# Patient Record
Sex: FEMALE | Race: WHITE | NOT HISPANIC OR LATINO | Employment: OTHER | URBAN - NONMETROPOLITAN AREA
[De-identification: names, ages, dates, MRNs, and addresses within clinical notes are randomized per-mention and may not be internally consistent; named-entity substitution may affect disease eponyms.]

---

## 2017-01-09 RX ORDER — OMEPRAZOLE 40 MG/1
40 CAPSULE, DELAYED RELEASE ORAL EVERY MORNING
Qty: 30 CAPSULE | Refills: 3 | Status: SHIPPED | OUTPATIENT
Start: 2017-01-09 | End: 2017-03-23

## 2017-01-10 ENCOUNTER — TRANSITIONAL CARE MANAGEMENT TELEPHONE ENCOUNTER (OUTPATIENT)
Dept: INTERNAL MEDICINE | Facility: CLINIC | Age: 77
End: 2017-01-10

## 2017-01-10 ENCOUNTER — OFFICE VISIT (OUTPATIENT)
Dept: INTERNAL MEDICINE | Facility: CLINIC | Age: 77
End: 2017-01-10

## 2017-01-10 VITALS
SYSTOLIC BLOOD PRESSURE: 110 MMHG | TEMPERATURE: 98.2 F | RESPIRATION RATE: 14 BRPM | BODY MASS INDEX: 22.26 KG/M2 | HEART RATE: 78 BPM | OXYGEN SATURATION: 96 % | HEIGHT: 62 IN | DIASTOLIC BLOOD PRESSURE: 58 MMHG | WEIGHT: 121 LBS

## 2017-01-10 DIAGNOSIS — J18.9 PNEUMONIA DUE TO INFECTIOUS ORGANISM, UNSPECIFIED LATERALITY, UNSPECIFIED PART OF LUNG: Primary | ICD-10-CM

## 2017-01-10 DIAGNOSIS — R51.9 HEADACHE, UNSPECIFIED HEADACHE TYPE: ICD-10-CM

## 2017-01-10 RX ORDER — MECLIZINE HCL 12.5 MG/1
TABLET ORAL
Refills: 0 | COMMUNITY
Start: 2017-01-07 | End: 2017-01-12 | Stop reason: SDUPTHER

## 2017-01-10 NOTE — MR AVS SNAPSHOT
Sadie Tijerina   1/10/2017 4:15 PM   Office Visit    Provider:  Kristian Wolff MD   Department:  [facility] PRIMARY CARE   Dept Phone:  [phone]                Your Full Care Plan              Today's Medication Changes          These changes are accurate as of: 1/10/17  5:06 PM.  If you have any questions, ask your nurse or doctor.               Medication(s)that have changed:     meclizine 12.5 MG tablet   Commonly known as:  ANTIVERT   otc   What changed:  Another medication with the same name was removed. Continue taking this medication, and follow the directions you see here.   Changed by:  Kristian Wolff MD         Stop taking medication(s)listed here:     buPROPion 100 MG tablet   Commonly known as:  WELLBUTRIN   Stopped by:  Kristian Wolff MD           fluconazole 150 MG tablet   Commonly known as:  DIFLUCAN   Stopped by:  Kristian Wolff MD           metroNIDAZOLE 500 MG tablet   Commonly known as:  FLAGYL   Stopped by:  Kristian Wolff MD           ondansetron ODT 4 MG disintegrating tablet   Commonly known as:  ZOFRAN-ODT   Stopped by:  Kristian Wolff MD                      Your Updated Medication List          This list is accurate as of: 1/10/17  5:06 PM.  Always use your most recent med list.                * albuterol (2.5 MG/3ML) 0.083% nebulizer solution   Commonly known as:  PROVENTIL   USE 1 AMPULE IN NEBULIZER FOUR TIMES A DAY AS NEEDED       * albuterol 108 (90 BASE) MCG/ACT inhaler   Commonly known as:  PROVENTIL HFA;VENTOLIN HFA   Inhale 1 puff Every 4 (Four) Hours As Needed for shortness of air.       amLODIPine 5 MG tablet   Commonly known as:  NORVASC   Take 1/2  tablet by mouth in morning and 1/2 at night if BP over 150       clonazePAM 0.5 MG tablet   Commonly known as:  KlonoPIN   Take 1 tablet by mouth Every Night.       estrogens (conjugated) 0.625 MG tablet   Commonly known as:  PREMARIN   Take daily for 21 days then do not take for 7 days.        Fluticasone Propionate (Inhal) 250 MCG/BLIST aerosol powder    Commonly known as:  FLOVENT DISKUS   Take 1 puff by mouth 2 (Two) Times a Day.       losartan 50 MG tablet   Commonly known as:  COZAAR   Take 1 tablet by mouth Daily.       meclizine 12.5 MG tablet   Commonly known as:  ANTIVERT       MUCINEX PO       omeprazole 40 MG capsule   Commonly known as:  priLOSEC   Take 1 capsule by mouth Every Morning.       pravastatin 20 MG tablet   Commonly known as:  PRAVACHOL   Take 1 tablet by mouth Every Night.       propranolol 60 MG tablet   Commonly known as:  INDERAL   TAKE 1 TABLET BY MOUTH ONE TIME A DAY       sertraline 50 MG tablet   Commonly known as:  ZOLOFT       SPIRIVA HANDIHALER 18 MCG per inhalation capsule   Generic drug:  tiotropium   INHALE CONTENTS 1 CAPSULE BY MOUTH ONE TIME A DAY       traMADol 50 MG tablet   Commonly known as:  ULTRAM       * Notice:  This list has 2 medication(s) that are the same as other medications prescribed for you. Read the directions carefully, and ask your doctor or other care provider to review them with you.            We Performed the Following     Ambulatory Referral to Neurology       You Were Diagnosed With        Codes Comments    Pneumonia due to infectious organism, unspecified laterality, unspecified part of lung    -  Primary ICD-10-CM: J18.9  ICD-9-CM: 136.9, 484.8     Headache, unspecified headache type     ICD-10-CM: R51  ICD-9-CM: 784.0       Instructions     None    Patient Instructions History      [facility] [facility] Hospital[facility]Done[facility] Signup     [facility] [employer] allows you to send messages to your doctor, view your test results, renew your prescriptions, schedule appointments, and more. To sign up, go to [employer] and click on the Sign Up Now link in the New User? box. Enter your [employer] Activation Code exactly as it appears below along with the last four digits of your Social Security Number and your Date of Birth () to complete the sign-up process. If  "you do not sign up before the expiration date, you must request a new code.    [employer] Activation Code: [email or web address]  Expires: 1/24/2017  5:06 PM    If you have questions, you can email [email or web address] or call [phone] to talk to our [employer] staff. Remember, [employer] is NOT to be used for urgent needs. For medical emergencies, dial 911.               Other Info from Your Visit           Allergies     Morphine And Related      Prednisone        Reason for Visit     Transitional Care Management Here for hospital follow up      Vital Signs     Blood Pressure Pulse Temperature Respirations Height Weight    110/58 78 98.2 °F (36.8 °C) 14 62\" (157.5 cm) 121 lb (54.9 kg)    Oxygen Saturation Body Mass Index Smoking Status             96% 22.13 kg/m2 Current Every Day Smoker         Problems and Diagnoses Noted     Pneumonia due to infectious organism, unspecified laterality, unspecified part of lung    -  Primary    Headache          "

## 2017-01-10 NOTE — PROGRESS NOTES
Transitional Care Follow Up Visit  Subjective     Sadie Jo Tijerina is a 76 y.o. female who presents for a transitional care management visit.    Within 48 business hours after discharge our office contacted her via telephone to coordinate her care and needs.      I reviewed and discussed the details of that call along with the discharge summary, hospital problems, inpatient lab results, inpatient diagnostic studies, and consultation reports with Sadie.        Date of TCM Phone Call 1/10/2017   [facility]   Date of Admission 12/26/2016   Date of Discharge 1/7/2017   Discharge Disposition Home or Self Care       History of Present Illness   Course During Hospital Stay:  Right upper lobe and middle lobe community-acquired pneumonia. COPD with acute exacerbation,    dysphagia and diarrhea resolved. Persistent headache with CT head done seeing Neurology.     No cough now no F/c , no chest pain no soa, Generalized weakness. Also headache, off and on, CT head showed microvascular diease. Also  C/o jaw pain bilateral. HTN stable without med. , COPD stable on medicine, Left-sided mastoid effusion. And dizzy ?due to this. Last CXR normal       The following portions of the patient's history were reviewed and updated as appropriate: allergies, current medications, past family history, past medical history, past social history, past surgical history and problem list.    Review of Systems   Constitutional: Negative.    Respiratory: Negative.    Cardiovascular: Negative.    Gastrointestinal: Negative.    Musculoskeletal: Negative.    Skin: Negative.    Neurological: Negative.    Psychiatric/Behavioral: Negative.        Objective   Physical Exam   Constitutional: She is oriented to person, place, and time. She appears well-nourished.   Neck: Neck supple.   Cardiovascular: Normal rate, regular rhythm and normal heart sounds.    Pulmonary/Chest: Effort normal and breath sounds normal.   Abdominal: Bowel  sounds are normal.   Neurological: She is alert and oriented to person, place, and time.   Skin: Skin is warm.   Psychiatric: She has a normal mood and affect.       Assessment/Plan   Sadie was seen today for transitional care management.    Diagnoses and all orders for this visit:    Pneumonia due to infectious organism, unspecified laterality, unspecified part of lung, last CXR NSD, continue present Mx    Headache, and dizzy follow up with neuro    CT head micro ischemic changes start ASA 81mg daily    TMJ, aleve or tylenol for now    HTN low now, meds on hold    Weakness, refuse PT    Left mastoid effusion dizzy, refuse to see ENT     3 weeks

## 2017-01-12 RX ORDER — MECLIZINE HCL 12.5 MG/1
12.5 TABLET ORAL 3 TIMES DAILY PRN
Qty: 90 TABLET | Refills: 0 | Status: SHIPPED | OUTPATIENT
Start: 2017-01-12 | End: 2017-02-28 | Stop reason: SDUPTHER

## 2017-01-15 ENCOUNTER — RESULTS ENCOUNTER (OUTPATIENT)
Dept: INTERNAL MEDICINE | Facility: CLINIC | Age: 77
End: 2017-01-15

## 2017-01-15 DIAGNOSIS — J18.9 PNEUMONIA DUE TO INFECTIOUS ORGANISM, UNSPECIFIED LATERALITY, UNSPECIFIED PART OF LUNG: ICD-10-CM

## 2017-01-18 ENCOUNTER — LAB (OUTPATIENT)
Dept: INTERNAL MEDICINE | Facility: CLINIC | Age: 77
End: 2017-01-18

## 2017-01-18 DIAGNOSIS — J18.9 PNEUMONIA DUE TO INFECTIOUS ORGANISM, UNSPECIFIED LATERALITY, UNSPECIFIED PART OF LUNG: Primary | ICD-10-CM

## 2017-01-18 PROCEDURE — 36415 COLL VENOUS BLD VENIPUNCTURE: CPT | Performed by: INTERNAL MEDICINE

## 2017-01-19 LAB
ALBUMIN SERPL-MCNC: 3.7 G/DL (ref 3.2–4.8)
ALBUMIN/GLOB SERPL: 1.4 G/DL (ref 1.5–2.5)
ALP SERPL-CCNC: 96 U/L (ref 25–100)
ALT SERPL-CCNC: 27 U/L (ref 7–40)
AST SERPL-CCNC: 26 U/L (ref 0–33)
BASOPHILS # BLD AUTO: 0.03 10*3/MM3 (ref 0–0.2)
BASOPHILS NFR BLD AUTO: 0.3 % (ref 0–1)
BILIRUB SERPL-MCNC: 0.2 MG/DL (ref 0.3–1.2)
BUN SERPL-MCNC: 14 MG/DL (ref 9–23)
BUN/CREAT SERPL: 15.6 (ref 7–25)
CALCIUM SERPL-MCNC: 9.4 MG/DL (ref 8.7–10.4)
CHLORIDE SERPL-SCNC: 103 MMOL/L (ref 99–109)
CO2 SERPL-SCNC: 24 MMOL/L (ref 20–31)
CREAT SERPL-MCNC: 0.9 MG/DL (ref 0.6–1.3)
EOSINOPHIL # BLD AUTO: 0.18 10*3/MM3 (ref 0.1–0.3)
EOSINOPHIL NFR BLD AUTO: 1.9 % (ref 0–3)
ERYTHROCYTE [DISTWIDTH] IN BLOOD BY AUTOMATED COUNT: 12.8 % (ref 11.3–14.5)
GLOBULIN SER CALC-MCNC: 2.7 GM/DL
GLUCOSE SERPL-MCNC: 89 MG/DL (ref 70–100)
HCT VFR BLD AUTO: 33.4 % (ref 34.5–44)
HGB BLD-MCNC: 10.6 G/DL (ref 11.5–15.5)
IMM GRANULOCYTES # BLD: 0.03 10*3/MM3 (ref 0–0.03)
IMM GRANULOCYTES NFR BLD: 0.3 % (ref 0–0.6)
LYMPHOCYTES # BLD AUTO: 1.97 10*3/MM3 (ref 0.6–4.8)
LYMPHOCYTES NFR BLD AUTO: 20.4 % (ref 24–44)
MCH RBC QN AUTO: 28.6 PG (ref 27–31)
MCHC RBC AUTO-ENTMCNC: 31.7 G/DL (ref 32–36)
MCV RBC AUTO: 90.3 FL (ref 80–99)
MONOCYTES # BLD AUTO: 0.68 10*3/MM3 (ref 0–1)
MONOCYTES NFR BLD AUTO: 7 % (ref 0–12)
NEUTROPHILS # BLD AUTO: 6.79 10*3/MM3 (ref 1.5–8.3)
NEUTROPHILS NFR BLD AUTO: 70.1 % (ref 41–71)
PLATELET # BLD AUTO: 336 10*3/MM3 (ref 150–450)
POTASSIUM SERPL-SCNC: 5.1 MMOL/L (ref 3.5–5.5)
PROT SERPL-MCNC: 6.4 G/DL (ref 5.7–8.2)
RBC # BLD AUTO: 3.7 10*6/MM3 (ref 3.89–5.14)
SODIUM SERPL-SCNC: 144 MMOL/L (ref 132–146)
WBC # BLD AUTO: 9.68 10*3/MM3 (ref 3.5–10.8)

## 2017-01-24 ENCOUNTER — OFFICE VISIT (OUTPATIENT)
Dept: NEUROLOGY | Facility: CLINIC | Age: 77
End: 2017-01-24

## 2017-01-24 VITALS
WEIGHT: 121 LBS | SYSTOLIC BLOOD PRESSURE: 120 MMHG | HEIGHT: 62 IN | HEART RATE: 74 BPM | BODY MASS INDEX: 22.26 KG/M2 | OXYGEN SATURATION: 96 % | DIASTOLIC BLOOD PRESSURE: 62 MMHG

## 2017-01-24 DIAGNOSIS — R42 DIZZINESS: Primary | ICD-10-CM

## 2017-01-24 DIAGNOSIS — G44.209 TENSION HEADACHE: ICD-10-CM

## 2017-01-24 PROCEDURE — 99214 OFFICE O/P EST MOD 30 MIN: CPT | Performed by: NURSE PRACTITIONER

## 2017-01-24 RX ORDER — AMITRIPTYLINE HYDROCHLORIDE 10 MG/1
10 TABLET, FILM COATED ORAL NIGHTLY
Qty: 30 TABLET | Refills: 3 | Status: SHIPPED | OUTPATIENT
Start: 2017-01-24 | End: 2017-03-23

## 2017-01-24 RX ORDER — ASPIRIN 81 MG/1
81 TABLET ORAL DAILY
COMMUNITY
End: 2018-07-25

## 2017-01-24 NOTE — MR AVS SNAPSHOT
[facility] NEUROLOGY  [phone]                    Sadie Tijerina   1/24/2017 3:00 PM   Office Visit    Dept Phone:  [phone]   Encounter #:  [id number]    Provider:  RUBY Paiz   Department:  [facility] NEUROLOGY                Your Full Care Plan              Today's Medication Changes          These changes are accurate as of: 1/24/17  3:23 PM.  If you have any questions, ask your nurse or doctor.               New Medication(s)Ordered:     amitriptyline 10 MG tablet   Commonly known as:  ELAVIL   Take 1 tablet by mouth Every Night.            Where to Get Your Medications      These medications were sent to [id number] - [place], [place] - [address] - [phone]  - [phone] FX  [address] [place] [address]     Phone:  [phone]     amitriptyline 10 MG tablet                  Your Updated Medication List          This list is accurate as of: 1/24/17  3:23 PM.  Always use your most recent med list.                * albuterol (2.5 MG/3ML) 0.083% nebulizer solution   Commonly known as:  PROVENTIL   USE 1 AMPULE IN NEBULIZER FOUR TIMES A DAY AS NEEDED       * albuterol 108 (90 BASE) MCG/ACT inhaler   Commonly known as:  PROVENTIL HFA;VENTOLIN HFA   Inhale 1 puff Every 4 (Four) Hours As Needed for shortness of air.       amitriptyline 10 MG tablet   Commonly known as:  ELAVIL   Take 1 tablet by mouth Every Night.       amLODIPine 5 MG tablet   Commonly known as:  NORVASC   Take 1/2  tablet by mouth in morning and 1/2 at night if BP over 150       aspirin 81 MG EC tablet       clonazePAM 0.5 MG tablet   Commonly known as:  KlonoPIN   Take 1 tablet by mouth Every Night.       estrogens (conjugated) 0.625 MG tablet   Commonly known as:  PREMARIN   Take daily for 21 days then do not take for 7 days.       Fluticasone Propionate (Inhal) 250 MCG/BLIST aerosol powder    Commonly known as:  FLOVENT DISKUS   Take 1 puff by  mouth 2 (Two) Times a Day.       losartan 50 MG tablet   Commonly known as:  COZAAR   Take 1 tablet by mouth Daily.       meclizine 12.5 MG tablet   Commonly known as:  ANTIVERT   Take 1 tablet by mouth 3 (Three) Times a Day As Needed for dizziness.       MUCINEX PO       omeprazole 40 MG capsule   Commonly known as:  priLOSEC   Take 1 capsule by mouth Every Morning.       pravastatin 20 MG tablet   Commonly known as:  PRAVACHOL   Take 1 tablet by mouth Every Night.       propranolol 60 MG tablet   Commonly known as:  INDERAL   TAKE 1 TABLET BY MOUTH ONE TIME A DAY       sertraline 50 MG tablet   Commonly known as:  ZOLOFT       SPIRIVA HANDIHALER 18 MCG per inhalation capsule   Generic drug:  tiotropium   INHALE CONTENTS 1 CAPSULE BY MOUTH ONE TIME A DAY       traMADol 50 MG tablet   Commonly known as:  ULTRAM       * Notice:  This list has 2 medication(s) that are the same as other medications prescribed for you. Read the directions carefully, and ask your doctor or other care provider to review them with you.            Instructions     None    Patient Instructions History      Upcoming Appointments     Visit Type Date Time Department    NEW PATIENT 2017  3:00 PM [facility] [place]    FOLLOW UP 2017  3:30 PM [facility]  RACHEL HALE      [employer] Signup     [facility] [employer] allows you to send messages to your doctor, view your test results, renew your prescriptions, schedule appointments, and more. To sign up, go to [employer] and click on the Sign Up Now link in the New User? box. Enter your [employer] Activation Code exactly as it appears below along with the last four digits of your Social Security Number and your Date of Birth () to complete the sign-up process. If you do not sign up before the expiration date, you must request a new code.    [employer] Activation Code: [email or web address]  Expires: 2/3/2017  5:35 AM    If you have questions, you can email  [email or web address] or call [phone] to talk to our MyChart staff. Remember, [employer] is NOT to be used for urgent needs. For medical emergencies, dial 911.               Other Info from Your Visit           Your Appointments     Jan 26, 2017  3:30 PM EST   Follow Up with Kristian Wolff MD   [facility] PRIMARY CARE (--)    [address] [place] [id number] [phone]           Arrive 15 minutes prior to appointment.              Allergies     Morphine And Related      Prednisone        Reason for Visit     Headache     Imaging Only here to discuss mri that was done while she was inpt in january. states that there is some arty hardening that they want to discuss.      Vital Signs     Blood Pressure Pulse Height Weight Oxygen Saturation Body Mass Index    120/62 74 62\" (157.5 cm) 121 lb (54.9 kg) 96% 22.13 kg/m2    Smoking Status                   Current Every Day Smoker             "

## 2017-01-24 NOTE — PROGRESS NOTES
Subjective   Sadie Tijerina is a 76 y.o. female     Chief Complaint   Patient presents with   • Headache   • Imaging Only     here to discuss mri that was done while she was inpt in january. states that there is some arty hardening that they want to discuss.       History of Present Illness     Pt is 75 yo female in clinic today to follow up Hosp d/c.  Patient was seen by Dr. Cosby while in the hospital and found to have tension headaches.  She also had an MRI which was reviewed with patient and daughters showing a mastoid effusion as well as microvascular disease.  Patient was started on a baby aspirin daily prior to leaving the hospital.  He does state that she still smokes 4-5 cigarettes a day we have discussed quitting smoking and patient states that this is a big improvement from what she is doing that she is working on smoke cessation.  Patient did ENT visit when Dr. sánchez last saw her.  After discussion with the patient as she understands now what the mastoid effusion was.  Patient is willing to go back to ENT and she sees Dr. Wolff on Thursday daughter states that they will make sure the Dr. Wolff  is aware that she is willing to go to ENT.  In the meantime patient had been started on Elavil by Dr. Cosby in the hospital in hopes of helping control her headaches as well as dry up some of the mastoid effusion.  Patient states she never received the prescription and daughter confirms that we will write that in clinic today.    Patient states that her headache is 2-3 out of 10 basis since discharge but she has bits and pieces of the headache every day.  She denies any nausea vomiting photophobia or phonophobia.  Does have complaints of dizziness states that it onset 2001 that it's been intermittent since then.  Patient denies ever passing out or having a fall associated with her dizziness.  That she did have her crystals aligned in approximately 2004 2005 which helped temporarily.  Again patient is encouraged to speak  "with her primary care about getting into see ENT.    The following portions of the patient's history were reviewed and updated as appropriate: allergies, current medications, past family history, past medical history, past social history, past surgical history and problem list.    Review of Systems   Constitutional: Positive for activity change. Negative for chills and fever.   HENT: Positive for ear pain. Negative for congestion, hearing loss, rhinorrhea and sore throat.    Eyes: Negative for pain, discharge and redness.   Respiratory: Negative for cough, shortness of breath, wheezing and stridor.    Cardiovascular: Negative for chest pain, palpitations and leg swelling.   Gastrointestinal: Negative for abdominal pain, constipation, nausea and vomiting.   Endocrine: Negative for cold intolerance, heat intolerance and polyphagia.   Genitourinary: Negative for dysuria, flank pain, frequency and urgency.   Musculoskeletal: Positive for arthralgias, neck pain and neck stiffness. Negative for joint swelling and myalgias.   Skin: Negative for pallor, rash and wound.   Allergic/Immunologic: Negative for environmental allergies.   Neurological: Positive for dizziness and headaches. Negative for tremors, seizures, syncope, facial asymmetry, speech difficulty, weakness, light-headedness and numbness.   Hematological: Negative for adenopathy.   Psychiatric/Behavioral: Negative for confusion and hallucinations. The patient is nervous/anxious.        Objective         Vitals:    01/24/17 1443   BP: 120/62   Pulse: 74   SpO2: 96%   Weight: 121 lb (54.9 kg)   Height: 62\" (157.5 cm)     GENERAL: Patient is pleasant, cooperative, appears to be stated age.  Body habitus is endomorphic.  SKIN AND EXTREMITIES:  No skin rashes or lesions are noted.  No cyanosis, clubbing or edema of the extremities.    HEAD:  Head is normocephalic and atraumatic.    NECK: Neck are non-tender without thyromegaly or adenopathy.  Carotic upstrokes are " 1+/4.  No cranial or cervical bruits.  The neck is supple with a full range of motion.   CARDIOVASCULAR:  Regular rate and rhythm with normal S1 and S2 without rub or gallop.  RESPIRATORY:  Clear to auscultation without wheezes or crackle   ABDOMEN:  Soft and non-tender, positive bowel sound without hepatosplenomegaly  BACK:  Back is straight without midline defect.    PSYCH:  Higher cortical function/mental status:  The patient is alert.  She is oriented x3 to time, place and person.  Recent and the remote memory appear normal.  The patient has a good fund of knowledge.  There is no visual or auditory hallucination or suicidal or homicidal ideation.  SPEECH:There is no gross evidence of aphasia, dysarthria or agnosia.      CRANIAL NERVES:    Visual fields are intact to confrontation testing.   No papilledema, hemorrhages or exudates.  Extraocular movements are full and smooth with normal pursuits and saccades.  No nystagmus noted.  The face is symmetric.  MOTOR: Strength is 5/5 throughout with normal tone and bulk with the following exceptions.  No involuntary movements noted.    MUSCULOSKELETAL: Range of motion normal, no clubbing, cyanosis, or edema.  No joint swelling.     Results for orders placed or performed in visit on 01/18/17   Comprehensive Metabolic Panel   Result Value Ref Range    Glucose 89 70 - 100 mg/dL    BUN 14 9 - 23 mg/dL    Creatinine 0.90 0.60 - 1.30 mg/dL    eGFR Non African Am 61 >60 mL/min/1.73    eGFR African Am 73 >60 mL/min/1.73    BUN/Creatinine Ratio 15.6 7.0 - 25.0    Sodium 144 132 - 146 mmol/L    Potassium 5.1 3.5 - 5.5 mmol/L    Chloride 103 99 - 109 mmol/L    Total CO2 24.0 20.0 - 31.0 mmol/L    Calcium 9.4 8.7 - 10.4 mg/dL    Total Protein 6.4 5.7 - 8.2 g/dL    Albumin 3.70 3.20 - 4.80 g/dL    Globulin 2.7 gm/dL    A/G Ratio 1.4 (L) 1.5 - 2.5 g/dL    Total Bilirubin 0.2 (L) 0.3 - 1.2 mg/dL    Alkaline Phosphatase 96 25 - 100 U/L    AST (SGOT) 26 0 - 33 U/L    ALT (SGPT) 27 7 - 40  U/L   CBC & Differential   Result Value Ref Range    WBC 9.68 3.50 - 10.80 10*3/mm3    RBC 3.70 (L) 3.89 - 5.14 10*6/mm3    Hemoglobin 10.6 (L) 11.5 - 15.5 g/dL    Hematocrit 33.4 (L) 34.5 - 44.0 %    MCV 90.3 80.0 - 99.0 fL    MCH 28.6 27.0 - 31.0 pg    MCHC 31.7 (L) 32.0 - 36.0 g/dL    RDW 12.8 11.3 - 14.5 %    Platelets 336 150 - 450 10*3/mm3    Neutrophil Rel % 70.1 41.0 - 71.0 %    Lymphocyte Rel % 20.4 (L) 24.0 - 44.0 %    Monocyte Rel % 7.0 0.0 - 12.0 %    Eosinophil Rel % 1.9 0.0 - 3.0 %    Basophil Rel % 0.3 0.0 - 1.0 %    Neutrophils Absolute 6.79 1.50 - 8.30 10*3/mm3    Lymphocytes Absolute 1.97 0.60 - 4.80 10*3/mm3    Monocytes Absolute 0.68 0.00 - 1.00 10*3/mm3    Eosinophils Absolute 0.18 0.10 - 0.30 10*3/mm3    Basophils Absolute 0.03 0.00 - 0.20 10*3/mm3    Immature Granulocyte Rel % 0.3 0.0 - 0.6 %    Immature Grans Absolute 0.03 0.00 - 0.03 10*3/mm3       Ct Chest With Contrast    Result Date: 2016  Narrative:  [place] [id number] [phone]     CT Report    [id number]     Signed    PATIENT NAME:  SUSAN HOWELL MRN:  [id number]  :  1940 ACCT#:  [id number]  ATTENDING:   DATE OF EXAM:  16  PRIMARY CARE:  YESI RASHEED MD LOCATION:  ED    ORDERING PHYSICIAN:  JENNIFER SEGAL  PROCEDURE(s):  CT CHEST WITH CONTRAST  ORDER NUMBER(s):  [id number]    CC:   JANE SEGAL; YESI RASHEED MD           FINAL REPORT      TECHNIQUE:   Thin section axial images through the chest were obtained during the arterial phase of IV contrast administration.      CLINICAL HISTORY:   SOA, ABDOMINAL PAIN      FINDINGS:   There are patchy ankur-bronchovascular opacities in areas of right middle and upper lobes, likely due to bronchitis and/or bronchopneumonia. There is evidence of calcified granulomatous disease. The lungs are otherwise clear. The pulmonary arteries show no filling defect. There is no pleural disease, adenopathy or osseous  abnormality.     IMPRESSION-  Lung opacities in the right upper and middle lobes, consistent with bronchitis/bronchopneumonia.      Authenticated by Klever Og M.D. on 2016 08:00:12 PM     DICTATED BY:      [facility].RADIOLOG[facility]  DICTATED DATE/TIME:      16  TRANSCRIBED DATE/TIME:      16    CC: YESI RASHEED MD; JENNIFER SEGAL PA-C      Mri Brain Without Contrast    Result Date: 1/3/2017  Narrative:  [address] [address]   [phone]     MRI    [id number]     Signed    PATIENT NAME:  SUSAN HOWELL MRN:  [id number]  :  1940 ACCT#:  [id number]  ATTENDING:  MARICRUZ ELI MD DATE OF EXAM:  17  PRIMARY CARE:  YESI RASHEED MD LOCATION:  [facility]    ORDERING PHYSICIAN:  EZRA LR  PROCEDURE(s):  MR BRAIN W/O CONTRAST  ORDER NUMBER(s):  [id number]    CC:   RUBY LR; YESI RASHEED MD         PROCEDURE: MR BRAIN W/O CONTRAST-       HISTORY: headaches, dizziness       PROCEDURE: Multiplanar multisequence imaging of the brain was performed   without the use of intravenous contrast.       FINDINGS: There are FLAIR hyperintensities in the periventricular and   subcortical white matter consistent with chronic small vessel ischemic   change. There is no mass, mass effect or midline shift. There is no   hydrocephalus. There are no areas of restricted diffusion.           The midbrain, krista, cerebellum and craniocervical junction are   unremarkable. The sella and pituitary gland are within normal limits.   The major intracranial vasculature demonstrates the expected flow   related signal. There is a left mastoid effusion. The remainder the   paranasal sinuses are clear.      IMPRESSION-  1. Findings consistent with chronic small vessel ischemic change with no   acute intracranial abnormality.   2. Left mastoid effusion.                 This report was finalized on 1/3/2017 2:44 PM by Sylvain Dent M.D..          DICTATED BY:      FARRAH ACOSTA MD  DICTATED DATE/TIME:      17  TRANSCRIBED DATE/TIME:      17    CC: RUBY SIERRA; YESI RASHEED MD      Ct Abdomen Pelvis With Contrast    Result Date: 2016  Narrative:  [address] [address]   [phone]     CT Report    [id number]     Signed    PATIENT NAME:  SUSAN HOWELL MRN:  [id number]  :  1940 ACCT#:  [id number]  ATTENDING:   DATE OF EXAM:  16  PRIMARY CARE:  YESI RASHEED MD LOCATION:  ED    ORDERING PHYSICIAN:  JENNIFER SEGAL  PROCEDURE(s):  CT ABD/PELVIS WITH CONTRAST  ORDER NUMBER(s):  [id number]    CC:   JANE SEGAL; YESI RASHEED MD           FINAL REPORT      TECHNIQUE:   Routine axial images through the abdomen and pelvis were obtained following IV and oral contrast administration.      CLINICAL HISTORY:   SOA, ABDOMINAL PAIN      FINDINGS:   Abdomen: The gallbladder has been removed. There is an exophytic left renal cyst. The solid abdominal organs and ureters are otherwise normal. Other than mild left sided diverticulosis the GI tract is normal, with no evidence of appendicitis.      Pelvis: The uterus and ovaries are absent. The urinary bladder is normal. There is no pelvic or abdominal ascites, adenopathy or significant osseous abnormality.     IMPRESSION-  No acute abnormality of the abdomen or pelvis.      Authenticated by Klever Og M.D. on 2016 07:58:26 PM     [facility]ATED BY:      [facility]  DICTATED DATE/TIME:      16  TRANSCRIBED DATE/TIME:      16    CC: YESI RASHEED MD; JENNIFER SEGAL PA-C      Xr Chest 1 View    Result Date: 2017  Narrative:  [address] [address]   [phone]     Diagnostic Imaging Report    [id number]     Signed    PATIENT NAME:  SUSAN HOWELL MRN:  [id number]  :  1940 ACCT#:  [id number]  ATTENDING:  MARICRUZ  SREEDHAR THOMPSON DATE OF EXAM:  17  PRIMARY CARE:  YESI RASHEED MD LOCATION:  [facility]    ORDERING PHYSICIAN:  JIMMY PATINO MD  PROCEDURE(s):  CHEST PORTABLE  ORDER NUMBER(s):  [id number]    CC:  YESI RASHEED MD; SHERI PATINO         FINAL REPORT      CLINICAL HISTORY:   F/U PNA      COMPARISON:   2013      FINDINGS:   The heart size is normal. The mediastinum is normal. There is no focal infiltrate or edema. There are no pleural effusions. There is no pneumothorax. There is no osseous abnormality.     IMPRESSION-  No acute cardiopulmonary process      Authenticated by OK Ramirez MD on 2017 09:02:46 AM     DICTATED BY:      [facility].RADIOLOGY  DICTATED DATE/TIME:      17  TRANSCRIBED DATE/TIME:      17    CC: YESI RASHEED MD; SHERI PATINO      Xr Chest 1 View    Result Date: 2016  Narrative:  [place] [id number]   [phone]     Diagnostic Imaging Report    [id number]     Signed    PATIENT NAME:  SUSAN HOWELL MRN:  [id number]  :  1940 ACCT#:  [id number]  ATTENDING:  MARICRUZ ELI MD DATE OF EXAM:  16  PRIMARY CARE:  YESI RASHEED MD LOCATION:  [facility]    ORDERING PHYSICIAN:  EZRA LR  PROCEDURE(s):  CHEST PORTABLE  ORDER NUMBER(s):  [id number]    CC:   RUBY LR; YESI RASHEED MD         PROCEDURE: CHEST PORTABLE-       HISTORY: dyspnea       COMPARISON: 2016.       FINDINGS: The heart is normal in size. The mediastinum is unremarkable.   There are worsening bilateral pleural effusions and bibasilar opacities.   There is no pneumothorax.  There are no acute osseous abnormalities.          IMPRESSION- Worsening bilateral pleural effusions and bibasilar airspace  disease.       Continued followup is recommended.       This report was finalized on 2016 3:44 PM by Sylvain Acosta M.D..         DICTATED BY:      SYLVAIN ACOSTA MD  DICTATED  DATE/TIME:      16 1544  TRANSCRIBED DATE/TIME:      16 1544    CC: RUBY SIERRA; YESI RASHEED MD      Xr Chest 1 View    Result Date: 2016  Narrative:  [place] [address] [place] [id number]   [phone]     Diagnostic Imaging Report    [id number]     Signed    PATIENT NAME:  SUSAN HOWELL MRN:  [id number]  :  1940 ACCT#:  [id number]  ATTENDING:  MARICRUZ ELI MD DATE OF EXAM:  16  PRIMARY CARE:  YESI RASHEED MD LOCATION:  [facility]    ORDERING PHYSICIAN:  EZRA LR  PROCEDURE(s):  CHEST PORTABLE  ORDER NUMBER(s):  [id number]    CC:   RUBY LR; YESI RASHEED MD         PROCEDURE: CHEST PORTABLE-       HISTORY: Right lobe pneumonia       COMPARISON: 2016.       FINDINGS: The heart is normal in size. The mediastinum is unremarkable.   The patient's known right upper and right middle lobe airspace disease   is not well appreciated on this exam. There are chronic hazy opacities   in the lung bases likely areas of scarring. There is no pneumothorax.    There are no acute osseous abnormalities.          IMPRESSION- The patient's known right upper and right middle lobe  airspace disease is not well seen on this exam.       Continued followup is recommended.       This report was finalized on 2016 2:36 PM by Sylvain Acosta M.D..         DICTATED BY:      SYLVAIN ACOSTA MD  DICTATED DATE/TIME:      16 1435  TRANSCRIBED DATE/TIME:      16 1436    CC: RUBY SIERRA; YESI RASHEED MD        I have personally reviewed the above labs and imaging studies.    Assessment/Plan    1.  Tension headache: Patient and are willing to go and pick amitriptyline low dose and started today.  Did review with patient that he can dry her out and watch for her urine output.  Also aware that it may cause mild confusion if that happens again we need to have her return to clinic.    2.  Right  ear pain/mastoid effusion on MRI: Patient will be seeing her primary care this week and is willing to have a consult placed for ENT.  Patient also is aware that the amitriptyline may assist with drying out the mastoid effusion.    3.  Patient discussed in clinic in detail today her brother-in-law who was patients with dementia and his vehicle was found in a pond with brother-in-law in the vehicle.  Apparently her family member had been missing for over a month.  Patient gets teary-eyed in clinic today and states that her brother-in-law will attempt to the nursing home and see her has been at least once a week.  Daughter of helping patient wanting to make sure she is not becoming too depressed.

## 2017-01-26 ENCOUNTER — OFFICE VISIT (OUTPATIENT)
Dept: INTERNAL MEDICINE | Facility: CLINIC | Age: 77
End: 2017-01-26

## 2017-01-26 VITALS
DIASTOLIC BLOOD PRESSURE: 62 MMHG | TEMPERATURE: 97.5 F | SYSTOLIC BLOOD PRESSURE: 130 MMHG | HEIGHT: 62 IN | HEART RATE: 68 BPM | WEIGHT: 123 LBS | BODY MASS INDEX: 22.63 KG/M2 | OXYGEN SATURATION: 97 % | RESPIRATION RATE: 14 BRPM

## 2017-01-26 DIAGNOSIS — F41.8 MIXED ANXIETY DEPRESSIVE DISORDER: ICD-10-CM

## 2017-01-26 DIAGNOSIS — G25.0 ESSENTIAL TREMOR: ICD-10-CM

## 2017-01-26 DIAGNOSIS — H81.10 BENIGN PAROXYSMAL POSITIONAL VERTIGO, UNSPECIFIED LATERALITY: ICD-10-CM

## 2017-01-26 DIAGNOSIS — I10 ESSENTIAL HYPERTENSION: ICD-10-CM

## 2017-01-26 DIAGNOSIS — G25.81 RESTLESS LEGS SYNDROME: ICD-10-CM

## 2017-01-26 DIAGNOSIS — J43.9 PULMONARY EMPHYSEMA, UNSPECIFIED EMPHYSEMA TYPE (HCC): ICD-10-CM

## 2017-01-26 DIAGNOSIS — K21.9 GASTROESOPHAGEAL REFLUX DISEASE WITHOUT ESOPHAGITIS: ICD-10-CM

## 2017-01-26 DIAGNOSIS — E78.5 HYPERLIPIDEMIA, UNSPECIFIED HYPERLIPIDEMIA TYPE: Primary | ICD-10-CM

## 2017-01-26 PROCEDURE — 99213 OFFICE O/P EST LOW 20 MIN: CPT | Performed by: INTERNAL MEDICINE

## 2017-01-26 RX ORDER — RANITIDINE 150 MG/1
150 TABLET ORAL DAILY
COMMUNITY
End: 2017-03-23

## 2017-01-26 NOTE — PROGRESS NOTES
Subjective   Sadie Tijerina is a 76 y.o. female.     Chief Complaint   Patient presents with   • Hypertension     here for follow up - discuss meds        History of Present Illness   Patient here for follow-up.  Pneumonia resolved on chest x-ray.  Coughing is also improved.  Denies any fever chills no chest pain no short of breath.  Patient also has low blood now.  White blood cell normalized.  Anxiety stable medication.  Hypertension without medication stable now.  Headache MRI of the head showed ischemic changes.  Continues to have some dizziness maxillary effusion or MRI patient is a pending to see ENT doctor.    Current Outpatient Prescriptions:   •  albuterol (PROVENTIL HFA;VENTOLIN HFA) 108 (90 BASE) MCG/ACT inhaler, Inhale 1 puff Every 4 (Four) Hours As Needed for shortness of air., Disp: 18 g, Rfl: 3  •  albuterol (PROVENTIL) (2.5 MG/3ML) 0.083% nebulizer solution, USE 1 AMPULE IN NEBULIZER FOUR TIMES A DAY AS NEEDED, Disp: 150 mL, Rfl: 4  •  amitriptyline (ELAVIL) 10 MG tablet, Take 1 tablet by mouth Every Night., Disp: 30 tablet, Rfl: 3  •  amLODIPine (NORVASC) 5 MG tablet, Take 1/2  tablet by mouth in morning and 1/2 at night if BP over 150, Disp: 30 tablet, Rfl: 5  •  aspirin 81 MG EC tablet, Take 81 mg by mouth Daily., Disp: , Rfl:   •  clonazePAM (KlonoPIN) 0.5 MG tablet, Take 1 tablet by mouth Every Night., Disp: 30 tablet, Rfl: 2  •  estrogens, conjugated, (PREMARIN) 0.625 MG tablet, Take daily for 21 days then do not take for 7 days., Disp: 90 tablet, Rfl: 2  •  Fluticasone Propionate, Inhal, (FLOVENT DISKUS) 250 MCG/BLIST aerosol powder , Take 1 puff by mouth 2 (Two) Times a Day., Disp: 1 each, Rfl: 5  •  GuaiFENesin (MUCINEX PO), Take  by mouth., Disp: , Rfl:   •  losartan (COZAAR) 50 MG tablet, Take 1 tablet by mouth Daily., Disp: 45 tablet, Rfl: 3  •  meclizine (ANTIVERT) 12.5 MG tablet, Take 1 tablet by mouth 3 (Three) Times a Day As Needed for dizziness., Disp: 90 tablet, Rfl: 0  •   pravastatin (PRAVACHOL) 20 MG tablet, Take 1 tablet by mouth Every Night., Disp: 90 tablet, Rfl: 3  •  propranolol (INDERAL) 60 MG tablet, TAKE 1 TABLET BY MOUTH ONE TIME A DAY , Disp: 30 tablet, Rfl: 4  •  raNITIdine (ZANTAC) 150 MG tablet, Take 150 mg by mouth Daily., Disp: , Rfl:   •  sertraline (ZOLOFT) 50 MG tablet, Take 1.5 tablets by mouth daily., Disp: , Rfl:   •  SPIRIVA HANDIHALER 18 MCG per inhalation capsule, INHALE CONTENTS 1 CAPSULE BY MOUTH ONE TIME A DAY , Disp: 30 capsule, Rfl: 8  •  traMADol (ULTRAM) 50 MG tablet, Take 1 tablet by mouth daily as needed., Disp: , Rfl:   •  omeprazole (priLOSEC) 40 MG capsule, Take 1 capsule by mouth Every Morning., Disp: 30 capsule, Rfl: 3    The following portions of the patient's history were reviewed and updated as appropriate: allergies, current medications, past family history, past medical history, past social history, past surgical history and problem list.    Review of Systems   Constitutional: Negative.    Respiratory: Negative.    Cardiovascular: Negative.    Gastrointestinal: Negative.    Musculoskeletal: Negative.    Skin: Negative.    Neurological: Negative.    Psychiatric/Behavioral: Negative.        Objective   Physical Exam   Constitutional: She is oriented to person, place, and time. She appears well-developed and well-nourished.   Neck: Neck supple.   Cardiovascular: Normal rate, regular rhythm and normal heart sounds.    Pulmonary/Chest: Effort normal and breath sounds normal.   Abdominal: Soft. Bowel sounds are normal.   Neurological: She is alert and oriented to person, place, and time.   Psychiatric: She has a normal mood and affect. Her behavior is normal.       All tests have been reviewed.    Assessment/Plan   There are no diagnoses linked to this encounter.          Pneumonia  CXR NSD, resolving after abx     Headache, and dizzy seen by neuro,Left mastoid effusion, pending to see ENT     MRI  head micro ischemic changes continue ASA 81mg  daily     TMJ, aleve or tylenol for now     HTN continue diet only resume losartan, but lower to 25mg daily    Anemia we will repeat next time CBC              8 weeks

## 2017-01-26 NOTE — MR AVS SNAPSHOT
Sadie Tijerina   1/26/2017 3:30 PM   Office Visit    Provider:  Kristian Wolff MD   Department:  [facility] PRIMARY CARE   Dept Phone:  [phone]                Your Full Care Plan              Today's Medication Changes          These changes are accurate as of: 1/26/17  4:09 PM.  If you have any questions, ask your nurse or doctor.               Stop taking medication(s)listed here:     amLODIPine 5 MG tablet   Commonly known as:  NORVASC   Stopped by:  Kristian Wolff MD                      Your Updated Medication List          This list is accurate as of: 1/26/17  4:09 PM.  Always use your most recent med list.                * albuterol (2.5 MG/3ML) 0.083% nebulizer solution   Commonly known as:  PROVENTIL   USE 1 AMPULE IN NEBULIZER FOUR TIMES A DAY AS NEEDED       * albuterol 108 (90 BASE) MCG/ACT inhaler   Commonly known as:  PROVENTIL HFA;VENTOLIN HFA   Inhale 1 puff Every 4 (Four) Hours As Needed for shortness of air.       amitriptyline 10 MG tablet   Commonly known as:  ELAVIL   Take 1 tablet by mouth Every Night.       aspirin 81 MG EC tablet       clonazePAM 0.5 MG tablet   Commonly known as:  KlonoPIN   Take 1 tablet by mouth Every Night.       estrogens (conjugated) 0.625 MG tablet   Commonly known as:  PREMARIN   Take daily for 21 days then do not take for 7 days.       Fluticasone Propionate (Inhal) 250 MCG/BLIST aerosol powder    Commonly known as:  FLOVENT DISKUS   Take 1 puff by mouth 2 (Two) Times a Day.       losartan 50 MG tablet   Commonly known as:  COZAAR   Take 1 tablet by mouth Daily.       meclizine 12.5 MG tablet   Commonly known as:  ANTIVERT   Take 1 tablet by mouth 3 (Three) Times a Day As Needed for dizziness.       MUCINEX PO       omeprazole 40 MG capsule   Commonly known as:  priLOSEC   Take 1 capsule by mouth Every Morning.       pravastatin 20 MG tablet   Commonly known as:  PRAVACHOL   Take 1 tablet by mouth Every Night.       propranolol 60 MG tablet   Commonly known as:  INDERAL   TAKE 1 TABLET BY MOUTH ONE TIME A DAY       raNITIdine 150 MG tablet   Commonly known as:  ZANTAC       sertraline 50 MG tablet   Commonly known as:  ZOLOFT       SPIRIVA HANDIHALER 18 MCG per inhalation capsule   Generic drug:  tiotropium   INHALE CONTENTS 1 CAPSULE BY MOUTH ONE TIME A DAY       traMADol 50 MG tablet   Commonly known as:  ULTRAM       * Notice:  This list has 2 medication(s) that are the same as other medications prescribed for you. Read the directions carefully, and ask your doctor or other care provider to review them with you.            You Were Diagnosed With        Codes Comments    Hyperlipidemia, unspecified hyperlipidemia type    -  Primary ICD-10-CM: E78.5  ICD-9-CM: 272.4     Essential hypertension     ICD-10-CM: I10  ICD-9-CM: 401.9     Gastroesophageal reflux disease without esophagitis     ICD-10-CM: K21.9  ICD-9-CM: 530.81     Pulmonary emphysema, unspecified emphysema type     ICD-10-CM: J43.9  ICD-9-CM: 492.8     Benign paroxysmal positional vertigo, unspecified laterality     ICD-10-CM: H81.10  ICD-9-CM: 386.11     Essential tremor     ICD-10-CM: G25.0  ICD-9-CM: 333.1     Mixed anxiety depressive disorder     ICD-10-CM: F41.8  ICD-9-CM: 300.4     Restless legs syndrome     ICD-10-CM: G25.81  ICD-9-CM: 333.94       Instructions     None    Patient Instructions History      [employer]inel[employer] Signup     [facility] Spinel[employer] allows you to send messages to your doctor, view your test results, renew your prescriptions, schedule appointments, and more. To sign up, go to [employer] and click on the Sign Up Now link in the New User? box. Enter your [employer] Activation Code exactly as it appears below along with the last four digits of your Social Security Number and your Date of Birth () to complete the sign-up process. If you do not sign up before the expiration date, you must request a new code.    [employer]inel[employer] Activation  "Code: [email or web address]  Expires: 2/3/2017  5:35 AM    If you have questions, you can email [email or web address] or call [phone] to talk to our MyChart staff. Remember, [employer] is NOT to be used for urgent needs. For medical emergencies, dial 911.               Other Info from Your Visit           Your Appointments     Feb 09, 2017  2:00 PM EST   Follow Up with RUBY Paiz   [facility] NEUROLOGY (--)    [address] [facility] [address], [address] [address] [phone]           Arrive 15 minutes prior to appointment.              Allergies     Morphine And Related      Prednisone        Reason for Visit     Hypertension here for follow up - discuss meds       Vital Signs     Blood Pressure Pulse Temperature Respirations Height Weight    130/62 68 97.5 °F (36.4 °C) 14 62\" (157.5 cm) 123 lb (55.8 kg)    Oxygen Saturation Body Mass Index Smoking Status             97% 22.5 kg/m2 Current Every Day Smoker         Problems and Diagnoses Noted     Benign positional vertigo    Essential tremor    Acid reflux disease    High cholesterol or triglycerides    High blood pressure    Mixed anxiety depressive disorder    Emphysema    Restless legs syndrome      "

## 2017-02-06 PROCEDURE — 99496 TRANSJ CARE MGMT HIGH F2F 7D: CPT | Performed by: INTERNAL MEDICINE

## 2017-02-08 RX ORDER — LEVOFLOXACIN 500 MG/1
500 TABLET, FILM COATED ORAL DAILY
Qty: 7 TABLET | Refills: 0 | Status: SHIPPED | OUTPATIENT
Start: 2017-02-08 | End: 2017-03-23

## 2017-02-21 RX ORDER — CLONAZEPAM 0.5 MG/1
TABLET ORAL
Qty: 30 TABLET | Refills: 1 | Status: SHIPPED | OUTPATIENT
Start: 2017-02-21 | End: 2017-04-11 | Stop reason: SDUPTHER

## 2017-02-28 RX ORDER — MECLIZINE HCL 12.5 MG/1
TABLET ORAL
Qty: 90 TABLET | Refills: 0 | Status: SHIPPED | OUTPATIENT
Start: 2017-02-28 | End: 2017-04-11 | Stop reason: SDUPTHER

## 2017-03-13 ENCOUNTER — LAB (OUTPATIENT)
Dept: INTERNAL MEDICINE | Facility: CLINIC | Age: 77
End: 2017-03-13

## 2017-03-13 ENCOUNTER — ANTICOAGULATION VISIT (OUTPATIENT)
Dept: INTERNAL MEDICINE | Facility: CLINIC | Age: 77
End: 2017-03-13

## 2017-03-13 DIAGNOSIS — R26.89 BALANCE PROBLEM: Primary | ICD-10-CM

## 2017-03-13 DIAGNOSIS — D64.9 ANEMIA, UNSPECIFIED TYPE: ICD-10-CM

## 2017-03-13 DIAGNOSIS — D64.9 ANEMIA, UNSPECIFIED TYPE: Primary | ICD-10-CM

## 2017-03-13 PROCEDURE — 36415 COLL VENOUS BLD VENIPUNCTURE: CPT | Performed by: INTERNAL MEDICINE

## 2017-03-14 LAB
BASOPHILS # BLD AUTO: 0.05 10*3/MM3 (ref 0–0.2)
BASOPHILS NFR BLD AUTO: 0.5 % (ref 0–1)
EOSINOPHIL # BLD AUTO: 0.24 10*3/MM3 (ref 0.1–0.3)
EOSINOPHIL NFR BLD AUTO: 2.6 % (ref 0–3)
ERYTHROCYTE [DISTWIDTH] IN BLOOD BY AUTOMATED COUNT: 13.8 % (ref 11.3–14.5)
HCT VFR BLD AUTO: 41.6 % (ref 34.5–44)
HGB BLD-MCNC: 13.2 G/DL (ref 11.5–15.5)
IMM GRANULOCYTES # BLD: 0.02 10*3/MM3 (ref 0–0.03)
IMM GRANULOCYTES NFR BLD: 0.2 % (ref 0–0.6)
LYMPHOCYTES # BLD AUTO: 3.01 10*3/MM3 (ref 0.6–4.8)
LYMPHOCYTES NFR BLD AUTO: 33.1 % (ref 24–44)
MCH RBC QN AUTO: 29.1 PG (ref 27–31)
MCHC RBC AUTO-ENTMCNC: 31.7 G/DL (ref 32–36)
MCV RBC AUTO: 91.8 FL (ref 80–99)
MONOCYTES # BLD AUTO: 0.96 10*3/MM3 (ref 0–1)
MONOCYTES NFR BLD AUTO: 10.5 % (ref 0–12)
NEUTROPHILS # BLD AUTO: 4.82 10*3/MM3 (ref 1.5–8.3)
NEUTROPHILS NFR BLD AUTO: 53.1 % (ref 41–71)
PLATELET # BLD AUTO: 276 10*3/MM3 (ref 150–450)
RBC # BLD AUTO: 4.53 10*6/MM3 (ref 3.89–5.14)
WBC # BLD AUTO: 9.1 10*3/MM3 (ref 3.5–10.8)

## 2017-03-23 ENCOUNTER — OFFICE VISIT (OUTPATIENT)
Dept: INTERNAL MEDICINE | Facility: CLINIC | Age: 77
End: 2017-03-23

## 2017-03-23 VITALS
OXYGEN SATURATION: 94 % | SYSTOLIC BLOOD PRESSURE: 136 MMHG | BODY MASS INDEX: 22.08 KG/M2 | RESPIRATION RATE: 14 BRPM | DIASTOLIC BLOOD PRESSURE: 66 MMHG | WEIGHT: 120 LBS | HEIGHT: 62 IN | HEART RATE: 86 BPM | TEMPERATURE: 97.3 F

## 2017-03-23 DIAGNOSIS — H81.10 BENIGN PAROXYSMAL POSITIONAL VERTIGO, UNSPECIFIED LATERALITY: ICD-10-CM

## 2017-03-23 DIAGNOSIS — G25.0 ESSENTIAL TREMOR: ICD-10-CM

## 2017-03-23 DIAGNOSIS — I10 ESSENTIAL HYPERTENSION: ICD-10-CM

## 2017-03-23 DIAGNOSIS — J43.9 PULMONARY EMPHYSEMA, UNSPECIFIED EMPHYSEMA TYPE (HCC): ICD-10-CM

## 2017-03-23 DIAGNOSIS — K21.9 GASTROESOPHAGEAL REFLUX DISEASE WITHOUT ESOPHAGITIS: ICD-10-CM

## 2017-03-23 DIAGNOSIS — E78.5 HYPERLIPIDEMIA, UNSPECIFIED HYPERLIPIDEMIA TYPE: Primary | ICD-10-CM

## 2017-03-23 DIAGNOSIS — F41.8 MIXED ANXIETY DEPRESSIVE DISORDER: ICD-10-CM

## 2017-03-23 DIAGNOSIS — E55.9 VITAMIN D DEFICIENCY: ICD-10-CM

## 2017-03-23 DIAGNOSIS — G25.81 RESTLESS LEGS SYNDROME: ICD-10-CM

## 2017-03-23 PROCEDURE — 99214 OFFICE O/P EST MOD 30 MIN: CPT | Performed by: INTERNAL MEDICINE

## 2017-03-23 RX ORDER — PROPRANOLOL HYDROCHLORIDE 60 MG/1
60 TABLET ORAL DAILY
Qty: 90 TABLET | Refills: 3 | Status: SHIPPED | OUTPATIENT
Start: 2017-03-23 | End: 2018-07-27 | Stop reason: SDUPTHER

## 2017-03-23 RX ORDER — BENZONATATE 200 MG/1
200 CAPSULE ORAL 3 TIMES DAILY PRN
Qty: 45 CAPSULE | Refills: 1 | Status: SHIPPED | OUTPATIENT
Start: 2017-03-23 | End: 2017-08-07

## 2017-03-23 RX ORDER — TRAMADOL HYDROCHLORIDE 50 MG/1
50 TABLET ORAL DAILY PRN
Qty: 30 TABLET | Refills: 2 | Status: SHIPPED | OUTPATIENT
Start: 2017-03-23 | End: 2018-02-06 | Stop reason: HOSPADM

## 2017-03-23 NOTE — PROGRESS NOTES
Subjective   Sadie Tijerina is a 76 y.o. female.     Chief Complaint   Patient presents with   • Hypertension     Here for follow up on labs   • Med Refill     discuss tessalon pearls - refill on tramadol        History of Present Illness   Patient here for follow-up.  COPD stable medication.  Depression anxiety continues to have somesuicidal thoughts.  Patient is taking sertraline 75 mg daily.  Dizziness meclizine as needed helps.  Back pain still comes and goes need a refill tramadol.  Hyperlipidemia stable on medication.  Restless leg syndrome stable medication.  Tremor stable medication.  Knee joint pain still.  Needs a refill tramadol.    Current Outpatient Prescriptions:   •  albuterol (PROVENTIL HFA;VENTOLIN HFA) 108 (90 BASE) MCG/ACT inhaler, Inhale 1 puff Every 4 (Four) Hours As Needed for shortness of air., Disp: 18 g, Rfl: 3  •  albuterol (PROVENTIL) (2.5 MG/3ML) 0.083% nebulizer solution, USE 1 AMPULE IN NEBULIZER FOUR TIMES A DAY AS NEEDED, Disp: 150 mL, Rfl: 4  •  aspirin 81 MG EC tablet, Take 81 mg by mouth Daily., Disp: , Rfl:   •  clonazePAM (KlonoPIN) 0.5 MG tablet, TAKE 1 TABLET BY MOUTH AT BEDTIME , Disp: 30 tablet, Rfl: 1  •  estrogens, conjugated, (PREMARIN) 0.625 MG tablet, Take daily for 21 days then do not take for 7 days., Disp: 90 tablet, Rfl: 2  •  Fluticasone Propionate, Inhal, (FLOVENT DISKUS) 250 MCG/BLIST aerosol powder , Take 1 puff by mouth 2 (Two) Times a Day., Disp: 1 each, Rfl: 5  •  losartan (COZAAR) 50 MG tablet, Take 1 tablet by mouth Daily., Disp: 45 tablet, Rfl: 3  •  meclizine (ANTIVERT) 12.5 MG tablet, TAKE 1 TABLET BY MOUTH THREE TIMES A DAY AS NEEDED for dizziness, Disp: 90 tablet, Rfl: 0  •  pravastatin (PRAVACHOL) 20 MG tablet, Take 1 tablet by mouth Every Night., Disp: 90 tablet, Rfl: 3  •  propranolol (INDERAL) 60 MG tablet, Take 1 tablet by mouth Daily., Disp: 90 tablet, Rfl: 3  •  sertraline (ZOLOFT) 50 MG tablet, TAKE 1 AND 1/2 TABLETS BY MOUTH ONE TIME A DAY  , Disp: 90 tablet, Rfl: 4  •  SPIRIVA HANDIHALER 18 MCG per inhalation capsule, INHALE CONTENTS 1 CAPSULE BY MOUTH ONE TIME A DAY , Disp: 30 capsule, Rfl: 8  •  traMADol (ULTRAM) 50 MG tablet, Take 1 tablet by mouth daily as needed., Disp: , Rfl:     The following portions of the patient's history were reviewed and updated as appropriate: allergies, current medications, past family history, past medical history, past social history, past surgical history and problem list.    Review of Systems   Constitutional: Negative.    Respiratory: Negative.    Cardiovascular: Negative.    Gastrointestinal: Negative.    Musculoskeletal: Negative.    Skin: Negative.    Neurological: Negative.    Psychiatric/Behavioral: Negative.        Objective   Physical Exam   Constitutional: She is oriented to person, place, and time. She appears well-nourished.   Neck: Neck supple.   Cardiovascular: Normal rate, regular rhythm and normal heart sounds.    Pulmonary/Chest: Effort normal and breath sounds normal.   Abdominal: Bowel sounds are normal.   Musculoskeletal: She exhibits tenderness.   Neurological: She is alert and oriented to person, place, and time.   Skin: Skin is warm.   Psychiatric: She has a normal mood and affect.       All tests have been reviewed.    Assessment/Plan   There are no diagnoses linked to this encounter.          Pneumonia CXR NSD, resolving after abx, still some cough refill tessalon--      Headache, and dizzy seen by neuro,Left mastoid effusion, seen ENT, no further tx from ENT      MRI  head micro ischemic changes continue ASA 81mg daily      TMJ, aleve or tylenol for now      HTN continue  Losartan  25mg daily, due to dizzy with 50mg     Anemia we will repeat next time CBC    allergy, d/c medicine  COPD CXR emphysema continue medications,  Depression and anxiety encouraged patient continue sertraline 75 daily increase to 50 bid, refill clonazepam,  Fatigue ?due to depression  tobacco use cut  down  dizzy,improved after medicine.continue, carotid, echo, holter unremarkble  Vitamin D deficiency continue supplement.   Low back pain improved after Flexeril and Tylenol,refill tramadol, xray:mild DJD  ?hematuria on dip , micro normal  HRT patient still wants it.wean very slow,   fatigue, improved   hyperlipidemia, continue pravastatin   RLS, refill clonazepam   tremor continue propranololp helps per patient. continue   edema trace Watch  Knee OA xr OA, refused cortisone shot, or glucosamine, refill tramadol  Refuse disrobing for PE  vacUTD   Follow-up 2 mo PE                  8 weeks

## 2017-04-11 RX ORDER — MECLIZINE HCL 12.5 MG/1
TABLET ORAL
Qty: 90 TABLET | Refills: 0 | Status: SHIPPED | OUTPATIENT
Start: 2017-04-11 | End: 2017-07-05

## 2017-04-11 RX ORDER — CLONAZEPAM 0.5 MG/1
TABLET ORAL
Qty: 30 TABLET | Refills: 0 | Status: SHIPPED | OUTPATIENT
Start: 2017-04-11 | End: 2017-05-07 | Stop reason: SDUPTHER

## 2017-05-08 RX ORDER — PROPRANOLOL HYDROCHLORIDE 60 MG/1
TABLET ORAL
Qty: 30 TABLET | Refills: 3 | Status: SHIPPED | OUTPATIENT
Start: 2017-05-08 | End: 2017-06-02

## 2017-05-08 RX ORDER — MECLIZINE HCL 12.5 MG/1
TABLET ORAL
Qty: 90 TABLET | Refills: 0 | Status: SHIPPED | OUTPATIENT
Start: 2017-05-08 | End: 2017-07-05

## 2017-05-08 RX ORDER — CLONAZEPAM 0.5 MG/1
TABLET ORAL
Qty: 30 TABLET | Refills: 0 | Status: SHIPPED | OUTPATIENT
Start: 2017-05-08 | End: 2017-10-12 | Stop reason: SDUPTHER

## 2017-05-08 RX ORDER — CONJUGATED ESTROGENS 0.62 MG/1
TABLET, FILM COATED ORAL
Qty: 90 TABLET | Refills: 1 | Status: SHIPPED | OUTPATIENT
Start: 2017-05-08 | End: 2018-10-14 | Stop reason: SDUPTHER

## 2017-05-11 RX ORDER — MECLIZINE HCL 12.5 MG/1
TABLET ORAL
Qty: 90 TABLET | Refills: 0 | Status: SHIPPED | OUTPATIENT
Start: 2017-05-11 | End: 2017-06-02

## 2017-06-02 ENCOUNTER — OFFICE VISIT (OUTPATIENT)
Dept: INTERNAL MEDICINE | Facility: CLINIC | Age: 77
End: 2017-06-02

## 2017-06-02 VITALS
OXYGEN SATURATION: 95 % | BODY MASS INDEX: 22.63 KG/M2 | WEIGHT: 123 LBS | HEIGHT: 62 IN | HEART RATE: 68 BPM | DIASTOLIC BLOOD PRESSURE: 66 MMHG | RESPIRATION RATE: 14 BRPM | SYSTOLIC BLOOD PRESSURE: 106 MMHG | TEMPERATURE: 97.1 F

## 2017-06-02 DIAGNOSIS — I10 ESSENTIAL HYPERTENSION: ICD-10-CM

## 2017-06-02 DIAGNOSIS — K21.9 GASTROESOPHAGEAL REFLUX DISEASE WITHOUT ESOPHAGITIS: ICD-10-CM

## 2017-06-02 DIAGNOSIS — J43.9 PULMONARY EMPHYSEMA, UNSPECIFIED EMPHYSEMA TYPE (HCC): ICD-10-CM

## 2017-06-02 DIAGNOSIS — H81.10 BENIGN PAROXYSMAL POSITIONAL VERTIGO, UNSPECIFIED LATERALITY: ICD-10-CM

## 2017-06-02 DIAGNOSIS — E78.5 HYPERLIPIDEMIA, UNSPECIFIED HYPERLIPIDEMIA TYPE: Primary | ICD-10-CM

## 2017-06-02 DIAGNOSIS — E55.9 VITAMIN D DEFICIENCY: ICD-10-CM

## 2017-06-02 DIAGNOSIS — G25.0 ESSENTIAL TREMOR: ICD-10-CM

## 2017-06-02 DIAGNOSIS — R25.2 CALF CRAMP: ICD-10-CM

## 2017-06-02 DIAGNOSIS — F41.8 MIXED ANXIETY DEPRESSIVE DISORDER: ICD-10-CM

## 2017-06-02 DIAGNOSIS — R31.9 HEMATURIA: ICD-10-CM

## 2017-06-02 DIAGNOSIS — R53.83 OTHER FATIGUE: ICD-10-CM

## 2017-06-02 PROCEDURE — 99397 PER PM REEVAL EST PAT 65+ YR: CPT | Performed by: INTERNAL MEDICINE

## 2017-06-09 ENCOUNTER — RESULTS ENCOUNTER (OUTPATIENT)
Dept: INTERNAL MEDICINE | Facility: CLINIC | Age: 77
End: 2017-06-09

## 2017-06-09 DIAGNOSIS — R53.83 OTHER FATIGUE: ICD-10-CM

## 2017-06-09 DIAGNOSIS — I10 ESSENTIAL HYPERTENSION: ICD-10-CM

## 2017-06-09 DIAGNOSIS — E55.9 VITAMIN D DEFICIENCY: ICD-10-CM

## 2017-06-09 DIAGNOSIS — E78.5 HYPERLIPIDEMIA, UNSPECIFIED HYPERLIPIDEMIA TYPE: ICD-10-CM

## 2017-06-09 DIAGNOSIS — K21.9 GASTROESOPHAGEAL REFLUX DISEASE WITHOUT ESOPHAGITIS: ICD-10-CM

## 2017-06-12 RX ORDER — MECLIZINE HCL 12.5 MG/1
TABLET ORAL
Qty: 90 TABLET | Refills: 0 | Status: SHIPPED | OUTPATIENT
Start: 2017-06-12 | End: 2017-09-27 | Stop reason: SDUPTHER

## 2017-06-12 RX ORDER — LOSARTAN POTASSIUM 50 MG/1
TABLET ORAL
Qty: 30 TABLET | Refills: 2 | Status: SHIPPED | OUTPATIENT
Start: 2017-06-12 | End: 2017-09-19 | Stop reason: SDUPTHER

## 2017-06-26 LAB
25(OH)D3+25(OH)D2 SERPL-MCNC: 21.4 NG/ML
ALBUMIN SERPL-MCNC: 3.9 G/DL (ref 3.5–5)
ALBUMIN/GLOB SERPL: 1.3 G/DL (ref 1–2)
ALP SERPL-CCNC: 73 U/L (ref 38–126)
ALT SERPL-CCNC: 26 U/L (ref 13–69)
APPEARANCE UR: CLEAR
AST SERPL-CCNC: 21 U/L (ref 15–46)
BACTERIA #/AREA URNS HPF: ABNORMAL /HPF
BILIRUB SERPL-MCNC: 0.5 MG/DL (ref 0.2–1.3)
BILIRUB UR QL STRIP: NEGATIVE
BUN SERPL-MCNC: 20 MG/DL (ref 7–20)
BUN/CREAT SERPL: 20 (ref 7.1–23.5)
CALCIUM SERPL-MCNC: 9.8 MG/DL (ref 8.4–10.2)
CHLORIDE SERPL-SCNC: 99 MMOL/L (ref 98–107)
CHOLEST SERPL-MCNC: 166 MG/DL (ref 0–199)
CK SERPL-CCNC: 54 U/L (ref 30–170)
CO2 SERPL-SCNC: 32 MMOL/L (ref 26–30)
COLOR UR: YELLOW
CREAT SERPL-MCNC: 1 MG/DL (ref 0.6–1.3)
EPI CELLS #/AREA URNS HPF: ABNORMAL /HPF
FOLATE SERPL-MCNC: 7.11 NG/ML
GLOBULIN SER CALC-MCNC: 2.9 GM/DL
GLUCOSE SERPL-MCNC: 93 MG/DL (ref 74–98)
GLUCOSE UR QL: NEGATIVE
HDLC SERPL-MCNC: 63 MG/DL (ref 40–60)
HGB UR QL STRIP: ABNORMAL
KETONES UR QL STRIP: NEGATIVE
LDLC SERPL CALC-MCNC: 73 MG/DL (ref 0–99)
LEUKOCYTE ESTERASE UR QL STRIP: NEGATIVE
NITRITE UR QL STRIP: NEGATIVE
PH UR STRIP: 6 [PH] (ref 5–8)
POTASSIUM SERPL-SCNC: 4.9 MMOL/L (ref 3.5–5.1)
PROT SERPL-MCNC: 6.8 G/DL (ref 6.3–8.2)
PROT UR QL STRIP: NEGATIVE
RBC #/AREA URNS HPF: ABNORMAL /HPF
SODIUM SERPL-SCNC: 140 MMOL/L (ref 137–145)
SP GR UR: 1.02 (ref 1–1.03)
TRIGL SERPL-MCNC: 151 MG/DL
TSH SERPL DL<=0.005 MIU/L-ACNC: 2.53 MIU/ML (ref 0.47–4.68)
UROBILINOGEN UR STRIP-MCNC: ABNORMAL MG/DL
VIT B12 SERPL-MCNC: 366 PG/ML (ref 239–931)
VLDLC SERPL CALC-MCNC: 30.2 MG/DL
WBC #/AREA URNS HPF: ABNORMAL /HPF

## 2017-07-05 ENCOUNTER — OFFICE VISIT (OUTPATIENT)
Dept: INTERNAL MEDICINE | Facility: CLINIC | Age: 77
End: 2017-07-05

## 2017-07-05 VITALS
RESPIRATION RATE: 14 BRPM | DIASTOLIC BLOOD PRESSURE: 64 MMHG | OXYGEN SATURATION: 94 % | SYSTOLIC BLOOD PRESSURE: 130 MMHG | BODY MASS INDEX: 22.63 KG/M2 | WEIGHT: 123 LBS | HEART RATE: 68 BPM | HEIGHT: 62 IN | TEMPERATURE: 98.1 F

## 2017-07-05 DIAGNOSIS — E55.9 VITAMIN D DEFICIENCY: ICD-10-CM

## 2017-07-05 DIAGNOSIS — G25.0 ESSENTIAL TREMOR: ICD-10-CM

## 2017-07-05 DIAGNOSIS — I10 ESSENTIAL HYPERTENSION: ICD-10-CM

## 2017-07-05 DIAGNOSIS — G25.81 RESTLESS LEGS SYNDROME: ICD-10-CM

## 2017-07-05 DIAGNOSIS — J43.9 PULMONARY EMPHYSEMA, UNSPECIFIED EMPHYSEMA TYPE (HCC): ICD-10-CM

## 2017-07-05 DIAGNOSIS — E78.5 HYPERLIPIDEMIA, UNSPECIFIED HYPERLIPIDEMIA TYPE: Primary | ICD-10-CM

## 2017-07-05 DIAGNOSIS — R53.83 OTHER FATIGUE: ICD-10-CM

## 2017-07-05 DIAGNOSIS — G44.209 TENSION HEADACHE: ICD-10-CM

## 2017-07-05 DIAGNOSIS — F41.8 MIXED ANXIETY DEPRESSIVE DISORDER: ICD-10-CM

## 2017-07-05 DIAGNOSIS — K21.9 GASTROESOPHAGEAL REFLUX DISEASE WITHOUT ESOPHAGITIS: ICD-10-CM

## 2017-07-05 PROCEDURE — 99214 OFFICE O/P EST MOD 30 MIN: CPT | Performed by: INTERNAL MEDICINE

## 2017-07-05 NOTE — PROGRESS NOTES
Subjective   Sadie Tijerina is a 77 y.o. female.     Chief Complaint   Patient presents with   • Hypertension     Here for follow up        History of Present Illness   Patient here for follow-up.  COPD stable on medication.  The depression anxiety stable and now on medication.  Hypertension stable medication.  Anemia resolved.  Hyperlipidemia stable medication.  The restless leg syndrome stable medication.  Vitamin D still decreased patient self discontinued the vitamin D supplement.    Current Outpatient Prescriptions:   •  albuterol (PROVENTIL HFA;VENTOLIN HFA) 108 (90 BASE) MCG/ACT inhaler, Inhale 1 puff Every 4 (Four) Hours As Needed for shortness of air., Disp: 18 g, Rfl: 3  •  albuterol (PROVENTIL) (2.5 MG/3ML) 0.083% nebulizer solution, USE 1 AMPULE IN NEBULIZER FOUR TIMES A DAY AS NEEDED, Disp: 150 mL, Rfl: 4  •  aspirin 81 MG EC tablet, Take 81 mg by mouth Daily., Disp: , Rfl:   •  benzonatate (TESSALON) 200 MG capsule, Take 1 capsule by mouth 3 (Three) Times a Day As Needed for Cough., Disp: 45 capsule, Rfl: 1  •  clonazePAM (KlonoPIN) 0.5 MG tablet, TAKE 1 TABLET BY MOUTH AT BEDTIME , Disp: 30 tablet, Rfl: 0  •  estrogens, conjugated, (PREMARIN) 0.625 MG tablet, Take daily for 21 days then do not take for 7 days., Disp: 90 tablet, Rfl: 2  •  FLOVENT DISKUS 250 MCG/BLIST aerosol powder , INHALE 1 PUFF BY MOUTH TWO TIMES A DAY , Disp: 60 each, Rfl: 4  •  losartan (COZAAR) 50 MG tablet, TAKE 1 TABLET BY MOUTH ONE TIME A DAY , Disp: 30 tablet, Rfl: 2  •  meclizine (ANTIVERT) 12.5 MG tablet, TAKE 1 TABLET BY MOUTH THREE TIMES A DAY AS NEEDED for dizziness , Disp: 90 tablet, Rfl: 0  •  pravastatin (PRAVACHOL) 20 MG tablet, Take 1 tablet by mouth Every Night., Disp: 90 tablet, Rfl: 3  •  PREMARIN 0.625 MG tablet, TAKE 1 TABLET BY MOUTH ONE TIME A DAY , Disp: 90 tablet, Rfl: 1  •  propranolol (INDERAL) 60 MG tablet, Take 1 tablet by mouth Daily., Disp: 90 tablet, Rfl: 3  •  sertraline (ZOLOFT) 50 MG tablet,  Take 1 tablet by mouth 2 (Two) Times a Day., Disp: 180 tablet, Rfl: 1  •  SPIRIVA HANDIHALER 18 MCG per inhalation capsule, INHALE CONTENTS 1 CAPSULE BY MOUTH ONE TIME A DAY , Disp: 30 capsule, Rfl: 8  •  traMADol (ULTRAM) 50 MG tablet, Take 1 tablet by mouth Daily As Needed (pain)., Disp: 30 tablet, Rfl: 2    The following portions of the patient's history were reviewed and updated as appropriate: allergies, current medications, past family history, past medical history, past social history, past surgical history and problem list.    Review of Systems   Constitutional: Negative.    Respiratory: Negative.    Cardiovascular: Negative.    Gastrointestinal: Negative.    Skin: Negative.    Psychiatric/Behavioral: Negative.        Objective   Physical Exam   Constitutional: She is oriented to person, place, and time. She appears well-developed and well-nourished.   Neck: Neck supple.   Cardiovascular: Normal rate, regular rhythm and normal heart sounds.    Pulmonary/Chest: Effort normal and breath sounds normal.   Abdominal: Soft. Bowel sounds are normal.   Neurological: She is alert and oriented to person, place, and time.   Psychiatric: She has a normal mood and affect. Her behavior is normal.       All tests have been reviewed.    Assessment/Plan   There are no diagnoses linked to this encounter.          Headache, and dizzy seen by neuro,Left mastoid effusion, seen ENT, no further tx from ENT    MRI head micro ischemic changes continue ASA 81mg daily    TMJ, resolved after aleve or tylenol for now    HTN continue Losartan 25mg daily, due to dizzy with 50mg    Anemia resolved   allergy, d/c medicine  COPD CXR emphysema continue medications,  Depression and anxiety continue sertraline 50 bid,clonazepam,  Fatigue still some  tobacco use cut down  dizzy,improved after medicine.continue, carotid, echo, holter unremarkble  Vitamin D deficiency start Vit D3 1000iu  Low back pain improved after Flexeril and Tylenol,refill  tramadol, xray:mild DJD  HRT patient still wants it.wean very slow,   fatigue, improved   hyperlipidemia, continue pravastatin   RLS, continue clonazepam   tremor continue propranolol helps per patient. continue   edema trace Watch  Knee OA xr OA, refused cortisone shot, or glucosamine, tramadol  Refused disrobing for PE  vacUTD   Follow-up 4 mo

## 2017-07-18 ENCOUNTER — APPOINTMENT (OUTPATIENT)
Dept: GENERAL RADIOLOGY | Facility: HOSPITAL | Age: 77
End: 2017-07-18

## 2017-07-18 ENCOUNTER — HOSPITAL ENCOUNTER (EMERGENCY)
Facility: HOSPITAL | Age: 77
Discharge: HOME | End: 2017-07-18
Attending: EMERGENCY MEDICINE | Admitting: EMERGENCY MEDICINE

## 2017-07-18 VITALS
BODY MASS INDEX: 22.45 KG/M2 | SYSTOLIC BLOOD PRESSURE: 135 MMHG | WEIGHT: 122 LBS | HEIGHT: 62 IN | RESPIRATION RATE: 18 BRPM | OXYGEN SATURATION: 92 % | TEMPERATURE: 97.4 F | HEART RATE: 70 BPM | DIASTOLIC BLOOD PRESSURE: 69 MMHG

## 2017-07-18 DIAGNOSIS — J44.1 COPD EXACERBATION (HCC): Primary | ICD-10-CM

## 2017-07-18 LAB
ALBUMIN SERPL-MCNC: 3.8 G/DL (ref 3.5–5)
ALBUMIN/GLOB SERPL: 1.2 G/DL (ref 1–2)
ALP SERPL-CCNC: 96 U/L (ref 38–126)
ALT SERPL W P-5'-P-CCNC: 25 U/L (ref 13–69)
ANION GAP SERPL CALCULATED.3IONS-SCNC: 14.4 MMOL/L
AST SERPL-CCNC: 15 U/L (ref 15–46)
BASOPHILS # BLD AUTO: 0.07 10*3/MM3 (ref 0–0.2)
BASOPHILS NFR BLD AUTO: 0.4 % (ref 0–2.5)
BILIRUB SERPL-MCNC: 0.7 MG/DL (ref 0.2–1.3)
BUN BLD-MCNC: 13 MG/DL (ref 7–20)
BUN/CREAT SERPL: 13 (ref 7.1–23.5)
CALCIUM SPEC-SCNC: 9.3 MG/DL (ref 8.4–10.2)
CHLORIDE SERPL-SCNC: 100 MMOL/L (ref 98–107)
CO2 SERPL-SCNC: 30 MMOL/L (ref 26–30)
CREAT BLD-MCNC: 1 MG/DL (ref 0.6–1.3)
D-LACTATE SERPL-SCNC: 0.7 MMOL/L (ref 0.5–2)
DEPRECATED RDW RBC AUTO: 41.2 FL (ref 37–54)
EOSINOPHIL # BLD AUTO: 0.18 10*3/MM3 (ref 0–0.7)
EOSINOPHIL NFR BLD AUTO: 1.2 % (ref 0–7)
ERYTHROCYTE [DISTWIDTH] IN BLOOD BY AUTOMATED COUNT: 12.7 % (ref 11.5–14.5)
GFR SERPL CREATININE-BSD FRML MDRD: 53 ML/MIN/1.73
GLOBULIN UR ELPH-MCNC: 3.3 GM/DL
GLUCOSE BLD-MCNC: 118 MG/DL (ref 74–98)
HCT VFR BLD AUTO: 41.5 % (ref 37–47)
HGB BLD-MCNC: 13.7 G/DL (ref 12–16)
HOLD SPECIMEN: NORMAL
HOLD SPECIMEN: NORMAL
IMM GRANULOCYTES # BLD: 0.07 10*3/MM3 (ref 0–0.06)
IMM GRANULOCYTES NFR BLD: 0.4 % (ref 0–0.6)
LYMPHOCYTES # BLD AUTO: 2.09 10*3/MM3 (ref 0.6–3.4)
LYMPHOCYTES NFR BLD AUTO: 13.4 % (ref 10–50)
MCH RBC QN AUTO: 29.5 PG (ref 27–31)
MCHC RBC AUTO-ENTMCNC: 33 G/DL (ref 30–37)
MCV RBC AUTO: 89.4 FL (ref 81–99)
MONOCYTES # BLD AUTO: 0.98 10*3/MM3 (ref 0–0.9)
MONOCYTES NFR BLD AUTO: 6.3 % (ref 0–12)
NEUTROPHILS # BLD AUTO: 12.2 10*3/MM3 (ref 2–6.9)
NEUTROPHILS NFR BLD AUTO: 78.3 % (ref 37–80)
NRBC BLD MANUAL-RTO: 0 /100 WBC (ref 0–0)
NT-PROBNP SERPL-MCNC: 343 PG/ML (ref 0–450)
PLATELET # BLD AUTO: 260 10*3/MM3 (ref 130–400)
PMV BLD AUTO: 9.4 FL (ref 6–12)
POTASSIUM BLD-SCNC: 4.4 MMOL/L (ref 3.5–5.1)
PROT SERPL-MCNC: 7.1 G/DL (ref 6.3–8.2)
RBC # BLD AUTO: 4.64 10*6/MM3 (ref 4.2–5.4)
SODIUM BLD-SCNC: 140 MMOL/L (ref 137–145)
TROPONIN I SERPL-MCNC: <0.012 NG/ML (ref 0–0.03)
WBC NRBC COR # BLD: 15.59 10*3/MM3 (ref 4.8–10.8)
WHOLE BLOOD HOLD SPECIMEN: NORMAL
WHOLE BLOOD HOLD SPECIMEN: NORMAL

## 2017-07-18 PROCEDURE — 85025 COMPLETE CBC W/AUTO DIFF WBC: CPT | Performed by: EMERGENCY MEDICINE

## 2017-07-18 PROCEDURE — 83880 ASSAY OF NATRIURETIC PEPTIDE: CPT | Performed by: EMERGENCY MEDICINE

## 2017-07-18 PROCEDURE — 84484 ASSAY OF TROPONIN QUANT: CPT | Performed by: EMERGENCY MEDICINE

## 2017-07-18 PROCEDURE — 96375 TX/PRO/DX INJ NEW DRUG ADDON: CPT

## 2017-07-18 PROCEDURE — 83605 ASSAY OF LACTIC ACID: CPT | Performed by: EMERGENCY MEDICINE

## 2017-07-18 PROCEDURE — 99284 EMERGENCY DEPT VISIT MOD MDM: CPT

## 2017-07-18 PROCEDURE — 94799 UNLISTED PULMONARY SVC/PX: CPT

## 2017-07-18 PROCEDURE — 96361 HYDRATE IV INFUSION ADD-ON: CPT

## 2017-07-18 PROCEDURE — 94640 AIRWAY INHALATION TREATMENT: CPT

## 2017-07-18 PROCEDURE — 93005 ELECTROCARDIOGRAM TRACING: CPT | Performed by: EMERGENCY MEDICINE

## 2017-07-18 PROCEDURE — 96365 THER/PROPH/DIAG IV INF INIT: CPT

## 2017-07-18 PROCEDURE — 80053 COMPREHEN METABOLIC PANEL: CPT | Performed by: EMERGENCY MEDICINE

## 2017-07-18 PROCEDURE — 71020 HC CHEST PA AND LATERAL: CPT

## 2017-07-18 PROCEDURE — 25010000002 METHYLPREDNISOLONE PER 125 MG: Performed by: EMERGENCY MEDICINE

## 2017-07-18 PROCEDURE — 25010000002 MAGNESIUM SULFATE 2 GM/50ML SOLUTION: Performed by: EMERGENCY MEDICINE

## 2017-07-18 RX ORDER — IPRATROPIUM BROMIDE AND ALBUTEROL SULFATE 2.5; .5 MG/3ML; MG/3ML
3 SOLUTION RESPIRATORY (INHALATION) ONCE
Status: COMPLETED | OUTPATIENT
Start: 2017-07-18 | End: 2017-07-18

## 2017-07-18 RX ORDER — SODIUM CHLORIDE 0.9 % (FLUSH) 0.9 %
10 SYRINGE (ML) INJECTION AS NEEDED
Status: DISCONTINUED | OUTPATIENT
Start: 2017-07-18 | End: 2017-07-18 | Stop reason: HOSPADM

## 2017-07-18 RX ORDER — DOXYCYCLINE 100 MG/1
100 CAPSULE ORAL 2 TIMES DAILY
Qty: 20 CAPSULE | Refills: 0 | Status: SHIPPED | OUTPATIENT
Start: 2017-07-18 | End: 2017-08-07

## 2017-07-18 RX ORDER — MAGNESIUM SULFATE HEPTAHYDRATE 40 MG/ML
2 INJECTION, SOLUTION INTRAVENOUS ONCE
Status: COMPLETED | OUTPATIENT
Start: 2017-07-18 | End: 2017-07-18

## 2017-07-18 RX ORDER — METHYLPREDNISOLONE 4 MG/1
TABLET ORAL
Qty: 21 TABLET | Refills: 0 | Status: SHIPPED | OUTPATIENT
Start: 2017-07-18 | End: 2017-08-07

## 2017-07-18 RX ORDER — DOXYCYCLINE 100 MG/1
100 CAPSULE ORAL ONCE
Status: COMPLETED | OUTPATIENT
Start: 2017-07-18 | End: 2017-07-18

## 2017-07-18 RX ORDER — METHYLPREDNISOLONE SODIUM SUCCINATE 125 MG/2ML
125 INJECTION, POWDER, LYOPHILIZED, FOR SOLUTION INTRAMUSCULAR; INTRAVENOUS ONCE
Status: COMPLETED | OUTPATIENT
Start: 2017-07-18 | End: 2017-07-18

## 2017-07-18 RX ADMIN — IPRATROPIUM BROMIDE AND ALBUTEROL SULFATE 3 ML: .5; 3 SOLUTION RESPIRATORY (INHALATION) at 13:03

## 2017-07-18 RX ADMIN — SODIUM CHLORIDE 500 ML: 9 INJECTION, SOLUTION INTRAVENOUS at 10:46

## 2017-07-18 RX ADMIN — METHYLPREDNISOLONE SODIUM SUCCINATE 125 MG: 125 INJECTION, POWDER, FOR SOLUTION INTRAMUSCULAR; INTRAVENOUS at 10:47

## 2017-07-18 RX ADMIN — IPRATROPIUM BROMIDE AND ALBUTEROL SULFATE 3 ML: .5; 3 SOLUTION RESPIRATORY (INHALATION) at 10:39

## 2017-07-18 RX ADMIN — DOXYCYCLINE 100 MG: 100 CAPSULE ORAL at 13:00

## 2017-07-18 RX ADMIN — SODIUM CHLORIDE 500 ML: 9 INJECTION, SOLUTION INTRAVENOUS at 13:00

## 2017-07-18 RX ADMIN — MAGNESIUM SULFATE HEPTAHYDRATE 2 G: 40 INJECTION, SOLUTION INTRAVENOUS at 10:50

## 2017-07-18 NOTE — ED NOTES
"Pt's family came to me saying \"she is out of her head\", pt playing with blanket on arrival into room, pt A&O x4, pt says that she will wake up sometimes and play with her blanket in the middle of the night, she said that she dreamt that her daughter's dog , pt looked sleepy and I told her that she could take a nap while the fluids were infusing, MD notified and he said as long as she was alert and oriented he was good with it.     Maliha Hanks RN  [phone]    "

## 2017-07-18 NOTE — ED PROVIDER NOTES
"TRIAGE CHIEF COMPLAINT:     Nursing and triage notes reviewed    Chief Complaint   Patient presents with   • Shortness of Breath      HPI: Sadie Tijerina is a 77 y.o. female who presents to the emergency department complaining of Shortness of breath.  Patient states that for the past 3 weeks she has had shortness of breath, cough, and congestion that is progressively been worsening.  She states over the past few days has been coughing up some \"nasty\" colored sputum.  Has not had any fevers that she is aware of.  She denies having any chest pain.  She denies abdominal pain but states she occasionally will become sick to her stomach.  She states that she had some basic labs drawn from her primary care physician.  Does have a history of COPD and apparently has been told she should wear oxygen but she does not.     REVIEW OF SYSTEMS: Otherwise negative unless stated above     PAST MEDICAL HISTORY:   Past Medical History:   Diagnosis Date   • Arthritis    • Depression    • History of emphysema    • History of esophageal reflux    • History of recurrent UTI (urinary tract infection)    • History of renal calculi    • History of uterine cancer    • Hyperlipidemia         FAMILY HISTORY:   Family History   Problem Relation Age of Onset   • Cancer Mother    • Heart attack Father    • Arthritis Father    • Diabetes Daughter    • Hyperlipidemia Daughter    • Migraines Daughter         SOCIAL HISTORY:   Social History     Social History   • Marital status:      Spouse name: N/A   • Number of children: N/A   • Years of education: N/A     Occupational History   • Not on file.     Social History Main Topics   • Smoking status: Current Every Day Smoker   • Smokeless tobacco: Not on file   • Alcohol use No   • Drug use: No   • Sexual activity: Not on file      Comment:      Other Topics Concern   • Not on file     Social History Narrative        SURGICAL HISTORY:   Past Surgical History:   Procedure Laterality " Date   • FOOT SURGERY     • HAND SURGERY     • HYSTERECTOMY          CURRENT MEDICATIONS:      Medication List      ASK your doctor about these medications          * albuterol (2.5 MG/3ML) 0.083% nebulizer solution   Commonly known as:  PROVENTIL   USE 1 AMPULE IN NEBULIZER FOUR TIMES A DAY AS NEEDED       * albuterol 108 (90 BASE) MCG/ACT inhaler   Commonly known as:  PROVENTIL HFA;VENTOLIN HFA   Inhale 1 puff Every 4 (Four) Hours As Needed for shortness of air.       aspirin 81 MG EC tablet       benzonatate 200 MG capsule   Commonly known as:  TESSALON PERLES   Take 1 capsule by mouth 3 (Three) Times a Day As Needed for Cough.       clonazePAM 0.5 MG tablet   Commonly known as:  KlonoPIN   TAKE 1 TABLET BY MOUTH AT BEDTIME       * estrogens (conjugated) 0.625 MG tablet   Commonly known as:  PREMARIN   Take daily for 21 days then do not take for 7 days.       * PREMARIN 0.625 MG tablet   Generic drug:  estrogens (conjugated)   TAKE 1 TABLET BY MOUTH ONE TIME A DAY       FLOVENT DISKUS 250 MCG/BLIST aerosol powder    Generic drug:  Fluticasone Propionate (Inhal)   INHALE 1 PUFF BY MOUTH TWO TIMES A DAY       losartan 50 MG tablet   Commonly known as:  COZAAR   TAKE 1 TABLET BY MOUTH ONE TIME A DAY       meclizine 12.5 MG tablet   Commonly known as:  ANTIVERT   TAKE 1 TABLET BY MOUTH THREE TIMES A DAY AS NEEDED for dizziness       pravastatin 20 MG tablet   Commonly known as:  PRAVACHOL   Take 1 tablet by mouth Every Night.       propranolol 60 MG tablet   Commonly known as:  INDERAL   Take 1 tablet by mouth Daily.       sertraline 50 MG tablet   Commonly known as:  ZOLOFT   Take 1 tablet by mouth 2 (Two) Times a Day.       SPIRIVA HANDIHALER 18 MCG per inhalation capsule   Generic drug:  tiotropium   INHALE CONTENTS 1 CAPSULE BY MOUTH ONE TIME A DAY       traMADol 50 MG tablet   Commonly known as:  ULTRAM   Take 1 tablet by mouth Daily As Needed (pain).       * Notice:  This list has 4 medication(s) that are the  same as other   medications prescribed for you. Read the directions carefully, and ask   your doctor or other care provider to review them with you.         ALLERGIES: Morphine and related and Prednisone     PHYSICAL EXAM:   VITAL SIGNS:   Vitals:    07/18/17 1020   BP: 127/69   Pulse:    Resp:    Temp:    SpO2:       CONSTITUTIONAL: Awake, oriented, appears non-toxic   HENT: Atraumatic, normocephalic, oral mucosa pink and moist, airway patent.   EYES: Conjunctiva clear  NECK: Trachea midline   CARDIOVASCULAR: Normal heart rate, Normal rhythm, No murmurs, rubs, gallops   PULMONARY/CHEST: Decreased air movement with diffuse wheezes in all lung fields.  No chest wall tenderness.  No retractions are noted.  ABDOMINAL: Non-distended, soft, non-tender - no rebound or guarding.  NEUROLOGIC: Non-focal, moving all four extremities, no gross sensory or motor deficits.   EXTREMITIES: No clubbing, cyanosis, or edema   SKIN: Warm, Dry, No erythema, No rash     ED COURSE / MEDICAL DECISION MAKING:   Sadie Tijerina is a 77 y.o. female who presents to the emergency department for evaluation of shortness of breath, cough, and sputum production.  Patient does have a history of COPD.  Patient has an oxygen saturation in the low 90s, occasionally dipping into the very high 80s on arrival on room air.  There is no increased work of breathing but exam does reveal diffuse wheezes in all lung fields with decreased air movement.  Exam is otherwise largely unremarkable.  EKG was obtained on arrival which revealed sinus rhythm with rate of 75 bpm.  Acute nonspecific's findings but no acute ST segment or T-wave changes that would suggest ischemia at this time.  We will obtain a chest x-ray and laboratory tests for further evaluation.  Chest x-ray did not reveal any acute abnormalities.  Patient's labs including a lactic acid and cardiac enzymes were within normal ranges.  Patient significantly improved with breathing treatments,  magnesium, and steroids.  Repeat exam revealed significantly decreased wheezing.  I think patient can safely be treated as an outpatient.  Will give her return precautions and have her follow with her primary care physician.    DECISION TO DISCHARGE/ADMIT: see ED care timeline     FINAL IMPRESSION:   1 -- COPD exacerbation   2 --   3 --     Electronically signed by: Marilin Amos MD, 7/18/2017 10:29 AM       Marilin Amos MD  07/18/17 5725

## 2017-07-19 ENCOUNTER — TELEPHONE (OUTPATIENT)
Dept: INTERNAL MEDICINE | Facility: CLINIC | Age: 77
End: 2017-07-19

## 2017-08-07 ENCOUNTER — OFFICE VISIT (OUTPATIENT)
Dept: INTERNAL MEDICINE | Facility: CLINIC | Age: 77
End: 2017-08-07

## 2017-08-07 VITALS
OXYGEN SATURATION: 95 % | TEMPERATURE: 97.8 F | BODY MASS INDEX: 21.75 KG/M2 | HEIGHT: 62 IN | RESPIRATION RATE: 18 BRPM | SYSTOLIC BLOOD PRESSURE: 120 MMHG | WEIGHT: 118.19 LBS | DIASTOLIC BLOOD PRESSURE: 62 MMHG | HEART RATE: 76 BPM

## 2017-08-07 DIAGNOSIS — R05.9 COUGH: Primary | ICD-10-CM

## 2017-08-07 PROCEDURE — 99213 OFFICE O/P EST LOW 20 MIN: CPT | Performed by: NURSE PRACTITIONER

## 2017-08-07 RX ORDER — BUDESONIDE AND FORMOTEROL FUMARATE DIHYDRATE 160; 4.5 UG/1; UG/1
2 AEROSOL RESPIRATORY (INHALATION)
Qty: 6 G | Refills: 0 | COMMUNITY
Start: 2017-08-07 | End: 2017-08-18 | Stop reason: SDUPTHER

## 2017-08-07 RX ORDER — MELATONIN
1000 DAILY
COMMUNITY
End: 2018-07-25

## 2017-08-07 NOTE — PROGRESS NOTES
Chief Complaint / Reason:      Chief Complaint   Patient presents with   • Cough     f/u- ER, bronchitis, just completed A Medrol pack and doxycycyline.        Subjective     HPI  Patient presents today for cough.  7/18/17seen in ER for COPD exacerbation.   she was shortness of breath, having productive cough, and congestion that progressively worsened.  In the ER she was given doxycycline and a Medrol Dosepak and which she states she has improved but she still has a cough.  Denies chest pain, fever, or heart palpitations.  Was instructed to wear oxygen but states she does not.  No coughing up blood or night sweats.  Vital signs stable.  History taken from: patient    PMH/FH/Social History were reviewed and updated appropriately in the electronic medical record.     Review of Systems:   Review of Systems   Constitutional: Negative.  Negative for fatigue and fever.   HENT: Positive for congestion.    Respiratory: Positive for cough. Negative for chest tightness, shortness of breath and wheezing.    Cardiovascular: Negative.  Negative for chest pain.   Gastrointestinal: Negative.    Skin: Negative.    Neurological: Negative.  Negative for weakness.   Psychiatric/Behavioral: Negative.      All other systems were reviewed and are negative.  Exceptions are noted in the subjective or above.      Objective     Vital Signs  Vitals:    08/07/17 1437   BP: 120/62   Pulse: 76   Resp: 18   Temp: 97.8 °F (36.6 °C)   SpO2: 95%       Body mass index is 21.62 kg/(m^2).    Physical Exam   Constitutional: She is oriented to person, place, and time. She appears well-developed and well-nourished.   HENT:   Head: Normocephalic and atraumatic.   Right Ear: Tympanic membrane and external ear normal. Tympanic membrane is not bulging.   Left Ear: Tympanic membrane and external ear normal. Tympanic membrane is not bulging.   Nose: Nose normal.   Mouth/Throat: Mucous membranes are dry. Posterior oropharyngeal erythema present.   Eyes:  Conjunctivae and EOM are normal. Pupils are equal, round, and reactive to light.   Neck: Normal range of motion. Neck supple.   Cardiovascular: Normal rate, regular rhythm, normal heart sounds and intact distal pulses.    Pulmonary/Chest: Effort normal and breath sounds normal. She exhibits tenderness.   Abdominal: Soft. Bowel sounds are normal. There is no tenderness.   Lymphadenopathy:     She has no cervical adenopathy.   Neurological: She is alert and oriented to person, place, and time. She has normal reflexes.   Skin: Skin is warm and dry.   Psychiatric: She has a normal mood and affect. Her behavior is normal. Judgment and thought content normal.   Nursing note and vitals reviewed.       Results Review:    I reviewed the patient's clinical results From ER.     Medication Review:     Current Outpatient Prescriptions:   •  albuterol (PROVENTIL HFA;VENTOLIN HFA) 108 (90 BASE) MCG/ACT inhaler, Inhale 1 puff Every 4 (Four) Hours As Needed for shortness of air., Disp: 18 g, Rfl: 3  •  albuterol (PROVENTIL) (2.5 MG/3ML) 0.083% nebulizer solution, USE 1 AMPULE IN NEBULIZER FOUR TIMES A DAY AS NEEDED, Disp: 150 mL, Rfl: 4  •  aspirin 81 MG EC tablet, Take 81 mg by mouth Daily., Disp: , Rfl:   •  cholecalciferol (VITAMIN D3) 1000 UNITS tablet, Take 1,000 Units by mouth Daily., Disp: , Rfl:   •  clonazePAM (KlonoPIN) 0.5 MG tablet, TAKE 1 TABLET BY MOUTH AT BEDTIME , Disp: 30 tablet, Rfl: 0  •  estrogens, conjugated, (PREMARIN) 0.625 MG tablet, Take daily for 21 days then do not take for 7 days., Disp: 90 tablet, Rfl: 2  •  losartan (COZAAR) 50 MG tablet, TAKE 1 TABLET BY MOUTH ONE TIME A DAY , Disp: 30 tablet, Rfl: 2  •  meclizine (ANTIVERT) 12.5 MG tablet, TAKE 1 TABLET BY MOUTH THREE TIMES A DAY AS NEEDED for dizziness , Disp: 90 tablet, Rfl: 0  •  pravastatin (PRAVACHOL) 20 MG tablet, Take 1 tablet by mouth Every Night., Disp: 90 tablet, Rfl: 3  •  PREMARIN 0.625 MG tablet, TAKE 1 TABLET BY MOUTH ONE TIME A DAY ,  Disp: 90 tablet, Rfl: 1  •  propranolol (INDERAL) 60 MG tablet, Take 1 tablet by mouth Daily., Disp: 90 tablet, Rfl: 3  •  sertraline (ZOLOFT) 50 MG tablet, Take 1 tablet by mouth 2 (Two) Times a Day., Disp: 180 tablet, Rfl: 1  •  SPIRIVA HANDIHALER 18 MCG per inhalation capsule, INHALE CONTENTS 1 CAPSULE BY MOUTH ONE TIME A DAY , Disp: 30 capsule, Rfl: 8  •  traMADol (ULTRAM) 50 MG tablet, Take 1 tablet by mouth Daily As Needed (pain)., Disp: 30 tablet, Rfl: 2  •  budesonide-formoterol (SYMBICORT) 160-4.5 MCG/ACT inhaler, Inhale 2 puffs 2 (Two) Times a Day., Disp: 6 g, Rfl: 0  •  meclizine (ANTIVERT) 12.5 MG tablet, TAKE 1 TABLET BY MOUTH THREE TIMES A DAY AS NEEDED for dizziness , Disp: 90 tablet, Rfl: 0    Assessment/Plan   Sadie was seen today for cough.    Diagnoses and all orders for this visit:    Cough  -     budesonide-formoterol (SYMBICORT) 160-4.5 MCG/ACT inhaler; Inhale 2 puffs 2 (Two) Times a Day.  Recommend treating cough symptomatically.  Discussed ways to prevent COPD exacerbation.  Increase fluid intake and stay active to loosen secretions.  Discussed worsening signs and symptoms.  Recommend patient follow up in 4 weeks and as needed.  Jasmin Nayak, APRN  08/07/2017

## 2017-08-07 NOTE — PATIENT INSTRUCTIONS
Chronic Obstructive Pulmonary Disease Exacerbation  Chronic obstructive pulmonary disease (COPD) is a common lung condition in which airflow from the lungs is limited. COPD is a general term that can be used to describe many different lung problems that limit airflow, including chronic bronchitis and emphysema. COPD exacerbations are episodes when breathing symptoms become much worse and require extra treatment. Without treatment, COPD exacerbations can be life threatening, and frequent COPD exacerbations can cause further damage to your lungs.  CAUSES  · Respiratory infections.  · Exposure to smoke.  · Exposure to air pollution, chemical fumes, or dust.  Sometimes there is no apparent cause or trigger.  RISK FACTORS  · Smoking cigarettes.  · Older age.  · Frequent prior COPD exacerbations.  SIGNS AND SYMPTOMS  · Increased coughing.  · Increased thick spit (sputum) production.  · Increased wheezing.  · Increased shortness of breath.  · Rapid breathing.  · Chest tightness.  DIAGNOSIS  Your medical history, a physical exam, and tests will help your health care provider make a diagnosis. Tests may include:  · A chest X-ray.  · Basic lab tests.  · Sputum testing.  · An arterial blood gas test.  TREATMENT  Depending on the severity of your COPD exacerbation, you may need to be admitted to a hospital for treatment. Some of the treatments commonly used to treat COPD exacerbations are:   · Antibiotic medicines.  · Bronchodilators. These are drugs that expand the air passages. They may be given with an inhaler or nebulizer. Spacer devices may be needed to help improve drug delivery.  · Corticosteroid medicines.  · Supplemental oxygen therapy.  · Airway clearing techniques, such as noninvasive ventilation (NIV) and positive expiratory pressure (PEP). These provide respiratory support through a mask or other noninvasive device.  HOME CARE INSTRUCTIONS  · Do not smoke. Quitting smoking is very important to prevent COPD from  getting worse and exacerbations from happening as often.  · Avoid exposure to all substances that irritate the airway, especially to tobacco smoke.  · If you were prescribed an antibiotic medicine, finish it all even if you start to feel better.  · Take all medicines as directed by your health care provider. It is important to use correct technique with inhaled medicines.  · Drink enough fluids to keep your urine clear or pale yellow (unless you have a medical condition that requires fluid restriction).  · Use a cool mist vaporizer. This makes it easier to clear your chest when you cough.  · If you have a home nebulizer and oxygen, continue to use them as directed.  · Maintain all necessary vaccinations to prevent infections.  · Exercise regularly.  · Eat a healthy diet.  · Keep all follow-up appointments as directed by your health care provider.  SEEK IMMEDIATE MEDICAL CARE IF:  · You have worsening shortness of breath.  · You have trouble talking.  · You have severe chest pain.  · You have blood in your sputum.  · You have a fever.  · You have weakness, vomit repeatedly, or faint.  · You feel confused.  · You continue to get worse.  MAKE SURE YOU:  · Understand these instructions.  · Will watch your condition.  · Will get help right away if you are not doing well or get worse.     This information is not intended to replace advice given to you by your health care provider. Make sure you discuss any questions you have with your health care provider.     Document Released: 10/14/2008 Document Revised: 01/08/2016 Document Reviewed: 08/22/2014  [email or web address] Interactive Patient Education ©2017 [email or web address]

## 2017-08-09 RX ORDER — MECLIZINE HCL 12.5 MG/1
TABLET ORAL
Qty: 90 TABLET | Refills: 0 | Status: SHIPPED | OUTPATIENT
Start: 2017-08-09 | End: 2017-09-27 | Stop reason: SDUPTHER

## 2017-08-18 DIAGNOSIS — R05.9 COUGH: ICD-10-CM

## 2017-08-18 RX ORDER — BUDESONIDE AND FORMOTEROL FUMARATE DIHYDRATE 160; 4.5 UG/1; UG/1
2 AEROSOL RESPIRATORY (INHALATION)
Qty: 1 INHALER | Refills: 2 | COMMUNITY
Start: 2017-08-18 | End: 2017-08-21 | Stop reason: SDUPTHER

## 2017-08-21 DIAGNOSIS — R05.9 COUGH: ICD-10-CM

## 2017-08-21 RX ORDER — BUDESONIDE AND FORMOTEROL FUMARATE DIHYDRATE 160; 4.5 UG/1; UG/1
2 AEROSOL RESPIRATORY (INHALATION)
Qty: 1 INHALER | Refills: 2 | COMMUNITY
Start: 2017-08-21 | End: 2017-08-23 | Stop reason: SDUPTHER

## 2017-08-23 DIAGNOSIS — R05.9 COUGH: ICD-10-CM

## 2017-08-23 RX ORDER — BUDESONIDE AND FORMOTEROL FUMARATE DIHYDRATE 160; 4.5 UG/1; UG/1
AEROSOL RESPIRATORY (INHALATION)
Qty: 10.2 G | Refills: 2 | Status: SHIPPED | OUTPATIENT
Start: 2017-08-23 | End: 2017-12-18 | Stop reason: SDUPTHER

## 2017-08-28 ENCOUNTER — OFFICE VISIT (OUTPATIENT)
Dept: INTERNAL MEDICINE | Facility: CLINIC | Age: 77
End: 2017-08-28

## 2017-08-28 VITALS
HEART RATE: 69 BPM | TEMPERATURE: 97.9 F | DIASTOLIC BLOOD PRESSURE: 58 MMHG | SYSTOLIC BLOOD PRESSURE: 120 MMHG | WEIGHT: 119 LBS | HEIGHT: 62 IN | BODY MASS INDEX: 21.9 KG/M2 | OXYGEN SATURATION: 94 %

## 2017-08-28 DIAGNOSIS — H65.92 OTITIS MEDIA WITH EFFUSION, LEFT: ICD-10-CM

## 2017-08-28 DIAGNOSIS — B37.0 ORAL THRUSH: Primary | ICD-10-CM

## 2017-08-28 DIAGNOSIS — I10 ESSENTIAL HYPERTENSION: ICD-10-CM

## 2017-08-28 PROCEDURE — 99214 OFFICE O/P EST MOD 30 MIN: CPT | Performed by: NURSE PRACTITIONER

## 2017-08-28 RX ORDER — FLUCONAZOLE 150 MG/1
150 TABLET ORAL ONCE
Qty: 1 TABLET | Refills: 0 | Status: SHIPPED | OUTPATIENT
Start: 2017-08-28 | End: 2017-08-28

## 2017-08-28 RX ORDER — AMOXICILLIN AND CLAVULANATE POTASSIUM 875; 125 MG/1; MG/1
1 TABLET, FILM COATED ORAL 2 TIMES DAILY
Qty: 20 TABLET | Refills: 0 | Status: SHIPPED | OUTPATIENT
Start: 2017-08-28 | End: 2017-09-07

## 2017-08-28 NOTE — PROGRESS NOTES
Chief Complaint / Reason:      Chief Complaint   Patient presents with   • Follow-up     follow up visit for cough and htn       Subjective     HPI  Presents today for follow up regarding cough and HTN. HTN is stable on medications and patient states that she has been taking it as prescribed. She still has a cough that is very annoying and sometimes keeps her up at night. She denies any fever, does report shortness of breath and dizziness. No chest pain or heart palpitations. States that she has tried using inhaler with no relief. Reports that she does wash her mouth out with each use.   History taken from: patient    PMH/FH/Social History were reviewed and updated appropriately in the electronic medical record.     Review of Systems:   Review of Systems   Constitutional: Positive for fatigue. Negative for fever.   HENT: Positive for sore throat and trouble swallowing.    Respiratory: Positive for cough. Negative for chest tightness, shortness of breath and wheezing.    Cardiovascular: Negative.  Negative for chest pain.   Gastrointestinal: Negative.    Skin: Negative.    Neurological: Negative.  Negative for weakness.   Psychiatric/Behavioral: Negative.      All other systems were reviewed and are negative.  Exceptions are noted in the subjective or above.      Objective     Vital Signs  Vitals:    08/28/17 1257   BP: 120/58   Pulse: 69   Temp: 97.9 °F (36.6 °C)   SpO2: 94%       Body mass index is 21.77 kg/(m^2).    Physical Exam   Constitutional: She is oriented to person, place, and time. She appears well-developed and well-nourished.   HENT:   Head: Normocephalic and atraumatic.   Right Ear: External ear normal. Tympanic membrane is erythematous. Tympanic membrane is not bulging.   Left Ear: External ear normal. There is tenderness. Tympanic membrane is erythematous and bulging. A middle ear effusion is present.   Nose: Nose normal.   Mouth/Throat: Mucous membranes are dry. Posterior oropharyngeal edema and  posterior oropharyngeal erythema present.       Eyes: Conjunctivae and EOM are normal. Pupils are equal, round, and reactive to light.   Neck: Normal range of motion. Neck supple.   Cardiovascular: Normal rate, regular rhythm, normal heart sounds and intact distal pulses.    Pulmonary/Chest: Effort normal and breath sounds normal. She exhibits tenderness.   Abdominal: Soft. Bowel sounds are normal. There is no tenderness.   Lymphadenopathy:     She has no cervical adenopathy.   Neurological: She is alert and oriented to person, place, and time. She has normal reflexes.   Skin: Skin is warm and dry.   Psychiatric: She has a normal mood and affect. Her behavior is normal. Judgment and thought content normal.   Nursing note and vitals reviewed.       Results Review:    I reviewed the patient's new clinical results.     Medication Review:     Current Outpatient Prescriptions:   •  albuterol (PROVENTIL HFA;VENTOLIN HFA) 108 (90 BASE) MCG/ACT inhaler, Inhale 1 puff Every 4 (Four) Hours As Needed for shortness of air., Disp: 18 g, Rfl: 3  •  albuterol (PROVENTIL) (2.5 MG/3ML) 0.083% nebulizer solution, USE 1 AMPULE IN NEBULIZER FOUR TIMES A DAY AS NEEDED, Disp: 150 mL, Rfl: 4  •  aspirin 81 MG EC tablet, Take 81 mg by mouth Daily., Disp: , Rfl:   •  cholecalciferol (VITAMIN D3) 1000 UNITS tablet, Take 1,000 Units by mouth Daily., Disp: , Rfl:   •  clonazePAM (KlonoPIN) 0.5 MG tablet, TAKE 1 TABLET BY MOUTH AT BEDTIME , Disp: 30 tablet, Rfl: 0  •  estrogens, conjugated, (PREMARIN) 0.625 MG tablet, Take daily for 21 days then do not take for 7 days., Disp: 90 tablet, Rfl: 2  •  losartan (COZAAR) 50 MG tablet, TAKE 1 TABLET BY MOUTH ONE TIME A DAY , Disp: 30 tablet, Rfl: 2  •  meclizine (ANTIVERT) 12.5 MG tablet, TAKE 1 TABLET BY MOUTH THREE TIMES A DAY AS NEEDED for dizziness , Disp: 90 tablet, Rfl: 0  •  meclizine (ANTIVERT) 12.5 MG tablet, TAKE 1 TABLET BY MOUTH THREE TIMES A DAY AS NEEDED for dizziness , Disp: 90 tablet,  Rfl: 0  •  pravastatin (PRAVACHOL) 20 MG tablet, Take 1 tablet by mouth Every Night., Disp: 90 tablet, Rfl: 3  •  PREMARIN 0.625 MG tablet, TAKE 1 TABLET BY MOUTH ONE TIME A DAY , Disp: 90 tablet, Rfl: 1  •  propranolol (INDERAL) 60 MG tablet, Take 1 tablet by mouth Daily., Disp: 90 tablet, Rfl: 3  •  sertraline (ZOLOFT) 50 MG tablet, Take 1 tablet by mouth 2 (Two) Times a Day., Disp: 180 tablet, Rfl: 1  •  SPIRIVA HANDIHALER 18 MCG per inhalation capsule, INHALE CONTENTS 1 CAPSULE BY MOUTH ONE TIME A DAY , Disp: 30 capsule, Rfl: 8  •  SYMBICORT 160-4.5 MCG/ACT inhaler, INHALE 2 PUFFS BY MOUTH TWO TIMES A DAY AND RINSE MOUTH AFTER EACH USE, Disp: 10.2 g, Rfl: 2  •  traMADol (ULTRAM) 50 MG tablet, Take 1 tablet by mouth Daily As Needed (pain)., Disp: 30 tablet, Rfl: 2  •  amoxicillin-clavulanate (AUGMENTIN) 875-125 MG per tablet, Take 1 tablet by mouth 2 (Two) Times a Day for 10 days., Disp: 20 tablet, Rfl: 0  •  nystatin (MYCOSTATIN) 390977 UNIT/ML suspension, Swish and swallow 5 mL 4 (Four) Times a Day., Disp: 60 mL, Rfl: 0    Assessment/Plan   Sadie was seen today for follow-up.    Diagnoses and all orders for this visit:    Oral thrush  -     fluconazole (DIFLUCAN) 150 MG tablet; Take 1 tablet by mouth 1 (One) Time for 1 dose.  -     nystatin (MYCOSTATIN) [id number] UNIT/ML suspension; Swish and swallow 5 mL 4 (Four) Times a Day.    Otitis media with effusion, left  -     amoxicillin-clavulanate (AUGMENTIN) 875-125 MG per tablet; Take 1 tablet by mouth 2 (Two) Times a Day for 10 days.    Essential hypertension  stable on medication  Discussed compliance with medication regimen and discussed ways to prevent of long-term complications from high blood pressure.    F/u 4 weeks and prn  Jasmin Nayak, RUBY  08/28/2017

## 2017-09-19 RX ORDER — MECLIZINE HCL 12.5 MG/1
TABLET ORAL
Qty: 90 TABLET | Refills: 0 | Status: SHIPPED | OUTPATIENT
Start: 2017-09-19 | End: 2019-04-10 | Stop reason: SDUPTHER

## 2017-09-19 RX ORDER — LOSARTAN POTASSIUM 50 MG/1
TABLET ORAL
Qty: 30 TABLET | Refills: 1 | Status: ON HOLD | OUTPATIENT
Start: 2017-09-19 | End: 2017-10-30

## 2017-09-19 RX ORDER — TIOTROPIUM BROMIDE 18 UG/1
CAPSULE ORAL; RESPIRATORY (INHALATION)
Qty: 30 CAPSULE | Refills: 7 | Status: SHIPPED | OUTPATIENT
Start: 2017-09-19 | End: 2017-10-12 | Stop reason: SDUPTHER

## 2017-09-19 RX ORDER — CONJUGATED ESTROGENS 0.62 MG/1
TABLET, FILM COATED ORAL
Qty: 90 TABLET | Refills: 1 | Status: SHIPPED | OUTPATIENT
Start: 2017-09-19 | End: 2017-10-12

## 2017-09-27 ENCOUNTER — OFFICE VISIT (OUTPATIENT)
Dept: INTERNAL MEDICINE | Facility: CLINIC | Age: 77
End: 2017-09-27

## 2017-09-27 VITALS
TEMPERATURE: 97.9 F | RESPIRATION RATE: 12 BRPM | HEIGHT: 62 IN | BODY MASS INDEX: 22.26 KG/M2 | SYSTOLIC BLOOD PRESSURE: 122 MMHG | HEART RATE: 73 BPM | OXYGEN SATURATION: 95 % | WEIGHT: 121 LBS | DIASTOLIC BLOOD PRESSURE: 70 MMHG

## 2017-09-27 DIAGNOSIS — L03.90 CELLULITIS, UNSPECIFIED CELLULITIS SITE: ICD-10-CM
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
## 2017-09-27 DIAGNOSIS — I10 ESSENTIAL HYPERTENSION: ICD-10-CM

## 2017-09-27 DIAGNOSIS — Z09 FOLLOW UP: Primary | ICD-10-CM

## 2017-09-27 DIAGNOSIS — B37.0 ORAL THRUSH: ICD-10-CM

## 2017-09-27 PROCEDURE — 99214 OFFICE O/P EST MOD 30 MIN: CPT | Performed by: NURSE PRACTITIONER

## 2017-09-27 RX ORDER — CEPHALEXIN 500 MG/1
500 CAPSULE ORAL 2 TIMES DAILY
Qty: 14 CAPSULE | Refills: 0 | Status: SHIPPED | OUTPATIENT
Start: 2017-09-27 | End: 2018-01-15 | Stop reason: HOSPADM

## 2017-09-27 RX ORDER — FLUCONAZOLE 150 MG/1
150 TABLET ORAL ONCE
Qty: 1 TABLET | Refills: 0 | Status: SHIPPED | OUTPATIENT
Start: 2017-09-27 | End: 2017-09-27

## 2017-09-27 NOTE — PROGRESS NOTES
Chief Complaint / Reason:      Chief Complaint   Patient presents with   • Hypertension       Subjective     HPI  Patient presents today for follow-up regarding hypertension.  She has provided a log of her blood pressure for me to review.  Reports blood pressure seems to be controlled and is only taking 25 mg of Cozaar but is elevated at home.  She has some abrasions on extremities and head, which she indicates that she fell in some gravel.  The fall will be almost a week ago.  She states that she has tried putting peroxide on her wounds that they don't seem to be healing well except for the one that is on her head.  Denies any neurological changes or headache.  Denies any fever, chest pain shortness of breath or heart palpitations.     History taken from: patient    PMH/FH/Social History were reviewed and updated appropriately in the electronic medical record.     Review of Systems:   Review of Systems   Constitutional: Negative.    Respiratory: Negative.    Cardiovascular: Negative.    Gastrointestinal: Negative.    Skin: Positive for color change and wound.     All other systems were reviewed and are negative.  Exceptions are noted in the subjective or above.      Objective     Vital Signs  Vitals:    09/27/17 1255   BP: 122/70   Pulse: 73   Resp: 12   Temp: 97.9 °F (36.6 °C)   SpO2: 95%       Body mass index is 22.13 kg/(m^2).    Physical Exam   Constitutional: She is oriented to person, place, and time. She appears well-developed and well-nourished. No distress.   Cardiovascular: Normal rate, regular rhythm, normal heart sounds and intact distal pulses.    Pulmonary/Chest: Effort normal and breath sounds normal. She has no wheezes. She exhibits no tenderness.   Neurological: She is alert and oriented to person, place, and time.   Skin: Skin is warm. Abrasion, bruising and laceration noted. No rash noted. There is erythema. No pallor.        Psychiatric: She has a normal mood and affect. Her behavior is  normal. Judgment and thought content normal.   Nursing note and vitals reviewed.       Results Review:    I reviewed the patient's BP results she brought in from home.       Medication Review:     Current Outpatient Prescriptions:   •  albuterol (PROVENTIL HFA;VENTOLIN HFA) 108 (90 BASE) MCG/ACT inhaler, Inhale 1 puff Every 4 (Four) Hours As Needed for shortness of air., Disp: 18 g, Rfl: 3  •  albuterol (PROVENTIL) (2.5 MG/3ML) 0.083% nebulizer solution, USE 1 AMPULE IN NEBULIZER FOUR TIMES A DAY AS NEEDED, Disp: 150 mL, Rfl: 4  •  aspirin 81 MG EC tablet, Take 81 mg by mouth Daily., Disp: , Rfl:   •  cholecalciferol (VITAMIN D3) 1000 UNITS tablet, Take 1,000 Units by mouth Daily., Disp: , Rfl:   •  clonazePAM (KlonoPIN) 0.5 MG tablet, TAKE 1 TABLET BY MOUTH AT BEDTIME , Disp: 30 tablet, Rfl: 0  •  estrogens, conjugated, (PREMARIN) 0.625 MG tablet, Take daily for 21 days then do not take for 7 days., Disp: 90 tablet, Rfl: 2  •  losartan (COZAAR) 50 MG tablet, TAKE 1 TABLET BY MOUTH ONE TIME A DAY , Disp: 30 tablet, Rfl: 1  •  meclizine (ANTIVERT) 12.5 MG tablet, TAKE 1 TABLET BY MOUTH THREE TIMES A DAY AS NEEDED for dizziness , Disp: 90 tablet, Rfl: 0  •  pravastatin (PRAVACHOL) 20 MG tablet, Take 1 tablet by mouth Every Night., Disp: 90 tablet, Rfl: 3  •  PREMARIN 0.625 MG tablet, TAKE 1 TABLET BY MOUTH ONE TIME A DAY , Disp: 90 tablet, Rfl: 1  •  PREMARIN 0.625 MG tablet, TAKE 1 TABLET BY MOUTH ONE TIME A DAY , Disp: 90 tablet, Rfl: 1  •  propranolol (INDERAL) 60 MG tablet, Take 1 tablet by mouth Daily., Disp: 90 tablet, Rfl: 3  •  sertraline (ZOLOFT) 50 MG tablet, TAKE 1 TABLET BY MOUTH TWO TIMES A DAY , Disp: 60 tablet, Rfl: 0  •  SPIRIVA HANDIHALER 18 MCG per inhalation capsule, inhale contents of 1 capsule by mouth one time per day, Disp: 30 capsule, Rfl: 7  •  SYMBICORT 160-4.5 MCG/ACT inhaler, INHALE 2 PUFFS BY MOUTH TWO TIMES A DAY AND RINSE MOUTH AFTER EACH USE, Disp: 10.2 g, Rfl: 2  •  traMADol (ULTRAM)  50 MG tablet, Take 1 tablet by mouth Daily As Needed (pain)., Disp: 30 tablet, Rfl: 2  •  cephalexin (KEFLEX) 500 MG capsule, Take 1 capsule by mouth 2 (Two) Times a Day for 7 days., Disp: 14 capsule, Rfl: 0  •  nystatin (MYCOSTATIN) [id number] UNIT/ML suspension, Swish and swallow 5 mL 4 (Four) Times a Day., Disp: 473 mL, Rfl: 0    Assessment/Plan   Sadie was seen today for hypertension.    Diagnoses and all orders for this visit:    Follow up    Cellulitis, unspecified cellulitis site  -     cephalexin (KEFLEX) 500 MG capsule; Take 1 capsule by mouth 2 (Two) Times a Day for 7 days.  Recommend patient keep area clean and dry.  Advised patient to clean with antibacterial soap.  Essential hypertension  Continue taking Cozaar as she has been doing  Recommend patient closely monitor blood pressure and contact office if remains elevated and discussed dietary modifications.  Fall, initial encounter  Discussed fall prevention and interventions.  Oral thrush  -     nystatin (MYCOSTATIN) [id number] UNIT/ML suspension; Swish and swallow 5 mL 4 (Four) Times a Day.  -     fluconazole (DIFLUCAN) 150 MG tablet; Take 1 tablet by mouth 1 (One) Time for 1 dose.  Recommend patient rinse mouth thoroughly after breathing treatments.    Return in about 2 weeks (around 10/11/2017).    Jasmin Nayak, APRN  09/27/2017

## 2017-09-27 NOTE — PATIENT INSTRUCTIONS
Cellulitis, Adult  Cellulitis is a skin infection. The infected area is usually red and tender. This condition occurs most often in the arms and lower legs. The infection can travel to the muscles, blood, and underlying tissue and become serious. It is very important to get treated for this condition.  CAUSES  Cellulitis is caused by bacteria. The bacteria enter through a break in the skin, such as a cut, burn, insect bite, open sore, or crack.  RISK FACTORS  This condition is more likely to occur in people who:  · Have a weak defense system (immune system).  · Have open wounds on the skin such as cuts, burns, bites, and scrapes. Bacteria can enter the body through these open wounds.  · Are older.  · Have diabetes.  · Have a type of long-lasting (chronic) liver disease (cirrhosis) or kidney disease.  · Use IV drugs.  SYMPTOMS  Symptoms of this condition include:  · Redness, streaking, or spotting on the skin.  · Swollen area of the skin.  · Tenderness or pain when an area of the skin is touched.  · Warm skin.  · Fever.  · Chills.  · Blisters.  DIAGNOSIS  This condition is diagnosed based on a medical history and physical exam. You may also have tests, including:  · Blood tests.  · Lab tests.  · Imaging tests.  TREATMENT  Treatment for this condition may include:   · Medicines, such as antibiotic medicines or antihistamines.  · Supportive care, such as rest and application of cold or warm cloths (cold or warm compresses) to the skin.  · Hospital care, if the condition is severe.  The infection usually gets better within 1-2 days of treatment.  HOME CARE INSTRUCTIONS  · Take over-the-counter and prescription medicines only as told by your health care provider.  · If you were prescribed an antibiotic medicine, take it as told by your health care provider. Do not stop taking the antibiotic even if you start to feel better.  · Drink enough fluid to keep your urine clear or pale yellow.  · Do not touch or rub the infected  area.  · Raise (elevate) the infected area above the level of your heart while you are sitting or lying down.  · Apply warm or cold compresses to the affected area as told by your health care provider.  · Keep all follow-up visits as told by your health care provider. This is important. These visits let your health care provider make sure a more serious infection is not developing.  SEEK MEDICAL CARE IF:  · You have a fever.  · Your symptoms do not improve within 1-2 days of starting treatment.  · Your bone or joint underneath the infected area becomes painful after the skin has healed.  · Your infection returns in the same area or another area.  · You notice a swollen bump in the infected area.  · You develop new symptoms.  · You have a general ill feeling (malaise) with muscle aches and pains.  SEEK IMMEDIATE MEDICAL CARE IF:  · Your symptoms get worse.  · You feel very sleepy.  · You develop vomiting or diarrhea that persists.  · You notice red streaks coming from the infected area.  · Your red area gets larger or turns dark in color.     This information is not intended to replace advice given to you by your health care provider. Make sure you discuss any questions you have with your health care provider.     Document Released: 09/27/2006 Document Revised: 04/10/2017 Document Reviewed: 10/26/2016  [employer] Interactive Patient Education ©2017 [employer]

## 2017-10-12 ENCOUNTER — OFFICE VISIT (OUTPATIENT)
Dept: INTERNAL MEDICINE | Facility: CLINIC | Age: 77
End: 2017-10-12

## 2017-10-12 VITALS
WEIGHT: 120.19 LBS | HEIGHT: 62 IN | DIASTOLIC BLOOD PRESSURE: 70 MMHG | SYSTOLIC BLOOD PRESSURE: 118 MMHG | HEART RATE: 68 BPM | RESPIRATION RATE: 16 BRPM | OXYGEN SATURATION: 95 % | TEMPERATURE: 98.1 F | BODY MASS INDEX: 22.12 KG/M2

## 2017-10-12 DIAGNOSIS — I10 ESSENTIAL HYPERTENSION: ICD-10-CM

## 2017-10-12 DIAGNOSIS — Z09 FOLLOW UP: Primary | ICD-10-CM

## 2017-10-12 DIAGNOSIS — L03.90 CELLULITIS, UNSPECIFIED CELLULITIS SITE: ICD-10-CM

## 2017-10-12 PROCEDURE — 99213 OFFICE O/P EST LOW 20 MIN: CPT | Performed by: NURSE PRACTITIONER

## 2017-10-12 RX ORDER — CLONAZEPAM 0.5 MG/1
0.5 TABLET ORAL NIGHTLY PRN
Qty: 30 TABLET | Refills: 3 | Status: SHIPPED | OUTPATIENT
Start: 2017-10-12 | End: 2018-02-06 | Stop reason: HOSPADM

## 2017-10-12 NOTE — PROGRESS NOTES
Chief Complaint / Reason:      Chief Complaint   Patient presents with   • Cellulitis     f/u- 2 week   • Hypertension       Subjective     HPI  Patient presents today for 2 week follow up regarding cellulitis and hypertension, She states some of the places have healed from her previous fall in the gravel while others have not. Blood pressure is stable. Denies fever, chest pain, shortness of breath or heart palpitations.   History taken from: patient    PMH/FH/Social History were reviewed and updated appropriately in the electronic medical record.     Review of Systems:   Review of Systems   Constitutional: Negative.  Negative for fever.   Respiratory: Negative.    Cardiovascular: Negative.    Gastrointestinal: Negative.    Skin: Positive for color change and wound.     All other systems were reviewed and are negative.  Exceptions are noted in the subjective or above.      Objective     Vital Signs  Vitals:    10/12/17 1301   BP: 118/70   Pulse: 68   Resp: 16   Temp: 98.1 °F (36.7 °C)   SpO2: 95%       Body mass index is 21.98 kg/(m^2).    Physical Exam   Constitutional: She is oriented to person, place, and time. She appears well-developed and well-nourished. No distress.   Cardiovascular: Normal rate, regular rhythm, normal heart sounds and intact distal pulses.    Pulmonary/Chest: Effort normal and breath sounds normal. She has no wheezes. She exhibits no tenderness.   Neurological: She is alert and oriented to person, place, and time.   Skin: Skin is warm and dry. Rash noted. No erythema. No pallor.   Wounds healing and non healing noted. See photos   Psychiatric: She has a normal mood and affect. Her behavior is normal. Judgment and thought content normal.   Nursing note and vitals reviewed.      Medication Review:     Current Outpatient Prescriptions:   •  albuterol (PROVENTIL HFA;VENTOLIN HFA) 108 (90 BASE) MCG/ACT inhaler, Inhale 1 puff Every 4 (Four) Hours As Needed for shortness of air., Disp: 18 g, Rfl:  3  •  albuterol (PROVENTIL) (2.5 MG/3ML) 0.083% nebulizer solution, USE 1 AMPULE IN NEBULIZER FOUR TIMES A DAY AS NEEDED, Disp: 150 mL, Rfl: 4  •  aspirin 81 MG EC tablet, Take 81 mg by mouth Daily., Disp: , Rfl:   •  cholecalciferol (VITAMIN D3) 1000 UNITS tablet, Take 1,000 Units by mouth Daily., Disp: , Rfl:   •  losartan (COZAAR) 50 MG tablet, TAKE 1 TABLET BY MOUTH ONE TIME A DAY  (Patient taking differently: TAKE 1 TABLET BY MOUTH ONE TIME A DAY   taking 1/2 tab. bid), Disp: 30 tablet, Rfl: 1  •  meclizine (ANTIVERT) 12.5 MG tablet, TAKE 1 TABLET BY MOUTH THREE TIMES A DAY AS NEEDED for dizziness , Disp: 90 tablet, Rfl: 0  •  nystatin (MYCOSTATIN) 588247 UNIT/ML suspension, Swish and swallow 5 mL 4 (Four) Times a Day., Disp: 473 mL, Rfl: 0  •  pravastatin (PRAVACHOL) 20 MG tablet, Take 1 tablet by mouth Every Night., Disp: 90 tablet, Rfl: 3  •  PREMARIN 0.625 MG tablet, TAKE 1 TABLET BY MOUTH ONE TIME A DAY , Disp: 90 tablet, Rfl: 1  •  propranolol (INDERAL) 60 MG tablet, Take 1 tablet by mouth Daily., Disp: 90 tablet, Rfl: 3  •  sertraline (ZOLOFT) 50 MG tablet, TAKE 1 TABLET BY MOUTH TWO TIMES A DAY , Disp: 60 tablet, Rfl: 0  •  SYMBICORT 160-4.5 MCG/ACT inhaler, INHALE 2 PUFFS BY MOUTH TWO TIMES A DAY AND RINSE MOUTH AFTER EACH USE, Disp: 10.2 g, Rfl: 2  •  tiotropium (SPIRIVA HANDIHALER) 18 MCG per inhalation capsule, Place 1 capsule into inhaler and inhale Daily., Disp: 30 capsule, Rfl: 5  •  traMADol (ULTRAM) 50 MG tablet, Take 1 tablet by mouth Daily As Needed (pain)., Disp: 30 tablet, Rfl: 2  •  clonazePAM (KlonoPIN) 0.5 MG tablet, Take 1 tablet by mouth At Night As Needed for Seizures., Disp: 30 tablet, Rfl: 3    Assessment/Plan   Sadie was seen today for cellulitis and hypertension.    Diagnoses and all orders for this visit:    Follow up    Cellulitis, unspecified cellulitis site  Keep area clean and dry and keep open to air.   Essential hypertension  Stable   Other orders  -     tiotropium (SPIRIVA  HANDIHALER) 18 MCG per inhalation capsule; Place 1 capsule into inhaler and inhale Daily.      Return in about 4 weeks (around 11/9/2017), or if symptoms worsen or fail to improve.    Jasmin Nayak, APRN  10/12/2017

## 2017-10-24 RX ORDER — PRAVASTATIN SODIUM 20 MG
TABLET ORAL
Qty: 30 TABLET | Refills: 5 | Status: SHIPPED | OUTPATIENT
Start: 2017-10-24 | End: 2018-11-16 | Stop reason: SDUPTHER

## 2017-10-24 RX ORDER — ALBUTEROL SULFATE 2.5 MG/3ML
SOLUTION RESPIRATORY (INHALATION)
Qty: 75 ML | Refills: 3 | Status: SHIPPED | OUTPATIENT
Start: 2017-10-24 | End: 2018-02-06 | Stop reason: HOSPADM

## 2017-10-24 RX ORDER — MECLIZINE HCL 12.5 MG/1
TABLET ORAL
Qty: 90 TABLET | Refills: 5 | Status: ON HOLD | OUTPATIENT
Start: 2017-10-24 | End: 2017-10-30

## 2017-10-27 ENCOUNTER — APPOINTMENT (OUTPATIENT)
Dept: GENERAL RADIOLOGY | Facility: HOSPITAL | Age: 77
End: 2017-10-27

## 2017-10-27 ENCOUNTER — HOSPITAL ENCOUNTER (INPATIENT)
Facility: HOSPITAL | Age: 77
LOS: 15 days | Discharge: HOME | End: 2017-11-12
Attending: EMERGENCY MEDICINE | Admitting: HOSPITALIST

## 2017-10-27 DIAGNOSIS — J44.1 COPD WITH EXACERBATION (HCC): Primary | ICD-10-CM

## 2017-10-27 DIAGNOSIS — R09.02 HYPOXIA: ICD-10-CM

## 2017-10-27 DIAGNOSIS — Z74.09 IMPAIRED FUNCTIONAL MOBILITY, BALANCE, GAIT, AND ENDURANCE: ICD-10-CM

## 2017-10-27 DIAGNOSIS — R06.2 WHEEZING: ICD-10-CM

## 2017-10-27 DIAGNOSIS — D72.829 LEUKOCYTOSIS, UNSPECIFIED TYPE: ICD-10-CM

## 2017-10-27 DIAGNOSIS — Z74.09 IMPAIRED MOBILITY AND ADLS: ICD-10-CM

## 2017-10-27 DIAGNOSIS — Z78.9 IMPAIRED MOBILITY AND ADLS: ICD-10-CM

## 2017-10-27 LAB
ALBUMIN SERPL-MCNC: 4.2 G/DL (ref 3.2–4.8)
ALBUMIN/GLOB SERPL: 1.2 G/DL (ref 1.5–2.5)
ALP SERPL-CCNC: 104 U/L (ref 25–100)
ALT SERPL W P-5'-P-CCNC: 17 U/L (ref 7–40)
ANION GAP SERPL CALCULATED.3IONS-SCNC: 4 MMOL/L (ref 3–11)
AST SERPL-CCNC: 24 U/L (ref 0–33)
BASOPHILS # BLD AUTO: 0.03 10*3/MM3 (ref 0–0.2)
BASOPHILS NFR BLD AUTO: 0.2 % (ref 0–1)
BILIRUB SERPL-MCNC: 0.3 MG/DL (ref 0.3–1.2)
BNP SERPL-MCNC: 153 PG/ML (ref 0–100)
BUN BLD-MCNC: 13 MG/DL (ref 9–23)
BUN/CREAT SERPL: 14.4 (ref 7–25)
CALCIUM SPEC-SCNC: 9.5 MG/DL (ref 8.7–10.4)
CHLORIDE SERPL-SCNC: 101 MMOL/L (ref 99–109)
CO2 SERPL-SCNC: 32 MMOL/L (ref 20–31)
CREAT BLD-MCNC: 0.9 MG/DL (ref 0.6–1.3)
DEPRECATED RDW RBC AUTO: 43.1 FL (ref 37–54)
EOSINOPHIL # BLD AUTO: 0.24 10*3/MM3 (ref 0–0.3)
EOSINOPHIL NFR BLD AUTO: 1.5 % (ref 0–3)
ERYTHROCYTE [DISTWIDTH] IN BLOOD BY AUTOMATED COUNT: 13 % (ref 11.3–14.5)
GFR SERPL CREATININE-BSD FRML MDRD: 60 ML/MIN/1.73
GLOBULIN UR ELPH-MCNC: 3.4 GM/DL
GLUCOSE BLD-MCNC: 132 MG/DL (ref 70–100)
HCT VFR BLD AUTO: 41.5 % (ref 34.5–44)
HGB BLD-MCNC: 13.6 G/DL (ref 11.5–15.5)
IMM GRANULOCYTES # BLD: 0.03 10*3/MM3 (ref 0–0.03)
IMM GRANULOCYTES NFR BLD: 0.2 % (ref 0–0.6)
LYMPHOCYTES # BLD AUTO: 1.37 10*3/MM3 (ref 0.6–4.8)
LYMPHOCYTES NFR BLD AUTO: 8.4 % (ref 24–44)
MCH RBC QN AUTO: 29.9 PG (ref 27–31)
MCHC RBC AUTO-ENTMCNC: 32.8 G/DL (ref 32–36)
MCV RBC AUTO: 91.2 FL (ref 80–99)
MONOCYTES # BLD AUTO: 1 10*3/MM3 (ref 0–1)
MONOCYTES NFR BLD AUTO: 6.2 % (ref 0–12)
NEUTROPHILS # BLD AUTO: 13.56 10*3/MM3 (ref 1.5–8.3)
NEUTROPHILS NFR BLD AUTO: 83.5 % (ref 41–71)
PLATELET # BLD AUTO: 269 10*3/MM3 (ref 150–450)
PMV BLD AUTO: 9.9 FL (ref 6–12)
POTASSIUM BLD-SCNC: 4.1 MMOL/L (ref 3.5–5.5)
PROT SERPL-MCNC: 7.6 G/DL (ref 5.7–8.2)
RBC # BLD AUTO: 4.55 10*6/MM3 (ref 3.89–5.14)
SODIUM BLD-SCNC: 137 MMOL/L (ref 132–146)
TROPONIN I SERPL-MCNC: 0 NG/ML (ref 0–0.07)
WBC NRBC COR # BLD: 16.23 10*3/MM3 (ref 3.5–10.8)

## 2017-10-27 PROCEDURE — 80053 COMPREHEN METABOLIC PANEL: CPT | Performed by: EMERGENCY MEDICINE

## 2017-10-27 PROCEDURE — 71010 HC CHEST PA OR AP: CPT

## 2017-10-27 PROCEDURE — 85025 COMPLETE CBC W/AUTO DIFF WBC: CPT | Performed by: EMERGENCY MEDICINE

## 2017-10-27 PROCEDURE — 99284 EMERGENCY DEPT VISIT MOD MDM: CPT

## 2017-10-27 PROCEDURE — 93005 ELECTROCARDIOGRAM TRACING: CPT | Performed by: EMERGENCY MEDICINE

## 2017-10-27 PROCEDURE — 83880 ASSAY OF NATRIURETIC PEPTIDE: CPT | Performed by: EMERGENCY MEDICINE

## 2017-10-27 PROCEDURE — 84484 ASSAY OF TROPONIN QUANT: CPT

## 2017-10-27 RX ORDER — SODIUM CHLORIDE 0.9 % (FLUSH) 0.9 %
10 SYRINGE (ML) INJECTION AS NEEDED
Status: DISCONTINUED | OUTPATIENT
Start: 2017-10-27 | End: 2017-11-12 | Stop reason: HOSPADM

## 2017-10-27 RX ORDER — METHYLPREDNISOLONE SODIUM SUCCINATE 40 MG/ML
80 INJECTION, POWDER, LYOPHILIZED, FOR SOLUTION INTRAMUSCULAR; INTRAVENOUS ONCE
Status: COMPLETED | OUTPATIENT
Start: 2017-10-27 | End: 2017-10-28

## 2017-10-27 RX ORDER — IPRATROPIUM BROMIDE AND ALBUTEROL SULFATE 2.5; .5 MG/3ML; MG/3ML
3 SOLUTION RESPIRATORY (INHALATION) ONCE
Status: COMPLETED | OUTPATIENT
Start: 2017-10-27 | End: 2017-10-28

## 2017-10-28 ENCOUNTER — APPOINTMENT (OUTPATIENT)
Dept: CARDIOLOGY | Facility: HOSPITAL | Age: 77
End: 2017-10-28
Attending: INTERNAL MEDICINE

## 2017-10-28 PROBLEM — D72.829 LEUKOCYTOSIS: Chronic | Status: ACTIVE | Noted: 2017-10-28

## 2017-10-28 PROBLEM — J44.1 COPD WITH EXACERBATION (HCC): Status: ACTIVE | Noted: 2017-10-28

## 2017-10-28 PROBLEM — J44.1 COPD EXACERBATION (HCC): Status: ACTIVE | Noted: 2017-10-28

## 2017-10-28 PROBLEM — Z72.0 TOBACCO ABUSE DISORDER: Chronic | Status: ACTIVE | Noted: 2017-10-28

## 2017-10-28 LAB
BH CV ECHO MEAS - AO MAX PG (FULL): 2.5 MMHG
BH CV ECHO MEAS - AO MAX PG: 6.9 MMHG
BH CV ECHO MEAS - AO ROOT AREA (BSA CORRECTED): 1.2
BH CV ECHO MEAS - AO ROOT AREA: 2.8 CM^2
BH CV ECHO MEAS - AO ROOT DIAM: 1.9 CM
BH CV ECHO MEAS - AO V2 MAX: 131 CM/SEC
BH CV ECHO MEAS - BSA(HAYCOCK): 1.6 M^2
BH CV ECHO MEAS - BSA: 1.6 M^2
BH CV ECHO MEAS - BZI_BMI: 23 KILOGRAMS/M^2
BH CV ECHO MEAS - BZI_METRIC_HEIGHT: 157.5 CM
BH CV ECHO MEAS - BZI_METRIC_WEIGHT: 57.2 KG
BH CV ECHO MEAS - CONTRAST EF (2CH): 62.1 ML/M^2
BH CV ECHO MEAS - CONTRAST EF 4CH: 63.6 ML/M^2
BH CV ECHO MEAS - EDV(CUBED): 32.8 ML
BH CV ECHO MEAS - EDV(MOD-SP2): 29 ML
BH CV ECHO MEAS - EDV(MOD-SP4): 33 ML
BH CV ECHO MEAS - EDV(TEICH): 41 ML
BH CV ECHO MEAS - EF(CUBED): 79.4 %
BH CV ECHO MEAS - EF(MOD-SP2): 62.1 %
BH CV ECHO MEAS - EF(MOD-SP4): 63.6 %
BH CV ECHO MEAS - EF(TEICH): 73.1 %
BH CV ECHO MEAS - ESV(CUBED): 6.8 ML
BH CV ECHO MEAS - ESV(MOD-SP2): 11 ML
BH CV ECHO MEAS - ESV(MOD-SP4): 12 ML
BH CV ECHO MEAS - ESV(TEICH): 11 ML
BH CV ECHO MEAS - FS: 40.9 %
BH CV ECHO MEAS - IVS/LVPW: 0.85
BH CV ECHO MEAS - IVSD: 0.99 CM
BH CV ECHO MEAS - LAT PEAK E' VEL: 6.1 CM/SEC
BH CV ECHO MEAS - LV DIASTOLIC VOL/BSA (35-75): 21 ML/M^2
BH CV ECHO MEAS - LV MASS(C)D: 101.6 GRAMS
BH CV ECHO MEAS - LV MASS(C)DI: 64.7 GRAMS/M^2
BH CV ECHO MEAS - LV MAX PG: 4.4 MMHG
BH CV ECHO MEAS - LV MEAN PG: 2 MMHG
BH CV ECHO MEAS - LV SYSTOLIC VOL/BSA (12-30): 7.6 ML/M^2
BH CV ECHO MEAS - LV V1 MAX: 105 CM/SEC
BH CV ECHO MEAS - LV V1 MEAN: 66.7 CM/SEC
BH CV ECHO MEAS - LV V1 VTI: 21.5 CM
BH CV ECHO MEAS - LVIDD: 3.2 CM
BH CV ECHO MEAS - LVIDS: 1.9 CM
BH CV ECHO MEAS - LVLD AP2: 5.5 CM
BH CV ECHO MEAS - LVLD AP4: 6.1 CM
BH CV ECHO MEAS - LVLS AP2: 5.3 CM
BH CV ECHO MEAS - LVLS AP4: 5.1 CM
BH CV ECHO MEAS - LVPWD: 1.2 CM
BH CV ECHO MEAS - MV A MAX VEL: 116 CM/SEC
BH CV ECHO MEAS - MV DEC TIME: 0.35 SEC
BH CV ECHO MEAS - MV E MAX VEL: 80.1 CM/SEC
BH CV ECHO MEAS - MV E/A: 0.69
BH CV ECHO MEAS - RAP SYSTOLE: 3 MMHG
BH CV ECHO MEAS - RVDD: 2.6 CM
BH CV ECHO MEAS - RVSP: 27 MMHG
BH CV ECHO MEAS - SI(CUBED): 16.6 ML/M^2
BH CV ECHO MEAS - SI(MOD-SP2): 11.5 ML/M^2
BH CV ECHO MEAS - SI(MOD-SP4): 13.4 ML/M^2
BH CV ECHO MEAS - SI(TEICH): 19.1 ML/M^2
BH CV ECHO MEAS - SV(CUBED): 26 ML
BH CV ECHO MEAS - SV(MOD-SP2): 18 ML
BH CV ECHO MEAS - SV(MOD-SP4): 21 ML
BH CV ECHO MEAS - SV(TEICH): 29.9 ML
BH CV ECHO MEAS - TR MAX V: 24 MMHG
BH CV ECHO MEAS - TR MAX VEL: 244 CM/SEC
BH CV VAS BP LEFT ARM: NORMAL MMHG
HOLD SPECIMEN: NORMAL
HOLD SPECIMEN: NORMAL
LEFT ATRIUM VOLUME INDEX: 15 ML/M2
LEFT ATRIUM VOLUME: 24 CM3
WHOLE BLOOD HOLD SPECIMEN: NORMAL
WHOLE BLOOD HOLD SPECIMEN: NORMAL

## 2017-10-28 PROCEDURE — 99223 1ST HOSP IP/OBS HIGH 75: CPT | Performed by: INTERNAL MEDICINE

## 2017-10-28 PROCEDURE — 25010000002 METHYLPREDNISOLONE PER 40 MG: Performed by: PHYSICIAN ASSISTANT

## 2017-10-28 PROCEDURE — 93306 TTE W/DOPPLER COMPLETE: CPT

## 2017-10-28 PROCEDURE — 94799 UNLISTED PULMONARY SVC/PX: CPT

## 2017-10-28 PROCEDURE — 87205 SMEAR GRAM STAIN: CPT | Performed by: PHYSICIAN ASSISTANT

## 2017-10-28 PROCEDURE — 25010000002 METHYLPREDNISOLONE PER 40 MG: Performed by: NURSE PRACTITIONER

## 2017-10-28 PROCEDURE — 25010000002 METHYLPREDNISOLONE PER 40 MG: Performed by: INTERNAL MEDICINE

## 2017-10-28 PROCEDURE — 94640 AIRWAY INHALATION TREATMENT: CPT

## 2017-10-28 PROCEDURE — 87184 SC STD DISK METHOD PER PLATE: CPT | Performed by: PHYSICIAN ASSISTANT

## 2017-10-28 PROCEDURE — 87077 CULTURE AEROBIC IDENTIFY: CPT | Performed by: PHYSICIAN ASSISTANT

## 2017-10-28 PROCEDURE — 87186 SC STD MICRODIL/AGAR DIL: CPT | Performed by: PHYSICIAN ASSISTANT

## 2017-10-28 PROCEDURE — 94760 N-INVAS EAR/PLS OXIMETRY 1: CPT

## 2017-10-28 PROCEDURE — 25010000002 ENOXAPARIN PER 10 MG

## 2017-10-28 PROCEDURE — 87185 SC STD ENZYME DETCJ PER NZM: CPT | Performed by: PHYSICIAN ASSISTANT

## 2017-10-28 PROCEDURE — 87070 CULTURE OTHR SPECIMN AEROBIC: CPT | Performed by: PHYSICIAN ASSISTANT

## 2017-10-28 PROCEDURE — 93306 TTE W/DOPPLER COMPLETE: CPT | Performed by: INTERNAL MEDICINE

## 2017-10-28 RX ORDER — DOXYCYCLINE HYCLATE 100 MG/1
100 CAPSULE ORAL EVERY 12 HOURS
Status: DISCONTINUED | OUTPATIENT
Start: 2017-10-28 | End: 2017-11-04

## 2017-10-28 RX ORDER — IPRATROPIUM BROMIDE AND ALBUTEROL SULFATE 2.5; .5 MG/3ML; MG/3ML
3 SOLUTION RESPIRATORY (INHALATION) ONCE
Status: COMPLETED | OUTPATIENT
Start: 2017-10-28 | End: 2017-10-28

## 2017-10-28 RX ORDER — METHYLPREDNISOLONE SODIUM SUCCINATE 40 MG/ML
40 INJECTION, POWDER, LYOPHILIZED, FOR SOLUTION INTRAMUSCULAR; INTRAVENOUS EVERY 12 HOURS
Status: COMPLETED | OUTPATIENT
Start: 2017-10-28 | End: 2017-10-30

## 2017-10-28 RX ORDER — IPRATROPIUM BROMIDE AND ALBUTEROL SULFATE 2.5; .5 MG/3ML; MG/3ML
3 SOLUTION RESPIRATORY (INHALATION)
Status: DISCONTINUED | OUTPATIENT
Start: 2017-10-28 | End: 2017-10-29

## 2017-10-28 RX ORDER — SODIUM CHLORIDE 0.9 % (FLUSH) 0.9 %
1-10 SYRINGE (ML) INJECTION AS NEEDED
Status: DISCONTINUED | OUTPATIENT
Start: 2017-10-28 | End: 2017-11-12 | Stop reason: HOSPADM

## 2017-10-28 RX ORDER — CLONAZEPAM 0.5 MG/1
0.5 TABLET ORAL NIGHTLY PRN
Status: DISCONTINUED | OUTPATIENT
Start: 2017-10-28 | End: 2017-10-29

## 2017-10-28 RX ORDER — ASPIRIN 81 MG/1
81 TABLET ORAL DAILY
Status: DISCONTINUED | OUTPATIENT
Start: 2017-10-28 | End: 2017-11-12 | Stop reason: HOSPADM

## 2017-10-28 RX ORDER — FAMOTIDINE 20 MG/1
20 TABLET, FILM COATED ORAL 2 TIMES DAILY PRN
Status: DISCONTINUED | OUTPATIENT
Start: 2017-10-28 | End: 2017-11-12 | Stop reason: HOSPADM

## 2017-10-28 RX ORDER — IPRATROPIUM BROMIDE AND ALBUTEROL SULFATE 2.5; .5 MG/3ML; MG/3ML
3 SOLUTION RESPIRATORY (INHALATION) EVERY 4 HOURS PRN
Status: DISCONTINUED | OUTPATIENT
Start: 2017-10-28 | End: 2017-10-29

## 2017-10-28 RX ORDER — ONDANSETRON 2 MG/ML
4 INJECTION INTRAMUSCULAR; INTRAVENOUS EVERY 6 HOURS PRN
Status: DISCONTINUED | OUTPATIENT
Start: 2017-10-28 | End: 2017-11-12 | Stop reason: HOSPADM

## 2017-10-28 RX ORDER — BUDESONIDE AND FORMOTEROL FUMARATE DIHYDRATE 160; 4.5 UG/1; UG/1
2 AEROSOL RESPIRATORY (INHALATION)
Status: DISCONTINUED | OUTPATIENT
Start: 2017-10-28 | End: 2017-11-12 | Stop reason: HOSPADM

## 2017-10-28 RX ORDER — NICOTINE 21 MG/24HR
1 PATCH, TRANSDERMAL 24 HOURS TRANSDERMAL EVERY 24 HOURS
Status: DISCONTINUED | OUTPATIENT
Start: 2017-10-28 | End: 2017-11-12 | Stop reason: HOSPADM

## 2017-10-28 RX ORDER — DOXYCYCLINE HYCLATE 100 MG
100 TABLET ORAL ONCE
Status: COMPLETED | OUTPATIENT
Start: 2017-10-28 | End: 2017-10-28

## 2017-10-28 RX ORDER — ONDANSETRON 4 MG/1
4 TABLET, FILM COATED ORAL EVERY 6 HOURS PRN
Status: DISCONTINUED | OUTPATIENT
Start: 2017-10-28 | End: 2017-11-12 | Stop reason: HOSPADM

## 2017-10-28 RX ORDER — NICOTINE 21 MG/24HR
1 PATCH, TRANSDERMAL 24 HOURS TRANSDERMAL EVERY 24 HOURS
Status: DISCONTINUED | OUTPATIENT
Start: 2017-10-28 | End: 2017-10-28

## 2017-10-28 RX ORDER — TRAMADOL HYDROCHLORIDE 50 MG/1
50 TABLET ORAL DAILY PRN
Status: DISCONTINUED | OUTPATIENT
Start: 2017-10-28 | End: 2017-10-31

## 2017-10-28 RX ORDER — METHYLPREDNISOLONE SODIUM SUCCINATE 40 MG/ML
40 INJECTION, POWDER, LYOPHILIZED, FOR SOLUTION INTRAMUSCULAR; INTRAVENOUS DAILY
Status: DISCONTINUED | OUTPATIENT
Start: 2017-10-28 | End: 2017-10-28

## 2017-10-28 RX ORDER — DEXTROMETHORPHAN POLISTIREX 30 MG/5ML
30 SUSPENSION ORAL 2 TIMES DAILY
Status: DISCONTINUED | OUTPATIENT
Start: 2017-10-28 | End: 2017-11-07

## 2017-10-28 RX ORDER — ATORVASTATIN CALCIUM 10 MG/1
10 TABLET, FILM COATED ORAL NIGHTLY
Status: DISCONTINUED | OUTPATIENT
Start: 2017-10-28 | End: 2017-11-12 | Stop reason: HOSPADM

## 2017-10-28 RX ORDER — MELATONIN
1000 DAILY
Status: DISCONTINUED | OUTPATIENT
Start: 2017-10-28 | End: 2017-11-12 | Stop reason: HOSPADM

## 2017-10-28 RX ADMIN — METHYLPREDNISOLONE SODIUM SUCCINATE 80 MG: 40 INJECTION, POWDER, FOR SOLUTION INTRAMUSCULAR; INTRAVENOUS at 00:38

## 2017-10-28 RX ADMIN — DOXYCYCLINE HYCLATE 100 MG: 100 TABLET, COATED ORAL at 00:36

## 2017-10-28 RX ADMIN — DOXYCYCLINE HYCLATE 100 MG: 100 CAPSULE ORAL at 08:58

## 2017-10-28 RX ADMIN — SERTRALINE HYDROCHLORIDE 50 MG: 50 TABLET ORAL at 08:58

## 2017-10-28 RX ADMIN — IPRATROPIUM BROMIDE AND ALBUTEROL SULFATE 3 ML: .5; 3 SOLUTION RESPIRATORY (INHALATION) at 07:49

## 2017-10-28 RX ADMIN — DOXYCYCLINE HYCLATE 100 MG: 100 CAPSULE ORAL at 20:27

## 2017-10-28 RX ADMIN — IPRATROPIUM BROMIDE AND ALBUTEROL SULFATE 3 ML: .5; 3 SOLUTION RESPIRATORY (INHALATION) at 16:57

## 2017-10-28 RX ADMIN — IPRATROPIUM BROMIDE AND ALBUTEROL SULFATE 3 ML: .5; 3 SOLUTION RESPIRATORY (INHALATION) at 12:00

## 2017-10-28 RX ADMIN — CLONAZEPAM 0.5 MG: 0.5 TABLET ORAL at 20:27

## 2017-10-28 RX ADMIN — IPRATROPIUM BROMIDE AND ALBUTEROL SULFATE 3 ML: .5; 3 SOLUTION RESPIRATORY (INHALATION) at 20:42

## 2017-10-28 RX ADMIN — ATORVASTATIN CALCIUM 10 MG: 10 TABLET, FILM COATED ORAL at 20:27

## 2017-10-28 RX ADMIN — VITAMIN D, TAB 1000IU (100/BT) 1000 UNITS: 25 TAB at 08:58

## 2017-10-28 RX ADMIN — ENOXAPARIN SODIUM 40 MG: 40 INJECTION SUBCUTANEOUS at 08:59

## 2017-10-28 RX ADMIN — BUDESONIDE AND FORMOTEROL FUMARATE DIHYDRATE 2 PUFF: 160; 4.5 AEROSOL RESPIRATORY (INHALATION) at 20:43

## 2017-10-28 RX ADMIN — DEXTROMETHORPHAN 30 MG: 30 SUSPENSION, EXTENDED RELEASE ORAL at 12:21

## 2017-10-28 RX ADMIN — BUDESONIDE AND FORMOTEROL FUMARATE DIHYDRATE 2 PUFF: 160; 4.5 AEROSOL RESPIRATORY (INHALATION) at 07:48

## 2017-10-28 RX ADMIN — METHYLPREDNISOLONE SODIUM SUCCINATE 40 MG: 40 INJECTION, POWDER, FOR SOLUTION INTRAMUSCULAR; INTRAVENOUS at 20:27

## 2017-10-28 RX ADMIN — DEXTROMETHORPHAN 30 MG: 30 SUSPENSION, EXTENDED RELEASE ORAL at 17:55

## 2017-10-28 RX ADMIN — IPRATROPIUM BROMIDE AND ALBUTEROL SULFATE 3 ML: .5; 3 SOLUTION RESPIRATORY (INHALATION) at 00:22

## 2017-10-28 RX ADMIN — ASPIRIN 81 MG: 81 TABLET, COATED ORAL at 08:58

## 2017-10-28 RX ADMIN — METHYLPREDNISOLONE SODIUM SUCCINATE 40 MG: 40 INJECTION, POWDER, FOR SOLUTION INTRAMUSCULAR; INTRAVENOUS at 08:59

## 2017-10-28 RX ADMIN — IPRATROPIUM BROMIDE AND ALBUTEROL SULFATE 3 ML: .5; 3 SOLUTION RESPIRATORY (INHALATION) at 02:24

## 2017-10-29 ENCOUNTER — APPOINTMENT (OUTPATIENT)
Dept: GENERAL RADIOLOGY | Facility: HOSPITAL | Age: 77
End: 2017-10-29

## 2017-10-29 PROCEDURE — 94760 N-INVAS EAR/PLS OXIMETRY 1: CPT

## 2017-10-29 PROCEDURE — 94799 UNLISTED PULMONARY SVC/PX: CPT

## 2017-10-29 PROCEDURE — 25010000002 METHYLPREDNISOLONE PER 40 MG: Performed by: INTERNAL MEDICINE

## 2017-10-29 PROCEDURE — 94640 AIRWAY INHALATION TREATMENT: CPT

## 2017-10-29 PROCEDURE — 99233 SBSQ HOSP IP/OBS HIGH 50: CPT | Performed by: INTERNAL MEDICINE

## 2017-10-29 PROCEDURE — 73502 X-RAY EXAM HIP UNI 2-3 VIEWS: CPT

## 2017-10-29 PROCEDURE — 25010000002 ENOXAPARIN PER 10 MG

## 2017-10-29 RX ORDER — CLONAZEPAM 0.5 MG/1
0.5 TABLET ORAL DAILY PRN
Status: DISCONTINUED | OUTPATIENT
Start: 2017-10-29 | End: 2017-11-12 | Stop reason: HOSPADM

## 2017-10-29 RX ORDER — IPRATROPIUM BROMIDE AND ALBUTEROL SULFATE 2.5; .5 MG/3ML; MG/3ML
3 SOLUTION RESPIRATORY (INHALATION)
Status: DISCONTINUED | OUTPATIENT
Start: 2017-10-29 | End: 2017-11-11

## 2017-10-29 RX ADMIN — DEXTROMETHORPHAN 30 MG: 30 SUSPENSION, EXTENDED RELEASE ORAL at 08:41

## 2017-10-29 RX ADMIN — IPRATROPIUM BROMIDE AND ALBUTEROL SULFATE 3 ML: .5; 3 SOLUTION RESPIRATORY (INHALATION) at 00:29

## 2017-10-29 RX ADMIN — BUDESONIDE AND FORMOTEROL FUMARATE DIHYDRATE 2 PUFF: 160; 4.5 AEROSOL RESPIRATORY (INHALATION) at 19:45

## 2017-10-29 RX ADMIN — VITAMIN D, TAB 1000IU (100/BT) 1000 UNITS: 25 TAB at 08:42

## 2017-10-29 RX ADMIN — TRAMADOL HYDROCHLORIDE 50 MG: 50 TABLET, COATED ORAL at 08:56

## 2017-10-29 RX ADMIN — IPRATROPIUM BROMIDE AND ALBUTEROL SULFATE 3 ML: .5; 3 SOLUTION RESPIRATORY (INHALATION) at 19:45

## 2017-10-29 RX ADMIN — ASPIRIN 81 MG: 81 TABLET, COATED ORAL at 08:42

## 2017-10-29 RX ADMIN — DEXTROMETHORPHAN 30 MG: 30 SUSPENSION, EXTENDED RELEASE ORAL at 17:43

## 2017-10-29 RX ADMIN — METHYLPREDNISOLONE SODIUM SUCCINATE 40 MG: 40 INJECTION, POWDER, FOR SOLUTION INTRAMUSCULAR; INTRAVENOUS at 21:53

## 2017-10-29 RX ADMIN — IPRATROPIUM BROMIDE AND ALBUTEROL SULFATE 3 ML: .5; 3 SOLUTION RESPIRATORY (INHALATION) at 07:00

## 2017-10-29 RX ADMIN — ATORVASTATIN CALCIUM 10 MG: 10 TABLET, FILM COATED ORAL at 21:53

## 2017-10-29 RX ADMIN — METHYLPREDNISOLONE SODIUM SUCCINATE 40 MG: 40 INJECTION, POWDER, FOR SOLUTION INTRAMUSCULAR; INTRAVENOUS at 08:42

## 2017-10-29 RX ADMIN — IPRATROPIUM BROMIDE AND ALBUTEROL SULFATE 3 ML: .5; 3 SOLUTION RESPIRATORY (INHALATION) at 12:35

## 2017-10-29 RX ADMIN — DOXYCYCLINE HYCLATE 100 MG: 100 CAPSULE ORAL at 21:53

## 2017-10-29 RX ADMIN — BUDESONIDE AND FORMOTEROL FUMARATE DIHYDRATE 2 PUFF: 160; 4.5 AEROSOL RESPIRATORY (INHALATION) at 07:00

## 2017-10-29 RX ADMIN — SERTRALINE HYDROCHLORIDE 50 MG: 50 TABLET ORAL at 08:42

## 2017-10-29 RX ADMIN — IPRATROPIUM BROMIDE AND ALBUTEROL SULFATE 3 ML: .5; 3 SOLUTION RESPIRATORY (INHALATION) at 16:16

## 2017-10-29 RX ADMIN — ENOXAPARIN SODIUM 40 MG: 40 INJECTION SUBCUTANEOUS at 08:42

## 2017-10-29 RX ADMIN — DOXYCYCLINE HYCLATE 100 MG: 100 CAPSULE ORAL at 08:42

## 2017-10-30 LAB
ANION GAP SERPL CALCULATED.3IONS-SCNC: 4 MMOL/L (ref 3–11)
BASOPHILS # BLD AUTO: 0.01 10*3/MM3 (ref 0–0.2)
BASOPHILS NFR BLD AUTO: 0.1 % (ref 0–1)
BUN BLD-MCNC: 26 MG/DL (ref 9–23)
BUN/CREAT SERPL: 32.5 (ref 7–25)
CALCIUM SPEC-SCNC: 8.9 MG/DL (ref 8.7–10.4)
CHLORIDE SERPL-SCNC: 98 MMOL/L (ref 99–109)
CO2 SERPL-SCNC: 34 MMOL/L (ref 20–31)
CREAT BLD-MCNC: 0.8 MG/DL (ref 0.6–1.3)
DEPRECATED RDW RBC AUTO: 43.3 FL (ref 37–54)
EOSINOPHIL # BLD AUTO: 0 10*3/MM3 (ref 0–0.3)
EOSINOPHIL NFR BLD AUTO: 0 % (ref 0–3)
ERYTHROCYTE [DISTWIDTH] IN BLOOD BY AUTOMATED COUNT: 12.6 % (ref 11.3–14.5)
GFR SERPL CREATININE-BSD FRML MDRD: 69 ML/MIN/1.73
GLUCOSE BLD-MCNC: 152 MG/DL (ref 70–100)
HCT VFR BLD AUTO: 37.7 % (ref 34.5–44)
HGB BLD-MCNC: 11.6 G/DL (ref 11.5–15.5)
IMM GRANULOCYTES # BLD: 0.06 10*3/MM3 (ref 0–0.03)
IMM GRANULOCYTES NFR BLD: 0.4 % (ref 0–0.6)
LYMPHOCYTES # BLD AUTO: 0.78 10*3/MM3 (ref 0.6–4.8)
LYMPHOCYTES NFR BLD AUTO: 4.9 % (ref 24–44)
MCH RBC QN AUTO: 28.8 PG (ref 27–31)
MCHC RBC AUTO-ENTMCNC: 30.8 G/DL (ref 32–36)
MCV RBC AUTO: 93.5 FL (ref 80–99)
MONOCYTES # BLD AUTO: 0.5 10*3/MM3 (ref 0–1)
MONOCYTES NFR BLD AUTO: 3.1 % (ref 0–12)
NEUTROPHILS # BLD AUTO: 14.65 10*3/MM3 (ref 1.5–8.3)
NEUTROPHILS NFR BLD AUTO: 91.5 % (ref 41–71)
PLATELET # BLD AUTO: 267 10*3/MM3 (ref 150–450)
PMV BLD AUTO: 10 FL (ref 6–12)
POTASSIUM BLD-SCNC: 4.2 MMOL/L (ref 3.5–5.5)
RBC # BLD AUTO: 4.03 10*6/MM3 (ref 3.89–5.14)
SODIUM BLD-SCNC: 136 MMOL/L (ref 132–146)
WBC NRBC COR # BLD: 16 10*3/MM3 (ref 3.5–10.8)

## 2017-10-30 PROCEDURE — 94799 UNLISTED PULMONARY SVC/PX: CPT

## 2017-10-30 PROCEDURE — 85025 COMPLETE CBC W/AUTO DIFF WBC: CPT | Performed by: INTERNAL MEDICINE

## 2017-10-30 PROCEDURE — 94640 AIRWAY INHALATION TREATMENT: CPT

## 2017-10-30 PROCEDURE — 25010000002 METHYLPREDNISOLONE PER 40 MG: Performed by: INTERNAL MEDICINE

## 2017-10-30 PROCEDURE — 25010000002 ENOXAPARIN PER 10 MG

## 2017-10-30 PROCEDURE — 99233 SBSQ HOSP IP/OBS HIGH 50: CPT | Performed by: INTERNAL MEDICINE

## 2017-10-30 PROCEDURE — 80048 BASIC METABOLIC PNL TOTAL CA: CPT | Performed by: INTERNAL MEDICINE

## 2017-10-30 PROCEDURE — 94760 N-INVAS EAR/PLS OXIMETRY 1: CPT

## 2017-10-30 RX ORDER — PROPRANOLOL HYDROCHLORIDE 20 MG/1
60 TABLET ORAL DAILY
Status: DISCONTINUED | OUTPATIENT
Start: 2017-10-30 | End: 2017-11-12 | Stop reason: HOSPADM

## 2017-10-30 RX ORDER — LOSARTAN POTASSIUM 50 MG/1
25 TABLET ORAL 2 TIMES DAILY
Status: ON HOLD | COMMUNITY
End: 2017-11-12

## 2017-10-30 RX ORDER — ACETAMINOPHEN 325 MG/1
650 TABLET ORAL EVERY 6 HOURS PRN
Status: DISCONTINUED | OUTPATIENT
Start: 2017-10-30 | End: 2017-11-12 | Stop reason: HOSPADM

## 2017-10-30 RX ADMIN — DEXTROMETHORPHAN 30 MG: 30 SUSPENSION, EXTENDED RELEASE ORAL at 08:45

## 2017-10-30 RX ADMIN — IPRATROPIUM BROMIDE AND ALBUTEROL SULFATE 3 ML: .5; 3 SOLUTION RESPIRATORY (INHALATION) at 23:19

## 2017-10-30 RX ADMIN — ACETAMINOPHEN 650 MG: 325 TABLET ORAL at 10:14

## 2017-10-30 RX ADMIN — ENOXAPARIN SODIUM 40 MG: 40 INJECTION SUBCUTANEOUS at 08:46

## 2017-10-30 RX ADMIN — SERTRALINE HYDROCHLORIDE 50 MG: 50 TABLET ORAL at 08:44

## 2017-10-30 RX ADMIN — ASPIRIN 81 MG: 81 TABLET, COATED ORAL at 08:44

## 2017-10-30 RX ADMIN — DOXYCYCLINE HYCLATE 100 MG: 100 CAPSULE ORAL at 20:23

## 2017-10-30 RX ADMIN — IPRATROPIUM BROMIDE AND ALBUTEROL SULFATE 3 ML: .5; 3 SOLUTION RESPIRATORY (INHALATION) at 12:54

## 2017-10-30 RX ADMIN — ACETAMINOPHEN 650 MG: 325 TABLET ORAL at 04:02

## 2017-10-30 RX ADMIN — IPRATROPIUM BROMIDE AND ALBUTEROL SULFATE 3 ML: .5; 3 SOLUTION RESPIRATORY (INHALATION) at 00:31

## 2017-10-30 RX ADMIN — METHYLPREDNISOLONE SODIUM SUCCINATE 40 MG: 40 INJECTION, POWDER, FOR SOLUTION INTRAMUSCULAR; INTRAVENOUS at 08:43

## 2017-10-30 RX ADMIN — IPRATROPIUM BROMIDE AND ALBUTEROL SULFATE 3 ML: .5; 3 SOLUTION RESPIRATORY (INHALATION) at 07:50

## 2017-10-30 RX ADMIN — DEXTROMETHORPHAN 30 MG: 30 SUSPENSION, EXTENDED RELEASE ORAL at 17:02

## 2017-10-30 RX ADMIN — ATORVASTATIN CALCIUM 10 MG: 10 TABLET, FILM COATED ORAL at 20:23

## 2017-10-30 RX ADMIN — VITAMIN D, TAB 1000IU (100/BT) 1000 UNITS: 25 TAB at 08:45

## 2017-10-30 RX ADMIN — BUDESONIDE AND FORMOTEROL FUMARATE DIHYDRATE 2 PUFF: 160; 4.5 AEROSOL RESPIRATORY (INHALATION) at 18:57

## 2017-10-30 RX ADMIN — METHYLPREDNISOLONE SODIUM SUCCINATE 40 MG: 40 INJECTION, POWDER, FOR SOLUTION INTRAMUSCULAR; INTRAVENOUS at 20:24

## 2017-10-30 RX ADMIN — BUDESONIDE AND FORMOTEROL FUMARATE DIHYDRATE 2 PUFF: 160; 4.5 AEROSOL RESPIRATORY (INHALATION) at 07:50

## 2017-10-30 RX ADMIN — PROPRANOLOL HYDROCHLORIDE 60 MG: 20 TABLET ORAL at 10:14

## 2017-10-30 RX ADMIN — IPRATROPIUM BROMIDE AND ALBUTEROL SULFATE 3 ML: .5; 3 SOLUTION RESPIRATORY (INHALATION) at 15:39

## 2017-10-30 RX ADMIN — IPRATROPIUM BROMIDE AND ALBUTEROL SULFATE 3 ML: .5; 3 SOLUTION RESPIRATORY (INHALATION) at 04:03

## 2017-10-30 RX ADMIN — DOXYCYCLINE HYCLATE 100 MG: 100 CAPSULE ORAL at 08:44

## 2017-10-30 RX ADMIN — IPRATROPIUM BROMIDE AND ALBUTEROL SULFATE 3 ML: .5; 3 SOLUTION RESPIRATORY (INHALATION) at 18:57

## 2017-10-31 PROBLEM — J96.01 ACUTE RESPIRATORY FAILURE WITH HYPOXEMIA (HCC): Status: ACTIVE | Noted: 2017-10-31

## 2017-10-31 LAB
ANION GAP SERPL CALCULATED.3IONS-SCNC: 1 MMOL/L (ref 3–11)
BUN BLD-MCNC: 30 MG/DL (ref 9–23)
BUN/CREAT SERPL: 37.5 (ref 7–25)
CALCIUM SPEC-SCNC: 8.7 MG/DL (ref 8.7–10.4)
CHLORIDE SERPL-SCNC: 100 MMOL/L (ref 99–109)
CO2 SERPL-SCNC: 34 MMOL/L (ref 20–31)
CREAT BLD-MCNC: 0.8 MG/DL (ref 0.6–1.3)
DEPRECATED RDW RBC AUTO: 43.2 FL (ref 37–54)
ERYTHROCYTE [DISTWIDTH] IN BLOOD BY AUTOMATED COUNT: 12.6 % (ref 11.3–14.5)
GFR SERPL CREATININE-BSD FRML MDRD: 69 ML/MIN/1.73
GLUCOSE BLD-MCNC: 118 MG/DL (ref 70–100)
HCT VFR BLD AUTO: 37.6 % (ref 34.5–44)
HGB BLD-MCNC: 11.7 G/DL (ref 11.5–15.5)
MCH RBC QN AUTO: 29 PG (ref 27–31)
MCHC RBC AUTO-ENTMCNC: 31.1 G/DL (ref 32–36)
MCV RBC AUTO: 93.3 FL (ref 80–99)
PLATELET # BLD AUTO: 265 10*3/MM3 (ref 150–450)
PMV BLD AUTO: 10.1 FL (ref 6–12)
POTASSIUM BLD-SCNC: 4.5 MMOL/L (ref 3.5–5.5)
RBC # BLD AUTO: 4.03 10*6/MM3 (ref 3.89–5.14)
SODIUM BLD-SCNC: 135 MMOL/L (ref 132–146)
WBC NRBC COR # BLD: 14.64 10*3/MM3 (ref 3.5–10.8)

## 2017-10-31 PROCEDURE — 94799 UNLISTED PULMONARY SVC/PX: CPT

## 2017-10-31 PROCEDURE — 99232 SBSQ HOSP IP/OBS MODERATE 35: CPT | Performed by: INTERNAL MEDICINE

## 2017-10-31 PROCEDURE — 94760 N-INVAS EAR/PLS OXIMETRY 1: CPT

## 2017-10-31 PROCEDURE — 25010000002 ENOXAPARIN PER 10 MG

## 2017-10-31 PROCEDURE — 63710000001 METHYLPREDNISOLONE PER 4 MG: Performed by: INTERNAL MEDICINE

## 2017-10-31 PROCEDURE — 94640 AIRWAY INHALATION TREATMENT: CPT

## 2017-10-31 PROCEDURE — 80048 BASIC METABOLIC PNL TOTAL CA: CPT | Performed by: INTERNAL MEDICINE

## 2017-10-31 PROCEDURE — 85027 COMPLETE CBC AUTOMATED: CPT | Performed by: INTERNAL MEDICINE

## 2017-10-31 RX ORDER — ACETYLCYSTEINE 200 MG/ML
5 SOLUTION ORAL; RESPIRATORY (INHALATION)
Status: DISPENSED | OUTPATIENT
Start: 2017-10-31 | End: 2017-11-01

## 2017-10-31 RX ORDER — TRAMADOL HYDROCHLORIDE 50 MG/1
50 TABLET ORAL EVERY 8 HOURS PRN
Status: DISCONTINUED | OUTPATIENT
Start: 2017-10-31 | End: 2017-11-12 | Stop reason: HOSPADM

## 2017-10-31 RX ORDER — GUAIFENESIN 600 MG/1
600 TABLET, EXTENDED RELEASE ORAL EVERY 12 HOURS SCHEDULED
Status: DISCONTINUED | OUTPATIENT
Start: 2017-10-31 | End: 2017-11-12 | Stop reason: HOSPADM

## 2017-10-31 RX ORDER — FLUTICASONE PROPIONATE 50 MCG
2 SPRAY, SUSPENSION (ML) NASAL DAILY
Status: DISCONTINUED | OUTPATIENT
Start: 2017-10-31 | End: 2017-11-12 | Stop reason: HOSPADM

## 2017-10-31 RX ADMIN — ACETAMINOPHEN 325 MG: 325 TABLET ORAL at 00:09

## 2017-10-31 RX ADMIN — TRAMADOL HYDROCHLORIDE 50 MG: 50 TABLET, COATED ORAL at 23:47

## 2017-10-31 RX ADMIN — METHYLPREDNISOLONE 40 MG: 16 TABLET ORAL at 09:12

## 2017-10-31 RX ADMIN — IPRATROPIUM BROMIDE AND ALBUTEROL SULFATE 3 ML: .5; 3 SOLUTION RESPIRATORY (INHALATION) at 12:11

## 2017-10-31 RX ADMIN — ASPIRIN 81 MG: 81 TABLET, COATED ORAL at 09:07

## 2017-10-31 RX ADMIN — BUDESONIDE AND FORMOTEROL FUMARATE DIHYDRATE 2 PUFF: 160; 4.5 AEROSOL RESPIRATORY (INHALATION) at 06:57

## 2017-10-31 RX ADMIN — ACETAMINOPHEN 650 MG: 325 TABLET ORAL at 06:35

## 2017-10-31 RX ADMIN — VITAMIN D, TAB 1000IU (100/BT) 1000 UNITS: 25 TAB at 09:07

## 2017-10-31 RX ADMIN — BUDESONIDE AND FORMOTEROL FUMARATE DIHYDRATE 2 PUFF: 160; 4.5 AEROSOL RESPIRATORY (INHALATION) at 19:54

## 2017-10-31 RX ADMIN — GUAIFENESIN 600 MG: 600 TABLET, EXTENDED RELEASE ORAL at 11:51

## 2017-10-31 RX ADMIN — ATORVASTATIN CALCIUM 10 MG: 10 TABLET, FILM COATED ORAL at 20:03

## 2017-10-31 RX ADMIN — PROPRANOLOL HYDROCHLORIDE 60 MG: 20 TABLET ORAL at 09:06

## 2017-10-31 RX ADMIN — FLUTICASONE PROPIONATE 2 SPRAY: 50 SPRAY, METERED NASAL at 18:09

## 2017-10-31 RX ADMIN — GUAIFENESIN 600 MG: 600 TABLET, EXTENDED RELEASE ORAL at 20:02

## 2017-10-31 RX ADMIN — IPRATROPIUM BROMIDE AND ALBUTEROL SULFATE 3 ML: .5; 3 SOLUTION RESPIRATORY (INHALATION) at 06:57

## 2017-10-31 RX ADMIN — IPRATROPIUM BROMIDE AND ALBUTEROL SULFATE 3 ML: .5; 3 SOLUTION RESPIRATORY (INHALATION) at 19:54

## 2017-10-31 RX ADMIN — IPRATROPIUM BROMIDE AND ALBUTEROL SULFATE 3 ML: .5; 3 SOLUTION RESPIRATORY (INHALATION) at 03:31

## 2017-10-31 RX ADMIN — ENOXAPARIN SODIUM 40 MG: 40 INJECTION SUBCUTANEOUS at 09:07

## 2017-10-31 RX ADMIN — DOXYCYCLINE HYCLATE 100 MG: 100 CAPSULE ORAL at 09:07

## 2017-10-31 RX ADMIN — IPRATROPIUM BROMIDE AND ALBUTEROL SULFATE 3 ML: .5; 3 SOLUTION RESPIRATORY (INHALATION) at 15:30

## 2017-10-31 RX ADMIN — DOXYCYCLINE HYCLATE 100 MG: 100 CAPSULE ORAL at 20:02

## 2017-10-31 RX ADMIN — SERTRALINE HYDROCHLORIDE 50 MG: 50 TABLET ORAL at 09:07

## 2017-10-31 RX ADMIN — DEXTROMETHORPHAN 30 MG: 30 SUSPENSION, EXTENDED RELEASE ORAL at 09:06

## 2017-10-31 RX ADMIN — DEXTROMETHORPHAN 30 MG: 30 SUSPENSION, EXTENDED RELEASE ORAL at 18:09

## 2017-11-01 ENCOUNTER — APPOINTMENT (OUTPATIENT)
Dept: GENERAL RADIOLOGY | Facility: HOSPITAL | Age: 77
End: 2017-11-01

## 2017-11-01 PROCEDURE — 97116 GAIT TRAINING THERAPY: CPT

## 2017-11-01 PROCEDURE — 25010000002 CEFTRIAXONE PER 250 MG: Performed by: INTERNAL MEDICINE

## 2017-11-01 PROCEDURE — 94799 UNLISTED PULMONARY SVC/PX: CPT

## 2017-11-01 PROCEDURE — 94640 AIRWAY INHALATION TREATMENT: CPT

## 2017-11-01 PROCEDURE — 94760 N-INVAS EAR/PLS OXIMETRY 1: CPT

## 2017-11-01 PROCEDURE — 25010000002 ENOXAPARIN PER 10 MG

## 2017-11-01 PROCEDURE — 63710000001 METHYLPREDNISOLONE PER 4 MG: Performed by: INTERNAL MEDICINE

## 2017-11-01 PROCEDURE — 97165 OT EVAL LOW COMPLEX 30 MIN: CPT

## 2017-11-01 PROCEDURE — 97110 THERAPEUTIC EXERCISES: CPT

## 2017-11-01 PROCEDURE — 97162 PT EVAL MOD COMPLEX 30 MIN: CPT

## 2017-11-01 PROCEDURE — 99232 SBSQ HOSP IP/OBS MODERATE 35: CPT | Performed by: INTERNAL MEDICINE

## 2017-11-01 PROCEDURE — 71020 HC CHEST PA AND LATERAL: CPT

## 2017-11-01 RX ORDER — CEFTRIAXONE SODIUM 1 G/50ML
1 INJECTION, SOLUTION INTRAVENOUS EVERY 24 HOURS
Status: DISCONTINUED | OUTPATIENT
Start: 2017-11-01 | End: 2017-11-01

## 2017-11-01 RX ORDER — CEFTRIAXONE SODIUM 1 G/50ML
1 INJECTION, SOLUTION INTRAVENOUS DAILY
Status: DISCONTINUED | OUTPATIENT
Start: 2017-11-01 | End: 2017-11-03 | Stop reason: CLARIF

## 2017-11-01 RX ADMIN — SERTRALINE HYDROCHLORIDE 50 MG: 50 TABLET ORAL at 09:46

## 2017-11-01 RX ADMIN — DOXYCYCLINE HYCLATE 100 MG: 100 CAPSULE ORAL at 20:11

## 2017-11-01 RX ADMIN — CEFTRIAXONE SODIUM 1 G: 1 INJECTION, SOLUTION INTRAVENOUS at 21:54

## 2017-11-01 RX ADMIN — ACETYLCYSTEINE 5 ML: 200 SOLUTION ORAL; RESPIRATORY (INHALATION) at 00:24

## 2017-11-01 RX ADMIN — IPRATROPIUM BROMIDE AND ALBUTEROL SULFATE 3 ML: .5; 3 SOLUTION RESPIRATORY (INHALATION) at 15:50

## 2017-11-01 RX ADMIN — METHYLPREDNISOLONE 40 MG: 16 TABLET ORAL at 09:48

## 2017-11-01 RX ADMIN — GUAIFENESIN 600 MG: 600 TABLET, EXTENDED RELEASE ORAL at 09:46

## 2017-11-01 RX ADMIN — BUDESONIDE AND FORMOTEROL FUMARATE DIHYDRATE 2 PUFF: 160; 4.5 AEROSOL RESPIRATORY (INHALATION) at 18:49

## 2017-11-01 RX ADMIN — ENOXAPARIN SODIUM 40 MG: 40 INJECTION SUBCUTANEOUS at 09:45

## 2017-11-01 RX ADMIN — DOXYCYCLINE HYCLATE 100 MG: 100 CAPSULE ORAL at 09:46

## 2017-11-01 RX ADMIN — IPRATROPIUM BROMIDE AND ALBUTEROL SULFATE 3 ML: .5; 3 SOLUTION RESPIRATORY (INHALATION) at 12:06

## 2017-11-01 RX ADMIN — IPRATROPIUM BROMIDE AND ALBUTEROL SULFATE 3 ML: .5; 3 SOLUTION RESPIRATORY (INHALATION) at 23:46

## 2017-11-01 RX ADMIN — IPRATROPIUM BROMIDE AND ALBUTEROL SULFATE 3 ML: .5; 3 SOLUTION RESPIRATORY (INHALATION) at 00:24

## 2017-11-01 RX ADMIN — TRAMADOL HYDROCHLORIDE 50 MG: 50 TABLET, COATED ORAL at 23:28

## 2017-11-01 RX ADMIN — BUDESONIDE AND FORMOTEROL FUMARATE DIHYDRATE 2 PUFF: 160; 4.5 AEROSOL RESPIRATORY (INHALATION) at 07:35

## 2017-11-01 RX ADMIN — IPRATROPIUM BROMIDE AND ALBUTEROL SULFATE 3 ML: .5; 3 SOLUTION RESPIRATORY (INHALATION) at 18:49

## 2017-11-01 RX ADMIN — FLUTICASONE PROPIONATE 2 SPRAY: 50 SPRAY, METERED NASAL at 09:46

## 2017-11-01 RX ADMIN — PROPRANOLOL HYDROCHLORIDE 60 MG: 20 TABLET ORAL at 09:45

## 2017-11-01 RX ADMIN — DEXTROMETHORPHAN 30 MG: 30 SUSPENSION, EXTENDED RELEASE ORAL at 09:48

## 2017-11-01 RX ADMIN — VITAMIN D, TAB 1000IU (100/BT) 1000 UNITS: 25 TAB at 09:46

## 2017-11-01 RX ADMIN — ASPIRIN 81 MG: 81 TABLET, COATED ORAL at 09:46

## 2017-11-01 RX ADMIN — DEXTROMETHORPHAN 30 MG: 30 SUSPENSION, EXTENDED RELEASE ORAL at 17:57

## 2017-11-01 RX ADMIN — IPRATROPIUM BROMIDE AND ALBUTEROL SULFATE 3 ML: .5; 3 SOLUTION RESPIRATORY (INHALATION) at 07:35

## 2017-11-01 RX ADMIN — ATORVASTATIN CALCIUM 10 MG: 10 TABLET, FILM COATED ORAL at 20:11

## 2017-11-01 RX ADMIN — GUAIFENESIN 600 MG: 600 TABLET, EXTENDED RELEASE ORAL at 20:11

## 2017-11-01 NOTE — SIGNIFICANT NOTE
RN reports very thick secretions. Mucinex just added today. Questions about mucomyst use. Will do 2 doses and have rounding MD re-eval in AM. Please f/u.

## 2017-11-01 NOTE — PLAN OF CARE
Problem: Patient Care Overview (Adult)  Goal: Plan of Care Review  Outcome: Ongoing (interventions implemented as appropriate)    11/01/17 1119   Coping/Psychosocial Response Interventions   Plan Of Care Reviewed With patient   Patient Care Overview   Progress progress towards functional goals is fair   Outcome Evaluation   Outcome Summary/Follow up Plan Presents w/ evolving sympt., incl. elev BP, SIGNIF SOB/fatigue w/ gt x 50 ft utilizing MIN.HHA of 2 (noted onset dizziness & L knee pain 10/10, req. roll back to room in chair), wheezing/non-prod. cough,desat. to 91% & weakness/knee instabil. w/ all mobil; recommend HHPT at d/c to meet all goals, although pt notified CM she plans home w/ no f/u needs          Problem: Inpatient Physical Therapy  Goal: Bed Mobility Goal LTG- PT  Outcome: Ongoing (interventions implemented as appropriate)    11/01/17 1119   Bed Mobility PT LTG   Bed Mobility PT LTG, Date Established 11/01/17   Bed Mobility PT LTG, Time to Achieve 1 wk   Bed Mobility PT LTG, Activity Type all bed mobility   Bed Mobility PT LTG, [place] Level independent       Goal: Transfer Training Goal 1 LTG- PT  Outcome: Ongoing (interventions implemented as appropriate)    11/01/17 1119   Transfer Training PT LTG   Transfer Training PT LTG, Date Established 11/01/17   Transfer Training PT LTG, Time to Achieve 1 wk   Transfer Training PT LTG, Activity Type bed to chair /chair to bed;sit to stand/stand to sit   Transfer Training PT LTG, [place] Level supervision required   Transfer Training PT LTG, Assist Device walker, rolling       Goal: Gait Training Goal LTG- PT  Outcome: Ongoing (interventions implemented as appropriate)    11/01/17 1119   Gait Training PT LTG   Gait Training Goal PT LTG, Date Established 11/01/17   Gait Training Goal PT LTG, Time to Achieve 1 wk   Gait Training Goal PT LTG, [place] Level contact guard assist  (stable VS,WBAT LLE; NO LOB)   Gait Training Goal PT LTG, Assist Device  walker, rolling   Gait Training Goal PT LTG, Distance to Achieve 150       Goal: Patient Education Goal LTG- PT  Outcome: Ongoing (interventions implemented as appropriate)    11/01/17 1119   Patient Education PT LTG   Patient Education PT LTG, Date Established 11/01/17   Patient Education PT LTG, Time to Achieve 1 wk   Patient Education PT LTG, Education Type written program;HEP;posture/body mechanics;gait;transfers;bed mobility;benefits of activity   Patient Education PT LTG, Education Understanding demonstrate adequately;verbalize understanding

## 2017-11-01 NOTE — THERAPY EVALUATION
"Acute Care - Occupational Therapy Initial Evaluation  [facility]     Patient Name: Sadie Tijerina  : 1940  MRN: [id number]  Today's Date: 2017  Onset of Illness/Injury or Date of Surgery Date: 10/27/17  Date of Referral to OT: 10/31/17  Referring Physician: VIRGIE Craft MD    Admit Date: 10/27/2017       ICD-10-CM ICD-9-CM   1. COPD with exacerbation J44.1 491.21   2. Hypoxia R09.02 799.02   3. Wheezing R06.2 786.07   4. Leukocytosis, unspecified type D72.829 288.60   5. Impaired functional mobility, balance, gait, and endurance Z74.09 V49.89   6. Impaired mobility and ADLs Z74.09 799.89     Patient Active Problem List   Diagnosis   • Benign paroxysmal positional vertigo   • Calf cramp   • Mixed anxiety depressive disorder   • Dizziness   • Fatigue   • Gastroesophageal reflux disease without esophagitis   • Hematuria   • Hyperlipidemia   • Hypertension   • Low back pain   • Orthostatic hypotension   • Pulmonary emphysema   • Restless legs syndrome   • Pre-syncope   • Essential tremor   • Vitamin D deficiency   • Balance problem   • Tension headache   • COPD exacerbation   • Leukocytosis   • COPD with exacerbation   • Tobacco abuse disorder   • Acute respiratory failure with hypoxemia     Past Medical History:   Diagnosis Date   • Arthritis    • Depression    • History of emphysema    • History of esophageal reflux    • History of recurrent UTI (urinary tract infection)    • History of renal calculi    • History of uterine cancer    • Hyperlipidemia      Past Surgical History:   Procedure Laterality Date   • FOOT SURGERY     • HAND SURGERY     • HYSTERECTOMY            OT ASSESSMENT FLOWSHEET (last 72 hours)      OT Evaluation       17 0954 17 0953 10/30/17 1517          Rehab Evaluation    Document Type evaluation  -DM evaluation  -       Subjective Information agree to therapy;complains of;weakness;pain;dyspnea   wobbly w/gt.toBRonRA perNsg;\"no sleep\";init.refPT,thenAgreed  -DM agree " to therapy;complains of;fatigue  -       Patient Effort, Rehab Treatment fair  -DM good  -MC       Symptoms Noted During/After Treatment fatigue;increased pain;shortness of breath  -DM dizziness;fatigue  -       Symptoms Noted Comment  VSS throughout, pt with hx of vertigo  -       General Information    Patient Profile Review yes  -DM yes  -       Onset of Illness/Injury or Date of Surgery Date 10/27/17  -DM 10/27/17  -       Referring Physician VIRGIE Craft MD  - MD Venancio  -       General Observations Sup.in bed;tele;o2;iv;bro.at BS;nsg gave shot in abdom (blood thinner);exit alarm disarmed by nsg. d/t slight stature(kept triggering it w/any mvmt)  - Pt received in supine, 02 on3L  -       Pertinent History Of Current Problem chr. bronchitis & cont. to smoke daily; on steroids & ABX since 8/'17;incr. cough past week; to ER W/ wheezing, hypoxia, o2 sat 89%, HTN, chills; MD detected ? murmur; dx: COPD exac, resp.fail./hypox w/ wheezing, leukocytosis; echo '16: EF 65%, Mod. TR; on Requip for RLS   -DM Pt is 77 YOF who presented to ED with c/o SOA and productive cough x 1 week. Pt with recent fall at home  -       Precautions/Limitations fall precautions;oxygen therapy device and L/min;other (see comments)   H/O Vertigo,ess. tremor,RLS,A/D  -DM fall precautions;oxygen therapy device and L/min  -       Prior Level of Function independent:;all household mobility;community mobility;gait;transfer;bed mobility;ADL's;home management;cooking;cleaning;driving;shopping   INDEP gt w/o A.D.;Hired ydwk;smokes 5-6 cig./day  - independent:;all household mobility;community mobility;gait;transfer;bed mobility;ADL's;home management;driving  -       Equipment Currently Used at Home nebulizer  - nebulizer  - nebulizer  -[facility]      Plans/Goals Discussed With patient;agreed upon  - patient;agreed upon  -       Risks Reviewed patient:;LOB;dizziness;increased discomfort;change in vital signs;lines disloged  -  patient:;LOB;nausea/vomiting;dizziness;increased discomfort;change in vital signs;lines disloged  -       Benefits Reviewed patient:;improve function;increase independence;increase strength;increase balance;increase knowledge;decrease pain  - patient:;improve function;increase independence;increase strength;increase balance;increase knowledge  -       Barriers to Rehab ineffective coping  - none identified  -       Living Environment    Lives With alone  - alone  - alone  -[facility]      Living Arrangements house  - house  - house  -[facility]      Home Accessibility stairs within home;tub/shower is not walk in;bed and bath on same level   doesn't use upstairs  - stairs within home;tub/shower is not walk in  - stairs within home  -[facility]      Number of Stairs Within Home --   1 flight  - 12  -MC --   1 flight  -[facility]      Type of Financial/Environmental Concern none  -DM  none  -[facility]      Transportation Available car;family or friend will provide  -  car;family or friend will provide  -[facility]      Living Environment Comment 2 dtrs avail. PRN  -DM All needs met on first floor of home. Pt reports she has a large family who is available to assist 24/7  -       Clinical Impression    Date of Referral to OT  10/31/17  [facility]       OT Diagnosis  Impaired ADLs and functional mobility  -       Patient/Family Goals Statement  to return home  -       Criteria for Skilled Therapeutic Interventions Met  yes  -       Rehab Potential  good, to achieve stated therapy goals  -       Therapy Frequency  daily  -       Anticipated Equipment Needs At Discharge  --   TBD  -       Anticipated Discharge Disposition  home with assist  -       Functional Level Prior    Ambulation   0-->independent  -[facility]      Transferring   0-->independent  -[facility]      Toileting   0-->independent  -[facility]      Bathing   0-->independent  -[facility]      Dressing   0-->independent  -[facility]      Eating   0-->independent  -[facility]      Communication    0-->understands/communicates without difficulty  -[facility]      Swallowing   0-->swallows foods/liquids without difficulty  -[facility]      Vital Signs    Pre Systolic BP Rehab 160  -  -MC       Pre Treatment Diastolic BP 70  -DM 70  -MC       Post Systolic BP Rehab 144  -  -MC       Post Treatment Diastolic BP 66  -DM 66  -MC       Pretreatment Heart Rate (beats/min) 92   IRREG.  -DM 77  -MC       Intratreatment Heart Rate (beats/min) 80  -DM        Posttreatment Heart Rate (beats/min) 76  -DM 80  -MC       Pre SpO2 (%) 92  -DM 92  -MC       O2 Delivery Pre Treatment supplemental O2   3 L  -DM supplemental O2   3L  -MC       Post SpO2 (%) 91  -DM 92  -MC       O2 Delivery Post Treatment supplemental O2  -DM supplemental O2  -MC       Pre Patient Position Supine  -DM Supine  -MC       Intra Patient Position Standing  -DM Sitting  -MC       Post Patient Position Sitting  -DM Sitting  -MC       Pain Assessment    Pain Assessment 0-10  -DM 0-10  -MC       Pain Score 2  -DM 1  -MC       Post Pain Score 3  -DM 1  -MC       Pain Type Acute pain   s/p shot  -DM Acute pain  -MC       Pain Location Abdomen   also L knee pain 10/10 w/ WB during gt  -DM Abdomen   pt recently received shot in abdomen   -       Pain Descriptors Aching  -DM        Pain Frequency Constant/continuous  -DM        Patient's Stated Pain Goal No pain  -DM        Pain Intervention(s) Repositioned;Rest  -DM        Response to Interventions tolerated  -DM        Vision Assessment/Intervention    Visual Impairment WFL  -DM WFL  -MC       Cognitive Assessment/Intervention    Current Cognitive/Communication Assessment functional  -DM functional  -       Orientation Status oriented x 4  -DM oriented x 4  -MC       Follows Commands/Answers Questions 100% of the time;able to follow single-step instructions;needs cueing;needs increased time;needs repetition  -% of the time  -       Personal Safety mild impairment;decreased awareness, need for  assist;decreased awareness, need for safety;decreased insight to deficits  -DM mild impairment  -       Personal Safety Interventions fall prevention program maintained;gait belt;nonskid shoes/slippers when out of bed  - fall prevention program maintained;gait belt;muscle strengthening facilitated;nonskid shoes/slippers when out of bed;supervised activity  -       Short/Long Term Memory  intact short term memory;intact long term memory  -       ROM (Range of Motion)    General ROM  upper extremity range of motion deficits identified  -       General ROM Detail  Bilateral shoulders 0-75% AROM, bilateral elbows/forearms/wrists/digits WFL  -       MMT (Manual Muscle Testing)    General MMT Assessment  upper extremity strength deficits identified  -       General MMT Assessment Detail  Bilateral shoulders 2+/5, bilateral elbows/forearms/wrists/digits 4/5  -       Bed Mobility, Assessment/Treatment    Bed Mobility, Assistive Device  bed rails;head of bed elevated  -       Bed Mobility, Roll Left, Dawn  supervision required  -       Bed Mobility, Scoot/Bridge, Dawn  minimum assist (75% patient effort)  -       Bed Mob, Supine to Sit, Dawn  supervision required  -       Bed Mob, Sit to Supine, Dawn  not tested  -       Bed Mobility, Impairments  ROM decreased;strength decreased  -       Transfer Assessment/Treatment    Transfers, Bed-Chair Dawn  contact guard assist;2 person assist required  -       Transfers, Sit-Stand Dawn  contact guard assist;2 person assist required  -       Transfers, Stand-Sit Dawn  contact guard assist;2 person assist required  -       Transfer, Safety Issues  balance decreased during turns  -       Transfer, Impairments  strength decreased;impaired balance  -       Transfer, Comment  CUes for hand placement, safe transfer technique   -       Functional Mobility    Functional Mobility- Ind. Level   contact guard assist;2 person assist required  -       Functional Mobility-Distance (Feet)  --   in hallway  -       Functional Mobility- Comment  pt reported becoming dizzy during mobility.  Brought chair up behind her, BP taken and stable. Follow with chair for safety in the future  -       Upper Body Dressing Assessment/Training    UB Dressing Assess/Train, Clothing Type  donning:;hospital gown  -       UB Dressing Assess/Train, Position  edge of bed;sitting  -       UB Dressing Assess/Train, [place]  moderate assist (50% patient effort)  -       UB Dressing Assess/Train, Impairments  ROM decreased;strength decreased  -       Motor Skills/Interventions    Additional Documentation  Balance Skills Training (Group);Fine Motor Coordination Training (Group);Gross Motor Coordination Training (Group)  -       Balance Skills Training    Sitting-Level of Assistance  Close supervision  -       Sitting-Balance Support  Feet supported  -       Sitting-Balance Activities  Trunk control activities  -       Sitting # of Minutes  7  -       Standing-Level of Assistance  Contact guard  -       Static Standing Balance Support  Right upper extremity supported  -       Gait Balance-Level of Assistance  Contact guard;x2  -       Gait Balance Support  Right upper extremity supported;Left upper extremity supported  -       Fine Motor Coordination Training    Opposition  Right:;Left:;intact  -       Sensory Assessment/Intervention    Sensory Impairment  numbness;tingling  -       Light Touch  LUE;RUE;LLE;RLE  -       LUE Light Touch  WNL  -       RUE Light Touch  WNL  -       LLE Light Touch  mild impairment   chronic  -       RLE Light Touch  mild impairment   chronic  -       Positioning and Restraints    Pre-Treatment Position  in bed  -       Post Treatment Position  chair  -       In Chair  notified nsg;reclined;call light within reach;encouraged to call for assist;exit alarm  on;with PT;legs elevated  -         User Key  (r) = Recorded By, (t) = Taken By, (c) = Cosigned By    Initials Name Effective Dates    DM Nicole Fraire, PT 06/19/15 -     [facility] Indy Baird, RN 06/21/17 -      April Galvez, OT 03/14/16 -            Occupational Therapy Education     Title: PT OT SLP Therapies (Active)     Topic: Occupational Therapy (Active)     Point: ADL training (Done)    Description: Instruct learner(s) on proper safety adaptation and remediation techniques during self care or transfers.   Instruct in proper use of assistive devices.    Learning Progress Summary    Learner Readiness Method Response Comment Documented by Status   Patient Acceptance KIMBERLEY TRINIDAD,NR   11/01/17 1132 Done               Point: Body mechanics (Done)    Description: Instruct learner(s) on proper positioning and spine alignment during self-care, functional mobility activities and/or exercises.    Learning Progress Summary    Learner Readiness Method Response Comment Documented by Status   Patient Acceptance KIMBERLEY TRINIDAD,NR   11/01/17 1132 Done                      User Key     Initials Effective Dates Name Provider Type Discipline     03/14/16 -  April Galvez, OT Occupational Therapist OT                  OT Recommendation and Plan  Anticipated Equipment Needs At Discharge:  (TBD)  Anticipated Discharge Disposition: home with assist  Therapy Frequency: daily  Plan of Care Review  Plan Of Care Reviewed With: patient  Outcome Summary/Follow up Plan: OT initial eval completed. Pt demonstrates an increased need for assist 2/2 weakness and fatigue.  Recommendp to receive skilled OT to address ADLs, functional mobility, safety and education to return to pLOF.  Recommend pt to go home with assist at time of DC.          OT Goals       11/01/17 1133          Bed Mobility OT LTG    Bed Mobility OT LTG, Date Established 11/01/17  -      Bed Mobility OT LTG, Time to Achieve 2 wks  -      Bed Mobility OT LTG, Activity Type all  bed mobility  -      Bed Mobility OT LTG, [place] Level conditional independence  -      Bed Mobility OT LTG, Outcome goal ongoing  -      Transfer Training OT LTG    Transfer Training OT LTG, Date Established 11/01/17  -      Transfer Training OT LTG, Time to Achieve 2 wks  -      Transfer Training OT LTG, Activity Type sit to stand/stand to sit;toilet  -      Transfer Training OT LTG, [place] Level conditional independence  -      Transfer Training OT LTG, Assist Device walker, rolling;commode, bedside  -      Transfer Training OT LTG, Outcome goal ongoing  -      Patient Education OT LTG    Patient Education OT LTG, Date Established 11/01/17  -      Patient Education OT LTG, Time to Achieve 2 wks  -      Patient Education OT LTG, Education Type HEP;work simplification;energy conservation;adaptive breathing  -      Patient Education OT LTG, Education Understanding verbalizes understanding;demonstrates adequately  -      Patient Education OT LTG Outcome goal ongoing  -      Activity Tolerance OT LTG    Activity Tolerance Goal OT LTG, Date Established 11/01/17  -      Activity Tolerance Goal OT LTG, Time to Achieve 2 wks  -      Activity Tolerance Goal OT LTG, Activity Level 10 min activity  -      Activity Tolerance Goal OT LTG, Additional Goal Pt will complete 10 minutes of ADLs/functional task with one sitting rest break to improve occupational endurance.   -      Activity Tolerance Goal OT LTG, Outcome goal ongoing  -        User Key  (r) = Recorded By, (t) = Taken By, (c) = Cosigned By    Initials Name Provider Type    ALLISON Galvez, OT Occupational Therapist                Outcome Measures       11/01/17 3413          How much help from another is currently needed...    Putting on and taking off regular lower body clothing? 3  -MC      Bathing (including washing, rinsing, and drying) 3  -MC      Toileting (which includes using toilet bed pan or urinal) 3  -MC       Putting on and taking off regular upper body clothing 3  -MC      Taking care of personal grooming (such as brushing teeth) 3  -MC      Eating meals 3  -      Score 18  -      Functional Assessment    Outcome Measure Options AM-PAC 6 Clicks Daily Activity (OT)  -MC        User Key  (r) = Recorded By, (t) = Taken By, (c) = Cosigned By    Initials Name Provider Type    ALLISON Galvez OT Occupational Therapist          Time Calculation:   OT Start Time: 0953    Therapy Charges for Today     Code Description Service Date Service Provider Modifiers Qty    [id number]  OT EVAL LOW COMPLEXITY 4 11/1/2017 April Galvez OT GO 1               April Galvez OT  11/1/2017

## 2017-11-01 NOTE — PLAN OF CARE
Problem: Respiratory Insufficiency (Adult)  Goal: Identify Related Risk Factors and Signs and Symptoms  Outcome: Outcome(s) achieved Date Met:  11/01/17  Goal: Acid/Base Balance  Outcome: Ongoing (interventions implemented as appropriate)  Goal: Effective Ventilation  Outcome: Ongoing (interventions implemented as appropriate)    Problem: Fall Risk (Adult)  Goal: Identify Related Risk Factors and Signs and Symptoms  Outcome: Outcome(s) achieved Date Met:  11/01/17  Goal: Absence of Falls  Outcome: Ongoing (interventions implemented as appropriate)

## 2017-11-01 NOTE — PLAN OF CARE
Problem: Patient Care Overview (Adult)  Goal: Plan of Care Review  Outcome: Ongoing (interventions implemented as appropriate)    11/01/17 1133   Coping/Psychosocial Response Interventions   Plan Of Care Reviewed With patient   Outcome Evaluation   Outcome Summary/Follow up Plan OT initial eval completed. Pt demonstrates an increased need for assist 2/2 weakness and fatigue. Recommendp to receive skilled OT to address ADLs, functional mobility, safety and education to return to pLOF. Recommend pt to go home with assist at time of DC.         Problem: Inpatient Occupational Therapy  Goal: Bed Mobility Goal LTG- OT  Outcome: Ongoing (interventions implemented as appropriate)    11/01/17 1133   Bed Mobility OT LTG   Bed Mobility OT LTG, Date Established 11/01/17   Bed Mobility OT LTG, Time to Achieve 2 wks   Bed Mobility OT LTG, Activity Type all bed mobility   Bed Mobility OT LTG, [place]ake Level conditional independence   Bed Mobility OT LTG, Outcome goal ongoing       Goal: Transfer Training Goal 1 LTG- OT  Outcome: Ongoing (interventions implemented as appropriate)    11/01/17 1133   Transfer Training OT LTG   Transfer Training OT LTG, Date Established 11/01/17   Transfer Training OT LTG, Time to Achieve 2 wks   Transfer Training OT LTG, Activity Type sit to stand/stand to sit;toilet   Transfer Training OT LTG, Leake Level conditional independence   Transfer Training OT LTG, Assist Device walker, rolling;commode, bedside   Transfer Training OT LTG, Outcome goal ongoing       Goal: Patient Education Goal LTG- OT  Outcome: Ongoing (interventions implemented as appropriate)    11/01/17 1133   Patient Education OT LTG   Patient Education OT LTG, Date Established 11/01/17   Patient Education OT LTG, Time to Achieve 2 wks   Patient Education OT LTG, Education Type HEP;work simplification;energy conservation;adaptive breathing   Patient Education OT LTG, Education Understanding verbalizes  understanding;demonstrates adequately   Patient Education OT LTG Outcome goal ongoing       Goal: Activity Tolerance Goal LTG- OT  Outcome: Ongoing (interventions implemented as appropriate)    11/01/17 1133   Activity Tolerance OT LTG   Activity Tolerance Goal OT LTG, Date Established 11/01/17   Activity Tolerance Goal OT LTG, Time to Achieve 2 wks   Activity Tolerance Goal OT LTG, Activity Level 10 min activity   Activity Tolerance Goal OT LTG, Additional Goal Pt will complete 10 minutes of ADLs/functional task with one sitting rest break to improve occupational endurance.    Activity Tolerance Goal OT LTG, Outcome goal ongoing

## 2017-11-01 NOTE — PROGRESS NOTES
Continued Stay Note  [facility]     Patient Name: Sadie Tijerina  MRN: [id number]  Today's Date: 11/1/2017    Admit Date: 10/27/2017          Discharge Plan       11/01/17 1529    Case Management/Social Work Plan    Plan home    Patient/Family In Agreement With Plan yes    Additional Comments Spoke with patient at bedside regarding discharge plan.  Patient unsure when she may be discharged home.  Verbalizes no discharge needs at this time.  Patient plan to discharge home via car with family to transport when medically ready.              Discharge Codes     None        Expected Discharge Date and Time     Expected Discharge Date Expected Discharge Time    Nov 3, 2017             Indy Baird RN

## 2017-11-01 NOTE — PLAN OF CARE
Problem: Patient Care Overview (Adult)  Goal: Plan of Care Review  Outcome: Ongoing (interventions implemented as appropriate)    Problem: Respiratory Insufficiency (Adult)  Goal: Identify Related Risk Factors and Signs and Symptoms  Outcome: Ongoing (interventions implemented as appropriate)    Problem: Fall Risk (Adult)  Goal: Identify Related Risk Factors and Signs and Symptoms  Outcome: Ongoing (interventions implemented as appropriate)

## 2017-11-01 NOTE — PROGRESS NOTES
[facility] Medicine Services  PROGRESS NOTE    Patient Name: Sadie Tijernia  : 1940  MRN: [id number]    Date of Admission: 10/27/2017  Length of Stay: 3  Primary Care Physician: Kristian Wolff MD    Subjective   Subjective     CC:  F/U dyspnea    HPI:  She is not short of breath at rest on 1L O2 but does get short of air with moving around.  She has a tickly cough that keeps her up at night.    Review of Systems  Gen- No fevers, chills  CV- No palpitations  Resp- Persistent wheezing, SOA improved some  GI- No N/V/D, abd pain      Otherwise ROS is negative except as mentioned in the HPI.    Objective   Objective     Vital Signs:   Temp:  [97.6 °F (36.4 °C)-98.1 °F (36.7 °C)] 97.9 °F (36.6 °C)  Heart Rate:  [65-88] 69  Resp:  [14-22] 14  BP: (131-144)/(56-66) 131/56        Physical Exam:  Constitutional: No acute distress, awake, alert, sitting up in bed reading newspaper  HENT: NCAT, mucous membranes moist  Respiratory: Diffuse expiratory wheezing slightly improved from yesterday, voice is hoarse, occasional coughing, respiratory effort normal, O2 sats drop with talking when on room air   Cardiovascular: RRR, no murmurs, rubs, or gallops  Gastrointestinal: Positive bowel sounds, soft, nontender, nondistended  Musculoskeletal: No bilateral ankle edema  Psychiatric: Appropriate affect, cooperative  Neurologic: Oriented x 3, Cranial Nerves grossly intact to confrontation, speech clear  Skin: No rashes    Results Reviewed:  I have personally reviewed current lab, radiology, and data and agree.      Results from last 7 days  Lab Units 10/31/17  0539 10/30/17  0620 10/27/17  2323   WBC 10*3/mm3 14.64* 16.00* 16.23*   HEMOGLOBIN g/dL 11.7 11.6 13.6   HEMATOCRIT % 37.6 37.7 41.5   PLATELETS 10*3/mm3 265 267 269       Results from last 7 days  Lab Units 10/31/17  0539 10/30/17  0620 10/27/17  2323   SODIUM mmol/L 135 136 137   POTASSIUM mmol/L 4.5 4.2 4.1   CHLORIDE mmol/L 100 98* 101    CO2 mmol/L 34.0* 34.0* 32.0*   BUN mg/dL 30* 26* 13   CREATININE mg/dL 0.80 0.80 0.90   GLUCOSE mg/dL 118* 152* 132*   CALCIUM mg/dL 8.7 8.9 9.5   ALT (SGPT) U/L  --   --  17   AST (SGOT) U/L  --   --  24     No results found for: BNP  No results found for: PHART       Microbiology Results Abnormal     Procedure Component Value - Date/Time    Respiratory Culture - Sputum, Cough [[id number]]  (Abnormal)  (Susceptibility) Collected:  10/28/17 0038    Lab Status:  Preliminary result Specimen:  Sputum from Cough Updated:  10/31/17 1146     Respiratory Culture --      Light growth (2+) Enterobacter cloacae (A)      Light growth (2+) Klebsiella pneumoniae (A)      Light growth (2+) Normal Respiratory Ashlee     Gram Stain Result Moderate (3+) WBCs per low power field      Many (4+) Gram negative bacilli, tiny      Occasional Gram positive cocci in clusters    Susceptibility      Enterobacter cloacae     TEA (Preliminary)     Ampicillin >16 ug/ml Resistant     Ampicillin + Sulbactam 16/8 ug/ml Intermediate     Aztreonam <=8 ug/ml Susceptible     Cefepime <=8 ug/ml Susceptible     Cefotaxime <=2 ug/ml Susceptible     Ceftriaxone <=8 ug/ml Susceptible     Cefuroxime sodium 16 ug/ml Intermediate     Ertapenem <=1 ug/ml Susceptible     Gentamicin <=4 ug/ml Susceptible     Levofloxacin <=2 ug/ml Susceptible     Meropenem <=1 ug/ml Susceptible     Piperacillin + Tazobactam <=16 ug/ml Susceptible     Tetracycline <=4 ug/ml Susceptible     Tobramycin <=4 ug/ml Susceptible     Trimethoprim + Sulfamethoxazole <=2/38 ug/ml Susceptible                Susceptibility      Klebsiella pneumoniae     TEA (Preliminary)     Ampicillin >16 ug/ml Resistant     Ampicillin + Sulbactam <=8/4 ug/ml Susceptible     Aztreonam <=8 ug/ml Susceptible     Cefepime <=8 ug/ml Susceptible     Cefotaxime <=2 ug/ml Susceptible     Ceftriaxone <=8 ug/ml Susceptible     Cefuroxime sodium <=4 ug/ml Susceptible     Ertapenem <=1 ug/ml Susceptible     Gentamicin  <=4 ug/ml Susceptible     Levofloxacin <=2 ug/ml Susceptible     Meropenem <=1 ug/ml Susceptible     Piperacillin + Tazobactam <=16 ug/ml Susceptible     Tetracycline <=4 ug/ml Susceptible     Tobramycin <=4 ug/ml Susceptible     Trimethoprim + Sulfamethoxazole <=2/38 ug/ml Susceptible                          Imaging Results (last 24 hours)     ** No results found for the last 24 hours. **        Results for orders placed during the hospital encounter of 10/27/17   Adult Transthoracic Echo Complete W/ Cont if Necessary Per Protocol    Narrative · Left ventricular systolic function is normal.  · Estimated EF appears to be in the range of 61 - 65%.  · Left ventricular diastolic dysfunction (grade I a) consistent with   impaired relaxation.  · Calculated right ventricular systolic pressure from tricuspid   regurgitation is 27 mmHg.          I have reviewed the medications.    Assessment/Plan   Assessment / Plan     Hospital Problem List     * (Principal)COPD exacerbation    Gastroesophageal reflux disease without esophagitis    Hyperlipidemia    Leukocytosis (Chronic)    COPD with exacerbation    Tobacco abuse disorder (Chronic)             Brief Hospital Course to date:  Sadie Tijerina is a 77 y.o. female with COPD and continued tobacco use presenting with SOA x 1 week and diffuse wheezing.    Assessment & Plan:    COPD with exacerbation   Acute hypoxic respiratory failure  -Slowly improving  -Continue PO steroids, scheduled duonebs, PRN albuterol nebs, flutter valve  -PO doxycycline day 4/7  -Sputum culture pending with enterobacter and klebsiella both sensitive to tetracycline  -Smoking cessation encouraged  -She will likely need a steroid taper at discharge  -She does not have a pulmonologist and will need a referral to pulmonary clinic at discharge for PFTs and further management of her lung disease    Hip pain s/p fall prior to admission  -X ray ordered and revealed no acute fracture  -PT/OT  consulted    GERD   -Continue pepcid, tums PRN    HLD  -Continue atorvastatin    Leukocytosis  -Recheck with am labs    Tobacco abuse disorder  -Counseled on cessation; she says she will think about it  -Continue nicotine patch       DVT Prophylaxis:  Lovenox    CODE STATUS: Conditional Code    Disposition: I expect the patient to be discharged home in 2-3 days.    Vy Craft MD  10/31/17  8:04 PM

## 2017-11-01 NOTE — THERAPY EVALUATION
"Acute Care - Physical Therapy Initial Evaluation  [facility]     Patient Name: Sadie Tijerina  : 1940  MRN: [id number]  Today's Date: 2017   Onset of Illness/Injury or Date of Surgery Date: 10/27/17  Date of Referral to PT: 10/31/17  Referring Physician: VIRGIE Craft MD      Admit Date: 10/27/2017     Visit Dx:    ICD-10-CM ICD-9-CM   1. COPD with exacerbation J44.1 491.21   2. Hypoxia R09.02 799.02   3. Wheezing R06.2 786.07   4. Leukocytosis, unspecified type D72.829 288.60   5. Impaired functional mobility, balance, gait, and endurance Z74.09 V49.89   6. Impaired mobility and ADLs Z74.09 799.89     Patient Active Problem List   Diagnosis   • Benign paroxysmal positional vertigo   • Calf cramp   • Mixed anxiety depressive disorder   • Dizziness   • Fatigue   • Gastroesophageal reflux disease without esophagitis   • Hematuria   • Hyperlipidemia   • Hypertension   • Low back pain   • Orthostatic hypotension   • Pulmonary emphysema   • Restless legs syndrome   • Pre-syncope   • Essential tremor   • Vitamin D deficiency   • Balance problem   • Tension headache   • COPD exacerbation   • Leukocytosis   • COPD with exacerbation   • Tobacco abuse disorder   • Acute respiratory failure with hypoxemia     Past Medical History:   Diagnosis Date   • Arthritis    • Depression    • History of emphysema    • History of esophageal reflux    • History of recurrent UTI (urinary tract infection)    • History of renal calculi    • History of uterine cancer    • Hyperlipidemia      Past Surgical History:   Procedure Laterality Date   • FOOT SURGERY     • HAND SURGERY     • HYSTERECTOMY            PT ASSESSMENT (last 72 hours)      PT Evaluation       17 0954 17 0953    Rehab Evaluation    Document Type evaluation  -DM evaluation  -    Subjective Information agree to therapy;complains of;weakness;pain;dyspnea   wobbly w/gt.toBRonRA perNsg;\"no sleep\";init.refPT,thenAgreed  -DM agree to therapy;complains " of;fatigue  -    Patient Effort, Rehab Treatment fair  -DM good  -MC    Symptoms Noted During/After Treatment fatigue;increased pain;shortness of breath  -DM dizziness;fatigue  -    Symptoms Noted Comment  VSS throughout, pt with hx of vertigo  -    General Information    Patient Profile Review yes  -DM yes  -MC    Onset of Illness/Injury or Date of Surgery Date 10/27/17  -DM 10/27/17  -    Referring Physician VIRGIE Craft MD  -DM MD Venancio  -    General Observations Sup.in bed;tele;o2;iv;bro.at BS;nsg gave shot in abdom (blood thinner);exit alarm disarmed by nsg. d/t slight stature(kept triggering it w/any mvmt)  - Pt received in supine, 02 on3L  -    Pertinent History Of Current Problem chr. bronchitis & cont. to smoke daily; on steroids & ABX since 8/'17;incr. cough past week; to ER W/ wheezing, hypoxia, o2 sat 89%, HTN, chills; MD detected ? murmur; dx: COPD exac, resp.fail./hypox w/ wheezing, leukocytosis; echo '16: EF 65%, Mod. TR; on Requip for RLS   -DM Pt is 77 YOF who presented to ED with c/o SOA and productive cough x 1 week. Pt with recent fall at home  -    Precautions/Limitations fall precautions;oxygen therapy device and L/min;other (see comments)   H/O Vertigo,ess. tremor,RLS,A/D  -DM fall precautions;oxygen therapy device and L/min  -    Prior Level of Function independent:;all household mobility;community mobility;gait;transfer;bed mobility;ADL's;home management;cooking;cleaning;driving;shopping   [facility] gt w/o A.D.;Hired ydwk;smokes 5-6 cig./day  - independent:;all household mobility;community mobility;gait;transfer;bed mobility;ADL's;home management;driving  -    Equipment Currently Used at Home nebulizer  -DM nebulizer  -    Plans/Goals Discussed With patient;agreed upon  -DM patient;agreed upon  -    Risks Reviewed patient:;LOB;dizziness;increased discomfort;change in vital signs;lines disloged  -DM patient:;LOB;nausea/vomiting;dizziness;increased discomfort;change in  vital signs;lines disloged  -    Benefits Reviewed patient:;improve function;increase independence;increase strength;increase balance;increase knowledge;decrease pain  -DM patient:;improve function;increase independence;increase strength;increase balance;increase knowledge  -    Barriers to Rehab ineffective coping  -DM none identified  -MC    Living Environment    Lives With alone  -DM alone  -MC    Living Arrangements house  -DM house  -MC    Home Accessibility stairs within home;tub/shower is not walk in;bed and bath on same level   doesn't use upstairs  -DM stairs within home;tub/shower is not walk in  -    Number of Stairs Within Home --   1 flight  -DM 12  -MC    Type of Financial/Environmental Concern none  -DM     Transportation Available car;family or friend will provide  -DM     Living Environment Comment 2 dtrs avail. PRN  -DM All needs met on first floor of home. Pt reports she has a large family who is available to assist 24/7  -    Clinical Impression    Date of Referral to PT 10/31/17  -     PT Diagnosis IMPAIRED funct mobil  -DM     Criteria for Skilled Therapeutic Interventions Met yes;treatment indicated  -DM     Pathology/Pathophysiology Noted (Describe Specifically for Each System) pulmonary  -DM     Impairments Found (describe specific impairments) gait, locomotion, and balance  -DM     Rehab Potential good, to achieve stated therapy goals  -DM     Vital Signs    Pre Systolic BP Rehab 160  -  -MC    Pre Treatment Diastolic BP 70  -DM 70  -MC    Post Systolic BP Rehab 144  -  -MC    Post Treatment Diastolic BP 66  -DM 66  -MC    Pretreatment Heart Rate (beats/min) 92   IRREG.  -DM 77  -MC    Intratreatment Heart Rate (beats/min) 80  -DM     Posttreatment Heart Rate (beats/min) 76  -DM 80  -MC    Pre SpO2 (%) 92  -DM 92  -MC    O2 Delivery Pre Treatment supplemental O2   3 L  -DM supplemental O2   3L  -MC    Post SpO2 (%) 91  -DM 92  -MC    O2 Delivery Post Treatment  supplemental O2  -DM supplemental O2  -MC    Pre Patient Position Supine  -DM Supine  -MC    Intra Patient Position Standing  -DM Sitting  -MC    Post Patient Position Sitting  -DM Sitting  -    Pain Assessment    Pain Assessment 0-10  -DM 0-10  -MC    Pain Score 2  -DM 1  -MC    Post Pain Score 3  -DM 1  -MC    Pain Type Acute pain   s/p shot  -DM Acute pain  -    Pain Location Abdomen   also L knee pain 10/10 w/ WB during gt  -DM Abdomen   pt recently received shot in abdomen   -    Pain Descriptors Aching  -DM     Pain Frequency Constant/continuous  -DM     Patient's Stated Pain Goal No pain  -DM     Pain Intervention(s) Repositioned;Rest  -DM     Response to Interventions tolerated  -DM     Vision Assessment/Intervention    Visual Impairment WFL  -DM WFL  -    Cognitive Assessment/Intervention    Current Cognitive/Communication Assessment functional  -DM functional  -    Orientation Status oriented x 4  -DM oriented x 4  -    Follows Commands/Answers Questions 100% of the time;able to follow single-step instructions;needs cueing;needs increased time;needs repetition  - 100% of the time  -    Personal Safety mild impairment;decreased awareness, need for assist;decreased awareness, need for safety;decreased insight to deficits  - mild impairment  -    Personal Safety Interventions fall prevention program maintained;gait belt;nonskid shoes/slippers when out of bed  - fall prevention program maintained;gait belt;muscle strengthening facilitated;nonskid shoes/slippers when out of bed;supervised activity  -    Short/Long Term Memory  intact short term memory;intact long term memory  -    ROM (Range of Motion)    General ROM lower extremity range of motion deficits identified   Lknee & Bhip flex hall. 25 % d/t pain w/mvmt  -DM upper extremity range of motion deficits identified  -    General ROM Detail  Bilateral shoulders 0-75% AROM, bilateral elbows/forearms/wrists/digits WFL  -    MMT  (Manual Muscle Testing)    General MMT Assessment lower extremity strength deficits identified   L quad & B hip flex 3- to 3+/5  -DM upper extremity strength deficits identified  -    General MMT Assessment Detail  Bilateral shoulders 2+/5, bilateral elbows/forearms/wrists/digits 4/5  -    Bed Mobility, Assessment/Treatment    Bed Mobility, Assistive Device bed rails;head of bed elevated  -DM bed rails;head of bed elevated  -MC    Bed Mobility, Roll Left, [place] supervision required  -DM supervision required  -MC    Bed Mobility, Roll Right, [place] supervision required  -DM     Bed Mobility, Scoot/Bridge, [place] minimum assist (75% patient effort);verbal cues required  -DM minimum assist (75% patient effort)  -    Bed Mob, Supine to Sit, [place]  supervision required  -MC    Bed Mob, Sit to Supine, [place]  not tested  -    Bed Mob, Sidelying to Sit, [place] supervision required;verbal cues required  -DM     Bed Mobility, Safety Issues decreased use of arms for pushing/pulling;decreased use of legs for bridging/pushing  -DM     Bed Mobility, Impairments ROM decreased;strength decreased;pain  -DM ROM decreased;strength decreased  -    Bed Mobility, Comment Cues for HP  -DM     Transfer Assessment/Treatment    Transfers, Bed-Chair [place]  contact guard assist;2 person assist required  -MC    Transfers, Sit-Stand [place] contact guard assist;2 person assist required;verbal cues required  -DM contact guard assist;2 person assist required  -MC    Transfers, Stand-Sit [place] contact guard assist;2 person assist required;verbal cues required  -DM contact guard assist;2 person assist required  -MC    Transfers, Sit-Stand-Sit, Assist Device --   GT BELT; UE SUPP.  -DM     Transfer, Safety Issues weight-shifting ability decreased;loses balance backward  -DM balance decreased during turns  -MC    Transfer, Impairments strength decreased;impaired balance;pain   L knee  pain w/WB  -DM strength decreased;impaired balance  -    Transfer, Comment CUES for HP  -DM CUes for hand placement, safe transfer technique   -    Gait Assessment/Treatment    Gait, [place] Level minimum assist (75% patient effort);2 person assist required;verbal cues required  -     Gait, Assistive Device --   GT belt, UE supp. (decl. R wx)  -DM     Gait, Distance (Feet) 50   onset dizziness;brought jade chair;rolled toRoom;sidest.toCh  -DM     Gait, Gait Pattern Analysis swing-to gait  -     Gait, Gait Deviations antalgic;left:;alia decreased;decreased heel strike;forward flexed posture;narrow base;step length decreased;weight-shifting ability decreased   c/o L knee pain 10/10 upon ret. to room(h/o pain w/WB)  -DM     Gait, Safety Issues step length decreased;weight-shifting ability decreased;supplemental O2   MOD SOB/fat./dizzy  -DM     Gait, Impairments strength decreased;impaired balance;pain  -     Gait, Comment cues for incr step length,trunk ext, PLB  -     Motor Skills/Interventions    Additional Documentation Balance Skills Training (Group)  - Balance Skills Training (Group);Fine Motor Coordination Training (Group);Gross Motor Coordination Training (Group)  -    Balance Skills Training    Sitting-Level of Assistance Close supervision  - Close supervision  -    Sitting-Balance Support Feet supported  - Feet supported  -    Sitting-Balance Activities Trunk control activities   scooting; LE exer  - Trunk control activities  -    Sitting # of Minutes 7  - 7  -    Standing-Level of Assistance Contact guard  - Contact guard  -    Static Standing Balance Support Right upper extremity supported;Left upper extremity supported  - Right upper extremity supported  -    Standing-Balance Activities Weight Shift A-P;Weight Shift R-L   trunk ext; PLB  -     Gait Balance-Level of Assistance  Contact guard;x2  -    Gait Balance Support  Right upper extremity  supported;Left upper extremity supported  -    Therapy Exercises    Bilateral Lower Extremities AROM:;10 reps;5 reps;sitting;ankle pumps/circles;glut sets;hip abduction/adduction;hip ER;hip flexion;hip IR;LAQ;quad sets   only  5 reps marches & L LAQ  -     Fine Motor Coordination Training    Opposition  Right:;Left:;intact  -    Sensory Assessment/Intervention    Sensory Impairment  numbness;tingling  -    Light Touch  LUE;RUE;LLE;RLE  -    LUE Light Touch  WNL  -    RUE Light Touch  WNL  -    LLE Light Touch  mild impairment   chronic  -    RLE Light Touch  mild impairment   chronic  -    Positioning and Restraints    Pre-Treatment Position in bed  - in bed  -    Post Treatment Position chair  - chair  -    In Chair notified nsg;reclined;call light within reach;encouraged to call for assist;exit alarm on;legs elevated  - notified nsg;reclined;call light within reach;encouraged to call for assist;exit alarm on;with PT;legs elevated  -      10/30/17 1517       General Information    Equipment Currently Used at Home nebulizer  -[facility]     Living Environment    Lives With alone  -[facility]     Living Arrangements house  -[facility]     Home Accessibility stairs within home  -[facility]     Number of Stairs Within Home --   1 flight  -[facility]     Type of Financial/Environmental Concern none  -[facility]     Transportation Available car;family or friend will provide  -[facility]       User Key  (r) = Recorded By, (t) = Taken By, (c) = Cosigned By    Initials Name Provider Type     Nicole Fraire, PT Physical Therapist    [facility] Indy Baird, RN Case Manager     April Galvez, OT Occupational Therapist          Physical Therapy Education     Title: PT OT SLP Therapies (Active)     Topic: Physical Therapy (Active)     Point: Mobility training (Active)    Learning Progress Summary    Learner Readiness Method Response Comment Documented by Status   Patient KIMBERLEY Meehan NR   11/01/17 1119 Active               Point: Home exercise  program (Active)    Learning Progress Summary    Learner Readiness Method Response Comment Documented by Status   Patient KIMBERLEY Meehan NR  DM 11/01/17 1119 Active               Point: Body mechanics (Active)    Learning Progress Summary    Learner Readiness Method Response Comment Documented by Status   Patient KIMBERLEY Meehan NR  DM 11/01/17 1119 Active               Point: Precautions (Active)    Learning Progress Summary    Learner Readiness Method Response Comment Documented by Status   Patient KIMBERLEY Meehan NR  DM 11/01/17 1119 Active                      User Key     Initials Effective Dates Name Provider Type Discipline     06/19/15 -  Nicole Fraire, PT Physical Therapist PT                PT Recommendation and Plan  Anticipated Discharge Disposition: home with home health (pt told CM she plans to return home(dtr. to helpPRN))  PT Frequency: daily  Plan of Care Review  Plan Of Care Reviewed With: patient  Progress: progress towards functional goals is fair  Outcome Summary/Follow up Plan: Presents w/ evolving sympt., incl. elev BP, SIGNIF SOB/fatigue w/ gt x 50 ft utilizing MIN.HHA of 2 (noted onset dizziness & L knee pain 10/10, req. roll back to room in chair), wheezing/non-prod. cough,desat. to 91% & weakness/knee instabil. w/ all mobil; recommend HHPT at d/c to meet all goals, although pt notified CM she plans home w/ no f/u needs             IP PT Goals       11/01/17 1119          Bed Mobility PT LTG    Bed Mobility PT LTG, Date Established 11/01/17  -DM      Bed Mobility PT LTG, Time to Achieve 1 wk  -DM      Bed Mobility PT LTG, Activity Type all bed mobility  -DM      Bed Mobility PT LTG, [place] Level independent  -DM      Transfer Training PT LTG    Transfer Training PT LTG, Date Established 11/01/17  -DM      Transfer Training PT LTG, Time to Achieve 1 wk  -DM      Transfer Training PT LTG, Activity Type bed to chair /chair to bed;sit to stand/stand to sit  -DM      Transfer Training PT LTG,  [place] Level supervision required  -DM      Transfer Training PT LTG, Assist Device walker, rolling  -DM      Gait Training PT LTG    Gait Training Goal PT LTG, Date Established 11/01/17  -DM      Gait Training Goal PT LTG, Time to Achieve 1 wk  -DM      Gait Training Goal PT LTG, [place] Level contact guard assist   stable VS,WBAT LLE; NO LOB  -DM      Gait Training Goal PT LTG, Assist Device walker, rolling  -DM      Gait Training Goal PT LTG, Distance to Achieve 150  -DM      Patient Education PT LTG    Patient Education PT LTG, Date Established 11/01/17  -DM      Patient Education PT LTG, Time to Achieve 1 wk  -DM      Patient Education PT LTG, Education Type written program;HEP;posture/body mechanics;gait;transfers;bed mobility;benefits of activity  -DM      Patient Education PT LTG, Education Understanding demonstrate adequately;verbalize understanding  -DM        User Key  (r) = Recorded By, (t) = Taken By, (c) = Cosigned By    Initials Name Provider Type    DM Nicole Fraire, PT Physical Therapist                Outcome Measures       11/01/17 0954 11/01/17 0953       How much help from another person do you currently need...    Turning from your back to your side while in flat bed without using bedrails? 3  -DM      Moving from lying on back to sitting on the side of a flat bed without bedrails? 3  -DM      Moving to and from a bed to a chair (including a wheelchair)? 3  -DM      Standing up from a chair using your arms (e.g., wheelchair, bedside chair)? 3  -DM      Climbing 3-5 steps with a railing? 1  -DM      To walk in hospital room? 3  -DM      AM-PAC 6 Clicks Score 16  -DM      How much help from another is currently needed...    Putting on and taking off regular lower body clothing?  3  -MC     Bathing (including washing, rinsing, and drying)  3  -MC     Toileting (which includes using toilet bed pan or urinal)  3  -MC     Putting on and taking off regular upper body clothing  3  -MC      Taking care of personal grooming (such as brushing teeth)  3  -MC     Eating meals  3  -MC     Score  18  -     Functional Assessment    Outcome Measure Options AM-PAC 6 Clicks Basic Mobility (PT)  -DM AM-PAC 6 Clicks Daily Activity (OT)  -       User Key  (r) = Recorded By, (t) = Taken By, (c) = Cosigned By    Initials Name Provider Type    DM Nicole Fraire, PT Physical Therapist    MC April Galvez, OT Occupational Therapist           Time Calculation:         PT Charges       11/01/17 1119          Time Calculation    Start Time 0954  -DM      PT Received On 11/01/17  -DM      PT Goal Re-Cert Due Date 11/11/17  -DM      Time Calculation- PT    Total Timed Code Minutes- PT 25 minute(s)  -DM        User Key  (r) = Recorded By, (t) = Taken By, (c) = Cosigned By    Initials Name Provider Type    GILSON Farire, PT Physical Therapist          Therapy Charges for Today     Code Description Service Date Service Provider Modifiers Qty    79744045751 HC PT EVAL MOD COMPLEXITY 4 11/1/2017 Nicole Fraire, PT GP 1    93052456545 HC PT THER PROC EA 15 MIN 11/1/2017 Nicole Fraire, PT GP 1    40073229769 HC GAIT TRAINING EA 15 MIN 11/1/2017 Nicole Fraire, PT GP 1          PT G-Codes  Outcome Measure Options: AM-PAC 6 Clicks Basic Mobility (PT)      Nicole Fraire, PT  11/1/2017

## 2017-11-01 NOTE — PROGRESS NOTES
[facility] Medicine Services  PROGRESS NOTE    Patient Name: Sadie Tijerina  : 1940  MRN: [id number]    Date of Admission: 10/27/2017  Length of Stay: 4  Primary Care Physician: Kristian Wolff MD    Subjective   Subjective     CC:  F/U dyspnea    HPI:  Still significantly short of breath with moving around.  She feels very tired due to coughing.  No better after mucomyst nebs given overnight.    Review of Systems  Gen- No fevers, chills  CV- No palpitations  Resp- Persistent wheezing, productive cough, SOA on exertion  GI- No N/V/D, abd pain      Otherwise ROS is negative except as mentioned in the HPI.    Objective   Objective     Vital Signs:   Temp:  [96.4 °F (35.8 °C)-97.6 °F (36.4 °C)] 97.6 °F (36.4 °C)  Heart Rate:  [67-90] 70  Resp:  [14-22] 20  BP: (141-160)/(58-92) 148/74        Physical Exam:  Constitutional: No acute distress, awake, alert, sleeping but arousable  HENT: NCAT, mucous membranes moist  Respiratory: Diffuse expiratory wheezing slightly improved from yesterday, she does have some faint bibasilar crackles which are new, voice is hoarse, occasional coughing, respiratory effort normal, O2 sats drop with talking when on room air   Cardiovascular: RRR, no murmurs, rubs, or gallops  Gastrointestinal: Positive bowel sounds, soft, nontender, nondistended  Musculoskeletal: No bilateral ankle edema  Psychiatric: Appropriate affect, cooperative  Neurologic: Oriented x 3, Cranial Nerves grossly intact to confrontation, speech clear  Skin: No rashes    Results Reviewed:  I have personally reviewed current lab, radiology, and data and agree.      Results from last 7 days  Lab Units 10/31/17  0539 10/30/17  0620 10/27/17  2323   WBC 10*3/mm3 14.64* 16.00* 16.23*   HEMOGLOBIN g/dL 11.7 11.6 13.6   HEMATOCRIT % 37.6 37.7 41.5   PLATELETS 10*3/mm3 265 267 269       Results from last 7 days  Lab Units 10/31/17  0539 10/30/17  0620 10/27/17  2323   SODIUM mmol/L 135 136 137    POTASSIUM mmol/L 4.5 4.2 4.1   CHLORIDE mmol/L 100 98* 101   CO2 mmol/L 34.0* 34.0* 32.0*   BUN mg/dL 30* 26* 13   CREATININE mg/dL 0.80 0.80 0.90   GLUCOSE mg/dL 118* 152* 132*   CALCIUM mg/dL 8.7 8.9 9.5   ALT (SGPT) U/L  --   --  17   AST (SGOT) U/L  --   --  24     No results found for: BNP  No results found for: PHART       Microbiology Results Abnormal     Procedure Component Value - Date/Time    Respiratory Culture - Sputum, Cough [[id number]]  (Abnormal)  (Susceptibility) Collected:  10/28/17 0038    Lab Status:  Preliminary result Specimen:  Sputum from Cough Updated:  11/01/17 1452     Respiratory Culture --      Moderate growth (3+) Haemophilus influenzae Biotype III (A)        Beta Lactamase Positive            Light growth (2+) Enterobacter cloacae (A)      Light growth (2+) Klebsiella pneumoniae (A)      Light growth (2+) Normal Respiratory Ashlee     Gram Stain Result Moderate (3+) WBCs per low power field      Many (4+) Gram negative bacilli, tiny      Occasional Gram positive cocci in clusters    Susceptibility      Enterobacter cloacae     TEA (Preliminary)     Ampicillin >16 ug/ml Resistant     Ampicillin + Sulbactam 16/8 ug/ml Intermediate     Aztreonam <=8 ug/ml Susceptible     Cefepime <=8 ug/ml Susceptible     Cefotaxime <=2 ug/ml Susceptible     Ceftriaxone <=8 ug/ml Susceptible     Cefuroxime sodium 16 ug/ml Intermediate     Ertapenem <=1 ug/ml Susceptible     Gentamicin <=4 ug/ml Susceptible     Levofloxacin <=2 ug/ml Susceptible     Meropenem <=1 ug/ml Susceptible     Piperacillin + Tazobactam <=16 ug/ml Susceptible     Tetracycline <=4 ug/ml Susceptible     Tobramycin <=4 ug/ml Susceptible     Trimethoprim + Sulfamethoxazole <=2/38 ug/ml Susceptible                Susceptibility      Klebsiella pneumoniae     TEA (Preliminary)     Ampicillin >16 ug/ml Resistant     Ampicillin + Sulbactam <=8/4 ug/ml Susceptible     Aztreonam <=8 ug/ml Susceptible     Cefepime <=8 ug/ml Susceptible      Cefotaxime <=2 ug/ml Susceptible     Ceftriaxone <=8 ug/ml Susceptible     Cefuroxime sodium <=4 ug/ml Susceptible     Ertapenem <=1 ug/ml Susceptible     Gentamicin <=4 ug/ml Susceptible     Levofloxacin <=2 ug/ml Susceptible     Meropenem <=1 ug/ml Susceptible     Piperacillin + Tazobactam <=16 ug/ml Susceptible     Tetracycline <=4 ug/ml Susceptible     Tobramycin <=4 ug/ml Susceptible     Trimethoprim + Sulfamethoxazole <=2/38 ug/ml Susceptible                          Imaging Results (last 24 hours)     Procedure Component Value Units Date/Time    XR Chest PA & Lateral [[id number]] Updated:  11/01/17 1459        Results for orders placed during the hospital encounter of 10/27/17   Adult Transthoracic Echo Complete W/ Cont if Necessary Per Protocol    Narrative · Left ventricular systolic function is normal.  · Estimated EF appears to be in the range of 61 - 65%.  · Left ventricular diastolic dysfunction (grade I a) consistent with   impaired relaxation.  · Calculated right ventricular systolic pressure from tricuspid   regurgitation is 27 mmHg.          I have reviewed the medications.    Assessment/Plan   Assessment / Plan     Hospital Problem List     * (Principal)COPD exacerbation    Gastroesophageal reflux disease without esophagitis    Hyperlipidemia    Leukocytosis (Chronic)    COPD with exacerbation    Tobacco abuse disorder (Chronic)    Acute respiratory failure with hypoxemia             Brief Hospital Course to date:  Sadie Tijerina is a 77 y.o. female with COPD and continued tobacco use presenting with SOA x 1 week and diffuse wheezing.    Assessment & Plan:    COPD with exacerbation   Acute hypoxic respiratory failure  -Wheezing improved some today but she has new bibasilar faint crackles  -Continue PO steroids, scheduled duonebs, PRN albuterol nebs, flutter valve, incentive spirometry  -PO doxycycline day 5/7, will add ceftriaxone today as 3rd generation cephalosporin is an option for  beta-lactamase positive H. Influenzae pending susceptibilities but tetracycline may also cover this  -Sputum culture pending with enterobacter and klebsiella both sensitive to tetracycline, now also with beta-lactamase positive H. Influenzae  -Will get repeat CXR PA/lateral today given new lung exam findings  -Smoking cessation encouraged  -She will likely need a steroid taper at discharge  -She does not have a pulmonologist and will need a referral to pulmonary clinic at discharge for PFTs and further management of her lung disease    Hip pain s/p fall prior to admission  -X ray ordered and revealed no acute fracture  -PT/OT consulted    GERD   -Continue pepcid, tums PRN    HLD  -Continue atorvastatin    Leukocytosis  -Recheck with am labs    Tobacco abuse disorder  -Counseled on cessation; she says she will think about it  -Continue nicotine patch       DVT Prophylaxis:  Lovenox    CODE STATUS: Conditional Code    Disposition: I expect the patient to be discharged home in 2-3 days.  She has been extremely slow to improve.    Vy Craft MD  11/01/17  7:25 PM

## 2017-11-02 LAB
ANION GAP SERPL CALCULATED.3IONS-SCNC: 10 MMOL/L (ref 3–11)
BACTERIA SPEC RESP CULT: ABNORMAL
BUN BLD-MCNC: 27 MG/DL (ref 9–23)
BUN/CREAT SERPL: 33.8 (ref 7–25)
CALCIUM SPEC-SCNC: 8.6 MG/DL (ref 8.7–10.4)
CHLORIDE SERPL-SCNC: 96 MMOL/L (ref 99–109)
CO2 SERPL-SCNC: 29 MMOL/L (ref 20–31)
CREAT BLD-MCNC: 0.8 MG/DL (ref 0.6–1.3)
DEPRECATED RDW RBC AUTO: 43 FL (ref 37–54)
ERYTHROCYTE [DISTWIDTH] IN BLOOD BY AUTOMATED COUNT: 12.7 % (ref 11.3–14.5)
GFR SERPL CREATININE-BSD FRML MDRD: 69 ML/MIN/1.73
GLUCOSE BLD-MCNC: 86 MG/DL (ref 70–100)
GRAM STN SPEC: ABNORMAL
HCT VFR BLD AUTO: 40.1 % (ref 34.5–44)
HGB BLD-MCNC: 12.8 G/DL (ref 11.5–15.5)
MCH RBC QN AUTO: 29.2 PG (ref 27–31)
MCHC RBC AUTO-ENTMCNC: 31.9 G/DL (ref 32–36)
MCV RBC AUTO: 91.6 FL (ref 80–99)
PLATELET # BLD AUTO: 266 10*3/MM3 (ref 150–450)
PMV BLD AUTO: 10 FL (ref 6–12)
POTASSIUM BLD-SCNC: 4 MMOL/L (ref 3.5–5.5)
RBC # BLD AUTO: 4.38 10*6/MM3 (ref 3.89–5.14)
SODIUM BLD-SCNC: 135 MMOL/L (ref 132–146)
WBC NRBC COR # BLD: 15.59 10*3/MM3 (ref 3.5–10.8)

## 2017-11-02 PROCEDURE — 80048 BASIC METABOLIC PNL TOTAL CA: CPT | Performed by: INTERNAL MEDICINE

## 2017-11-02 PROCEDURE — 94799 UNLISTED PULMONARY SVC/PX: CPT

## 2017-11-02 PROCEDURE — 25010000002 CEFTRIAXONE PER 250 MG: Performed by: INTERNAL MEDICINE

## 2017-11-02 PROCEDURE — 94640 AIRWAY INHALATION TREATMENT: CPT

## 2017-11-02 PROCEDURE — 63710000001 METHYLPREDNISOLONE PER 4 MG: Performed by: INTERNAL MEDICINE

## 2017-11-02 PROCEDURE — 94667 MNPJ CHEST WALL 1ST: CPT

## 2017-11-02 PROCEDURE — 99233 SBSQ HOSP IP/OBS HIGH 50: CPT | Performed by: INTERNAL MEDICINE

## 2017-11-02 PROCEDURE — 25010000002 ENOXAPARIN PER 10 MG

## 2017-11-02 PROCEDURE — 94760 N-INVAS EAR/PLS OXIMETRY 1: CPT

## 2017-11-02 PROCEDURE — 85027 COMPLETE CBC AUTOMATED: CPT | Performed by: INTERNAL MEDICINE

## 2017-11-02 RX ADMIN — GUAIFENESIN 600 MG: 600 TABLET, EXTENDED RELEASE ORAL at 20:05

## 2017-11-02 RX ADMIN — ASPIRIN 81 MG: 81 TABLET, COATED ORAL at 09:34

## 2017-11-02 RX ADMIN — IPRATROPIUM BROMIDE AND ALBUTEROL SULFATE 3 ML: .5; 3 SOLUTION RESPIRATORY (INHALATION) at 16:05

## 2017-11-02 RX ADMIN — DEXTROMETHORPHAN 30 MG: 30 SUSPENSION, EXTENDED RELEASE ORAL at 09:34

## 2017-11-02 RX ADMIN — ATORVASTATIN CALCIUM 10 MG: 10 TABLET, FILM COATED ORAL at 20:05

## 2017-11-02 RX ADMIN — DOXYCYCLINE HYCLATE 100 MG: 100 CAPSULE ORAL at 20:05

## 2017-11-02 RX ADMIN — IPRATROPIUM BROMIDE AND ALBUTEROL SULFATE 3 ML: .5; 3 SOLUTION RESPIRATORY (INHALATION) at 13:19

## 2017-11-02 RX ADMIN — GUAIFENESIN 600 MG: 600 TABLET, EXTENDED RELEASE ORAL at 09:34

## 2017-11-02 RX ADMIN — FLUTICASONE PROPIONATE 2 SPRAY: 50 SPRAY, METERED NASAL at 09:35

## 2017-11-02 RX ADMIN — ENOXAPARIN SODIUM 40 MG: 40 INJECTION SUBCUTANEOUS at 09:34

## 2017-11-02 RX ADMIN — ACETAMINOPHEN 650 MG: 325 TABLET ORAL at 15:01

## 2017-11-02 RX ADMIN — SERTRALINE HYDROCHLORIDE 50 MG: 50 TABLET ORAL at 09:34

## 2017-11-02 RX ADMIN — PROPRANOLOL HYDROCHLORIDE 60 MG: 20 TABLET ORAL at 09:34

## 2017-11-02 RX ADMIN — VITAMIN D, TAB 1000IU (100/BT) 1000 UNITS: 25 TAB at 09:34

## 2017-11-02 RX ADMIN — BUDESONIDE AND FORMOTEROL FUMARATE DIHYDRATE 2 PUFF: 160; 4.5 AEROSOL RESPIRATORY (INHALATION) at 19:47

## 2017-11-02 RX ADMIN — IPRATROPIUM BROMIDE AND ALBUTEROL SULFATE 3 ML: .5; 3 SOLUTION RESPIRATORY (INHALATION) at 04:04

## 2017-11-02 RX ADMIN — DOXYCYCLINE HYCLATE 100 MG: 100 CAPSULE ORAL at 09:34

## 2017-11-02 RX ADMIN — IPRATROPIUM BROMIDE AND ALBUTEROL SULFATE 3 ML: .5; 3 SOLUTION RESPIRATORY (INHALATION) at 19:47

## 2017-11-02 RX ADMIN — DEXTROMETHORPHAN 30 MG: 30 SUSPENSION, EXTENDED RELEASE ORAL at 17:04

## 2017-11-02 RX ADMIN — CEFTRIAXONE SODIUM 1 G: 1 INJECTION, SOLUTION INTRAVENOUS at 09:38

## 2017-11-02 RX ADMIN — METHYLPREDNISOLONE 40 MG: 16 TABLET ORAL at 09:34

## 2017-11-02 RX ADMIN — IPRATROPIUM BROMIDE AND ALBUTEROL SULFATE 3 ML: .5; 3 SOLUTION RESPIRATORY (INHALATION) at 08:25

## 2017-11-02 RX ADMIN — BUDESONIDE AND FORMOTEROL FUMARATE DIHYDRATE 2 PUFF: 160; 4.5 AEROSOL RESPIRATORY (INHALATION) at 08:41

## 2017-11-02 NOTE — PLAN OF CARE
Problem: Respiratory Insufficiency (Adult)  Goal: Acid/Base Balance  Outcome: Ongoing (interventions implemented as appropriate)  Goal: Effective Ventilation  Outcome: Ongoing (interventions implemented as appropriate)    Problem: Fall Risk (Adult)  Goal: Absence of Falls  Outcome: Ongoing (interventions implemented as appropriate)

## 2017-11-02 NOTE — PROGRESS NOTES
[facility] Medicine Services  PROGRESS NOTE    Patient Name: Sadie Tijerina  : 1940  MRN: [id number]    Date of Admission: 10/27/2017  Length of Stay: 5  Primary Care Physician: Kristian Wolff MD    Subjective   Subjective     CC:  F/U dyspnea    HPI:  Pt states that she still feels poorly. Does not feel any better than on admission.  Slept better last pm.     Review of Systems  Gen- No fevers, chills  CV- No palpitations  Resp- Persistent wheezing, productive cough, SOA on exertion  GI- No N/V/D, abd pain      Otherwise ROS is negative except as mentioned in the HPI.    Objective   Objective     Vital Signs:   Temp:  [96.9 °F (36.1 °C)-98.5 °F (36.9 °C)] 98.3 °F (36.8 °C)  Heart Rate:  [67-82] 78  Resp:  [16-20] 18  BP: (118-166)/(67-80) 118/70        Physical Exam:  Constitutional: No acute distress, awake, alert, sleeping but arousable  HENT: NCAT, mucous membranes moist  Respiratory: Diffuse expiratory wheezing, poor air movement bilaterally  Cardiovascular: RRR, no murmurs, rubs, or gallops  Gastrointestinal: Positive bowel sounds, soft, nontender, nondistended  Musculoskeletal: No bilateral ankle edema  Psychiatric: Appropriate affect, cooperative  Neurologic: Oriented x 3, Cranial Nerves grossly intact to confrontation, speech clear  Skin: No rashes    Results Reviewed:  I have personally reviewed current lab, radiology, and data and agree.      Results from last 7 days  Lab Units 11/02/17  0615 10/31/17  0539 10/30/17  0620   WBC 10*3/mm3 15.59* 14.64* 16.00*   HEMOGLOBIN g/dL 12.8 11.7 11.6   HEMATOCRIT % 40.1 37.6 37.7   PLATELETS 10*3/mm3 266 265 267       Results from last 7 days  Lab Units 11/02/17  0615 10/31/17  0539 10/30/17  0620 10/27/17  2323   SODIUM mmol/L 135 135 136 137   POTASSIUM mmol/L 4.0 4.5 4.2 4.1   CHLORIDE mmol/L 96* 100 98* 101   CO2 mmol/L 29.0 34.0* 34.0* 32.0*   BUN mg/dL 27* 30* 26* 13   CREATININE mg/dL 0.80 0.80 0.80 0.90   GLUCOSE mg/dL  86 118* 152* 132*   CALCIUM mg/dL 8.6* 8.7 8.9 9.5   ALT (SGPT) U/L  --   --   --  17   AST (SGOT) U/L  --   --   --  24     No results found for: BNP  No results found for: PHART       Microbiology Results Abnormal     Procedure Component Value - Date/Time    Respiratory Culture - Sputum, Cough [[id number]]  (Abnormal)  (Susceptibility) Collected:  10/28/17 0038    Lab Status:  Preliminary result Specimen:  Sputum from Cough Updated:  11/01/17 1452     Respiratory Culture --      Moderate growth (3+) Haemophilus influenzae Biotype III (A)        Beta Lactamase Positive            Light growth (2+) Enterobacter cloacae (A)      Light growth (2+) Klebsiella pneumoniae (A)      Light growth (2+) Normal Respiratory Ashlee     Gram Stain Result Moderate (3+) WBCs per low power field      Many (4+) Gram negative bacilli, tiny      Occasional Gram positive cocci in clusters    Susceptibility      Enterobacter cloacae     TEA (Preliminary)     Ampicillin >16 ug/ml Resistant     Ampicillin + Sulbactam 16/8 ug/ml Intermediate     Aztreonam <=8 ug/ml Susceptible     Cefepime <=8 ug/ml Susceptible     Cefotaxime <=2 ug/ml Susceptible     Ceftriaxone <=8 ug/ml Susceptible     Cefuroxime sodium 16 ug/ml Intermediate     Ertapenem <=1 ug/ml Susceptible     Gentamicin <=4 ug/ml Susceptible     Levofloxacin <=2 ug/ml Susceptible     Meropenem <=1 ug/ml Susceptible     Piperacillin + Tazobactam <=16 ug/ml Susceptible     Tetracycline <=4 ug/ml Susceptible     Tobramycin <=4 ug/ml Susceptible     Trimethoprim + Sulfamethoxazole <=2/38 ug/ml Susceptible                Susceptibility      Klebsiella pneumoniae     TEA (Preliminary)     Ampicillin >16 ug/ml Resistant     Ampicillin + Sulbactam <=8/4 ug/ml Susceptible     Aztreonam <=8 ug/ml Susceptible     Cefepime <=8 ug/ml Susceptible     Cefotaxime <=2 ug/ml Susceptible     Ceftriaxone <=8 ug/ml Susceptible     Cefuroxime sodium <=4 ug/ml Susceptible     Ertapenem <=1 ug/ml Susceptible      Gentamicin <=4 ug/ml Susceptible     Levofloxacin <=2 ug/ml Susceptible     Meropenem <=1 ug/ml Susceptible     Piperacillin + Tazobactam <=16 ug/ml Susceptible     Tetracycline <=4 ug/ml Susceptible     Tobramycin <=4 ug/ml Susceptible     Trimethoprim + Sulfamethoxazole <=2/38 ug/ml Susceptible                          Imaging Results (last 24 hours)     Procedure Component Value Units Date/Time    XR Chest PA & Lateral [[id number]] Updated:  11/01/17 1459        Results for orders placed during the hospital encounter of 10/27/17   Adult Transthoracic Echo Complete W/ Cont if Necessary Per Protocol    Narrative · Left ventricular systolic function is normal.  · Estimated EF appears to be in the range of 61 - 65%.  · Left ventricular diastolic dysfunction (grade I a) consistent with   impaired relaxation.  · Calculated right ventricular systolic pressure from tricuspid   regurgitation is 27 mmHg.          I have reviewed the medications.    Assessment/Plan   Assessment / Plan     Hospital Problem List     * (Principal)COPD exacerbation    Gastroesophageal reflux disease without esophagitis    Hyperlipidemia    Leukocytosis (Chronic)    COPD with exacerbation    Tobacco abuse disorder (Chronic)    Acute respiratory failure with hypoxemia             Brief Hospital Course to date:  Sadie Tijerina is a 77 y.o. female with COPD and continued tobacco use presenting with SOA x 1 week and diffuse wheezing.    Assessment & Plan:    COPD with exacerbation   Acute hypoxic respiratory failure  -Wheezing improved some today but she has new bibasilar faint crackles  -Continue PO steroids, scheduled duonebs, PRN albuterol nebs, flutter valve, incentive spirometry  -PO doxycycline day 6/7, added ceftriaxone today as 3rd generation cephalosporin is an option for beta-lactamase positive H. Influenzae pending susceptibilities but tetracycline may also cover this  -Sputum culture pending with enterobacter and klebsiella both  sensitive to tetracycline, now also with beta-lactamase positive H. Influenzae  -Repeat CXR done yesterday but I am unable to pull up images and has not yet been read by Radiology  -Smoking cessation encouraged  -She will likely need a steroid taper at discharge  -She does not have a pulmonologist and will need a referral to pulmonary clinic at discharge for PFTs and further management of her lung disease    Hip pain s/p fall prior to admission  -X ray ordered and revealed no acute fracture  -PT/OT consulted    GERD   -Continue pepcid, tums PRN    HLD  -Continue atorvastatin    Leukocytosis  -Recheck with am labs    Tobacco abuse disorder  -Counseled on cessation; she says she will think about it  -Continue nicotine patch       DVT Prophylaxis:  Lovenox    CODE STATUS: Conditional Code    Disposition: I expect the patient to be discharged home in 2-3 days.  She has been extremely slow to improve.    Cori Chaidez MD  11/02/17  1:07 PM

## 2017-11-03 LAB
ANION GAP SERPL CALCULATED.3IONS-SCNC: 4 MMOL/L (ref 3–11)
BUN BLD-MCNC: 30 MG/DL (ref 9–23)
BUN/CREAT SERPL: 37.5 (ref 7–25)
CALCIUM SPEC-SCNC: 9.4 MG/DL (ref 8.7–10.4)
CHLORIDE SERPL-SCNC: 97 MMOL/L (ref 99–109)
CO2 SERPL-SCNC: 33 MMOL/L (ref 20–31)
CREAT BLD-MCNC: 0.8 MG/DL (ref 0.6–1.3)
DEPRECATED RDW RBC AUTO: 41.7 FL (ref 37–54)
ERYTHROCYTE [DISTWIDTH] IN BLOOD BY AUTOMATED COUNT: 12.5 % (ref 11.3–14.5)
GFR SERPL CREATININE-BSD FRML MDRD: 69 ML/MIN/1.73
GLUCOSE BLD-MCNC: 100 MG/DL (ref 70–100)
HCT VFR BLD AUTO: 42.1 % (ref 34.5–44)
HGB BLD-MCNC: 13.7 G/DL (ref 11.5–15.5)
MCH RBC QN AUTO: 29.5 PG (ref 27–31)
MCHC RBC AUTO-ENTMCNC: 32.5 G/DL (ref 32–36)
MCV RBC AUTO: 90.5 FL (ref 80–99)
PLATELET # BLD AUTO: 279 10*3/MM3 (ref 150–450)
PMV BLD AUTO: 10 FL (ref 6–12)
POTASSIUM BLD-SCNC: 5 MMOL/L (ref 3.5–5.5)
RBC # BLD AUTO: 4.65 10*6/MM3 (ref 3.89–5.14)
SODIUM BLD-SCNC: 134 MMOL/L (ref 132–146)
WBC NRBC COR # BLD: 18.75 10*3/MM3 (ref 3.5–10.8)

## 2017-11-03 PROCEDURE — 25010000002 ENOXAPARIN PER 10 MG

## 2017-11-03 PROCEDURE — 85027 COMPLETE CBC AUTOMATED: CPT | Performed by: INTERNAL MEDICINE

## 2017-11-03 PROCEDURE — 99232 SBSQ HOSP IP/OBS MODERATE 35: CPT | Performed by: PHYSICIAN ASSISTANT

## 2017-11-03 PROCEDURE — 25010000002 CEFTRIAXONE PER 250 MG: Performed by: INTERNAL MEDICINE

## 2017-11-03 PROCEDURE — 63710000001 METHYLPREDNISOLONE PER 4 MG: Performed by: INTERNAL MEDICINE

## 2017-11-03 PROCEDURE — 97116 GAIT TRAINING THERAPY: CPT

## 2017-11-03 PROCEDURE — 94640 AIRWAY INHALATION TREATMENT: CPT

## 2017-11-03 PROCEDURE — 94668 MNPJ CHEST WALL SBSQ: CPT

## 2017-11-03 PROCEDURE — 97530 THERAPEUTIC ACTIVITIES: CPT

## 2017-11-03 PROCEDURE — 80048 BASIC METABOLIC PNL TOTAL CA: CPT | Performed by: INTERNAL MEDICINE

## 2017-11-03 PROCEDURE — 94799 UNLISTED PULMONARY SVC/PX: CPT

## 2017-11-03 PROCEDURE — 94760 N-INVAS EAR/PLS OXIMETRY 1: CPT

## 2017-11-03 RX ORDER — LOSARTAN POTASSIUM 25 MG/1
25 TABLET ORAL
Status: DISCONTINUED | OUTPATIENT
Start: 2017-11-03 | End: 2017-11-12 | Stop reason: HOSPADM

## 2017-11-03 RX ORDER — MECLIZINE HCL 12.5 MG/1
12.5 TABLET ORAL 3 TIMES DAILY PRN
Status: DISCONTINUED | OUTPATIENT
Start: 2017-11-03 | End: 2017-11-12 | Stop reason: HOSPADM

## 2017-11-03 RX ADMIN — CEFTRIAXONE SODIUM 1 G: 1 INJECTION, SOLUTION INTRAVENOUS at 08:40

## 2017-11-03 RX ADMIN — NYSTATIN 500000 UNITS: 100000 SUSPENSION ORAL at 20:48

## 2017-11-03 RX ADMIN — LOSARTAN POTASSIUM 25 MG: 25 TABLET, FILM COATED ORAL at 08:40

## 2017-11-03 RX ADMIN — VITAMIN D, TAB 1000IU (100/BT) 1000 UNITS: 25 TAB at 08:35

## 2017-11-03 RX ADMIN — ATORVASTATIN CALCIUM 10 MG: 10 TABLET, FILM COATED ORAL at 20:48

## 2017-11-03 RX ADMIN — SERTRALINE HYDROCHLORIDE 50 MG: 50 TABLET ORAL at 08:35

## 2017-11-03 RX ADMIN — IPRATROPIUM BROMIDE AND ALBUTEROL SULFATE 3 ML: .5; 3 SOLUTION RESPIRATORY (INHALATION) at 03:15

## 2017-11-03 RX ADMIN — BUDESONIDE AND FORMOTEROL FUMARATE DIHYDRATE 2 PUFF: 160; 4.5 AEROSOL RESPIRATORY (INHALATION) at 19:06

## 2017-11-03 RX ADMIN — ENOXAPARIN SODIUM 40 MG: 40 INJECTION SUBCUTANEOUS at 08:34

## 2017-11-03 RX ADMIN — DOXYCYCLINE HYCLATE 100 MG: 100 CAPSULE ORAL at 20:48

## 2017-11-03 RX ADMIN — DEXTROMETHORPHAN 30 MG: 30 SUSPENSION, EXTENDED RELEASE ORAL at 08:35

## 2017-11-03 RX ADMIN — FLUTICASONE PROPIONATE 2 SPRAY: 50 SPRAY, METERED NASAL at 08:34

## 2017-11-03 RX ADMIN — ASPIRIN 81 MG: 81 TABLET, COATED ORAL at 08:35

## 2017-11-03 RX ADMIN — GUAIFENESIN 600 MG: 600 TABLET, EXTENDED RELEASE ORAL at 20:48

## 2017-11-03 RX ADMIN — NYSTATIN 500000 UNITS: 100000 SUSPENSION ORAL at 12:14

## 2017-11-03 RX ADMIN — CLONAZEPAM 0.5 MG: 0.5 TABLET ORAL at 12:20

## 2017-11-03 RX ADMIN — PROPRANOLOL HYDROCHLORIDE 60 MG: 20 TABLET ORAL at 08:35

## 2017-11-03 RX ADMIN — IPRATROPIUM BROMIDE AND ALBUTEROL SULFATE 3 ML: .5; 3 SOLUTION RESPIRATORY (INHALATION) at 16:05

## 2017-11-03 RX ADMIN — METHYLPREDNISOLONE 40 MG: 16 TABLET ORAL at 08:35

## 2017-11-03 RX ADMIN — TRAMADOL HYDROCHLORIDE 50 MG: 50 TABLET, COATED ORAL at 05:07

## 2017-11-03 RX ADMIN — MECLIZINE HCL 12.5 MG: 12.5 TABLET ORAL at 17:17

## 2017-11-03 RX ADMIN — NYSTATIN 500000 UNITS: 100000 SUSPENSION ORAL at 17:29

## 2017-11-03 RX ADMIN — DOXYCYCLINE HYCLATE 100 MG: 100 CAPSULE ORAL at 08:35

## 2017-11-03 RX ADMIN — DEXTROMETHORPHAN 30 MG: 30 SUSPENSION, EXTENDED RELEASE ORAL at 17:18

## 2017-11-03 RX ADMIN — BUDESONIDE AND FORMOTEROL FUMARATE DIHYDRATE 2 PUFF: 160; 4.5 AEROSOL RESPIRATORY (INHALATION) at 07:39

## 2017-11-03 RX ADMIN — IPRATROPIUM BROMIDE AND ALBUTEROL SULFATE 3 ML: .5; 3 SOLUTION RESPIRATORY (INHALATION) at 07:39

## 2017-11-03 RX ADMIN — IPRATROPIUM BROMIDE AND ALBUTEROL SULFATE 3 ML: .5; 3 SOLUTION RESPIRATORY (INHALATION) at 19:06

## 2017-11-03 NOTE — PROGRESS NOTES
[facility] Medicine Services  PROGRESS NOTE    Patient Name: Sadie Tijerina  : 1940  MRN: [id number]    Date of Admission: 10/27/2017  Length of Stay: 6  Primary Care Physician: Kristian Wolff MD    Subjective   Subjective     CC:  F/U dyspnea    HPI:  Pt states still feels poorly, still SOA/but stable, not worse. Making better efforts with PT, wants to go home at [place].     Review of Systems  Gen- No fevers, chills  CV- No palpitations  Resp- Persistent wheezing, productive cough, SOA on exertion  GI- No N/V/D, abd pain      Otherwise ROS is negative except as mentioned in the HPI.    Objective   Objective     Vital Signs:   Temp:  [97.8 °F (36.6 °C)-98.6 °F (37 °C)] 98.2 °F (36.8 °C)  Heart Rate:  [75-82] 75  Resp:  [16-18] 16  BP: (118-160)/(62-94) 160/94        Physical Exam:  Constitutional: resting in bed, appears comfortable, in NAD, awake, alert  HENT: NCAT, mucous membranes moist  Respiratory: Diffuse expiratory wheezing, poor air movement bilaterally, rhonchi cleared with cough  Cardiovascular: RRR, no murmurs, rubs, or gallops  Gastrointestinal: Positive bowel sounds, soft, nontender, nondistended  Musculoskeletal: No bilateral ankle edema  Psychiatric: Appropriate affect, cooperative  Neurologic: Oriented x 3, Cranial Nerves grossly intact to confrontation, speech clear  Skin: warm, dry. No rashes    Results Reviewed:  I have personally reviewed current lab, radiology, and data and agree.      Results from last 7 days  Lab Units 11/03/17  0454 11/02/17  0615 10/31/17  0539   WBC 10*3/mm3 18.75* 15.59* 14.64*   HEMOGLOBIN g/dL 13.7 12.8 11.7   HEMATOCRIT % 42.1 40.1 37.6   PLATELETS 10*3/mm3 279 266 265       Results from last 7 days  Lab Units 11/03/17  0454 11/02/17  0615 10/31/17  0539  10/27/17  2323   SODIUM mmol/L 134 135 135  < > 137   POTASSIUM mmol/L 5.0 4.0 4.5  < > 4.1   CHLORIDE mmol/L 97* 96* 100  < > 101   CO2 mmol/L 33.0* 29.0 34.0*  < > 32.0*   BUN mg/dL  30* 27* 30*  < > 13   CREATININE mg/dL 0.80 0.80 0.80  < > 0.90   GLUCOSE mg/dL 100 86 118*  < > 132*   CALCIUM mg/dL 9.4 8.6* 8.7  < > 9.5   ALT (SGPT) U/L  --   --   --   --  17   AST (SGOT) U/L  --   --   --   --  24   < > = values in this interval not displayed.  No results found for: BNP  No results found for: PHART       Microbiology Results Abnormal     Procedure Component Value - Date/Time    Respiratory Culture - Sputum, Cough [[id number]]  (Abnormal)  (Susceptibility) Collected:  10/28/17 0038    Lab Status:  Final result Specimen:  Sputum from Cough Updated:  11/02/17 1416     Respiratory Culture --      Moderate growth (3+) Haemophilus influenzae Biotype III (A)        Beta Lactamase Positive            Light growth (2+) Enterobacter cloacae (A)      Light growth (2+) Klebsiella pneumoniae (A)      Light growth (2+) Normal Respiratory Ashlee     Gram Stain Result Moderate (3+) WBCs per low power field      Many (4+) Gram negative bacilli, tiny      Occasional Gram positive cocci in clusters    Susceptibility      Haemophilus influenzae Biotype III     DISK DIFFUSION     Ampicillin Resistant     Ampicillin + Sulbactam Susceptible     Ceftriaxone Susceptible     Chloramphenicol Susceptible     Meropenem Susceptible     Rifampin Susceptible     Trimethoprim + Sulfamethoxazole Susceptible                Susceptibility      Enterobacter cloacae     TEA     Ampicillin >16 ug/ml Resistant     Ampicillin + Sulbactam 16/8 ug/ml Intermediate     Aztreonam <=8 ug/ml Susceptible     Cefepime <=8 ug/ml Susceptible     Cefotaxime <=2 ug/ml Susceptible     Ceftriaxone <=8 ug/ml Susceptible     Cefuroxime sodium 16 ug/ml Intermediate     Ertapenem <=1 ug/ml Susceptible     Gentamicin <=4 ug/ml Susceptible     Levofloxacin <=2 ug/ml Susceptible     Meropenem <=1 ug/ml Susceptible     Piperacillin + Tazobactam <=16 ug/ml Susceptible     Tetracycline <=4 ug/ml Susceptible     Tobramycin <=4 ug/ml Susceptible     Trimethoprim  + Sulfamethoxazole <=2/38 ug/ml Susceptible                Susceptibility      Klebsiella pneumoniae     TEA     Ampicillin >16 ug/ml Resistant     Ampicillin + Sulbactam <=8/4 ug/ml Susceptible     Aztreonam <=8 ug/ml Susceptible     Cefepime <=8 ug/ml Susceptible     Cefotaxime <=2 ug/ml Susceptible     Ceftriaxone <=8 ug/ml Susceptible     Cefuroxime sodium <=4 ug/ml Susceptible     Ertapenem <=1 ug/ml Susceptible     Gentamicin <=4 ug/ml Susceptible     Levofloxacin <=2 ug/ml Susceptible     Meropenem <=1 ug/ml Susceptible     Piperacillin + Tazobactam <=16 ug/ml Susceptible     Tetracycline <=4 ug/ml Susceptible     Tobramycin <=4 ug/ml Susceptible     Trimethoprim + Sulfamethoxazole <=2/38 ug/ml Susceptible                          Imaging Results (last 24 hours)     Procedure Component Value Units Date/Time    XR Chest PA & Lateral [[id number]] Collected:  11/03/17 0849     Updated:  11/03/17 0852    Narrative:          EXAMINATION: XR CHEST PA AND LATERAL-      INDICATION: Cough, dyspnea; J44.1-Chronic obstructive pulmonary disease  with (acute) exacerbation; R09.02-Hypoxemia; R06.2-Wheezing;  D72.829-Elevated white blood cell count, unspecified; Z74.09-Other  reduced mobility; Z74.09-Other reduced mobility      COMPARISON: 10/27/2017     FINDINGS: Heart mediastinum and bony vasculature appear within normal  limits. Lungs appear well-expanded and clear except for granulomatous  calcifications, present on the previous exam.           Impression:       Stable negative chest exam.     This report was finalized on 11/3/2017 8:49 AM by DR. Paulino Manley MD.           Results for orders placed during the hospital encounter of 10/27/17   Adult Transthoracic Echo Complete W/ Cont if Necessary Per Protocol    Narrative · Left ventricular systolic function is normal.  · Estimated EF appears to be in the range of 61 - 65%.  · Left ventricular diastolic dysfunction (grade I a) consistent with   impaired relaxation.  ·  Calculated right ventricular systolic pressure from tricuspid   regurgitation is 27 mmHg.          I have reviewed the medications.    Assessment/Plan   Assessment / Plan     Hospital Problem List     * (Principal)COPD exacerbation    Gastroesophageal reflux disease without esophagitis    Hyperlipidemia    CAP (community acquired pneumonia)    Leukocytosis (Chronic)    Tobacco abuse disorder (Chronic)    Acute respiratory failure with hypoxemia             Brief Hospital Course to date:  Sadie Tijerina is a 77 y.o. female with COPD and continued tobacco use presenting with SOA x 1 week and diffuse wheezing.    Assessment & Plan:    COPD with exacerbation   Acute hypoxic respiratory failure  -Wheezing, bibasilar faint crackles  -Continue PO steroids, scheduled duonebs, PRN albuterol nebs, flutter valve, incentive spirometry  -PO doxycycline day 7/7, started ceftriaxone (3rd generation cephalosporin) 11/3 d/t susceptibilities for beta-lactamase positive H. Influenzae.   -Sputum culture pending with enterobacter and klebsiella both sensitive to tetracycline,  beta-lactamase positive H. Influenzae- all 3 microbes sensitive to rocephin. Near dc could consider omnicef.   -Repeat CXR done shows no infiltrates   -Smoking cessation encouraged  -She will likely need a steroid taper at discharge  -She does not have a pulmonologist and will need a referral to pulmonary clinic at discharge for PFTs and further management of her lung disease  -getting chest percussion with RT/encouraged frequent use of flutter valve    Hip pain s/p fall prior to admission  -X ray ordered and revealed no acute fracture  -PT/OT consulted    GERD   -Continue pepcid, tums PRN    HLD  -Continue atorvastatin    Leukocytosis  -however still on steroids, afebrile    Tobacco abuse disorder  -Counseled on cessation; she says she will think about it  -Continue nicotine patch     Thrush  -start nystatin oral    HTN  -resume home ARB but at low dose for  now, watch K+    DVT Prophylaxis:  Lovenox    CODE STATUS: Conditional Code    Disposition: I expect the patient to be discharged home in 2-3 days.  She has been extremely slow to improve. Refusing rehab. Will need HH.    Casie M Mayne, PA-C  11/03/17  11:44 AM

## 2017-11-03 NOTE — THERAPY TREATMENT NOTE
Acute Care - Occupational Therapy Treatment Note  [facility]     Patient Name: Sadie Tijerina  : 1940  MRN: [id number]  Today's Date: 11/3/2017  Onset of Illness/Injury or Date of Surgery Date: 10/27/17  Date of Referral to OT: 10/31/17  Referring Physician: VIRGIE Craft MD      Admit Date: 10/27/2017    Visit Dx:     ICD-10-CM ICD-9-CM   1. COPD with exacerbation J44.1 491.21   2. Hypoxia R09.02 799.02   3. Wheezing R06.2 786.07   4. Leukocytosis, unspecified type D72.829 288.60   5. Impaired functional mobility, balance, gait, and endurance Z74.09 V49.89   6. Impaired mobility and ADLs Z74.09 799.89     Patient Active Problem List   Diagnosis   • Benign paroxysmal positional vertigo   • Calf cramp   • Mixed anxiety depressive disorder   • Dizziness   • Fatigue   • Gastroesophageal reflux disease without esophagitis   • Hematuria   • Hyperlipidemia   • Hypertension   • Low back pain   • Orthostatic hypotension   • CAP (community acquired pneumonia)   • Pulmonary emphysema   • Restless legs syndrome   • Pre-syncope   • Essential tremor   • Vitamin D deficiency   • Balance problem   • Tension headache   • COPD exacerbation   • Leukocytosis   • COPD with exacerbation   • Tobacco abuse disorder   • Acute respiratory failure with hypoxemia             Adult Rehabilitation Note       17 1052 17 1049       Rehab Assessment/Intervention    Discipline occupational therapist  -CL physical therapist  -EH     Document Type therapy note (daily note)  -CL therapy note (daily note)  -EH     Subjective Information agree to therapy;complains of;weakness;fatigue  -CL      Patient Effort, Rehab Treatment good  -CL      Symptoms Noted During/After Treatment fatigue  -CL fatigue;increased pain;shortness of breath  -EH     Precautions/Limitations fall precautions;oxygen therapy device and L/min  -CL fall precautions;oxygen therapy device and L/min  -EH     Recorded by [CL] Ashley Cosby OT [EH] Jasmin Swartz,  PT     Vital Signs    Pre SpO2 (%) 93  -CL      O2 Delivery Pre Treatment supplemental O2  -CL      Intra SpO2 (%) 89  -CL      O2 Delivery Intra Treatment supplemental O2  -CL      Post SpO2 (%) 91  -CL      O2 Delivery Post Treatment supplemental O2  -CL      Pre Patient Position Supine  -CL      Intra Patient Position Standing  -CL      Post Patient Position Supine  -CL      Recorded by [CL] Ashley Cosby OT      Pain Assessment    Pain Assessment Gomez-Hudson FACES  -CL Gomez-Baker FACES  -EH     Gomez-Baker FACES Pain Rating 2  -CL      Pain Score 2  -CL 2  -EH     Post Pain Score 2  -CL 3  -EH     Pain Type Acute pain  -CL Acute pain  -EH     Pain Location Abdomen  -CL Abdomen  -EH     Pain Descriptors  Aching   after coughing  -EH     Pain Intervention(s) Repositioned;Ambulation/increased activity  -CL Repositioned;Ambulation/increased activity  -EH     Response to Interventions Tolerated.   -CL tolerated  -EH     Recorded by [CL] Ashley Cosby OT [EH] Jasmin Swartz, PT     Cognitive Assessment/Intervention    Current Cognitive/Communication Assessment functional  -CL functional  -EH     Orientation Status oriented x 4  -CL oriented x 4  -EH     Follows Commands/Answers Questions 100% of the time;needs cueing;needs repetition  -% of the time;able to follow single-step instructions  -EH     Personal Safety mild impairment;decreased awareness, need for assist;decreased awareness, need for safety  -CL mild impairment  -EH     Personal Safety Interventions fall prevention program maintained;gait belt;nonskid shoes/slippers when out of bed  -CL fall prevention program maintained;gait belt;nonskid shoes/slippers when out of bed  -EH     Recorded by [CL] Ashley Cosby OT [EH] Jasmin Swartz, PT     Bed Mobility, Assessment/Treatment    Bed Mobility, Assistive Device bed rails;head of bed elevated  -CL      Bed Mob, Supine to Sit, [place] supervision required  -CL supervision required  -EH     Bed Mob,  Sit to Supine, [place] supervision required  -CL supervision required  -EH     Recorded by [CL] Ashley Cosby OT [EH] Jasmin Swartz PT     Transfer Assessment/Treatment    Transfers, Sit-Stand [place] minimum assist (75% patient effort);verbal cues required  -CL minimum assist (75% patient effort)  -EH     Transfers, Stand-Sit [place] minimum assist (75% patient effort);verbal cues required  -CL minimum assist (75% patient effort)  -EH     Transfers, Sit-Stand-Sit, Assist Device rolling walker  -CL rolling walker  -EH     Transfer, Comment VCs for HP/sequencing.   -CL Upon sit> stand 1st attempt pt had slight LOB. during stand>sit t/f pt required cueing for continued use of walker, positioning and HP for safety.  -EH     Recorded by [CL] Ashley Cosby OT [EH] Jasmni Swartz, PT     Gait Assessment/Treatment    Gait, [place] Level  minimum assist (75% patient effort)  -     Gait, Assistive Device  rolling walker  -     Gait, Distance (Feet)  56  -     Gait, Gait Pattern Analysis  swing-to gait  -     Gait, Gait Deviations  antalgic;forward flexed posture;alia decreased;decreased heel strike;step length decreased  -     Gait, Safety Issues  balance decreased during turns;step length decreased;supplemental O2  -     Gait, Impairments  strength decreased;impaired balance;pain  -     Gait, Comment  cueing for PLB, avoiding veering to L in hallway.  -EH     Recorded by  [EH] Jasmin Swartz, PT     Functional Mobility    Functional Mobility- Ind. Level minimum assist (75% patient effort);1 person + 1 person to manage equipment;verbal cues required  -CL      Functional Mobility- Device rolling walker  -CL      Functional Mobility-Distance (Feet) 56  -CL      Functional Mobility- Comment Pt w/ frequent episodes of slight LOB w/ Min A to recover, followed closely w/ chair. Pt required 1 seated rest break for ~3 mins.   -CL      Recorded by [CL] Ashley Cosby OT      Motor  Skills/Interventions    Additional Documentation  Balance Skills Training (Group)  -     Recorded by  [EH] Jasmin Swartz PT     Balance Skills Training    Sitting-Level of Assistance Close supervision  -CL Close supervision  -     Sitting-Balance Support Right upper extremity supported;Left upper extremity supported;Feet supported  -CL      Sitting-Balance Activities Forward lean;Reaching for objects;Trunk control activities  -CL      Standing-Level of Assistance Contact guard  -CL Contact guard  -EH     Static Standing Balance Support assistive device  -CL      Standing-Balance Activities Weight Shift A-P;Weight Shift R-L  -CL      Gait Balance-Level of Assistance Minimum assistance  -CL      Gait Balance Support assistive device  -CL      Gait Balance Activities side-stepping;scanning environment R/L  -CL      Recorded by [CL] Ashley Cosby OT [EH] Jasmin Swartz PT     Therapy Exercises    Bilateral Lower Extremities  AROM:;10 reps;ankle pumps/circles;quad sets  -     Bilateral Upper Extremity --   Pt encouraged to perform HEP, decline d/t fatigue  -CL      Recorded by [CL] Ashley Cosby OT [EH] Jasmin Swartz PT     Positioning and Restraints    Pre-Treatment Position in bed  -CL in bed  -EH     Post Treatment Position bed  -CL bed  -EH     In Bed notified nsg;fowlers;call light within reach;encouraged to call for assist;exit alarm on  -CL notified nsg;fowlers;call light within reach;encouraged to call for assist;exit alarm on  -EH     Recorded by [CL] Ashley Cosby OT [EH] Jasmin Swartz PT       User Key  (r) = Recorded By, (t) = Taken By, (c) = Cosigned By    Initials Name Effective Dates     Jasmin Swartz, PT 06/19/15 -     CL Ashley Cosby OT 06/08/16 -                 OT Goals       11/03/17 1407 11/01/17 1133       Bed Mobility OT LTG    Bed Mobility OT LTG, Date Established  11/01/17  -     Bed Mobility OT LTG, Time to Achieve  2 wks  -     Bed Mobility OT LTG, Activity  Type  all bed mobility  -     Bed Mobility OT LTG, Butts Level  conditional independence  -MC     Bed Mobility OT LTG, Outcome goal ongoing  -CL goal ongoing  -     Transfer Training OT LTG    Transfer Training OT LTG, Date Established  11/01/17  -     Transfer Training OT LTG, Time to Achieve  2 wks  -MC     Transfer Training OT LTG, Activity Type  sit to stand/stand to sit;toilet  -     Transfer Training OT LTG, Butts Level  conditional independence  -MC     Transfer Training OT LTG, Assist Device  walker, rolling;commode, bedside  -     Transfer Training OT LTG, Outcome goal ongoing  -CL goal ongoing  -MC     Patient Education OT LTG    Patient Education OT LTG, Date Established  11/01/17  -     Patient Education OT LTG, Time to Achieve  2 wks  -     Patient Education OT LTG, Education Type  HEP;work simplification;energy conservation;adaptive breathing  -     Patient Education OT LTG, Education Understanding  verbalizes understanding;demonstrates adequately  -     Patient Education OT LTG Outcome goal ongoing  -CL goal ongoing  -     Activity Tolerance OT LTG    Activity Tolerance Goal OT LTG, Date Established  11/01/17  -     Activity Tolerance Goal OT LTG, Time to Achieve  2 wks  -MC     Activity Tolerance Goal OT LTG, Activity Level  10 min activity  -     Activity Tolerance Goal OT LTG, Additional Goal  Pt will complete 10 minutes of ADLs/functional task with one sitting rest break to improve occupational endurance.   -     Activity Tolerance Goal OT LTG, Outcome goal ongoing  -CL goal ongoing  -MC       User Key  (r) = Recorded By, (t) = Taken By, (c) = Cosigned By    Initials Name Provider Type    ALLISON Galvez, OT Occupational Therapist    KRISTY Cosby, OT Occupational Therapist          Occupational Therapy Education     Title: PT OT SLP Therapies (Active)     Topic: Occupational Therapy (Active)     Point: ADL training (Active)    Description: Instruct  learner(s) on proper safety adaptation and remediation techniques during self care or transfers.   Instruct in proper use of assistive devices.    Learning Progress Summary    Learner Readiness Method Response Comment Documented by Status   Patient Acceptance E,D NR Pt educated on appropriate safety precautions, t/f techniques, HEP, and benefits of therapy.  11/03/17 1407 Active    Acceptance ED ARIANNANR   11/01/17 1132 Done               Point: Home exercise program (Active)    Description: Instruct learner(s) on appropriate technique for monitoring, assisting and/or progressing therapeutic exercises/activities.    Learning Progress Summary    Learner Readiness Method Response Comment Documented by Status   Patient Acceptance E,D NR Pt educated on appropriate safety precautions, t/f techniques, HEP, and benefits of therapy.  11/03/17 1407 Active               Point: Precautions (Active)    Description: Instruct learner(s) on prescribed precautions during self-care and functional transfers.    Learning Progress Summary    Learner Readiness Method Response Comment Documented by Status   Patient Acceptance E,D NR Pt educated on appropriate safety precautions, t/f techniques, HEP, and benefits of therapy.  11/03/17 1407 Active               Point: Body mechanics (Active)    Description: Instruct learner(s) on proper positioning and spine alignment during self-care, functional mobility activities and/or exercises.    Learning Progress Summary    Learner Readiness Method Response Comment Documented by Status   Patient Acceptance E,D NR Pt educated on appropriate safety precautions, t/f techniques, HEP, and benefits of therapy.  11/03/17 1407 Active    Acceptance EKIMBERLEY NR   11/01/17 1132 Done                      User Key     Initials Effective Dates Name Provider Type Discipline     03/14/16 -  April Galvez, OT Occupational Therapist OT     06/08/16 -  Ashley Cosby, OT Occupational Therapist OT                   OT Recommendation and Plan  Anticipated Equipment Needs At Discharge:  (TBD)  Anticipated Discharge Disposition: home with assist  Therapy Frequency: daily  Plan of Care Review  Plan Of Care Reviewed With: patient  Progress: progress towards functional goals is fair  Outcome Summary/Follow up Plan: Pt demo good effort, ambulated 56 ft w/ Min Ax1 and 1 seated rest break. Pt w/ frequent episodes of minor LOB. Pt conts to quickly fatigue, encouraged to perform HEP outside of tx session. Recommend cont skilled IPOT POC.         Outcome Measures       11/03/17 1052 11/03/17 1049 11/01/17 0954    How much help from another person do you currently need...    Turning from your back to your side while in flat bed without using bedrails?  3  -EH 3  -DM    Moving from lying on back to sitting on the side of a flat bed without bedrails?  3  -EH 3  -DM    Moving to and from a bed to a chair (including a wheelchair)?  3  -EH 3  -DM    Standing up from a chair using your arms (e.g., wheelchair, bedside chair)?  3  -EH 3  -DM    Climbing 3-5 steps with a railing?  1  -EH 1  -DM    To walk in hospital room?  3  -EH 3  -DM    AM-PAC 6 Clicks Score  16  -EH 16  -DM    How much help from another is currently needed...    Putting on and taking off regular lower body clothing? 2  -CL      Bathing (including washing, rinsing, and drying) 2  -CL      Toileting (which includes using toilet bed pan or urinal) 3  -CL      Putting on and taking off regular upper body clothing 3  -CL      Taking care of personal grooming (such as brushing teeth) 3  -CL      Eating meals 4  -CL      Score 17  -CL      Functional Assessment    Outcome Measure Options AM-PAC 6 Clicks Daily Activity (OT)  -CL AM-PAC 6 Clicks Basic Mobility (PT)  -EH AM-PAC 6 Clicks Basic Mobility (PT)  -DM      11/01/17 0953          How much help from another is currently needed...    Putting on and taking off regular lower body clothing? 3  -MC      Bathing (including  washing, rinsing, and drying) 3  -MC      Toileting (which includes using toilet bed pan or urinal) 3  -MC      Putting on and taking off regular upper body clothing 3  -MC      Taking care of personal grooming (such as brushing teeth) 3  -MC      Eating meals 3  -MC      Score 18  -MC      Functional Assessment    Outcome Measure Options AM-PAC 6 Clicks Daily Activity (OT)  -        User Key  (r) = Recorded By, (t) = Taken By, (c) = Cosigned By    Initials Name Provider Type     Jasmin Swartz, PT Physical Therapist    GILSON Fraire, PT Physical Therapist    MC April Galvez, OT Occupational Therapist    CL Ashley Cosby OT Occupational Therapist           Time Calculation:         Time Calculation- OT       11/03/17 1409          Time Calculation- OT    OT Start Time 1052  -CL      Total Timed Code Minutes- OT 11 minute(s)  -CL      OT Received On 11/03/17  -CL      OT Goal Re-Cert Due Date 11/11/17  -CL        User Key  (r) = Recorded By, (t) = Taken By, (c) = Cosigned By    Initials Name Provider Type    CL Ashley Cosby OT Occupational Therapist           Therapy Charges for Today     Code Description Service Date Service Provider Modifiers Qty    [id number]  OT THERAPEUTIC ACT EA 15 MIN 11/3/2017 Ashley Cosby OT GO 1               Ashley Cosby OT  11/3/2017

## 2017-11-03 NOTE — PLAN OF CARE
Problem: Respiratory Insufficiency (Adult)  Goal: Acid/Base Balance  Outcome: Ongoing (interventions implemented as appropriate)    Problem: Fall Risk (Adult)  Goal: Absence of Falls  Outcome: Ongoing (interventions implemented as appropriate)

## 2017-11-03 NOTE — PROGRESS NOTES
Continued Stay Note   [facility]     Patient Name: Sadie Tijerina  MRN: [id number]  Today's Date: 11/3/2017    Admit Date: 10/27/2017          Discharge Plan       11/03/17 1309    Case Management/Social Work Plan    Plan update    Patient/Family In Agreement With Plan yes    Additional Comments Spoke with patient at bedside regarding discharge plan.   Patient denies discharge needs at this time.  Patient plan to discharge home via car with family to transport when medically ready.              Discharge Codes     None        Expected Discharge Date and Time     Expected Discharge Date Expected Discharge Time    Nov 3, 2017             Indy Baird RN

## 2017-11-03 NOTE — PROGRESS NOTES
"Adult Nutrition  Assessment/PES    Patient Name:  Sadie Tijerina  YOB: 1940  MRN: [id number]  Admit Date:  10/27/2017    Assessment Date:  11/3/2017    Comments:            Reason for Assessment       11/03/17 1618    Reason for Assessment    Reason For Assessment/Visit length of stay    Time Spent (min) 20    Diagnosis --   complete list per MD notes this adm    Gastrointestinal GERD/Reflux    Pulmonary/Critical Care COPD    Renal ARF    Substance Use Tobacco                Anthropometrics       11/03/17 1619    Anthropometrics    Height 157.5 cm (62\")    Weight 57.2 kg (126 lb)    Ideal Body Weight (IBW)    Ideal Body Weight (IBW), Female 50.83    % Ideal Body Weight 112.67    Body Mass Index (BMI)    BMI (kg/m2) 23.09                  Nutrition Prescription Ordered       11/03/17 1619    Nutrition Prescription PO    Current PO Diet Regular            Evaluation of Received Nutrient/Fluid Intake       11/03/17 1619    PO Evaluation    Number of Meals 5    % PO Intake 35            Problem/Interventions:        Problem 1       11/03/17 1620    Nutrition Diagnoses Problem 1    Problem 1 Inadequate Intake/Infusion    Etiology (related to) MNT for Treatment/Condition    Signs/Symptoms (evidenced by) PO Intake    Percent (%) intake recorded 35 %    Over number of meals 5                    Intervention Goal       11/03/17 1620    Intervention Goal    PO Increase intake            Nutrition Intervention       11/03/17 1620    Nutrition Intervention    RD/Tech Action Follow Tx progress;Supplement provided   attempted to get pt food prefs and discuss po intake x2, but pt unavailable.            Nutrition Prescription       11/03/17 1621    Nutrition Prescription PO    PO Prescription Begin/change supplement    Supplement Boost Glucose Control    Supplement Frequency 2 times a day    New PO Prescription Ordered? Yes            Education/Evaluation       11/03/17 1620    Monitor/Evaluation    Monitor Per " protocol        Electronically signed by:  Flavia Rich MS,RD,LD  11/03/17 4:21 PM

## 2017-11-03 NOTE — THERAPY TREATMENT NOTE
Acute Care - Physical Therapy Treatment Note  [facility]     Patient Name: Sadie Tijerina  : 1940  MRN: [id number]  Today's Date: 11/3/2017  Onset of Illness/Injury or Date of Surgery Date: 10/27/17  Date of Referral to PT: 10/31/17  Referring Physician: VIRGIE Craft MD    Admit Date: 10/27/2017    Visit Dx:    ICD-10-CM ICD-9-CM   1. COPD with exacerbation J44.1 491.21   2. Hypoxia R09.02 799.02   3. Wheezing R06.2 786.07   4. Leukocytosis, unspecified type D72.829 288.60   5. Impaired functional mobility, balance, gait, and endurance Z74.09 V49.89   6. Impaired mobility and ADLs Z74.09 799.89     Patient Active Problem List   Diagnosis   • Benign paroxysmal positional vertigo   • Calf cramp   • Mixed anxiety depressive disorder   • Dizziness   • Fatigue   • Gastroesophageal reflux disease without esophagitis   • Hematuria   • Hyperlipidemia   • Hypertension   • Low back pain   • Orthostatic hypotension   • CAP (community acquired pneumonia)   • Pulmonary emphysema   • Restless legs syndrome   • Pre-syncope   • Essential tremor   • Vitamin D deficiency   • Balance problem   • Tension headache   • COPD exacerbation   • Leukocytosis   • COPD with exacerbation   • Tobacco abuse disorder   • Acute respiratory failure with hypoxemia               Adult Rehabilitation Note       17 1049          Rehab Assessment/Intervention    Discipline physical therapist  -      Document Type therapy note (daily note)  -      Symptoms Noted During/After Treatment fatigue;increased pain;shortness of breath  -      Precautions/Limitations fall precautions;oxygen therapy device and L/min  -EH      Recorded by [] Jasmin Swartz, PT      Pain Assessment    Pain Assessment [place] FACES  -      Pain Score 2  -EH      Post Pain Score 3  -      Pain Type Acute pain  -      Pain Location Abdomen  -      Pain Descriptors Aching   after coughing  -      Pain Intervention(s)  Repositioned;Ambulation/increased activity  -      Response to Interventions tolerated  -      Recorded by [] Jasmin Swartz, PT      Cognitive Assessment/Intervention    Current Cognitive/Communication Assessment functional  -      Orientation Status oriented x 4  -      Follows Commands/Answers Questions 100% of the time;able to follow single-step instructions  -      Personal Safety mild impairment  -      Personal Safety Interventions fall prevention program maintained;gait belt;nonskid shoes/slippers when out of bed  -EH      Recorded by [] Jasmin Swartz, PT      Bed Mobility, Assessment/Treatment    Bed Mob, Supine to Sit, [place] supervision required  -      Bed Mob, Sit to Supine, [place] supervision required  -EH      Recorded by [] Jasmin Swartz, PT      Transfer Assessment/Treatment    Transfers, Sit-Stand [place] minimum assist (75% patient effort)  -      Transfers, Stand-Sit [place] minimum assist (75% patient effort)  -      Transfers, Sit-Stand-Sit, Assist Device rolling walker  -      Transfer, Comment Upon sit> stand 1st attempt pt had slight LOB. during stand>sit t/f pt required cueing for continued use of walker, positioning and HP for safety.  -      Recorded by [] Jasmin Swartz, PT      Gait Assessment/Treatment    Gait, [place] Level minimum assist (75% patient effort)  -      Gait, Assistive Device rolling walker  -      Gait, Distance (Feet) 56  -      Gait, Gait Pattern Analysis swing-to gait  -      Gait, Gait Deviations antalgic;forward flexed posture;alia decreased;decreased heel strike;step length decreased  -      Gait, Safety Issues balance decreased during turns;step length decreased;supplemental O2  -      Gait, Impairments strength decreased;impaired balance;pain  -      Gait, Comment cueing for PLB, avoiding veering to L in hallway.  -      Recorded by [] Jasmin Swartz, PT       Motor Skills/Interventions    Additional Documentation Balance Skills Training (Group)  -      Recorded by [] Jasmin Swartz, PT      Balance Skills Training    Sitting-Level of Assistance Close supervision  -      Standing-Level of Assistance Contact guard  -EH      Recorded by [] Jasmin Swartz, PT      Therapy Exercises    Bilateral Lower Extremities AROM:;10 reps;ankle pumps/circles;quad sets  -EH      Recorded by [] Jasmin Swartz, PT      Positioning and Restraints    Pre-Treatment Position in bed  -      Post Treatment Position bed  -EH      In Bed notified nsg;fowlers;call light within reach;encouraged to call for assist;exit alarm on  -EH      Recorded by [] Jasmin Swartz, PT        User Key  (r) = Recorded By, (t) = Taken By, (c) = Cosigned By    Initials Name Effective Dates     Jasmin Swartz, PT 06/19/15 -                 IP PT Goals       11/03/17 1121 11/01/17 1119       Bed Mobility PT LTG    Bed Mobility PT LTG, Date Established  11/01/17  -DM     Bed Mobility PT LTG, Time to Achieve  1 wk  -DM     Bed Mobility PT LTG, Activity Type  all bed mobility  -DM     Bed Mobility PT LTG, [place] Level  independent  -DM     Bed Mobility PT LTG, Outcome goal ongoing  -      Transfer Training PT LTG    Transfer Training PT LTG, Date Established  11/01/17  -DM     Transfer Training PT LTG, Time to Achieve  1 wk  -DM     Transfer Training PT LTG, Activity Type  bed to chair /chair to bed;sit to stand/stand to sit  -DM     Transfer Training PT LTG, [place] Level  supervision required  -DM     Transfer Training PT LTG, Assist Device  walker, rolling  -DM     Transfer Training PT LTG, Outcome goal ongoing  -      Gait Training PT LTG    Gait Training Goal PT LTG, Date Established  11/01/17  -DM     Gait Training Goal PT LTG, Time to Achieve  1 wk  -DM     Gait Training Goal PT LTG, [place] Level  contact guard assist   stable VS,WBAT LLE; NO LOB  -DM     Gait  Training Goal PT LTG, Assist Device  walker, rolling  -DM     Gait Training Goal PT LTG, Distance to Achieve  150  -DM     Gait Training Goal PT LTG, Outcome goal ongoing  -      Patient Education PT LTG    Patient Education PT LTG, Date Established  11/01/17  -DM     Patient Education PT LTG, Time to Achieve  1 wk  -DM     Patient Education PT LTG, Education Type  written program;HEP;posture/body mechanics;gait;transfers;bed mobility;benefits of activity  -DM     Patient Education PT LTG, Education Understanding  demonstrate adequately;verbalize understanding  -DM     Patient Education PT LTG Outcome goal ongoing  -        User Key  (r) = Recorded By, (t) = Taken By, (c) = Cosigned By    Initials Name Provider Type     Jasmin Swartz, PT Physical Therapist    GILSON Fraire, PT Physical Therapist          Physical Therapy Education     Title: PT OT SLP Therapies (Active)     Topic: Physical Therapy (Done)     Point: Mobility training (Done)    Learning Progress Summary    Learner Readiness Method Response Comment Documented by Status   Patient Acceptance E VU,[facility] 11/03/17 1120 Done    KIMBERLEY Meehan NR   11/01/17 1119 Active               Point: Home exercise program (Done)    Learning Progress Summary    Learner Readiness Method Response Comment Documented by Status   Patient Acceptance E VU,[facility] 11/03/17 1120 Done    KIMBERLEY Meehan NR   11/01/17 1119 Active               Point: Body mechanics (Done)    Learning Progress Summary    Learner Readiness Method Response Comment Documented by Status   Patient Acceptance E ,[facility] 11/03/17 1120 Done    KIMBERLEY Meehan NR   11/01/17 1119 Active               Point: Precautions (Done)    Learning Progress Summary    Learner Readiness Method Response Comment Documented by Status   Patient Acceptance E VU,[facility] 11/03/17 1120 Done    KIMBERLEY Meehan NR   11/01/17 1119 Active                      User Key     Initials Effective Dates Name Provider Type [facility]  06/19/15 -  Jasmin Swartz, PT Physical Therapist PT    DM 06/19/15 -  Nicole Fraire, PT Physical Therapist PT                    PT Recommendation and Plan  Anticipated Discharge Disposition: home with home health (pt told CM she plans to return home(dtr. to helpPRN))  PT Frequency: daily  Plan of Care Review  Plan Of Care Reviewed With: patient  Progress: progress towards functional goals is fair  Outcome Summary/Follow up Plan: Pt ambulates 56 ft in jade with 1 sitting rest break. Pt required cueing for PLB and cueing for  safety during t/f and ambulation.           Outcome Measures       11/03/17 1049 11/01/17 0954 11/01/17 0953    How much help from another person do you currently need...    Turning from your back to your side while in flat bed without using bedrails? 3  -EH 3  -DM     Moving from lying on back to sitting on the side of a flat bed without bedrails? 3  -EH 3  -DM     Moving to and from a bed to a chair (including a wheelchair)? 3  -EH 3  -DM     Standing up from a chair using your arms (e.g., wheelchair, bedside chair)? 3  -EH 3  -DM     Climbing 3-5 steps with a railing? 1  -EH 1  -DM     To walk in hospital room? 3  -EH 3  -DM     AM-PAC 6 Clicks Score 16  -EH 16  -DM     How much help from another is currently needed...    Putting on and taking off regular lower body clothing?   3  -MC    Bathing (including washing, rinsing, and drying)   3  -MC    Toileting (which includes using toilet bed pan or urinal)   3  -MC    Putting on and taking off regular upper body clothing   3  -MC    Taking care of personal grooming (such as brushing teeth)   3  -MC    Eating meals   3  -MC    Score   18  -MC    Functional Assessment    Outcome Measure Options AM-PAC 6 Clicks Basic Mobility (PT)  -EH AM-PAC 6 Clicks Basic Mobility (PT)  -DM AM-PAC 6 Clicks Daily Activity (OT)  -MC      User Key  (r) = Recorded By, (t) = Taken By, (c) = Cosigned By    Initials Name Provider Type     Jasmin Swartz,  PT Physical Therapist    GILSON Fraire, PT Physical Therapist    ALLISON Galvez, OT Occupational Therapist           Time Calculation:         PT Charges       11/03/17 1123          Time Calculation    Start Time 1049  -      PT Received On 11/03/17  -      PT Goal Re-Cert Due Date 11/11/17  -      Time Calculation- PT    Total Timed Code Minutes- PT 15 minute(s)  -        User Key  (r) = Recorded By, (t) = Taken By, (c) = Cosigned By    Initials Name Provider Type     Jasmin Swartz, PT Physical Therapist          Therapy Charges for Today     Code Description Service Date Service Provider Modifiers Qty    [id number] HC GAIT TRAINING EA 15 MIN 11/3/2017 Jasmin Swartz, PT GP 1          PT G-Codes  Outcome Measure Options: AM-PAC 6 Clicks Basic Mobility (PT)    Jasmin Swartz, PT  11/3/2017

## 2017-11-03 NOTE — PLAN OF CARE
Problem: Patient Care Overview (Adult)  Goal: Plan of Care Review  Outcome: Ongoing (interventions implemented as appropriate)    11/03/17 1407   Coping/Psychosocial Response Interventions   Plan Of Care Reviewed With patient   Patient Care Overview   Progress progress towards functional goals is fair   Outcome Evaluation   Outcome Summary/Follow up Plan Pt demo good effort, ambulated 56 ft w/ Min Ax1 and 1 seated rest break. Pt w/ frequent episodes of minor LOB. Pt conts to quickly fatigue, encouraged to perform HEP outside of tx session. Recommend cont skilled IPOT POC.          Problem: Inpatient Occupational Therapy  Goal: Bed Mobility Goal LTG- OT  Outcome: Ongoing (interventions implemented as appropriate)    11/01/17 1133 11/03/17 1407   Bed Mobility OT LTG   Bed Mobility OT LTG, Date Established 11/01/17 --    Bed Mobility OT LTG, Time to Achieve 2 wks --    Bed Mobility OT LTG, Activity Type all bed mobility --    Bed Mobility OT LTG, [place] Level conditional independence --    Bed Mobility OT LTG, Outcome --  goal ongoing       Goal: Transfer Training Goal 1 LTG- OT  Outcome: Ongoing (interventions implemented as appropriate)    11/01/17 1133 11/03/17 1407   Transfer Training OT LTG   Transfer Training OT LTG, Date Established 11/01/17 --    Transfer Training OT LTG, Time to Achieve 2 wks --    Transfer Training OT LTG, Activity Type sit to stand/stand to sit;toilet --    Transfer Training OT LTG, [place] Level conditional independence --    Transfer Training OT LTG, Assist Device walker, rolling;commode, bedside --    Transfer Training OT LTG, Outcome --  goal ongoing       Goal: Patient Education Goal LTG- OT  Outcome: Ongoing (interventions implemented as appropriate)    11/01/17 1133 11/03/17 1407   Patient Education OT LTG   Patient Education OT LTG, Date Established 11/01/17 --    Patient Education OT LTG, Time to Achieve 2 wks --    Patient Education OT LTG, Education Type HEP;work  simplification;energy conservation;adaptive breathing --    Patient Education OT LTG, Education Understanding verbalizes understanding;demonstrates adequately --    Patient Education OT LTG Outcome --  goal ongoing       Goal: Activity Tolerance Goal LTG- OT  Outcome: Ongoing (interventions implemented as appropriate)    11/01/17 1133 11/03/17 1407   Activity Tolerance OT LTG   Activity Tolerance Goal OT LTG, Date Established 11/01/17 --    Activity Tolerance Goal OT LTG, Time to Achieve 2 wks --    Activity Tolerance Goal OT LTG, Activity Level 10 min activity --    Activity Tolerance Goal OT LTG, Additional Goal Pt will complete 10 minutes of ADLs/functional task with one sitting rest break to improve occupational endurance.  --    Activity Tolerance Goal OT LTG, Outcome --  goal ongoing

## 2017-11-03 NOTE — PLAN OF CARE
Problem: Patient Care Overview (Adult)  Goal: Plan of Care Review  Outcome: Ongoing (interventions implemented as appropriate)    11/03/17 1121   Coping/Psychosocial Response Interventions   Plan Of Care Reviewed With patient   Patient Care Overview   Progress progress towards functional goals is fair   Outcome Evaluation   Outcome Summary/Follow up Plan Pt ambulates 56 ft in jade with 1 sitting rest break. Pt required cueing for PLB and cueing for safety during t/f and ambulation.          Problem: Inpatient Physical Therapy  Goal: Bed Mobility Goal LTG- PT  Outcome: Ongoing (interventions implemented as appropriate)    11/01/17 1119 11/03/17 1121   Bed Mobility PT LTG   Bed Mobility PT LTG, Date Established 11/01/17 --    Bed Mobility PT LTG, Time to Achieve 1 wk --    Bed Mobility PT LTG, Activity Type all bed mobility --    Bed Mobility PT LTG, [place] Level independent --    Bed Mobility PT LTG, Outcome --  goal ongoing       Goal: Transfer Training Goal 1 LTG- PT  Outcome: Ongoing (interventions implemented as appropriate)    11/01/17 1119 11/03/17 1121   Transfer Training PT LTG   Transfer Training PT LTG, Date Established 11/01/17 --    Transfer Training PT LTG, Time to Achieve 1 wk --    Transfer Training PT LTG, Activity Type bed to chair /chair to bed;sit to stand/stand to sit --    Transfer Training PT LTG, [place] Level supervision required --    Transfer Training PT LTG, Assist Device walker, rolling --    Transfer Training PT LTG, Outcome --  goal ongoing       Goal: Gait Training Goal LTG- PT  Outcome: Ongoing (interventions implemented as appropriate)    11/01/17 1119 11/03/17 1121   Gait Training PT LTG   Gait Training Goal PT LTG, Date Established 11/01/17 --    Gait Training Goal PT LTG, Time to Achieve 1 wk --    Gait Training Goal PT LTG, [place] Level contact guard assist  (stable VS,WBAT LLE; NO LOB) --    Gait Training Goal PT LTG, Assist Device walker, rolling --    Gait  Training Goal PT LTG, Distance to Achieve 150 --    Gait Training Goal PT LTG, Outcome --  goal ongoing       Goal: Patient Education Goal LTG- PT  Outcome: Ongoing (interventions implemented as appropriate)    11/01/17 1119 11/03/17 1121   Patient Education PT LTG   Patient Education PT LTG, Date Established 11/01/17 --    Patient Education PT LTG, Time to Achieve 1 wk --    Patient Education PT LTG, Education Type written program;HEP;posture/body mechanics;gait;transfers;bed mobility;benefits of activity --    Patient Education PT LTG, Education Understanding demonstrate adequately;verbalize understanding --    Patient Education PT LTG Outcome --  goal ongoing

## 2017-11-04 LAB
ANION GAP SERPL CALCULATED.3IONS-SCNC: 8 MMOL/L (ref 3–11)
BUN BLD-MCNC: 41 MG/DL (ref 9–23)
BUN/CREAT SERPL: 51.3 (ref 7–25)
CALCIUM SPEC-SCNC: 9.3 MG/DL (ref 8.7–10.4)
CHLORIDE SERPL-SCNC: 98 MMOL/L (ref 99–109)
CO2 SERPL-SCNC: 30 MMOL/L (ref 20–31)
CREAT BLD-MCNC: 0.8 MG/DL (ref 0.6–1.3)
DEPRECATED RDW RBC AUTO: 42.3 FL (ref 37–54)
ERYTHROCYTE [DISTWIDTH] IN BLOOD BY AUTOMATED COUNT: 12.6 % (ref 11.3–14.5)
GFR SERPL CREATININE-BSD FRML MDRD: 69 ML/MIN/1.73
GLUCOSE BLD-MCNC: 96 MG/DL (ref 70–100)
HCT VFR BLD AUTO: 40.4 % (ref 34.5–44)
HGB BLD-MCNC: 12.8 G/DL (ref 11.5–15.5)
MCH RBC QN AUTO: 28.8 PG (ref 27–31)
MCHC RBC AUTO-ENTMCNC: 31.7 G/DL (ref 32–36)
MCV RBC AUTO: 91 FL (ref 80–99)
PLATELET # BLD AUTO: 273 10*3/MM3 (ref 150–450)
PMV BLD AUTO: 10.5 FL (ref 6–12)
POTASSIUM BLD-SCNC: 4.6 MMOL/L (ref 3.5–5.5)
RBC # BLD AUTO: 4.44 10*6/MM3 (ref 3.89–5.14)
SODIUM BLD-SCNC: 136 MMOL/L (ref 132–146)
WBC NRBC COR # BLD: 19.36 10*3/MM3 (ref 3.5–10.8)

## 2017-11-04 PROCEDURE — 25010000002 ONDANSETRON PER 1 MG: Performed by: NURSE PRACTITIONER

## 2017-11-04 PROCEDURE — 25010000002 ENOXAPARIN PER 10 MG

## 2017-11-04 PROCEDURE — 85027 COMPLETE CBC AUTOMATED: CPT | Performed by: PHYSICIAN ASSISTANT

## 2017-11-04 PROCEDURE — 94799 UNLISTED PULMONARY SVC/PX: CPT

## 2017-11-04 PROCEDURE — 80048 BASIC METABOLIC PNL TOTAL CA: CPT | Performed by: PHYSICIAN ASSISTANT

## 2017-11-04 PROCEDURE — 25010000002 CEFTRIAXONE PER 250 MG

## 2017-11-04 PROCEDURE — 99232 SBSQ HOSP IP/OBS MODERATE 35: CPT | Performed by: NURSE PRACTITIONER

## 2017-11-04 PROCEDURE — 94640 AIRWAY INHALATION TREATMENT: CPT

## 2017-11-04 PROCEDURE — 63710000001 METHYLPREDNISOLONE PER 4 MG: Performed by: INTERNAL MEDICINE

## 2017-11-04 PROCEDURE — 94668 MNPJ CHEST WALL SBSQ: CPT

## 2017-11-04 RX ORDER — METHYLPREDNISOLONE 16 MG/1
32 TABLET ORAL
Status: DISCONTINUED | OUTPATIENT
Start: 2017-11-05 | End: 2017-11-08

## 2017-11-04 RX ORDER — CODEINE PHOSPHATE AND GUAIFENESIN 10; 100 MG/5ML; MG/5ML
5 SOLUTION ORAL NIGHTLY PRN
Status: DISCONTINUED | OUTPATIENT
Start: 2017-11-04 | End: 2017-11-05

## 2017-11-04 RX ADMIN — NYSTATIN 500000 UNITS: 100000 SUSPENSION ORAL at 18:56

## 2017-11-04 RX ADMIN — ASPIRIN 81 MG: 81 TABLET, COATED ORAL at 09:33

## 2017-11-04 RX ADMIN — NYSTATIN 500000 UNITS: 100000 SUSPENSION ORAL at 09:32

## 2017-11-04 RX ADMIN — IPRATROPIUM BROMIDE AND ALBUTEROL SULFATE 3 ML: .5; 3 SOLUTION RESPIRATORY (INHALATION) at 00:07

## 2017-11-04 RX ADMIN — BUDESONIDE AND FORMOTEROL FUMARATE DIHYDRATE 2 PUFF: 160; 4.5 AEROSOL RESPIRATORY (INHALATION) at 07:33

## 2017-11-04 RX ADMIN — DEXTROMETHORPHAN 30 MG: 30 SUSPENSION, EXTENDED RELEASE ORAL at 09:38

## 2017-11-04 RX ADMIN — GUAIFENESIN 600 MG: 600 TABLET, EXTENDED RELEASE ORAL at 21:10

## 2017-11-04 RX ADMIN — ATORVASTATIN CALCIUM 10 MG: 10 TABLET, FILM COATED ORAL at 21:10

## 2017-11-04 RX ADMIN — FAMOTIDINE 20 MG: 20 TABLET, FILM COATED ORAL at 15:51

## 2017-11-04 RX ADMIN — IPRATROPIUM BROMIDE AND ALBUTEROL SULFATE 3 ML: .5; 3 SOLUTION RESPIRATORY (INHALATION) at 15:22

## 2017-11-04 RX ADMIN — SERTRALINE HYDROCHLORIDE 50 MG: 50 TABLET ORAL at 09:33

## 2017-11-04 RX ADMIN — IPRATROPIUM BROMIDE AND ALBUTEROL SULFATE 3 ML: .5; 3 SOLUTION RESPIRATORY (INHALATION) at 19:16

## 2017-11-04 RX ADMIN — NYSTATIN 500000 UNITS: 100000 SUSPENSION ORAL at 13:11

## 2017-11-04 RX ADMIN — DEXTROMETHORPHAN 30 MG: 30 SUSPENSION, EXTENDED RELEASE ORAL at 18:56

## 2017-11-04 RX ADMIN — TRAMADOL HYDROCHLORIDE 50 MG: 50 TABLET, COATED ORAL at 04:24

## 2017-11-04 RX ADMIN — ENOXAPARIN SODIUM 40 MG: 40 INJECTION SUBCUTANEOUS at 09:32

## 2017-11-04 RX ADMIN — BUDESONIDE AND FORMOTEROL FUMARATE DIHYDRATE 2 PUFF: 160; 4.5 AEROSOL RESPIRATORY (INHALATION) at 19:16

## 2017-11-04 RX ADMIN — DOXYCYCLINE HYCLATE 100 MG: 100 CAPSULE ORAL at 09:33

## 2017-11-04 RX ADMIN — VITAMIN D, TAB 1000IU (100/BT) 1000 UNITS: 25 TAB at 09:33

## 2017-11-04 RX ADMIN — IPRATROPIUM BROMIDE AND ALBUTEROL SULFATE 3 ML: .5; 3 SOLUTION RESPIRATORY (INHALATION) at 11:07

## 2017-11-04 RX ADMIN — ONDANSETRON 4 MG: 2 INJECTION INTRAMUSCULAR; INTRAVENOUS at 10:39

## 2017-11-04 RX ADMIN — GUAIFENESIN AND CODEINE PHOSPHATE 5 ML: 100; 10 SOLUTION ORAL at 21:10

## 2017-11-04 RX ADMIN — PROPRANOLOL HYDROCHLORIDE 60 MG: 20 TABLET ORAL at 09:36

## 2017-11-04 RX ADMIN — IPRATROPIUM BROMIDE AND ALBUTEROL SULFATE 3 ML: .5; 3 SOLUTION RESPIRATORY (INHALATION) at 03:29

## 2017-11-04 RX ADMIN — FLUTICASONE PROPIONATE 2 SPRAY: 50 SPRAY, METERED NASAL at 09:37

## 2017-11-04 RX ADMIN — LOSARTAN POTASSIUM 25 MG: 25 TABLET, FILM COATED ORAL at 09:33

## 2017-11-04 RX ADMIN — WATER 1 G: 1 INJECTION INTRAMUSCULAR; INTRAVENOUS; SUBCUTANEOUS at 10:29

## 2017-11-04 RX ADMIN — METHYLPREDNISOLONE 40 MG: 16 TABLET ORAL at 09:33

## 2017-11-04 RX ADMIN — NYSTATIN 500000 UNITS: 100000 SUSPENSION ORAL at 21:10

## 2017-11-04 RX ADMIN — IPRATROPIUM BROMIDE AND ALBUTEROL SULFATE 3 ML: .5; 3 SOLUTION RESPIRATORY (INHALATION) at 07:33

## 2017-11-04 NOTE — PLAN OF CARE
Problem: Respiratory Insufficiency (Adult)  Goal: Effective Ventilation  Outcome: Ongoing (interventions implemented as appropriate)    Problem: Fall Risk (Adult)  Goal: Absence of Falls  Outcome: Ongoing (interventions implemented as appropriate)

## 2017-11-04 NOTE — PLAN OF CARE
"Problem: Patient Care Overview (Adult)  Goal: Plan of Care Review  Outcome: Ongoing (interventions implemented as appropriate)  Loose productive wet cough, RA and o2 at 1l, SOB \"same\" today    11/03/17 1407 11/04/17 1956   Coping/Psychosocial Response Interventions   Plan Of Care Reviewed With --  patient;family;daughter   Patient Care Overview   Progress progress towards functional goals is fair --        Goal: Adult Individualization and Mutuality  Outcome: Ongoing (interventions implemented as appropriate)  Goal: Discharge Needs Assessment  Outcome: Ongoing (interventions implemented as appropriate)    Problem: Respiratory Insufficiency (Adult)  Goal: Acid/Base Balance  Outcome: Ongoing (interventions implemented as appropriate)  Goal: Effective Ventilation  Outcome: Ongoing (interventions implemented as appropriate)    Problem: Fall Risk (Adult)  Goal: Absence of Falls  Outcome: Ongoing (interventions implemented as appropriate)      "

## 2017-11-04 NOTE — PROGRESS NOTES
[facility] Medicine Services  PROGRESS NOTE    Patient Name: Sadie Tijerina  : 1940  MRN: [id number]    Date of Admission: 10/27/2017  Length of Stay: 7  Primary Care Physician: Kristian Wolff MD    Subjective   Subjective     CC:  F/U dyspnea    HPI:  Patient still feels about the same, however did better with PT ambulating yesterday. States cough keeping her up and she might feel better if she could rest at night.     Review of Systems  Gen- No fevers, chills  CV- No palpitations  Resp- Persistent wheezing, productive cough, SOA on exertion  GI- No N/V/D, abd pain      Otherwise ROS is negative except as mentioned in the HPI.    Objective   Objective     Vital Signs:   Temp:  [97.6 °F (36.4 °C)-98 °F (36.7 °C)] 97.7 °F (36.5 °C)  Heart Rate:  [70-99] 85  Resp:  [16-24] 24  BP: (118-165)/(62-80) 165/80        Physical Exam:  Constitutional: resting in bed, appears comfortable, in NAD, awake, alert  HENT: NCAT, mucous membranes moist  Respiratory: Diffuse expiratory wheezing, better air movement bilaterally, few rhonchi in bases  Cardiovascular: RRR, no murmurs, rubs, or gallops  Gastrointestinal: Positive bowel sounds, soft, nontender, nondistended  Musculoskeletal: No bilateral ankle edema  Psychiatric: Appropriate affect, cooperative  Neurologic: Oriented x 3, Cranial Nerves grossly intact to confrontation, speech clear  Skin: warm, dry. No rashes    Results Reviewed:  I have personally reviewed current lab, radiology, and data and agree.      Results from last 7 days  Lab Units 1715   WBC 10*3/mm3 19.36* 18.75* 15.59*   HEMOGLOBIN g/dL 12.8 13.7 12.8   HEMATOCRIT % 40.4 42.1 40.1   PLATELETS 10*3/mm3 273 279 266       Results from last 7 days  Lab Units 1715   SODIUM mmol/L 136 134 135   POTASSIUM mmol/L 4.6 5.0 4.0   CHLORIDE mmol/L 98* 97* 96*   CO2 mmol/L 30.0 33.0* 29.0   BUN mg/dL 41* 30*  27*   CREATININE mg/dL 0.80 0.80 0.80   GLUCOSE mg/dL 96 100 86   CALCIUM mg/dL 9.3 9.4 8.6*     No results found for: BNP  No results found for: PHART       Microbiology Results Abnormal     Procedure Component Value - Date/Time    Respiratory Culture - Sputum, Cough [[id number]]  (Abnormal)  (Susceptibility) Collected:  10/28/17 0038    Lab Status:  Final result Specimen:  Sputum from Cough Updated:  11/02/17 1418     Respiratory Culture --      Moderate growth (3+) Haemophilus influenzae Biotype III (A)        Beta Lactamase Positive            Light growth (2+) Enterobacter cloacae (A)      Light growth (2+) Klebsiella pneumoniae (A)      Light growth (2+) Normal Respiratory Ashlee     Gram Stain Result Moderate (3+) WBCs per low power field      Many (4+) Gram negative bacilli, tiny      Occasional Gram positive cocci in clusters    Susceptibility      Haemophilus influenzae Biotype III     DISK DIFFUSION     Ampicillin Resistant     Ampicillin + Sulbactam Susceptible     Ceftriaxone Susceptible     Chloramphenicol Susceptible     Meropenem Susceptible     Rifampin Susceptible     Trimethoprim + Sulfamethoxazole Susceptible                Susceptibility      Enterobacter cloacae     TEA     Ampicillin >16 ug/ml Resistant     Ampicillin + Sulbactam 16/8 ug/ml Intermediate     Aztreonam <=8 ug/ml Susceptible     Cefepime <=8 ug/ml Susceptible     Cefotaxime <=2 ug/ml Susceptible     Ceftriaxone <=8 ug/ml Susceptible     Cefuroxime sodium 16 ug/ml Intermediate     Ertapenem <=1 ug/ml Susceptible     Gentamicin <=4 ug/ml Susceptible     Levofloxacin <=2 ug/ml Susceptible     Meropenem <=1 ug/ml Susceptible     Piperacillin + Tazobactam <=16 ug/ml Susceptible     Tetracycline <=4 ug/ml Susceptible     Tobramycin <=4 ug/ml Susceptible     Trimethoprim + Sulfamethoxazole <=2/38 ug/ml Susceptible                Susceptibility      Klebsiella pneumoniae     TEA     Ampicillin >16 ug/ml Resistant     Ampicillin + Sulbactam  <=8/4 ug/ml Susceptible     Aztreonam <=8 ug/ml Susceptible     Cefepime <=8 ug/ml Susceptible     Cefotaxime <=2 ug/ml Susceptible     Ceftriaxone <=8 ug/ml Susceptible     Cefuroxime sodium <=4 ug/ml Susceptible     Ertapenem <=1 ug/ml Susceptible     Gentamicin <=4 ug/ml Susceptible     Levofloxacin <=2 ug/ml Susceptible     Meropenem <=1 ug/ml Susceptible     Piperacillin + Tazobactam <=16 ug/ml Susceptible     Tetracycline <=4 ug/ml Susceptible     Tobramycin <=4 ug/ml Susceptible     Trimethoprim + Sulfamethoxazole <=2/38 ug/ml Susceptible                          Imaging Results (last 24 hours)     ** No results found for the last 24 hours. **        Results for orders placed during the hospital encounter of 10/27/17   Adult Transthoracic Echo Complete W/ Cont if Necessary Per Protocol    Narrative · Left ventricular systolic function is normal.  · Estimated EF appears to be in the range of 61 - 65%.  · Left ventricular diastolic dysfunction (grade I a) consistent with   impaired relaxation.  · Calculated right ventricular systolic pressure from tricuspid   regurgitation is 27 mmHg.          I have reviewed the medications.    Assessment/Plan   Assessment / Plan     Hospital Problem List     * (Principal)COPD exacerbation    Gastroesophageal reflux disease without esophagitis    Hyperlipidemia    CAP (community acquired pneumonia)    Leukocytosis (Chronic)    Tobacco abuse disorder (Chronic)    Acute respiratory failure with hypoxemia             Brief Hospital Course to date:  Sadie Tijerina is a 77 y.o. female with COPD and continued tobacco use presenting with SOA x 1 week and diffuse wheezing and slow progress    Assessment & Plan:    COPD with exacerbation   Acute hypoxic respiratory failure  - slow progress, continues to desaturate with activity and off O2  -Continue PO steroids, scheduled duonebs, PRN albuterol nebs, flutter valve, incentive spirometry  - status post PO doxycycline 7 days, started  ceftriaxone (3rd generation cephalosporin) 11/3 d/t susceptibilities for beta-lactamase positive H. Influenzae day 4/7  - Near dc could consider omnicef.   -Repeat CXR done shows no infiltrates   -Smoking cessation encouraged  -She will likely need a steroid taper at discharge  -She does not have a pulmonologist and will need a referral to pulmonary clinic at discharge for PFTs and further management of her lung disease  -getting chest percussion with RT/encouraged frequent use of flutter valve  - decrease medrol slightly  - add prn cough syrup with codeine     Hip pain s/p fall prior to admission  -X ray ordered and revealed no acute fracture  -PT/OT consulted    GERD   -Continue pepcid, tums PRN    HLD  -Continue atorvastatin    Leukocytosis  -however still on steroids, afebrile    Tobacco abuse disorder  -Counseled on cessation; she says she will think about it  -Continue nicotine patch     Thrush  -start nystatin oral    HTN  -resume home ARB but at low dose for now, watch K+    Weakness/ bilateral foot pain  -- walked with PT yesterday, states feet gave out prior to breathing. Refusing rehab currently but will try home PT at discharge.    DVT Prophylaxis:  Lovenox    CODE STATUS: Conditional Code    Disposition: I expect the patient to be discharged home in 2-3 days.  She has been extremely slow to improve. Refusing rehab. Will need HH.    Lorri Swartz, APRN  11/04/17  11:43 AM

## 2017-11-05 ENCOUNTER — APPOINTMENT (OUTPATIENT)
Dept: GENERAL RADIOLOGY | Facility: HOSPITAL | Age: 77
End: 2017-11-05

## 2017-11-05 PROCEDURE — 94799 UNLISTED PULMONARY SVC/PX: CPT

## 2017-11-05 PROCEDURE — 71010 HC CHEST PA OR AP: CPT

## 2017-11-05 PROCEDURE — 63710000001 METHYLPREDNISOLONE PER 4 MG: Performed by: NURSE PRACTITIONER

## 2017-11-05 PROCEDURE — 25010000002 CEFTRIAXONE PER 250 MG

## 2017-11-05 PROCEDURE — 94640 AIRWAY INHALATION TREATMENT: CPT

## 2017-11-05 PROCEDURE — 25010000002 ENOXAPARIN PER 10 MG

## 2017-11-05 PROCEDURE — 99232 SBSQ HOSP IP/OBS MODERATE 35: CPT | Performed by: NURSE PRACTITIONER

## 2017-11-05 PROCEDURE — 94760 N-INVAS EAR/PLS OXIMETRY 1: CPT

## 2017-11-05 RX ORDER — CODEINE PHOSPHATE AND GUAIFENESIN 10; 100 MG/5ML; MG/5ML
10 SOLUTION ORAL NIGHTLY PRN
Status: DISCONTINUED | OUTPATIENT
Start: 2017-11-05 | End: 2017-11-05

## 2017-11-05 RX ORDER — BENZONATATE 100 MG/1
100 CAPSULE ORAL 3 TIMES DAILY PRN
Status: DISCONTINUED | OUTPATIENT
Start: 2017-11-05 | End: 2017-11-12 | Stop reason: HOSPADM

## 2017-11-05 RX ADMIN — BUDESONIDE AND FORMOTEROL FUMARATE DIHYDRATE 2 PUFF: 160; 4.5 AEROSOL RESPIRATORY (INHALATION) at 20:17

## 2017-11-05 RX ADMIN — ASPIRIN 81 MG: 81 TABLET, COATED ORAL at 08:59

## 2017-11-05 RX ADMIN — LOSARTAN POTASSIUM 25 MG: 25 TABLET, FILM COATED ORAL at 08:58

## 2017-11-05 RX ADMIN — IPRATROPIUM BROMIDE AND ALBUTEROL SULFATE 3 ML: .5; 3 SOLUTION RESPIRATORY (INHALATION) at 00:30

## 2017-11-05 RX ADMIN — GUAIFENESIN 600 MG: 600 TABLET, EXTENDED RELEASE ORAL at 08:57

## 2017-11-05 RX ADMIN — PROPRANOLOL HYDROCHLORIDE 60 MG: 20 TABLET ORAL at 08:56

## 2017-11-05 RX ADMIN — DEXTROMETHORPHAN 30 MG: 30 SUSPENSION, EXTENDED RELEASE ORAL at 17:03

## 2017-11-05 RX ADMIN — GUAIFENESIN 600 MG: 600 TABLET, EXTENDED RELEASE ORAL at 21:20

## 2017-11-05 RX ADMIN — BENZONATATE 100 MG: 100 CAPSULE ORAL at 21:20

## 2017-11-05 RX ADMIN — NYSTATIN 500000 UNITS: 100000 SUSPENSION ORAL at 08:57

## 2017-11-05 RX ADMIN — DEXTROMETHORPHAN 30 MG: 30 SUSPENSION, EXTENDED RELEASE ORAL at 08:57

## 2017-11-05 RX ADMIN — METHYLPREDNISOLONE 32 MG: 16 TABLET ORAL at 08:58

## 2017-11-05 RX ADMIN — IPRATROPIUM BROMIDE AND ALBUTEROL SULFATE 3 ML: .5; 3 SOLUTION RESPIRATORY (INHALATION) at 04:07

## 2017-11-05 RX ADMIN — METHYLPREDNISOLONE 32 MG: 16 TABLET ORAL at 09:46

## 2017-11-05 RX ADMIN — VITAMIN D, TAB 1000IU (100/BT) 1000 UNITS: 25 TAB at 08:59

## 2017-11-05 RX ADMIN — IPRATROPIUM BROMIDE AND ALBUTEROL SULFATE 3 ML: .5; 3 SOLUTION RESPIRATORY (INHALATION) at 08:00

## 2017-11-05 RX ADMIN — IPRATROPIUM BROMIDE AND ALBUTEROL SULFATE 3 ML: .5; 3 SOLUTION RESPIRATORY (INHALATION) at 15:45

## 2017-11-05 RX ADMIN — IPRATROPIUM BROMIDE AND ALBUTEROL SULFATE 3 ML: .5; 3 SOLUTION RESPIRATORY (INHALATION) at 23:19

## 2017-11-05 RX ADMIN — ENOXAPARIN SODIUM 40 MG: 40 INJECTION SUBCUTANEOUS at 08:58

## 2017-11-05 RX ADMIN — NYSTATIN 500000 UNITS: 100000 SUSPENSION ORAL at 12:01

## 2017-11-05 RX ADMIN — BUDESONIDE AND FORMOTEROL FUMARATE DIHYDRATE 2 PUFF: 160; 4.5 AEROSOL RESPIRATORY (INHALATION) at 07:59

## 2017-11-05 RX ADMIN — SERTRALINE HYDROCHLORIDE 50 MG: 50 TABLET ORAL at 08:59

## 2017-11-05 RX ADMIN — ATORVASTATIN CALCIUM 10 MG: 10 TABLET, FILM COATED ORAL at 21:20

## 2017-11-05 RX ADMIN — NYSTATIN 500000 UNITS: 100000 SUSPENSION ORAL at 17:03

## 2017-11-05 RX ADMIN — FLUTICASONE PROPIONATE 2 SPRAY: 50 SPRAY, METERED NASAL at 09:00

## 2017-11-05 RX ADMIN — IPRATROPIUM BROMIDE AND ALBUTEROL SULFATE 3 ML: .5; 3 SOLUTION RESPIRATORY (INHALATION) at 13:04

## 2017-11-05 RX ADMIN — NYSTATIN 500000 UNITS: 100000 SUSPENSION ORAL at 21:20

## 2017-11-05 RX ADMIN — ACETAMINOPHEN 650 MG: 325 TABLET ORAL at 08:59

## 2017-11-05 RX ADMIN — WATER 1 G: 1 INJECTION INTRAMUSCULAR; INTRAVENOUS; SUBCUTANEOUS at 08:56

## 2017-11-05 RX ADMIN — IPRATROPIUM BROMIDE AND ALBUTEROL SULFATE 3 ML: .5; 3 SOLUTION RESPIRATORY (INHALATION) at 20:17

## 2017-11-05 NOTE — PLAN OF CARE
Problem: Patient Care Overview (Adult)  Goal: Plan of Care Review  Outcome: Ongoing (interventions implemented as appropriate)  Goal: Adult Individualization and Mutuality  Outcome: Ongoing (interventions implemented as appropriate)  Goal: Discharge Needs Assessment  Outcome: Ongoing (interventions implemented as appropriate)    Problem: Respiratory Insufficiency (Adult)  Goal: Acid/Base Balance  Outcome: Ongoing (interventions implemented as appropriate)  Goal: Effective Ventilation  Outcome: Ongoing (interventions implemented as appropriate)  Patient with frequent non-productive cough.  Oxygen removed and patient desaturated to 89% and Oxygen reapplied at one liter per minute.      11/05/17 1447   Respiratory Insufficiency (Adult)   Effective Ventilation making progress toward outcome         Problem: Fall Risk (Adult)  Goal: Absence of Falls  Outcome: Ongoing (interventions implemented as appropriate)  Bed and chair alarm in place and activated.    11/05/17 1447   Fall Risk (Adult)   Absence of Falls making progress toward outcome

## 2017-11-05 NOTE — PROGRESS NOTES
[facility] Medicine Services  PROGRESS NOTE    Patient Name: Sadie Tijerina  : 1940  MRN: [id number]    Date of Admission: 10/27/2017  Length of Stay: 8  Primary Care Physician: Kristian Wolff MD    Subjective   Subjective     CC:  F/U dyspnea    HPI:  Patient still feels about the same, however requiring less oxygen. States she's never felt this bad and taken so long to improve. Still very winded with activity. Extreme fatigue and cough no better. Sides are sore from cough.    Review of Systems  Gen- No fevers, chills  CV- No palpitations  Resp- Persistent wheezing, productive cough, SOA on exertion  GI- No N/V/D, abd pain      Otherwise ROS is negative except as mentioned in the HPI.    Objective   Objective     Vital Signs:   Temp:  [97.6 °F (36.4 °C)-98.5 °F (36.9 °C)] 97.6 °F (36.4 °C)  Heart Rate:  [64-89] 89  Resp:  [18-20] 18  BP: (109-148)/(54-89) 146/63        Physical Exam:  Constitutional: resting in bed, appears comfortable, in NAD, awake, alert  HENT: NCAT, mucous membranes moist  Respiratory: slight bilat expiratory wheezing, few rhonchi in bases- increase in rhonchi to right base  Cardiovascular: RRR, no murmurs, rubs, or gallops  Gastrointestinal: Positive bowel sounds, soft, nontender, nondistended  Musculoskeletal: No bilateral ankle edema  Psychiatric: Appropriate affect, cooperative  Neurologic: Oriented x 3, Cranial Nerves grossly intact to confrontation, speech clear  Skin: warm, dry. No rashes    Results Reviewed:  I have personally reviewed current lab, radiology, and data and agree.      Results from last 7 days  Lab Units 17  0517  0615   WBC 10*3/mm3 19.36* 18.75* 15.59*   HEMOGLOBIN g/dL 12.8 13.7 12.8   HEMATOCRIT % 40.4 42.1 40.1   PLATELETS 10*3/mm3 273 279 266       Results from last 7 days  Lab Units 17  0517  0615   SODIUM mmol/L 136 134 135   POTASSIUM mmol/L 4.6 5.0 4.0   CHLORIDE  mmol/L 98* 97* 96*   CO2 mmol/L 30.0 33.0* 29.0   BUN mg/dL 41* 30* 27*   CREATININE mg/dL 0.80 0.80 0.80   GLUCOSE mg/dL 96 100 86   CALCIUM mg/dL 9.3 9.4 8.6*     No results found for: BNP  No results found for: PHART       Microbiology Results Abnormal     Procedure Component Value - Date/Time    Respiratory Culture - Sputum, Cough [[id number]]  (Abnormal)  (Susceptibility) Collected:  10/28/17 0038    Lab Status:  Final result Specimen:  Sputum from Cough Updated:  11/02/17 1414     Respiratory Culture --      Moderate growth (3+) Haemophilus influenzae Biotype III (A)        Beta Lactamase Positive            Light growth (2+) Enterobacter cloacae (A)      Light growth (2+) Klebsiella pneumoniae (A)      Light growth (2+) Normal Respiratory Ashlee     Gram Stain Result Moderate (3+) WBCs per low power field      Many (4+) Gram negative bacilli, tiny      Occasional Gram positive cocci in clusters    Susceptibility      Haemophilus influenzae Biotype III     DISK DIFFUSION     Ampicillin Resistant     Ampicillin + Sulbactam Susceptible     Ceftriaxone Susceptible     Chloramphenicol Susceptible     Meropenem Susceptible     Rifampin Susceptible     Trimethoprim + Sulfamethoxazole Susceptible                Susceptibility      Enterobacter cloacae     TEA     Ampicillin >16 ug/ml Resistant     Ampicillin + Sulbactam 16/8 ug/ml Intermediate     Aztreonam <=8 ug/ml Susceptible     Cefepime <=8 ug/ml Susceptible     Cefotaxime <=2 ug/ml Susceptible     Ceftriaxone <=8 ug/ml Susceptible     Cefuroxime sodium 16 ug/ml Intermediate     Ertapenem <=1 ug/ml Susceptible     Gentamicin <=4 ug/ml Susceptible     Levofloxacin <=2 ug/ml Susceptible     Meropenem <=1 ug/ml Susceptible     Piperacillin + Tazobactam <=16 ug/ml Susceptible     Tetracycline <=4 ug/ml Susceptible     Tobramycin <=4 ug/ml Susceptible     Trimethoprim + Sulfamethoxazole <=2/38 ug/ml Susceptible                Susceptibility      Klebsiella pneumoniae      TEA     Ampicillin >16 ug/ml Resistant     Ampicillin + Sulbactam <=8/4 ug/ml Susceptible     Aztreonam <=8 ug/ml Susceptible     Cefepime <=8 ug/ml Susceptible     Cefotaxime <=2 ug/ml Susceptible     Ceftriaxone <=8 ug/ml Susceptible     Cefuroxime sodium <=4 ug/ml Susceptible     Ertapenem <=1 ug/ml Susceptible     Gentamicin <=4 ug/ml Susceptible     Levofloxacin <=2 ug/ml Susceptible     Meropenem <=1 ug/ml Susceptible     Piperacillin + Tazobactam <=16 ug/ml Susceptible     Tetracycline <=4 ug/ml Susceptible     Tobramycin <=4 ug/ml Susceptible     Trimethoprim + Sulfamethoxazole <=2/38 ug/ml Susceptible                          Imaging Results (last 24 hours)     ** No results found for the last 24 hours. **        Results for orders placed during the hospital encounter of 10/27/17   Adult Transthoracic Echo Complete W/ Cont if Necessary Per Protocol    Narrative · Left ventricular systolic function is normal.  · Estimated EF appears to be in the range of 61 - 65%.  · Left ventricular diastolic dysfunction (grade I a) consistent with   impaired relaxation.  · Calculated right ventricular systolic pressure from tricuspid   regurgitation is 27 mmHg.          I have reviewed the medications.    Assessment/Plan   Assessment / Plan     Hospital Problem List     * (Principal)COPD exacerbation    Gastroesophageal reflux disease without esophagitis    Hyperlipidemia    CAP (community acquired pneumonia)    Leukocytosis (Chronic)    Tobacco abuse disorder (Chronic)    Acute respiratory failure with hypoxemia             Brief Hospital Course to date:  Sadie Tijerina is a 77 y.o. female with COPD and continued tobacco use presenting with SOA x 1 week and diffuse wheezing and slow progress    Assessment & Plan:    COPD with exacerbation   Acute hypoxic respiratory failure  - slow progress, continues to desaturate with activity and off O2, weaned O2 to 1L at rest  -Continue PO steroids- decreased to 32mg daily,  scheduled duonebs, PRN albuterol nebs, flutter valve, incentive spirometry  - status post PO doxycycline 7 days, started ceftriaxone (3rd generation cephalosporin) 11/3 d/t susceptibilities for beta-lactamase positive H. Influenzae day 5/7  -Repeat CXR as patient clinically no better- only chronic changes seen  -Smoking cessation encouraged  -She will likely need a long steroid taper  -She does not have a pulmonologist and will need a referral to pulmonary clinic at discharge for PFTs and further management of her lung disease-   -getting chest percussion with RT/encouraged frequent use of flutter valve  - add prn cough syrup with codeine - increase at night    Hip pain s/p fall prior to admission  -X ray ordered and revealed no acute fracture  -PT/OT consulted    GERD   -Continue pepcid, tums PRN    HLD  -Continue atorvastatin    Leukocytosis  -however still on steroids, afebrile    Tobacco abuse disorder  -Counseled on cessation; she says she will think about it  -Continue nicotine patch     Thrush  -start nystatin oral    HTN  -resume home ARB but at low dose for now, watch K+    Weakness/ bilateral foot pain  -- walked with PT yesterday, states feet gave out prior to breathing. Refusing rehab currently but will try home PT at discharge.    DVT Prophylaxis:  Lovenox    CODE STATUS: Conditional Code    Disposition: I expect the patient to be discharged home in 2-3 days.  She has been extremely slow to improve. Refusing rehab still. Will need HH.    Lorri Swartz, APRN  11/05/17  12:40 PM

## 2017-11-06 PROCEDURE — 94799 UNLISTED PULMONARY SVC/PX: CPT

## 2017-11-06 PROCEDURE — 63710000001 METHYLPREDNISOLONE PER 4 MG: Performed by: NURSE PRACTITIONER

## 2017-11-06 PROCEDURE — 94640 AIRWAY INHALATION TREATMENT: CPT

## 2017-11-06 PROCEDURE — 94760 N-INVAS EAR/PLS OXIMETRY 1: CPT

## 2017-11-06 PROCEDURE — 25010000002 TOBRAMYCIN PER 80 MG

## 2017-11-06 PROCEDURE — 25010000002 ENOXAPARIN PER 10 MG

## 2017-11-06 PROCEDURE — 99232 SBSQ HOSP IP/OBS MODERATE 35: CPT | Performed by: HOSPITALIST

## 2017-11-06 PROCEDURE — 97530 THERAPEUTIC ACTIVITIES: CPT

## 2017-11-06 PROCEDURE — 25010000002 CEFTRIAXONE PER 250 MG

## 2017-11-06 RX ORDER — LIDOCAINE 50 MG/G
1 PATCH TOPICAL
Status: DISCONTINUED | OUTPATIENT
Start: 2017-11-06 | End: 2017-11-12 | Stop reason: HOSPADM

## 2017-11-06 RX ADMIN — LOSARTAN POTASSIUM 25 MG: 25 TABLET, FILM COATED ORAL at 09:58

## 2017-11-06 RX ADMIN — PROPRANOLOL HYDROCHLORIDE 60 MG: 20 TABLET ORAL at 10:05

## 2017-11-06 RX ADMIN — IPRATROPIUM BROMIDE AND ALBUTEROL SULFATE 3 ML: .5; 3 SOLUTION RESPIRATORY (INHALATION) at 16:04

## 2017-11-06 RX ADMIN — IPRATROPIUM BROMIDE AND ALBUTEROL SULFATE 3 ML: .5; 3 SOLUTION RESPIRATORY (INHALATION) at 04:19

## 2017-11-06 RX ADMIN — BUDESONIDE AND FORMOTEROL FUMARATE DIHYDRATE 2 PUFF: 160; 4.5 AEROSOL RESPIRATORY (INHALATION) at 20:12

## 2017-11-06 RX ADMIN — GUAIFENESIN 600 MG: 600 TABLET, EXTENDED RELEASE ORAL at 09:57

## 2017-11-06 RX ADMIN — FLUTICASONE PROPIONATE 2 SPRAY: 50 SPRAY, METERED NASAL at 09:59

## 2017-11-06 RX ADMIN — DEXTROMETHORPHAN 30 MG: 30 SUSPENSION, EXTENDED RELEASE ORAL at 09:58

## 2017-11-06 RX ADMIN — DEXTROMETHORPHAN 30 MG: 30 SUSPENSION, EXTENDED RELEASE ORAL at 18:49

## 2017-11-06 RX ADMIN — TOBRAMYCIN SULFATE 300 MG: 40 INJECTION, SOLUTION INTRAMUSCULAR; INTRAVENOUS at 18:37

## 2017-11-06 RX ADMIN — NYSTATIN 500000 UNITS: 100000 SUSPENSION ORAL at 21:13

## 2017-11-06 RX ADMIN — IPRATROPIUM BROMIDE AND ALBUTEROL SULFATE 3 ML: .5; 3 SOLUTION RESPIRATORY (INHALATION) at 13:10

## 2017-11-06 RX ADMIN — LIDOCAINE 1 PATCH: 50 PATCH CUTANEOUS at 18:38

## 2017-11-06 RX ADMIN — NYSTATIN 500000 UNITS: 100000 SUSPENSION ORAL at 18:37

## 2017-11-06 RX ADMIN — IPRATROPIUM BROMIDE AND ALBUTEROL SULFATE 3 ML: .5; 3 SOLUTION RESPIRATORY (INHALATION) at 07:30

## 2017-11-06 RX ADMIN — ATORVASTATIN CALCIUM 10 MG: 10 TABLET, FILM COATED ORAL at 21:13

## 2017-11-06 RX ADMIN — FAMOTIDINE 20 MG: 20 TABLET, FILM COATED ORAL at 21:13

## 2017-11-06 RX ADMIN — WATER 1 G: 1 INJECTION INTRAMUSCULAR; INTRAVENOUS; SUBCUTANEOUS at 09:23

## 2017-11-06 RX ADMIN — TRAMADOL HYDROCHLORIDE 50 MG: 50 TABLET, COATED ORAL at 09:23

## 2017-11-06 RX ADMIN — IPRATROPIUM BROMIDE AND ALBUTEROL SULFATE 3 ML: .5; 3 SOLUTION RESPIRATORY (INHALATION) at 20:12

## 2017-11-06 RX ADMIN — SERTRALINE HYDROCHLORIDE 50 MG: 50 TABLET ORAL at 09:58

## 2017-11-06 RX ADMIN — ENOXAPARIN SODIUM 40 MG: 40 INJECTION SUBCUTANEOUS at 09:59

## 2017-11-06 RX ADMIN — NYSTATIN 500000 UNITS: 100000 SUSPENSION ORAL at 13:14

## 2017-11-06 RX ADMIN — ASPIRIN 81 MG: 81 TABLET, COATED ORAL at 09:58

## 2017-11-06 RX ADMIN — VITAMIN D, TAB 1000IU (100/BT) 1000 UNITS: 25 TAB at 09:58

## 2017-11-06 RX ADMIN — BUDESONIDE AND FORMOTEROL FUMARATE DIHYDRATE 2 PUFF: 160; 4.5 AEROSOL RESPIRATORY (INHALATION) at 07:30

## 2017-11-06 RX ADMIN — METHYLPREDNISOLONE 32 MG: 16 TABLET ORAL at 09:57

## 2017-11-06 RX ADMIN — GUAIFENESIN 600 MG: 600 TABLET, EXTENDED RELEASE ORAL at 21:13

## 2017-11-06 RX ADMIN — NYSTATIN 500000 UNITS: 100000 SUSPENSION ORAL at 09:58

## 2017-11-06 NOTE — PROGRESS NOTES
Continued Stay Note   [facility]     Patient Name: Sadie Tijerina  MRN: [id number]  Today's Date: 11/6/2017    Admit Date: 10/27/2017          Discharge Plan       11/06/17 1443    Case Management/Social Work Plan    Plan update    Patient/Family In Agreement With Plan yes    Additional Comments Spoke with patient at bedside regarding discharge plan.  Patient verbalizes no new discharge needs at this time.  Patient plans to discharge home via car with family to transport when medically ready.               Discharge Codes     None        Expected Discharge Date and Time     Expected Discharge Date Expected Discharge Time    Nov 8, 2017             Indy Baird RN

## 2017-11-06 NOTE — PLAN OF CARE
Problem: Patient Care Overview (Adult)  Goal: Plan of Care Review  Outcome: Ongoing (interventions implemented as appropriate)    11/06/17 1006   Coping/Psychosocial Response Interventions   Plan Of Care Reviewed With patient   Patient Care Overview   Progress progress toward functional goals as expected   Outcome Evaluation   Outcome Summary/Follow up Plan Pt conts to be CGA for toilet t/f and this date required Min Ax1+1 to ambulate 70 ft w/ RW, frequent episodes of minor LOB. Pt conts to be limited d/t c/o dyspnea/fatigue. Recommend cont skilled IPOT POC.          Problem: Inpatient Occupational Therapy  Goal: Bed Mobility Goal LTG- OT  Outcome: Ongoing (interventions implemented as appropriate)    11/01/17 1133 11/06/17 1006   Bed Mobility OT LTG   Bed Mobility OT LTG, Date Established 11/01/17 --    Bed Mobility OT LTG, Time to Achieve 2 wks --    Bed Mobility OT LTG, Activity Type all bed mobility --    Bed Mobility OT LTG, [place] Level conditional independence --    Bed Mobility OT LTG, Outcome --  goal ongoing       Goal: Transfer Training Goal 1 LTG- OT  Outcome: Ongoing (interventions implemented as appropriate)    11/01/17 1133 11/06/17 1006   Transfer Training OT LTG   Transfer Training OT LTG, Date Established 11/01/17 --    Transfer Training OT LTG, Time to Achieve 2 wks --    Transfer Training OT LTG, Activity Type sit to stand/stand to sit;toilet --    Transfer Training OT LTG, [place] Level conditional independence --    Transfer Training OT LTG, Assist Device walker, rolling;commode, bedside --    Transfer Training OT LTG, Outcome --  goal ongoing       Goal: Patient Education Goal LTG- OT  Outcome: Ongoing (interventions implemented as appropriate)    11/01/17 1133 11/06/17 1006   Patient Education OT LTG   Patient Education OT LTG, Date Established 11/01/17 --    Patient Education OT LTG, Time to Achieve 2 wks --    Patient Education OT LTG, Education Type HEP;work  simplification;energy conservation;adaptive breathing --    Patient Education OT LTG, Education Understanding verbalizes understanding;demonstrates adequately --    Patient Education OT LTG Outcome --  goal ongoing       Goal: Activity Tolerance Goal LTG- OT  Outcome: Ongoing (interventions implemented as appropriate)    11/01/17 1133 11/06/17 1006   Activity Tolerance OT LTG   Activity Tolerance Goal OT LTG, Date Established 11/01/17 --    Activity Tolerance Goal OT LTG, Time to Achieve 2 wks --    Activity Tolerance Goal OT LTG, Activity Level 10 min activity --    Activity Tolerance Goal OT LTG, Additional Goal Pt will complete 10 minutes of ADLs/functional task with one sitting rest break to improve occupational endurance.  --    Activity Tolerance Goal OT LTG, Outcome --  goal ongoing

## 2017-11-06 NOTE — PROGRESS NOTES
[facility] Medicine Services  PROGRESS NOTE    Patient Name: Sadie Tijerina  : 1940  MRN: [id number]    Date of Admission: 10/27/2017  Length of Stay: 9  Primary Care Physician: Kristian Wolff MD    Subjective   Subjective     CC:  SOB    HPI:  NAEO. Pt reports persistent coughing that make the center of her chest hurt when she coughs. On RA at home. Current smoker.     Review of Systems   Gen- No fevers, chills  CV- No chest pain, palpitations  Resp- +persistent productive cough, dyspnea on 1L NC  GI- No N/V/D, abd pain    Otherwise ROS is negative except as mentioned in the HPI.    Objective   Objective     Vital Signs:   Temp:  [97.3 °F (36.3 °C)-98.1 °F (36.7 °C)] 98.1 °F (36.7 °C)  Heart Rate:  [58-85] 77  Resp:  [16-20] 16  BP: (115-150)/(56-91) 132/61        Physical Exam:  Constitutional: No acute distress, awake, alert  Eyes: PERRLA, sclerae anicteric, no conjunctival injection  HENT: NCAT, mucous membranes moist  Neck: Supple, no thyromegaly, no lymphadenopathy, trachea midline  Respiratory: Diffuse expiratory wheezing, nonlabored respirations   Cardiovascular: RRR, no murmurs, rubs, or gallops, palpable pedal pulses bilaterally  Gastrointestinal: Positive bowel sounds, soft, nontender, nondistended  Musculoskeletal: No bilateral ankle edema, no clubbing or cyanosis to extremities  Psychiatric: Appropriate affect, cooperative  Neurologic: Oriented x 3, strength symmetric in all extremities, Cranial Nerves grossly intact to confrontation, speech clear  Skin: No rashes    Results Reviewed:  I have personally reviewed current lab, radiology, and data and agree.      Results from last 7 days  Lab Units 1715   WBC 10*3/mm3 19.36* 18.75* 15.59*   HEMOGLOBIN g/dL 12.8 13.7 12.8   HEMATOCRIT % 40.4 42.1 40.1   PLATELETS 10*3/mm3 273 279 266       Results from last 7 days  Lab Units 17  0615   SODIUM  mmol/L 136 134 135   POTASSIUM mmol/L 4.6 5.0 4.0   CHLORIDE mmol/L 98* 97* 96*   CO2 mmol/L 30.0 33.0* 29.0   BUN mg/dL 41* 30* 27*   CREATININE mg/dL 0.80 0.80 0.80   GLUCOSE mg/dL 96 100 86   CALCIUM mg/dL 9.3 9.4 8.6*     No results found for: BNP  No results found for: PHART    Microbiology Results Abnormal     Procedure Component Value - Date/Time    Respiratory Culture - Sputum, Cough [[id number]]  (Abnormal)  (Susceptibility) Collected:  10/28/17 0038    Lab Status:  Final result Specimen:  Sputum from Cough Updated:  11/02/17 1416     Respiratory Culture --      Moderate growth (3+) Haemophilus influenzae Biotype III (A)        Beta Lactamase Positive            Light growth (2+) Enterobacter cloacae (A)      Light growth (2+) Klebsiella pneumoniae (A)      Light growth (2+) Normal Respiratory Ashlee     Gram Stain Result Moderate (3+) WBCs per low power field      Many (4+) Gram negative bacilli, tiny      Occasional Gram positive cocci in clusters    Susceptibility      Haemophilus influenzae Biotype III     DISK DIFFUSION     Ampicillin Resistant     Ampicillin + Sulbactam Susceptible     Ceftriaxone Susceptible     Chloramphenicol Susceptible     Meropenem Susceptible     Rifampin Susceptible     Trimethoprim + Sulfamethoxazole Susceptible                Susceptibility      Enterobacter cloacae     TEA     Ampicillin >16 ug/ml Resistant     Ampicillin + Sulbactam 16/8 ug/ml Intermediate     Aztreonam <=8 ug/ml Susceptible     Cefepime <=8 ug/ml Susceptible     Cefotaxime <=2 ug/ml Susceptible     Ceftriaxone <=8 ug/ml Susceptible     Cefuroxime sodium 16 ug/ml Intermediate     Ertapenem <=1 ug/ml Susceptible     Gentamicin <=4 ug/ml Susceptible     Levofloxacin <=2 ug/ml Susceptible     Meropenem <=1 ug/ml Susceptible     Piperacillin + Tazobactam <=16 ug/ml Susceptible     Tetracycline <=4 ug/ml Susceptible     Tobramycin <=4 ug/ml Susceptible     Trimethoprim + Sulfamethoxazole <=2/38 ug/ml Susceptible                 Susceptibility      Klebsiella pneumoniae     TEA     Ampicillin >16 ug/ml Resistant     Ampicillin + Sulbactam <=8/4 ug/ml Susceptible     Aztreonam <=8 ug/ml Susceptible     Cefepime <=8 ug/ml Susceptible     Cefotaxime <=2 ug/ml Susceptible     Ceftriaxone <=8 ug/ml Susceptible     Cefuroxime sodium <=4 ug/ml Susceptible     Ertapenem <=1 ug/ml Susceptible     Gentamicin <=4 ug/ml Susceptible     Levofloxacin <=2 ug/ml Susceptible     Meropenem <=1 ug/ml Susceptible     Piperacillin + Tazobactam <=16 ug/ml Susceptible     Tetracycline <=4 ug/ml Susceptible     Tobramycin <=4 ug/ml Susceptible     Trimethoprim + Sulfamethoxazole <=2/38 ug/ml Susceptible                          Imaging Results (last 24 hours)     Procedure Component Value Units Date/Time    XR Chest 1 View [[id number]] Collected:  11/05/17 1324     Updated:  11/05/17 1456    Narrative:          EXAMINATION: XR CHEST, SINGLE VIEW - 11/05/2017     INDICATION:  J44.1-Chronic obstructive pulmonary disease with (acute)  exacerbation; R09.02-Hypoxemia; R06.2-Wheezing; D72.829-Elevated white  blood cell count, unspecified; Z74.09-Other reduced mobility.      COMPARISON: Chest x-ray 11/01/2017.     FINDINGS: Chronic lung changes with hyperinflated appearance and  flattening of the diaphragms concerning for underlying emphysema. No  focal airspace opacity or consolidation. Pulmonary vascularity within  normal limits. Cardiomegaly signal contour within normal limits. No  pneumothorax or pleural effusion.           Impression:       Chronic lung changes without acute cardiopulmonary process.     DICTATED:     11/05/2017  EDITED:         11/05/2017                    Results for orders placed during the hospital encounter of 10/27/17   Adult Transthoracic Echo Complete W/ Cont if Necessary Per Protocol    Narrative · Left ventricular systolic function is normal.  · Estimated EF appears to be in the range of 61 - 65%.  · Left ventricular  diastolic dysfunction (grade I a) consistent with   impaired relaxation.  · Calculated right ventricular systolic pressure from tricuspid   regurgitation is 27 mmHg.          I have reviewed the medications.    Assessment/Plan   Assessment / Plan     Hospital Problem List     * (Principal)COPD exacerbation    Gastroesophageal reflux disease without esophagitis    Hyperlipidemia    CAP (community acquired pneumonia)    Leukocytosis (Chronic)    Tobacco abuse disorder (Chronic)    Acute respiratory failure with hypoxemia        Brief Hospital Course to date:  Sadie Tijerina is a 77 y.o. female with COPD and continued tobacco use presenting with SOA x 1 week and diffuse wheezing.    Assessment & Plan:    - Acute hypoxic respiratory failure: RA at home. Currently on 1L NC  - PNA: Resp Cx +H.Flu, Enterobacter, Klebsiella. D6 Ceftriaxone to which all 3 bacteria are susceptible to (completed 7days Doxy). Will give 1x dose tobra and reassess to eval for improvement. Optimized on nebs, pulm toilet  - COPD with exacerbation: Symbicort, Duonebs, Methylpred (allergic to prednisone)  - HTN: Losartan, propranolol  - Chest wall pain: Lidocaine patch  - Chronic compensated dCHF: ECHO 10/2017: EF normal, Grade I diastolic dysfunction  - Mood: Zoloft  - Hip pain s/p fall prior to admission: No acute fracture  - GERD  - Leukocytosis: On steroids  - Tobacco abuse disorder       DVT Prophylaxis: pLOV    CODE STATUS: Conditional Code    Disposition: I expect the patient to be discharged in 2-3 days    Nereida Mixon MD  11/06/17  1:32 PM

## 2017-11-06 NOTE — PROGRESS NOTES
"Pharmacy Consult-Vancomycin Dosing    Sadie Tijerina is a  76 yo woman admitted 10/28/17 for COPD exacerbation vs. PNA.  See microbiology below.  Today is day 6 of ceftriaxone therapy but clinical improvement is less than desired despite sensitivities of all isolated microbes.  Pharmacy consulted for a one-time dose of tobramycin.  Continuation of aminoglycoside therapy will be re-evaluated by the provider tomorrow.    Indication: CAP  Consulting Provider: Dr. Mixon    Current Antimicrobial Therapy  IV Anti-Infectives       Ordered     Dose/Rate Route Frequency Start Stop      11/06/17 1412  tobramycin (NEBCIN) 300 mg in sodium chloride 0.9 % 100 mL IVPB     Ordering Provider:  Sarina Johnson RPH    300 mg  100 mL/hr over 60 Minutes Intravenous Once 11/06/17 1600      11/06/17 1345  Pharmacy to Dose tobramycin (NEBCIN)     Ordering Provider:  Nereida Mixon MD     Does not apply Continuous PRN 11/06/17 1338      11/03/17 1326  cefTRIAXone (ROCEPHIN) in SWFI 1 gram/10ml IV PUSH syringe     Ordering Provider:  Marin Moran RPH    1 g  over 5 Minutes Intravenous Daily 11/04/17 0900      10/28/17 0029  doxycycline (VIBRAMYICN) tablet 100 mg     Ordering Provider:  Jenifer Daley PA-C    100 mg Oral Once 10/28/17 0031 10/28/17 0036          Allergies  Allergies as of 10/27/2017 - Tony as Reviewed 10/27/2017   Allergen Reaction Noted   • Influenza vaccines  10/12/2017   • Morphine and related  04/04/2016   • Prednisone  04/04/2016   Labs      Results from last 7 days     Lab Units 11/04/17  0513 11/03/17  0454 11/02/17  0615   BUN mg/dL 41* 30* 27*   CREATININE mg/dL 0.80 0.80 0.80     Results from last 7 days     Lab Units 11/04/17  0513 11/03/17  0454 11/02/17  0615   WBC 10*3/mm3 19.36* 18.75* 15.59*   Evaluation of Dosing     Ht - 62\" (157.5 cm)  Wt - 126 lb (57.2 kg)    Estimated Creatinine Clearance: 51.5 mL/min (by C-G formula based on Cr of 0.8).    Intake & Output (last 3 days)         11/03 0701 - " 11/04 0700 11/04 0701 - 11/05 0700 11/05 0701 - 11/06 0700 11/06 0701 - 11/07 0700      P.O. 360 960 720 480    IV Piggyback        Total Intake(mL/kg) 360 (6.3) 960 (16.8) 720 (12.6) 480 (8.4)    Urine (mL/kg/hr) 800 (0.6) 2375 (1.7) 1050 (0.8)     Total Output 800 2375 1050      Net -440 -1415 -330 +480            Unmeasured Urine Occurrence  1 x      Unmeasured Stool Occurrence 1 x               Microbiology and Radiology  Microbiology Results (last 10 days)       Procedure Component Value - Date/Time      Respiratory Culture - Sputum, Cough [[id number]]  (Abnormal)  (Susceptibility) Collected:  10/28/17 0038    Lab Status:  Final result Specimen:  Sputum from Cough Updated:  11/02/17 1416     Respiratory Culture --      Moderate growth (3+) Haemophilus influenzae Biotype III (A)        Beta Lactamase Positive            Light growth (2+) Enterobacter cloacae (A)      Light growth (2+) Klebsiella pneumoniae (A)      Light growth (2+) Normal Respiratory Ahslee     Gram Stain Result Moderate (3+) WBCs per low power field      Many (4+) Gram negative bacilli, tiny      Occasional Gram positive cocci in clusters    Susceptibility        Haemophilus influenzae Biotype III     DISK DIFFUSION     Ampicillin Resistant     Ampicillin + Sulbactam Susceptible     Ceftriaxone Susceptible     Chloramphenicol Susceptible     Meropenem Susceptible     Rifampin Susceptible     Trimethoprim + Sulfamethoxazole Susceptible                  Susceptibility        Enterobacter cloacae     TEA     Ampicillin >16 ug/ml Resistant     Ampicillin + Sulbactam 16/8 ug/ml Intermediate     Aztreonam <=8 ug/ml Susceptible     Cefepime <=8 ug/ml Susceptible     Cefotaxime <=2 ug/ml Susceptible     Ceftriaxone <=8 ug/ml Susceptible     Cefuroxime sodium 16 ug/ml Intermediate     Ertapenem <=1 ug/ml Susceptible     Gentamicin <=4 ug/ml Susceptible     Levofloxacin <=2 ug/ml Susceptible     Meropenem <=1 ug/ml Susceptible     Piperacillin +  Tazobactam <=16 ug/ml Susceptible     Tetracycline <=4 ug/ml Susceptible     Tobramycin <=4 ug/ml Susceptible     Trimethoprim + Sulfamethoxazole <=2/38 ug/ml Susceptible                  Susceptibility        Klebsiella pneumoniae     TEA     Ampicillin >16 ug/ml Resistant     Ampicillin + Sulbactam <=8/4 ug/ml Susceptible     Aztreonam <=8 ug/ml Susceptible     Cefepime <=8 ug/ml Susceptible     Cefotaxime <=2 ug/ml Susceptible     Ceftriaxone <=8 ug/ml Susceptible     Cefuroxime sodium <=4 ug/ml Susceptible     Ertapenem <=1 ug/ml Susceptible     Gentamicin <=4 ug/ml Susceptible     Levofloxacin <=2 ug/ml Susceptible     Meropenem <=1 ug/ml Susceptible     Piperacillin + Tazobactam <=16 ug/ml Susceptible     Tetracycline <=4 ug/ml Susceptible     Tobramycin <=4 ug/ml Susceptible     Trimethoprim + Sulfamethoxazole <=2/38 ug/ml Susceptible                            Assessment/Plan:    Will load the patient with 6 mg/kg (300 mg total) based on IBW.  Will reassess for ongoing dosing if provider chooses to continue the antibiotic.      Sarina Johnson, PharmD, BCPS

## 2017-11-06 NOTE — PROGRESS NOTES
Adult Nutrition  Assessment/PES    Patient Name:  Sadie Tijerina  YOB: 1940  MRN: [id number]  Admit Date:  10/27/2017    Assessment Date:  11/6/2017    Comments:            Reason for Assessment       11/06/17 1333    Reason for Assessment    Reason For Assessment/Visit follow up protocol    Time Spent (min) 15                        Nutrition Prescription Ordered       11/06/17 1334    Nutrition Prescription PO    Supplement Boost Glucose Control    Supplement Frequency 2 times a day      11/06/17 1333    Nutrition Prescription PO    Current PO Diet Regular            Evaluation of Received Nutrient/Fluid Intake       11/06/17 1333    PO Evaluation    Number of Meals 5    % PO Intake 70            Problem/Interventions:        Problem 1       11/06/17 1333    Nutrition Diagnoses Problem 1    Problem 1 Nutrition Appropriate for Condition at this Time    Etiology (related to) MNT for Treatment/Condition    Signs/Symptoms (evidenced by) PO Intake    Percent (%) intake recorded 70 %    Over number of meals 5                    Intervention Goal       11/06/17 1333    Intervention Goal    PO Maintain intake            Nutrition Intervention       11/06/17 1333    Nutrition Intervention    RD/Tech Action Follow Tx progress              Education/Evaluation       11/06/17 1333    Monitor/Evaluation    Monitor Per protocol        Electronically signed by:  Flavia Rich, MS,RD,LD  11/06/17 1:34 PM

## 2017-11-06 NOTE — PLAN OF CARE
Problem: Patient Care Overview (Adult)  Goal: Plan of Care Review  Outcome: Ongoing (interventions implemented as appropriate)    11/06/17 0541   Coping/Psychosocial Response Interventions   Plan Of Care Reviewed With patient   Patient Care Overview   Progress progress toward functional goals as expected         Problem: Respiratory Insufficiency (Adult)  Goal: Acid/Base Balance  Outcome: Ongoing (interventions implemented as appropriate)    11/06/17 0541   Respiratory Insufficiency (Adult)   Acid/Base Balance making progress toward outcome       Goal: Effective Ventilation  Outcome: Ongoing (interventions implemented as appropriate)    11/06/17 0541   Respiratory Insufficiency (Adult)   Effective Ventilation making progress toward outcome         Problem: Fall Risk (Adult)  Goal: Absence of Falls  Outcome: Ongoing (interventions implemented as appropriate)    11/06/17 0541   Fall Risk (Adult)   Absence of Falls making progress toward outcome

## 2017-11-06 NOTE — THERAPY TREATMENT NOTE
Acute Care - Occupational Therapy Treatment Note  [facility]     Patient Name: Sadie Tijerina  : 1940  MRN: [id number]  Today's Date: 2017  Onset of Illness/Injury or Date of Surgery Date: 10/27/17  Date of Referral to OT: 10/31/17  Referring Physician: VIRGIE Craft MD      Admit Date: 10/27/2017    Visit Dx:     ICD-10-CM ICD-9-CM   1. COPD with exacerbation J44.1 491.21   2. Hypoxia R09.02 799.02   3. Wheezing R06.2 786.07   4. Leukocytosis, unspecified type D72.829 288.60   5. Impaired functional mobility, balance, gait, and endurance Z74.09 V49.89   6. Impaired mobility and ADLs Z74.09 799.89     Patient Active Problem List   Diagnosis   • Benign paroxysmal positional vertigo   • Calf cramp   • Mixed anxiety depressive disorder   • Dizziness   • Fatigue   • Gastroesophageal reflux disease without esophagitis   • Hematuria   • Hyperlipidemia   • Hypertension   • Low back pain   • Orthostatic hypotension   • CAP (community acquired pneumonia)   • Pulmonary emphysema   • Restless legs syndrome   • Pre-syncope   • Essential tremor   • Vitamin D deficiency   • Balance problem   • Tension headache   • COPD exacerbation   • Leukocytosis   • COPD with exacerbation   • Tobacco abuse disorder   • Acute respiratory failure with hypoxemia             Adult Rehabilitation Note       17 0903 17 1052 17 1049    Rehab Assessment/Intervention    Discipline occupational therapist  -CL occupational therapist  -CL physical therapist  -EH    Document Type therapy note (daily note)  -CL therapy note (daily note)  -CL therapy note (daily note)  -EH    Subjective Information agree to therapy;complains of;weakness;fatigue  -CL agree to therapy;complains of;weakness;fatigue  -CL     Patient Effort, Rehab Treatment good  -CL good  -CL     Symptoms Noted During/After Treatment shortness of breath  -CL fatigue  -CL fatigue;increased pain;shortness of breath  -EH    Symptoms Noted Comment VSS.   -CL       Precautions/Limitations fall precautions;oxygen therapy device and L/min  -CL fall precautions;oxygen therapy device and L/min  -CL fall precautions;oxygen therapy device and L/min  -EH    Recorded by [CL] Ashley Cosby OT [CL] Ashley Cosby OT [EH] Jasmin Swartz, PT    Vital Signs    Pre Systolic BP Rehab 138  -CL      Pre Treatment Diastolic BP 62  -CL      Pretreatment Heart Rate (beats/min) 80  -CL      Posttreatment Heart Rate (beats/min) 76  -CL      Pre SpO2 (%) 96  -CL 93  -CL     O2 Delivery Pre Treatment supplemental O2  -CL supplemental O2  -CL     Intra SpO2 (%)  89  -CL     O2 Delivery Intra Treatment  supplemental O2  -CL     Post SpO2 (%) 96  -CL 91  -CL     O2 Delivery Post Treatment supplemental O2  -CL supplemental O2  -CL     Pre Patient Position Supine  -CL Supine  -CL     Intra Patient Position Standing  -CL Standing  -CL     Post Patient Position Sitting  -CL Supine  -CL     Recorded by [CL] Ashley Cosby OT [CL] Ashley Cosby OT     Pain Assessment    Pain Assessment Gomez-Hudson FACES  -CL Gomez-Baker FACES  -CL Gomez-Baker FACES  -EH    Gmoez-Baker FACES Pain Rating 2  -CL 2  -CL     Pain Score 6  -CL 2  -CL 2  -EH    Post Pain Score 6  -CL 2  -CL 3  -EH    Pain Type Chronic pain  -CL Acute pain  -CL Acute pain  -EH    Pain Location Generalized  -CL Abdomen  -CL Abdomen  -EH    Pain Descriptors   Aching   after coughing  -EH    Pain Intervention(s) Repositioned;Ambulation/increased activity  -CL Repositioned;Ambulation/increased activity  -CL Repositioned;Ambulation/increased activity  -EH    Response to Interventions Tolerated.   -CL Tolerated.   -CL tolerated  -EH    Recorded by [CL] Ashley Cosby OT [CL] Ashley Cosby OT [EH] Jasmin Swartz, PT    Cognitive Assessment/Intervention    Current Cognitive/Communication Assessment functional  -CL functional  -CL functional  -EH    Orientation Status oriented x 4  -CL oriented x 4  -CL oriented x 4  -EH    Follows Commands/Answers Questions 100%  of the time;needs cueing;needs repetition  -% of the time;needs cueing;needs repetition  -% of the time;able to follow single-step instructions  -EH    Personal Safety mild impairment;decreased awareness, need for safety;decreased awareness, need for assist  -CL mild impairment;decreased awareness, need for assist;decreased awareness, need for safety  -CL mild impairment  -EH    Personal Safety Interventions fall prevention program maintained;gait belt;nonskid shoes/slippers when out of bed  -CL fall prevention program maintained;gait belt;nonskid shoes/slippers when out of bed  -CL fall prevention program maintained;gait belt;nonskid shoes/slippers when out of bed  -EH    Recorded by [CL] Ashley Cosby OT [CL] Ashley Cosby OT [EH] Jasmin Swartz, PT    Bed Mobility, Assessment/Treatment    Bed Mobility, Assistive Device bed rails;head of bed elevated  -CL bed rails;head of bed elevated  -CL     Bed Mob, Supine to Sit, [place] contact guard assist;verbal cues required  -CL supervision required  -CL supervision required  -    Bed Mob, Sit to Supine, [place]  supervision required  -CL supervision required  -    Bed Mobility, Comment VCs for HP/sequencing.   -CL      Recorded by [CL] Ashley Cosby OT [CL] Ashley Cosby OT [EH] Jasmin Swartz, PT    Transfer Assessment/Treatment    Transfers, Sit-Stand [place] stand by assist;verbal cues required  -CL minimum assist (75% patient effort);verbal cues required  -CL minimum assist (75% patient effort)  -    Transfers, Stand-Sit [place] stand by assist;verbal cues required  -CL minimum assist (75% patient effort);verbal cues required  -CL minimum assist (75% patient effort)  -    Transfers, Sit-Stand-Sit, Assist Device rolling walker  -CL rolling walker  -CL rolling walker  -EH    Toilet Transfer, [place] contact guard assist;verbal cues required  -CL      Toilet Transfer, Assistive Device rolling walker   grab bars  -CL       Transfer, Comment VCs for HP/sequencing.   -CL VCs for HP/sequencing.   -CL Upon sit> stand 1st attempt pt had slight LOB. during stand>sit t/f pt required cueing for continued use of walker, positioning and HP for safety.  -EH    Recorded by [CL] Ashley Cosby OT [CL] Ashley Cosby OT [EH] Jasmin Swartz, PT    Gait Assessment/Treatment    Gait, [place] Level   minimum assist (75% patient effort)  -    Gait, Assistive Device   rolling walker  -    Gait, Distance (Feet)   56  -    Gait, Gait Pattern Analysis   swing-to gait  -    Gait, Gait Deviations   antalgic;forward flexed posture;alia decreased;decreased heel strike;step length decreased  -    Gait, Safety Issues   balance decreased during turns;step length decreased;supplemental O2  -    Gait, Impairments   strength decreased;impaired balance;pain  -EH    Gait, Comment   cueing for PLB, avoiding veering to L in hallway.  -EH    Recorded by   [EH] Jasmin Swartz, PT    Functional Mobility    Functional Mobility- Ind. Level minimum assist (75% patient effort);1 person + 1 person to manage equipment;verbal cues required  -CL minimum assist (75% patient effort);1 person + 1 person to manage equipment;verbal cues required  -CL     Functional Mobility- Device rolling walker  -CL rolling walker  -CL     Functional Mobility-Distance (Feet) 70  -CL 56  -CL     Functional Mobility- Comment Pt w/ 2 episodes of minor LOB w/ c/o numbness in B feet. Pt c/o dyspnea, however O2 sats stable. Followed w/ chair.   -CL Pt w/ frequent episodes of slight LOB w/ Min A to recover, followed closely w/ chair. Pt required 1 seated rest break for ~3 mins.   -CL     Recorded by [CL] Ashley Cosby OT [CL] Ashley Cosby OT     Toileting Assessment/Training    Toileting Assess/Train, Position sitting;standing  -CL      Toileting Assess/Train, Indepen Level contact guard assist;verbal cues required  -CL      Toileting Assess/Train, Comment Clothing management and  post-toilet hygiene.   -CL      Recorded by [CL] Ashley Cosby OT      Motor Skills/Interventions    Additional Documentation   Balance Skills Training (Group)  -EH    Recorded by   [EH] Jasmin Swartz, PT    Balance Skills Training    Sitting-Level of Assistance Close supervision  -CL Close supervision  -CL Close supervision  -EH    Sitting-Balance Support Right upper extremity supported;Left upper extremity supported;Feet supported  -CL Right upper extremity supported;Left upper extremity supported;Feet supported  -CL     Sitting-Balance Activities Forward lean;Reaching for objects;Trunk control activities  -CL Forward lean;Reaching for objects;Trunk control activities  -CL     Standing-Level of Assistance Contact guard  -CL Contact guard  -CL Contact guard  -EH    Static Standing Balance Support assistive device  -CL assistive device  -CL     Standing-Balance Activities Weight Shift A-P;Weight Shift R-L;Reaching for objects  -CL Weight Shift A-P;Weight Shift R-L  -CL     Gait Balance-Level of Assistance Minimum assistance  -CL Minimum assistance  -CL     Gait Balance Support assistive device  -CL assistive device  -CL     Gait Balance Activities scanning environment R/L;side-stepping  -CL side-stepping;scanning environment R/L  -CL     Recorded by [CL] Ashley Cosby OT [CL] Ashley Cosby OT [EH] Jasmin Swartz, PT    Therapy Exercises    Bilateral Lower Extremities   AROM:;10 reps;ankle pumps/circles;quad sets  -EH    Bilateral Upper Extremity 5 reps;AROM:;elbow flexion/extension;shoulder abduction/adduction;shoulder horizontal abd/add;shoulder protraction/retraction   w/ PLB  -CL --   Pt encouraged to perform HEP, decline d/t fatigue  -CL     Recorded by [CL] Ashley Cosby OT [CL] Ashley Cosyb OT [EH] Jasmin Swartz, PT    Positioning and Restraints    Pre-Treatment Position in bed  -CL in bed  -CL in bed  -EH    Post Treatment Position chair  -CL bed  -CL bed  -EH    In Bed  notified curtis;eddie;call  light within reach;encouraged to call for assist;exit alarm on  -CL notified nsg;fowlers;call light within reach;encouraged to call for assist;exit alarm on  -EH    In Chair notified nsg;reclined;call light within reach;encouraged to call for assist;exit alarm on;waffle cushion;legs elevated;heels elevated;with nsg  -CL      Recorded by [CL] Ashley Cosby, OT [CL] Ashley Cosby, OT [EH] Jasmin Swartz, PT      User Key  (r) = Recorded By, (t) = Taken By, (c) = Cosigned By    Initials Name Effective Dates    EH Jasmin Swartz, PT 06/19/15 -     CL Ashley Cosby OT 06/08/16 -                 OT Goals       11/06/17 1006 11/03/17 1407 11/01/17 1133    Bed Mobility OT LTG    Bed Mobility OT LTG, Date Established   11/01/17  -    Bed Mobility OT LTG, Time to Achieve   2 wks  -MC    Bed Mobility OT LTG, Activity Type   all bed mobility  -    Bed Mobility OT LTG, [place] Level   conditional independence  -    Bed Mobility OT LTG, Outcome goal ongoing  -CL goal ongoing  -CL goal ongoing  -    Transfer Training OT LTG    Transfer Training OT LTG, Date Established   11/01/17  -    Transfer Training OT LTG, Time to Achieve   2 wks  -MC    Transfer Training OT LTG, Activity Type   sit to stand/stand to sit;toilet  -    Transfer Training OT LTG, [place] Level   conditional independence  -    Transfer Training OT LTG, Assist Device   walker, rolling;commode, bedside  -    Transfer Training OT LTG, Outcome goal ongoing  -CL goal ongoing  -CL goal ongoing  -MC    Patient Education OT LTG    Patient Education OT LTG, Date Established   11/01/17  -    Patient Education OT LTG, Time to Achieve   2 wks  -MC    Patient Education OT LTG, Education Type   HEP;work simplification;energy conservation;adaptive breathing  -    Patient Education OT LTG, Education Understanding   verbalizes understanding;demonstrates adequately  -    Patient Education OT LTG Outcome goal ongoing  -CL goal ongoing  -CL goal  ongoing  -    Activity Tolerance OT LTG    Activity Tolerance Goal OT LTG, Date Established   11/01/17  -    Activity Tolerance Goal OT LTG, Time to Achieve   2 wks  -    Activity Tolerance Goal OT LTG, Activity Level   10 min activity  -    Activity Tolerance Goal OT LTG, Additional Goal   Pt will complete 10 minutes of ADLs/functional task with one sitting rest break to improve occupational endurance.   -MC    Activity Tolerance Goal OT LTG, Outcome goal ongoing  -CL goal ongoing  -CL goal ongoing  -MC      User Key  (r) = Recorded By, (t) = Taken By, (c) = Cosigned By    Initials Name Provider Type     April Galvez, OT Occupational Therapist    KRISTY Cosby, OT Occupational Therapist          Occupational Therapy Education     Title: PT OT SLP Therapies (Active)     Topic: Occupational Therapy (Active)     Point: ADL training (Active)    Description: Instruct learner(s) on proper safety adaptation and remediation techniques during self care or transfers.   Instruct in proper use of assistive devices.    Learning Progress Summary    Learner Readiness Method Response Comment Documented by Status   Patient Acceptance E,D NR Pt educated on appropriate safety precautions, t/f techniques, BUE HEP w/ PLB, and benefits of therapy.  11/06/17 1005 Active    Acceptance E,D NR Pt educated on appropriate safety precautions, t/f techniques, HEP, and benefits of therapy.  11/03/17 1407 Active    Acceptance E,D VU,NR   11/01/17 1132 Done               Point: Home exercise program (Active)    Description: Instruct learner(s) on appropriate technique for monitoring, assisting and/or progressing therapeutic exercises/activities.    Learning Progress Summary    Learner Readiness Method Response Comment Documented by Status   Patient Acceptance E,D NR Pt educated on appropriate safety precautions, t/f techniques, BUE HEP w/ PLB, and benefits of therapy.  11/06/17 1005 Active    Acceptance E,D NR Pt educated on  appropriate safety precautions, t/f techniques, HEP, and benefits of therapy.  11/03/17 1407 Active               Point: Precautions (Active)    Description: Instruct learner(s) on prescribed precautions during self-care and functional transfers.    Learning Progress Summary    Learner Readiness Method Response Comment Documented by Status   Patient Acceptance E,D NR Pt educated on appropriate safety precautions, t/f techniques, BUE HEP w/ PLB, and benefits of therapy.  11/06/17 1005 Active    Acceptance E,D NR Pt educated on appropriate safety precautions, t/f techniques, HEP, and benefits of therapy.  11/03/17 1407 Active               Point: Body mechanics (Active)    Description: Instruct learner(s) on proper positioning and spine alignment during self-care, functional mobility activities and/or exercises.    Learning Progress Summary    Learner Readiness Method Response Comment Documented by Status   Patient Acceptance E,D NR Pt educated on appropriate safety precautions, t/f techniques, BUE HEP w/ PLB, and benefits of therapy.  11/06/17 1005 Active    Acceptance E,D NR Pt educated on appropriate safety precautions, t/f techniques, HEP, and benefits of therapy.  11/03/17 1407 Active    Acceptance E,D VU,NR   11/01/17 1132 Done                      User Key     Initials Effective Dates Name Provider Type Discipline     03/14/16 -  April Galvez, OT Occupational Therapist OT     06/08/16 -  Ashley Cosby, OT Occupational Therapist OT                  OT Recommendation and Plan  Anticipated Equipment Needs At Discharge:  (TBD)  Anticipated Discharge Disposition: home with assist  Therapy Frequency: daily  Plan of Care Review  Plan Of Care Reviewed With: patient  Progress: progress toward functional goals as expected  Outcome Summary/Follow up Plan: Pt conts to be CGA for toilet t/f and this date required Min Ax1+1 to ambulate 70 ft w/ RW, frequent episodes of minor LOB. Pt conts to be limited d/t c/o  dyspnea/fatigue. Recommend cont skilled IPOT POC.         Outcome Measures       11/06/17 0903 11/03/17 1052 11/03/17 1049    How much help from another person do you currently need...    Turning from your back to your side while in flat bed without using bedrails?   3  -EH    Moving from lying on back to sitting on the side of a flat bed without bedrails?   3  -EH    Moving to and from a bed to a chair (including a wheelchair)?   3  -EH    Standing up from a chair using your arms (e.g., wheelchair, bedside chair)?   3  -EH    Climbing 3-5 steps with a railing?   1  -EH    To walk in hospital room?   3  -EH    AM-PAC 6 Clicks Score   16  -EH    How much help from another is currently needed...    Putting on and taking off regular lower body clothing? 2  -CL 2  -CL     Bathing (including washing, rinsing, and drying) 2  -CL 2  -CL     Toileting (which includes using toilet bed pan or urinal) 3  -CL 3  -CL     Putting on and taking off regular upper body clothing 3  -CL 3  -CL     Taking care of personal grooming (such as brushing teeth) 3  -CL 3  -CL     Eating meals 4  -CL 4  -CL     Score 17  -CL 17  -CL     Functional Assessment    Outcome Measure Options AM-PAC 6 Clicks Daily Activity (OT)  -CL AM-PAC 6 Clicks Daily Activity (OT)  -CL AM-PAC 6 Clicks Basic Mobility (PT)  -      User Key  (r) = Recorded By, (t) = Taken By, (c) = Cosigned By    Initials Name Provider Type     Jasmin Swartz, PT Physical Therapist    CL Ashley Cosby OT Occupational Therapist           Time Calculation:         Time Calculation- OT       11/06/17 1007          Time Calculation- OT    OT Start Time 0903  -CL      Total Timed Code Minutes- OT 24 minute(s)  -CL      OT Received On 11/06/17  -CL      OT Goal Re-Cert Due Date 11/11/17  -CL        User Key  (r) = Recorded By, (t) = Taken By, (c) = Cosigned By    Initials Name Provider Type    KRISTY Cosby OT Occupational Therapist           Therapy Charges for Today     Code  Description Service Date Service Provider Modifiers Qty    [id number]  OT THERAPEUTIC ACT EA 15 MIN 11/6/2017 Ashley Cosby OT GO 2    [id number]  OT THER SUPP EA 15 MIN 11/6/2017 Ashley Cosby OT GO 2               Ashley Cosby OT  11/6/2017

## 2017-11-07 LAB
ALBUMIN SERPL-MCNC: 3.3 G/DL (ref 3.2–4.8)
ANION GAP SERPL CALCULATED.3IONS-SCNC: -2 MMOL/L (ref 3–11)
ARTERIAL PATENCY WRIST A: POSITIVE
ATMOSPHERIC PRESS: ABNORMAL MMHG
BASE EXCESS BLDA CALC-SCNC: 5.9 MMOL/L (ref 0–2)
BDY SITE: ABNORMAL
BUN BLD-MCNC: 37 MG/DL (ref 9–23)
BUN/CREAT SERPL: 41.1 (ref 7–25)
CALCIUM SPEC-SCNC: 8.8 MG/DL (ref 8.7–10.4)
CHLORIDE SERPL-SCNC: 100 MMOL/L (ref 99–109)
CO2 BLDA-SCNC: 30.2 MMOL/L (ref 22–33)
CO2 SERPL-SCNC: 36 MMOL/L (ref 20–31)
COHGB MFR BLD: 1.1 % (ref 0–2)
CREAT BLD-MCNC: 0.9 MG/DL (ref 0.6–1.3)
DEPRECATED RDW RBC AUTO: 43.6 FL (ref 37–54)
ERYTHROCYTE [DISTWIDTH] IN BLOOD BY AUTOMATED COUNT: 12.9 % (ref 11.3–14.5)
GFR SERPL CREATININE-BSD FRML MDRD: 60 ML/MIN/1.73
GLUCOSE BLD-MCNC: 90 MG/DL (ref 70–100)
HCO3 BLDA-SCNC: 29.1 MMOL/L (ref 20–26)
HCT VFR BLD AUTO: 40.1 % (ref 34.5–44)
HCT VFR BLD CALC: 43.2 %
HGB BLD-MCNC: 12.8 G/DL (ref 11.5–15.5)
HGB BLDA-MCNC: 14.1 G/DL (ref 14–18)
HOROWITZ INDEX BLD+IHG-RTO: 27 %
MCH RBC QN AUTO: 29.3 PG (ref 27–31)
MCHC RBC AUTO-ENTMCNC: 31.9 G/DL (ref 32–36)
MCV RBC AUTO: 91.8 FL (ref 80–99)
METHGB BLD QL: 0.6 % (ref 0–1.5)
MODALITY: ABNORMAL
OXYHGB MFR BLDV: 98.4 % (ref 94–99)
PCO2 BLDA: 36.5 MM HG (ref 35–45)
PH BLDA: 7.51 PH UNITS (ref 7.35–7.45)
PHOSPHATE SERPL-MCNC: 3.4 MG/DL (ref 2.4–5.1)
PLATELET # BLD AUTO: 281 10*3/MM3 (ref 150–450)
PMV BLD AUTO: 10.4 FL (ref 6–12)
PO2 BLDA: 151 MM HG (ref 83–108)
POTASSIUM BLD-SCNC: 4.4 MMOL/L (ref 3.5–5.5)
RBC # BLD AUTO: 4.37 10*6/MM3 (ref 3.89–5.14)
SODIUM BLD-SCNC: 134 MMOL/L (ref 132–146)
WBC NRBC COR # BLD: 21.52 10*3/MM3 (ref 3.5–10.8)

## 2017-11-07 PROCEDURE — 36600 WITHDRAWAL OF ARTERIAL BLOOD: CPT | Performed by: HOSPITALIST

## 2017-11-07 PROCEDURE — 97110 THERAPEUTIC EXERCISES: CPT

## 2017-11-07 PROCEDURE — 85027 COMPLETE CBC AUTOMATED: CPT | Performed by: HOSPITALIST

## 2017-11-07 PROCEDURE — 25010000002 ENOXAPARIN PER 10 MG

## 2017-11-07 PROCEDURE — 80069 RENAL FUNCTION PANEL: CPT | Performed by: HOSPITALIST

## 2017-11-07 PROCEDURE — 94799 UNLISTED PULMONARY SVC/PX: CPT

## 2017-11-07 PROCEDURE — 99232 SBSQ HOSP IP/OBS MODERATE 35: CPT | Performed by: HOSPITALIST

## 2017-11-07 PROCEDURE — 82805 BLOOD GASES W/O2 SATURATION: CPT | Performed by: HOSPITALIST

## 2017-11-07 PROCEDURE — 94760 N-INVAS EAR/PLS OXIMETRY 1: CPT

## 2017-11-07 PROCEDURE — 25010000002 CEFTRIAXONE PER 250 MG

## 2017-11-07 PROCEDURE — 63710000001 METHYLPREDNISOLONE PER 4 MG: Performed by: NURSE PRACTITIONER

## 2017-11-07 PROCEDURE — 97116 GAIT TRAINING THERAPY: CPT

## 2017-11-07 RX ORDER — DEXTROMETHORPHAN POLISTIREX 30 MG/5ML
30 SUSPENSION ORAL EVERY 12 HOURS SCHEDULED
Status: DISCONTINUED | OUTPATIENT
Start: 2017-11-07 | End: 2017-11-12 | Stop reason: HOSPADM

## 2017-11-07 RX ADMIN — NYSTATIN 500000 UNITS: 100000 SUSPENSION ORAL at 12:38

## 2017-11-07 RX ADMIN — NYSTATIN 500000 UNITS: 100000 SUSPENSION ORAL at 17:22

## 2017-11-07 RX ADMIN — LIDOCAINE 1 PATCH: 50 PATCH CUTANEOUS at 09:03

## 2017-11-07 RX ADMIN — ENOXAPARIN SODIUM 40 MG: 40 INJECTION SUBCUTANEOUS at 09:01

## 2017-11-07 RX ADMIN — ATORVASTATIN CALCIUM 10 MG: 10 TABLET, FILM COATED ORAL at 20:21

## 2017-11-07 RX ADMIN — BUDESONIDE AND FORMOTEROL FUMARATE DIHYDRATE 2 PUFF: 160; 4.5 AEROSOL RESPIRATORY (INHALATION) at 08:09

## 2017-11-07 RX ADMIN — DEXTROMETHORPHAN 30 MG: 30 SUSPENSION, EXTENDED RELEASE ORAL at 09:02

## 2017-11-07 RX ADMIN — IPRATROPIUM BROMIDE AND ALBUTEROL SULFATE 3 ML: .5; 3 SOLUTION RESPIRATORY (INHALATION) at 12:43

## 2017-11-07 RX ADMIN — IPRATROPIUM BROMIDE AND ALBUTEROL SULFATE 3 ML: .5; 3 SOLUTION RESPIRATORY (INHALATION) at 08:09

## 2017-11-07 RX ADMIN — IPRATROPIUM BROMIDE AND ALBUTEROL SULFATE 3 ML: .5; 3 SOLUTION RESPIRATORY (INHALATION) at 19:41

## 2017-11-07 RX ADMIN — SERTRALINE HYDROCHLORIDE 50 MG: 50 TABLET ORAL at 09:03

## 2017-11-07 RX ADMIN — DEXTROMETHORPHAN 30 MG: 30 SUSPENSION, EXTENDED RELEASE ORAL at 20:22

## 2017-11-07 RX ADMIN — METHYLPREDNISOLONE 32 MG: 16 TABLET ORAL at 09:02

## 2017-11-07 RX ADMIN — ASPIRIN 81 MG: 81 TABLET, COATED ORAL at 09:02

## 2017-11-07 RX ADMIN — FLUTICASONE PROPIONATE 2 SPRAY: 50 SPRAY, METERED NASAL at 09:03

## 2017-11-07 RX ADMIN — IPRATROPIUM BROMIDE AND ALBUTEROL SULFATE 3 ML: .5; 3 SOLUTION RESPIRATORY (INHALATION) at 00:23

## 2017-11-07 RX ADMIN — VITAMIN D, TAB 1000IU (100/BT) 1000 UNITS: 25 TAB at 09:02

## 2017-11-07 RX ADMIN — IPRATROPIUM BROMIDE AND ALBUTEROL SULFATE 3 ML: .5; 3 SOLUTION RESPIRATORY (INHALATION) at 03:50

## 2017-11-07 RX ADMIN — PROPRANOLOL HYDROCHLORIDE 60 MG: 20 TABLET ORAL at 09:02

## 2017-11-07 RX ADMIN — NYSTATIN 500000 UNITS: 100000 SUSPENSION ORAL at 20:22

## 2017-11-07 RX ADMIN — BUDESONIDE AND FORMOTEROL FUMARATE DIHYDRATE 2 PUFF: 160; 4.5 AEROSOL RESPIRATORY (INHALATION) at 19:42

## 2017-11-07 RX ADMIN — IPRATROPIUM BROMIDE AND ALBUTEROL SULFATE 3 ML: .5; 3 SOLUTION RESPIRATORY (INHALATION) at 16:29

## 2017-11-07 RX ADMIN — WATER 1 G: 1 INJECTION INTRAMUSCULAR; INTRAVENOUS; SUBCUTANEOUS at 09:02

## 2017-11-07 RX ADMIN — NYSTATIN 500000 UNITS: 100000 SUSPENSION ORAL at 09:02

## 2017-11-07 RX ADMIN — LOSARTAN POTASSIUM 25 MG: 25 TABLET, FILM COATED ORAL at 09:02

## 2017-11-07 NOTE — PLAN OF CARE
Problem: Patient Care Overview (Adult)  Goal: Plan of Care Review  Outcome: Ongoing (interventions implemented as appropriate)    11/07/17 1428   Patient Care Overview   Progress progress toward functional goals is gradual   Outcome Evaluation   Outcome Summary/Follow up Plan Pt c/o dizziness with forward ambulation and she needs consistent MIN A 2/2 scissoring steps. No improvement with standing rest break and PLB. Pt assisted back to chair, VSS. NSG presented to room to assess pt. DTR reports pt confused.         Problem: Inpatient Physical Therapy  Goal: Bed Mobility Goal LTG- PT  Outcome: Ongoing (interventions implemented as appropriate)  Goal: Transfer Training Goal 1 LTG- PT  Outcome: Ongoing (interventions implemented as appropriate)    11/01/17 1119 11/07/17 1428   Transfer Training PT LTG   Transfer Training PT LTG, Date Established 11/01/17 --    Transfer Training PT LTG, Time to Achieve 1 wk --    Transfer Training PT LTG, Activity Type bed to chair /chair to bed;sit to stand/stand to sit --    Transfer Training PT LTG, [place] Level supervision required --    Transfer Training PT LTG, Assist Device walker, rolling --    Transfer Training PT LTG, Outcome --  goal ongoing       Goal: Gait Training Goal LTG- PT  Outcome: Ongoing (interventions implemented as appropriate)    11/01/17 1119 11/07/17 1428   Gait Training PT LTG   Gait Training Goal PT LTG, Date Established 11/01/17 --    Gait Training Goal PT LTG, Time to Achieve 1 wk --    Gait Training Goal PT LTG, [place] Level contact guard assist  (stable VS,WBAT LLE; NO LOB) --    Gait Training Goal PT LTG, Assist Device walker, rolling --    Gait Training Goal PT LTG, Distance to Achieve 150 --    Gait Training Goal PT LTG, Outcome --  goal ongoing       Goal: Patient Education Goal LTG- PT  Outcome: Ongoing (interventions implemented as appropriate)    11/01/17 1119 11/07/17 1428   Patient Education PT LTG   Patient Education PT LTG, Date  Established 11/01/17 --    Patient Education PT LTG, Time to Achieve 1 wk --    Patient Education PT LTG, Education Type written program;HEP;posture/body mechanics;gait;transfers;bed mobility;benefits of activity --    Patient Education PT LTG, Education Understanding demonstrate adequately;verbalize understanding --    Patient Education PT LTG Outcome --  goal ongoing

## 2017-11-07 NOTE — PLAN OF CARE
Problem: Patient Care Overview (Adult)  Goal: Plan of Care Review  Outcome: Ongoing (interventions implemented as appropriate)  Cont with wet cough, getting OOB without calling for help first at times, o2 at 1-2l nc    11/06/17 1006   Coping/Psychosocial Response Interventions   Plan Of Care Reviewed With patient   Patient Care Overview   Progress progress toward functional goals as expected       Goal: Adult Individualization and Mutuality  Outcome: Ongoing (interventions implemented as appropriate)  Goal: Discharge Needs Assessment  Outcome: Ongoing (interventions implemented as appropriate)    Problem: Respiratory Insufficiency (Adult)  Goal: Acid/Base Balance  Outcome: Ongoing (interventions implemented as appropriate)  Goal: Effective Ventilation  Outcome: Ongoing (interventions implemented as appropriate)    Problem: Fall Risk (Adult)  Goal: Absence of Falls  Outcome: Ongoing (interventions implemented as appropriate)

## 2017-11-07 NOTE — PROGRESS NOTES
[facility] Medicine Services  PROGRESS NOTE    Patient Name: Sadie Tijerina  : 1940  MRN: [id number]    Date of Admission: 10/27/2017  Length of Stay: 10  Primary Care Physician: Kristian Wolff MD    Subjective   Subjective     CC:  SOB    HPI:  Still coughing with increased wheezing and chest pain when coughing. Still very weak. Hasn't been out of bed.    Review of Systems   Gen- No fevers, chills  CV- No chest pain, palpitations  Resp- +persistent productive cough, dyspnea on 1L NC  GI- No N/V/D, abd pain    Otherwise ROS is negative except as mentioned in the HPI.    Objective   Objective     Vital Signs:   Temp:  [97.1 °F (36.2 °C)-98.3 °F (36.8 °C)] 97.9 °F (36.6 °C)  Heart Rate:  [] 80  Resp:  [16-20] 16  BP: (105-146)/(57-67) 146/67        Physical Exam:  NAD, alert and oriented, ill appearing  OP clear, dry MM  Neck supple  Diffuse wheezing, doesn't move a lot of air  RRR  +BS, ND, NT  RODRIGUEZ  No edema  Normal affect  No rashes  Alert and oriented    Results Reviewed:  I have personally reviewed current lab, radiology, and data and agree.      Results from last 7 days  Lab Units 17   WBC 10*3/mm3 21.52* 19.36* 18.75*   HEMOGLOBIN g/dL 12.8 12.8 13.7   HEMATOCRIT % 40.1 40.4 42.1   PLATELETS 10*3/mm3 281 273 279       Results from last 7 days  Lab Units 1745 17  0454   SODIUM mmol/L 134 136 134   POTASSIUM mmol/L 4.4 4.6 5.0   CHLORIDE mmol/L 100 98* 97*   CO2 mmol/L 36.0* 30.0 33.0*   BUN mg/dL 37* 41* 30*   CREATININE mg/dL 0.90 0.80 0.80   GLUCOSE mg/dL 90 96 100   CALCIUM mg/dL 8.8 9.3 9.4     No results found for: BNP  No results found for: PHART    Microbiology Results Abnormal     Procedure Component Value - Date/Time    Respiratory Culture - Sputum, Cough [[id number]]  (Abnormal)  (Susceptibility) Collected:  10/28/17 0038    Lab Status:  Final result Specimen:  Sputum from Cough Updated:   11/02/17 1416     Respiratory Culture --      Moderate growth (3+) Haemophilus influenzae Biotype III (A)        Beta Lactamase Positive            Light growth (2+) Enterobacter cloacae (A)      Light growth (2+) Klebsiella pneumoniae (A)      Light growth (2+) Normal Respiratory Ashlee     Gram Stain Result Moderate (3+) WBCs per low power field      Many (4+) Gram negative bacilli, tiny      Occasional Gram positive cocci in clusters    Susceptibility      Haemophilus influenzae Biotype III     DISK DIFFUSION     Ampicillin Resistant     Ampicillin + Sulbactam Susceptible     Ceftriaxone Susceptible     Chloramphenicol Susceptible     Meropenem Susceptible     Rifampin Susceptible     Trimethoprim + Sulfamethoxazole Susceptible                Susceptibility      Enterobacter cloacae     TEA     Ampicillin >16 ug/ml Resistant     Ampicillin + Sulbactam 16/8 ug/ml Intermediate     Aztreonam <=8 ug/ml Susceptible     Cefepime <=8 ug/ml Susceptible     Cefotaxime <=2 ug/ml Susceptible     Ceftriaxone <=8 ug/ml Susceptible     Cefuroxime sodium 16 ug/ml Intermediate     Ertapenem <=1 ug/ml Susceptible     Gentamicin <=4 ug/ml Susceptible     Levofloxacin <=2 ug/ml Susceptible     Meropenem <=1 ug/ml Susceptible     Piperacillin + Tazobactam <=16 ug/ml Susceptible     Tetracycline <=4 ug/ml Susceptible     Tobramycin <=4 ug/ml Susceptible     Trimethoprim + Sulfamethoxazole <=2/38 ug/ml Susceptible                Susceptibility      Klebsiella pneumoniae     TEA     Ampicillin >16 ug/ml Resistant     Ampicillin + Sulbactam <=8/4 ug/ml Susceptible     Aztreonam <=8 ug/ml Susceptible     Cefepime <=8 ug/ml Susceptible     Cefotaxime <=2 ug/ml Susceptible     Ceftriaxone <=8 ug/ml Susceptible     Cefuroxime sodium <=4 ug/ml Susceptible     Ertapenem <=1 ug/ml Susceptible     Gentamicin <=4 ug/ml Susceptible     Levofloxacin <=2 ug/ml Susceptible     Meropenem <=1 ug/ml Susceptible     Piperacillin + Tazobactam <=16 ug/ml  Susceptible     Tetracycline <=4 ug/ml Susceptible     Tobramycin <=4 ug/ml Susceptible     Trimethoprim + Sulfamethoxazole <=2/38 ug/ml Susceptible                          Imaging Results (last 24 hours)     Procedure Component Value Units Date/Time    XR Chest 1 View [[id number]] Collected:  11/05/17 1324     Updated:  11/07/17 0920    Narrative:          EXAMINATION: XR CHEST, SINGLE VIEW - 11/05/2017     INDICATION:  J44.1-Chronic obstructive pulmonary disease with (acute)  exacerbation; R09.02-Hypoxemia; R06.2-Wheezing; D72.829-Elevated white  blood cell count, unspecified; Z74.09-Other reduced mobility.      COMPARISON: Chest x-ray 11/01/2017.     FINDINGS: Chronic lung changes with hyperinflated appearance and  flattening of the diaphragms concerning for underlying emphysema. No  focal airspace opacity or consolidation. Pulmonary vascularity within  normal limits. Cardiomegaly signal contour within normal limits. No  pneumothorax or pleural effusion.           Impression:       Chronic lung changes without acute cardiopulmonary process.     DICTATED:     11/05/2017  EDITED:         11/05/2017        This report was finalized on 11/7/2017 9:18 AM by Dr. Wellington Noe.           Results for orders placed during the hospital encounter of 10/27/17   Adult Transthoracic Echo Complete W/ Cont if Necessary Per Protocol    Narrative · Left ventricular systolic function is normal.  · Estimated EF appears to be in the range of 61 - 65%.  · Left ventricular diastolic dysfunction (grade I a) consistent with   impaired relaxation.  · Calculated right ventricular systolic pressure from tricuspid   regurgitation is 27 mmHg.          I have reviewed the medications.    Assessment/Plan   Assessment / Plan     Hospital Problem List     * (Principal)COPD exacerbation    Gastroesophageal reflux disease without esophagitis    Hyperlipidemia    CAP (community acquired pneumonia)    Leukocytosis (Chronic)    Tobacco abuse disorder  (Chronic)    Acute respiratory failure with hypoxemia        Brief Hospital Course to date:  Sadie Tijerina is a 77 y.o. female with COPD and continued tobacco use presenting with SOA x 1 week and diffuse wheezing.    Assessment & Plan:    - Acute hypoxic respiratory failure: RA at home. Currently on 1L NC  - PNA: Resp Cx +H.Flu, Enterobacter, Klebsiella. D7 Ceftriaxone to which all 3 bacteria are susceptible to (completed 7days Doxy). S/P tobramycin per notes. Optimized on nebs, pulm toilet. Needs to mobilize!  - COPD with exacerbation: Symbicort, Duonebs, Methylpred (allergic to prednisone)  - Leukocytosis, likely secondary to steroids  - HTN: Losartan, propranolol  - Chest wall pain: Lidocaine patch  - Chronic compensated dCHF: ECHO 10/2017: EF normal, Grade I diastolic dysfunction  - Mood: Zoloft  - Hip pain s/p fall prior to admission: No acute fracture  - GERD  - Leukocytosis: On steroids  - Tobacco abuse disorder       DVT Prophylaxis: pLOV    CODE STATUS: Conditional Code    Disposition: I expect the patient to be discharged in 2-3 days    Paulino Watts MD  11/07/17  10:52 AM

## 2017-11-07 NOTE — THERAPY TREATMENT NOTE
Acute Care - Physical Therapy Treatment Note  [facility]     Patient Name: Sadie Tijerina  : 1940  MRN: [id number]  Today's Date: 2017  Onset of Illness/Injury or Date of Surgery Date: 10/27/17  Date of Referral to PT: 10/31/17  Referring Physician: VIRGIE Craft MD    Admit Date: 10/27/2017    Visit Dx:    ICD-10-CM ICD-9-CM   1. COPD with exacerbation J44.1 491.21   2. Hypoxia R09.02 799.02   3. Wheezing R06.2 786.07   4. Leukocytosis, unspecified type D72.829 288.60   5. Impaired functional mobility, balance, gait, and endurance Z74.09 V49.89   6. Impaired mobility and ADLs Z74.09 799.89     Patient Active Problem List   Diagnosis   • Benign paroxysmal positional vertigo   • Calf cramp   • Mixed anxiety depressive disorder   • Dizziness   • Fatigue   • Gastroesophageal reflux disease without esophagitis   • Hematuria   • Hyperlipidemia   • Hypertension   • Low back pain   • Orthostatic hypotension   • CAP (community acquired pneumonia)   • Pulmonary emphysema   • Restless legs syndrome   • Pre-syncope   • Essential tremor   • Vitamin D deficiency   • Balance problem   • Tension headache   • COPD exacerbation   • Leukocytosis   • COPD with exacerbation   • Tobacco abuse disorder   • Acute respiratory failure with hypoxemia               Adult Rehabilitation Note       17 1350 17 0903       Rehab Assessment/Intervention    Discipline physical therapist  -EH occupational therapist  -CL     Document Type therapy note (daily note)  -EH therapy note (daily note)  -CL     Subjective Information agree to therapy;complains of;fatigue  -EH agree to therapy;complains of;weakness;fatigue  -CL     Patient Effort, Rehab Treatment good  -EH good  -CL     Symptoms Noted During/After Treatment dizziness  -EH shortness of breath  -CL     Symptoms Noted Comment VSS  -EH VSS.   -CL     Precautions/Limitations cardiac precautions  -EH fall precautions;oxygen therapy device and L/min  -CL     Specific  Treatment Considerations scissoring gait  -EH      Recorded by [EH] Jasmin Swartz, PT [CL] Ashley Cosby OT     Vital Signs    Pre Systolic BP Rehab  138  -CL     Pre Treatment Diastolic BP  62  -CL     Pretreatment Heart Rate (beats/min)  80  -CL     Posttreatment Heart Rate (beats/min)  76  -CL     Pre SpO2 (%)  96  -CL     O2 Delivery Pre Treatment  supplemental O2  -CL     Post SpO2 (%)  96  -CL     O2 Delivery Post Treatment  supplemental O2  -CL     Pre Patient Position  Supine  -CL     Intra Patient Position  Standing  -CL     Post Patient Position  Sitting  -CL     Recorded by  [CL] Ashley Cosby OT     Pain Assessment    Pain Assessment Gomez-Hudson FACES  -EH Gomez-Baker FACES  -CL     Gomez-Hudson FACES Pain Rating 2  -EH 2  -CL     Pain Score 6  -EH 6  -CL     Post Pain Score 6  -EH 6  -CL     Pain Type Chronic pain  -EH Chronic pain  -CL     Pain Location Generalized  -EH Generalized  -CL     Pain Descriptors Aching  -EH      Pain Frequency Constant/continuous  -EH      Patient's Stated Pain Goal No pain  -EH      Pain Intervention(s) Repositioned;Ambulation/increased activity  -EH Repositioned;Ambulation/increased activity  -CL     Response to Interventions tolerated  -EH Tolerated.   -CL     Recorded by [EH] Jasmin Swartz, PT [CL] Ashley Cosby OT     Vision Assessment/Intervention    Visual Impairment WFL  -EH      Recorded by [EH] Jasmin Swartz, PT      Cognitive Assessment/Intervention    Current Cognitive/Communication Assessment impaired  -EH functional  -CL     Orientation Status oriented x 4  -EH oriented x 4  -CL     Follows Commands/Answers Questions 100% of the time;able to follow single-step instructions   dtr reports pt confused call in A.M.; pt reports working ...  - 100% of the time;needs cueing;needs repetition  -CL     Personal Safety mild impairment   with therapy prior this morning; no therapy this A.M.  -EH mild impairment;decreased awareness, need for safety;decreased  awareness, need for assist  -CL     Personal Safety Interventions fall prevention program maintained;gait belt;nonskid shoes/slippers when out of bed  -EH fall prevention program maintained;gait belt;nonskid shoes/slippers when out of bed  -CL     Recorded by [EH] Jasmin Swartz, PT [CL] Ashley Cosby OT     Bed Mobility, Assessment/Treatment    Bed Mobility, Assistive Device  bed rails;head of bed elevated  -CL     Bed Mob, Supine to Sit, [place]  contact guard assist;verbal cues required  -CL     Bed Mobility, Comment pt UI  - VCs for HP/sequencing.   -CL     Recorded by [EH] Jasmin Swartz, PT [CL] Ashley Cosby OT     Transfer Assessment/Treatment    Transfers, Sit-Stand [place] contact guard assist;verbal cues required  - stand by assist;verbal cues required  -CL     Transfers, Stand-Sit [place] contact guard assist;verbal cues required  - stand by assist;verbal cues required  -CL     Transfers, Sit-Stand-Sit, Assist Device rolling walker  -EH rolling walker  -CL     Toilet Transfer, [place]  contact guard assist;verbal cues required  -CL     Toilet Transfer, Assistive Device  rolling walker   grab bars  -CL     Transfer, Comment VC for HP; pt does not push up or reach back to chair.  - VCs for HP/sequencing.   -CL     Recorded by [EH] Jasmin Swartz, PT [CL] Ashley Cosby OT     Gait Assessment/Treatment    Gait, [place] Level minimum assist (75% patient effort)  -      Gait, Assistive Device rolling walker  -      Gait, Distance (Feet) 20  -      Gait, Gait Pattern Analysis swing-through gait  -      Gait, Gait Deviations narrow base;ataxia  -      Gait, Safety Issues step length decreased;weight-shifting ability decreased;supplemental O2   pt reports dizziness.  -      Gait, Impairments strength decreased;impaired balance;pain  -      Gait, Comment Pt reports dizziness after ~10 ft of forward ambualtion. Pt cued to stop, stand tall and focus on PLB. Pt  dizziness does not improve, and pt demonstrates multiple scissoring steps with need for assist to maintain balance.  -EH      Recorded by [EH] Jasmin Swartz, PT      Functional Mobility    Functional Mobility- Ind. Level  minimum assist (75% patient effort);1 person + 1 person to manage equipment;verbal cues required  -CL     Functional Mobility- Device  rolling walker  -CL     Functional Mobility-Distance (Feet)  70  -CL     Functional Mobility- Comment  Pt w/ 2 episodes of minor LOB w/ c/o numbness in B feet. Pt c/o dyspnea, however O2 sats stable. Followed w/ chair.   -CL     Recorded by  [CL] Ashley Cosby OT     Toileting Assessment/Training    Toileting Assess/Train, Position  sitting;standing  -CL     Toileting Assess/Train, Indepen Level  contact guard assist;verbal cues required  -CL     Toileting Assess/Train, Comment  Clothing management and post-toilet hygiene.   -CL     Recorded by  [CL] Ashley Cosby OT     Balance Skills Training    Sitting-Level of Assistance  Close supervision  -CL     Sitting-Balance Support  Right upper extremity supported;Left upper extremity supported;Feet supported  -CL     Sitting-Balance Activities  Forward lean;Reaching for objects;Trunk control activities  -CL     Standing-Level of Assistance  Contact guard  -CL     Static Standing Balance Support  assistive device  -CL     Standing-Balance Activities  Weight Shift A-P;Weight Shift R-L;Reaching for objects  -CL     Gait Balance-Level of Assistance  Minimum assistance  -CL     Gait Balance Support  assistive device  -CL     Gait Balance Activities  scanning environment R/L;side-stepping  -CL     Recorded by  [CL] Ashley Cosby OT     Therapy Exercises    Bilateral Lower Extremities AROM:;10 reps;ankle pumps/circles;knee flexion;LAQ  -EH      Bilateral Upper Extremity  5 reps;AROM:;elbow flexion/extension;shoulder abduction/adduction;shoulder horizontal abd/add;shoulder protraction/retraction   w/ PLB  -CL     Recorded by  [EH] Jasmin Swartz, PT [CL] Ashley Cosby, OT     Positioning and Restraints    Pre-Treatment Position in bed  -EH in bed  -CL     Post Treatment Position chair  -EH chair  -CL     In Chair notified nsg;reclined;call light within reach;exit alarm on;encouraged to call for assist;with family/caregiver  - notified nsg;reclined;call light within reach;encouraged to call for assist;exit alarm on;waffle cushion;legs elevated;heels elevated;with nsg  -CL     Recorded by [EH] Jasmin Swartz, PT [CL] Ashley Cosby, OT       User Key  (r) = Recorded By, (t) = Taken By, (c) = Cosigned By    Initials Name Effective Dates     Jasmin Swartz, PT 06/19/15 -     CL Ashley Cosby, OT 06/08/16 -                 IP PT Goals       11/07/17 1428 11/03/17 1121 11/01/17 1119    Bed Mobility PT LTG    Bed Mobility PT LTG, Date Established   11/01/17  -DM    Bed Mobility PT LTG, Time to Achieve   1 wk  -DM    Bed Mobility PT LTG, Activity Type   all bed mobility  -DM    Bed Mobility PT LTG, [place] Level   independent  -DM    Bed Mobility PT LTG, Outcome goal [facility] goal ongoing  -     Transfer Training PT LTG    Transfer Training PT LTG, Date Established   11/01/17  -DM    Transfer Training PT LTG, Time to Achieve   1 wk  -DM    Transfer Training PT LTG, Activity Type   bed to chair /chair to bed;sit to stand/stand to sit  -DM    Transfer Training PT LTG, [place] Level   supervision required  -DM    Transfer Training PT LTG, Assist Device   walker, rolling  -DM    Transfer Training PT LTG, Outcome goal ongoing  [facility] goal ongoing  -     Gait Training PT LTG    Gait Training Goal PT LTG, Date Established   11/01/17  -DM    Gait Training Goal PT LTG, Time to Achieve   1 wk  -DM    Gait Training Goal PT LTG, [place] Level   contact guard assist   stable VS,WBAT LLE; NO LOB  -DM    Gait Training Goal PT LTG, Assist Device   walker, rolling  -DM    Gait Training Goal PT LTG, Distance to Achieve   150  -DM     Gait Training Goal PT LTG, Outcome goal ongoing  - goal ongoing  -     Patient Education PT LTG    Patient Education PT LTG, Date Established   11/01/17  -DM    Patient Education PT LTG, Time to Achieve   1 wk  -DM    Patient Education PT LTG, Education Type   written program;HEP;posture/body mechanics;gait;transfers;bed mobility;benefits of activity  -DM    Patient Education PT LTG, Education Understanding   demonstrate adequately;verbalize understanding  -DM    Patient Education PT LTG Outcome goal ongoing  - goal ongoing  -EH       User Key  (r) = Recorded By, (t) = Taken By, (c) = Cosigned By    Initials Name Provider Type     Jasmin Swartz, PT Physical Therapist    DM Nicole Fraire, PT Physical Therapist          Physical Therapy Education     Title: PT OT SLP Therapies (Active)     Topic: Physical Therapy (Done)     Point: Mobility training (Done)    Learning Progress Summary    Learner Readiness Method Response Comment Documented by Status   Patient Acceptance E ,[facility] 11/07/17 1428 Done    Acceptance E ,[facility] 11/03/17 1120 Done    Jered ED NR   11/01/17 1119 Active               Point: Home exercise program (Done)    Learning Progress Summary    Learner Readiness Method Response Comment Documented by Status   Patient Acceptance E VU,[facility] 11/07/17 1428 Done    Acceptance E VU,[facility] 11/03/17 1120 Done    Jered ED NR   11/01/17 1119 Active               Point: Body mechanics (Done)    Learning Progress Summary    Learner Readiness Method Response Comment Documented by Status   Patient Acceptance E VU,[facility] 11/07/17 1428 Done    Acceptance E VU,[facility] 11/03/17 1120 Done    Jered ED NR   11/01/17 1119 Active               Point: Precautions (Done)    Learning Progress Summary    Learner Readiness Method Response Comment Documented by Status   Patient Acceptance E VU,[facility] 11/07/17 1428 Done    Acceptance E VU,[facility] 11/03/17 1120 Done    Jered TRINIDADD NR   11/01/17 1119 Active                       User Key     Initials Effective Dates Name Provider Type Discipline     06/19/15 -  Jasmin Swartz, PT Physical Therapist PT    DM 06/19/15 -  Nicole Fraire, PT Physical Therapist PT                    PT Recommendation and Plan  Anticipated Discharge Disposition: home with home health (pt told CM she plans to return home(dtr. to helpPRN))  PT Frequency: daily  Plan of Care Review  Plan Of Care Reviewed With: patient  Progress: progress toward functional goals is gradual  Outcome Summary/Follow up Plan: Pt c/o dizziness with forward ambulation and she needs consistent MIN A 2/2 scissoring steps. No improvement with standing rest break and PLB. Pt assisted back to chair, VSS. NSG presented to room to assess pt. DTR reports pt confused.          Outcome Measures       11/07/17 1350 11/06/17 0903       How much help from another person do you currently need...    Turning from your back to your side while in flat bed without using bedrails? 3  -EH      Moving from lying on back to sitting on the side of a flat bed without bedrails? 3  -EH      Moving to and from a bed to a chair (including a wheelchair)? 3  -EH      Standing up from a chair using your arms (e.g., wheelchair, bedside chair)? 3  -EH      Climbing 3-5 steps with a railing? 1  -EH      To walk in hospital room? 3  -EH      AM-PAC 6 Clicks Score 16  -EH      How much help from another is currently needed...    Putting on and taking off regular lower body clothing?  2  -CL     Bathing (including washing, rinsing, and drying)  2  -CL     Toileting (which includes using toilet bed pan or urinal)  3  -CL     Putting on and taking off regular upper body clothing  3  -CL     Taking care of personal grooming (such as brushing teeth)  3  -CL     Eating meals  4  -CL     Score  17  -CL     Functional Assessment    Outcome Measure Options AM-PAC 6 Clicks Basic Mobility (PT)  -EH AM-PAC 6 Clicks Daily Activity (OT)  -CL       User Key  (r) =  Recorded By, (t) = Taken By, (c) = Cosigned By    Initials Name Provider Type     Jasmin Swartz PT Physical Therapist    KRISTY Cosby, OT Occupational Therapist           Time Calculation:         PT Charges       11/07/17 1433          Time Calculation    Start Time 1350  -      PT Received On 11/07/17  -      PT Goal Re-Cert Due Date 11/11/17  -      Time Calculation- PT    Total Timed Code Minutes- PT 27 minute(s)  -        User Key  (r) = Recorded By, (t) = Taken By, (c) = Cosigned By    Initials Name Provider Type     Jasmin Swartz PT Physical Therapist          Therapy Charges for Today     Code Description Service Date Service Provider Modifiers Qty    13072334249 HC GAIT TRAINING EA 15 MIN 11/7/2017 Jasmin Swartz, PT GP 1    24624699766 HC PT THER PROC EA 15 MIN 11/7/2017 Jasmin Swartz, PT GP 1          PT G-Codes  Outcome Measure Options: AM-PAC 6 Clicks Basic Mobility (PT)    Jasmin Swartz PT  11/7/2017

## 2017-11-08 ENCOUNTER — APPOINTMENT (OUTPATIENT)
Dept: GENERAL RADIOLOGY | Facility: HOSPITAL | Age: 77
End: 2017-11-08

## 2017-11-08 LAB
BILIRUB UR QL STRIP: NEGATIVE
CLARITY UR: CLEAR
COLOR UR: YELLOW
GLUCOSE UR STRIP-MCNC: NEGATIVE MG/DL
HGB UR QL STRIP.AUTO: NEGATIVE
KETONES UR QL STRIP: NEGATIVE
LEUKOCYTE ESTERASE UR QL STRIP.AUTO: NEGATIVE
NITRITE UR QL STRIP: NEGATIVE
PH UR STRIP.AUTO: 7 [PH] (ref 5–8)
PROT UR QL STRIP: NEGATIVE
SP GR UR STRIP: 1.02 (ref 1–1.03)
UROBILINOGEN UR QL STRIP: NORMAL

## 2017-11-08 PROCEDURE — 71010 HC CHEST PA OR AP: CPT

## 2017-11-08 PROCEDURE — 94640 AIRWAY INHALATION TREATMENT: CPT

## 2017-11-08 PROCEDURE — 97530 THERAPEUTIC ACTIVITIES: CPT

## 2017-11-08 PROCEDURE — 94799 UNLISTED PULMONARY SVC/PX: CPT

## 2017-11-08 PROCEDURE — 81003 URINALYSIS AUTO W/O SCOPE: CPT | Performed by: HOSPITALIST

## 2017-11-08 PROCEDURE — 25010000002 CEFTRIAXONE PER 250 MG

## 2017-11-08 PROCEDURE — 25010000002 ENOXAPARIN PER 10 MG

## 2017-11-08 PROCEDURE — 63710000001 METHYLPREDNISOLONE PER 4 MG: Performed by: NURSE PRACTITIONER

## 2017-11-08 PROCEDURE — 94760 N-INVAS EAR/PLS OXIMETRY 1: CPT

## 2017-11-08 PROCEDURE — 99232 SBSQ HOSP IP/OBS MODERATE 35: CPT | Performed by: HOSPITALIST

## 2017-11-08 RX ORDER — SODIUM CHLORIDE 9 MG/ML
50 INJECTION, SOLUTION INTRAVENOUS CONTINUOUS
Status: DISCONTINUED | OUTPATIENT
Start: 2017-11-08 | End: 2017-11-11

## 2017-11-08 RX ORDER — QUETIAPINE FUMARATE 25 MG/1
50 TABLET, FILM COATED ORAL NIGHTLY
Status: DISCONTINUED | OUTPATIENT
Start: 2017-11-08 | End: 2017-11-12

## 2017-11-08 RX ORDER — METHYLPREDNISOLONE 4 MG/1
16 TABLET ORAL
Status: DISCONTINUED | OUTPATIENT
Start: 2017-11-09 | End: 2017-11-11

## 2017-11-08 RX ORDER — QUETIAPINE FUMARATE 25 MG/1
12.5 TABLET, FILM COATED ORAL EVERY 8 HOURS PRN
Status: DISCONTINUED | OUTPATIENT
Start: 2017-11-08 | End: 2017-11-12 | Stop reason: HOSPADM

## 2017-11-08 RX ADMIN — BUDESONIDE AND FORMOTEROL FUMARATE DIHYDRATE 2 PUFF: 160; 4.5 AEROSOL RESPIRATORY (INHALATION) at 19:54

## 2017-11-08 RX ADMIN — VITAMIN D, TAB 1000IU (100/BT) 1000 UNITS: 25 TAB at 09:32

## 2017-11-08 RX ADMIN — IPRATROPIUM BROMIDE AND ALBUTEROL SULFATE 3 ML: .5; 3 SOLUTION RESPIRATORY (INHALATION) at 23:49

## 2017-11-08 RX ADMIN — CLONAZEPAM 0.5 MG: 0.5 TABLET ORAL at 05:52

## 2017-11-08 RX ADMIN — NYSTATIN 500000 UNITS: 100000 SUSPENSION ORAL at 11:34

## 2017-11-08 RX ADMIN — IPRATROPIUM BROMIDE AND ALBUTEROL SULFATE 3 ML: .5; 3 SOLUTION RESPIRATORY (INHALATION) at 13:30

## 2017-11-08 RX ADMIN — WATER 1 G: 1 INJECTION INTRAMUSCULAR; INTRAVENOUS; SUBCUTANEOUS at 09:41

## 2017-11-08 RX ADMIN — QUETIAPINE FUMARATE 12.5 MG: 25 TABLET ORAL at 12:17

## 2017-11-08 RX ADMIN — IPRATROPIUM BROMIDE AND ALBUTEROL SULFATE 3 ML: .5; 3 SOLUTION RESPIRATORY (INHALATION) at 03:30

## 2017-11-08 RX ADMIN — METHYLPREDNISOLONE 32 MG: 16 TABLET ORAL at 09:31

## 2017-11-08 RX ADMIN — IPRATROPIUM BROMIDE AND ALBUTEROL SULFATE 3 ML: .5; 3 SOLUTION RESPIRATORY (INHALATION) at 09:16

## 2017-11-08 RX ADMIN — ENOXAPARIN SODIUM 40 MG: 40 INJECTION SUBCUTANEOUS at 09:32

## 2017-11-08 RX ADMIN — TRAMADOL HYDROCHLORIDE 50 MG: 50 TABLET, COATED ORAL at 11:39

## 2017-11-08 RX ADMIN — NYSTATIN 500000 UNITS: 100000 SUSPENSION ORAL at 09:33

## 2017-11-08 RX ADMIN — IPRATROPIUM BROMIDE AND ALBUTEROL SULFATE 3 ML: .5; 3 SOLUTION RESPIRATORY (INHALATION) at 00:27

## 2017-11-08 RX ADMIN — SODIUM CHLORIDE 50 ML/HR: 9 INJECTION, SOLUTION INTRAVENOUS at 17:17

## 2017-11-08 RX ADMIN — SERTRALINE HYDROCHLORIDE 50 MG: 50 TABLET ORAL at 09:32

## 2017-11-08 RX ADMIN — DEXTROMETHORPHAN 30 MG: 30 SUSPENSION, EXTENDED RELEASE ORAL at 09:32

## 2017-11-08 RX ADMIN — ASPIRIN 81 MG: 81 TABLET, COATED ORAL at 09:32

## 2017-11-08 RX ADMIN — PROPRANOLOL HYDROCHLORIDE 60 MG: 20 TABLET ORAL at 09:31

## 2017-11-08 RX ADMIN — IPRATROPIUM BROMIDE AND ALBUTEROL SULFATE 3 ML: .5; 3 SOLUTION RESPIRATORY (INHALATION) at 16:50

## 2017-11-08 RX ADMIN — BUDESONIDE AND FORMOTEROL FUMARATE DIHYDRATE 2 PUFF: 160; 4.5 AEROSOL RESPIRATORY (INHALATION) at 09:18

## 2017-11-08 RX ADMIN — LOSARTAN POTASSIUM 25 MG: 25 TABLET, FILM COATED ORAL at 09:33

## 2017-11-08 RX ADMIN — LIDOCAINE 1 PATCH: 50 PATCH CUTANEOUS at 09:33

## 2017-11-08 RX ADMIN — IPRATROPIUM BROMIDE AND ALBUTEROL SULFATE 3 ML: .5; 3 SOLUTION RESPIRATORY (INHALATION) at 19:54

## 2017-11-08 NOTE — PLAN OF CARE
Problem: Patient Care Overview (Adult)  Goal: Plan of Care Review  Outcome: Ongoing (interventions implemented as appropriate)    11/08/17 0443   Coping/Psychosocial Response Interventions   Plan Of Care Reviewed With patient   Patient Care Overview   Progress no change   Outcome Evaluation   Outcome Summary/Follow up Plan Patient very unsteady when ambulating to bathroom with 2 assist. Patient has been confused and constantly trying to get out of bed setting the alarm off.          Problem: Respiratory Insufficiency (Adult)  Goal: Acid/Base Balance    11/08/17 0443   Respiratory Insufficiency (Adult)   Acid/Base Balance making progress toward outcome       Goal: Effective Ventilation  Outcome: Ongoing (interventions implemented as appropriate)    11/08/17 0443   Respiratory Insufficiency (Adult)   Effective Ventilation making progress toward outcome         Problem: Fall Risk (Adult)  Goal: Absence of Falls  Outcome: Ongoing (interventions implemented as appropriate)    11/08/17 0443   Fall Risk (Adult)   Absence of Falls making progress toward outcome

## 2017-11-08 NOTE — PROGRESS NOTES
Continued Stay Note   [facility]     Patient Name: Sadie Tijerina  MRN: [id number]  Today's Date: 11/8/2017    Admit Date: 10/27/2017          Discharge Plan       11/08/17 1438    Case Management/Social Work Plan    Plan update    Patient/Family In Agreement With Plan yes    Additional Comments Spoke with patient family at bedside regarding discharge plan/needs.  Family voiced concerns about behavioral changes.  Sitter at bedside.  CM following.  Patient discharge disposition TBD.                Discharge Codes     None        Expected Discharge Date and Time     Expected Discharge Date Expected Discharge Time    Nov 10, 2017             Indy Baird RN

## 2017-11-08 NOTE — PROGRESS NOTES
[facility] Medicine Services  PROGRESS NOTE    Patient Name: Sadie Tijerina  : 1940  MRN: [id number]    Date of Admission: 10/27/2017  Length of Stay: 11  Primary Care Physician: Kristian Wolff MD    Subjective   Subjective     CC:  SOB    HPI:  Increased agitation with poor sleep.  Less short of breath. No wheezing today. Some confusion today as well.    Review of Systems   Gen- No fevers, chills  CV- No chest pain, palpitations  Resp- +persistent productive cough, dyspnea on 1L NC  GI- No N/V/D, abd pain    Otherwise ROS is negative except as mentioned in the HPI.    Objective   Objective     Vital Signs:   Temp:  [97.7 °F (36.5 °C)-98.1 °F (36.7 °C)] 97.7 °F (36.5 °C)  Heart Rate:  [78-97] 89  Resp:  [16-28] 22  BP: (118-158)/(56-73) 118/65        Physical Exam:  NAD, alert only/seems confused  OP clear, dry MM  Neck supple  Less wheezing today, moving more air  RRR  +BS, ND, NT  RODRIGUEZ  No edema  Normal affect  No rashes  Alert and oriented    Results Reviewed:  I have personally reviewed current lab, radiology, and data and agree.      Results from last 7 days  Lab Units 174   WBC 10*3/mm3 21.52* 19.36* 18.75*   HEMOGLOBIN g/dL 12.8 12.8 13.7   HEMATOCRIT % 40.1 40.4 42.1   PLATELETS 10*3/mm3 281 273 279       Results from last 7 days  Lab Units 1745 17  0454   SODIUM mmol/L 134 136 134   POTASSIUM mmol/L 4.4 4.6 5.0   CHLORIDE mmol/L 100 98* 97*   CO2 mmol/L 36.0* 30.0 33.0*   BUN mg/dL 37* 41* 30*   CREATININE mg/dL 0.90 0.80 0.80   GLUCOSE mg/dL 90 96 100   CALCIUM mg/dL 8.8 9.3 9.4     No results found for: BNP  pH, Arterial   Date Value Ref Range Status   2017 7.509 (H) 7.350 - 7.450 pH units Final       Microbiology Results Abnormal     Procedure Component Value - Date/Time    Respiratory Culture - Sputum, Cough [[id number]]  (Abnormal)  (Susceptibility) Collected:  10/28/17 0038    Lab Status:   Final result Specimen:  Sputum from Cough Updated:  11/02/17 1416     Respiratory Culture --      Moderate growth (3+) Haemophilus influenzae Biotype III (A)        Beta Lactamase Positive            Light growth (2+) Enterobacter cloacae (A)      Light growth (2+) Klebsiella pneumoniae (A)      Light growth (2+) Normal Respiratory Ashlee     Gram Stain Result Moderate (3+) WBCs per low power field      Many (4+) Gram negative bacilli, tiny      Occasional Gram positive cocci in clusters    Susceptibility      Haemophilus influenzae Biotype III     DISK DIFFUSION     Ampicillin Resistant     Ampicillin + Sulbactam Susceptible     Ceftriaxone Susceptible     Chloramphenicol Susceptible     Meropenem Susceptible     Rifampin Susceptible     Trimethoprim + Sulfamethoxazole Susceptible                Susceptibility      Enterobacter cloacae     TEA     Ampicillin >16 ug/ml Resistant     Ampicillin + Sulbactam 16/8 ug/ml Intermediate     Aztreonam <=8 ug/ml Susceptible     Cefepime <=8 ug/ml Susceptible     Cefotaxime <=2 ug/ml Susceptible     Ceftriaxone <=8 ug/ml Susceptible     Cefuroxime sodium 16 ug/ml Intermediate     Ertapenem <=1 ug/ml Susceptible     Gentamicin <=4 ug/ml Susceptible     Levofloxacin <=2 ug/ml Susceptible     Meropenem <=1 ug/ml Susceptible     Piperacillin + Tazobactam <=16 ug/ml Susceptible     Tetracycline <=4 ug/ml Susceptible     Tobramycin <=4 ug/ml Susceptible     Trimethoprim + Sulfamethoxazole <=2/38 ug/ml Susceptible                Susceptibility      Klebsiella pneumoniae     TEA     Ampicillin >16 ug/ml Resistant     Ampicillin + Sulbactam <=8/4 ug/ml Susceptible     Aztreonam <=8 ug/ml Susceptible     Cefepime <=8 ug/ml Susceptible     Cefotaxime <=2 ug/ml Susceptible     Ceftriaxone <=8 ug/ml Susceptible     Cefuroxime sodium <=4 ug/ml Susceptible     Ertapenem <=1 ug/ml Susceptible     Gentamicin <=4 ug/ml Susceptible     Levofloxacin <=2 ug/ml Susceptible     Meropenem <=1 ug/ml  Susceptible     Piperacillin + Tazobactam <=16 ug/ml Susceptible     Tetracycline <=4 ug/ml Susceptible     Tobramycin <=4 ug/ml Susceptible     Trimethoprim + Sulfamethoxazole <=2/38 ug/ml Susceptible                          Imaging Results (last 24 hours)     Procedure Component Value Units Date/Time    XR Chest 1 View [[id number]] Updated:  11/08/17 0558        Results for orders placed during the hospital encounter of 10/27/17   Adult Transthoracic Echo Complete W/ Cont if Necessary Per Protocol    Narrative · Left ventricular systolic function is normal.  · Estimated EF appears to be in the range of 61 - 65%.  · Left ventricular diastolic dysfunction (grade I a) consistent with   impaired relaxation.  · Calculated right ventricular systolic pressure from tricuspid   regurgitation is 27 mmHg.          I have reviewed the medications.    Assessment/Plan   Assessment / Plan     Hospital Problem List     * (Principal)COPD exacerbation    Gastroesophageal reflux disease without esophagitis    Hyperlipidemia    CAP (community acquired pneumonia)    Leukocytosis (Chronic)    Tobacco abuse disorder (Chronic)    Acute respiratory failure with hypoxemia        Brief Hospital Course to date:  Sadie Tijerina is a 77 y.o. female with COPD and continued tobacco use presenting with SOA x 1 week and diffuse wheezing.    Assessment & Plan:    - Acute hypoxic respiratory failure: RA at home. Currently on 1L NC  - PNA: Resp Cx +H.Flu, Enterobacter, Klebsiella. S/P Ceftriaxone. Needs to mobilize!  - COPD with exacerbation: Symbicort, Duonebs, Methylpred (allergic to prednisone), taper steroids today  - Leukocytosis, likely secondary to steroids  - HTN: Losartan, propranolol  - Chest wall pain: Lidocaine patch  - Chronic compensated dCHF: ECHO 10/2017: EF normal, Grade I diastolic dysfunction  - Mood: Zoloft  - Hip pain s/p fall prior to admission: No acute fracture  - GERD  - Leukocytosis: On steroids  - Tobacco abuse  disorder  - Agitation/confusion, likely secondary to a combination of steroids/sleep deprivation, decrease steroids, and add seroquel for now       DVT Prophylaxis: pLOV    CODE STATUS: Conditional Code    Disposition: I expect the patient to be discharged in 2-3 days    Paulino Watts MD  11/08/17  12:17 PM

## 2017-11-08 NOTE — THERAPY TREATMENT NOTE
Acute Care - Occupational Therapy Treatment Note  [facility]     Patient Name: Sadie Tijerina  : 1940  MRN: [id number]  Today's Date: 2017  Onset of Illness/Injury or Date of Surgery Date: 10/27/17  Date of Referral to OT: 10/31/17  Referring Physician: VIRGIE Craft MD      Admit Date: 10/27/2017    Visit Dx:     ICD-10-CM ICD-9-CM   1. COPD with exacerbation J44.1 491.21   2. Hypoxia R09.02 799.02   3. Wheezing R06.2 786.07   4. Leukocytosis, unspecified type D72.829 288.60   5. Impaired functional mobility, balance, gait, and endurance Z74.09 V49.89   6. Impaired mobility and ADLs Z74.09 799.89     Patient Active Problem List   Diagnosis   • Benign paroxysmal positional vertigo   • Calf cramp   • Mixed anxiety depressive disorder   • Dizziness   • Fatigue   • Gastroesophageal reflux disease without esophagitis   • Hematuria   • Hyperlipidemia   • Hypertension   • Low back pain   • Orthostatic hypotension   • CAP (community acquired pneumonia)   • Pulmonary emphysema   • Restless legs syndrome   • Pre-syncope   • Essential tremor   • Vitamin D deficiency   • Balance problem   • Tension headache   • COPD exacerbation   • Leukocytosis   • COPD with exacerbation   • Tobacco abuse disorder   • Acute respiratory failure with hypoxemia             Adult Rehabilitation Note       17 0840 17 1350 [id number]    Rehab Assessment/Intervention    Discipline occupational therapist  -CL physical therapist  -EH occupational therapist  -CL    Document Type therapy note (daily note)  -CL therapy note (daily note)  -EH therapy note (daily note)  -CL    Subjective Information agree to therapy;no complaints  -CL agree to therapy;complains of;fatigue  -EH agree to therapy;complains of;weakness;fatigue  -CL    Patient Effort, Rehab Treatment good  -CL good  -EH good  -CL    Symptoms Noted During/After Treatment  dizziness  -EH shortness of breath  -CL    Symptoms Noted Comment  VSS  -EH VSS.   -CL     Precautions/Limitations fall precautions;oxygen therapy device and L/min  -CL cardiac precautions  -EH fall precautions;oxygen therapy device and L/min  -CL    Specific Treatment Considerations Upon arrival, pt sitting EOB w/ nursing and attempting to ambulated/t/f w/out assist, not following commands for safety.   -CL scissoring gait  -EH     Recorded by [CL] Ashley Cosby OT [EH] Jasmin Swartz, PT [CL] Ashley Cosby OT    Vital Signs    Pre Systolic BP Rehab --   EUGENIA, RN cleared for tx.   -CL  138  -CL    Pre Treatment Diastolic BP   62  -CL    Pretreatment Heart Rate (beats/min)   80  -CL    Posttreatment Heart Rate (beats/min)   76  -CL    Pre SpO2 (%)   96  -CL    O2 Delivery Pre Treatment   supplemental O2  -CL    Post SpO2 (%)   96  -CL    O2 Delivery Post Treatment   supplemental O2  -CL    Pre Patient Position   Supine  -CL    Intra Patient Position   Standing  -CL    Post Patient Position   Sitting  -CL    Recorded by [CL] Ashley Cosby OT  [CL] Ashley Cosby OT    Pain Assessment    Pain Assessment Gomez-Hudson FACES  -CL Gomez-Baker FACES  -EH Gomez-Baker FACES  -CL    Gomez-Hudson FACES Pain Rating 2  -CL 2  -EH 2  -CL    Pain Score 2  -CL 6  -EH 6  -CL    Post Pain Score 2  -CL 6  -EH 6  -CL    Pain Type Chronic pain  -CL Chronic pain  -EH Chronic pain  -CL    Pain Location Generalized  -CL Generalized  -EH Generalized  -CL    Pain Descriptors  Aching  -EH     Pain Frequency  Constant/continuous  -EH     Patient's Stated Pain Goal  No pain  -EH     Pain Intervention(s) Repositioned;Ambulation/increased activity  -CL Repositioned;Ambulation/increased activity  -EH Repositioned;Ambulation/increased activity  -CL    Response to Interventions Tolerated.   -CL tolerated  -EH Tolerated.   -CL    Recorded by [CL] Ashley Cosby OT [EH] Jasmin Swartz, PT [CL] Ashley Cobsy OT    Vision Assessment/Intervention    Visual Impairment  WFL  -EH     Recorded by  [EH] Jasmin Swartz, PT     Cognitive  Assessment/Intervention    Current Cognitive/Communication Assessment impaired  -CL impaired  -EH functional  -CL    Orientation Status oriented to;person;disoriented to;place;time;situation  -CL oriented x 4  -EH oriented x 4  -CL    Follows Commands/Answers Questions 50% of the time;75% of the time;needs cueing;needs increased time;needs repetition  -% of the time;able to follow single-step instructions   dtr reports pt confused call in A.M.; pt reports working ...  -% of the time;needs cueing;needs repetition  -CL    Personal Safety severe impairment;decreased awareness, need for assist;decreased awareness, need for safety;at risk behaviors demonstrated;impulsive;decreased insight to deficits  -CL mild impairment   with therapy prior this morning; no therapy this A.M.  -EH mild impairment;decreased awareness, need for safety;decreased awareness, need for assist  -CL    Personal Safety Interventions fall prevention program maintained;gait belt;nonskid shoes/slippers when out of bed  -CL fall prevention program maintained;gait belt;nonskid shoes/slippers when out of bed  -EH fall prevention program maintained;gait belt;nonskid shoes/slippers when out of bed  -CL    Recorded by [CL] Ashley Cosby OT [EH] Jasmin Swartz, PT [CL] Ashley Cosby OT    Bed Mobility, Assessment/Treatment    Bed Mobility, Assistive Device   bed rails;head of bed elevated  -CL    Bed Mob, Supine to Sit, [place]   contact guard assist;verbal cues required  -CL    Bed Mobility, Comment Pt received sitting EOB w/ RN.   -CL pt [facility]  - VCs for HP/sequencing.   -CL    Recorded by [CL] Ashley Cosby OT [EH] Jasmin Swarzt, PT [CL] Ashley Cosby OT    Transfer Assessment/Treatment    Transfers, Sit-Stand [place] contact guard assist;verbal cues required  -CL contact guard assist;verbal cues required  - stand by assist;verbal cues required  -CL    Transfers, Stand-Sit [place] contact guard assist;verbal cues  required  -CL contact guard assist;verbal cues required  -EH stand by assist;verbal cues required  -CL    Transfers, Sit-Stand-Sit, Assist Device rolling walker  -CL rolling walker  -EH rolling walker  -CL    Toilet Transfer, [place]   contact guard assist;verbal cues required  -CL    Toilet Transfer, Assistive Device   rolling walker   grab bars  -CL    Transfer, Comment Pt performed ~4 reps d/t standing impulsively despite MAX VCs for safety.   -CL VC for HP; pt does not push up or reach back to chair.  -EH VCs for HP/sequencing.   -CL    Recorded by [CL] Ashley Cosby, OT [EH] Jasmin Swartz, PT [CL] Ashley Cosby OT    Gait Assessment/Treatment    Gait, [place] Level  minimum assist (75% patient effort)  -     Gait, Assistive Device  rolling walker  -EH     Gait, Distance (Feet)  20  -     Gait, Gait Pattern Analysis  swing-through gait  -     Gait, Gait Deviations  narrow base;ataxia  -EH     Gait, Safety Issues  step length decreased;weight-shifting ability decreased;supplemental O2   pt reports dizziness.  -     Gait, Impairments  strength decreased;impaired balance;pain  -EH     Gait, Comment  Pt reports dizziness after ~10 ft of forward ambualtion. Pt cued to stop, stand tall and focus on PLB. Pt dizziness does not improve, and pt demonstrates multiple scissoring steps with need for assist to maintain balance.  -EH     Recorded by  [EH] Jasmin Swartz, PT     Functional Mobility    Functional Mobility- Ind. Level minimum assist (75% patient effort);1 person + 1 person to manage equipment;verbal cues required  -CL  minimum assist (75% patient effort);1 person + 1 person to manage equipment;verbal cues required  -CL    Functional Mobility- Device rolling walker  -CL  rolling walker  -CL    Functional Mobility-Distance (Feet) 150  -CL  70  -CL    Functional Mobility- Comment Pt w/ several minor episodes of LOB w/ Min A to recover d/t small GWYN and scissoring gait, followed closely w/  chair.   -CL  Pt w/ 2 episodes of minor LOB w/ c/o numbness in B feet. Pt c/o dyspnea, however O2 sats stable. Followed w/ chair.   -CL    Recorded by [CL] Ashley Cosby OT  [CL] Ashley Cosby OT    Toileting Assessment/Training    Toileting Assess/Train, Position   sitting;standing  -CL    Toileting Assess/Train, Indepen Level   contact guard assist;verbal cues required  -CL    Toileting Assess/Train, Comment   Clothing management and post-toilet hygiene.   -CL    Recorded by   [CL] Ashley Cosby OT    Balance Skills Training    Sitting-Level of Assistance Contact guard   d/t impulsivity  -CL  Close supervision  -CL    Sitting-Balance Support Right upper extremity supported;Left upper extremity supported;Feet supported  -CL  Right upper extremity supported;Left upper extremity supported;Feet supported  -CL    Sitting-Balance Activities Forward lean;Reaching for objects;Trunk control activities  -CL  Forward lean;Reaching for objects;Trunk control activities  -CL    Standing-Level of Assistance Contact guard  -CL  Contact guard  -CL    Static Standing Balance Support assistive device  -CL  assistive device  -CL    Standing-Balance Activities Weight Shift R-L;Weight Shift A-P;Reaching for objects;Reaching across midline;Forward lean;Lateral lean  -CL  Weight Shift A-P;Weight Shift R-L;Reaching for objects  -CL    Gait Balance-Level of Assistance Minimum assistance  -CL  Minimum assistance  -CL    Gait Balance Support assistive device  -CL  assistive device  -CL    Gait Balance Activities scanning environment R/L;side-stepping  -CL  scanning environment R/L;side-stepping  -CL    Recorded by [CL] Ashley Cosby OT  [CL] Ashley Cosby OT    Therapy Exercises    Bilateral Lower Extremities AROM:;10 reps;ankle pumps/circles  -CL AROM:;10 reps;ankle pumps/circles;knee flexion;LAQ  -EH     Bilateral Upper Extremity   5 reps;AROM:;elbow flexion/extension;shoulder abduction/adduction;shoulder horizontal abd/add;shoulder  protraction/retraction   w/ PLB  -CL    Recorded by [CL] Ashley Cosby OT [EH] Jasmin Swartz, PT [CL] Ashley Cosby OT    Positioning and Restraints    Pre-Treatment Position in bed  -CL in bed  -EH in bed  -CL    Post Treatment Position chair  -CL chair  -EH chair  -CL    In Chair notified nsg;reclined;call light within reach;encouraged to call for assist;exit alarm on;waffle cushion;legs elevated;heels elevated  -CL notified nsg;reclined;call light within reach;exit alarm on;encouraged to call for assist;with family/caregiver  -EH notified nsg;reclined;call light within reach;encouraged to call for assist;exit alarm on;waffle cushion;legs elevated;heels elevated;with nsg  -CL    Recorded by [CL] Ashley Cosby, OT [EH] Jasmin Swartz, PT [CL] Ashley Cosby OT      User Key  (r) = Recorded By, (t) = Taken By, (c) = Cosigned By    Initials Name Effective Dates     Jasmin Swartz, PT 06/19/15 -     CL Ashley Cosby OT 06/08/16 -                 OT Goals       11/08/17 0943 11/06/17 1006 11/03/17 1407    Bed Mobility OT LTG    Bed Mobility OT LTG, Outcome goal ongoing  -CL goal ongoing  -CL goal ongoing  -CL    Transfer Training OT LTG    Transfer Training OT LTG, Outcome goal ongoing  -CL goal ongoing  -CL goal ongoing  -CL    Patient Education OT LTG    Patient Education OT LTG Outcome goal ongoing  -CL goal ongoing  -CL goal ongoing  -CL    Activity Tolerance OT LTG    Activity Tolerance Goal OT LTG, Outcome goal ongoing  -CL goal ongoing  -CL goal ongoing  -CL      11/01/17 1133          Bed Mobility OT LTG    Bed Mobility OT LTG, Date Established 11/01/17  -      Bed Mobility OT LTG, Time to Achieve 2 wks  -MC      Bed Mobility OT LTG, Activity Type all bed mobility  -MC      Bed Mobility OT LTG, [place] Level conditional independence  -      Bed Mobility OT LTG, Outcome goal ongoing  -MC      Transfer Training OT LTG    Transfer Training OT LTG, Date Established 11/01/17  -      Transfer  Training OT LTG, Time to Achieve 2 wks  -      Transfer Training OT LTG, Activity Type sit to stand/stand to sit;toilet  -      Transfer Training OT LTG, [place] Level conditional independence  -      Transfer Training OT LTG, Assist Device walker, rolling;commode, bedside  -      Transfer Training OT LTG, Outcome goal ongoing  -      Patient Education OT LTG    Patient Education OT LTG, Date Established 11/01/17  -      Patient Education OT LTG, Time to Achieve 2 wks  -      Patient Education OT LTG, Education Type HEP;work simplification;energy conservation;adaptive breathing  -      Patient Education OT LTG, Education Understanding verbalizes understanding;demonstrates adequately  -      Patient Education OT LTG Outcome goal ongoing  -      Activity Tolerance OT LTG    Activity Tolerance Goal OT LTG, Date Established 11/01/17  -      Activity Tolerance Goal OT LTG, Time to Achieve 2 wks  -      Activity Tolerance Goal OT LTG, Activity Level 10 min activity  -      Activity Tolerance Goal OT LTG, Additional Goal Pt will complete 10 minutes of ADLs/functional task with one sitting rest break to improve occupational endurance.   -      Activity Tolerance Goal OT LTG, Outcome goal ongoing  -        User Key  (r) = Recorded By, (t) = Taken By, (c) = Cosigned By    Initials Name Provider Type     April Galvez, OT Occupational Therapist    KRISTY Cosby, OT Occupational Therapist          Occupational Therapy Education     Title: PT OT SLP Therapies (Active)     Topic: Occupational Therapy (Active)     Point: ADL training (Active)    Description: Instruct learner(s) on proper safety adaptation and remediation techniques during self care or transfers.   Instruct in proper use of assistive devices.    Learning Progress Summary    Learner Readiness Method Response Comment Documented by Status   Patient Acceptance E,D NR Pt educated on appropriate safety precautions, t/f techniques, and  role of therapy.  11/08/17 0942 Active    Acceptance E,D NR Pt educated on appropriate safety precautions, t/f techniques, BUE HEP w/ PLB, and benefits of therapy.  11/06/17 1005 Active    Acceptance E,D NR Pt educated on appropriate safety precautions, t/f techniques, HEP, and benefits of therapy.  11/03/17 1407 Active    Acceptance E,D VU,NR   11/01/17 1132 Done               Point: Home exercise program (Active)    Description: Instruct learner(s) on appropriate technique for monitoring, assisting and/or progressing therapeutic exercises/activities.    Learning Progress Summary    Learner Readiness Method Response Comment Documented by Status   Patient Acceptance E,D NR Pt educated on appropriate safety precautions, t/f techniques, BUE HEP w/ PLB, and benefits of therapy.  11/06/17 1005 Active    Acceptance E,D NR Pt educated on appropriate safety precautions, t/f techniques, HEP, and benefits of therapy.  11/03/17 1407 Active               Point: Precautions (Active)    Description: Instruct learner(s) on prescribed precautions during self-care and functional transfers.    Learning Progress Summary    Learner Readiness Method Response Comment Documented by Status   Patient Acceptance E,D NR Pt educated on appropriate safety precautions, t/f techniques, and role of therapy.  11/08/17 0942 Active    Acceptance E,D NR Pt educated on appropriate safety precautions, t/f techniques, BUE HEP w/ PLB, and benefits of therapy.  11/06/17 1005 Active    Acceptance E,D NR Pt educated on appropriate safety precautions, t/f techniques, HEP, and benefits of therapy.  11/03/17 1407 Active               Point: Body mechanics (Active)    Description: Instruct learner(s) on proper positioning and spine alignment during self-care, functional mobility activities and/or exercises.    Learning Progress Summary    Learner Readiness Method Response Comment Documented by Status   Patient Acceptance E,D NR Pt educated on  appropriate safety precautions, t/f techniques, and role of therapy.  11/08/17 0942 Active    Acceptance E,D NR Pt educated on appropriate safety precautions, t/f techniques, BUE HEP w/ PLB, and benefits of therapy.  11/06/17 1005 Active    Acceptance E,D NR Pt educated on appropriate safety precautions, t/f techniques, HEP, and benefits of therapy.  11/03/17 1407 Active    Acceptance E,D VU,NR   11/01/17 1132 Done                      User Key     Initials Effective Dates Name Provider Type Discipline     03/14/16 -  April Galvez, OT Occupational Therapist OT     06/08/16 -  Ashley Cosby, OT Occupational Therapist OT                  OT Recommendation and Plan  Anticipated Equipment Needs At Discharge:  (TBD)  Anticipated Discharge Disposition: home with assist  Therapy Frequency: daily  Plan of Care Review  Plan Of Care Reviewed With: patient  Progress: progress towards functional goals is fair  Outcome Summary/Follow up Plan: Pt demo improved activity tolerance by ambulating 150 ft w/ Min Ax1+1 w/ RW, however demo several episodes of LOB. Pt w/ significantly increased confusion this date w/ poor safety awareness, pt oriented only to person. Recommend cont skilled IPOT POC.         Outcome Measures       11/08/17 0840 11/07/17 1350 11/06/17 0903    How much help from another person do you currently need...    Turning from your back to your side while in flat bed without using bedrails?  3  -EH     Moving from lying on back to sitting on the side of a flat bed without bedrails?  3  -EH     Moving to and from a bed to a chair (including a wheelchair)?  3  -EH     Standing up from a chair using your arms (e.g., wheelchair, bedside chair)?  3  -EH     Climbing 3-5 steps with a railing?  1  -EH     To walk in hospital room?  3  -EH     AM-PAC 6 Clicks Score  16  -EH     How much help from another is currently needed...    Putting on and taking off regular lower body clothing? 2  -CL  2  -CL    Bathing  (including washing, rinsing, and drying) 2  -CL  2  -CL    Toileting (which includes using toilet bed pan or urinal) 3  -CL  3  -CL    Putting on and taking off regular upper body clothing 3  -CL  3  -CL    Taking care of personal grooming (such as brushing teeth) 3  -CL  3  -CL    Eating meals 4  -CL  4  -CL    Score 17  -CL  17  -CL    Functional Assessment    Outcome Measure Options AM-PAC 6 Clicks Daily Activity (OT)  -CL AM-PAC 6 Clicks Basic Mobility (PT)  - AM-PAC 6 Clicks Daily Activity (OT)  -CL      User Key  (r) = Recorded By, (t) = Taken By, (c) = Cosigned By    Initials Name Provider Type     Jasmin Swartz, PT Physical Therapist    CL Ashley Cosby OT Occupational Therapist           Time Calculation:         Time Calculation- OT       11/08/17 0946          Time Calculation- OT    OT Start Time 0840  -CL      Total Timed Code Minutes- OT 24 minute(s)  -CL      OT Received On 11/08/17  -CL      OT Goal Re-Cert Due Date 11/11/17  -CL        User Key  (r) = Recorded By, (t) = Taken By, (c) = Cosigned By    Initials Name Provider Type    CL Ashley Cosby OT Occupational Therapist           Therapy Charges for Today     Code Description Service Date Service Provider Modifiers Qty    [id number]  OT THERAPEUTIC ACT EA 15 MIN 11/8/2017 Ashley Cosby OT GO 2    [id number]  OT THER SUPP EA 15 MIN 11/8/2017 Ashley Cosby OT GO 1               Ashley Cosby OT  11/8/2017

## 2017-11-08 NOTE — PLAN OF CARE
Problem: Patient Care Overview (Adult)  Goal: Plan of Care Review  Outcome: Ongoing (interventions implemented as appropriate)    11/08/17 0943   Coping/Psychosocial Response Interventions   Plan Of Care Reviewed With patient   Patient Care Overview   Progress progress towards functional goals is fair   Outcome Evaluation   Outcome Summary/Follow up Plan Pt demo improved activity tolerance by ambulating 150 ft w/ Min Ax1+1 w/ RW, however demo several episodes of LOB. Pt w/ significantly increased confusion this date w/ poor safety awareness, pt oriented only to person. Recommend cont skilled IPOT POC.          Problem: Inpatient Occupational Therapy  Goal: Bed Mobility Goal LTG- OT  Outcome: Ongoing (interventions implemented as appropriate)    11/01/17 1133 11/08/17 0943   Bed Mobility OT LTG   Bed Mobility OT LTG, Date Established 11/01/17 --    Bed Mobility OT LTG, Time to Achieve 2 wks --    Bed Mobility OT LTG, Activity Type all bed mobility --    Bed Mobility OT LTG, [place] Level conditional independence --    Bed Mobility OT LTG, Outcome --  goal ongoing       Goal: Transfer Training Goal 1 LTG- OT  Outcome: Ongoing (interventions implemented as appropriate)    11/01/17 1133 11/08/17 0943   Transfer Training OT LTG   Transfer Training OT LTG, Date Established 11/01/17 --    Transfer Training OT LTG, Time to Achieve 2 wks --    Transfer Training OT LTG, Activity Type sit to stand/stand to sit;toilet --    Transfer Training OT LTG, [place] Level conditional independence --    Transfer Training OT LTG, Assist Device walker, rolling;commode, bedside --    Transfer Training OT LTG, Outcome --  goal ongoing       Goal: Patient Education Goal LTG- OT  Outcome: Ongoing (interventions implemented as appropriate)    11/01/17 1133 11/08/17 0943   Patient Education OT LTG   Patient Education OT LTG, Date Established 11/01/17 --    Patient Education OT LTG, Time to Achieve 2 wks --    Patient Education OT LTG,  Education Type HEP;work simplification;energy conservation;adaptive breathing --    Patient Education OT LTG, Education Understanding verbalizes understanding;demonstrates adequately --    Patient Education OT LTG Outcome --  goal ongoing       Goal: Activity Tolerance Goal LTG- OT  Outcome: Ongoing (interventions implemented as appropriate)    11/01/17 1133 11/08/17 0943   Activity Tolerance OT LTG   Activity Tolerance Goal OT LTG, Date Established 11/01/17 --    Activity Tolerance Goal OT LTG, Time to Achieve 2 wks --    Activity Tolerance Goal OT LTG, Activity Level 10 min activity --    Activity Tolerance Goal OT LTG, Additional Goal Pt will complete 10 minutes of ADLs/functional task with one sitting rest break to improve occupational endurance.  --    Activity Tolerance Goal OT LTG, Outcome --  goal ongoing

## 2017-11-09 PROCEDURE — 94640 AIRWAY INHALATION TREATMENT: CPT

## 2017-11-09 PROCEDURE — 97110 THERAPEUTIC EXERCISES: CPT

## 2017-11-09 PROCEDURE — 63710000001 METHYLPREDNISOLONE PER 4 MG: Performed by: HOSPITALIST

## 2017-11-09 PROCEDURE — 94760 N-INVAS EAR/PLS OXIMETRY 1: CPT

## 2017-11-09 PROCEDURE — 25010000002 ENOXAPARIN PER 10 MG

## 2017-11-09 PROCEDURE — 94799 UNLISTED PULMONARY SVC/PX: CPT

## 2017-11-09 PROCEDURE — 97116 GAIT TRAINING THERAPY: CPT

## 2017-11-09 PROCEDURE — 99233 SBSQ HOSP IP/OBS HIGH 50: CPT | Performed by: HOSPITALIST

## 2017-11-09 RX ADMIN — IPRATROPIUM BROMIDE AND ALBUTEROL SULFATE 3 ML: .5; 3 SOLUTION RESPIRATORY (INHALATION) at 23:14

## 2017-11-09 RX ADMIN — ATORVASTATIN CALCIUM 10 MG: 10 TABLET, FILM COATED ORAL at 21:06

## 2017-11-09 RX ADMIN — BUDESONIDE AND FORMOTEROL FUMARATE DIHYDRATE 2 PUFF: 160; 4.5 AEROSOL RESPIRATORY (INHALATION) at 07:15

## 2017-11-09 RX ADMIN — DEXTROMETHORPHAN 30 MG: 30 SUSPENSION, EXTENDED RELEASE ORAL at 10:07

## 2017-11-09 RX ADMIN — DEXTROMETHORPHAN 30 MG: 30 SUSPENSION, EXTENDED RELEASE ORAL at 21:06

## 2017-11-09 RX ADMIN — IPRATROPIUM BROMIDE AND ALBUTEROL SULFATE 3 ML: .5; 3 SOLUTION RESPIRATORY (INHALATION) at 12:16

## 2017-11-09 RX ADMIN — VITAMIN D, TAB 1000IU (100/BT) 1000 UNITS: 25 TAB at 08:35

## 2017-11-09 RX ADMIN — IPRATROPIUM BROMIDE AND ALBUTEROL SULFATE 3 ML: .5; 3 SOLUTION RESPIRATORY (INHALATION) at 03:51

## 2017-11-09 RX ADMIN — IPRATROPIUM BROMIDE AND ALBUTEROL SULFATE 3 ML: .5; 3 SOLUTION RESPIRATORY (INHALATION) at 07:15

## 2017-11-09 RX ADMIN — IPRATROPIUM BROMIDE AND ALBUTEROL SULFATE 3 ML: .5; 3 SOLUTION RESPIRATORY (INHALATION) at 20:43

## 2017-11-09 RX ADMIN — SERTRALINE HYDROCHLORIDE 50 MG: 50 TABLET ORAL at 08:35

## 2017-11-09 RX ADMIN — NYSTATIN 500000 UNITS: 100000 SUSPENSION ORAL at 17:49

## 2017-11-09 RX ADMIN — METHYLPREDNISOLONE 16 MG: 4 TABLET ORAL at 08:35

## 2017-11-09 RX ADMIN — ENOXAPARIN SODIUM 40 MG: 40 INJECTION SUBCUTANEOUS at 08:35

## 2017-11-09 RX ADMIN — FLUTICASONE PROPIONATE 2 SPRAY: 50 SPRAY, METERED NASAL at 08:35

## 2017-11-09 RX ADMIN — QUETIAPINE FUMARATE 12.5 MG: 25 TABLET ORAL at 21:07

## 2017-11-09 RX ADMIN — LOSARTAN POTASSIUM 25 MG: 25 TABLET, FILM COATED ORAL at 08:35

## 2017-11-09 RX ADMIN — NYSTATIN 500000 UNITS: 100000 SUSPENSION ORAL at 13:21

## 2017-11-09 RX ADMIN — CLONAZEPAM 0.5 MG: 0.5 TABLET ORAL at 10:12

## 2017-11-09 RX ADMIN — ASPIRIN 81 MG: 81 TABLET, COATED ORAL at 08:35

## 2017-11-09 RX ADMIN — PROPRANOLOL HYDROCHLORIDE 60 MG: 20 TABLET ORAL at 10:08

## 2017-11-09 RX ADMIN — NYSTATIN 500000 UNITS: 100000 SUSPENSION ORAL at 08:35

## 2017-11-09 RX ADMIN — NYSTATIN 500000 UNITS: 100000 SUSPENSION ORAL at 21:05

## 2017-11-09 RX ADMIN — SODIUM CHLORIDE 50 ML/HR: 9 INJECTION, SOLUTION INTRAVENOUS at 10:12

## 2017-11-09 RX ADMIN — BUDESONIDE AND FORMOTEROL FUMARATE DIHYDRATE 2 PUFF: 160; 4.5 AEROSOL RESPIRATORY (INHALATION) at 20:43

## 2017-11-09 NOTE — PLAN OF CARE
Problem: Patient Care Overview (Adult)  Goal: Plan of Care Review  Outcome: Ongoing (interventions implemented as appropriate)    11/09/17 1511   Coping/Psychosocial Response Interventions   Plan Of Care Reviewed With patient;family   Patient Care Overview   Progress improving   Outcome Evaluation   Outcome Summary/Follow up Plan Pt. demo improved safety awareness and ind. w/ mobility. Pt. met gait goal this date by ambulating 250 ft w/ RW and CGA; however, patient required cues for safety and adaptive breathing. VSS throughout. Recommend cont PT per POC.         Problem: Inpatient Physical Therapy  Goal: Bed Mobility Goal LTG- PT  Outcome: Ongoing (interventions implemented as appropriate)    11/01/17 1119 11/09/17 1511   Bed Mobility PT LTG   Bed Mobility PT LTG, Date Established 11/01/17 --    Bed Mobility PT LTG, Time to Achieve 1 wk --    Bed Mobility PT LTG, Activity Type all bed mobility --    Bed Mobility PT LTG, [place] Level independent --    Bed Mobility PT LTG, Outcome --  goal ongoing       Goal: Transfer Training Goal 1 LTG- PT  Outcome: Ongoing (interventions implemented as appropriate)    11/01/17 1119 11/09/17 1511   Transfer Training PT LTG   Transfer Training PT LTG, Date Established 11/01/17 --    Transfer Training PT LTG, Time to Achieve 1 wk --    Transfer Training PT LTG, Activity Type bed to chair /chair to bed;sit to stand/stand to sit --    Transfer Training PT LTG, [place] Level supervision required --    Transfer Training PT LTG, Assist Device walker, rolling --    Transfer Training PT LTG, Outcome --  goal ongoing       Goal: Gait Training Goal LTG- PT  Outcome: Outcome(s) achieved Date Met:  11/09/17 11/01/17 1119 11/09/17 1511   Gait Training PT LTG   Gait Training Goal PT LTG, Date Established 11/01/17 --    Gait Training Goal PT LTG, Time to Achieve 1 wk --    Gait Training Goal PT LTG, [place] Level contact guard assist  (stable VS,WBAT LLE; NO LOB) --    Gait  Training Goal PT LTG, Assist Device walker, rolling --    Gait Training Goal PT LTG, Distance to Achieve 150 --    Gait Training Goal PT LTG, Outcome --  goal met       Goal: Patient Education Goal LTG- PT  Outcome: Ongoing (interventions implemented as appropriate)    11/01/17 1119 11/09/17 1511   Patient Education PT LTG   Patient Education PT LTG, Date Established 11/01/17 --    Patient Education PT LTG, Time to Achieve 1 wk --    Patient Education PT LTG, Education Type written program;HEP;posture/body mechanics;gait;transfers;bed mobility;benefits of activity --    Patient Education PT LTG, Education Understanding demonstrate adequately;verbalize understanding --    Patient Education PT LTG Outcome --  goal ongoing

## 2017-11-09 NOTE — THERAPY TREATMENT NOTE
Acute Care - Physical Therapy Treatment Note  [facility]     Patient Name: Sadie Tijerina  : 1940  MRN: [id number]  Today's Date: 2017  Onset of Illness/Injury or Date of Surgery Date: 10/27/17  Date of Referral to PT: 10/31/17  Referring Physician: VIRGIE Craft MD    Admit Date: 10/27/2017    Visit Dx:    ICD-10-CM ICD-9-CM   1. COPD with exacerbation J44.1 491.21   2. Hypoxia R09.02 799.02   3. Wheezing R06.2 786.07   4. Leukocytosis, unspecified type D72.829 288.60   5. Impaired functional mobility, balance, gait, and endurance Z74.09 V49.89   6. Impaired mobility and ADLs Z74.09 799.89     Patient Active Problem List   Diagnosis   • Benign paroxysmal positional vertigo   • Calf cramp   • Mixed anxiety depressive disorder   • Dizziness   • Fatigue   • Gastroesophageal reflux disease without esophagitis   • Hematuria   • Hyperlipidemia   • Hypertension   • Low back pain   • Orthostatic hypotension   • CAP (community acquired pneumonia)   • Pulmonary emphysema   • Restless legs syndrome   • Pre-syncope   • Essential tremor   • Vitamin D deficiency   • Balance problem   • Tension headache   • COPD exacerbation   • Leukocytosis   • COPD with exacerbation   • Tobacco abuse disorder   • Acute respiratory failure with hypoxemia               Adult Rehabilitation Note       17 1430 17 0840 17 1350    Rehab Assessment/Intervention    Discipline physical therapist  -MB occupational therapist  -CL physical therapist  -EH    Document Type therapy note (daily note)  -MB therapy note (daily note)  -CL therapy note (daily note)  -EH    Subjective Information no complaints;agree to therapy  -MB agree to therapy;no complaints  -CL agree to therapy;complains of;fatigue  -EH    Patient Effort, Rehab Treatment good  -MB good  -CL good  -EH    Symptoms Noted During/After Treatment   dizziness  -EH    Symptoms Noted Comment   VSS  -EH    Precautions/Limitations fall precautions;oxygen therapy device  and L/min  -MB fall precautions;oxygen therapy device and L/min  -CL cardiac precautions  -EH    Specific Treatment Considerations  Upon arrival, pt sitting EOB w/ nursing and attempting to ambulated/t/f w/out assist, not following commands for safety.   -CL scissoring gait  -EH    Recorded by [MB] Margo Schaefer, PT [CL] Ashley Cosby, OT [EH] Jasmin Swartz, PT    Vital Signs    Pre Systolic BP Rehab --   VSS. Nsg cleared for tx.  -MB --   VSS, RN cleared for tx.   -CL     Recorded by [MB] Margo Schaefer, PT [CL] Ashley Cosby, OT     Pain Assessment    Pain Assessment Gomez-Hudson FACES  -MB Gomez-Hudson FACES  -CL Gomez-Baker FACES  -EH    Gomez-Baker FACES Pain Rating 2  -MB 2  -CL 2  -EH    Pain Score 1  -MB 2  -CL 6  -EH    Post Pain Score  2  -CL 6  -EH    Pain Type Chronic pain;Acute pain  -MB Chronic pain  -CL Chronic pain  -EH    Pain Location Generalized   B heels  -MB Generalized  -CL Generalized  -EH    Pain Orientation Right;Left  -MB      Pain Descriptors   Aching  -EH    Pain Frequency   Constant/continuous  -EH    Patient's Stated Pain Goal   No pain  -EH    Pain Intervention(s) Repositioned;Ambulation/increased activity  -MB Repositioned;Ambulation/increased activity  -CL Repositioned;Ambulation/increased activity  -EH    Response to Interventions Tolerated  -MB Tolerated.   -CL tolerated  -EH    Recorded by [MB] Margo Schaefer, PT [CL] Ashley Cosby, OT [EH] Jasmin Swartz, PT    Vision Assessment/Intervention    Visual Impairment   WFL  -EH    Recorded by   [EH] Jasmin Swartz, PT    Cognitive Assessment/Intervention    Current Cognitive/Communication Assessment functional  -MB impaired  -CL impaired  -EH    Orientation Status oriented x 4  -MB oriented to;person;disoriented to;place;time;situation  -CL oriented x 4  -EH    Follows Commands/Answers Questions 100% of the time;able to follow single-step instructions;needs cueing  -MB 50% of the time;75% of the time;needs cueing;needs  increased time;needs repetition  - 100% of the time;able to follow single-step instructions   dtr reports pt confused call in A.M.; pt reports working ...  -    Personal Safety mild impairment;decreased awareness, need for assist;decreased awareness, need for safety;decreased insight to deficits  -MB severe impairment;decreased awareness, need for assist;decreased awareness, need for safety;at risk behaviors demonstrated;impulsive;decreased insight to deficits  - mild impairment   with therapy prior this morning; no therapy this A.M.  -    Personal Safety Interventions fall prevention program maintained;gait belt;muscle strengthening facilitated;nonskid shoes/slippers when out of bed  -MB fall prevention program maintained;gait belt;nonskid shoes/slippers when out of bed  - fall prevention program maintained;gait belt;nonskid shoes/slippers when out of bed  -    Recorded by [MB] Margo Schaefer, PT [CL] Ashley Cosby, OT [] Jasmin Swartz, PT    Bed Mobility, Assessment/Treatment    Bed Mob, Supine to Sit, Salome supervision required;verbal cues required  -MB      Bed Mob, Sit to Supine, Salome not tested  -MB      Bed Mobility, Safety Issues decreased use of arms for pushing/pulling;decreased use of legs for bridging/pushing  -MB      Bed Mobility, Impairments strength decreased;impaired balance;decreased flexibility  -MB      Bed Mobility, Comment VCs to scoot hips forward to EOB.  -MB Pt received sitting EOB w/ RN.   - pt [facility]  -    Recorded by [MB] Margo Schaefer, PT [CL] Ashley Cosby, OT [] Jasmin Swartz, PT    Transfer Assessment/Treatment    Transfers, Sit-Stand Salome contact guard assist;verbal cues required  -MB contact guard assist;verbal cues required  -CL contact guard assist;verbal cues required  -    Transfers, Stand-Sit Salome contact guard assist;verbal cues required  -MB contact guard assist;verbal cues required  -CL contact guard  assist;verbal cues required  -    Transfers, Sit-Stand-Sit, Assist Device rolling walker  -MB rolling walker  -CL rolling walker  -EH    Transfer, Safety Issues step length decreased;balance decreased during turns  -MB      Transfer, Impairments decreased flexibility;impaired balance;strength decreased  -MB      Transfer, Comment VCs for safe hand placement.  -MB Pt performed ~4 reps d/t standing impulsively despite MAX VCs for safety.   -CL VC for HP; pt does not push up or reach back to chair.  -EH    Recorded by [MB] Margo Schaefer, PT [CL] Ashley Cosby OT [EH] Jasmin Swartz, PT    Gait Assessment/Treatment    Gait, [place] Level contact guard assist  -MB  minimum assist (75% patient effort)  -    Gait, Assistive Device rolling walker  -MB  rolling walker  -EH    Gait, Distance (Feet) 250  -MB  20  -EH    Gait, Gait Pattern Analysis swing-to gait  -MB  swing-through gait  -    Gait, Gait Deviations forward flexed posture;alia decreased;bilateral:;step length decreased;narrow base  -MB  narrow base;ataxia  -    Gait, Safety Issues balance decreased during turns;step length decreased;supplemental O2  -MB  step length decreased;weight-shifting ability decreased;supplemental O2   pt reports dizziness.  -    Gait, Impairments decreased flexibility;strength decreased;impaired balance  -MB  strength decreased;impaired balance;pain  -    Gait, Comment Pt amb w/ slow alia and VCs for upright posture, inc GWYN and B step length, and adaptive breathing.    -MB  Pt reports dizziness after ~10 ft of forward ambualtion. Pt cued to stop, stand tall and focus on PLB. Pt dizziness does not improve, and pt demonstrates multiple scissoring steps with need for assist to maintain balance.  -EH    Recorded by [MB] Margo Schaefer, PT  [EH] Jasmin Swartz, PT    Functional Mobility    Functional Mobility- Ind. Level  minimum assist (75% patient effort);1 person + 1 person to manage equipment;verbal  cues required  -CL     Functional Mobility- Device  rolling walker  -CL     Functional Mobility-Distance (Feet)  150  -CL     Functional Mobility- Comment  Pt w/ several minor episodes of LOB w/ Min A to recover d/t small GWYN and scissoring gait, followed closely w/ chair.   -CL     Recorded by  [CL] Ashley Cosby OT     Balance Skills Training    Sitting-Level of Assistance  Contact guard   d/t impulsivity  -CL     Sitting-Balance Support  Right upper extremity supported;Left upper extremity supported;Feet supported  -CL     Sitting-Balance Activities  Forward lean;Reaching for objects;Trunk control activities  -CL     Standing-Level of Assistance Contact guard  -MB Contact guard  -CL     Static Standing Balance Support assistive device  -MB assistive device  -CL     Standing-Balance Activities Weight Shift R-L;Weight Shift A-P  -MB Weight Shift R-L;Weight Shift A-P;Reaching for objects;Reaching across midline;Forward lean;Lateral lean  -CL     Gait Balance-Level of Assistance Minimum assistance  -MB Minimum assistance  -CL     Gait Balance Support assistive device  -MB assistive device  -CL     Gait Balance Activities scanning environment R/L;side-stepping  -MB scanning environment R/L;side-stepping  -CL     Recorded by [MB] Margo Schaefer, PT [CL] Ashley Cosby OT     Therapy Exercises    Bilateral Lower Extremities AROM:;sitting;ankle pumps/circles;heel raises;hip abduction/adduction  -MB AROM:;10 reps;ankle pumps/circles  -CL AROM:;10 reps;ankle pumps/circles;knee flexion;LAQ  -EH    Recorded by [MB] Margo Schaefer, PT [CL] Ashley Cosby OT [EH] Jasmin Swartz, PT    Positioning and Restraints    Pre-Treatment Position in bed  -MB in bed  -CL in bed  -EH    Post Treatment Position chair  -MB chair  -CL chair  -EH    In Chair notified nsg;reclined;call light within reach;encouraged to call for assist;exit alarm on;with family/caregiver;legs elevated;heels elevated;waffle cushion  -MB notified  nsg;reclined;call light within reach;encouraged to call for assist;exit alarm on;waffle cushion;legs elevated;heels elevated  -CL notified nsg;reclined;call light within reach;exit alarm on;encouraged to call for assist;with family/caregiver  -EH    Recorded by [MB] Margo Schaefer, PT [CL] Ashley Cosby, OT [EH] Jasmin Swartz, PT      User Key  (r) = Recorded By, (t) = Taken By, (c) = Cosigned By    Initials Name Effective Dates     Jasmin Swartz, PT 06/19/15 -     MB Margo Schaefer, PT 03/14/16 -     CL Ashley Cosby, OT 06/08/16 -                 IP PT Goals       11/09/17 1511 11/07/17 1428 11/03/17 1121    Bed Mobility PT LTG    Bed Mobility PT LTG, Outcome goal ongoing  -MB goal ongoing  -EH goal ongoing  -EH    Transfer Training PT LTG    Transfer Training PT LTG, Outcome goal ongoing  -MB goal ongoing  -EH goal ongoing  -EH    Gait Training PT LTG    Gait Training Goal PT LTG, Outcome goal met  -MB goal ongoing  -EH goal ongoing  -EH    Patient Education PT LTG    Patient Education PT LTG Outcome goal ongoing  -MB goal ongoing  -EH goal ongoing  -EH      11/01/17 1119          Bed Mobility PT LTG    Bed Mobility PT LTG, Date Established 11/01/17  -DM      Bed Mobility PT LTG, Time to Achieve 1 wk  -DM      Bed Mobility PT LTG, Activity Type all bed mobility  -DM      Bed Mobility PT LTG, [place] Level independent  -DM      Transfer Training PT LTG    Transfer Training PT LTG, Date Established 11/01/17  -DM      Transfer Training PT LTG, Time to Achieve 1 wk  -DM      Transfer Training PT LTG, Activity Type bed to chair /chair to bed;sit to stand/stand to sit  -DM      Transfer Training PT LTG, [place] Level supervision required  -DM      Transfer Training PT LTG, Assist Device walker, rolling  -DM      Gait Training PT LTG    Gait Training Goal PT LTG, Date Established 11/01/17  -DM      Gait Training Goal PT LTG, Time to Achieve 1 wk  -DM      Gait Training Goal PT LTG, [place]  Level contact guard assist   stable VS,WBAT LLE; NO LOB  -DM      Gait Training Goal PT LTG, Assist Device walker, rolling  -DM      Gait Training Goal PT LTG, Distance to Achieve 150  -DM      Patient Education PT LTG    Patient Education PT LTG, Date Established 11/01/17  -DM      Patient Education PT LTG, Time to Achieve 1 wk  -DM      Patient Education PT LTG, Education Type written program;HEP;posture/body mechanics;gait;transfers;bed mobility;benefits of activity  -DM      Patient Education PT LTG, Education Understanding demonstrate adequately;verbalize understanding  -DM        User Key  (r) = Recorded By, (t) = Taken By, (c) = Cosigned By    Initials Name Provider Type     Jasmin Swartz, PT Physical Therapist    GILSON Fraire, PT Physical Therapist    VIRGINIE Schaefer, PT Physical Therapist          Physical Therapy Education     Title: PT OT SLP Therapies (Active)     Topic: Physical Therapy (Done)     Point: Mobility training (Done)    Learning Progress Summary    Learner Readiness Method Response Comment Documented by Status   Patient Acceptance E,D ARIANNA GARCIA  MB 11/09/17 1510 Done    Acceptance E VU,NR   11/07/17 1428 Done    Acceptance E VU,NR   11/03/17 1120 Done    Eager E,D JOSE   11/01/17 1119 Active   Family Acceptance E,D ARIANNA GARCIA  MB 11/09/17 1510 Done               Point: Home exercise program (Done)    Learning Progress Summary    Learner Readiness Method Response Comment Documented by Status   Patient Acceptance E,D ARIANNA GARCIA  MB 11/09/17 1510 Done    Acceptance E VUNR   11/07/17 1428 Done    Acceptance E VU,NR   11/03/17 1120 Done    Eager E,D NR   11/01/17 1119 Active   Family Acceptance E,D ARIANNA GARCIA  MB 11/09/17 1510 Done               Point: Body mechanics (Done)    Learning Progress Summary    Learner Readiness Method Response Comment Documented by Status   Patient Acceptance E,D ARIANNA GARCIA  MB 11/09/17 1510 Done    Acceptance E VU,NR   11/07/17 1428 Done    Acceptance E VU,NR    11/03/17 1120 Done    Eager E,D NR   11/01/17 1119 Active   Family Acceptance E,D NR,VU  MB 11/09/17 1510 Done               Point: Precautions (Done)    Learning Progress Summary    Learner Readiness Method Response Comment Documented by Status   Patient Acceptance E,D NR,VU  MB 11/09/17 1510 Done    Acceptance E VU,NR   11/07/17 1428 Done    Acceptance E VU,NR   11/03/17 1120 Done    Eager E,D NR   11/01/17 1119 Active   Family Acceptance E,D NR,VU  MB 11/09/17 1510 Done                      User Key     Initials Effective Dates Name Provider Type Discipline     06/19/15 -  Jasmin Swartz, PT Physical Therapist PT     06/19/15 -  Nicole Fraire, PT Physical Therapist PT    MB 03/14/16 -  Margo Schaefer, PT Physical Therapist PT                    PT Recommendation and Plan  Anticipated Discharge Disposition: home with home health (pt told CM she plans to return home(dtr. to helpPRN))  PT Frequency: daily  Plan of Care Review  Plan Of Care Reviewed With: patient, family  Progress: improving  Outcome Summary/Follow up Plan: Pt. demo improved safety awareness and ind. w/ mobility.  Pt. met gait goal this date by ambulating 250 ft w/ RW and CGA; however, patient required cues for safety and adaptive breathing.  VSS throughout.  Recommend cont PT per POC.          Outcome Measures       11/09/17 1430 11/08/17 0840 11/07/17 1350    How much help from another person do you currently need...    Turning from your back to your side while in flat bed without using bedrails? 3  -MB  3  -EH    Moving from lying on back to sitting on the side of a flat bed without bedrails? 3  -MB  3  -EH    Moving to and from a bed to a chair (including a wheelchair)? 3  -MB  3  -EH    Standing up from a chair using your arms (e.g., wheelchair, bedside chair)? 3  -MB  3  -EH    Climbing 3-5 steps with a railing? 2  -MB  1  -EH    To walk in hospital room? 3  -MB  3  -EH    AM-PAC 6 Clicks Score 17  -MB  16  -EH    How  much help from another is currently needed...    Putting on and taking off regular lower body clothing?  2  -CL     Bathing (including washing, rinsing, and drying)  2  -CL     Toileting (which includes using toilet bed pan or urinal)  3  -CL     Putting on and taking off regular upper body clothing  3  -CL     Taking care of personal grooming (such as brushing teeth)  3  -CL     Eating meals  4  -CL     Score  17  -CL     Functional Assessment    Outcome Measure Options AM-PAC 6 Clicks Basic Mobility (PT)  -MB AM-PAC 6 Clicks Daily Activity (OT)  -CL AM-PAC 6 Clicks Basic Mobility (PT)  -      User Key  (r) = Recorded By, (t) = Taken By, (c) = Cosigned By    Initials Name Provider Type     Jasmin Swartz, PT Physical Therapist    VIRGINIE Schaefer, PT Physical Therapist    CL Ashley Cosby, OT Occupational Therapist           Time Calculation:         PT Charges       11/09/17 1513          Time Calculation    Start Time 1430  -MB      PT Received On 11/09/17  -MB      PT Goal Re-Cert Due Date 11/11/17  -MB      Time Calculation- PT    Total Timed Code Minutes- PT 30 minute(s)  -MB        User Key  (r) = Recorded By, (t) = Taken By, (c) = Cosigned By    Initials Name Provider Type    VIRGINIE Schaefer, PT Physical Therapist          Therapy Charges for Today     Code Description Service Date Service Provider Modifiers Qty    92349718634 HC GAIT TRAINING EA 15 MIN 11/9/2017 Margo Schaefer, PT GP 1    30636466971 HC PT THER PROC EA 15 MIN 11/9/2017 Margo Schaefer, PT GP 1          PT G-Codes  Outcome Measure Options: AM-PAC 6 Clicks Basic Mobility (PT)    Margo Schaefer, PT  11/9/2017

## 2017-11-09 NOTE — PROGRESS NOTES
[facility] Medicine Services  PROGRESS NOTE    Patient Name: Sadie Tijerina  : 1940  MRN: [id number]    Date of Admission: 10/27/2017  Length of Stay: 12  Primary Care Physician: Kristian Wolff MD    Subjective   Subjective     CC:  SOB    HPI:  Slept better. Alert and oriented this am. Mild HA. Some increased wheezing. No f/c. No n/v. No diarrhea. Minimal congestion, but cough persists.    Review of Systems   Gen- No fevers, chills  CV- No chest pain, palpitations  Resp- +persistent productive cough, some increased wheezing, dyspnea on 1L NC  GI- No N/V/D, abd pain  Mild HA    Otherwise ROS is negative except as mentioned in the HPI.    Objective   Objective     Vital Signs:   Temp:  [97.1 °F (36.2 °C)-97.9 °F (36.6 °C)] 97.7 °F (36.5 °C)  Heart Rate:  [58-82] 64  Resp:  [16-20] 18  BP: ()/(44-91) 118/52        Physical Exam:  NAD, alert and oriented  OP clear, dry MM  Neck supple  Minimally increased wheezing today  RRR  +BS, ND, NT  RODRIGUEZ  No edema  Normal affect  No rashes      Results Reviewed:  I have personally reviewed current lab, radiology, and data and agree.      Results from last 7 days  Lab Units 17   WBC 10*3/mm3 21.52* 19.36* 18.75*   HEMOGLOBIN g/dL 12.8 12.8 13.7   HEMATOCRIT % 40.1 40.4 42.1   PLATELETS 10*3/mm3 281 273 279       Results from last 7 days  Lab Units 1745 17  0454   SODIUM mmol/L 134 136 134   POTASSIUM mmol/L 4.4 4.6 5.0   CHLORIDE mmol/L 100 98* 97*   CO2 mmol/L 36.0* 30.0 33.0*   BUN mg/dL 37* 41* 30*   CREATININE mg/dL 0.90 0.80 0.80   GLUCOSE mg/dL 90 96 100   CALCIUM mg/dL 8.8 9.3 9.4     No results found for: BNP  pH, Arterial   Date Value Ref Range Status   2017 7.509 (H) 7.350 - 7.450 pH units Final       Microbiology Results Abnormal     Procedure Component Value - Date/Time    Respiratory Culture - Sputum, Cough [[id number]]  (Abnormal)   (Susceptibility) Collected:  10/28/17 0038    Lab Status:  Final result Specimen:  Sputum from Cough Updated:  11/02/17 1416     Respiratory Culture --      Moderate growth (3+) Haemophilus influenzae Biotype III (A)        Beta Lactamase Positive            Light growth (2+) Enterobacter cloacae (A)      Light growth (2+) Klebsiella pneumoniae (A)      Light growth (2+) Normal Respiratory Ashlee     Gram Stain Result Moderate (3+) WBCs per low power field      Many (4+) Gram negative bacilli, tiny      Occasional Gram positive cocci in clusters    Susceptibility      Haemophilus influenzae Biotype III     DISK DIFFUSION     Ampicillin Resistant     Ampicillin + Sulbactam Susceptible     Ceftriaxone Susceptible     Chloramphenicol Susceptible     Meropenem Susceptible     Rifampin Susceptible     Trimethoprim + Sulfamethoxazole Susceptible                Susceptibility      Enterobacter cloacae     TEA     Ampicillin >16 ug/ml Resistant     Ampicillin + Sulbactam 16/8 ug/ml Intermediate     Aztreonam <=8 ug/ml Susceptible     Cefepime <=8 ug/ml Susceptible     Cefotaxime <=2 ug/ml Susceptible     Ceftriaxone <=8 ug/ml Susceptible     Cefuroxime sodium 16 ug/ml Intermediate     Ertapenem <=1 ug/ml Susceptible     Gentamicin <=4 ug/ml Susceptible     Levofloxacin <=2 ug/ml Susceptible     Meropenem <=1 ug/ml Susceptible     Piperacillin + Tazobactam <=16 ug/ml Susceptible     Tetracycline <=4 ug/ml Susceptible     Tobramycin <=4 ug/ml Susceptible     Trimethoprim + Sulfamethoxazole <=2/38 ug/ml Susceptible                Susceptibility      Klebsiella pneumoniae     TEA     Ampicillin >16 ug/ml Resistant     Ampicillin + Sulbactam <=8/4 ug/ml Susceptible     Aztreonam <=8 ug/ml Susceptible     Cefepime <=8 ug/ml Susceptible     Cefotaxime <=2 ug/ml Susceptible     Ceftriaxone <=8 ug/ml Susceptible     Cefuroxime sodium <=4 ug/ml Susceptible     Ertapenem <=1 ug/ml Susceptible     Gentamicin <=4 ug/ml Susceptible      Levofloxacin <=2 ug/ml Susceptible     Meropenem <=1 ug/ml Susceptible     Piperacillin + Tazobactam <=16 ug/ml Susceptible     Tetracycline <=4 ug/ml Susceptible     Tobramycin <=4 ug/ml Susceptible     Trimethoprim + Sulfamethoxazole <=2/38 ug/ml Susceptible                          Imaging Results (last 24 hours)     Procedure Component Value Units Date/Time    XR Chest 1 View [[id number]] Collected:  11/08/17 1248     Updated:  11/08/17 1322    Narrative:       EXAMINATION: XR CHEST 1 VW- 11/08/2017     INDICATION: DYSPNEA, ON EXERTION      COMPARISON: NONE     FINDINGS:   1. Obstructive lung disease is noted. There is linear fibrotic scarring  and stranding in the upper lung zones with apical pleural scarring.  2. Calcified granulomatous scarring is seen in the central chest.  3. Otherwise, active disease, free fluid or edema is not identified.           Impression:       1. Centrilobular emphysema, obstructive lung disease, fibrotic stranding  and hyperexpansion of the chest is noted.  2. Otherwise, no superimposed acute abnormality is identified.     D:  11/08/2017  E:  11/08/2017     This report was finalized on 11/8/2017 1:20 PM by Dr. Aron Garcia MD.           Results for orders placed during the hospital encounter of 10/27/17   Adult Transthoracic Echo Complete W/ Cont if Necessary Per Protocol    Narrative · Left ventricular systolic function is normal.  · Estimated EF appears to be in the range of 61 - 65%.  · Left ventricular diastolic dysfunction (grade I a) consistent with   impaired relaxation.  · Calculated right ventricular systolic pressure from tricuspid   regurgitation is 27 mmHg.          I have reviewed the medications.    Assessment/Plan   Assessment / Plan     Hospital Problem List     * (Principal)COPD exacerbation    Gastroesophageal reflux disease without esophagitis    Hyperlipidemia    CAP (community acquired pneumonia)    Leukocytosis (Chronic)    Tobacco abuse disorder (Chronic)     Acute respiratory failure with hypoxemia        Brief Hospital Course to date:  Sadie Tijerina is a 77 y.o. female with COPD and continued tobacco use presenting with SOA x 1 week and diffuse wheezing.    Assessment & Plan:    - Acute hypoxic respiratory failure: RA at home. Currently on 1L NC  - PNA: Resp Cx +H.Flu, Enterobacter, Klebsiella. S/P Ceftriaxone. Needs to mobilize!  - COPD with exacerbation: Symbicort, Duonebs, Methylpred (allergic to prednisone), tapered steroids, increase prn atrovent today in hopes of not needing to increase steroids to minimize agitation/psychosis  - Leukocytosis, likely secondary to steroids  - HTN: Losartan, propranolol  - Chest wall pain: Lidocaine patch  - Chronic compensated dCHF: ECHO 10/2017: EF normal, Grade I diastolic dysfunction  - Mood: Zoloft  - Hip pain s/p fall prior to admission: No acute fracture  - GERD  - Leukocytosis: On steroids  - Tobacco abuse disorder  - Agitation/confusion, resolved, continue seroquel at night for sleep       DVT Prophylaxis: pLOV    CODE STATUS: Conditional Code    Disposition: I expect the patient to be discharged in 2-3 days    Paulino Watts MD  11/09/17  9:57 AM

## 2017-11-09 NOTE — PLAN OF CARE
Problem: Patient Care Overview (Adult)  Goal: Plan of Care Review  Outcome: Ongoing (interventions implemented as appropriate)    11/09/17 0513   Coping/Psychosocial Response Interventions   Plan Of Care Reviewed With patient;sibling   Patient Care Overview   Progress improving   Outcome Evaluation   Outcome Summary/Follow up Plan patient was extremely drowsy at beginning of shift. Meds held due to inability to wake up and swallow. Patient was more alert as shift went on and eventually walked to the bathroom with 1 assist to void. Patient was A/O from 0300 on and very pleasant. Patient did not attempt to get OOB nor was she combative. Patient states that she does not remember her combative behavior from previous shifts. No PRN seroquel given and nightly dose was also held. If patient continues with current state she will no longer need a sitter. VSS       Goal: Adult Individualization and Mutuality  Outcome: Ongoing (interventions implemented as appropriate)  Goal: Discharge Needs Assessment  Outcome: Ongoing (interventions implemented as appropriate)    Problem: Respiratory Insufficiency (Adult)  Goal: Acid/Base Balance  Outcome: Ongoing (interventions implemented as appropriate)  Goal: Effective Ventilation  Outcome: Ongoing (interventions implemented as appropriate)    Problem: Fall Risk (Adult)  Goal: Absence of Falls  Outcome: Ongoing (interventions implemented as appropriate)

## 2017-11-10 PROCEDURE — 94799 UNLISTED PULMONARY SVC/PX: CPT

## 2017-11-10 PROCEDURE — 94760 N-INVAS EAR/PLS OXIMETRY 1: CPT

## 2017-11-10 PROCEDURE — 99233 SBSQ HOSP IP/OBS HIGH 50: CPT | Performed by: HOSPITALIST

## 2017-11-10 PROCEDURE — 94640 AIRWAY INHALATION TREATMENT: CPT

## 2017-11-10 PROCEDURE — 97110 THERAPEUTIC EXERCISES: CPT

## 2017-11-10 PROCEDURE — 25010000002 ENOXAPARIN PER 10 MG

## 2017-11-10 PROCEDURE — 63710000001 METHYLPREDNISOLONE PER 4 MG: Performed by: HOSPITALIST

## 2017-11-10 PROCEDURE — 97116 GAIT TRAINING THERAPY: CPT

## 2017-11-10 RX ORDER — DOXYCYCLINE HYCLATE 100 MG/1
100 CAPSULE ORAL EVERY 12 HOURS SCHEDULED
Status: DISCONTINUED | OUTPATIENT
Start: 2017-11-10 | End: 2017-11-11

## 2017-11-10 RX ADMIN — DOXYCYCLINE HYCLATE 100 MG: 100 CAPSULE ORAL at 21:39

## 2017-11-10 RX ADMIN — SERTRALINE HYDROCHLORIDE 50 MG: 50 TABLET ORAL at 08:52

## 2017-11-10 RX ADMIN — FLUTICASONE PROPIONATE 2 SPRAY: 50 SPRAY, METERED NASAL at 08:51

## 2017-11-10 RX ADMIN — METHYLPREDNISOLONE 16 MG: 4 TABLET ORAL at 08:52

## 2017-11-10 RX ADMIN — BUDESONIDE AND FORMOTEROL FUMARATE DIHYDRATE 2 PUFF: 160; 4.5 AEROSOL RESPIRATORY (INHALATION) at 08:33

## 2017-11-10 RX ADMIN — DEXTROMETHORPHAN 30 MG: 30 SUSPENSION, EXTENDED RELEASE ORAL at 21:38

## 2017-11-10 RX ADMIN — VITAMIN D, TAB 1000IU (100/BT) 1000 UNITS: 25 TAB at 08:52

## 2017-11-10 RX ADMIN — ENOXAPARIN SODIUM 40 MG: 40 INJECTION SUBCUTANEOUS at 08:51

## 2017-11-10 RX ADMIN — NYSTATIN 500000 UNITS: 100000 SUSPENSION ORAL at 17:40

## 2017-11-10 RX ADMIN — DEXTROMETHORPHAN 30 MG: 30 SUSPENSION, EXTENDED RELEASE ORAL at 08:51

## 2017-11-10 RX ADMIN — IPRATROPIUM BROMIDE AND ALBUTEROL SULFATE 3 ML: .5; 3 SOLUTION RESPIRATORY (INHALATION) at 12:26

## 2017-11-10 RX ADMIN — LOSARTAN POTASSIUM 25 MG: 25 TABLET, FILM COATED ORAL at 08:51

## 2017-11-10 RX ADMIN — IPRATROPIUM BROMIDE AND ALBUTEROL SULFATE 3 ML: .5; 3 SOLUTION RESPIRATORY (INHALATION) at 19:33

## 2017-11-10 RX ADMIN — ASPIRIN 81 MG: 81 TABLET, COATED ORAL at 08:52

## 2017-11-10 RX ADMIN — DOXYCYCLINE HYCLATE 100 MG: 100 CAPSULE ORAL at 10:14

## 2017-11-10 RX ADMIN — IPRATROPIUM BROMIDE AND ALBUTEROL SULFATE 3 ML: .5; 3 SOLUTION RESPIRATORY (INHALATION) at 03:25

## 2017-11-10 RX ADMIN — NYSTATIN 500000 UNITS: 100000 SUSPENSION ORAL at 11:59

## 2017-11-10 RX ADMIN — NYSTATIN 500000 UNITS: 100000 SUSPENSION ORAL at 21:38

## 2017-11-10 RX ADMIN — QUETIAPINE FUMARATE 12.5 MG: 25 TABLET ORAL at 21:39

## 2017-11-10 RX ADMIN — BUDESONIDE AND FORMOTEROL FUMARATE DIHYDRATE 2 PUFF: 160; 4.5 AEROSOL RESPIRATORY (INHALATION) at 19:33

## 2017-11-10 RX ADMIN — ATORVASTATIN CALCIUM 10 MG: 10 TABLET, FILM COATED ORAL at 21:39

## 2017-11-10 RX ADMIN — IPRATROPIUM BROMIDE AND ALBUTEROL SULFATE 3 ML: .5; 3 SOLUTION RESPIRATORY (INHALATION) at 08:28

## 2017-11-10 RX ADMIN — PROPRANOLOL HYDROCHLORIDE 60 MG: 20 TABLET ORAL at 08:51

## 2017-11-10 RX ADMIN — NYSTATIN 500000 UNITS: 100000 SUSPENSION ORAL at 08:51

## 2017-11-10 RX ADMIN — IPRATROPIUM BROMIDE AND ALBUTEROL SULFATE 3 ML: .5; 3 SOLUTION RESPIRATORY (INHALATION) at 15:44

## 2017-11-10 NOTE — THERAPY TREATMENT NOTE
Acute Care - Physical Therapy Treatment Note  [facility]     Patient Name: Sadie Tijerina  : 1940  MRN: [id number]  Today's Date: 11/10/2017  Onset of Illness/Injury or Date of Surgery Date: 10/27/17  Date of Referral to PT: 10/31/17  Referring Physician: VIRGIE Craft MD    Admit Date: 10/27/2017    Visit Dx:    ICD-10-CM ICD-9-CM   1. COPD with exacerbation J44.1 491.21   2. Hypoxia R09.02 799.02   3. Wheezing R06.2 786.07   4. Leukocytosis, unspecified type D72.829 288.60   5. Impaired functional mobility, balance, gait, and endurance Z74.09 V49.89   6. Impaired mobility and ADLs Z74.09 799.89     Patient Active Problem List   Diagnosis   • Benign paroxysmal positional vertigo   • Calf cramp   • Mixed anxiety depressive disorder   • Dizziness   • Fatigue   • Gastroesophageal reflux disease without esophagitis   • Hematuria   • Hyperlipidemia   • Hypertension   • Low back pain   • Orthostatic hypotension   • CAP (community acquired pneumonia)   • Pulmonary emphysema   • Restless legs syndrome   • Pre-syncope   • Essential tremor   • Vitamin D deficiency   • Balance problem   • Tension headache   • COPD exacerbation   • Leukocytosis   • COPD with exacerbation   • Tobacco abuse disorder   • Acute respiratory failure with hypoxemia               Adult Rehabilitation Note       11/10/17 1104 17 1430 17 0840    Rehab Assessment/Intervention    Discipline physical therapist  -SJ physical therapist  -MB occupational therapist  -CL    Document Type  therapy note (daily note)  -MB therapy note (daily note)  -CL    Subjective Information agree to therapy;complains of;fatigue  -SJ no complaints;agree to therapy  -MB agree to therapy;no complaints  -CL    Patient Effort, Rehab Treatment good  -SJ good  -MB good  -CL    Symptoms Noted During/After Treatment dizziness  -SJ      Symptoms Noted Comment VSS  -SJ      Precautions/Limitations fall precautions;oxygen therapy device and L/min  -SJ fall  precautions;oxygen therapy device and L/min  -MB fall precautions;oxygen therapy device and L/min  -CL    Specific Treatment Considerations   Upon arrival, pt sitting EOB w/ nursing and attempting to ambulated/t/f w/out assist, not following commands for safety.   -CL    Recorded by [SJ] Albina Brian, PT [MB] Margo Schaefer, PT [CL] Ashley Cosby OT    Vital Signs    Pre Systolic BP Rehab 136  -SJ --   VSS. Nsg cleared for tx.  -MB --   VSS, RN cleared for tx.   -CL    Pre Treatment Diastolic BP 59  -SJ      Pretreatment Heart Rate (beats/min) 67  -SJ      Pre SpO2 (%) 93  -SJ      O2 Delivery Pre Treatment supplemental O2  -SJ      Recorded by [SJ] Albina Brian, PT [MB] Margo Schaefer, PT [CL] Ashley Cosby OT    Pain Assessment    Pain Assessment No/denies pain  -SJ Gomez-Hudson FACES  -MB Gomez-Hudson FACES  -CL    Gomez-Hudson FACES Pain Rating 0  -SJ 2  -MB 2  -CL    Pain Score 0  -SJ 1  -MB 2  -CL    Post Pain Score 0  -SJ  2  -CL    Pain Type  Chronic pain;Acute pain  -MB Chronic pain  -CL    Pain Location  Generalized   B heels  -MB Generalized  -CL    Pain Orientation  Right;Left  -MB     Pain Intervention(s)  Repositioned;Ambulation/increased activity  -MB Repositioned;Ambulation/increased activity  -CL    Response to Interventions  Tolerated  -MB Tolerated.   -CL    Recorded by [SJ] Albina Brian, PT [MB] Margo Schaefer, PT [CL] Ashley Cosby OT    Cognitive Assessment/Intervention    Current Cognitive/Communication Assessment functional  -SJ functional  -MB impaired  -CL    Orientation Status oriented x 4  -SJ oriented x 4  -MB oriented to;person;disoriented to;place;time;situation  -CL    Follows Commands/Answers Questions 100% of the time;able to follow single-step instructions;needs cueing;needs increased time  -% of the time;able to follow single-step instructions;needs cueing  -MB 50% of the time;75% of the time;needs cueing;needs increased time;needs repetition  -CL    Personal  Safety mild impairment;decreased awareness, need for assist;decreased awareness, need for safety;decreased insight to deficits  -SJ mild impairment;decreased awareness, need for assist;decreased awareness, need for safety;decreased insight to deficits  -MB severe impairment;decreased awareness, need for assist;decreased awareness, need for safety;at risk behaviors demonstrated;impulsive;decreased insight to deficits  -CL    Personal Safety Interventions fall prevention program maintained;gait belt;muscle strengthening facilitated;nonskid shoes/slippers when out of bed  -SJ fall prevention program maintained;gait belt;muscle strengthening facilitated;nonskid shoes/slippers when out of bed  -MB fall prevention program maintained;gait belt;nonskid shoes/slippers when out of bed  -CL    Recorded by [SJ] Albina Brian PT [MB] Margo Schaefer, PT [CL] Ashley Cosby OT    Bed Mobility, Assessment/Treatment    Bed Mob, Supine to Sit, [place] supervision required;verbal cues required  -SJ supervision required;verbal cues required  -MB     Bed Mob, Sit to Supine, [place]  not tested  -MB     Bed Mobility, Safety Issues  decreased use of arms for pushing/pulling;decreased use of legs for bridging/pushing  -MB     Bed Mobility, Impairments  strength decreased;impaired balance;decreased flexibility  -MB     Bed Mobility, Comment setup for lines, cues for scooting to EOB  - VCs to scoot hips forward to EOB.  -MB Pt received sitting EOB w/ RN.   -CL    Recorded by [SJ] Albina Brian, PT [MB] Margo Scahefer, PT [CL] Ashley Cosby OT    Transfer Assessment/Treatment    Transfers, Sit-Stand [place] contact guard assist;verbal cues required  -SJ contact guard assist;verbal cues required  -MB contact guard assist;verbal cues required  -CL    Transfers, Stand-Sit [place] contact guard assist;verbal cues required  -SJ contact guard assist;verbal cues required  -MB contact guard assist;verbal cues required   -CL    Transfers, Sit-Stand-Sit, Assist Device rolling walker  -SJ rolling walker  -MB rolling walker  -CL    Transfer, Safety Issues step length decreased;balance decreased during turns  -SJ step length decreased;balance decreased during turns  -MB     Transfer, Impairments strength decreased;impaired balance;coordination impaired  -SJ decreased flexibility;impaired balance;strength decreased  -MB     Transfer, Comment cues for safety  -SJ VCs for safe hand placement.  -MB Pt performed ~4 reps d/t standing impulsively despite MAX VCs for safety.   -CL    Recorded by [SJ] Albina Brian, PT [MB] Margo Schaefer, PT [CL] Ashley Cosby OT    Gait Assessment/Treatment    Gait, Long Level minimum assist (75% patient effort);1 person + 1 person to manage equipment;verbal cues required  -SJ contact guard assist  -MB     Gait, Assistive Device rolling walker  -SJ rolling walker  -MB     Gait, Distance (Feet) 110  -  -MB     Gait, Gait Pattern Analysis swing-to gait  -SJ swing-to gait  -MB     Gait, Gait Deviations alia decreased;decreased heel strike;double stance time increased;forward flexed posture;narrow base  -SJ forward flexed posture;alia decreased;bilateral:;step length decreased;narrow base  -MB     Gait, Safety Issues balance decreased during turns;weight-shifting ability decreased;loses balance backward;supplemental O2  -SJ balance decreased during turns;step length decreased;supplemental O2  -MB     Gait, Impairments decreased flexibility;strength decreased;impaired balance  -SJ decreased flexibility;strength decreased;impaired balance  -MB     Gait, Comment slow gait speed, multiple LOB requiring Jas, scissoring gait requiring cues to widen GWYN  -SJ Pt amb w/ slow alia and VCs for upright posture, inc GWYN and B step length, and adaptive breathing.    -MB     Recorded by [SJ] Albina Brian, PT [MB] Margo Schaefer, PT     Functional Mobility    Functional Mobility- Ind. Level    minimum assist (75% patient effort);1 person + 1 person to manage equipment;verbal cues required  -CL    Functional Mobility- Device   rolling walker  -CL    Functional Mobility-Distance (Feet)   150  -CL    Functional Mobility- Comment   Pt w/ several minor episodes of LOB w/ Min A to recover d/t small GWYN and scissoring gait, followed closely w/ chair.   -CL    Recorded by   [CL] Ashley Cosby OT    Balance Skills Training    Sitting-Level of Assistance Close supervision  -SJ  Contact guard   d/t impulsivity  -CL    Sitting-Balance Support Right upper extremity supported;Left upper extremity supported;Feet supported  -SJ  Right upper extremity supported;Left upper extremity supported;Feet supported  -CL    Sitting-Balance Activities   Forward lean;Reaching for objects;Trunk control activities  -CL    Standing-Level of Assistance Contact guard  -SJ Contact guard  -MB Contact guard  -CL    Static Standing Balance Support assistive device  -SJ assistive device  -MB assistive device  -CL    Standing-Balance Activities  Weight Shift R-L;Weight Shift A-P  -MB Weight Shift R-L;Weight Shift A-P;Reaching for objects;Reaching across midline;Forward lean;Lateral lean  -CL    Gait Balance-Level of Assistance Minimum assistance  -SJ Minimum assistance  -MB Minimum assistance  -CL    Gait Balance Support assistive device  -SJ assistive device  -MB assistive device  -CL    Gait Balance Activities  scanning environment R/L;side-stepping  -MB scanning environment R/L;side-stepping  -CL    Recorded by [SJ] Albina Brian, PT [MB] Margo Schaefer, PT [CL] Ashley Cosby, OT    Therapy Exercises    Bilateral Lower Extremities AROM:;sitting;ankle pumps/circles;heel raises;hip abduction/adduction  -SJ AROM:;sitting;ankle pumps/circles;heel raises;hip abduction/adduction  -MB AROM:;10 reps;ankle pumps/circles  -CL    Recorded by [SJ] Albina Brian, PT [MB] Margo Schaefer, PT [CL] Ashley Cosby OT    Positioning and Restraints     Pre-Treatment Position in bed  -SJ in bed  -MB in bed  -CL    Post Treatment Position chair  -SJ chair  -MB chair  -CL    In Chair notified nsg;reclined;call light within reach;encouraged to call for assist;exit alarm on;waffle cushion;heels elevated  -SJ notified nsg;reclined;call light within reach;encouraged to call for assist;exit alarm on;with family/caregiver;legs elevated;heels elevated;waffle cushion  -MB notified nsg;reclined;call light within reach;encouraged to call for assist;exit alarm on;waffle cushion;legs elevated;heels elevated  -CL    Recorded by [SJ] Albina Brian, PT [MB] Margo Schaefer PT [CL] Ashley Cosby OT      11/07/17 1350          Rehab Assessment/Intervention    Discipline physical therapist  -      Document Type therapy note (daily note)  -      Subjective Information agree to therapy;complains of;fatigue  -EH      Patient Effort, Rehab Treatment good  -EH      Symptoms Noted During/After Treatment dizziness  -EH      Symptoms Noted Comment VSS  -      Precautions/Limitations cardiac precautions  -      Specific Treatment Considerations scissoring gait  -EH      Recorded by [EH] Jasmin Swartz, PT      Pain Assessment    Pain Assessment Gomez-Hudson FACES  -      Gomez-Baker FACES Pain Rating 2  -EH      Pain Score 6  -EH      Post Pain Score 6  -EH      Pain Type Chronic pain  -EH      Pain Location Generalized  -      Pain Descriptors Aching  -      Pain Frequency Constant/continuous  -      Patient's Stated Pain Goal No pain  -EH      Pain Intervention(s) Repositioned;Ambulation/increased activity  -      Response to Interventions tolerated  -EH      Recorded by [EH] Jasmin Swartz, PT      Vision Assessment/Intervention    Visual Impairment WFL  -EH      Recorded by [EH] Jasmin Swartz, PT      Cognitive Assessment/Intervention    Current Cognitive/Communication Assessment impaired  -      Orientation Status oriented x 4  -EH      Follows  Commands/Answers Questions 100% of the time;able to follow single-step instructions   dtr reports pt confused call in A.M.; pt reports working ...  -      Personal Safety mild impairment   with therapy prior this morning; no therapy this A.M.  -      Personal Safety Interventions fall prevention program maintained;gait belt;nonskid shoes/slippers when out of bed  -      Recorded by [] Jasmin Swartz, PT      Bed Mobility, Assessment/Treatment    Bed Mobility, Comment pt UIC  -      Recorded by [] Jasmin Swartz PT      Transfer Assessment/Treatment    Transfers, Sit-Stand [place] contact guard assist;verbal cues required  -      Transfers, Stand-Sit [place] contact guard assist;verbal cues required  -      Transfers, Sit-Stand-Sit, Assist Device rolling walker  -      Transfer, Comment VC for HP; pt does not push up or reach back to chair.  -      Recorded by [] Jasmin Swartz, PT      Gait Assessment/Treatment    Gait, [place] Level minimum assist (75% patient effort)  -      Gait, Assistive Device rolling walker  -      Gait, Distance (Feet) 20  -      Gait, Gait Pattern Analysis swing-through gait  -      Gait, Gait Deviations narrow base;ataxia  -      Gait, Safety Issues step length decreased;weight-shifting ability decreased;supplemental O2   pt reports dizziness.  -      Gait, Impairments strength decreased;impaired balance;pain  -      Gait, Comment Pt reports dizziness after ~10 ft of forward ambualtion. Pt cued to stop, stand tall and focus on PLB. Pt dizziness does not improve, and pt demonstrates multiple scissoring steps with need for assist to maintain balance.  -      Recorded by [] Jasmin Swartz, PT      Therapy Exercises    Bilateral Lower Extremities AROM:;10 reps;ankle pumps/circles;knee flexion;LAQ  -      Recorded by [] Jasmin Swartz, PT      Positioning and Restraints    Pre-Treatment Position in bed  -       Post Treatment Position chair  -EH      In Chair notified nsg;reclined;call light within reach;exit alarm on;encouraged to call for assist;with family/caregiver  -EH      Recorded by [EH] Jasmin Swartz, PT        User Key  (r) = Recorded By, (t) = Taken By, (c) = Cosigned By    Initials Name Effective Dates    EH Jasmin Swartz, PT 06/19/15 -     SJ Albina Brian, PT 06/19/15 -     MB Margo Schaefer, PT 03/14/16 -     CL Ashley Cosby, OT 06/08/16 -                 IP PT Goals       11/09/17 1511 11/07/17 1428 11/03/17 1121    Bed Mobility PT LTG    Bed Mobility PT LTG, Outcome goal ongoing  -MB goal ongoing  -EH goal ongoing  -EH    Transfer Training PT LTG    Transfer Training PT LTG, Outcome goal ongoing  -MB goal ongoing  - goal ongoing  -EH    Gait Training PT LTG    Gait Training Goal PT LTG, Outcome goal met  -MB goal ongoing  - goal ongoing  -EH    Patient Education PT LTG    Patient Education PT LTG Outcome goal ongoing  -MB goal ongoing  - goal ongoing  -EH      11/01/17 1119          Bed Mobility PT LTG    Bed Mobility PT LTG, Date Established 11/01/17  -DM      Bed Mobility PT LTG, Time to Achieve 1 wk  -DM      Bed Mobility PT LTG, Activity Type all bed mobility  -DM      Bed Mobility PT LTG, [place] Level independent  -DM      Transfer Training PT LTG    Transfer Training PT LTG, Date Established 11/01/17  -DM      Transfer Training PT LTG, Time to Achieve 1 wk  -DM      Transfer Training PT LTG, Activity Type bed to chair /chair to bed;sit to stand/stand to sit  -DM      Transfer Training PT LTG, [place] Level supervision required  -DM      Transfer Training PT LTG, Assist Device walker, rolling  -DM      Gait Training PT LTG    Gait Training Goal PT LTG, Date Established 11/01/17  -DM      Gait Training Goal PT LTG, Time to Achieve 1 wk  -DM      Gait Training Goal PT LTG, [place] Level contact guard assist   stable VS,WBAT LLE; NO LOB  -DM      Gait Training Goal PT  LTG, Assist Device walker, rolling  -DM      Gait Training Goal PT LTG, Distance to Achieve 150  -DM      Patient Education PT LTG    Patient Education PT LTG, Date Established 11/01/17  -DM      Patient Education PT LTG, Time to Achieve 1 wk  -DM      Patient Education PT LTG, Education Type written program;HEP;posture/body mechanics;gait;transfers;bed mobility;benefits of activity  -DM      Patient Education PT LTG, Education Understanding demonstrate adequately;verbalize understanding  -DM        User Key  (r) = Recorded By, (t) = Taken By, (c) = Cosigned By    Initials Name Provider Type     Jasmin Swartz, PT Physical Therapist    DM Nicole Fraire, PT Physical Therapist    MB Margo Schaefer, PT Physical Therapist          Physical Therapy Education     Title: PT OT SLP Therapies (Active)     Topic: Physical Therapy (Active)     Point: Mobility training (Active)    Learning Progress Summary    Learner Readiness Method Response Comment Documented by Status   Patient Acceptance E NR   11/10/17 1152 Active    Acceptance E,D NR,VU  MB 11/09/17 1510 Done    Acceptance E VU,NR   11/07/17 1428 Done    Acceptance E VU,NR   11/03/17 1120 Done    Eager E,D NR   11/01/17 1119 Active   Family Acceptance E,D NR,VU  MB 11/09/17 1510 Done               Point: Home exercise program (Active)    Learning Progress Summary    Learner Readiness Method Response Comment Documented by Status   Patient Acceptance E NR   11/10/17 1152 Active    Acceptance E,D NR,VU  MB 11/09/17 1510 Done    Acceptance E VU,NR   11/07/17 1428 Done    Acceptance E VU,NR   11/03/17 1120 Done    Eager E,D NR   11/01/17 1119 Active   Family Acceptance E,D NR,VU  MB 11/09/17 1510 Done               Point: Body mechanics (Active)    Learning Progress Summary    Learner Readiness Method Response Comment Documented by Status   Patient Acceptance E NR   11/10/17 1152 Active    Acceptance E,D NR,VU  MB 11/09/17 1510 Done    Acceptance  E VU,NR   11/07/17 1428 Done    Acceptance E VU,NR   11/03/17 1120 Done    Eager E,D NR   11/01/17 1119 Active   Family Acceptance E,D NR,VU  MB 11/09/17 1510 Done               Point: Precautions (Active)    Learning Progress Summary    Learner Readiness Method Response Comment Documented by Status   Patient Acceptance E NR   11/10/17 1152 Active    Acceptance E,D NR,VU  MB 11/09/17 1510 Done    Acceptance E VU,NR   11/07/17 1428 Done    Acceptance E VU,NR   11/03/17 1120 Done    Eager E,D NR   11/01/17 1119 Active   Family Acceptance E,D NR,VU  MB 11/09/17 1510 Done                      User Key     Initials Effective Dates Name Provider Type Discipline     06/19/15 -  Jasmin Swartz, PT Physical Therapist PT     06/19/15 -  Albina Brian, PT Physical Therapist PT     06/19/15 -  Nicole Fraire, PT Physical Therapist PT    MB 03/14/16 -  Margo Schaefer, PT Physical Therapist PT                    PT Recommendation and Plan  Anticipated Discharge Disposition: home with home health (pt told CM she plans to return home(dtr. to helpPRN))  PT Frequency: daily  Plan of Care Review  Plan Of Care Reviewed With: patient  Progress: progress toward functional goals as expected  Outcome Summary/Follow up Plan: Pt amb 110ft with RW with Jas, with several LOB requiring assistance. Pt unsteady and not safe to ambulate on her own. Continue POC.          Outcome Measures       11/10/17 1104 11/09/17 1430 11/08/17 0840    How much help from another person do you currently need...    Turning from your back to your side while in flat bed without using bedrails? 4  -SJ 3  -MB     Moving from lying on back to sitting on the side of a flat bed without bedrails? 4  -SJ 3  -MB     Moving to and from a bed to a chair (including a wheelchair)? 3  -SJ 3  -MB     Standing up from a chair using your arms (e.g., wheelchair, bedside chair)? 3  -SJ 3  -MB     Climbing 3-5 steps with a railing? 2  -SJ 2  -MB     To  walk in hospital room? 3  -SJ 3  -MB     AM-PAC 6 Clicks Score 19  -SJ 17  -MB     How much help from another is currently needed...    Putting on and taking off regular lower body clothing?   2  -CL    Bathing (including washing, rinsing, and drying)   2  -CL    Toileting (which includes using toilet bed pan or urinal)   3  -CL    Putting on and taking off regular upper body clothing   3  -CL    Taking care of personal grooming (such as brushing teeth)   3  -CL    Eating meals   4  -CL    Score   17  -CL    Functional Assessment    Outcome Measure Options AM-PAC 6 Clicks Basic Mobility (PT)  -SJ AM-PAC 6 Clicks Basic Mobility (PT)  -MB AM-PAC 6 Clicks Daily Activity (OT)  -CL      11/07/17 1350          How much help from another person do you currently need...    Turning from your back to your side while in flat bed without using bedrails? 3  -EH      Moving from lying on back to sitting on the side of a flat bed without bedrails? 3  -EH      Moving to and from a bed to a chair (including a wheelchair)? 3  -EH      Standing up from a chair using your arms (e.g., wheelchair, bedside chair)? 3  -EH      Climbing 3-5 steps with a railing? 1  -EH      To walk in hospital room? 3  -EH      AM-PAC 6 Clicks Score 16  -EH      Functional Assessment    Outcome Measure Options AM-PAC 6 Clicks Basic Mobility (PT)  -EH        User Key  (r) = Recorded By, (t) = Taken By, (c) = Cosigned By    Initials Name Provider Type    EH Jasmin Swartz, PT Physical Therapist     Albina Brian, PT Physical Therapist    VIRGINIE Schaefer, PT Physical Therapist    CL Ashley Cosby, OT Occupational Therapist           Time Calculation:         PT Charges       11/10/17 1152          Time Calculation    Start Time 1104  -SJ      PT Received On 11/10/17  -      PT Goal Re-Cert Due Date 11/11/17  -SJ      Time Calculation- PT    Total Timed Code Minutes- PT 23 minute(s)  -        User Key  (r) = Recorded By, (t) = Taken By, (c) =  Cosigned By    Initials Name Provider Type    SJ Albina Brian, PT Physical Therapist          Therapy Charges for Today     Code Description Service Date Service Provider Modifiers Qty    43346779145 HC GAIT TRAINING EA 15 MIN 11/10/2017 Albina Brian, PT GP 1    23472212200 HC PT THER PROC EA 15 MIN 11/10/2017 Albina Brian, PT GP 1    95168735772 HC PT THER SUPP EA 15 MIN 11/10/2017 Albina Brian, PT GP 2          PT G-Codes  Outcome Measure Options: AM-PAC 6 Clicks Basic Mobility (PT)    Albina Brian PT  11/10/2017

## 2017-11-10 NOTE — PROGRESS NOTES
[facility] Medicine Services  PROGRESS NOTE    Patient Name: Sadie Tijerina  : 1940  MRN: [id number]    Date of Admission: 10/27/2017  Length of Stay: 13  Primary Care Physician: Kristian Wolff MD    Subjective   Subjective     CC:  SOB    HPI:  Slept better. Alert and oriented this am. No HA. Increased cough/congestion, but no increased shortness of breath or wheezing. No f/c. No n/v. Still very weak but does NOT agree to going to rehab.    Review of Systems   Gen- No fevers, chills  CV- No chest pain, palpitations  Resp- +persistent productive cough, no increased wheezing, but increased congestion  GI- No N/V/D, abd pain  Mild HA resolved  Increased fatigue/weakness    Otherwise ROS is negative except as mentioned in the HPI.    Objective   Objective     Vital Signs:   Temp:  [97.4 °F (36.3 °C)-98.6 °F (37 °C)] 97.4 °F (36.3 °C)  Heart Rate:  [] 75  Resp:  [16-18] 16  BP: ()/(50-92) 136/59        Physical Exam:  NAD, alert and oriented  OP clear, dry MM  Neck supple  Increased upper airway congestion, otherwise clear  RRR  +BS, ND, NT  RODRIGUEZ  No edema  Normal affect  No rashes      Results Reviewed:  I have personally reviewed current lab, radiology, and data and agree.      Results from last 7 days  Lab Units 17  0545 17  0513   WBC 10*3/mm3 21.52* 19.36*   HEMOGLOBIN g/dL 12.8 12.8   HEMATOCRIT % 40.1 40.4   PLATELETS 10*3/mm3 281 273       Results from last 7 days  Lab Units 17  0545 17  0513   SODIUM mmol/L 134 136   POTASSIUM mmol/L 4.4 4.6   CHLORIDE mmol/L 100 98*   CO2 mmol/L 36.0* 30.0   BUN mg/dL 37* 41*   CREATININE mg/dL 0.90 0.80   GLUCOSE mg/dL 90 96   CALCIUM mg/dL 8.8 9.3     No results found for: BNP  pH, Arterial   Date Value Ref Range Status   2017 7.509 (H) 7.350 - 7.450 pH units Final       Microbiology Results Abnormal     Procedure Component Value - Date/Time    Respiratory Culture - Sputum, Cough [[id number]]   (Abnormal)  (Susceptibility) Collected:  10/28/17 0038    Lab Status:  Final result Specimen:  Sputum from Cough Updated:  11/02/17 141     Respiratory Culture --      Moderate growth (3+) Haemophilus influenzae Biotype III (A)        Beta Lactamase Positive            Light growth (2+) Enterobacter cloacae (A)      Light growth (2+) Klebsiella pneumoniae (A)      Light growth (2+) Normal Respiratory Ashlee     Gram Stain Result Moderate (3+) WBCs per low power field      Many (4+) Gram negative bacilli, tiny      Occasional Gram positive cocci in clusters    Susceptibility      Haemophilus influenzae Biotype III     DISK DIFFUSION     Ampicillin Resistant     Ampicillin + Sulbactam Susceptible     Ceftriaxone Susceptible     Chloramphenicol Susceptible     Meropenem Susceptible     Rifampin Susceptible     Trimethoprim + Sulfamethoxazole Susceptible                Susceptibility      Enterobacter cloacae     TEA     Ampicillin >16 ug/ml Resistant     Ampicillin + Sulbactam 16/8 ug/ml Intermediate     Aztreonam <=8 ug/ml Susceptible     Cefepime <=8 ug/ml Susceptible     Cefotaxime <=2 ug/ml Susceptible     Ceftriaxone <=8 ug/ml Susceptible     Cefuroxime sodium 16 ug/ml Intermediate     Ertapenem <=1 ug/ml Susceptible     Gentamicin <=4 ug/ml Susceptible     Levofloxacin <=2 ug/ml Susceptible     Meropenem <=1 ug/ml Susceptible     Piperacillin + Tazobactam <=16 ug/ml Susceptible     Tetracycline <=4 ug/ml Susceptible     Tobramycin <=4 ug/ml Susceptible     Trimethoprim + Sulfamethoxazole <=2/38 ug/ml Susceptible                Susceptibility      Klebsiella pneumoniae     TEA     Ampicillin >16 ug/ml Resistant     Ampicillin + Sulbactam <=8/4 ug/ml Susceptible     Aztreonam <=8 ug/ml Susceptible     Cefepime <=8 ug/ml Susceptible     Cefotaxime <=2 ug/ml Susceptible     Ceftriaxone <=8 ug/ml Susceptible     Cefuroxime sodium <=4 ug/ml Susceptible     Ertapenem <=1 ug/ml Susceptible     Gentamicin <=4 ug/ml  Susceptible     Levofloxacin <=2 ug/ml Susceptible     Meropenem <=1 ug/ml Susceptible     Piperacillin + Tazobactam <=16 ug/ml Susceptible     Tetracycline <=4 ug/ml Susceptible     Tobramycin <=4 ug/ml Susceptible     Trimethoprim + Sulfamethoxazole <=2/38 ug/ml Susceptible                          Imaging Results (last 24 hours)     ** No results found for the last 24 hours. **        Results for orders placed during the hospital encounter of 10/27/17   Adult Transthoracic Echo Complete W/ Cont if Necessary Per Protocol    Narrative · Left ventricular systolic function is normal.  · Estimated EF appears to be in the range of 61 - 65%.  · Left ventricular diastolic dysfunction (grade I a) consistent with   impaired relaxation.  · Calculated right ventricular systolic pressure from tricuspid   regurgitation is 27 mmHg.          I have reviewed the medications.    Assessment/Plan   Assessment / Plan     Hospital Problem List     * (Principal)COPD exacerbation    Gastroesophageal reflux disease without esophagitis    Hyperlipidemia    CAP (community acquired pneumonia)    Leukocytosis (Chronic)    Tobacco abuse disorder (Chronic)    Acute respiratory failure with hypoxemia        Brief Hospital Course to date:  Sadie Tijerina is a 77 y.o. female with COPD and continued tobacco use presenting with SOA x 1 week and diffuse wheezing.    Assessment & Plan:    - Acute hypoxic respiratory failure: RA at home. Currently on 1L NC  - PNA: Resp Cx +H.Flu, Enterobacter, Klebsiella. S/P Ceftriaxone. Completed Ceftriaxone Needs to mobilize!  - COPD with exacerbation: Symbicort, Duonebs, Methylpred (allergic to prednisone), tapered steroids, increase prn atrovent today in hopes of not needing to increase steroids to minimize agitation/psychosis. Increased congestion today, adding Doxycycline, repeat CXR in AM.  - Leukocytosis, likely secondary to steroids  - HTN: Losartan, propranolol  - Chest wall pain: Lidocaine patch  -  Chronic compensated dCHF: ECHO 10/2017: EF normal, Grade I diastolic dysfunction  - Mood: Zoloft  - Hip pain s/p fall prior to admission: No acute fracture  - GERD  - Leukocytosis: On steroids  - Tobacco abuse disorder  - Agitation/confusion, resolved, continue seroquel at night for sleep       Refused inpatient rehab although I explained to her that she would likely benefit. She appears too weak to return home at this point however.    DVT Prophylaxis: pLOV    CODE STATUS: Conditional Code    Disposition: I expect the patient to be discharged in 2-3 days    Paulino Watts MD  11/10/17  10:09 AM

## 2017-11-10 NOTE — PROGRESS NOTES
Continued Stay Note  [facility]     Patient Name: Sadie Tijerina  MRN: [id number]  Today's Date: 11/10/2017    Admit Date: 10/27/2017          Discharge Plan       11/10/17 0943    Case Management/Social Work Plan    Plan update    Patient/Family In Agreement With Plan yes    Additional Comments Spoke with patient at bedside regarding discharge plan.  Patient indicates that she is feeling more like herself and does not remember how she had been.  Patient not ready for discharge at this time.  Patient plan is to discharge home via car with family to transport when medically ready.              Discharge Codes     None        Expected Discharge Date and Time     Expected Discharge Date Expected Discharge Time    Nov 10, 2017             Indy Baird RN

## 2017-11-10 NOTE — PLAN OF CARE
Problem: Patient Care Overview (Adult)  Goal: Plan of Care Review  Outcome: Ongoing (interventions implemented as appropriate)    11/10/17 1150   Coping/Psychosocial Response Interventions   Plan Of Care Reviewed With patient   Patient Care Overview   Progress progress toward functional goals as expected   Outcome Evaluation   Outcome Summary/Follow up Plan Pt amb 110ft with RW with Jas, with several LOB requiring assistance. Pt unsteady and not safe to ambulate on her own. Continue POC.         Problem: Inpatient Physical Therapy  Goal: Bed Mobility Goal LTG- PT  Outcome: Ongoing (interventions implemented as appropriate)    11/01/17 1119 11/09/17 1511   Bed Mobility PT LTG   Bed Mobility PT LTG, Date Established 11/01/17 --    Bed Mobility PT LTG, Time to Achieve 1 wk --    Bed Mobility PT LTG, Activity Type all bed mobility --    Bed Mobility PT LTG, [place] Level independent --    Bed Mobility PT LTG, Outcome --  goal ongoing       Goal: Transfer Training Goal 1 LTG- PT  Outcome: Ongoing (interventions implemented as appropriate)    11/01/17 1119 11/09/17 1511   Transfer Training PT LTG   Transfer Training PT LTG, Date Established 11/01/17 --    Transfer Training PT LTG, Time to Achieve 1 wk --    Transfer Training PT LTG, Activity Type bed to chair /chair to bed;sit to stand/stand to sit --    Transfer Training PT LTG, [place] Level supervision required --    Transfer Training PT LTG, Assist Device walker, rolling --    Transfer Training PT LTG, Outcome --  goal ongoing       Goal: Patient Education Goal LTG- PT  Outcome: Ongoing (interventions implemented as appropriate)    11/01/17 1119 11/09/17 1511   Patient Education PT LTG   Patient Education PT LTG, Date Established 11/01/17 --    Patient Education PT LTG, Time to Achieve 1 wk --    Patient Education PT LTG, Education Type written program;HEP;posture/body mechanics;gait;transfers;bed mobility;benefits of activity --    Patient Education PT  LTG, Education Understanding demonstrate adequately;verbalize understanding --    Patient Education PT LTG Outcome --  goal ongoing

## 2017-11-11 PROCEDURE — 94640 AIRWAY INHALATION TREATMENT: CPT

## 2017-11-11 PROCEDURE — 94799 UNLISTED PULMONARY SVC/PX: CPT

## 2017-11-11 PROCEDURE — 94760 N-INVAS EAR/PLS OXIMETRY 1: CPT

## 2017-11-11 PROCEDURE — 97530 THERAPEUTIC ACTIVITIES: CPT | Performed by: OCCUPATIONAL THERAPIST

## 2017-11-11 PROCEDURE — 63710000001 METHYLPREDNISOLONE PER 4 MG: Performed by: HOSPITALIST

## 2017-11-11 PROCEDURE — 99232 SBSQ HOSP IP/OBS MODERATE 35: CPT | Performed by: NURSE PRACTITIONER

## 2017-11-11 PROCEDURE — 25010000002 ENOXAPARIN PER 10 MG

## 2017-11-11 RX ORDER — METHYLPREDNISOLONE 4 MG/1
8 TABLET ORAL
Status: DISCONTINUED | OUTPATIENT
Start: 2017-11-12 | End: 2017-11-12 | Stop reason: HOSPADM

## 2017-11-11 RX ORDER — IPRATROPIUM BROMIDE AND ALBUTEROL SULFATE 2.5; .5 MG/3ML; MG/3ML
3 SOLUTION RESPIRATORY (INHALATION)
Status: DISCONTINUED | OUTPATIENT
Start: 2017-11-11 | End: 2017-11-12 | Stop reason: HOSPADM

## 2017-11-11 RX ADMIN — ENOXAPARIN SODIUM 40 MG: 40 INJECTION SUBCUTANEOUS at 08:36

## 2017-11-11 RX ADMIN — QUETIAPINE FUMARATE 12.5 MG: 25 TABLET ORAL at 21:03

## 2017-11-11 RX ADMIN — IPRATROPIUM BROMIDE AND ALBUTEROL SULFATE 3 ML: .5; 3 SOLUTION RESPIRATORY (INHALATION) at 03:54

## 2017-11-11 RX ADMIN — SERTRALINE HYDROCHLORIDE 50 MG: 50 TABLET ORAL at 08:36

## 2017-11-11 RX ADMIN — METHYLPREDNISOLONE 16 MG: 4 TABLET ORAL at 08:35

## 2017-11-11 RX ADMIN — DEXTROMETHORPHAN 30 MG: 30 SUSPENSION, EXTENDED RELEASE ORAL at 21:02

## 2017-11-11 RX ADMIN — IPRATROPIUM BROMIDE AND ALBUTEROL SULFATE 3 ML: .5; 3 SOLUTION RESPIRATORY (INHALATION) at 12:38

## 2017-11-11 RX ADMIN — PROPRANOLOL HYDROCHLORIDE 60 MG: 20 TABLET ORAL at 08:35

## 2017-11-11 RX ADMIN — BUDESONIDE AND FORMOTEROL FUMARATE DIHYDRATE 2 PUFF: 160; 4.5 AEROSOL RESPIRATORY (INHALATION) at 19:15

## 2017-11-11 RX ADMIN — ATORVASTATIN CALCIUM 10 MG: 10 TABLET, FILM COATED ORAL at 21:02

## 2017-11-11 RX ADMIN — NYSTATIN 500000 UNITS: 100000 SUSPENSION ORAL at 21:02

## 2017-11-11 RX ADMIN — IPRATROPIUM BROMIDE AND ALBUTEROL SULFATE 3 ML: .5; 3 SOLUTION RESPIRATORY (INHALATION) at 07:39

## 2017-11-11 RX ADMIN — NYSTATIN 500000 UNITS: 100000 SUSPENSION ORAL at 12:49

## 2017-11-11 RX ADMIN — DEXTROMETHORPHAN 30 MG: 30 SUSPENSION, EXTENDED RELEASE ORAL at 08:35

## 2017-11-11 RX ADMIN — ASPIRIN 81 MG: 81 TABLET, COATED ORAL at 08:36

## 2017-11-11 RX ADMIN — NYSTATIN 500000 UNITS: 100000 SUSPENSION ORAL at 16:51

## 2017-11-11 RX ADMIN — IPRATROPIUM BROMIDE AND ALBUTEROL SULFATE 3 ML: .5; 3 SOLUTION RESPIRATORY (INHALATION) at 19:15

## 2017-11-11 RX ADMIN — TRAMADOL HYDROCHLORIDE 50 MG: 50 TABLET, COATED ORAL at 18:23

## 2017-11-11 RX ADMIN — BUDESONIDE AND FORMOTEROL FUMARATE DIHYDRATE 2 PUFF: 160; 4.5 AEROSOL RESPIRATORY (INHALATION) at 07:40

## 2017-11-11 RX ADMIN — ACETAMINOPHEN 650 MG: 325 TABLET ORAL at 16:50

## 2017-11-11 RX ADMIN — IPRATROPIUM BROMIDE AND ALBUTEROL SULFATE 3 ML: .5; 3 SOLUTION RESPIRATORY (INHALATION) at 00:11

## 2017-11-11 RX ADMIN — NYSTATIN 500000 UNITS: 100000 SUSPENSION ORAL at 08:35

## 2017-11-11 RX ADMIN — FLUTICASONE PROPIONATE 2 SPRAY: 50 SPRAY, METERED NASAL at 08:36

## 2017-11-11 RX ADMIN — DOXYCYCLINE HYCLATE 100 MG: 100 CAPSULE ORAL at 08:35

## 2017-11-11 RX ADMIN — LOSARTAN POTASSIUM 25 MG: 25 TABLET, FILM COATED ORAL at 08:36

## 2017-11-11 RX ADMIN — VITAMIN D, TAB 1000IU (100/BT) 1000 UNITS: 25 TAB at 08:35

## 2017-11-11 NOTE — PROGRESS NOTES
[facility] Medicine Services  PROGRESS NOTE    Patient Name: Sadie Tijerina  : 1940  MRN: [id number]    Date of Admission: 10/27/2017  Length of Stay: 14  Primary Care Physician: Kristian Wolff MD    Subjective   Subjective     CC:  SOB    HPI:  Feels weak and worn out. Breathing at baseline. Occasional cough. Does not want rehab. Has children that by her that can help her.    Review of Systems   Gen- No fevers, chills  CV- No chest pain, palpitations  Resp-as above  GI- No N/V/D, abd pain      Otherwise ROS is negative except as mentioned in the HPI.    Objective   Objective     Vital Signs:   Temp:  [97.3 °F (36.3 °C)-98.2 °F (36.8 °C)] 98.2 °F (36.8 °C)  Heart Rate:  [74-89] 83  Resp:  [16-18] 16  BP: (112-142)/(55-62) 112/59        Physical Exam:  Gen-no acute distress, sitting up in chair, looks tired  CV-RRR, S1 S2 normal, no m/r/g  Resp-very faint wheezes heard in right lung, left clear, diminished throughout  Abd-soft, NT, ND, +BS  Ext-no edema  Neuro-A&Ox3, no focal deficits  Psych-appropriate mood        Results Reviewed:  I have personally reviewed current lab, radiology, and data and agree.      Results from last 7 days  Lab Units 17  0545   WBC 10*3/mm3 21.52*   HEMOGLOBIN g/dL 12.8   HEMATOCRIT % 40.1   PLATELETS 10*3/mm3 281       Results from last 7 days  Lab Units 17  0545   SODIUM mmol/L 134   POTASSIUM mmol/L 4.4   CHLORIDE mmol/L 100   CO2 mmol/L 36.0*   BUN mg/dL 37*   CREATININE mg/dL 0.90   GLUCOSE mg/dL 90   CALCIUM mg/dL 8.8     No results found for: BNP  No results found for: PHART    Microbiology Results Abnormal     Procedure Component Value - Date/Time    Respiratory Culture - Sputum, Cough [[id number]]  (Abnormal)  (Susceptibility) Collected:  10/28/17 0038    Lab Status:  Final result Specimen:  Sputum from Cough Updated:  17 1416     Respiratory Culture --      Moderate growth (3+) Haemophilus influenzae Biotype III (A)         Beta Lactamase Positive            Light growth (2+) Enterobacter cloacae (A)      Light growth (2+) Klebsiella pneumoniae (A)      Light growth (2+) Normal Respiratory Ashlee     Gram Stain Result Moderate (3+) WBCs per low power field      Many (4+) Gram negative bacilli, tiny      Occasional Gram positive cocci in clusters    Susceptibility      Haemophilus influenzae Biotype III     DISK DIFFUSION     Ampicillin Resistant     Ampicillin + Sulbactam Susceptible     Ceftriaxone Susceptible     Chloramphenicol Susceptible     Meropenem Susceptible     Rifampin Susceptible     Trimethoprim + Sulfamethoxazole Susceptible                Susceptibility      Enterobacter cloacae     TEA     Ampicillin >16 ug/ml Resistant     Ampicillin + Sulbactam 16/8 ug/ml Intermediate     Aztreonam <=8 ug/ml Susceptible     Cefepime <=8 ug/ml Susceptible     Cefotaxime <=2 ug/ml Susceptible     Ceftriaxone <=8 ug/ml Susceptible     Cefuroxime sodium 16 ug/ml Intermediate     Ertapenem <=1 ug/ml Susceptible     Gentamicin <=4 ug/ml Susceptible     Levofloxacin <=2 ug/ml Susceptible     Meropenem <=1 ug/ml Susceptible     Piperacillin + Tazobactam <=16 ug/ml Susceptible     Tetracycline <=4 ug/ml Susceptible     Tobramycin <=4 ug/ml Susceptible     Trimethoprim + Sulfamethoxazole <=2/38 ug/ml Susceptible                Susceptibility      Klebsiella pneumoniae     TEA     Ampicillin >16 ug/ml Resistant     Ampicillin + Sulbactam <=8/4 ug/ml Susceptible     Aztreonam <=8 ug/ml Susceptible     Cefepime <=8 ug/ml Susceptible     Cefotaxime <=2 ug/ml Susceptible     Ceftriaxone <=8 ug/ml Susceptible     Cefuroxime sodium <=4 ug/ml Susceptible     Ertapenem <=1 ug/ml Susceptible     Gentamicin <=4 ug/ml Susceptible     Levofloxacin <=2 ug/ml Susceptible     Meropenem <=1 ug/ml Susceptible     Piperacillin + Tazobactam <=16 ug/ml Susceptible     Tetracycline <=4 ug/ml Susceptible     Tobramycin <=4 ug/ml Susceptible     Trimethoprim +  Sulfamethoxazole <=2/38 ug/ml Susceptible                          Imaging Results (last 24 hours)     ** No results found for the last 24 hours. **        Results for orders placed during the hospital encounter of 10/27/17   Adult Transthoracic Echo Complete W/ Cont if Necessary Per Protocol    Narrative · Left ventricular systolic function is normal.  · Estimated EF appears to be in the range of 61 - 65%.  · Left ventricular diastolic dysfunction (grade I a) consistent with   impaired relaxation.  · Calculated right ventricular systolic pressure from tricuspid   regurgitation is 27 mmHg.          I have reviewed the medications.    Assessment/Plan   Assessment / Plan     Hospital Problem List     * (Principal)COPD exacerbation    Gastroesophageal reflux disease without esophagitis    Hyperlipidemia    CAP (community acquired pneumonia)    Leukocytosis (Chronic)    Tobacco abuse disorder (Chronic)    Acute respiratory failure with hypoxemia        Brief Hospital Course to date:  Sadie Tijerina is a 77 y.o. female with COPD and continued tobacco use presenting with SOA x 1 week and diffuse wheezing.    Assessment & Plan:    - Acute hypoxic respiratory failure: RA at home. RA while sitting up in chair now.   - PNA: Resp Cx +H.Flu, Enterobacter, Klebsiella. S/P Ceftriaxone. Completed Ceftriaxone Needs to mobilize!  - COPD with exacerbation: Symbicort, Duonebs, Methylpred (allergic to prednisone), continue to wean Methylpred, stop doxy has previously completed 7 day course on 11/4  - Leukocytosis, likely secondary to steroids, recheck in AM  - HTN: Losartan, propranolol  - Chest wall pain: Lidocaine patch  - Chronic compensated dCHF: ECHO 10/2017: EF normal, Grade I diastolic dysfunction  - Mood: Zoloft  - Hip pain s/p fall prior to admission: No acute fracture  - GERD  - Tobacco abuse disorder  - Agitation/confusion, resolved, continue seroquel at night for sleep  -PT is recommending rehab, patient is very weak,  she is very certain about not going to rehab,       DVT Prophylaxis: Lovenox    CODE STATUS: Conditional Code    Disposition: I expect the patient to be discharged likely Monday if everything continues to improve, too weak today    Roxane Hamm, RUBY  11/11/17  12:53 PM

## 2017-11-11 NOTE — PLAN OF CARE
Problem: Inpatient Occupational Therapy  Goal: Bed Mobility Goal LTG- OT  Outcome: Ongoing (interventions implemented as appropriate)    11/01/17 1133 11/08/17 0943 11/11/17 0958   Bed Mobility OT LTG   Bed Mobility OT LTG, Date Established 11/01/17 --  --    Bed Mobility OT LTG, Time to Achieve 2 wks --  --    Bed Mobility OT LTG, Activity Type all bed mobility --  --    Bed Mobility OT LTG, [place] Level --  --  (required CGA today)   Bed Mobility OT LTG, Outcome --  goal ongoing --        Goal: Transfer Training Goal 1 LTG- OT  Outcome: Ongoing (interventions implemented as appropriate)    11/01/17 1133 11/08/17 0943 11/11/17 0958   Transfer Training OT LTG   Transfer Training OT LTG, Date Established 11/01/17 --  --    Transfer Training OT LTG, Time to Achieve 2 wks --  --    Transfer Training OT LTG, Activity Type sit to stand/stand to sit;toilet --  --    Transfer Training OT LTG, [place] Level --  --  (pt. made progress with this goal today)   Transfer Training OT LTG, Assist Device walker, rolling;commode, bedside --  --    Transfer Training OT LTG, Outcome --  goal ongoing --        Goal: Patient Education Goal LTG- OT  Outcome: Ongoing (interventions implemented as appropriate)    11/01/17 1133 11/08/17 0943   Patient Education OT LTG   Patient Education OT LTG, Date Established 11/01/17 --    Patient Education OT LTG, Time to Achieve 2 wks --    Patient Education OT LTG, Education Type HEP;work simplification;energy conservation;adaptive breathing --    Patient Education OT LTG, Education Understanding verbalizes understanding;demonstrates adequately --    Patient Education OT LTG Outcome --  goal ongoing       Goal: Activity Tolerance Goal LTG- OT  Outcome: Ongoing (interventions implemented as appropriate)    11/01/17 1133 11/08/17 0943   Activity Tolerance OT LTG   Activity Tolerance Goal OT LTG, Date Established 11/01/17 --    Activity Tolerance Goal OT LTG, Time to Achieve 2 wks --     Activity Tolerance Goal OT LTG, Activity Level 10 min activity --    Activity Tolerance Goal OT LTG, Additional Goal Pt will complete 10 minutes of ADLs/functional task with one sitting rest break to improve occupational endurance.  --    Activity Tolerance Goal OT LTG, Outcome --  goal ongoing

## 2017-11-11 NOTE — PLAN OF CARE
Problem: Patient Care Overview (Adult)  Goal: Plan of Care Review  Outcome: Ongoing (interventions implemented as appropriate)    11/11/17 1000   Coping/Psychosocial Response Interventions   Plan Of Care Reviewed With patient   Patient Care Overview   Progress improving   Outcome Evaluation   Outcome Summary/Follow up Plan Pt. required CGA for bed mobility this AM, then ambulated to toilet with CGA. Transferred to chair with min A for balance.

## 2017-11-11 NOTE — THERAPY TREATMENT NOTE
Acute Care - Occupational Therapy Treatment Note  [facility]     Patient Name: Sadie Tijerina  : 1940  MRN: [id number]  Today's Date: 2017  Onset of Illness/Injury or Date of Surgery Date: 10/27/17  Date of Referral to OT: 10/31/17  Referring Physician: VIRGIE Craft MD      Admit Date: 10/27/2017    Visit Dx:     ICD-10-CM ICD-9-CM   1. COPD with exacerbation J44.1 491.21   2. Hypoxia R09.02 799.02   3. Wheezing R06.2 786.07   4. Leukocytosis, unspecified type D72.829 288.60   5. Impaired functional mobility, balance, gait, and endurance Z74.09 V49.89   6. Impaired mobility and ADLs Z74.09 799.89     Patient Active Problem List   Diagnosis   • Benign paroxysmal positional vertigo   • Calf cramp   • Mixed anxiety depressive disorder   • Dizziness   • Fatigue   • Gastroesophageal reflux disease without esophagitis   • Hematuria   • Hyperlipidemia   • Hypertension   • Low back pain   • Orthostatic hypotension   • CAP (community acquired pneumonia)   • Pulmonary emphysema   • Restless legs syndrome   • Pre-syncope   • Essential tremor   • Vitamin D deficiency   • Balance problem   • Tension headache   • COPD exacerbation   • Leukocytosis   • COPD with exacerbation   • Tobacco abuse disorder   • Acute respiratory failure with hypoxemia             Adult Rehabilitation Note       17 0928 11/10/17 1104 17 1430    Rehab Assessment/Intervention    Discipline occupational therapist  -SD physical therapist  -SJ physical therapist  -MB    Document Type therapy note (daily note)  -SD  therapy note (daily note)  -MB    Subjective Information agree to therapy;complains of;pain  -SD agree to therapy;complains of;fatigue  -SJ no complaints;agree to therapy  -MB    Patient Effort, Rehab Treatment good  -SD good  -SJ good  -MB    Symptoms Noted During/After Treatment  dizziness  -SJ     Symptoms Noted Comment  VSS  -SJ     Precautions/Limitations  fall precautions;oxygen therapy device and L/min  -SJ fall  precautions;oxygen therapy device and L/min  -MB    Recorded by [SD] Erica Wang, OT [SJ] Albina Brian, PT [MB] Margo Schaefer, PT    Vital Signs    Pre Systolic BP Rehab  136  -SJ --   VSS. Nsg cleared for tx.  -MB    Pre Treatment Diastolic BP  59  -SJ     Pretreatment Heart Rate (beats/min)  67  -SJ     Pre SpO2 (%)  93  -SJ     O2 Delivery Pre Treatment  supplemental O2  -SJ     Pre Patient Position Supine  -SD      Intra Patient Position Standing  -SD      Post Patient Position Sitting  -SD      Recorded by [SD] Erica Wang, OT [SJ] Albina Brian, PT [MB] Margo Schaefer, PT    Pain Assessment    Pain Assessment 0-10  -SD No/denies pain  -SJ Gomez-Hudson FACES  -MB    Gomez-Hudson FACES Pain Rating  0  -SJ 2  -MB    Pain Score 0   but did c/o her throat being sore  -SD 0  -SJ 1  -MB    Post Pain Score 0  -SD 0  -SJ     Pain Type   Chronic pain;Acute pain  -MB    Pain Location   Generalized   B heels  -MB    Pain Orientation   Right;Left  -MB    Pain Intervention(s) --   pt. used her throat spray  -SD  Repositioned;Ambulation/increased activity  -MB    Response to Interventions   Tolerated  -MB    Recorded by [SD] Erica Wang, OT [SJ] Albina Brian, PT [MB] Margo Schaefer, PT    Cognitive Assessment/Intervention    Current Cognitive/Communication Assessment functional  -SD functional  -SJ functional  -MB    Orientation Status oriented x 4  -SD oriented x 4  -SJ oriented x 4  -MB    Follows Commands/Answers Questions 100% of the time;able to follow single-step instructions  -% of the time;able to follow single-step instructions;needs cueing;needs increased time  -% of the time;able to follow single-step instructions;needs cueing  -MB    Personal Safety mild impairment  -SD mild impairment;decreased awareness, need for assist;decreased awareness, need for safety;decreased insight to deficits  -SJ mild impairment;decreased awareness, need for assist;decreased  awareness, need for safety;decreased insight to deficits  -MB    Personal Safety Interventions fall prevention program maintained;gait belt;nonskid shoes/slippers when out of bed  -SD fall prevention program maintained;gait belt;muscle strengthening facilitated;nonskid shoes/slippers when out of bed  -SJ fall prevention program maintained;gait belt;muscle strengthening facilitated;nonskid shoes/slippers when out of bed  -MB    Recorded by [SD] Erica Wang, OT [SJ] Albina Brian, PT [MB] Margo Schaefer, PT    Bed Mobility, Assessment/Treatment    Bed Mobility, Roll Right, [place] contact guard assist  -SD      Bed Mobility, Scoot/Bridge, [place] contact guard assist  -SD      Bed Mob, Supine to Sit, [place] contact guard assist  -SD supervision required;verbal cues required  - supervision required;verbal cues required  -MB    Bed Mob, Sit to Supine, [place]   not tested  -MB    Bed Mobility, Safety Issues   decreased use of arms for pushing/pulling;decreased use of legs for bridging/pushing  -MB    Bed Mobility, Impairments   strength decreased;impaired balance;decreased flexibility  -MB    Bed Mobility, Comment  setup for lines, cues for scooting to EOB  - VCs to scoot hips forward to EOB.  -MB    Recorded by [SD] Erica Wang OT [SJ] Albina Brian, PT [MB] Margo Schaefer, PT    Transfer Assessment/Treatment    Transfers, Sit-Stand [place] contact guard assist  -SD contact guard assist;verbal cues required  -SJ contact guard assist;verbal cues required  -MB    Transfers, Stand-Sit [place]  contact guard assist;verbal cues required  -SJ contact guard assist;verbal cues required  -MB    Transfers, Sit-Stand-Sit, Assist Device  rolling walker  -SJ rolling walker  -MB    Toilet Transfer, [place] minimum assist (75% patient effort);contact guard assist  -SD      Toilet Transfer, Assistive Device elevated toilet seat  -SD      Transfer, Safety Issues   step length decreased;balance decreased during turns  -SJ step length decreased;balance decreased during turns  -MB    Transfer, Impairments  strength decreased;impaired balance;coordination impaired  -SJ decreased flexibility;impaired balance;strength decreased  -MB    Transfer, Comment  cues for safety  -SJ VCs for safe hand placement.  -MB    Recorded by [SD] Erica Wang OT [SJ] Albina Brian, PT [MB] Margo Schaefer, PT    Gait Assessment/Treatment    Gait, [place] Level  minimum assist (75% patient effort);1 person + 1 person to manage equipment;verbal cues required  -SJ contact guard assist  -MB    Gait, Assistive Device  rolling walker  -SJ rolling walker  -MB    Gait, Distance (Feet)  110  -  -MB    Gait, Gait Pattern Analysis  swing-to gait  -SJ swing-to gait  -MB    Gait, Gait Deviations  alia decreased;decreased heel strike;double stance time increased;forward flexed posture;narrow base  -SJ forward flexed posture;alia decreased;bilateral:;step length decreased;narrow base  -MB    Gait, Safety Issues  balance decreased during turns;weight-shifting ability decreased;loses balance backward;supplemental O2  -SJ balance decreased during turns;step length decreased;supplemental O2  -MB    Gait, Impairments  decreased flexibility;strength decreased;impaired balance  -SJ decreased flexibility;strength decreased;impaired balance  -MB    Gait, Comment  slow gait speed, multiple LOB requiring Jas, scissoring gait requiring cues to widen GWYN  -SJ Pt amb w/ slow alia and VCs for upright posture, inc GWYN and B step length, and adaptive breathing.    -MB    Recorded by  [SJ] Albina Brian PT [MB] Margo Schaefer, PT    Functional Mobility    Functional Mobility- Ind. Level minimum assist (75% patient effort)  -SD      Functional Mobility-Distance (Feet) 15  -SD      Functional Mobility- Safety Issues balance decreased during turns;sequencing ability decreased;loses balance  backward  -SD      Recorded by [SD] Erica Wang OT      Toileting Assessment/Training    Toileting Assess/Train, Assistive Device raised toilet seat  -SD      Toileting Assess/Train, Position supported standing  -SD      Toileting Assess/Train, Indepen Level supervision required  -SD      Toileting Assess/Train, Impairments impaired balance;postural control impaired  -SD      Recorded by [SD] Erica Wang OT      Grooming Assessment/Training    Grooming Assess/Train, Position supported standing  -SD      Grooming Assess/Train, Indepen Level contact guard assist  -SD      Grooming Assess/Train, Impairments postural control impaired;strength decreased  -SD      Grooming Assess/Train, Comment pt. washed her hands & face at sink  -SD      Recorded by [SD] Erica Wang OT      Balance Skills Training    Sitting-Level of Assistance  Close supervision  -     Sitting-Balance Support  Right upper extremity supported;Left upper extremity supported;Feet supported  -     Standing-Level of Assistance  Contact guard  -SJ Contact guard  -MB    Static Standing Balance Support  assistive device  - assistive device  -MB    Standing-Balance Activities Weight Shift R-L;Reaching for objects  -SD  Weight Shift R-L;Weight Shift A-P  -MB    Standing Balance # of Minutes 3  -SD      Gait Balance-Level of Assistance Minimum assistance  -SD Minimum assistance  -SJ Minimum assistance  -MB    Gait Balance Support  assistive device  - assistive device  -MB    Gait Balance Activities   scanning environment R/L;side-stepping  -MB    Recorded by [SD] Erica Wang OT [SJ] Albina Brian, PT [MB] Margo Schaefer, PT    Therapy Exercises    Bilateral Lower Extremities  AROM:;sitting;ankle pumps/circles;heel raises;hip abduction/adduction  -SJ AROM:;sitting;ankle pumps/circles;heel raises;hip abduction/adduction  -MB    Recorded by  [SJ] Albina Brian, PT [MB] Margo Schaefer, PT    Positioning  and Restraints    Pre-Treatment Position in bed  -SD in bed  -SJ in bed  -MB    Post Treatment Position chair  -SD chair  -SJ chair  -MB    In Chair sitting;call light within reach;encouraged to call for assist;exit alarm on   b'fast tray set up in front of pt. in chair; feeding self   -SD notified nsg;reclined;call light within reach;encouraged to call for assist;exit alarm on;waffle cushion;heels elevated  -SJ notified nsg;reclined;call light within reach;encouraged to call for assist;exit alarm on;with family/caregiver;legs elevated;heels elevated;waffle cushion  -MB    Recorded by [SD] Erica Wang, OT [SJ] Albina Brian, PT [MB] Margo Schaefer, PT      User Key  (r) = Recorded By, (t) = Taken By, (c) = Cosigned By    Initials Name Effective Dates    SD Erica Wang, OT 06/22/15 -     SJ Albina Brian, PT 06/19/15 -     VIRGINIE Schaefer, PT 03/14/16 -                 OT Goals       11/11/17 0958 11/08/17 0943 11/06/17 1006    Bed Mobility OT LTG    Bed Mobility OT LTG, [place] Level --   required CGA today  -SD      Bed Mobility OT LTG, Outcome  goal ongoing  -CL goal ongoing  -CL    Transfer Training OT LTG    Transfer Training OT LTG, [place] Level --   pt. made progress with this goal today  -SD      Transfer Training OT LTG, Outcome  goal ongoing  -CL goal ongoing  -CL    Patient Education OT LTG    Patient Education OT LTG Outcome  goal ongoing  -CL goal ongoing  -CL    Activity Tolerance OT LTG    Activity Tolerance Goal OT LTG, Outcome  goal ongoing  -CL goal ongoing  -CL      11/03/17 1407 11/01/17 1133       Bed Mobility OT LTG    Bed Mobility OT LTG, Date Established  11/01/17  -     Bed Mobility OT LTG, Time to Achieve  2 wks  -MC     Bed Mobility OT LTG, Activity Type  all bed mobility  -MC     Bed Mobility OT LTG, [place] Level  conditional independence  -MC     Bed Mobility OT LTG, Outcome goal ongoing  -CL goal ongoing  -MC     Transfer Training OT  LTG    Transfer Training OT LTG, Date Established  11/01/17  -     Transfer Training OT LTG, Time to Achieve  2 wks  -MC     Transfer Training OT LTG, Activity Type  sit to stand/stand to sit;toilet  -     Transfer Training OT LTG, [place] Level  conditional independence  -     Transfer Training OT LTG, Assist Device  walker, rolling;commode, bedside  -     Transfer Training OT LTG, Outcome goal ongoing  -CL goal ongoing  -     Patient Education OT LTG    Patient Education OT LTG, Date Established  11/01/17  -     Patient Education OT LTG, Time to Achieve  2 wks  -MC     Patient Education OT LTG, Education Type  HEP;work simplification;energy conservation;adaptive breathing  -     Patient Education OT LTG, Education Understanding  verbalizes understanding;demonstrates adequately  -     Patient Education OT LTG Outcome goal ongoing  -CL goal ongoing  -     Activity Tolerance OT LTG    Activity Tolerance Goal OT LTG, Date Established  11/01/17  -     Activity Tolerance Goal OT LTG, Time to Achieve  2 wks  -MC     Activity Tolerance Goal OT LTG, Activity Level  10 min activity  -     Activity Tolerance Goal OT LTG, Additional Goal  Pt will complete 10 minutes of ADLs/functional task with one sitting rest break to improve occupational endurance.   -     Activity Tolerance Goal OT LTG, Outcome goal ongoing  -CL goal ongoing  -       User Key  (r) = Recorded By, (t) = Taken By, (c) = Cosigned By    Initials Name Provider Type    ALEJANDRA Wang, OT Occupational Therapist    ALLISON Galvez, OT Occupational Therapist    KRISTY Cosby, OT Occupational Therapist          Occupational Therapy Education     Title: PT OT SLP Therapies (Active)     Topic: Occupational Therapy (Active)     Point: ADL training (Active)    Description: Instruct learner(s) on proper safety adaptation and remediation techniques during self care or transfers.   Instruct in proper use of assistive devices.     Learning Progress Summary    Learner Readiness Method Response Comment Documented by Status   Patient Acceptance E,D NR Pt educated on appropriate safety precautions, t/f techniques, and role of therapy.  11/08/17 0942 Active    Acceptance E,D NR Pt educated on appropriate safety precautions, t/f techniques, BUE HEP w/ PLB, and benefits of therapy.  11/06/17 1005 Active    Acceptance E,D NR Pt educated on appropriate safety precautions, t/f techniques, HEP, and benefits of therapy.  11/03/17 1407 Active    Acceptance E,D VU,NR   11/01/17 1132 Done               Point: Home exercise program (Active)    Description: Instruct learner(s) on appropriate technique for monitoring, assisting and/or progressing therapeutic exercises/activities.    Learning Progress Summary    Learner Readiness Method Response Comment Documented by Status   Patient Acceptance E,D NR Pt educated on appropriate safety precautions, t/f techniques, BUE HEP w/ PLB, and benefits of therapy.  11/06/17 1005 Active    Acceptance E,D NR Pt educated on appropriate safety precautions, t/f techniques, HEP, and benefits of therapy.  11/03/17 1407 Active               Point: Precautions (Active)    Description: Instruct learner(s) on prescribed precautions during self-care and functional transfers.    Learning Progress Summary    Learner Readiness Method Response Comment Documented by Status   Patient Acceptance E,D NR Pt educated on appropriate safety precautions, t/f techniques, and role of therapy.  11/08/17 0942 Active    Acceptance E,D NR Pt educated on appropriate safety precautions, t/f techniques, BUE HEP w/ PLB, and benefits of therapy.  11/06/17 1005 Active    Acceptance E,D NR Pt educated on appropriate safety precautions, t/f techniques, HEP, and benefits of therapy.  11/03/17 1407 Active               Point: Body mechanics (Active)    Description: Instruct learner(s) on proper positioning and spine alignment during self-care,  functional mobility activities and/or exercises.    Learning Progress Summary    Learner Readiness Method Response Comment Documented by Status   Patient Acceptance E,D NR Pt educated on appropriate safety precautions, t/f techniques, and role of therapy.  11/08/17 0942 Active    Acceptance E,D NR Pt educated on appropriate safety precautions, t/f techniques, BUE HEP w/ PLB, and benefits of therapy.  11/06/17 1005 Active    Acceptance E,D NR Pt educated on appropriate safety precautions, t/f techniques, HEP, and benefits of therapy.  11/03/17 1407 Active    Acceptance E,D VU,NR   11/01/17 1132 Done                      User Key     Initials Effective Dates Name Provider Type Discipline     03/14/16 -  April Galvez, OT Occupational Therapist OT     06/08/16 -  Ashley Cosby OT Occupational Therapist OT                  OT Recommendation and Plan  Anticipated Equipment Needs At Discharge:  (TBD)  Anticipated Discharge Disposition: home with assist  Therapy Frequency: daily  Plan of Care Review  Plan Of Care Reviewed With: patient  Progress: improving  Outcome Summary/Follow up Plan: Pt. required CGA for bed mobility this AM, then ambulated to toilet with CGA. Transferred to chair with min A for balance.        Outcome Measures       11/11/17 0928 11/10/17 1104 11/09/17 1430    How much help from another person do you currently need...    Turning from your back to your side while in flat bed without using bedrails?  4  -SJ 3  -MB    Moving from lying on back to sitting on the side of a flat bed without bedrails?  4  -SJ 3  -MB    Moving to and from a bed to a chair (including a wheelchair)?  3  -SJ 3  -MB    Standing up from a chair using your arms (e.g., wheelchair, bedside chair)?  3  -SJ 3  -MB    Climbing 3-5 steps with a railing?  2  -SJ 2  -MB    To walk in hospital room?  3  -SJ 3  -MB    AM-PAC 6 Clicks Score  19  -SJ 17  -MB    How much help from another is currently needed...    Putting on and  taking off regular lower body clothing? 2  -SD      Bathing (including washing, rinsing, and drying) 2  -SD      Toileting (which includes using toilet bed pan or urinal) 3  -SD      Putting on and taking off regular upper body clothing 3  -SD      Taking care of personal grooming (such as brushing teeth) 3  -SD      Eating meals 4  -SD      Score 17  -SD      Functional Assessment    Outcome Measure Options AM-PAC 6 Clicks Daily Activity (OT)  -SD AM-PAC 6 Clicks Basic Mobility (PT)  -LAUREN AM-PAC 6 Clicks Basic Mobility (PT)  -MB      User Key  (r) = Recorded By, (t) = Taken By, (c) = Cosigned By    Initials Name Provider Type    ALEJANDRA Wang, OT Occupational Therapist    LAUREN Brian, PT Physical Therapist    VIRGINIE Schaefer, PT Physical Therapist           Time Calculation:         Time Calculation- OT       11/11/17 1001          Time Calculation- OT    OT Start Time 0928  -SD      OT Stop Time 0951  -SD      OT Time Calculation (min) 23 min  -SD      OT Received On 11/11/17  -SD      OT Goal Re-Cert Due Date 11/11/17  -SD        User Key  (r) = Recorded By, (t) = Taken By, (c) = Cosigned By    Initials Name Provider Type    ALEJANDRA Wang OT Occupational Therapist           Therapy Charges for Today     Code Description Service Date Service Provider Modifiers Qty    [id number]  OT THERAPEUTIC ACT EA 15 MIN 11/11/2017 Erica Wang OT GO 2               Erica Wang OT  11/11/2017

## 2017-11-12 VITALS
BODY MASS INDEX: 23.19 KG/M2 | SYSTOLIC BLOOD PRESSURE: 162 MMHG | DIASTOLIC BLOOD PRESSURE: 77 MMHG | TEMPERATURE: 97.9 F | OXYGEN SATURATION: 94 % | WEIGHT: 126 LBS | HEIGHT: 62 IN | RESPIRATION RATE: 20 BRPM | HEART RATE: 76 BPM

## 2017-11-12 LAB
ANION GAP SERPL CALCULATED.3IONS-SCNC: 1 MMOL/L (ref 3–11)
BASOPHILS # BLD AUTO: 0.02 10*3/MM3 (ref 0–0.2)
BASOPHILS NFR BLD AUTO: 0.1 % (ref 0–1)
BUN BLD-MCNC: 19 MG/DL (ref 9–23)
BUN/CREAT SERPL: 23.8 (ref 7–25)
CALCIUM SPEC-SCNC: 8.7 MG/DL (ref 8.7–10.4)
CHLORIDE SERPL-SCNC: 99 MMOL/L (ref 99–109)
CO2 SERPL-SCNC: 34 MMOL/L (ref 20–31)
CREAT BLD-MCNC: 0.8 MG/DL (ref 0.6–1.3)
DEPRECATED RDW RBC AUTO: 42.6 FL (ref 37–54)
EOSINOPHIL # BLD AUTO: 0.09 10*3/MM3 (ref 0–0.3)
EOSINOPHIL NFR BLD AUTO: 0.5 % (ref 0–3)
ERYTHROCYTE [DISTWIDTH] IN BLOOD BY AUTOMATED COUNT: 12.6 % (ref 11.3–14.5)
GFR SERPL CREATININE-BSD FRML MDRD: 69 ML/MIN/1.73
GLUCOSE BLD-MCNC: 95 MG/DL (ref 70–100)
HCT VFR BLD AUTO: 40.2 % (ref 34.5–44)
HGB BLD-MCNC: 12.6 G/DL (ref 11.5–15.5)
IMM GRANULOCYTES # BLD: 0.1 10*3/MM3 (ref 0–0.03)
IMM GRANULOCYTES NFR BLD: 0.6 % (ref 0–0.6)
LYMPHOCYTES # BLD AUTO: 3.09 10*3/MM3 (ref 0.6–4.8)
LYMPHOCYTES NFR BLD AUTO: 17 % (ref 24–44)
MCH RBC QN AUTO: 28.6 PG (ref 27–31)
MCHC RBC AUTO-ENTMCNC: 31.3 G/DL (ref 32–36)
MCV RBC AUTO: 91.4 FL (ref 80–99)
MONOCYTES # BLD AUTO: 1.16 10*3/MM3 (ref 0–1)
MONOCYTES NFR BLD AUTO: 6.4 % (ref 0–12)
NEUTROPHILS # BLD AUTO: 13.69 10*3/MM3 (ref 1.5–8.3)
NEUTROPHILS NFR BLD AUTO: 75.4 % (ref 41–71)
PLATELET # BLD AUTO: 238 10*3/MM3 (ref 150–450)
PMV BLD AUTO: 10.1 FL (ref 6–12)
POTASSIUM BLD-SCNC: 4.1 MMOL/L (ref 3.5–5.5)
RBC # BLD AUTO: 4.4 10*6/MM3 (ref 3.89–5.14)
SODIUM BLD-SCNC: 134 MMOL/L (ref 132–146)
WBC NRBC COR # BLD: 18.15 10*3/MM3 (ref 3.5–10.8)

## 2017-11-12 PROCEDURE — 94760 N-INVAS EAR/PLS OXIMETRY 1: CPT

## 2017-11-12 PROCEDURE — 63710000001 METHYLPREDNISOLONE PER 4 MG: Performed by: NURSE PRACTITIONER

## 2017-11-12 PROCEDURE — 99239 HOSP IP/OBS DSCHRG MGMT >30: CPT | Performed by: HOSPITALIST

## 2017-11-12 PROCEDURE — 94799 UNLISTED PULMONARY SVC/PX: CPT

## 2017-11-12 PROCEDURE — 85025 COMPLETE CBC W/AUTO DIFF WBC: CPT | Performed by: NURSE PRACTITIONER

## 2017-11-12 PROCEDURE — 94640 AIRWAY INHALATION TREATMENT: CPT

## 2017-11-12 PROCEDURE — 80048 BASIC METABOLIC PNL TOTAL CA: CPT | Performed by: NURSE PRACTITIONER

## 2017-11-12 PROCEDURE — 25010000002 ENOXAPARIN PER 10 MG

## 2017-11-12 RX ORDER — QUETIAPINE FUMARATE 25 MG/1
12.5 TABLET, FILM COATED ORAL NIGHTLY
Qty: 15 TABLET | Refills: 2 | Status: SHIPPED | OUTPATIENT
Start: 2017-11-12 | End: 2017-11-14

## 2017-11-12 RX ORDER — QUETIAPINE FUMARATE 25 MG/1
12.5 TABLET, FILM COATED ORAL NIGHTLY
Status: DISCONTINUED | OUTPATIENT
Start: 2017-11-12 | End: 2017-11-12 | Stop reason: HOSPADM

## 2017-11-12 RX ORDER — FLUTICASONE PROPIONATE 50 MCG
2 SPRAY, SUSPENSION (ML) NASAL DAILY
Qty: 1 BOTTLE | Refills: 2 | Status: SHIPPED | OUTPATIENT
Start: 2017-11-13 | End: 2018-01-02

## 2017-11-12 RX ORDER — LIDOCAINE 50 MG/G
1 PATCH TOPICAL
Qty: 12 PATCH | Refills: 0 | Status: SHIPPED | OUTPATIENT
Start: 2017-11-13 | End: 2017-11-14

## 2017-11-12 RX ORDER — BENZONATATE 100 MG/1
100 CAPSULE ORAL 3 TIMES DAILY PRN
Qty: 30 CAPSULE | Refills: 0 | Status: SHIPPED | OUTPATIENT
Start: 2017-11-12 | End: 2017-11-28 | Stop reason: SDUPTHER

## 2017-11-12 RX ORDER — DEXTROMETHORPHAN POLISTIREX 30 MG/5ML
30 SUSPENSION ORAL EVERY 12 HOURS SCHEDULED
Qty: 280 ML | Refills: 2 | Status: SHIPPED | OUTPATIENT
Start: 2017-11-12 | End: 2017-12-13

## 2017-11-12 RX ORDER — LOSARTAN POTASSIUM 25 MG/1
25 TABLET ORAL DAILY
Qty: 30 TABLET | Refills: 2 | Status: SHIPPED | OUTPATIENT
Start: 2017-11-12 | End: 2019-03-19

## 2017-11-12 RX ADMIN — IPRATROPIUM BROMIDE AND ALBUTEROL SULFATE 3 ML: .5; 3 SOLUTION RESPIRATORY (INHALATION) at 13:38

## 2017-11-12 RX ADMIN — METHYLPREDNISOLONE 8 MG: 4 TABLET ORAL at 08:26

## 2017-11-12 RX ADMIN — DEXTROMETHORPHAN 30 MG: 30 SUSPENSION, EXTENDED RELEASE ORAL at 08:27

## 2017-11-12 RX ADMIN — FLUTICASONE PROPIONATE 2 SPRAY: 50 SPRAY, METERED NASAL at 08:27

## 2017-11-12 RX ADMIN — ACETAMINOPHEN 650 MG: 325 TABLET ORAL at 08:25

## 2017-11-12 RX ADMIN — IPRATROPIUM BROMIDE AND ALBUTEROL SULFATE 3 ML: .5; 3 SOLUTION RESPIRATORY (INHALATION) at 09:11

## 2017-11-12 RX ADMIN — NYSTATIN 500000 UNITS: 100000 SUSPENSION ORAL at 08:25

## 2017-11-12 RX ADMIN — SERTRALINE HYDROCHLORIDE 50 MG: 50 TABLET ORAL at 08:25

## 2017-11-12 RX ADMIN — VITAMIN D, TAB 1000IU (100/BT) 1000 UNITS: 25 TAB at 08:25

## 2017-11-12 RX ADMIN — NYSTATIN 500000 UNITS: 100000 SUSPENSION ORAL at 11:47

## 2017-11-12 RX ADMIN — PROPRANOLOL HYDROCHLORIDE 60 MG: 20 TABLET ORAL at 08:26

## 2017-11-12 RX ADMIN — LOSARTAN POTASSIUM 25 MG: 25 TABLET, FILM COATED ORAL at 08:26

## 2017-11-12 RX ADMIN — IPRATROPIUM BROMIDE AND ALBUTEROL SULFATE 3 ML: .5; 3 SOLUTION RESPIRATORY (INHALATION) at 16:51

## 2017-11-12 RX ADMIN — BENZONATATE 100 MG: 100 CAPSULE ORAL at 08:26

## 2017-11-12 RX ADMIN — ASPIRIN 81 MG: 81 TABLET, COATED ORAL at 08:26

## 2017-11-12 RX ADMIN — BUDESONIDE AND FORMOTEROL FUMARATE DIHYDRATE 2 PUFF: 160; 4.5 AEROSOL RESPIRATORY (INHALATION) at 09:23

## 2017-11-12 RX ADMIN — ENOXAPARIN SODIUM 40 MG: 40 INJECTION SUBCUTANEOUS at 08:25

## 2017-11-12 NOTE — PLAN OF CARE
Problem: Patient Care Overview (Adult)  Goal: Adult Individualization and Mutuality  Outcome: Ongoing (interventions implemented as appropriate)  Goal: Discharge Needs Assessment  Outcome: Ongoing (interventions implemented as appropriate)  Patient reports she is going to be discharged home.      11/12/17 1353   Discharge Needs Assessment   Concerns To Be Addressed basic needs concerns   Readmission Within The Last 30 Days no previous admission in last 30 days   Current Discharge Risk chronically ill;dependent with mobility/activities of daily living;lives alone   Current Health   Anticipated Changes Related to Illness inability to care for self   Living Environment   Transportation Available car;family or friend will provide         Problem: Respiratory Insufficiency (Adult)  Goal: Acid/Base Balance  Outcome: Ongoing (interventions implemented as appropriate)  Goal: Effective Ventilation  Outcome: Ongoing (interventions implemented as appropriate)  Patient is on room air.  Pt with non productive cough, PRN medications given with some relief.      11/12/17 1353   Respiratory Insufficiency (Adult)   Effective Ventilation making progress toward outcome         Problem: Fall Risk (Adult)  Goal: Absence of Falls  Outcome: Ongoing (interventions implemented as appropriate)  Bed and chair alarms in place, pt as of 1400 has not attempted to get up without assistance.      11/12/17 1353   Fall Risk (Adult)   Absence of Falls making progress toward outcome

## 2017-11-12 NOTE — NURSING NOTE
Patient ambulated in the hallway without O2 and maintained her SAO2 95% or greater.  Patient unsteady on her feet and would have fallen four times if the patient was not ambulating with two assists and a gait belt.

## 2017-11-12 NOTE — DISCHARGE SUMMARY
[facility] Medicine Services  DISCHARGE SUMMARY    Patient Name: Sadie Tijerina  : 1940  MRN: [id number]    Date of Admission: 10/27/2017  Date of Discharge:  2017  Length of Stay: 15  Primary Care Physician: Kristian Wolff MD    Consults     No orders found from 2017 to 10/28/2017.        Hospital Course     Presenting Problem:   COPD with exacerbation [J44.1]  COPD with exacerbation [J44.1]    Active Hospital Problems (** Indicates Principal Problem)    Diagnosis Date Noted   • **CAP (community acquired pneumonia) [J18.9] 2016   • Acute respiratory failure with hypoxemia [J96.01] 10/31/2017   • COPD exacerbation [J44.1] 10/28/2017   • Leukocytosis [D72.829] 10/28/2017   • Tobacco abuse disorder [Z72.0] 10/28/2017   • Gastroesophageal reflux disease without esophagitis [K21.9] 2016   • Hyperlipidemia [E78.5] 2016      Resolved Hospital Problems    Diagnosis Date Noted Date Resolved   No resolved problems to display.          Hospital Course:  Sadie Tijerina is a 77 y.o. female who presents to the ED with c/o SOA with onset 1 week ago. Pt was admitted and treated for triple-bug PNA and COPD exacerbation. Discharged on methylpred taper for her COPD. Pt was unsteady on her feet and would benefit greatly from acute rehab however, pt was adamant about leaving. Daughter present at bedside, witnessed pt ambulate and wants to take her home. A family member would stay with patient .        Day of Discharge     HPI:   Pt reports feeling well. Having sorethroat from coughing. No dysphagia.     Review of Systems   Gen- No fevers, chills  CV- No chest pain, palpitations  Resp- No cough, dyspnea  GI- No N/V/D, abd pain    Otherwise ROS is negative except as mentioned in the HPI.    Vital Signs:   Temp:  [96.9 °F (36.1 °C)-98 °F (36.7 °C)] 97.9 °F (36.6 °C)  Heart Rate:  [] 73  Resp:  [16-20] 20  BP: (119-174)/(50-79) 162/77     Physical  Exam:  Constitutional: No acute distress, awake, alert on PA  Eyes: PERRLA, sclerae anicteric, no conjunctival injection  HENT: NCAT, mucous membranes moist  Neck: Supple, no thyromegaly, no lymphadenopathy, trachea midline  Respiratory: Clear to auscultation bilaterally, nonlabored respirations   Cardiovascular: RRR, no murmurs, rubs, or gallops, palpable pedal pulses bilaterally  Gastrointestinal: Positive bowel sounds, soft, nontender, nondistended  Musculoskeletal: No bilateral ankle edema, no clubbing or cyanosis to extremities  Psychiatric: Appropriate affect, cooperative  Neurologic: Oriented x 3, strength symmetric in all extremities, Cranial Nerves grossly intact to confrontation, speech clear  Skin: No rashes    Pertinent  and/or Most Recent Results         Results from last 7 days  Lab Units 11/12/17  0543 11/07/17  0545   WBC 10*3/mm3 18.15* 21.52*   HEMOGLOBIN g/dL 12.6 12.8   HEMATOCRIT % 40.2 40.1   PLATELETS 10*3/mm3 238 281   SODIUM mmol/L 134 134   POTASSIUM mmol/L 4.1 4.4   CHLORIDE mmol/L 99 100   CO2 mmol/L 34.0* 36.0*   BUN mg/dL 19 37*   CREATININE mg/dL 0.80 0.90   GLUCOSE mg/dL 95 90   CALCIUM mg/dL 8.7 8.8           Invalid input(s): PROT, LABALBU        Invalid input(s): TG, LDLREALC      Brief Urine Lab Results  (Last result in the past 365 days)      Color   Clarity   Blood   Leuk Est   Nitrite   Protein   CREAT   Urine HCG        11/08/17 1602 Yellow Clear Negative Negative Negative Negative               Microbiology Results Abnormal     Procedure Component Value - Date/Time    Respiratory Culture - Sputum, Cough [[id number]]  (Abnormal)  (Susceptibility) Collected:  10/28/17 0038    Lab Status:  Final result Specimen:  Sputum from Cough Updated:  11/02/17 1416     Respiratory Culture --      Moderate growth (3+) Haemophilus influenzae Biotype III (A)        Beta Lactamase Positive            Light growth (2+) Enterobacter cloacae (A)      Light growth (2+) Klebsiella pneumoniae (A)       Light growth (2+) Normal Respiratory Ashlee     Gram Stain Result Moderate (3+) WBCs per low power field      Many (4+) Gram negative bacilli, tiny      Occasional Gram positive cocci in clusters    Susceptibility      Haemophilus influenzae Biotype III     DISK DIFFUSION     Ampicillin Resistant     Ampicillin + Sulbactam Susceptible     Ceftriaxone Susceptible     Chloramphenicol Susceptible     Meropenem Susceptible     Rifampin Susceptible     Trimethoprim + Sulfamethoxazole Susceptible                Susceptibility      Enterobacter cloacae     TEA     Ampicillin >16 ug/ml Resistant     Ampicillin + Sulbactam 16/8 ug/ml Intermediate     Aztreonam <=8 ug/ml Susceptible     Cefepime <=8 ug/ml Susceptible     Cefotaxime <=2 ug/ml Susceptible     Ceftriaxone <=8 ug/ml Susceptible     Cefuroxime sodium 16 ug/ml Intermediate     Ertapenem <=1 ug/ml Susceptible     Gentamicin <=4 ug/ml Susceptible     Levofloxacin <=2 ug/ml Susceptible     Meropenem <=1 ug/ml Susceptible     Piperacillin + Tazobactam <=16 ug/ml Susceptible     Tetracycline <=4 ug/ml Susceptible     Tobramycin <=4 ug/ml Susceptible     Trimethoprim + Sulfamethoxazole <=2/38 ug/ml Susceptible                Susceptibility      Klebsiella pneumoniae     TEA     Ampicillin >16 ug/ml Resistant     Ampicillin + Sulbactam <=8/4 ug/ml Susceptible     Aztreonam <=8 ug/ml Susceptible     Cefepime <=8 ug/ml Susceptible     Cefotaxime <=2 ug/ml Susceptible     Ceftriaxone <=8 ug/ml Susceptible     Cefuroxime sodium <=4 ug/ml Susceptible     Ertapenem <=1 ug/ml Susceptible     Gentamicin <=4 ug/ml Susceptible     Levofloxacin <=2 ug/ml Susceptible     Meropenem <=1 ug/ml Susceptible     Piperacillin + Tazobactam <=16 ug/ml Susceptible     Tetracycline <=4 ug/ml Susceptible     Tobramycin <=4 ug/ml Susceptible     Trimethoprim + Sulfamethoxazole <=2/38 ug/ml Susceptible                          Imaging Results (all)     Procedure Component Value Units Date/Time     XR Chest 1 View [[id number]] Collected:  10/27/17 2308     Updated:  10/27/17 2348    Narrative:       EXAM:  XR Chest, 1 View    CLINICAL HISTORY:  77 years old, female; Signs and symptoms; Other: SOA; Additional info: SOA   triage protocol    TECHNIQUE:  Frontal view of the chest.    COMPARISON:  CR - RD-CHEST PA AND LATERAL 2016-02-03 14:26    FINDINGS:  Lungs:  Calcified granuloma within the left midlung zone.  Biapical   pleural-parenchymal scarring, with slight upper retraction of the justin,   possibly secondary to prior infections.  Pleural space:  Normal.  No pneumothorax.  Heart:  Normal.  No cardiomegaly.  Mediastinum:  See above.  Bones/joints:  Multilevel thoracic spine degenerative changes.  Soft tissue   anchor projects over the left humeral head, likely secondary to prior rotator   cuff repair.  Lymph nodes:  Calcified left hilar lymph nodes, compatible with prior   granulomatous disease.      Impression:         1. No acute findings.    2. Non-acute findings are described above.    THIS DOCUMENT HAS BEEN ELECTRONICALLY SIGNED BY PHAN CAN MD    XR Hip With or Without Pelvis 2 - 3 View Right [[id number]] Collected:  10/29/17 1825     Updated:  10/30/17 0039    Narrative:       HISTORY: Fall, lower back and left hip pain.     EXAMINATION: AP PELVIS AND LEFT LOWER HIP - 10/29/2017     COMPARISON: None.     FINDINGS: The bony pelvis appears intact. Hip joints and SI joints  appear well-maintained. There appear to be minor degenerative changes of  the left hip, not unusual for age. No fracture or avulsion is seen.       Impression:       No evidence of acute left hip trauma.     DICTATED:     10/29/2017  EDITED:         10/29/2017        This report was finalized on 10/30/2017 12:37 AM by DR. Paulino Manley MD.       XR Chest PA & Lateral [[id number]] Collected:  11/03/17 0849     Updated:  11/03/17 0852    Narrative:          EXAMINATION: XR CHEST PA AND LATERAL-      INDICATION: Cough, dyspnea; J44.1-Chronic  obstructive pulmonary disease  with (acute) exacerbation; R09.02-Hypoxemia; R06.2-Wheezing;  D72.829-Elevated white blood cell count, unspecified; Z74.09-Other  reduced mobility; Z74.09-Other reduced mobility      COMPARISON: 10/27/2017     FINDINGS: Heart mediastinum and bony vasculature appear within normal  limits. Lungs appear well-expanded and clear except for granulomatous  calcifications, present on the previous exam.           Impression:       Stable negative chest exam.     This report was finalized on 11/3/2017 8:49 AM by DR. Paulino Manley MD.       XR Chest 1 View [[id number]] Collected:  11/05/17 1324     Updated:  11/07/17 0920    Narrative:          EXAMINATION: XR CHEST, SINGLE VIEW - 11/05/2017     INDICATION:  J44.1-Chronic obstructive pulmonary disease with (acute)  exacerbation; R09.02-Hypoxemia; R06.2-Wheezing; D72.829-Elevated white  blood cell count, unspecified; Z74.09-Other reduced mobility.      COMPARISON: Chest x-ray 11/01/2017.     FINDINGS: Chronic lung changes with hyperinflated appearance and  flattening of the diaphragms concerning for underlying emphysema. No  focal airspace opacity or consolidation. Pulmonary vascularity within  normal limits. Cardiomegaly signal contour within normal limits. No  pneumothorax or pleural effusion.           Impression:       Chronic lung changes without acute cardiopulmonary process.     DICTATED:     11/05/2017  EDITED:         11/05/2017        This report was finalized on 11/7/2017 9:18 AM by Dr. Wellington Noe.       XR Chest 1 View [[id number]] Collected:  11/08/17 1248     Updated:  11/08/17 1322    Narrative:       EXAMINATION: XR CHEST 1 VW- 11/08/2017     INDICATION: DYSPNEA, ON EXERTION      COMPARISON: NONE     FINDINGS:   1. Obstructive lung disease is noted. There is linear fibrotic scarring  and stranding in the upper lung zones with apical pleural scarring.  2. Calcified granulomatous scarring is seen in the central chest.  3. Otherwise,  active disease, free fluid or edema is not identified.           Impression:       1. Centrilobular emphysema, obstructive lung disease, fibrotic stranding  and hyperexpansion of the chest is noted.  2. Otherwise, no superimposed acute abnormality is identified.     D:  11/08/2017  E:  11/08/2017     This report was finalized on 11/8/2017 1:20 PM by Dr. Aron Garcia MD.             Results for orders placed during the hospital encounter of 10/27/17   Adult Transthoracic Echo Complete W/ Cont if Necessary Per Protocol    Narrative · Left ventricular systolic function is normal.  · Estimated EF appears to be in the range of 61 - 65%.  · Left ventricular diastolic dysfunction (grade I a) consistent with   impaired relaxation.  · Calculated right ventricular systolic pressure from tricuspid   regurgitation is 27 mmHg.            Discharge Details      Sadie Tijerina   Home Medication Instructions ILYA:[id number]    Printed on:11/12/17 5664   Medication Information                      albuterol (PROVENTIL HFA;VENTOLIN HFA) 108 (90 BASE) MCG/ACT inhaler  Inhale 1 puff Every 4 (Four) Hours As Needed for shortness of air.             albuterol (PROVENTIL) (2.5 MG/3ML) 0.083% nebulizer solution  USE 1 AMPULE IN NEBULIZER FOUR TIMES A DAY AS NEEDED             aspirin 81 MG EC tablet  Take 81 mg by mouth Daily.             benzonatate (TESSALON) 100 MG capsule  Take 1 capsule by mouth 3 (Three) Times a Day As Needed for Cough.             cholecalciferol (VITAMIN D3) 1000 UNITS tablet  Take 1,000 Units by mouth Daily.             clonazePAM (KlonoPIN) 0.5 MG tablet  Take 1 tablet by mouth At Night As Needed for Seizures.             dextromethorphan polistirex ER (DELSYM) 30 MG/5ML Suspension Extended Release oral suspension  Take 30 mg by mouth Every 12 (Twelve) Hours.             fluticasone (FLONASE) 50 MCG/ACT nasal spray  2 sprays by Each Nare route Daily.             lidocaine (LIDODERM) 5 %  Place 1 patch on  the skin Daily. Remove & Discard patch within 12 hours or as directed by MD             losartan (COZAAR) 25 MG tablet  Take 1 tablet by mouth Daily.             meclizine (ANTIVERT) 12.5 MG tablet  TAKE 1 TABLET BY MOUTH THREE TIMES A DAY AS NEEDED for dizziness              methylPREDNISolone (MEDROL) 2 MG tablet  Take 1 tablet by mouth Daily With Breakfast.             nystatin (MYCOSTATIN) 056647 UNIT/ML suspension  Swish and swallow 5 mL 4 (Four) Times a Day.             pravastatin (PRAVACHOL) 20 MG tablet  TAKE 1 TABLET BY MOUTH IN THE EVENING              PREMARIN 0.625 MG tablet  TAKE 1 TABLET BY MOUTH ONE TIME A DAY              propranolol (INDERAL) 60 MG tablet  Take 1 tablet by mouth Daily.             QUEtiapine (SEROquel) 25 MG tablet  Take 0.5 tablets by mouth Every Night.             sertraline (ZOLOFT) 50 MG tablet  TAKE 1 TABLET BY MOUTH TWO TIMES A DAY              SYMBICORT 160-4.5 MCG/ACT inhaler  INHALE 2 PUFFS BY MOUTH TWO TIMES A DAY AND RINSE MOUTH AFTER EACH USE             tiotropium (SPIRIVA HANDIHALER) 18 MCG per inhalation capsule  Place 1 capsule into inhaler and inhale Daily.             traMADol (ULTRAM) 50 MG tablet  Take 1 tablet by mouth Daily As Needed (pain).                   Discharge Disposition:  Home or Self Care    Discharge Diet:  Regular    Discharge Activity:  As tolerated    No future appointments.    Additional Instructions for the Follow-ups that You Need to Schedule     Discharge Follow-up with PCP    As directed    Follow Up Details:  1-2 weeks                 Time Spent on Discharge:  >35min    Nereida Mixon MD  11/12/17  3:13 PM

## 2017-11-12 NOTE — PROGRESS NOTES
[facility] Medicine Services  PROGRESS NOTE    Patient Name: Sadie Tijerina  : 1940  MRN: [id number]    Date of Admission: 10/27/2017  Length of Stay: 15  Primary Care Physician: Kristian Wolff MD    Subjective   Subjective     CC:  Dyspnea    HPI:  Reports feeling well. No SOB, cough. No CP. Adamant about going home despite beng told repeatedly that she will not be safe at home.    Review of Systems   Gen- No fevers, chills  CV- No chest pain, palpitations  Resp- No cough, dyspnea  GI- No N/V/D, abd pain    Otherwise ROS is negative except as mentioned in the HPI.    Objective   Objective     Vital Signs:   Temp:  [96.9 °F (36.1 °C)-98 °F (36.7 °C)] 97.9 °F (36.6 °C)  Heart Rate:  [] 73  Resp:  [16-20] 20  BP: (119-174)/(50-79) 162/77        Physical Exam:  Constitutional: No acute distress, awake, alert  Eyes: PERRLA, sclerae anicteric, no conjunctival injection  HENT: NCAT, mucous membranes moist  Neck: Supple, no thyromegaly, no lymphadenopathy, trachea midline  Respiratory: Clear to auscultation bilaterally, nonlabored respirations   Cardiovascular: RRR, no murmurs, rubs, or gallops, palpable pedal pulses bilaterally  Gastrointestinal: Positive bowel sounds, soft, nontender, nondistended  Musculoskeletal: No bilateral ankle edema, no clubbing or cyanosis to extremities  Psychiatric: Appropriate affect, cooperative  Neurologic: Oriented x 3, strength symmetric in all extremities, Cranial Nerves grossly intact to confrontation, speech clear  Skin: No rashes    Results Reviewed:  I have personally reviewed current lab, radiology, and data and agree.      Results from last 7 days  Lab Units 17  0543 17  0545   WBC 10*3/mm3 18.15* 21.52*   HEMOGLOBIN g/dL 12.6 12.8   HEMATOCRIT % 40.2 40.1   PLATELETS 10*3/mm3 238 281       Results from last 7 days  Lab Units 17  0543 17  0545   SODIUM mmol/L 134 134   POTASSIUM mmol/L 4.1 4.4   CHLORIDE mmol/L 99  100   CO2 mmol/L 34.0* 36.0*   BUN mg/dL 19 37*   CREATININE mg/dL 0.80 0.90   GLUCOSE mg/dL 95 90   CALCIUM mg/dL 8.7 8.8     No results found for: BNP  No results found for: PHART    Microbiology Results Abnormal     Procedure Component Value - Date/Time    Respiratory Culture - Sputum, Cough [[id number]]  (Abnormal)  (Susceptibility) Collected:  10/28/17 0038    Lab Status:  Final result Specimen:  Sputum from Cough Updated:  11/02/17 6080     Respiratory Culture --      Moderate growth (3+) Haemophilus influenzae Biotype III (A)        Beta Lactamase Positive            Light growth (2+) Enterobacter cloacae (A)      Light growth (2+) Klebsiella pneumoniae (A)      Light growth (2+) Normal Respiratory Ashlee     Gram Stain Result Moderate (3+) WBCs per low power field      Many (4+) Gram negative bacilli, tiny      Occasional Gram positive cocci in clusters    Susceptibility      Haemophilus influenzae Biotype III     DISK DIFFUSION     Ampicillin Resistant     Ampicillin + Sulbactam Susceptible     Ceftriaxone Susceptible     Chloramphenicol Susceptible     Meropenem Susceptible     Rifampin Susceptible     Trimethoprim + Sulfamethoxazole Susceptible                Susceptibility      Enterobacter cloacae     TEA     Ampicillin >16 ug/ml Resistant     Ampicillin + Sulbactam 16/8 ug/ml Intermediate     Aztreonam <=8 ug/ml Susceptible     Cefepime <=8 ug/ml Susceptible     Cefotaxime <=2 ug/ml Susceptible     Ceftriaxone <=8 ug/ml Susceptible     Cefuroxime sodium 16 ug/ml Intermediate     Ertapenem <=1 ug/ml Susceptible     Gentamicin <=4 ug/ml Susceptible     Levofloxacin <=2 ug/ml Susceptible     Meropenem <=1 ug/ml Susceptible     Piperacillin + Tazobactam <=16 ug/ml Susceptible     Tetracycline <=4 ug/ml Susceptible     Tobramycin <=4 ug/ml Susceptible     Trimethoprim + Sulfamethoxazole <=2/38 ug/ml Susceptible                Susceptibility      Klebsiella pneumoniae     TEA     Ampicillin >16 ug/ml Resistant      Ampicillin + Sulbactam <=8/4 ug/ml Susceptible     Aztreonam <=8 ug/ml Susceptible     Cefepime <=8 ug/ml Susceptible     Cefotaxime <=2 ug/ml Susceptible     Ceftriaxone <=8 ug/ml Susceptible     Cefuroxime sodium <=4 ug/ml Susceptible     Ertapenem <=1 ug/ml Susceptible     Gentamicin <=4 ug/ml Susceptible     Levofloxacin <=2 ug/ml Susceptible     Meropenem <=1 ug/ml Susceptible     Piperacillin + Tazobactam <=16 ug/ml Susceptible     Tetracycline <=4 ug/ml Susceptible     Tobramycin <=4 ug/ml Susceptible     Trimethoprim + Sulfamethoxazole <=2/38 ug/ml Susceptible                          Imaging Results (last 24 hours)     ** No results found for the last 24 hours. **        Results for orders placed during the hospital encounter of 10/27/17   Adult Transthoracic Echo Complete W/ Cont if Necessary Per Protocol    Narrative · Left ventricular systolic function is normal.  · Estimated EF appears to be in the range of 61 - 65%.  · Left ventricular diastolic dysfunction (grade I a) consistent with   impaired relaxation.  · Calculated right ventricular systolic pressure from tricuspid   regurgitation is 27 mmHg.          I have reviewed the medications.    Assessment/Plan   Assessment / Plan     Hospital Problem List     * (Principal)COPD exacerbation    Gastroesophageal reflux disease without esophagitis    Hyperlipidemia    CAP (community acquired pneumonia)    Leukocytosis (Chronic)    Tobacco abuse disorder (Chronic)    Acute respiratory failure with hypoxemia        Brief Hospital Course to date:  Sadie Tijerina is a 77 y.o. female with COPD and continued tobacco use presenting with SOA x 1 week and diffuse wheezing.    Assessment & Plan:  - Acute hypoxic respiratory failure: RA at home. RA currently  - PNA: Resp Cx +H.Flu, Enterobacter, Klebsiella. Completed Ceftriaxone and doxycycline  - COPD with exacerbation: Symbicort, Duonebs, Methylpred (allergic to prednisone), continue to wean Methylpred  -  Leukocytosis, likely secondary to steroids. Doing well clinically  - HTN: Losartan, propranolol  - Chest wall pain: Lidocaine patch  - Chronic compensated dCHF: ECHO 10/2017: EF normal, Grade I diastolic dysfunction  - Mood: Zoloft  - Hip pain s/p fall prior to admission: No acute fracture  - GERD  - Tobacco abuse disorder  - Agitation/confusion: resolved, continue seroquel 12.5mg qHS    DVT Prophylaxis:  pLOV    CODE STATUS: Conditional Code    Disposition: I expect the patient to be discharged when pt agrees to go to rehab. Adamant about going home. Hope is that daughter and son will see her try to ambulate and will talk pt into going to rehab    Nereida Mixon MD  11/12/17  2:49 PM

## 2017-11-13 ENCOUNTER — TRANSITIONAL CARE MANAGEMENT TELEPHONE ENCOUNTER (OUTPATIENT)
Dept: INTERNAL MEDICINE | Facility: CLINIC | Age: 77
End: 2017-11-13

## 2017-11-14 ENCOUNTER — OFFICE VISIT (OUTPATIENT)
Dept: INTERNAL MEDICINE | Facility: CLINIC | Age: 77
End: 2017-11-14

## 2017-11-14 VITALS
HEIGHT: 62 IN | TEMPERATURE: 98.4 F | BODY MASS INDEX: 22.08 KG/M2 | WEIGHT: 120 LBS | RESPIRATION RATE: 14 BRPM | HEART RATE: 78 BPM | SYSTOLIC BLOOD PRESSURE: 110 MMHG | OXYGEN SATURATION: 98 % | DIASTOLIC BLOOD PRESSURE: 58 MMHG

## 2017-11-14 DIAGNOSIS — M54.50 LOW BACK PAIN WITHOUT SCIATICA, UNSPECIFIED BACK PAIN LATERALITY, UNSPECIFIED CHRONICITY: ICD-10-CM

## 2017-11-14 DIAGNOSIS — J18.9 COMMUNITY ACQUIRED PNEUMONIA, UNSPECIFIED LATERALITY: Primary | ICD-10-CM

## 2017-11-14 DIAGNOSIS — Z72.0 TOBACCO ABUSE DISORDER: Chronic | ICD-10-CM

## 2017-11-14 DIAGNOSIS — J44.1 COPD EXACERBATION (HCC): ICD-10-CM

## 2017-11-14 DIAGNOSIS — I10 ESSENTIAL HYPERTENSION: ICD-10-CM

## 2017-11-14 DIAGNOSIS — H81.10 BENIGN PAROXYSMAL POSITIONAL VERTIGO, UNSPECIFIED LATERALITY: ICD-10-CM

## 2017-11-14 DIAGNOSIS — D72.829 LEUKOCYTOSIS, UNSPECIFIED TYPE: Chronic | ICD-10-CM

## 2017-11-14 DIAGNOSIS — J43.9 PULMONARY EMPHYSEMA, UNSPECIFIED EMPHYSEMA TYPE (HCC): ICD-10-CM

## 2017-11-14 PROBLEM — J96.01 ACUTE RESPIRATORY FAILURE WITH HYPOXEMIA (HCC): Status: RESOLVED | Noted: 2017-10-31 | Resolved: 2017-11-14

## 2017-11-14 PROCEDURE — 99496 TRANSJ CARE MGMT HIGH F2F 7D: CPT | Performed by: INTERNAL MEDICINE

## 2017-11-14 RX ORDER — METHYLPREDNISOLONE 4 MG/1
TABLET ORAL
Refills: 0 | COMMUNITY
Start: 2017-11-12 | End: 2017-12-13

## 2017-11-14 NOTE — PROGRESS NOTES
Transitional Care Follow Up Visit  Subjective     Sadie Jo Tijerina is a 77 y.o. female who presents for a transitional care management visit.    Within 48 business hours after discharge our office contacted her via telephone to coordinate her care and needs.      I reviewed and discussed the details of that call along with the discharge summary, hospital problems, inpatient lab results, inpatient diagnostic studies, and consultation reports with Sadie.     Current outpatient and discharge medications have been reconciled for the patient.    Date of TCM Phone Call 1/10/2017   [facility]   Date of Admission 10/28/17   Date of Discharge 11/12/17   Discharge Disposition Home or Self Care     Risk for Readmission (LACE) Score: 12    History of Present Illness   Course During Hospital Stay:  Patient was hospitalized for community acquired pneumonia COPD exacerbation.  Improved and now.  No significant coughing.  Anxiety hallucination in the hospital resolved.  Patient is not taking Seroquel.  Low pain still comes and goes patient is not taking lidocainepatch rather is on tramadol.   WBC high, still on steroid  The following portions of the patient's history were reviewed and updated as appropriate: allergies, current medications, past family history, past medical history, past social history, past surgical history and problem list.    Review of Systems   Constitutional: Negative.    Respiratory: Negative.    Cardiovascular: Negative.    Gastrointestinal: Negative.    Musculoskeletal: Negative.    Skin: Negative.    Neurological: Negative.    Psychiatric/Behavioral: Negative.        Objective   Physical Exam   Constitutional: She is oriented to person, place, and time. She appears well-nourished.   Neck: Neck supple.   Cardiovascular: Normal rate, regular rhythm and normal heart sounds.    Pulmonary/Chest: Effort normal and breath sounds normal.   Abdominal: Bowel sounds are normal.   Neurological:  She is alert and oriented to person, place, and time.   Skin: Skin is warm.   Psychiatric: She has a normal mood and affect.       Assessment/Plan   Sadie was seen today for nausea and transitional care management.    Diagnoses and all orders for this visit:    Community acquired pneumonia, unspecified laterality    COPD exacerbation    Pulmonary emphysema, unspecified emphysema type    Low back pain without sciatica, unspecified back pain laterality, unspecified chronicity    Benign paroxysmal positional vertigo, unspecified laterality    Leukocytosis, unspecified type    Tobacco abuse disorder    Essential hypertension           COPD exacerbation and knee pneumonia improved after steroid and antibiotics.  Repeat CBC with differential.  Complaining dizziness is still lightheadedness also patient is taking meclizine.  We will try to hold blood pressure medicine low salt and 25 mg to see if it helps.  Encourage patient to quit tobacco  Back pain continue tramadol discontinue lidocaine patch.  Anxiety stable and now hallucination normal discontinue Seroquel.  3 week after labs

## 2017-11-15 ENCOUNTER — TELEPHONE (OUTPATIENT)
Dept: INTERNAL MEDICINE | Facility: CLINIC | Age: 77
End: 2017-11-15

## 2017-11-15 RX ORDER — ONDANSETRON 4 MG/1
4 TABLET, FILM COATED ORAL EVERY 8 HOURS PRN
Qty: 30 TABLET | Refills: 5 | Status: SHIPPED | OUTPATIENT
Start: 2017-11-15 | End: 2018-01-02

## 2017-11-15 NOTE — TELEPHONE ENCOUNTER
Ava is calling concerning a prescription for patient's nausea. [facility] She would like a return call and to please leave a message if you get her voice mail.

## 2017-11-28 RX ORDER — BENZONATATE 100 MG/1
100 CAPSULE ORAL 3 TIMES DAILY PRN
Qty: 30 CAPSULE | Refills: 0 | Status: SHIPPED | OUTPATIENT
Start: 2017-11-28 | End: 2017-12-14 | Stop reason: SDUPTHER

## 2017-11-30 ENCOUNTER — TELEPHONE (OUTPATIENT)
Dept: INTERNAL MEDICINE | Facility: CLINIC | Age: 77
End: 2017-11-30

## 2017-11-30 RX ORDER — AZITHROMYCIN 500 MG/1
500 TABLET, FILM COATED ORAL DAILY
Qty: 5 TABLET | Refills: 0 | Status: SHIPPED | OUTPATIENT
Start: 2017-11-30 | End: 2017-12-14

## 2017-11-30 NOTE — TELEPHONE ENCOUNTER
Please call Zithromax 500 mg  once a day for 5 daysend also encourage patient to do blood tests after antibiotics.  See patient if no better

## 2017-11-30 NOTE — TELEPHONE ENCOUNTER
Pt has been hospitalized for pneumonia and came into the office for hospital follow up on 11/7/17.  Still suffering from pneumonia symptoms such as coughing, stuffy, and pain chest when coughing.  Wanted to see if Dr Wolff can call in a different prescription.      Please return call to patient.

## 2017-12-05 LAB
BASOPHILS # BLD AUTO: 0.05 10*3/MM3 (ref 0–0.2)
BASOPHILS NFR BLD AUTO: 0.5 % (ref 0–2.5)
BUN SERPL-MCNC: 18 MG/DL (ref 7–20)
BUN/CREAT SERPL: 16.4 (ref 7.1–23.5)
CALCIUM SERPL-MCNC: 9.5 MG/DL (ref 8.4–10.2)
CHLORIDE SERPL-SCNC: 101 MMOL/L (ref 98–107)
CO2 SERPL-SCNC: 30 MMOL/L (ref 26–30)
CREAT SERPL-MCNC: 1.1 MG/DL (ref 0.6–1.3)
EOSINOPHIL # BLD AUTO: 0.15 10*3/MM3 (ref 0–0.7)
EOSINOPHIL NFR BLD AUTO: 1.4 % (ref 0–7)
ERYTHROCYTE [DISTWIDTH] IN BLOOD BY AUTOMATED COUNT: 13.1 % (ref 11.5–14.5)
GFR SERPLBLD CREATININE-BSD FMLA CKD-EPI: 48 ML/MIN/1.73
GFR SERPLBLD CREATININE-BSD FMLA CKD-EPI: 58 ML/MIN/1.73
GLUCOSE SERPL-MCNC: 97 MG/DL (ref 74–98)
HCT VFR BLD AUTO: 39.2 % (ref 37–47)
HGB BLD-MCNC: 12.2 G/DL (ref 12–16)
IMM GRANULOCYTES # BLD: 0.05 10*3/MM3 (ref 0–0.06)
IMM GRANULOCYTES NFR BLD: 0.5 % (ref 0–0.6)
LYMPHOCYTES # BLD AUTO: 2.91 10*3/MM3 (ref 0.6–3.4)
LYMPHOCYTES NFR BLD AUTO: 27.8 % (ref 10–50)
MCH RBC QN AUTO: 28.6 PG (ref 27–31)
MCHC RBC AUTO-ENTMCNC: 31.1 G/DL (ref 30–37)
MCV RBC AUTO: 92 FL (ref 81–99)
MONOCYTES # BLD AUTO: 0.77 10*3/MM3 (ref 0–0.9)
MONOCYTES NFR BLD AUTO: 7.4 % (ref 0–12)
NEUTROPHILS # BLD AUTO: 6.53 10*3/MM3 (ref 2–6.9)
NEUTROPHILS NFR BLD AUTO: 62.4 % (ref 37–80)
NRBC BLD AUTO-RTO: 0 /100 WBC (ref 0–0)
PLATELET # BLD AUTO: 316 10*3/MM3 (ref 130–400)
POTASSIUM SERPL-SCNC: 4.1 MMOL/L (ref 3.5–5.1)
RBC # BLD AUTO: 4.26 10*6/MM3 (ref 4.2–5.4)
SODIUM SERPL-SCNC: 140 MMOL/L (ref 137–145)
WBC # BLD AUTO: 10.46 10*3/MM3 (ref 4.8–10.8)

## 2017-12-07 RX ORDER — LOSARTAN POTASSIUM 50 MG/1
TABLET ORAL
Qty: 30 TABLET | Refills: 0 | Status: SHIPPED | OUTPATIENT
Start: 2017-12-07 | End: 2017-12-14

## 2017-12-14 ENCOUNTER — OFFICE VISIT (OUTPATIENT)
Dept: INTERNAL MEDICINE | Facility: CLINIC | Age: 77
End: 2017-12-14

## 2017-12-14 VITALS
DIASTOLIC BLOOD PRESSURE: 60 MMHG | HEIGHT: 62 IN | OXYGEN SATURATION: 90 % | BODY MASS INDEX: 22.08 KG/M2 | RESPIRATION RATE: 14 BRPM | SYSTOLIC BLOOD PRESSURE: 110 MMHG | TEMPERATURE: 99.4 F | HEART RATE: 84 BPM | WEIGHT: 120 LBS

## 2017-12-14 DIAGNOSIS — J44.1 COPD EXACERBATION (HCC): Primary | ICD-10-CM

## 2017-12-14 PROCEDURE — 99214 OFFICE O/P EST MOD 30 MIN: CPT | Performed by: INTERNAL MEDICINE

## 2017-12-14 RX ORDER — PREDNISONE 10 MG/1
10 TABLET ORAL DAILY
Qty: 45 TABLET | Refills: 0 | Status: SHIPPED | OUTPATIENT
Start: 2017-12-14 | End: 2017-12-14 | Stop reason: SDUPTHER

## 2017-12-14 RX ORDER — BENZONATATE 200 MG/1
CAPSULE ORAL
Qty: 45 CAPSULE | Refills: 0 | Status: SHIPPED | OUTPATIENT
Start: 2017-12-14 | End: 2018-02-06 | Stop reason: HOSPADM

## 2017-12-14 RX ORDER — PREDNISONE 10 MG/1
10 TABLET ORAL DAILY
Qty: 45 TABLET | Refills: 0 | Status: SHIPPED | OUTPATIENT
Start: 2017-12-14 | End: 2017-12-14

## 2017-12-14 RX ORDER — PREDNISONE 10 MG/1
10 TABLET ORAL DAILY
Qty: 45 TABLET | Refills: 0 | Status: SHIPPED | OUTPATIENT
Start: 2017-12-14 | End: 2018-01-02

## 2017-12-14 RX ORDER — DEXTROMETHORPHAN POLISTIREX 30 MG/5ML
30 SUSPENSION ORAL NIGHTLY PRN
Qty: 148 ML | Refills: 0 | Status: SHIPPED | OUTPATIENT
Start: 2017-12-14 | End: 2018-02-06 | Stop reason: HOSPADM

## 2017-12-14 NOTE — PROGRESS NOTES
Subjective   Sadie Tijerina is a 77 y.o. female.     Chief Complaint   Patient presents with   • Follow-up     from  for pnumonia    • Labs Only     discuss        Cough   This is a new problem. The current episode started 1 to 4 weeks ago. The problem has been gradually worsening. The problem occurs constantly. The cough is productive of purulent sputum. Associated symptoms include chills, ear congestion, a fever, headaches, nasal congestion, postnasal drip, rhinorrhea, shortness of breath and wheezing. Pertinent negatives include no chest pain, ear pain or hemoptysis. The symptoms are aggravated by lying down. She has tried OTC cough suppressant and oral steroids for the symptoms. The treatment provided no relief. Her past medical history is significant for bronchitis and COPD.          Current Outpatient Prescriptions:   •  albuterol (PROVENTIL HFA;VENTOLIN HFA) 108 (90 BASE) MCG/ACT inhaler, Inhale 1 puff Every 4 (Four) Hours As Needed for shortness of air., Disp: 18 g, Rfl: 3  •  albuterol (PROVENTIL) (2.5 MG/3ML) 0.083% nebulizer solution, USE 1 AMPULE IN NEBULIZER FOUR TIMES A DAY AS NEEDED, Disp: 75 mL, Rfl: 3  •  aspirin 81 MG EC tablet, Take 81 mg by mouth Daily., Disp: , Rfl:   •  benzonatate (TESSALON) 200 MG capsule, 1 tid po prn, Disp: 45 capsule, Rfl: 0  •  cholecalciferol (VITAMIN D3) 1000 UNITS tablet, Take 1,000 Units by mouth Daily., Disp: , Rfl:   •  clonazePAM (KlonoPIN) 0.5 MG tablet, Take 1 tablet by mouth At Night As Needed for Seizures., Disp: 30 tablet, Rfl: 3  •  doxycycline (VIBRAMYICN) 100 MG tablet, Take 1 tablet by mouth 2 (Two) Times a Day., Disp: 20 tablet, Rfl: 0  •  fluticasone (FLONASE) 50 MCG/ACT nasal spray, 2 sprays by Each Nare route Daily., Disp: 1 bottle, Rfl: 2  •  methylPREDNISolone (MEDROL) 4 MG tablet, Take 0.5 tablets by mouth Daily., Disp: 5 tablet, Rfl: 0  •  ondansetron (ZOFRAN) 4 MG tablet, Take 1 tablet by mouth Every 8 (Eight) Hours As Needed for Nausea or  Vomiting., Disp: 30 tablet, Rfl: 5  •  pravastatin (PRAVACHOL) 20 MG tablet, TAKE 1 TABLET BY MOUTH IN THE EVENING , Disp: 30 tablet, Rfl: 5  •  PREMARIN 0.625 MG tablet, TAKE 1 TABLET BY MOUTH ONE TIME A DAY , Disp: 90 tablet, Rfl: 1  •  propranolol (INDERAL) 60 MG tablet, Take 1 tablet by mouth Daily., Disp: 90 tablet, Rfl: 3  •  sertraline (ZOLOFT) 50 MG tablet, TAKE 1 TABLET BY MOUTH TWO TIMES A DAY , Disp: 60 tablet, Rfl: 5  •  SYMBICORT 160-4.5 MCG/ACT inhaler, INHALE 2 PUFFS BY MOUTH TWO TIMES A DAY AND RINSE MOUTH AFTER EACH USE, Disp: 10.2 g, Rfl: 2  •  tiotropium (SPIRIVA HANDIHALER) 18 MCG per inhalation capsule, Place 1 capsule into inhaler and inhale Daily., Disp: 30 capsule, Rfl: 5  •  traMADol (ULTRAM) 50 MG tablet, Take 1 tablet by mouth Daily As Needed (pain)., Disp: 30 tablet, Rfl: 2  •  dextromethorphan polistirex ER (DELSYM) 30 MG/5ML Suspension Extended Release oral suspension, Take 30 mg by mouth At Night As Needed (cough)., Disp: 148 mL, Rfl: 0  •  losartan (COZAAR) 25 MG tablet, Take 1 tablet by mouth Daily., Disp: 30 tablet, Rfl: 2  •  meclizine (ANTIVERT) 12.5 MG tablet, TAKE 1 TABLET BY MOUTH THREE TIMES A DAY AS NEEDED for dizziness , Disp: 90 tablet, Rfl: 0  •  predniSONE (DELTASONE) 10 MG tablet, Take 1 tablet by mouth Daily. 5 tab for 3 day 4 tab for 3 day 3 tab  3d 2 tab 3 d 1 tab 3d, Disp: 45 tablet, Rfl: 0  No current facility-administered medications for this visit.     The following portions of the patient's history were reviewed and updated as appropriate: allergies, current medications, past family history, past medical history, past social history, past surgical history and problem list.    Review of Systems   Constitutional: Positive for chills and fever.   HENT: Positive for postnasal drip and rhinorrhea. Negative for ear pain.    Respiratory: Positive for cough, shortness of breath and wheezing. Negative for hemoptysis.    Cardiovascular: Negative.  Negative for chest pain.    Gastrointestinal: Negative.    Musculoskeletal: Negative.    Skin: Negative.    Neurological: Positive for headaches.   Psychiatric/Behavioral: Negative.        Objective   Physical Exam   Constitutional: She is oriented to person, place, and time. She appears well-developed and well-nourished.   HENT:   Head: Normocephalic and atraumatic.   Eyes: Conjunctivae are normal. Pupils are equal, round, and reactive to light.   Neck: Normal range of motion. Neck supple.   Cardiovascular: Normal rate, regular rhythm and normal heart sounds.    Pulmonary/Chest: Effort normal. She has wheezes. She has no rales.   rhonchi   Abdominal: Bowel sounds are normal.   Neurological: She is alert and oriented to person, place, and time.   Skin: Skin is warm.   Psychiatric: She has a normal mood and affect.       All tests have been reviewed.    Assessment/Plan   Diagnoses and all orders for this visit:    COPD exacerbation    Other orders  -     benzonatate (TESSALON) 200 MG capsule; 1 tid po prn  -     dextromethorphan polistirex ER (DELSYM) 30 MG/5ML Suspension Extended Release oral suspension; Take 30 mg by mouth At Night As Needed (cough).  -     predniSONE (DELTASONE) 10 MG tablet; Take 1 tablet by mouth Daily. 5 tab for 3 day  4 tab for 3 day  3 tab  3d  2 tab 3 d  1 tab 3d           continue doxy and neb and tessalon , zyrtec

## 2017-12-15 ENCOUNTER — TELEPHONE (OUTPATIENT)
Dept: INTERNAL MEDICINE | Facility: CLINIC | Age: 77
End: 2017-12-15

## 2017-12-15 NOTE — TELEPHONE ENCOUNTER
Pt states Pharmacy didn't receive order for Prednisone.     Pt requests call back.     [facility]

## 2017-12-18 DIAGNOSIS — R05.9 COUGH: ICD-10-CM

## 2017-12-18 RX ORDER — BUDESONIDE AND FORMOTEROL FUMARATE DIHYDRATE 160; 4.5 UG/1; UG/1
AEROSOL RESPIRATORY (INHALATION)
Qty: 10.2 G | Refills: 1 | Status: SHIPPED | OUTPATIENT
Start: 2017-12-18 | End: 2018-07-25

## 2017-12-24 ENCOUNTER — APPOINTMENT (OUTPATIENT)
Dept: GENERAL RADIOLOGY | Facility: HOSPITAL | Age: 77
End: 2017-12-24

## 2017-12-24 ENCOUNTER — HOSPITAL ENCOUNTER (EMERGENCY)
Facility: HOSPITAL | Age: 77
Discharge: HOME | End: 2017-12-24
Attending: STUDENT IN AN ORGANIZED HEALTH CARE EDUCATION/TRAINING PROGRAM | Admitting: STUDENT IN AN ORGANIZED HEALTH CARE EDUCATION/TRAINING PROGRAM

## 2017-12-24 VITALS
OXYGEN SATURATION: 96 % | WEIGHT: 121 LBS | HEART RATE: 63 BPM | DIASTOLIC BLOOD PRESSURE: 72 MMHG | HEIGHT: 62 IN | SYSTOLIC BLOOD PRESSURE: 148 MMHG | BODY MASS INDEX: 22.26 KG/M2 | TEMPERATURE: 97.9 F | RESPIRATION RATE: 19 BRPM

## 2017-12-24 DIAGNOSIS — S93.401A SPRAIN OF RIGHT ANKLE, UNSPECIFIED LIGAMENT, INITIAL ENCOUNTER: ICD-10-CM

## 2017-12-24 DIAGNOSIS — S91.311A LACERATION OF RIGHT FOOT, INITIAL ENCOUNTER: Primary | ICD-10-CM

## 2017-12-24 DIAGNOSIS — R05.9 COUGH: ICD-10-CM

## 2017-12-24 PROCEDURE — 73630 X-RAY EXAM OF FOOT: CPT

## 2017-12-24 PROCEDURE — 73610 X-RAY EXAM OF ANKLE: CPT

## 2017-12-24 PROCEDURE — 99282 EMERGENCY DEPT VISIT SF MDM: CPT

## 2017-12-24 RX ORDER — LIDOCAINE HYDROCHLORIDE 10 MG/ML
10 INJECTION, SOLUTION INFILTRATION; PERINEURAL ONCE
Status: COMPLETED | OUTPATIENT
Start: 2017-12-24 | End: 2017-12-24

## 2017-12-24 RX ORDER — CEPHALEXIN 500 MG/1
500 CAPSULE ORAL 3 TIMES DAILY
Qty: 21 CAPSULE | Refills: 0 | Status: SHIPPED | OUTPATIENT
Start: 2017-12-24 | End: 2018-01-02

## 2017-12-24 RX ORDER — DEXTROMETHORPHAN HYDROBROMIDE AND PROMETHAZINE HYDROCHLORIDE 15; 6.25 MG/5ML; MG/5ML
2.5 SYRUP ORAL 4 TIMES DAILY PRN
Qty: 118 ML | Refills: 0 | Status: SHIPPED | OUTPATIENT
Start: 2017-12-24 | End: 2018-01-02

## 2017-12-24 RX ADMIN — LIDOCAINE HYDROCHLORIDE 10 ML: 10 INJECTION, SOLUTION INFILTRATION; PERINEURAL at 13:33

## 2017-12-26 ENCOUNTER — HOSPITAL ENCOUNTER (EMERGENCY)
Facility: HOSPITAL | Age: 77
Discharge: HOME | End: 2017-12-26
Attending: EMERGENCY MEDICINE | Admitting: EMERGENCY MEDICINE

## 2017-12-26 VITALS
RESPIRATION RATE: 16 BRPM | BODY MASS INDEX: 22.26 KG/M2 | SYSTOLIC BLOOD PRESSURE: 148 MMHG | OXYGEN SATURATION: 96 % | HEIGHT: 62 IN | TEMPERATURE: 97.4 F | WEIGHT: 121 LBS | HEART RATE: 89 BPM | DIASTOLIC BLOOD PRESSURE: 72 MMHG

## 2017-12-26 DIAGNOSIS — Z51.89 VISIT FOR WOUND CHECK: Primary | ICD-10-CM

## 2017-12-26 PROCEDURE — 99283 EMERGENCY DEPT VISIT LOW MDM: CPT

## 2018-01-02 ENCOUNTER — HOSPITAL ENCOUNTER (OUTPATIENT)
Dept: GENERAL RADIOLOGY | Facility: HOSPITAL | Age: 78
Discharge: HOME | End: 2018-01-02
Attending: INTERNAL MEDICINE | Admitting: INTERNAL MEDICINE

## 2018-01-02 ENCOUNTER — OFFICE VISIT (OUTPATIENT)
Dept: INTERNAL MEDICINE | Facility: CLINIC | Age: 78
End: 2018-01-02

## 2018-01-02 VITALS
DIASTOLIC BLOOD PRESSURE: 68 MMHG | SYSTOLIC BLOOD PRESSURE: 110 MMHG | OXYGEN SATURATION: 94 % | TEMPERATURE: 97.9 F | HEART RATE: 82 BPM | WEIGHT: 124 LBS | RESPIRATION RATE: 14 BRPM | HEIGHT: 62 IN | BODY MASS INDEX: 22.82 KG/M2

## 2018-01-02 DIAGNOSIS — J44.1 COPD EXACERBATION (HCC): ICD-10-CM

## 2018-01-02 DIAGNOSIS — J44.1 COPD EXACERBATION (HCC): Primary | ICD-10-CM

## 2018-01-02 DIAGNOSIS — Z72.0 TOBACCO ABUSE DISORDER: Chronic | ICD-10-CM

## 2018-01-02 DIAGNOSIS — T14.8XXA WOUND INFECTION: ICD-10-CM

## 2018-01-02 DIAGNOSIS — L08.9 WOUND INFECTION: ICD-10-CM

## 2018-01-02 PROCEDURE — 99214 OFFICE O/P EST MOD 30 MIN: CPT | Performed by: INTERNAL MEDICINE

## 2018-01-02 PROCEDURE — 71046 X-RAY EXAM CHEST 2 VIEWS: CPT

## 2018-01-02 RX ORDER — PREDNISONE 20 MG/1
20 TABLET ORAL 3 TIMES DAILY
Qty: 15 TABLET | Refills: 0 | Status: SHIPPED | OUTPATIENT
Start: 2018-01-02 | End: 2018-01-02

## 2018-01-02 RX ORDER — PROMETHAZINE HYDROCHLORIDE AND CODEINE PHOSPHATE 6.25; 1 MG/5ML; MG/5ML
5 SYRUP ORAL EVERY 4 HOURS PRN
COMMUNITY
End: 2018-01-18 | Stop reason: SDUPTHER

## 2018-01-02 RX ORDER — LEVOFLOXACIN 500 MG/1
500 TABLET, FILM COATED ORAL DAILY
Qty: 14 TABLET | Refills: 0 | Status: SHIPPED | OUTPATIENT
Start: 2018-01-02 | End: 2018-01-15 | Stop reason: HOSPADM

## 2018-01-02 RX ORDER — PREDNISONE 10 MG/1
10 TABLET ORAL DAILY
Qty: 45 TABLET | Refills: 0 | Status: SHIPPED | OUTPATIENT
Start: 2018-01-02 | End: 2018-01-15 | Stop reason: HOSPADM

## 2018-01-02 NOTE — PROGRESS NOTES
Subjective   Sadie Tijerina is a 77 y.o. female.     Chief Complaint   Patient presents with   • Wheezing   • Shortness of Breath       Wheezing    This is a new problem. The current episode started in the past 7 days. The problem occurs constantly. The problem has been rapidly worsening. Associated symptoms include chills, coryza, coughing (yellow sputum), diarrhea, ear pain, headaches, rhinorrhea, shortness of breath, a sore throat and sputum production. Pertinent negatives include no chest pain, fever, hemoptysis, neck pain, rash or vomiting. The symptoms are aggravated by lying flat. Treatments tried: inhaler. The treatment provided no relief.   Shortness of Breath   Associated symptoms include coryza, ear pain, headaches, rhinorrhea, a sore throat, sputum production and wheezing. Pertinent negatives include no chest pain, fever, hemoptysis, neck pain, rash or vomiting.    Patient recently had a COPD exacerbation was started on prednisone Dosepak last week patient finished her last by mouth of prednisone cough started to come back gradually getting worse yellow sputum short of breath wheezing.  Patient is still smoking.  Denies any fever chills.  Patient also recently fell when to emergency room and had a stitches on the left right foot.      Current Outpatient Prescriptions:   •  albuterol (PROVENTIL HFA;VENTOLIN HFA) 108 (90 BASE) MCG/ACT inhaler, Inhale 1 puff Every 4 (Four) Hours As Needed for shortness of air., Disp: 18 g, Rfl: 3  •  albuterol (PROVENTIL) (2.5 MG/3ML) 0.083% nebulizer solution, USE 1 AMPULE IN NEBULIZER FOUR TIMES A DAY AS NEEDED, Disp: 75 mL, Rfl: 3  •  aspirin 81 MG EC tablet, Take 81 mg by mouth Daily., Disp: , Rfl:   •  benzonatate (TESSALON) 200 MG capsule, 1 tid po prn, Disp: 45 capsule, Rfl: 0  •  cholecalciferol (VITAMIN D3) 1000 UNITS tablet, Take 1,000 Units by mouth Daily., Disp: , Rfl:   •  clonazePAM (KlonoPIN) 0.5 MG tablet, Take 1 tablet by mouth At Night As Needed for  Seizures., Disp: 30 tablet, Rfl: 3  •  dextromethorphan polistirex ER (DELSYM) 30 MG/5ML Suspension Extended Release oral suspension, Take 30 mg by mouth At Night As Needed (cough)., Disp: 148 mL, Rfl: 0  •  losartan (COZAAR) 25 MG tablet, Take 1 tablet by mouth Daily., Disp: 30 tablet, Rfl: 2  •  meclizine (ANTIVERT) 12.5 MG tablet, TAKE 1 TABLET BY MOUTH THREE TIMES A DAY AS NEEDED for dizziness , Disp: 90 tablet, Rfl: 0  •  pravastatin (PRAVACHOL) 20 MG tablet, TAKE 1 TABLET BY MOUTH IN THE EVENING , Disp: 30 tablet, Rfl: 5  •  PREMARIN 0.625 MG tablet, TAKE 1 TABLET BY MOUTH ONE TIME A DAY , Disp: 90 tablet, Rfl: 1  •  promethazine-codeine (PHENERGAN with CODEINE) 6.25-10 MG/5ML syrup, Take 5 mL by mouth Every 4 (Four) Hours As Needed for Cough., Disp: , Rfl:   •  propranolol (INDERAL) 60 MG tablet, Take 1 tablet by mouth Daily., Disp: 90 tablet, Rfl: 3  •  sertraline (ZOLOFT) 50 MG tablet, TAKE 1 TABLET BY MOUTH TWO TIMES A DAY , Disp: 60 tablet, Rfl: 5  •  SYMBICORT 160-4.5 MCG/ACT inhaler, INHALE 2 PUFFS BY MOUTH TWO TIMES A DAY. Rinse mouth after each use. , Disp: 10.2 g, Rfl: 1  •  tiotropium (SPIRIVA HANDIHALER) 18 MCG per inhalation capsule, Place 1 capsule into inhaler and inhale Daily., Disp: 30 capsule, Rfl: 5  •  traMADol (ULTRAM) 50 MG tablet, Take 1 tablet by mouth Daily As Needed (pain)., Disp: 30 tablet, Rfl: 2  •  cephalexin (KEFLEX) 500 MG capsule, Take 1 capsule by mouth 2 (Two) Times a Day for 7 days., Disp: 14 capsule, Rfl: 0  •  levoFLOXacin (LEVAQUIN) 500 MG tablet, Take 1 tablet by mouth Daily., Disp: 14 tablet, Rfl: 0  •  predniSONE (DELTASONE) 10 MG tablet, Take 1 tablet by mouth Daily. 5 tab for 3 day 4 tab for 3 day 3 tab  3d 2 tab 3 d 1 tab 3d, Disp: 45 tablet, Rfl: 0    The following portions of the patient's history were reviewed and updated as appropriate: allergies, current medications, past family history, past medical history, past social history, past surgical history and  problem list.    Review of Systems   Constitutional: Positive for chills. Negative for fever.   HENT: Positive for ear pain, rhinorrhea and sore throat.    Respiratory: Positive for cough (yellow sputum), sputum production, shortness of breath and wheezing. Negative for hemoptysis.    Cardiovascular: Negative.  Negative for chest pain.   Gastrointestinal: Positive for diarrhea. Negative for vomiting.   Musculoskeletal: Negative.  Negative for neck pain.   Skin: Positive for wound. Negative for rash.   Neurological: Positive for headaches.   Psychiatric/Behavioral: Negative.        Objective   Physical Exam   Constitutional: She is oriented to person, place, and time. She appears well-nourished.   HENT:   Head: Normocephalic and atraumatic.   Eyes: Conjunctivae are normal. Pupils are equal, round, and reactive to light.   Neck: Normal range of motion. Neck supple.   Cardiovascular: Normal rate, regular rhythm and normal heart sounds.    Pulmonary/Chest: Effort normal. She has wheezes.   Abdominal: Bowel sounds are normal.   Neurological: She is alert and oriented to person, place, and time.   Skin: Skin is warm.   Right ankle would dehicense   Psychiatric: She has a normal mood and affect.       All tests have been reviewed.    Assessment/Plan   Diagnoses and all orders for this visit:    COPD exacerbation  -     XR Chest PA & Lateral; Future    Tobacco abuse disorder nicotine patch and counselling    Wound infection right ankle  -     Ambulatory Referral to Podiatry    Other orders  -     promethazine-codeine (PHENERGAN with CODEINE) 6.25-10 MG/5ML syrup; Take 5 mL by mouth Every 4 (Four) Hours As Needed for Cough.  -     levoFLOXacin (LEVAQUIN) 500 MG tablet; Take 1 tablet by mouth Daily.  -     Discontinue: predniSONE (DELTASONE) 20 MG tablet; Take 1 tablet by mouth 3 (Three) Times a Day.  -     predniSONE (DELTASONE) 10 MG tablet; Take 1 tablet by mouth Daily. 5 tab for 3 day  4 tab for 3 day  3 tab  3d  2 tab 3  d  1 tab 3d  Zyrtec and mucinex and cough med and inhaler

## 2018-01-08 RX ORDER — LOSARTAN POTASSIUM 50 MG/1
TABLET ORAL
Qty: 30 TABLET | Refills: 0 | OUTPATIENT
Start: 2018-01-08

## 2018-01-10 ENCOUNTER — APPOINTMENT (OUTPATIENT)
Dept: GENERAL RADIOLOGY | Facility: HOSPITAL | Age: 78
End: 2018-01-10

## 2018-01-10 ENCOUNTER — HOSPITAL ENCOUNTER (INPATIENT)
Facility: HOSPITAL | Age: 78
LOS: 4 days | Discharge: HOME HEALTH | End: 2018-01-15
Attending: EMERGENCY MEDICINE | Admitting: INTERNAL MEDICINE

## 2018-01-10 DIAGNOSIS — L03.119 CELLULITIS OF FOOT: Primary | ICD-10-CM

## 2018-01-10 DIAGNOSIS — J44.1 COPD EXACERBATION (HCC): ICD-10-CM

## 2018-01-10 DIAGNOSIS — Z74.09 IMPAIRED FUNCTIONAL MOBILITY, BALANCE, GAIT, AND ENDURANCE: ICD-10-CM

## 2018-01-10 LAB
ANION GAP SERPL CALCULATED.3IONS-SCNC: 5.9 MMOL/L
BASOPHILS # BLD AUTO: 0.02 10*3/MM3 (ref 0–0.2)
BASOPHILS NFR BLD AUTO: 0.1 % (ref 0–2.5)
BUN BLD-MCNC: 25 MG/DL (ref 7–20)
BUN/CREAT SERPL: 20.8 (ref 7.1–23.5)
CALCIUM SPEC-SCNC: 9.2 MG/DL (ref 8.4–10.2)
CHLORIDE SERPL-SCNC: 97 MMOL/L (ref 98–107)
CO2 SERPL-SCNC: 39 MMOL/L (ref 26–30)
CREAT BLD-MCNC: 1.2 MG/DL (ref 0.6–1.3)
DEPRECATED RDW RBC AUTO: 44 FL (ref 37–54)
EOSINOPHIL # BLD AUTO: 0.23 10*3/MM3 (ref 0–0.7)
EOSINOPHIL NFR BLD AUTO: 1.7 % (ref 0–7)
ERYTHROCYTE [DISTWIDTH] IN BLOOD BY AUTOMATED COUNT: 13.2 % (ref 11.5–14.5)
GFR SERPL CREATININE-BSD FRML MDRD: 43 ML/MIN/1.73
GLUCOSE BLD-MCNC: 95 MG/DL (ref 74–98)
HCT VFR BLD AUTO: 38.5 % (ref 37–47)
HGB BLD-MCNC: 12 G/DL (ref 12–16)
IMM GRANULOCYTES # BLD: 0.07 10*3/MM3 (ref 0–0.06)
IMM GRANULOCYTES NFR BLD: 0.5 % (ref 0–0.6)
LYMPHOCYTES # BLD AUTO: 2.04 10*3/MM3 (ref 0.6–3.4)
LYMPHOCYTES NFR BLD AUTO: 15.1 % (ref 10–50)
MCH RBC QN AUTO: 28.6 PG (ref 27–31)
MCHC RBC AUTO-ENTMCNC: 31.2 G/DL (ref 30–37)
MCV RBC AUTO: 91.9 FL (ref 81–99)
MONOCYTES # BLD AUTO: 0.94 10*3/MM3 (ref 0–0.9)
MONOCYTES NFR BLD AUTO: 7 % (ref 0–12)
NEUTROPHILS # BLD AUTO: 10.18 10*3/MM3 (ref 2–6.9)
NEUTROPHILS NFR BLD AUTO: 75.6 % (ref 37–80)
NRBC BLD MANUAL-RTO: 0 /100 WBC (ref 0–0)
PLATELET # BLD AUTO: 207 10*3/MM3 (ref 130–400)
PMV BLD AUTO: 10.1 FL (ref 6–12)
POTASSIUM BLD-SCNC: 3.9 MMOL/L (ref 3.5–5.1)
RBC # BLD AUTO: 4.19 10*6/MM3 (ref 4.2–5.4)
SODIUM BLD-SCNC: 138 MMOL/L (ref 137–145)
WBC NRBC COR # BLD: 13.48 10*3/MM3 (ref 4.8–10.8)

## 2018-01-10 PROCEDURE — 87077 CULTURE AEROBIC IDENTIFY: CPT | Performed by: PHYSICIAN ASSISTANT

## 2018-01-10 PROCEDURE — 90471 IMMUNIZATION ADMIN: CPT | Performed by: PHYSICIAN ASSISTANT

## 2018-01-10 PROCEDURE — 25010000002 HEPARIN (PORCINE) PER 1000 UNITS: Performed by: INTERNAL MEDICINE

## 2018-01-10 PROCEDURE — 99220 PR INITIAL OBSERVATION CARE/DAY 70 MINUTES: CPT | Performed by: INTERNAL MEDICINE

## 2018-01-10 PROCEDURE — 99284 EMERGENCY DEPT VISIT MOD MDM: CPT

## 2018-01-10 PROCEDURE — G0378 HOSPITAL OBSERVATION PER HR: HCPCS

## 2018-01-10 PROCEDURE — 80048 BASIC METABOLIC PNL TOTAL CA: CPT | Performed by: PHYSICIAN ASSISTANT

## 2018-01-10 PROCEDURE — 25010000003 CEFAZOLIN PER 500 MG: Performed by: PHYSICIAN ASSISTANT

## 2018-01-10 PROCEDURE — 94640 AIRWAY INHALATION TREATMENT: CPT

## 2018-01-10 PROCEDURE — 87040 BLOOD CULTURE FOR BACTERIA: CPT | Performed by: EMERGENCY MEDICINE

## 2018-01-10 PROCEDURE — 25010000002 TDAP 5-2.5-18.5 LF-MCG/0.5 SUSPENSION: Performed by: PHYSICIAN ASSISTANT

## 2018-01-10 PROCEDURE — 25010000002 VANCOMYCIN PER 500 MG: Performed by: PHYSICIAN ASSISTANT

## 2018-01-10 PROCEDURE — 87186 SC STD MICRODIL/AGAR DIL: CPT | Performed by: PHYSICIAN ASSISTANT

## 2018-01-10 PROCEDURE — 73630 X-RAY EXAM OF FOOT: CPT

## 2018-01-10 PROCEDURE — 90715 TDAP VACCINE 7 YRS/> IM: CPT | Performed by: PHYSICIAN ASSISTANT

## 2018-01-10 PROCEDURE — 85025 COMPLETE CBC W/AUTO DIFF WBC: CPT | Performed by: PHYSICIAN ASSISTANT

## 2018-01-10 PROCEDURE — 87070 CULTURE OTHR SPECIMN AEROBIC: CPT | Performed by: PHYSICIAN ASSISTANT

## 2018-01-10 RX ORDER — ACETAMINOPHEN 325 MG/1
650 TABLET ORAL EVERY 6 HOURS PRN
Status: DISCONTINUED | OUTPATIENT
Start: 2018-01-10 | End: 2018-01-15 | Stop reason: HOSPADM

## 2018-01-10 RX ORDER — IPRATROPIUM BROMIDE AND ALBUTEROL SULFATE 2.5; .5 MG/3ML; MG/3ML
3 SOLUTION RESPIRATORY (INHALATION) ONCE
Status: COMPLETED | OUTPATIENT
Start: 2018-01-10 | End: 2018-01-10

## 2018-01-10 RX ORDER — BENZONATATE 100 MG/1
100 CAPSULE ORAL 3 TIMES DAILY PRN
Status: DISCONTINUED | OUTPATIENT
Start: 2018-01-10 | End: 2018-01-15 | Stop reason: HOSPADM

## 2018-01-10 RX ORDER — IPRATROPIUM BROMIDE AND ALBUTEROL SULFATE 2.5; .5 MG/3ML; MG/3ML
3 SOLUTION RESPIRATORY (INHALATION)
Status: DISCONTINUED | OUTPATIENT
Start: 2018-01-10 | End: 2018-01-15 | Stop reason: HOSPADM

## 2018-01-10 RX ORDER — ONDANSETRON 4 MG/1
4 TABLET, FILM COATED ORAL EVERY 6 HOURS PRN
Status: DISCONTINUED | OUTPATIENT
Start: 2018-01-10 | End: 2018-01-15 | Stop reason: HOSPADM

## 2018-01-10 RX ORDER — ATORVASTATIN CALCIUM 10 MG/1
10 TABLET, FILM COATED ORAL DAILY
Status: DISCONTINUED | OUTPATIENT
Start: 2018-01-11 | End: 2018-01-15 | Stop reason: HOSPADM

## 2018-01-10 RX ORDER — CLONAZEPAM 0.5 MG/1
0.5 TABLET ORAL NIGHTLY PRN
Status: DISCONTINUED | OUTPATIENT
Start: 2018-01-10 | End: 2018-01-15 | Stop reason: HOSPADM

## 2018-01-10 RX ORDER — TRAMADOL HYDROCHLORIDE 50 MG/1
50 TABLET ORAL DAILY PRN
Status: DISCONTINUED | OUTPATIENT
Start: 2018-01-10 | End: 2018-01-15 | Stop reason: HOSPADM

## 2018-01-10 RX ORDER — BUDESONIDE AND FORMOTEROL FUMARATE DIHYDRATE 160; 4.5 UG/1; UG/1
2 AEROSOL RESPIRATORY (INHALATION)
Status: DISCONTINUED | OUTPATIENT
Start: 2018-01-10 | End: 2018-01-15 | Stop reason: HOSPADM

## 2018-01-10 RX ORDER — PROMETHAZINE HYDROCHLORIDE AND CODEINE PHOSPHATE 6.25; 1 MG/5ML; MG/5ML
5 SYRUP ORAL 3 TIMES DAILY PRN
Status: DISCONTINUED | OUTPATIENT
Start: 2018-01-10 | End: 2018-01-15 | Stop reason: HOSPADM

## 2018-01-10 RX ORDER — ASPIRIN 81 MG/1
81 TABLET ORAL DAILY
Status: DISCONTINUED | OUTPATIENT
Start: 2018-01-11 | End: 2018-01-15 | Stop reason: HOSPADM

## 2018-01-10 RX ORDER — SODIUM CHLORIDE 0.9 % (FLUSH) 0.9 %
1-10 SYRINGE (ML) INJECTION AS NEEDED
Status: DISCONTINUED | OUTPATIENT
Start: 2018-01-10 | End: 2018-01-15 | Stop reason: HOSPADM

## 2018-01-10 RX ORDER — HEPARIN SODIUM 5000 [USP'U]/ML
5000 INJECTION, SOLUTION INTRAVENOUS; SUBCUTANEOUS EVERY 8 HOURS SCHEDULED
Status: DISCONTINUED | OUTPATIENT
Start: 2018-01-10 | End: 2018-01-15 | Stop reason: HOSPADM

## 2018-01-10 RX ORDER — MECLIZINE HCL 12.5 MG/1
12.5 TABLET ORAL EVERY 8 HOURS PRN
Status: DISCONTINUED | OUTPATIENT
Start: 2018-01-10 | End: 2018-01-15 | Stop reason: HOSPADM

## 2018-01-10 RX ORDER — SODIUM CHLORIDE 9 MG/ML
100 INJECTION, SOLUTION INTRAVENOUS CONTINUOUS
Status: DISCONTINUED | OUTPATIENT
Start: 2018-01-10 | End: 2018-01-11

## 2018-01-10 RX ORDER — LOSARTAN POTASSIUM 25 MG/1
25 TABLET ORAL DAILY
Status: DISCONTINUED | OUTPATIENT
Start: 2018-01-11 | End: 2018-01-15 | Stop reason: HOSPADM

## 2018-01-10 RX ORDER — PROPRANOLOL HYDROCHLORIDE 20 MG/1
60 TABLET ORAL DAILY
Status: DISCONTINUED | OUTPATIENT
Start: 2018-01-11 | End: 2018-01-15 | Stop reason: HOSPADM

## 2018-01-10 RX ADMIN — SODIUM CHLORIDE 100 ML/HR: 9 INJECTION, SOLUTION INTRAVENOUS at 22:41

## 2018-01-10 RX ADMIN — HEPARIN SODIUM 5000 UNITS: 5000 INJECTION, SOLUTION INTRAVENOUS; SUBCUTANEOUS at 22:40

## 2018-01-10 RX ADMIN — TETANUS TOXOID, REDUCED DIPHTHERIA TOXOID AND ACELLULAR PERTUSSIS VACCINE, ADSORBED 0.5 ML: 5; 2.5; 8; 8; 2.5 SUSPENSION INTRAMUSCULAR at 18:28

## 2018-01-10 RX ADMIN — VANCOMYCIN HYDROCHLORIDE 1000 MG: 1 INJECTION, POWDER, LYOPHILIZED, FOR SOLUTION INTRAVENOUS at 20:23

## 2018-01-10 RX ADMIN — SERTRALINE HYDROCHLORIDE 50 MG: 50 TABLET ORAL at 22:40

## 2018-01-10 RX ADMIN — IPRATROPIUM BROMIDE AND ALBUTEROL SULFATE 3 ML: .5; 3 SOLUTION RESPIRATORY (INHALATION) at 20:24

## 2018-01-10 RX ADMIN — CEFAZOLIN SODIUM 1 G: 1 SOLUTION INTRAVENOUS at 18:30

## 2018-01-11 ENCOUNTER — APPOINTMENT (OUTPATIENT)
Dept: GENERAL RADIOLOGY | Facility: HOSPITAL | Age: 78
End: 2018-01-11

## 2018-01-11 ENCOUNTER — APPOINTMENT (OUTPATIENT)
Dept: ULTRASOUND IMAGING | Facility: HOSPITAL | Age: 78
End: 2018-01-11

## 2018-01-11 LAB
ANION GAP SERPL CALCULATED.3IONS-SCNC: 6.5 MMOL/L
BUN BLD-MCNC: 18 MG/DL (ref 7–20)
BUN/CREAT SERPL: 20 (ref 7.1–23.5)
CALCIUM SPEC-SCNC: 8 MG/DL (ref 8.4–10.2)
CHLORIDE SERPL-SCNC: 102 MMOL/L (ref 98–107)
CO2 SERPL-SCNC: 34 MMOL/L (ref 26–30)
CREAT BLD-MCNC: 0.9 MG/DL (ref 0.6–1.3)
DEPRECATED RDW RBC AUTO: 44.7 FL (ref 37–54)
ERYTHROCYTE [DISTWIDTH] IN BLOOD BY AUTOMATED COUNT: 13.2 % (ref 11.5–14.5)
GFR SERPL CREATININE-BSD FRML MDRD: 60 ML/MIN/1.73
GLUCOSE BLD-MCNC: 103 MG/DL (ref 74–98)
GLUCOSE BLDC GLUCOMTR-MCNC: 111 MG/DL (ref 70–130)
GLUCOSE BLDC GLUCOMTR-MCNC: 186 MG/DL (ref 70–130)
HCT VFR BLD AUTO: 33.6 % (ref 37–47)
HGB BLD-MCNC: 10.4 G/DL (ref 12–16)
MCH RBC QN AUTO: 28.6 PG (ref 27–31)
MCHC RBC AUTO-ENTMCNC: 31 G/DL (ref 30–37)
MCV RBC AUTO: 92.3 FL (ref 81–99)
PLATELET # BLD AUTO: 157 10*3/MM3 (ref 130–400)
PMV BLD AUTO: 10.1 FL (ref 6–12)
POTASSIUM BLD-SCNC: 3.5 MMOL/L (ref 3.5–5.1)
RBC # BLD AUTO: 3.64 10*6/MM3 (ref 4.2–5.4)
SODIUM BLD-SCNC: 139 MMOL/L (ref 137–145)
VANCOMYCIN SERPL-MCNC: 9.47 MCG/ML (ref 5–40)
WBC NRBC COR # BLD: 9.99 10*3/MM3 (ref 4.8–10.8)

## 2018-01-11 PROCEDURE — 25010000002 AZITHROMYCIN: Performed by: NURSE PRACTITIONER

## 2018-01-11 PROCEDURE — 85027 COMPLETE CBC AUTOMATED: CPT | Performed by: INTERNAL MEDICINE

## 2018-01-11 PROCEDURE — 82962 GLUCOSE BLOOD TEST: CPT

## 2018-01-11 PROCEDURE — 71045 X-RAY EXAM CHEST 1 VIEW: CPT

## 2018-01-11 PROCEDURE — 25010000002 VANCOMYCIN PER 500 MG: Performed by: INTERNAL MEDICINE

## 2018-01-11 PROCEDURE — 25010000002 METHYLPREDNISOLONE PER 40 MG: Performed by: NURSE PRACTITIONER

## 2018-01-11 PROCEDURE — 94799 UNLISTED PULMONARY SVC/PX: CPT

## 2018-01-11 PROCEDURE — 25010000002 PIPERACILLIN SOD-TAZOBACTAM PER 1 G: Performed by: NURSE PRACTITIONER

## 2018-01-11 PROCEDURE — 99232 SBSQ HOSP IP/OBS MODERATE 35: CPT | Performed by: NURSE PRACTITIONER

## 2018-01-11 PROCEDURE — 25010000002 HEPARIN (PORCINE) PER 1000 UNITS: Performed by: INTERNAL MEDICINE

## 2018-01-11 PROCEDURE — 87075 CULTR BACTERIA EXCEPT BLOOD: CPT | Performed by: NURSE PRACTITIONER

## 2018-01-11 PROCEDURE — 80202 ASSAY OF VANCOMYCIN: CPT | Performed by: INTERNAL MEDICINE

## 2018-01-11 PROCEDURE — 93971 EXTREMITY STUDY: CPT

## 2018-01-11 PROCEDURE — 94640 AIRWAY INHALATION TREATMENT: CPT

## 2018-01-11 PROCEDURE — 80048 BASIC METABOLIC PNL TOTAL CA: CPT | Performed by: INTERNAL MEDICINE

## 2018-01-11 PROCEDURE — 99222 1ST HOSP IP/OBS MODERATE 55: CPT | Performed by: PODIATRIST

## 2018-01-11 RX ORDER — GUAIFENESIN 600 MG/1
600 TABLET, EXTENDED RELEASE ORAL EVERY 12 HOURS SCHEDULED
Status: DISCONTINUED | OUTPATIENT
Start: 2018-01-11 | End: 2018-01-15 | Stop reason: HOSPADM

## 2018-01-11 RX ORDER — METHYLPREDNISOLONE SODIUM SUCCINATE 40 MG/ML
40 INJECTION, POWDER, LYOPHILIZED, FOR SOLUTION INTRAMUSCULAR; INTRAVENOUS EVERY 12 HOURS
Status: DISCONTINUED | OUTPATIENT
Start: 2018-01-11 | End: 2018-01-15 | Stop reason: HOSPADM

## 2018-01-11 RX ADMIN — PROMETHAZINE HYDROCHLORIDE AND CODEINE PHOSPHATE 5 ML: 6.25; 1 SOLUTION ORAL at 00:10

## 2018-01-11 RX ADMIN — TAZOBACTAM SODIUM AND PIPERACILLIN SODIUM 3.38 G: 375; 3 INJECTION, SOLUTION INTRAVENOUS at 22:15

## 2018-01-11 RX ADMIN — IPRATROPIUM BROMIDE AND ALBUTEROL SULFATE 3 ML: .5; 3 SOLUTION RESPIRATORY (INHALATION) at 06:56

## 2018-01-11 RX ADMIN — TAZOBACTAM SODIUM AND PIPERACILLIN SODIUM 3.38 G: 375; 3 INJECTION, SOLUTION INTRAVENOUS at 13:36

## 2018-01-11 RX ADMIN — SERTRALINE HYDROCHLORIDE 50 MG: 50 TABLET ORAL at 20:39

## 2018-01-11 RX ADMIN — ASPIRIN 81 MG: 81 TABLET, COATED ORAL at 10:02

## 2018-01-11 RX ADMIN — IPRATROPIUM BROMIDE AND ALBUTEROL SULFATE 3 ML: .5; 3 SOLUTION RESPIRATORY (INHALATION) at 00:52

## 2018-01-11 RX ADMIN — ATORVASTATIN CALCIUM 10 MG: 10 TABLET, FILM COATED ORAL at 10:03

## 2018-01-11 RX ADMIN — SERTRALINE HYDROCHLORIDE 50 MG: 50 TABLET ORAL at 10:03

## 2018-01-11 RX ADMIN — LOSARTAN POTASSIUM 25 MG: 25 TABLET, FILM COATED ORAL at 10:03

## 2018-01-11 RX ADMIN — ONDANSETRON 4 MG: 4 TABLET, FILM COATED ORAL at 21:00

## 2018-01-11 RX ADMIN — METHYLPREDNISOLONE SODIUM SUCCINATE 40 MG: 40 INJECTION, POWDER, FOR SOLUTION INTRAMUSCULAR; INTRAVENOUS at 18:41

## 2018-01-11 RX ADMIN — GUAIFENESIN 600 MG: 600 TABLET, EXTENDED RELEASE ORAL at 13:03

## 2018-01-11 RX ADMIN — AZITHROMYCIN 500 MG: 500 INJECTION, POWDER, LYOPHILIZED, FOR SOLUTION INTRAVENOUS at 18:41

## 2018-01-11 RX ADMIN — ACETAMINOPHEN 650 MG: 325 TABLET, FILM COATED ORAL at 03:35

## 2018-01-11 RX ADMIN — VANCOMYCIN HYDROCHLORIDE 750 MG: 750 INJECTION, SOLUTION INTRAVENOUS at 20:39

## 2018-01-11 RX ADMIN — BUDESONIDE AND FORMOTEROL FUMARATE DIHYDRATE 2 PUFF: 160; 4.5 AEROSOL RESPIRATORY (INHALATION) at 18:38

## 2018-01-11 RX ADMIN — HEPARIN SODIUM 5000 UNITS: 5000 INJECTION, SOLUTION INTRAVENOUS; SUBCUTANEOUS at 20:39

## 2018-01-11 RX ADMIN — GUAIFENESIN 600 MG: 600 TABLET, EXTENDED RELEASE ORAL at 20:39

## 2018-01-11 RX ADMIN — ACETAMINOPHEN 650 MG: 325 TABLET, FILM COATED ORAL at 13:35

## 2018-01-11 RX ADMIN — IPRATROPIUM BROMIDE AND ALBUTEROL SULFATE 3 ML: .5; 3 SOLUTION RESPIRATORY (INHALATION) at 18:38

## 2018-01-11 RX ADMIN — HEPARIN SODIUM 5000 UNITS: 5000 INJECTION, SOLUTION INTRAVENOUS; SUBCUTANEOUS at 13:35

## 2018-01-11 RX ADMIN — BUDESONIDE AND FORMOTEROL FUMARATE DIHYDRATE 2 PUFF: 160; 4.5 AEROSOL RESPIRATORY (INHALATION) at 06:57

## 2018-01-11 RX ADMIN — PROPRANOLOL HYDROCHLORIDE 60 MG: 20 TABLET ORAL at 10:02

## 2018-01-11 RX ADMIN — PROMETHAZINE HYDROCHLORIDE AND CODEINE PHOSPHATE 5 ML: 6.25; 1 SOLUTION ORAL at 10:22

## 2018-01-11 RX ADMIN — COLLAGENASE SANTYL: 250 OINTMENT TOPICAL at 13:03

## 2018-01-11 RX ADMIN — PROMETHAZINE HYDROCHLORIDE AND CODEINE PHOSPHATE 5 ML: 6.25; 1 SOLUTION ORAL at 22:15

## 2018-01-11 RX ADMIN — HEPARIN SODIUM 5000 UNITS: 5000 INJECTION, SOLUTION INTRAVENOUS; SUBCUTANEOUS at 05:54

## 2018-01-11 RX ADMIN — TAZOBACTAM SODIUM AND PIPERACILLIN SODIUM 3.38 G: 375; 3 INJECTION, SOLUTION INTRAVENOUS at 10:11

## 2018-01-11 NOTE — CONSULTS
Referring Provider: hospitalist  Reason for Consultation: right foot laceration and cellulitis    Patient Care Team:  Kristian Wolff MD as PCP - General  Kristian Wolff MD as PCP - Family Medicine  Bayron Grimaldo MD (Inactive) as Consulting Physician (General Surgery)    Chief complaint right foot    Subjective .     History of present illness:  77-year-old nondiabetic female who tells me that on 12/23/2017 she got her foot caught in a storm door at home and lacerated her foot.  She says she was seen in the emergency department and her foot was sewn back together by staff.  She was sent home where she continued to slowly have deterioration and signs of infection of the area.  She was then seen by her primary care physician and a podiatrist in [place] who she says took cultures of the area.  She says she had been having the area addressed by her daughter with antibiotic cream and had been cleaning it with warm water.  She states she was sent to the hospital for admission yesterday due to drainage and increased redness and swelling and pain.  She denies any constitutional symptoms and only complains of pain.  She tells me that she was worried about blood stream infection.    Review of Systems  All systems were reviewed and negative except for chief complaint.    History  Past Medical History:   Diagnosis Date   • Arthritis    • Depression    • History of emphysema    • History of esophageal reflux    • History of recurrent UTI (urinary tract infection)    • History of renal calculi    • History of uterine cancer    • Hyperlipidemia    , Past Surgical History:   Procedure Laterality Date   • FOOT SURGERY     • HAND SURGERY     • HYSTERECTOMY     , Family History   Problem Relation Age of Onset   • Cancer Mother    • Heart attack Father    • Arthritis Father    • Diabetes Daughter    • Hyperlipidemia Daughter    • Migraines Daughter    , Social History   Substance Use Topics   • Smoking status: Current Every Day  Smoker     Packs/day: 0.25     Types: Cigarettes   • Smokeless tobacco: Never Used   • Alcohol use No   , Prescriptions Prior to Admission   Medication Sig Dispense Refill Last Dose   • albuterol (PROVENTIL HFA;VENTOLIN HFA) 108 (90 BASE) MCG/ACT inhaler Inhale 1 puff Every 4 (Four) Hours As Needed for shortness of air. 18 g 3 1/10/2018 at Unknown time   • albuterol (PROVENTIL) (2.5 MG/3ML) 0.083% nebulizer solution USE 1 AMPULE IN NEBULIZER FOUR TIMES A DAY AS NEEDED 75 mL 3 Past Week at Unknown time   • aspirin 81 MG EC tablet Take 81 mg by mouth Daily.   1/10/2018   • benzonatate (TESSALON) 200 MG capsule 1 tid po prn (Patient not taking: Reported on 1/11/2018) 45 capsule 0 Not Taking at Unknown time   • cephalexin (KEFLEX) 500 MG capsule Take 1 capsule by mouth 2 (Two) Times a Day for 7 days. (Patient not taking: Reported on 1/11/2018) 14 capsule 0 Not Taking at Unknown time   • cholecalciferol (VITAMIN D3) 1000 UNITS tablet Take 1,000 Units by mouth Daily.   1/10/2018   • clonazePAM (KlonoPIN) 0.5 MG tablet Take 1 tablet by mouth At Night As Needed for Seizures. 30 tablet 3 1/10/2018   • dextromethorphan polistirex ER (DELSYM) 30 MG/5ML Suspension Extended Release oral suspension Take 30 mg by mouth At Night As Needed (cough). 148 mL 0 Unknown at Unknown time   • levoFLOXacin (LEVAQUIN) 500 MG tablet Take 1 tablet by mouth Daily. 14 tablet 0 1/10/2018   • losartan (COZAAR) 25 MG tablet Take 1 tablet by mouth Daily. (Patient not taking: Reported on 1/11/2018) 30 tablet 2 Not Taking at Unknown time   • meclizine (ANTIVERT) 12.5 MG tablet TAKE 1 TABLET BY MOUTH THREE TIMES A DAY AS NEEDED for dizziness  90 tablet 0 1/10/2018   • pravastatin (PRAVACHOL) 20 MG tablet TAKE 1 TABLET BY MOUTH IN THE EVENING  30 tablet 5 1/9/2018   • predniSONE (DELTASONE) 10 MG tablet Take 1 tablet by mouth Daily. 5 tab for 3 day  4 tab for 3 day  3 tab  3d  2 tab 3 d  1 tab 3d (Patient not taking: Reported on 1/11/2018) 45 tablet 0  "Not Taking at Unknown time   • PREMARIN 0.625 MG tablet TAKE 1 TABLET BY MOUTH ONE TIME A DAY  90 tablet 1 1/10/2018   • promethazine-codeine (PHENERGAN with CODEINE) 6.25-10 MG/5ML syrup Take 5 mL by mouth Every 4 (Four) Hours As Needed for Cough.   Unknown at Unknown time   • propranolol (INDERAL) 60 MG tablet Take 1 tablet by mouth Daily. 90 tablet 3 1/10/2018   • sertraline (ZOLOFT) 50 MG tablet TAKE 1 TABLET BY MOUTH TWO TIMES A DAY  60 tablet 5 1/10/2018   • SYMBICORT 160-4.5 MCG/ACT inhaler INHALE 2 PUFFS BY MOUTH TWO TIMES A DAY. Rinse mouth after each use.  10.2 g 1 1/10/2018   • tiotropium (SPIRIVA HANDIHALER) 18 MCG per inhalation capsule Place 1 capsule into inhaler and inhale Daily. 30 capsule 5 1/10/2018   • traMADol (ULTRAM) 50 MG tablet Take 1 tablet by mouth Daily As Needed (pain). 30 tablet 2 1/9/2018   , Scheduled Meds:    aspirin 81 mg Oral Daily   atorvastatin 10 mg Oral Daily   budesonide-formoterol 2 puff Inhalation BID - RT   collagenase  Topical Q24H   estrogens (conjugated) 0.625 mg Oral Daily   guaiFENesin 600 mg Oral Q12H   heparin (porcine) 5,000 Units Subcutaneous Q8H   ipratropium-albuterol 3 mL Nebulization Q6H - RT   losartan 25 mg Oral Daily   piperacillin-tazobactam 3.375 g Intravenous Q8H   propranolol 60 mg Oral Daily   sertraline 50 mg Oral Q12H   [START ON 1/12/2018] vancomycin 750 mg Intravenous Q36H   , Continuous Infusions:    Pharmacy to dose vancomycin    Pharmacy Consult    , PRN Meds:  •  acetaminophen  •  benzonatate  •  clonazePAM  •  meclizine  •  ondansetron  •  Pharmacy to dose vancomycin  •  promethazine-codeine  •  Pharmacy Consult  •  sodium chloride  •  traMADol and Allergies:  Influenza vaccines and Morphine and related    Objective     Vital Signs   /54 (BP Location: Left arm, Patient Position: Lying)  Pulse 83  Temp 97.5 °F (36.4 °C) (Oral)   Resp 20  Ht 157.5 cm (62\")  Wt 58.2 kg (128 lb 4.8 oz)  SpO2 100%  BMI 23.47 kg/m2    Physical Exam:  AAO " ×3, NAD, afebrile vital signs stable  Appears to be a well-developed well-nourished female with apparent stated age.  Nasal cannula in place.  PERRLA, cranial nerves II through XII intact  Heart: Regular rate rhythm  Lungs: Positive for wheezing and crackles.     Physical exam of the right foot shows pitting edema to the right midfoot, forefoot, and ankle.  There is tenderness to palpation about the anterior lateral ankle and dorsal lateral foot.  There is edema and swelling and redness present that stays fairly well localized to this area.  There is no ascending cellulitis or streaking or progression up the ankle or leg  The wound is in an oblique fashion at the junction of the ankle and lateral foot.  Measures roughly 6-7 cm in length and at its widest point more medially is roughly 1.5-2 cm wide.  The wound is yellow and brown and fibrous medially and as it tracks laterally turns a little more necrotic and dry.  I do not appreciate any odor or pus.  It seems to be full-thickness down at least into the subcutaneous tissue layer.  I had this point cannot appreciate any visible tendons or fascia.  Results Review: Current blood cultures pending but no growth as of now.  All laboratory data reviewed.  No white count today.  White blood cell count yesterday was 13.48  No left shift today however neutrophils were 10.18 and monocytes were 0.94 yesterday immature granulocytes were elevated as well..I    Imaging Results: Three-view x-rays taken of the right foot are unremarkable.  I reviewed her x-rays from last month as well which are unremarkable for any fracture or bony abnormality.    Assessment/Plan   77-year-old nondiabetic female with COPD, right foot wound/laceration and cellulitis  Active Problems:    Cellulitis of foot  I discussed her wound and condition with her and her family at bedside today.  I recommend continuing IV antibiotics.  We will begin local wound care with Santyl and dry dressings changed daily.   She is free to weight-bear as tolerated with a flat Darco shoe.  I did explain and advise her that the area needs to be kept clean and dry at all times.  I did explain to her that as of now I do not anticipate the need for any surgery but given the location of the wound we have to watch this very closely and ensure that it does not track deeply into any deep tenderness structures or joint.  I will continue to follow.        I discussed the patients findings and my recommendations with patient and family    Tigre Courtney DPM  01/11/18  11:26 AM

## 2018-01-11 NOTE — ED NOTES
Called hospitalist with answer. Called transferred to DOROTHY Ohara @ this time.      Joe Barrett  01/10/18 1937       Joe Barrett  01/10/18 1939

## 2018-01-11 NOTE — PROGRESS NOTES
"Pharmacokinetic Initial Note - Vancomycin    Sadie Tijerina is a 77 y.o. female  157.5 cm (62\") 55.3 kg (122 lb)    Indication for use: cellulitis of foot      Results from last 7 days     Lab Units 01/10/18  1827   WBC 10*3/mm3 13.48*   CREATININE mg/dL 1.20      Estimated Creatinine Clearance: 33.2 mL/min (by C-G formula based on Cr of 1.2).  Temp Readings from Last 1 Encounters:   01/10/18 97.4 °F (36.3 °C) (Oral)       Culture results  Microbiology Results (last 10 days)       ** No results found for the last 240 hours. **            Other Antimicrobials    Cefazolin 1 gm for one dose given in ED  Assessment/Plan  Initiated Vancomycin 1000 mg IVPB once, followed by 750 mg every  36 hours. Random  Vancomycin ordered for am.  Pharmacy will monitor renal function and adjust dose accordingly.    Lilian Sampson RPH  01/11/18 3:34 AM    "

## 2018-01-11 NOTE — H&P
[facility]   HISTORY AND PHYSICAL      Name:  Sadie Tijerina   Age:  77 y.o.  Sex:  female  :  1940  MRN:  [id number]   Visit Number:  [id number]  Admission Date:  1/10/2018  Date Of Service:  01/10/18  Primary Care Physician:  Kristian Wolff MD    Chief Complaint:     Right lower extremity wound    History Of Presenting Illness:    Miss Forrest is a 77-year-old  female with past medical history significant for arthritis, neuropathy, who presents with a right lower extremity wound and surrounding cellulitis.  The initial laceration occurred on  she was walking through her doorway she gas should on the screen door.  She did not notice it until later that evening.  She presented to the ER the following day and received 6 sutures along with by mouth antibiotics.  Despite this he continued to get worse and she followed up with her primary care physician on the second of this month.  It still did not appear to be healing well and as a result he sent her to podiatry.  Today when she was at the podiatrist he looks at her extremity and recommended that she come here for admission and IV antibiotic therapy.    Today her right lower extremity is swollen with 2+ pitting edema.  Clearly asymmetrical in comparison to her left foot.  There is an approximately 4-1/2-5 cm gash on the top of her foot with what appears to be dried purulent material.  There is surrounding erythema.  It is slightly warm to the touch.  Her stitches are in place with the skin growing over them.  She complains of pain in her foot.  She denies any fever or chills.  Her daughter is at bedside and assisted with providing the history.  No other complaints or concerns.  The patient at this time.    In observation otherwise the patient in the ER, she is mildly hypoxemic with O2 sat 88 to 90% on room air.  She has known COPD.  She states that her primary care has recommended chronic O2, but she is  in denial about the situation.  She believes that she is at her baseline right now.    Review Of Systems:     10 point review systems as stated above otherwise reviewed and negative     Past Medical History:    Past Medical History:   Diagnosis Date   • Arthritis    • Depression    • History of emphysema    • History of esophageal reflux    • History of recurrent UTI (urinary tract infection)    • History of renal calculi    • History of uterine cancer    • Hyperlipidemia        Past Surgical history:    Past Surgical History:   Procedure Laterality Date   • FOOT SURGERY     • HAND SURGERY     • HYSTERECTOMY         Social History:    Social History     Social History   • Marital status:      Spouse name: N/A   • Number of children: N/A   • Years of education: N/A     Occupational History   • Not on file.     Social History Main Topics   • Smoking status: Current Every Day Smoker     Packs/day: 0.25     Types: Cigarettes   • Smokeless tobacco: Never Used   • Alcohol use No   • Drug use: No   • Sexual activity: Not on file      Comment:      Other Topics Concern   • Not on file     Social History Narrative       Family History:    Family History   Problem Relation Age of Onset   • Cancer Mother    • Heart attack Father    • Arthritis Father    • Diabetes Daughter    • Hyperlipidemia Daughter    • Migraines Daughter        Allergies:      Influenza vaccines and Morphine and related    Home Medications:    Prior to Admission Medications     Prescriptions Last Dose Informant Patient Reported? Taking?    albuterol (PROVENTIL HFA;VENTOLIN HFA) 108 (90 BASE) MCG/ACT inhaler   No No    Inhale 1 puff Every 4 (Four) Hours As Needed for shortness of air.    albuterol (PROVENTIL) (2.5 MG/3ML) 0.083% nebulizer solution   No No    USE 1 AMPULE IN NEBULIZER FOUR TIMES A DAY AS NEEDED    aspirin 81 MG EC tablet   Yes No    Take 81 mg by mouth Daily.    benzonatate (TESSALON) 200 MG capsule   No No    1 tid po prn     cephalexin (KEFLEX) 500 MG capsule   No No    Take 1 capsule by mouth 2 (Two) Times a Day for 7 days.    cholecalciferol (VITAMIN D3) 1000 UNITS tablet   Yes No    Take 1,000 Units by mouth Daily.    clonazePAM (KlonoPIN) 0.5 MG tablet   No No    Take 1 tablet by mouth At Night As Needed for Seizures.    dextromethorphan polistirex ER (DELSYM) 30 MG/5ML Suspension Extended Release oral suspension   No No    Take 30 mg by mouth At Night As Needed (cough).    levoFLOXacin (LEVAQUIN) 500 MG tablet   No No    Take 1 tablet by mouth Daily.    losartan (COZAAR) 25 MG tablet   No No    Take 1 tablet by mouth Daily.    meclizine (ANTIVERT) 12.5 MG tablet   No No    TAKE 1 TABLET BY MOUTH THREE TIMES A DAY AS NEEDED for dizziness     pravastatin (PRAVACHOL) 20 MG tablet   No No    TAKE 1 TABLET BY MOUTH IN THE EVENING     predniSONE (DELTASONE) 10 MG tablet   No No    Take 1 tablet by mouth Daily. 5 tab for 3 day  4 tab for 3 day  3 tab  3d  2 tab 3 d  1 tab 3d    PREMARIN 0.625 MG tablet   No No    TAKE 1 TABLET BY MOUTH ONE TIME A DAY     promethazine-codeine (PHENERGAN with CODEINE) 6.25-10 MG/5ML syrup   Yes No    Take 5 mL by mouth Every 4 (Four) Hours As Needed for Cough.    propranolol (INDERAL) 60 MG tablet   No No    Take 1 tablet by mouth Daily.    sertraline (ZOLOFT) 50 MG tablet   No No    TAKE 1 TABLET BY MOUTH TWO TIMES A DAY     SYMBICORT 160-4.5 MCG/ACT inhaler   No No    INHALE 2 PUFFS BY MOUTH TWO TIMES A DAY. Rinse mouth after each use.     tiotropium (SPIRIVA HANDIHALER) 18 MCG per inhalation capsule   No No    Place 1 capsule into inhaler and inhale Daily.    traMADol (ULTRAM) 50 MG tablet   No No    Take 1 tablet by mouth Daily As Needed (pain).             ED Medications:    Medications   sodium chloride 0.9 % flush 1-10 mL (not administered)   heparin (porcine) 5000 UNIT/ML injection 5,000 Units (not administered)   sodium chloride 0.9 % infusion (not administered)   acetaminophen (TYLENOL) tablet 650  mg (not administered)   ondansetron (ZOFRAN) tablet 4 mg (not administered)   Pharmacy to dose vancomycin (not administered)   ipratropium-albuterol (DUO-NEB) nebulizer solution 3 mL (not administered)   aspirin EC tablet 81 mg (not administered)   benzonatate (TESSALON) capsule 100 mg (not administered)   clonazePAM (KlonoPIN) tablet 0.5 mg (not administered)   losartan (COZAAR) tablet 25 mg (not administered)   meclizine (ANTIVERT) tablet 12.5 mg (not administered)   atorvastatin (LIPITOR) tablet 10 mg (not administered)   estrogens (conjugated) (PREMARIN) tablet 0.625 mg (not administered)   propranolol (INDERAL) tablet 60 mg (not administered)   sertraline (ZOLOFT) tablet 50 mg (not administered)   budesonide-formoterol (SYMBICORT) 160-4.5 MCG/ACT inhaler 2 puff (not administered)   traMADol (ULTRAM) tablet 50 mg (not administered)   ceFAZolin (ANCEF) IVPB (duplex) 1 g (0 g Intravenous Stopped 1/10/18 1910)   Tdap (BOOSTRIX) injection 0.5 mL (0.5 mL Intramuscular Given 1/10/18 1828)   ipratropium-albuterol (DUO-NEB) nebulizer solution 3 mL (3 mL Nebulization Given 1/10/18 2024)   vancomycin 1000 mg in sodium chloride 0.9% 250 mL IVPB (0 mg Intravenous Stopped 1/10/18 2139)       Vital Signs:    Temp:  [98 °F (36.7 °C)] 98 °F (36.7 °C)  Heart Rate:  [91-95] 94  Resp:  [16-17] 16  BP: (110-125)/(62-80) 110/62    Last 3 weights    01/10/18  1532   Weight: 55.3 kg (122 lb)       Body mass index is 22.31 kg/(m^2).    Physical Exam:    General Appearance:  Alert and cooperative, not in any acute distress.   Head:  Atraumatic and normocephalic, without obvious abnormality.   Eyes:          PERRLA, conjunctivae and sclerae normal, no Icterus. No pallor. Extra-occular movements are within normal limits.   Ears:  Ears appear intact with no abnormalities noted.   Throat: No oral lesions, no thrush, oral mucosa moist.   Neck: Supple, trachea midline, no thyromegaly, no carotid bruit.   Back:   No kyphoscoliosis present. No  tenderness to palpation,   range of motion normal.   Lungs:   Chest shape is normal. Breath sounds heard bilaterally equally.  Mild bilateral wheezing. No Pleural rub or bronchial breathing.   Heart:  Normal S1 and S2, no murmur, no gallop, no rub. No JVD.   Abdomen:   Normal bowel sounds, no masses, no organomegaly. Soft, non-tender, non-distended, no guarding, no rebound tenderness.   Extremities: Moves all extremities well, no cyanosis, no clubbing.Right lower extremity with gas and erythematous stated above.  Sutures in place   Pulses: Pulses palpable and equal bilaterally.   Skin: No bleeding, bruising or rash.   Neurologic: Alert and oriented x 3. Moves all four limbs equally. No tremors. No facial asymetry.     Laboratory data:    I have reviewed the labs done in the emergency room.      Results from last 7 days  Lab Units 01/10/18  1827   SODIUM mmol/L 138   POTASSIUM mmol/L 3.9   CHLORIDE mmol/L 97*   CO2 mmol/L 39.0*   BUN mg/dL 25*   CREATININE mg/dL 1.20   CALCIUM mg/dL 9.2   GLUCOSE mg/dL 95       Results from last 7 days  Lab Units 01/10/18  1827   WBC 10*3/mm3 13.48*   HEMOGLOBIN g/dL 12.0   HEMATOCRIT % 38.5   PLATELETS 10*3/mm3 207                                   Invalid input(s): USDES,  BLOODU, NITRITITE, BACT, EP  Pain Management Panel     There is no flowsheet data to display.              Radiology:    Imaging Results (last 72 hours)     Procedure Component Value Units Date/Time    XR Foot 3+ View Right [[id number]] Updated:  01/10/18 1841          Active Problems:    Cellulitis of foot      Assessment:    Right lower extremity cellulitis  COPD at baseline per the patient  Hypoxemia, patient is aware that her PCP recommends oxygen but has been in denial in the past  Mild elevation in creatinine  Mild leukocytosis  Tobacco dependence    Plan:    We will admit the patient and start IV antibiotics.  Vancomycin day 1.  Blood cultures obtained in the ER and currently pending.  Wound culture  attempted however no new purulent drainage noted.  Sutures removed in the ER.  Podiatry consulted.    Scheduled nebs every 6 hours for her COPD.  Resumed home medications.  Recommended that she has walking oximetry prior to discharge.  Strongly encourage the patient to accept oxygen if needed at discharge.    Discussed tobacco cessation at length.  Greater than 5 minute discussion.  We'll give NicoDerm patch if patient desires.    Subcutaneous heparin for DVT prophylaxis.    Zhanna Gilliland DO  01/10/18  9:57 PM    Dictated utilizing Dragon dictation.

## 2018-01-11 NOTE — PLAN OF CARE
Problem: Patient Care Overview (Adult)  Goal: Plan of Care Review  Outcome: Ongoing (interventions implemented as appropriate)   01/11/18 3312   Coping/Psychosocial Response Interventions   Plan Of Care Reviewed With patient   Patient Care Overview   Progress no change       Problem: Wound, Traumatic, Nonburn (Adult)  Goal: Signs and Symptoms of Listed Potential Problems Will be Absent or Manageable (Wound, Traumatic, Nonburn)  Outcome: Ongoing (interventions implemented as appropriate)      Problem: Infection, Risk/Actual (Adult)  Goal: Identify Related Risk Factors and Signs and Symptoms  Outcome: Ongoing (interventions implemented as appropriate)      Problem: Fall Risk (Adult)  Goal: Absence of Falls  Outcome: Ongoing (interventions implemented as appropriate)

## 2018-01-11 NOTE — PHARMACY RECOMMENDATION
"Pharmacokinetic Follow-up Note - Vancomycin     Sadie Tijerina is a 77 y.o. female  157.5 cm (62\") 58.2 kg (128 lb 4.8 oz)     Indication for use: Cellulitis of foot      Results from last 7 days     Lab Units 01/11/18  0615 01/10/18  1827   WBC 10*3/mm3 9.99 13.48*   CREATININE mg/dL 0.90 1.20      Estimated Creatinine Clearance: 46.6 mL/min (by C-G formula based on Cr of 0.9).  Temp Readings from Last 1 Encounters:   01/11/18 97.5 °F (36.4 °C) (Oral)     Lab Results   Component Value Date    VANCORANDOM 9.47 01/11/2018       Microbiology:  Microbiology Results (last 10 days)       Procedure Component Value - Date/Time      Blood Culture With CLAYTON - Blood, [[id number]]  (Normal) Collected:  01/10/18 2037    Lab Status:  Preliminary result Specimen:  Blood from Hand, Left Updated:  01/11/18 0916     Blood Culture No growth at less than 24 hours    Blood Culture With CLAYTON - Blood, [[id number]]  (Normal) Collected:  01/10/18 2021    Lab Status:  Preliminary result Specimen:  Blood from Arm, Left Updated:  01/11/18 0916     Blood Culture No growth at less than 24 hours            Current Vancomycin Dose:  Vancomycin 750 mg IV q 36 hrs, day 2 of therapy.    Other Antimicrobials: Piperacillin/Tazobactam (Zosyn) 3.375 gm IV q 8 hrs.    Assessment/Plan:  Vancomycin random (1/11 0600 with AM labs) = 9.47 mcg/mL. Renal function has improved to the point of being able to warrant q 24 hrs dosing. Adjusted dose to 750 mg IV q 24 hrs with first dose today (24 hrs after loading dose). Pharmacy will continue to monitor renal function and adjust dose accordingly.    Victor M Jo GUY   01/11/18 2:09 PM  "

## 2018-01-11 NOTE — DISCHARGE INSTR - OTHER ORDERS
If you have any questions about your recovery, please call the [facility] Nurse Call Center at [phone]. A registered nurse is available 24 hours a day 7 days a week to assist you.

## 2018-01-11 NOTE — PROGRESS NOTES
PROGRESS NOTE        Date of Admission: 1/10/2018  Length of Stay: 0  Primary Care Physician: Kristian Wolff MD    Subjective   Chief Complaint:  Right Foot cellulitis  HPI: This is a 77-year-old female who is admitted with a right foot colitis.  According to the patient she caught her foot on the screen door in December and had stitches.  According to the patient over the past 2 days she's had increasing redness and swelling of her right foot as well as drainage around her sutures.  She was seen and evaluated in the emergency room and admitted to the hospitalist service.  She has chronic and IV antibiotics in the form of Zosyn and vancomycin.  Ultrasound venous was done which did not show any evidence of DVT.  Dr. Courtney with podiatry was consulted.           Review Of Systems:   Review of Systems   General ROS: Patient denies any fevers, chills or loss of consciousness.    Psychological ROS: Denies any hallucinations and delusions.  Ophthalmic ROS: Denies any diplopia or transient loss of vision.  ENT ROS: Denies sore throat, nasal congestion or ear pain.   Allergy and Immunology ROS: Denies rash or itching.  Hematological and Lymphatic ROS: Denies neck swelling or easy bleeding.  Endocrine ROS: Denies any recent unintentional weight gain or loss.  Breast ROS: Denies any pain or swelling.  Respiratory ROS: Denies cough or shortness of breath.   Cardiovascular ROS: Denies chest pain or palpitations. No history of exertional chest pain.   Gastrointestinal ROS: Denies nausea and vomiting. Denies any abdominal pain. No diarrhea.  Genito-Urinary ROS: Denies dysuria or hematuria.  Musculoskeletal ROS: Denies back pain. No muscle pain. No calf pain.  Right foot pain   Neurological ROS: Denies any focal weakness. No loss of consciousness. Denies any numbness. Denies neck pain.   Dermatological ROS: Denies any redness or pruritis.    Objective      Temp:  [97.4 °F (36.3 °C)-97.6 °F (36.4 °C)] 97.6 °F (36.4 °C)  Heart Rate:   [75-97] 80  Resp:  [16-30] 19  BP: (104-141)/(47-62) 126/62  Physical Exam    General Appearance:  Alert and cooperative, not in any acute distress.   Head:  Atraumatic and normocephalic, without obvious abnormality.   Eyes:          PERRLA, conjunctivae and sclerae normal, no Icterus. No pallor. Extra-occular movements are within normal limits.   Ears:  Ears appear intact with no abnormalities noted.   Throat: No oral lesions, no thrush, oral mucosa moist.   Neck: Supple, trachea midline, no thyromegaly, no carotid bruit.   Back:   No kyphoscoliosis present. No tenderness to palpation,   range of motion normal.   Lungs:   Chest shape is normal. Breath sounds heard bilaterally equally.  No crackles or wheezing. No Pleural rub or bronchial breathing.   Heart:  Normal S1 and S2, no murmur, no gallop, no rub. No JVD   Abdomen:   Normal bowel sounds, no masses, no organomegaly. Soft     non-tender, non-distended, no guarding, no rebound                tenderness   Extremities: Moves all extremities well, no edema, no cyanosis, no clubbing.  Erythema and redness to dorsal right foot.   Pulses: Pulses palpable and equal bilaterally   Skin: No bleeding, bruising or rash   Lymph nodes: No palpable adenopathy   Neurologic:    Psychiatric/Behavior:     Cranial nerves 2 - 12 grossly intact, sensation intact, Motor power is normal and equal bilaterally.  Mood normal, behavior normal       Results Review:    I have reviewed the labs, radiology results and diagnostic studies.      Results from last 7 days  Lab Units 01/11/18  0615   WBC 10*3/mm3 9.99   HEMOGLOBIN g/dL 10.4*   PLATELETS 10*3/mm3 157       Results from last 7 days  Lab Units 01/11/18  0615   SODIUM mmol/L 139   POTASSIUM mmol/L 3.5   CO2 mmol/L 34.0*   CREATININE mg/dL 0.90   GLUCOSE mg/dL 103*       Culture Data:   Blood Culture   Date Value Ref Range Status   01/10/2018 No growth at less than 24 hours  Preliminary   01/10/2018 No growth at less than 24 hours   Preliminary     Radiology Data:   Cardiology Data:    I have reviewed the medications.    Assessment/Plan     Assessment and Plan:  1.  Right foot cellulitis-  patient is on IV antibiotics in the form of vancomycin and Zosyn.  Dr. Courtney with podiatry has been consulted.    2.  Left lower lobe pneumonia failed outpatient antibiotics.  Patient is on IV antibiotics in the form of Zosyn and azithromycin since she found out patient.  I have added breathing treatments as well as mucolytics.  Patient does have some cough and congestion.  Will continue with breathing treatments, mucolytics.  Chest x-ray was done    3.  Chronic hypoxic respiratory failure-patient was satting 88% when she was admitted.  She does have a history of hypoxia but has declined oxygen.    4.  Acute renal failure-present upon admission likely secondary to dehydration-resolved    5.  Tobacco abuse-counseling        DVT prophylaxis:  Heparin  Discharge Planning:   Ce Herron, RUBY 01/11/18 3:55 PM

## 2018-01-11 NOTE — PLAN OF CARE
Problem: Patient Care Overview (Adult)  Goal: Plan of Care Review  Outcome: Ongoing (interventions implemented as appropriate)   01/11/18 0420   Coping/Psychosocial Response Interventions   Plan Of Care Reviewed With patient   Patient Care Overview   Progress no change       Problem: Wound, Traumatic, Nonburn (Adult)  Goal: Signs and Symptoms of Listed Potential Problems Will be Absent or Manageable (Wound, Traumatic, Nonburn)  Outcome: Ongoing (interventions implemented as appropriate)   01/11/18 0420   Wound, Traumatic, Nonburn   Problems Assessed (Wound) all   Problems Present (Wound) situational response       Problem: Infection, Risk/Actual (Adult)  Goal: Identify Related Risk Factors and Signs and Symptoms   01/11/18 0420   Infection, Risk/Actual   Infection, Risk/Actual: Related Risk Factors skin integrity impairment     Goal: Infection Prevention/Resolution  Outcome: Ongoing (interventions implemented as appropriate)   01/11/18 0420   Infection, Risk/Actual (Adult)   Infection Prevention/Resolution making progress toward outcome       Problem: Fall Risk (Adult)  Goal: Identify Related Risk Factors and Signs and Symptoms  Outcome: Outcome(s) achieved Date Met: 01/11/18 01/11/18 0420   Fall Risk   Fall Risk: Related Risk Factors age-related changes   Fall Risk: Signs and Symptoms presence of risk factors     Goal: Absence of Falls  Outcome: Ongoing (interventions implemented as appropriate)   01/11/18 0420   Fall Risk (Adult)   Absence of Falls making progress toward outcome

## 2018-01-11 NOTE — PROGRESS NOTES
Discharge Planning Assessment   Friend     Patient Name: Sadie Tijerina  MRN: [id number]  Today's Date: 1/11/2018    Admit Date: 1/10/2018          Discharge Needs Assessment       01/11/18 1549    Living Environment    Lives With alone    Living Arrangements house    Home Accessibility no concerns;bed and bath on same level;stairs within home    Number of Stairs Within Home 13    Stair Railings at Home inside, present on right side    Type of Financial/Environmental Concern none    Transportation Available car;family or friend will provide    Living Environment    Provides Primary Care For no one    Able to Return to Prior Living Arrangements yes    Discharge Needs Assessment    Concerns To Be Addressed denies needs/concerns at this time    Readmission Within The Last 30 Days no previous admission in last 30 days    Anticipated Changes Related to Illness none    Equipment Currently Used at Home nebulizer    Equipment Needed After Discharge none            Discharge Plan       01/11/18 0187    Case Management/Social Work Plan    Plan Home    Patient/Family In Agreement With Plan yes    Additional Comments Spoke with pt in room regarding DCP; she is alone.  Pt states she lives alone in a multi-story house, but does not have to use the upstairs any longer.  Her  is currently at [facility] for LTC.  Pt's daughter lives close and checks on her daily.  She has been helping taking care of her current foot wound and states that if she needs to have dressing changes at discharge, her daughter will be able to help.  Pt had [employer] in the past.  She has a bedside, shower chair, and cane at home that belongs to her .   Pt denies the use of any equipment, except her nebulizer.  She states that she is indepdent and plans to return home at discharge.   Address, phone & PCP verified as correct on chart.  CM will continue to follow and assist with discharge as needed.        Discharge Placement     No  information found        Expected Discharge Date and Time     Expected Discharge Date Expected Discharge Time    Oscar 15, 2018               Demographic Summary       01/11/18 1540    Referral Information    Admission Type inpatient    Arrived From home or self-care    Referral Source admission list    Reason For Consult discharge planning    Record Reviewed history and physical;medical record    Primary Care Physician Information    Name Kristian Wolff MD            Functional Status       01/11/18 1541    Functional Status Prior    Ambulation 0-->independent    Transferring 0-->independent    Toileting 0-->independent    Bathing 0-->independent    Dressing 0-->independent    Eating 0-->independent    Communication 0-->understands/communicates without difficulty    Swallowing 0-->swallows foods/liquids without difficulty    IADL    Medications independent    Meal Preparation independent    Housekeeping independent    Laundry independent    Shopping independent    Oral Care independent            Psychosocial     None            Abuse/Neglect     None            Legal     None            Substance Abuse     None            Patient Forms     None          Sara Galvez

## 2018-01-12 LAB — VANCOMYCIN TROUGH SERPL-MCNC: 7.45 MCG/ML (ref 5–15)

## 2018-01-12 PROCEDURE — 94799 UNLISTED PULMONARY SVC/PX: CPT

## 2018-01-12 PROCEDURE — 80202 ASSAY OF VANCOMYCIN: CPT | Performed by: INTERNAL MEDICINE

## 2018-01-12 PROCEDURE — 25010000002 HEPARIN (PORCINE) PER 1000 UNITS: Performed by: INTERNAL MEDICINE

## 2018-01-12 PROCEDURE — 25010000002 PIPERACILLIN SOD-TAZOBACTAM PER 1 G: Performed by: NURSE PRACTITIONER

## 2018-01-12 PROCEDURE — 25010000002 METHYLPREDNISOLONE PER 40 MG: Performed by: NURSE PRACTITIONER

## 2018-01-12 PROCEDURE — 99232 SBSQ HOSP IP/OBS MODERATE 35: CPT | Performed by: NURSE PRACTITIONER

## 2018-01-12 PROCEDURE — 25010000002 AZITHROMYCIN: Performed by: NURSE PRACTITIONER

## 2018-01-12 PROCEDURE — 25010000002 VANCOMYCIN PER 500 MG: Performed by: INTERNAL MEDICINE

## 2018-01-12 PROCEDURE — 99233 SBSQ HOSP IP/OBS HIGH 50: CPT | Performed by: PODIATRIST

## 2018-01-12 RX ORDER — ATORVASTATIN CALCIUM 10 MG/1
10 TABLET, FILM COATED ORAL DAILY
Status: COMPLETED | OUTPATIENT
Start: 2018-01-12 | End: 2018-01-12

## 2018-01-12 RX ADMIN — PROPRANOLOL HYDROCHLORIDE 60 MG: 20 TABLET ORAL at 09:18

## 2018-01-12 RX ADMIN — PROMETHAZINE HYDROCHLORIDE AND CODEINE PHOSPHATE 5 ML: 6.25; 1 SOLUTION ORAL at 10:39

## 2018-01-12 RX ADMIN — ESTROGENS, CONJUGATED 0.62 MG: 0.62 TABLET, FILM COATED ORAL at 10:49

## 2018-01-12 RX ADMIN — IPRATROPIUM BROMIDE AND ALBUTEROL SULFATE 3 ML: .5; 3 SOLUTION RESPIRATORY (INHALATION) at 18:18

## 2018-01-12 RX ADMIN — GUAIFENESIN 600 MG: 600 TABLET, EXTENDED RELEASE ORAL at 22:22

## 2018-01-12 RX ADMIN — VANCOMYCIN HYDROCHLORIDE 750 MG: 750 INJECTION, SOLUTION INTRAVENOUS at 20:00

## 2018-01-12 RX ADMIN — IPRATROPIUM BROMIDE AND ALBUTEROL SULFATE 3 ML: .5; 3 SOLUTION RESPIRATORY (INHALATION) at 00:37

## 2018-01-12 RX ADMIN — HEPARIN SODIUM 5000 UNITS: 5000 INJECTION, SOLUTION INTRAVENOUS; SUBCUTANEOUS at 22:22

## 2018-01-12 RX ADMIN — TAZOBACTAM SODIUM AND PIPERACILLIN SODIUM 3.38 G: 375; 3 INJECTION, SOLUTION INTRAVENOUS at 06:11

## 2018-01-12 RX ADMIN — TAZOBACTAM SODIUM AND PIPERACILLIN SODIUM 3.38 G: 375; 3 INJECTION, SOLUTION INTRAVENOUS at 22:32

## 2018-01-12 RX ADMIN — COLLAGENASE SANTYL: 250 OINTMENT TOPICAL at 09:19

## 2018-01-12 RX ADMIN — IPRATROPIUM BROMIDE AND ALBUTEROL SULFATE 3 ML: .5; 3 SOLUTION RESPIRATORY (INHALATION) at 07:16

## 2018-01-12 RX ADMIN — TAZOBACTAM SODIUM AND PIPERACILLIN SODIUM 3.38 G: 375; 3 INJECTION, SOLUTION INTRAVENOUS at 13:55

## 2018-01-12 RX ADMIN — SERTRALINE HYDROCHLORIDE 50 MG: 50 TABLET ORAL at 09:18

## 2018-01-12 RX ADMIN — AZITHROMYCIN 500 MG: 500 INJECTION, POWDER, LYOPHILIZED, FOR SOLUTION INTRAVENOUS at 17:13

## 2018-01-12 RX ADMIN — SERTRALINE HYDROCHLORIDE 50 MG: 50 TABLET ORAL at 22:22

## 2018-01-12 RX ADMIN — NYSTATIN 500000 UNITS: 100000 SUSPENSION ORAL at 12:37

## 2018-01-12 RX ADMIN — PROMETHAZINE HYDROCHLORIDE AND CODEINE PHOSPHATE 5 ML: 6.25; 1 SOLUTION ORAL at 22:32

## 2018-01-12 RX ADMIN — METHYLPREDNISOLONE SODIUM SUCCINATE 40 MG: 40 INJECTION, POWDER, FOR SOLUTION INTRAMUSCULAR; INTRAVENOUS at 06:11

## 2018-01-12 RX ADMIN — ASPIRIN 81 MG: 81 TABLET, COATED ORAL at 09:18

## 2018-01-12 RX ADMIN — Medication: at 19:40

## 2018-01-12 RX ADMIN — ATORVASTATIN CALCIUM 10 MG: 10 TABLET, FILM COATED ORAL at 22:22

## 2018-01-12 RX ADMIN — NYSTATIN 500000 UNITS: 100000 SUSPENSION ORAL at 22:22

## 2018-01-12 RX ADMIN — BUDESONIDE AND FORMOTEROL FUMARATE DIHYDRATE 2 PUFF: 160; 4.5 AEROSOL RESPIRATORY (INHALATION) at 18:23

## 2018-01-12 RX ADMIN — IPRATROPIUM BROMIDE AND ALBUTEROL SULFATE 3 ML: .5; 3 SOLUTION RESPIRATORY (INHALATION) at 13:33

## 2018-01-12 RX ADMIN — NYSTATIN 500000 UNITS: 100000 SUSPENSION ORAL at 17:14

## 2018-01-12 RX ADMIN — LOSARTAN POTASSIUM 25 MG: 25 TABLET, FILM COATED ORAL at 09:18

## 2018-01-12 RX ADMIN — METHYLPREDNISOLONE SODIUM SUCCINATE 40 MG: 40 INJECTION, POWDER, FOR SOLUTION INTRAMUSCULAR; INTRAVENOUS at 17:13

## 2018-01-12 RX ADMIN — GUAIFENESIN 600 MG: 600 TABLET, EXTENDED RELEASE ORAL at 09:18

## 2018-01-12 RX ADMIN — HEPARIN SODIUM 5000 UNITS: 5000 INJECTION, SOLUTION INTRAVENOUS; SUBCUTANEOUS at 06:11

## 2018-01-12 RX ADMIN — BUDESONIDE AND FORMOTEROL FUMARATE DIHYDRATE 2 PUFF: 160; 4.5 AEROSOL RESPIRATORY (INHALATION) at 07:17

## 2018-01-12 RX ADMIN — HEPARIN SODIUM 5000 UNITS: 5000 INJECTION, SOLUTION INTRAVENOUS; SUBCUTANEOUS at 13:55

## 2018-01-12 NOTE — PROGRESS NOTES
PROGRESS NOTE        Date of Admission: 1/10/2018  Length of Stay: 1  Primary Care Physician: Kristian Wolff MD    Subjective   Chief Complaint:  Right Foot cellulitis  HPI: This is a 77-year-old female who is admitted with a right foot colitis.  According to the patient she caught her foot on the screen door in December and had stitches.  According to the patient over the past 2 days prior to admission she's had increasing redness and swelling of her right foot as well as drainage around her sutures.  Ultrasound venous was done which did not show any evidence of DVT. Patient was noted to have some soft tissue swelling with no evidence of osteomyelitis on x-ray.  Dr. Courtney with podiatry was consulted and he has evaluated the patient.  He recommended continued IV antibiotics.  Cultures are pending.  She is weightbearing as tolerated with a Darco shoe.  He does not anticipate any need for surgical intervention.    Patient was noted to have cough and congestion yesterday for which a chest x-ray was done.  Chest x-ray did show a left basilar opacity consistent with pneumonia.  Azithromycin has been added to her vancomycin and Zosyn.  Once we get the cultures from her foot we can start deescalating her IV antibiotics.  She is still coughing but is not able to get out very much sputum.  She states that she is still having some pain in her right foot.  She denies any chest pain nausea or vomiting.           Review Of Systems:   Review of Systems   General ROS: Patient denies any fevers, chills or loss of consciousness.    Psychological ROS: Denies any hallucinations and delusions.  Ophthalmic ROS: Denies any diplopia or transient loss of vision.  ENT ROS: Denies sore throat, nasal congestion or ear pain.   Allergy and Immunology ROS: Denies rash or itching.  Hematological and Lymphatic ROS: Denies neck swelling or easy bleeding.  Endocrine ROS: Denies any recent unintentional weight gain or loss.  Breast ROS: Denies any  pain or swelling.  Respiratory ROS: Denies cough or shortness of breath.   Cardiovascular ROS: Denies chest pain or palpitations. No history of exertional chest pain.   Gastrointestinal ROS: Denies nausea and vomiting. Denies any abdominal pain. No diarrhea.  Genito-Urinary ROS: Denies dysuria or hematuria.  Musculoskeletal ROS: Denies back pain. No muscle pain. No calf pain.  Right foot pain   Neurological ROS: Denies any focal weakness. No loss of consciousness. Denies any numbness. Denies neck pain.   Dermatological ROS: Denies any redness or pruritis.    Objective      Temp:  [97.3 °F (36.3 °C)-97.9 °F (36.6 °C)] 97.9 °F (36.6 °C)  Heart Rate:  [70-88] 81  Resp:  [16-19] 19  BP: (102-136)/(44-69) 136/69  Physical Exam    General Appearance:  Alert and cooperative, not in any acute distress.   Head:  Atraumatic and normocephalic, without obvious abnormality.   Eyes:          PERRLA, conjunctivae and sclerae normal, no Icterus. No pallor. Extra-occular movements are within normal limits.   Ears:  Ears appear intact with no abnormalities noted.   Throat: No oral lesions, no thrush, oral mucosa moist.   Neck: Supple, trachea midline, no thyromegaly, no carotid bruit.   Back:   No kyphoscoliosis present. No tenderness to palpation,   range of motion normal.   Lungs:   Chest shape is normal. Breath sounds heard bilaterally equally.  No crackles or wheezing. No Pleural rub or bronchial breathing.   Heart:  Normal S1 and S2, no murmur, no gallop, no rub. No JVD   Abdomen:   Normal bowel sounds, no masses, no organomegaly. Soft     non-tender, non-distended, no guarding, no rebound                tenderness   Extremities: Moves all extremities well, no edema, no cyanosis, no clubbing.  Erythema and redness to dorsal right foot.   Pulses: Pulses palpable and equal bilaterally   Skin: No bleeding, bruising or rash   Lymph nodes: No palpable adenopathy   Neurologic:    Psychiatric/Behavior:     Cranial nerves 2 - 12 grossly  intact, sensation intact, Motor power is normal and equal bilaterally.  Mood normal, behavior normal       Results Review:    I have reviewed the labs, radiology results and diagnostic studies.      Results from last 7 days  Lab Units 01/11/18  0615   WBC 10*3/mm3 9.99   HEMOGLOBIN g/dL 10.4*   PLATELETS 10*3/mm3 157       Results from last 7 days  Lab Units 01/11/18  0615   SODIUM mmol/L 139   POTASSIUM mmol/L 3.5   CO2 mmol/L 34.0*   CREATININE mg/dL 0.90   GLUCOSE mg/dL 103*       Culture Data:   Blood Culture   Date Value Ref Range Status   01/10/2018 No growth at less than 24 hours  Preliminary   01/10/2018 No growth at less than 24 hours  Preliminary     Radiology Data:   Cardiology Data:    I have reviewed the medications.    Assessment/Plan     Assessment and Plan:  1.  Right foot cellulitis-  patient is on IV antibiotics in the form of vancomycin and Zosyn.  Dr. Courtney with podiatry has been consulted At this time does not feel that surgical intervention is warranted.  He has recommended continuation of IV antibiotics..    2.  Left lower lobe pneumonia failed outpatient antibiotics.  Patient is on IV antibiotics in the form of Zosyn and azithromycin since she found out patient.  I have added breathing treatments as well as mucolytics.  Patient does have some cough and congestion.  Will continue with breathing treatments, mucolytics.  Sputum culture ordered    3.  Chronic hypoxic respiratory failure-patient was satting 88% when she was admitted.  She does have a history of hypoxia but has declined oxygen.  Will most certainly need a walking oximetry prior to discharge.  4.  Acute renal failure-present upon admission likely secondary to dehydration-resolved    5.  Tobacco abuse-counseling        DVT prophylaxis:  Heparin  Discharge Planning:   Ce Herron, RUBY 01/12/18 10:59 AM

## 2018-01-12 NOTE — PROGRESS NOTES
Patient Care Team:  Kristian Wolff MD as PCP - General  Kristian Wolff MD as PCP - Family Medicine  Bayron Grimaldo MD (Inactive) as Consulting Physician (General Surgery)    Chief complaint right foot wound/cellulitis/pain    Subjective .   77-year-old female seen at bedside today for her right foot wound/cellulitis.  Still complaining of the foot being painful and tender.  Denies constitutional symptoms.  States nursing just changed her dressing.      Review of Systems  All systems were reviewed and negative except for chief complaint.    History  Past Medical History:   Diagnosis Date   • Arthritis    • Depression    • History of emphysema    • History of esophageal reflux    • History of recurrent UTI (urinary tract infection)    • History of renal calculi    • History of uterine cancer    • Hyperlipidemia    , Past Surgical History:   Procedure Laterality Date   • FOOT SURGERY     • HAND SURGERY     • HYSTERECTOMY     , Family History   Problem Relation Age of Onset   • Cancer Mother    • Heart attack Father    • Arthritis Father    • Diabetes Daughter    • Hyperlipidemia Daughter    • Migraines Daughter    , Social History   Substance Use Topics   • Smoking status: Current Every Day Smoker     Packs/day: 0.25     Types: Cigarettes   • Smokeless tobacco: Never Used   • Alcohol use No   , Prescriptions Prior to Admission   Medication Sig Dispense Refill Last Dose   • albuterol (PROVENTIL HFA;VENTOLIN HFA) 108 (90 BASE) MCG/ACT inhaler Inhale 1 puff Every 4 (Four) Hours As Needed for shortness of air. 18 g 3 1/10/2018 at Unknown time   • albuterol (PROVENTIL) (2.5 MG/3ML) 0.083% nebulizer solution USE 1 AMPULE IN NEBULIZER FOUR TIMES A DAY AS NEEDED 75 mL 3 Past Week at Unknown time   • aspirin 81 MG EC tablet Take 81 mg by mouth Daily.   1/10/2018   • benzonatate (TESSALON) 200 MG capsule 1 tid po prn (Patient not taking: Reported on 1/11/2018) 45 capsule 0 Not Taking at Unknown time   • cephalexin (KEFLEX)  500 MG capsule Take 1 capsule by mouth 2 (Two) Times a Day for 7 days. (Patient not taking: Reported on 1/11/2018) 14 capsule 0 Not Taking at Unknown time   • cholecalciferol (VITAMIN D3) 1000 UNITS tablet Take 1,000 Units by mouth Daily.   1/10/2018   • clonazePAM (KlonoPIN) 0.5 MG tablet Take 1 tablet by mouth At Night As Needed for Seizures. 30 tablet 3 1/10/2018   • dextromethorphan polistirex ER (DELSYM) 30 MG/5ML Suspension Extended Release oral suspension Take 30 mg by mouth At Night As Needed (cough). 148 mL 0 Unknown at Unknown time   • levoFLOXacin (LEVAQUIN) 500 MG tablet Take 1 tablet by mouth Daily. 14 tablet 0 1/10/2018   • losartan (COZAAR) 25 MG tablet Take 1 tablet by mouth Daily. (Patient not taking: Reported on 1/11/2018) 30 tablet 2 Not Taking at Unknown time   • meclizine (ANTIVERT) 12.5 MG tablet TAKE 1 TABLET BY MOUTH THREE TIMES A DAY AS NEEDED for dizziness  90 tablet 0 1/10/2018   • pravastatin (PRAVACHOL) 20 MG tablet TAKE 1 TABLET BY MOUTH IN THE EVENING  30 tablet 5 1/9/2018   • predniSONE (DELTASONE) 10 MG tablet Take 1 tablet by mouth Daily. 5 tab for 3 day  4 tab for 3 day  3 tab  3d  2 tab 3 d  1 tab 3d (Patient not taking: Reported on 1/11/2018) 45 tablet 0 Not Taking at Unknown time   • PREMARIN 0.625 MG tablet TAKE 1 TABLET BY MOUTH ONE TIME A DAY  90 tablet 1 1/10/2018   • promethazine-codeine (PHENERGAN with CODEINE) 6.25-10 MG/5ML syrup Take 5 mL by mouth Every 4 (Four) Hours As Needed for Cough.   Unknown at Unknown time   • propranolol (INDERAL) 60 MG tablet Take 1 tablet by mouth Daily. 90 tablet 3 1/10/2018   • sertraline (ZOLOFT) 50 MG tablet TAKE 1 TABLET BY MOUTH TWO TIMES A DAY  60 tablet 5 1/10/2018   • SYMBICORT 160-4.5 MCG/ACT inhaler INHALE 2 PUFFS BY MOUTH TWO TIMES A DAY. Rinse mouth after each use.  10.2 g 1 1/10/2018   • tiotropium (SPIRIVA HANDIHALER) 18 MCG per inhalation capsule Place 1 capsule into inhaler and inhale Daily. 30 capsule 5 1/10/2018   •  "traMADol (ULTRAM) 50 MG tablet Take 1 tablet by mouth Daily As Needed (pain). 30 tablet 2 1/9/2018   , Scheduled Meds:    aspirin 81 mg Oral Daily   atorvastatin 10 mg Oral Daily   atorvastatin 10 mg Oral Daily   azithromycin 500 mg Intravenous Q24H   budesonide-formoterol 2 puff Inhalation BID - RT   collagenase  Topical Q24H   estrogens (conjugated) 0.625 mg Oral Daily   guaiFENesin 600 mg Oral Q12H   heparin (porcine) 5,000 Units Subcutaneous Q8H   ipratropium-albuterol 3 mL Nebulization Q6H - RT   losartan 25 mg Oral Daily   methylPREDNISolone sodium succinate 40 mg Intravenous Q12H   nystatin 5 mL Oral 4x Daily   piperacillin-tazobactam 3.375 g Intravenous Q8H   propranolol 60 mg Oral Daily   sertraline 50 mg Oral Q12H   vancomycin 750 mg Intravenous Q24H   Pharmacy Consult  Does not apply Once   , Continuous Infusions:    Pharmacy to dose vancomycin    Pharmacy Consult    , PRN Meds:  •  acetaminophen  •  benzonatate  •  clonazePAM  •  meclizine  •  ondansetron  •  Pharmacy to dose vancomycin  •  promethazine-codeine  •  Pharmacy Consult  •  sodium chloride  •  traMADol and Allergies:  Influenza vaccines and Morphine and related    Objective     Vital Signs   /82 (BP Location: Left arm, Patient Position: Lying)  Pulse 81  Temp 97.9 °F (36.6 °C) (Oral)   Resp 18  Ht 157.5 cm (62\")  Wt 59.9 kg (132 lb 2 oz)  SpO2 99%  BMI 24.17 kg/m2    Physical Exam:AAO ×3, NAD, afebrile vital signs stable  Physical exam of the right foot shows that the edema and erythema have decreased fairly significantly.  There still some slight erythema that blanches but it's much less warm and tense than yesterday.  The entire dorsal and lateral foot is still very tender to palpation.  The obliquely oriented wound is still somewhat yellow and fibrous but seems to be healthy in appearance.  It is slightly decreased in size due to the decrease in edema.  She still grossly neurovascularly intact to the right lower extremity.  " Pulses are palpable.  She still has ecchymosis of the lateral 4 digits.       Results Review: Laboratory data reviewed.  There are no new laboratory values from today available      Assessment/Plan   77-year-old nondiabetic female with right foot wound and cellulitis  Active Problems:    Cellulitis of foot  I redressed her right foot.  Explained to her that she can weight-bear as tolerated.  Continue Santyl dressing changes daily to the right foot.  Antibiotics per the primary.  From my standpoint I think she will be okay for discharge when felt safe by the primary.  I think realistically another 1-2 days of IV antibiotics followed by discharge on oral antibiotics should hopefully be sufficient.  I will continue to follow.  Call me with any questions.        Tigre Courtney DPM  01/12/18  3:22 PM

## 2018-01-12 NOTE — PLAN OF CARE
Problem: Patient Care Overview (Adult)  Goal: Plan of Care Review  Outcome: Ongoing (interventions implemented as appropriate)   01/12/18 7884   Coping/Psychosocial Response Interventions   Plan Of Care Reviewed With patient   Patient Care Overview   Progress improving   Outcome Evaluation   Outcome Summary/Follow up Plan ANTIBIOTIC THERAPY MAINTAINED. WOUND CARE PROVIDED. MD STATES IMPROVEMENT. PLANNING DISCHARGE ON MONDAY. TREATMENT FOR PNEUMONIA INITIATED.

## 2018-01-12 NOTE — PLAN OF CARE
Problem: Patient Care Overview (Adult)  Goal: Plan of Care Review  Outcome: Ongoing (interventions implemented as appropriate)   01/12/18 0432   Coping/Psychosocial Response Interventions   Plan Of Care Reviewed With patient   Patient Care Overview   Progress no change       Problem: Wound, Traumatic, Nonburn (Adult)  Goal: Signs and Symptoms of Listed Potential Problems Will be Absent or Manageable (Wound, Traumatic, Nonburn)  Outcome: Ongoing (interventions implemented as appropriate)   01/12/18 0432   Wound, Traumatic, Nonburn   Problems Assessed (Wound) all   Problems Present (Wound) wound complications;infection       Problem: Infection, Risk/Actual (Adult)  Goal: Identify Related Risk Factors and Signs and Symptoms  Outcome: Outcome(s) achieved Date Met: 01/12/18 01/12/18 0432   Infection, Risk/Actual   Infection, Risk/Actual: Related Risk Factors age extremes;chronic illness/condition;skin integrity impairment   Signs and Symptoms (Infection, Risk/Actual) edema     Goal: Infection Prevention/Resolution  Outcome: Ongoing (interventions implemented as appropriate)   01/12/18 0432   Infection, Risk/Actual (Adult)   Infection Prevention/Resolution making progress toward outcome       Problem: Fall Risk (Adult)  Goal: Absence of Falls  Outcome: Ongoing (interventions implemented as appropriate)   01/12/18 0432   Fall Risk (Adult)   Absence of Falls making progress toward outcome

## 2018-01-12 NOTE — PAYOR COMM NOTE
"TO:HUMANA   FROM:MANINDER AGUIRRE,RN PHONE [phone]  INPT CLINICALS     Sadie Tijerina (77 y.o. Female)     Date of Birth Social Security Number Address Home Phone MRN    1940  [id number] [id number] [phone] [id number]    [employer]ian Marital Status          Latter day        Admission Date Admission Type Admitting Provider Attending Provider Department, Room/Bed    [facility] CadeZhanna Chicas DO [facility] MED SURG  4, 410/1    Discharge Date Discharge Disposition Discharge Destination                      Attending Provider: Zhanna Gilliland DO     Allergies:  Influenza Vaccines, Morphine And Related    Isolation:  None   Infection:  None   Code Status:  FULL    Ht:  157.5 cm (62\")   Wt:  59.9 kg (132 lb 2 oz)    Admission Cmt:  None   Principal Problem:  None                Active Insurance as of 1/10/2018     Primary Coverage     Payor Plan Insurance Group Employer/Plan Group    HUMANA HUMANA [id number]     Payor Plan Address Payor Plan Phone Number Effective From Effective To    [address] [address] [phone] [address]     [place] [address]       Subscriber Name Subscriber Birth Date Member ID       SADIE TIJERINA 1940 [id number]                 Emergency Contacts      (Rel.) Home Phone Work Phone Mobile Phone    KenilworthAva baca (Daughter) [phone] -- --    Neris Giles (Daughter) -- -- [phone]               History & Physical       DO Talat at 1/10/2018  9:57 PM              [facility] HOSPITALIST   HISTORY AND PHYSICAL      Name:  Sadie Tijerina   Age:  77 y.o.  Sex:  female  :  1940  MRN:  [id number]   Visit Number:  [id number]  Admission Date:  1/10/2018  Date Of Service:  01/10/18  Primary Care Physician:  Kristian Wolff MD    Chief Complaint:     Right lower extremity wound    History Of Presenting Illness:  "   Miss Forrest is a 77-year-old  female with past medical history significant for arthritis, neuropathy, who presents with a right lower extremity wound and surrounding cellulitis.  The initial laceration occurred on December 23 she was walking through her doorway she gas should on the screen door.  She did not notice it until later that evening.  She presented to the ER the following day and received 6 sutures along with by mouth antibiotics.  Despite this he continued to get worse and she followed up with her primary care physician on the second of this month.  It still did not appear to be healing well and as a result he sent her to podiatry.  Today when she was at the podiatrist he looks at her extremity and recommended that she come here for admission and IV antibiotic therapy.    Today her right lower extremity is swollen with 2+ pitting edema.  Clearly asymmetrical in comparison to her left foot.  There is an approximately 4-1/2-5 cm gash on the top of her foot with what appears to be dried purulent material.  There is surrounding erythema.  It is slightly warm to the touch.  Her stitches are in place with the skin growing over them.  She complains of pain in her foot.  She denies any fever or chills.  Her daughter is at bedside and assisted with providing the history.  No other complaints or concerns.  The patient at this time.    In observation otherwise the patient in the ER, she is mildly hypoxemic with O2 sat 88 to 90% on room air.  She has known COPD.  She states that her primary care has recommended chronic O2, but she is in denial about the situation.  She believes that she is at her baseline right now.    Review Of Systems:     10 point review systems as stated above otherwise reviewed and negative     Past Medical History:    Past Medical History:   Diagnosis Date   • Arthritis    • Depression    • History of emphysema    • History of esophageal reflux    • History of recurrent UTI  (urinary tract infection)    • History of renal calculi    • History of uterine cancer    • Hyperlipidemia        Past Surgical history:    Past Surgical History:   Procedure Laterality Date   • FOOT SURGERY     • HAND SURGERY     • HYSTERECTOMY         Social History:    Social History     Social History   • Marital status:      Spouse name: N/A   • Number of children: N/A   • Years of education: N/A     Occupational History   • Not on file.     Social History Main Topics   • Smoking status: Current Every Day Smoker     Packs/day: 0.25     Types: Cigarettes   • Smokeless tobacco: Never Used   • Alcohol use No   • Drug use: No   • Sexual activity: Not on file      Comment:      Other Topics Concern   • Not on file     Social History Narrative       Family History:    Family History   Problem Relation Age of Onset   • Cancer Mother    • Heart attack Father    • Arthritis Father    • Diabetes Daughter    • Hyperlipidemia Daughter    • Migraines Daughter        Allergies:      Influenza vaccines and Morphine and related    Home Medications:    Prior to Admission Medications     Prescriptions Last Dose Informant Patient Reported? Taking?    albuterol (PROVENTIL HFA;VENTOLIN HFA) 108 (90 BASE) MCG/ACT inhaler   No No    Inhale 1 puff Every 4 (Four) Hours As Needed for shortness of air.    albuterol (PROVENTIL) (2.5 MG/3ML) 0.083% nebulizer solution   No No    USE 1 AMPULE IN NEBULIZER FOUR TIMES A DAY AS NEEDED    aspirin 81 MG EC tablet   Yes No    Take 81 mg by mouth Daily.    benzonatate (TESSALON) 200 MG capsule   No No    1 tid po prn    cephalexin (KEFLEX) 500 MG capsule   No No    Take 1 capsule by mouth 2 (Two) Times a Day for 7 days.    cholecalciferol (VITAMIN D3) 1000 UNITS tablet   Yes No    Take 1,000 Units by mouth Daily.    clonazePAM (KlonoPIN) 0.5 MG tablet   No No    Take 1 tablet by mouth At Night As Needed for Seizures.    dextromethorphan polistirex ER (DELSYM) 30 MG/5ML Suspension  Extended Release oral suspension   No No    Take 30 mg by mouth At Night As Needed (cough).    levoFLOXacin (LEVAQUIN) 500 MG tablet   No No    Take 1 tablet by mouth Daily.    losartan (COZAAR) 25 MG tablet   No No    Take 1 tablet by mouth Daily.    meclizine (ANTIVERT) 12.5 MG tablet   No No    TAKE 1 TABLET BY MOUTH THREE TIMES A DAY AS NEEDED for dizziness     pravastatin (PRAVACHOL) 20 MG tablet   No No    TAKE 1 TABLET BY MOUTH IN THE EVENING     predniSONE (DELTASONE) 10 MG tablet   No No    Take 1 tablet by mouth Daily. 5 tab for 3 day  4 tab for 3 day  3 tab  3d  2 tab 3 d  1 tab 3d    PREMARIN 0.625 MG tablet   No No    TAKE 1 TABLET BY MOUTH ONE TIME A DAY     promethazine-codeine (PHENERGAN with CODEINE) 6.25-10 MG/5ML syrup   Yes No    Take 5 mL by mouth Every 4 (Four) Hours As Needed for Cough.    propranolol (INDERAL) 60 MG tablet   No No    Take 1 tablet by mouth Daily.    sertraline (ZOLOFT) 50 MG tablet   No No    TAKE 1 TABLET BY MOUTH TWO TIMES A DAY     SYMBICORT 160-4.5 MCG/ACT inhaler   No No    INHALE 2 PUFFS BY MOUTH TWO TIMES A DAY. Rinse mouth after each use.     tiotropium (SPIRIVA HANDIHALER) 18 MCG per inhalation capsule   No No    Place 1 capsule into inhaler and inhale Daily.    traMADol (ULTRAM) 50 MG tablet   No No    Take 1 tablet by mouth Daily As Needed (pain).             ED Medications:    Medications   sodium chloride 0.9 % flush 1-10 mL (not administered)   heparin (porcine) 5000 UNIT/ML injection 5,000 Units (not administered)   sodium chloride 0.9 % infusion (not administered)   acetaminophen (TYLENOL) tablet 650 mg (not administered)   ondansetron (ZOFRAN) tablet 4 mg (not administered)   Pharmacy to dose vancomycin (not administered)   ipratropium-albuterol (DUO-NEB) nebulizer solution 3 mL (not administered)   aspirin EC tablet 81 mg (not administered)   benzonatate (TESSALON) capsule 100 mg (not administered)   clonazePAM (KlonoPIN) tablet 0.5 mg (not administered)    losartan (COZAAR) tablet 25 mg (not administered)   meclizine (ANTIVERT) tablet 12.5 mg (not administered)   atorvastatin (LIPITOR) tablet 10 mg (not administered)   estrogens (conjugated) (PREMARIN) tablet 0.625 mg (not administered)   propranolol (INDERAL) tablet 60 mg (not administered)   sertraline (ZOLOFT) tablet 50 mg (not administered)   budesonide-formoterol (SYMBICORT) 160-4.5 MCG/ACT inhaler 2 puff (not administered)   traMADol (ULTRAM) tablet 50 mg (not administered)   ceFAZolin (ANCEF) IVPB (duplex) 1 g (0 g Intravenous Stopped 1/10/18 1910)   Tdap (BOOSTRIX) injection 0.5 mL (0.5 mL Intramuscular Given 1/10/18 1828)   ipratropium-albuterol (DUO-NEB) nebulizer solution 3 mL (3 mL Nebulization Given 1/10/18 2024)   vancomycin 1000 mg in sodium chloride 0.9% 250 mL IVPB (0 mg Intravenous Stopped 1/10/18 2139)       Vital Signs:    Temp:  [98 °F (36.7 °C)] 98 °F (36.7 °C)  Heart Rate:  [91-95] 94  Resp:  [16-17] 16  BP: (110-125)/(62-80) 110/62    Last 3 weights    01/10/18  1532   Weight: 55.3 kg (122 lb)       Body mass index is 22.31 kg/(m^2).    Physical Exam:    General Appearance:  Alert and cooperative, not in any acute distress.   Head:  Atraumatic and normocephalic, without obvious abnormality.   Eyes:          PERRLA, conjunctivae and sclerae normal, no Icterus. No pallor. Extra-occular movements are within normal limits.   Ears:  Ears appear intact with no abnormalities noted.   Throat: No oral lesions, no thrush, oral mucosa moist.   Neck: Supple, trachea midline, no thyromegaly, no carotid bruit.   Back:   No kyphoscoliosis present. No tenderness to palpation,   range of motion normal.   Lungs:   Chest shape is normal. Breath sounds heard bilaterally equally.  Mild bilateral wheezing. No Pleural rub or bronchial breathing.   Heart:  Normal S1 and S2, no murmur, no gallop, no rub. No JVD.   Abdomen:   Normal bowel sounds, no masses, no organomegaly. Soft, non-tender, non-distended, no  guarding, no rebound tenderness.   Extremities: Moves all extremities well, no cyanosis, no clubbing.Right lower extremity with gas and erythematous stated above.  Sutures in place   Pulses: Pulses palpable and equal bilaterally.   Skin: No bleeding, bruising or rash.   Neurologic: Alert and oriented x 3. Moves all four limbs equally. No tremors. No facial asymetry.     Laboratory data:    I have reviewed the labs done in the emergency room.      Results from last 7 days  Lab Units 01/10/18  1827   SODIUM mmol/L 138   POTASSIUM mmol/L 3.9   CHLORIDE mmol/L 97*   CO2 mmol/L 39.0*   BUN mg/dL 25*   CREATININE mg/dL 1.20   CALCIUM mg/dL 9.2   GLUCOSE mg/dL 95       Results from last 7 days  Lab Units 01/10/18  1827   WBC 10*3/mm3 13.48*   HEMOGLOBIN g/dL 12.0   HEMATOCRIT % 38.5   PLATELETS 10*3/mm3 207                                   Invalid input(s): USDES,  BLOODU, NITRITITE, BACT, EP  Pain Management Panel     There is no flowsheet data to display.              Radiology:    Imaging Results (last 72 hours)     Procedure Component Value Units Date/Time    XR Foot 3+ View Right [[id number]] Updated:  01/10/18 1841          Active Problems:    Cellulitis of foot      Assessment:    Right lower extremity cellulitis  COPD at baseline per the patient  Hypoxemia, patient is aware that her PCP recommends oxygen but has been in denial in the past  Mild elevation in creatinine  Mild leukocytosis  Tobacco dependence    Plan:    We will admit the patient and start IV antibiotics.  Vancomycin day 1.  Blood cultures obtained in the ER and currently pending.  Wound culture attempted however no new purulent drainage noted.  Sutures removed in the ER.  Podiatry consulted.    Scheduled nebs every 6 hours for her COPD.  Resumed home medications.  Recommended that she has walking oximetry prior to discharge.  Strongly encourage the patient to accept oxygen if needed at discharge.    Discussed tobacco cessation at length.  Greater  than 5 minute discussion.  We'll give NicoDerm patch if patient desires.    Subcutaneous heparin for DVT prophylaxis.    Zhanna Gilliland DO  01/10/18  9:57 PM    Dictated utilizing Dragon dictation.     Electronically signed by Zhanna Gilliland DO at 1/10/2018 10:15 PM           Emergency Department Notes      Mayda Holcomb PA-C at 1/10/2018  6:05 PM     Attestation signed by Montana Nolan MD at 1/11/2018  7:04 AM              For this patient encounter, I reviewed the NP or PA documentation, treatment plan, and medical decision making. Montana Nolan MD 1/11/2018 7:04 AM                               Subjective   HPI Comments: 77-year-old female advised to come to the ER by her foot doctor today, the patient has an infected right foot wound, her foot doctor has advised admission for IV antibiotics.  She was seen in our ER on December 24 after cutting her foot on the edge of a storm door.  She was sutured and sent home on antibiotics.  The patient and her daughter both report foot became red within one day and has been red and swollen with increased pain ever since.  She was seen by her family Dr. Wolff, last week  he referred her onto a podiatrist.  He gave her a prescription of Levaquin, she has taken 11 days of this.  The patient denies any fever but states she is in so much pain she has decreased sleep.  He was seen by Dr. MEEK today in [place].  She states her only past medical history is COPD for which she uses nebulizer and inhaler only.      History provided by:  Patient and relative  History limited by: no limits.   used: No        Review of Systems   Constitutional: Negative for activity change, appetite change, fatigue and fever.   HENT: Negative for congestion, ear pain, rhinorrhea, sneezing and sore throat.    Eyes: Negative for pain, discharge and redness.   Respiratory: Negative for cough, shortness of breath and wheezing.    Cardiovascular:  Negative.    Gastrointestinal: Negative for abdominal pain, constipation, diarrhea, nausea and vomiting.   Endocrine: Negative.    Genitourinary: Negative for difficulty urinating, dysuria and frequency.   Musculoskeletal: Positive for joint swelling. Negative for back pain and neck pain.   Skin: Positive for wound. Negative for color change, pallor and rash.   Allergic/Immunologic: Negative.    Neurological: Negative.    Hematological: Negative.    Psychiatric/Behavioral: Negative.    All other systems reviewed and are negative.      Past Medical History:   Diagnosis Date   • Arthritis    • Depression    • History of emphysema    • History of esophageal reflux    • History of recurrent UTI (urinary tract infection)    • History of renal calculi    • History of uterine cancer    • Hyperlipidemia        Allergies   Allergen Reactions   • Influenza Vaccines    • Morphine And Related        Past Surgical History:   Procedure Laterality Date   • FOOT SURGERY     • HAND SURGERY     • HYSTERECTOMY         Family History   Problem Relation Age of Onset   • Cancer Mother    • Heart attack Father    • Arthritis Father    • Diabetes Daughter    • Hyperlipidemia Daughter    • Migraines Daughter        Social History     Social History   • Marital status:      Spouse name: N/A   • Number of children: N/A   • Years of education: N/A     Social History Main Topics   • Smoking status: Current Every Day Smoker     Packs/day: 0.25     Types: Cigarettes   • Smokeless tobacco: Never Used   • Alcohol use No   • Drug use: No   • Sexual activity: Not Asked      Comment:      Other Topics Concern   • None     Social History Narrative           Objective   Physical Exam   Constitutional: She is oriented to person, place, and time. She appears well-developed and well-nourished. No distress.   HENT:   Head: Normocephalic and atraumatic.   Right Ear: External ear normal.   Left Ear: External ear normal.   Nose: Nose normal.    Mouth/Throat: Oropharynx is clear and moist. No oropharyngeal exudate.   Eyes: Conjunctivae and EOM are normal. Pupils are equal, round, and reactive to light. Right eye exhibits no discharge. Left eye exhibits no discharge. No scleral icterus.   Neck: Normal range of motion. Neck supple. No JVD present. No tracheal deviation present.   Cardiovascular: Normal rate, regular rhythm and normal heart sounds.    Pulmonary/Chest: Effort normal and breath sounds normal. No stridor. No respiratory distress. She has no wheezes. She has no rales.   Abdominal: Soft. Bowel sounds are normal. She exhibits no distension and no mass. There is no tenderness. There is no rebound and no guarding. No hernia.   Musculoskeletal: Normal range of motion. She exhibits edema and tenderness. She exhibits no deformity.   Right foot with laceration across lateral/ dorsal surface.  There is wound dehiscence to the proximal aspect.  The entire foot is erythematous, warm, swollen.  Capillary refill is less than 3 seconds.  There are palpable pedal pulses.  Sensation is intact.   Neurological: She is alert and oriented to person, place, and time. No cranial nerve deficit. Coordination normal.   Skin: Skin is warm and dry. No rash noted. She is not diaphoretic. No erythema.   Psychiatric: She has a normal mood and affect. Her behavior is normal. Judgment and thought content normal.   Nursing note and vitals reviewed.      Procedures        ED Course  ED Course   Comment By Time   Discussed with hospitalist who accepted pt as obs/admit. Mayda Holcomb PA-C 01/10 1948   CBC, white count 13.8, hemoglobin 12, hematocrit 38.5, pulse 207.  Basic metabolic panel glucose 95, BUN 25, creatinine 1.2, sodium 138, potassium 3.9, chloride 97, CO2 39.  Two-view x-ray of the right foot as interpreted by me, no fracture, no dislocation, no evidence of ostemylitis Mayad Holcomb PA-C 01/10 1952                  MDM    Final diagnoses:   Cellulitis of foot            Mayda  JANE Holcomb  01/11/18 0124       Electronically signed by Montana Nolan MD at 1/11/2018  7:04 AM      Joe S Arnold at 1/10/2018  7:36 PM          Called hospitalist with answer. Called transferred to DOROTHY Ohara @ this time.      Joe FARRELL Arnold  01/10/18 1937       Joe Barrett  01/10/18 1939       Electronically signed by Joe FARRELL Arnold at 1/10/2018  7:39 PM        Vital Signs (last 24 hours)       01/11 0700  -  01/12 0659 01/12 0700  -  01/12 0825   Most Recent    Temp (°F) 97.3 -  97.9      97.9     97.9 (36.6)    Heart Rate 70 -  88    81 -  83     81    Resp 16 -  20    18 -  19     19    /58 -  130/54      136/69     136/69    SpO2 (%) 90 -  100    97 -  98     97          Hospital Medications (active)       Dose Frequency Start End    acetaminophen (TYLENOL) tablet 650 mg 650 mg Every 6 Hours PRN 1/10/2018     Sig - Route: Take 2 tablets by mouth Every 6 (Six) Hours As Needed for Mild Pain . - Oral    aspirin EC tablet 81 mg 81 mg Daily 1/11/2018     Sig - Route: Take 1 tablet by mouth Daily. - Oral    atorvastatin (LIPITOR) tablet 10 mg 10 mg Daily 1/11/2018     Sig - Route: Take 1 tablet by mouth Daily. - Oral    AZITHROMYCIN 500 MG/250 ML 0.9% NS IVPB (vial-mate) 500 mg Every 24 Hours 1/11/2018 1/16/2018    Sig - Route: Infuse 500 mg into a venous catheter Daily. - Intravenous    benzonatate (TESSALON) capsule 100 mg 100 mg 3 Times Daily PRN 1/10/2018     Sig - Route: Take 1 capsule by mouth 3 (Three) Times a Day As Needed for Cough. - Oral    budesonide-formoterol (SYMBICORT) 160-4.5 MCG/ACT inhaler 2 puff 2 puff 2 Times Daily - RT 1/10/2018     Sig - Route: Inhale 2 puffs 2 (Two) Times a Day. - Inhalation    clonazePAM (KlonoPIN) tablet 0.5 mg 0.5 mg Nightly PRN 1/10/2018     Sig - Route: Take 1 tablet by mouth At Night As Needed for Seizures. - Oral    collagenase ointment  Every 24 Hours Scheduled 1/11/2018     Sig - Route: Apply  topically Daily. - Topical    estrogens (conjugated)  (PREMARIN) tablet 0.625 mg 0.625 mg Daily 1/11/2018     Sig - Route: Take 1 tablet by mouth Daily. - Oral    Notes to Pharmacy: Home med--- risks discussed with patient who continues to smoke    guaiFENesin (MUCINEX) 12 hr tablet 600 mg 600 mg Every 12 Hours Scheduled 1/11/2018     Sig - Route: Take 1 tablet by mouth Every 12 (Twelve) Hours. - Oral    heparin (porcine) 5000 UNIT/ML injection 5,000 Units 5,000 Units Every 8 Hours Scheduled 1/10/2018     Sig - Route: Inject 1 mL under the skin Every 8 (Eight) Hours. - Subcutaneous    ipratropium-albuterol (DUO-NEB) nebulizer solution 3 mL 3 mL Every 6 Hours - RT 1/10/2018     Sig - Route: Take 3 mL by nebulization Every 6 (Six) Hours. - Nebulization    losartan (COZAAR) tablet 25 mg 25 mg Daily 1/11/2018     Sig - Route: Take 1 tablet by mouth Daily. - Oral    meclizine (ANTIVERT) tablet 12.5 mg 12.5 mg Every 8 Hours PRN 1/10/2018     Sig - Route: Take 1 tablet by mouth Every 8 (Eight) Hours As Needed for dizziness. - Oral    methylPREDNISolone sodium succinate (SOLU-Medrol) injection 40 mg 40 mg Every 12 Hours 1/11/2018     Sig - Route: Infuse 1 mL into a venous catheter Every 12 (Twelve) Hours. - Intravenous    ondansetron (ZOFRAN) tablet 4 mg 4 mg Every 6 Hours PRN 1/10/2018     Sig - Route: Take 1 tablet by mouth Every 6 (Six) Hours As Needed for Nausea or Vomiting. - Oral    Pharmacy to dose vancomycin  Continuous PRN 1/10/2018 1/24/2018    Sig - Route: Continuous As Needed for Consult. - Does not apply    piperacillin-tazobactam (ZOSYN) 3.375 g in iso-osmotic dextrose 50 ml (premix) 3.375 g Once 1/11/2018 1/11/2018    Sig - Route: Infuse 50 mL into a venous catheter 1 (One) Time. - Intravenous    piperacillin-tazobactam (ZOSYN) 3.375 g in iso-osmotic dextrose 50 ml (premix) 3.375 g Every 8 Hours 1/11/2018 1/16/2018    Sig - Route: Infuse 50 mL into a venous catheter Every 8 (Eight) Hours. - Intravenous    promethazine-codeine (PHENERGAN with CODEINE) 6.25-10  "MG/5ML syrup 5 mL 5 mL 3 Times Daily PRN 1/10/2018     Sig - Route: Take 5 mL by mouth 3 (Three) Times a Day As Needed for Cough. - Oral    propranolol (INDERAL) tablet 60 mg 60 mg Daily 1/11/2018     Sig - Route: Take 3 tablets by mouth Daily. - Oral    Random Vancomycin level 1/11/18 with am labs  Continuous PRN 1/11/2018     Sig - Route: Continuous As Needed for Consult. - Does not apply    sertraline (ZOLOFT) tablet 50 mg 50 mg Every 12 Hours 1/10/2018     Sig - Route: Take 1 tablet by mouth Every 12 (Twelve) Hours. - Oral    Notes to Pharmacy: Please verify home dose    sodium chloride 0.9 % flush 1-10 mL 1-10 mL As Needed 1/10/2018     Sig - Route: Infuse 1-10 mL into a venous catheter As Needed for Line Care. - Intravenous    traMADol (ULTRAM) tablet 50 mg 50 mg Daily PRN 1/10/2018     Sig - Route: Take 1 tablet by mouth Daily As Needed (pain). - Oral    vancomycin in dextrose 5% 150 mL (VANCOCIN) IVPB 750 mg 750 mg Every 24 Hours 1/11/2018 1/21/2018    Sig - Route: Infuse 150 mL into a venous catheter Daily. - Intravenous    Vancomycin level scheduled for 1/12 at 1930. Hold next dose if level > 20 mcg/mL.  Once 1/12/2018     Sig - Route: 1 (One) Time. - Does not apply    Linked Group 1:  \"And\" Linked Group Details        sodium chloride 0.9 % infusion (Discontinued) 100 mL/hr Continuous 1/10/2018 1/11/2018    Sig - Route: Infuse 100 mL/hr into a venous catheter Continuous. - Intravenous    Notes to Pharmacy: Stop after 1 liter    vancomycin in dextrose 5% 150 mL (VANCOCIN) IVPB 750 mg (Discontinued) 750 mg Every 36 Hours 1/12/2018 1/11/2018    Sig - Route: Infuse 150 mL into a venous catheter Every 36 (Thirty-Six) Hours. - Intravenous            Lab Results (last 24 hours)     Procedure Component Value Units Date/Time    Anaerobic Culture - Swab, Foot [[id number]] Collected:  01/11/18 1605    Specimen:  Swab from Foot Updated:  01/11/18 1633    POC Glucose Once [[id number]]  (Abnormal) Collected:  01/11/18 " 1727    Specimen:  Blood Updated:  01/11/18 1740     Glucose 186 (H) mg/dL       Serial Number: [id number]:  [id number]       POC Glucose Once [[id number]]  (Normal) Collected:  01/11/18 2100    Specimen:  Blood Updated:  01/11/18 2106     Glucose 111 mg/dL       Serial Number: [id number]:  [id number]       Blood Culture With CLAYTON - Blood, [[id number]]  (Normal) Collected:  01/10/18 2021    Specimen:  Blood from Arm, Left Updated:  01/11/18 2116     Blood Culture No growth at 24 hours    Blood Culture With CLAYTON - Blood, [[id number]]  (Normal) Collected:  01/10/18 2037    Specimen:  Blood from Hand, Left Updated:  01/11/18 2116     Blood Culture No growth at 24 hours        Imaging Results (last 24 hours)     Procedure Component Value Units Date/Time    US Venous Doppler Lower Extremity Right (duplex) [[id number]] Collected:  01/11/18 0858     Updated:  01/11/18 0900    Narrative:       PROCEDURE: US VENOUS DOPPLER LOWER EXTREMITY RIGHT (DUPLEX)-     HISTORY: edema; L03.119-Cellulitis of unspecified part of limb     PROCEDURE: Multiple transverse and longitudinal scans were performed of  the femoropopliteal deep venous system, with augmentation and  compression maneuvers.     FINDINGS: Normal phasic flow was noted in the visualized deep venous  system. No intraluminal increased echogenicity is noted to suggest  thrombus. There is normal compression and augmentation of the venous  structures.  No abnormal venous collaterals are seen.       Impression:       No evidence of deep venous thrombosis.     This report was finalized on 1/11/2018 8:58 AM by Sylvain Dent M.D..    XR Chest 1 View [[id number]] Collected:  01/11/18 1138     Updated:  01/11/18 1156    Narrative:       PROCEDURE: XR CHEST 1 VW-     HISTORY: cough, hypoxia, dyspnea; L03.119-Cellulitis of unspecified part  of limb     COMPARISON: December 13, 2017.     FINDINGS: The heart is normal in size. The mediastinum is unremarkable.  Left basilar  opacity is consistent with pneumonia. The right lung is  clear. There is no pneumothorax.  There are no acute osseous  abnormalities.           Impression:       Left basilar opacity is consistent with pneumonia. Follow-up  to radiographic resolution is recommended.           Images were reviewed, interpreted, and dictated by Dr. Dent.  Transcribed by Marilee Hernandez PA-C.     This report was finalized on 1/11/2018 11:54 AM by Sylvain Dent M.D..           Physician Progress Notes (last 24 hours) (Notes from 1/11/2018  8:26 AM through 1/12/2018  8:26 AM)      RUBY Gtz at 1/11/2018  3:55 PM  Version 1 of 1           PROGRESS NOTE        Date of Admission: 1/10/2018  Length of Stay: 0  Primary Care Physician: Kristian Wolff MD    Subjective   Chief Complaint:  Right Foot cellulitis  HPI: This is a 77-year-old female who is admitted with a right foot colitis.  According to the patient she caught her foot on the screen door in December and had stitches.  According to the patient over the past 2 days she's had increasing redness and swelling of her right foot as well as drainage around her sutures.  She was seen and evaluated in the emergency room and admitted to the hospitalist service.  She has chronic and IV antibiotics in the form of Zosyn and vancomycin.  Ultrasound venous was done which did not show any evidence of DVT.  Dr. Courtney with podiatry was consulted.           Review Of Systems:   Review of Systems   General ROS: Patient denies any fevers, chills or loss of consciousness.    Psychological ROS: Denies any hallucinations and delusions.  Ophthalmic ROS: Denies any diplopia or transient loss of vision.  ENT ROS: Denies sore throat, nasal congestion or ear pain.   Allergy and Immunology ROS: Denies rash or itching.  Hematological and Lymphatic ROS: Denies neck swelling or easy bleeding.  Endocrine ROS: Denies any recent unintentional weight gain or loss.  Breast ROS: Denies any pain  or swelling.  Respiratory ROS: Denies cough or shortness of breath.   Cardiovascular ROS: Denies chest pain or palpitations. No history of exertional chest pain.   Gastrointestinal ROS: Denies nausea and vomiting. Denies any abdominal pain. No diarrhea.  Genito-Urinary ROS: Denies dysuria or hematuria.  Musculoskeletal ROS: Denies back pain. No muscle pain. No calf pain.  Right foot pain   Neurological ROS: Denies any focal weakness. No loss of consciousness. Denies any numbness. Denies neck pain.   Dermatological ROS: Denies any redness or pruritis.    Objective      Temp:  [97.4 °F (36.3 °C)-97.6 °F (36.4 °C)] 97.6 °F (36.4 °C)  Heart Rate:  [75-97] 80  Resp:  [16-30] 19  BP: (104-141)/(47-62) 126/62  Physical Exam    General Appearance:  Alert and cooperative, not in any acute distress.   Head:  Atraumatic and normocephalic, without obvious abnormality.   Eyes:          PERRLA, conjunctivae and sclerae normal, no Icterus. No pallor. Extra-occular movements are within normal limits.   Ears:  Ears appear intact with no abnormalities noted.   Throat: No oral lesions, no thrush, oral mucosa moist.   Neck: Supple, trachea midline, no thyromegaly, no carotid bruit.   Back:   No kyphoscoliosis present. No tenderness to palpation,   range of motion normal.   Lungs:   Chest shape is normal. Breath sounds heard bilaterally equally.  No crackles or wheezing. No Pleural rub or bronchial breathing.   Heart:  Normal S1 and S2, no murmur, no gallop, no rub. No JVD   Abdomen:   Normal bowel sounds, no masses, no organomegaly. Soft     non-tender, non-distended, no guarding, no rebound                tenderness   Extremities: Moves all extremities well, no edema, no cyanosis, no clubbing.  Erythema and redness to dorsal right foot.   Pulses: Pulses palpable and equal bilaterally   Skin: No bleeding, bruising or rash   Lymph nodes: No palpable adenopathy   Neurologic:    Psychiatric/Behavior:     Cranial nerves 2 - 12 grossly  intact, sensation intact, Motor power is normal and equal bilaterally.  Mood normal, behavior normal       Results Review:    I have reviewed the labs, radiology results and diagnostic studies.      Results from last 7 days  Lab Units 01/11/18  0615   WBC 10*3/mm3 9.99   HEMOGLOBIN g/dL 10.4*   PLATELETS 10*3/mm3 157       Results from last 7 days  Lab Units 01/11/18  0615   SODIUM mmol/L 139   POTASSIUM mmol/L 3.5   CO2 mmol/L 34.0*   CREATININE mg/dL 0.90   GLUCOSE mg/dL 103*       Culture Data:   Blood Culture   Date Value Ref Range Status   01/10/2018 No growth at less than 24 hours  Preliminary   01/10/2018 No growth at less than 24 hours  Preliminary     Radiology Data:   Cardiology Data:    I have reviewed the medications.    Assessment/Plan     Assessment and Plan:  1.  Right foot cellulitis-  patient is on IV antibiotics in the form of vancomycin and Zosyn.  Dr. Courtney with podiatry has been consulted.    2.  Left lower lobe pneumonia failed outpatient antibiotics.  Patient is on IV antibiotics in the form of Zosyn and azithromycin since she found out patient.  I have added breathing treatments as well as mucolytics.  Patient does have some cough and congestion.  Will continue with breathing treatments, mucolytics.  Chest x-ray was done    3.  Chronic hypoxic respiratory failure-patient was satting 88% when she was admitted.  She does have a history of hypoxia but has declined oxygen.    4.  Acute renal failure-present upon admission likely secondary to dehydration-resolved    5.  Tobacco abuse-counseling        DVT prophylaxis:  Heparin  Discharge Planning:   RUBY Gtz 01/11/18 3:55 PM       Electronically signed by RUBY Gtz at 1/11/2018  4:21 PM           Consult Notes (last 24 hours) (Notes from 1/11/2018  8:26 AM through 1/12/2018  8:26 AM)      Tigre Courtney DPM at 1/11/2018 11:26 AM  Version 1 of 1     Consult Orders:    1. Inpatient Consult to Podiatry [[id number]]  ordered by RUBY Gtz at 01/11/18 0736                    Referring Provider: hospitalist  Reason for Consultation: right foot laceration and cellulitis    Patient Care Team:  Kristian Wolff MD as PCP - General  Kristian Wolff MD as PCP - Family Medicine  Bayron Grimaldo MD (Inactive) as Consulting Physician (General Surgery)    Chief complaint right foot    Subjective .     History of present illness:  77-year-old nondiabetic female who tells me that on 12/23/2017 she got her foot caught in a storm door at home and lacerated her foot.  She says she was seen in the emergency department and her foot was sewn back together by staff.  She was sent home where she continued to slowly have deterioration and signs of infection of the area.  She was then seen by her primary care physician and a podiatrist in [place] who she says took cultures of the area.  She says she had been having the area addressed by her daughter with antibiotic cream and had been cleaning it with warm water.  She states she was sent to the hospital for admission yesterday due to drainage and increased redness and swelling and pain.  She denies any constitutional symptoms and only complains of pain.  She tells me that she was worried about blood stream infection.    Review of Systems  All systems were reviewed and negative except for chief complaint.    History  Past Medical History:   Diagnosis Date   • Arthritis    • Depression    • History of emphysema    • History of esophageal reflux    • History of recurrent UTI (urinary tract infection)    • History of renal calculi    • History of uterine cancer    • Hyperlipidemia    , Past Surgical History:   Procedure Laterality Date   • FOOT SURGERY     • HAND SURGERY     • HYSTERECTOMY     , Family History   Problem Relation Age of Onset   • Cancer Mother    • Heart attack Father    • Arthritis Father    • Diabetes Daughter    • Hyperlipidemia Daughter    • Migraines Daughter    , Social  History   Substance Use Topics   • Smoking status: Current Every Day Smoker     Packs/day: 0.25     Types: Cigarettes   • Smokeless tobacco: Never Used   • Alcohol use No   , Prescriptions Prior to Admission   Medication Sig Dispense Refill Last Dose   • albuterol (PROVENTIL HFA;VENTOLIN HFA) 108 (90 BASE) MCG/ACT inhaler Inhale 1 puff Every 4 (Four) Hours As Needed for shortness of air. 18 g 3 1/10/2018 at Unknown time   • albuterol (PROVENTIL) (2.5 MG/3ML) 0.083% nebulizer solution USE 1 AMPULE IN NEBULIZER FOUR TIMES A DAY AS NEEDED 75 mL 3 Past Week at Unknown time   • aspirin 81 MG EC tablet Take 81 mg by mouth Daily.   1/10/2018   • benzonatate (TESSALON) 200 MG capsule 1 tid po prn (Patient not taking: Reported on 1/11/2018) 45 capsule 0 Not Taking at Unknown time   • cephalexin (KEFLEX) 500 MG capsule Take 1 capsule by mouth 2 (Two) Times a Day for 7 days. (Patient not taking: Reported on 1/11/2018) 14 capsule 0 Not Taking at Unknown time   • cholecalciferol (VITAMIN D3) 1000 UNITS tablet Take 1,000 Units by mouth Daily.   1/10/2018   • clonazePAM (KlonoPIN) 0.5 MG tablet Take 1 tablet by mouth At Night As Needed for Seizures. 30 tablet 3 1/10/2018   • dextromethorphan polistirex ER (DELSYM) 30 MG/5ML Suspension Extended Release oral suspension Take 30 mg by mouth At Night As Needed (cough). 148 mL 0 Unknown at Unknown time   • levoFLOXacin (LEVAQUIN) 500 MG tablet Take 1 tablet by mouth Daily. 14 tablet 0 1/10/2018   • losartan (COZAAR) 25 MG tablet Take 1 tablet by mouth Daily. (Patient not taking: Reported on 1/11/2018) 30 tablet 2 Not Taking at Unknown time   • meclizine (ANTIVERT) 12.5 MG tablet TAKE 1 TABLET BY MOUTH THREE TIMES A DAY AS NEEDED for dizziness  90 tablet 0 1/10/2018   • pravastatin (PRAVACHOL) 20 MG tablet TAKE 1 TABLET BY MOUTH IN THE EVENING  30 tablet 5 1/9/2018   • predniSONE (DELTASONE) 10 MG tablet Take 1 tablet by mouth Daily. 5 tab for 3 day  4 tab for 3 day  3 tab  3d  2 tab 3  "d  1 tab 3d (Patient not taking: Reported on 1/11/2018) 45 tablet 0 Not Taking at Unknown time   • PREMARIN 0.625 MG tablet TAKE 1 TABLET BY MOUTH ONE TIME A DAY  90 tablet 1 1/10/2018   • promethazine-codeine (PHENERGAN with CODEINE) 6.25-10 MG/5ML syrup Take 5 mL by mouth Every 4 (Four) Hours As Needed for Cough.   Unknown at Unknown time   • propranolol (INDERAL) 60 MG tablet Take 1 tablet by mouth Daily. 90 tablet 3 1/10/2018   • sertraline (ZOLOFT) 50 MG tablet TAKE 1 TABLET BY MOUTH TWO TIMES A DAY  60 tablet 5 1/10/2018   • SYMBICORT 160-4.5 MCG/ACT inhaler INHALE 2 PUFFS BY MOUTH TWO TIMES A DAY. Rinse mouth after each use.  10.2 g 1 1/10/2018   • tiotropium (SPIRIVA HANDIHALER) 18 MCG per inhalation capsule Place 1 capsule into inhaler and inhale Daily. 30 capsule 5 1/10/2018   • traMADol (ULTRAM) 50 MG tablet Take 1 tablet by mouth Daily As Needed (pain). 30 tablet 2 1/9/2018   , Scheduled Meds:    aspirin 81 mg Oral Daily   atorvastatin 10 mg Oral Daily   budesonide-formoterol 2 puff Inhalation BID - RT   collagenase  Topical Q24H   estrogens (conjugated) 0.625 mg Oral Daily   guaiFENesin 600 mg Oral Q12H   heparin (porcine) 5,000 Units Subcutaneous Q8H   ipratropium-albuterol 3 mL Nebulization Q6H - RT   losartan 25 mg Oral Daily   piperacillin-tazobactam 3.375 g Intravenous Q8H   propranolol 60 mg Oral Daily   sertraline 50 mg Oral Q12H   [START ON 1/12/2018] vancomycin 750 mg Intravenous Q36H   , Continuous Infusions:    Pharmacy to dose vancomycin    Pharmacy Consult    , PRN Meds:  •  acetaminophen  •  benzonatate  •  clonazePAM  •  meclizine  •  ondansetron  •  Pharmacy to dose vancomycin  •  promethazine-codeine  •  Pharmacy Consult  •  sodium chloride  •  traMADol and Allergies:  Influenza vaccines and Morphine and related    Objective     Vital Signs   /54 (BP Location: Left arm, Patient Position: Lying)  Pulse 83  Temp 97.5 °F (36.4 °C) (Oral)   Resp 20  Ht 157.5 cm (62\")  Wt 58.2 kg " (128 lb 4.8 oz)  SpO2 100%  BMI 23.47 kg/m2    Physical Exam:  AAO ×3, NAD, afebrile vital signs stable  Appears to be a well-developed well-nourished female with apparent stated age.  Nasal cannula in place.  PERRLA, cranial nerves II through XII intact  Heart: Regular rate rhythm  Lungs: Positive for wheezing and crackles.     Physical exam of the right foot shows pitting edema to the right midfoot, forefoot, and ankle.  There is tenderness to palpation about the anterior lateral ankle and dorsal lateral foot.  There is edema and swelling and redness present that stays fairly well localized to this area.  There is no ascending cellulitis or streaking or progression up the ankle or leg  The wound is in an oblique fashion at the junction of the ankle and lateral foot.  Measures roughly 6-7 cm in length and at its widest point more medially is roughly 1.5-2 cm wide.  The wound is yellow and brown and fibrous medially and as it tracks laterally turns a little more necrotic and dry.  I do not appreciate any odor or pus.  It seems to be full-thickness down at least into the subcutaneous tissue layer.  I had this point cannot appreciate any visible tendons or fascia.  Results Review: Current blood cultures pending but no growth as of now.  All laboratory data reviewed.  No white count today.  White blood cell count yesterday was 13.48  No left shift today however neutrophils were 10.18 and monocytes were 0.94 yesterday immature granulocytes were elevated as well..I    Imaging Results: Three-view x-rays taken of the right foot are unremarkable.  I reviewed her x-rays from last month as well which are unremarkable for any fracture or bony abnormality.    Assessment/Plan   77-year-old nondiabetic female with COPD, right foot wound/laceration and cellulitis  Active Problems:    Cellulitis of foot  I discussed her wound and condition with her and her family at bedside today.  I recommend continuing IV antibiotics.  We will  begin local wound care with Santyl and dry dressings changed daily.  She is free to weight-bear as tolerated with a flat Darco shoe.  I did explain and advise her that the area needs to be kept clean and dry at all times.  I did explain to her that as of now I do not anticipate the need for any surgery but given the location of the wound we have to watch this very closely and ensure that it does not track deeply into any deep tenderness structures or joint.  I will continue to follow.        I discussed the patients findings and my recommendations with patient and family    Tigre Courtney DPM  01/11/18  11:26 AM               Electronically signed by Tigre Courtney DPM at 1/11/2018 11:35 AM

## 2018-01-13 LAB
ANION GAP SERPL CALCULATED.3IONS-SCNC: 6.9 MMOL/L
BASOPHILS # BLD AUTO: 0.02 10*3/MM3 (ref 0–0.2)
BASOPHILS NFR BLD AUTO: 0.1 % (ref 0–2.5)
BUN BLD-MCNC: 19 MG/DL (ref 7–20)
BUN/CREAT SERPL: 27.1 (ref 7.1–23.5)
CALCIUM SPEC-SCNC: 8.6 MG/DL (ref 8.4–10.2)
CHLORIDE SERPL-SCNC: 102 MMOL/L (ref 98–107)
CO2 SERPL-SCNC: 33 MMOL/L (ref 26–30)
CREAT BLD-MCNC: 0.7 MG/DL (ref 0.6–1.3)
DEPRECATED RDW RBC AUTO: 43.1 FL (ref 37–54)
EOSINOPHIL # BLD AUTO: 0.01 10*3/MM3 (ref 0–0.7)
EOSINOPHIL NFR BLD AUTO: 0.1 % (ref 0–7)
ERYTHROCYTE [DISTWIDTH] IN BLOOD BY AUTOMATED COUNT: 13 % (ref 11.5–14.5)
GFR SERPL CREATININE-BSD FRML MDRD: 81 ML/MIN/1.73
GLUCOSE BLD-MCNC: 115 MG/DL (ref 74–98)
HCT VFR BLD AUTO: 35 % (ref 37–47)
HGB BLD-MCNC: 11.1 G/DL (ref 12–16)
IMM GRANULOCYTES # BLD: 0.14 10*3/MM3 (ref 0–0.06)
IMM GRANULOCYTES NFR BLD: 0.7 % (ref 0–0.6)
LYMPHOCYTES # BLD AUTO: 1.04 10*3/MM3 (ref 0.6–3.4)
LYMPHOCYTES NFR BLD AUTO: 5.3 % (ref 10–50)
MCH RBC QN AUTO: 28.9 PG (ref 27–31)
MCHC RBC AUTO-ENTMCNC: 31.7 G/DL (ref 30–37)
MCV RBC AUTO: 91.1 FL (ref 81–99)
MONOCYTES # BLD AUTO: 0.75 10*3/MM3 (ref 0–0.9)
MONOCYTES NFR BLD AUTO: 3.8 % (ref 0–12)
NEUTROPHILS # BLD AUTO: 17.54 10*3/MM3 (ref 2–6.9)
NEUTROPHILS NFR BLD AUTO: 90 % (ref 37–80)
NRBC BLD MANUAL-RTO: 0 /100 WBC (ref 0–0)
PLATELET # BLD AUTO: 191 10*3/MM3 (ref 130–400)
PMV BLD AUTO: 10.3 FL (ref 6–12)
POTASSIUM BLD-SCNC: 3.9 MMOL/L (ref 3.5–5.1)
RBC # BLD AUTO: 3.84 10*6/MM3 (ref 4.2–5.4)
SODIUM BLD-SCNC: 138 MMOL/L (ref 137–145)
WBC NRBC COR # BLD: 19.5 10*3/MM3 (ref 4.8–10.8)

## 2018-01-13 PROCEDURE — 25010000002 VANCOMYCIN PER 500 MG: Performed by: INTERNAL MEDICINE

## 2018-01-13 PROCEDURE — 25010000002 HEPARIN (PORCINE) PER 1000 UNITS: Performed by: INTERNAL MEDICINE

## 2018-01-13 PROCEDURE — 25010000002 AZITHROMYCIN: Performed by: NURSE PRACTITIONER

## 2018-01-13 PROCEDURE — 25010000002 METHYLPREDNISOLONE PER 40 MG: Performed by: NURSE PRACTITIONER

## 2018-01-13 PROCEDURE — 94799 UNLISTED PULMONARY SVC/PX: CPT

## 2018-01-13 PROCEDURE — 99232 SBSQ HOSP IP/OBS MODERATE 35: CPT | Performed by: INTERNAL MEDICINE

## 2018-01-13 PROCEDURE — 85025 COMPLETE CBC W/AUTO DIFF WBC: CPT | Performed by: NURSE PRACTITIONER

## 2018-01-13 PROCEDURE — 25010000002 PIPERACILLIN SOD-TAZOBACTAM PER 1 G: Performed by: NURSE PRACTITIONER

## 2018-01-13 PROCEDURE — 80048 BASIC METABOLIC PNL TOTAL CA: CPT | Performed by: NURSE PRACTITIONER

## 2018-01-13 RX ORDER — DIPHENHYDRAMINE HYDROCHLORIDE AND LIDOCAINE HYDROCHLORIDE AND ALUMINUM HYDROXIDE AND MAGNESIUM HYDRO
KIT EVERY 6 HOURS
Status: DISCONTINUED | OUTPATIENT
Start: 2018-01-13 | End: 2018-01-15 | Stop reason: HOSPADM

## 2018-01-13 RX ADMIN — TAZOBACTAM SODIUM AND PIPERACILLIN SODIUM 3.38 G: 375; 3 INJECTION, SOLUTION INTRAVENOUS at 13:40

## 2018-01-13 RX ADMIN — HEPARIN SODIUM 5000 UNITS: 5000 INJECTION, SOLUTION INTRAVENOUS; SUBCUTANEOUS at 13:00

## 2018-01-13 RX ADMIN — HEPARIN SODIUM 5000 UNITS: 5000 INJECTION, SOLUTION INTRAVENOUS; SUBCUTANEOUS at 06:33

## 2018-01-13 RX ADMIN — NYSTATIN 500000 UNITS: 100000 SUSPENSION ORAL at 12:32

## 2018-01-13 RX ADMIN — TAZOBACTAM SODIUM AND PIPERACILLIN SODIUM 3.38 G: 375; 3 INJECTION, SOLUTION INTRAVENOUS at 06:33

## 2018-01-13 RX ADMIN — CLONAZEPAM 0.5 MG: 0.5 TABLET ORAL at 21:49

## 2018-01-13 RX ADMIN — BUDESONIDE AND FORMOTEROL FUMARATE DIHYDRATE 2 PUFF: 160; 4.5 AEROSOL RESPIRATORY (INHALATION) at 06:54

## 2018-01-13 RX ADMIN — ESTROGENS, CONJUGATED 0.62 MG: 0.62 TABLET, FILM COATED ORAL at 09:18

## 2018-01-13 RX ADMIN — TRAMADOL HYDROCHLORIDE 50 MG: 50 TABLET, FILM COATED ORAL at 21:49

## 2018-01-13 RX ADMIN — NYSTATIN 500000 UNITS: 100000 SUSPENSION ORAL at 09:03

## 2018-01-13 RX ADMIN — IPRATROPIUM BROMIDE AND ALBUTEROL SULFATE 3 ML: .5; 3 SOLUTION RESPIRATORY (INHALATION) at 19:00

## 2018-01-13 RX ADMIN — PROPRANOLOL HYDROCHLORIDE 60 MG: 20 TABLET ORAL at 09:04

## 2018-01-13 RX ADMIN — METHYLPREDNISOLONE SODIUM SUCCINATE 40 MG: 40 INJECTION, POWDER, FOR SOLUTION INTRAMUSCULAR; INTRAVENOUS at 17:20

## 2018-01-13 RX ADMIN — IPRATROPIUM BROMIDE AND ALBUTEROL SULFATE 3 ML: .5; 3 SOLUTION RESPIRATORY (INHALATION) at 00:15

## 2018-01-13 RX ADMIN — ASPIRIN 81 MG: 81 TABLET, COATED ORAL at 09:04

## 2018-01-13 RX ADMIN — VANCOMYCIN HYDROCHLORIDE 1000 MG: 1 INJECTION, POWDER, LYOPHILIZED, FOR SOLUTION INTRAVENOUS at 19:55

## 2018-01-13 RX ADMIN — COLLAGENASE SANTYL: 250 OINTMENT TOPICAL at 09:03

## 2018-01-13 RX ADMIN — IPRATROPIUM BROMIDE AND ALBUTEROL SULFATE 3 ML: .5; 3 SOLUTION RESPIRATORY (INHALATION) at 12:57

## 2018-01-13 RX ADMIN — SERTRALINE HYDROCHLORIDE 50 MG: 50 TABLET ORAL at 21:49

## 2018-01-13 RX ADMIN — LOSARTAN POTASSIUM 25 MG: 25 TABLET, FILM COATED ORAL at 09:04

## 2018-01-13 RX ADMIN — AZITHROMYCIN 500 MG: 500 INJECTION, POWDER, LYOPHILIZED, FOR SOLUTION INTRAVENOUS at 17:20

## 2018-01-13 RX ADMIN — TAZOBACTAM SODIUM AND PIPERACILLIN SODIUM 3.38 G: 375; 3 INJECTION, SOLUTION INTRAVENOUS at 21:58

## 2018-01-13 RX ADMIN — NYSTATIN 500000 UNITS: 100000 SUSPENSION ORAL at 17:20

## 2018-01-13 RX ADMIN — PROMETHAZINE HYDROCHLORIDE AND CODEINE PHOSPHATE 5 ML: 6.25; 1 SOLUTION ORAL at 13:50

## 2018-01-13 RX ADMIN — NYSTATIN 500000 UNITS: 100000 SUSPENSION ORAL at 21:49

## 2018-01-13 RX ADMIN — METHYLPREDNISOLONE SODIUM SUCCINATE 40 MG: 40 INJECTION, POWDER, FOR SOLUTION INTRAMUSCULAR; INTRAVENOUS at 06:33

## 2018-01-13 RX ADMIN — BUDESONIDE AND FORMOTEROL FUMARATE DIHYDRATE 2 PUFF: 160; 4.5 AEROSOL RESPIRATORY (INHALATION) at 19:01

## 2018-01-13 RX ADMIN — GUAIFENESIN 600 MG: 600 TABLET, EXTENDED RELEASE ORAL at 09:05

## 2018-01-13 RX ADMIN — DIPHENHYDRAMINE HYDROCHLORIDE AND LIDOCAINE HYDROCHLORIDE AND ALUMINUM HYDROXIDE AND MAGNESIUM HYDRO: KIT at 10:33

## 2018-01-13 RX ADMIN — GUAIFENESIN 600 MG: 600 TABLET, EXTENDED RELEASE ORAL at 21:49

## 2018-01-13 RX ADMIN — ATORVASTATIN CALCIUM 10 MG: 10 TABLET, FILM COATED ORAL at 21:49

## 2018-01-13 RX ADMIN — HEPARIN SODIUM 5000 UNITS: 5000 INJECTION, SOLUTION INTRAVENOUS; SUBCUTANEOUS at 21:49

## 2018-01-13 RX ADMIN — IPRATROPIUM BROMIDE AND ALBUTEROL SULFATE 3 ML: .5; 3 SOLUTION RESPIRATORY (INHALATION) at 06:54

## 2018-01-13 RX ADMIN — SERTRALINE HYDROCHLORIDE 50 MG: 50 TABLET ORAL at 09:07

## 2018-01-13 RX ADMIN — TRAMADOL HYDROCHLORIDE 50 MG: 50 TABLET, FILM COATED ORAL at 00:11

## 2018-01-13 NOTE — NURSING NOTE
"Spoke with  at 0800 about pt. Pt states, \" my throat is hurting.\" Upon examination I saw white coating on tongue and running down throat. MD aware at this time. New orders received for magic mouth wash.   unwrapped foot to look at would. I redressed wound site and applied yellow waffle booties to bilateral feet. I elevated bilateral feet off bed with a pillow.  "

## 2018-01-13 NOTE — PLAN OF CARE
Problem: Patient Care Overview (Adult)  Goal: Plan of Care Review  Outcome: Ongoing (interventions implemented as appropriate)   01/13/18 0639   Coping/Psychosocial Response Interventions   Plan Of Care Reviewed With patient   Patient Care Overview   Progress improving   Outcome Evaluation   Outcome Summary/Follow up Plan Continue antibiotic therapy. Patient taking oral nystatin for thrush.      Goal: Adult Individualization and Mutuality  Outcome: Ongoing (interventions implemented as appropriate)    Goal: Discharge Needs Assessment  Outcome: Ongoing (interventions implemented as appropriate)      Problem: Wound, Traumatic, Nonburn (Adult)  Goal: Signs and Symptoms of Listed Potential Problems Will be Absent or Manageable (Wound, Traumatic, Nonburn)  Outcome: Ongoing (interventions implemented as appropriate)      Problem: Infection, Risk/Actual (Adult)  Goal: Infection Prevention/Resolution  Outcome: Ongoing (interventions implemented as appropriate)      Problem: Fall Risk (Adult)  Goal: Absence of Falls  Outcome: Ongoing (interventions implemented as appropriate)

## 2018-01-13 NOTE — PLAN OF CARE
Problem: Patient Care Overview (Adult)  Goal: Plan of Care Review  Outcome: Ongoing (interventions implemented as appropriate)   01/13/18 0639 01/13/18 1542   Coping/Psychosocial Response Interventions   Plan Of Care Reviewed With --  patient   Patient Care Overview   Progress improving --    Outcome Evaluation   Outcome Summary/Follow up Plan Continue antibiotic therapy. Patient taking oral nystatin for thrush.  --      Goal: Adult Individualization and Mutuality  Outcome: Ongoing (interventions implemented as appropriate)    Goal: Discharge Needs Assessment  Outcome: Ongoing (interventions implemented as appropriate)      Problem: Wound, Traumatic, Nonburn (Adult)  Goal: Signs and Symptoms of Listed Potential Problems Will be Absent or Manageable (Wound, Traumatic, Nonburn)  Outcome: Ongoing (interventions implemented as appropriate)      Problem: Infection, Risk/Actual (Adult)  Goal: Infection Prevention/Resolution  Outcome: Ongoing (interventions implemented as appropriate)      Problem: Fall Risk (Adult)  Goal: Absence of Falls  Outcome: Ongoing (interventions implemented as appropriate)

## 2018-01-13 NOTE — PROGRESS NOTES
"Hospitalist Progress Note.    LOS: 2 days    Patient Care Team:  Kristian Wolff MD as PCP - General  Kristian Wolff MD as PCP - Family Medicine  Bayron Grimaldo MD (Inactive) as Consulting Physician (General Surgery)    Chief Complaint:    F/u right foot cellulitis     Subjective   Patient seen and examined this morning. She is extremely pleasant. She complains of right foot pain and bilateral heel pain which is controlled on current pain regimen. She also continues to have burning in her mouth which prevents her from eating. She is also requesting a change in her diet. She would like a regular diet without restriction. No other complaints of fevers, chills, abdominal pain, diarrhea or dysuria.     Review of Systems:    The pertinent  ROS was done and it is noted above, rest  was negative.    Objective     Vital Signs  /58  Pulse 82  Temp 98 °F (36.7 °C)  Resp 18  Ht 157.5 cm (62\")  Wt 61.3 kg (135 lb 2 oz)  SpO2 97%  BMI 24.71 kg/m2      I/O this shift:  In: 480 [P.O.:480]  Out: 400 [Urine:400]    Intake/Output Summary (Last 24 hours) at 01/13/18 1432  Last data filed at 01/13/18 1200   Gross per 24 hour   Intake             1210 ml   Output             1300 ml   Net              -90 ml       Physical Exam:    General Appearance: alert, oriented x 3, no acute distress, pleasant elderly female    HEENT: pupils round and reactive to light, oral mucosa dry, extra occular movements intact.  Neck: supple, no JVD, trachea midline  Lungs: fair air entry bilaterally, unlabored breathing effort, mild wheezing appreciated in posterior lung bases  Heart: RRR, normal S1 and S2, no S3, no rub  Abdomen: soft, non-tender, no palpable bladder, present bowel sounds to auscultation  Extremities: no edema, cyanosis or clubbing, right foot with about 3cm long wound on the dorsal aspect that appears to have mild surrounding cellulitic changes without any drainage or foul smell  Neuro: normal speech and mental status, grossly " non focal.     Results Review:      Results from last 7 days  Lab Units 01/13/18  0532 01/11/18  0615 01/10/18  1827   SODIUM mmol/L 138 139 138   POTASSIUM mmol/L 3.9 3.5 3.9   CHLORIDE mmol/L 102 102 97*   CO2 mmol/L 33.0* 34.0* 39.0*   BUN mg/dL 19 18 25*   CREATININE mg/dL 0.70 0.90 1.20   CALCIUM mg/dL 8.6 8.0* 9.2   GLUCOSE mg/dL 115* 103* 95       Estimated Creatinine Clearance: 49.1 mL/min (by C-G formula based on Cr of 0.7).                  Results from last 7 days  Lab Units 01/13/18  0532 01/11/18  0615 01/10/18  1827   WBC 10*3/mm3 19.50* 9.99 13.48*   HEMOGLOBIN g/dL 11.1* 10.4* 12.0   PLATELETS 10*3/mm3 191 157 207               Imaging Results (last 24 hours)     ** No results found for the last 24 hours. **          aspirin 81 mg Oral Daily   atorvastatin 10 mg Oral Daily   azithromycin 500 mg Intravenous Q24H   budesonide-formoterol 2 puff Inhalation BID - RT   collagenase  Topical Q24H   estrogens (conjugated) 0.625 mg Oral Daily   First Mouthwash (Magic Mouthwash)  Swish & Spit Q6H   guaiFENesin 600 mg Oral Q12H   heparin (porcine) 5,000 Units Subcutaneous Q8H   ipratropium-albuterol 3 mL Nebulization Q6H - RT   losartan 25 mg Oral Daily   methylPREDNISolone sodium succinate 40 mg Intravenous Q12H   nystatin 5 mL Oral 4x Daily   piperacillin-tazobactam 3.375 g Intravenous Q8H   propranolol 60 mg Oral Daily   sertraline 50 mg Oral Q12H   vancomycin 1,000 mg Intravenous Q24H       Pharmacy to dose vancomycin    Pharmacy Consult    Pharmacy Consult        Medication Review:   Current Facility-Administered Medications   Medication Dose Route Frequency Provider Last Rate Last Dose   • acetaminophen (TYLENOL) tablet 650 mg  650 mg Oral Q6H PRN April G CadeJuan Francisco, DO   650 mg at 01/11/18 1335   • aspirin EC tablet 81 mg  81 mg Oral Daily April G Talat, DO   81 mg at 01/13/18 0904   • atorvastatin (LIPITOR) tablet 10 mg  10 mg Oral Daily April G DO Talat   10 mg at 01/11/18 1006    • AZITHROMYCIN 500 MG/250 ML 0.9% NS IVPB (vial-mate)  500 mg Intravenous Q24H Ce Bowling-Luis Eduardo, APRN   500 mg at 01/12/18 1713   • benzonatate (TESSALON) capsule 100 mg  100 mg Oral TID PRN April G Cade-Maggard, DO       • budesonide-formoterol (SYMBICORT) 160-4.5 MCG/ACT inhaler 2 puff  2 puff Inhalation BID - RT [facility]   2 puff at 01/13/18 0654   • clonazePAM (KlonoPIN) tablet 0.5 mg  0.5 mg Oral Nightly PRN [facility]       • collagenase ointment   Topical Q24H Tigre Courtney DPM       • estrogens (conjugated) (PREMARIN) tablet 0.625 mg  0.625 mg Oral Daily [facility]   0.625 mg at 01/13/18 0918   • First Mouthwash (Magic Mouthwash)   Swish & Spit Q6H Eddy Martinez MD       • guaiFENesin (MUCINEX) 12 hr tablet 600 mg  600 mg Oral Q12H Ce Bowling-Luis Eduardo, APRN   600 mg at 01/13/18 0905   • heparin (porcine) 5000 UNIT/ML injection 5,000 Units  5,000 Units Subcutaneous Q8H [facility]   5,000 Units at 01/13/18 1300   • ipratropium-albuterol (DUO-NEB) nebulizer solution 3 mL  3 mL Nebulization Q6H - RT [facility]   3 mL at 01/13/18 1257   • losartan (COZAAR) tablet 25 mg  25 mg Oral Daily [facility]   25 mg at 01/13/18 0904   • meclizine (ANTIVERT) tablet 12.5 mg  12.5 mg Oral Q8H PRN April G Cade-Juan Francisco, DO       • methylPREDNISolone sodium succinate (SOLU-Medrol) injection 40 mg  40 mg Intravenous Q12H Ce Bowling-Luis Eduardo, APRN   40 mg at 01/13/18 0633   • nystatin (MYCOSTATIN) 907950 UNIT/ML suspension 500,000 Units  5 mL Oral 4x Daily RUBY Gtz   500,000 Units at 01/13/18 1232   • ondansetron (ZOFRAN) tablet 4 mg  4 mg Oral Q6H PRN April G Talat DO   4 mg at 01/11/18 2100   • Pharmacy to dose vancomycin   Does not apply Continuous PRN April G DO Talat       • piperacillin-tazobactam (ZOSYN) 3.375 g in iso-osmotic dextrose 50 ml (premix)  3.375 g  Intravenous Q8H Ce Herron, APRN 12.5 mL/hr at 01/13/18 1340 3.375 g at 01/13/18 1340   • promethazine-codeine (PHENERGAN with CODEINE) 6.25-10 MG/5ML syrup 5 mL  5 mL Oral TID PRN April G Romid, DO   5 mL at 01/13/18 1350   • propranolol (INDERAL) tablet 60 mg  60 mg Oral Daily April G Cade-Juan Francisco, DO   60 mg at 01/13/18 0904   • Random Vancomycin level 1/11/18 with am labs   Does not apply Continuous PRN April G Cade-Juan Francisco, DO       • sertraline (ZOLOFT) tablet 50 mg  50 mg Oral Q12H April G CadeJuan Francisco, DO   50 mg at 01/13/18 0907   • sodium chloride 0.9 % flush 1-10 mL  1-10 mL Intravenous PRN April G Cade-Juan Francisco, DO       • traMADol (ULTRAM) tablet 50 mg  50 mg Oral Daily PRN April G Romid, DO   50 mg at 01/13/18 0011   • vancomycin 1000 mg in sodium chloride 0.9% 250 mL IVPB  1,000 mg Intravenous Q24H April G Cade-Juan Francisco, DO       • Vancomycin trough 1/15/2018 @ 1930; hold next dose if level greater than 20 mcg/ml   Does not apply Continuous PRN April G Cade-Juan Francisco, DO         Active Problems:    Cellulitis of foot    Assessment/Plan   1.  Right foot cellulitis  2.  Left lower lobe pneumonia failed outpatient antibiotics   3.  Chronic hypoxic respiratory failure  4.  Acute renal failure  5.  Tobacco abuse  6. Oral thrush  7. Leucocytosis, due to IV steroids    Continue with IV antibiotics with vancomycin and Zosyn and Azithromycin for treatment of pneumonia.   Will add on magic mouth wash for mouth soreness. She's already on nystatin swish and swallow for oral thrush.   I have asking nursing to offload her heels as she did complain of some heel pain.     Rest as ordered.    Eddy Martinez MD  01/13/18  2:32 PM    Please note that portions of this note may have been completed with a voice recognition program. Efforts were made to edit the dictations, but occasionally words are mistranscribed.

## 2018-01-13 NOTE — PROGRESS NOTES
"Pharmacokinetic Follow-up Note - Vancomycin     Sadie Tijerina is a 77 y.o. female  157.5 cm (62\") 59.9 kg (132 lb 2 oz)     Indication for use: cellulitis of foot      Results from last 7 days     Lab Units 01/11/18  0615 01/10/18  1827   WBC 10*3/mm3 9.99 13.48*   CREATININE mg/dL 0.90 1.20      Estimated Creatinine Clearance: 47.9 mL/min (by C-G formula based on Cr of 0.9).  Temp Readings from Last 1 Encounters:   01/13/18 97.5 °F (36.4 °C) (Oral)     Lab Results   Component Value Date    VANCOTROUGH 7.45 01/12/2018    VANCORANDOM 9.47 01/11/2018       Microbiology:  Microbiology Results (last 10 days)       Procedure Component Value - Date/Time      Blood Culture With CLAYTON - Blood, [[id number]]  (Normal) Collected:  01/10/18 2037    Lab Status:  Preliminary result Specimen:  Blood from Hand, Left Updated:  01/12/18 2116     Blood Culture No growth at 2 days    Blood Culture With CLAYTON - Blood, [[id number]]  (Normal) Collected:  01/10/18 2021    Lab Status:  Preliminary result Specimen:  Blood from Arm, Left Updated:  01/12/18 2116     Blood Culture No growth at 2 days            Current Vancomycin Dose:  750 mg IVPB every 24 hours, day three of therapy.    Other Antimicrobials:   Piperacillin 3.375 g iv every 8 hours, extended infusion  Azithromycin 500 mg iv every 24 hours  Assessment/Plan:  Change to Vancomycin 1 gm (16.7 mg/kg) iv every 24 hours  Pharmacy will continue to monitor renal function and adjust dose accordingly.    Lilian Sampson RPH   01/13/18 5:21 AM    "

## 2018-01-14 PROCEDURE — 94799 UNLISTED PULMONARY SVC/PX: CPT

## 2018-01-14 PROCEDURE — 99232 SBSQ HOSP IP/OBS MODERATE 35: CPT | Performed by: INTERNAL MEDICINE

## 2018-01-14 PROCEDURE — 25010000002 VANCOMYCIN PER 500 MG: Performed by: INTERNAL MEDICINE

## 2018-01-14 PROCEDURE — 25010000002 PIPERACILLIN SOD-TAZOBACTAM PER 1 G: Performed by: NURSE PRACTITIONER

## 2018-01-14 PROCEDURE — G8978 MOBILITY CURRENT STATUS: HCPCS

## 2018-01-14 PROCEDURE — 25010000002 METHYLPREDNISOLONE PER 40 MG: Performed by: NURSE PRACTITIONER

## 2018-01-14 PROCEDURE — 97161 PT EVAL LOW COMPLEX 20 MIN: CPT

## 2018-01-14 PROCEDURE — 99233 SBSQ HOSP IP/OBS HIGH 50: CPT | Performed by: PODIATRIST

## 2018-01-14 PROCEDURE — G8979 MOBILITY GOAL STATUS: HCPCS

## 2018-01-14 PROCEDURE — 25010000002 HEPARIN (PORCINE) PER 1000 UNITS: Performed by: INTERNAL MEDICINE

## 2018-01-14 PROCEDURE — 97530 THERAPEUTIC ACTIVITIES: CPT

## 2018-01-14 PROCEDURE — 25010000002 AZITHROMYCIN: Performed by: NURSE PRACTITIONER

## 2018-01-14 RX ORDER — PANTOPRAZOLE SODIUM 40 MG/1
40 TABLET, DELAYED RELEASE ORAL
Status: DISCONTINUED | OUTPATIENT
Start: 2018-01-14 | End: 2018-01-15 | Stop reason: HOSPADM

## 2018-01-14 RX ORDER — SODIUM CHLORIDE FOR INHALATION 3 %
4 VIAL, NEBULIZER (ML) INHALATION ONCE
Status: DISCONTINUED | OUTPATIENT
Start: 2018-01-14 | End: 2018-01-15 | Stop reason: HOSPADM

## 2018-01-14 RX ADMIN — SERTRALINE HYDROCHLORIDE 50 MG: 50 TABLET ORAL at 22:24

## 2018-01-14 RX ADMIN — CLONAZEPAM 0.5 MG: 0.5 TABLET ORAL at 22:30

## 2018-01-14 RX ADMIN — AZITHROMYCIN 500 MG: 500 INJECTION, POWDER, LYOPHILIZED, FOR SOLUTION INTRAVENOUS at 17:17

## 2018-01-14 RX ADMIN — IPRATROPIUM BROMIDE AND ALBUTEROL SULFATE 3 ML: .5; 3 SOLUTION RESPIRATORY (INHALATION) at 18:57

## 2018-01-14 RX ADMIN — GUAIFENESIN 600 MG: 600 TABLET, EXTENDED RELEASE ORAL at 20:09

## 2018-01-14 RX ADMIN — COLLAGENASE SANTYL 1 APPLICATION: 250 OINTMENT TOPICAL at 08:12

## 2018-01-14 RX ADMIN — LOSARTAN POTASSIUM 25 MG: 25 TABLET, FILM COATED ORAL at 08:11

## 2018-01-14 RX ADMIN — ASPIRIN 81 MG: 81 TABLET, COATED ORAL at 08:11

## 2018-01-14 RX ADMIN — BUDESONIDE AND FORMOTEROL FUMARATE DIHYDRATE 2 PUFF: 160; 4.5 AEROSOL RESPIRATORY (INHALATION) at 07:00

## 2018-01-14 RX ADMIN — VANCOMYCIN HYDROCHLORIDE 1000 MG: 1 INJECTION, POWDER, LYOPHILIZED, FOR SOLUTION INTRAVENOUS at 20:08

## 2018-01-14 RX ADMIN — NYSTATIN 500000 UNITS: 100000 SUSPENSION ORAL at 08:09

## 2018-01-14 RX ADMIN — PROPRANOLOL HYDROCHLORIDE 60 MG: 20 TABLET ORAL at 08:10

## 2018-01-14 RX ADMIN — HEPARIN SODIUM 5000 UNITS: 5000 INJECTION, SOLUTION INTRAVENOUS; SUBCUTANEOUS at 06:27

## 2018-01-14 RX ADMIN — METHYLPREDNISOLONE SODIUM SUCCINATE 40 MG: 40 INJECTION, POWDER, FOR SOLUTION INTRAMUSCULAR; INTRAVENOUS at 17:11

## 2018-01-14 RX ADMIN — METHYLPREDNISOLONE SODIUM SUCCINATE 40 MG: 40 INJECTION, POWDER, FOR SOLUTION INTRAMUSCULAR; INTRAVENOUS at 06:27

## 2018-01-14 RX ADMIN — PANTOPRAZOLE SODIUM 40 MG: 40 TABLET, DELAYED RELEASE ORAL at 11:23

## 2018-01-14 RX ADMIN — LIDOCAINE HYDROCHLORIDE: 20 SOLUTION ORAL; TOPICAL at 17:11

## 2018-01-14 RX ADMIN — NYSTATIN 500000 UNITS: 100000 SUSPENSION ORAL at 20:09

## 2018-01-14 RX ADMIN — ESTROGENS, CONJUGATED 0.62 MG: 0.62 TABLET, FILM COATED ORAL at 10:02

## 2018-01-14 RX ADMIN — TAZOBACTAM SODIUM AND PIPERACILLIN SODIUM 3.38 G: 375; 3 INJECTION, SOLUTION INTRAVENOUS at 06:27

## 2018-01-14 RX ADMIN — TAZOBACTAM SODIUM AND PIPERACILLIN SODIUM 3.38 G: 375; 3 INJECTION, SOLUTION INTRAVENOUS at 13:32

## 2018-01-14 RX ADMIN — SERTRALINE HYDROCHLORIDE 50 MG: 50 TABLET ORAL at 10:03

## 2018-01-14 RX ADMIN — BUDESONIDE AND FORMOTEROL FUMARATE DIHYDRATE 2 PUFF: 160; 4.5 AEROSOL RESPIRATORY (INHALATION) at 21:57

## 2018-01-14 RX ADMIN — PROMETHAZINE HYDROCHLORIDE AND CODEINE PHOSPHATE 5 ML: 6.25; 1 SOLUTION ORAL at 10:14

## 2018-01-14 RX ADMIN — PROMETHAZINE HYDROCHLORIDE AND CODEINE PHOSPHATE 5 ML: 6.25; 1 SOLUTION ORAL at 21:37

## 2018-01-14 RX ADMIN — NYSTATIN 500000 UNITS: 100000 SUSPENSION ORAL at 11:23

## 2018-01-14 RX ADMIN — ATORVASTATIN CALCIUM 10 MG: 10 TABLET, FILM COATED ORAL at 20:09

## 2018-01-14 RX ADMIN — HEPARIN SODIUM 5000 UNITS: 5000 INJECTION, SOLUTION INTRAVENOUS; SUBCUTANEOUS at 22:24

## 2018-01-14 RX ADMIN — TAZOBACTAM SODIUM AND PIPERACILLIN SODIUM 3.38 G: 375; 3 INJECTION, SOLUTION INTRAVENOUS at 22:24

## 2018-01-14 RX ADMIN — IPRATROPIUM BROMIDE AND ALBUTEROL SULFATE 3 ML: .5; 3 SOLUTION RESPIRATORY (INHALATION) at 01:07

## 2018-01-14 RX ADMIN — HEPARIN SODIUM 5000 UNITS: 5000 INJECTION, SOLUTION INTRAVENOUS; SUBCUTANEOUS at 13:32

## 2018-01-14 RX ADMIN — GUAIFENESIN 600 MG: 600 TABLET, EXTENDED RELEASE ORAL at 08:10

## 2018-01-14 RX ADMIN — IPRATROPIUM BROMIDE AND ALBUTEROL SULFATE 3 ML: .5; 3 SOLUTION RESPIRATORY (INHALATION) at 07:12

## 2018-01-14 RX ADMIN — NYSTATIN 500000 UNITS: 100000 SUSPENSION ORAL at 17:11

## 2018-01-14 NOTE — PROGRESS NOTES
"Hospitalist Progress Note.    LOS: 3 days    Patient Care Team:  Kristian Wolff MD as PCP - General  Kristian Wolff MD as PCP - Family Medicine  Bayron Grimaldo MD (Inactive) as Consulting Physician (General Surgery)    Chief Complaint:    F/u right foot cellulitis     Subjective   Patient seen and examined this morning. She reports feeling a bit unwell today. Cannot describe what she means by it, but feels as though she has mucus that is not coming up. She feels that the wound of her foot is improving. Denies any fevers or chills. I discussed short term rehabilitation with her which she declined but is willing to see PT while inpatient.      Review of Systems:    The pertinent  ROS was done and it is noted above, rest  was negative.    Objective     Vital Signs  /66 (BP Location: Right arm)  Pulse 82  Temp 97.8 °F (36.6 °C) (Oral)   Resp 18  Ht 157.5 cm (62\")  Wt 63.3 kg (139 lb 9 oz)  SpO2 91%  BMI 25.53 kg/m2      I/O this shift:  In: 290 [P.O.:240; IV Piggyback:50]  Out: -     Intake/Output Summary (Last 24 hours) at 01/14/18 1406  Last data filed at 01/14/18 1332   Gross per 24 hour   Intake              770 ml   Output              900 ml   Net             -130 ml       Physical Exam:    General Appearance: alert, oriented x 3, no acute distress, pleasant elderly female    HEENT: pupils round and reactive to light, oral mucosa dry, extra occular movements intact.  Neck: supple, no JVD, trachea midline  Lungs: fair air entry bilaterally, unlabored breathing effort, mild wheezing appreciated in posterior lung bases  Heart: RRR, normal S1 and S2, no S3, no rub  Abdomen: soft, non-tender, no palpable bladder, present bowel sounds to auscultation  Extremities: no edema, cyanosis or clubbing, right foot with dressing on this morning  Neuro: normal speech and mental status, grossly non focal.     Results Review:      Results from last 7 days  Lab Units 01/13/18  0532 01/11/18  0615 01/10/18  1827   SODIUM " mmol/L 138 139 138   POTASSIUM mmol/L 3.9 3.5 3.9   CHLORIDE mmol/L 102 102 97*   CO2 mmol/L 33.0* 34.0* 39.0*   BUN mg/dL 19 18 25*   CREATININE mg/dL 0.70 0.90 1.20   CALCIUM mg/dL 8.6 8.0* 9.2   GLUCOSE mg/dL 115* 103* 95       Estimated Creatinine Clearance: 49.9 mL/min (by C-G formula based on Cr of 0.7).                  Results from last 7 days  Lab Units 01/13/18  0532 01/11/18  0615 01/10/18  1827   WBC 10*3/mm3 19.50* 9.99 13.48*   HEMOGLOBIN g/dL 11.1* 10.4* 12.0   PLATELETS 10*3/mm3 191 157 207               Imaging Results (last 24 hours)     ** No results found for the last 24 hours. **          aspirin 81 mg Oral Daily   atorvastatin 10 mg Oral Daily   azithromycin 500 mg Intravenous Q24H   budesonide-formoterol 2 puff Inhalation BID - RT   collagenase  Topical Q24H   estrogens (conjugated) 0.625 mg Oral Daily   First Mouthwash (Magic Mouthwash)  Swish & Spit Q6H   guaiFENesin 600 mg Oral Q12H   heparin (porcine) 5,000 Units Subcutaneous Q8H   ipratropium-albuterol 3 mL Nebulization Q6H - RT   losartan 25 mg Oral Daily   methylPREDNISolone sodium succinate 40 mg Intravenous Q12H   nystatin 5 mL Oral 4x Daily   pantoprazole 40 mg Oral Q AM   piperacillin-tazobactam 3.375 g Intravenous Q8H   propranolol 60 mg Oral Daily   sertraline 50 mg Oral Q12H   vancomycin 1,000 mg Intravenous Q24H       Pharmacy to dose vancomycin    Pharmacy Consult    Pharmacy Consult        Medication Review:   Current Facility-Administered Medications   Medication Dose Route Frequency Provider Last Rate Last Dose   • acetaminophen (TYLENOL) tablet 650 mg  650 mg Oral Q6H PRN April G Cade-Juan Francisco, DO   650 mg at 01/11/18 1335   • aspirin EC tablet 81 mg  81 mg Oral Daily April G Cade-Juan Francisco, DO   81 mg at 01/14/18 0811   • atorvastatin (LIPITOR) tablet 10 mg  10 mg Oral Daily April G Cade-Juan Francisco, DO   10 mg at 01/13/18 2149   • AZITHROMYCIN 500 MG/250 ML 0.9% NS IVPB (vial-mate)  500 mg Intravenous Q24H Ce  Bowling-Luis Eduardo, APRN   500 mg at 01/13/18 1720   • benzonatate (TESSALON) capsule 100 mg  100 mg Oral TID PRN April G Nevarez-Maggard, DO       • budesonide-formoterol (SYMBICORT) 160-4.5 MCG/ACT inhaler 2 puff  2 puff Inhalation BID - RT April G Romid, DO   2 puff at 01/14/18 0700   • clonazePAM (KlonoPIN) tablet 0.5 mg  0.5 mg Oral Nightly PRN April G Cade-Juan Francisco, DO   0.5 mg at 01/13/18 2149   • collagenase ointment   Topical Q24H Tigre Courtney DPM   1 application at 01/14/18 0812   • estrogens (conjugated) (PREMARIN) tablet 0.625 mg  0.625 mg Oral Daily April G [facility]-Juan Francisco, DO   0.625 mg at 01/14/18 1002   • First Mouthwash (Magic Mouthwash)   Swish & Spit Q6H Eddy Martinez MD       • guaiFENesin (MUCINEX) 12 hr tablet 600 mg  600 mg Oral Q12H Ce Khoa-Luis Eduardo, APRN   600 mg at 01/14/18 0810   • heparin (porcine) 5000 UNIT/ML injection 5,000 Units  5,000 Units Subcutaneous Q8H April G Cade-Juan Francisco, DO   5,000 Units at 01/14/18 1332   • ipratropium-albuterol (DUO-NEB) nebulizer solution 3 mL  3 mL Nebulization Q6H - RT April G CadeJuan Francisco, DO   3 mL at 01/14/18 0712   • losartan (COZAAR) tablet 25 mg  25 mg Oral Daily [facility]   25 mg at 01/14/18 0811   • meclizine (ANTIVERT) tablet 12.5 mg  12.5 mg Oral Q8H PRN April G Nevarez-Maggard, DO       • methylPREDNISolone sodium succinate (SOLU-Medrol) injection 40 mg  40 mg Intravenous Q12H Ce Bowling-Luis Eduardo, APRN   40 mg at 01/14/18 0627   • nystatin (MYCOSTATIN) [id number] UNIT/ML suspension 500,000 Units  5 mL Oral 4x Daily Ce Prafulling-Luis Eduardo, APRN   500,000 Units at 01/14/18 1123   • ondansetron (ZOFRAN) tablet 4 mg  4 mg Oral Q6H PRN April G DO Talat   4 mg at 01/11/18 2100   • pantoprazole (PROTONIX) EC tablet 40 mg  40 mg Oral Q AM Eddy Martinez MD   40 mg at 01/14/18 1123   • Pharmacy to dose vancomycin   Does not apply Continuous PRN April G DO Talat       • piperacillin-tazobactam  (ZOSYN) 3.375 g in iso-osmotic dextrose 50 ml (premix)  3.375 g Intravenous Q8H Ce Herron, APRN 12.5 mL/hr at 01/14/18 1332 3.375 g at 01/14/18 1332   • promethazine-codeine (PHENERGAN with CODEINE) 6.25-10 MG/5ML syrup 5 mL  5 mL Oral TID [facility]N [facility]   5 mL at 01/14/18 1014   • propranolol (INDERAL) tablet 60 mg  60 mg Oral Daily [facility]   60 mg at 01/14/18 0810   • Random Vancomycin level 1/11/18 with am labs   Does not apply Continuous PRN April G Cade-Juan Francisco, DO       • sertraline (ZOLOFT) tablet 50 mg  50 mg Oral Q12H [facility]   50 mg at 01/14/18 1003   • sodium chloride 0.9 % flush 1-10 mL  1-10 mL Intravenous PRN [facility]       • traMADol (ULTRAM) tablet 50 mg  50 mg Oral Daily PRN [facility]   50 mg at 01/13/18 2149   • vancomycin 1000 mg in sodium chloride 0.9% 250 mL IVPB  1,000 mg Intravenous Q24H [facility]   1,000 mg at 01/13/18 1955   • Vancomycin trough 1/15/2018 @ 1930; hold next dose if level greater than 20 mcg/ml   Does not apply Continuous PRN April G Cade-Juan Francisco, DO         Active Problems:    Cellulitis of foot    Assessment/Plan   1.  Right foot cellulitis, wound culture negative so far  2.  Left lower lobe pneumonia failed outpatient antibiotics   3.  Chronic hypoxic respiratory failure  4.  Acute renal failure, resolved  5.  Tobacco abuse  6. Oral thrush  7. Leucocytosis, due to IV steroids    I have ordered for one time dose of hypertonic saline nebulized treatment to help her expectorate. Meanwhile, she remains on broadspectrum IV antibiotics with Vancomycin, zosyn and azithromycin for treatment of pneumonia as well as cellulitis which is improving daily.   She was placed on magic mouth wash yesterday for persistent complaint of mouth soreness.   Dr. Courtney has reevaluated wound again today. Recommends WBAT to right foot with her shoe.     Rest as  ordered.    Eddy Martinez MD  01/14/18  2:06 PM    Please note that portions of this note may have been completed with a voice recognition program. Efforts were made to edit the dictations, but occasionally words are mistranscribed.

## 2018-01-14 NOTE — PLAN OF CARE
Problem: Patient Care Overview (Adult)  Goal: Plan of Care Review  Outcome: Ongoing (interventions implemented as appropriate)   01/14/18 1258   Coping/Psychosocial Response Interventions   Plan Of Care Reviewed With patient   Patient Care Overview   Progress progress toward functional goals as expected   Outcome Evaluation   Outcome Summary/Follow up Plan PT eval complete with performance of bed mobility and transfer to chair. Demonstration of modified 3 point gait pattern for ambulation.        Problem: Inpatient Physical Therapy  Goal: Bed Mobility Goal LTG- PT  Outcome: Ongoing (interventions implemented as appropriate)   01/14/18 1258   Bed Mobility PT LTG   Bed Mobility PT LTG, Date Established 01/14/18   Bed Mobility PT LTG, Time to Achieve 2 wks   Bed Mobility PT LTG, Activity Type all bed mobility   Bed Mobility PT LTG, [place] Level independent     Goal: Transfer Training Goal 1 LTG- PT  Outcome: Ongoing (interventions implemented as appropriate)   01/14/18 1258   Transfer Training PT LTG   Transfer Training PT LTG, Date Established 01/14/18   Transfer Training PT LTG, Time to Achieve 2 wks   Transfer Training PT LTG, Activity Type all transfers   Transfer Training PT LTG, [place] Level conditional independence   Transfer Training PT LTG, Assist Device walker, rolling     Goal: Gait Training Goal LTG- PT  Outcome: Ongoing (interventions implemented as appropriate)   01/14/18 1258   Gait Training PT LTG   Gait Training Goal PT LTG, Date Established 01/14/18   Gait Training Goal PT LTG, Time to Achieve 2 wks   Gait Training Goal PT LTG, [place] Level conditional independence   Gait Training Goal PT LTG, Assist Device walker, rolling   Gait Training Goal PT LTG, Distance to Achieve 200

## 2018-01-14 NOTE — PLAN OF CARE
Problem: Patient Care Overview (Adult)  Goal: Plan of Care Review  Outcome: Ongoing (interventions implemented as appropriate)   01/14/18 0118   Coping/Psychosocial Response Interventions   Plan Of Care Reviewed With patient   Patient Care Overview   Progress improving   Outcome Evaluation   Outcome Summary/Follow up Plan Patient states that she is feeling some better. Continue with IV antibiotics, steroids, and neb treatments.     Goal: Adult Individualization and Mutuality  Outcome: Ongoing (interventions implemented as appropriate)      Problem: Wound, Traumatic, Nonburn (Adult)  Goal: Signs and Symptoms of Listed Potential Problems Will be Absent or Manageable (Wound, Traumatic, Nonburn)  Outcome: Ongoing (interventions implemented as appropriate)      Problem: Infection, Risk/Actual (Adult)  Goal: Infection Prevention/Resolution  Outcome: Ongoing (interventions implemented as appropriate)      Problem: Fall Risk (Adult)  Goal: Absence of Falls  Outcome: Ongoing (interventions implemented as appropriate)

## 2018-01-14 NOTE — THERAPY EVALUATION
Acute Care - Physical Therapy Initial Evaluation   [facility]     Patient Name: Sadie Tijerina  : 1940  MRN: [id number]  Today's Date: 2018   Onset of Illness/Injury or Date of Surgery Date: 01/10/18  Date of Referral to PT: 18  Referring Physician: Michelle      Admit Date: 1/10/2018     Visit Dx:    ICD-10-CM ICD-9-CM   1. Cellulitis of foot L03.119 682.7   2. Impaired functional mobility, balance, gait, and endurance Z74.09 V49.89     Patient Active Problem List   Diagnosis   • Benign paroxysmal positional vertigo   • Calf cramp   • Mixed anxiety depressive disorder   • Dizziness   • Fatigue   • Gastroesophageal reflux disease without esophagitis   • Hematuria   • Hyperlipidemia   • Hypertension   • Low back pain   • Orthostatic hypotension   • Restless legs syndrome   • Pre-syncope   • Essential tremor   • Vitamin D deficiency   • Balance problem   • Tension headache   • COPD exacerbation   • Leukocytosis   • Tobacco abuse disorder   • Wound infection   • Cellulitis of foot     Past Medical History:   Diagnosis Date   • Arthritis    • Depression    • History of emphysema    • History of esophageal reflux    • History of recurrent UTI (urinary tract infection)    • History of renal calculi    • History of uterine cancer    • Hyperlipidemia      Past Surgical History:   Procedure Laterality Date   • FOOT SURGERY     • HAND SURGERY     • HYSTERECTOMY            PT ASSESSMENT (last 72 hours)      PT Evaluation       18 1055 18 1542    Rehab Evaluation    Document Type evaluation  -BS     Subjective Information agree to therapy;complains of;weakness;pain;fatigue;dyspnea  -BS     Patient Effort, Rehab Treatment good  -BS     Symptoms Noted During/After Treatment increased pain;dizziness  -BS     General Information    Patient Profile Review yes  -BS     Onset of Illness/Injury or Date of Surgery Date 01/10/18  -BS     Referring Physician Michelle  -BARBARA     Pertinent History Of Current  Problem Left Lower Lobe PNA, acute renal failure, oral thrush. Cellulitis of left foot.   -BS     Precautions/Limitations fall precautions   Flat darco shoe when ambulating  -BS     Prior Level of Function independent:   Multiple falls reported by patient.   -BS     Equipment Currently Used at Home  nebulizer  -LT    Living Environment    Lives With alone  -BS alone  -LT    Living Arrangements  house  -LT    Home Accessibility  no concerns;bed and bath on same level;stairs within home  -LT    Number of Stairs Within Home  13  -LT    Stair Railings at Home  inside, present on right side  -LT    Type of Financial/Environmental Concern  none  -LT    Transportation Available  car;family or friend will provide  -LT    Clinical Impression    Date of Referral to PT 01/13/18  -BS     PT Diagnosis Deconditioned. Difficulty walking, History of falls.   -BS     Prognosis Fair  -BS     Functional Level At Time Of Evaluation CGA   -BS     Patient/Family Goals Statement Patient requests to return to her home.   -BS     Criteria for Skilled Therapeutic Interventions Met yes  -BS     Pathology/Pathophysiology Noted (Describe Specifically for Each System) musculoskeletal;pulmonary;integumentary;endocrine/metabolic;genitourinary  -BS     Impairments Found (describe specific impairments) aerobic capacity/endurance;arousal, attention, and cognition;gait, locomotion, and balance;integumentary integrity  -BS     Functional Limitations in Following Categories (Describe Specific Limitations) self-care;home management;community/leisure  -BS     Disability: Inability to Perform Actions/Activities of Required Roles (describe specific disability) community/leisure  -BS     Risk Reduction/Prevention (Describe Specific Areas of risk reduction/prevention) recurrence of condition;secondary impairments;other (see comments);risk factors  -BS     Rehab Potential fair, will monitor progress closely  -BS     Predicted Duration of Therapy Intervention  (days/wks) 2 weeks   -BS     Vital Signs    Pre SpO2 (%) 97  -BS     O2 Delivery Pre Treatment supplemental O2  -BS     Intra SpO2 (%) 92  -BS     O2 Delivery Intra Treatment room air  -BS     Post SpO2 (%) 93  -BS     O2 Delivery Post Treatment room air  -BS     Pre Patient Position Supine  -BS     Pain Assessment    Pain Assessment 0-10  -BS     Pain Score 8  -BS     Post Pain Score 8  -BS     Pain Type Acute pain  -BS     Pain Location Foot  -BS     Pain Orientation Right  -BS     Cognitive Assessment/Intervention    Current Cognitive/Communication Assessment functional  -BS     Orientation Status oriented x 4  -BS     Follows Commands/Answers Questions 100% of the time  -BS     Personal Safety mild impairment  -BS     Personal Safety Interventions elopement precautions initiated;fall prevention program maintained;muscle strengthening facilitated;nonskid shoes/slippers when out of bed;supervised activity  -BS     ROM (Range of Motion)    General ROM no range of motion deficits identified  -BS     MMT (Manual Muscle Testing)    General MMT Assessment no strength deficits identified  -BS     Mobility Assessment/Training    Extremity Weight-Bearing Status right lower extremity  -BS     Right Lower Extremity Weight-Bearing weight-bearing as tolerated  -BS     Bed Mobility, Assessment/Treatment    Bed Mobility, Assistive Device bed rails;head of bed elevated  -BS     Bed Mobility, Roll Left, [place] conditional independence  -BS     Bed Mob, Supine to Sit, [place] conditional independence  -BS     Transfer Assessment/Treatment    Transfers, Bed-Chair [place] contact guard assist  -BS     Transfers, Bed-Chair-Bed, Assist Device rolling walker  -BS     Transfers, Sit-Stand [place] contact guard assist  -BS     Transfers, Stand-Sit [place] contact guard assist;verbal cues required;supervision required  -BS     Gait Assessment/Treatment    Gait, [place] Level contact guard assist;supervision  required;verbal cues required  -BS     Gait, Assistive Device rolling walker  -BS     Gait, Distance (Feet) 5  -BS     Gait, Gait Pattern Analysis 3-point gait  -BS     Gait, Gait Deviations right:;antalgic  -BS     Gait, Maintain Weight Bearing Status able to maintain weight bearing status  -BS     Gait, Impairments pain  -BS     Sensory Assessment/Intervention    Sensory Impairment burning  -BS     Positioning and Restraints    Pre-Treatment Position in bed  -BS     Post Treatment Position chair  -BS     In Chair reclined;notified nsg;legs elevated;heels elevated  -BS       User Key  (r) = Recorded By, (t) = Taken By, (c) = Cosigned By    Initials Name Provider Type    BS Yonatan Valladares, PT Physical Therapist    LT Sara Galvez           Physical Therapy Education     Title: PT OT SLP Therapies (Done)     Topic: Physical Therapy (Done)     Point: Mobility training (Done)    Learning Progress Summary    Learner Readiness Method Response Comment Documented by Status   Patient Acceptance E NR,Bed IU Patient educated on need for use of gait device since she has had multiple falls, self reported. Denonstrated understanding of assistance for mobility.  01/14/18 1256 Done               Point: Home exercise program (Done)    Learning Progress Summary    Learner Readiness Method Response Comment Documented by Status   Patient Acceptance E NR,Bed IU Patient educated on need for use of gait device since she has had multiple falls, self reported. Denonstrated understanding of assistance for mobility.  01/14/18 1256 Done               Point: Body mechanics (Done)    Learning Progress Summary    Learner Readiness Method Response Comment Documented by Status   Patient Acceptance E NR,Bed IU Patient educated on need for use of gait device since she has had multiple falls, self reported. Denonstrated understanding of assistance for mobility.  01/14/18 1256 Done               Point: Precautions (Done)    Learning  Progress Summary    Learner Readiness Method Response Comment Documented by Status   Patient Acceptance E NR,Bed IU Patient educated on need for use of gait device since she has had multiple falls, self reported. Denonstrated understanding of assistance for mobility.  01/14/18 1256 Done                      User Key     Initials Effective Dates Name Provider Type Discipline    BS 10/26/16 -  Yonatan Valladares, PT Physical Therapist PT                PT Recommendation and Plan  Anticipated Equipment Needs At Discharge: front wheeled walker  Anticipated Discharge Disposition: home with home health  Demonstrates Need for Referral to Another Service: occupational therapy  Planned Therapy Interventions: gait training, balance training, home exercise program, patient/family education, strengthening  PT Frequency: daily  Plan of Care Review  Plan Of Care Reviewed With: patient  Progress: progress toward functional goals as expected  Outcome Summary/Follow up Plan: PT eval complete with performance of bed mobility and transfer to chair. Demonstration of modified 3 point gait pattern for ambulation.           IP PT Goals       01/14/18 1258          Bed Mobility PT LTG    Bed Mobility PT LTG, Date Established 01/14/18  -BS      Bed Mobility PT LTG, Time to Achieve 2 wks  -BS      Bed Mobility PT LTG, Activity Type all bed mobility  -BS      Bed Mobility PT LTG, [place] Level independent  -BS      Transfer Training PT LTG    Transfer Training PT LTG, Date Established 01/14/18  -BS      Transfer Training PT LTG, Time to Achieve 2 wks  -BS      Transfer Training PT LTG, Activity Type all transfers  -BS      Transfer Training PT LTG, [place] Level conditional independence  -BS      Transfer Training PT LTG, Assist Device walker, rolling  -BS      Gait Training PT LTG    Gait Training Goal PT LTG, Date Established 01/14/18  -BS      Gait Training Goal PT LTG, Time to Achieve 2 wks  -BS      Gait Training Goal PT LTG,  [place] Level conditional independence  -BS      Gait Training Goal PT LTG, Assist Device walker, rolling  -BS      Gait Training Goal PT LTG, Distance to Achieve 200  -BS        User Key  (r) = Recorded By, (t) = Taken By, (c) = Cosigned By    Initials Name Provider Type    BARBARA Valladares PT Physical Therapist                Outcome Measures       01/14/18 1055          How much help from another person do you currently need...    Turning from your back to your side while in flat bed without using bedrails? 3  -BS      Moving from lying on back to sitting on the side of a flat bed without bedrails? 3  -BS      Moving to and from a bed to a chair (including a wheelchair)? 3  -BS      Standing up from a chair using your arms (e.g., wheelchair, bedside chair)? 3  -BS      Climbing 3-5 steps with a railing? 1  -BS      To walk in hospital room? 3  -BS      AM-PAC 6 Clicks Score 16  -BS      Functional Assessment    Outcome Measure Options AM-PAC 6 Clicks Basic Mobility (PT)  -BS        User Key  (r) = Recorded By, (t) = Taken By, (c) = Cosigned By    Initials Name Provider Type    BARBARA Valladares PT Physical Therapist           Time Calculation:         PT Charges       01/14/18 1301          Time Calculation    Stop Time 1055  -BS      PT Received On 01/14/18  -BS      PT Goal Re-Cert Due Date 01/24/18  -BS        User Key  (r) = Recorded By, (t) = Taken By, (c) = Cosigned By    Initials Name Provider Type    BARBARA Valladares PT Physical Therapist          Therapy Charges for Today     Code Description Service Date Service Provider Modifiers Qty    75782484999 HC PT MOBILITY CURRENT 1/14/2018 Yonatan Valladares PT GP, CK 1    74884093229 HC PT MOBILITY PROJECTED 1/14/2018 Yonatan Valladares PT GP, CI 1    95969521378 HC PT THERAPEUTIC ACT EA 15 MIN 1/14/2018 Yonatan Valladares PT GP, KX 1    85597715801 HC PT EVAL LOW COMPLEXITY 4 1/14/2018 Yonatan Valladares PT GP, KX 1          PT G-Codes  PT Professional Judgement  Used?: Yes  Outcome Measure Options: AM-PAC 6 Clicks Basic Mobility (PT)  Score: 16  Functional Limitation: Mobility: Walking and moving around  Mobility: Walking and Moving Around Current Status (): At least 40 percent but less than 60 percent impaired, limited or restricted  Mobility: Walking and Moving Around Goal Status (): At least 1 percent but less than 20 percent impaired, limited or restricted      Yonatan Valladares, PT  1/14/2018

## 2018-01-14 NOTE — PLAN OF CARE
Problem: Patient Care Overview (Adult)  Goal: Plan of Care Review  Outcome: Ongoing (interventions implemented as appropriate)   01/14/18 8741   Coping/Psychosocial Response Interventions   Plan Of Care Reviewed With patient   Patient Care Overview   Progress improving   Outcome Evaluation   Outcome Summary/Follow up Plan WOUND TO RT FOOT HEALING WELL. ANTIBIOTIC THERAPY CONTINUED FOR  WOUND AND PNEUMONIA. PT STATES SHE IS FEELING MUCH BETTER. POSSIBLE DISCHARGE TOMORROW

## 2018-01-14 NOTE — PROGRESS NOTES
Patient Care Team:  Kristian Wolff MD as PCP - General  Kristian Wolff MD as PCP - Family Medicine  Bayron Grimaldo MD (Inactive) as Consulting Physician (General Surgery)    Chief complaint right foot wound/cellulitis, pneumonia    Subjective .   77-year-old nondiabetic female with right foot wound and cellulitis secondary to laceration.  Seen at bedside today.  Still complaining of right foot pain.  Denies any other complaints or constitutional symptoms.      Review of Systems  All systems were reviewed and negative except for chief complaint.    History  Past Medical History:   Diagnosis Date   • Arthritis    • Depression    • History of emphysema    • History of esophageal reflux    • History of recurrent UTI (urinary tract infection)    • History of renal calculi    • History of uterine cancer    • Hyperlipidemia    , Past Surgical History:   Procedure Laterality Date   • FOOT SURGERY     • HAND SURGERY     • HYSTERECTOMY     , Family History   Problem Relation Age of Onset   • Cancer Mother    • Heart attack Father    • Arthritis Father    • Diabetes Daughter    • Hyperlipidemia Daughter    • Migraines Daughter    , Social History   Substance Use Topics   • Smoking status: Current Every Day Smoker     Packs/day: 0.25     Types: Cigarettes   • Smokeless tobacco: Never Used   • Alcohol use No   , Prescriptions Prior to Admission   Medication Sig Dispense Refill Last Dose   • albuterol (PROVENTIL HFA;VENTOLIN HFA) 108 (90 BASE) MCG/ACT inhaler Inhale 1 puff Every 4 (Four) Hours As Needed for shortness of air. 18 g 3 1/10/2018 at Unknown time   • albuterol (PROVENTIL) (2.5 MG/3ML) 0.083% nebulizer solution USE 1 AMPULE IN NEBULIZER FOUR TIMES A DAY AS NEEDED 75 mL 3 Past Week at Unknown time   • aspirin 81 MG EC tablet Take 81 mg by mouth Daily.   1/10/2018   • benzonatate (TESSALON) 200 MG capsule 1 tid po prn (Patient not taking: Reported on 1/11/2018) 45 capsule 0 Not Taking at Unknown time   • cephalexin  (KEFLEX) 500 MG capsule Take 1 capsule by mouth 2 (Two) Times a Day for 7 days. (Patient not taking: Reported on 1/11/2018) 14 capsule 0 Not Taking at Unknown time   • cholecalciferol (VITAMIN D3) 1000 UNITS tablet Take 1,000 Units by mouth Daily.   1/10/2018   • clonazePAM (KlonoPIN) 0.5 MG tablet Take 1 tablet by mouth At Night As Needed for Seizures. 30 tablet 3 1/10/2018   • dextromethorphan polistirex ER (DELSYM) 30 MG/5ML Suspension Extended Release oral suspension Take 30 mg by mouth At Night As Needed (cough). 148 mL 0 Unknown at Unknown time   • levoFLOXacin (LEVAQUIN) 500 MG tablet Take 1 tablet by mouth Daily. 14 tablet 0 1/10/2018   • losartan (COZAAR) 25 MG tablet Take 1 tablet by mouth Daily. (Patient not taking: Reported on 1/11/2018) 30 tablet 2 Not Taking at Unknown time   • meclizine (ANTIVERT) 12.5 MG tablet TAKE 1 TABLET BY MOUTH THREE TIMES A DAY AS NEEDED for dizziness  90 tablet 0 1/10/2018   • pravastatin (PRAVACHOL) 20 MG tablet TAKE 1 TABLET BY MOUTH IN THE EVENING  30 tablet 5 1/9/2018   • predniSONE (DELTASONE) 10 MG tablet Take 1 tablet by mouth Daily. 5 tab for 3 day  4 tab for 3 day  3 tab  3d  2 tab 3 d  1 tab 3d (Patient not taking: Reported on 1/11/2018) 45 tablet 0 Not Taking at Unknown time   • PREMARIN 0.625 MG tablet TAKE 1 TABLET BY MOUTH ONE TIME A DAY  90 tablet 1 1/10/2018   • promethazine-codeine (PHENERGAN with CODEINE) 6.25-10 MG/5ML syrup Take 5 mL by mouth Every 4 (Four) Hours As Needed for Cough.   Unknown at Unknown time   • propranolol (INDERAL) 60 MG tablet Take 1 tablet by mouth Daily. 90 tablet 3 1/10/2018   • sertraline (ZOLOFT) 50 MG tablet TAKE 1 TABLET BY MOUTH TWO TIMES A DAY  60 tablet 5 1/10/2018   • SYMBICORT 160-4.5 MCG/ACT inhaler INHALE 2 PUFFS BY MOUTH TWO TIMES A DAY. Rinse mouth after each use.  10.2 g 1 1/10/2018   • tiotropium (SPIRIVA HANDIHALER) 18 MCG per inhalation capsule Place 1 capsule into inhaler and inhale Daily. 30 capsule 5 1/10/2018  "  • traMADol (ULTRAM) 50 MG tablet Take 1 tablet by mouth Daily As Needed (pain). 30 tablet 2 1/9/2018   , Scheduled Meds:    aspirin 81 mg Oral Daily   atorvastatin 10 mg Oral Daily   azithromycin 500 mg Intravenous Q24H   budesonide-formoterol 2 puff Inhalation BID - RT   collagenase  Topical Q24H   estrogens (conjugated) 0.625 mg Oral Daily   First Mouthwash (Magic Mouthwash)  Swish & Spit Q6H   guaiFENesin 600 mg Oral Q12H   heparin (porcine) 5,000 Units Subcutaneous Q8H   ipratropium-albuterol 3 mL Nebulization Q6H - RT   losartan 25 mg Oral Daily   methylPREDNISolone sodium succinate 40 mg Intravenous Q12H   nystatin 5 mL Oral 4x Daily   pantoprazole 40 mg Oral Q AM   piperacillin-tazobactam 3.375 g Intravenous Q8H   propranolol 60 mg Oral Daily   sertraline 50 mg Oral Q12H   vancomycin 1,000 mg Intravenous Q24H   , Continuous Infusions:    Pharmacy to dose vancomycin    Pharmacy Consult    Pharmacy Consult    , PRN Meds:  •  acetaminophen  •  benzonatate  •  clonazePAM  •  meclizine  •  ondansetron  •  Pharmacy to dose vancomycin  •  promethazine-codeine  •  Pharmacy Consult  •  sodium chloride  •  traMADol  •  Pharmacy Consult and Allergies:  Influenza vaccines and Morphine and related    Objective     Vital Signs   /64 (BP Location: Left arm, Patient Position: Lying)  Pulse 78  Temp 97.4 °F (36.3 °C) (Oral)   Resp 18  Ht 157.5 cm (62\")  Wt 63.3 kg (139 lb 9 oz)  SpO2 92%  BMI 25.53 kg/m2    Physical Exam:AAO ×3, NAD, AF VSS  PERRLA, cranial nerves II through XII intact  Physical exam of the right foot shows a pulses are palpable.  Edema has decreased but there still slightly edematous area over the dorsal midfoot, there is minimal erythema or signs of cellulitis.  The laceration/wound are still present and starting to dry up and improve and become more healthy.  It still fibrotic and brown and yellow in coloration but is showing good signs of healing potential.  She still complains of " significant pain about the entire right rear foot, ankle, midfoot.  The ecchymosis of the digits of the right foot is decreasing.     Results Review: White count is still extremely elevated but this is most likely secondary to pneumonia rather than the right foot.  All other laboratory data reviewed.  Imaging Results: No new imaging.    Assessment/Plan     Active Problems:    Cellulitis of foot  Continue IV antibiotics per the hospitalist service.  I changed her dressing today during physical exam.  Continue local wound care with the [place].  Weight-bear as tolerated to the right foot with her shoe.  I will continue to follow.  Call with questions.        Tigre Courtney DPM  01/14/18  10:47 AM

## 2018-01-15 ENCOUNTER — APPOINTMENT (OUTPATIENT)
Dept: GENERAL RADIOLOGY | Facility: HOSPITAL | Age: 78
End: 2018-01-15

## 2018-01-15 VITALS
HEIGHT: 62 IN | TEMPERATURE: 97.3 F | SYSTOLIC BLOOD PRESSURE: 155 MMHG | BODY MASS INDEX: 25.68 KG/M2 | WEIGHT: 139.56 LBS | RESPIRATION RATE: 18 BRPM | HEART RATE: 79 BPM | DIASTOLIC BLOOD PRESSURE: 61 MMHG | OXYGEN SATURATION: 95 %

## 2018-01-15 PROBLEM — J18.9 PNEUMONIA DUE TO INFECTIOUS ORGANISM: Status: ACTIVE | Noted: 2018-01-15

## 2018-01-15 LAB
ANION GAP SERPL CALCULATED.3IONS-SCNC: 8 MMOL/L
BACTERIA SPEC AEROBE CULT: ABNORMAL
BACTERIA SPEC AEROBE CULT: NORMAL
BACTERIA SPEC AEROBE CULT: NORMAL
BACTERIA SPEC ANAEROBE CULT: NORMAL
BASOPHILS # BLD AUTO: 0.03 10*3/MM3 (ref 0–0.2)
BASOPHILS NFR BLD AUTO: 0.2 % (ref 0–2.5)
BUN BLD-MCNC: 20 MG/DL (ref 7–20)
BUN/CREAT SERPL: 22.2 (ref 7.1–23.5)
CALCIUM SPEC-SCNC: 8.9 MG/DL (ref 8.4–10.2)
CHLORIDE SERPL-SCNC: 100 MMOL/L (ref 98–107)
CO2 SERPL-SCNC: 35 MMOL/L (ref 26–30)
CREAT BLD-MCNC: 0.9 MG/DL (ref 0.6–1.3)
DEPRECATED RDW RBC AUTO: 43.5 FL (ref 37–54)
EOSINOPHIL # BLD AUTO: 0 10*3/MM3 (ref 0–0.7)
EOSINOPHIL NFR BLD AUTO: 0 % (ref 0–7)
ERYTHROCYTE [DISTWIDTH] IN BLOOD BY AUTOMATED COUNT: 13.1 % (ref 11.5–14.5)
GFR SERPL CREATININE-BSD FRML MDRD: 60 ML/MIN/1.73
GLUCOSE BLD-MCNC: 101 MG/DL (ref 74–98)
HCT VFR BLD AUTO: 33.4 % (ref 37–47)
HGB BLD-MCNC: 10.5 G/DL (ref 12–16)
IMM GRANULOCYTES # BLD: 0.22 10*3/MM3 (ref 0–0.06)
IMM GRANULOCYTES NFR BLD: 1.2 % (ref 0–0.6)
LYMPHOCYTES # BLD AUTO: 1.55 10*3/MM3 (ref 0.6–3.4)
LYMPHOCYTES NFR BLD AUTO: 8.6 % (ref 10–50)
MCH RBC QN AUTO: 28.5 PG (ref 27–31)
MCHC RBC AUTO-ENTMCNC: 31.4 G/DL (ref 30–37)
MCV RBC AUTO: 90.8 FL (ref 81–99)
MONOCYTES # BLD AUTO: 1 10*3/MM3 (ref 0–0.9)
MONOCYTES NFR BLD AUTO: 5.5 % (ref 0–12)
NEUTROPHILS # BLD AUTO: 15.26 10*3/MM3 (ref 2–6.9)
NEUTROPHILS NFR BLD AUTO: 84.5 % (ref 37–80)
NRBC BLD MANUAL-RTO: 0 /100 WBC (ref 0–0)
PLATELET # BLD AUTO: 210 10*3/MM3 (ref 130–400)
PMV BLD AUTO: 10.1 FL (ref 6–12)
POTASSIUM BLD-SCNC: 4 MMOL/L (ref 3.5–5.1)
RBC # BLD AUTO: 3.68 10*6/MM3 (ref 4.2–5.4)
SODIUM BLD-SCNC: 139 MMOL/L (ref 137–145)
VANCOMYCIN TROUGH SERPL-MCNC: 13.57 MCG/ML (ref 5–15)
WBC NRBC COR # BLD: 18.06 10*3/MM3 (ref 4.8–10.8)

## 2018-01-15 PROCEDURE — 94799 UNLISTED PULMONARY SVC/PX: CPT

## 2018-01-15 PROCEDURE — 71045 X-RAY EXAM CHEST 1 VIEW: CPT

## 2018-01-15 PROCEDURE — 80202 ASSAY OF VANCOMYCIN: CPT | Performed by: INTERNAL MEDICINE

## 2018-01-15 PROCEDURE — 25010000002 HEPARIN (PORCINE) PER 1000 UNITS: Performed by: INTERNAL MEDICINE

## 2018-01-15 PROCEDURE — 25010000002 PIPERACILLIN SOD-TAZOBACTAM PER 1 G: Performed by: NURSE PRACTITIONER

## 2018-01-15 PROCEDURE — 99239 HOSP IP/OBS DSCHRG MGMT >30: CPT | Performed by: NURSE PRACTITIONER

## 2018-01-15 PROCEDURE — 85025 COMPLETE CBC W/AUTO DIFF WBC: CPT | Performed by: INTERNAL MEDICINE

## 2018-01-15 PROCEDURE — 25010000002 METHYLPREDNISOLONE PER 40 MG: Performed by: NURSE PRACTITIONER

## 2018-01-15 PROCEDURE — 80048 BASIC METABOLIC PNL TOTAL CA: CPT | Performed by: INTERNAL MEDICINE

## 2018-01-15 RX ORDER — PREDNISONE 10 MG/1
TABLET ORAL
Qty: 14 TABLET | Refills: 0 | Status: ON HOLD | OUTPATIENT
Start: 2018-01-15 | End: 2018-01-23

## 2018-01-15 RX ORDER — DOXYCYCLINE HYCLATE 100 MG/1
100 CAPSULE ORAL 2 TIMES DAILY
Qty: 20 CAPSULE | Refills: 0 | Status: SHIPPED | OUTPATIENT
Start: 2018-01-15 | End: 2018-02-06 | Stop reason: HOSPADM

## 2018-01-15 RX ADMIN — LOSARTAN POTASSIUM 25 MG: 25 TABLET, FILM COATED ORAL at 08:31

## 2018-01-15 RX ADMIN — TRAMADOL HYDROCHLORIDE 50 MG: 50 TABLET, FILM COATED ORAL at 08:35

## 2018-01-15 RX ADMIN — IPRATROPIUM BROMIDE AND ALBUTEROL SULFATE 3 ML: .5; 3 SOLUTION RESPIRATORY (INHALATION) at 07:09

## 2018-01-15 RX ADMIN — BUDESONIDE AND FORMOTEROL FUMARATE DIHYDRATE 2 PUFF: 160; 4.5 AEROSOL RESPIRATORY (INHALATION) at 07:09

## 2018-01-15 RX ADMIN — METHYLPREDNISOLONE SODIUM SUCCINATE 40 MG: 40 INJECTION, POWDER, FOR SOLUTION INTRAMUSCULAR; INTRAVENOUS at 05:46

## 2018-01-15 RX ADMIN — IPRATROPIUM BROMIDE AND ALBUTEROL SULFATE 3 ML: .5; 3 SOLUTION RESPIRATORY (INHALATION) at 12:12

## 2018-01-15 RX ADMIN — COLLAGENASE SANTYL: 250 OINTMENT TOPICAL at 08:32

## 2018-01-15 RX ADMIN — NYSTATIN 500000 UNITS: 100000 SUSPENSION ORAL at 13:31

## 2018-01-15 RX ADMIN — IPRATROPIUM BROMIDE AND ALBUTEROL SULFATE 3 ML: .5; 3 SOLUTION RESPIRATORY (INHALATION) at 00:51

## 2018-01-15 RX ADMIN — ASPIRIN 81 MG: 81 TABLET, COATED ORAL at 08:32

## 2018-01-15 RX ADMIN — HEPARIN SODIUM 5000 UNITS: 5000 INJECTION, SOLUTION INTRAVENOUS; SUBCUTANEOUS at 05:46

## 2018-01-15 RX ADMIN — PANTOPRAZOLE SODIUM 40 MG: 40 TABLET, DELAYED RELEASE ORAL at 05:46

## 2018-01-15 RX ADMIN — TAZOBACTAM SODIUM AND PIPERACILLIN SODIUM 3.38 G: 375; 3 INJECTION, SOLUTION INTRAVENOUS at 05:46

## 2018-01-15 RX ADMIN — HEPARIN SODIUM 5000 UNITS: 5000 INJECTION, SOLUTION INTRAVENOUS; SUBCUTANEOUS at 13:31

## 2018-01-15 RX ADMIN — PROPRANOLOL HYDROCHLORIDE 60 MG: 20 TABLET ORAL at 08:31

## 2018-01-15 RX ADMIN — GUAIFENESIN 600 MG: 600 TABLET, EXTENDED RELEASE ORAL at 08:32

## 2018-01-15 RX ADMIN — PROMETHAZINE HYDROCHLORIDE AND CODEINE PHOSPHATE 5 ML: 6.25; 1 SOLUTION ORAL at 05:46

## 2018-01-15 RX ADMIN — ESTROGENS, CONJUGATED 0.62 MG: 0.62 TABLET, FILM COATED ORAL at 10:55

## 2018-01-15 RX ADMIN — NYSTATIN 500000 UNITS: 100000 SUSPENSION ORAL at 08:32

## 2018-01-15 RX ADMIN — SERTRALINE HYDROCHLORIDE 50 MG: 50 TABLET ORAL at 10:55

## 2018-01-15 NOTE — DISCHARGE SUMMARY
[facility]    Name:  Sadie Tijerina   Age:  77 y.o.  Sex:  female  :  1940  MRN:  [id number]   Visit Number:  [id number]  Primary Care Physician:  Kristian Wolff MD  Date of Discharge:  1/15/2018  Admission Date:  1/10/2018      Presenting Problem:    Cellulitis of foot [L03.119]  Cellulitis of foot [L03.119]       Discharge Diagnosis:     Active Problems:    Cellulitis of foot    Pneumonia due to infectious organism        Past Medical History:  Past Medical History:   Diagnosis Date   • Arthritis    • Depression    • History of emphysema    • History of esophageal reflux    • History of recurrent UTI (urinary tract infection)    • History of renal calculi    • History of uterine cancer    • Hyperlipidemia          Consults:     Consults     Date and Time Order Name Status Description    2018 0736 Inpatient Consult to Podiatry Completed           Procedures Performed:  None             History of presenting illness:  This is a 77-year-old female past medical history as listed above who initially had a laceration to her foot on  when she was walking to her screen door and caught her foot on her door.  She went to the emergency room and had 6 sutures and placed on oral antibiotics.  Since then she started noticing redness and swelling.  She came into the emergency room for further evaluation.  He was noted to have a 4-1/2-5 cm gash on the top of her right foot that did have some dried purulent material with surrounding erythema.  It was slightly warm to touch.  The sutures were intact and were removed.  She was also noted to be mildly hypoxic in the emergency room with oxygen saturations 88-90% on room air.  She does have a history of COPD.  Her primary care has recommended chronic oxygen that she has declined in the past.  He was admitted to the hospitalist service for further medical management.      Hospital Course:  Upon admission to  the hospitalist service Dr. Courtney with podiatry was consulted.  He did see and evaluate the patient and felt that IV antibiotics would be the best option and would not need any surgical intervention.  Cultures were done of the foot as well as blood cultures.  Blood cultures have not shown any growth however her wound culture has grown MRSA.  Initiating the IV antibiotics her right foot does appear to be doing much better.  There is no longer any surrounding erythema and it does appear to be healing up very well.  She did have an ultrasound that did not show any evidence of DVT.  An x-ray of her foot was done upon admission which showed soft tissue swelling with no radiographic evidence of osteomyelitis.    At the time of admission patient was also noted to have some cough and congestion.  A chest x-ray was done which showed a left basilar opacity consistent with pneumonia.  Azithromycin was added to the Zosyn and Vancomycin as well as breathing treatments, IV steroids and mucolytics.  Since admission from the respiratory standpoint patient does appear to be doing better.  Repeat chest x-ray done today does show interval improvement in her left lower lobe pneumonia.  She does have some leukocytosis which is likely from the steroids.  She will need a repeat CBC when she follows up with her primary care provider.    I did discuss with the patient rehabilitation placement but she is in agreement for home health with PT OT.  She will also need assistance with dressing changes to her right foot.  Dr. Courtney did recommend Santyl dressing changes.  Patient is otherwise hemodynamically stable and symptomatically improved from the hospitalist standpoint for discharge home.  I will arrange for home health.  Patient has been on room air for the last 24 hours and saturating in the 90s that she will not require home oxygen.  I will have her follow-up with her primary care provider in one week and have her repeat chest x-ray as well  as a repeat CBC to ensure resolution of pneumonia and improving leukocytosis which is likely secondary to steroids.  Patient will be discharged home on oral doxycycline to cover for MRSA of her foot wound as well as pneumonia.        Vital Signs:    Temp:  [97.3 °F (36.3 °C)-97.8 °F (36.6 °C)] 97.3 °F (36.3 °C)  Heart Rate:  [71-85] 79  Resp:  [16-20] 18  BP: (148-177)/(61-75) 155/61    Physical Exam:  General Appearance:    Alert and cooperative, not in any acute distress.   Head:    Atraumatic and normocephalic, without obvious abnormality.   Eyes:            PERRLA, conjunctivae and sclerae normal, no Icterus. No pallor. Extra-occular movements are within normal limits.   Ears:    Ears appear intact with no abnormalities noted.   Throat:   No oral lesions, no thrush, oral mucosa moist.   Neck:   Supple, trachea midline, no thyromegaly, no carotid bruit.   Back:     No kyphoscoliosis present. No tenderness to palpation,   range of motion normal.   Lungs:     Chest shape is normal. Breath sounds heard bilaterally equally.  No crackles or wheezing. No Pleural rub or bronchial breathing.  Upper airway congestion that clears with cough.     Heart:    Normal S1 and S2, no murmur, no gallop, no rub. No JVD   Abdomen:     Normal bowel sounds, no masses, no organomegaly. Soft        non-tender, non-distended, no guarding, no rebound                tenderness   Extremities:   Moves all extremities well, no edema, no cyanosis, no             Clubbing, right foot looks much better.  Erythema resolved, no drainage.  She does have a 4cm laceration that is healing.   Pulses:   Pulses palpable and equal bilaterally   Skin:   No bleeding, bruising or rash   Lymph nodes:  Psychiatric/Behavior:    No palpable adenopathy    Normal mood, normal behavior   Neurologic:   Cranial nerves 2 - 12 grossly intact, sensation intact, Motor power is normal and equal bilaterally.           Pertinent Lab Results:       Results from last 7  days  Lab Units 01/15/18  0551 01/13/18  0532 01/11/18  0615   SODIUM mmol/L 139 138 139   POTASSIUM mmol/L 4.0 3.9 3.5   CHLORIDE mmol/L 100 102 102   CO2 mmol/L 35.0* 33.0* 34.0*   BUN mg/dL 20 19 18   CREATININE mg/dL 0.90 0.70 0.90   CALCIUM mg/dL 8.9 8.6 8.0*   GLUCOSE mg/dL 101* 115* 103*       Results from last 7 days  Lab Units 01/15/18  0551 01/13/18  0532 01/11/18  0615   WBC 10*3/mm3 18.06* 19.50* 9.99   HEMOGLOBIN g/dL 10.5* 11.1* 10.4*   HEMATOCRIT % 33.4* 35.0* 33.6*   PLATELETS 10*3/mm3 210 191 157         Blood Culture   Date Value Ref Range Status   01/10/2018 No growth at 4 days  Preliminary   01/10/2018 No growth at 4 days  Preliminary     Wound Culture   Date Value Ref Range Status   01/10/2018 Light growth (2+) Staphylococcus aureus, MRSA (A)  Corrected     Comment:     With heavy growth of normal skin cassi.  Methicillin resistant Staphylococcus aureus, Patient may be an isolation risk.  Corrected Result: Previously Reported Organism Changed. Previous result was Normal Skin Cassi on 1/14/2018 at 0606 EST         Pertinent Radiology Results:    Imaging Results (all)     Procedure Component Value Units Date/Time    XR Foot 3+ View Right [[id number]] Collected:  01/11/18 0814     Updated:  01/11/18 0818    Narrative:       PROCEDURE: XR FOOT 3+ VW RIGHT-     History: infected wound     COMPARISON:December 24, 2017.     FINDINGS:  A 3 view exam demonstrates no acute fracture or dislocation.  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 There is joint space narrowing and  osteophyte formation at the first metatarsal-phalangeal joint and at the  interphalangeal joint of the great toe consistent with osteoarthritis.  Soft tissue swelling is present at the dorsum of the foot and medially.            Impression:       Soft tissue swelling with no radiographic evidence of  osteomyelitis.                 This report was finalized on 1/11/2018 8:15  AM by Sylvain Dent M.D..    US Venous Doppler Lower Extremity Right (duplex) [[id number]] Collected:  01/11/18 0858     Updated:  01/11/18 0900    Narrative:       PROCEDURE: US VENOUS DOPPLER LOWER EXTREMITY RIGHT (DUPLEX)-     HISTORY: edema; L03.119-Cellulitis of unspecified part of limb     PROCEDURE: Multiple transverse and longitudinal scans were performed of  the femoropopliteal deep venous system, with augmentation and  compression maneuvers.     FINDINGS: Normal phasic flow was noted in the visualized deep venous  system. No intraluminal increased echogenicity is noted to suggest  thrombus. There is normal compression and augmentation of the venous  structures.  No abnormal venous collaterals are seen.       Impression:       No evidence of deep venous thrombosis.     This report was finalized on 1/11/2018 8:58 AM by Sylvain Dent M.D..    XR Chest 1 View [[id number]] Collected:  01/11/18 1138     Updated:  01/11/18 1156    Narrative:       PROCEDURE: XR CHEST 1 VW-     HISTORY: cough, hypoxia, dyspnea; L03.119-Cellulitis of unspecified part  of limb     COMPARISON: December 13, 2017.     FINDINGS: The heart is normal in size. The mediastinum is unremarkable.  Left basilar opacity is consistent with pneumonia. The right lung is  clear. There is no pneumothorax.  There are no acute osseous  abnormalities.           Impression:       Left basilar opacity is consistent with pneumonia. Follow-up  to radiographic resolution is recommended.           Images were reviewed, interpreted, and dictated by Dr. Dent.  Transcribed by Marilee Hernandez PA-C.     This report was finalized on 1/11/2018 11:54 AM by Sylvain Dent M.D..    XR Chest 1 View [[id number]] Collected:  01/15/18 1051     Updated:  01/15/18 1054    Narrative:       PROCEDURE: XR CHEST 1 VW-     HISTORY: pneumonia; L03.119-Cellulitis of unspecified part of limb;  Z74.09-Other reduced mobility     COMPARISON: January 11, 2018.      FINDINGS: The heart is normal in size. The mediastinum is unremarkable.  There has been mild improved aeration of the left lung base compared to  the prior exam. The right lung is clear. There is no pneumothorax.   There are no acute osseous abnormalities.           Impression:       Mild improvement in the patients left basilar airspace  disease.     Continued followup is recommended.     This report was finalized on 1/15/2018 10:52 AM by Sylvain Dent M.D..          Condition on Discharge:    Stable        Discharge Disposition:    Home-Health Care [facility]    Discharge Medication:     Tijerina Sadie Jo   Home Medication Instructions ILYA:[id number]    Printed on:01/15/18 7466   Medication Information                      albuterol (PROVENTIL HFA;VENTOLIN HFA) 108 (90 BASE) MCG/ACT inhaler  Inhale 1 puff Every 4 (Four) Hours As Needed for shortness of air.             albuterol (PROVENTIL) (2.5 MG/3ML) 0.083% nebulizer solution  USE 1 AMPULE IN NEBULIZER FOUR TIMES A DAY AS NEEDED             aspirin 81 MG EC tablet  Take 81 mg by mouth Daily.             benzonatate (TESSALON) 200 MG capsule  1 tid po prn             cholecalciferol (VITAMIN D3) 1000 UNITS tablet  Take 1,000 Units by mouth Daily.             clonazePAM (KlonoPIN) 0.5 MG tablet  Take 1 tablet by mouth At Night As Needed for Seizures.             collagenase 250 UNIT/GM ointment  Apply  topically Daily. Use daily with dressing change.             dextromethorphan polistirex ER (DELSYM) 30 MG/5ML Suspension Extended Release oral suspension  Take 30 mg by mouth At Night As Needed (cough).             doxycycline (DORYX) 100 MG enteric coated tablet  Take 1 tablet by mouth 2 (Two) Times a Day.             losartan (COZAAR) 25 MG tablet  Take 1 tablet by mouth Daily.             meclizine (ANTIVERT) 12.5 MG tablet  TAKE 1 TABLET BY MOUTH THREE TIMES A DAY AS NEEDED for dizziness              pravastatin (PRAVACHOL) 20 MG tablet  TAKE 1 TABLET BY  MOUTH IN THE EVENING              predniSONE (DELTASONE) 10 MG tablet  4 tabs daily X 2 days then 2 tabs daliy X 2 days then 1 tab daily X 2 days then stop.             PREMARIN 0.625 MG tablet  TAKE 1 TABLET BY MOUTH ONE TIME A DAY              promethazine-codeine (PHENERGAN with CODEINE) 6.25-10 MG/5ML syrup  Take 5 mL by mouth Every 4 (Four) Hours As Needed for Cough.             propranolol (INDERAL) 60 MG tablet  Take 1 tablet by mouth Daily.             sertraline (ZOLOFT) 50 MG tablet  TAKE 1 TABLET BY MOUTH TWO TIMES A DAY              SYMBICORT 160-4.5 MCG/ACT inhaler  INHALE 2 PUFFS BY MOUTH TWO TIMES A DAY. Rinse mouth after each use.              tiotropium (SPIRIVA HANDIHALER) 18 MCG per inhalation capsule  Place 1 capsule into inhaler and inhale Daily.             traMADol (ULTRAM) 50 MG tablet  Take 1 tablet by mouth Daily As Needed (pain).                 Discharge Diet:     Diet Instructions     Diet: Cardiac; Thin       Discharge Diet:  Cardiac   Fluid Consistency:  Thin                 Activity at Discharge:     Activity Instructions     Discharge Activity       As tolerates               Additional Instructions:  Clean wound with NS, pat dry, apply santyl and cover with dry dressing daily  CBC in 1 week when following up with PCP  Repeat chest xray in 1 week when following up with PCP to ensure resolution of pneumonia.       Follow-up Appointments:    Future Appointments  Date Time Provider Department Center   1/19/2018 2:45 PM Kristian Rasheed MD [facility] None     Additional Instructions for the Follow-ups that You Need to Schedule     Ambulatory Referral to Home Health    As directed    Face to Face Visit Date:  1/15/2018    Follow-up Provider for Plan of Care?:  I treated the patient in an acute care facility and will not continue treatment after discharge.    Follow-up Provider:  KRISTIAN RASHEED [[id number]]    Reason/Clinical Findings:  strenth mobility wound care    Describe mobility limitations  that make leaving home difficult:  COPD,    Nursing/Therapeutic Services Requested:  Skilled Nursing Physical Therapy Occupational Therapy    Skilled nursing orders:  Wound care dressing/changes    Instructions:  clean NS, pat dry, apply santyl and dry dressing daily    PT orders:  Strengthening    Occupational orders:  Strengthening Energy conservation Activities of daily living    Frequency:  1 Week 1                     Test Results Pending at Discharge:     Order Current Status    Blood Culture With C[facility]TON - Blood, Preliminary result    Blood Culture With [facility] - Blood, Preliminary result             RUBY Gtz  01/15/18  1:46 PM    Time:  35  minutes were spent reviewing labs, history, evaluating patient and discharge planning.

## 2018-01-15 NOTE — PLAN OF CARE
Problem: Patient Care Overview (Adult)  Goal: Plan of Care Review  Outcome: Ongoing (interventions implemented as appropriate)    Goal: Adult Individualization and Mutuality  Outcome: Ongoing (interventions implemented as appropriate)      Problem: Wound, Traumatic, Nonburn (Adult)  Goal: Signs and Symptoms of Listed Potential Problems Will be Absent or Manageable (Wound, Traumatic, Nonburn)  Outcome: Ongoing (interventions implemented as appropriate)      Problem: Infection, Risk/Actual (Adult)  Goal: Infection Prevention/Resolution  Outcome: Ongoing (interventions implemented as appropriate)      Problem: Fall Risk (Adult)  Goal: Absence of Falls  Outcome: Ongoing (interventions implemented as appropriate)

## 2018-01-15 NOTE — DISCHARGE INSTRUCTIONS
Clean wound with NS, pat dry, apply santyl and cover with dry dressing daily  CBC in 1 week when following up with PCP  Repeat chest xray in 1 week when following up with PCP to ensure resolution of pneumonia.

## 2018-01-16 ENCOUNTER — TRANSITIONAL CARE MANAGEMENT TELEPHONE ENCOUNTER (OUTPATIENT)
Dept: INTERNAL MEDICINE | Facility: CLINIC | Age: 78
End: 2018-01-16

## 2018-01-18 ENCOUNTER — OFFICE VISIT (OUTPATIENT)
Dept: INTERNAL MEDICINE | Facility: CLINIC | Age: 78
End: 2018-01-18

## 2018-01-18 ENCOUNTER — TELEPHONE (OUTPATIENT)
Dept: INTERNAL MEDICINE | Facility: CLINIC | Age: 78
End: 2018-01-18

## 2018-01-18 VITALS
OXYGEN SATURATION: 96 % | WEIGHT: 124 LBS | SYSTOLIC BLOOD PRESSURE: 156 MMHG | DIASTOLIC BLOOD PRESSURE: 70 MMHG | TEMPERATURE: 98.4 F | BODY MASS INDEX: 21.17 KG/M2 | RESPIRATION RATE: 14 BRPM | HEIGHT: 64 IN | HEART RATE: 70 BPM

## 2018-01-18 DIAGNOSIS — IMO0001 WOUND ABSCESS, SUBSEQUENT ENCOUNTER: ICD-10-CM

## 2018-01-18 DIAGNOSIS — M79.89 LEG SWELLING: Primary | ICD-10-CM

## 2018-01-18 DIAGNOSIS — J18.9 PNEUMONIA DUE TO INFECTIOUS ORGANISM, UNSPECIFIED LATERALITY, UNSPECIFIED PART OF LUNG: ICD-10-CM

## 2018-01-18 PROBLEM — IMO0002 WOUND ABSCESS: Status: ACTIVE | Noted: 2018-01-18

## 2018-01-18 PROCEDURE — 99496 TRANSJ CARE MGMT HIGH F2F 7D: CPT | Performed by: INTERNAL MEDICINE

## 2018-01-18 RX ORDER — PROMETHAZINE HYDROCHLORIDE AND CODEINE PHOSPHATE 6.25; 1 MG/5ML; MG/5ML
5 SYRUP ORAL EVERY 4 HOURS PRN
Qty: 240 ML | Refills: 0 | Status: SHIPPED | OUTPATIENT
Start: 2018-01-18 | End: 2018-02-06 | Stop reason: HOSPADM

## 2018-01-18 NOTE — PROGRESS NOTES
Transitional Care Follow Up Visit  Subjective     Sadieulices Tijerina is a 77 y.o. female who presents for a transitional care management visit.no significant cough short of breath     Within 48 business hours after discharge our office contacted her via telephone to coordinate her care and needs.      I reviewed and discussed the details of that call along with the discharge summary, hospital problems, inpatient lab results, inpatient diagnostic studies, and consultation reports with Sadie.     Current outpatient and discharge medications have been reconciled for the patient.    Date of TCM Phone Call 1/10/2017 1/16/2018   [facility]   Date of Admission 12/26/2016 1/11/2018   Date of Discharge 1/7/2017 1/15/2018   Discharge Disposition Home or Self Care Home or Self Care     Risk for Readmission (LACE) Score: 13    History of Present Illness   Course During Hospital Stay:  The following portions of the patient's history were reviewed and updated as appropriate: allergies, current medications, past family history, past medical history, past social history, past surgical history and problem list.  Patient recently was hospitalized for pneumonia and right leg wound care.  Cough improved antibiotics improved symptoms no short of breath.  Yesterday morning patient developed both lower extremity swelling mainly involve both feet and ankle area.  Mild the pain when standing up.  No erythema.  Mild tenderness.  Patient is taking steroid recently for pneumonia.  Review of Systems   Constitutional: Negative.    Respiratory: Negative.    Cardiovascular: Positive for leg swelling.   Gastrointestinal: Negative.    Musculoskeletal: Negative.    Skin: Positive for wound.   Neurological: Negative.    Psychiatric/Behavioral: Negative.        Objective   Physical Exam   Constitutional: She is oriented to person, place, and time. She appears well-nourished.   Neck: Neck supple.    Cardiovascular: Normal rate, regular rhythm and normal heart sounds.    Pulmonary/Chest: Effort normal and breath sounds normal.   Abdominal: Bowel sounds are normal.   Musculoskeletal: She exhibits tenderness (calves).   Neurological: She is alert and oriented to person, place, and time.   Skin: Skin is warm.   Right wound   Psychiatric: She has a normal mood and affect.       Assessment/Plan   Sadie was seen today for transitional care management.    Diagnoses and all orders for this visit:    Leg swelling  -     Duplex Venous Lower Extremity - Bilateral CAR; Future    Pneumonia due to infectious organism, unspecified laterality, unspecified part of lung    Wound abscess, subsequent encounter           leg swelling probably due to steroid we will perform venous Doppler to rule out clots, d/c steroid, do CBC and CXR in a week  Wound laceration continue home health wound care.  Pneumonia continue antibiotics   3 week

## 2018-01-18 NOTE — TELEPHONE ENCOUNTER
Milli from [facility] called regarding Mrs. Tijerina.     She states that the patient has bilateral leg and foot swelling and +2 edema. She states that the swelling starts about mid-shin and goes into the patient's feet.    Patient is currently taking prednisone, and states that the last time she was on this medication she had the same problem.    Please advise.

## 2018-01-19 ENCOUNTER — HOSPITAL ENCOUNTER (OUTPATIENT)
Dept: CARDIOLOGY | Facility: HOSPITAL | Age: 78
Discharge: HOME | End: 2018-01-19
Attending: INTERNAL MEDICINE | Admitting: INTERNAL MEDICINE

## 2018-01-19 DIAGNOSIS — M79.89 LEG SWELLING: ICD-10-CM

## 2018-01-19 PROCEDURE — 93970 EXTREMITY STUDY: CPT | Performed by: INTERNAL MEDICINE

## 2018-01-19 PROCEDURE — 93970 EXTREMITY STUDY: CPT

## 2018-01-22 ENCOUNTER — HOSPITAL ENCOUNTER (INPATIENT)
Facility: HOSPITAL | Age: 78
LOS: 12 days | Discharge: NURSING, REHAB OR LONG-TERM CARE | End: 2018-02-06
Attending: EMERGENCY MEDICINE | Admitting: FAMILY MEDICINE

## 2018-01-22 ENCOUNTER — TELEPHONE (OUTPATIENT)
Dept: INTERNAL MEDICINE | Facility: CLINIC | Age: 78
End: 2018-01-22

## 2018-01-22 ENCOUNTER — APPOINTMENT (OUTPATIENT)
Dept: CT IMAGING | Facility: HOSPITAL | Age: 78
End: 2018-01-22

## 2018-01-22 ENCOUNTER — APPOINTMENT (OUTPATIENT)
Dept: GENERAL RADIOLOGY | Facility: HOSPITAL | Age: 78
End: 2018-01-22

## 2018-01-22 DIAGNOSIS — Z78.9 IMPAIRED MOBILITY AND ADLS: ICD-10-CM

## 2018-01-22 DIAGNOSIS — J44.1 CHRONIC OBSTRUCTIVE PULMONARY DISEASE WITH ACUTE EXACERBATION (HCC): Primary | ICD-10-CM

## 2018-01-22 DIAGNOSIS — Z74.09 IMPAIRED MOBILITY AND ADLS: ICD-10-CM

## 2018-01-22 DIAGNOSIS — Z74.09 IMPAIRED FUNCTIONAL MOBILITY, BALANCE, GAIT, AND ENDURANCE: ICD-10-CM

## 2018-01-22 LAB
ALBUMIN SERPL-MCNC: 3.5 G/DL (ref 3.5–5)
ALBUMIN/GLOB SERPL: 1.3 G/DL (ref 1–2)
ALP SERPL-CCNC: 78 U/L (ref 38–126)
ALT SERPL W P-5'-P-CCNC: 38 U/L (ref 13–69)
ANION GAP SERPL CALCULATED.3IONS-SCNC: 10.4 MMOL/L
AST SERPL-CCNC: 24 U/L (ref 15–46)
BASOPHILS # BLD AUTO: 0.04 10*3/MM3 (ref 0–0.2)
BASOPHILS NFR BLD AUTO: 0.3 % (ref 0–2.5)
BILIRUB SERPL-MCNC: 0.4 MG/DL (ref 0.2–1.3)
BUN BLD-MCNC: 38 MG/DL (ref 7–20)
BUN/CREAT SERPL: 42.2 (ref 7.1–23.5)
CALCIUM SPEC-SCNC: 9 MG/DL (ref 8.4–10.2)
CHLORIDE SERPL-SCNC: 102 MMOL/L (ref 98–107)
CO2 SERPL-SCNC: 30 MMOL/L (ref 26–30)
CREAT BLD-MCNC: 0.9 MG/DL (ref 0.6–1.3)
DEPRECATED RDW RBC AUTO: 46.5 FL (ref 37–54)
EOSINOPHIL # BLD AUTO: 0.19 10*3/MM3 (ref 0–0.7)
EOSINOPHIL NFR BLD AUTO: 1.2 % (ref 0–7)
ERYTHROCYTE [DISTWIDTH] IN BLOOD BY AUTOMATED COUNT: 13.7 % (ref 11.5–14.5)
FLUAV AG NPH QL: NEGATIVE
FLUBV AG NPH QL IA: NEGATIVE
GFR SERPL CREATININE-BSD FRML MDRD: 60 ML/MIN/1.73
GLOBULIN UR ELPH-MCNC: 2.7 GM/DL
GLUCOSE BLD-MCNC: 105 MG/DL (ref 74–98)
HCT VFR BLD AUTO: 39.7 % (ref 37–47)
HGB BLD-MCNC: 12.3 G/DL (ref 12–16)
HOLD SPECIMEN: NORMAL
HOLD SPECIMEN: NORMAL
IMM GRANULOCYTES # BLD: 0.1 10*3/MM3 (ref 0–0.06)
IMM GRANULOCYTES NFR BLD: 0.6 % (ref 0–0.6)
LYMPHOCYTES # BLD AUTO: 2.18 10*3/MM3 (ref 0.6–3.4)
LYMPHOCYTES NFR BLD AUTO: 13.8 % (ref 10–50)
MCH RBC QN AUTO: 28.7 PG (ref 27–31)
MCHC RBC AUTO-ENTMCNC: 31 G/DL (ref 30–37)
MCV RBC AUTO: 92.5 FL (ref 81–99)
MONOCYTES # BLD AUTO: 1.17 10*3/MM3 (ref 0–0.9)
MONOCYTES NFR BLD AUTO: 7.4 % (ref 0–12)
NEUTROPHILS # BLD AUTO: 12.07 10*3/MM3 (ref 2–6.9)
NEUTROPHILS NFR BLD AUTO: 76.7 % (ref 37–80)
NRBC BLD MANUAL-RTO: 0 /100 WBC (ref 0–0)
NT-PROBNP SERPL-MCNC: 181 PG/ML (ref 0–450)
PLATELET # BLD AUTO: 182 10*3/MM3 (ref 130–400)
PMV BLD AUTO: 10.4 FL (ref 6–12)
POTASSIUM BLD-SCNC: 4.4 MMOL/L (ref 3.5–5.1)
PROT SERPL-MCNC: 6.2 G/DL (ref 6.3–8.2)
RBC # BLD AUTO: 4.29 10*6/MM3 (ref 4.2–5.4)
SODIUM BLD-SCNC: 138 MMOL/L (ref 137–145)
TROPONIN I SERPL-MCNC: <0.012 NG/ML (ref 0–0.03)
WBC NRBC COR # BLD: 15.75 10*3/MM3 (ref 4.8–10.8)
WHOLE BLOOD HOLD SPECIMEN: NORMAL
WHOLE BLOOD HOLD SPECIMEN: NORMAL

## 2018-01-22 PROCEDURE — 83880 ASSAY OF NATRIURETIC PEPTIDE: CPT | Performed by: NURSE PRACTITIONER

## 2018-01-22 PROCEDURE — 87804 INFLUENZA ASSAY W/OPTIC: CPT | Performed by: NURSE PRACTITIONER

## 2018-01-22 PROCEDURE — 25010000002 METHYLPREDNISOLONE PER 40 MG: Performed by: NURSE PRACTITIONER

## 2018-01-22 PROCEDURE — 71045 X-RAY EXAM CHEST 1 VIEW: CPT

## 2018-01-22 PROCEDURE — 99284 EMERGENCY DEPT VISIT MOD MDM: CPT

## 2018-01-22 PROCEDURE — 94799 UNLISTED PULMONARY SVC/PX: CPT

## 2018-01-22 PROCEDURE — 93005 ELECTROCARDIOGRAM TRACING: CPT | Performed by: NURSE PRACTITIONER

## 2018-01-22 PROCEDURE — 71275 CT ANGIOGRAPHY CHEST: CPT

## 2018-01-22 PROCEDURE — 80053 COMPREHEN METABOLIC PANEL: CPT | Performed by: NURSE PRACTITIONER

## 2018-01-22 PROCEDURE — 85025 COMPLETE CBC W/AUTO DIFF WBC: CPT | Performed by: NURSE PRACTITIONER

## 2018-01-22 PROCEDURE — 94640 AIRWAY INHALATION TREATMENT: CPT

## 2018-01-22 PROCEDURE — 99285 EMERGENCY DEPT VISIT HI MDM: CPT

## 2018-01-22 PROCEDURE — 0 IOPAMIDOL 61 % SOLUTION: Performed by: EMERGENCY MEDICINE

## 2018-01-22 PROCEDURE — 84484 ASSAY OF TROPONIN QUANT: CPT | Performed by: NURSE PRACTITIONER

## 2018-01-22 RX ORDER — SODIUM CHLORIDE 0.9 % (FLUSH) 0.9 %
10 SYRINGE (ML) INJECTION AS NEEDED
Status: DISCONTINUED | OUTPATIENT
Start: 2018-01-22 | End: 2018-02-06 | Stop reason: HOSPADM

## 2018-01-22 RX ORDER — METHYLPREDNISOLONE SODIUM SUCCINATE 40 MG/ML
80 INJECTION, POWDER, LYOPHILIZED, FOR SOLUTION INTRAMUSCULAR; INTRAVENOUS ONCE
Status: COMPLETED | OUTPATIENT
Start: 2018-01-22 | End: 2018-01-22

## 2018-01-22 RX ORDER — FUROSEMIDE 20 MG/1
20 TABLET ORAL DAILY
Qty: 30 TABLET | Refills: 0 | Status: SHIPPED | OUTPATIENT
Start: 2018-01-22 | End: 2018-02-06 | Stop reason: HOSPADM

## 2018-01-22 RX ORDER — POTASSIUM CHLORIDE 750 MG/1
10 TABLET, FILM COATED, EXTENDED RELEASE ORAL DAILY
Qty: 30 TABLET | Refills: 0 | Status: SHIPPED | OUTPATIENT
Start: 2018-01-22 | End: 2018-02-06 | Stop reason: HOSPADM

## 2018-01-22 RX ORDER — IPRATROPIUM BROMIDE AND ALBUTEROL SULFATE 2.5; .5 MG/3ML; MG/3ML
3 SOLUTION RESPIRATORY (INHALATION) ONCE
Status: COMPLETED | OUTPATIENT
Start: 2018-01-22 | End: 2018-01-22

## 2018-01-22 RX ADMIN — METHYLPREDNISOLONE SODIUM SUCCINATE 80 MG: 40 INJECTION, POWDER, FOR SOLUTION INTRAMUSCULAR; INTRAVENOUS at 21:14

## 2018-01-22 RX ADMIN — IPRATROPIUM BROMIDE AND ALBUTEROL SULFATE 3 ML: .5; 3 SOLUTION RESPIRATORY (INHALATION) at 21:06

## 2018-01-22 RX ADMIN — IOPAMIDOL 95 ML: 612 INJECTION, SOLUTION INTRAVENOUS at 21:51

## 2018-01-22 RX ADMIN — SODIUM CHLORIDE 500 ML: 9 INJECTION, SOLUTION INTRAVENOUS at 21:13

## 2018-01-22 NOTE — TELEPHONE ENCOUNTER
Patient daughter and [facility] health told her that patient has a lot of swelling and needs to be placed on lasix and told her to contact pcp. She also wants to get results of us done last week

## 2018-01-23 PROBLEM — I50.32 CHRONIC DIASTOLIC HEART FAILURE (HCC): Chronic | Status: ACTIVE | Noted: 2018-01-23

## 2018-01-23 PROBLEM — J96.21 ACUTE ON CHRONIC RESPIRATORY FAILURE WITH HYPOXIA (HCC): Status: ACTIVE | Noted: 2018-01-23

## 2018-01-23 PROBLEM — I87.2 VENOUS INSUFFICIENCY OF BOTH LOWER EXTREMITIES: Chronic | Status: ACTIVE | Noted: 2018-01-23

## 2018-01-23 LAB
ANION GAP SERPL CALCULATED.3IONS-SCNC: 12.1 MMOL/L
BASOPHILS # BLD AUTO: 0.02 10*3/MM3 (ref 0–0.2)
BASOPHILS NFR BLD AUTO: 0.2 % (ref 0–2.5)
BH CV ECHO MEAS - BSA(HAYCOCK): 1.6 M^2
BH CV ECHO MEAS - BSA: 1.6 M^2
BH CV ECHO MEAS - BZI_BMI: 21.3 KILOGRAMS/M^2
BH CV ECHO MEAS - BZI_METRIC_HEIGHT: 162.6 CM
BH CV ECHO MEAS - BZI_METRIC_WEIGHT: 56.2 KG
BH CV LOWER VASCULAR LEFT COMMON FEMORAL AUGMENT: NORMAL
BH CV LOWER VASCULAR LEFT COMMON FEMORAL COMPRESS: NORMAL
BH CV LOWER VASCULAR LEFT COMMON FEMORAL PHASIC: NORMAL
BH CV LOWER VASCULAR LEFT COMMON FEMORAL SPONT: NORMAL
BH CV LOWER VASCULAR LEFT DISTAL FEMORAL COMPRESS: NORMAL
BH CV LOWER VASCULAR LEFT GASTRONEMIUS COMPRESS: NORMAL
BH CV LOWER VASCULAR LEFT GREATER SAPH AK COMPRESS: NORMAL
BH CV LOWER VASCULAR LEFT GREATER SAPH BK COMPRESS: NORMAL
BH CV LOWER VASCULAR LEFT LESSER SAPH COMPRESS: NORMAL
BH CV LOWER VASCULAR LEFT MID FEMORAL AUGMENT: NORMAL
BH CV LOWER VASCULAR LEFT MID FEMORAL COMPRESS: NORMAL
BH CV LOWER VASCULAR LEFT MID FEMORAL PHASIC: NORMAL
BH CV LOWER VASCULAR LEFT MID FEMORAL SPONT: NORMAL
BH CV LOWER VASCULAR LEFT PERONEAL COMPRESS: NORMAL
BH CV LOWER VASCULAR LEFT POPLITEAL AUGMENT: NORMAL
BH CV LOWER VASCULAR LEFT POPLITEAL COMPRESS: NORMAL
BH CV LOWER VASCULAR LEFT POPLITEAL PHASIC: NORMAL
BH CV LOWER VASCULAR LEFT POPLITEAL SPONT: NORMAL
BH CV LOWER VASCULAR LEFT POSTERIOR TIBIAL COMPRESS: NORMAL
BH CV LOWER VASCULAR LEFT PROFUNDA FEMORAL AUGMENT: NORMAL
BH CV LOWER VASCULAR LEFT PROFUNDA FEMORAL COMPRESS: NORMAL
BH CV LOWER VASCULAR LEFT PROFUNDA FEMORAL PHASIC: NORMAL
BH CV LOWER VASCULAR LEFT PROFUNDA FEMORAL SPONT: NORMAL
BH CV LOWER VASCULAR LEFT PROXIMAL FEMORAL COMPRESS: NORMAL
BH CV LOWER VASCULAR LEFT SAPHENOFEMORAL JUNCTION AUGMENT: NORMAL
BH CV LOWER VASCULAR LEFT SAPHENOFEMORAL JUNCTION COMPRESS: NORMAL
BH CV LOWER VASCULAR LEFT SAPHENOFEMORAL JUNCTION PHASIC: NORMAL
BH CV LOWER VASCULAR LEFT SAPHENOFEMORAL JUNCTION SPONT: NORMAL
BH CV LOWER VASCULAR RIGHT COMMON FEMORAL AUGMENT: NORMAL
BH CV LOWER VASCULAR RIGHT COMMON FEMORAL COMPRESS: NORMAL
BH CV LOWER VASCULAR RIGHT COMMON FEMORAL PHASIC: NORMAL
BH CV LOWER VASCULAR RIGHT COMMON FEMORAL SPONT: NORMAL
BH CV LOWER VASCULAR RIGHT DISTAL FEMORAL COMPRESS: NORMAL
BH CV LOWER VASCULAR RIGHT GASTRONEMIUS COMPRESS: NORMAL
BH CV LOWER VASCULAR RIGHT GREATER SAPH AK COMPRESS: NORMAL
BH CV LOWER VASCULAR RIGHT GREATER SAPH BK COMPRESS: NORMAL
BH CV LOWER VASCULAR RIGHT LESSER SAPH COMPRESS: NORMAL
BH CV LOWER VASCULAR RIGHT MID FEMORAL AUGMENT: NORMAL
BH CV LOWER VASCULAR RIGHT MID FEMORAL COMPRESS: NORMAL
BH CV LOWER VASCULAR RIGHT MID FEMORAL PHASIC: NORMAL
BH CV LOWER VASCULAR RIGHT MID FEMORAL SPONT: NORMAL
BH CV LOWER VASCULAR RIGHT PERONEAL COMPRESS: NORMAL
BH CV LOWER VASCULAR RIGHT POPLITEAL AUGMENT: NORMAL
BH CV LOWER VASCULAR RIGHT POPLITEAL COMPRESS: NORMAL
BH CV LOWER VASCULAR RIGHT POPLITEAL PHASIC: NORMAL
BH CV LOWER VASCULAR RIGHT POPLITEAL SPONT: NORMAL
BH CV LOWER VASCULAR RIGHT POSTERIOR TIBIAL COMPRESS: NORMAL
BH CV LOWER VASCULAR RIGHT PROFUNDA FEMORAL COMPRESS: NORMAL
BH CV LOWER VASCULAR RIGHT PROXIMAL FEMORAL COMPRESS: NORMAL
BH CV LOWER VASCULAR RIGHT SAPHENOFEMORAL JUNCTION AUGMENT: NORMAL
BH CV LOWER VASCULAR RIGHT SAPHENOFEMORAL JUNCTION COMPRESS: NORMAL
BH CV LOWER VASCULAR RIGHT SAPHENOFEMORAL JUNCTION PHASIC: NORMAL
BH CV LOWER VASCULAR RIGHT SAPHENOFEMORAL JUNCTION SPONT: NORMAL
BUN BLD-MCNC: 29 MG/DL (ref 7–20)
BUN/CREAT SERPL: 36.3 (ref 7.1–23.5)
CALCIUM SPEC-SCNC: 8.4 MG/DL (ref 8.4–10.2)
CHLORIDE SERPL-SCNC: 105 MMOL/L (ref 98–107)
CO2 SERPL-SCNC: 27 MMOL/L (ref 26–30)
CREAT BLD-MCNC: 0.8 MG/DL (ref 0.6–1.3)
DEPRECATED RDW RBC AUTO: 46.5 FL (ref 37–54)
EOSINOPHIL # BLD AUTO: 0 10*3/MM3 (ref 0–0.7)
EOSINOPHIL NFR BLD AUTO: 0 % (ref 0–7)
ERYTHROCYTE [DISTWIDTH] IN BLOOD BY AUTOMATED COUNT: 13.5 % (ref 11.5–14.5)
GFR SERPL CREATININE-BSD FRML MDRD: 69 ML/MIN/1.73
GLUCOSE BLD-MCNC: 182 MG/DL (ref 74–98)
HCT VFR BLD AUTO: 36.7 % (ref 37–47)
HGB BLD-MCNC: 11.3 G/DL (ref 12–16)
IMM GRANULOCYTES # BLD: 0.06 10*3/MM3 (ref 0–0.06)
IMM GRANULOCYTES NFR BLD: 0.6 % (ref 0–0.6)
LYMPHOCYTES # BLD AUTO: 0.68 10*3/MM3 (ref 0.6–3.4)
LYMPHOCYTES NFR BLD AUTO: 6.5 % (ref 10–50)
MAGNESIUM SERPL-MCNC: 1.9 MG/DL (ref 1.6–2.3)
MCH RBC QN AUTO: 28.8 PG (ref 27–31)
MCHC RBC AUTO-ENTMCNC: 30.8 G/DL (ref 30–37)
MCV RBC AUTO: 93.4 FL (ref 81–99)
MONOCYTES # BLD AUTO: 0.08 10*3/MM3 (ref 0–0.9)
MONOCYTES NFR BLD AUTO: 0.8 % (ref 0–12)
NEUTROPHILS # BLD AUTO: 9.61 10*3/MM3 (ref 2–6.9)
NEUTROPHILS NFR BLD AUTO: 91.9 % (ref 37–80)
NRBC BLD MANUAL-RTO: 0 /100 WBC (ref 0–0)
PLATELET # BLD AUTO: 167 10*3/MM3 (ref 130–400)
PMV BLD AUTO: 11.2 FL (ref 6–12)
POTASSIUM BLD-SCNC: 5.1 MMOL/L (ref 3.5–5.1)
RBC # BLD AUTO: 3.93 10*6/MM3 (ref 4.2–5.4)
SODIUM BLD-SCNC: 139 MMOL/L (ref 137–145)
WBC NRBC COR # BLD: 10.45 10*3/MM3 (ref 4.8–10.8)

## 2018-01-23 PROCEDURE — 83735 ASSAY OF MAGNESIUM: CPT | Performed by: FAMILY MEDICINE

## 2018-01-23 PROCEDURE — G0378 HOSPITAL OBSERVATION PER HR: HCPCS

## 2018-01-23 PROCEDURE — 25010000002 ENOXAPARIN PER 10 MG: Performed by: FAMILY MEDICINE

## 2018-01-23 PROCEDURE — 25010000002 FUROSEMIDE PER 20 MG: Performed by: INTERNAL MEDICINE

## 2018-01-23 PROCEDURE — 94799 UNLISTED PULMONARY SVC/PX: CPT

## 2018-01-23 PROCEDURE — 25010000002 METHYLPREDNISOLONE PER 40 MG: Performed by: FAMILY MEDICINE

## 2018-01-23 PROCEDURE — 85025 COMPLETE CBC W/AUTO DIFF WBC: CPT | Performed by: FAMILY MEDICINE

## 2018-01-23 PROCEDURE — 80048 BASIC METABOLIC PNL TOTAL CA: CPT | Performed by: FAMILY MEDICINE

## 2018-01-23 PROCEDURE — 94762 N-INVAS EAR/PLS OXIMTRY CONT: CPT

## 2018-01-23 PROCEDURE — 99220 PR INITIAL OBSERVATION CARE/DAY 70 MINUTES: CPT | Performed by: FAMILY MEDICINE

## 2018-01-23 RX ORDER — NICOTINE 21 MG/24HR
1 PATCH, TRANSDERMAL 24 HOURS TRANSDERMAL EVERY 24 HOURS
Status: DISCONTINUED | OUTPATIENT
Start: 2018-01-23 | End: 2018-02-03

## 2018-01-23 RX ORDER — ATORVASTATIN CALCIUM 10 MG/1
10 TABLET, FILM COATED ORAL DAILY
Status: DISCONTINUED | OUTPATIENT
Start: 2018-01-23 | End: 2018-02-06 | Stop reason: HOSPADM

## 2018-01-23 RX ORDER — PROPRANOLOL HYDROCHLORIDE 20 MG/1
20 TABLET ORAL EVERY 12 HOURS SCHEDULED
Status: DISCONTINUED | OUTPATIENT
Start: 2018-01-23 | End: 2018-02-06 | Stop reason: HOSPADM

## 2018-01-23 RX ORDER — SODIUM CHLORIDE 0.9 % (FLUSH) 0.9 %
1-10 SYRINGE (ML) INJECTION AS NEEDED
Status: DISCONTINUED | OUTPATIENT
Start: 2018-01-23 | End: 2018-02-06 | Stop reason: HOSPADM

## 2018-01-23 RX ORDER — SODIUM CHLORIDE 9 MG/ML
30 INJECTION, SOLUTION INTRAVENOUS CONTINUOUS
Status: DISCONTINUED | OUTPATIENT
Start: 2018-01-23 | End: 2018-01-23

## 2018-01-23 RX ORDER — SPIRONOLACTONE 25 MG/1
25 TABLET ORAL DAILY
Status: DISCONTINUED | OUTPATIENT
Start: 2018-01-23 | End: 2018-02-06 | Stop reason: HOSPADM

## 2018-01-23 RX ORDER — TRAMADOL HYDROCHLORIDE 50 MG/1
50 TABLET ORAL DAILY PRN
Status: DISCONTINUED | OUTPATIENT
Start: 2018-01-23 | End: 2018-02-01

## 2018-01-23 RX ORDER — ASPIRIN 81 MG/1
81 TABLET ORAL DAILY
Status: DISCONTINUED | OUTPATIENT
Start: 2018-01-23 | End: 2018-02-06 | Stop reason: HOSPADM

## 2018-01-23 RX ORDER — DOXYCYCLINE 100 MG/1
100 CAPSULE ORAL EVERY 12 HOURS SCHEDULED
Status: COMPLETED | OUTPATIENT
Start: 2018-01-23 | End: 2018-01-24

## 2018-01-23 RX ORDER — LOSARTAN POTASSIUM 25 MG/1
25 TABLET ORAL DAILY
Status: DISCONTINUED | OUTPATIENT
Start: 2018-01-23 | End: 2018-02-06 | Stop reason: HOSPADM

## 2018-01-23 RX ORDER — BUDESONIDE AND FORMOTEROL FUMARATE DIHYDRATE 160; 4.5 UG/1; UG/1
2 AEROSOL RESPIRATORY (INHALATION)
Status: DISCONTINUED | OUTPATIENT
Start: 2018-01-23 | End: 2018-02-06 | Stop reason: HOSPADM

## 2018-01-23 RX ORDER — METHYLPREDNISOLONE SODIUM SUCCINATE 40 MG/ML
40 INJECTION, POWDER, LYOPHILIZED, FOR SOLUTION INTRAMUSCULAR; INTRAVENOUS EVERY 8 HOURS
Status: DISCONTINUED | OUTPATIENT
Start: 2018-01-23 | End: 2018-01-26

## 2018-01-23 RX ORDER — FUROSEMIDE 10 MG/ML
20 INJECTION INTRAMUSCULAR; INTRAVENOUS ONCE
Status: COMPLETED | OUTPATIENT
Start: 2018-01-23 | End: 2018-01-23

## 2018-01-23 RX ORDER — ONDANSETRON 2 MG/ML
4 INJECTION INTRAMUSCULAR; INTRAVENOUS EVERY 6 HOURS PRN
Status: DISCONTINUED | OUTPATIENT
Start: 2018-01-23 | End: 2018-02-06 | Stop reason: HOSPADM

## 2018-01-23 RX ADMIN — DOXYCYCLINE 100 MG: 100 CAPSULE ORAL at 20:35

## 2018-01-23 RX ADMIN — DOXYCYCLINE 100 MG: 100 CAPSULE ORAL at 09:19

## 2018-01-23 RX ADMIN — SODIUM CHLORIDE 30 ML/HR: 9 INJECTION, SOLUTION INTRAVENOUS at 01:44

## 2018-01-23 RX ADMIN — METHYLPREDNISOLONE SODIUM SUCCINATE 40 MG: 40 INJECTION, POWDER, FOR SOLUTION INTRAMUSCULAR; INTRAVENOUS at 20:35

## 2018-01-23 RX ADMIN — DOXYCYCLINE 100 MG: 100 CAPSULE ORAL at 01:44

## 2018-01-23 RX ADMIN — TRAMADOL HYDROCHLORIDE 50 MG: 50 TABLET, FILM COATED ORAL at 02:04

## 2018-01-23 RX ADMIN — BUDESONIDE AND FORMOTEROL FUMARATE DIHYDRATE 2 PUFF: 160; 4.5 AEROSOL RESPIRATORY (INHALATION) at 19:18

## 2018-01-23 RX ADMIN — ASPIRIN 81 MG: 81 TABLET, COATED ORAL at 09:19

## 2018-01-23 RX ADMIN — NICOTINE 1 PATCH: 14 PATCH TRANSDERMAL at 01:45

## 2018-01-23 RX ADMIN — SERTRALINE HYDROCHLORIDE 50 MG: 50 TABLET ORAL at 09:19

## 2018-01-23 RX ADMIN — VITAMIN D, TAB 1000IU (100/BT) 1000 UNITS: 25 TAB at 09:17

## 2018-01-23 RX ADMIN — ESTROGENS, CONJUGATED 0.62 MG: 0.62 TABLET, FILM COATED ORAL at 09:18

## 2018-01-23 RX ADMIN — FUROSEMIDE 20 MG: 10 INJECTION, SOLUTION INTRAMUSCULAR; INTRAVENOUS at 10:49

## 2018-01-23 RX ADMIN — SPIRONOLACTONE 25 MG: 25 TABLET, FILM COATED ORAL at 09:18

## 2018-01-23 RX ADMIN — TRAMADOL HYDROCHLORIDE 50 MG: 50 TABLET, FILM COATED ORAL at 23:34

## 2018-01-23 RX ADMIN — COLLAGENASE SANTYL: 250 OINTMENT TOPICAL at 09:25

## 2018-01-23 RX ADMIN — ENOXAPARIN SODIUM 40 MG: 40 INJECTION SUBCUTANEOUS at 01:44

## 2018-01-23 RX ADMIN — METHYLPREDNISOLONE SODIUM SUCCINATE 40 MG: 40 INJECTION, POWDER, FOR SOLUTION INTRAMUSCULAR; INTRAVENOUS at 14:05

## 2018-01-23 RX ADMIN — PROPRANOLOL HYDROCHLORIDE 20 MG: 20 TABLET ORAL at 09:18

## 2018-01-23 RX ADMIN — PROPRANOLOL HYDROCHLORIDE 20 MG: 20 TABLET ORAL at 20:35

## 2018-01-23 RX ADMIN — ATORVASTATIN CALCIUM 10 MG: 10 TABLET, FILM COATED ORAL at 09:17

## 2018-01-23 RX ADMIN — PROPRANOLOL HYDROCHLORIDE 20 MG: 20 TABLET ORAL at 01:43

## 2018-01-23 RX ADMIN — LOSARTAN POTASSIUM 25 MG: 25 TABLET, FILM COATED ORAL at 09:18

## 2018-01-23 RX ADMIN — BUDESONIDE AND FORMOTEROL FUMARATE DIHYDRATE 2 PUFF: 160; 4.5 AEROSOL RESPIRATORY (INHALATION) at 10:52

## 2018-01-24 PROCEDURE — 94799 UNLISTED PULMONARY SVC/PX: CPT

## 2018-01-24 PROCEDURE — G0378 HOSPITAL OBSERVATION PER HR: HCPCS

## 2018-01-24 PROCEDURE — 25010000002 ENOXAPARIN PER 10 MG: Performed by: FAMILY MEDICINE

## 2018-01-24 PROCEDURE — 94640 AIRWAY INHALATION TREATMENT: CPT

## 2018-01-24 PROCEDURE — 99225 PR SBSQ OBSERVATION CARE/DAY 25 MINUTES: CPT | Performed by: NURSE PRACTITIONER

## 2018-01-24 PROCEDURE — 25010000002 METHYLPREDNISOLONE PER 40 MG: Performed by: FAMILY MEDICINE

## 2018-01-24 RX ORDER — IPRATROPIUM BROMIDE AND ALBUTEROL SULFATE 2.5; .5 MG/3ML; MG/3ML
3 SOLUTION RESPIRATORY (INHALATION)
Status: DISCONTINUED | OUTPATIENT
Start: 2018-01-24 | End: 2018-01-31 | Stop reason: SDUPTHER

## 2018-01-24 RX ADMIN — LOSARTAN POTASSIUM 25 MG: 25 TABLET, FILM COATED ORAL at 09:56

## 2018-01-24 RX ADMIN — ESTROGENS, CONJUGATED 0.62 MG: 0.62 TABLET, FILM COATED ORAL at 09:56

## 2018-01-24 RX ADMIN — ASPIRIN 81 MG: 81 TABLET, COATED ORAL at 09:56

## 2018-01-24 RX ADMIN — SPIRONOLACTONE 25 MG: 25 TABLET, FILM COATED ORAL at 09:56

## 2018-01-24 RX ADMIN — BUDESONIDE AND FORMOTEROL FUMARATE DIHYDRATE 2 PUFF: 160; 4.5 AEROSOL RESPIRATORY (INHALATION) at 07:34

## 2018-01-24 RX ADMIN — ATORVASTATIN CALCIUM 10 MG: 10 TABLET, FILM COATED ORAL at 09:57

## 2018-01-24 RX ADMIN — HYDROCODONE POLISTIREX AND CHLORPHENIRAMINE POLISTIREX 5 ML: 10; 8 SUSPENSION, EXTENDED RELEASE ORAL at 09:56

## 2018-01-24 RX ADMIN — HYDROCODONE POLISTIREX AND CHLORPHENIRAMINE POLISTIREX 5 ML: 10; 8 SUSPENSION, EXTENDED RELEASE ORAL at 21:25

## 2018-01-24 RX ADMIN — ENOXAPARIN SODIUM 40 MG: 40 INJECTION SUBCUTANEOUS at 03:42

## 2018-01-24 RX ADMIN — METHYLPREDNISOLONE SODIUM SUCCINATE 40 MG: 40 INJECTION, POWDER, FOR SOLUTION INTRAMUSCULAR; INTRAVENOUS at 05:45

## 2018-01-24 RX ADMIN — PROPRANOLOL HYDROCHLORIDE 20 MG: 20 TABLET ORAL at 20:12

## 2018-01-24 RX ADMIN — DOXYCYCLINE 100 MG: 100 CAPSULE ORAL at 09:56

## 2018-01-24 RX ADMIN — METHYLPREDNISOLONE SODIUM SUCCINATE 40 MG: 40 INJECTION, POWDER, FOR SOLUTION INTRAMUSCULAR; INTRAVENOUS at 20:12

## 2018-01-24 RX ADMIN — SERTRALINE HYDROCHLORIDE 50 MG: 50 TABLET ORAL at 09:56

## 2018-01-24 RX ADMIN — NICOTINE 1 PATCH: 14 PATCH TRANSDERMAL at 03:43

## 2018-01-24 RX ADMIN — COLLAGENASE SANTYL: 250 OINTMENT TOPICAL at 09:59

## 2018-01-24 RX ADMIN — BUDESONIDE AND FORMOTEROL FUMARATE DIHYDRATE 2 PUFF: 160; 4.5 AEROSOL RESPIRATORY (INHALATION) at 18:34

## 2018-01-24 RX ADMIN — DOXYCYCLINE 100 MG: 100 CAPSULE ORAL at 20:12

## 2018-01-24 RX ADMIN — VITAMIN D, TAB 1000IU (100/BT) 1000 UNITS: 25 TAB at 09:56

## 2018-01-24 RX ADMIN — IPRATROPIUM BROMIDE AND ALBUTEROL SULFATE 3 ML: .5; 3 SOLUTION RESPIRATORY (INHALATION) at 18:33

## 2018-01-24 RX ADMIN — METHYLPREDNISOLONE SODIUM SUCCINATE 40 MG: 40 INJECTION, POWDER, FOR SOLUTION INTRAMUSCULAR; INTRAVENOUS at 13:15

## 2018-01-24 RX ADMIN — PROPRANOLOL HYDROCHLORIDE 20 MG: 20 TABLET ORAL at 09:57

## 2018-01-24 RX ADMIN — IPRATROPIUM BROMIDE AND ALBUTEROL SULFATE 3 ML: .5; 3 SOLUTION RESPIRATORY (INHALATION) at 13:28

## 2018-01-25 LAB
ANION GAP SERPL CALCULATED.3IONS-SCNC: 8.7 MMOL/L
BUN BLD-MCNC: 31 MG/DL (ref 7–20)
BUN/CREAT SERPL: 38.8 (ref 7.1–23.5)
CALCIUM SPEC-SCNC: 8.9 MG/DL (ref 8.4–10.2)
CHLORIDE SERPL-SCNC: 102 MMOL/L (ref 98–107)
CO2 SERPL-SCNC: 31 MMOL/L (ref 26–30)
CREAT BLD-MCNC: 0.8 MG/DL (ref 0.6–1.3)
DEPRECATED RDW RBC AUTO: 45.5 FL (ref 37–54)
ERYTHROCYTE [DISTWIDTH] IN BLOOD BY AUTOMATED COUNT: 13.4 % (ref 11.5–14.5)
GFR SERPL CREATININE-BSD FRML MDRD: 69 ML/MIN/1.73
GLUCOSE BLD-MCNC: 123 MG/DL (ref 74–98)
HCT VFR BLD AUTO: 36.9 % (ref 37–47)
HGB BLD-MCNC: 11.4 G/DL (ref 12–16)
MCH RBC QN AUTO: 28.4 PG (ref 27–31)
MCHC RBC AUTO-ENTMCNC: 30.9 G/DL (ref 30–37)
MCV RBC AUTO: 92 FL (ref 81–99)
PLATELET # BLD AUTO: 192 10*3/MM3 (ref 130–400)
PMV BLD AUTO: 11.2 FL (ref 6–12)
POTASSIUM BLD-SCNC: 4.7 MMOL/L (ref 3.5–5.1)
RBC # BLD AUTO: 4.01 10*6/MM3 (ref 4.2–5.4)
SODIUM BLD-SCNC: 137 MMOL/L (ref 137–145)
WBC NRBC COR # BLD: 22.51 10*3/MM3 (ref 4.8–10.8)

## 2018-01-25 PROCEDURE — 94799 UNLISTED PULMONARY SVC/PX: CPT

## 2018-01-25 PROCEDURE — 85027 COMPLETE CBC AUTOMATED: CPT | Performed by: NURSE PRACTITIONER

## 2018-01-25 PROCEDURE — G8978 MOBILITY CURRENT STATUS: HCPCS

## 2018-01-25 PROCEDURE — 97161 PT EVAL LOW COMPLEX 20 MIN: CPT

## 2018-01-25 PROCEDURE — 80048 BASIC METABOLIC PNL TOTAL CA: CPT | Performed by: NURSE PRACTITIONER

## 2018-01-25 PROCEDURE — 97165 OT EVAL LOW COMPLEX 30 MIN: CPT

## 2018-01-25 PROCEDURE — 25010000002 ENOXAPARIN PER 10 MG: Performed by: FAMILY MEDICINE

## 2018-01-25 PROCEDURE — G8987 SELF CARE CURRENT STATUS: HCPCS

## 2018-01-25 PROCEDURE — G8988 SELF CARE GOAL STATUS: HCPCS

## 2018-01-25 PROCEDURE — G8979 MOBILITY GOAL STATUS: HCPCS

## 2018-01-25 PROCEDURE — 99232 SBSQ HOSP IP/OBS MODERATE 35: CPT | Performed by: INTERNAL MEDICINE

## 2018-01-25 PROCEDURE — 25010000002 METHYLPREDNISOLONE PER 40 MG: Performed by: FAMILY MEDICINE

## 2018-01-25 RX ADMIN — IPRATROPIUM BROMIDE AND ALBUTEROL SULFATE 3 ML: .5; 3 SOLUTION RESPIRATORY (INHALATION) at 07:07

## 2018-01-25 RX ADMIN — SERTRALINE HYDROCHLORIDE 50 MG: 50 TABLET ORAL at 09:13

## 2018-01-25 RX ADMIN — METHYLPREDNISOLONE SODIUM SUCCINATE 40 MG: 40 INJECTION, POWDER, FOR SOLUTION INTRAMUSCULAR; INTRAVENOUS at 12:10

## 2018-01-25 RX ADMIN — METHYLPREDNISOLONE SODIUM SUCCINATE 40 MG: 40 INJECTION, POWDER, FOR SOLUTION INTRAMUSCULAR; INTRAVENOUS at 21:18

## 2018-01-25 RX ADMIN — PROPRANOLOL HYDROCHLORIDE 20 MG: 20 TABLET ORAL at 09:13

## 2018-01-25 RX ADMIN — METHYLPREDNISOLONE SODIUM SUCCINATE 40 MG: 40 INJECTION, POWDER, FOR SOLUTION INTRAMUSCULAR; INTRAVENOUS at 04:02

## 2018-01-25 RX ADMIN — IPRATROPIUM BROMIDE AND ALBUTEROL SULFATE 3 ML: .5; 3 SOLUTION RESPIRATORY (INHALATION) at 00:19

## 2018-01-25 RX ADMIN — VITAMIN D, TAB 1000IU (100/BT) 1000 UNITS: 25 TAB at 09:13

## 2018-01-25 RX ADMIN — SPIRONOLACTONE 25 MG: 25 TABLET, FILM COATED ORAL at 09:13

## 2018-01-25 RX ADMIN — BUDESONIDE AND FORMOTEROL FUMARATE DIHYDRATE 2 PUFF: 160; 4.5 AEROSOL RESPIRATORY (INHALATION) at 19:07

## 2018-01-25 RX ADMIN — IPRATROPIUM BROMIDE AND ALBUTEROL SULFATE 3 ML: .5; 3 SOLUTION RESPIRATORY (INHALATION) at 19:06

## 2018-01-25 RX ADMIN — LOSARTAN POTASSIUM 25 MG: 25 TABLET, FILM COATED ORAL at 09:12

## 2018-01-25 RX ADMIN — ESTROGENS, CONJUGATED 0.62 MG: 0.62 TABLET, FILM COATED ORAL at 09:13

## 2018-01-25 RX ADMIN — NICOTINE 1 PATCH: 14 PATCH TRANSDERMAL at 04:02

## 2018-01-25 RX ADMIN — COLLAGENASE SANTYL: 250 OINTMENT TOPICAL at 09:14

## 2018-01-25 RX ADMIN — ENOXAPARIN SODIUM 40 MG: 40 INJECTION SUBCUTANEOUS at 04:02

## 2018-01-25 RX ADMIN — ASPIRIN 81 MG: 81 TABLET, COATED ORAL at 09:13

## 2018-01-25 RX ADMIN — ATORVASTATIN CALCIUM 10 MG: 10 TABLET, FILM COATED ORAL at 09:13

## 2018-01-25 RX ADMIN — BUDESONIDE AND FORMOTEROL FUMARATE DIHYDRATE 2 PUFF: 160; 4.5 AEROSOL RESPIRATORY (INHALATION) at 07:07

## 2018-01-25 RX ADMIN — PROPRANOLOL HYDROCHLORIDE 20 MG: 20 TABLET ORAL at 21:17

## 2018-01-25 RX ADMIN — IPRATROPIUM BROMIDE AND ALBUTEROL SULFATE 3 ML: .5; 3 SOLUTION RESPIRATORY (INHALATION) at 12:57

## 2018-01-26 ENCOUNTER — APPOINTMENT (OUTPATIENT)
Dept: GENERAL RADIOLOGY | Facility: HOSPITAL | Age: 78
End: 2018-01-26

## 2018-01-26 LAB
ANION GAP SERPL CALCULATED.3IONS-SCNC: 8.2 MMOL/L
BASOPHILS # BLD AUTO: 0.03 10*3/MM3 (ref 0–0.2)
BASOPHILS NFR BLD AUTO: 0.2 % (ref 0–2.5)
BUN BLD-MCNC: 34 MG/DL (ref 7–20)
BUN/CREAT SERPL: 42.5 (ref 7.1–23.5)
CALCIUM SPEC-SCNC: 8.8 MG/DL (ref 8.4–10.2)
CHLORIDE SERPL-SCNC: 102 MMOL/L (ref 98–107)
CO2 SERPL-SCNC: 29 MMOL/L (ref 26–30)
CREAT BLD-MCNC: 0.8 MG/DL (ref 0.6–1.3)
DEPRECATED RDW RBC AUTO: 45.7 FL (ref 37–54)
EOSINOPHIL # BLD AUTO: 0.01 10*3/MM3 (ref 0–0.7)
EOSINOPHIL NFR BLD AUTO: 0.1 % (ref 0–7)
ERYTHROCYTE [DISTWIDTH] IN BLOOD BY AUTOMATED COUNT: 13.4 % (ref 11.5–14.5)
GFR SERPL CREATININE-BSD FRML MDRD: 69 ML/MIN/1.73
GLUCOSE BLD-MCNC: 192 MG/DL (ref 74–98)
GLUCOSE BLDC GLUCOMTR-MCNC: 157 MG/DL (ref 70–130)
HCT VFR BLD AUTO: 38.6 % (ref 37–47)
HGB BLD-MCNC: 12.3 G/DL (ref 12–16)
IMM GRANULOCYTES # BLD: 0.14 10*3/MM3 (ref 0–0.06)
IMM GRANULOCYTES NFR BLD: 0.8 % (ref 0–0.6)
LYMPHOCYTES # BLD AUTO: 0.71 10*3/MM3 (ref 0.6–3.4)
LYMPHOCYTES NFR BLD AUTO: 4 % (ref 10–50)
MCH RBC QN AUTO: 29.5 PG (ref 27–31)
MCHC RBC AUTO-ENTMCNC: 31.9 G/DL (ref 30–37)
MCV RBC AUTO: 92.6 FL (ref 81–99)
MONOCYTES # BLD AUTO: 0.14 10*3/MM3 (ref 0–0.9)
MONOCYTES NFR BLD AUTO: 0.8 % (ref 0–12)
NEUTROPHILS # BLD AUTO: 16.85 10*3/MM3 (ref 2–6.9)
NEUTROPHILS NFR BLD AUTO: 94.1 % (ref 37–80)
NRBC BLD MANUAL-RTO: 0 /100 WBC (ref 0–0)
PLATELET # BLD AUTO: 192 10*3/MM3 (ref 130–400)
PMV BLD AUTO: 10.4 FL (ref 6–12)
POTASSIUM BLD-SCNC: 4.2 MMOL/L (ref 3.5–5.1)
RBC # BLD AUTO: 4.17 10*6/MM3 (ref 4.2–5.4)
SODIUM BLD-SCNC: 135 MMOL/L (ref 137–145)
WBC NRBC COR # BLD: 17.88 10*3/MM3 (ref 4.8–10.8)

## 2018-01-26 PROCEDURE — 82962 GLUCOSE BLOOD TEST: CPT

## 2018-01-26 PROCEDURE — 94799 UNLISTED PULMONARY SVC/PX: CPT

## 2018-01-26 PROCEDURE — 99232 SBSQ HOSP IP/OBS MODERATE 35: CPT | Performed by: NURSE PRACTITIONER

## 2018-01-26 PROCEDURE — 80048 BASIC METABOLIC PNL TOTAL CA: CPT | Performed by: NURSE PRACTITIONER

## 2018-01-26 PROCEDURE — 97116 GAIT TRAINING THERAPY: CPT

## 2018-01-26 PROCEDURE — 25010000002 ENOXAPARIN PER 10 MG: Performed by: FAMILY MEDICINE

## 2018-01-26 PROCEDURE — 97110 THERAPEUTIC EXERCISES: CPT

## 2018-01-26 PROCEDURE — 85025 COMPLETE CBC W/AUTO DIFF WBC: CPT | Performed by: NURSE PRACTITIONER

## 2018-01-26 PROCEDURE — 99222 1ST HOSP IP/OBS MODERATE 55: CPT | Performed by: INTERNAL MEDICINE

## 2018-01-26 PROCEDURE — 25010000002 METHYLPREDNISOLONE PER 125 MG: Performed by: INTERNAL MEDICINE

## 2018-01-26 PROCEDURE — 97530 THERAPEUTIC ACTIVITIES: CPT

## 2018-01-26 PROCEDURE — 25010000002 METHYLPREDNISOLONE PER 40 MG: Performed by: FAMILY MEDICINE

## 2018-01-26 PROCEDURE — 25010000002 MAGNESIUM SULFATE 2 GM/50ML SOLUTION: Performed by: NURSE PRACTITIONER

## 2018-01-26 PROCEDURE — 71046 X-RAY EXAM CHEST 2 VIEWS: CPT

## 2018-01-26 RX ORDER — METHYLPREDNISOLONE SODIUM SUCCINATE 125 MG/2ML
60 INJECTION, POWDER, LYOPHILIZED, FOR SOLUTION INTRAMUSCULAR; INTRAVENOUS EVERY 6 HOURS
Status: DISCONTINUED | OUTPATIENT
Start: 2018-01-26 | End: 2018-01-27

## 2018-01-26 RX ORDER — MAGNESIUM SULFATE HEPTAHYDRATE 40 MG/ML
2 INJECTION, SOLUTION INTRAVENOUS ONCE
Status: COMPLETED | OUTPATIENT
Start: 2018-01-26 | End: 2018-01-26

## 2018-01-26 RX ORDER — BUDESONIDE 0.5 MG/2ML
0.5 INHALANT ORAL
Status: DISCONTINUED | OUTPATIENT
Start: 2018-01-26 | End: 2018-02-06 | Stop reason: HOSPADM

## 2018-01-26 RX ADMIN — BUDESONIDE AND FORMOTEROL FUMARATE DIHYDRATE 2 PUFF: 160; 4.5 AEROSOL RESPIRATORY (INHALATION) at 06:59

## 2018-01-26 RX ADMIN — ASPIRIN 81 MG: 81 TABLET, COATED ORAL at 09:04

## 2018-01-26 RX ADMIN — METHYLPREDNISOLONE SODIUM SUCCINATE 60 MG: 125 INJECTION, POWDER, FOR SOLUTION INTRAMUSCULAR; INTRAVENOUS at 17:24

## 2018-01-26 RX ADMIN — VITAMIN D, TAB 1000IU (100/BT) 1000 UNITS: 25 TAB at 09:04

## 2018-01-26 RX ADMIN — METHYLPREDNISOLONE SODIUM SUCCINATE 40 MG: 40 INJECTION, POWDER, FOR SOLUTION INTRAMUSCULAR; INTRAVENOUS at 04:37

## 2018-01-26 RX ADMIN — IPRATROPIUM BROMIDE AND ALBUTEROL SULFATE 3 ML: .5; 3 SOLUTION RESPIRATORY (INHALATION) at 06:59

## 2018-01-26 RX ADMIN — LOSARTAN POTASSIUM 25 MG: 25 TABLET, FILM COATED ORAL at 09:04

## 2018-01-26 RX ADMIN — PROPRANOLOL HYDROCHLORIDE 20 MG: 20 TABLET ORAL at 09:04

## 2018-01-26 RX ADMIN — IPRATROPIUM BROMIDE AND ALBUTEROL SULFATE 3 ML: .5; 3 SOLUTION RESPIRATORY (INHALATION) at 19:14

## 2018-01-26 RX ADMIN — METHYLPREDNISOLONE SODIUM SUCCINATE 60 MG: 125 INJECTION, POWDER, FOR SOLUTION INTRAMUSCULAR; INTRAVENOUS at 21:14

## 2018-01-26 RX ADMIN — BUDESONIDE 0.5 MG: 0.5 INHALANT RESPIRATORY (INHALATION) at 19:14

## 2018-01-26 RX ADMIN — COLLAGENASE SANTYL: 250 OINTMENT TOPICAL at 09:04

## 2018-01-26 RX ADMIN — MAGNESIUM SULFATE IN WATER 2 G: 40 INJECTION, SOLUTION INTRAVENOUS at 11:34

## 2018-01-26 RX ADMIN — IPRATROPIUM BROMIDE AND ALBUTEROL SULFATE 3 ML: .5; 3 SOLUTION RESPIRATORY (INHALATION) at 13:18

## 2018-01-26 RX ADMIN — PROPRANOLOL HYDROCHLORIDE 20 MG: 20 TABLET ORAL at 21:14

## 2018-01-26 RX ADMIN — ENOXAPARIN SODIUM 40 MG: 40 INJECTION SUBCUTANEOUS at 01:34

## 2018-01-26 RX ADMIN — SPIRONOLACTONE 25 MG: 25 TABLET, FILM COATED ORAL at 09:04

## 2018-01-26 RX ADMIN — SERTRALINE HYDROCHLORIDE 50 MG: 50 TABLET ORAL at 09:04

## 2018-01-26 RX ADMIN — METHYLPREDNISOLONE SODIUM SUCCINATE 60 MG: 125 INJECTION, POWDER, FOR SOLUTION INTRAMUSCULAR; INTRAVENOUS at 11:34

## 2018-01-26 RX ADMIN — ATORVASTATIN CALCIUM 10 MG: 10 TABLET, FILM COATED ORAL at 09:04

## 2018-01-26 RX ADMIN — IPRATROPIUM BROMIDE AND ALBUTEROL SULFATE 3 ML: .5; 3 SOLUTION RESPIRATORY (INHALATION) at 00:48

## 2018-01-26 RX ADMIN — NICOTINE 1 PATCH: 14 PATCH TRANSDERMAL at 01:34

## 2018-01-26 RX ADMIN — ESTROGENS, CONJUGATED 0.62 MG: 0.62 TABLET, FILM COATED ORAL at 09:04

## 2018-01-26 RX ADMIN — BUDESONIDE AND FORMOTEROL FUMARATE DIHYDRATE 2 PUFF: 160; 4.5 AEROSOL RESPIRATORY (INHALATION) at 19:24

## 2018-01-27 LAB
ANION GAP SERPL CALCULATED.3IONS-SCNC: 7.1 MMOL/L
BASOPHILS # BLD AUTO: 0.01 10*3/MM3 (ref 0–0.2)
BASOPHILS NFR BLD AUTO: 0.1 % (ref 0–2.5)
BUN BLD-MCNC: 37 MG/DL (ref 7–20)
BUN/CREAT SERPL: 46.3 (ref 7.1–23.5)
CALCIUM SPEC-SCNC: 8.4 MG/DL (ref 8.4–10.2)
CHLORIDE SERPL-SCNC: 102 MMOL/L (ref 98–107)
CO2 SERPL-SCNC: 30 MMOL/L (ref 26–30)
CREAT BLD-MCNC: 0.8 MG/DL (ref 0.6–1.3)
DEPRECATED RDW RBC AUTO: 43.7 FL (ref 37–54)
EOSINOPHIL # BLD AUTO: 0 10*3/MM3 (ref 0–0.7)
EOSINOPHIL NFR BLD AUTO: 0 % (ref 0–7)
ERYTHROCYTE [DISTWIDTH] IN BLOOD BY AUTOMATED COUNT: 13.3 % (ref 11.5–14.5)
GFR SERPL CREATININE-BSD FRML MDRD: 69 ML/MIN/1.73
GLUCOSE BLD-MCNC: 126 MG/DL (ref 74–98)
GLUCOSE BLDC GLUCOMTR-MCNC: 120 MG/DL (ref 70–130)
GLUCOSE BLDC GLUCOMTR-MCNC: 144 MG/DL (ref 70–130)
HCT VFR BLD AUTO: 34.7 % (ref 37–47)
HGB BLD-MCNC: 11.4 G/DL (ref 12–16)
IMM GRANULOCYTES # BLD: 0.1 10*3/MM3 (ref 0–0.06)
IMM GRANULOCYTES NFR BLD: 0.7 % (ref 0–0.6)
LYMPHOCYTES # BLD AUTO: 0.59 10*3/MM3 (ref 0.6–3.4)
LYMPHOCYTES NFR BLD AUTO: 4.4 % (ref 10–50)
MCH RBC QN AUTO: 29.7 PG (ref 27–31)
MCHC RBC AUTO-ENTMCNC: 32.9 G/DL (ref 30–37)
MCV RBC AUTO: 90.4 FL (ref 81–99)
MONOCYTES # BLD AUTO: 0.35 10*3/MM3 (ref 0–0.9)
MONOCYTES NFR BLD AUTO: 2.6 % (ref 0–12)
NEUTROPHILS # BLD AUTO: 12.49 10*3/MM3 (ref 2–6.9)
NEUTROPHILS NFR BLD AUTO: 92.2 % (ref 37–80)
NRBC BLD MANUAL-RTO: 0 /100 WBC (ref 0–0)
PLATELET # BLD AUTO: 202 10*3/MM3 (ref 130–400)
PMV BLD AUTO: 10.9 FL (ref 6–12)
POTASSIUM BLD-SCNC: 4.1 MMOL/L (ref 3.5–5.1)
RBC # BLD AUTO: 3.84 10*6/MM3 (ref 4.2–5.4)
SODIUM BLD-SCNC: 135 MMOL/L (ref 137–145)
WBC NRBC COR # BLD: 13.54 10*3/MM3 (ref 4.8–10.8)

## 2018-01-27 PROCEDURE — 94799 UNLISTED PULMONARY SVC/PX: CPT

## 2018-01-27 PROCEDURE — 85025 COMPLETE CBC W/AUTO DIFF WBC: CPT | Performed by: NURSE PRACTITIONER

## 2018-01-27 PROCEDURE — 80048 BASIC METABOLIC PNL TOTAL CA: CPT | Performed by: NURSE PRACTITIONER

## 2018-01-27 PROCEDURE — 99232 SBSQ HOSP IP/OBS MODERATE 35: CPT | Performed by: INTERNAL MEDICINE

## 2018-01-27 PROCEDURE — 83036 HEMOGLOBIN GLYCOSYLATED A1C: CPT | Performed by: NURSE PRACTITIONER

## 2018-01-27 PROCEDURE — 94667 MNPJ CHEST WALL 1ST: CPT

## 2018-01-27 PROCEDURE — 82962 GLUCOSE BLOOD TEST: CPT

## 2018-01-27 PROCEDURE — 25010000002 ENOXAPARIN PER 10 MG: Performed by: FAMILY MEDICINE

## 2018-01-27 PROCEDURE — 25010000002 METHYLPREDNISOLONE PER 125 MG: Performed by: INTERNAL MEDICINE

## 2018-01-27 RX ORDER — METHYLPREDNISOLONE SODIUM SUCCINATE 125 MG/2ML
60 INJECTION, POWDER, LYOPHILIZED, FOR SOLUTION INTRAMUSCULAR; INTRAVENOUS EVERY 12 HOURS
Status: DISCONTINUED | OUTPATIENT
Start: 2018-01-28 | End: 2018-01-28

## 2018-01-27 RX ADMIN — COLLAGENASE SANTYL: 250 OINTMENT TOPICAL at 09:00

## 2018-01-27 RX ADMIN — IPRATROPIUM BROMIDE AND ALBUTEROL SULFATE 3 ML: .5; 3 SOLUTION RESPIRATORY (INHALATION) at 19:56

## 2018-01-27 RX ADMIN — BUDESONIDE 0.5 MG: 0.5 INHALANT RESPIRATORY (INHALATION) at 06:36

## 2018-01-27 RX ADMIN — SPIRONOLACTONE 25 MG: 25 TABLET, FILM COATED ORAL at 08:57

## 2018-01-27 RX ADMIN — ATORVASTATIN CALCIUM 10 MG: 10 TABLET, FILM COATED ORAL at 08:57

## 2018-01-27 RX ADMIN — NICOTINE 1 PATCH: 14 PATCH TRANSDERMAL at 01:29

## 2018-01-27 RX ADMIN — TRAMADOL HYDROCHLORIDE 50 MG: 50 TABLET, FILM COATED ORAL at 20:36

## 2018-01-27 RX ADMIN — BUDESONIDE 0.5 MG: 0.5 INHALANT RESPIRATORY (INHALATION) at 19:56

## 2018-01-27 RX ADMIN — IPRATROPIUM BROMIDE AND ALBUTEROL SULFATE 3 ML: .5; 3 SOLUTION RESPIRATORY (INHALATION) at 06:36

## 2018-01-27 RX ADMIN — IPRATROPIUM BROMIDE AND ALBUTEROL SULFATE 3 ML: .5; 3 SOLUTION RESPIRATORY (INHALATION) at 00:26

## 2018-01-27 RX ADMIN — PROPRANOLOL HYDROCHLORIDE 20 MG: 20 TABLET ORAL at 08:57

## 2018-01-27 RX ADMIN — SERTRALINE HYDROCHLORIDE 50 MG: 50 TABLET ORAL at 08:57

## 2018-01-27 RX ADMIN — VITAMIN D, TAB 1000IU (100/BT) 1000 UNITS: 25 TAB at 08:57

## 2018-01-27 RX ADMIN — METHYLPREDNISOLONE SODIUM SUCCINATE 60 MG: 125 INJECTION, POWDER, FOR SOLUTION INTRAMUSCULAR; INTRAVENOUS at 03:40

## 2018-01-27 RX ADMIN — TRAMADOL HYDROCHLORIDE 50 MG: 50 TABLET, FILM COATED ORAL at 06:13

## 2018-01-27 RX ADMIN — ASPIRIN 81 MG: 81 TABLET, COATED ORAL at 08:57

## 2018-01-27 RX ADMIN — ENOXAPARIN SODIUM 40 MG: 40 INJECTION SUBCUTANEOUS at 01:28

## 2018-01-27 RX ADMIN — LOSARTAN POTASSIUM 25 MG: 25 TABLET, FILM COATED ORAL at 08:57

## 2018-01-27 RX ADMIN — ESTROGENS, CONJUGATED 0.62 MG: 0.62 TABLET, FILM COATED ORAL at 08:57

## 2018-01-27 RX ADMIN — PROPRANOLOL HYDROCHLORIDE 20 MG: 20 TABLET ORAL at 20:36

## 2018-01-27 RX ADMIN — BUDESONIDE AND FORMOTEROL FUMARATE DIHYDRATE 2 PUFF: 160; 4.5 AEROSOL RESPIRATORY (INHALATION) at 19:56

## 2018-01-27 RX ADMIN — METHYLPREDNISOLONE SODIUM SUCCINATE 60 MG: 125 INJECTION, POWDER, FOR SOLUTION INTRAMUSCULAR; INTRAVENOUS at 09:00

## 2018-01-27 RX ADMIN — METHYLPREDNISOLONE SODIUM SUCCINATE 60 MG: 125 INJECTION, POWDER, FOR SOLUTION INTRAMUSCULAR; INTRAVENOUS at 18:10

## 2018-01-27 RX ADMIN — IPRATROPIUM BROMIDE AND ALBUTEROL SULFATE 3 ML: .5; 3 SOLUTION RESPIRATORY (INHALATION) at 13:18

## 2018-01-27 RX ADMIN — BUDESONIDE AND FORMOTEROL FUMARATE DIHYDRATE 2 PUFF: 160; 4.5 AEROSOL RESPIRATORY (INHALATION) at 06:36

## 2018-01-28 PROCEDURE — 94799 UNLISTED PULMONARY SVC/PX: CPT

## 2018-01-28 PROCEDURE — 25010000002 METHYLPREDNISOLONE PER 125 MG: Performed by: INTERNAL MEDICINE

## 2018-01-28 PROCEDURE — 25010000002 ENOXAPARIN PER 10 MG: Performed by: FAMILY MEDICINE

## 2018-01-28 PROCEDURE — 99232 SBSQ HOSP IP/OBS MODERATE 35: CPT | Performed by: INTERNAL MEDICINE

## 2018-01-28 PROCEDURE — 25010000002 METHYLPREDNISOLONE PER 40 MG: Performed by: INTERNAL MEDICINE

## 2018-01-28 RX ORDER — SODIUM CHLORIDE FOR INHALATION 3 %
4 VIAL, NEBULIZER (ML) INHALATION ONCE
Status: COMPLETED | OUTPATIENT
Start: 2018-01-28 | End: 2018-01-28

## 2018-01-28 RX ORDER — FAMOTIDINE 20 MG/1
20 TABLET, FILM COATED ORAL DAILY
Status: DISCONTINUED | OUTPATIENT
Start: 2018-01-28 | End: 2018-02-06 | Stop reason: HOSPADM

## 2018-01-28 RX ORDER — METHYLPREDNISOLONE SODIUM SUCCINATE 40 MG/ML
40 INJECTION, POWDER, LYOPHILIZED, FOR SOLUTION INTRAMUSCULAR; INTRAVENOUS EVERY 12 HOURS
Status: DISCONTINUED | OUTPATIENT
Start: 2018-01-28 | End: 2018-01-29

## 2018-01-28 RX ORDER — SODIUM CHLORIDE 9 MG/ML
50 INJECTION, SOLUTION INTRAVENOUS CONTINUOUS
Status: DISCONTINUED | OUTPATIENT
Start: 2018-01-28 | End: 2018-01-29

## 2018-01-28 RX ADMIN — ASPIRIN 81 MG: 81 TABLET, COATED ORAL at 09:45

## 2018-01-28 RX ADMIN — LOSARTAN POTASSIUM 25 MG: 25 TABLET, FILM COATED ORAL at 09:45

## 2018-01-28 RX ADMIN — ENOXAPARIN SODIUM 40 MG: 40 INJECTION SUBCUTANEOUS at 01:18

## 2018-01-28 RX ADMIN — FAMOTIDINE 20 MG: 20 TABLET, FILM COATED ORAL at 15:05

## 2018-01-28 RX ADMIN — NYSTATIN 500000 UNITS: 100000 SUSPENSION ORAL at 20:54

## 2018-01-28 RX ADMIN — IPRATROPIUM BROMIDE AND ALBUTEROL SULFATE 3 ML: .5; 3 SOLUTION RESPIRATORY (INHALATION) at 13:36

## 2018-01-28 RX ADMIN — IPRATROPIUM BROMIDE AND ALBUTEROL SULFATE 3 ML: .5; 3 SOLUTION RESPIRATORY (INHALATION) at 06:53

## 2018-01-28 RX ADMIN — NYSTATIN 500000 UNITS: 100000 SUSPENSION ORAL at 17:27

## 2018-01-28 RX ADMIN — BUDESONIDE 0.5 MG: 0.5 INHALANT RESPIRATORY (INHALATION) at 18:44

## 2018-01-28 RX ADMIN — IPRATROPIUM BROMIDE AND ALBUTEROL SULFATE 3 ML: .5; 3 SOLUTION RESPIRATORY (INHALATION) at 00:29

## 2018-01-28 RX ADMIN — ATORVASTATIN CALCIUM 10 MG: 10 TABLET, FILM COATED ORAL at 09:45

## 2018-01-28 RX ADMIN — PROPRANOLOL HYDROCHLORIDE 20 MG: 20 TABLET ORAL at 09:44

## 2018-01-28 RX ADMIN — SODIUM CHLORIDE SOLN NEBU 3% 4 ML: 3 NEBU SOLN at 13:36

## 2018-01-28 RX ADMIN — BUDESONIDE AND FORMOTEROL FUMARATE DIHYDRATE 2 PUFF: 160; 4.5 AEROSOL RESPIRATORY (INHALATION) at 06:52

## 2018-01-28 RX ADMIN — BUDESONIDE AND FORMOTEROL FUMARATE DIHYDRATE 2 PUFF: 160; 4.5 AEROSOL RESPIRATORY (INHALATION) at 19:55

## 2018-01-28 RX ADMIN — SERTRALINE HYDROCHLORIDE 50 MG: 50 TABLET ORAL at 09:45

## 2018-01-28 RX ADMIN — TRAMADOL HYDROCHLORIDE 50 MG: 50 TABLET, FILM COATED ORAL at 04:54

## 2018-01-28 RX ADMIN — VITAMIN D, TAB 1000IU (100/BT) 1000 UNITS: 25 TAB at 09:45

## 2018-01-28 RX ADMIN — METHYLPREDNISOLONE SODIUM SUCCINATE 60 MG: 125 INJECTION, POWDER, FOR SOLUTION INTRAMUSCULAR; INTRAVENOUS at 06:27

## 2018-01-28 RX ADMIN — NYSTATIN 500000 UNITS: 100000 SUSPENSION ORAL at 15:01

## 2018-01-28 RX ADMIN — NICOTINE 1 PATCH: 14 PATCH TRANSDERMAL at 01:19

## 2018-01-28 RX ADMIN — ESTROGENS, CONJUGATED 0.62 MG: 0.62 TABLET, FILM COATED ORAL at 09:45

## 2018-01-28 RX ADMIN — SPIRONOLACTONE 25 MG: 25 TABLET, FILM COATED ORAL at 09:45

## 2018-01-28 RX ADMIN — IPRATROPIUM BROMIDE AND ALBUTEROL SULFATE 3 ML: .5; 3 SOLUTION RESPIRATORY (INHALATION) at 18:44

## 2018-01-28 RX ADMIN — COLLAGENASE SANTYL: 250 OINTMENT TOPICAL at 17:27

## 2018-01-28 RX ADMIN — SODIUM CHLORIDE 50 ML/HR: 9 INJECTION, SOLUTION INTRAVENOUS at 20:53

## 2018-01-28 RX ADMIN — METHYLPREDNISOLONE SODIUM SUCCINATE 40 MG: 40 INJECTION, POWDER, FOR SOLUTION INTRAMUSCULAR; INTRAVENOUS at 17:27

## 2018-01-28 RX ADMIN — PROPRANOLOL HYDROCHLORIDE 20 MG: 20 TABLET ORAL at 20:54

## 2018-01-29 LAB
ANION GAP SERPL CALCULATED.3IONS-SCNC: 11.9 MMOL/L
BASOPHILS # BLD AUTO: 0.02 10*3/MM3 (ref 0–0.2)
BASOPHILS NFR BLD AUTO: 0.1 % (ref 0–2.5)
BUN BLD-MCNC: 33 MG/DL (ref 7–20)
BUN/CREAT SERPL: 47.1 (ref 7.1–23.5)
CALCIUM SPEC-SCNC: 8.1 MG/DL (ref 8.4–10.2)
CHLORIDE SERPL-SCNC: 101 MMOL/L (ref 98–107)
CO2 SERPL-SCNC: 30 MMOL/L (ref 26–30)
CREAT BLD-MCNC: 0.7 MG/DL (ref 0.6–1.3)
DEPRECATED RDW RBC AUTO: 43.7 FL (ref 37–54)
EOSINOPHIL # BLD AUTO: 0 10*3/MM3 (ref 0–0.7)
EOSINOPHIL NFR BLD AUTO: 0 % (ref 0–7)
ERYTHROCYTE [DISTWIDTH] IN BLOOD BY AUTOMATED COUNT: 13.3 % (ref 11.5–14.5)
GFR SERPL CREATININE-BSD FRML MDRD: 81 ML/MIN/1.73
GLUCOSE BLD-MCNC: 116 MG/DL (ref 74–98)
HBA1C MFR BLD: 5.6 % (ref 3–6)
HCT VFR BLD AUTO: 34.6 % (ref 37–47)
HGB BLD-MCNC: 11.3 G/DL (ref 12–16)
IMM GRANULOCYTES # BLD: 0.1 10*3/MM3 (ref 0–0.06)
IMM GRANULOCYTES NFR BLD: 0.7 % (ref 0–0.6)
LYMPHOCYTES # BLD AUTO: 0.9 10*3/MM3 (ref 0.6–3.4)
LYMPHOCYTES NFR BLD AUTO: 5.9 % (ref 10–50)
MCH RBC QN AUTO: 29.1 PG (ref 27–31)
MCHC RBC AUTO-ENTMCNC: 32.7 G/DL (ref 30–37)
MCV RBC AUTO: 89.2 FL (ref 81–99)
MONOCYTES # BLD AUTO: 0.71 10*3/MM3 (ref 0–0.9)
MONOCYTES NFR BLD AUTO: 4.7 % (ref 0–12)
NEUTROPHILS # BLD AUTO: 13.5 10*3/MM3 (ref 2–6.9)
NEUTROPHILS NFR BLD AUTO: 88.6 % (ref 37–80)
NRBC BLD MANUAL-RTO: 0 /100 WBC (ref 0–0)
PLATELET # BLD AUTO: 190 10*3/MM3 (ref 130–400)
PMV BLD AUTO: 10.3 FL (ref 6–12)
POTASSIUM BLD-SCNC: 3.9 MMOL/L (ref 3.5–5.1)
RBC # BLD AUTO: 3.88 10*6/MM3 (ref 4.2–5.4)
SODIUM BLD-SCNC: 139 MMOL/L (ref 137–145)
WBC NRBC COR # BLD: 15.23 10*3/MM3 (ref 4.8–10.8)

## 2018-01-29 PROCEDURE — 80048 BASIC METABOLIC PNL TOTAL CA: CPT | Performed by: INTERNAL MEDICINE

## 2018-01-29 PROCEDURE — 97530 THERAPEUTIC ACTIVITIES: CPT

## 2018-01-29 PROCEDURE — 94668 MNPJ CHEST WALL SBSQ: CPT

## 2018-01-29 PROCEDURE — 25010000002 ENOXAPARIN PER 10 MG: Performed by: FAMILY MEDICINE

## 2018-01-29 PROCEDURE — 94799 UNLISTED PULMONARY SVC/PX: CPT

## 2018-01-29 PROCEDURE — 99232 SBSQ HOSP IP/OBS MODERATE 35: CPT | Performed by: NURSE PRACTITIONER

## 2018-01-29 PROCEDURE — 85025 COMPLETE CBC W/AUTO DIFF WBC: CPT | Performed by: INTERNAL MEDICINE

## 2018-01-29 PROCEDURE — 25010000002 METHYLPREDNISOLONE PER 40 MG: Performed by: INTERNAL MEDICINE

## 2018-01-29 RX ORDER — METHYLPREDNISOLONE SODIUM SUCCINATE 40 MG/ML
40 INJECTION, POWDER, LYOPHILIZED, FOR SOLUTION INTRAMUSCULAR; INTRAVENOUS EVERY 24 HOURS
Status: DISCONTINUED | OUTPATIENT
Start: 2018-01-30 | End: 2018-01-30

## 2018-01-29 RX ORDER — ACETYLCYSTEINE 200 MG/ML
1 SOLUTION ORAL; RESPIRATORY (INHALATION)
Status: DISCONTINUED | OUTPATIENT
Start: 2018-01-29 | End: 2018-02-06 | Stop reason: HOSPADM

## 2018-01-29 RX ADMIN — LOSARTAN POTASSIUM 25 MG: 25 TABLET, FILM COATED ORAL at 09:34

## 2018-01-29 RX ADMIN — IPRATROPIUM BROMIDE AND ALBUTEROL SULFATE 3 ML: .5; 3 SOLUTION RESPIRATORY (INHALATION) at 12:17

## 2018-01-29 RX ADMIN — ENOXAPARIN SODIUM 40 MG: 40 INJECTION SUBCUTANEOUS at 02:41

## 2018-01-29 RX ADMIN — NYSTATIN 500000 UNITS: 100000 SUSPENSION ORAL at 09:33

## 2018-01-29 RX ADMIN — ESTROGENS, CONJUGATED 0.62 MG: 0.62 TABLET, FILM COATED ORAL at 09:35

## 2018-01-29 RX ADMIN — PROPRANOLOL HYDROCHLORIDE 20 MG: 20 TABLET ORAL at 20:05

## 2018-01-29 RX ADMIN — VITAMIN D, TAB 1000IU (100/BT) 1000 UNITS: 25 TAB at 09:34

## 2018-01-29 RX ADMIN — ASPIRIN 81 MG: 81 TABLET, COATED ORAL at 09:34

## 2018-01-29 RX ADMIN — NYSTATIN 500000 UNITS: 100000 SUSPENSION ORAL at 17:28

## 2018-01-29 RX ADMIN — ACETYLCYSTEINE 1 ML: 200 SOLUTION ORAL; RESPIRATORY (INHALATION) at 20:13

## 2018-01-29 RX ADMIN — IPRATROPIUM BROMIDE AND ALBUTEROL SULFATE 3 ML: .5; 3 SOLUTION RESPIRATORY (INHALATION) at 00:57

## 2018-01-29 RX ADMIN — BUDESONIDE 0.5 MG: 0.5 INHALANT RESPIRATORY (INHALATION) at 20:13

## 2018-01-29 RX ADMIN — BUDESONIDE AND FORMOTEROL FUMARATE DIHYDRATE 2 PUFF: 160; 4.5 AEROSOL RESPIRATORY (INHALATION) at 21:59

## 2018-01-29 RX ADMIN — NICOTINE 1 PATCH: 14 PATCH TRANSDERMAL at 02:41

## 2018-01-29 RX ADMIN — TRAMADOL HYDROCHLORIDE 50 MG: 50 TABLET, FILM COATED ORAL at 09:34

## 2018-01-29 RX ADMIN — BUDESONIDE AND FORMOTEROL FUMARATE DIHYDRATE 2 PUFF: 160; 4.5 AEROSOL RESPIRATORY (INHALATION) at 06:50

## 2018-01-29 RX ADMIN — SPIRONOLACTONE 25 MG: 25 TABLET, FILM COATED ORAL at 09:34

## 2018-01-29 RX ADMIN — FAMOTIDINE 20 MG: 20 TABLET, FILM COATED ORAL at 09:34

## 2018-01-29 RX ADMIN — METHYLPREDNISOLONE SODIUM SUCCINATE 40 MG: 40 INJECTION, POWDER, FOR SOLUTION INTRAMUSCULAR; INTRAVENOUS at 06:26

## 2018-01-29 RX ADMIN — IPRATROPIUM BROMIDE AND ALBUTEROL SULFATE 3 ML: .5; 3 SOLUTION RESPIRATORY (INHALATION) at 06:49

## 2018-01-29 RX ADMIN — IPRATROPIUM BROMIDE AND ALBUTEROL SULFATE 3 ML: .5; 3 SOLUTION RESPIRATORY (INHALATION) at 20:13

## 2018-01-29 RX ADMIN — ATORVASTATIN CALCIUM 10 MG: 10 TABLET, FILM COATED ORAL at 09:34

## 2018-01-29 RX ADMIN — NYSTATIN 500000 UNITS: 100000 SUSPENSION ORAL at 13:51

## 2018-01-29 RX ADMIN — BUDESONIDE 0.5 MG: 0.5 INHALANT RESPIRATORY (INHALATION) at 06:49

## 2018-01-29 RX ADMIN — COLLAGENASE SANTYL: 250 OINTMENT TOPICAL at 09:36

## 2018-01-29 RX ADMIN — SERTRALINE HYDROCHLORIDE 50 MG: 50 TABLET ORAL at 09:34

## 2018-01-29 RX ADMIN — NYSTATIN 500000 UNITS: 100000 SUSPENSION ORAL at 20:05

## 2018-01-30 PROCEDURE — 25010000002 ENOXAPARIN PER 10 MG: Performed by: FAMILY MEDICINE

## 2018-01-30 PROCEDURE — 94799 UNLISTED PULMONARY SVC/PX: CPT

## 2018-01-30 PROCEDURE — 25010000002 METHYLPREDNISOLONE PER 40 MG: Performed by: NURSE PRACTITIONER

## 2018-01-30 PROCEDURE — G8980 MOBILITY D/C STATUS: HCPCS

## 2018-01-30 PROCEDURE — G8978 MOBILITY CURRENT STATUS: HCPCS

## 2018-01-30 PROCEDURE — 97162 PT EVAL MOD COMPLEX 30 MIN: CPT

## 2018-01-30 PROCEDURE — 25010000002 ONDANSETRON PER 1 MG: Performed by: FAMILY MEDICINE

## 2018-01-30 PROCEDURE — 99232 SBSQ HOSP IP/OBS MODERATE 35: CPT | Performed by: NURSE PRACTITIONER

## 2018-01-30 PROCEDURE — 94762 N-INVAS EAR/PLS OXIMTRY CONT: CPT

## 2018-01-30 RX ORDER — PROMETHAZINE HYDROCHLORIDE 12.5 MG/1
12.5 TABLET ORAL EVERY 8 HOURS PRN
Status: DISCONTINUED | OUTPATIENT
Start: 2018-01-30 | End: 2018-02-06 | Stop reason: HOSPADM

## 2018-01-30 RX ORDER — SODIUM CHLORIDE 9 MG/ML
50 INJECTION, SOLUTION INTRAVENOUS CONTINUOUS
Status: DISCONTINUED | OUTPATIENT
Start: 2018-01-30 | End: 2018-01-31

## 2018-01-30 RX ORDER — PREDNISONE 20 MG/1
40 TABLET ORAL
Status: DISCONTINUED | OUTPATIENT
Start: 2018-01-31 | End: 2018-02-01

## 2018-01-30 RX ORDER — MECLIZINE HCL 12.5 MG/1
12.5 TABLET ORAL 3 TIMES DAILY PRN
Status: DISCONTINUED | OUTPATIENT
Start: 2018-01-30 | End: 2018-02-06 | Stop reason: HOSPADM

## 2018-01-30 RX ADMIN — LOSARTAN POTASSIUM 25 MG: 25 TABLET, FILM COATED ORAL at 08:56

## 2018-01-30 RX ADMIN — COLLAGENASE SANTYL: 250 OINTMENT TOPICAL at 08:57

## 2018-01-30 RX ADMIN — IPRATROPIUM BROMIDE AND ALBUTEROL SULFATE 3 ML: .5; 3 SOLUTION RESPIRATORY (INHALATION) at 19:18

## 2018-01-30 RX ADMIN — ATORVASTATIN CALCIUM 10 MG: 10 TABLET, FILM COATED ORAL at 08:56

## 2018-01-30 RX ADMIN — IPRATROPIUM BROMIDE AND ALBUTEROL SULFATE 3 ML: .5; 3 SOLUTION RESPIRATORY (INHALATION) at 01:30

## 2018-01-30 RX ADMIN — TRAMADOL HYDROCHLORIDE 50 MG: 50 TABLET, FILM COATED ORAL at 21:29

## 2018-01-30 RX ADMIN — BUDESONIDE 0.5 MG: 0.5 INHALANT RESPIRATORY (INHALATION) at 07:07

## 2018-01-30 RX ADMIN — SERTRALINE HYDROCHLORIDE 50 MG: 50 TABLET ORAL at 08:56

## 2018-01-30 RX ADMIN — METHYLPREDNISOLONE SODIUM SUCCINATE 40 MG: 40 INJECTION, POWDER, FOR SOLUTION INTRAMUSCULAR; INTRAVENOUS at 05:35

## 2018-01-30 RX ADMIN — NYSTATIN 500000 UNITS: 100000 SUSPENSION ORAL at 21:29

## 2018-01-30 RX ADMIN — IPRATROPIUM BROMIDE AND ALBUTEROL SULFATE 3 ML: .5; 3 SOLUTION RESPIRATORY (INHALATION) at 07:07

## 2018-01-30 RX ADMIN — BUDESONIDE AND FORMOTEROL FUMARATE DIHYDRATE 2 PUFF: 160; 4.5 AEROSOL RESPIRATORY (INHALATION) at 19:19

## 2018-01-30 RX ADMIN — HYDROCODONE POLISTIREX AND CHLORPHENIRAMINE POLISTIREX 5 ML: 10; 8 SUSPENSION, EXTENDED RELEASE ORAL at 01:47

## 2018-01-30 RX ADMIN — ESTROGENS, CONJUGATED 0.62 MG: 0.62 TABLET, FILM COATED ORAL at 08:56

## 2018-01-30 RX ADMIN — PROPRANOLOL HYDROCHLORIDE 20 MG: 20 TABLET ORAL at 21:29

## 2018-01-30 RX ADMIN — NYSTATIN 500000 UNITS: 100000 SUSPENSION ORAL at 08:56

## 2018-01-30 RX ADMIN — NYSTATIN 500000 UNITS: 100000 SUSPENSION ORAL at 19:20

## 2018-01-30 RX ADMIN — ASPIRIN 81 MG: 81 TABLET, COATED ORAL at 08:56

## 2018-01-30 RX ADMIN — ONDANSETRON HYDROCHLORIDE 4 MG: 2 INJECTION, SOLUTION INTRAMUSCULAR; INTRAVENOUS at 12:59

## 2018-01-30 RX ADMIN — FAMOTIDINE 20 MG: 20 TABLET, FILM COATED ORAL at 08:56

## 2018-01-30 RX ADMIN — ACETYLCYSTEINE 1 ML: 200 SOLUTION ORAL; RESPIRATORY (INHALATION) at 19:19

## 2018-01-30 RX ADMIN — ENOXAPARIN SODIUM 40 MG: 40 INJECTION SUBCUTANEOUS at 01:45

## 2018-01-30 RX ADMIN — BUDESONIDE 0.5 MG: 0.5 INHALANT RESPIRATORY (INHALATION) at 19:19

## 2018-01-30 RX ADMIN — NYSTATIN 500000 UNITS: 100000 SUSPENSION ORAL at 12:43

## 2018-01-30 RX ADMIN — ACETYLCYSTEINE 1 ML: 200 SOLUTION ORAL; RESPIRATORY (INHALATION) at 07:08

## 2018-01-30 RX ADMIN — SODIUM CHLORIDE 50 ML/HR: 9 INJECTION, SOLUTION INTRAVENOUS at 17:57

## 2018-01-30 RX ADMIN — BUDESONIDE AND FORMOTEROL FUMARATE DIHYDRATE 2 PUFF: 160; 4.5 AEROSOL RESPIRATORY (INHALATION) at 07:08

## 2018-01-30 RX ADMIN — SPIRONOLACTONE 25 MG: 25 TABLET, FILM COATED ORAL at 08:56

## 2018-01-30 RX ADMIN — VITAMIN D, TAB 1000IU (100/BT) 1000 UNITS: 25 TAB at 08:56

## 2018-01-31 ENCOUNTER — APPOINTMENT (OUTPATIENT)
Dept: GENERAL RADIOLOGY | Facility: HOSPITAL | Age: 78
End: 2018-01-31

## 2018-01-31 LAB
ANION GAP SERPL CALCULATED.3IONS-SCNC: 11.2 MMOL/L
BASOPHILS # BLD AUTO: 0.04 10*3/MM3 (ref 0–0.2)
BASOPHILS NFR BLD AUTO: 0.2 % (ref 0–2.5)
BILIRUB UR QL STRIP: NEGATIVE
BUN BLD-MCNC: 31 MG/DL (ref 7–20)
BUN/CREAT SERPL: 38.8 (ref 7.1–23.5)
CALCIUM SPEC-SCNC: 8.8 MG/DL (ref 8.4–10.2)
CHLORIDE SERPL-SCNC: 98 MMOL/L (ref 98–107)
CLARITY UR: CLEAR
CO2 SERPL-SCNC: 32 MMOL/L (ref 26–30)
COLOR UR: YELLOW
CREAT BLD-MCNC: 0.8 MG/DL (ref 0.6–1.3)
DEPRECATED RDW RBC AUTO: 43.5 FL (ref 37–54)
DEPRECATED RDW RBC AUTO: 43.8 FL (ref 37–54)
EOSINOPHIL # BLD AUTO: 0.06 10*3/MM3 (ref 0–0.7)
EOSINOPHIL NFR BLD AUTO: 0.3 % (ref 0–7)
ERYTHROCYTE [DISTWIDTH] IN BLOOD BY AUTOMATED COUNT: 13.2 % (ref 11.5–14.5)
ERYTHROCYTE [DISTWIDTH] IN BLOOD BY AUTOMATED COUNT: 13.3 % (ref 11.5–14.5)
GFR SERPL CREATININE-BSD FRML MDRD: 69 ML/MIN/1.73
GLUCOSE BLD-MCNC: 85 MG/DL (ref 74–98)
GLUCOSE UR STRIP-MCNC: NEGATIVE MG/DL
HCT VFR BLD AUTO: 37.5 % (ref 37–47)
HCT VFR BLD AUTO: 39.8 % (ref 37–47)
HGB BLD-MCNC: 12.3 G/DL (ref 12–16)
HGB BLD-MCNC: 13.1 G/DL (ref 12–16)
HGB UR QL STRIP.AUTO: NEGATIVE
IMM GRANULOCYTES # BLD: 0.38 10*3/MM3 (ref 0–0.06)
IMM GRANULOCYTES NFR BLD: 1.9 % (ref 0–0.6)
KETONES UR QL STRIP: NEGATIVE
LEUKOCYTE ESTERASE UR QL STRIP.AUTO: NEGATIVE
LYMPHOCYTES # BLD AUTO: 1.18 10*3/MM3 (ref 0.6–3.4)
LYMPHOCYTES NFR BLD AUTO: 5.8 % (ref 10–50)
MCH RBC QN AUTO: 29.4 PG (ref 27–31)
MCH RBC QN AUTO: 29.7 PG (ref 27–31)
MCHC RBC AUTO-ENTMCNC: 32.8 G/DL (ref 30–37)
MCHC RBC AUTO-ENTMCNC: 32.9 G/DL (ref 30–37)
MCV RBC AUTO: 89.4 FL (ref 81–99)
MCV RBC AUTO: 90.6 FL (ref 81–99)
MONOCYTES # BLD AUTO: 1.04 10*3/MM3 (ref 0–0.9)
MONOCYTES NFR BLD AUTO: 5.1 % (ref 0–12)
NEUTROPHILS # BLD AUTO: 17.51 10*3/MM3 (ref 2–6.9)
NEUTROPHILS NFR BLD AUTO: 86.7 % (ref 37–80)
NITRITE UR QL STRIP: NEGATIVE
NRBC BLD MANUAL-RTO: 0 /100 WBC (ref 0–0)
PH UR STRIP.AUTO: 7 [PH] (ref 5–8)
PLATELET # BLD AUTO: 206 10*3/MM3 (ref 130–400)
PLATELET # BLD AUTO: 242 10*3/MM3 (ref 130–400)
PMV BLD AUTO: 10.6 FL (ref 6–12)
PMV BLD AUTO: 10.8 FL (ref 6–12)
POTASSIUM BLD-SCNC: 4.2 MMOL/L (ref 3.5–5.1)
PROT UR QL STRIP: NEGATIVE
RBC # BLD AUTO: 4.14 10*6/MM3 (ref 4.2–5.4)
RBC # BLD AUTO: 4.45 10*6/MM3 (ref 4.2–5.4)
SODIUM BLD-SCNC: 137 MMOL/L (ref 137–145)
SP GR UR STRIP: 1.01 (ref 1–1.03)
UROBILINOGEN UR QL STRIP: NORMAL
WBC NRBC COR # BLD: 19.52 10*3/MM3 (ref 4.8–10.8)
WBC NRBC COR # BLD: 20.21 10*3/MM3 (ref 4.8–10.8)

## 2018-01-31 PROCEDURE — 94762 N-INVAS EAR/PLS OXIMTRY CONT: CPT

## 2018-01-31 PROCEDURE — 94799 UNLISTED PULMONARY SVC/PX: CPT

## 2018-01-31 PROCEDURE — 81003 URINALYSIS AUTO W/O SCOPE: CPT | Performed by: NURSE PRACTITIONER

## 2018-01-31 PROCEDURE — 99232 SBSQ HOSP IP/OBS MODERATE 35: CPT | Performed by: NURSE PRACTITIONER

## 2018-01-31 PROCEDURE — 97530 THERAPEUTIC ACTIVITIES: CPT

## 2018-01-31 PROCEDURE — 85025 COMPLETE CBC W/AUTO DIFF WBC: CPT | Performed by: NURSE PRACTITIONER

## 2018-01-31 PROCEDURE — 80048 BASIC METABOLIC PNL TOTAL CA: CPT | Performed by: NURSE PRACTITIONER

## 2018-01-31 PROCEDURE — 63710000001 PREDNISONE PER 1 MG: Performed by: NURSE PRACTITIONER

## 2018-01-31 PROCEDURE — 97110 THERAPEUTIC EXERCISES: CPT

## 2018-01-31 PROCEDURE — 71046 X-RAY EXAM CHEST 2 VIEWS: CPT

## 2018-01-31 PROCEDURE — 85027 COMPLETE CBC AUTOMATED: CPT | Performed by: NURSE PRACTITIONER

## 2018-01-31 PROCEDURE — 25010000002 ENOXAPARIN PER 10 MG: Performed by: FAMILY MEDICINE

## 2018-01-31 RX ORDER — MECLIZINE HCL 12.5 MG/1
12.5 TABLET ORAL EVERY 8 HOURS SCHEDULED
Status: DISCONTINUED | OUTPATIENT
Start: 2018-01-31 | End: 2018-02-06 | Stop reason: HOSPADM

## 2018-01-31 RX ORDER — CLONAZEPAM 0.5 MG/1
0.5 TABLET ORAL NIGHTLY PRN
Status: DISCONTINUED | OUTPATIENT
Start: 2018-01-31 | End: 2018-02-06 | Stop reason: HOSPADM

## 2018-01-31 RX ORDER — GUAIFENESIN 600 MG/1
600 TABLET, EXTENDED RELEASE ORAL EVERY 12 HOURS SCHEDULED
Status: DISCONTINUED | OUTPATIENT
Start: 2018-01-31 | End: 2018-02-06 | Stop reason: HOSPADM

## 2018-01-31 RX ORDER — IPRATROPIUM BROMIDE AND ALBUTEROL SULFATE 2.5; .5 MG/3ML; MG/3ML
3 SOLUTION RESPIRATORY (INHALATION)
Status: DISCONTINUED | OUTPATIENT
Start: 2018-01-31 | End: 2018-02-06 | Stop reason: HOSPADM

## 2018-01-31 RX ORDER — ACETAMINOPHEN 325 MG/1
650 TABLET ORAL EVERY 4 HOURS PRN
Status: DISCONTINUED | OUTPATIENT
Start: 2018-01-31 | End: 2018-02-06 | Stop reason: HOSPADM

## 2018-01-31 RX ORDER — AMLODIPINE BESYLATE 5 MG/1
5 TABLET ORAL
Status: DISCONTINUED | OUTPATIENT
Start: 2018-01-31 | End: 2018-02-06 | Stop reason: HOSPADM

## 2018-01-31 RX ORDER — FLUTICASONE PROPIONATE 50 MCG
2 SPRAY, SUSPENSION (ML) NASAL DAILY
Status: DISCONTINUED | OUTPATIENT
Start: 2018-01-31 | End: 2018-02-06 | Stop reason: HOSPADM

## 2018-01-31 RX ADMIN — MECLIZINE 12.5 MG: 12.5 TABLET ORAL at 20:56

## 2018-01-31 RX ADMIN — ATORVASTATIN CALCIUM 10 MG: 10 TABLET, FILM COATED ORAL at 09:16

## 2018-01-31 RX ADMIN — FAMOTIDINE 20 MG: 20 TABLET, FILM COATED ORAL at 09:16

## 2018-01-31 RX ADMIN — NYSTATIN 500000 UNITS: 100000 SUSPENSION ORAL at 11:53

## 2018-01-31 RX ADMIN — PREDNISONE 40 MG: 20 TABLET ORAL at 09:16

## 2018-01-31 RX ADMIN — BUDESONIDE 0.5 MG: 0.5 INHALANT RESPIRATORY (INHALATION) at 06:56

## 2018-01-31 RX ADMIN — IPRATROPIUM BROMIDE AND ALBUTEROL SULFATE 3 ML: .5; 3 SOLUTION RESPIRATORY (INHALATION) at 14:56

## 2018-01-31 RX ADMIN — LOSARTAN POTASSIUM 25 MG: 25 TABLET, FILM COATED ORAL at 09:16

## 2018-01-31 RX ADMIN — TRAMADOL HYDROCHLORIDE 50 MG: 50 TABLET, FILM COATED ORAL at 20:55

## 2018-01-31 RX ADMIN — GUAIFENESIN 600 MG: 600 TABLET, EXTENDED RELEASE ORAL at 20:55

## 2018-01-31 RX ADMIN — PROPRANOLOL HYDROCHLORIDE 20 MG: 20 TABLET ORAL at 09:16

## 2018-01-31 RX ADMIN — COLLAGENASE SANTYL 1 APPLICATION: 250 OINTMENT TOPICAL at 10:59

## 2018-01-31 RX ADMIN — ENOXAPARIN SODIUM 40 MG: 40 INJECTION SUBCUTANEOUS at 02:51

## 2018-01-31 RX ADMIN — ASPIRIN 81 MG: 81 TABLET, COATED ORAL at 09:16

## 2018-01-31 RX ADMIN — CLONAZEPAM 0.5 MG: 0.5 TABLET ORAL at 20:55

## 2018-01-31 RX ADMIN — AMLODIPINE BESYLATE 5 MG: 5 TABLET ORAL at 14:06

## 2018-01-31 RX ADMIN — TRAMADOL HYDROCHLORIDE 50 MG: 50 TABLET, FILM COATED ORAL at 03:41

## 2018-01-31 RX ADMIN — VITAMIN D, TAB 1000IU (100/BT) 1000 UNITS: 25 TAB at 09:16

## 2018-01-31 RX ADMIN — NYSTATIN 500000 UNITS: 100000 SUSPENSION ORAL at 09:16

## 2018-01-31 RX ADMIN — NYSTATIN 500000 UNITS: 100000 SUSPENSION ORAL at 17:07

## 2018-01-31 RX ADMIN — IPRATROPIUM BROMIDE AND ALBUTEROL SULFATE 3 ML: .5; 3 SOLUTION RESPIRATORY (INHALATION) at 19:39

## 2018-01-31 RX ADMIN — ACETAMINOPHEN 650 MG: 325 TABLET, FILM COATED ORAL at 20:55

## 2018-01-31 RX ADMIN — ACETYLCYSTEINE 1 ML: 200 SOLUTION ORAL; RESPIRATORY (INHALATION) at 19:39

## 2018-01-31 RX ADMIN — FLUTICASONE PROPIONATE 2 SPRAY: 50 SPRAY, METERED NASAL at 15:33

## 2018-01-31 RX ADMIN — SERTRALINE HYDROCHLORIDE 50 MG: 50 TABLET ORAL at 09:16

## 2018-01-31 RX ADMIN — BUDESONIDE 0.5 MG: 0.5 INHALANT RESPIRATORY (INHALATION) at 19:39

## 2018-01-31 RX ADMIN — ACETYLCYSTEINE 1 ML: 200 SOLUTION ORAL; RESPIRATORY (INHALATION) at 06:57

## 2018-01-31 RX ADMIN — GUAIFENESIN 600 MG: 600 TABLET, EXTENDED RELEASE ORAL at 14:06

## 2018-01-31 RX ADMIN — HYDROCODONE POLISTIREX AND CHLORPHENIRAMINE POLISTIREX 5 ML: 10; 8 SUSPENSION, EXTENDED RELEASE ORAL at 20:56

## 2018-01-31 RX ADMIN — PROPRANOLOL HYDROCHLORIDE 20 MG: 20 TABLET ORAL at 20:56

## 2018-01-31 RX ADMIN — NYSTATIN 500000 UNITS: 100000 SUSPENSION ORAL at 20:55

## 2018-01-31 RX ADMIN — NICOTINE 1 PATCH: 14 PATCH TRANSDERMAL at 02:51

## 2018-01-31 RX ADMIN — IPRATROPIUM BROMIDE AND ALBUTEROL SULFATE 3 ML: .5; 3 SOLUTION RESPIRATORY (INHALATION) at 01:21

## 2018-01-31 RX ADMIN — SPIRONOLACTONE 25 MG: 25 TABLET, FILM COATED ORAL at 09:16

## 2018-01-31 RX ADMIN — ESTROGENS, CONJUGATED 0.62 MG: 0.62 TABLET, FILM COATED ORAL at 09:17

## 2018-01-31 RX ADMIN — IPRATROPIUM BROMIDE AND ALBUTEROL SULFATE 3 ML: .5; 3 SOLUTION RESPIRATORY (INHALATION) at 06:56

## 2018-01-31 RX ADMIN — MECLIZINE 12.5 MG: 12.5 TABLET ORAL at 14:07

## 2018-02-01 ENCOUNTER — APPOINTMENT (OUTPATIENT)
Dept: CT IMAGING | Facility: HOSPITAL | Age: 78
End: 2018-02-01

## 2018-02-01 ENCOUNTER — APPOINTMENT (OUTPATIENT)
Dept: CARDIOLOGY | Facility: HOSPITAL | Age: 78
End: 2018-02-01

## 2018-02-01 LAB
ALBUMIN SERPL-MCNC: 3 G/DL (ref 3.5–5)
ALBUMIN/GLOB SERPL: 1.3 G/DL (ref 1–2)
ALP SERPL-CCNC: 58 U/L (ref 38–126)
ALT SERPL W P-5'-P-CCNC: 44 U/L (ref 13–69)
ANION GAP SERPL CALCULATED.3IONS-SCNC: 12.3 MMOL/L
AST SERPL-CCNC: 15 U/L (ref 15–46)
BASOPHILS # BLD AUTO: 0.07 10*3/MM3 (ref 0–0.2)
BASOPHILS NFR BLD AUTO: 0.2 % (ref 0–2.5)
BILIRUB SERPL-MCNC: 0.3 MG/DL (ref 0.2–1.3)
BUN BLD-MCNC: 26 MG/DL (ref 7–20)
BUN/CREAT SERPL: 37.1 (ref 7.1–23.5)
CALCIUM SPEC-SCNC: 8.8 MG/DL (ref 8.4–10.2)
CHLORIDE SERPL-SCNC: 99 MMOL/L (ref 98–107)
CO2 SERPL-SCNC: 30 MMOL/L (ref 26–30)
CREAT BLD-MCNC: 0.7 MG/DL (ref 0.6–1.3)
DEPRECATED RDW RBC AUTO: 42.8 FL (ref 37–54)
EOSINOPHIL # BLD AUTO: 0.08 10*3/MM3 (ref 0–0.7)
EOSINOPHIL NFR BLD AUTO: 0.3 % (ref 0–7)
ERYTHROCYTE [DISTWIDTH] IN BLOOD BY AUTOMATED COUNT: 13.3 % (ref 11.5–14.5)
FLUAV AG NPH QL: NEGATIVE
FLUBV AG NPH QL IA: NEGATIVE
GFR SERPL CREATININE-BSD FRML MDRD: 81 ML/MIN/1.73
GLOBULIN UR ELPH-MCNC: 2.3 GM/DL
GLUCOSE BLD-MCNC: 82 MG/DL (ref 74–98)
HCT VFR BLD AUTO: 41.2 % (ref 37–47)
HEMOCCULT STL QL: POSITIVE
HGB BLD-MCNC: 13.9 G/DL (ref 12–16)
IMM GRANULOCYTES # BLD: 1.03 10*3/MM3 (ref 0–0.06)
IMM GRANULOCYTES NFR BLD: 3.6 % (ref 0–0.6)
LYMPHOCYTES # BLD AUTO: 1.5 10*3/MM3 (ref 0.6–3.4)
LYMPHOCYTES NFR BLD AUTO: 5.3 % (ref 10–50)
MCH RBC QN AUTO: 29.5 PG (ref 27–31)
MCHC RBC AUTO-ENTMCNC: 33.7 G/DL (ref 30–37)
MCV RBC AUTO: 87.5 FL (ref 81–99)
MONOCYTES # BLD AUTO: 1.86 10*3/MM3 (ref 0–0.9)
MONOCYTES NFR BLD AUTO: 6.6 % (ref 0–12)
NEUTROPHILS # BLD AUTO: 23.83 10*3/MM3 (ref 2–6.9)
NEUTROPHILS NFR BLD AUTO: 84 % (ref 37–80)
NRBC BLD MANUAL-RTO: 0 /100 WBC (ref 0–0)
PLAT MORPH BLD: NORMAL
PLATELET # BLD AUTO: ABNORMAL 10*3/MM3 (ref 130–400)
PMV BLD AUTO: 11.5 FL (ref 6–12)
POTASSIUM BLD-SCNC: 4.3 MMOL/L (ref 3.5–5.1)
PROT SERPL-MCNC: 5.3 G/DL (ref 6.3–8.2)
RBC # BLD AUTO: 4.71 10*6/MM3 (ref 4.2–5.4)
RBC MORPH BLD: NORMAL
SODIUM BLD-SCNC: 137 MMOL/L (ref 137–145)
WBC MORPH BLD: NORMAL
WBC NRBC COR # BLD: 28.37 10*3/MM3 (ref 4.8–10.8)

## 2018-02-01 PROCEDURE — 94799 UNLISTED PULMONARY SVC/PX: CPT

## 2018-02-01 PROCEDURE — 25010000002 AZITHROMYCIN: Performed by: NURSE PRACTITIONER

## 2018-02-01 PROCEDURE — 80053 COMPREHEN METABOLIC PANEL: CPT | Performed by: NURSE PRACTITIONER

## 2018-02-01 PROCEDURE — 0 IOPAMIDOL 61 % SOLUTION: Performed by: FAMILY MEDICINE

## 2018-02-01 PROCEDURE — 99233 SBSQ HOSP IP/OBS HIGH 50: CPT | Performed by: NURSE PRACTITIONER

## 2018-02-01 PROCEDURE — 87040 BLOOD CULTURE FOR BACTERIA: CPT | Performed by: NURSE PRACTITIONER

## 2018-02-01 PROCEDURE — 82272 OCCULT BLD FECES 1-3 TESTS: CPT | Performed by: INTERNAL MEDICINE

## 2018-02-01 PROCEDURE — 25010000002 ONDANSETRON PER 1 MG: Performed by: FAMILY MEDICINE

## 2018-02-01 PROCEDURE — 85025 COMPLETE CBC W/AUTO DIFF WBC: CPT | Performed by: NURSE PRACTITIONER

## 2018-02-01 PROCEDURE — 25010000002 KETOROLAC TROMETHAMINE PER 15 MG: Performed by: NURSE PRACTITIONER

## 2018-02-01 PROCEDURE — 74177 CT ABD & PELVIS W/CONTRAST: CPT

## 2018-02-01 PROCEDURE — 87804 INFLUENZA ASSAY W/OPTIC: CPT | Performed by: NURSE PRACTITIONER

## 2018-02-01 PROCEDURE — 25010000002 ENOXAPARIN PER 10 MG: Performed by: FAMILY MEDICINE

## 2018-02-01 PROCEDURE — 85060 BLOOD SMEAR INTERPRETATION: CPT | Performed by: NURSE PRACTITIONER

## 2018-02-01 PROCEDURE — 71260 CT THORAX DX C+: CPT

## 2018-02-01 PROCEDURE — 63710000001 PREDNISONE PER 1 MG: Performed by: NURSE PRACTITIONER

## 2018-02-01 PROCEDURE — 25010000002 VANCOMYCIN PER 500 MG: Performed by: NURSE PRACTITIONER

## 2018-02-01 PROCEDURE — 25010000002 PIPERACILLIN SOD-TAZOBACTAM PER 1 G: Performed by: NURSE PRACTITIONER

## 2018-02-01 PROCEDURE — 70450 CT HEAD/BRAIN W/O DYE: CPT

## 2018-02-01 PROCEDURE — 63710000001 PROMETHAZINE PER 12.5 MG: Performed by: NURSE PRACTITIONER

## 2018-02-01 PROCEDURE — 25010000002 FUROSEMIDE PER 20 MG: Performed by: NURSE PRACTITIONER

## 2018-02-01 PROCEDURE — 85007 BL SMEAR W/DIFF WBC COUNT: CPT | Performed by: NURSE PRACTITIONER

## 2018-02-01 RX ORDER — PREDNISONE 20 MG/1
20 TABLET ORAL
Status: DISCONTINUED | OUTPATIENT
Start: 2018-02-02 | End: 2018-02-06 | Stop reason: HOSPADM

## 2018-02-01 RX ORDER — DICYCLOMINE HYDROCHLORIDE 10 MG/1
10 CAPSULE ORAL ONCE
Status: COMPLETED | OUTPATIENT
Start: 2018-02-01 | End: 2018-02-01

## 2018-02-01 RX ORDER — TRAMADOL HYDROCHLORIDE 50 MG/1
50 TABLET ORAL EVERY 6 HOURS PRN
Status: DISCONTINUED | OUTPATIENT
Start: 2018-02-01 | End: 2018-02-06 | Stop reason: HOSPADM

## 2018-02-01 RX ORDER — KETOROLAC TROMETHAMINE 30 MG/ML
30 INJECTION, SOLUTION INTRAMUSCULAR; INTRAVENOUS ONCE
Status: COMPLETED | OUTPATIENT
Start: 2018-02-01 | End: 2018-02-01

## 2018-02-01 RX ORDER — FUROSEMIDE 10 MG/ML
40 INJECTION INTRAMUSCULAR; INTRAVENOUS ONCE
Status: COMPLETED | OUTPATIENT
Start: 2018-02-01 | End: 2018-02-01

## 2018-02-01 RX ADMIN — ASPIRIN 81 MG: 81 TABLET, COATED ORAL at 08:25

## 2018-02-01 RX ADMIN — PREDNISONE 40 MG: 20 TABLET ORAL at 08:25

## 2018-02-01 RX ADMIN — DICYCLOMINE HYDROCHLORIDE 10 MG: 10 CAPSULE ORAL at 17:26

## 2018-02-01 RX ADMIN — SERTRALINE HYDROCHLORIDE 50 MG: 50 TABLET ORAL at 08:25

## 2018-02-01 RX ADMIN — SPIRONOLACTONE 25 MG: 25 TABLET, FILM COATED ORAL at 09:00

## 2018-02-01 RX ADMIN — IOPAMIDOL 90 ML: 612 INJECTION, SOLUTION INTRAVENOUS at 16:00

## 2018-02-01 RX ADMIN — COLLAGENASE SANTYL 1 APPLICATION: 250 OINTMENT TOPICAL at 08:26

## 2018-02-01 RX ADMIN — PROPRANOLOL HYDROCHLORIDE 20 MG: 20 TABLET ORAL at 08:25

## 2018-02-01 RX ADMIN — ONDANSETRON HYDROCHLORIDE 4 MG: 2 INJECTION, SOLUTION INTRAMUSCULAR; INTRAVENOUS at 15:23

## 2018-02-01 RX ADMIN — MECLIZINE 12.5 MG: 12.5 TABLET ORAL at 22:04

## 2018-02-01 RX ADMIN — NYSTATIN 500000 UNITS: 100000 SUSPENSION ORAL at 12:07

## 2018-02-01 RX ADMIN — NYSTATIN 500000 UNITS: 100000 SUSPENSION ORAL at 08:24

## 2018-02-01 RX ADMIN — NYSTATIN 500000 UNITS: 100000 SUSPENSION ORAL at 23:17

## 2018-02-01 RX ADMIN — TRAMADOL HYDROCHLORIDE 50 MG: 50 TABLET, FILM COATED ORAL at 05:53

## 2018-02-01 RX ADMIN — TAZOBACTAM SODIUM AND PIPERACILLIN SODIUM 3.38 G: 375; 3 INJECTION, SOLUTION INTRAVENOUS at 12:15

## 2018-02-01 RX ADMIN — PROPRANOLOL HYDROCHLORIDE 20 MG: 20 TABLET ORAL at 20:53

## 2018-02-01 RX ADMIN — TAZOBACTAM SODIUM AND PIPERACILLIN SODIUM 3.38 G: 375; 3 INJECTION, SOLUTION INTRAVENOUS at 18:18

## 2018-02-01 RX ADMIN — GUAIFENESIN 600 MG: 600 TABLET, EXTENDED RELEASE ORAL at 20:54

## 2018-02-01 RX ADMIN — ESTROGENS, CONJUGATED 0.62 MG: 0.62 TABLET, FILM COATED ORAL at 08:25

## 2018-02-01 RX ADMIN — FAMOTIDINE 20 MG: 20 TABLET, FILM COATED ORAL at 08:25

## 2018-02-01 RX ADMIN — NYSTATIN 500000 UNITS: 100000 SUSPENSION ORAL at 17:26

## 2018-02-01 RX ADMIN — MECLIZINE 12.5 MG: 12.5 TABLET ORAL at 05:49

## 2018-02-01 RX ADMIN — KETOROLAC TROMETHAMINE 30 MG: 30 INJECTION, SOLUTION INTRAMUSCULAR at 15:22

## 2018-02-01 RX ADMIN — NICOTINE 1 PATCH: 14 PATCH TRANSDERMAL at 03:00

## 2018-02-01 RX ADMIN — AZITHROMYCIN 500 MG: 500 INJECTION, POWDER, LYOPHILIZED, FOR SOLUTION INTRAVENOUS at 13:14

## 2018-02-01 RX ADMIN — ATORVASTATIN CALCIUM 10 MG: 10 TABLET, FILM COATED ORAL at 08:25

## 2018-02-01 RX ADMIN — BUDESONIDE 0.5 MG: 0.5 INHALANT RESPIRATORY (INHALATION) at 07:01

## 2018-02-01 RX ADMIN — GUAIFENESIN 600 MG: 600 TABLET, EXTENDED RELEASE ORAL at 08:25

## 2018-02-01 RX ADMIN — AMLODIPINE BESYLATE 5 MG: 5 TABLET ORAL at 08:25

## 2018-02-01 RX ADMIN — FLUTICASONE PROPIONATE 2 SPRAY: 50 SPRAY, METERED NASAL at 08:26

## 2018-02-01 RX ADMIN — MECLIZINE 12.5 MG: 12.5 TABLET ORAL at 13:15

## 2018-02-01 RX ADMIN — FUROSEMIDE 40 MG: 10 INJECTION, SOLUTION INTRAMUSCULAR; INTRAVENOUS at 12:07

## 2018-02-01 RX ADMIN — LOSARTAN POTASSIUM 25 MG: 25 TABLET, FILM COATED ORAL at 08:25

## 2018-02-01 RX ADMIN — VITAMIN D, TAB 1000IU (100/BT) 1000 UNITS: 25 TAB at 08:25

## 2018-02-01 RX ADMIN — VANCOMYCIN HYDROCHLORIDE 1250 MG: 500 INJECTION, POWDER, LYOPHILIZED, FOR SOLUTION INTRAVENOUS at 16:45

## 2018-02-01 RX ADMIN — ACETYLCYSTEINE 1 ML: 200 SOLUTION ORAL; RESPIRATORY (INHALATION) at 07:01

## 2018-02-01 RX ADMIN — ENOXAPARIN SODIUM 40 MG: 40 INJECTION SUBCUTANEOUS at 03:00

## 2018-02-01 RX ADMIN — CLONAZEPAM 0.5 MG: 0.5 TABLET ORAL at 20:54

## 2018-02-01 RX ADMIN — PROMETHAZINE HYDROCHLORIDE 12.5 MG: 12.5 TABLET ORAL at 22:03

## 2018-02-01 RX ADMIN — IPRATROPIUM BROMIDE AND ALBUTEROL SULFATE 3 ML: .5; 3 SOLUTION RESPIRATORY (INHALATION) at 07:00

## 2018-02-01 NOTE — PROGRESS NOTES
Discharge Planning Assessment   Phuc     Patient Name: Sadie Tijerina  MRN: [id number]  Today's Date: 2/1/2018    Admit Date: 1/22/2018          Discharge Needs Assessment     None            Discharge Plan       02/01/18 1318    Case Management/Social Work Plan    Plan Return call from [facility].  She advises they did not receive fax.  Refaxed.    Patient/Family In Agreement With Plan yes      02/01/18 1232    Case Management/Social Work Plan    Plan Call to [facility] with [facility] regarding pre-cert and acceptance.  She will follow up and call back with update.     Patient/Family In Agreement With Plan yes        Discharge Placement     Facility/Agency Request Status Selected? Address Phone Number Fax Number    [facility] Pending - Request Sent     [address] [facility] [address] [facility] [address] [phone]    [facility] Pending - Request Sent     [address] [facility] [address] [facility] [id number] [phone]                Demographic Summary     None            Functional Status     None            Psychosocial     None            Abuse/Neglect     None            Legal     None            Substance Abuse     None            Patient Forms     None          Maria E Ceballos

## 2018-02-01 NOTE — DISCHARGE PLACEMENT REQUEST
"     Requesting rehab  For  Pulmonary and weakness  Will be ready for discharge  02/2/18  [facility]   [phone]               Sadie Tijerina (77 y.o. Female)     Date of Birth Social Security Number Address Home Phone MRN    1940  [id number] [id number] [phone] [id number]    Mormonism Marital Status          Buddhism        Admission Date Admission Type Admitting Provider Attending Provider Department, Room/Bed    1/22/18 Emergency Luis Eduardo Perry DO Devers, Dustin K, DO [facility] MED SURG  4, 403/1    Discharge Date Discharge Disposition Discharge Destination                      Attending Provider: Luis Eduardo Perry DO     Allergies:  Influenza Vaccines, Morphine And Related    Isolation:  Contact   Infection:  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)   Code Status:  FULL    Ht:  157.5 cm (62\")   Wt:  55.3 kg (121 lb 14.4 oz)    Admission Cmt:  None   Principal Problem:  Acute on chronic respiratory failure with hypoxia [J96.21]                 Active Insurance as of 1/22/2018     Primary Coverage     Payor Plan Insurance Group Employer/Plan Group    MEDICARE MEDICARE A ONLY      Payor Plan Address Payor Plan Phone Number Effective From Effective To    [address] [phone] 6/1/2003     [place] [address]       Subscriber Name Subscriber Birth Date Member ID       SADIE TIJERINA 1940 [id number]           Secondary Coverage     Payor Plan Insurance Group Employer/Plan Group    HUMANA HUMANA [id number]     Payor Plan Address Payor Plan Phone Number Effective From Effective To    [address] [address] [phone] 1/1/2013     [place] [address]       Subscriber Name Subscriber Birth Date Member ID       SADIE TIJERINA 1940 [id number]                 Emergency Contacts      (Rel.) Home Phone Work Phone Mobile Phone    JamAva (Daughter) [phone] -- --    Neris Giles (Daughter) -- -- [phone]            Insurance Information             "    MEDICARE/MEDICARE A ONLY Phone: [phone]    Subscriber: Sadie Tijerina Subscriber#: [id number]    Group#:  Precert#:         HUMANA/HUMANA Phone: [phone]    Subscriber: Sadie Tijerina Subscriber#: [id number]    Group#: [id number] Precert#:              History & Physical      Luis Eduardo Perry DO at 1/23/2018  1:21 AM              [facility] Medicine Services  HISTORY AND PHYSICAL    Primary Care Physician: Kristian Wolff MD    Subjective     Chief Complaint:    Chief Complaint   Patient presents with   • Shortness of Breath       History of Present Illness:     I have reviewed labs/imaging/records from this hospitalization, including ER staff and admitting/attending physicians H/P's and progress notes to establish a comprehensive understanding of this patient's clinical hospital course, as well as to establish a transition of care appropriately.    Patient is a 77-year-old female who presents to the ER today with complaints of progressive worsening of shortness of breath over the ensuing 2-3 days at home.  She was seen by primary care provider last Friday with complaints of lower extremity edema that developed after being discharged home from a recent hospitalization secondary to cellulitis of her right foot and left lower lobe pneumonia.  She is nearly finished her entire course of antibiotics.  She has approximately 3 doses left of her doxycycline.  Her primary care provider ordered a venous Doppler ultrasound of the lower extremities for further clarification of abnormality.  It was felt that it was likely secondary to her prednisone use, as he her primary care provider prescribed Lasix and potassium supplement to be taken daily.  Patient does have a history of chronic obstructive lung disease, with respiratory failure requiring oxygen supplementation on previous hospitalization.  Patient is to her oxygen supplementation upon discharge, however she is agreeable to this  if needed at this time.    In the emergency room her oxygen saturation was noted to be 80% on room air.  She was shortly thereafter placed on nasal cannula support with significant improvement and normalization of her oxygen saturation at 2 L nasal cannula.  She has had no known sick contacts other than recent hospitalization.  Chest x-ray revealed no acute abnormalities and resolution of left lower lobe pneumonia, but findings consistent with COPD.  CT of the chest to rule out pulmonary embolism did just that, and did demonstrate some right upper lobe bronchitis/bronchiolitis.  Labs demonstrated mild elevation to her BUN at 38 and creatinine was 0.9.  Her flu screen was negative.  Mild elevation to her white blood cell count of 15 K, however she was recently on steroid dosing.  Troponin was negative and BNP was normal.    Review of Systems   1. Constitutional: Negative for fever. Negative for chills, diaphoresis.  Generalized malaise/fatigue.   2. HENT: No dysphagia; no changes to vision/hearing/smell/taste; no epistaxis  3. Eyes: Negative for redness and visual disturbance.   4. Respiratory: Cough with shortness of breath, no hemoptysis or orthopnea.   5. Cardiovascular: Negative for chest pain and palpitations.   6. Gastrointestinal: Negative for abdominal distention, abdominal pain and blood in stool.   7. Endocrine: Negative for cold intolerance and heat intolerance.   8. Genitourinary: Negative for difficulty urinating, dysuria and frequency.   9. Musculoskeletal: Negative for arthralgias, back pain and myalgias.   10. Skin: Improving wound to the right lower extremity.  Edema noted to bilateral lower extremities extending from the knees to the feet.   11. Neurological: Negative for syncope and headaches.  Generalized weakness.  12. Hematological: Negative for adenopathy. Does not bruise/bleed easily.   13. Psychiatric/Behavioral: Negative for confusion. The patient is not nervous/anxious.     Past Medical  History:   Past Medical History:   Diagnosis Date   • Arthritis    • Depression    • History of emphysema    • History of esophageal reflux    • History of recurrent UTI (urinary tract infection)    • History of renal calculi    • History of uterine cancer    • Hyperlipidemia        Past Surgical History:  Past Surgical History:   Procedure Laterality Date   • FOOT SURGERY     • HAND SURGERY     • HYSTERECTOMY         Family History: family history includes Arthritis in her father; Cancer in her mother; Diabetes in her daughter; Heart attack in her father; Hyperlipidemia in her daughter; Migraines in her daughter.    Social History:  reports that she has been smoking Cigarettes.  She has been smoking about 0.25 packs per day. She has never used smokeless tobacco. She reports that she does not drink alcohol or use illicit drugs.    Medications:  Prescriptions Prior to Admission   Medication Sig Dispense Refill Last Dose   • albuterol (PROVENTIL HFA;VENTOLIN HFA) 108 (90 BASE) MCG/ACT inhaler Inhale 1 puff Every 4 (Four) Hours As Needed for shortness of air. 18 g 3 Taking   • albuterol (PROVENTIL) (2.5 MG/3ML) 0.083% nebulizer solution USE 1 AMPULE IN NEBULIZER FOUR TIMES A DAY AS NEEDED 75 mL 3 Taking   • aspirin 81 MG EC tablet Take 81 mg by mouth Daily.   Taking   • benzonatate (TESSALON) 200 MG capsule 1 tid po prn 45 capsule 0 Taking   • cholecalciferol (VITAMIN D3) 1000 UNITS tablet Take 1,000 Units by mouth Daily.   Taking   • clonazePAM (KlonoPIN) 0.5 MG tablet Take 1 tablet by mouth At Night As Needed for Seizures. 30 tablet 3 Taking   • collagenase 250 UNIT/GM ointment Apply topically to affected area with dressing change daily 30 g 0 Taking   • dextromethorphan polistirex ER (DELSYM) 30 MG/5ML Suspension Extended Release oral suspension Take 30 mg by mouth At Night As Needed (cough). 148 mL 0 Taking   • doxycycline (VIBRAMYCIN) 100 MG capsule Take 1 capsule by mouth 2 (Two) Times a Day. 20 capsule 0 Taking  "  • furosemide (LASIX) 20 MG tablet Take 1 tablet by mouth Daily. 30 tablet 0    • losartan (COZAAR) 25 MG tablet Take 1 tablet by mouth Daily. (Patient taking differently: Take 25 mg by mouth Daily. Only takes if SBP >150) 30 tablet 2 Taking   • meclizine (ANTIVERT) 12.5 MG tablet TAKE 1 TABLET BY MOUTH THREE TIMES A DAY AS NEEDED for dizziness  90 tablet 0 Taking   • potassium chloride (K-DUR) 10 MEQ CR tablet Take 1 tablet by mouth Daily. 30 tablet 0    • pravastatin (PRAVACHOL) 20 MG tablet TAKE 1 TABLET BY MOUTH IN THE EVENING  30 tablet 5 Taking   • PREMARIN 0.625 MG tablet TAKE 1 TABLET BY MOUTH ONE TIME A DAY  90 tablet 1 Taking   • promethazine-codeine (PHENERGAN with CODEINE) 6.25-10 MG/5ML syrup Take 5 mL by mouth Every 4 (Four) Hours As Needed for Cough. 240 mL 0    • propranolol (INDERAL) 60 MG tablet Take 1 tablet by mouth Daily. 90 tablet 3 Taking   • sertraline (ZOLOFT) 50 MG tablet TAKE 1 TABLET BY MOUTH TWO TIMES A DAY  60 tablet 5 Taking   • SYMBICORT 160-4.5 MCG/ACT inhaler INHALE 2 PUFFS BY MOUTH TWO TIMES A DAY. Rinse mouth after each use.  10.2 g 1 Taking   • tiotropium (SPIRIVA HANDIHALER) 18 MCG per inhalation capsule Place 1 capsule into inhaler and inhale Daily. 30 capsule 5 Taking   • traMADol (ULTRAM) 50 MG tablet Take 1 tablet by mouth Daily As Needed (pain). 30 tablet 2 Taking       Allergies:  Allergies   Allergen Reactions   • Influenza Vaccines    • Morphine And Related          Objective     Physical Exam:  Vital Signs: /61 (BP Location: Right arm, Patient Position: Lying)  Pulse 81  Temp 97.2 °F (36.2 °C) (Oral)   Resp 18  Ht 157.5 cm (62\")  Wt 55.5 kg (122 lb 4.8 oz)  SpO2 100%  BMI 22.37 kg/m2     General Appearance: alert, oriented x 3, no acute distress.  Skin: warm and dry.   HEENT: Atraumatic.  pupils round and reactive to light and accommodation, oral mucosa pink and moist.  Nares patent without epistaxis.  External auditory canals are patent tympanic membranes " intact.  Neck: supple, no JVD, trachea midline.  No thyromegaly  Lungs: Nasal cannula in place, no increased work of breathing.  Audible air exchange noted all lung fields, however inspiratory and expiratory wheezes throughout with prolonged inspiratory phase bilaterally.  Referred rhonchus upper airway congestion is mostly cleared with light cough.  Heart: RRR, normal S1 and S2, no S3, no rub.  Abdomen: soft, non-tender, no palpable bladder, present bowel sounds to auscultation ×4.  No guarding or rigidity.  Extremities: no clubbing, cyanosis.  Good range of motion actively and passively.  Symmetric muscle strength and development.  1+ pitting edema noted to the distal third of the tibias bilaterally, extending to trace nonpitting edema to the knees bilaterally.  Neuro: normal speech and mental status.  Cranial nerves II through XII intact.  No anosmia. DTR 2+; proprioception intact.  No focal motor/sensory deficits.          Results Reviewed:    Lab Results (last 72 hours)     Procedure Component Value Units Date/Time    CBC & Differential [[id number]] Collected:  01/22/18 1930    Specimen:  Blood Updated:  01/22/18 1940    Narrative:       The following orders were created for panel order CBC & Differential.  Procedure                               Abnormality         Status                     ---------                               -----------         ------                     CBC Auto Differential[[id number]]        Abnormal            Final result                 Please view results for these tests on the individual orders.    CBC Auto Differential [[id number]]  (Abnormal) Collected:  01/22/18 1930    Specimen:  Blood Updated:  01/22/18 1940     WBC 15.75 (H) 10*3/mm3      RBC 4.29 10*6/mm3      Hemoglobin 12.3 g/dL      Hematocrit 39.7 %      MCV 92.5 fL      MCH 28.7 pg      MCHC 31.0 g/dL      RDW 13.7 %      RDW-SD 46.5 fl      MPV 10.4 fL      Platelets 182 10*3/mm3      Neutrophil % 76.7 %      Lymphocyte  % 13.8 %      Monocyte % 7.4 %      Eosinophil % 1.2 %      Basophil % 0.3 %      Immature Grans % 0.6 %      Neutrophils, Absolute 12.07 (H) 10*3/mm3      Lymphocytes, Absolute 2.18 10*3/mm3      Monocytes, Absolute 1.17 (H) 10*3/mm3      Eosinophils, Absolute 0.19 10*3/mm3      Basophils, Absolute 0.04 10*3/mm3      Immature Grans, Absolute 0.10 (H) 10*3/mm3      nRBC 0.0 /100 WBC     BNP [[id number]]  (Normal) Collected:  01/22/18 1930    Specimen:  Blood Updated:  01/22/18 2003     proBNP 181.0 pg/mL     Troponin [[id number]]  (Normal) Collected:  01/22/18 1930    Specimen:  Blood Updated:  01/22/18 2003     Troponin I <0.012 ng/mL     Narrative:       Normal Patient Upper Reference Limit (URL) (99th Percentile)=0.03 ng/mL   Non-AMI Illness Reference Limit=0.03-0.11 ng/mL   AMI Confirmation=0.12 ng/mL and above    Comprehensive Metabolic Panel [[id number]]  (Abnormal) Collected:  01/22/18 1930    Specimen:  Blood Updated:  01/22/18 2004     Glucose 105 (H) mg/dL      BUN 38 (H) mg/dL      Creatinine 0.90 mg/dL      Sodium 138 mmol/L      Potassium 4.4 mmol/L      Chloride 102 mmol/L      CO2 30.0 mmol/L      Calcium 9.0 mg/dL      Total Protein 6.2 (L) g/dL      Albumin 3.50 g/dL      ALT (SGPT) 38 U/L      AST (SGOT) 24 U/L      Alkaline Phosphatase 78 U/L      Total Bilirubin 0.4 mg/dL      eGFR Non African Amer 60 (L) mL/min/1.73      Globulin 2.7 gm/dL      A/G Ratio 1.3 g/dL      BUN/Creatinine Ratio 42.2 (H)     Anion Gap 10.4 mmol/L     Narrative:       The MDRD GFR formula is only valid for adults with stable renal function between ages 18 and 70.    Light Blue Top [[id number]] Collected:  01/22/18 1930    Specimen:  Blood Updated:  01/22/18 2031     Extra Tube hold for add-on      Auto resulted       Gold Top - SST [[id number]] Collected:  01/22/18 1930    Specimen:  Blood Updated:  01/22/18 2031     Extra Tube Hold for add-ons.      Auto resulted.       Green Top (No Gel) [[id number]] Collected:  01/22/18  1930    Specimen:  Blood Updated:  01/22/18 2031     Extra Tube Hold for add-ons.      Auto resulted.       Gowen Draw [[id number]] Collected:  01/22/18 1930    Specimen:  Blood Updated:  01/22/18 2031    Narrative:       The following orders were created for panel order Gowen Draw.  Procedure                               Abnormality         Status                     ---------                               -----------         ------                     Light Blue Top[[id number]]                                   Final result               Lavender Top[[id number]]                                     Final result               Gold Top - SST[[id number]]                                   Final result               Green Top (No Gel)[[id number]]                               Final result                 Please view results for these tests on the individual orders.    Lavender Top [[id number]] Collected:  01/22/18 1930    Specimen:  Blood Updated:  01/22/18 2031     Extra Tube hold for add-on      Auto resulted       Influenza Antigen, Rapid - Swab, Nasopharynx [[id number]]  (Normal) Collected:  01/22/18 2012    Specimen:  Swab from Nasopharynx Updated:  01/22/18 2034     Influenza A Ag, EIA Negative     Influenza B Ag, EIA Negative          Imaging Results (last 72 hours)     Procedure Component Value Units Date/Time    XR Chest 1 View [[id number]] Collected:  01/22/18 2031     Updated:  01/22/18 2032    Narrative:       FINAL REPORT    TECHNIQUE:  An AP portable view of the chest was obtained.    CLINICAL HISTORY:  SOA    FINDINGS:  There is evidence of calcified granulomatous disease. The lungs  are hyperlucent and hyperexpanded consistent with COPD.  The  lungs are otherwise clear. The heart and vasculature are  unremarkable. There is no pleural disease, adenopathy, or  significant osseous abnormality.      Impression:       COPD. No acute disease.    Authenticated by Klever Og M.D. on 01/22/2018 08:31:26 PM    CT  Chest Pulmonary Embolism With Contrast [[id number]] Collected:  01/22/18 2256     Updated:  01/22/18 2257    Narrative:       FINAL REPORT    TECHNIQUE:  Thin section axial images were obtained through the chest and  during the arterial phase of IV contrast administration. Coronal  3-D MIP reconstructed images were also provided.    CLINICAL HISTORY:  Shortness of breath, pe protocol    FINDINGS:  The pulmonary arteries are well-opacified and there is no  filling defect to indicate pulmonary embolism. The thoracic  aorta shows no significant abnormality. The lungs demonstrate  multifocal patchy groundglass opacities and that may represent  pulmonary edema and tree in bud micronodular opacities in the  right upper lobe consistent with bronchitis/bronchiolitis. There  is no pleural disease. There is no adenopathy. There is no  significant osseous abnormality.      Impression:       No pulmonary embolism. Right upper lobe bronchitis/bronchiolitis.    Authenticated by Klever Og M.D. on 01/22/2018 10:56:29 PM          ECG/EMG Results (most recent)     None          I have personally reviewed and interpreted available lab data, radiology studies and ECG obtained at time of admission.     Assessment / Plan     Assessment/Problem List:   Principal Problem:    Acute on chronic respiratory failure with hypoxia  Active Problems:    Chronic obstructive pulmonary disease with acute exacerbation    Venous insufficiency of both lower extremities    Chronic diastolic heart failure    Gastroesophageal reflux disease without esophagitis    Hyperlipidemia    Essential hypertension    Essential tremor          Plan:  I have discussed the numerous health consequences of continued tobacco use with patient.  She verbalized understanding of the need for cessation and is agreement to begin nicotine transdermal replacement and continued gradual cessation upon discharge.  Patient is admitted to the med/surgery floor with pulse oximetry.  She  will have nasal cannula oxygen supplementation continued as needed.  Planned walking oximetry tomorrow to fully ascertain her oxygen demand.  She will be continued on her doxycycline as per previous hospitalization culture results until completion.  I've scheduled patient for IV Solu-Medrol every 8 hours this time.  Upon discharge patient would likely do better with a very low-dose dexamethasone taper, 0.5 mg tablets #42, take 6 tablets daily for 6 days, then 5 tablets daily for 2 days, then 4 tablets daily for 2 days, then 3 tablets daily for 2 days, then 2 tablets daily for 2 days, then 1 tablet daily for 2 days, then stop.  I will schedule her nebulizer breathing treatments every 4 hours.  Continue current wound care to right lower extremity.  I've discontinued her planned Lasix and potassium supplementation.  I will instead encouraged her to continue losartan 25 mg daily, not on an as-needed basis.  I also encouraged beginning spironolactone 25 mg once daily, with dose adjustment as needed and follow up outpatient.  See no need to extend her antibiotic coverage at this time.  I discussed plan of care in detail with patient, she verbalized understanding and agrees.  I've answered all of her questions today.    I spent a total of 70 minutes in direct patient care time today.          Luis Eduardo Perry DO 01/23/18 1:21 AM    Please note that portions of this note may have been completed with a voice recognition program. Efforts were made to edit the dictations, but occasionally words are mistranscribed.         Electronically signed by Luis Eduardo Perry DO at 1/23/2018  1:41 AM           Physician Progress Notes (last 24 hours) (Notes from 1/31/2018  1:17 PM through 2/1/2018  1:17 PM)      RUBY Gtz at 1/31/2018  3:03 PM  Version 2 of 2           PROGRESS NOTE        Date of Admission: 1/22/2018  Length of Stay: 6  Primary Care Physician: Kristian Wolff MD    Subjective   Chief Complaint:  Cough  HPI: Patient's daughter is at bedside.  I have seen and evaluated the patient today at bedside.  The daughter has concerns regarding the need for rehabilitation.  I have expressed to the daughter and the patient that I feel she would best benefit from going to rehabilitation given that she is complaining of some weakness.  Patient is still complaining of some dizziness and nausea when she gets up.  This is a chronic condition.  Physical therapy did see and evaluate the patient did do Burns-Hallpike and Apley's and there was no sign of benign positional vertigo.  I have examined her bilateral ears and she does have chronic fluid in both of her ears.  I did start her on some nasal steroids as well as schedule meclizine to see if this will help with symptomatology.  They are in agreement for rehabilitation placement he wanted try to get a [facility] as patient may benefit from physical therapy as well as pulmonary [facility].  Case management has been consulted for the same.  Her cough is more loose today but states she is not getting up a lot of mucus.  I have added guaifenesin along with the Mucomyst.    History:  This is a 77-year-old female who is admitted with acute on chronic hypoxic respiratory failure as well as COPD and acute bronchitis.  She was recently discharged from this facility where at that time she was treated for pneumonia.  She was discharged home on doxycycline for which she has completed.  She had a CT scan in the emergency room which showed a bronchiolitis/bronchitis but no evidence of pneumonia.  She is complaining of some cough but is not able to cough anything up.  Did add Mucomyst yesterday.  Dr. Mojica with pulmonology was consulted and has evaluated the patient.      Review Of Systems:   Review of Systems   General ROS: Patient denies any fevers, chills or loss of consciousness.    Psychological ROS: Denies any hallucinations and delusions.  Ophthalmic ROS: Denies any  diplopia or transient loss of vision.  ENT ROS: Denies sore throat, nasal congestion or ear pain.  Dizziness with nausea which is a chronic condition due to middle ear  Allergy and Immunology ROS: Denies rash or itching.  Hematological and Lymphatic ROS: Denies neck swelling or easy bleeding.  Endocrine ROS: Denies any recent unintentional weight gain or loss.  Breast ROS: Denies any pain or swelling.  Respiratory ROS: cough non productive and  shortness of breath. She has been able to cough up small amount of phlegm today.    Cardiovascular ROS: Denies chest pain or palpitations. No history of exertional chest pain.   Gastrointestinal ROS: Denies nausea and vomiting. Denies any abdominal pain. No diarrhea.  Genito-Urinary ROS: Denies dysuria or hematuria.  Musculoskeletal ROS: Denies back pain. No muscle pain. No calf pain.   Neurological ROS: Denies any focal weakness. No loss of consciousness. Denies any numbness. Denies neck pain.  Dermatological ROS: Denies any redness or pruritis.    Objective      Temp:  [97.5 °F (36.4 °C)-98.9 °F (37.2 °C)] 97.8 °F (36.6 °C)  Heart Rate:  [73-89] 83  Resp:  [16-20] 16  BP: (138-177)/(58-69) 170/66  Physical Exam    General Appearance:  Alert and cooperative, not in any acute distress.   Head:  Atraumatic and normocephalic, without obvious abnormality.   Eyes:          PERRLA, conjunctivae and sclerae normal, no Icterus. No pallor. Extra-occular movements are within normal limits.   Ears:  Ears appear intact with no abnormalities noted., chronic fluid in bilateral ears. TM dull with fluid ring.     Throat: No oral lesions, no thrush, oral mucosa moist.   Neck: Supple, trachea midline, no thyromegaly, no carotid bruit.   Back:   No kyphoscoliosis present. No tenderness to palpation,   range of motion normal.   Lungs:   Chest shape is normal. Breath sounds heard bilaterally equally.  few wheezing and rhonchi. No Pleural rub or bronchial breathing.   Heart:  Normal S1 and S2, no  murmur, no gallop, no rub. No JVD   Abdomen:   Normal bowel sounds, no masses, no organomegaly. Soft     non-tender, non-distended, no guarding, no rebound                tenderness   Extremities: Moves all extremities well, no edema, no cyanosis, no clubbing.   Pulses: Pulses palpable and equal bilaterally   Skin: No bleeding, bruising or rash, wound to foot is much improved with no erythema or edema.   Lymph nodes: No palpable adenopathy   Neurologic:    Psychiatric/Behavior:     Cranial nerves 2 - 12 grossly intact, sensation intact, Motor power is normal and equal bilaterally.  Mood normal, behavior normal       Results Review:    I have reviewed the labs, radiology results and diagnostic studies.      Results from last 7 days  Lab Units 01/31/18  1219   WBC 10*3/mm3 20.21*   HEMOGLOBIN g/dL 13.1   PLATELETS 10*3/mm3 242       Results from last 7 days  Lab Units 01/31/18  0535   SODIUM mmol/L 137   POTASSIUM mmol/L 4.2   CO2 mmol/L 32.0*   CREATININE mg/dL 0.80   GLUCOSE mg/dL 85       Culture Data:    Radiology Data:   Cardiology Data:    I have reviewed the medications.    Assessment/Plan     Assessment and Plan:  1.  Acute on chronic hypoxic respiratory failure-patient only walks oximetry prior to discharge.    2.  COPD with exacerbation-patient finished doxycycline.  She is getting breathing treatments and steroids.  I have weaning steroids. She is on Tussionex.  Flutter valve ordered.  Her cough is more loose.  I have added mucinex along with the mucomyst.     3.  Bronchiolitis - Dr. Mojica with pulmonology has been consulted for further evaluation.  He has seen and evaluated the patient.  She is getting breathing treatments, we are tapering steroids.      3.  Chronic stable diastolic heart failure    4.  Chronic venous insufficiency of lower extremities    5.  Chronic essential hypertension blood pressure stable continue to monitor-  BP elevated.  I have ordered Norvasc.    6.  Essential tremor    7.   Elevated Blood sugar- likely secondary to steroids.  hgb A1c normal.      8.  Generalized weakness- PT/OT consulted.  Recommend rehab placement    9.  Suspected vestibulopathy- Meclizine ordered.    10.  Leukocytosis-  Likely related to steroids.  There is no evidence of bandemia.  Chest xray and UA done today which were negative.      DVT prophylaxis:  Lovenox    Discharge Planning:  Awaiting rehab placement    RUBY Gtz 01/31/18 3:03 PM         Electronically signed by RUBY Gtz at 1/31/2018  3:20 PM      RUBY Gtz at 1/31/2018  3:03 PM  Version 1 of 2           PROGRESS NOTE        Date of Admission: 1/22/2018  Length of Stay: 6  Primary Care Physician: Kristian Wolff MD    Subjective   Chief Complaint: Cough  HPI: Patient's daughter is at bedside.  I have seen and evaluated the patient today at bedside.  The daughter has concerns regarding the need for rehabilitation.  I have expressed to the daughter and the patient that I feel she would best benefit from going to rehabilitation given that she is complaining of some weakness.  Patient is still complaining of some dizziness and nausea when she gets up.  This is a chronic condition.  Physical therapy did see and evaluate the patient did do Natalee-Hallpike and Apley's and there was no sign of benign positional vertigo.  I have examined her bilateral ears and she does have chronic fluid in both of her ears.  I did start her on some nasal steroids as well as schedule meclizine to see if this will help with symptomatology.  They are in agreement for rehabilitation placement he wanted try to get a [facility] as patient may benefit from physical therapy as well as pulmonary therapy at [facility].  Case management has been consulted for the same.  Her cough is more loose today but states she is not getting up a lot of mucus.  I have added guaifenesin along with the Mucomyst.    History:  This is a 77-year-old  female who is admitted with acute on chronic hypoxic respiratory failure as well as COPD and acute bronchitis.  She was recently discharged from this facility where at that time she was treated for pneumonia.  She was discharged home on doxycycline for which she has completed.  She had a CT scan in the emergency room which showed a bronchiolitis/bronchitis but no evidence of pneumonia.  She is complaining of some cough but is not able to cough anything up.  Did add Mucomyst yesterday.  Dr. Mojica with pulmonology was consulted and has evaluated the patient.      Review Of Systems:   Review of Systems   General ROS: Patient denies any fevers, chills or loss of consciousness.    Psychological ROS: Denies any hallucinations and delusions.  Ophthalmic ROS: Denies any diplopia or transient loss of vision.  ENT ROS: Denies sore throat, nasal congestion or ear pain.  Dizziness with nausea which is a chronic condition due to middle ear  Allergy and Immunology ROS: Denies rash or itching.  Hematological and Lymphatic ROS: Denies neck swelling or easy bleeding.  Endocrine ROS: Denies any recent unintentional weight gain or loss.  Breast ROS: Denies any pain or swelling.  Respiratory ROS: cough non productive and  shortness of breath. She has been able to cough up small amount of phlegm today.    Cardiovascular ROS: Denies chest pain or palpitations. No history of exertional chest pain.   Gastrointestinal ROS: Denies nausea and vomiting. Denies any abdominal pain. No diarrhea.  Genito-Urinary ROS: Denies dysuria or hematuria.  Musculoskeletal ROS: Denies back pain. No muscle pain. No calf pain.   Neurological ROS: Denies any focal weakness. No loss of consciousness. Denies any numbness. Denies neck pain.  Dermatological ROS: Denies any redness or pruritis.    Objective      Temp:  [97.5 °F (36.4 °C)-98.9 °F (37.2 °C)] 97.8 °F (36.6 °C)  Heart Rate:  [73-89] 83  Resp:  [16-20] 16  BP: (138-177)/(58-69) 170/66  Physical  Exam    General Appearance:  Alert and cooperative, not in any acute distress.   Head:  Atraumatic and normocephalic, without obvious abnormality.   Eyes:          PERRLA, conjunctivae and sclerae normal, no Icterus. No pallor. Extra-occular movements are within normal limits.   Ears:  Ears appear intact with no abnormalities noted.   Throat: No oral lesions, no thrush, oral mucosa moist.   Neck: Supple, trachea midline, no thyromegaly, no carotid bruit.   Back:   No kyphoscoliosis present. No tenderness to palpation,   range of motion normal.   Lungs:   Chest shape is normal. Breath sounds heard bilaterally equally.  few wheezing and rhonchi. No Pleural rub or bronchial breathing.   Heart:  Normal S1 and S2, no murmur, no gallop, no rub. No JVD   Abdomen:   Normal bowel sounds, no masses, no organomegaly. Soft     non-tender, non-distended, no guarding, no rebound                tenderness   Extremities: Moves all extremities well, no edema, no cyanosis, no clubbing.   Pulses: Pulses palpable and equal bilaterally   Skin: No bleeding, bruising or rash, wound to foot is much improved with no erythema or edema.   Lymph nodes: No palpable adenopathy   Neurologic:    Psychiatric/Behavior:     Cranial nerves 2 - 12 grossly intact, sensation intact, Motor power is normal and equal bilaterally.  Mood normal, behavior normal       Results Review:    I have reviewed the labs, radiology results and diagnostic studies.      Results from last 7 days  Lab Units 01/31/18  1219   WBC 10*3/mm3 20.21*   HEMOGLOBIN g/dL 13.1   PLATELETS 10*3/mm3 242       Results from last 7 days  Lab Units 01/31/18  0535   SODIUM mmol/L 137   POTASSIUM mmol/L 4.2   CO2 mmol/L 32.0*   CREATININE mg/dL 0.80   GLUCOSE mg/dL 85       Culture Data:    Radiology Data:   Cardiology Data:    I have reviewed the medications.    Assessment/Plan     Assessment and Plan:  1.  Acute on chronic hypoxic respiratory failure-patient only walks oximetry prior to  discharge.    2.  COPD with exacerbation-patient finished doxycycline.  She is getting breathing treatments and steroids.  I have weaning steroids. She is on Tussionex.  Flutter valve ordered.  Her cough is more loose.  I have added mucinex along with the mucomyst.     3.  Bronchiolitis - Dr. Mojica with pulmonology has been consulted for further evaluation.  He has seen and evaluated the patient.  She is getting breathing treatments, we are tapering steroids.      3.  Chronic stable diastolic heart failure    4.  Chronic venous insufficiency of lower extremities    5.  Chronic essential hypertension blood pressure stable continue to monitor-  BP elevated.  I have ordered Norvasc.    6.  Essential tremor    7.  Elevated Blood sugar- likely secondary to steroids.  hgb A1c normal.      8.  Generalized weakness- PT/OT consulted.  Recommend rehab placement    9.  Suspected vestibulopathy- Meclizine ordered.      DVT prophylaxis:  Lovenox    Discharge Planning:  Awaiting rehab placement    RUBY Gtz 01/31/18 3:03 PM         Electronically signed by RUBY Gtz at 1/31/2018  3:18 PM        Medical Student Notes (last 24 hours) (Notes from 1/31/2018  1:17 PM through 2/1/2018  1:17 PM)     No notes of this type exist for this encounter.           Physical Therapy Notes (last 24 hours) (Notes from 1/31/2018  1:17 PM through 2/1/2018  1:17 PM)      Nika Chun, DILEEP at 1/31/2018  5:37 PM  Version 1 of 1         Problem: Patient Care Overview (Adult)  Goal: Plan of Care Review  Outcome: Ongoing (interventions implemented as appropriate)   01/31/18 1605   Coping/Psychosocial Response Interventions   Plan Of Care Reviewed With patient   Patient Care Overview   Progress progress toward functional goals as expected   Outcome Evaluation   Outcome Summary/Follow up Plan Pt with 3 episodes of LOB during amb and required assist to maintain bal. Pt withdizziness and nauseated feeling. Con't with PT  POC       Problem: Inpatient Physical Therapy  Goal: Transfer Training Goal 1 LTG- PT  Outcome: Ongoing (interventions implemented as appropriate)   18 1319 18 1605   Transfer Training PT LTG   Transfer Training PT LTG, Date Established 18 --    Transfer Training PT LTG, Time to Achieve 2 wks --    Transfer Training PT LTG, Activity Type sit to stand/stand to sit;bed to chair /chair to bed --    Transfer Training PT LTG, [place] Level supervision required --    Transfer Training PT LTG, Assist Device walker, rolling --    Transfer Training PT LTG, Outcome --  goal ongoing     Goal: Gait Training Goal LTG- PT  Outcome: Ongoing (interventions implemented as appropriate)   18 1319 18 1605   Gait Training PT LTG   Gait Training Goal PT LTG, Date Established 18 --    Gait Training Goal PT LTG, Time to Achieve 2 wks --    Gait Training Goal PT LTG, [place] Level contact guard assist --    Gait Training Goal PT LTG, Assist Device walker, rolling --    Gait Training Goal PT LTG, Distance to Achieve 300 --    Gait Training Goal PT LTG, Outcome --  goal partially met     Goal: Strength Goal LTG- PT  Outcome: Ongoing (interventions implemented as appropriate)   18 1319 18 1605   Strength Goal PT LTG   Strength Goal PT LTG, Date Established 18 --    Strength Goal PT LTG, Time to Achieve 2 wks --    Strength Goal PT LTG, Measure to Achieve Patient will demonstrate independence with HEP. --    Strength Goal PT LTG, Outcome --  goal ongoing            Electronically signed by Nika Chun PTA at 2018  5:37 PM      Nika Chun PTA at 2018  5:42 PM  Version 1 of 1         Acute Care - Physical Therapy Treatment Note  TOÑA Friend     Patient Name: Sadie Tijerina  : 1940  MRN: [id number]  Today's Date: 2018  Onset of Illness/Injury or Date of Surgery Date: 18  Date of Referral to PT: 18  Referring Physician: Ce  RUBY Herron    Admit Date: 1/22/2018    Visit Dx:    ICD-10-CM ICD-9-CM   1. Chronic obstructive pulmonary disease with acute exacerbation J44.1 491.21   2. Impaired mobility and ADLs Z74.09 799.89   3. Impaired functional mobility, balance, gait, and endurance Z74.09 V49.89     Patient Active Problem List   Diagnosis   • Benign paroxysmal positional vertigo   • Calf cramp   • Mixed anxiety depressive disorder   • Dizziness   • Fatigue   • Gastroesophageal reflux disease without esophagitis   • Hematuria   • Hyperlipidemia   • Essential hypertension   • Low back pain   • Orthostatic hypotension   • Restless legs syndrome   • Essential tremor   • Vitamin D deficiency   • Balance problem   • Tension headache   • COPD exacerbation   • Leukocytosis   • Tobacco abuse disorder   • Wound infection   • Cellulitis of foot   • Pneumonia due to infectious organism   • Leg swelling   • Wound abscess   • Chronic obstructive pulmonary disease with acute exacerbation   • Acute on chronic respiratory failure with hypoxia   • Venous insufficiency of both lower extremities   • Chronic diastolic heart failure               Adult Rehabilitation Note       01/31/18 1605 01/31/18 1010 01/30/18 1648    Rehab Assessment/Intervention    Discipline physical therapist  -CC occupational therapist  -SD physical therapist  -JR    Document Type therapy note (daily note)  -CC therapy note (daily note)  -SD evaluation  -JR    Subjective Information agree to therapy;complains of;dizziness;nausea/vomiting  -CC agree to therapy;complains of;dizziness;nausea/vomiting  -SD agree to therapy;complains of;dizziness;nausea/vomiting  -JR    Patient Effort, Rehab Treatment adequate  -CC adequate  -SD good  -JR    Symptoms Noted During/After Treatment dizziness  -CC dizziness  -SD fatigue  -JR    Precautions/Limitations fall precautions  -CC fall precautions  -SD fall precautions  -JR    Specific Treatment Considerations dizziness  -CC  dizziness  -JR     Patient Response to Treatment no significant increased nausea noted  -CC  No increased nausea after treatment  -JR    Recorded by [CC] Nika Chun, PTA [SD] Mariia Arevalo, OT [JR] Estrella Ferraro, PT    Vital Signs    Pre SpO2 (%) 98  -CC      O2 Delivery Pre Treatment room air  -CC      Intra SpO2 (%) 95  -CC      O2 Delivery Intra Treatment room air  -CC      Post SpO2 (%) 97  -CC      O2 Delivery Post Treatment room air  -CC      Recorded by [CC] Nika Chun PTA      Pain Assessment    Pain Assessment No/denies pain  -CC No/denies pain  -SD No/denies pain  -JR    Recorded by [CC] Nika Chun PTA [SD] Mariia Arevalo, OT [JR] Estrella Ferraro, PT    Bed Mobility, Assessment/Treatment    Bed Mobility, Assistive Device bed rails  -CC bed rails;head of bed elevated  -SD head of bed elevated;bed rails  -JR    Bed Mob, Supine to Sit, [place] independent  -CC independent  -SD independent  -JR    Bed Mob, Sit to Supine, [place] independent  -CC      Recorded by [CC] Nika Chun, DILEEP [SD] Mariia Arevalo, OT [JR] Estrella Ferraro, PT    Transfer Assessment/Treatment    Transfers, Sit-Stand [place] contact guard assist;verbal cues required  -CC contact guard assist  -SD contact guard assist  -JR    Transfers, Stand-Sit [place] contact guard assist;verbal cues required  -CC contact guard assist  -SD contact guard assist  -JR    Transfers, Sit-Stand-Sit, Assist Device rolling walker  -CC rolling walker  -SD rolling walker  -JR    Toilet Transfer, [place] contact guard assist;verbal cues required  -CC contact guard assist  -SD contact guard assist  -JR    Toilet Transfer, Assistive Device rolling walker  -CC rolling walker  -SD rolling walker  -JR    Transfer, Safety Issues balance decreased during turns;sequencing ability decreased;loses balance backward  -CC  sequencing ability decreased;step length decreased;balance decreased during turns  -JR    Transfer, Impairments impaired  balance;strength decreased  -CC  strength decreased;impaired balance  -JR    Recorded by [CC] Nika Chun PTA [SD] Mariia Arevalo OT [JR] Estrella Ferraro, PT    Gait Assessment/Treatment    Gait, [place] Level contact guard assist;verbal cues required  -CC  contact guard assist  -JR    Gait, Assistive Device rolling walker  -CC  rolling walker  -JR    Gait, Distance (Feet) 60  -CC  76  -JR    Gait, Gait Deviations alia decreased  -CC  laia decreased;forward flexed posture;stride length decreased;stride width increased  -JR    Gait, Safety Issues balance decreased during turns;loses balance backward;sequencing ability decreased  -CC  loses balance backward;sequencing ability decreased;balance decreased during turns;step length decreased  -JR    Gait, Impairments impaired balance;strength decreased  -CC  impaired balance;strength decreased;coordination impaired  -JR    Gait, Comment Pt with LOB x 3 with CGA to right self d/t light headed/dizziness  -CC  s/o lightheadedness with mobility  -JR    Recorded by [CC] Nika Chun PTA  [JR] Estrella Ferraro, PT    Functional Mobility    Functional Mobility- Ind. Level  contact guard assist  -SD     Functional Mobility- Device  rolling walker  -SD     Functional Mobility-Distance (Feet)  74  -SD     Recorded by  [SD] Mariia Arevalo OT     Toileting Assessment/Training    Toileting Assess/Train, [place]ep[place] Level  contact guard assist  -SD     Recorded by  [SD] Mariia Arevalo OT     Therapy Exercises    Bilateral Upper Extremity  AROM:;15 reps;elbow flexion/extension;shoulder abduction/adduction;shoulder extension/flexion  -SD     BUE Resistance  theraband  -SD     Recorded by  [SD] Mariia Arevalo OT     Positioning and Restraints    Pre-Treatment Position in bed  -CC in bed  -SD in bed  -JR    Post Treatment Position bed  -CC chair  -SD bed  -JR    In Bed call light within reach;encouraged to call for assist;exit alarm on;with family/caregiver;side rails up  x2;R heel elevated;L heel elevated   HOB elevated  -CC  sitting EOB;call light within reach;encouraged to call for assist;with family/caregiver  -JR    In Chair  reclined;call light within reach;encouraged to call for assist;with family/caregiver  -SD     Recorded by [CC] Nika Chun, PTA [SD] Mariia Arevalo OT [JR] Estrella Ferraro, PT      01/29/18 1600          Rehab Assessment/Intervention    Discipline occupational therapist  -SD      Document Type therapy note (daily note)  -SD      Subjective Information agree to therapy;complains of;dizziness  -SD      Patient Effort, Rehab Treatment adequate  -SD      Symptoms Noted During/After Treatment fatigue  -SD      Precautions/Limitations fall precautions  -SD      Recorded by [SD] Mariia Arevalo OT      Transfer Assessment/Treatment    Transfers, Sit-Stand [place] contact guard assist  -SD      Transfers, Stand-Sit [place] contact guard assist  -SD      Transfers, Sit-Stand-Sit, Assist Device rolling walker  -SD      Toilet Transfer, [place] contact guard assist  -SD      Toilet Transfer, Assistive Device rolling walker  -SD      Transfer, Comment sit to stand x 5   -SD      Recorded by [SD] Mariia Arevalo OT      Functional Mobility    Functional Mobility- Ind. Level contact guard assist  -SD      Functional Mobility- Device rolling walker  -SD      Functional Mobility-Distance (Feet) 54  -SD      Recorded by [SD] Mariia Arevalo OT      Therapy Exercises    Bilateral Upper Extremity AROM:;15 reps;elbow flexion/extension;shoulder abduction/adduction;shoulder extension/flexion  -SD      Recorded by [SD] Mariia Arevalo OT        User Key  (r) = Recorded By, (t) = Taken By, (c) = Cosigned By    Initials Name Effective Dates    JR Estrella Ferraro, PT 10/26/16 -     CC Nika Chun, PTA 10/26/16 -     SD Mariia Arevalo OT 06/30/17 -                 IP PT Goals       01/31/18 1605 01/30/18 1857 01/26/18 1320    Transfer Training PT LTG     Transfer Training PT LTG, Outcome goal ongoing  -CC goal ongoing  -JR goal ongoing  -LM    Gait Training PT LTG    Gait Training Goal PT LTG, Outcome goal partially met  -CC goal ongoing  -JR goal ongoing  -LM    Strength Goal PT LTG    Strength Goal PT LTG, Outcome goal ongoing  -CC goal ongoing  -JR goal ongoing  -LM      01/25/18 1319          Transfer Training PT LTG    Transfer Training PT LTG, Date Established 01/25/18  -LM      Transfer Training PT LTG, Time to Achieve 2 wks  -LM      Transfer Training PT LTG, Activity Type sit to stand/stand to sit;bed to chair /chair to bed  -LM      Transfer Training PT LTG, [place] Level supervision required  -LM      Transfer Training PT LTG, Assist Device walker, rolling  -LM      Transfer Training PT LTG, Outcome goal ongoing  -LM      Gait Training PT LTG    Gait Training Goal PT LTG, Date Established 01/25/18  -LM      Gait Training Goal PT LTG, Time to Achieve 2 wks  -LM      Gait Training Goal PT LTG, [place] Level contact guard assist  -LM      Gait Training Goal PT LTG, Assist Device walker, rolling  -LM      Gait Training Goal PT LTG, Distance to Achieve 300  -LM      Gait Training Goal PT LTG, Outcome goal ongoing  -LM      Strength Goal PT LTG    Strength Goal PT LTG, Date Established 01/25/18  -LM      Strength Goal PT LTG, Time to Achieve 2 wks  -LM      Strength Goal PT LTG, Measure to Achieve Patient will demonstrate independence with HEP.  -LM      Strength Goal PT LTG, Outcome goal ongoing  -LM        User Key  (r) = Recorded By, (t) = Taken By, (c) = Cosigned By    Initials Name Provider Type    JR Estrella Ferraro, PT Physical Therapist    LM Mirna Doran, PT Physical Therapist    KAEL Chun, PTA Physical Therapy Assistant          Physical Therapy Education     Title: PT OT SLP Therapies (Active)     Topic: Physical Therapy (Done)     Point: Mobility training (Done)    Learning Progress Summary    Learner Readiness Method Response  Comment Documented by Status   Patient Acceptance E VU,NR Importance of increasing mobility daily CC 01/31/18 1734 Done    Acceptance E VU Instructed on importance of rehab prior to going home JR 01/30/18 1849 Done    Acceptance E VU Ther ex for HEP; proper use of RW for safe transfers/ambulation.  01/26/18 1320 Done    Acceptance E VU Purpose of PT/POC.  01/25/18 1319 Done   Family Acceptance E VU Ther ex for HEP; proper use of RW for safe transfers/ambulation.  01/26/18 1320 Done               Point: Home exercise program (Done)    Learning Progress Summary    Learner Readiness Method Response Comment Documented by Status   Patient Acceptance E VU Ther ex for HEP; proper use of RW for safe transfers/ambulation.  01/26/18 1320 Done    Acceptance E VU Purpose of PT/POC.  01/25/18 1319 Done   Family Acceptance E VU Ther ex for HEP; proper use of RW for safe transfers/ambulation.  01/26/18 1320 Done               Point: Precautions (Done)    Learning Progress Summary    Learner Readiness Method Response Comment Documented by Status   Patient Acceptance E VU Ther ex for HEP; proper use of RW for safe transfers/ambulation.  01/26/18 1320 Done    Acceptance E VU Purpose of PT/POC.  01/25/18 1319 Done   Family Acceptance E VU Ther ex for HEP; proper use of RW for safe transfers/ambulation.  01/26/18 1320 Done                      User Key     Initials Effective Dates Name Provider Type Discipline     10/26/16 -  Estrella Ferraro, PT Physical Therapist PT     10/26/16 -  Mirna Doran, PT Physical Therapist PT     10/26/16 -  Nika Chun, PTA Physical Therapy Assistant PT                    PT Recommendation and Plan  Anticipated Discharge Disposition: home with home health  Planned Therapy Interventions: balance training, gait training, home exercise program, patient/family education, strengthening, transfer training  PT Frequency: daily  Plan of Care Review  Plan Of Care Reviewed With:  patient  Progress: progress toward functional goals as expected  Outcome Summary/Follow up Plan: Pt with 3 episodes of LOB during amb and required assist to maintain bal. Pt withdizziness and nauseated feeling. Con't  with PT POC          Outcome Measures       01/31/18 1010 01/30/18 0932 01/29/18 1600    How much help from another person do you currently need...    Turning from your back to your side while in flat bed without using bedrails?  4  -JR     Moving from lying on back to sitting on the side of a flat bed without bedrails?  4  -JR     Moving to and from a bed to a chair (including a wheelchair)?  3  -JR     Standing up from a chair using your arms (e.g., wheelchair, bedside chair)?  3  -JR     Climbing 3-5 steps with a railing?  3  -JR     To walk in hospital room?  3  -JR     AM-PAC 6 Clicks Score  20  -JR     How much help from another is currently needed...    Putting on and taking off regular lower body clothing? 3  -SD  3  -SD    Bathing (including washing, rinsing, and drying) 3  -SD  3  -SD    Toileting (which includes using toilet bed pan or urinal) 3  -SD  3  -SD    Putting on and taking off regular upper body clothing 4  -SD  4  -SD    Taking care of personal grooming (such as brushing teeth) 4  -SD  4  -SD    Eating meals 4  -SD  4  -SD    Score 21  -SD  21  -SD    Functional Assessment    Outcome Measure Options AM-PAC 6 Clicks Daily Activity (OT)  -SD AM-PAC 6 Clicks Basic Mobility (PT)  -JR AM-PAC 6 Clicks Daily Activity (OT)  -SD      User Key  (r) = Recorded By, (t) = Taken By, (c) = Cosigned By    Initials Name Provider Type    JR Estrella Ferraro, PT Physical Therapist    ALEJANDRA Arevalo, OT Occupational Therapist           Time Calculation:         PT Charges       01/31/18 1605          Time Calculation    Start Time 1605  -CC      PT Received On 01/31/18  -CC      PT Goal Re-Cert Due Date 01/04/18  -CC      Time Calculation- PT    Total Timed Code Minutes- PT 30 minute(s)  -CC         User Key  (r) = Recorded By, (t) = Taken By, (c) = Cosigned By    Initials Name Provider Type    CC Nika Chun PTA Physical Therapy Assistant              PT G-Codes  PT Professional Judgement Used?: Yes  Outcome Measure Options: AM-PAC 6 Clicks Daily Activity (OT)  Score: 20  Functional Limitation: Mobility: Walking and moving around  Mobility: Walking and Moving Around Current Status (): At least 20 percent but less than 40 percent impaired, limited or restricted  Mobility: Walking and Moving Around Goal Status (): At least 1 percent but less than 20 percent impaired, limited or restricted    Nika Chun PTA  1/31/2018          Electronically signed by Nika Chun PTA at 1/31/2018  5:45 PM        Occupational Therapy Notes (last 24 hours) (Notes from 1/31/2018  1:17 PM through 2/1/2018  1:17 PM)     No notes of this type exist for this encounter.        Respiratory Therapy Notes (last 24 hours) (Notes from 1/31/2018  1:17 PM through 2/1/2018  1:17 PM)     No notes of this type exist for this encounter.

## 2018-02-01 NOTE — PROGRESS NOTES
PROGRESS NOTE        Date of Admission: 1/22/2018  Length of Stay: 7  Primary Care Physician: Kristian Wolff MD    Subjective   Chief Complaint: Cough  HPI:  This is a 77-year-old female who is admitted with acute on chronic hypoxic respiratory failure as well as COPD and acute bronchitis.  She was recently discharged from this facility where at that time she was treated for pneumonia.  She was discharged home on doxycycline for which she has completed.  She had a CT scan in the emergency room which showed a bronchiolitis/bronchitis I have seen and evaluated the patient today at bedside.  She had had some dizziness with nausea with ambulation for which she was started on meclizine yesterday.  According to the patient and daughter the dizziness with nausea when she gets up to ambulate has been a chronic problem and she was told that it was due to fluid in her middle ear.  I did evaluate and she does have fluid rings around each tympanic membrane does appear to be more chronic than acute.  She was complaining of some increased shortness of breath this morning and did sound more congested with a productive cough with greenish sputum have added antibiotics back in the form of Zosyn, azithromycin and vancomycin.  I did give a dose of Lasix as well as she did get fluid hydration even though it was gentle yesterday.    This afternoon I did go back to evaluate the patient and check on her status and she was complaining of severe abdominal pain that started 1 hour prior to this.  Patient states that it feels like cramping across her lower abdomen.  She did have a bowel movement yesterday.  Her abdomen is soft does have hypoactive bowel sounds.  A CT scan of abdomen and pelvis has been ordered.  She has been given a dose of Toradol.  Her white count has jumped to 28,000.  She denies any diarrhea.  We are in the process of weaning her steroids.  I have notified her daughter that we will be getting a CT scan and I will  transfer the patient to telemetry for closer monitoring.         Review Of Systems:   Review of Systems   General ROS: Patient denies any fevers, chills or loss of consciousness.    Psychological ROS: Denies any hallucinations and delusions.  Ophthalmic ROS: Denies any diplopia or transient loss of vision.  ENT ROS: Denies sore throat, nasal congestion or ear pain.  Dizziness with nausea which is a chronic condition due to middle ear  Allergy and Immunology ROS: Denies rash or itching.  Hematological and Lymphatic ROS: Denies neck swelling or easy bleeding.  Endocrine ROS: Denies any recent unintentional weight gain or loss.  Breast ROS: Denies any pain or swelling.  Respiratory ROS: cough non productive and  shortness of breath. She has been able to cough up small amount of phlegm today.    Cardiovascular ROS: Denies chest pain or palpitations. No history of exertional chest pain.   Gastrointestinal ROS: nausea and vomiting X 1 episode.. Lower abdominal pain that is crampy in nature. No diarrhea.  Genito-Urinary ROS: Denies dysuria or hematuria.  Musculoskeletal ROS: Denies back pain. No muscle pain. No calf pain.   Neurological ROS: Denies any focal weakness. No loss of consciousness. Denies any numbness. Denies neck pain.  Dermatological ROS: Denies any redness or pruritis.    Objective      Temp:  [97.9 °F (36.6 °C)-98.9 °F (37.2 °C)] 98.6 °F (37 °C)  Heart Rate:  [71-93] 86  Resp:  [16-20] 18  BP: (110-140)/(58-70) 118/70  Physical Exam    General Appearance:  Alert and cooperative, tearful with abdominal pain   Head:  Atraumatic and normocephalic, without obvious abnormality.   Eyes:          PERRLA, conjunctivae and sclerae normal, no Icterus. No pallor. Extra-occular movements are within normal limits.   Ears:  Ears appear intact with no abnormalities noted., chronic fluid in bilateral ears. TM dull with fluid ring.     Throat: No oral lesions, no thrush, oral mucosa moist.   Neck: Supple, trachea midline,  no thyromegaly, no carotid bruit.   Back:   No kyphoscoliosis present. No tenderness to palpation,   range of motion normal.   Lungs:   Chest shape is normal. Breath sounds heard bilaterally equally.  few wheezing and rhonchi. No Pleural rub or bronchial breathing.   Heart:  Normal S1 and S2, no murmur, no gallop, no rub. No JVD   Abdomen:   Hypoactive bowel sounds, no masses, no organomegaly. Soft  Tender in lower quadrants. non-distended, no guarding, no rebound                tenderness   Extremities: Moves all extremities well, no edema, no cyanosis, no clubbing.   Pulses: Pulses palpable and equal bilaterally   Skin: No bleeding, bruising or rash, wound to foot is much improved with no erythema or edema.  No drainage.     Lymph nodes: No palpable adenopathy   Neurologic:    Psychiatric/Behavior:     Cranial nerves 2 - 12 grossly intact, sensation intact, Motor power is normal and equal bilaterally.  Mood normal, behavior normal       Results Review:    I have reviewed the labs, radiology results and diagnostic studies.      Results from last 7 days  Lab Units 02/01/18  1135 01/31/18  1219   WBC 10*3/mm3 28.37* 20.21*   HEMOGLOBIN g/dL 13.9 13.1   PLATELETS 10*3/mm3  --  242       Results from last 7 days  Lab Units 02/01/18  0548   SODIUM mmol/L 137   POTASSIUM mmol/L 4.3   CO2 mmol/L 30.0   CREATININE mg/dL 0.70   GLUCOSE mg/dL 82       Culture Data:    Radiology Data:   Cardiology Data:    I have reviewed the medications.    Assessment/Plan     Assessment and Plan:  1.  Acute on chronic hypoxic respiratory failure-patient only walks oximetry prior to discharge.    2.  COPD with exacerbation- In the patient's white count is going up and she does have a productive cough now with greenish sputum have restarted the patient back on Zosyn, azithromycin and vancomycin.  I have ordered for CT of the chest.  She is getting breathing treatments and steroids.  I have weaning steroids. She is on Tussionex.  Flutter valve  ordered.  Her cough is more loose.  I have added mucinex along with the mucomyst. Sputum and blood cultures has been ordered.    3.  Bronchiolitis - Dr. Mojica with pulmonology has been consulted for further evaluation.  He has seen and evaluated the patient.  She is getting breathing treatments, we are tapering steroids.      3.  Chronic stable diastolic heart failure    4.  Chronic venous insufficiency of lower extremities    5.  Chronic essential hypertension blood pressure stable continue to monitor-  BP elevated.  I have ordered Norvasc.    6.  Essential tremor    7.  Elevated Blood sugar- likely secondary to steroids.  hgb A1c normal.      8.  Generalized weakness- PT/OT consulted.  Recommend rehab placement    9.  Suspected vestibulopathy- Meclizine ordered.    10.  Leukocytosis-  exact etiology is unknown.  Patient is not having any diarrhea to indicate any evidence of C. difficile colitis.  I have ordered for CT of her abdomen and pelvis and she is having abdominal pain as well as a CT of the chest and head.    11.  Abdominal pain with nausea and vomiting-patient's had no diarrhea.  She did have a bowel movement yesterday.  She does have hypoactive bowel sounds.  Have ordered for a CT scan of abdomen and pelvis with IV contrast for further evaluation.      DVT prophylaxis:  Lovenox    Discharge Plannin-3 days    Ce Herron, APRN 18 3:00 PM

## 2018-02-01 NOTE — PHARMACY RECOMMENDATION
"Pharmacokinetic Initial Note - Vancomycin    Pharmacy was consulted to dose vancomycin for  Sadie Tijerina, a 77 y.o. female  157.5 cm (62\") 55.3 kg (121 lb 14.4 oz)    Indication for use: upper respiratory tract infection      Results from last 7 days     Lab Units 02/01/18  1135 02/01/18  0548 01/31/18  1219 01/31/18  0535 01/29/18  0544   WBC 10*3/mm3 28.37* --  20.21* 19.52* 15.23*   CREATININE mg/dL --  0.70 --  0.80 0.70      Estimated Creatinine Clearance: 49.8 mL/min (by C-G formula based on Cr of 0.7).  Temp Readings from Last 1 Encounters:   02/01/18 98.6 °F (37 °C) (Oral)       Other Antimicrobials  Zosyn 3.375g IV q8h  Azithromycin 500 mg IV q24h    Assessment/Plan  Initiated Vancomycin 1250 mg (22 mg/kg) IV once, followed by 1000 mg (17.5 mg/kg) IV every 24 hours. Will order Vancomycin trough at steady state. Pharmacy will monitor renal function and adjust dose accordingly.    Kathrin Appiah, PharmD, BCPS  02/01/18 2:27 PM    "

## 2018-02-01 NOTE — PLAN OF CARE
Problem: Patient Care Overview (Adult)  Goal: Plan of Care Review  Outcome: Ongoing (interventions implemented as appropriate)   02/01/18 8218   Coping/Psychosocial Response Interventions   Plan Of Care Reviewed With patient   Patient Care Overview   Progress improving   Outcome Evaluation   Outcome Summary/Follow up Plan PT FROM 4TH FLOOR TODAY. PT CONTINUES TO HAVE INTERMITTENT NAUSEA AND ABD CRAMPING. TELE SR. POOR APPETITE. SPEECH THERAPY CONSULT IN AM FOR POSSIBLE ASPIRATION.

## 2018-02-01 NOTE — PROGRESS NOTES
PROGRESS NOTE        Date of Admission: 1/22/2018  Length of Stay: 7  Primary Care Physician: Kristian Wolff MD    Subjective   Chief Complaint: Cough  HPI: Patient's daughter is at bedside.  I have seen and evaluated the patient today at bedside.  The daughter has concerns regarding the need for rehabilitation.  I have expressed to the daughter and the patient that I feel she would best benefit from going to rehabilitation given that she is complaining of some weakness.  Patient is still complaining of some dizziness and nausea when she gets up.  This is a chronic condition.  Physical therapy did see and evaluate the patient did do [place]-Hallpike and Apley's and there was no sign of benign positional vertigo.  I have examined her bilateral ears and she does have chronic fluid in both of her ears.  I did start her on some nasal steroids as well as schedule meclizine to see if this will help with symptomatology.  They are in agreement for rehabilitation placement he wanted try to get a [facility] as patient may benefit from physical therapy as well as pulmonary [facility].  Case management has been consulted for the same.  Her cough is more loose today but states she is not getting up a lot of mucus.  I have added guaifenesin along with the Mucomyst.    History:  This is a 77-year-old female who is admitted with acute on chronic hypoxic respiratory failure as well as COPD and acute bronchitis.  She was recently discharged from this facility where at that time she was treated for pneumonia.  She was discharged home on doxycycline for which she has completed.  She had a CT scan in the emergency room which showed a bronchiolitis/bronchitis but no evidence of pneumonia.  She is complaining of some cough but is not able to cough anything up.  Did add Mucomyst yesterday.  Dr. Mojica with pulmonology was consulted and has evaluated the patient.      Review Of Systems:   Review of Systems   General ROS:  Patient denies any fevers, chills or loss of consciousness.    Psychological ROS: Denies any hallucinations and delusions.  Ophthalmic ROS: Denies any diplopia or transient loss of vision.  ENT ROS: Denies sore throat, nasal congestion or ear pain.  Dizziness with nausea which is a chronic condition due to middle ear  Allergy and Immunology ROS: Denies rash or itching.  Hematological and Lymphatic ROS: Denies neck swelling or easy bleeding.  Endocrine ROS: Denies any recent unintentional weight gain or loss.  Breast ROS: Denies any pain or swelling.  Respiratory ROS: cough non productive and  shortness of breath. She has been able to cough up small amount of phlegm today.    Cardiovascular ROS: Denies chest pain or palpitations. No history of exertional chest pain.   Gastrointestinal ROS: Denies nausea and vomiting. Denies any abdominal pain. No diarrhea.  Genito-Urinary ROS: Denies dysuria or hematuria.  Musculoskeletal ROS: Denies back pain. No muscle pain. No calf pain.   Neurological ROS: Denies any focal weakness. No loss of consciousness. Denies any numbness. Denies neck pain.  Dermatological ROS: Denies any redness or pruritis.    Objective      Temp:  [97.9 °F (36.6 °C)-98.9 °F (37.2 °C)] 98.6 °F (37 °C)  Heart Rate:  [71-93] 86  Resp:  [16-20] 18  BP: (110-168)/(58-70) 118/70  Physical Exam    General Appearance:  Alert and cooperative, not in any acute distress.   Head:  Atraumatic and normocephalic, without obvious abnormality.   Eyes:          PERRLA, conjunctivae and sclerae normal, no Icterus. No pallor. Extra-occular movements are within normal limits.   Ears:  Ears appear intact with no abnormalities noted., chronic fluid in bilateral ears. TM dull with fluid ring.     Throat: No oral lesions, no thrush, oral mucosa moist.   Neck: Supple, trachea midline, no thyromegaly, no carotid bruit.   Back:   No kyphoscoliosis present. No tenderness to palpation,   range of motion normal.   Lungs:   Chest shape  is normal. Breath sounds heard bilaterally equally.  few wheezing and rhonchi. No Pleural rub or bronchial breathing.   Heart:  Normal S1 and S2, no murmur, no gallop, no rub. No JVD   Abdomen:   Normal bowel sounds, no masses, no organomegaly. Soft     non-tender, non-distended, no guarding, no rebound                tenderness   Extremities: Moves all extremities well, no edema, no cyanosis, no clubbing.   Pulses: Pulses palpable and equal bilaterally   Skin: No bleeding, bruising or rash, wound to foot is much improved with no erythema or edema.   Lymph nodes: No palpable adenopathy   Neurologic:    Psychiatric/Behavior:     Cranial nerves 2 - 12 grossly intact, sensation intact, Motor power is normal and equal bilaterally.  Mood normal, behavior normal       Results Review:    I have reviewed the labs, radiology results and diagnostic studies.      Results from last 7 days  Lab Units 02/01/18  1135 01/31/18  1219   WBC 10*3/mm3 28.37* 20.21*   HEMOGLOBIN g/dL 13.9 13.1   PLATELETS 10*3/mm3  --  242       Results from last 7 days  Lab Units 02/01/18  0548   SODIUM mmol/L 137   POTASSIUM mmol/L 4.3   CO2 mmol/L 30.0   CREATININE mg/dL 0.70   GLUCOSE mg/dL 82       Culture Data:    Radiology Data:   Cardiology Data:    I have reviewed the medications.    Assessment/Plan     Assessment and Plan:  1.  Acute on chronic hypoxic respiratory failure-patient only walks oximetry prior to discharge.    2.  COPD with exacerbation-patient finished doxycycline.  She is getting breathing treatments and steroids.  I have weaning steroids. She is on Tussionex.  Flutter valve ordered.  Her cough is more loose.  I have added mucinex along with the mucomyst.     3.  Bronchiolitis - Dr. Mojica with pulmonology has been consulted for further evaluation.  He has seen and evaluated the patient.  She is getting breathing treatments, we are tapering steroids.      3.  Chronic stable diastolic heart failure    4.  Chronic venous  insufficiency of lower extremities    5.  Chronic essential hypertension blood pressure stable continue to monitor-  BP is better after addition of Norvasc.      6.  Essential tremor    7.  Elevated Blood sugar- likely secondary to steroids.  hgb A1c normal.      8.  Generalized weakness- PT/OT consulted.  Recommend rehab placement    9.  Suspected vestibulopathy- Meclizine ordered.    10.  Leukocytosis-  Likely related to steroids.  There is no evidence of bandemia.  Chest xray and UA done today which were negative.      DVT prophylaxis:  Lovenox    Discharge Planning:  Awaiting rehab placement    RUBY Gtz 02/01/18 2:18 PM

## 2018-02-01 NOTE — SIGNIFICANT NOTE
02/01/18 1518   Rehab Treatment   Discipline occupational therapist   Treatment Not Performed other (see comments)  (Pt on hold for therapy d/t being taken for CT scan and transferring to telemetry floor.  Will follow up with pt tomorrow.)

## 2018-02-01 NOTE — PLAN OF CARE
Problem: COPD, Chronic Bronchitis/Emphysema (Adult)  Goal: Signs and Symptoms of Listed Potential Problems Will be Absent or Manageable (COPD, Chronic Bronchitis/Emphysema)  Outcome: Ongoing (interventions implemented as appropriate)   01/31/18 2237   COPD, Chronic Bronchitis/Emphysema   Problems Assessed (COPD, Chronic Bronchitis/Emphysema) dyspnea;functional decline/self-care deficit;situational response;hypoxia/hypoxemia   Problems Present (COPD, Chronic Bronchitis/Emphysema) dyspnea;functional decline/self-care deficit;hypoxia/hypoxemia;situational response       Problem: Fall Risk (Adult)  Goal: Absence of Falls  Outcome: Ongoing (interventions implemented as appropriate)   01/31/18 2237   Fall Risk (Adult)   Absence of Falls making progress toward outcome       Problem: Wound, Traumatic, Nonburn (Adult)  Goal: Signs and Symptoms of Listed Potential Problems Will be Absent or Manageable (Wound, Traumatic, Nonburn)  Outcome: Ongoing (interventions implemented as appropriate)   01/31/18 2237   Wound, Traumatic, Nonburn   Problems Assessed (Wound) pain;infection;situational response   Problems Present (Wound) pain;infection;situational response

## 2018-02-01 NOTE — PROGRESS NOTES
Discharge Planning Assessment   Phuc     Patient Name: Sadie Tijerina  MRN: [id number]  Today's Date: 2/1/2018    Admit Date: 1/22/2018          Discharge Needs Assessment     None            Discharge Plan       02/01/18 1232    Case Management/Social Work Plan    Plan Call to Hanane with [facility] regarding pre-cert and acceptance.  She will follow up and call back with update.     Patient/Family In Agreement With Plan yes        Discharge Placement     Facility/Agency Request Status Selected? Address Phone Number Fax Number    [facility] Pending - Request Sent     [address] [facility] [address] [phone]    [facility] Pending - No Request Sent     [address] [facility] [address] [phone]                Demographic Summary     None            Functional Status     None            Psychosocial     None            Abuse/Neglect     None            Legal     None            Substance Abuse     None            Patient Forms     None          Maria E Ceballos

## 2018-02-02 ENCOUNTER — APPOINTMENT (OUTPATIENT)
Dept: CARDIOLOGY | Facility: HOSPITAL | Age: 78
End: 2018-02-02

## 2018-02-02 LAB
ALBUMIN SERPL-MCNC: 3.3 G/DL (ref 3.5–5)
ALBUMIN/GLOB SERPL: 1.2 G/DL (ref 1–2)
ALP SERPL-CCNC: 53 U/L (ref 38–126)
ALT SERPL W P-5'-P-CCNC: 44 U/L (ref 13–69)
ANION GAP SERPL CALCULATED.3IONS-SCNC: 19.7 MMOL/L
ANISOCYTOSIS BLD QL: ABNORMAL
AST SERPL-CCNC: 34 U/L (ref 15–46)
BILIRUB SERPL-MCNC: 0.9 MG/DL (ref 0.2–1.3)
BUN BLD-MCNC: 46 MG/DL (ref 7–20)
BUN/CREAT SERPL: 35.4 (ref 7.1–23.5)
CALCIUM SPEC-SCNC: 8.6 MG/DL (ref 8.4–10.2)
CHLORIDE SERPL-SCNC: 98 MMOL/L (ref 98–107)
CO2 SERPL-SCNC: 26 MMOL/L (ref 26–30)
CREAT BLD-MCNC: 1.3 MG/DL (ref 0.6–1.3)
D-LACTATE SERPL-SCNC: 1.6 MMOL/L (ref 0.5–2)
DEPRECATED RDW RBC AUTO: 44.6 FL (ref 37–54)
ERYTHROCYTE [DISTWIDTH] IN BLOOD BY AUTOMATED COUNT: 14 % (ref 11.5–14.5)
GFR SERPL CREATININE-BSD FRML MDRD: 40 ML/MIN/1.73
GLOBULIN UR ELPH-MCNC: 2.7 GM/DL
GLUCOSE BLD-MCNC: 170 MG/DL (ref 74–98)
HCT VFR BLD AUTO: 51.7 % (ref 37–47)
HGB BLD-MCNC: 17.1 G/DL (ref 12–16)
LYMPHOCYTES # BLD MANUAL: 1.35 10*3/MM3 (ref 0.6–3.4)
LYMPHOCYTES NFR BLD MANUAL: 3 % (ref 10–50)
MAXIMAL PREDICTED HEART RATE: 141 BPM
MCH RBC QN AUTO: 29.5 PG (ref 27–31)
MCHC RBC AUTO-ENTMCNC: 33.1 G/DL (ref 30–37)
MCV RBC AUTO: 89.1 FL (ref 81–99)
NEUTROPHILS # BLD AUTO: 43.73 10*3/MM3 (ref 2–6.9)
NEUTROPHILS NFR BLD MANUAL: 89 % (ref 37–80)
NEUTS BAND NFR BLD MANUAL: 8 % (ref 0–6)
PLATELET # BLD AUTO: 162 10*3/MM3 (ref 130–400)
PMV BLD AUTO: 12.1 FL (ref 6–12)
POTASSIUM BLD-SCNC: 4.7 MMOL/L (ref 3.5–5.1)
PROT SERPL-MCNC: 6 G/DL (ref 6.3–8.2)
RBC # BLD AUTO: 5.8 10*6/MM3 (ref 4.2–5.4)
SCAN SLIDE: NORMAL
SMALL PLATELETS BLD QL SMEAR: ADEQUATE
SODIUM BLD-SCNC: 139 MMOL/L (ref 137–145)
STRESS TARGET HR: 120 BPM
WBC MORPH BLD: NORMAL
WBC NRBC COR # BLD: 45.08 10*3/MM3 (ref 4.8–10.8)

## 2018-02-02 PROCEDURE — 63710000001 PREDNISONE PER 1 MG: Performed by: NURSE PRACTITIONER

## 2018-02-02 PROCEDURE — 83605 ASSAY OF LACTIC ACID: CPT | Performed by: NURSE PRACTITIONER

## 2018-02-02 PROCEDURE — 85007 BL SMEAR W/DIFF WBC COUNT: CPT | Performed by: NURSE PRACTITIONER

## 2018-02-02 PROCEDURE — 94799 UNLISTED PULMONARY SVC/PX: CPT

## 2018-02-02 PROCEDURE — 25010000002 PIPERACILLIN SOD-TAZOBACTAM PER 1 G: Performed by: NURSE PRACTITIONER

## 2018-02-02 PROCEDURE — 99232 SBSQ HOSP IP/OBS MODERATE 35: CPT | Performed by: NURSE PRACTITIONER

## 2018-02-02 PROCEDURE — 92610 EVALUATE SWALLOWING FUNCTION: CPT

## 2018-02-02 PROCEDURE — 85025 COMPLETE CBC W/AUTO DIFF WBC: CPT | Performed by: NURSE PRACTITIONER

## 2018-02-02 PROCEDURE — 25010000002 ENOXAPARIN PER 10 MG: Performed by: FAMILY MEDICINE

## 2018-02-02 PROCEDURE — 93306 TTE W/DOPPLER COMPLETE: CPT

## 2018-02-02 PROCEDURE — 80053 COMPREHEN METABOLIC PANEL: CPT | Performed by: NURSE PRACTITIONER

## 2018-02-02 RX ORDER — SODIUM CHLORIDE 9 MG/ML
75 INJECTION, SOLUTION INTRAVENOUS CONTINUOUS
Status: DISCONTINUED | OUTPATIENT
Start: 2018-02-02 | End: 2018-02-03

## 2018-02-02 RX ADMIN — SPIRONOLACTONE 25 MG: 25 TABLET, FILM COATED ORAL at 08:17

## 2018-02-02 RX ADMIN — ESTROGENS, CONJUGATED 0.62 MG: 0.62 TABLET, FILM COATED ORAL at 08:17

## 2018-02-02 RX ADMIN — SERTRALINE HYDROCHLORIDE 50 MG: 50 TABLET ORAL at 08:17

## 2018-02-02 RX ADMIN — FAMOTIDINE 20 MG: 20 TABLET, FILM COATED ORAL at 08:17

## 2018-02-02 RX ADMIN — ACETYLCYSTEINE 1 ML: 200 SOLUTION ORAL; RESPIRATORY (INHALATION) at 12:40

## 2018-02-02 RX ADMIN — MECLIZINE 12.5 MG: 12.5 TABLET ORAL at 05:38

## 2018-02-02 RX ADMIN — IPRATROPIUM BROMIDE AND ALBUTEROL SULFATE 3 ML: .5; 3 SOLUTION RESPIRATORY (INHALATION) at 12:40

## 2018-02-02 RX ADMIN — VITAMIN D, TAB 1000IU (100/BT) 1000 UNITS: 25 TAB at 08:17

## 2018-02-02 RX ADMIN — LOSARTAN POTASSIUM 25 MG: 25 TABLET, FILM COATED ORAL at 08:17

## 2018-02-02 RX ADMIN — NICOTINE 1 PATCH: 14 PATCH TRANSDERMAL at 04:26

## 2018-02-02 RX ADMIN — BUDESONIDE AND FORMOTEROL FUMARATE DIHYDRATE 2 PUFF: 160; 4.5 AEROSOL RESPIRATORY (INHALATION) at 19:25

## 2018-02-02 RX ADMIN — MECLIZINE 12.5 MG: 12.5 TABLET ORAL at 20:44

## 2018-02-02 RX ADMIN — NYSTATIN 500000 UNITS: 100000 SUSPENSION ORAL at 12:34

## 2018-02-02 RX ADMIN — MECLIZINE 12.5 MG: 12.5 TABLET ORAL at 15:24

## 2018-02-02 RX ADMIN — ACETYLCYSTEINE 1 ML: 200 SOLUTION ORAL; RESPIRATORY (INHALATION) at 19:25

## 2018-02-02 RX ADMIN — TAZOBACTAM SODIUM AND PIPERACILLIN SODIUM 3.38 G: 375; 3 INJECTION, SOLUTION INTRAVENOUS at 02:00

## 2018-02-02 RX ADMIN — PROPRANOLOL HYDROCHLORIDE 20 MG: 20 TABLET ORAL at 08:17

## 2018-02-02 RX ADMIN — COLLAGENASE SANTYL: 250 OINTMENT TOPICAL at 08:21

## 2018-02-02 RX ADMIN — NYSTATIN 500000 UNITS: 100000 SUSPENSION ORAL at 17:42

## 2018-02-02 RX ADMIN — ENOXAPARIN SODIUM 40 MG: 40 INJECTION SUBCUTANEOUS at 04:25

## 2018-02-02 RX ADMIN — IPRATROPIUM BROMIDE AND ALBUTEROL SULFATE 3 ML: .5; 3 SOLUTION RESPIRATORY (INHALATION) at 07:19

## 2018-02-02 RX ADMIN — IPRATROPIUM BROMIDE AND ALBUTEROL SULFATE 3 ML: .5; 3 SOLUTION RESPIRATORY (INHALATION) at 19:24

## 2018-02-02 RX ADMIN — AMLODIPINE BESYLATE 5 MG: 5 TABLET ORAL at 08:17

## 2018-02-02 RX ADMIN — ASPIRIN 81 MG: 81 TABLET, COATED ORAL at 08:18

## 2018-02-02 RX ADMIN — VANCOMYCIN 125 MG: KIT at 17:42

## 2018-02-02 RX ADMIN — BUDESONIDE AND FORMOTEROL FUMARATE DIHYDRATE 2 PUFF: 160; 4.5 AEROSOL RESPIRATORY (INHALATION) at 07:20

## 2018-02-02 RX ADMIN — SODIUM CHLORIDE 60 ML/HR: 9 INJECTION, SOLUTION INTRAVENOUS at 08:23

## 2018-02-02 RX ADMIN — TRAMADOL HYDROCHLORIDE 50 MG: 50 TABLET, FILM COATED ORAL at 20:12

## 2018-02-02 RX ADMIN — VANCOMYCIN 125 MG: KIT at 12:42

## 2018-02-02 RX ADMIN — ACETAMINOPHEN 650 MG: 325 TABLET, FILM COATED ORAL at 23:34

## 2018-02-02 RX ADMIN — NYSTATIN 500000 UNITS: 100000 SUSPENSION ORAL at 20:12

## 2018-02-02 RX ADMIN — ACETYLCYSTEINE 1 ML: 200 SOLUTION ORAL; RESPIRATORY (INHALATION) at 07:19

## 2018-02-02 RX ADMIN — GUAIFENESIN 600 MG: 600 TABLET, EXTENDED RELEASE ORAL at 08:17

## 2018-02-02 RX ADMIN — BUDESONIDE 0.5 MG: 0.5 INHALANT RESPIRATORY (INHALATION) at 19:25

## 2018-02-02 RX ADMIN — SODIUM CHLORIDE 75 ML/HR: 9 INJECTION, SOLUTION INTRAVENOUS at 23:37

## 2018-02-02 RX ADMIN — PREDNISONE 20 MG: 20 TABLET ORAL at 08:17

## 2018-02-02 RX ADMIN — GUAIFENESIN 600 MG: 600 TABLET, EXTENDED RELEASE ORAL at 20:12

## 2018-02-02 RX ADMIN — FLUTICASONE PROPIONATE 2 SPRAY: 50 SPRAY, METERED NASAL at 08:18

## 2018-02-02 RX ADMIN — NYSTATIN 500000 UNITS: 100000 SUSPENSION ORAL at 08:57

## 2018-02-02 RX ADMIN — ATORVASTATIN CALCIUM 10 MG: 10 TABLET, FILM COATED ORAL at 08:17

## 2018-02-02 RX ADMIN — PROPRANOLOL HYDROCHLORIDE 20 MG: 20 TABLET ORAL at 20:12

## 2018-02-02 RX ADMIN — VANCOMYCIN 125 MG: KIT at 08:57

## 2018-02-02 RX ADMIN — VANCOMYCIN 125 MG: KIT at 23:34

## 2018-02-02 NOTE — THERAPY EVALUATION
Acute Care - Speech Language Pathology   Swallow Initial Evaluation  Friend     Patient Name: Sadie Tijerina  : 1940  MRN: [id number]  Today's Date: 2018  Onset of Illness/Injury or Date of Surgery Date: 18     Referring Physician: Germaine      Admit Date: 2018    Visit Dx:     ICD-10-CM ICD-9-CM   1. Chronic obstructive pulmonary disease with acute exacerbation J44.1 491.21   2. Impaired mobility and ADLs Z74.09 799.89   3. Impaired functional mobility, balance, gait, and endurance Z74.09 V49.89     Patient Active Problem List   Diagnosis   • Benign paroxysmal positional vertigo   • Calf cramp   • Mixed anxiety depressive disorder   • Dizziness   • Fatigue   • Gastroesophageal reflux disease without esophagitis   • Hematuria   • Hyperlipidemia   • Essential hypertension   • Low back pain   • Orthostatic hypotension   • Restless legs syndrome   • Essential tremor   • Vitamin D deficiency   • Balance problem   • Tension headache   • COPD exacerbation   • Leukocytosis   • Tobacco abuse disorder   • Wound infection   • Cellulitis of foot   • Pneumonia due to infectious organism   • Leg swelling   • Wound abscess   • Chronic obstructive pulmonary disease with acute exacerbation   • Acute on chronic respiratory failure with hypoxia   • Venous insufficiency of both lower extremities   • Chronic diastolic heart failure     Past Medical History:   Diagnosis Date   • Arthritis    • Depression    • History of emphysema    • History of esophageal reflux    • History of recurrent UTI (urinary tract infection)    • History of renal calculi    • History of uterine cancer    • Hyperlipidemia      Past Surgical History:   Procedure Laterality Date   • FOOT SURGERY     • HAND SURGERY     • HYSTERECTOMY            SWALLOW EVALUATION (last 72 hours)      Swallow Evaluation       18 1025                Rehab Evaluation    Document Type evaluation  -TM        Subjective Information agree to  therapy  -TM        Patient Effort, Rehab Treatment good  -TM        Symptoms Noted During/After Treatment none  -TM        General Information    Patient Profile Review yes  -TM        Onset of Illness/Injury 01/22/18  -TM        Referring Physician Germaine  -        Subjective Patient Observations pt. cooperative but said she didn't feel well  -TM        Pertinent History Of Current Problem GERD, tremor, cardiac, COPD, hx pneu  -TM        Current Diet Limitations no diet restrictions  -TM        Precautions/Limitations, Hearing WFL  -TM        Prior Level of Function- Communication functional in all spheres  -TM        Prior Level of Function- Swallowing no diet consistency restrictions  -TM        Plans/Goals Discussed With patient  -TM        Barriers to Rehab none identified  -TM        Clinical Impression    Patient's Goals For Discharge patient did not state  -TM        SLP Swallowing Diagnosis esophageal dysfunction   previous diagnosis of GERD  -TM        Functional Level At Time Of Eval functional swallowing  -TM        Criteria for Skilled Therapeutic Interventions Met no problems identified which require skilled intervention  -        FCM, Swallowing 6-->Level 6  -TM        Therapy Frequency evaluation only  -TM        SLP Diet Recommendation regular textures;thin liquids  -TM        Recommended Feeding/Eating Techniques other (see comments);maintain upright posture during/after eating for 30 mins   single sip-swallows (no consecutive swallows)  -TM        SLP Rec. for Method of Medication Administration meds whole in pudding/applesauce  -TM        Monitor For Signs Of Aspiration cough;right lower lobe infiltrates  -TM        Vital Signs    Pre SpO2 (%) 98  -TM        O2 Delivery Pre Treatment room air  -TM        Intra SpO2 (%) 98  -TM        O2 Delivery Intra Treatment room air  -TM        Post SpO2 (%) 98  -TM        O2 Delivery Post Treatment room air  -TM        Pre Patient Position Supine   -TM        Intra Patient Position Sitting  -TM        Post Patient Position Sitting  -TM        Pain Assessment    Pain Assessment No/denies pain  -TM        Cognitive Assessment/Intervention    Current Cognitive/Communication Assessment functional  -TM        Orientation Status oriented x 4  -TM        Follows Commands/Answers Questions able to follow multi-step instructions  -TM        Personal Safety decreased awareness, need for safety;decreased awareness, need for assist  -TM        Oral Motor Structure and Function    Oral Motor Anatomy and Physiology patient demonstrates anatomy and physiology that is WNL  -TM        Dentition Assessment present and adequate  -TM        Secretion Management WNL/WFL  -TM        Mucosal Quality moist, healthy  -TM        Velar Elevation WNL (within normal limits)  -TM        Volitional Swallow no difficulties initiating volitional swallow  -TM        Volitional Cough no difficulties initiating volitional cough  -TM        Oral Musculature General Assessment WNL (within normal limits)  -TM        General Feeding/Swallowing Observations    Current Feeding Method oral feeding methods  -TM        SpO2 Level Prior to Feeding 98  -TM        SpO2 Level Following Swallow 98  -TM        Psychosocial Eating History food is an important aspect of patient's social life  -TM        Clinical Swallow Exam    Mode of Presentation fed by clinician;self fed  -TM        Respiratory Concerns other (see comments);decreased control/incoordination of breathing with swallow   with consecutive swallows  -TM        Oral Phase Results intact oral phase without signs of dysfunction;other (see comments)   for single sip-swallows (no consecutive swallows)  -TM        Pharyngeal Phase Results no signs/symptoms of pharyngeal impairment  -TM        Summary of Clinical Exam No overt s/s aspiration.  No oral phase dysphagia.  Pt. is at increased risk of aspiration with consecutive swallows from reduced bolus  control/coordination, but no difficulty with single sip-swallows.  Pt. denied dysphagia.  Recommend:  1. continue regular diet with thin liquid as bibi,  2. meds whole with applesauce/pudding,  3. single sip-swallows (no consecutive swallows),  4. aspiration precautions,  5. reflux precautions.  D/W pt. and RN following eval.  -TM        Swallow Recommendations    Eating Assistance no assistance needed with self eating  -TM        Oral Care oral care with toothbrush and dentifrice BID and PRN  -TM        Modified Eating Strategies adjust rate of eating to fit with patient's ability;reflux precautions;upright positioning 90 degrees  -TM        Other Recommendations regular;thin;meds whole  -TM        Recommended Diet regular textures;thin liquids  -TM        Positioning and Restraints    Pre-Treatment Position in bed  -TM        Post Treatment Position bed  -TM        In Bed notified nsg;call light within reach  -TM          User Key  (r) = Recorded By, (t) = Taken By, (c) = Cosigned By    Initials Name Effective Dates     Oumou Gay 10/26/16 -         EDUCATION  The patient has been educated in the following areas:   Dysphagia (Swallowing Impairment).    SLP Recommendation and Plan  SLP Swallowing Diagnosis: esophageal dysfunction (previous diagnosis of GERD)  SLP Diet Recommendation: regular textures, thin liquids  Recommended Feeding/Eating Techniques: other (see comments), maintain upright posture during/after eating for 30 mins (single sip-swallows (no consecutive swallows))  SLP Rec. for Method of Medication Administration: meds whole in pudding/applesauce  Monitor For Signs Of Aspiration: cough, right lower lobe infiltrates     Criteria for Skilled Therapeutic Interventions Met: no problems identified which require skilled intervention        Therapy Frequency: evaluation only             Plan of Care Review  Plan Of Care Reviewed With: patient  Progress: improving  Outcome Summary/Follow up Plan: No  overt s/s aspiration. No oral phase dysphagia. Pt. is at increased risk of aspiration with consecutive swallows from reduced bolus control/coordination, but no difficulty with single sip-swallows. Pt. denied dysphagia. Recommend: 1. continue regular diet with thin liquid as bibi, 2. meds whole with applesauce/pudding, 3. single sip-swallows (no consecutive swallows), 4. aspiration precautions, 5. reflux precautions. D/W pt. and RN following eval.             Time Calculation:         Time Calculation- SLP       02/02/18 1100          Time Calculation- SLP    SLP Start Time 1025  -TM      SLP Received On 02/02/18  -TM        User Key  (r) = Recorded By, (t) = Taken By, (c) = Cosigned By    Initials Name Provider Type    TM Oumou Gay Speech and Language Pathologist          Therapy Charges for Today     Code Description Service Date Service Provider Modifiers Qty    [id number]  ST EVAL ORAL PHARYNG SWALLOW 4 2/2/2018 Oumou Gay GN 1               Oumou Gay  2/2/2018

## 2018-02-02 NOTE — SIGNIFICANT NOTE
02/02/18 1412   Rehab Treatment   Discipline occupational therapist   Treatment Not Performed patient/family declined treatment  (Patient refused treatment d/t c/o diarrhea and vomiting; will follow up with patient at later time. )

## 2018-02-02 NOTE — PROGRESS NOTES
[facility]    Name:  Sadie Tijerina   Age:  77 y.o.  Sex:  female  :  1940  MRN:  [id number]   Visit Number:  [id number]  Admission Date:  2018  Date Of Service:  18  Primary Care Physician:  rKistian Wolff MD    Patient was seen and examined. Pertinent laboratory and radiology data were reviewed.  I was asked to see the patient since he is been having abdominal pain and worsening leukocytosis.  Patient has been admitted for the last 10 days or so because of pneumonia and COPD associated shortness of breath.  She has been on antibiotic therapy as well as steroids.  She had leukocytosis which has been improving but today the white count has gone up again into 28,000.    Vital signs:    Vital Signs (last 24 hours)        0700  -   0659  07  -   1939   Most Recent    Temp (°F) 97.5 -  98.9    98 -  98.6     98.6 (37)    Heart Rate 71 -  93    77 -  86     86    Resp 16 -  20    17 -  18     18    /58 -  177/69    110/61 -  118/70     118/70    SpO2 (%) 92 -  96    93 -  96     96        Patient was seen and examined.  She is in mild distress due to the abdominal pain.  She does not seem to be in any respiratory distress.  She was able to walk to the bathroom for a bowel movement.  She denies any chest pain.  She does complain of lower abdominal pain and the abdomen is diffusely tender without any rebound tenderness.  Ecchymotic patches are noted on the abdominal wall due to Lovenox injections.  Bowel sounds are decreased.  Patient has had some nausea but denies any vomiting or diarrhea.  She is hemodynamically stable.    Due to her abdominal pain we will get an abdomen CT scan with IV contrast.  She is not able to tolerate oral contrast due to nausea.  He was transferred to the medical floor with telemetry.  We will get a speech therapy evaluation to rule out aspiration pneumonia.  She is also at high risk for C. difficile  infection due to her recent antibiotic use.  She does not have diarrhea at this time.  If she has diarrhea we will send for C. difficile toxin.  She has been placed on broader antibiotics with Zosyn and vancomycin.  I agree with assessment and plan of Ms. Couch.    Addendum: I reviewed the CT of the chest and CT of the abdomen reports in the evening.  CT abdomen and chest were unremarkable.  We will continue to monitor her on telemetry.    Nico Enriquez MD  02/01/18  7:37 PM    Dictated utilizing Dragon dictation.

## 2018-02-02 NOTE — PLAN OF CARE
Problem: COPD, Chronic Bronchitis/Emphysema (Adult)  Goal: Signs and Symptoms of Listed Potential Problems Will be Absent or Manageable (COPD, Chronic Bronchitis/Emphysema)  Outcome: Ongoing (interventions implemented as appropriate)   01/31/18 2237 02/02/18 0217   COPD, Chronic Bronchitis/Emphysema   Problems Assessed (COPD, Chronic Bronchitis/Emphysema) --  all   Problems Present (COPD, Chronic Bronchitis/Emphysema) dyspnea;functional decline/self-care deficit;hypoxia/hypoxemia;situational response --

## 2018-02-02 NOTE — PROGRESS NOTES
PROGRESS NOTE        Date of Admission: 1/22/2018  Length of Stay: 8  Primary Care Physician: Kristian Wolff MD    Subjective   Chief Complaint: Cough  HPI:  I have seen and evaluated patient at bedside.  This is a 77-year-old female who is admitted with acute on chronic hypoxic respiratory failure as well as COPD and acute bronchitis.  She was recently discharged from this facility where at that time she was treated for pneumonia.  She was discharged home on doxycycline for which she had completed during this admission.  She had a CT scan in the emergency room which showed a bronchiolitis/bronchitis.   She had had some dizziness with nausea with ambulation for which she was started on meclizine yesterday.  According to the patient and daughter the dizziness with nausea when she gets up to ambulate has been a chronic problem and she was told that it was due to fluid in her middle ear.  I did evaluate and she does have fluid rings around each tympanic membrane does appear to be more chronic than acute.      She did have increasing white count to 28,000 yesterday and it has jumped to 45,000 today.  She was noted yesterday to be complaining of some abdominal cramping.  According to the patient she did have an episode of diarrhea this morning.  She did undergo a CT scan of abdomen and pelvis which did not show any acute findings.  She had some mild sigmoid diverticulosis.,  CT of the chest  Not show any acute findings but she did have some bronchiectasis seen in the right middle lobe and lingula which could be sequelae of chronic aspiration for which speech therapy has been consulted, CT of the head did not show any acute findings.  Initially patient was started on antibiotics yesterday said she did have a productive cough with greenish sputum however since she is now having diarrhea the concern is for C. difficile colitis have stop the IV antibiotics since there is no evidence of infection and place the patient on  oral vancomycin to empirically cover for C. difficile colitis especially given such an elevation of her white count.  In order peripheral blood smear which is currently pending.    Today patient is still complaining of some abdominal tenderness but not as painful.  Again her white count today has jumped to 45,000.  Lactic acid was normal.  Blood cultures have been ordered which the far have not shown any growth.  Influenza A and B were negative.  Urinalysis was done which was negative.  There is no evidence of infection that would explain this elevation and leukocytosis.  I have reviewed this case with attending physician Dr. Enriquez who is also seen and evaluated the patient yesterday and today.         Review Of Systems:   Review of Systems   General ROS: Patient denies any fevers, chills or loss of consciousness.    Psychological ROS: Denies any hallucinations and delusions.  Ophthalmic ROS: Denies any diplopia or transient loss of vision.  ENT ROS: Denies sore throat, nasal congestion or ear pain.  Dizziness with nausea which is a chronic condition due to middle ear  Allergy and Immunology ROS: Denies rash or itching.  Hematological and Lymphatic ROS: Denies neck swelling or easy bleeding.  Endocrine ROS: Denies any recent unintentional weight gain or loss.  Breast ROS: Denies any pain or swelling.  Respiratory ROS: cough non productive and  shortness of breath. She has been able to cough up small amount of phlegm today.    Cardiovascular ROS: Denies chest pain or palpitations. No history of exertional chest pain.   Gastrointestinal ROS: nausea. Lower abdominal pain tenderness with 1 episode of diarrhea per patient report.  Genito-Urinary ROS: Denies dysuria or hematuria.  Musculoskeletal ROS: Denies back pain. No muscle pain. No calf pain.   Neurological ROS: Denies any focal weakness. No loss of consciousness. Denies any numbness. Denies neck pain.  Dermatological ROS: Denies any redness or pruritis.    Objective       Temp:  [97.3 °F (36.3 °C)-98.2 °F (36.8 °C)] 98 °F (36.7 °C)  Heart Rate:  [72-89] 72  Resp:  [16-18] 16  BP: (115-147)/(53-71) 115/53  Physical Exam    General Appearance:  Alert and cooperative, tearful with abdominal pain   Head:  Atraumatic and normocephalic, without obvious abnormality.   Eyes:          PERRLA, conjunctivae and sclerae normal, no Icterus. No pallor. Extra-occular movements are within normal limits.   Ears:  Ears appear intact with no abnormalities noted., chronic fluid in bilateral ears. TM dull with fluid ring.     Throat: No oral lesions, no thrush, oral mucosa moist.   Neck: Supple, trachea midline, no thyromegaly, no carotid bruit.   Back:   No kyphoscoliosis present. No tenderness to palpation,   range of motion normal.   Lungs:   Chest shape is normal. Breath sounds heard bilaterally equally.  few wheezing and rhonchi. No Pleural rub or bronchial breathing.   Heart:  Normal S1 and S2, no murmur, no gallop, no rub. No JVD   Abdomen:   Hypoactive bowel sounds, no masses, no organomegaly. Soft  Tender in lower quadrants. non-distended, no guarding, no rebound                tenderness   Extremities: Moves all extremities well, no edema, no cyanosis, no clubbing.   Pulses: Pulses palpable and equal bilaterally   Skin: No bleeding, bruising or rash, wound to foot is much improved with no erythema or edema.  No drainage.     Lymph nodes: No palpable adenopathy   Neurologic:    Psychiatric/Behavior:     Cranial nerves 2 - 12 grossly intact, sensation intact, Motor power is normal and equal bilaterally.  Mood normal, behavior normal       Results Review:    I have reviewed the labs, radiology results and diagnostic studies.      Results from last 7 days  Lab Units 02/02/18  0513   WBC 10*3/mm3 45.08*   HEMOGLOBIN g/dL 17.1*   PLATELETS 10*3/mm3 162       Results from last 7 days  Lab Units 02/02/18  0513   SODIUM mmol/L 139   POTASSIUM mmol/L 4.7   CO2 mmol/L 26.0   CREATININE mg/dL 1.30    GLUCOSE mg/dL 170*       Culture Data:    Radiology Data:   Cardiology Data:    I have reviewed the medications.    Assessment/Plan     Assessment and Plan:  1.  Acute on chronic hypoxic respiratory failure-patient only walks oximetry prior to discharge.    2.  COPD with exacerbation- In the patient's white count is going up and she does have a productive cough now with greenish sputum have restarted the patient back on Zosyn, azithromycin and vancomycin.  I have ordered for CT of the chest.  She is getting breathing treatments and steroids.  I have weaning steroids. She is on Tussionex.  Flutter valve ordered.  Her cough is more loose.  I have added mucinex along with the mucomyst. Sputum and blood cultures has been ordered.    3.  Bronchiolitis - Dr. Mojica with pulmonology has been consulted for further evaluation.  He has seen and evaluated the patient.  She is getting breathing treatments, we are tapering steroids.  CT scan of the chest did show some bronchiectasis concerning for chronic aspiration.  Speech therapy was consulted and did a bedside evaluation.  There was no evidence of aspiration and patient denies any history of disc nausea.    4.  Possible clinical Cdiff colitis-  Pt has been started on oral vancomycin given her significant leukocytosis in GI symptoms.  According to the patient she had diarrhea however the stool that the nursing staff seen was more thickened but patient states that she did have a loose bowel movement that had a foul odor.    5.  Chronic stable diastolic heart failure    6.  Chronic venous insufficiency of lower extremities    7.  Chronic essential hypertension blood pressure stable continue to monitor-  BP elevated.  I have ordered Norvasc.    8.  Essential tremor    9.  Generalized weakness- PT/OT consulted.  Recommend rehab placement and referral has been sent to [facility].    10.  Suspected vestibulopathy- Meclizine ordered.  Improving with flonase and meclizine.    11.   Leukocytosis-  exact etiology is unknown.  Concern is possible Cdiff colitis however she is not having diarrhea stool.  We will empirically treat.  She is having abdominal pain and cramps.  I have ordered a peripheral smear which is pending.  Lactic acid negative and CT does not show any evidence of ischemic bowel and given that her serum Co2 is normal I doubt that bowel ischemia is the problem.          DVT prophylaxis:  Lovenox    Discharge Plannin-3 days    RUBY Gtz 18 1:05 PM

## 2018-02-02 NOTE — SIGNIFICANT NOTE
02/02/18 1103   Rehab Treatment   Discipline physical therapist   Treatment Not Performed patient/family declined treatment  (Pt declines participation with PT this date d/t increased abdominal discomfort and nausea.  PT will follow up with patient tomorrow and progress activity as tolerated and appropriate.)

## 2018-02-02 NOTE — PLAN OF CARE
Problem: Patient Care Overview (Adult)  Goal: Plan of Care Review  Outcome: Ongoing (interventions implemented as appropriate)   02/02/18 1055   Coping/Psychosocial Response Interventions   Plan Of Care Reviewed With patient   Patient Care Overview   Progress improving   Outcome Evaluation   Outcome Summary/Follow up Plan No overt s/s aspiration. No oral phase dysphagia. Pt. is at increased risk of aspiration with consecutive swallows from reduced bolus control/coordination, but no difficulty with single sip-swallows. Pt. denied dysphagia. Recommend: 1. continue regular diet with thin liquid as bibi, 2. meds whole with applesauce/pudding, 3. single sip-swallows (no consecutive swallows), 4. aspiration precautions, 5. reflux precautions. D/W pt. and RN following eval.

## 2018-02-02 NOTE — PROGRESS NOTES
[facility]    Name:  Sadie Tijerina   Age:  77 y.o.  Sex:  female  :  1940  MRN:  [id number]   Visit Number:  [id number]  Admission Date:  2018  Date Of Service:  18  Primary Care Physician:  Kristian Wolff MD    Patient was seen and examined this morning. Pertinent laboratory and radiology data were reviewed.  Continues to have leukocytosis with white count at 45,000 today.  She does have diffuse abdominal pain on examination.  Abdomen is soft however.  She denies any chest pain or shortness of breath.  She did have a formed bowel movement yesterday.  C. difficile toxin was not sent since there was no liquid stool.  Stool occult blood however was positive.  Her lactic acid level is 1.6 which is within normal limits.    Vital signs:    Vital Signs (last 24 hours)        0700  -   0659  07  -   1406   Most Recent    Temp (°F) 97.3 -  98.6      98     98 (36.7)    Heart Rate 77 -  89    72 -  85     72    Resp 17 -  18    16 -  18     16    /61 -  147/71      115/53     115/53    SpO2 (%) 93 -  97    97 -  98     97        The exact etiology of her abdominal pain is uncertain.  Initially I was suspecting ischemic bowel however since her lactic acid level and CO2 levels are within normal limits and the fact that the patient did not have any hypotension, it seems unlikely.  I am still suspicious for C. difficile colitis due to her recent antibiotic use.  We will empirically start her on oral vancomycin to treat C. difficile colitis.  I have discussed the patient's condition with Ms. Couch and I agree with her assessment and plan.  I discussed the patient's condition and the treatment plan with her daughter Neris ([phone]) over the phone.  She states that she is going to come to the hospital today and stay with her mother kinza.    Nico Enriquez MD  18  2:06 PM    Dictated utilizing Dragon dictation.

## 2018-02-03 LAB
ALBUMIN SERPL-MCNC: 2.7 G/DL (ref 3.5–5)
ALBUMIN/GLOB SERPL: 1.1 G/DL (ref 1–2)
ALP SERPL-CCNC: 56 U/L (ref 38–126)
ALT SERPL W P-5'-P-CCNC: 43 U/L (ref 13–69)
ANION GAP SERPL CALCULATED.3IONS-SCNC: 9.9 MMOL/L
AST SERPL-CCNC: 20 U/L (ref 15–46)
BASOPHILS # BLD AUTO: 0.06 10*3/MM3 (ref 0–0.2)
BASOPHILS NFR BLD AUTO: 0.2 % (ref 0–2.5)
BILIRUB SERPL-MCNC: 0.3 MG/DL (ref 0.2–1.3)
BUN BLD-MCNC: 39 MG/DL (ref 7–20)
BUN/CREAT SERPL: 43.3 (ref 7.1–23.5)
CALCIUM SPEC-SCNC: 8.4 MG/DL (ref 8.4–10.2)
CHLORIDE SERPL-SCNC: 103 MMOL/L (ref 98–107)
CO2 SERPL-SCNC: 30 MMOL/L (ref 26–30)
CREAT BLD-MCNC: 0.9 MG/DL (ref 0.6–1.3)
DEPRECATED RDW RBC AUTO: 44.6 FL (ref 37–54)
EOSINOPHIL # BLD AUTO: 0.05 10*3/MM3 (ref 0–0.7)
EOSINOPHIL NFR BLD AUTO: 0.2 % (ref 0–7)
ERYTHROCYTE [DISTWIDTH] IN BLOOD BY AUTOMATED COUNT: 13.7 % (ref 11.5–14.5)
GFR SERPL CREATININE-BSD FRML MDRD: 60 ML/MIN/1.73
GLOBULIN UR ELPH-MCNC: 2.5 GM/DL
GLUCOSE BLD-MCNC: 86 MG/DL (ref 74–98)
HCT VFR BLD AUTO: 35.4 % (ref 37–47)
HGB BLD-MCNC: 11.5 G/DL (ref 12–16)
IMM GRANULOCYTES # BLD: 0.4 10*3/MM3 (ref 0–0.06)
IMM GRANULOCYTES NFR BLD: 1.4 % (ref 0–0.6)
LYMPHOCYTES # BLD AUTO: 1.73 10*3/MM3 (ref 0.6–3.4)
LYMPHOCYTES NFR BLD AUTO: 6.2 % (ref 10–50)
MCH RBC QN AUTO: 29.1 PG (ref 27–31)
MCHC RBC AUTO-ENTMCNC: 32.5 G/DL (ref 30–37)
MCV RBC AUTO: 89.6 FL (ref 81–99)
MONOCYTES # BLD AUTO: 1.26 10*3/MM3 (ref 0–0.9)
MONOCYTES NFR BLD AUTO: 4.5 % (ref 0–12)
NEUTROPHILS # BLD AUTO: 24.6 10*3/MM3 (ref 2–6.9)
NEUTROPHILS NFR BLD AUTO: 87.5 % (ref 37–80)
NRBC BLD MANUAL-RTO: 0 /100 WBC (ref 0–0)
PLAT MORPH BLD: NORMAL
PLATELET # BLD AUTO: 187 10*3/MM3 (ref 130–400)
PMV BLD AUTO: 10.6 FL (ref 6–12)
POTASSIUM BLD-SCNC: 3.9 MMOL/L (ref 3.5–5.1)
PROT SERPL-MCNC: 5.2 G/DL (ref 6.3–8.2)
RBC # BLD AUTO: 3.95 10*6/MM3 (ref 4.2–5.4)
RBC MORPH BLD: NORMAL
SODIUM BLD-SCNC: 139 MMOL/L (ref 137–145)
WBC MORPH BLD: NORMAL
WBC NRBC COR # BLD: 28.1 10*3/MM3 (ref 4.8–10.8)

## 2018-02-03 PROCEDURE — 94799 UNLISTED PULMONARY SVC/PX: CPT

## 2018-02-03 PROCEDURE — 85007 BL SMEAR W/DIFF WBC COUNT: CPT | Performed by: NURSE PRACTITIONER

## 2018-02-03 PROCEDURE — 80053 COMPREHEN METABOLIC PANEL: CPT | Performed by: NURSE PRACTITIONER

## 2018-02-03 PROCEDURE — 99232 SBSQ HOSP IP/OBS MODERATE 35: CPT | Performed by: INTERNAL MEDICINE

## 2018-02-03 PROCEDURE — 85025 COMPLETE CBC W/AUTO DIFF WBC: CPT | Performed by: NURSE PRACTITIONER

## 2018-02-03 PROCEDURE — 25010000002 ENOXAPARIN PER 10 MG: Performed by: FAMILY MEDICINE

## 2018-02-03 PROCEDURE — 63710000001 PREDNISONE PER 1 MG: Performed by: NURSE PRACTITIONER

## 2018-02-03 RX ORDER — NICOTINE 21 MG/24HR
1 PATCH, TRANSDERMAL 24 HOURS TRANSDERMAL EVERY 24 HOURS
Status: DISCONTINUED | OUTPATIENT
Start: 2018-02-04 | End: 2018-02-06 | Stop reason: HOSPADM

## 2018-02-03 RX ORDER — SACCHAROMYCES BOULARDII 250 MG
250 CAPSULE ORAL 2 TIMES DAILY
Status: DISCONTINUED | OUTPATIENT
Start: 2018-02-03 | End: 2018-02-06 | Stop reason: HOSPADM

## 2018-02-03 RX ADMIN — TRAMADOL HYDROCHLORIDE 50 MG: 50 TABLET, FILM COATED ORAL at 21:04

## 2018-02-03 RX ADMIN — ATORVASTATIN CALCIUM 10 MG: 10 TABLET, FILM COATED ORAL at 08:49

## 2018-02-03 RX ADMIN — VANCOMYCIN 125 MG: KIT at 06:21

## 2018-02-03 RX ADMIN — LOSARTAN POTASSIUM 25 MG: 25 TABLET, FILM COATED ORAL at 08:50

## 2018-02-03 RX ADMIN — NYSTATIN 500000 UNITS: 100000 SUSPENSION ORAL at 12:47

## 2018-02-03 RX ADMIN — NYSTATIN 500000 UNITS: 100000 SUSPENSION ORAL at 17:42

## 2018-02-03 RX ADMIN — VANCOMYCIN 125 MG: KIT at 15:25

## 2018-02-03 RX ADMIN — SERTRALINE HYDROCHLORIDE 50 MG: 50 TABLET ORAL at 08:50

## 2018-02-03 RX ADMIN — PROPRANOLOL HYDROCHLORIDE 20 MG: 20 TABLET ORAL at 20:49

## 2018-02-03 RX ADMIN — Medication 250 MG: at 20:49

## 2018-02-03 RX ADMIN — ESTROGENS, CONJUGATED 0.62 MG: 0.62 TABLET, FILM COATED ORAL at 08:49

## 2018-02-03 RX ADMIN — VANCOMYCIN 125 MG: KIT at 17:41

## 2018-02-03 RX ADMIN — BUDESONIDE 0.5 MG: 0.5 INHALANT RESPIRATORY (INHALATION) at 19:30

## 2018-02-03 RX ADMIN — MECLIZINE 12.5 MG: 12.5 TABLET ORAL at 21:04

## 2018-02-03 RX ADMIN — SPIRONOLACTONE 25 MG: 25 TABLET, FILM COATED ORAL at 08:49

## 2018-02-03 RX ADMIN — PREDNISONE 20 MG: 20 TABLET ORAL at 08:49

## 2018-02-03 RX ADMIN — VITAMIN D, TAB 1000IU (100/BT) 1000 UNITS: 25 TAB at 08:49

## 2018-02-03 RX ADMIN — MECLIZINE 12.5 MG: 12.5 TABLET ORAL at 06:21

## 2018-02-03 RX ADMIN — GUAIFENESIN 600 MG: 600 TABLET, EXTENDED RELEASE ORAL at 20:49

## 2018-02-03 RX ADMIN — GUAIFENESIN 600 MG: 600 TABLET, EXTENDED RELEASE ORAL at 08:49

## 2018-02-03 RX ADMIN — IPRATROPIUM BROMIDE AND ALBUTEROL SULFATE 3 ML: .5; 3 SOLUTION RESPIRATORY (INHALATION) at 13:20

## 2018-02-03 RX ADMIN — NICOTINE 1 PATCH: 14 PATCH TRANSDERMAL at 02:24

## 2018-02-03 RX ADMIN — IPRATROPIUM BROMIDE AND ALBUTEROL SULFATE 3 ML: .5; 3 SOLUTION RESPIRATORY (INHALATION) at 07:13

## 2018-02-03 RX ADMIN — NYSTATIN 500000 UNITS: 100000 SUSPENSION ORAL at 20:49

## 2018-02-03 RX ADMIN — FAMOTIDINE 20 MG: 20 TABLET, FILM COATED ORAL at 08:50

## 2018-02-03 RX ADMIN — ACETYLCYSTEINE 1 ML: 200 SOLUTION ORAL; RESPIRATORY (INHALATION) at 19:30

## 2018-02-03 RX ADMIN — ASPIRIN 81 MG: 81 TABLET, COATED ORAL at 08:50

## 2018-02-03 RX ADMIN — FLUTICASONE PROPIONATE 2 SPRAY: 50 SPRAY, METERED NASAL at 08:51

## 2018-02-03 RX ADMIN — NYSTATIN 500000 UNITS: 100000 SUSPENSION ORAL at 08:49

## 2018-02-03 RX ADMIN — CLONAZEPAM 0.5 MG: 0.5 TABLET ORAL at 23:05

## 2018-02-03 RX ADMIN — BUDESONIDE AND FORMOTEROL FUMARATE DIHYDRATE 2 PUFF: 160; 4.5 AEROSOL RESPIRATORY (INHALATION) at 21:07

## 2018-02-03 RX ADMIN — AMLODIPINE BESYLATE 5 MG: 5 TABLET ORAL at 08:49

## 2018-02-03 RX ADMIN — IPRATROPIUM BROMIDE AND ALBUTEROL SULFATE 3 ML: .5; 3 SOLUTION RESPIRATORY (INHALATION) at 02:19

## 2018-02-03 RX ADMIN — TRAMADOL HYDROCHLORIDE 50 MG: 50 TABLET, FILM COATED ORAL at 02:54

## 2018-02-03 RX ADMIN — ACETYLCYSTEINE 1 ML: 200 SOLUTION ORAL; RESPIRATORY (INHALATION) at 07:14

## 2018-02-03 RX ADMIN — ENOXAPARIN SODIUM 40 MG: 40 INJECTION SUBCUTANEOUS at 02:23

## 2018-02-03 RX ADMIN — BUDESONIDE AND FORMOTEROL FUMARATE DIHYDRATE 2 PUFF: 160; 4.5 AEROSOL RESPIRATORY (INHALATION) at 07:14

## 2018-02-03 RX ADMIN — MECLIZINE 12.5 MG: 12.5 TABLET ORAL at 15:26

## 2018-02-03 RX ADMIN — IPRATROPIUM BROMIDE AND ALBUTEROL SULFATE 3 ML: .5; 3 SOLUTION RESPIRATORY (INHALATION) at 19:30

## 2018-02-03 RX ADMIN — PROPRANOLOL HYDROCHLORIDE 20 MG: 20 TABLET ORAL at 08:50

## 2018-02-03 RX ADMIN — BUDESONIDE 0.5 MG: 0.5 INHALANT RESPIRATORY (INHALATION) at 07:13

## 2018-02-03 RX ADMIN — VANCOMYCIN 125 MG: KIT at 23:05

## 2018-02-03 NOTE — PLAN OF CARE
Problem: COPD, Chronic Bronchitis/Emphysema (Adult)  Goal: Signs and Symptoms of Listed Potential Problems Will be Absent or Manageable (COPD, Chronic Bronchitis/Emphysema)  Outcome: Ongoing (interventions implemented as appropriate)      Problem: Fall Risk (Adult)  Goal: Absence of Falls  Outcome: Ongoing (interventions implemented as appropriate)      Problem: Wound, Traumatic, Nonburn (Adult)  Goal: Signs and Symptoms of Listed Potential Problems Will be Absent or Manageable (Wound, Traumatic, Nonburn)  Outcome: Ongoing (interventions implemented as appropriate)      Problem: Patient Care Overview (Adult)  Goal: Plan of Care Review  Outcome: Ongoing (interventions implemented as appropriate)   02/02/18 1958   Coping/Psychosocial Response Interventions   Plan Of Care Reviewed With patient   Patient Care Overview   Progress improving

## 2018-02-03 NOTE — PLAN OF CARE
Problem: COPD, Chronic Bronchitis/Emphysema (Adult)  Goal: Signs and Symptoms of Listed Potential Problems Will be Absent or Manageable (COPD, Chronic Bronchitis/Emphysema)   02/03/18 0336   COPD, Chronic Bronchitis/Emphysema   Problems Assessed (COPD, Chronic Bronchitis/Emphysema) all   Problems Present (COPD, Chronic Bronchitis/Emphysema) dyspnea       Problem: Fall Risk (Adult)  Goal: Absence of Falls  Outcome: Ongoing (interventions implemented as appropriate)   02/03/18 0336   Fall Risk (Adult)   Absence of Falls making progress toward outcome       Problem: Patient Care Overview (Adult)  Goal: Plan of Care Review   02/03/18 0336   Coping/Psychosocial Response Interventions   Plan Of Care Reviewed With patient   Patient Care Overview   Progress no change   Outcome Evaluation   Outcome Summary/Follow up Plan Patient has required pain medication treatment throughout night. Patient has rested intermittently throughout night and daughter has remained at bedside. Patient did desat while asleep and required 2L NC to maintain O2 saturation at 90% or greater. Patient does appear congested, but currently has a non productive cough. Will continue to monitor.

## 2018-02-03 NOTE — SIGNIFICANT NOTE
02/03/18 1247   PT Deferred Reason   PT Deferred Reason Patient/family declined treatment  (pt reports she is too exhausted participate in tx . Therapy to f/u tomorrow)

## 2018-02-03 NOTE — PLAN OF CARE
Problem: Patient Care Overview (Adult)  Goal: Plan of Care Review  Outcome: Ongoing (interventions implemented as appropriate)   02/03/18 5710   Coping/Psychosocial Response Interventions   Plan Of Care Reviewed With patient;daughter   Patient Care Overview   Progress improving   Outcome Evaluation   Outcome Summary/Follow up Plan pt denies pain/n/v this shift, ambulating in room with assistance, VSS, NAD, remains on RA this shift without desatting, 1 bm this shift

## 2018-02-03 NOTE — PROGRESS NOTES
[facility]    Name:  Sadie Tijerina   Age:  77 y.o.  Sex:  female  :  1940  MRN:  [id number]   Visit Number:  [id number]  Admission Date:  2018  Date Of Service:  18  Primary Care Physician:  Kristian Wolff MD     LOS: 9 days :  Patient Care Team:  Kristian Wolff MD as PCP - General  Kristian Wolff MD as PCP - Family Medicine  Bayron Grimaldo MD (Inactive) as Consulting Physician (General Surgery):    Chief Complaint:      Abdominal pain.    Subjective / Interval History:     Patient is currently lying down in the bed and is comfortable at rest.  She still has abdominal pain which seems to be better compared to last 2 days.  She states that she did not sleep very well last night.  She is requesting if telemetry can be discontinued.  She did have multiple bowel movements but no diarrhea.  Denies any nausea or vomiting.  She has been empirically started on oral vancomycin couple of days ago due to suspected C. difficile colitis.  She did have significantly elevated white count without any evidence of ischemic bowel clinically.  After initiation of oral vancomycin therapy, her white count has significantly improved.  She denies any chest pain but complains of cough which is nonproductive.  She does have baseline shortness of breath due to her COPD.  Previous provider documentation, laboratory and imaging data have been reviewed.    Review of Systems:     General ROS: Patient denies any fevers, chills or loss of consciousness.  Complains of generalized weakness.  Respiratory ROS: Chronic cough and shortness of breath.  Cardiovascular ROS: Denies chest pain or palpitations. No history of exertional chest pain.  Gastrointestinal ROS: Complaints of abdominal pain and frequent stools.  Neurological ROS: Denies any focal weakness. No loss of consciousness. Denies any numbness.  Dermatological ROS: Denies any redness or pruritis.    Vital Signs:    Temp:   [97.4 °F (36.3 °C)-98 °F (36.7 °C)] 98 °F (36.7 °C)  Heart Rate:  [79-96] 79  Resp:  [17-19] 19  BP: ()/(50-99) 99/50    Intake and output:    I/O last 3 completed shifts:  In: 839 [I.V.:839]  Out: 500 [Urine:200; Emesis/NG output:300]       Physical Examination:    General Appearance:  Alert and cooperative, not in any acute distress.   Head:  Atraumatic and normocephalic, without obvious abnormality.   Eyes:          PERRLA, conjunctivae and sclerae normal, no Icterus. No pallor. Extra-occular movements are within normal limits.   Neck: Supple, trachea midline, no thyromegaly, no carotid bruit.   Lungs:   Chest shape is normal. Breath sounds heard bilaterally equally.  Scattered crackles and wheezing heard.. No Pleural rub or bronchial breathing.   Heart:  Normal S1 and S2, no murmur, no gallop, no rub. No JVD   Abdomen:   Abdomen is soft but diffusely tender.  No rebound tenderness noted today.  Multiple ecchymotic patches are noted on the abdominal wall due to Lovenox injections.  Bowel sounds are decreased.     Extremities: Moves all extremities well, 1+ edema, no cyanosis, no            clubbing.   Skin: No bleeding, bruising or rash.   Neurologic: Awake, alert and oriented times 3. Moves all 4 extremities equally.   Laboratory results:      Results from last 7 days  Lab Units 02/03/18  0733 02/02/18  0513 02/01/18  0548   SODIUM mmol/L 139 139 137   POTASSIUM mmol/L 3.9 4.7 4.3   CHLORIDE mmol/L 103 98 99   CO2 mmol/L 30.0 26.0 30.0   BUN mg/dL 39* 46* 26*   CREATININE mg/dL 0.90 1.30 0.70   CALCIUM mg/dL 8.4 8.6 8.8   BILIRUBIN mg/dL 0.3 0.9 0.3   ALK PHOS U/L 56 53 58   ALT (SGPT) U/L 43 44 44   AST (SGOT) U/L 20 34 15   GLUCOSE mg/dL 86 170* 82       Results from last 7 days  Lab Units 02/03/18  0734 02/02/18  0513 02/01/18  1135 01/31/18  1219   WBC 10*3/mm3 28.10* 45.08* 28.37* 20.21*   HEMOGLOBIN g/dL 11.5* 17.1* 13.9 13.1   HEMATOCRIT % 35.4* 51.7* 41.2 39.8   PLATELETS 10*3/mm3 187 162  --  242                Results from last 7 days  Lab Units 02/01/18  1654 02/01/18  1642   BLOODCX  No growth at 24 hours No growth at 24 hours     I have reviewed the patient's laboratory results.    Radiology results:    Imaging Results (last 24 hours)     ** No results found for the last 24 hours. **        Medication Review:     I have reviewed the patients active and prn medications.     Principal Problem:    Acute on chronic respiratory failure with hypoxia  Active Problems:    Gastroesophageal reflux disease without esophagitis    Hyperlipidemia    Essential hypertension    Essential tremor    Chronic obstructive pulmonary disease with acute exacerbation    Venous insufficiency of both lower extremities    Chronic diastolic heart failure    Assessment:    1.  Abdominal pain with leukocytosis, suspected C. difficile colitis.  2.  Acute on chronic hypoxic respiratory failure, present on admission.  2.  COPD exacerbation, improved.  3.  Bronchiolitis status post antibiotic therapy and steroids.  4.  Chronic diastolic heart failure.  5.  Essential hypertension.  6.  Chronic venous insufficiency of the lower extremities.    Plan:    Patient is currently hemodynamically stable.  She still has abdominal pain but seems to be improved compared to yesterday.  She will be continued on oral vancomycin for suspected C. difficile colitis.  Her leukocytosis has significantly improved from 45,000 to 28,000 today.  She has been afebrile.  She is not on any other IV antibiotic therapy at this time.  I will place her on Saccharomyces probiotic supplements.  I have discussed the patient's condition with her daughter Neris who is at the bedside.  Patient does not want work with physical therapy today because she is tired.  I will discontinue her telemetry at this time.  She is currently awaiting transfer to rehabilitation facility when clinically stable.    iNco Enriquez MD  02/03/18  1:47 PM    Dictated utilizing Dragon dictation.

## 2018-02-04 LAB
BASOPHILS # BLD AUTO: 0.03 10*3/MM3 (ref 0–0.2)
BASOPHILS NFR BLD AUTO: 0.1 % (ref 0–2.5)
DEPRECATED RDW RBC AUTO: 44.4 FL (ref 37–54)
EOSINOPHIL # BLD AUTO: 0.04 10*3/MM3 (ref 0–0.7)
EOSINOPHIL NFR BLD AUTO: 0.2 % (ref 0–7)
ERYTHROCYTE [DISTWIDTH] IN BLOOD BY AUTOMATED COUNT: 13.6 % (ref 11.5–14.5)
HCT VFR BLD AUTO: 31.5 % (ref 37–47)
HGB BLD-MCNC: 10.3 G/DL (ref 12–16)
IMM GRANULOCYTES # BLD: 0.22 10*3/MM3 (ref 0–0.06)
IMM GRANULOCYTES NFR BLD: 1.1 % (ref 0–0.6)
LYMPHOCYTES # BLD AUTO: 1.23 10*3/MM3 (ref 0.6–3.4)
LYMPHOCYTES NFR BLD AUTO: 6.1 % (ref 10–50)
MCH RBC QN AUTO: 29.3 PG (ref 27–31)
MCHC RBC AUTO-ENTMCNC: 32.7 G/DL (ref 30–37)
MCV RBC AUTO: 89.7 FL (ref 81–99)
MONOCYTES # BLD AUTO: 1.15 10*3/MM3 (ref 0–0.9)
MONOCYTES NFR BLD AUTO: 5.7 % (ref 0–12)
NEUTROPHILS # BLD AUTO: 17.65 10*3/MM3 (ref 2–6.9)
NEUTROPHILS NFR BLD AUTO: 86.8 % (ref 37–80)
NRBC BLD MANUAL-RTO: 0 /100 WBC (ref 0–0)
PLATELET # BLD AUTO: 183 10*3/MM3 (ref 130–400)
PMV BLD AUTO: 11.1 FL (ref 6–12)
RBC # BLD AUTO: 3.51 10*6/MM3 (ref 4.2–5.4)
WBC NRBC COR # BLD: 20.32 10*3/MM3 (ref 4.8–10.8)

## 2018-02-04 PROCEDURE — 25010000002 ENOXAPARIN PER 10 MG: Performed by: INTERNAL MEDICINE

## 2018-02-04 PROCEDURE — 87205 SMEAR GRAM STAIN: CPT | Performed by: NURSE PRACTITIONER

## 2018-02-04 PROCEDURE — 94668 MNPJ CHEST WALL SBSQ: CPT

## 2018-02-04 PROCEDURE — 25010000002 ONDANSETRON PER 1 MG: Performed by: FAMILY MEDICINE

## 2018-02-04 PROCEDURE — 99232 SBSQ HOSP IP/OBS MODERATE 35: CPT | Performed by: INTERNAL MEDICINE

## 2018-02-04 PROCEDURE — 87077 CULTURE AEROBIC IDENTIFY: CPT | Performed by: NURSE PRACTITIONER

## 2018-02-04 PROCEDURE — 87186 SC STD MICRODIL/AGAR DIL: CPT | Performed by: NURSE PRACTITIONER

## 2018-02-04 PROCEDURE — 94799 UNLISTED PULMONARY SVC/PX: CPT

## 2018-02-04 PROCEDURE — 63710000001 PREDNISONE PER 1 MG: Performed by: NURSE PRACTITIONER

## 2018-02-04 PROCEDURE — 87070 CULTURE OTHR SPECIMN AEROBIC: CPT | Performed by: NURSE PRACTITIONER

## 2018-02-04 PROCEDURE — 85025 COMPLETE CBC W/AUTO DIFF WBC: CPT | Performed by: INTERNAL MEDICINE

## 2018-02-04 RX ADMIN — BUDESONIDE 0.5 MG: 0.5 INHALANT RESPIRATORY (INHALATION) at 06:55

## 2018-02-04 RX ADMIN — NYSTATIN 500000 UNITS: 100000 SUSPENSION ORAL at 18:17

## 2018-02-04 RX ADMIN — VANCOMYCIN 125 MG: KIT at 05:52

## 2018-02-04 RX ADMIN — NYSTATIN 500000 UNITS: 100000 SUSPENSION ORAL at 21:19

## 2018-02-04 RX ADMIN — MECLIZINE 12.5 MG: 12.5 TABLET ORAL at 21:19

## 2018-02-04 RX ADMIN — CLONAZEPAM 0.5 MG: 0.5 TABLET ORAL at 21:19

## 2018-02-04 RX ADMIN — FLUTICASONE PROPIONATE 2 SPRAY: 50 SPRAY, METERED NASAL at 09:20

## 2018-02-04 RX ADMIN — PREDNISONE 20 MG: 20 TABLET ORAL at 09:20

## 2018-02-04 RX ADMIN — Medication 2 DROP: at 23:03

## 2018-02-04 RX ADMIN — MECLIZINE 12.5 MG: 12.5 TABLET ORAL at 05:52

## 2018-02-04 RX ADMIN — PROPRANOLOL HYDROCHLORIDE 20 MG: 20 TABLET ORAL at 21:19

## 2018-02-04 RX ADMIN — VITAMIN D, TAB 1000IU (100/BT) 1000 UNITS: 25 TAB at 09:19

## 2018-02-04 RX ADMIN — VANCOMYCIN 125 MG: KIT at 18:17

## 2018-02-04 RX ADMIN — Medication 250 MG: at 09:19

## 2018-02-04 RX ADMIN — IPRATROPIUM BROMIDE AND ALBUTEROL SULFATE 3 ML: .5; 3 SOLUTION RESPIRATORY (INHALATION) at 19:12

## 2018-02-04 RX ADMIN — ACETYLCYSTEINE 1 ML: 200 SOLUTION ORAL; RESPIRATORY (INHALATION) at 06:56

## 2018-02-04 RX ADMIN — ASPIRIN 81 MG: 81 TABLET, COATED ORAL at 09:20

## 2018-02-04 RX ADMIN — Medication 250 MG: at 21:19

## 2018-02-04 RX ADMIN — ATORVASTATIN CALCIUM 10 MG: 10 TABLET, FILM COATED ORAL at 09:19

## 2018-02-04 RX ADMIN — ESTROGENS, CONJUGATED 0.62 MG: 0.62 TABLET, FILM COATED ORAL at 09:19

## 2018-02-04 RX ADMIN — NYSTATIN 500000 UNITS: 100000 SUSPENSION ORAL at 12:10

## 2018-02-04 RX ADMIN — SPIRONOLACTONE 25 MG: 25 TABLET, FILM COATED ORAL at 09:19

## 2018-02-04 RX ADMIN — LOSARTAN POTASSIUM 25 MG: 25 TABLET, FILM COATED ORAL at 09:19

## 2018-02-04 RX ADMIN — FAMOTIDINE 20 MG: 20 TABLET, FILM COATED ORAL at 09:20

## 2018-02-04 RX ADMIN — PROPRANOLOL HYDROCHLORIDE 20 MG: 20 TABLET ORAL at 09:19

## 2018-02-04 RX ADMIN — TRAMADOL HYDROCHLORIDE 50 MG: 50 TABLET, FILM COATED ORAL at 23:02

## 2018-02-04 RX ADMIN — IPRATROPIUM BROMIDE AND ALBUTEROL SULFATE 3 ML: .5; 3 SOLUTION RESPIRATORY (INHALATION) at 06:55

## 2018-02-04 RX ADMIN — NICOTINE 1 PATCH: 14 PATCH TRANSDERMAL at 05:51

## 2018-02-04 RX ADMIN — NYSTATIN 500000 UNITS: 100000 SUSPENSION ORAL at 09:20

## 2018-02-04 RX ADMIN — IPRATROPIUM BROMIDE AND ALBUTEROL SULFATE 3 ML: .5; 3 SOLUTION RESPIRATORY (INHALATION) at 12:50

## 2018-02-04 RX ADMIN — VANCOMYCIN 125 MG: KIT at 12:10

## 2018-02-04 RX ADMIN — ONDANSETRON HYDROCHLORIDE 4 MG: 2 INJECTION, SOLUTION INTRAMUSCULAR; INTRAVENOUS at 12:12

## 2018-02-04 RX ADMIN — VANCOMYCIN 125 MG: KIT at 23:02

## 2018-02-04 RX ADMIN — SERTRALINE HYDROCHLORIDE 50 MG: 50 TABLET ORAL at 09:19

## 2018-02-04 RX ADMIN — BUDESONIDE AND FORMOTEROL FUMARATE DIHYDRATE 2 PUFF: 160; 4.5 AEROSOL RESPIRATORY (INHALATION) at 19:14

## 2018-02-04 RX ADMIN — GUAIFENESIN 600 MG: 600 TABLET, EXTENDED RELEASE ORAL at 21:19

## 2018-02-04 RX ADMIN — AMLODIPINE BESYLATE 5 MG: 5 TABLET ORAL at 09:19

## 2018-02-04 RX ADMIN — ENOXAPARIN SODIUM 40 MG: 40 INJECTION SUBCUTANEOUS at 05:52

## 2018-02-04 RX ADMIN — COLLAGENASE SANTYL: 250 OINTMENT TOPICAL at 18:29

## 2018-02-04 RX ADMIN — BUDESONIDE 0.5 MG: 0.5 INHALANT RESPIRATORY (INHALATION) at 19:12

## 2018-02-04 RX ADMIN — GUAIFENESIN 600 MG: 600 TABLET, EXTENDED RELEASE ORAL at 09:19

## 2018-02-04 RX ADMIN — MECLIZINE 12.5 MG: 12.5 TABLET ORAL at 15:33

## 2018-02-04 RX ADMIN — ACETYLCYSTEINE 1 ML: 200 SOLUTION ORAL; RESPIRATORY (INHALATION) at 19:12

## 2018-02-04 NOTE — PROGRESS NOTES
[facility]    Name:  Sadie Tijerina   Age:  77 y.o.  Sex:  female  :  1940  MRN:  [id number]   Visit Number:  [id number]  Admission Date:  2018  Date Of Service:  18  Primary Care Physician:  Kristian Wolff MD     LOS: 10 days :  Patient Care Team:  Kristian Wolff MD as PCP - General  Kristian Wolff MD as PCP - Family Medicine  Bayron Grimaldo MD (Inactive) as Consulting Physician (General Surgery):    Chief Complaint:      Abdominal pain.    Subjective / Interval History:     Ms. Forrest is currently sitting up on the bed and is eating her breakfast.  She however states that she has no appetite and continues to have nausea.  No vomiting however.  She did have a bowel movement without any diarrhea.  She continues to have intermittent cramping in her abdomen but states that this is definitely better compared to the last couple of days.  No fevers.  She states that she had low blood pressure last night but no documentation is noted.  She denies any chest pain or dizziness. She has been empirically started on oral vancomycin couple of days ago due to suspected C. difficile colitis.  She did have significantly elevated white count without any evidence of ischemic bowel clinically.  After initiation of oral vancomycin therapy, her white count has significantly improved.  She does have baseline shortness of breath due to her COPD.     Review of Systems:     General ROS: Patient denies any fevers, chills or loss of consciousness.  Complains of generalized weakness.  Respiratory ROS: Chronic cough and shortness of breath.  Cardiovascular ROS: Denies chest pain or palpitations. No history of exertional chest pain.  Gastrointestinal ROS: Complains of nausea.  Complains of abdominal pain and frequent stools.  Neurological ROS: Denies any focal weakness. No loss of consciousness. Denies any numbness.  Dermatological ROS: Denies any redness or  pruritis.    Vital Signs:    Temp:  [97.7 °F (36.5 °C)-98.4 °F (36.9 °C)] 97.9 °F (36.6 °C)  Heart Rate:  [79-96] 87  Resp:  [16-20] 18  BP: ()/(50-62) 126/52    Intake and output:    I/O last 3 completed shifts:  In: 1399 [P.O.:560; I.V.:839]  Out: 400 [Urine:400]  I/O this shift:  In: 360 [P.O.:360]  Out: -     Physical Examination:    General Appearance:  Alert and cooperative, not in any acute distress.   Head:  Atraumatic and normocephalic, without obvious abnormality.   Eyes:          PERRLA, conjunctivae and sclerae normal, no Icterus. No pallor. Extra-occular movements are within normal limits.   Neck: Supple, trachea midline, no thyromegaly, no carotid bruit.   Lungs:   Chest shape is normal. Breath sounds heard bilaterally equally.  Scattered crackles and wheezing heard.. No Pleural rub or bronchial breathing.   Heart:  Normal S1 and S2, no murmur, no gallop, no rub. No JVD   Abdomen:   Abdomen is soft but minimal diffuse tenderness present.  No rebound tenderness.  Multiple ecchymotic patches are noted on the abdominal wall due to Lovenox injections.  Bowel sounds are decreased.     Extremities: Moves all extremities well, 1+ edema, no cyanosis, no            clubbing.   Skin: No bleeding, bruising or rash.   Neurologic: Awake, alert and oriented times 3. Moves all 4 extremities equally.   Laboratory results:      Results from last 7 days  Lab Units 02/03/18  0733 02/02/18  0513 02/01/18  0548   SODIUM mmol/L 139 139 137   POTASSIUM mmol/L 3.9 4.7 4.3   CHLORIDE mmol/L 103 98 99   CO2 mmol/L 30.0 26.0 30.0   BUN mg/dL 39* 46* 26*   CREATININE mg/dL 0.90 1.30 0.70   CALCIUM mg/dL 8.4 8.6 8.8   BILIRUBIN mg/dL 0.3 0.9 0.3   ALK PHOS U/L 56 53 58   ALT (SGPT) U/L 43 44 44   AST (SGOT) U/L 20 34 15   GLUCOSE mg/dL 86 170* 82       Results from last 7 days  Lab Units 02/04/18  0518 02/03/18  0734 02/02/18  0513   WBC 10*3/mm3 20.32* 28.10* 45.08*   HEMOGLOBIN g/dL 10.3* 11.5* 17.1*   HEMATOCRIT % 31.5*  35.4* 51.7*   PLATELETS 10*3/mm3 183 187 162               Results from last 7 days  Lab Units 02/01/18  1654 02/01/18  1642   BLOODCX  No growth at 2 days No growth at 2 days     I have reviewed the patient's laboratory results.    Radiology results:    Imaging Results (last 24 hours)     ** No results found for the last 24 hours. **        Medication Review:     I have reviewed the patients active and prn medications.     Principal Problem:    Acute on chronic respiratory failure with hypoxia  Active Problems:    Chronic obstructive pulmonary disease with acute exacerbation    Essential hypertension    Chronic diastolic heart failure    Gastroesophageal reflux disease without esophagitis    Hyperlipidemia    Essential tremor    Venous insufficiency of both lower extremities    Assessment:    1.  Abdominal pain with leukocytosis, suspected C. difficile colitis.  2.  Acute on chronic hypoxic respiratory failure, present on admission.  2.  COPD exacerbation, improved.  3.  Bronchiolitis status post antibiotic therapy and steroids.  4.  Chronic diastolic heart failure.  5.  Essential hypertension.  6.  Chronic venous insufficiency of the lower extremities.    Plan:    Ms. Forrest continues to do well and is hemodynamically stable.  She is tolerating diet despite low appetite.  She has not had any diarrhea.  She is currently on oral vancomycin for suspected C. difficile colitis.  Her leukocytosis is significantly improving and is down to 20,000 today.  She has been afebrile.  She was initially on IV antibiotic therapy for her COPD and pneumonia but currently not on any antibiotics other than the oral vancomycin.  She will be continued on physical and occupational therapy.  Her telemetry has been discontinued.  She is currently waiting to transfer to rehabilitation facility.  Further recommendations depend upon her clinical course.  She may continue to have abdominal pain for a few more days.  She would need oral  vancomycin to be continued for 14 days.    Nico Enriquez MD  02/04/18  10:43 AM    Dictated utilizing Dragon dictation.

## 2018-02-04 NOTE — PLAN OF CARE
Problem: Patient Care Overview (Adult)  Goal: Plan of Care Review  Outcome: Ongoing (interventions implemented as appropriate)   02/04/18 1704   Coping/Psychosocial Response Interventions   Plan Of Care Reviewed With patient;daughter   Patient Care Overview   Progress improving   Outcome Evaluation   Outcome Summary/Follow up Plan Pt states she is feeling better today, less c/o abd cramping and nausea, 1 bm this shift, WBC improving

## 2018-02-04 NOTE — PLAN OF CARE
Problem: COPD, Chronic Bronchitis/Emphysema (Adult)  Goal: Signs and Symptoms of Listed Potential Problems Will be Absent or Manageable (COPD, Chronic Bronchitis/Emphysema)  Outcome: Ongoing (interventions implemented as appropriate)      Problem: Fall Risk (Adult)  Goal: Absence of Falls  Outcome: Ongoing (interventions implemented as appropriate)      Problem: Wound, Traumatic, Nonburn (Adult)  Goal: Signs and Symptoms of Listed Potential Problems Will be Absent or Manageable (Wound, Traumatic, Nonburn)  Outcome: Ongoing (interventions implemented as appropriate)      Problem: Patient Care Overview (Adult)  Goal: Plan of Care Review  Outcome: Ongoing (interventions implemented as appropriate)   02/04/18 0207   Coping/Psychosocial Response Interventions   Plan Of Care Reviewed With patient   Patient Care Overview   Progress improving   Outcome Evaluation   Outcome Summary/Follow up Plan PT MORE ALERT AND TALKATIVE, APPETITE SLIGHTLY BETTER, VSS, PAIN MANAGED WITH PRN TRAMADOL,

## 2018-02-05 LAB
ANION GAP SERPL CALCULATED.3IONS-SCNC: 8.8 MMOL/L
BASOPHILS # BLD AUTO: 0.03 10*3/MM3 (ref 0–0.2)
BASOPHILS NFR BLD AUTO: 0.2 % (ref 0–2.5)
BUN BLD-MCNC: 22 MG/DL (ref 7–20)
BUN/CREAT SERPL: 36.7 (ref 7.1–23.5)
CALCIUM SPEC-SCNC: 8.6 MG/DL (ref 8.4–10.2)
CHLORIDE SERPL-SCNC: 103 MMOL/L (ref 98–107)
CO2 SERPL-SCNC: 29 MMOL/L (ref 26–30)
CREAT BLD-MCNC: 0.6 MG/DL (ref 0.6–1.3)
DEPRECATED RDW RBC AUTO: 43.5 FL (ref 37–54)
EOSINOPHIL # BLD AUTO: 0.07 10*3/MM3 (ref 0–0.7)
EOSINOPHIL NFR BLD AUTO: 0.4 % (ref 0–7)
ERYTHROCYTE [DISTWIDTH] IN BLOOD BY AUTOMATED COUNT: 13.4 % (ref 11.5–14.5)
GFR SERPL CREATININE-BSD FRML MDRD: 96 ML/MIN/1.73
GLUCOSE BLD-MCNC: 81 MG/DL (ref 74–98)
HCT VFR BLD AUTO: 32.7 % (ref 37–47)
HGB BLD-MCNC: 10.9 G/DL (ref 12–16)
IMM GRANULOCYTES # BLD: 0.2 10*3/MM3 (ref 0–0.06)
IMM GRANULOCYTES NFR BLD: 1.3 % (ref 0–0.6)
LYMPHOCYTES # BLD AUTO: 1.4 10*3/MM3 (ref 0.6–3.4)
LYMPHOCYTES NFR BLD AUTO: 9 % (ref 10–50)
MCH RBC QN AUTO: 29.3 PG (ref 27–31)
MCHC RBC AUTO-ENTMCNC: 33.3 G/DL (ref 30–37)
MCV RBC AUTO: 87.9 FL (ref 81–99)
MONOCYTES # BLD AUTO: 1.07 10*3/MM3 (ref 0–0.9)
MONOCYTES NFR BLD AUTO: 6.8 % (ref 0–12)
NEUTROPHILS # BLD AUTO: 12.87 10*3/MM3 (ref 2–6.9)
NEUTROPHILS NFR BLD AUTO: 82.3 % (ref 37–80)
NRBC BLD MANUAL-RTO: 0 /100 WBC (ref 0–0)
PLATELET # BLD AUTO: 179 10*3/MM3 (ref 130–400)
PMV BLD AUTO: 11.1 FL (ref 6–12)
POTASSIUM BLD-SCNC: 4.8 MMOL/L (ref 3.5–5.1)
RBC # BLD AUTO: 3.72 10*6/MM3 (ref 4.2–5.4)
SODIUM BLD-SCNC: 136 MMOL/L (ref 137–145)
WBC NRBC COR # BLD: 15.64 10*3/MM3 (ref 4.8–10.8)

## 2018-02-05 PROCEDURE — 94799 UNLISTED PULMONARY SVC/PX: CPT

## 2018-02-05 PROCEDURE — 63710000001 PREDNISONE PER 1 MG: Performed by: NURSE PRACTITIONER

## 2018-02-05 PROCEDURE — 80048 BASIC METABOLIC PNL TOTAL CA: CPT | Performed by: INTERNAL MEDICINE

## 2018-02-05 PROCEDURE — 97110 THERAPEUTIC EXERCISES: CPT

## 2018-02-05 PROCEDURE — 85025 COMPLETE CBC W/AUTO DIFF WBC: CPT | Performed by: INTERNAL MEDICINE

## 2018-02-05 PROCEDURE — 25010000002 ENOXAPARIN PER 10 MG: Performed by: INTERNAL MEDICINE

## 2018-02-05 PROCEDURE — 97116 GAIT TRAINING THERAPY: CPT

## 2018-02-05 PROCEDURE — G8989 SELF CARE D/C STATUS: HCPCS

## 2018-02-05 PROCEDURE — 99232 SBSQ HOSP IP/OBS MODERATE 35: CPT | Performed by: NURSE PRACTITIONER

## 2018-02-05 RX ADMIN — IPRATROPIUM BROMIDE AND ALBUTEROL SULFATE 3 ML: .5; 3 SOLUTION RESPIRATORY (INHALATION) at 19:22

## 2018-02-05 RX ADMIN — VITAMIN D, TAB 1000IU (100/BT) 1000 UNITS: 25 TAB at 08:46

## 2018-02-05 RX ADMIN — MECLIZINE 12.5 MG: 12.5 TABLET ORAL at 21:12

## 2018-02-05 RX ADMIN — GUAIFENESIN 600 MG: 600 TABLET, EXTENDED RELEASE ORAL at 08:46

## 2018-02-05 RX ADMIN — Medication 250 MG: at 08:46

## 2018-02-05 RX ADMIN — VANCOMYCIN 125 MG: KIT at 23:05

## 2018-02-05 RX ADMIN — COLLAGENASE SANTYL: 250 OINTMENT TOPICAL at 08:47

## 2018-02-05 RX ADMIN — SPIRONOLACTONE 25 MG: 25 TABLET, FILM COATED ORAL at 08:46

## 2018-02-05 RX ADMIN — BUDESONIDE 0.5 MG: 0.5 INHALANT RESPIRATORY (INHALATION) at 07:01

## 2018-02-05 RX ADMIN — NYSTATIN 500000 UNITS: 100000 SUSPENSION ORAL at 17:26

## 2018-02-05 RX ADMIN — AMLODIPINE BESYLATE 5 MG: 5 TABLET ORAL at 08:45

## 2018-02-05 RX ADMIN — ACETYLCYSTEINE 1 ML: 200 SOLUTION ORAL; RESPIRATORY (INHALATION) at 12:55

## 2018-02-05 RX ADMIN — GUAIFENESIN 600 MG: 600 TABLET, EXTENDED RELEASE ORAL at 21:12

## 2018-02-05 RX ADMIN — NYSTATIN 500000 UNITS: 100000 SUSPENSION ORAL at 21:12

## 2018-02-05 RX ADMIN — TRAMADOL HYDROCHLORIDE 50 MG: 50 TABLET, FILM COATED ORAL at 23:05

## 2018-02-05 RX ADMIN — VANCOMYCIN 125 MG: KIT at 17:26

## 2018-02-05 RX ADMIN — FLUTICASONE PROPIONATE 2 SPRAY: 50 SPRAY, METERED NASAL at 08:47

## 2018-02-05 RX ADMIN — PREDNISONE 20 MG: 20 TABLET ORAL at 08:48

## 2018-02-05 RX ADMIN — SERTRALINE HYDROCHLORIDE 50 MG: 50 TABLET ORAL at 08:46

## 2018-02-05 RX ADMIN — Medication 250 MG: at 21:12

## 2018-02-05 RX ADMIN — PROPRANOLOL HYDROCHLORIDE 20 MG: 20 TABLET ORAL at 21:12

## 2018-02-05 RX ADMIN — BUDESONIDE 0.5 MG: 0.5 INHALANT RESPIRATORY (INHALATION) at 19:22

## 2018-02-05 RX ADMIN — ESTROGENS, CONJUGATED 0.62 MG: 0.62 TABLET, FILM COATED ORAL at 08:46

## 2018-02-05 RX ADMIN — FAMOTIDINE 20 MG: 20 TABLET, FILM COATED ORAL at 08:47

## 2018-02-05 RX ADMIN — IPRATROPIUM BROMIDE AND ALBUTEROL SULFATE 3 ML: .5; 3 SOLUTION RESPIRATORY (INHALATION) at 12:55

## 2018-02-05 RX ADMIN — PROPRANOLOL HYDROCHLORIDE 20 MG: 20 TABLET ORAL at 08:46

## 2018-02-05 RX ADMIN — NYSTATIN 500000 UNITS: 100000 SUSPENSION ORAL at 12:08

## 2018-02-05 RX ADMIN — IPRATROPIUM BROMIDE AND ALBUTEROL SULFATE 3 ML: .5; 3 SOLUTION RESPIRATORY (INHALATION) at 07:01

## 2018-02-05 RX ADMIN — BUDESONIDE AND FORMOTEROL FUMARATE DIHYDRATE 2 PUFF: 160; 4.5 AEROSOL RESPIRATORY (INHALATION) at 19:22

## 2018-02-05 RX ADMIN — ACETYLCYSTEINE 1 ML: 200 SOLUTION ORAL; RESPIRATORY (INHALATION) at 07:01

## 2018-02-05 RX ADMIN — MECLIZINE 12.5 MG: 12.5 TABLET ORAL at 14:36

## 2018-02-05 RX ADMIN — CLONAZEPAM 0.5 MG: 0.5 TABLET ORAL at 23:05

## 2018-02-05 RX ADMIN — VANCOMYCIN 125 MG: KIT at 12:08

## 2018-02-05 RX ADMIN — ACETYLCYSTEINE 1 ML: 200 SOLUTION ORAL; RESPIRATORY (INHALATION) at 19:22

## 2018-02-05 RX ADMIN — LOSARTAN POTASSIUM 25 MG: 25 TABLET, FILM COATED ORAL at 08:46

## 2018-02-05 RX ADMIN — VANCOMYCIN 125 MG: KIT at 06:18

## 2018-02-05 RX ADMIN — MECLIZINE 12.5 MG: 12.5 TABLET ORAL at 06:18

## 2018-02-05 RX ADMIN — NYSTATIN 500000 UNITS: 100000 SUSPENSION ORAL at 08:46

## 2018-02-05 RX ADMIN — ENOXAPARIN SODIUM 40 MG: 40 INJECTION SUBCUTANEOUS at 06:17

## 2018-02-05 RX ADMIN — ATORVASTATIN CALCIUM 10 MG: 10 TABLET, FILM COATED ORAL at 08:46

## 2018-02-05 RX ADMIN — NICOTINE 1 PATCH: 14 PATCH TRANSDERMAL at 06:18

## 2018-02-05 RX ADMIN — ASPIRIN 81 MG: 81 TABLET, COATED ORAL at 08:46

## 2018-02-05 NOTE — PLAN OF CARE
Problem: Patient Care Overview (Adult)  Goal: Plan of Care Review  Outcome: Ongoing (interventions implemented as appropriate)   02/05/18 1329   Coping/Psychosocial Response Interventions   Plan Of Care Reviewed With patient   Patient Care Overview   Progress improving   Outcome Evaluation   Outcome Summary/Follow up Plan Pt tolerated increased activity this treatment evident by increased ambulation distance during gait trainning as well as seated ther ex. PT did have x3 episodes of LOB that required therapists assistance to regain balance. See flowsheet for details.        Problem: Inpatient Physical Therapy  Goal: Transfer Training Goal 1 LTG- PT  Outcome: Ongoing (interventions implemented as appropriate)   01/25/18 1319 02/05/18 1329   Transfer Training PT LTG   Transfer Training PT LTG, Date Established 01/25/18 --    Transfer Training PT LTG, Time to Achieve 2 wks --    Transfer Training PT LTG, Activity Type sit to stand/stand to sit;bed to chair /chair to bed --    Transfer Training PT LTG, [place] Level supervision required --    Transfer Training PT LTG, Assist Device walker, rolling --    Transfer Training PT LTG, Outcome --  goal ongoing     Goal: Gait Training Goal LTG- PT  Outcome: Ongoing (interventions implemented as appropriate)   01/25/18 1319 02/05/18 1329   Gait Training PT LTG   Gait Training Goal PT LTG, Date Established 01/25/18 --    Gait Training Goal PT LTG, Time to Achieve 2 wks --    Gait Training Goal PT LTG, [place] Level contact guard assist --    Gait Training Goal PT LTG, Assist Device walker, rolling --    Gait Training Goal PT LTG, Distance to Achieve 300 --    Gait Training Goal PT LTG, Outcome --  goal ongoing     Goal: Strength Goal LTG- PT  Outcome: Ongoing (interventions implemented as appropriate)   01/25/18 1319 02/05/18 1329   Strength Goal PT LTG   Strength Goal PT LTG, Date Established 01/25/18 --    Strength Goal PT LTG, Time to Achieve 2 wks --    Strength  Goal PT LTG, Measure to Achieve Patient will demonstrate independence with HEP. --    Strength Goal PT LTG, Outcome --  goal ongoing

## 2018-02-05 NOTE — PLAN OF CARE
Problem: Patient Care Overview (Adult)  Goal: Plan of Care Review  Outcome: Ongoing (interventions implemented as appropriate)   02/05/18 1312   Coping/Psychosocial Response Interventions   Plan Of Care Reviewed With patient;sibling   Patient Care Overview   Progress improving   Outcome Evaluation   Outcome Summary/Follow up Plan Pt willing to work with therapy today. Pt participated in UB ex using 2# weighted bar and was able to walk 80' with min assist for balance. Pt was left sitting up in her chair eating her lunch. Pts sister was present.       Problem: Inpatient Occupational Therapy  Goal: LB Dressing Goal LTG- OT  Outcome: Ongoing (interventions implemented as appropriate)   01/25/18 1323 02/05/18 1312   LB Dressing OT LTG   LB Dressing Goal OT LTG, Date Established 01/25/18 --    LB Dressing Goal OT LTG, Time to Achieve by discharge --    LB Dressing Goal OT LTG, [place] Level set up required --    LB Dressing Goal OT LTG, Date Goal Reviewed --  02/05/18   LB Dressing Goal OT LTG, Outcome --  goal ongoing     Goal: Functional Mobility Goal LTG- OT  Outcome: Ongoing (interventions implemented as appropriate)   01/25/18 1323 02/05/18 1312   Functional Mobility OT LTG   Functional Mobility Goal OT LTG, Date Established 01/25/18 --    Functional Mobility Goal OT LTG, Time to Achieve by discharge --    Functional Mobility Goal OT LTG, [place] Level contact guard --    Functional Mobility Goal OT LTG, Assist Device rolling walker --    Functional Mobility Goal OT LTG, Distance to Achieve in hallway --    Functional Mobility Goal OT LTG, Additional Goal Pt will walk 150' with cga using RW --    Functional Mobility Goal OT LTG, Date Goal Reviewed --  02/05/18   Functional Mobility Goal OT LTG, Outcome --  goal ongoing

## 2018-02-05 NOTE — THERAPY TREATMENT NOTE
Acute Care - Physical Therapy Treatment Note   Friend     Patient Name: Sadie Tijerina  : 1940  MRN: [id number]  Today's Date: 2018  Onset of Illness/Injury or Date of Surgery Date: 18  Date of Referral to PT: 18  Referring Physician: RUBY Gtz    Admit Date: 2018    Visit Dx:    ICD-10-CM ICD-9-CM   1. Chronic obstructive pulmonary disease with acute exacerbation J44.1 491.21   2. Impaired mobility and ADLs Z74.09 799.89   3. Impaired functional mobility, balance, gait, and endurance Z74.09 V49.89     Patient Active Problem List   Diagnosis   • Benign paroxysmal positional vertigo   • Calf cramp   • Mixed anxiety depressive disorder   • Dizziness   • Fatigue   • Gastroesophageal reflux disease without esophagitis   • Hematuria   • Hyperlipidemia   • Essential hypertension   • Low back pain   • Orthostatic hypotension   • Restless legs syndrome   • Essential tremor   • Vitamin D deficiency   • Balance problem   • Tension headache   • COPD exacerbation   • Leukocytosis   • Tobacco abuse disorder   • Wound infection   • Cellulitis of foot   • Pneumonia due to infectious organism   • Leg swelling   • Wound abscess   • Chronic obstructive pulmonary disease with acute exacerbation   • Acute on chronic respiratory failure with hypoxia   • Venous insufficiency of both lower extremities   • Chronic diastolic heart failure               Adult Rehabilitation Note       18 1121 18 1118       Rehab Assessment/Intervention    Discipline occupational therapist  - physical therapy assistant  -RM     Document Type therapy note (daily note)  - therapy note (daily note)  -RM     Subjective Information agree to therapy   c/o stomach cramping  - agree to therapy;complains of;fatigue  -RM     Patient Effort, Rehab Treatment adequate  - adequate  -RM     Symptoms Noted During/After Treatment fatigue  -      Specific Treatment Considerations  dizziness and 2 lpm  O2   -RM     Recorded by [] Radha Escalona [] Anibal Michelle PTA     Vital Signs    Pre SpO2 (%)  90  -RM     O2 Delivery Pre Treatment  room air  -RM     Post SpO2 (%)  95  -RM     O2 Delivery Post Treatment  supplemental O2  -RM     Pre Patient Position  Sitting  -RM     Post Patient Position  Sitting  -RM     Recorded by  [] Anibal Michelle PTA     Pain Assessment    Pain Assessment No/denies pain   c/o stomach cramping, but doesn't rate it numerically  - --   reports abdominal cramping but no pain  -     Recorded by [] Radha Escalona [] Anibal Michelle PTA     Transfer Assessment/Treatment    Transfers, Sit-Stand Alfalfa contact guard assist  - contact guard assist  -RM     Transfers, Stand-Sit Alfalfa contact guard assist  - contact guard assist  -RM     Transfers, Sit-Stand-Sit, Assist Device rolling walker  - rolling walker  -RM     Recorded by [] Radha Escalona [] Anibal Michelle PTA     Gait Assessment/Treatment    Gait, Alfalfa Level  minimum assist (75% patient effort)  -     Gait, Assistive Device  rolling walker  -     Gait, Distance (Feet)  80  -     Gait, Gait Pattern Analysis  swing-through gait  -     Gait, Gait Deviations  ataxia;decreased heel strike;alia decreased;narrow base;toe-to-floor clearance decreased;weight-shifting ability decreased   x 3 LOB that required PTA assistance.   -RM     Recorded by  [] Anibal Michelle PTA     Functional Mobility    Functional Mobility- Ind. Level minimum assist (75% patient effort)  -      Functional Mobility- Device rolling walker  -      Functional Mobility-Distance (Feet) 80  -      Functional Mobility- Comment Pt noted with 2 losses of balance while walking   -      Recorded by [] Radha Escalona      Therapy Exercises    Bilateral Lower Extremities  AROM:;10 reps;sitting;ankle pumps/circles;glut sets;hip flexion;LAQ  -RM     Bilateral Upper Extremity AROM:;10 reps;elbow  flexion/extension;shoulder extension/flexion   chest press  -      BUE Resistance other (comment)   2# weighted bar  -      Recorded by [] Radha Escalona [] Anibal Michelle PTA     Positioning and Restraints    Pre-Treatment Position sitting in chair/recliner  - sitting in chair/recliner  -RM     Post Treatment Position chair  - chair  -RM     In Chair sitting;call light within reach;encouraged to call for assist;with family/caregiver  - sitting;call light within reach;encouraged to call for assist;with family/caregiver;notified nsg  -RM     Recorded by [] Radha Escalona [] Anibal Michelle PTA       User Key  (r) = Recorded By, (t) = Taken By, (c) = Cosigned By    Initials Name Effective Dates     Radha Escalona 10/26/16 -     RM Anibal Michelle PTA 10/26/16 -                 IP PT Goals       02/05/18 1329 01/31/18 1605 01/30/18 1857    Transfer Training PT LTG    Transfer Training PT LTG, Outcome goal ongoing  -RM goal ongoing  -CC goal ongoing  -JR    Gait Training PT LTG    Gait Training Goal PT LTG, Outcome goal ongoing  -RM goal partially met  -CC goal ongoing  -JR    Strength Goal PT LTG    Strength Goal PT LTG, Outcome goal ongoing  -RM goal ongoing  -CC goal ongoing  -JR      01/26/18 1320 01/25/18 1319       Transfer Training PT LTG    Transfer Training PT LTG, Date Established  01/25/18  -LM     Transfer Training PT LTG, Time to Achieve  2 wks  -LM     Transfer Training PT LTG, Activity Type  sit to stand/stand to sit;bed to chair /chair to bed  -LM     Transfer Training PT LTG, [place] Level  supervision required  -LM     Transfer Training PT LTG, Assist Device  walker, rolling  -LM     Transfer Training PT LTG, Outcome goal ongoing  -LM goal ongoing  -LM     Gait Training PT LTG    Gait Training Goal PT LTG, Date Established  01/25/18  -LM     Gait Training Goal PT LTG, Time to Achieve  2 wks  -LM     Gait Training Goal PT LTG, [place] Level  contact guard assist  -LM      Gait Training Goal PT LTG, Assist Device  walker, rolling  -LM     Gait Training Goal PT LTG, Distance to Achieve  300  -LM     Gait Training Goal PT LTG, Outcome goal ongoing  -LM goal ongoing  -LM     Strength Goal PT LTG    Strength Goal PT LTG, Date Established  01/25/18  -LM     Strength Goal PT LTG, Time to Achieve  2 wks  -LM     Strength Goal PT LTG, Measure to Achieve  Patient will demonstrate independence with HEP.  -LM     Strength Goal PT LTG, Outcome goal ongoing  -LM goal ongoing  -LM       User Key  (r) = Recorded By, (t) = Taken By, (c) = Cosigned By    Initials Name Provider Type    JR Estrella Ferraro, PT Physical Therapist    LM Mirna Doran, PT Physical Therapist    CC Nika Chun, PTA Physical Therapy Assistant    RM Anibal Michelle, PTA Physical Therapy Assistant          Physical Therapy Education     Title: PT OT SLP Therapies (Active)     Topic: Physical Therapy (Done)     Point: Mobility training (Done)    Learning Progress Summary    Learner Readiness Method Response Comment Documented by Status   Patient Acceptance E,D VU,NR ther ex technique and pursed lip breathing with activity RM 02/05/18 1319 Done    Acceptance E VU,NR Importance of increasing mobility daily CC 01/31/18 1734 Done    Acceptance E VU Instructed on importance of rehab prior to going home JR 01/30/18 1849 Done    Acceptance E VU Ther ex for HEP; proper use of RW for safe transfers/ambulation.  01/26/18 1320 Done    Acceptance E VU Purpose of PT/POC.  01/25/18 1319 Done   Family Acceptance E VU Ther ex for HEP; proper use of RW for safe transfers/ambulation.  01/26/18 1320 Done               Point: Home exercise program (Done)    Learning Progress Summary    Learner Readiness Method Response Comment Documented by Status   Patient Acceptance E,D VU,NR ther ex technique and pursed lip breathing with activity RM 02/05/18 1319 Done    Acceptance E VU Ther ex for HEP; proper use of RW for safe transfers/ambulation.   01/26/18 1320 Done    Acceptance E VU Purpose of PT/POC.  01/25/18 1319 Done   Family Acceptance E VU Ther ex for HEP; proper use of RW for safe transfers/ambulation.  01/26/18 1320 Done               Point: Precautions (Done)    Learning Progress Summary    Learner Readiness Method Response Comment Documented by Status   Patient Acceptance E VU Ther ex for HEP; proper use of RW for safe transfers/ambulation.  01/26/18 1320 Done    Acceptance E VU Purpose of PT/POC.  01/25/18 1319 Done   Family Acceptance E VU Ther ex for HEP; proper use of RW for safe transfers/ambulation.  01/26/18 1320 Done                      User Key     Initials Effective Dates Name Provider Type Discipline     10/26/16 -  Estrella Ferraro, PT Physical Therapist PT    LM 10/26/16 -  Mirna Doran, PT Physical Therapist PT    CC 10/26/16 -  Nika Chun, PTA Physical Therapy Assistant PT     10/26/16 -  Anibal Michelle, PTA Physical Therapy Assistant PT                    PT Recommendation and Plan  Anticipated Discharge Disposition: home with home health  Planned Therapy Interventions: balance training, gait training, home exercise program, patient/family education, strengthening, transfer training  PT Frequency: daily  Plan of Care Review  Plan Of Care Reviewed With: patient  Progress: improving  Outcome Summary/Follow up Plan: Pt tolerated increased activity this treatment evident by increased ambulation distance during gait trainning as well as seated ther ex.  PT did have x3 episodes of LOB  that required therapists assistance to regain balance.  See flowsheet for details.            Outcome Measures       02/05/18 1121          How much help from another is currently needed...    Putting on and taking off regular lower body clothing? 3  -AH      Bathing (including washing, rinsing, and drying) 3  -AH      Toileting (which includes using toilet bed pan or urinal) 3  -AH      Putting on and taking off regular upper body  clothing 4  -      Taking care of personal grooming (such as brushing teeth) 4  -      Eating meals 4  -      Score 21  -      Functional Assessment    Outcome Measure Options AM-PAC 6 Clicks Daily Activity (OT)  -        User Key  (r) = Recorded By, (t) = Taken By, (c) = Cosigned By    Initials Name Provider Type     Radha Escalona Occupational Therapist           Time Calculation:         PT Charges       02/05/18 1331          Time Calculation    Start Time 1118  -RM      PT Received On 02/05/18  -RM      PT Goal Re-Cert Due Date 02/06/18  -RM      Time Calculation- PT    Total Timed Code Minutes- PT 23 minute(s)  -RM        User Key  (r) = Recorded By, (t) = Taken By, (c) = Cosigned By    Initials Name Provider Type     Anibal Michelle PTA Physical Therapy Assistant          Therapy Charges for Today     Code Description Service Date Service Provider Modifiers Qty    11421096214 HC GAIT TRAINING EA 15 MIN 2/5/2018 Anibal Michelle PTA GP 1    26936635273 HC PT THER PROC EA 15 MIN 2/5/2018 Anibal Michelle PTA GP 1          PT G-Codes  PT Professional Judgement Used?: Yes  Outcome Measure Options: AM-PAC 6 Clicks Daily Activity (OT)  Score: 20  Functional Limitation: Mobility: Walking and moving around  Mobility: Walking and Moving Around Current Status (): At least 20 percent but less than 40 percent impaired, limited or restricted  Mobility: Walking and Moving Around Goal Status (): At least 1 percent but less than 20 percent impaired, limited or restricted    Anibal Michelle PTA  2/5/2018

## 2018-02-05 NOTE — PROGRESS NOTES
"Adult Nutrition  Assessment    Patient Name:  Sadie Tijerina  YOB: 1940  MRN: [id number]  Admit Date:  1/22/2018    Assessment Date:  2/5/2018    Comments:  Rec. #1: Continue with diet as ordered. Pt. Consuming ~54% of meals on average per NSG x 28 meals. RD added Ensure BID. Rec. #2: Consider adding MVI with minerals daily. Rec. #3: Continue to monitor/replace electrolytes. Consult RD PRN.           Reason for Assessment       02/05/18 1520    Reason for Assessment    Reason For Assessment/Visit admission assessment    Identified At Risk By Screening Criteria diagnosis    Cardiac DHF;HTN;Dyslipidemia    Gastrointestinal GERD/Reflux    Pulmonary/Critical Care A/C respiratory failure;Other (comment);COPD   hypoxia    Skin Cellulitis    Substance Use Tobacco    Factors Affecting Nutrition Factors    Appetite Good                  Anthropometrics       02/05/18 1522    Anthropometrics    Height Method Stated    Height 157.5 cm (62\")    Weight Method Bed scale    Weight 57.3 kg (126 lb 5.2 oz)    Ideal Body Weight (IBW)    Ideal Body Weight (IBW), Female 50.83    % Ideal Body Weight 112.96    Body Mass Index (BMI)    BMI (kg/m2) 23.15    BMI Grade 19.1 - 24.9 - normal            Labs/Tests/Procedures/Meds       02/05/18 1522    Labs/Tests/Procedures/Meds    Labs/Tests Review Reviewed;BUN;Na+;Hgb Hct   High: BUN    Medication Review Reviewed, pertinent;Antacid;Antibiotic            Physical Findings       02/05/18 1523    Physical Findings/Assessment    Additional Documentation Physical Appearance (Group)    Physical Appearance    Skin cellulitis            Estimated/Assessed Needs       02/05/18 1523    Calculation Measurements    Weight Used For Calculations 57.3 kg (126 lb 5.2 oz)    Height Used for Calculations 1.575 m (5' 2\")    Estimated/Assessed Energy Needs    Energy Need Method [place]    Age 77    RMR ([place] Equation) 1001.25    Activity Factors ([place])  Out of " bed, ambulatory  1.3    Estimated Kcal Range  ~4480-5893    Estimated/Assessed Protein Needs    Weight Used for Protein Calculation 57.3 kg (126 lb 5.2 oz)    Protein (gm/kg) 1.5    1.5 Gm Protein (gm) 85.95    Estimated Protein Range ~68.76-85.95    Estimated/Assessed Fluid Needs    Fluid Need Method RDA method    RDA Method (mL)  --   ~4448-6842            Nutrition Prescription Ordered       02/05/18 1524    Nutrition Prescription PO    Current PO Diet Regular            Evaluation of Received Nutrient/Fluid Intake       02/05/18 1524    PO Evaluation    Number of Days PO Intake Evaluated Other (comment)   12    Number of Meals 28    % PO Intake 54.46            Electronically signed by:  Scarlet Gaston RD  02/05/18 3:28 PM

## 2018-02-05 NOTE — PROGRESS NOTES
Continued Stay Note  TO[facility]A Friend     Patient Name: Sadie Tijerina  MRN: [id number]  Today's Date: 2/5/2018    Admit Date: 1/22/2018          Discharge Plan       02/05/18 1006    Case Management/Social Work Plan    Additional Comments [facility] can accept pt 02/06 updated pt and both daughters They will transport  If pt is able to sit in a chair and does not require oxygen She does not have oxygen at home       02/05/18 0910    Case Management/Social Work Plan    Additional Comments Faxed updated notes to [facility] awaiting the bed avalilabilty               Discharge Codes     None        Expected Discharge Date and Time     Expected Discharge Date Expected Discharge Time    Feb 6, 2018             Sara Brooke

## 2018-02-05 NOTE — DISCHARGE PLACEMENT REQUEST
"  Sara Brooke =[phone]  Fax [phone]        Sadie Tijerina (77 y.o. Female)     Date of Birth Social Security Number Address Home Phone MRN    [id number]  [id number] [place] [address] [phone] [id number]    Latter day Marital Status          [employer]        Admission Date Admission Type Admitting Provider Attending Provider Department, Room/Bed    1/22/18 Emergency Luis Eduardo Perry DO Pais, Roshan, MD Psychiatric MED SURG  3, 324/1    Discharge Date Discharge Disposition Discharge Destination                      Attending Provider: Nico Enriquez MD     Allergies:  Influenza Vaccines, Morphine And Related    Isolation:  Spore, Contact   Infection:  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)   Code Status:  FULL    Ht:  157.5 cm (62\")   Wt:  57.3 kg (126 lb 6.4 oz)    Admission Cmt:  None   Principal Problem:  Acute on chronic respiratory failure with hypoxia [J96.21]                 Active Insurance as of 1/22/2018     Primary Coverage     Payor Plan Insurance Group Employer/Plan Group    MEDICARE MEDICARE A ONLY      Payor Plan Address Payor Plan Phone Number Effective From Effective To    [address] [phone] 6/1/2003     [place] [address]       Subscriber Name Subscriber Birth Date Member ID       SADIE TIJERINA 1940 [id number]           Secondary Coverage     Payor Plan Insurance Group Employer/Plan Group    HUMANA HUMANA [id number]     Payor Plan Address Payor Plan Phone Number Effective From Effective To    [address] [address] [phone] 1/1/2013     [place] [address]       Subscriber Name Subscriber Birth Date Member ID       SADIE TIJERINA 1940 [id number]                 Emergency Contacts      (Rel.) Home Phone Work Phone Mobile Phone    Ava Polk (Daughter) [phone] -- --    Neris Giles (Daughter) -- -- [phone]               History & Physical      Luis Eduardo Perry DO at 1/23/2018  1:21 AM              [facility] " [facility]  HISTORY AND PHYSICAL    Primary Care Physician: Kristian Wolff MD    Subjective     Chief Complaint:    Chief Complaint   Patient presents with   • Shortness of Breath       History of Present Illness:     I have reviewed labs/imaging/records from this hospitalization, including ER staff and admitting/attending physicians H/P's and progress notes to establish a comprehensive understanding of this patient's clinical hospital course, as well as to establish a transition of care appropriately.    Patient is a 77-year-old female who presents to the ER today with complaints of progressive worsening of shortness of breath over the ensuing 2-3 days at home.  She was seen by primary care provider last Friday with complaints of lower extremity edema that developed after being discharged home from a recent hospitalization secondary to cellulitis of her right foot and left lower lobe pneumonia.  She is nearly finished her entire course of antibiotics.  She has approximately 3 doses left of her doxycycline.  Her primary care provider ordered a venous Doppler ultrasound of the lower extremities for further clarification of abnormality.  It was felt that it was likely secondary to her prednisone use, as he her primary care provider prescribed Lasix and potassium supplement to be taken daily.  Patient does have a history of chronic obstructive lung disease, with respiratory failure requiring oxygen supplementation on previous hospitalization.  Patient is to her oxygen supplementation upon discharge, however she is agreeable to this if needed at this time.    In the emergency room her oxygen saturation was noted to be 80% on room air.  She was shortly thereafter placed on nasal cannula support with significant improvement and normalization of her oxygen saturation at 2 L nasal cannula.  She has had no known sick contacts other than recent hospitalization.  Chest x-ray revealed no acute  abnormalities and resolution of left lower lobe pneumonia, but findings consistent with COPD.  CT of the chest to rule out pulmonary embolism did just that, and did demonstrate some right upper lobe bronchitis/bronchiolitis.  Labs demonstrated mild elevation to her BUN at 38 and creatinine was 0.9.  Her flu screen was negative.  Mild elevation to her white blood cell count of 15 K, however she was recently on steroid dosing.  Troponin was negative and BNP was normal.    Review of Systems   1. Constitutional: Negative for fever. Negative for chills, diaphoresis.  Generalized malaise/fatigue.   2. HENT: No dysphagia; no changes to vision/hearing/smell/taste; no epistaxis  3. Eyes: Negative for redness and visual disturbance.   4. Respiratory: Cough with shortness of breath, no hemoptysis or orthopnea.   5. Cardiovascular: Negative for chest pain and palpitations.   6. Gastrointestinal: Negative for abdominal distention, abdominal pain and blood in stool.   7. Endocrine: Negative for cold intolerance and heat intolerance.   8. Genitourinary: Negative for difficulty urinating, dysuria and frequency.   9. Musculoskeletal: Negative for arthralgias, back pain and myalgias.   10. Skin: Improving wound to the right lower extremity.  Edema noted to bilateral lower extremities extending from the knees to the feet.   11. Neurological: Negative for syncope and headaches.  Generalized weakness.  12. Hematological: Negative for adenopathy. Does not bruise/bleed easily.   13. Psychiatric/Behavioral: Negative for confusion. The patient is not nervous/anxious.     Past Medical History:   Past Medical History:   Diagnosis Date   • Arthritis    • Depression    • History of emphysema    • History of esophageal reflux    • History of recurrent UTI (urinary tract infection)    • History of renal calculi    • History of uterine cancer    • Hyperlipidemia        Past Surgical History:  Past Surgical History:   Procedure Laterality Date   •  FOOT SURGERY     • HAND SURGERY     • HYSTERECTOMY         Family History: family history includes Arthritis in her father; Cancer in her mother; Diabetes in her daughter; Heart attack in her father; Hyperlipidemia in her daughter; Migraines in her daughter.    Social History:  reports that she has been smoking Cigarettes.  She has been smoking about 0.25 packs per day. She has never used smokeless tobacco. She reports that she does not drink alcohol or use illicit drugs.    Medications:  Prescriptions Prior to Admission   Medication Sig Dispense Refill Last Dose   • albuterol (PROVENTIL HFA;VENTOLIN HFA) 108 (90 BASE) MCG/ACT inhaler Inhale 1 puff Every 4 (Four) Hours As Needed for shortness of air. 18 g 3 Taking   • albuterol (PROVENTIL) (2.5 MG/3ML) 0.083% nebulizer solution USE 1 AMPULE IN NEBULIZER FOUR TIMES A DAY AS NEEDED 75 mL 3 Taking   • aspirin 81 MG EC tablet Take 81 mg by mouth Daily.   Taking   • benzonatate (TESSALON) 200 MG capsule 1 tid po prn 45 capsule 0 Taking   • cholecalciferol (VITAMIN D3) 1000 UNITS tablet Take 1,000 Units by mouth Daily.   Taking   • clonazePAM (KlonoPIN) 0.5 MG tablet Take 1 tablet by mouth At Night As Needed for Seizures. 30 tablet 3 Taking   • collagenase 250 UNIT/GM ointment Apply topically to affected area with dressing change daily 30 g 0 Taking   • dextromethorphan polistirex ER (DELSYM) 30 MG/5ML Suspension Extended Release oral suspension Take 30 mg by mouth At Night As Needed (cough). 148 mL 0 Taking   • doxycycline (VIBRAMYCIN) 100 MG capsule Take 1 capsule by mouth 2 (Two) Times a Day. 20 capsule 0 Taking   • furosemide (LASIX) 20 MG tablet Take 1 tablet by mouth Daily. 30 tablet 0    • losartan (COZAAR) 25 MG tablet Take 1 tablet by mouth Daily. (Patient taking differently: Take 25 mg by mouth Daily. Only takes if SBP >150) 30 tablet 2 Taking   • meclizine (ANTIVERT) 12.5 MG tablet TAKE 1 TABLET BY MOUTH THREE TIMES A DAY AS NEEDED for dizziness  90 tablet 0  "Taking   • potassium chloride (K-DUR) 10 MEQ CR tablet Take 1 tablet by mouth Daily. 30 tablet 0    • pravastatin (PRAVACHOL) 20 MG tablet TAKE 1 TABLET BY MOUTH IN THE EVENING  30 tablet 5 Taking   • PREMARIN 0.625 MG tablet TAKE 1 TABLET BY MOUTH ONE TIME A DAY  90 tablet 1 Taking   • promethazine-codeine (PHENERGAN with CODEINE) 6.25-10 MG/5ML syrup Take 5 mL by mouth Every 4 (Four) Hours As Needed for Cough. 240 mL 0    • propranolol (INDERAL) 60 MG tablet Take 1 tablet by mouth Daily. 90 tablet 3 Taking   • sertraline (ZOLOFT) 50 MG tablet TAKE 1 TABLET BY MOUTH TWO TIMES A DAY  60 tablet 5 Taking   • SYMBICORT 160-4.5 MCG/ACT inhaler INHALE 2 PUFFS BY MOUTH TWO TIMES A DAY. Rinse mouth after each use.  10.2 g 1 Taking   • tiotropium (SPIRIVA HANDIHALER) 18 MCG per inhalation capsule Place 1 capsule into inhaler and inhale Daily. 30 capsule 5 Taking   • traMADol (ULTRAM) 50 MG tablet Take 1 tablet by mouth Daily As Needed (pain). 30 tablet 2 Taking       Allergies:  Allergies   Allergen Reactions   • Influenza Vaccines    • Morphine And Related          Objective     Physical Exam:  Vital Signs: /61 (BP Location: Right arm, Patient Position: Lying)  Pulse 81  Temp 97.2 °F (36.2 °C) (Oral)   Resp 18  Ht 157.5 cm (62\")  Wt 55.5 kg (122 lb 4.8 oz)  SpO2 100%  BMI 22.37 kg/m2     General Appearance: alert, oriented x 3, no acute distress.  Skin: warm and dry.   HEENT: Atraumatic.  pupils round and reactive to light and accommodation, oral mucosa pink and moist.  Nares patent without epistaxis.  External auditory canals are patent tympanic membranes intact.  Neck: supple, no JVD, trachea midline.  No thyromegaly  Lungs: Nasal cannula in place, no increased work of breathing.  Audible air exchange noted all lung fields, however inspiratory and expiratory wheezes throughout with prolonged inspiratory phase bilaterally.  Referred rhonchus upper airway congestion is mostly cleared with light cough.  Heart: " RRR, normal S1 and S2, no S3, no rub.  Abdomen: soft, non-tender, no palpable bladder, present bowel sounds to auscultation ×4.  No guarding or rigidity.  Extremities: no clubbing, cyanosis.  Good range of motion actively and passively.  Symmetric muscle strength and development.  1+ pitting edema noted to the distal third of the tibias bilaterally, extending to trace nonpitting edema to the knees bilaterally.  Neuro: normal speech and mental status.  Cranial nerves II through XII intact.  No anosmia. DTR 2+; proprioception intact.  No focal motor/sensory deficits.          Results Reviewed:    Lab Results (last 72 hours)     Procedure Component Value Units Date/Time    CBC & Differential [[id number]] Collected:  01/22/18 1930    Specimen:  Blood Updated:  01/22/18 1940    Narrative:       The following orders were created for panel order CBC & Differential.  Procedure                               Abnormality         Status                     ---------                               -----------         ------                     CBC Auto Differential[[id number]]        Abnormal            Final result                 Please view results for these tests on the individual orders.    CBC Auto Differential [[id number]]  (Abnormal) Collected:  01/22/18 1930    Specimen:  Blood Updated:  01/22/18 1940     WBC 15.75 (H) 10*3/mm3      RBC 4.29 10*6/mm3      Hemoglobin 12.3 g/dL      Hematocrit 39.7 %      MCV 92.5 fL      MCH 28.7 pg      MCHC 31.0 g/dL      RDW 13.7 %      RDW-SD 46.5 fl      MPV 10.4 fL      Platelets 182 10*3/mm3      Neutrophil % 76.7 %      Lymphocyte % 13.8 %      Monocyte % 7.4 %      Eosinophil % 1.2 %      Basophil % 0.3 %      Immature Grans % 0.6 %      Neutrophils, Absolute 12.07 (H) 10*3/mm3      Lymphocytes, Absolute 2.18 10*3/mm3      Monocytes, Absolute 1.17 (H) 10*3/mm3      Eosinophils, Absolute 0.19 10*3/mm3      Basophils, Absolute 0.04 10*3/mm3      Immature Grans, Absolute 0.10 (H)  10*3/mm3      nRBC 0.0 /100 WBC     BNP [[id number]]  (Normal) Collected:  01/22/18 1930    Specimen:  Blood Updated:  01/22/18 2003     proBNP 181.0 pg/mL     Troponin [[id number]]  (Normal) Collected:  01/22/18 1930    Specimen:  Blood Updated:  01/22/18 2003     Troponin I <0.012 ng/mL     Narrative:       Normal Patient Upper Reference Limit (URL) (99th Percentile)=0.03 ng/mL   Non-AMI Illness Reference Limit=0.03-0.11 ng/mL   AMI Confirmation=0.12 ng/mL and above    Comprehensive Metabolic Panel [[id number]]  (Abnormal) Collected:  01/22/18 1930    Specimen:  Blood Updated:  01/22/18 2004     Glucose 105 (H) mg/dL      BUN 38 (H) mg/dL      Creatinine 0.90 mg/dL      Sodium 138 mmol/L      Potassium 4.4 mmol/L      Chloride 102 mmol/L      CO2 30.0 mmol/L      Calcium 9.0 mg/dL      Total Protein 6.2 (L) g/dL      Albumin 3.50 g/dL      ALT (SGPT) 38 U/L      AST (SGOT) 24 U/L      Alkaline Phosphatase 78 U/L      Total Bilirubin 0.4 mg/dL      eGFR Non African Amer 60 (L) mL/min/1.73      Globulin 2.7 gm/dL      A/G Ratio 1.3 g/dL      BUN/Creatinine Ratio 42.2 (H)     Anion Gap 10.4 mmol/L     Narrative:       The MDRD GFR formula is only valid for adults with stable renal function between ages 18 and 70.    Light Blue Top [[id number]] Collected:  01/22/18 1930    Specimen:  Blood Updated:  01/22/18 2031     Extra Tube hold for add-on      Auto resulted       Gold Top - SST [[id number]] Collected:  01/22/18 1930    Specimen:  Blood Updated:  01/22/18 2031     Extra Tube Hold for add-ons.      Auto resulted.       Green Top (No Gel) [[id number]] Collected:  01/22/18 1930    Specimen:  Blood Updated:  01/22/18 2031     Extra Tube Hold for add-ons.      Auto resulted.       [place] Draw [[id number]] Collected:  01/22/18 1930    Specimen:  Blood Updated:  01/22/18 2031    Narrative:       The following orders were created for panel order [place] Draw.  Procedure                               Abnormality         Status                      ---------                               -----------         ------                     Light Blue Top[[id number]]                                   Final result               Lavender Top[[id number]]                                     Final result               Gold Top - SST[[id number]]                                   Final result               Green Top (No Gel)[[id number]]                               Final result                 Please view results for these tests on the individual orders.    Lavender Top [[id number]] Collected:  01/22/18 1930    Specimen:  Blood Updated:  01/22/18 2031     Extra Tube hold for add-on      Auto resulted       Influenza Antigen, Rapid - Swab, Nasopharynx [[id number]]  (Normal) Collected:  01/22/18 2012    Specimen:  Swab from Nasopharynx Updated:  01/22/18 2034     Influenza A Ag, EIA Negative     Influenza B Ag, EIA Negative          Imaging Results (last 72 hours)     Procedure Component Value Units Date/Time    XR Chest 1 View [[id number]] Collected:  01/22/18 2031     Updated:  01/22/18 2032    Narrative:       FINAL REPORT    TECHNIQUE:  An AP portable view of the chest was obtained.    CLINICAL HISTORY:  SOA    FINDINGS:  There is evidence of calcified granulomatous disease. The lungs  are hyperlucent and hyperexpanded consistent with COPD.  The  lungs are otherwise clear. The heart and vasculature are  unremarkable. There is no pleural disease, adenopathy, or  significant osseous abnormality.      Impression:       COPD. No acute disease.    Authenticated by Klever Og M.D. on 01/22/2018 08:31:26 PM    CT Chest Pulmonary Embolism With Contrast [[id number]] Collected:  01/22/18 2256     Updated:  01/22/18 2257    Narrative:       FINAL REPORT    TECHNIQUE:  Thin section axial images were obtained through the chest and  during the arterial phase of IV contrast administration. Coronal  3-D MIP reconstructed images were also provided.    CLINICAL  HISTORY:  Shortness of breath, pe protocol    FINDINGS:  The pulmonary arteries are well-opacified and there is no  filling defect to indicate pulmonary embolism. The thoracic  aorta shows no significant abnormality. The lungs demonstrate  multifocal patchy groundglass opacities and that may represent  pulmonary edema and tree in bud micronodular opacities in the  right upper lobe consistent with bronchitis/bronchiolitis. There  is no pleural disease. There is no adenopathy. There is no  significant osseous abnormality.      Impression:       No pulmonary embolism. Right upper lobe bronchitis/bronchiolitis.    Authenticated by Klever Og M.D. on 01/22/2018 10:56:29 PM          ECG/EMG Results (most recent)     None          I have personally reviewed and interpreted available lab data, radiology studies and ECG obtained at time of admission.     Assessment / Plan     Assessment/Problem List:   Principal Problem:    Acute on chronic respiratory failure with hypoxia  Active Problems:    Chronic obstructive pulmonary disease with acute exacerbation    Venous insufficiency of both lower extremities    Chronic diastolic heart failure    Gastroesophageal reflux disease without esophagitis    Hyperlipidemia    Essential hypertension    Essential tremor          Plan:  I have discussed the numerous health consequences of continued tobacco use with patient.  She verbalized understanding of the need for cessation and is agreement to begin nicotine transdermal replacement and continued gradual cessation upon discharge.  Patient is admitted to the med/surgery floor with pulse oximetry.  She will have nasal cannula oxygen supplementation continued as needed.  Planned walking oximetry tomorrow to fully ascertain her oxygen demand.  She will be continued on her doxycycline as per previous hospitalization culture results until completion.  I've scheduled patient for IV Solu-Medrol every 8 hours this time.  Upon discharge patient  would likely do better with a very low-dose dexamethasone taper, 0.5 mg tablets #42, take 6 tablets daily for 6 days, then 5 tablets daily for 2 days, then 4 tablets daily for 2 days, then 3 tablets daily for 2 days, then 2 tablets daily for 2 days, then 1 tablet daily for 2 days, then stop.  I will schedule her nebulizer breathing treatments every 4 hours.  Continue current wound care to right lower extremity.  I've discontinued her planned Lasix and potassium supplementation.  I will instead encouraged her to continue losartan 25 mg daily, not on an as-needed basis.  I also encouraged beginning spironolactone 25 mg once daily, with dose adjustment as needed and follow up outpatient.  See no need to extend her antibiotic coverage at this time.  I discussed plan of care in detail with patient, she verbalized understanding and agrees.  I've answered all of her questions today.    I spent a total of 70 minutes in direct patient care time today.          Luis Eduardo Perry DO 01/23/18 1:21 AM    Please note that portions of this note may have been completed with a voice recognition program. Efforts were made to edit the dictations, but occasionally words are mistranscribed.         Electronically signed by Luis Eduardo Perry DO at 1/23/2018  1:41 AM        Hospital Medications (active)       Dose Frequency Start End    acetaminophen (TYLENOL) tablet 650 mg 650 mg Every 4 Hours PRN 1/31/2018     Sig - Route: Take 2 tablets by mouth Every 4 (Four) Hours As Needed for Mild Pain . - Oral    acetylcysteine (MUCOMYST) 20 % solution 1 mL 1 mL 3 Times Daily - RT 1/29/2018     Sig - Route: Take 1 mL by nebulization 3 (Three) Times a Day. - Nebulization    amLODIPine (NORVASC) tablet 5 mg 5 mg Every 24 Hours Scheduled 1/31/2018     Sig - Route: Take 1 tablet by mouth Daily. - Oral    aspirin EC tablet 81 mg 81 mg Daily 1/23/2018     Sig - Route: Take 1 tablet by mouth Daily. - Oral    atorvastatin (LIPITOR) tablet 10 mg 10 mg Daily  1/23/2018     Sig - Route: Take 1 tablet by mouth Daily. - Oral    budesonide (PULMICORT) nebulizer solution 0.5 mg 0.5 mg 2 Times Daily - RT 1/26/2018     Sig - Route: Take 2 mL by nebulization 2 (Two) Times a Day. - Nebulization    budesonide-formoterol (SYMBICORT) 160-4.5 MCG/ACT inhaler 2 puff 2 puff 2 Times Daily - RT 1/23/2018     Sig - Route: Inhale 2 puffs 2 (Two) Times a Day. - Inhalation    clonazePAM (KlonoPIN) tablet 0.5 mg 0.5 mg Nightly PRN 1/31/2018     Sig - Route: Take 1 tablet by mouth At Night As Needed for Anxiety (Leg cramps). - Oral    collagenase ointment  Every 24 Hours Scheduled 1/23/2018     Sig - Route: Apply  topically Daily. - Topical    enoxaparin (LOVENOX) syringe 40 mg 40 mg Every 24 Hours 2/4/2018     Sig - Route: Inject 0.4 mL under the skin Daily. - Subcutaneous    estrogens (conjugated) (PREMARIN) tablet 0.625 mg 0.625 mg Daily 1/23/2018     Sig - Route: Take 1 tablet by mouth Daily. - Oral    famotidine (PEPCID) tablet 20 mg 20 mg Daily 1/28/2018     Sig - Route: Take 1 tablet by mouth Daily. - Oral    fluticasone (FLONASE) 50 MCG/ACT nasal spray 2 spray 2 spray Daily 1/31/2018     Sig - Route: 2 sprays by Each Nare route Daily. - Each Nare    guaiFENesin (MUCINEX) 12 hr tablet 600 mg 600 mg Every 12 Hours Scheduled 1/31/2018     Sig - Route: Take 1 tablet by mouth Every 12 (Twelve) Hours. - Oral    HYDROcod Polst-CPM Polst ER (TUSSIONEX PENNKINETIC) 10-8 MG/5ML ER suspension 5 mL 5 mL Every 12 Hours PRN 2/3/2018 2/13/2018    Sig - Route: Take 5 mL by mouth Every 12 (Twelve) Hours As Needed for Cough. - Oral    ipratropium-albuterol (DUO-NEB) nebulizer solution 3 mL 3 mL Every 6 Hours While Awake - RT 1/31/2018     Sig - Route: Take 3 mL by nebulization Every 6 (Six) Hours While Awake. - Nebulization    losartan (COZAAR) tablet 25 mg 25 mg Daily 1/23/2018     Sig - Route: Take 1 tablet by mouth Daily. - Oral    meclizine (ANTIVERT) tablet 12.5 mg 12.5 mg 3 Times Daily PRN  1/30/2018     Sig - Route: Take 1 tablet by mouth 3 (Three) Times a Day As Needed for dizziness. - Oral    meclizine (ANTIVERT) tablet 12.5 mg 12.5 mg Every 8 Hours Scheduled 1/31/2018     Sig - Route: Take 1 tablet by mouth Every 8 (Eight) Hours. - Oral    nicotine (NICODERM CQ) 14 MG/24HR patch 1 patch 1 patch Every 24 Hours 2/4/2018     Sig - Route: Place 1 patch on the skin Daily. - Transdermal    nystatin (MYCOSTATIN) 212795 UNIT/ML suspension 500,000 Units 5 mL 4 Times Daily 1/28/2018     Sig - Route: Take 5 mL by mouth 4 (Four) Times a Day. - Oral    ondansetron (ZOFRAN) injection 4 mg 4 mg Every 6 Hours PRN 1/23/2018     Sig - Route: Infuse 2 mL into a venous catheter Every 6 (Six) Hours As Needed for Nausea or Vomiting. - Intravenous    predniSONE (DELTASONE) tablet 20 mg 20 mg Daily With Breakfast 2/2/2018     Sig - Route: Take 1 tablet by mouth Daily With Breakfast. - Oral    promethazine (PHENERGAN) tablet 12.5 mg 12.5 mg Every 8 Hours PRN 1/30/2018     Sig - Route: Take 1 tablet by mouth Every 8 (Eight) Hours As Needed for Nausea or Vomiting. - Oral    propranolol (INDERAL) tablet 20 mg 20 mg Every 12 Hours Scheduled 1/23/2018     Sig - Route: Take 1 tablet by mouth Every 12 (Twelve) Hours. - Oral    saccharomyces boulardii (FLORASTOR) capsule 250 mg 250 mg 2 Times Daily 2/3/2018     Sig - Route: Take 1 capsule by mouth 2 (Two) Times a Day. - Oral    saline 0.65 % nasal solution (BABY AYR) 2 drop 2 drop As Needed 2/4/2018     Sig - Route: 2 sprays into each nostril As Needed (dryness). - Nasal    sertraline (ZOLOFT) tablet 50 mg 50 mg Daily 1/23/2018     Sig - Route: Take 1 tablet by mouth Daily. - Oral    sodium chloride 0.9 % flush 1-10 mL 1-10 mL As Needed 1/23/2018     Sig - Route: Infuse 1-10 mL into a venous catheter As Needed for Line Care. - Intravenous    sodium chloride 0.9 % flush 10 mL 10 mL As Needed 1/22/2018     Sig - Route: Infuse 10 mL into a venous catheter As Needed for Line Care. -  Intravenous    Cosign for Ordering: Accepted by Rohan Wilson MD on 1/24/2018 12:56 AM    spironolactone (ALDACTONE) tablet 25 mg 25 mg Daily 1/23/2018     Sig - Route: Take 1 tablet by mouth Daily. - Oral    traMADol (ULTRAM) tablet 50 mg 50 mg Every 6 Hours PRN 2/1/2018     Sig - Route: Take 1 tablet by mouth Every 6 (Six) Hours As Needed (pain). - Oral    vancomycin oral solution 125 mg 125 mg Every 6 Hours Scheduled 2/2/2018 2/16/2018    Sig - Route: Take 2.5 mL by mouth Every 6 (Six) Hours. - Oral    Vitamin D 1,000 Units 1,000 Units Daily 1/23/2018     Sig - Route: Take 1 tablet by mouth Daily. - Oral             Physician Progress Notes (last 72 hours) (Notes from 2/2/2018  9:05 AM through 2/5/2018  9:05 AM)      RUBY Gtz at 2/2/2018  1:05 PM  Version 3 of 3           PROGRESS NOTE        Date of Admission: 1/22/2018  Length of Stay: 8  Primary Care Physician: Kristian Wolff MD    Subjective   Chief Complaint: Cough  HPI:  I have seen and evaluated patient at bedside.  This is a 77-year-old female who is admitted with acute on chronic hypoxic respiratory failure as well as COPD and acute bronchitis.  She was recently discharged from this facility where at that time she was treated for pneumonia.  She was discharged home on doxycycline for which she had completed during this admission.  She had a CT scan in the emergency room which showed a bronchiolitis/bronchitis.   She had had some dizziness with nausea with ambulation for which she was started on meclizine yesterday.  According to the patient and daughter the dizziness with nausea when she gets up to ambulate has been a chronic problem and she was told that it was due to fluid in her middle ear.  I did evaluate and she does have fluid rings around each tympanic membrane does appear to be more chronic than acute.      She did have increasing white count to 28,000 yesterday and it has jumped to 45,000 today.  She was noted yesterday  to be complaining of some abdominal cramping.  According to the patient she did have an episode of diarrhea this morning.  She did undergo a CT scan of abdomen and pelvis which did not show any acute findings.  She had some mild sigmoid diverticulosis.,  CT of the chest  Not show any acute findings but she did have some bronchiectasis seen in the right middle lobe and lingula which could be sequelae of chronic aspiration for which speech therapy has been consulted, CT of the head did not show any acute findings.  Initially patient was started on antibiotics yesterday said she did have a productive cough with greenish sputum however since she is now having diarrhea the concern is for C. difficile colitis have stop the IV antibiotics since there is no evidence of infection and place the patient on oral vancomycin to empirically cover for C. difficile colitis especially given such an elevation of her white count.  In order peripheral blood smear which is currently pending.    Today patient is still complaining of some abdominal tenderness but not as painful.  Again her white count today has jumped to 45,000.  Lactic acid was normal.  Blood cultures have been ordered which the far have not shown any growth.  Influenza A and B were negative.  Urinalysis was done which was negative.  There is no evidence of infection that would explain this elevation and leukocytosis.  I have reviewed this case with attending physician Dr. Enriquez who is also seen and evaluated the patient yesterday and today.         Review Of Systems:   Review of Systems   General ROS: Patient denies any fevers, chills or loss of consciousness.    Psychological ROS: Denies any hallucinations and delusions.  Ophthalmic ROS: Denies any diplopia or transient loss of vision.  ENT ROS: Denies sore throat, nasal congestion or ear pain.  Dizziness with nausea which is a chronic condition due to middle ear  Allergy and Immunology ROS: Denies rash or  itching.  Hematological and Lymphatic ROS: Denies neck swelling or easy bleeding.  Endocrine ROS: Denies any recent unintentional weight gain or loss.  Breast ROS: Denies any pain or swelling.  Respiratory ROS: cough non productive and  shortness of breath. She has been able to cough up small amount of phlegm today.    Cardiovascular ROS: Denies chest pain or palpitations. No history of exertional chest pain.   Gastrointestinal ROS: nausea. Lower abdominal pain tenderness with 1 episode of diarrhea per patient report.  Genito-Urinary ROS: Denies dysuria or hematuria.  Musculoskeletal ROS: Denies back pain. No muscle pain. No calf pain.   Neurological ROS: Denies any focal weakness. No loss of consciousness. Denies any numbness. Denies neck pain.  Dermatological ROS: Denies any redness or pruritis.    Objective      Temp:  [97.3 °F (36.3 °C)-98.2 °F (36.8 °C)] 98 °F (36.7 °C)  Heart Rate:  [72-89] 72  Resp:  [16-18] 16  BP: (115-147)/(53-71) 115/53  Physical Exam    General Appearance:  Alert and cooperative, tearful with abdominal pain   Head:  Atraumatic and normocephalic, without obvious abnormality.   Eyes:          PERRLA, conjunctivae and sclerae normal, no Icterus. No pallor. Extra-occular movements are within normal limits.   Ears:  Ears appear intact with no abnormalities noted., chronic fluid in bilateral ears. TM dull with fluid ring.     Throat: No oral lesions, no thrush, oral mucosa moist.   Neck: Supple, trachea midline, no thyromegaly, no carotid bruit.   Back:   No kyphoscoliosis present. No tenderness to palpation,   range of motion normal.   Lungs:   Chest shape is normal. Breath sounds heard bilaterally equally.  few wheezing and rhonchi. No Pleural rub or bronchial breathing.   Heart:  Normal S1 and S2, no murmur, no gallop, no rub. No JVD   Abdomen:   Hypoactive bowel sounds, no masses, no organomegaly. Soft  Tender in lower quadrants. non-distended, no guarding, no rebound                 tenderness   Extremities: Moves all extremities well, no edema, no cyanosis, no clubbing.   Pulses: Pulses palpable and equal bilaterally   Skin: No bleeding, bruising or rash, wound to foot is much improved with no erythema or edema.  No drainage.     Lymph nodes: No palpable adenopathy   Neurologic:    Psychiatric/Behavior:     Cranial nerves 2 - 12 grossly intact, sensation intact, Motor power is normal and equal bilaterally.  Mood normal, behavior normal       Results Review:    I have reviewed the labs, radiology results and diagnostic studies.      Results from last 7 days  Lab Units 02/02/18  0513   WBC 10*3/mm3 45.08*   HEMOGLOBIN g/dL 17.1*   PLATELETS 10*3/mm3 162       Results from last 7 days  Lab Units 02/02/18  0513   SODIUM mmol/L 139   POTASSIUM mmol/L 4.7   CO2 mmol/L 26.0   CREATININE mg/dL 1.30   GLUCOSE mg/dL 170*       Culture Data:    Radiology Data:   Cardiology Data:    I have reviewed the medications.    Assessment/Plan     Assessment and Plan:  1.  Acute on chronic hypoxic respiratory failure-patient only walks oximetry prior to discharge.    2.  COPD with exacerbation- In the patient's white count is going up and she does have a productive cough now with greenish sputum have restarted the patient back on Zosyn, azithromycin and vancomycin.  I have ordered for CT of the chest.  She is getting breathing treatments and steroids.  I have weaning steroids. She is on Tussionex.  Flutter valve ordered.  Her cough is more loose.  I have added mucinex along with the mucomyst. Sputum and blood cultures has been ordered.    3.  Bronchiolitis - Dr. Mojica with pulmonology has been consulted for further evaluation.  He has seen and evaluated the patient.  She is getting breathing treatments, we are tapering steroids.  CT scan of the chest did show some bronchiectasis concerning for chronic aspiration.  Speech therapy was consulted and did a bedside evaluation.  There was no evidence of aspiration and  patient denies any history of disc nausea.    4.  Possible clinical Cdiff colitis-  Pt has been started on oral vancomycin given her significant leukocytosis in GI symptoms.  According to the patient she had diarrhea however the stool that the nursing staff seen was more thickened but patient states that she did have a loose bowel movement that had a foul odor.    5.  Chronic stable diastolic heart failure    6.  Chronic venous insufficiency of lower extremities    7.  Chronic essential hypertension blood pressure stable continue to monitor-  BP elevated.  I have ordered Norvasc.    8.  Essential tremor    9.  Generalized weakness- PT/OT consulted.  Recommend rehab placement and referral has been sent to [facility].    10.  Suspected vestibulopathy- Meclizine ordered.  Improving with flonase and meclizine.    11.  Leukocytosis-  exact etiology is unknown.  Concern is possible Cdiff colitis however she is not having diarrhea stool.  We will empirically treat.  She is having abdominal pain and cramps.  I have ordered a peripheral smear which is pending.  Lactic acid negative and CT does not show any evidence of ischemic bowel and given that her serum Co2 is normal I doubt that bowel ischemia is the problem.          DVT prophylaxis:  Lovenox    Discharge Plannin-3 days    RUBY Gtz 18 1:05 PM         Electronically signed by RUBY Gtz at 2018  1:47 PM      RUBY Gtz at 2018  1:05 PM  Version 2 of 3           PROGRESS NOTE        Date of Admission: 2018  Length of Stay: 8  Primary Care Physician: Kristian Wolff MD    Subjective   Chief Complaint: Cough  HPI:  I have seen and evaluated patient at bedside.  This is a 77-year-old female who is admitted with acute on chronic hypoxic respiratory failure as well as COPD and acute bronchitis.  She was recently discharged from this facility where at that time she was treated for pneumonia.  She was  discharged home on doxycycline for which she had completed during this admission.  She had a CT scan in the emergency room which showed a bronchiolitis/bronchitis.   She had had some dizziness with nausea with ambulation for which she was started on meclizine yesterday.  According to the patient and daughter the dizziness with nausea when she gets up to ambulate has been a chronic problem and she was told that it was due to fluid in her middle ear.  I did evaluate and she does have fluid rings around each tympanic membrane does appear to be more chronic than acute.      She did have increasing white count to 28,000 yesterday and it has jumped to 45,000 today.  She was noted yesterday to be complaining of some abdominal cramping.  According to the patient she did have an episode of diarrhea this morning.  She did undergo a CT scan of abdomen and pelvis which did not show any acute findings.  She had some mild sigmoid diverticulosis.,  CT of the chest  Not show any acute findings but she did have some bronchiectasis seen in the right middle lobe and lingula which could be sequelae of chronic aspiration for which speech therapy has been consulted, CT of the head did not show any acute findings.  Initially patient was started on antibiotics yesterday said she did have a productive cough with greenish sputum however since she is now having diarrhea the concern is for C. difficile colitis have stop the IV antibiotics since there is no evidence of infection and place the patient on oral vancomycin to empirically cover for C. difficile colitis especially given such an elevation of her white count.  In order peripheral blood smear which is currently pending.    Today patient is still complaining of some abdominal tenderness but not as painful.  Again her white count today has jumped to 45,000.  Lactic acid was normal.  Blood cultures have been ordered which the far have not shown any growth.  Influenza A and B were negative.   Urinalysis was done which was negative.  There is no evidence of infection that would explain this elevation and leukocytosis.  I have reviewed this case with attending physician Dr. Enriquez who is also seen and evaluated the patient yesterday and today.         Review Of Systems:   Review of Systems   General ROS: Patient denies any fevers, chills or loss of consciousness.    Psychological ROS: Denies any hallucinations and delusions.  Ophthalmic ROS: Denies any diplopia or transient loss of vision.  ENT ROS: Denies sore throat, nasal congestion or ear pain.  Dizziness with nausea which is a chronic condition due to middle ear  Allergy and Immunology ROS: Denies rash or itching.  Hematological and Lymphatic ROS: Denies neck swelling or easy bleeding.  Endocrine ROS: Denies any recent unintentional weight gain or loss.  Breast ROS: Denies any pain or swelling.  Respiratory ROS: cough non productive and  shortness of breath. She has been able to cough up small amount of phlegm today.    Cardiovascular ROS: Denies chest pain or palpitations. No history of exertional chest pain.   Gastrointestinal ROS: nausea. Lower abdominal pain tenderness with 1 episode of diarrhea per patient report.  Genito-Urinary ROS: Denies dysuria or hematuria.  Musculoskeletal ROS: Denies back pain. No muscle pain. No calf pain.   Neurological ROS: Denies any focal weakness. No loss of consciousness. Denies any numbness. Denies neck pain.  Dermatological ROS: Denies any redness or pruritis.    Objective      Temp:  [97.3 °F (36.3 °C)-98.2 °F (36.8 °C)] 98 °F (36.7 °C)  Heart Rate:  [72-89] 72  Resp:  [16-18] 16  BP: (115-147)/(53-71) 115/53  Physical Exam    General Appearance:  Alert and cooperative, tearful with abdominal pain   Head:  Atraumatic and normocephalic, without obvious abnormality.   Eyes:          PERRLA, conjunctivae and sclerae normal, no Icterus. No pallor. Extra-occular movements are within normal limits.   Ears:  Ears appear  intact with no abnormalities noted., chronic fluid in bilateral ears. TM dull with fluid ring.     Throat: No oral lesions, no thrush, oral mucosa moist.   Neck: Supple, trachea midline, no thyromegaly, no carotid bruit.   Back:   No kyphoscoliosis present. No tenderness to palpation,   range of motion normal.   Lungs:   Chest shape is normal. Breath sounds heard bilaterally equally.  few wheezing and rhonchi. No Pleural rub or bronchial breathing.   Heart:  Normal S1 and S2, no murmur, no gallop, no rub. No JVD   Abdomen:   Hypoactive bowel sounds, no masses, no organomegaly. Soft  Tender in lower quadrants. non-distended, no guarding, no rebound                tenderness   Extremities: Moves all extremities well, no edema, no cyanosis, no clubbing.   Pulses: Pulses palpable and equal bilaterally   Skin: No bleeding, bruising or rash, wound to foot is much improved with no erythema or edema.  No drainage.     Lymph nodes: No palpable adenopathy   Neurologic:    Psychiatric/Behavior:     Cranial nerves 2 - 12 grossly intact, sensation intact, Motor power is normal and equal bilaterally.  Mood normal, behavior normal       Results Review:    I have reviewed the labs, radiology results and diagnostic studies.      Results from last 7 days  Lab Units 02/02/18  0513   WBC 10*3/mm3 45.08*   HEMOGLOBIN g/dL 17.1*   PLATELETS 10*3/mm3 162       Results from last 7 days  Lab Units 02/02/18  0513   SODIUM mmol/L 139   POTASSIUM mmol/L 4.7   CO2 mmol/L 26.0   CREATININE mg/dL 1.30   GLUCOSE mg/dL 170*       Culture Data:    Radiology Data:   Cardiology Data:    I have reviewed the medications.    Assessment/Plan     Assessment and Plan:  1.  Acute on chronic hypoxic respiratory failure-patient only walks oximetry prior to discharge.    2.  COPD with exacerbation- In the patient's white count is going up and she does have a productive cough now with greenish sputum have restarted the patient back on Zosyn, azithromycin and  vancomycin.  I have ordered for CT of the chest.  She is getting breathing treatments and steroids.  I have weaning steroids. She is on Tussionex.  Flutter valve ordered.  Her cough is more loose.  I have added mucinex along with the mucomyst. Sputum and blood cultures has been ordered.    3.  Bronchiolitis - Dr. Mojica with pulmonology has been consulted for further evaluation.  He has seen and evaluated the patient.  She is getting breathing treatments, we are tapering steroids.      4.  Possible clinical Cdiff colitis-  Pt has been started on oral vancomycin given her significant leukocytosis in GI symptoms.  According to the patient she had diarrhea however the stool that the nursing staff seen was more thickened but patient states that she did have a loose bowel movement that had a foul odor.    5.  Chronic stable diastolic heart failure    6.  Chronic venous insufficiency of lower extremities    7.  Chronic essential hypertension blood pressure stable continue to monitor-  BP elevated.  I have ordered Norvasc.    8.  Essential tremor    9.  Generalized weakness- PT/OT consulted.  Recommend rehab placement and referral has been sent to [facility].    10.  Suspected vestibulopathy- Meclizine ordered.  Improving with flonase and meclizine.    11.  Leukocytosis-  exact etiology is unknown.  Concern is possible Cdiff colitis however she is not having diarrhea stool.  We will empirically treat.  She is having abdominal pain and cramps.  I have ordered a peripheral smear which is pending.  Lactic acid negative and CT does not show any evidence of ischemic bowel and given that her serum Co2 is normal I doubt that bowel ischemia is the problem.          DVT prophylaxis:  Lovenox    Discharge Plannin-3 days    RUBY Gtz 18 1:05 PM         Electronically signed by RUBY Gtz at 2018  1:46 PM      RUBY Gtz at 2018  1:05 PM  Version 1 of 3            PROGRESS NOTE        Date of Admission: 1/22/2018  Length of Stay: 8  Primary Care Physician: Kristian Wolff MD    Subjective   Chief Complaint: Cough  HPI:  I have seen and evaluated patient at bedside.  This is a 77-year-old female who is admitted with acute on chronic hypoxic respiratory failure as well as COPD and acute bronchitis.  She was recently discharged from this facility where at that time she was treated for pneumonia.  She was discharged home on doxycycline for which she had completed during this admission.  She had a CT scan in the emergency room which showed a bronchiolitis/bronchitis.   She had had some dizziness with nausea with ambulation for which she was started on meclizine yesterday.  According to the patient and daughter the dizziness with nausea when she gets up to ambulate has been a chronic problem and she was told that it was due to fluid in her middle ear.  I did evaluate and she does have fluid rings around each tympanic membrane does appear to be more chronic than acute.      She did have increasing white count to 28,000 yesterday and it has jumped to 45,000 today.  She was noted yesterday to be complaining of some abdominal cramping.  According to the patient she did have an episode of diarrhea this morning.  She did undergo a CT scan of abdomen and pelvis which did not show any acute findings.  She had some mild sigmoid diverticulosis.,  CT of the chest  Not show any acute findings but she did have some bronchiectasis seen in the right middle lobe and lingula which could be sequelae of chronic aspiration for which speech therapy has been consulted, CT of the head did not show any acute findings.  Initially patient was started on antibiotics yesterday said she did have a productive cough with greenish sputum however since she is now having diarrhea the concern is for C. difficile colitis have stop the IV antibiotics since there is no evidence of infection and place the patient  on oral vancomycin to empirically cover for C. difficile colitis especially given such an elevation of her white count.  In order peripheral blood smear which is currently pending.    Today patient is still complaining of some abdominal tenderness but not as painful.  Again her white count today has jumped to 45,000.  Lactic acid was normal.  Blood cultures have been ordered which the far have not shown any growth.  Influenza A and B were negative.  Urinalysis was done which was negative.  There is no evidence of infection that would explain this elevation and leukocytosis.  I have reviewed this case with attending physician Dr. Enriquez who is also seen and evaluated the patient yesterday and today.         Review Of Systems:   Review of Systems   General ROS: Patient denies any fevers, chills or loss of consciousness.    Psychological ROS: Denies any hallucinations and delusions.  Ophthalmic ROS: Denies any diplopia or transient loss of vision.  ENT ROS: Denies sore throat, nasal congestion or ear pain.  Dizziness with nausea which is a chronic condition due to middle ear  Allergy and Immunology ROS: Denies rash or itching.  Hematological and Lymphatic ROS: Denies neck swelling or easy bleeding.  Endocrine ROS: Denies any recent unintentional weight gain or loss.  Breast ROS: Denies any pain or swelling.  Respiratory ROS: cough non productive and  shortness of breath. She has been able to cough up small amount of phlegm today.    Cardiovascular ROS: Denies chest pain or palpitations. No history of exertional chest pain.   Gastrointestinal ROS: nausea. Lower abdominal pain tenderness with 1 episode of diarrhea per patient report.  Genito-Urinary ROS: Denies dysuria or hematuria.  Musculoskeletal ROS: Denies back pain. No muscle pain. No calf pain.   Neurological ROS: Denies any focal weakness. No loss of consciousness. Denies any numbness. Denies neck pain.  Dermatological ROS: Denies any redness or  pruritis.    Objective      Temp:  [97.3 °F (36.3 °C)-98.2 °F (36.8 °C)] 98 °F (36.7 °C)  Heart Rate:  [72-89] 72  Resp:  [16-18] 16  BP: (115-147)/(53-71) 115/53  Physical Exam    General Appearance:  Alert and cooperative, tearful with abdominal pain   Head:  Atraumatic and normocephalic, without obvious abnormality.   Eyes:          PERRLA, conjunctivae and sclerae normal, no Icterus. No pallor. Extra-occular movements are within normal limits.   Ears:  Ears appear intact with no abnormalities noted., chronic fluid in bilateral ears. TM dull with fluid ring.     Throat: No oral lesions, no thrush, oral mucosa moist.   Neck: Supple, trachea midline, no thyromegaly, no carotid bruit.   Back:   No kyphoscoliosis present. No tenderness to palpation,   range of motion normal.   Lungs:   Chest shape is normal. Breath sounds heard bilaterally equally.  few wheezing and rhonchi. No Pleural rub or bronchial breathing.   Heart:  Normal S1 and S2, no murmur, no gallop, no rub. No JVD   Abdomen:   Hypoactive bowel sounds, no masses, no organomegaly. Soft  Tender in lower quadrants. non-distended, no guarding, no rebound                tenderness   Extremities: Moves all extremities well, no edema, no cyanosis, no clubbing.   Pulses: Pulses palpable and equal bilaterally   Skin: No bleeding, bruising or rash, wound to foot is much improved with no erythema or edema.  No drainage.     Lymph nodes: No palpable adenopathy   Neurologic:    Psychiatric/Behavior:     Cranial nerves 2 - 12 grossly intact, sensation intact, Motor power is normal and equal bilaterally.  Mood normal, behavior normal       Results Review:    I have reviewed the labs, radiology results and diagnostic studies.      Results from last 7 days  Lab Units 02/02/18  0513   WBC 10*3/mm3 45.08*   HEMOGLOBIN g/dL 17.1*   PLATELETS 10*3/mm3 162       Results from last 7 days  Lab Units 02/02/18  0513   SODIUM mmol/L 139   POTASSIUM mmol/L 4.7   CO2 mmol/L 26.0    CREATININE mg/dL 1.30   GLUCOSE mg/dL 170*       Culture Data:    Radiology Data:   Cardiology Data:    I have reviewed the medications.    Assessment/Plan     Assessment and Plan:  1.  Acute on chronic hypoxic respiratory failure-patient only walks oximetry prior to discharge.    2.  COPD with exacerbation- In the patient's white count is going up and she does have a productive cough now with greenish sputum have restarted the patient back on Zosyn, azithromycin and vancomycin.  I have ordered for CT of the chest.  She is getting breathing treatments and steroids.  I have weaning steroids. She is on Tussionex.  Flutter valve ordered.  Her cough is more loose.  I have added mucinex along with the mucomyst. Sputum and blood cultures has been ordered.    3.  Bronchiolitis - Dr. Mojica with pulmonology has been consulted for further evaluation.  He has seen and evaluated the patient.  She is getting breathing treatments, we are tapering steroids.      4.  Possible clinical Cdiff colitis-  Pt has been started on oral vancomycin given her significant leukocytosis in GI symptoms.  According to the patient she had diarrhea however the stool that the nursing staff seen was more thickened but patient states that she did have a loose bowel movement that had a foul odor.    5.  Chronic stable diastolic heart failure    6.  Chronic venous insufficiency of lower extremities    7.  Chronic essential hypertension blood pressure stable continue to monitor-  BP elevated.  I have ordered Norvasc.    8.  Essential tremor    9.  Generalized weakness- PT/OT consulted.  Recommend rehab placement and referral has been sent to [facility].    10.  Suspected vestibulopathy- Meclizine ordered.  Improving with flonase and meclizine.    11.  Leukocytosis-  exact etiology is unknown.  Concern is possible Cdiff colitis however she is not having diarrhea stool.  We will empirically treat.  She is having abdominal pain and cramps.        DVT  prophylaxis:  Lovenox    Discharge Plannin-3 days    RUBY Gtz 18 1:05 PM         Electronically signed by RUBY Gtz at 2018  1:44 PM      Nico Enriquez MD at 2018  2:06 PM  Version 1 of 1             [facility]   FOLLOW UP NOTE    Name:  Sadie Tijerina   Age:  77 y.o.  Sex:  female  :  1940  MRN:  [id number]   Visit Number:  [id number]  Admission Date:  2018  Date Of Service:  18  Primary Care Physician:  Kristian Wolff MD    Patient was seen and examined this morning. Pertinent laboratory and radiology data were reviewed.  Continues to have leukocytosis with white count at 45,000 today.  She does have diffuse abdominal pain on examination.  Abdomen is soft however.  She denies any chest pain or shortness of breath.  She did have a formed bowel movement yesterday.  C. difficile toxin was not sent since there was no liquid stool.  Stool occult blood however was positive.  Her lactic acid level is 1.6 which is within normal limits.    Vital signs:    Vital Signs (last 24 hours)        0700  -   0659  0700  -   1406   Most Recent    Temp (°F) 97.3 -  98.6      98     98 (36.7)    Heart Rate 77 -  89    72 -  85     72    Resp 17 -  18    16 -  18     16    /61 -  147/71      115/53     115/53    SpO2 (%) 93 -  97    97 -  98     97        The exact etiology of her abdominal pain is uncertain.  Initially I was suspecting ischemic bowel however since her lactic acid level and CO2 levels are within normal limits and the fact that the patient did not have any hypotension, it seems unlikely.  I am still suspicious for C. difficile colitis due to her recent antibiotic use.  We will empirically start her on oral vancomycin to treat C. difficile colitis.  I have discussed the patient's condition with Ms. Couch and I agree with her assessment and plan.  I discussed the patient's condition and the  treatment plan with her daughter Neris ([phone]) over the phone.  She states that she is going to come to the hospital today and stay with her mother kinza.    Nico Enriquez MD  18  2:06 PM    Dictated utilizing Dragon dictation.     Electronically signed by Nico Enriquez MD at 2018  2:14 PM      Nico Enriquez MD at 2/3/2018  1:47 PM  Version 1 of 1               [facility]    Name:  Sadie Tijerina   Age:  77 y.o.  Sex:  female  :  1940  MRN:  [id number]   Visit Number:  [id number]  Admission Date:  2018  Date Of Service:  18  Primary Care Physician:  Kristian Wolff MD     LOS: 9 days :  Patient Care Team:  Kristian Wolff MD as PCP - General  Kristian Wolff MD as PCP - Family Medicine  Bayron Grimaldo MD (Inactive) as Consulting Physician (General Surgery):    Chief Complaint:      Abdominal pain.    Subjective / Interval History:     Patient is currently lying down in the bed and is comfortable at rest.  She still has abdominal pain which seems to be better compared to last 2 days.  She states that she did not sleep very well last night.  She is requesting if telemetry can be discontinued.  She did have multiple bowel movements but no diarrhea.  Denies any nausea or vomiting.  She has been empirically started on oral vancomycin couple of days ago due to suspected C. difficile colitis.  She did have significantly elevated white count without any evidence of ischemic bowel clinically.  After initiation of oral vancomycin therapy, her white count has significantly improved.  She denies any chest pain but complains of cough which is nonproductive.  She does have baseline shortness of breath due to her COPD.  Previous provider documentation, laboratory and imaging data have been reviewed.    Review of Systems:     General ROS: Patient denies any fevers, chills or loss of consciousness.  Complains of generalized weakness.  Respiratory ROS:  Chronic cough and shortness of breath.  Cardiovascular ROS: Denies chest pain or palpitations. No history of exertional chest pain.  Gastrointestinal ROS: Complaints of abdominal pain and frequent stools.  Neurological ROS: Denies any focal weakness. No loss of consciousness. Denies any numbness.  Dermatological ROS: Denies any redness or pruritis.    Vital Signs:    Temp:  [97.4 °F (36.3 °C)-98 °F (36.7 °C)] 98 °F (36.7 °C)  Heart Rate:  [79-96] 79  Resp:  [17-19] 19  BP: ()/(50-99) 99/50    Intake and output:    I/O last 3 completed shifts:  In: 839 [I.V.:839]  Out: 500 [Urine:200; Emesis/NG output:300]       Physical Examination:    General Appearance:  Alert and cooperative, not in any acute distress.   Head:  Atraumatic and normocephalic, without obvious abnormality.   Eyes:          PERRLA, conjunctivae and sclerae normal, no Icterus. No pallor. Extra-occular movements are within normal limits.   Neck: Supple, trachea midline, no thyromegaly, no carotid bruit.   Lungs:   Chest shape is normal. Breath sounds heard bilaterally equally.  Scattered crackles and wheezing heard.. No Pleural rub or bronchial breathing.   Heart:  Normal S1 and S2, no murmur, no gallop, no rub. No JVD   Abdomen:   Abdomen is soft but diffusely tender.  No rebound tenderness noted today.  Multiple ecchymotic patches are noted on the abdominal wall due to Lovenox injections.  Bowel sounds are decreased.     Extremities: Moves all extremities well, 1+ edema, no cyanosis, no            clubbing.   Skin: No bleeding, bruising or rash.   Neurologic: Awake, alert and oriented times 3. Moves all 4 extremities equally.   Laboratory results:      Results from last 7 days  Lab Units 02/03/18  0733 02/02/18  0513 02/01/18  0548   SODIUM mmol/L 139 139 137   POTASSIUM mmol/L 3.9 4.7 4.3   CHLORIDE mmol/L 103 98 99   CO2 mmol/L 30.0 26.0 30.0   BUN mg/dL 39* 46* 26*   CREATININE mg/dL 0.90 1.30 0.70   CALCIUM mg/dL 8.4 8.6 8.8   BILIRUBIN  mg/dL 0.3 0.9 0.3   ALK PHOS U/L 56 53 58   ALT (SGPT) U/L 43 44 44   AST (SGOT) U/L 20 34 15   GLUCOSE mg/dL 86 170* 82       Results from last 7 days  Lab Units 02/03/18  0734 02/02/18  0513 02/01/18  1135 01/31/18  1219   WBC 10*3/mm3 28.10* 45.08* 28.37* 20.21*   HEMOGLOBIN g/dL 11.5* 17.1* 13.9 13.1   HEMATOCRIT % 35.4* 51.7* 41.2 39.8   PLATELETS 10*3/mm3 187 162  --  242               Results from last 7 days  Lab Units 02/01/18  1654 02/01/18  1642   BLOODCX  No growth at 24 hours No growth at 24 hours     I have reviewed the patient's laboratory results.    Radiology results:    Imaging Results (last 24 hours)     ** No results found for the last 24 hours. **        Medication Review:     I have reviewed the patients active and prn medications.     Principal Problem:    Acute on chronic respiratory failure with hypoxia  Active Problems:    Gastroesophageal reflux disease without esophagitis    Hyperlipidemia    Essential hypertension    Essential tremor    Chronic obstructive pulmonary disease with acute exacerbation    Venous insufficiency of both lower extremities    Chronic diastolic heart failure    Assessment:    1.  Abdominal pain with leukocytosis, suspected C. difficile colitis.  2.  Acute on chronic hypoxic respiratory failure, present on admission.  2.  COPD exacerbation, improved.  3.  Bronchiolitis status post antibiotic therapy and steroids.  4.  Chronic diastolic heart failure.  5.  Essential hypertension.  6.  Chronic venous insufficiency of the lower extremities.    Plan:    Patient is currently hemodynamically stable.  She still has abdominal pain but seems to be improved compared to yesterday.  She will be continued on oral vancomycin for suspected C. difficile colitis.  Her leukocytosis has significantly improved from 45,000 to 28,000 today.  She has been afebrile.  She is not on any other IV antibiotic therapy at this time.  I will place her on Saccharomyces probiotic supplements.  I  have discussed the patient's condition with her daughter Neris who is at the bedside.  Patient does not want work with physical therapy today because she is tired.  I will discontinue her telemetry at this time.  She is currently awaiting transfer to rehabilitation facility when clinically stable.    Nico Enriquez MD  18  1:47 PM    Dictated utilizing Dragon dictation.       Electronically signed by Nico Enriquez MD at 2/3/2018  5:16 PM      Nico Enriquez MD at 2018 10:43 AM  Version 1 of 1               [facility]    Name:  Sadie Tijerina   Age:  77 y.o.  Sex:  female  :  1940  MRN:  [id number]   Visit Number:  [id number]  Admission Date:  2018  Date Of Service:  18  Primary Care Physician:  Kristian Wolff MD     LOS: 10 days :  Patient Care Team:  Kristian Wolff MD as PCP - General  Kristian Wolff MD as PCP - Family Medicine  Bayron Grimaldo MD (Inactive) as Consulting Physician (General Surgery):    Chief Complaint:      Abdominal pain.    Subjective / Interval History:     Ms. Forrest is currently sitting up on the bed and is eating her breakfast.  She however states that she has no appetite and continues to have nausea.  No vomiting however.  She did have a bowel movement without any diarrhea.  She continues to have intermittent cramping in her abdomen but states that this is definitely better compared to the last couple of days.  No fevers.  She states that she had low blood pressure last night but no documentation is noted.  She denies any chest pain or dizziness. She has been empirically started on oral vancomycin couple of days ago due to suspected C. difficile colitis.  She did have significantly elevated white count without any evidence of ischemic bowel clinically.  After initiation of oral vancomycin therapy, her white count has significantly improved.  She does have baseline shortness of breath due to her COPD.     Review of  Systems:     General ROS: Patient denies any fevers, chills or loss of consciousness.  Complains of generalized weakness.  Respiratory ROS: Chronic cough and shortness of breath.  Cardiovascular ROS: Denies chest pain or palpitations. No history of exertional chest pain.  Gastrointestinal ROS: Complains of nausea.  Complains of abdominal pain and frequent stools.  Neurological ROS: Denies any focal weakness. No loss of consciousness. Denies any numbness.  Dermatological ROS: Denies any redness or pruritis.    Vital Signs:    Temp:  [97.7 °F (36.5 °C)-98.4 °F (36.9 °C)] 97.9 °F (36.6 °C)  Heart Rate:  [79-96] 87  Resp:  [16-20] 18  BP: ()/(50-62) 126/52    Intake and output:    I/O last 3 completed shifts:  In: 1399 [P.O.:560; I.V.:839]  Out: 400 [Urine:400]  I/O this shift:  In: 360 [P.O.:360]  Out: -     Physical Examination:    General Appearance:  Alert and cooperative, not in any acute distress.   Head:  Atraumatic and normocephalic, without obvious abnormality.   Eyes:          PERRLA, conjunctivae and sclerae normal, no Icterus. No pallor. Extra-occular movements are within normal limits.   Neck: Supple, trachea midline, no thyromegaly, no carotid bruit.   Lungs:   Chest shape is normal. Breath sounds heard bilaterally equally.  Scattered crackles and wheezing heard.. No Pleural rub or bronchial breathing.   Heart:  Normal S1 and S2, no murmur, no gallop, no rub. No JVD   Abdomen:   Abdomen is soft but minimal diffuse tenderness present.  No rebound tenderness.  Multiple ecchymotic patches are noted on the abdominal wall due to Lovenox injections.  Bowel sounds are decreased.     Extremities: Moves all extremities well, 1+ edema, no cyanosis, no            clubbing.   Skin: No bleeding, bruising or rash.   Neurologic: Awake, alert and oriented times 3. Moves all 4 extremities equally.   Laboratory results:      Results from last 7 days  Lab Units 02/03/18  0733 02/02/18  0513 02/01/18  0548   SODIUM  mmol/L 139 139 137   POTASSIUM mmol/L 3.9 4.7 4.3   CHLORIDE mmol/L 103 98 99   CO2 mmol/L 30.0 26.0 30.0   BUN mg/dL 39* 46* 26*   CREATININE mg/dL 0.90 1.30 0.70   CALCIUM mg/dL 8.4 8.6 8.8   BILIRUBIN mg/dL 0.3 0.9 0.3   ALK PHOS U/L 56 53 58   ALT (SGPT) U/L 43 44 44   AST (SGOT) U/L 20 34 15   GLUCOSE mg/dL 86 170* 82       Results from last 7 days  Lab Units 02/04/18  0518 02/03/18  0734 02/02/18  0513   WBC 10*3/mm3 20.32* 28.10* 45.08*   HEMOGLOBIN g/dL 10.3* 11.5* 17.1*   HEMATOCRIT % 31.5* 35.4* 51.7*   PLATELETS 10*3/mm3 183 187 162               Results from last 7 days  Lab Units 02/01/18  1654 02/01/18  1642   BLOODCX  No growth at 2 days No growth at 2 days     I have reviewed the patient's laboratory results.    Radiology results:    Imaging Results (last 24 hours)     ** No results found for the last 24 hours. **        Medication Review:     I have reviewed the patients active and prn medications.     Principal Problem:    Acute on chronic respiratory failure with hypoxia  Active Problems:    Chronic obstructive pulmonary disease with acute exacerbation    Essential hypertension    Chronic diastolic heart failure    Gastroesophageal reflux disease without esophagitis    Hyperlipidemia    Essential tremor    Venous insufficiency of both lower extremities    Assessment:    1.  Abdominal pain with leukocytosis, suspected C. difficile colitis.  2.  Acute on chronic hypoxic respiratory failure, present on admission.  2.  COPD exacerbation, improved.  3.  Bronchiolitis status post antibiotic therapy and steroids.  4.  Chronic diastolic heart failure.  5.  Essential hypertension.  6.  Chronic venous insufficiency of the lower extremities.    Plan:    Ms. Forrest continues to do well and is hemodynamically stable.  She is tolerating diet despite low appetite.  She has not had any diarrhea.  She is currently on oral vancomycin for suspected C. difficile colitis.  Her leukocytosis is significantly  improving and is down to 20,000 today.  She has been afebrile.  She was initially on IV antibiotic therapy for her COPD and pneumonia but currently not on any antibiotics other than the oral vancomycin.  She will be continued on physical and occupational therapy.  Her telemetry has been discontinued.  She is currently waiting to transfer to rehabilitation facility.  Further recommendations depend upon her clinical course.  She may continue to have abdominal pain for a few more days.  She would need oral vancomycin to be continued for 14 days.    Nico Enriquez MD  02/04/18  10:43 AM    Dictated utilizing Dragon dictation.       Electronically signed by Nico Enriquez MD at 2/4/2018  5:27 PM           Physical Therapy Notes (last 72 hours) (Notes from 2/2/2018  9:05 AM through 2/5/2018  9:05 AM)      Mirna Doran, PT at 2/2/2018 11:16 AM  Version 1 of 1            02/02/18 1103   Rehab Treatment   Discipline physical therapist   Treatment Not Performed patient/family declined treatment  (Pt declines participation with PT this date d/t increased abdominal discomfort and nausea.  PT will follow up with patient tomorrow and progress activity as tolerated and appropriate.)        Electronically signed by Mirna Doran, PT at 2/2/2018 11:16 AM      Katerina Baires PTA at 2/3/2018 12:47 PM  Version 1 of 1            02/03/18 1247   PT Deferred Reason   PT Deferred Reason Patient/family declined treatment  (pt reports she is too exhausted participate in tx . Therapy to f/u tomorrow)        Electronically signed by Katerina Baires PTA at 2/3/2018 12:48 PM      Nika Chun PTA at 2/4/2018  4:24 PM  Version 1 of 1         Pt refused d/t vomiting and not feeling well . PT to f/u with pt tomorrow     Electronically signed by Nika Chun PTA at 2/4/2018  4:25 PM           Occupational Therapy Notes (last 72 hours) (Notes from 2/2/2018  9:05 AM through 2/5/2018  9:05 AM)      Mariia Arevalo OT at 2/2/2018  2:15 PM   Version 1 of 1 02/02/18 1412   Rehab Treatment   Discipline occupational therapist   Treatment Not Performed patient/family declined treatment  (Patient refused treatment d/t c/o diarrhea and vomiting; will follow up with patient at later time. )        Electronically signed by Mariia Arevalo OT at 2/2/2018  2:15 PM

## 2018-02-05 NOTE — THERAPY TREATMENT NOTE
Acute Care - Occupational Therapy Treatment Note  [facility]     Patient Name: Sadie Tijerina  : 1940  MRN: [id number]  Today's Date: 2018  Onset of Illness/Injury or Date of Surgery Date: 18  Date of Referral to OT: 18  Referring Physician: RUBY Gtz      Admit Date: 2018    Visit Dx:     ICD-10-CM ICD-9-CM   1. Chronic obstructive pulmonary disease with acute exacerbation J44.1 491.21   2. Impaired mobility and ADLs Z74.09 799.89   3. Impaired functional mobility, balance, gait, and endurance Z74.09 V49.89     Patient Active Problem List   Diagnosis   • Benign paroxysmal positional vertigo   • Calf cramp   • Mixed anxiety depressive disorder   • Dizziness   • Fatigue   • Gastroesophageal reflux disease without esophagitis   • Hematuria   • Hyperlipidemia   • Essential hypertension   • Low back pain   • Orthostatic hypotension   • Restless legs syndrome   • Essential tremor   • Vitamin D deficiency   • Balance problem   • Tension headache   • COPD exacerbation   • Leukocytosis   • Tobacco abuse disorder   • Wound infection   • Cellulitis of foot   • Pneumonia due to infectious organism   • Leg swelling   • Wound abscess   • Chronic obstructive pulmonary disease with acute exacerbation   • Acute on chronic respiratory failure with hypoxia   • Venous insufficiency of both lower extremities   • Chronic diastolic heart failure             Adult Rehabilitation Note       18 1121          Rehab Assessment/Intervention    Discipline occupational therapist  -      Document Type therapy note (daily note)  -      Subjective Information agree to therapy   c/o stomach cramping  -      Patient Effort, Rehab Treatment adequate  -      Symptoms Noted During/After Treatment fatigue  -      Recorded by [] Radha Escalona      Pain Assessment    Pain Assessment No/denies pain   c/o stomach cramping, but doesn't rate it numerically  -      Recorded by [] Radha  Iqra      Transfer Assessment/Treatment    Transfers, Sit-Stand [place] contact guard assist  -      Transfers, Stand-Sit [place] contact guard assist  -      Transfers, Sit-Stand-Sit, Assist Device rolling walker  -      Recorded by [] Radha Escalona      Functional Mobility    Functional Mobility- Ind. Level minimum assist (75% patient effort)  -      Functional Mobility- Device rolling walker  -      Functional Mobility-Distance (Feet) 80  -      Functional Mobility- Comment Pt noted with 2 losses of balance while walking   -      Recorded by [] Radha Escalona      Therapy Exercises    Bilateral Upper Extremity AROM:;10 reps;elbow flexion/extension;shoulder extension/flexion   chest press  -      BUE Resistance other (comment)   2# weighted bar  -      Recorded by [] Radha Escalona      Positioning and Restraints    Pre-Treatment Position sitting in chair/recliner  -      Post Treatment Position chair  -      In Chair sitting;call light within reach;encouraged to call for assist;with family/caregiver  -      Recorded by [] Radha Escalona        User Key  (r) = Recorded By, (t) = Taken By, (c) = Cosigned By    Initials Name Effective Dates     Radha Escalona 10/26/16 -                 OT Goals       02/05/18 1312 01/31/18 1200 01/29/18 1646    LB Dressing OT LTG    LB Dressing Goal OT LTG, Date Goal Reviewed 02/05/18  - 01/31/18  -SD 01/29/18  -SD    LB Dressing Goal OT LTG, Outcome goal ongoing  - goal ongoing  -SD goal ongoing  -SD    Functional Mobility OT LTG    Functional Mobility Goal OT LTG, Date Goal Reviewed 02/05/18  - 01/31/18  -SD 01/29/18  -SD    Functional Mobility Goal OT LTG, Outcome goal ongoing  - goal ongoing  -SD goal ongoing  -SD      01/26/18 1314 01/25/18 1323       Strength OT LTG    Strength Goal OT LTG, Date Established  01/25/18  -     Strength Goal OT LTG, Time to Achieve  by discharge  -     Strength Goal OT LTG, Functional Goal   Pt will perform BUE strengthening ex 15 reps using theraband for resistance.  -     Strength Goal OT LTG, Date Goal Reviewed 01/26/18  -      Strength Goal OT LTG, Outcome goal met  - goal ongoing  -     LB Dressing OT LTG    LB Dressing Goal OT LTG, Date Established  01/25/18  -     LB Dressing Goal OT LTG, Time to Achieve  by discharge  -     LB Dressing Goal OT LTG, [place] Level  set up required  -     LB Dressing Goal OT LTG, Date Goal Reviewed 01/26/18  -      LB Dressing Goal OT LTG, Outcome goal ongoing  - goal ongoing  -     Functional Mobility OT LTG    Functional Mobility Goal OT LTG, Date Established  01/25/18  -     Functional Mobility Goal OT LTG, Time to Achieve  by discharge  -     Functional Mobility Goal OT LTG, [place] Level  contact guard  -     Functional Mobility Goal OT LTG, Assist Device  rolling walker  -     Functional Mobility Goal OT LTG, Distance to Achieve  in hallway  -     Functional Mobility Goal OT LTG, Additional Goal  Pt will walk 150' with cga using RW  -     Functional Mobility Goal OT LTG, Date Goal Reviewed 01/26/18  -      Functional Mobility Goal OT LTG, Outcome goal ongoing  - goal ongoing  -       User Key  (r) = Recorded By, (t) = Taken By, (c) = Cosigned By    Initials Name Provider Type    DENIS Escalona Occupational Therapist    ALEJANDRA Arevalo OT Occupational Therapist          Occupational Therapy Education     Title: PT OT SLP Therapies (Active)     Topic: Occupational Therapy (Active)     Point: ADL training (Done)    Description: Instruct learner(s) on proper safety adaptation and remediation techniques during self care or transfers.   Instruct in proper use of assistive devices.    Learning Progress Summary    Learner Readiness Method Response Comment Documented by Status   Patient Acceptance E VU benefit of working with therapy to improve her strength, safety and independence with ADL tasks  02/05/18 1312  Done    Acceptance E VU Safety/sequencing during toilet transfer using RW SD 01/31/18 1202 Done    Acceptance E VU Safety/sequencing during functional transfers/mobility using RW SD 01/29/18 1648 Done    Acceptance E VU Role of OT/POC  01/25/18 1321 Done   Family Acceptance E VU benefit of working with therapy to improve her strength, safety and independence with ADL tasks  02/05/18 1312 Done               Point: Home exercise program (Done)    Description: Instruct learner(s) on appropriate technique for monitoring, assisting and/or progressing therapeutic exercises/activities.    Learning Progress Summary    Learner Readiness Method Response Comment Documented by Status   Patient Acceptance E VU benefit of working with therapy to improve her strength, safety and independence with ADL tasks  02/05/18 1312 Done    Acceptance E,D ARIANNA,HAVEN BUE ex  01/26/18 1314 Done   Family Acceptance E VU benefit of working with therapy to improve her strength, safety and independence with ADL tasks  02/05/18 1312 Done               Point: Precautions (Done)    Description: Instruct learner(s) on prescribed precautions during self-care and functional transfers.    Learning Progress Summary    Learner Readiness Method Response Comment Documented by Status   Patient Acceptance E VU benefit of working with therapy to improve her strength, safety and independence with ADL tasks  02/05/18 1312 Done   Family Acceptance E VU benefit of working with therapy to improve her strength, safety and independence with ADL tasks  02/05/18 1312 Done                      User Key     Initials Effective Dates Name Provider Type Discipline     10/26/16 -  Radha Escalona Occupational Therapist OT    SD 06/30/17 -  Mariia Arevalo OT Occupational Therapist OT                  OT Recommendation and Plan  Anticipated Discharge Disposition: home with home health  Planned Therapy Interventions: ADL retraining, strengthening, transfer  training  Therapy Frequency: 3-5 times/wk  Plan of Care Review  Plan Of Care Reviewed With: patient, sibling  Progress: improving  Outcome Summary/Follow up Plan: Pt willing to work with therapy today.  Pt participated in UB ex using 2# weighted bar and was able to walk 80' with min assist for balance.  Pt was left sitting up in her chair eating her lunch.  Pts sister was present.        Outcome Measures       02/05/18 1121          How much help from another is currently needed...    Putting on and taking off regular lower body clothing? 3  -AH      Bathing (including washing, rinsing, and drying) 3  -AH      Toileting (which includes using toilet bed pan or urinal) 3  -AH      Putting on and taking off regular upper body clothing 4  -AH      Taking care of personal grooming (such as brushing teeth) 4  -AH      Eating meals 4  -      Score 21  -      Functional Assessment    Outcome Measure Options AM-PAC 6 Clicks Daily Activity (OT)  -        User Key  (r) = Recorded By, (t) = Taken By, (c) = Cosigned By    Initials Name Provider Type     Radha Escalona Occupational Therapist           Time Calculation:         Time Calculation- OT       02/05/18 1315          Time Calculation- OT    OT Start Time 1121  -      Total Timed Code Minutes- OT 24 minute(s)  -      OT Received On 02/05/18  -      OT Goal Re-Cert Due Date 02/06/18  -        User Key  (r) = Recorded By, (t) = Taken By, (c) = Cosigned By    Initials Name Provider Type     Radha Escalona Occupational Therapist           Therapy Charges for Today     Code Description Service Date Service Provider Modifiers Qty    40976587772  OT THER PROC EA 15 MIN 2/5/2018 Radha Escalona GO 1          OT G-codes  OT Functional Scales Options: AM-PAC 6 Clicks Daily Activity (OT)  Score: 21  Functional Limitation: Self care  Self Care Current Status (): At least 20 percent but less than 40 percent impaired, limited or restricted  Self Care Goal Status  (): At least 1 percent but less than 20 percent impaired, limited or restricted    Radha  Iqra  2/5/2018

## 2018-02-05 NOTE — PLAN OF CARE
Problem: COPD, Chronic Bronchitis/Emphysema (Adult)  Goal: Signs and Symptoms of Listed Potential Problems Will be Absent or Manageable (COPD, Chronic Bronchitis/Emphysema)  Outcome: Ongoing (interventions implemented as appropriate)      Problem: Fall Risk (Adult)  Goal: Absence of Falls  Outcome: Ongoing (interventions implemented as appropriate)      Problem: Wound, Traumatic, Nonburn (Adult)  Goal: Signs and Symptoms of Listed Potential Problems Will be Absent or Manageable (Wound, Traumatic, Nonburn)  Outcome: Ongoing (interventions implemented as appropriate)

## 2018-02-05 NOTE — PROGRESS NOTES
PROGRESS NOTE        Date of Admission: 1/22/2018  Length of Stay: 11  Primary Care Physician: Kristian Wolff MD    Subjective   Chief Complaint: Cough  HPI:  I have seen and evaluated patient at bedside.  She still complains of some nausea but no vomiting.  She is still quite weak.  Physical and occupational therapy have been consulted.  She does have a bed available tomorrow at [facility]  She was treated with oral vancomycin for suspected C. difficile colitis.  She denies any diarrhea at this time.  She had significant leukocytosis on Friday with a white count of 45,000 which is down to 15,000.  Symptomatically she does appear to be doing better.  Clinically she does not have any evidence of ischemic bowel.  She denies any chest pain but still has a nonproductive cough.  She does have baseline shortness of breath due to her history of COPD.     Review Of Systems:   Review of Systems   General ROS: Patient denies any fevers, chills or loss of consciousness.    Psychological ROS: Denies any hallucinations and delusions.  Ophthalmic ROS: Denies any diplopia or transient loss of vision.  ENT ROS: Denies sore throat, nasal congestion or ear pain.  Dizziness with nausea which is a chronic condition due to middle ear  Allergy and Immunology ROS: Denies rash or itching.  Hematological and Lymphatic ROS: Denies neck swelling or easy bleeding.  Endocrine ROS: Denies any recent unintentional weight gain or loss.  Breast ROS: Denies any pain or swelling.  Respiratory ROS: cough non productive and  shortness of breath.   Cardiovascular ROS: Denies chest pain or palpitations. No history of exertional chest pain.   Gastrointestinal ROS: nausea. No vomiting.  No diarrhea.  Genito-Urinary ROS: Denies dysuria or hematuria.  Musculoskeletal ROS: Denies back pain. No muscle pain. No calf pain.   Neurological ROS: Denies any focal weakness. No loss of consciousness. Denies any numbness. Denies neck pain.  Dermatological ROS:  Denies any redness or pruritis.    Objective      Temp:  [97.3 °F (36.3 °C)-98.1 °F (36.7 °C)] 97.4 °F (36.3 °C)  Heart Rate:  [80-90] 82  Resp:  [16-20] 20  BP: (100-143)/(47-90) 128/69  Physical Exam    General Appearance:  Alert and cooperative, tearful with abdominal pain   Head:  Atraumatic and normocephalic, without obvious abnormality.   Eyes:          PERRLA, conjunctivae and sclerae normal, no Icterus. No pallor. Extra-occular movements are within normal limits.   Ears:  Ears appear intact with no abnormalities noted., chronic fluid in bilateral ears. TM dull with fluid ring.     Throat: No oral lesions, no thrush, oral mucosa moist.   Neck: Supple, trachea midline, no thyromegaly, no carotid bruit.   Back:   No kyphoscoliosis present. No tenderness to palpation,   range of motion normal.   Lungs:   Chest shape is normal. Breath sounds heard bilaterally equally.  few wheezing and rhonchi. No Pleural rub or bronchial breathing.   Heart:  Normal S1 and S2, no murmur, no gallop, no rub. No JVD   Abdomen:   Normal bowel sounds, no masses, no organomegaly. Soft  Tender in lower quadrants. non-distended, no guarding, no rebound                tenderness   Extremities: Moves all extremities well, no edema, no cyanosis, no clubbing.   Pulses: Pulses palpable and equal bilaterally   Skin: No bleeding, bruising or rash, wound to foot is much improved with no erythema or edema.  No drainage.     Lymph nodes: No palpable adenopathy   Neurologic:    Psychiatric/Behavior:     Cranial nerves 2 - 12 grossly intact, sensation intact, Motor power is normal and equal bilaterally.  Mood normal, behavior normal       Results Review:    I have reviewed the labs, radiology results and diagnostic studies.      Results from last 7 days  Lab Units 02/05/18  0555   WBC 10*3/mm3 15.64*   HEMOGLOBIN g/dL 10.9*   PLATELETS 10*3/mm3 179       Results from last 7 days  Lab Units 02/05/18  0555   SODIUM mmol/L 136*   POTASSIUM mmol/L 4.8    CO2 mmol/L 29.0   CREATININE mg/dL 0.60   GLUCOSE mg/dL 81       Culture Data:    Radiology Data:   Cardiology Data:    I have reviewed the medications.    Assessment/Plan     Assessment and Plan:  1.  Acute on chronic hypoxic respiratory failure-patient only walks oximetry prior to discharge.    2.  COPD with exacerbation-  Improving    3.  Bronchiolitis -  He has seen and evaluated the patient.  She is getting breathing treatments, we are tapering steroids.  CT scan of the chest did show some bronchiectasis concerning for chronic aspiration.  Speech therapy was consulted and did a bedside evaluation.  There was no evidence of aspiration and patient denies any history of disc nausea.  She has finished antibiotics and steroids.    4.  Possible clinical Cdiff colitis-  Pt has been started on oral vancomycin given her significant leukocytosis in GI symptoms.  Leukocytosis has significantly improved after initiating the oral vancomycin.  Patient did have abdominal pain with some soft stools however there was no diarrhea.  She has been started on probiotic supplements as well.       5.  Chronic stable diastolic heart failure    6.  Chronic venous insufficiency of lower extremities    7.  Chronic essential hypertension blood pressure stable continue to monitor-  BP elevated.  I have ordered Norvasc.    8.  Essential tremor    9.  Generalized weakness- PT/OT consulted.  Recommend rehab placement and referral has been sent to [facility].    10.  Suspected vestibulopathy- Meclizine ordered.  Improving with flonase and meclizine.    11.  Leukocytosis-  likely C. difficile colitis.         DVT prophylaxis:  Lovenox    Discharge Plannin-3 days    Ce Herron, RUBY 18 10:59 AM

## 2018-02-05 NOTE — PROGRESS NOTES
Continued Stay Note  [facility]     Patient Name: Sadie Tijerina  MRN: [id number]  Today's Date: 2/5/2018    Admit Date: 1/22/2018          Discharge Plan       02/05/18 0910    Case Management/Social Work Plan    Additional Comments Faxed updated notes to [facility] awaiting the bed availability               Discharge Codes     None        Expected Discharge Date and Time     Expected Discharge Date Expected Discharge Time    Feb 6, 2018             Sara Brooke

## 2018-02-06 VITALS
RESPIRATION RATE: 18 BRPM | OXYGEN SATURATION: 93 % | DIASTOLIC BLOOD PRESSURE: 73 MMHG | TEMPERATURE: 97.4 F | SYSTOLIC BLOOD PRESSURE: 125 MMHG | HEIGHT: 62 IN | WEIGHT: 125.6 LBS | HEART RATE: 93 BPM | BODY MASS INDEX: 23.11 KG/M2

## 2018-02-06 PROBLEM — A04.72 C. DIFFICILE COLITIS: Status: ACTIVE | Noted: 2018-02-06

## 2018-02-06 LAB
ANION GAP SERPL CALCULATED.3IONS-SCNC: 11.4 MMOL/L
BACTERIA SPEC AEROBE CULT: NORMAL
BACTERIA SPEC AEROBE CULT: NORMAL
BASOPHILS # BLD AUTO: 0.03 10*3/MM3 (ref 0–0.2)
BASOPHILS NFR BLD AUTO: 0.2 % (ref 0–2.5)
BUN BLD-MCNC: 23 MG/DL (ref 7–20)
BUN/CREAT SERPL: 28.8 (ref 7.1–23.5)
CALCIUM SPEC-SCNC: 9.4 MG/DL (ref 8.4–10.2)
CHLORIDE SERPL-SCNC: 100 MMOL/L (ref 98–107)
CO2 SERPL-SCNC: 32 MMOL/L (ref 26–30)
CREAT BLD-MCNC: 0.8 MG/DL (ref 0.6–1.3)
CRP SERPL-MCNC: 2.5 MG/DL (ref 0–1)
CYTOLOGIST CVX/VAG CYTO: NORMAL
DEPRECATED RDW RBC AUTO: 43.5 FL (ref 37–54)
EOSINOPHIL # BLD AUTO: 0.05 10*3/MM3 (ref 0–0.7)
EOSINOPHIL NFR BLD AUTO: 0.3 % (ref 0–7)
ERYTHROCYTE [DISTWIDTH] IN BLOOD BY AUTOMATED COUNT: 13.3 % (ref 11.5–14.5)
GFR SERPL CREATININE-BSD FRML MDRD: 69 ML/MIN/1.73
GLUCOSE BLD-MCNC: 101 MG/DL (ref 74–98)
HCT VFR BLD AUTO: 33.4 % (ref 37–47)
HGB BLD-MCNC: 11 G/DL (ref 12–16)
IMM GRANULOCYTES # BLD: 0.19 10*3/MM3 (ref 0–0.06)
IMM GRANULOCYTES NFR BLD: 1 % (ref 0–0.6)
LYMPHOCYTES # BLD AUTO: 1.58 10*3/MM3 (ref 0.6–3.4)
LYMPHOCYTES NFR BLD AUTO: 8.4 % (ref 10–50)
MCH RBC QN AUTO: 29.5 PG (ref 27–31)
MCHC RBC AUTO-ENTMCNC: 32.9 G/DL (ref 30–37)
MCV RBC AUTO: 89.5 FL (ref 81–99)
MONOCYTES # BLD AUTO: 1.02 10*3/MM3 (ref 0–0.9)
MONOCYTES NFR BLD AUTO: 5.5 % (ref 0–12)
NEUTROPHILS # BLD AUTO: 15.84 10*3/MM3 (ref 2–6.9)
NEUTROPHILS NFR BLD AUTO: 84.6 % (ref 37–80)
NRBC BLD MANUAL-RTO: 0 /100 WBC (ref 0–0)
PATH INTERP BLD-IMP: NORMAL
PLATELET # BLD AUTO: 202 10*3/MM3 (ref 130–400)
PMV BLD AUTO: 10.5 FL (ref 6–12)
POTASSIUM BLD-SCNC: 4.4 MMOL/L (ref 3.5–5.1)
RBC # BLD AUTO: 3.73 10*6/MM3 (ref 4.2–5.4)
SODIUM BLD-SCNC: 139 MMOL/L (ref 137–145)
WBC NRBC COR # BLD: 18.71 10*3/MM3 (ref 4.8–10.8)

## 2018-02-06 PROCEDURE — 63710000001 PREDNISONE PER 1 MG: Performed by: NURSE PRACTITIONER

## 2018-02-06 PROCEDURE — 25010000002 ENOXAPARIN PER 10 MG: Performed by: INTERNAL MEDICINE

## 2018-02-06 PROCEDURE — 86140 C-REACTIVE PROTEIN: CPT | Performed by: NURSE PRACTITIONER

## 2018-02-06 PROCEDURE — 85025 COMPLETE CBC W/AUTO DIFF WBC: CPT | Performed by: NURSE PRACTITIONER

## 2018-02-06 PROCEDURE — 99239 HOSP IP/OBS DSCHRG MGMT >30: CPT | Performed by: NURSE PRACTITIONER

## 2018-02-06 PROCEDURE — 25010000002 ONDANSETRON PER 1 MG: Performed by: FAMILY MEDICINE

## 2018-02-06 PROCEDURE — 80048 BASIC METABOLIC PNL TOTAL CA: CPT | Performed by: NURSE PRACTITIONER

## 2018-02-06 PROCEDURE — 94799 UNLISTED PULMONARY SVC/PX: CPT

## 2018-02-06 RX ORDER — TRAMADOL HYDROCHLORIDE 50 MG/1
50 TABLET ORAL EVERY 6 HOURS PRN
Qty: 10 TABLET | Refills: 0 | Status: SHIPPED | OUTPATIENT
Start: 2018-02-06 | End: 2018-07-25 | Stop reason: SDUPTHER

## 2018-02-06 RX ORDER — IPRATROPIUM BROMIDE AND ALBUTEROL SULFATE 2.5; .5 MG/3ML; MG/3ML
3 SOLUTION RESPIRATORY (INHALATION)
Qty: 360 ML
Start: 2018-02-06 | End: 2018-05-24 | Stop reason: SDUPTHER

## 2018-02-06 RX ORDER — METRONIDAZOLE 500 MG/1
500 TABLET ORAL 3 TIMES DAILY
Qty: 30 TABLET | Refills: 0
Start: 2018-02-06 | End: 2018-02-16

## 2018-02-06 RX ORDER — GUAIFENESIN 600 MG/1
600 TABLET, EXTENDED RELEASE ORAL EVERY 12 HOURS SCHEDULED
Start: 2018-02-06 | End: 2018-07-25

## 2018-02-06 RX ORDER — FAMOTIDINE 20 MG/1
20 TABLET, FILM COATED ORAL DAILY
Start: 2018-02-07 | End: 2018-07-25

## 2018-02-06 RX ORDER — SACCHAROMYCES BOULARDII 250 MG
250 CAPSULE ORAL 2 TIMES DAILY
Qty: 20 CAPSULE | Refills: 0
Start: 2018-02-06 | End: 2018-02-16

## 2018-02-06 RX ORDER — SPIRONOLACTONE 25 MG/1
25 TABLET ORAL DAILY
Start: 2018-02-07 | End: 2018-06-18 | Stop reason: SDUPTHER

## 2018-02-06 RX ORDER — FLUTICASONE PROPIONATE 50 MCG
2 SPRAY, SUSPENSION (ML) NASAL DAILY
Start: 2018-02-07

## 2018-02-06 RX ORDER — CALCIUM CARBONATE 200(500)MG
1 TABLET,CHEWABLE ORAL 3 TIMES DAILY PRN
Status: DISCONTINUED | OUTPATIENT
Start: 2018-02-06 | End: 2018-02-06 | Stop reason: HOSPADM

## 2018-02-06 RX ORDER — CLONAZEPAM 0.5 MG/1
0.5 TABLET ORAL NIGHTLY PRN
Qty: 6 TABLET | Refills: 0 | Status: SHIPPED | OUTPATIENT
Start: 2018-02-06 | End: 2018-04-13 | Stop reason: SDUPTHER

## 2018-02-06 RX ORDER — CALCIUM CARBONATE 200(500)MG
1 TABLET,CHEWABLE ORAL
Start: 2018-02-06 | End: 2019-03-07

## 2018-02-06 RX ORDER — PREDNISONE 20 MG/1
20 TABLET ORAL
Start: 2018-02-07 | End: 2018-07-25

## 2018-02-06 RX ORDER — AMLODIPINE BESYLATE 5 MG/1
5 TABLET ORAL
Start: 2018-02-07 | End: 2018-07-25

## 2018-02-06 RX ADMIN — IPRATROPIUM BROMIDE AND ALBUTEROL SULFATE 3 ML: .5; 3 SOLUTION RESPIRATORY (INHALATION) at 07:10

## 2018-02-06 RX ADMIN — MECLIZINE 12.5 MG: 12.5 TABLET ORAL at 06:10

## 2018-02-06 RX ADMIN — ASPIRIN 81 MG: 81 TABLET, COATED ORAL at 08:58

## 2018-02-06 RX ADMIN — ATORVASTATIN CALCIUM 10 MG: 10 TABLET, FILM COATED ORAL at 08:58

## 2018-02-06 RX ADMIN — SPIRONOLACTONE 25 MG: 25 TABLET, FILM COATED ORAL at 08:58

## 2018-02-06 RX ADMIN — VANCOMYCIN 125 MG: KIT at 13:05

## 2018-02-06 RX ADMIN — NYSTATIN 500000 UNITS: 100000 SUSPENSION ORAL at 13:04

## 2018-02-06 RX ADMIN — ACETYLCYSTEINE 1 ML: 200 SOLUTION ORAL; RESPIRATORY (INHALATION) at 07:10

## 2018-02-06 RX ADMIN — IPRATROPIUM BROMIDE AND ALBUTEROL SULFATE 3 ML: .5; 3 SOLUTION RESPIRATORY (INHALATION) at 12:47

## 2018-02-06 RX ADMIN — ONDANSETRON HYDROCHLORIDE 4 MG: 2 INJECTION, SOLUTION INTRAMUSCULAR; INTRAVENOUS at 09:03

## 2018-02-06 RX ADMIN — ENOXAPARIN SODIUM 40 MG: 40 INJECTION SUBCUTANEOUS at 06:10

## 2018-02-06 RX ADMIN — COLLAGENASE SANTYL 1 APPLICATION: 250 OINTMENT TOPICAL at 08:59

## 2018-02-06 RX ADMIN — ACETYLCYSTEINE 1 ML: 200 SOLUTION ORAL; RESPIRATORY (INHALATION) at 12:47

## 2018-02-06 RX ADMIN — GUAIFENESIN 600 MG: 600 TABLET, EXTENDED RELEASE ORAL at 08:58

## 2018-02-06 RX ADMIN — PREDNISONE 20 MG: 20 TABLET ORAL at 08:59

## 2018-02-06 RX ADMIN — LOSARTAN POTASSIUM 25 MG: 25 TABLET, FILM COATED ORAL at 08:58

## 2018-02-06 RX ADMIN — ACETAMINOPHEN 650 MG: 325 TABLET, FILM COATED ORAL at 04:40

## 2018-02-06 RX ADMIN — SERTRALINE HYDROCHLORIDE 50 MG: 50 TABLET ORAL at 08:58

## 2018-02-06 RX ADMIN — FAMOTIDINE 20 MG: 20 TABLET, FILM COATED ORAL at 08:59

## 2018-02-06 RX ADMIN — FLUTICASONE PROPIONATE 2 SPRAY: 50 SPRAY, METERED NASAL at 08:59

## 2018-02-06 RX ADMIN — ESTROGENS, CONJUGATED 0.62 MG: 0.62 TABLET, FILM COATED ORAL at 08:59

## 2018-02-06 RX ADMIN — VANCOMYCIN 125 MG: KIT at 06:10

## 2018-02-06 RX ADMIN — Medication 10 ML: at 09:03

## 2018-02-06 RX ADMIN — NICOTINE 1 PATCH: 14 PATCH TRANSDERMAL at 06:11

## 2018-02-06 RX ADMIN — VITAMIN D, TAB 1000IU (100/BT) 1000 UNITS: 25 TAB at 08:59

## 2018-02-06 RX ADMIN — BUDESONIDE AND FORMOTEROL FUMARATE DIHYDRATE 2 PUFF: 160; 4.5 AEROSOL RESPIRATORY (INHALATION) at 07:12

## 2018-02-06 RX ADMIN — NYSTATIN 500000 UNITS: 100000 SUSPENSION ORAL at 08:58

## 2018-02-06 RX ADMIN — Medication 250 MG: at 08:58

## 2018-02-06 RX ADMIN — BUDESONIDE 0.5 MG: 0.5 INHALANT RESPIRATORY (INHALATION) at 07:10

## 2018-02-06 RX ADMIN — CALCIUM CARBONATE 1 TABLET: 500 TABLET, CHEWABLE ORAL at 13:04

## 2018-02-06 RX ADMIN — PROPRANOLOL HYDROCHLORIDE 20 MG: 20 TABLET ORAL at 08:59

## 2018-02-06 RX ADMIN — AMLODIPINE BESYLATE 5 MG: 5 TABLET ORAL at 08:58

## 2018-02-06 NOTE — THERAPY DISCHARGE NOTE
Acute Care - Occupational Therapy Discharge Summary   [facility]     Patient Name: Sadie Tijerina  : 1940  MRN: [id number]    Today's Date: 2018  Onset of Illness/Injury or Date of Surgery Date: 18    Date of Referral to OT: 18  Referring Physician: RUBY Gtz      Admit Date: 2018        OT Recommendation and Plan    Visit Dx:    ICD-10-CM ICD-9-CM   1. Chronic obstructive pulmonary disease with acute exacerbation J44.1 491.21   2. Impaired mobility and ADLs Z74.09 799.89   3. Impaired functional mobility, balance, gait, and endurance Z74.09 V49.89                     OT Goals       18 1312 18 1200 18 1646    LB Dressing OT LTG    LB Dressing Goal OT LTG, Date Goal Reviewed 18  -AH 18  -SD 18  -SD    LB Dressing Goal OT LTG, Outcome goal ongoing  -AH goal ongoing  -SD goal ongoing  -SD    Functional Mobility OT LTG    Functional Mobility Goal OT LTG, Date Goal Reviewed 18  -AH 18  -SD 18  -SD    Functional Mobility Goal OT LTG, Outcome goal ongoing  -AH goal ongoing  -SD goal ongoing  -SD      18 1314 18 1323       Strength OT LTG    Strength Goal OT LTG, Date Established  18  -     Strength Goal OT LTG, Time to Achieve  by discharge  -     Strength Goal OT LTG, Functional Goal  Pt will perform BUE strengthening ex 15 reps using theraband for resistance.  -AH     Strength Goal OT LTG, Date Goal Reviewed 18  -      Strength Goal OT LTG, Outcome goal met  - goal ongoing  -AH     LB Dressing OT LTG    LB Dressing Goal OT LTG, Date Established  18  -     LB Dressing Goal OT LTG, Time to Achieve  by discharge  -     LB Dressing Goal OT LTG, [place] Level  set up required  -AH     LB Dressing Goal OT LTG, Date Goal Reviewed 18  -      LB Dressing Goal OT LTG, Outcome goal ongoing  - goal ongoing  -     Functional Mobility OT LTG    Functional Mobility Goal OT  LTG, Date Established  01/25/18  -     Functional Mobility Goal OT LTG, Time to Achieve  by discharge  -     Functional Mobility Goal OT LTG, [place] Level  contact guard  -     Functional Mobility Goal OT LTG, Assist Device  rolling walker  -     Functional Mobility Goal OT LTG, Distance to Achieve  in hallway  -     Functional Mobility Goal OT LTG, Additional Goal  Pt will walk 150' with cga using RW  -     Functional Mobility Goal OT LTG, Date Goal Reviewed 01/26/18  -      Functional Mobility Goal OT LTG, Outcome goal ongoing  - goal ongoing  -       User Key  (r) = Recorded By, (t) = Taken By, (c) = Cosigned By    Initials Name Provider Type     Radha Escalona Occupational Therapist    ALEJANDRA Arevalo OT Occupational Therapist                Outcome Measures       02/05/18 1121          How much help from another is currently needed...    Putting on and taking off regular lower body clothing? 3  -AH      Bathing (including washing, rinsing, and drying) 3  -AH      Toileting (which includes using toilet bed pan or urinal) 3  -AH      Putting on and taking off regular upper body clothing 4  -AH      Taking care of personal grooming (such as brushing teeth) 4  -AH      Eating meals 4  -      Score 21  -      Functional Assessment    Outcome Measure Options AM-PAC 6 Clicks Daily Activity (OT)  -        User Key  (r) = Recorded By, (t) = Taken By, (c) = Cosigned By    Initials Name Provider Type     Radha Escalona Occupational Therapist          Therapy Charges for Today     Code Description Service Date Service Provider Modifiers Qty    [id number]  OT SELFCARE DISCHARGE 2/5/2018 Mariia Arevalo OT GO,  1          OT Discharge Summary  Anticipated Discharge Disposition: home with home health  Reason for Discharge: Discharge from facility  Outcomes Achieved: Refer to plan of care for updates on goals achieved  Discharge Destination: Inpatient rehabilitation  facility      Mariia Arevalo, OT  2/6/2018

## 2018-02-06 NOTE — PLAN OF CARE
Problem: COPD, Chronic Bronchitis/Emphysema (Adult)  Goal: Signs and Symptoms of Listed Potential Problems Will be Absent or Manageable (COPD, Chronic Bronchitis/Emphysema)  Outcome: Ongoing (interventions implemented as appropriate)      Problem: Fall Risk (Adult)  Goal: Absence of Falls  Outcome: Ongoing (interventions implemented as appropriate)      Problem: Wound, Traumatic, Nonburn (Adult)  Goal: Signs and Symptoms of Listed Potential Problems Will be Absent or Manageable (Wound, Traumatic, Nonburn)  Outcome: Ongoing (interventions implemented as appropriate)      Problem: Patient Care Overview (Adult)  Goal: Plan of Care Review  Outcome: Ongoing (interventions implemented as appropriate)   02/06/18 1218   Coping/Psychosocial Response Interventions   Plan Of Care Reviewed With patient;daughter   Patient Care Overview   Progress progress toward functional goals as expected   Outcome Evaluation   Outcome Summary/Follow up Plan VSS, pt to be transferred to rehab today

## 2018-02-06 NOTE — PLAN OF CARE
Problem: Patient Care Overview (Adult)  Goal: Plan of Care Review  Outcome: Ongoing (interventions implemented as appropriate)   02/06/18 0414   Coping/Psychosocial Response Interventions   Plan Of Care Reviewed With patient   Patient Care Overview   Progress improving   Outcome Evaluation   Outcome Summary/Follow up Plan Patient states she is feeling better. The patient has continued to antibiotics. Vital Signs are stable. Patient is expected to be discharged to [facility] today for rehab. Will continue to monitor.

## 2018-02-06 NOTE — PROGRESS NOTES
Continued Stay Note  [facility]     Patient Name: Sadie Tijerina  MRN: [id number]  Today's Date: 2/6/2018    Admit Date: 1/22/2018          Discharge Plan       02/06/18 1016    Case Management/Social Work Plan    Additional Comments [facility] has accpeted pt nursing to call report to [phone] for pt unit Fax Dc summary to [phone] family to transport               Discharge Codes     None        Expected Discharge Date and Time     Expected Discharge Date Expected Discharge Time    Feb 6, 2018             Sara Brooke

## 2018-02-06 NOTE — DISCHARGE SUMMARY
[facility]   DISCHARGE SUMMARY    Name:  Sadie Tijerina   Age:  77 y.o.  Sex:  female  :  1940  MRN:  [id number]   Visit Number:  [id number]  Primary Care Physician:  Kristian Wolff MD  Date of Discharge:  2018  Admission Date:  2018      Presenting Problem:    Chronic obstructive pulmonary disease with acute exacerbation [J44.1]  Chronic obstructive pulmonary disease with acute exacerbation [J44.1]       Discharge Diagnosis:     Principal Problem:    Acute on chronic respiratory failure with hypoxia  Active Problems:    Gastroesophageal reflux disease without esophagitis    Hyperlipidemia    Essential hypertension    Essential tremor    COPD exacerbation    Leukocytosis    Chronic obstructive pulmonary disease with acute exacerbation    Venous insufficiency of both lower extremities    Chronic diastolic heart failure    C. difficile colitis        Past Medical History:  Past Medical History:   Diagnosis Date   • Arthritis    • Depression    • History of emphysema    • History of esophageal reflux    • History of recurrent UTI (urinary tract infection)    • History of renal calculi    • History of uterine cancer    • Hyperlipidemia          Consults:     Consults     Date and Time Order Name Status Description    2018 0859 Inpatient Consult to Pulmonology Completed     2018 0736 Inpatient Consult to Podiatry Completed           Procedures Performed:  None             History of presenting illness:  This is a 77-year-old female who presented to the emergency room with worsening shortness of breath that started 2-3 days prior to admission at home.  She had been seen by her primary care provider and was also noted to have some lower extremity edema that it started after discharge from recent hospitalization.  She was recently hospitalized prior to this for cellulitis in her right foot secondary to MRSA as well as a left lower lobe pneumonia.  She had finished  all but 3 doses of her doxycycline.  Primary care at ordered a venous duplex of the lower extremities for further clarification it was felt that is likely secondary to her prednisone.  She prescribed her Lasix and potassium to be taken daily.  In the emergency room patient was noted to have oxygen saturations of 80% on room air chest x-ray did not show any acute findings.  CT of the chest was done to rule out pulmonary embolism which showed some right upper lobe bronchitis or bronchiolitis but no evidence of PE.  Her influenza screen was negative.  She did have a mild leukocytosis with a white count of 15,000 however should been on a tapering steroid dose at home.  She was admitted to the hospitalist service for further medical management.      Hospital Course:  Upon admission to the hospitalist service patient was continued on doxycycline to finish a course as well as breathing treatments mucolytics and steroids.  Dr. Mojica with pulmonology was consulted for further evaluation.  He did see and evaluate the patient and felt that she did have an exacerbation of her COPD but as well as some bronchiolitis.  He recommended continuation of her breathing treatments, steroid taper and mucolytics.    Patient has been very slow to recover.  She is continued to complain of some shortness of breath speech therapy was consulted and did a CT evaluation for which there was no overt signs of aspiration.  She did have her repeat CT scan of the chest however that showed some bronchiectasis which can be seen at the sequelae of chronic aspiration.  And given her intermittent episodes of upper respiratory congestion and complaints of reflux at times it is likely she is aspirating secondary to reflux.  I have started her on Pepcid as well as times before meals.    Patient leukocytosis did start to show some improvement however after to normalize the started to trend back up and went to 45,000 at the highest.  A CT scan of abdomen and  pelvis was done which was negative.  Patient did complain of some abdominal cramping and tenderness but never had any diarrhea.  Due to the significant leukocytosis she was started on oral vancomycin to cover for suspected C. difficile colitis.  Symptomatically she appears to be doing better she still has some intermittent nausea and heartburn.  There was no evidence of ischemic bowel her lactic acid was normal her CRP was only mildly elevated at 2.5.  There was no chest pain.  Her serum bicarbonate was within normal limits.    Patient is still quite weak and I did have a long discussion with the patient and her daughter regarding the need for rehabilitation.  She did agree to go to rehabilitation at [facility]  I feel that she would benefit from physical therapy as well as some pulmonary rehabilitation.    Patient's white count has been trending down she did have a mild bump today from 15,000 18,000 however lactic acid has been negative her CRP is 2.5.  I did speak with infectious disease physician Dr. Felix who feels that given the negative lactic acid and the low CRP this may be secondary to aspiration.  He does recommend placing her on Flagyl 500 mg 3 times a day and having a follow-up CBC with differential and CRP in 3-4 days.  A peripheral smear was done which showed normochromic normocytic red blood cells in the leukocytosis with unremarkable blood white blood cell morphology.  I have also evaluated patient with attending hospitalist Dr. Perry who agrees with treatment plan for discharge and have follow up CBC and CRP in 3 days.      Patient at this time is hemodynamically stable.  She does appear to be quite frail and weak however I do feel that rehabilitation will be of much benefit to this patient.  She is otherwise stable from the hospitalist standpoint for discharge to [facility].      Vital Signs:    Temp:  [97.2 °F (36.2 °C)-97.4 °F (36.3 °C)] 97.4 °F (36.3 °C)  Heart Rate:  [88-98] 93  Resp:   [18-22] 18  BP: (106-125)/(50-73) 125/73    Physical Exam:  General Appearance:    Alert and cooperative, not in any acute distress.   Head:    Atraumatic and normocephalic, without obvious abnormality.   Eyes:            PERRLA, conjunctivae and sclerae normal, no Icterus. No pallor. Extra-occular movements are within normal limits.   Ears:    Ears appear intact with no abnormalities noted.   Throat:   No oral lesions, no thrush, oral mucosa moist.   Neck:   Supple, trachea midline, no thyromegaly, no carotid bruit.   Back:     No kyphoscoliosis present. No tenderness to palpation,   range of motion normal.   Lungs:     Chest shape is normal. Breath sounds heard bilaterally equally.  No crackles or wheezing. No Pleural rub or bronchial breathing.  Referred upper airway rhonchi with poor cough    Heart:    Normal S1 and S2, no murmur, no gallop, no rub. No JVD   Abdomen:     Normal bowel sounds, no masses, no organomegaly. Soft        non-tender, non-distended, no guarding, no rebound                tenderness   Extremities:   Moves all extremities well, no edema, no cyanosis, no             clubbing   Pulses:   Pulses palpable and equal bilaterally   Skin:   No bleeding, bruising or rash   Lymph nodes:  Psychiatric/Behavior:    No palpable adenopathy    Normal mood, normal behavior   Neurologic:   Cranial nerves 2 - 12 grossly intact, sensation intact, Motor power is normal and equal bilaterally.           Pertinent Lab Results:       Results from last 7 days  Lab Units 02/06/18  0543 02/05/18  0555 02/03/18  0733 02/02/18  0513 02/01/18  0548   SODIUM mmol/L 139 136* 139 139 137   POTASSIUM mmol/L 4.4 4.8 3.9 4.7 4.3   CHLORIDE mmol/L 100 103 103 98 99   CO2 mmol/L 32.0* 29.0 30.0 26.0 30.0   BUN mg/dL 23* 22* 39* 46* 26*   CREATININE mg/dL 0.80 0.60 0.90 1.30 0.70   CALCIUM mg/dL 9.4 8.6 8.4 8.6 8.8   BILIRUBIN mg/dL  --   --  0.3 0.9 0.3   ALK PHOS U/L  --   --  56 53 58   ALT (SGPT) U/L  --   --  43 44 44    AST (SGOT) U/L  --   --  20 34 15   GLUCOSE mg/dL 101* 81 86 170* 82       Results from last 7 days  Lab Units 02/06/18  0543 02/05/18  0555 02/04/18  0518   WBC 10*3/mm3 18.71* 15.64* 20.32*   HEMOGLOBIN g/dL 11.0* 10.9* 10.3*   HEMATOCRIT % 33.4* 32.7* 31.5*   PLATELETS 10*3/mm3 202 179 183         Blood Culture   Date Value Ref Range Status   02/01/2018 No growth at 4 days  Preliminary   02/01/2018 No growth at 4 days  Preliminary         Pertinent Radiology Results:    Imaging Results (all)     Procedure Component Value Units Date/Time    XR Chest 1 View [[id number]] Collected:  01/22/18 2031     Updated:  01/22/18 2032    Narrative:       FINAL REPORT    TECHNIQUE:  An AP portable view of the chest was obtained.    CLINICAL HISTORY:  SOA    FINDINGS:  There is evidence of calcified granulomatous disease. The lungs  are hyperlucent and hyperexpanded consistent with COPD.  The  lungs are otherwise clear. The heart and vasculature are  unremarkable. There is no pleural disease, adenopathy, or  significant osseous abnormality.      Impression:       COPD. No acute disease.    Authenticated by Klever Og M.D. on 01/22/2018 08:31:26 PM    CT Chest Pulmonary Embolism With Contrast [[id number]] Collected:  01/22/18 2256     Updated:  01/22/18 2257    Narrative:       FINAL REPORT    TECHNIQUE:  Thin section axial images were obtained through the chest and  during the arterial phase of IV contrast administration. Coronal  3-D MIP reconstructed images were also provided.    CLINICAL HISTORY:  Shortness of breath, pe protocol    FINDINGS:  The pulmonary arteries are well-opacified and there is no  filling defect to indicate pulmonary embolism. The thoracic  aorta shows no significant abnormality. The lungs demonstrate  multifocal patchy groundglass opacities and that may represent  pulmonary edema and tree in bud micronodular opacities in the  right upper lobe consistent with bronchitis/bronchiolitis. There  is no pleural  disease. There is no adenopathy. There is no  significant osseous abnormality.      Impression:       No pulmonary embolism. Right upper lobe bronchitis/bronchiolitis.    Authenticated by Klever Og M.D. on 01/22/2018 10:56:29 PM    XR Chest PA & Lateral [[id number]] Collected:  01/26/18 1044     Updated:  01/26/18 1047    Narrative:       PROCEDURE: XR CHEST PA AND LATERAL-        HISTORY: dyspnea, cough; J44.1-Chronic obstructive pulmonary disease  with (acute) exacerbation; Z74.09-Other reduced mobility; Z74.09-Other  reduced mobility     COMPARISON: January 22, 2018.     FINDINGS: The heart is normal in size. The mediastinum is unremarkable.  Calcified granulomas are present in the left lung base. The lungs are  otherwise clear. There is no pneumothorax. There are no acute osseous  abnormalities.           Impression:       No acute cardiopulmonary process.           This report was finalized on 1/26/2018 10:45 AM by Sylvain Dent M.D..    XR Chest PA & Lateral [[id number]] Collected:  01/31/18 1000     Updated:  01/31/18 1003    Narrative:       PROCEDURE: XR CHEST PA AND LATERAL-     HISTORY: Cough, dyspnea; J44.1-Chronic obstructive pulmonary disease  with (acute) exacerbation; Z74.09-Other reduced mobility; Z74.09-Other  reduced mobility        COMPARISON: 1/26/2018     FINDINGS: The lungs are hyperlucent hyperexpanded with flattening of the  hemidiaphragms, consistent with chronic obstructive pulmonary disease.  Lungs are otherwise clear.       There is no evidence of effusion or other pleural disease.  The  mediastinum has a normal appearance.      The cardiac silhouette is unremarkable.             Impression:       No acute findings.Underlying emphysema.        This report was finalized on 1/31/2018 10:01 AM by Gregorio Ramirez MD.    CT Head Without Contrast [[id number]] Collected:  02/01/18 1600     Updated:  02/01/18 1603    Narrative:       PROCEDURE: CT HEAD WO CONTRAST-     HISTORY: Headache, acute,  norm neuro exam; J44.1-Chronic obstructive  pulmonary disease with (acute) exacerbation; Z74.09-Other reduced  mobility; Z74.09-Other reduced mobility        TECHNIQUE: Noncontrast exam     Moderate atrophy and chronic ischemic white matter changes are noted.           No cortical edema is present. There is no mass or hemorrhage. Ventricles  are normal.     Bone windows show no skull fracture or obvious destructive lesion.       Impression:       1. No acute intracranial abnormality or obvious mass.   2. Atrophy and chronic ischemic white matter changes as above.         This report was finalized on 2/1/2018 4:01 PM by Gregorio Ramirez MD.    CT Chest With Contrast [[id number]] Collected:  02/01/18 1601     Updated:  02/01/18 1604    Narrative:       PROCEDURE: CT CHEST W CONTRAST-     HISTORY: COPD exacerbation, complicated     COMPARISON: None.     TECHNIQUE: Axial CT without IV contrast administration.         FINDINGS:     No acute lung disease is present.  Emphysematous changes are present.  Mild biapical scarring is noted. Mild bronchiectasis is seen of the  right middle lobe and lingula which could be sequela of chronic  aspiration.     No pleural or pericardial effusion is seen. No adenopathy or mass lesion  is present.           Impression:       1. No acute findings        This study was performed with techniques to keep radiation doses as low  as reasonably achievable (ALARA). Individualized dose reduction  techniques using automated exposure control or adjustment of vA and/or  kV according to the patient size were employed.      This report was finalized on 2/1/2018 4:02 PM by Gregorio Ramirez MD.    CT Abdomen Pelvis With Contrast [[id number]] Collected:  02/01/18 1605     Updated:  02/01/18 1611    Narrative:       PROCEDURE: CT ABDOMEN PELVIS W CONTRAST-     TECHNIQUE: Oral and IV contrast enhanced exam     HISTORY: Abdominal pain, unspecified; J44.1-Chronic obstructive  pulmonary disease with (acute)  exacerbation; Z74.09-Other reduced  mobility; Z74.09-Other reduced mobility     COMPARISON: 12/26/2016.     FINDINGS:     ABDOMEN: Exophytic cyst is seen of the left kidney. There is moderate  left renal atrophy. Remaining solid organs are normal.     No bowel obstruction is present. Central mesenteric are patent.  Postcholecystectomy changes are noted.     PELVIS: Mild sigmoid diverticulosis is present. Appendix is not seen.  Advanced calcified plaque disease is seen of the iliac vessels with  probable underlying iliac stenosis. Post hysterectomy changes are  present. Pelvic floor prolapse is noted.       Impression:       1. No bowel obstruction or abscess  2. No acute findings        This study was performed with techniques to keep radiation doses as low  as reasonably achievable (ALARA). Individualized dose reduction  techniques using automated exposure control or adjustment of vA and/or  kV according to the patient size were employed.      This report was finalized on 2/1/2018 4:09 PM by Gregorio Ramirez MD.          Condition on Discharge:    Stable        Discharge Disposition:        Discharge Medication:     Sadie Tijerina   Home Medication Instructions ILYA:[id number]    Printed on:02/06/18 4065   Medication Information                      albuterol (PROVENTIL HFA;VENTOLIN HFA) 108 (90 BASE) MCG/ACT inhaler  Inhale 1 puff Every 4 (Four) Hours As Needed for shortness of air.             amLODIPine (NORVASC) 5 MG tablet  Take 1 tablet by mouth Daily.             aspirin 81 MG EC tablet  Take 81 mg by mouth Daily.             calcium carbonate (TUMS) 500 MG chewable tablet  Chew 500 mg 3 (Three) Times a Day Before Meals.             cholecalciferol (VITAMIN D3) 1000 UNITS tablet  Take 1,000 Units by mouth Daily.             clonazePAM (KlonoPIN) 0.5 MG tablet  Take 1 tablet by mouth At Night As Needed for Anxiety (Leg cramps).             enoxaparin (LOVENOX) 40 MG/0.4ML solution syringe  Inject 0.4 mL  under the skin Daily for 5 days.             famotidine (PEPCID) 20 MG tablet  Take 1 tablet by mouth Daily.             fluticasone (FLONASE) 50 MCG/ACT nasal spray  2 sprays by Each Nare route Daily.             guaiFENesin (MUCINEX) 600 MG 12 hr tablet  Take 1 tablet by mouth Every 12 (Twelve) Hours.             ipratropium-albuterol (DUO-NEB) 0.5-2.5 mg/mL nebulizer  Take 3 mL by nebulization Every 6 (Six) Hours While Awake.             losartan (COZAAR) 25 MG tablet  Take 1 tablet by mouth Daily.             meclizine (ANTIVERT) 12.5 MG tablet  TAKE 1 TABLET BY MOUTH THREE TIMES A DAY AS NEEDED for dizziness              metroNIDAZOLE (FLAGYL) 500 MG tablet  Take 1 tablet by mouth 3 (Three) Times a Day for 10 days.             nystatin (MYCOSTATIN) 591909 UNIT/ML suspension  Take 5 mL by mouth 4 (Four) Times a Day for 7 days.             pravastatin (PRAVACHOL) 20 MG tablet  TAKE 1 TABLET BY MOUTH IN THE EVENING              predniSONE (DELTASONE) 20 MG tablet  Take 1 tablet by mouth Daily With Breakfast. 1/2 tablet daily X 2 days then stop.             PREMARIN 0.625 MG tablet  TAKE 1 TABLET BY MOUTH ONE TIME A DAY              propranolol (INDERAL) 60 MG tablet  Take 1 tablet by mouth Daily.             saccharomyces boulardii (FLORASTOR) 250 MG capsule  Take 1 capsule by mouth 2 (Two) Times a Day for 10 days.             sertraline (ZOLOFT) 50 MG tablet  TAKE 1 TABLET BY MOUTH TWO TIMES A DAY              spironolactone (ALDACTONE) 25 MG tablet  Take 1 tablet by mouth Daily.             SYMBICORT 160-4.5 MCG/ACT inhaler  INHALE 2 PUFFS BY MOUTH TWO TIMES A DAY. Rinse mouth after each use.              tiotropium (SPIRIVA HANDIHALER) 18 MCG per inhalation capsule  Place 1 capsule into inhaler and inhale Daily.             traMADol (ULTRAM) 50 MG tablet  Take 1 tablet by mouth Every 6 (Six) Hours As Needed (pain).                 Discharge Diet:     Diet Instructions     Diet: Regular; Thin       Discharge  Diet:  Regular   Fluid Consistency:  Thin   Nutritional shakes with meals                 Activity at Discharge:     Activity Instructions     Discharge Activity       As tolerates                 Follow-up Appointments:    Future Appointments  Date Time Provider Department Center   2/8/2018 9:30 AM Kristian Wolff MD [facility] None        Additional Instructions:  CBC and CRP on Friday  Oxygen 2 liters nasal canula as needed to keep sats >90%  Follow up with Dr. Mojica in 1 month  Follow up with PCP after discharge from rehab.  Clean foot with NS and dry dressing daily.          Test Results Pending at Discharge:     Order Current Status    Blood Culture With [facility] - Blood, Preliminary result    Blood Culture With [facility] - Blood, Preliminary result    Respiratory Culture - Sputum, Cough Preliminary result             RUBY Gtz  02/06/18  3:03 PM    Time:  45  minutes were spent reviewing labs, history, evaluating patient and discharge planning.      Addendum:  Patients sputum culture is growing Strenotophomonas maltophilia sensitive to Levaquin.  I have spoken with RUBY at [facility] regarding these findings who will place patient on Levaquin for 5 days and extend the Flagyl.

## 2018-02-07 LAB
BACTERIA SPEC RESP CULT: ABNORMAL
BACTERIA SPEC RESP CULT: ABNORMAL
GRAM STN SPEC: ABNORMAL

## 2018-02-08 ENCOUNTER — OUTSIDE FACILITY SERVICE (OUTPATIENT)
Dept: INTERNAL MEDICINE | Facility: HOSPITAL | Age: 78
End: 2018-02-08

## 2018-02-08 PROCEDURE — G0180 MD CERTIFICATION HHA PATIENT: HCPCS | Performed by: INTERNAL MEDICINE

## 2018-03-01 ENCOUNTER — OUTSIDE FACILITY SERVICE (OUTPATIENT)
Dept: INTERNAL MEDICINE | Facility: CLINIC | Age: 78
End: 2018-03-01

## 2018-03-01 PROCEDURE — 99305 1ST NF CARE MODERATE MDM 35: CPT | Performed by: INTERNAL MEDICINE

## 2018-03-08 ENCOUNTER — OUTSIDE FACILITY SERVICE (OUTPATIENT)
Dept: INTERNAL MEDICINE | Facility: CLINIC | Age: 78
End: 2018-03-08

## 2018-03-08 PROCEDURE — 99308 SBSQ NF CARE LOW MDM 20: CPT | Performed by: INTERNAL MEDICINE

## 2018-03-19 ENCOUNTER — OUTSIDE FACILITY SERVICE (OUTPATIENT)
Dept: FAMILY MEDICINE CLINIC | Facility: CLINIC | Age: 78
End: 2018-03-19

## 2018-03-19 PROCEDURE — 99309 SBSQ NF CARE MODERATE MDM 30: CPT | Performed by: NURSE PRACTITIONER

## 2018-03-22 ENCOUNTER — OUTSIDE FACILITY SERVICE (OUTPATIENT)
Dept: INTERNAL MEDICINE | Facility: CLINIC | Age: 78
End: 2018-03-22

## 2018-03-22 PROCEDURE — 99309 SBSQ NF CARE MODERATE MDM 30: CPT | Performed by: INTERNAL MEDICINE

## 2018-04-08 NOTE — PROGRESS NOTES
I was called by nursing staff that patient appears to be in distress and family fed patient 40 min ago some pudding. I went and examined the patient. she did not appear to be in any distress at that time however crackles are heard on bilateral lobes. likely due to her Pneumonia. good air entry bilaterally. patient saturating 97-98 % on 3 liters nasal canula. code status to be discussed with family as after reviewing consult notes patient has a poor prognosis. follow with patient throughout evening. will discuss with morning staff. QUICK REFERENCE INFORMATION:  The ABCs of the Annual Wellness Visit    Subsequent Medicare Wellness Visit    HEALTH RISK ASSESSMENT    1940    Recent Hospitalizations:  No hospitalization(s) within the last year..        Current Medical Providers:  Patient Care Team:  Kristian Wolff MD as PCP - General  Kristian Wolff MD as PCP - Family Medicine  Bayron Grimaldo MD as Consulting Physician (General Surgery)        Smoking Status:  History   Smoking Status   • Current Every Day Smoker   Smokeless Tobacco   • Not on file       Alcohol Consumption:  History   Alcohol Use No       Depression Screen:   PHQ-9 Depression Screening 5/11/2016   Little interest or pleasure in doing things 0   Feeling down, depressed, or hopeless 3   Trouble falling or staying asleep, or sleeping too much 1   Feeling tired or having little energy 3   Poor appetite or overeating 3   Feeling bad about yourself - or that you are a failure or have let yourself or your family down 0   Trouble concentrating on things, such as reading the newspaper or watching television 0   Moving or speaking so slowly that other people could have noticed. Or the opposite - being so fidgety or restless that you have been moving around a lot more than usual 0   Thoughts that you would be better off dead, or of hurting yourself in some way 0   PHQ-9 Total Score 10       Health Habits and Functional and Cognitive Screening:  Functional & Cognitive Status 5/11/2016   Do you have difficulty preparing food and eating? No   Do you have difficulty bathing yourself? No   Do you have difficulty getting dressed? No   Do you have difficulty using the toilet? No   Do you have difficulty moving around from place to place? No   In the past year have you fallen or experienced a near fall? Yes   Do you need help using the phone?  No   Are you deaf or do you have serious difficulty hearing?  No   Do you need help with transportation? No   Do you need help shopping? No   Do you need  help preparing meals?  No   Do you need help with housework?  No   Do you need help with laundry? No   Do you need help taking your medications? No   Do you need help managing money? No   Do you have difficulty concentrating, remembering or making decisions? No              Does the patient have evidence of cognitive impairment? No    Aspirin use counseling: Taking ASA appropriately as indicated      Recent Lab Results:  CMP:  Lab Results   Component Value Date    GLU 89 01/18/2017    BUN 14 01/18/2017    CREATININE 0.90 01/18/2017    EGFRIFNONA 61 01/18/2017    EGFRIFAFRI 73 01/18/2017    BCR 15.6 01/18/2017     01/18/2017    K 5.1 01/18/2017    CO2 24.0 01/18/2017    CALCIUM 9.4 01/18/2017    PROTENTOTREF 6.4 01/18/2017    ALBUMIN 3.70 01/18/2017    LABGLOBREF 2.7 01/18/2017    LABIL2 1.4 (L) 01/18/2017    BILITOT 0.2 (L) 01/18/2017    ALKPHOS 96 01/18/2017    AST 26 01/18/2017    ALT 27 01/18/2017     Lipid Panel:  Lab Results   Component Value Date    CHLPL 182 12/20/2016    TRIG 141 12/20/2016    HDL 50 12/20/2016    VLDL 28.2 12/20/2016     (H) 12/20/2016     HbA1c:  Lab Results   Component Value Date    HGBA1C 5.1 05/04/2016       Visual Acuity:  No exam data present    Age-appropriate Screening Schedule:  Refer to the list below for future screening recommendations based on patient's age, sex and/or medical conditions. Orders for these recommended tests are listed in the plan section. The patient has been provided with a written plan.    Health Maintenance   Topic Date Due   • INFLUENZA VACCINE  08/01/2017   • LIPID PANEL  12/20/2017   • MAMMOGRAM  03/23/2019   • TDAP/TD VACCINES (2 - Td) 09/08/2026   • PNEUMOCOCCAL VACCINES (65+ LOW/MEDIUM RISK)  Completed   • ZOSTER VACCINE  Completed        Subjective   History of Present Illness    Sadie Tijerina is a 76 y.o. female who presents for an Subsequent Wellness Visit.    The following portions of the patient's history were reviewed and  updated as appropriate: allergies, current medications, past family history, past medical history, past social history, past surgical history and problem list.    Outpatient Medications Prior to Visit   Medication Sig Dispense Refill   • albuterol (PROVENTIL HFA;VENTOLIN HFA) 108 (90 BASE) MCG/ACT inhaler Inhale 1 puff Every 4 (Four) Hours As Needed for shortness of air. 18 g 3   • albuterol (PROVENTIL) (2.5 MG/3ML) 0.083% nebulizer solution USE 1 AMPULE IN NEBULIZER FOUR TIMES A DAY AS NEEDED 150 mL 4   • aspirin 81 MG EC tablet Take 81 mg by mouth Daily.     • benzonatate (TESSALON) 200 MG capsule Take 1 capsule by mouth 3 (Three) Times a Day As Needed for Cough. 45 capsule 1   • clonazePAM (KlonoPIN) 0.5 MG tablet TAKE 1 TABLET BY MOUTH AT BEDTIME  30 tablet 0   • estrogens, conjugated, (PREMARIN) 0.625 MG tablet Take daily for 21 days then do not take for 7 days. 90 tablet 2   • Fluticasone Propionate, Inhal, (FLOVENT DISKUS) 250 MCG/BLIST aerosol powder  Take 1 puff by mouth 2 (Two) Times a Day. 1 each 5   • losartan (COZAAR) 50 MG tablet Take 1 tablet by mouth Daily. 45 tablet 3   • meclizine (ANTIVERT) 12.5 MG tablet TAKE 1 TABLET BY MOUTH THREE TIMES A DAY AS NEEDED for dizziness  90 tablet 0   • meclizine (ANTIVERT) 12.5 MG tablet TAKE 1 TABLET BY MOUTH THREE TIMES A DAY AS NEEDED for dizziness  90 tablet 0   • meclizine (ANTIVERT) 12.5 MG tablet TAKE 1 TABLET BY MOUTH THREE TIMES A DAY AS NEEDED for dizziness  90 tablet 0   • pravastatin (PRAVACHOL) 20 MG tablet Take 1 tablet by mouth Every Night. 90 tablet 3   • PREMARIN 0.625 MG tablet TAKE 1 TABLET BY MOUTH ONE TIME A DAY  90 tablet 1   • propranolol (INDERAL) 60 MG tablet Take 1 tablet by mouth Daily. 90 tablet 3   • propranolol (INDERAL) 60 MG tablet TAKE 1 TABLET BY MOUTH ONE TIME A DAY  30 tablet 3   • sertraline (ZOLOFT) 50 MG tablet Take 1 tablet by mouth 2 (Two) Times a Day. 180 tablet 1   • SPIRIVA HANDIHALER 18 MCG per inhalation capsule INHALE  CONTENTS 1 CAPSULE BY MOUTH ONE TIME A DAY  30 capsule 8   • traMADol (ULTRAM) 50 MG tablet Take 1 tablet by mouth Daily As Needed (pain). 30 tablet 2     No facility-administered medications prior to visit.        Patient Active Problem List   Diagnosis   • Benign paroxysmal positional vertigo   • Calf cramp   • Mixed anxiety depressive disorder   • Dizziness   • Fatigue   • Gastroesophageal reflux disease without esophagitis   • Hematuria   • Hyperlipidemia   • Hypertension   • Low back pain   • Orthostatic hypotension   • Pulmonary emphysema   • Restless legs syndrome   • Pre-syncope   • Essential tremor   • Vitamin D deficiency   • Balance problem   • Tension headache       Advance Care Planning:  has NO advance directive - not interested in additional information    Identification of Risk Factors:  Risk factors include: depression.    Review of Systems   Constitutional: Negative.    HENT: Negative.    Eyes: Negative.    Respiratory: Negative.    Cardiovascular: Negative.    Gastrointestinal: Negative.    Endocrine: Negative.    Genitourinary: Negative.    Musculoskeletal: Negative.    Skin: Negative.    Allergic/Immunologic: Negative.    Neurological: Negative.    Hematological: Negative.    Psychiatric/Behavioral: Negative.        Compared to one year ago, the patient feels her physical health is the same.  Compared to one year ago, the patient feels her mental health is the same.    Objective     Physical Exam   Constitutional: She is oriented to person, place, and time. She appears well-developed and well-nourished.   HENT:   Head: Normocephalic and atraumatic.   Right Ear: External ear normal.   Left Ear: External ear normal.   Nose: Nose normal.   Mouth/Throat: Oropharynx is clear and moist.   Eyes: Conjunctivae and EOM are normal. Pupils are equal, round, and reactive to light.   Neck: Normal range of motion. Neck supple.   Cardiovascular: Normal rate, regular rhythm, normal heart sounds and intact distal  pulses.    Pulmonary/Chest: Effort normal and breath sounds normal.   Abdominal: Soft. Bowel sounds are normal.   Musculoskeletal: Normal range of motion.   Neurological: She is alert and oriented to person, place, and time. She has normal reflexes.   Skin: Skin is warm and dry.   Psychiatric: She has a normal mood and affect. Her behavior is normal. Judgment and thought content normal.       There were no vitals filed for this visit.    There is no height or weight on file to calculate BMI.  Discussed the patient's BMI with her. The BMI is in the acceptable range.    Assessment/Plan   Patient Self-Management and Personalized Health Advice  The patient has been provided with information about: diet and preventive services including:   · Advance directive, Nutrition counseling provided.    Visit Diagnoses:  No diagnosis found.    No orders of the defined types were placed in this encounter.      Outpatient Encounter Prescriptions as of 6/2/2017   Medication Sig Dispense Refill   • albuterol (PROVENTIL HFA;VENTOLIN HFA) 108 (90 BASE) MCG/ACT inhaler Inhale 1 puff Every 4 (Four) Hours As Needed for shortness of air. 18 g 3   • albuterol (PROVENTIL) (2.5 MG/3ML) 0.083% nebulizer solution USE 1 AMPULE IN NEBULIZER FOUR TIMES A DAY AS NEEDED 150 mL 4   • aspirin 81 MG EC tablet Take 81 mg by mouth Daily.     • benzonatate (TESSALON) 200 MG capsule Take 1 capsule by mouth 3 (Three) Times a Day As Needed for Cough. 45 capsule 1   • clonazePAM (KlonoPIN) 0.5 MG tablet TAKE 1 TABLET BY MOUTH AT BEDTIME  30 tablet 0   • estrogens, conjugated, (PREMARIN) 0.625 MG tablet Take daily for 21 days then do not take for 7 days. 90 tablet 2   • Fluticasone Propionate, Inhal, (FLOVENT DISKUS) 250 MCG/BLIST aerosol powder  Take 1 puff by mouth 2 (Two) Times a Day. 1 each 5   • losartan (COZAAR) 50 MG tablet Take 1 tablet by mouth Daily. 45 tablet 3   • meclizine (ANTIVERT) 12.5 MG tablet TAKE 1 TABLET BY MOUTH THREE TIMES A DAY AS NEEDED  for dizziness  90 tablet 0   • meclizine (ANTIVERT) 12.5 MG tablet TAKE 1 TABLET BY MOUTH THREE TIMES A DAY AS NEEDED for dizziness  90 tablet 0   • meclizine (ANTIVERT) 12.5 MG tablet TAKE 1 TABLET BY MOUTH THREE TIMES A DAY AS NEEDED for dizziness  90 tablet 0   • pravastatin (PRAVACHOL) 20 MG tablet Take 1 tablet by mouth Every Night. 90 tablet 3   • PREMARIN 0.625 MG tablet TAKE 1 TABLET BY MOUTH ONE TIME A DAY  90 tablet 1   • propranolol (INDERAL) 60 MG tablet Take 1 tablet by mouth Daily. 90 tablet 3   • propranolol (INDERAL) 60 MG tablet TAKE 1 TABLET BY MOUTH ONE TIME A DAY  30 tablet 3   • sertraline (ZOLOFT) 50 MG tablet Take 1 tablet by mouth 2 (Two) Times a Day. 180 tablet 1   • SPIRIVA HANDIHALER 18 MCG per inhalation capsule INHALE CONTENTS 1 CAPSULE BY MOUTH ONE TIME A DAY  30 capsule 8   • traMADol (ULTRAM) 50 MG tablet Take 1 tablet by mouth Daily As Needed (pain). 30 tablet 2     No facility-administered encounter medications on file as of 6/2/2017.        Reviewed use of high risk medication in the elderly: no  Reviewed for potential of harmful drug interactions in the elderly: no    Follow Up:  No Follow-up on file.     An After Visit Summary and PPPS with all of these plans were given to the patient.             Headache, and dizzy seen by neuro,Left mastoid effusion, seen ENT, no further tx from ENT      MRI head micro ischemic changes continue ASA 81mg daily      TMJ, resolved after aleve or tylenol for now      HTN continue Losartan 25mg daily, due to dizzy with 50mg      Anemia resolved   allergy, d/c medicine  COPD CXR emphysema continue medications,  Depression and anxiety  continue sertraline 50 bid,clonazepam,  Fatigue still some  tobacco use cut down  dizzy,improved after medicine.continue, carotid, echo, holter unremarkble  Vitamin D deficiency continue supplement.   Low back pain improved after Flexeril and Tylenol,refill tramadol, xray:mild DJD  HRT patient still wants it.wean very  slow,   fatigue, improved   hyperlipidemia, continue pravastatin   RLS, continue clonazepam   tremor continue propranololp helps per patient. continue   edema trace Watch  Knee OA xr OA, refused cortisone shot, or glucosamine,  tramadol  Refuse disrobing for PE  vacUTD   Follow-up 4 mo

## 2018-04-13 ENCOUNTER — TELEPHONE (OUTPATIENT)
Dept: INTERNAL MEDICINE | Facility: CLINIC | Age: 78
End: 2018-04-13

## 2018-04-13 RX ORDER — TRAMADOL HYDROCHLORIDE 50 MG/1
50 TABLET ORAL EVERY 6 HOURS PRN
Qty: 10 TABLET | Refills: 0 | Status: CANCELLED | OUTPATIENT
Start: 2018-04-13

## 2018-04-13 RX ORDER — CLONAZEPAM 0.5 MG/1
0.5 TABLET ORAL NIGHTLY PRN
Qty: 10 TABLET | Refills: 0 | Status: SHIPPED | OUTPATIENT
Start: 2018-04-13 | End: 2018-05-18 | Stop reason: SDUPTHER

## 2018-04-13 NOTE — TELEPHONE ENCOUNTER
KAMILA FROM [facility] STATED SHE JUST SPOKE WITH DR SANDHU ORDER MED FOR PT AND SHE NEEDS TO ORDER ANOTHER ONE.    TRAMADOL Q*6 PRN     PH #[phone]    THANK YOU

## 2018-04-26 ENCOUNTER — OUTSIDE FACILITY SERVICE (OUTPATIENT)
Dept: INTERNAL MEDICINE | Facility: CLINIC | Age: 78
End: 2018-04-26

## 2018-04-26 PROCEDURE — 99308 SBSQ NF CARE LOW MDM 20: CPT | Performed by: INTERNAL MEDICINE

## 2018-05-10 ENCOUNTER — OUTSIDE FACILITY SERVICE (OUTPATIENT)
Dept: INTERNAL MEDICINE | Facility: CLINIC | Age: 78
End: 2018-05-10

## 2018-05-10 PROCEDURE — 99315 NF DSCHRG MGMT 30 MIN/LESS: CPT | Performed by: INTERNAL MEDICINE

## 2018-05-16 RX ORDER — ERGOCALCIFEROL 1.25 MG/1
CAPSULE ORAL
Qty: 30 CAPSULE | Refills: 1 | Status: SHIPPED | OUTPATIENT
Start: 2018-05-16 | End: 2018-07-25

## 2018-05-18 RX ORDER — CLONAZEPAM 0.5 MG/1
0.5 TABLET ORAL NIGHTLY PRN
Qty: 10 TABLET | Refills: 0 | Status: SHIPPED | OUTPATIENT
Start: 2018-05-18 | End: 2018-06-18 | Stop reason: SDUPTHER

## 2018-05-18 RX ORDER — OMEPRAZOLE 40 MG/1
40 CAPSULE, DELAYED RELEASE ORAL EVERY MORNING
Qty: 30 CAPSULE | Refills: 3 | Status: SHIPPED | OUTPATIENT
Start: 2018-05-18 | End: 2018-09-17 | Stop reason: SDUPTHER

## 2018-05-18 RX ORDER — CLONAZEPAM 0.5 MG/1
0.5 TABLET ORAL NIGHTLY PRN
Qty: 10 TABLET | Refills: 0 | Status: SHIPPED | OUTPATIENT
Start: 2018-05-18 | End: 2018-05-18 | Stop reason: SDUPTHER

## 2018-05-24 ENCOUNTER — TELEPHONE (OUTPATIENT)
Dept: INTERNAL MEDICINE | Facility: CLINIC | Age: 78
End: 2018-05-24

## 2018-05-24 RX ORDER — IPRATROPIUM BROMIDE AND ALBUTEROL SULFATE 2.5; .5 MG/3ML; MG/3ML
3 SOLUTION RESPIRATORY (INHALATION)
Qty: 360 ML
Start: 2018-05-24 | End: 2018-05-25 | Stop reason: SDUPTHER

## 2018-05-24 NOTE — TELEPHONE ENCOUNTER
Called pt daughter back to see what was wrong with the med. She said that it needed to have a PA. Will call insurance in the morning.

## 2018-05-24 NOTE — TELEPHONE ENCOUNTER
Patients daughter chris has called regarding her mother. She would like to speak with you about her nebulizer medication.

## 2018-05-25 RX ORDER — IPRATROPIUM BROMIDE AND ALBUTEROL SULFATE 2.5; .5 MG/3ML; MG/3ML
3 SOLUTION RESPIRATORY (INHALATION)
Qty: 360 ML
Start: 2018-05-25 | End: 2018-05-31 | Stop reason: SDUPTHER

## 2018-05-31 RX ORDER — IPRATROPIUM BROMIDE AND ALBUTEROL SULFATE 2.5; .5 MG/3ML; MG/3ML
3 SOLUTION RESPIRATORY (INHALATION)
Qty: 360 ML | Refills: 5 | Status: SHIPPED | OUTPATIENT
Start: 2018-05-31 | End: 2018-07-25

## 2018-06-06 RX ORDER — ALBUTEROL SULFATE 2.5 MG/3ML
2.5 SOLUTION RESPIRATORY (INHALATION) EVERY 4 HOURS PRN
Qty: 120 VIAL | Refills: 12 | Status: SHIPPED | OUTPATIENT
Start: 2018-06-06 | End: 2018-06-15 | Stop reason: SDUPTHER

## 2018-06-15 RX ORDER — ALBUTEROL SULFATE 2.5 MG/3ML
2.5 SOLUTION RESPIRATORY (INHALATION) EVERY 4 HOURS PRN
Qty: 120 VIAL | Refills: 12 | Status: SHIPPED | OUTPATIENT
Start: 2018-06-15 | End: 2018-06-18 | Stop reason: SDUPTHER

## 2018-06-15 NOTE — TELEPHONE ENCOUNTER
Patients daughter neela godwin called requesting a refill on her mothers nebulizer medication.    Patients daughter requested that this be sent to [facility] pharmacy.    Patient's medication has been resent to the [facility] pharmacy.    Patients daughter has been notified.

## 2018-06-18 RX ORDER — SPIRONOLACTONE 25 MG/1
TABLET ORAL
Qty: 30 TABLET | Refills: 0 | Status: SHIPPED | OUTPATIENT
Start: 2018-06-18 | End: 2018-07-27 | Stop reason: SDUPTHER

## 2018-06-18 RX ORDER — ALBUTEROL SULFATE 2.5 MG/3ML
2.5 SOLUTION RESPIRATORY (INHALATION) EVERY 4 HOURS PRN
Qty: 120 VIAL | Refills: 12 | Status: SHIPPED | OUTPATIENT
Start: 2018-06-18 | End: 2018-06-20 | Stop reason: SDUPTHER

## 2018-06-18 RX ORDER — CLONAZEPAM 0.5 MG/1
TABLET ORAL
Qty: 10 TABLET | Refills: 0 | Status: SHIPPED | OUTPATIENT
Start: 2018-06-18 | End: 2018-06-20 | Stop reason: SDUPTHER

## 2018-06-18 RX ORDER — LORATADINE 10 MG/1
TABLET ORAL
Qty: 30 TABLET | Refills: 0 | Status: SHIPPED | OUTPATIENT
Start: 2018-06-18 | End: 2018-07-16 | Stop reason: SDUPTHER

## 2018-06-20 RX ORDER — CLONAZEPAM 0.5 MG/1
0.5 TABLET ORAL NIGHTLY PRN
Qty: 30 TABLET | Refills: 3 | Status: SHIPPED | OUTPATIENT
Start: 2018-06-20 | End: 2018-10-14 | Stop reason: SDUPTHER

## 2018-06-20 RX ORDER — ALBUTEROL SULFATE 2.5 MG/3ML
2.5 SOLUTION RESPIRATORY (INHALATION) EVERY 4 HOURS PRN
Qty: 120 VIAL | Refills: 12 | Status: SHIPPED | OUTPATIENT
Start: 2018-06-20 | End: 2019-02-28 | Stop reason: HOSPADM

## 2018-07-16 RX ORDER — LORATADINE 10 MG/1
TABLET ORAL
Qty: 30 TABLET | Refills: 0 | Status: SHIPPED | OUTPATIENT
Start: 2018-07-16 | End: 2018-08-18 | Stop reason: SDUPTHER

## 2018-07-17 ENCOUNTER — OUTSIDE FACILITY SERVICE (OUTPATIENT)
Dept: INTERNAL MEDICINE | Facility: CLINIC | Age: 78
End: 2018-07-17

## 2018-07-17 PROCEDURE — G0180 MD CERTIFICATION HHA PATIENT: HCPCS | Performed by: INTERNAL MEDICINE

## 2018-07-23 DIAGNOSIS — E55.9 VITAMIN D DEFICIENCY: ICD-10-CM

## 2018-07-23 DIAGNOSIS — I10 ESSENTIAL HYPERTENSION: Primary | Chronic | ICD-10-CM

## 2018-07-23 DIAGNOSIS — E78.5 HYPERLIPIDEMIA, UNSPECIFIED HYPERLIPIDEMIA TYPE: ICD-10-CM

## 2018-07-23 DIAGNOSIS — I50.32 CHRONIC DIASTOLIC HEART FAILURE (HCC): Chronic | ICD-10-CM

## 2018-07-24 LAB
25(OH)D3+25(OH)D2 SERPL-MCNC: 53 NG/ML
ALBUMIN SERPL-MCNC: 3.8 G/DL (ref 3.5–5)
ALBUMIN/GLOB SERPL: 1.1 G/DL (ref 1–2)
ALP SERPL-CCNC: 77 U/L (ref 38–126)
ALT SERPL-CCNC: 20 U/L (ref 13–69)
AST SERPL-CCNC: 22 U/L (ref 15–46)
BASOPHILS # BLD AUTO: 0.06 10*3/MM3 (ref 0–0.2)
BASOPHILS NFR BLD AUTO: 0.6 % (ref 0–2.5)
BILIRUB SERPL-MCNC: 0.3 MG/DL (ref 0.2–1.3)
BUN SERPL-MCNC: 23 MG/DL (ref 7–20)
BUN/CREAT SERPL: 23 (ref 7.1–23.5)
CALCIUM SERPL-MCNC: 9.9 MG/DL (ref 8.4–10.2)
CHLORIDE SERPL-SCNC: 97 MMOL/L (ref 98–107)
CHOLEST SERPL-MCNC: 171 MG/DL (ref 0–199)
CK SERPL-CCNC: 24 U/L (ref 30–170)
CO2 SERPL-SCNC: 37 MMOL/L (ref 26–30)
CREAT SERPL-MCNC: 1 MG/DL (ref 0.6–1.3)
EOSINOPHIL # BLD AUTO: 0.34 10*3/MM3 (ref 0–0.7)
EOSINOPHIL NFR BLD AUTO: 3.4 % (ref 0–7)
ERYTHROCYTE [DISTWIDTH] IN BLOOD BY AUTOMATED COUNT: 13.2 % (ref 11.5–14.5)
GLOBULIN SER CALC-MCNC: 3.4 GM/DL
GLUCOSE SERPL-MCNC: 102 MG/DL (ref 74–98)
HCT VFR BLD AUTO: 39.8 % (ref 37–47)
HDLC SERPL-MCNC: 55 MG/DL (ref 40–60)
HGB BLD-MCNC: 12.1 G/DL (ref 12–16)
IMM GRANULOCYTES # BLD: 0.03 10*3/MM3 (ref 0–0.06)
IMM GRANULOCYTES NFR BLD: 0.3 % (ref 0–0.6)
LDLC SERPL CALC-MCNC: 94 MG/DL (ref 0–99)
LYMPHOCYTES # BLD AUTO: 2.62 10*3/MM3 (ref 0.6–3.4)
LYMPHOCYTES NFR BLD AUTO: 26.5 % (ref 10–50)
MCH RBC QN AUTO: 27.1 PG (ref 27–31)
MCHC RBC AUTO-ENTMCNC: 30.4 G/DL (ref 30–37)
MCV RBC AUTO: 89.2 FL (ref 81–99)
MONOCYTES # BLD AUTO: 0.91 10*3/MM3 (ref 0–0.9)
MONOCYTES NFR BLD AUTO: 9.2 % (ref 0–12)
NEUTROPHILS # BLD AUTO: 5.92 10*3/MM3 (ref 2–6.9)
NEUTROPHILS NFR BLD AUTO: 60 % (ref 37–80)
NRBC BLD AUTO-RTO: 0 /100 WBC (ref 0–0)
PLATELET # BLD AUTO: 245 10*3/MM3 (ref 130–400)
POTASSIUM SERPL-SCNC: 4.5 MMOL/L (ref 3.5–5.1)
PROT SERPL-MCNC: 7.2 G/DL (ref 6.3–8.2)
RBC # BLD AUTO: 4.46 10*6/MM3 (ref 4.2–5.4)
SODIUM SERPL-SCNC: 140 MMOL/L (ref 137–145)
TRIGL SERPL-MCNC: 110 MG/DL
TSH SERPL DL<=0.005 MIU/L-ACNC: 3.99 MIU/ML (ref 0.47–4.68)
VLDLC SERPL CALC-MCNC: 22 MG/DL
WBC # BLD AUTO: 9.88 10*3/MM3 (ref 4.8–10.8)

## 2018-07-25 ENCOUNTER — HOSPITAL ENCOUNTER (OUTPATIENT)
Dept: GENERAL RADIOLOGY | Facility: HOSPITAL | Age: 78
Discharge: HOME | End: 2018-07-25
Attending: INTERNAL MEDICINE | Admitting: INTERNAL MEDICINE

## 2018-07-25 ENCOUNTER — OFFICE VISIT (OUTPATIENT)
Dept: INTERNAL MEDICINE | Facility: CLINIC | Age: 78
End: 2018-07-25

## 2018-07-25 ENCOUNTER — TELEPHONE (OUTPATIENT)
Dept: INTERNAL MEDICINE | Facility: CLINIC | Age: 78
End: 2018-07-25

## 2018-07-25 VITALS
RESPIRATION RATE: 14 BRPM | HEIGHT: 62 IN | TEMPERATURE: 97.8 F | WEIGHT: 119 LBS | DIASTOLIC BLOOD PRESSURE: 78 MMHG | BODY MASS INDEX: 21.9 KG/M2 | HEART RATE: 72 BPM | SYSTOLIC BLOOD PRESSURE: 110 MMHG | OXYGEN SATURATION: 93 %

## 2018-07-25 DIAGNOSIS — I87.2 VENOUS INSUFFICIENCY OF BOTH LOWER EXTREMITIES: Chronic | ICD-10-CM

## 2018-07-25 DIAGNOSIS — M54.50 ACUTE LEFT-SIDED LOW BACK PAIN WITHOUT SCIATICA: ICD-10-CM

## 2018-07-25 DIAGNOSIS — G25.0 ESSENTIAL TREMOR: ICD-10-CM

## 2018-07-25 DIAGNOSIS — G25.81 RESTLESS LEGS SYNDROME: ICD-10-CM

## 2018-07-25 DIAGNOSIS — F41.8 MIXED ANXIETY DEPRESSIVE DISORDER: ICD-10-CM

## 2018-07-25 DIAGNOSIS — M79.89 LEG SWELLING: ICD-10-CM

## 2018-07-25 DIAGNOSIS — I10 ESSENTIAL HYPERTENSION: Chronic | ICD-10-CM

## 2018-07-25 DIAGNOSIS — Z72.0 TOBACCO ABUSE DISORDER: Chronic | ICD-10-CM

## 2018-07-25 DIAGNOSIS — K21.9 GASTROESOPHAGEAL REFLUX DISEASE WITHOUT ESOPHAGITIS: ICD-10-CM

## 2018-07-25 DIAGNOSIS — R53.83 OTHER FATIGUE: ICD-10-CM

## 2018-07-25 DIAGNOSIS — E55.9 VITAMIN D DEFICIENCY: ICD-10-CM

## 2018-07-25 DIAGNOSIS — E78.5 HYPERLIPIDEMIA, UNSPECIFIED HYPERLIPIDEMIA TYPE: ICD-10-CM

## 2018-07-25 DIAGNOSIS — M54.50 ACUTE LEFT-SIDED LOW BACK PAIN WITHOUT SCIATICA: Primary | ICD-10-CM

## 2018-07-25 PROBLEM — J18.9 PNEUMONIA DUE TO INFECTIOUS ORGANISM: Status: RESOLVED | Noted: 2018-01-15 | Resolved: 2018-07-25

## 2018-07-25 PROBLEM — A04.72 C. DIFFICILE COLITIS: Status: RESOLVED | Noted: 2018-02-06 | Resolved: 2018-07-25

## 2018-07-25 PROBLEM — T14.8XXA WOUND INFECTION: Status: RESOLVED | Noted: 2018-01-02 | Resolved: 2018-07-25

## 2018-07-25 PROBLEM — L08.9 WOUND INFECTION: Status: RESOLVED | Noted: 2018-01-02 | Resolved: 2018-07-25

## 2018-07-25 PROBLEM — IMO0002 WOUND ABSCESS: Status: RESOLVED | Noted: 2018-01-18 | Resolved: 2018-07-25

## 2018-07-25 PROBLEM — J96.21 ACUTE ON CHRONIC RESPIRATORY FAILURE WITH HYPOXIA (HCC): Status: RESOLVED | Noted: 2018-01-23 | Resolved: 2018-07-25

## 2018-07-25 PROBLEM — L03.119 CELLULITIS OF FOOT: Status: RESOLVED | Noted: 2018-01-10 | Resolved: 2018-07-25

## 2018-07-25 PROBLEM — J44.1 CHRONIC OBSTRUCTIVE PULMONARY DISEASE WITH ACUTE EXACERBATION (HCC): Status: RESOLVED | Noted: 2018-01-22 | Resolved: 2018-07-25

## 2018-07-25 PROBLEM — D72.829 LEUKOCYTOSIS: Chronic | Status: RESOLVED | Noted: 2017-10-28 | Resolved: 2018-07-25

## 2018-07-25 PROBLEM — J44.1 COPD EXACERBATION (HCC): Status: RESOLVED | Noted: 2017-10-28 | Resolved: 2018-07-25

## 2018-07-25 PROCEDURE — 72110 X-RAY EXAM L-2 SPINE 4/>VWS: CPT

## 2018-07-25 PROCEDURE — 99214 OFFICE O/P EST MOD 30 MIN: CPT | Performed by: INTERNAL MEDICINE

## 2018-07-25 RX ORDER — TRAMADOL HYDROCHLORIDE 50 MG/1
50 TABLET ORAL EVERY 6 HOURS PRN
Qty: 10 TABLET | Refills: 0 | Status: SHIPPED | OUTPATIENT
Start: 2018-07-25 | End: 2019-04-10 | Stop reason: SDUPTHER

## 2018-07-25 RX ORDER — ONDANSETRON 4 MG/1
TABLET, FILM COATED ORAL
Refills: 0 | COMMUNITY
Start: 2018-05-11 | End: 2019-04-10

## 2018-07-25 RX ORDER — TRAMADOL HYDROCHLORIDE 50 MG/1
50 TABLET ORAL EVERY 6 HOURS PRN
Qty: 10 TABLET | Refills: 0 | Status: SHIPPED | OUTPATIENT
Start: 2018-07-25 | End: 2018-07-25 | Stop reason: SDUPTHER

## 2018-07-25 RX ORDER — RANITIDINE 150 MG/1
150 TABLET ORAL DAILY
Refills: 0 | COMMUNITY
Start: 2018-05-11 | End: 2019-03-07

## 2018-07-25 NOTE — PROGRESS NOTES
Subjective   Sadie Tijerina is a 78 y.o. female.     Chief Complaint   Patient presents with   • Fall   • Earache   • Follow-up   • Labs Only       History of Present Illness   Patient here for follow-up.  COPD stable now on medication.  Palpitations stable medication.  Hyperlipidemia stable medication.  CBC normal.  Patient cut down tobacco use significantly.  Low back pain on medication stable except that today patient fell on the chest in her bedroom injured the left lower back bruise achy palpation causes severe pain.  Also complains some ear discomfort.    Current Outpatient Prescriptions:   •  albuterol (PROVENTIL HFA;VENTOLIN HFA) 108 (90 BASE) MCG/ACT inhaler, Inhale 1 puff Every 4 (Four) Hours As Needed for shortness of air., Disp: 18 g, Rfl: 3  •  albuterol (PROVENTIL) (2.5 MG/3ML) 0.083% nebulizer solution, Take 2.5 mg by nebulization Every 4 (Four) Hours As Needed for Wheezing. J44.1, Disp: 120 vial, Rfl: 12  •  calcium carbonate (TUMS) 500 MG chewable tablet, Chew 500 mg 3 (Three) Times a Day Before Meals., Disp: , Rfl:   •  clonazePAM (KlonoPIN) 0.5 MG tablet, Take 1 tablet by mouth At Night As Needed for Seizures., Disp: 30 tablet, Rfl: 3  •  fluticasone (FLONASE) 50 MCG/ACT nasal spray, 2 sprays by Each Nare route Daily., Disp: , Rfl:   •  loratadine (CLARITIN) 10 MG tablet, TAKE 1 TABLET BY MOUTH ONE TIME A DAY , Disp: 30 tablet, Rfl: 0  •  losartan (COZAAR) 25 MG tablet, Take 1 tablet by mouth Daily. (Patient taking differently: Take 25 mg by mouth Daily. Only takes if SBP >150), Disp: 30 tablet, Rfl: 2  •  meclizine (ANTIVERT) 12.5 MG tablet, TAKE 1 TABLET BY MOUTH THREE TIMES A DAY AS NEEDED for dizziness , Disp: 90 tablet, Rfl: 0  •  omeprazole (priLOSEC) 40 MG capsule, Take 1 capsule by mouth Every Morning., Disp: 30 capsule, Rfl: 3  •  ondansetron (ZOFRAN) 4 MG tablet, TAKE 1 TABLET BY MOUTH THREE TIMES A DAY AS NEEDED FOR NAUSEA, Disp: , Rfl: 0  •  pravastatin (PRAVACHOL) 20 MG tablet,  TAKE 1 TABLET BY MOUTH IN THE EVENING , Disp: 30 tablet, Rfl: 5  •  PREMARIN 0.625 MG tablet, TAKE 1 TABLET BY MOUTH ONE TIME A DAY , Disp: 90 tablet, Rfl: 1  •  propranolol (INDERAL) 60 MG tablet, Take 1 tablet by mouth Daily., Disp: 90 tablet, Rfl: 3  •  raNITIdine (ZANTAC) 150 MG tablet, Take 150 mg by mouth Daily. 200001, Disp: , Rfl: 0  •  sertraline (ZOLOFT) 50 MG tablet, TAKE 1 TABLET BY MOUTH TWO TIMES A DAY  (Patient taking differently: TAKE 1 TABLET BY MOUTH daily), Disp: 60 tablet, Rfl: 5  •  spironolactone (ALDACTONE) 25 MG tablet, TAKE 1 TABLET BY MOUTH ONE TIME A DAY , Disp: 30 tablet, Rfl: 0  •  tiotropium (SPIRIVA HANDIHALER) 18 MCG per inhalation capsule, Place 1 capsule into inhaler and inhale Daily., Disp: 30 capsule, Rfl: 5  •  traMADol (ULTRAM) 50 MG tablet, Take 1 tablet by mouth Every 6 (Six) Hours As Needed (pain)., Disp: 10 tablet, Rfl: 0  •  SYMBICORT 160-4.5 MCG/ACT inhaler, INHALE 2 PUFFS BY MOUTH TWO TIMES A DAY. Rinse mouth after each use. , Disp: 10.2 g, Rfl: 1    The following portions of the patient's history were reviewed and updated as appropriate: allergies, current medications, past family history, past medical history, past social history, past surgical history and problem list.    Review of Systems   Constitutional: Negative.    Respiratory: Positive for shortness of breath.    Cardiovascular: Negative.    Gastrointestinal: Negative.    Musculoskeletal: Positive for back pain.   Skin: Negative.    Neurological: Negative.    Psychiatric/Behavioral: Negative.        Objective   Physical Exam   Constitutional: She is oriented to person, place, and time. She appears well-nourished.   Neck: Neck supple.   Cardiovascular: Normal rate, regular rhythm and normal heart sounds.    Pulmonary/Chest: Effort normal and breath sounds normal.   Abdominal: Bowel sounds are normal.   Musculoskeletal: She exhibits tenderness.   Neurological: She is alert and oriented to person, place, and time.    Skin: Skin is warm.   Psychiatric: She has a normal mood and affect.       All tests have been reviewed.    Assessment/Plan   Diagnoses and all orders for this visit:    Acute left-sided low back pain without sciatica  -     XR Spine Lumbar 4+ View; Future    Other orders  -     raNITIdine (ZANTAC) 150 MG tablet; Take 150 mg by mouth Daily. 200001  -     ondansetron (ZOFRAN) 4 MG tablet; TAKE 1 TABLET BY MOUTH THREE TIMES A DAY AS NEEDED FOR NAUSEA                 Low back pain without sciatica, unspecified back pain laterality, unspecified chronicity   Benign paroxysmal positional vertigo, unspecified laterality   Tobacco abuse disorder occa, to quit     COPD exacerbation recurrent stable now  Anxiety stable on med and continue     Headache, and dizzy seen by neuro,Left mastoid effusion, seen ENT, no further tx from ENT    MRI head micro ischemic changes continue ASA 81mg daily    TMJ, resolved after aleve or tylenol for now    HTN continue Losartan 25mg daily, dizzy with 50mg   allergy, d/c medicine  dizzy,improved after medicine.continue, carotid, echo, holter unremarkble  Vitamin D deficiency start Vit D3 1000iu  Low back pain improved after Flexeril and Tylenol,refill tramadol, xray:mild DJD refill tramadol  HRT patient still wants it.wean very slow,   fatigue, improved   hyperlipidemia, continue pravastatin   RLS, continue clonazepam   tremor continue propranolol helps per patient. continue   Knee OA xr OA, refused cortisone shot, or glucosamine, tramadol  Refused disrobing for PE  vacUTD    4 week

## 2018-07-27 RX ORDER — ALBUTEROL SULFATE 90 UG/1
1 AEROSOL, METERED RESPIRATORY (INHALATION) EVERY 4 HOURS PRN
Qty: 18 G | Refills: 3 | Status: SHIPPED | OUTPATIENT
Start: 2018-07-27 | End: 2019-08-21 | Stop reason: SDUPTHER

## 2018-07-30 RX ORDER — PROPRANOLOL HYDROCHLORIDE 60 MG/1
TABLET ORAL
Qty: 30 TABLET | Refills: 0 | Status: SHIPPED | OUTPATIENT
Start: 2018-07-30 | End: 2018-08-18 | Stop reason: SDUPTHER

## 2018-07-30 RX ORDER — SPIRONOLACTONE 25 MG/1
TABLET ORAL
Qty: 30 TABLET | Refills: 0 | Status: SHIPPED | OUTPATIENT
Start: 2018-07-30 | End: 2018-08-18 | Stop reason: SDUPTHER

## 2018-08-01 ENCOUNTER — TELEPHONE (OUTPATIENT)
Dept: INTERNAL MEDICINE | Facility: CLINIC | Age: 78
End: 2018-08-01

## 2018-08-01 NOTE — TELEPHONE ENCOUNTER
Pts daughter, Ava,  called stating that pt needed Symbicort inhaler ordered. She states that they spoke to you about it at the last visit, and wanted to change to this one. Send to [facility], if possible.

## 2018-08-02 NOTE — TELEPHONE ENCOUNTER
Notified pt that she is not on the symbicort she is on spiriva. She just got confused. Her breathing meds were sent in on 7/27/18

## 2018-08-20 RX ORDER — PROPRANOLOL HYDROCHLORIDE 60 MG/1
TABLET ORAL
Qty: 30 TABLET | Refills: 0 | Status: SHIPPED | OUTPATIENT
Start: 2018-08-20 | End: 2018-09-20 | Stop reason: SDUPTHER

## 2018-08-20 RX ORDER — SPIRONOLACTONE 25 MG/1
TABLET ORAL
Qty: 30 TABLET | Refills: 0 | Status: SHIPPED | OUTPATIENT
Start: 2018-08-20 | End: 2018-11-26

## 2018-08-21 RX ORDER — LORATADINE 10 MG/1
TABLET ORAL
Qty: 30 TABLET | Refills: 2 | Status: SHIPPED | OUTPATIENT
Start: 2018-08-21 | End: 2018-11-26 | Stop reason: SDUPTHER

## 2018-08-24 NOTE — PROGRESS NOTES
Discharge Planning Assessment   Phuc     Patient Name: Sadie Tijerina  MRN: [id number]  Today's Date: 2/1/2018    Admit Date: 1/22/2018          Discharge Needs Assessment     None            Discharge Plan       02/01/18 1645    Case Management/Social Work Plan    Additional Comments Attempted to speak to daughter regarding choosing another faciltiy as [facility] has no beds and she advises her priority right now is finding out what is wrong with the patient with her abdominal pain and vomiting.  CM will follow up in the am.        02/01/18 1614    Case Management/Social Work Plan    Plan Call from Shahab with [facility].  They currently do not have any beds and don't anticipate any until next week.  She will keep patient on the list.     Patient/Family In Agreement With Plan yes        Discharge Placement     Facility/Agency Request Status Selected? Address Phone Number Fax Number    [facility] Pending - Request Sent     [address] [address] [phone]    [facility] Pending - Request Sent     [address] [address] [phone]                Demographic Summary     None            Functional Status     None            Psychosocial     None            Abuse/Neglect     None            Legal     None            Substance Abuse     None            Patient Forms     None          Maria E Ceballos     decreased activity level

## 2018-09-13 NOTE — PLAN OF CARE
Problem: COPD, Chronic Bronchitis/Emphysema (Adult)  Goal: Signs and Symptoms of Listed Potential Problems Will be Absent or Manageable (COPD, Chronic Bronchitis/Emphysema)  Outcome: Ongoing (interventions implemented as appropriate)      Problem: Fall Risk (Adult)  Goal: Absence of Falls  Outcome: Ongoing (interventions implemented as appropriate)      Problem: Patient Care Overview (Adult)  Goal: Plan of Care Review  Outcome: Ongoing (interventions implemented as appropriate)   02/05/18 0342   Coping/Psychosocial Response Interventions   Plan Of Care Reviewed With patient   Patient Care Overview   Progress improving   Outcome Evaluation   Outcome Summary/Follow up Plan IMPROVED APPETITE, NO BM THIS SHIFT, PT REPORTS DECREASED CRAMPING AND ABD PAIN          5

## 2018-09-17 RX ORDER — OMEPRAZOLE 40 MG/1
CAPSULE, DELAYED RELEASE ORAL
Qty: 30 CAPSULE | Refills: 2 | Status: SHIPPED | OUTPATIENT
Start: 2018-09-17 | End: 2018-11-26 | Stop reason: SDUPTHER

## 2018-09-20 RX ORDER — PROPRANOLOL HYDROCHLORIDE 60 MG/1
TABLET ORAL
Qty: 30 TABLET | Refills: 2 | Status: SHIPPED | OUTPATIENT
Start: 2018-09-20 | End: 2018-11-26 | Stop reason: SDUPTHER

## 2018-10-15 RX ORDER — CONJUGATED ESTROGENS 0.62 MG/1
TABLET, FILM COATED ORAL
Qty: 30 TABLET | Refills: 0 | Status: SHIPPED | OUTPATIENT
Start: 2018-10-15 | End: 2018-11-07 | Stop reason: SDUPTHER

## 2018-10-15 RX ORDER — CLONAZEPAM 0.5 MG/1
TABLET ORAL
Qty: 30 TABLET | Refills: 2 | Status: SHIPPED | OUTPATIENT
Start: 2018-10-15 | End: 2019-01-28 | Stop reason: SDUPTHER

## 2018-11-08 RX ORDER — CONJUGATED ESTROGENS 0.62 MG/1
TABLET, FILM COATED ORAL
Qty: 30 TABLET | Refills: 0 | Status: SHIPPED | OUTPATIENT
Start: 2018-11-08 | End: 2018-11-26 | Stop reason: SDUPTHER

## 2018-11-16 RX ORDER — PRAVASTATIN SODIUM 20 MG
20 TABLET ORAL EVERY EVENING
Qty: 30 TABLET | Refills: 5 | Status: SHIPPED | OUTPATIENT
Start: 2018-11-16 | End: 2018-11-26 | Stop reason: SDUPTHER

## 2018-11-16 NOTE — TELEPHONE ENCOUNTER
Patient daughter called stating that the patient is almost out of her medication but the pharmacy is waiting to hear from doctor before they can do a refill. Patient's daughter would like to know if she runs out of the medication before appointment on the 26th if she could get a few of them to hold her over until it can be refilled? Please advise.

## 2018-11-26 ENCOUNTER — OFFICE VISIT (OUTPATIENT)
Dept: INTERNAL MEDICINE | Facility: CLINIC | Age: 78
End: 2018-11-26

## 2018-11-26 VITALS
SYSTOLIC BLOOD PRESSURE: 102 MMHG | HEIGHT: 62 IN | HEART RATE: 65 BPM | OXYGEN SATURATION: 96 % | WEIGHT: 121 LBS | BODY MASS INDEX: 22.26 KG/M2 | DIASTOLIC BLOOD PRESSURE: 60 MMHG

## 2018-11-26 DIAGNOSIS — E78.5 HYPERLIPIDEMIA, UNSPECIFIED HYPERLIPIDEMIA TYPE: ICD-10-CM

## 2018-11-26 DIAGNOSIS — R26.89 BALANCE PROBLEM: ICD-10-CM

## 2018-11-26 DIAGNOSIS — H81.10 BENIGN PAROXYSMAL POSITIONAL VERTIGO, UNSPECIFIED LATERALITY: ICD-10-CM

## 2018-11-26 DIAGNOSIS — Z00.00 HEALTH CARE MAINTENANCE: ICD-10-CM

## 2018-11-26 DIAGNOSIS — I10 ESSENTIAL HYPERTENSION: Primary | Chronic | ICD-10-CM

## 2018-11-26 DIAGNOSIS — J44.9 CHRONIC OBSTRUCTIVE PULMONARY DISEASE, UNSPECIFIED COPD TYPE (HCC): ICD-10-CM

## 2018-11-26 DIAGNOSIS — Z72.0 TOBACCO ABUSE DISORDER: Chronic | ICD-10-CM

## 2018-11-26 DIAGNOSIS — F41.8 MIXED ANXIETY DEPRESSIVE DISORDER: ICD-10-CM

## 2018-11-26 DIAGNOSIS — R42 DIZZINESS: ICD-10-CM

## 2018-11-26 DIAGNOSIS — G25.0 ESSENTIAL TREMOR: ICD-10-CM

## 2018-11-26 DIAGNOSIS — G25.81 RESTLESS LEGS SYNDROME: ICD-10-CM

## 2018-11-26 DIAGNOSIS — K21.9 GASTROESOPHAGEAL REFLUX DISEASE WITHOUT ESOPHAGITIS: ICD-10-CM

## 2018-11-26 PROCEDURE — 90471 IMMUNIZATION ADMIN: CPT | Performed by: INTERNAL MEDICINE

## 2018-11-26 PROCEDURE — 99214 OFFICE O/P EST MOD 30 MIN: CPT | Performed by: INTERNAL MEDICINE

## 2018-11-26 PROCEDURE — 90662 IIV NO PRSV INCREASED AG IM: CPT | Performed by: INTERNAL MEDICINE

## 2018-11-26 RX ORDER — PRAVASTATIN SODIUM 20 MG
20 TABLET ORAL EVERY EVENING
Qty: 30 TABLET | Refills: 5 | Status: SHIPPED | OUTPATIENT
Start: 2018-11-26 | End: 2019-12-05 | Stop reason: SDUPTHER

## 2018-11-26 RX ORDER — BUDESONIDE AND FORMOTEROL FUMARATE DIHYDRATE 160; 4.5 UG/1; UG/1
2 AEROSOL RESPIRATORY (INHALATION)
COMMUNITY
End: 2018-11-26 | Stop reason: SDUPTHER

## 2018-11-26 RX ORDER — CARBIDOPA AND LEVODOPA 25; 100 MG/1; MG/1
1 TABLET, EXTENDED RELEASE ORAL NIGHTLY
Qty: 30 TABLET | Refills: 1 | Status: SHIPPED | OUTPATIENT
Start: 2018-11-26 | End: 2018-12-11 | Stop reason: SDUPTHER

## 2018-11-26 RX ORDER — PROPRANOLOL HYDROCHLORIDE 60 MG/1
60 TABLET ORAL DAILY
Qty: 90 TABLET | Refills: 3 | Status: SHIPPED | OUTPATIENT
Start: 2018-11-26 | End: 2019-01-14

## 2018-11-26 RX ORDER — BUDESONIDE AND FORMOTEROL FUMARATE DIHYDRATE 160; 4.5 UG/1; UG/1
2 AEROSOL RESPIRATORY (INHALATION)
Qty: 10.2 G | Refills: 3 | Status: SHIPPED | OUTPATIENT
Start: 2018-11-26 | End: 2019-03-07 | Stop reason: SDUPTHER

## 2018-11-26 RX ORDER — OMEPRAZOLE 40 MG/1
40 CAPSULE, DELAYED RELEASE ORAL DAILY
Qty: 90 CAPSULE | Refills: 3 | Status: SHIPPED | OUTPATIENT
Start: 2018-11-26 | End: 2019-09-05 | Stop reason: SDUPTHER

## 2018-11-26 RX ORDER — LORATADINE 10 MG/1
10 TABLET ORAL DAILY
Qty: 90 TABLET | Refills: 3 | Status: SHIPPED | OUTPATIENT
Start: 2018-11-26 | End: 2019-03-07

## 2018-11-26 NOTE — PROGRESS NOTES
Subjective   Sadie Tijerina is a 78 y.o. female.     Chief Complaint   Patient presents with   • Med Refill   • Follow-up   • Oral Swelling   • Skin Problem   • Dizziness       History of Present Illness   Patient here for follow-up.  Complaining dizziness when standing up loss of balance.  COPD stable now.  Patient is using oxygen with ambulation patient denies chest pain.  Patient does have short of breath with ambulation.  Patient also has essential tremor patient is a blood pressure is on the low side standing up causing significant dizziness and the loss of balance.  Hyperlipidemia on medication stable.  Patient still smokes.mouth soreness nystatin helps    Current Outpatient Medications:   •  albuterol (PROVENTIL HFA;VENTOLIN HFA) 108 (90 Base) MCG/ACT inhaler, Inhale 1 puff Every 4 (Four) Hours As Needed for Shortness of Air., Disp: 18 g, Rfl: 3  •  albuterol (PROVENTIL) (2.5 MG/3ML) 0.083% nebulizer solution, Take 2.5 mg by nebulization Every 4 (Four) Hours As Needed for Wheezing. J44.1, Disp: 120 vial, Rfl: 12  •  budesonide-formoterol (SYMBICORT) 160-4.5 MCG/ACT inhaler, Inhale 2 puffs 2 (Two) Times a Day., Disp: 10.2 g, Rfl: 3  •  calcium carbonate (TUMS) 500 MG chewable tablet, Chew 500 mg 3 (Three) Times a Day Before Meals., Disp: , Rfl:   •  clonazePAM (KlonoPIN) 0.5 MG tablet, TAKE 1 TABLET BY MOUTH AT BEDTIME AS NEEDED SEIZURES, Disp: 30 tablet, Rfl: 2  •  estrogens, conjugated, (PREMARIN) 0.625 MG tablet, Take 1 tablet by mouth Daily. 200001, Disp: 90 tablet, Rfl: 3  •  fluticasone (FLONASE) 50 MCG/ACT nasal spray, 2 sprays by Each Nare route Daily., Disp: , Rfl:   •  loratadine (CLARITIN) 10 MG tablet, Take 1 tablet by mouth Daily. 200001, Disp: 90 tablet, Rfl: 3  •  losartan (COZAAR) 25 MG tablet, Take 1 tablet by mouth Daily. (Patient taking differently: Take 25 mg by mouth Daily. Only takes if SBP >150), Disp: 30 tablet, Rfl: 2  •  meclizine (ANTIVERT) 12.5 MG tablet, TAKE 1 TABLET BY MOUTH  THREE TIMES A DAY AS NEEDED for dizziness , Disp: 90 tablet, Rfl: 0  •  omeprazole (priLOSEC) 40 MG capsule, Take 1 capsule by mouth Daily., Disp: 90 capsule, Rfl: 3  •  ondansetron (ZOFRAN) 4 MG tablet, TAKE 1 TABLET BY MOUTH THREE TIMES A DAY AS NEEDED FOR NAUSEA, Disp: , Rfl: 0  •  pravastatin (PRAVACHOL) 20 MG tablet, Take 1 tablet by mouth Every Evening., Disp: 30 tablet, Rfl: 5  •  propranolol (INDERAL) 60 MG tablet, Take 1 tablet by mouth Daily. 200001, Disp: 90 tablet, Rfl: 3  •  raNITIdine (ZANTAC) 150 MG tablet, Take 150 mg by mouth Daily. 200001, Disp: , Rfl: 0  •  sertraline (ZOLOFT) 50 MG tablet, Take 1 tablet by mouth every night at bedtime., Disp: 90 tablet, Rfl: 3  •  tiotropium (SPIRIVA HANDIHALER) 18 MCG per inhalation capsule, Place 1 capsule into inhaler and inhale Daily., Disp: 30 capsule, Rfl: 5  •  traMADol (ULTRAM) 50 MG tablet, Take 1 tablet by mouth Every 6 (Six) Hours As Needed (pain)., Disp: 10 tablet, Rfl: 0    The following portions of the patient's history were reviewed and updated as appropriate: allergies, current medications, past family history, past medical history, past social history, past surgical history and problem list.    Review of Systems   Constitutional: Negative.    Respiratory: Negative.    Cardiovascular: Negative.         Dizzy   Gastrointestinal: Negative.    Musculoskeletal: Negative.    Skin: Negative.    Neurological: Negative.         ET   Psychiatric/Behavioral: Negative.        Objective   Physical Exam   Constitutional: She is oriented to person, place, and time. She appears well-nourished.   Neck: Neck supple.   Cardiovascular: Normal rate, regular rhythm and normal heart sounds.   Pulmonary/Chest: Effort normal and breath sounds normal.   Abdominal: Bowel sounds are normal.   Neurological: She is alert and oriented to person, place, and time.   ET   Skin: Skin is warm.   Psychiatric: She has a normal mood and affect.       All tests have been  reviewed.    Assessment/Plan   There are no diagnoses linked to this encounter.          Acute left-sided low back pain without sciatica XR Spine Lumbar 4+ View; DJD   Benign paroxysmal positional vertigo, unspecified laterality   Tobacco abuse disorder occa, to quit   COPD exacerbation recurrent stable now [facility] the need for oxygen use.  Patient is oxygen continuously now.  Anxiety stable on med and continue  Dizzy loss of balance, probably due to lightheaded  D/c propranolol and continue hold losartan--    Headache, and dizzy seen by neuro,Left mastoid effusion, seen ENT, no further tx from ENT    MRI head micro ischemic changes continue ASA 81mg daily    TMJ, continue aleve or tylenol for now   allergy, d/c medicine  dizzy,improved after medicine.continue, carotid, echo, holter unremarkble  Vitamin D deficiency start Vit D3 1000iu  Low back pain improved after Flexeril and Tylenol,refill tramadol, xray:mild DJD refill tramadol  HRT patient still wants it.wean very slow,   fatigue, improved   hyperlipidemia, continue pravastatin   RLS, continue clonazepam   tremor d/c propranolol restart sinemet  Knee OA xr OA, refused cortisone shot, or glucosamine, tramadol  Refused disrobing for PE  vacUTD    4 week

## 2018-11-27 ENCOUNTER — TELEPHONE (OUTPATIENT)
Dept: INTERNAL MEDICINE | Facility: CLINIC | Age: 78
End: 2018-11-27

## 2018-11-27 NOTE — TELEPHONE ENCOUNTER
Patient's daughter called stating that the cream that was sent into the pharmacy has not been sent to the pharmacy.    Please contact Ava at [phone]

## 2018-12-05 ENCOUNTER — TELEPHONE (OUTPATIENT)
Dept: INTERNAL MEDICINE | Facility: CLINIC | Age: 78
End: 2018-12-05

## 2018-12-05 NOTE — TELEPHONE ENCOUNTER
Patient's daughter called requesting to speak with Dr. Wolff's nurse regarding patient's medications.

## 2018-12-10 ENCOUNTER — TELEPHONE (OUTPATIENT)
Dept: OTHER | Facility: OTHER | Age: 78
End: 2018-12-10

## 2018-12-11 RX ORDER — CARBIDOPA AND LEVODOPA 25; 100 MG/1; MG/1
1 TABLET, EXTENDED RELEASE ORAL 2 TIMES DAILY
Qty: 180 TABLET | Refills: 3 | Status: SHIPPED | OUTPATIENT
Start: 2018-12-11 | End: 2019-03-07

## 2019-01-14 ENCOUNTER — OFFICE VISIT (OUTPATIENT)
Dept: INTERNAL MEDICINE | Facility: CLINIC | Age: 79
End: 2019-01-14

## 2019-01-14 VITALS
OXYGEN SATURATION: 97 % | BODY MASS INDEX: 22.26 KG/M2 | HEART RATE: 60 BPM | DIASTOLIC BLOOD PRESSURE: 60 MMHG | HEIGHT: 62 IN | WEIGHT: 121 LBS | SYSTOLIC BLOOD PRESSURE: 138 MMHG

## 2019-01-14 DIAGNOSIS — G25.0 ESSENTIAL TREMOR: ICD-10-CM

## 2019-01-14 DIAGNOSIS — K21.9 GASTROESOPHAGEAL REFLUX DISEASE WITHOUT ESOPHAGITIS: ICD-10-CM

## 2019-01-14 DIAGNOSIS — G25.81 RESTLESS LEGS SYNDROME: ICD-10-CM

## 2019-01-14 DIAGNOSIS — H81.10 BENIGN PAROXYSMAL POSITIONAL VERTIGO, UNSPECIFIED LATERALITY: ICD-10-CM

## 2019-01-14 DIAGNOSIS — R42 DIZZINESS: ICD-10-CM

## 2019-01-14 DIAGNOSIS — F41.8 MIXED ANXIETY DEPRESSIVE DISORDER: ICD-10-CM

## 2019-01-14 DIAGNOSIS — E78.5 HYPERLIPIDEMIA, UNSPECIFIED HYPERLIPIDEMIA TYPE: ICD-10-CM

## 2019-01-14 DIAGNOSIS — M54.50 LOW BACK PAIN WITHOUT SCIATICA, UNSPECIFIED BACK PAIN LATERALITY, UNSPECIFIED CHRONICITY: ICD-10-CM

## 2019-01-14 DIAGNOSIS — I10 ESSENTIAL HYPERTENSION: Primary | Chronic | ICD-10-CM

## 2019-01-14 DIAGNOSIS — J44.9 CHRONIC OBSTRUCTIVE PULMONARY DISEASE, UNSPECIFIED COPD TYPE (HCC): ICD-10-CM

## 2019-01-14 PROCEDURE — 99214 OFFICE O/P EST MOD 30 MIN: CPT | Performed by: INTERNAL MEDICINE

## 2019-01-14 RX ORDER — PROPRANOLOL HYDROCHLORIDE 60 MG/1
TABLET ORAL
Qty: 30 TABLET | Refills: 1 | Status: SHIPPED | OUTPATIENT
Start: 2019-01-14 | End: 2019-09-05

## 2019-01-14 NOTE — PROGRESS NOTES
Subjective   Sadie Tijerina is a 78 y.o. female.     Chief Complaint   Patient presents with   • Follow-up     on     • Medication Problem     sinemet not working        History of Present Illness   Cough 2 days yellow tinged white sputum, running nose, hoarseness, no fever or chill, no sore throat, mild ear pain. No wheeze or soa.    Current Outpatient Medications:   •  albuterol (PROVENTIL HFA;VENTOLIN HFA) 108 (90 Base) MCG/ACT inhaler, Inhale 1 puff Every 4 (Four) Hours As Needed for Shortness of Air., Disp: 18 g, Rfl: 3  •  albuterol (PROVENTIL) (2.5 MG/3ML) 0.083% nebulizer solution, Take 2.5 mg by nebulization Every 4 (Four) Hours As Needed for Wheezing. J44.1, Disp: 120 vial, Rfl: 12  •  bacitracin-silver sulfadiazine-nystatin, Apply  topically to the appropriate area as directed 2 (Two) Times a Day., Disp: 1 tube, Rfl: 1  •  budesonide-formoterol (SYMBICORT) 160-4.5 MCG/ACT inhaler, Inhale 2 puffs 2 (Two) Times a Day., Disp: 10.2 g, Rfl: 3  •  calcium carbonate (TUMS) 500 MG chewable tablet, Chew 500 mg 3 (Three) Times a Day Before Meals., Disp: , Rfl:   •  carbidopa-levodopa ER (SINEMET CR)  MG per tablet, Take 1 tablet by mouth 2 (Two) Times a Day., Disp: 180 tablet, Rfl: 3  •  clonazePAM (KlonoPIN) 0.5 MG tablet, TAKE 1 TABLET BY MOUTH AT BEDTIME AS NEEDED SEIZURES, Disp: 30 tablet, Rfl: 2  •  estrogens, conjugated, (PREMARIN) 0.625 MG tablet, Take 1 tablet by mouth Daily. 200001, Disp: 90 tablet, Rfl: 3  •  fluticasone (FLONASE) 50 MCG/ACT nasal spray, 2 sprays by Each Nare route Daily., Disp: , Rfl:   •  loratadine (CLARITIN) 10 MG tablet, Take 1 tablet by mouth Daily. 200001, Disp: 90 tablet, Rfl: 3  •  losartan (COZAAR) 25 MG tablet, Take 1 tablet by mouth Daily. (Patient taking differently: Take 25 mg by mouth Daily. Only takes if SBP >150), Disp: 30 tablet, Rfl: 2  •  meclizine (ANTIVERT) 12.5 MG tablet, TAKE 1 TABLET BY MOUTH THREE TIMES A DAY AS NEEDED for dizziness , Disp: 90  tablet, Rfl: 0  •  nystatin (MYCOSTATIN) 742550 UNIT/ML suspension, Swish and swallow 5 mL 4 (Four) Times a Day., Disp: 473 mL, Rfl: 1  •  omeprazole (priLOSEC) 40 MG capsule, Take 1 capsule by mouth Daily., Disp: 90 capsule, Rfl: 3  •  ondansetron (ZOFRAN) 4 MG tablet, TAKE 1 TABLET BY MOUTH THREE TIMES A DAY AS NEEDED FOR NAUSEA, Disp: , Rfl: 0  •  pravastatin (PRAVACHOL) 20 MG tablet, Take 1 tablet by mouth Every Evening., Disp: 30 tablet, Rfl: 5  •  propranolol (INDERAL) 60 MG tablet, Take 1 tablet by mouth Daily. 200001, Disp: 90 tablet, Rfl: 3  •  raNITIdine (ZANTAC) 150 MG tablet, Take 150 mg by mouth Daily. 200001, Disp: , Rfl: 0  •  sertraline (ZOLOFT) 50 MG tablet, Take 1 tablet by mouth every night at bedtime., Disp: 90 tablet, Rfl: 3  •  tiotropium (SPIRIVA HANDIHALER) 18 MCG per inhalation capsule, Place 1 capsule into inhaler and inhale Daily., Disp: 30 capsule, Rfl: 5  •  traMADol (ULTRAM) 50 MG tablet, Take 1 tablet by mouth Every 6 (Six) Hours As Needed (pain)., Disp: 10 tablet, Rfl: 0    The following portions of the patient's history were reviewed and updated as appropriate: allergies, current medications, past family history, past medical history, past social history, past surgical history and problem list.    Review of Systems   Constitutional: Negative.    Respiratory: Negative.    Cardiovascular: Negative.    Gastrointestinal: Negative.    Musculoskeletal: Negative.    Skin: Negative.    Neurological: Positive for dizziness.        Tremor   Psychiatric/Behavioral: Negative.        Objective   Physical Exam   Constitutional: She is oriented to person, place, and time. She appears well-developed and well-nourished.   HENT:   Head: Normocephalic.   Neck: Neck supple.   Cardiovascular: Normal rate, regular rhythm and normal heart sounds.   Pulmonary/Chest: Effort normal and breath sounds normal.   Abdominal: Bowel sounds are normal.   Neurological: She is alert and oriented to person, place, and  time.   Skin: Skin is warm.   Psychiatric: She has a normal mood and affect.       All tests have been reviewed.    Assessment/Plan   There are no diagnoses linked to this encounter.          Acute left-sided low back pain without sciatica XR Spine Lumbar 4+ View; DJD--   Benign paroxysmal positional vertigo, unspecified laterality--   Tobacco abuse disorder occa, to quit   COPD exacerbation resolved   Anxiety stable on med and continue  Dizzy loss of balance, when standing up probably due to lightheaded  D/c propranolol no help, resume. carotid, echo, holter unremarkble    MRI head micro ischemic changes continue ASA 81mg daily    TMJ, continue aleve or tylenol for now   allergy, d/c medicine  Vitamin D deficiency start Vit D3 1000iu  Low back pain improved after Flexeril and Tylenol,refill tramadol, xray:mild DJD refill tramadol  HRT patient still wants it.wean very slow, --  hyperlipidemia, continue pravastatin   RLS, continue clonazepam   Tremor resume propranolol d/c sinemet for not helping  Knee OA xr OA, refused cortisone shot, or glucosamine, tramadol  Refused disrobing for PE  vacUTD   URI mucinex, delsym at night, claritin, flonase.   2 mo

## 2019-01-23 ENCOUNTER — OUTSIDE FACILITY SERVICE (OUTPATIENT)
Dept: INTERNAL MEDICINE | Facility: CLINIC | Age: 79
End: 2019-01-23

## 2019-01-23 PROCEDURE — G0180 MD CERTIFICATION HHA PATIENT: HCPCS | Performed by: INTERNAL MEDICINE

## 2019-01-28 RX ORDER — CLONAZEPAM 0.5 MG/1
TABLET ORAL
Qty: 30 TABLET | Refills: 1 | Status: SHIPPED | OUTPATIENT
Start: 2019-01-28 | End: 2019-04-07 | Stop reason: SDUPTHER

## 2019-02-13 ENCOUNTER — HOSPITAL ENCOUNTER (OUTPATIENT)
Dept: GENERAL RADIOLOGY | Facility: HOSPITAL | Age: 79
Discharge: HOME | End: 2019-02-13
Admitting: NURSE PRACTITIONER

## 2019-02-13 DIAGNOSIS — R06.09 DYSPNEA ON EXERTION: ICD-10-CM

## 2019-02-13 DIAGNOSIS — R53.83 FATIGUE, UNSPECIFIED TYPE: ICD-10-CM

## 2019-02-13 DIAGNOSIS — J44.9 CHRONIC OBSTRUCTIVE PULMONARY DISEASE, UNSPECIFIED COPD TYPE (HCC): ICD-10-CM

## 2019-02-13 DIAGNOSIS — R06.2 WHEEZING: ICD-10-CM

## 2019-02-13 PROCEDURE — 71046 X-RAY EXAM CHEST 2 VIEWS: CPT

## 2019-02-14 ENCOUNTER — TELEPHONE (OUTPATIENT)
Dept: URGENT CARE | Facility: CLINIC | Age: 79
End: 2019-02-14

## 2019-02-14 RX ORDER — TIOTROPIUM BROMIDE 18 UG/1
CAPSULE ORAL; RESPIRATORY (INHALATION)
Qty: 30 CAPSULE | Refills: 4 | Status: SHIPPED | OUTPATIENT
Start: 2019-02-14 | End: 2019-11-26 | Stop reason: SDUPTHER

## 2019-02-14 NOTE — TELEPHONE ENCOUNTER
----- Message from RUBY Marin sent at 2/13/2019  9:29 PM EST -----  PLEASE CALL AND ADVISE NO PNEUMONIA.

## 2019-02-14 NOTE — TELEPHONE ENCOUNTER
Called to let patient know that her chest xray was negative and to continue the plan of care, follow up for any problems.   She reports her prescribed cough med was not available at the pharmacy and they are having to order it which will take a couple of days.

## 2019-02-23 ENCOUNTER — APPOINTMENT (OUTPATIENT)
Dept: CT IMAGING | Facility: HOSPITAL | Age: 79
End: 2019-02-23

## 2019-02-23 ENCOUNTER — HOSPITAL ENCOUNTER (INPATIENT)
Facility: HOSPITAL | Age: 79
LOS: 3 days | Discharge: HOME | End: 2019-02-28
Attending: EMERGENCY MEDICINE | Admitting: INTERNAL MEDICINE

## 2019-02-23 DIAGNOSIS — Z74.09 IMPAIRED FUNCTIONAL MOBILITY, BALANCE, GAIT, AND ENDURANCE: ICD-10-CM

## 2019-02-23 DIAGNOSIS — J18.9 PNEUMONIA OF RIGHT UPPER LOBE DUE TO INFECTIOUS ORGANISM: Primary | ICD-10-CM

## 2019-02-23 LAB
A-A DO2: ABNORMAL MMHG
ALBUMIN SERPL-MCNC: 3.9 G/DL (ref 3.5–5)
ALBUMIN/GLOB SERPL: 1.2 G/DL (ref 1–2)
ALP SERPL-CCNC: 84 U/L (ref 38–126)
ALT SERPL W P-5'-P-CCNC: 16 U/L (ref 13–69)
ANION GAP SERPL CALCULATED.3IONS-SCNC: 9.2 MMOL/L (ref 10–20)
ARTERIAL PATENCY WRIST A: POSITIVE
AST SERPL-CCNC: 17 U/L (ref 15–46)
ATMOSPHERIC PRESS: 732 MMHG
BASE EXCESS BLDA CALC-SCNC: 5.1 MMOL/L (ref 0–2)
BASOPHILS # BLD AUTO: 0.06 10*3/MM3 (ref 0–0.2)
BASOPHILS NFR BLD AUTO: 0.3 % (ref 0–2.5)
BDY SITE: ABNORMAL
BILIRUB SERPL-MCNC: 0.5 MG/DL (ref 0.2–1.3)
BUN BLD-MCNC: 31 MG/DL (ref 7–20)
BUN/CREAT SERPL: 34.4 (ref 7.1–23.5)
CALCIUM SPEC-SCNC: 9.8 MG/DL (ref 8.4–10.2)
CHLORIDE SERPL-SCNC: 100 MMOL/L (ref 98–107)
CO2 SERPL-SCNC: 32 MMOL/L (ref 26–30)
COHGB MFR BLD: 2.3 % (ref 0–2)
CREAT BLD-MCNC: 0.9 MG/DL (ref 0.6–1.3)
DEPRECATED RDW RBC AUTO: 44.2 FL (ref 37–54)
EOSINOPHIL # BLD AUTO: 0.2 10*3/MM3 (ref 0–0.7)
EOSINOPHIL NFR BLD AUTO: 1 % (ref 0–7)
ERYTHROCYTE [DISTWIDTH] IN BLOOD BY AUTOMATED COUNT: 12.9 % (ref 11.5–14.5)
GAS FLOW AIRWAY: 2 LPM
GFR SERPL CREATININE-BSD FRML MDRD: 60 ML/MIN/1.73
GLOBULIN UR ELPH-MCNC: 3.2 GM/DL
GLUCOSE BLD-MCNC: 129 MG/DL (ref 74–98)
HCO3 BLDA-SCNC: 33.3 MMOL/L (ref 22–28)
HCT VFR BLD AUTO: 49 % (ref 37–47)
HCT VFR BLD CALC: 44 %
HGB BLD-MCNC: 15.4 G/DL (ref 12–16)
HGB BLDA-MCNC: 14.4 G/DL (ref 12–18)
HOROWITZ INDEX BLD+IHG-RTO: 28 %
IMM GRANULOCYTES # BLD AUTO: 0.07 10*3/MM3 (ref 0–0.06)
IMM GRANULOCYTES NFR BLD AUTO: 0.4 % (ref 0–0.6)
LYMPHOCYTES # BLD AUTO: 3.04 10*3/MM3 (ref 0.6–3.4)
LYMPHOCYTES NFR BLD AUTO: 15.7 % (ref 10–50)
MCH RBC QN AUTO: 29 PG (ref 27–31)
MCHC RBC AUTO-ENTMCNC: 31.4 G/DL (ref 30–37)
MCV RBC AUTO: 92.3 FL (ref 81–99)
METHGB BLD QL: 0.6 % (ref 0–1.5)
MODALITY: ABNORMAL
MONOCYTES # BLD AUTO: 1.47 10*3/MM3 (ref 0–0.9)
MONOCYTES NFR BLD AUTO: 7.6 % (ref 0–12)
NEUTROPHILS # BLD AUTO: 14.53 10*3/MM3 (ref 2–6.9)
NEUTROPHILS NFR BLD AUTO: 75 % (ref 37–80)
NOTE: ABNORMAL
NRBC BLD AUTO-RTO: 0 /100 WBC (ref 0–0)
NT-PROBNP SERPL-MCNC: 206 PG/ML (ref 0–450)
OXYHGB MFR BLDV: 96.8 % (ref 94–99)
PCO2 BLDA: 64 MM HG (ref 35–45)
PCO2 TEMP ADJ BLD: ABNORMAL MM HG (ref 35–45)
PH BLDA: 7.32 PH UNITS (ref 7.3–7.5)
PH, TEMP CORRECTED: ABNORMAL PH UNITS
PLATELET # BLD AUTO: 154 10*3/MM3 (ref 130–400)
PMV BLD AUTO: 10.3 FL (ref 6–12)
PO2 BLDA: 170 MM HG (ref 75–100)
PO2 TEMP ADJ BLD: ABNORMAL MM HG (ref 83–108)
POTASSIUM BLD-SCNC: 4.2 MMOL/L (ref 3.5–5.1)
PROT SERPL-MCNC: 7.1 G/DL (ref 6.3–8.2)
RBC # BLD AUTO: 5.31 10*6/MM3 (ref 4.2–5.4)
SAO2 % BLDCOA: 99.7 % (ref 94–100)
SODIUM BLD-SCNC: 137 MMOL/L (ref 137–145)
TROPONIN I SERPL-MCNC: <0.012 NG/ML (ref 0–0.03)
VENTILATOR MODE: ABNORMAL
WBC NRBC COR # BLD: 19.37 10*3/MM3 (ref 4.8–10.8)

## 2019-02-23 PROCEDURE — 84484 ASSAY OF TROPONIN QUANT: CPT | Performed by: PHYSICIAN ASSISTANT

## 2019-02-23 PROCEDURE — 71250 CT THORAX DX C-: CPT

## 2019-02-23 PROCEDURE — 80053 COMPREHEN METABOLIC PANEL: CPT | Performed by: PHYSICIAN ASSISTANT

## 2019-02-23 PROCEDURE — 25010000002 METHYLPREDNISOLONE PER 125 MG: Performed by: PHYSICIAN ASSISTANT

## 2019-02-23 PROCEDURE — 99220 PR INITIAL OBSERVATION CARE/DAY 70 MINUTES: CPT | Performed by: INTERNAL MEDICINE

## 2019-02-23 PROCEDURE — 94640 AIRWAY INHALATION TREATMENT: CPT

## 2019-02-23 PROCEDURE — 25010000002 HEPARIN (PORCINE) PER 1000 UNITS: Performed by: INTERNAL MEDICINE

## 2019-02-23 PROCEDURE — 36600 WITHDRAWAL OF ARTERIAL BLOOD: CPT

## 2019-02-23 PROCEDURE — 87040 BLOOD CULTURE FOR BACTERIA: CPT | Performed by: EMERGENCY MEDICINE

## 2019-02-23 PROCEDURE — G0378 HOSPITAL OBSERVATION PER HR: HCPCS

## 2019-02-23 PROCEDURE — 82375 ASSAY CARBOXYHB QUANT: CPT

## 2019-02-23 PROCEDURE — 83880 ASSAY OF NATRIURETIC PEPTIDE: CPT | Performed by: PHYSICIAN ASSISTANT

## 2019-02-23 PROCEDURE — 82805 BLOOD GASES W/O2 SATURATION: CPT

## 2019-02-23 PROCEDURE — 25010000002 CEFTRIAXONE SODIUM-DEXTROSE 1-3.74 GM-%(50ML) RECONSTITUTED SOLUTION: Performed by: PHYSICIAN ASSISTANT

## 2019-02-23 PROCEDURE — 25010000002 AZITHROMYCIN: Performed by: PHYSICIAN ASSISTANT

## 2019-02-23 PROCEDURE — 85025 COMPLETE CBC W/AUTO DIFF WBC: CPT | Performed by: PHYSICIAN ASSISTANT

## 2019-02-23 PROCEDURE — 99285 EMERGENCY DEPT VISIT HI MDM: CPT

## 2019-02-23 PROCEDURE — 94799 UNLISTED PULMONARY SVC/PX: CPT

## 2019-02-23 PROCEDURE — 83050 HGB METHEMOGLOBIN QUAN: CPT

## 2019-02-23 RX ORDER — GUAIFENESIN 600 MG/1
600 TABLET, EXTENDED RELEASE ORAL EVERY 12 HOURS SCHEDULED
Status: DISCONTINUED | OUTPATIENT
Start: 2019-02-23 | End: 2019-02-28 | Stop reason: HOSPADM

## 2019-02-23 RX ORDER — HEPARIN SODIUM 5000 [USP'U]/ML
5000 INJECTION, SOLUTION INTRAVENOUS; SUBCUTANEOUS EVERY 12 HOURS SCHEDULED
Status: DISCONTINUED | OUTPATIENT
Start: 2019-02-23 | End: 2019-02-28 | Stop reason: HOSPADM

## 2019-02-23 RX ORDER — SODIUM CHLORIDE, SODIUM LACTATE, POTASSIUM CHLORIDE, CALCIUM CHLORIDE 600; 310; 30; 20 MG/100ML; MG/100ML; MG/100ML; MG/100ML
75 INJECTION, SOLUTION INTRAVENOUS CONTINUOUS
Status: ACTIVE | OUTPATIENT
Start: 2019-02-23 | End: 2019-02-24

## 2019-02-23 RX ORDER — CEFTRIAXONE 1 G/50ML
1 INJECTION, SOLUTION INTRAVENOUS EVERY 24 HOURS
Status: DISCONTINUED | OUTPATIENT
Start: 2019-02-24 | End: 2019-02-28 | Stop reason: HOSPADM

## 2019-02-23 RX ORDER — METHYLPREDNISOLONE SODIUM SUCCINATE 125 MG/2ML
40 INJECTION, POWDER, LYOPHILIZED, FOR SOLUTION INTRAMUSCULAR; INTRAVENOUS EVERY 8 HOURS
Status: DISCONTINUED | OUTPATIENT
Start: 2019-02-24 | End: 2019-02-24 | Stop reason: ALTCHOICE

## 2019-02-23 RX ORDER — MECLIZINE HCL 12.5 MG/1
12.5 TABLET ORAL 3 TIMES DAILY PRN
Status: DISCONTINUED | OUTPATIENT
Start: 2019-02-23 | End: 2019-02-28 | Stop reason: HOSPADM

## 2019-02-23 RX ORDER — CALCIUM CARBONATE 200(500)MG
2 TABLET,CHEWABLE ORAL 2 TIMES DAILY PRN
Status: DISCONTINUED | OUTPATIENT
Start: 2019-02-23 | End: 2019-02-28 | Stop reason: HOSPADM

## 2019-02-23 RX ORDER — SODIUM CHLORIDE 0.9 % (FLUSH) 0.9 %
3-10 SYRINGE (ML) INJECTION AS NEEDED
Status: DISCONTINUED | OUTPATIENT
Start: 2019-02-23 | End: 2019-02-28 | Stop reason: HOSPADM

## 2019-02-23 RX ORDER — DOCUSATE SODIUM 100 MG/1
100 CAPSULE, LIQUID FILLED ORAL DAILY
Status: DISCONTINUED | OUTPATIENT
Start: 2019-02-23 | End: 2019-02-28 | Stop reason: HOSPADM

## 2019-02-23 RX ORDER — ONDANSETRON 4 MG/1
4 TABLET, ORALLY DISINTEGRATING ORAL EVERY 6 HOURS PRN
Status: DISCONTINUED | OUTPATIENT
Start: 2019-02-23 | End: 2019-02-28 | Stop reason: HOSPADM

## 2019-02-23 RX ORDER — ACETAMINOPHEN 325 MG/1
650 TABLET ORAL EVERY 4 HOURS PRN
Status: DISCONTINUED | OUTPATIENT
Start: 2019-02-23 | End: 2019-02-28 | Stop reason: HOSPADM

## 2019-02-23 RX ORDER — METHYLPREDNISOLONE SODIUM SUCCINATE 125 MG/2ML
125 INJECTION, POWDER, LYOPHILIZED, FOR SOLUTION INTRAMUSCULAR; INTRAVENOUS ONCE
Status: COMPLETED | OUTPATIENT
Start: 2019-02-23 | End: 2019-02-23

## 2019-02-23 RX ORDER — LANOLIN ALCOHOL/MO/W.PET/CERES
6 CREAM (GRAM) TOPICAL NIGHTLY PRN
Status: DISCONTINUED | OUTPATIENT
Start: 2019-02-23 | End: 2019-02-28 | Stop reason: HOSPADM

## 2019-02-23 RX ORDER — IPRATROPIUM BROMIDE AND ALBUTEROL SULFATE 2.5; .5 MG/3ML; MG/3ML
3 SOLUTION RESPIRATORY (INHALATION)
Status: DISCONTINUED | OUTPATIENT
Start: 2019-02-23 | End: 2019-02-25

## 2019-02-23 RX ORDER — IPRATROPIUM BROMIDE AND ALBUTEROL SULFATE 2.5; .5 MG/3ML; MG/3ML
3 SOLUTION RESPIRATORY (INHALATION) ONCE
Status: COMPLETED | OUTPATIENT
Start: 2019-02-23 | End: 2019-02-23

## 2019-02-23 RX ORDER — LORAZEPAM 0.5 MG/1
0.5 TABLET ORAL EVERY 8 HOURS PRN
Status: DISCONTINUED | OUTPATIENT
Start: 2019-02-23 | End: 2019-02-28 | Stop reason: HOSPADM

## 2019-02-23 RX ORDER — CALCIUM CARBONATE 200(500)MG
1 TABLET,CHEWABLE ORAL
Status: DISCONTINUED | OUTPATIENT
Start: 2019-02-23 | End: 2019-02-28 | Stop reason: HOSPADM

## 2019-02-23 RX ORDER — PANTOPRAZOLE SODIUM 40 MG/1
40 TABLET, DELAYED RELEASE ORAL
Status: DISCONTINUED | OUTPATIENT
Start: 2019-02-24 | End: 2019-02-28 | Stop reason: HOSPADM

## 2019-02-23 RX ORDER — SODIUM CHLORIDE 0.9 % (FLUSH) 0.9 %
10 SYRINGE (ML) INJECTION AS NEEDED
Status: DISCONTINUED | OUTPATIENT
Start: 2019-02-23 | End: 2019-02-28 | Stop reason: HOSPADM

## 2019-02-23 RX ORDER — CARBIDOPA AND LEVODOPA 25; 100 MG/1; MG/1
1 TABLET, EXTENDED RELEASE ORAL EVERY 12 HOURS SCHEDULED
Status: DISCONTINUED | OUTPATIENT
Start: 2019-02-23 | End: 2019-02-28 | Stop reason: HOSPADM

## 2019-02-23 RX ORDER — ONDANSETRON 2 MG/ML
4 INJECTION INTRAMUSCULAR; INTRAVENOUS EVERY 6 HOURS PRN
Status: DISCONTINUED | OUTPATIENT
Start: 2019-02-23 | End: 2019-02-28 | Stop reason: HOSPADM

## 2019-02-23 RX ORDER — ONDANSETRON 4 MG/1
4 TABLET, FILM COATED ORAL EVERY 6 HOURS PRN
Status: DISCONTINUED | OUTPATIENT
Start: 2019-02-23 | End: 2019-02-28 | Stop reason: HOSPADM

## 2019-02-23 RX ORDER — CEFTRIAXONE 1 G/50ML
1 INJECTION, SOLUTION INTRAVENOUS ONCE
Status: COMPLETED | OUTPATIENT
Start: 2019-02-23 | End: 2019-02-23

## 2019-02-23 RX ORDER — TRAMADOL HYDROCHLORIDE 50 MG/1
50 TABLET ORAL EVERY 6 HOURS PRN
Status: DISCONTINUED | OUTPATIENT
Start: 2019-02-23 | End: 2019-02-28 | Stop reason: HOSPADM

## 2019-02-23 RX ADMIN — NYSTATIN 500000 UNITS: 100000 SUSPENSION ORAL at 21:40

## 2019-02-23 RX ADMIN — GUAIFENESIN 600 MG: 600 TABLET, EXTENDED RELEASE ORAL at 21:40

## 2019-02-23 RX ADMIN — LORAZEPAM 0.5 MG: 0.5 TABLET ORAL at 23:05

## 2019-02-23 RX ADMIN — CARBIDOPA AND LEVODOPA 1 TABLET: 25; 100 TABLET, EXTENDED RELEASE ORAL at 17:55

## 2019-02-23 RX ADMIN — METHYLPREDNISOLONE SODIUM SUCCINATE 125 MG: 125 INJECTION, POWDER, FOR SOLUTION INTRAMUSCULAR; INTRAVENOUS at 14:45

## 2019-02-23 RX ADMIN — CEFTRIAXONE 1 G: 1 INJECTION, SOLUTION INTRAVENOUS at 16:17

## 2019-02-23 RX ADMIN — CALCIUM CARBONATE (ANTACID) CHEW TAB 500 MG 1 TABLET: 500 CHEW TAB at 17:54

## 2019-02-23 RX ADMIN — DOCUSATE SODIUM 100 MG: 100 CAPSULE, LIQUID FILLED ORAL at 17:54

## 2019-02-23 RX ADMIN — CARBIDOPA AND LEVODOPA 1 TABLET: 25; 100 TABLET, EXTENDED RELEASE ORAL at 21:40

## 2019-02-23 RX ADMIN — ONDANSETRON 4 MG: 4 TABLET, ORALLY DISINTEGRATING ORAL at 18:34

## 2019-02-23 RX ADMIN — SERTRALINE HYDROCHLORIDE 50 MG: 50 TABLET ORAL at 17:54

## 2019-02-23 RX ADMIN — SODIUM CHLORIDE 1000 ML: 9 INJECTION, SOLUTION INTRAVENOUS at 14:44

## 2019-02-23 RX ADMIN — HEPARIN SODIUM 5000 UNITS: 5000 INJECTION, SOLUTION INTRAVENOUS; SUBCUTANEOUS at 21:40

## 2019-02-23 RX ADMIN — AZITHROMYCIN 500 MG: 500 INJECTION, POWDER, LYOPHILIZED, FOR SOLUTION INTRAVENOUS at 17:38

## 2019-02-23 RX ADMIN — IPRATROPIUM BROMIDE AND ALBUTEROL SULFATE 3 ML: .5; 3 SOLUTION RESPIRATORY (INHALATION) at 14:28

## 2019-02-23 RX ADMIN — SODIUM CHLORIDE, POTASSIUM CHLORIDE, SODIUM LACTATE AND CALCIUM CHLORIDE 75 ML/HR: 600; 310; 30; 20 INJECTION, SOLUTION INTRAVENOUS at 17:54

## 2019-02-24 LAB
A-A DO2: ABNORMAL MMHG
ANION GAP SERPL CALCULATED.3IONS-SCNC: 5.3 MMOL/L (ref 10–20)
ARTERIAL PATENCY WRIST A: POSITIVE
ATMOSPHERIC PRESS: 729 MMHG
BASE EXCESS BLDA CALC-SCNC: 3 MMOL/L (ref 0–2)
BASOPHILS # BLD AUTO: 0.01 10*3/MM3 (ref 0–0.2)
BASOPHILS NFR BLD AUTO: 0.1 % (ref 0–2.5)
BDY SITE: ABNORMAL
BUN BLD-MCNC: 26 MG/DL (ref 7–20)
BUN/CREAT SERPL: 37.1 (ref 7.1–23.5)
CALCIUM SPEC-SCNC: 8.8 MG/DL (ref 8.4–10.2)
CHLORIDE SERPL-SCNC: 106 MMOL/L (ref 98–107)
CO2 SERPL-SCNC: 31 MMOL/L (ref 26–30)
COHGB MFR BLD: 1 % (ref 0–2)
CREAT BLD-MCNC: 0.7 MG/DL (ref 0.6–1.3)
DEPRECATED RDW RBC AUTO: 42.3 FL (ref 37–54)
EOSINOPHIL # BLD AUTO: 0 10*3/MM3 (ref 0–0.7)
EOSINOPHIL NFR BLD AUTO: 0 % (ref 0–7)
ERYTHROCYTE [DISTWIDTH] IN BLOOD BY AUTOMATED COUNT: 12.8 % (ref 11.5–14.5)
GAS FLOW AIRWAY: 3 LPM
GFR SERPL CREATININE-BSD FRML MDRD: 81 ML/MIN/1.73
GLUCOSE BLD-MCNC: 175 MG/DL (ref 74–98)
HCO3 BLDA-SCNC: 31.3 MMOL/L (ref 22–28)
HCT VFR BLD AUTO: 37.9 % (ref 37–47)
HCT VFR BLD CALC: 37.8 %
HGB BLD-MCNC: 11.7 G/DL (ref 12–16)
HGB BLDA-MCNC: 12.3 G/DL (ref 12–18)
HOROWITZ INDEX BLD+IHG-RTO: 32 %
IMM GRANULOCYTES # BLD AUTO: 0.05 10*3/MM3 (ref 0–0.06)
IMM GRANULOCYTES NFR BLD AUTO: 0.5 % (ref 0–0.6)
LYMPHOCYTES # BLD AUTO: 0.9 10*3/MM3 (ref 0.6–3.4)
LYMPHOCYTES NFR BLD AUTO: 9.6 % (ref 10–50)
MAGNESIUM SERPL-MCNC: 1.7 MG/DL (ref 1.6–2.3)
MCH RBC QN AUTO: 27.9 PG (ref 27–31)
MCHC RBC AUTO-ENTMCNC: 30.9 G/DL (ref 30–37)
MCV RBC AUTO: 90.2 FL (ref 81–99)
METHGB BLD QL: 0.6 % (ref 0–1.5)
MODALITY: ABNORMAL
MONOCYTES # BLD AUTO: 0.09 10*3/MM3 (ref 0–0.9)
MONOCYTES NFR BLD AUTO: 1 % (ref 0–12)
NEUTROPHILS # BLD AUTO: 8.32 10*3/MM3 (ref 2–6.9)
NEUTROPHILS NFR BLD AUTO: 88.8 % (ref 37–80)
NOTE: ABNORMAL
NRBC BLD AUTO-RTO: 0 /100 WBC (ref 0–0)
OXYHGB MFR BLDV: 97.6 % (ref 94–99)
PCO2 BLDA: 65.8 MM HG (ref 35–45)
PCO2 TEMP ADJ BLD: ABNORMAL MM HG (ref 35–45)
PH BLDA: 7.29 PH UNITS (ref 7.3–7.5)
PH, TEMP CORRECTED: ABNORMAL PH UNITS
PHOSPHATE SERPL-MCNC: 2.5 MG/DL (ref 2.5–4.5)
PLATELET # BLD AUTO: 145 10*3/MM3 (ref 130–400)
PMV BLD AUTO: 10.7 FL (ref 6–12)
PO2 BLDA: 125 MM HG (ref 75–100)
PO2 TEMP ADJ BLD: ABNORMAL MM HG (ref 83–108)
POTASSIUM BLD-SCNC: 4.3 MMOL/L (ref 3.5–5.1)
RBC # BLD AUTO: 4.2 10*6/MM3 (ref 4.2–5.4)
SAO2 % BLDCOA: 99.2 % (ref 94–100)
SODIUM BLD-SCNC: 138 MMOL/L (ref 137–145)
VENTILATOR MODE: ABNORMAL
WBC NRBC COR # BLD: 9.37 10*3/MM3 (ref 4.8–10.8)

## 2019-02-24 PROCEDURE — 25010000002 METHYLPREDNISOLONE PER 40 MG: Performed by: INTERNAL MEDICINE

## 2019-02-24 PROCEDURE — 36600 WITHDRAWAL OF ARTERIAL BLOOD: CPT

## 2019-02-24 PROCEDURE — 25010000002 CEFTRIAXONE SODIUM-DEXTROSE 1-3.74 GM-%(50ML) RECONSTITUTED SOLUTION: Performed by: INTERNAL MEDICINE

## 2019-02-24 PROCEDURE — 25010000002 HEPARIN (PORCINE) PER 1000 UNITS: Performed by: INTERNAL MEDICINE

## 2019-02-24 PROCEDURE — 94799 UNLISTED PULMONARY SVC/PX: CPT

## 2019-02-24 PROCEDURE — 80048 BASIC METABOLIC PNL TOTAL CA: CPT | Performed by: INTERNAL MEDICINE

## 2019-02-24 PROCEDURE — 83735 ASSAY OF MAGNESIUM: CPT | Performed by: INTERNAL MEDICINE

## 2019-02-24 PROCEDURE — 84100 ASSAY OF PHOSPHORUS: CPT | Performed by: INTERNAL MEDICINE

## 2019-02-24 PROCEDURE — 82805 BLOOD GASES W/O2 SATURATION: CPT

## 2019-02-24 PROCEDURE — 82375 ASSAY CARBOXYHB QUANT: CPT

## 2019-02-24 PROCEDURE — G0378 HOSPITAL OBSERVATION PER HR: HCPCS

## 2019-02-24 PROCEDURE — 85025 COMPLETE CBC W/AUTO DIFF WBC: CPT | Performed by: INTERNAL MEDICINE

## 2019-02-24 PROCEDURE — 83050 HGB METHEMOGLOBIN QUAN: CPT

## 2019-02-24 PROCEDURE — 99225 PR SBSQ OBSERVATION CARE/DAY 25 MINUTES: CPT | Performed by: INTERNAL MEDICINE

## 2019-02-24 RX ORDER — METHYLPREDNISOLONE SODIUM SUCCINATE 40 MG/ML
40 INJECTION, POWDER, LYOPHILIZED, FOR SOLUTION INTRAMUSCULAR; INTRAVENOUS EVERY 8 HOURS
Status: DISCONTINUED | OUTPATIENT
Start: 2019-02-24 | End: 2019-02-27

## 2019-02-24 RX ADMIN — CALCIUM CARBONATE (ANTACID) CHEW TAB 500 MG 1 TABLET: 500 CHEW TAB at 10:37

## 2019-02-24 RX ADMIN — METHYLPREDNISOLONE SODIUM SUCCINATE 40 MG: 40 INJECTION, POWDER, FOR SOLUTION INTRAMUSCULAR; INTRAVENOUS at 17:11

## 2019-02-24 RX ADMIN — CEFTRIAXONE 1 G: 1 INJECTION, SOLUTION INTRAVENOUS at 16:42

## 2019-02-24 RX ADMIN — CARBIDOPA AND LEVODOPA 1 TABLET: 25; 100 TABLET, EXTENDED RELEASE ORAL at 17:20

## 2019-02-24 RX ADMIN — GUAIFENESIN 600 MG: 600 TABLET, EXTENDED RELEASE ORAL at 21:14

## 2019-02-24 RX ADMIN — CARBIDOPA AND LEVODOPA 1 TABLET: 25; 100 TABLET, EXTENDED RELEASE ORAL at 06:55

## 2019-02-24 RX ADMIN — METHYLPREDNISOLONE SODIUM SUCCINATE 40 MG: 40 INJECTION, POWDER, FOR SOLUTION INTRAMUSCULAR; INTRAVENOUS at 00:55

## 2019-02-24 RX ADMIN — HEPARIN SODIUM 5000 UNITS: 5000 INJECTION, SOLUTION INTRAVENOUS; SUBCUTANEOUS at 21:14

## 2019-02-24 RX ADMIN — GUAIFENESIN 600 MG: 600 TABLET, EXTENDED RELEASE ORAL at 08:17

## 2019-02-24 RX ADMIN — NYSTATIN 500000 UNITS: 100000 SUSPENSION ORAL at 17:11

## 2019-02-24 RX ADMIN — HYDROCODONE POLISTIREX AND CHLORPHENIRAMINE POLISTIREX 5 ML: 10; 8 SUSPENSION, EXTENDED RELEASE ORAL at 21:59

## 2019-02-24 RX ADMIN — NYSTATIN 500000 UNITS: 100000 SUSPENSION ORAL at 13:01

## 2019-02-24 RX ADMIN — NYSTATIN 500000 UNITS: 100000 SUSPENSION ORAL at 21:13

## 2019-02-24 RX ADMIN — NYSTATIN 500000 UNITS: 100000 SUSPENSION ORAL at 08:17

## 2019-02-24 RX ADMIN — HEPARIN SODIUM 5000 UNITS: 5000 INJECTION, SOLUTION INTRAVENOUS; SUBCUTANEOUS at 08:16

## 2019-02-24 RX ADMIN — IPRATROPIUM BROMIDE AND ALBUTEROL SULFATE 3 ML: .5; 3 SOLUTION RESPIRATORY (INHALATION) at 12:52

## 2019-02-24 RX ADMIN — SERTRALINE HYDROCHLORIDE 50 MG: 50 TABLET ORAL at 08:17

## 2019-02-24 RX ADMIN — CALCIUM CARBONATE (ANTACID) CHEW TAB 500 MG 1 TABLET: 500 CHEW TAB at 06:55

## 2019-02-24 RX ADMIN — DOCUSATE SODIUM 100 MG: 100 CAPSULE, LIQUID FILLED ORAL at 08:17

## 2019-02-24 RX ADMIN — IPRATROPIUM BROMIDE AND ALBUTEROL SULFATE 3 ML: .5; 3 SOLUTION RESPIRATORY (INHALATION) at 07:38

## 2019-02-24 RX ADMIN — IPRATROPIUM BROMIDE AND ALBUTEROL SULFATE 3 ML: .5; 3 SOLUTION RESPIRATORY (INHALATION) at 19:48

## 2019-02-24 RX ADMIN — CALCIUM CARBONATE (ANTACID) CHEW TAB 500 MG 1 TABLET: 500 CHEW TAB at 17:12

## 2019-02-24 RX ADMIN — PANTOPRAZOLE SODIUM 40 MG: 40 TABLET, DELAYED RELEASE ORAL at 06:55

## 2019-02-24 RX ADMIN — METHYLPREDNISOLONE SODIUM SUCCINATE 40 MG: 40 INJECTION, POWDER, FOR SOLUTION INTRAMUSCULAR; INTRAVENOUS at 08:32

## 2019-02-25 PROBLEM — J96.22 ACUTE ON CHRONIC RESPIRATORY FAILURE WITH HYPOXIA AND HYPERCAPNIA (HCC): Status: ACTIVE | Noted: 2018-01-23

## 2019-02-25 LAB
A-A DO2: ABNORMAL MMHG
ALBUMIN SERPL-MCNC: 2.8 G/DL (ref 3.5–5)
ANION GAP SERPL CALCULATED.3IONS-SCNC: 5.6 MMOL/L (ref 10–20)
ARTERIAL PATENCY WRIST A: POSITIVE
ATMOSPHERIC PRESS: 743 MMHG
BASE EXCESS BLDA CALC-SCNC: 8.8 MMOL/L (ref 0–2)
BDY SITE: ABNORMAL
BUN BLD-MCNC: 27 MG/DL (ref 7–20)
BUN/CREAT SERPL: 38.6 (ref 7.1–23.5)
CALCIUM SPEC-SCNC: 9.2 MG/DL (ref 8.4–10.2)
CHLORIDE SERPL-SCNC: 102 MMOL/L (ref 98–107)
CO2 SERPL-SCNC: 35 MMOL/L (ref 26–30)
COHGB MFR BLD: 0.8 % (ref 0–2)
CREAT BLD-MCNC: 0.7 MG/DL (ref 0.6–1.3)
DEPRECATED RDW RBC AUTO: 42 FL (ref 37–54)
ERYTHROCYTE [DISTWIDTH] IN BLOOD BY AUTOMATED COUNT: 12.7 % (ref 11.5–14.5)
GAS FLOW AIRWAY: 2 LPM
GFR SERPL CREATININE-BSD FRML MDRD: 81 ML/MIN/1.73
GLUCOSE BLD-MCNC: 131 MG/DL (ref 74–98)
HCO3 BLDA-SCNC: 35.9 MMOL/L (ref 22–28)
HCT VFR BLD AUTO: 38.7 % (ref 37–47)
HCT VFR BLD CALC: 39.6 %
HGB BLD-MCNC: 12.2 G/DL (ref 12–16)
HGB BLDA-MCNC: 12.9 G/DL (ref 12–18)
HOROWITZ INDEX BLD+IHG-RTO: 28 %
MCH RBC QN AUTO: 28.8 PG (ref 27–31)
MCHC RBC AUTO-ENTMCNC: 31.5 G/DL (ref 30–37)
MCV RBC AUTO: 91.3 FL (ref 81–99)
METHGB BLD QL: 0.7 % (ref 0–1.5)
MODALITY: ABNORMAL
NOTE: ABNORMAL
OXYHGB MFR BLDV: 95.2 % (ref 94–99)
PCO2 BLDA: 59.9 MM HG (ref 35–45)
PCO2 TEMP ADJ BLD: ABNORMAL MM HG (ref 35–45)
PH BLDA: 7.39 PH UNITS (ref 7.3–7.5)
PH, TEMP CORRECTED: ABNORMAL PH UNITS
PHOSPHATE SERPL-MCNC: 2.9 MG/DL (ref 2.5–4.5)
PLATELET # BLD AUTO: 159 10*3/MM3 (ref 130–400)
PMV BLD AUTO: 10.9 FL (ref 6–12)
PO2 BLDA: 74.7 MM HG (ref 75–100)
PO2 TEMP ADJ BLD: ABNORMAL MM HG (ref 83–108)
POTASSIUM BLD-SCNC: 4.6 MMOL/L (ref 3.5–5.1)
RBC # BLD AUTO: 4.24 10*6/MM3 (ref 4.2–5.4)
SAO2 % BLDCOA: 96.6 % (ref 94–100)
SODIUM BLD-SCNC: 138 MMOL/L (ref 137–145)
VENTILATOR MODE: ABNORMAL
WBC NRBC COR # BLD: 20.88 10*3/MM3 (ref 4.8–10.8)

## 2019-02-25 PROCEDURE — 82805 BLOOD GASES W/O2 SATURATION: CPT

## 2019-02-25 PROCEDURE — 97161 PT EVAL LOW COMPLEX 20 MIN: CPT

## 2019-02-25 PROCEDURE — 99232 SBSQ HOSP IP/OBS MODERATE 35: CPT | Performed by: INTERNAL MEDICINE

## 2019-02-25 PROCEDURE — 80069 RENAL FUNCTION PANEL: CPT | Performed by: INTERNAL MEDICINE

## 2019-02-25 PROCEDURE — 36600 WITHDRAWAL OF ARTERIAL BLOOD: CPT

## 2019-02-25 PROCEDURE — 99254 IP/OBS CNSLTJ NEW/EST MOD 60: CPT | Performed by: INTERNAL MEDICINE

## 2019-02-25 PROCEDURE — 94799 UNLISTED PULMONARY SVC/PX: CPT

## 2019-02-25 PROCEDURE — 82375 ASSAY CARBOXYHB QUANT: CPT

## 2019-02-25 PROCEDURE — 25010000002 METHYLPREDNISOLONE PER 40 MG: Performed by: INTERNAL MEDICINE

## 2019-02-25 PROCEDURE — 25010000002 HEPARIN (PORCINE) PER 1000 UNITS: Performed by: INTERNAL MEDICINE

## 2019-02-25 PROCEDURE — 83050 HGB METHEMOGLOBIN QUAN: CPT

## 2019-02-25 PROCEDURE — 25010000002 CEFTRIAXONE SODIUM-DEXTROSE 1-3.74 GM-%(50ML) RECONSTITUTED SOLUTION: Performed by: INTERNAL MEDICINE

## 2019-02-25 PROCEDURE — 85027 COMPLETE CBC AUTOMATED: CPT | Performed by: INTERNAL MEDICINE

## 2019-02-25 RX ORDER — ARFORMOTEROL TARTRATE 15 UG/2ML
15 SOLUTION RESPIRATORY (INHALATION)
Status: DISCONTINUED | OUTPATIENT
Start: 2019-02-25 | End: 2019-02-28 | Stop reason: HOSPADM

## 2019-02-25 RX ORDER — BUDESONIDE 0.5 MG/2ML
0.5 INHALANT ORAL
Status: DISCONTINUED | OUTPATIENT
Start: 2019-02-25 | End: 2019-02-28 | Stop reason: HOSPADM

## 2019-02-25 RX ADMIN — SODIUM CHLORIDE, PRESERVATIVE FREE 3 ML: 5 INJECTION INTRAVENOUS at 10:04

## 2019-02-25 RX ADMIN — METHYLPREDNISOLONE SODIUM SUCCINATE 40 MG: 40 INJECTION, POWDER, FOR SOLUTION INTRAMUSCULAR; INTRAVENOUS at 10:03

## 2019-02-25 RX ADMIN — GUAIFENESIN 600 MG: 600 TABLET, EXTENDED RELEASE ORAL at 10:03

## 2019-02-25 RX ADMIN — NYSTATIN 500000 UNITS: 100000 SUSPENSION ORAL at 17:22

## 2019-02-25 RX ADMIN — LORAZEPAM 0.5 MG: 0.5 TABLET ORAL at 15:00

## 2019-02-25 RX ADMIN — BUDESONIDE 0.5 MG: 0.5 INHALANT RESPIRATORY (INHALATION) at 20:00

## 2019-02-25 RX ADMIN — SERTRALINE HYDROCHLORIDE 50 MG: 50 TABLET ORAL at 10:03

## 2019-02-25 RX ADMIN — GUAIFENESIN 600 MG: 600 TABLET, EXTENDED RELEASE ORAL at 20:42

## 2019-02-25 RX ADMIN — METHYLPREDNISOLONE SODIUM SUCCINATE 40 MG: 40 INJECTION, POWDER, FOR SOLUTION INTRAMUSCULAR; INTRAVENOUS at 02:03

## 2019-02-25 RX ADMIN — IPRATROPIUM BROMIDE AND ALBUTEROL SULFATE 3 ML: .5; 3 SOLUTION RESPIRATORY (INHALATION) at 07:27

## 2019-02-25 RX ADMIN — IPRATROPIUM BROMIDE AND ALBUTEROL SULFATE 3 ML: .5; 3 SOLUTION RESPIRATORY (INHALATION) at 12:52

## 2019-02-25 RX ADMIN — NYSTATIN 500000 UNITS: 100000 SUSPENSION ORAL at 10:03

## 2019-02-25 RX ADMIN — PANTOPRAZOLE SODIUM 40 MG: 40 TABLET, DELAYED RELEASE ORAL at 06:36

## 2019-02-25 RX ADMIN — ARFORMOTEROL TARTRATE 15 MCG: 15 SOLUTION RESPIRATORY (INHALATION) at 20:00

## 2019-02-25 RX ADMIN — HEPARIN SODIUM 5000 UNITS: 5000 INJECTION, SOLUTION INTRAVENOUS; SUBCUTANEOUS at 10:03

## 2019-02-25 RX ADMIN — CARBIDOPA AND LEVODOPA 1 TABLET: 25; 100 TABLET, EXTENDED RELEASE ORAL at 06:43

## 2019-02-25 RX ADMIN — HYDROCODONE POLISTIREX AND CHLORPHENIRAMINE POLISTIREX 5 ML: 10; 8 SUSPENSION, EXTENDED RELEASE ORAL at 15:00

## 2019-02-25 RX ADMIN — BUDESONIDE 0.5 MG: 0.5 INHALANT RESPIRATORY (INHALATION) at 10:21

## 2019-02-25 RX ADMIN — CEFTRIAXONE 1 G: 1 INJECTION, SOLUTION INTRAVENOUS at 17:22

## 2019-02-25 RX ADMIN — HEPARIN SODIUM 5000 UNITS: 5000 INJECTION, SOLUTION INTRAVENOUS; SUBCUTANEOUS at 20:42

## 2019-02-25 RX ADMIN — NYSTATIN 500000 UNITS: 100000 SUSPENSION ORAL at 20:41

## 2019-02-25 RX ADMIN — ARFORMOTEROL TARTRATE 15 MCG: 15 SOLUTION RESPIRATORY (INHALATION) at 10:21

## 2019-02-25 RX ADMIN — DOCUSATE SODIUM 100 MG: 100 CAPSULE, LIQUID FILLED ORAL at 10:03

## 2019-02-25 RX ADMIN — IPRATROPIUM BROMIDE 0.5 MG: 0.5 SOLUTION RESPIRATORY (INHALATION) at 20:00

## 2019-02-25 RX ADMIN — CARBIDOPA AND LEVODOPA 1 TABLET: 25; 100 TABLET, EXTENDED RELEASE ORAL at 17:23

## 2019-02-25 RX ADMIN — CALCIUM CARBONATE (ANTACID) CHEW TAB 500 MG 1 TABLET: 500 CHEW TAB at 06:43

## 2019-02-25 RX ADMIN — NYSTATIN 500000 UNITS: 100000 SUSPENSION ORAL at 11:51

## 2019-02-25 RX ADMIN — METHYLPREDNISOLONE SODIUM SUCCINATE 40 MG: 40 INJECTION, POWDER, FOR SOLUTION INTRAMUSCULAR; INTRAVENOUS at 17:24

## 2019-02-25 RX ADMIN — CALCIUM CARBONATE (ANTACID) CHEW TAB 500 MG 1 TABLET: 500 CHEW TAB at 17:23

## 2019-02-25 RX ADMIN — CALCIUM CARBONATE (ANTACID) CHEW TAB 500 MG 1 TABLET: 500 CHEW TAB at 11:51

## 2019-02-26 ENCOUNTER — APPOINTMENT (OUTPATIENT)
Dept: GENERAL RADIOLOGY | Facility: HOSPITAL | Age: 79
End: 2019-02-26

## 2019-02-26 LAB
ALBUMIN SERPL-MCNC: 3 G/DL (ref 3.5–5)
ANION GAP SERPL CALCULATED.3IONS-SCNC: 8.4 MMOL/L (ref 10–20)
BASOPHILS # BLD AUTO: 0.02 10*3/MM3 (ref 0–0.2)
BASOPHILS NFR BLD AUTO: 0.1 % (ref 0–2.5)
BUN BLD-MCNC: 30 MG/DL (ref 7–20)
BUN/CREAT SERPL: 37.5 (ref 7.1–23.5)
CALCIUM SPEC-SCNC: 9.2 MG/DL (ref 8.4–10.2)
CHLORIDE SERPL-SCNC: 99 MMOL/L (ref 98–107)
CO2 SERPL-SCNC: 33 MMOL/L (ref 26–30)
CREAT BLD-MCNC: 0.8 MG/DL (ref 0.6–1.3)
DEPRECATED RDW RBC AUTO: 40.7 FL (ref 37–54)
EOSINOPHIL # BLD AUTO: 0 10*3/MM3 (ref 0–0.7)
EOSINOPHIL NFR BLD AUTO: 0 % (ref 0–7)
ERYTHROCYTE [DISTWIDTH] IN BLOOD BY AUTOMATED COUNT: 12.8 % (ref 11.5–14.5)
GFR SERPL CREATININE-BSD FRML MDRD: 69 ML/MIN/1.73
GLUCOSE BLD-MCNC: 133 MG/DL (ref 74–98)
HCT VFR BLD AUTO: 37.4 % (ref 37–47)
HGB BLD-MCNC: 12 G/DL (ref 12–16)
IMM GRANULOCYTES # BLD AUTO: 0.11 10*3/MM3 (ref 0–0.06)
IMM GRANULOCYTES NFR BLD AUTO: 0.6 % (ref 0–0.6)
LYMPHOCYTES # BLD AUTO: 0.92 10*3/MM3 (ref 0.6–3.4)
LYMPHOCYTES NFR BLD AUTO: 5.2 % (ref 10–50)
MCH RBC QN AUTO: 28 PG (ref 27–31)
MCHC RBC AUTO-ENTMCNC: 32.1 G/DL (ref 30–37)
MCV RBC AUTO: 87.4 FL (ref 81–99)
MONOCYTES # BLD AUTO: 0.26 10*3/MM3 (ref 0–0.9)
MONOCYTES NFR BLD AUTO: 1.5 % (ref 0–12)
NEUTROPHILS # BLD AUTO: 16.32 10*3/MM3 (ref 2–6.9)
NEUTROPHILS NFR BLD AUTO: 92.6 % (ref 37–80)
NRBC BLD AUTO-RTO: 0 /100 WBC (ref 0–0)
PHOSPHATE SERPL-MCNC: 2.5 MG/DL (ref 2.5–4.5)
PLATELET # BLD AUTO: 171 10*3/MM3 (ref 130–400)
PMV BLD AUTO: 10.9 FL (ref 6–12)
POTASSIUM BLD-SCNC: 4.4 MMOL/L (ref 3.5–5.1)
RBC # BLD AUTO: 4.28 10*6/MM3 (ref 4.2–5.4)
SODIUM BLD-SCNC: 136 MMOL/L (ref 137–145)
WBC NRBC COR # BLD: 17.63 10*3/MM3 (ref 4.8–10.8)

## 2019-02-26 PROCEDURE — 25010000002 MAGNESIUM SULFATE 2 GM/50ML SOLUTION: Performed by: INTERNAL MEDICINE

## 2019-02-26 PROCEDURE — 25010000002 METHYLPREDNISOLONE PER 40 MG: Performed by: INTERNAL MEDICINE

## 2019-02-26 PROCEDURE — 71045 X-RAY EXAM CHEST 1 VIEW: CPT

## 2019-02-26 PROCEDURE — 25010000002 HEPARIN (PORCINE) PER 1000 UNITS: Performed by: INTERNAL MEDICINE

## 2019-02-26 PROCEDURE — 99232 SBSQ HOSP IP/OBS MODERATE 35: CPT | Performed by: INTERNAL MEDICINE

## 2019-02-26 PROCEDURE — 80069 RENAL FUNCTION PANEL: CPT | Performed by: INTERNAL MEDICINE

## 2019-02-26 PROCEDURE — 25010000002 CEFTRIAXONE SODIUM-DEXTROSE 1-3.74 GM-%(50ML) RECONSTITUTED SOLUTION: Performed by: INTERNAL MEDICINE

## 2019-02-26 PROCEDURE — 97116 GAIT TRAINING THERAPY: CPT

## 2019-02-26 PROCEDURE — 94799 UNLISTED PULMONARY SVC/PX: CPT

## 2019-02-26 PROCEDURE — 85025 COMPLETE CBC W/AUTO DIFF WBC: CPT | Performed by: INTERNAL MEDICINE

## 2019-02-26 PROCEDURE — 25010000002 METHYLPREDNISOLONE PER 125 MG: Performed by: INTERNAL MEDICINE

## 2019-02-26 RX ORDER — SODIUM CHLORIDE FOR INHALATION 3 %
4 VIAL, NEBULIZER (ML) INHALATION EVERY 8 HOURS
Status: COMPLETED | OUTPATIENT
Start: 2019-02-26 | End: 2019-02-27

## 2019-02-26 RX ORDER — DOXYCYCLINE HYCLATE 100 MG/1
100 CAPSULE ORAL 2 TIMES DAILY
COMMUNITY
End: 2019-02-28 | Stop reason: HOSPADM

## 2019-02-26 RX ORDER — L.ACID,PARA/B.BIFIDUM/S.THERM 8B CELL
1 CAPSULE ORAL 2 TIMES DAILY
Status: DISCONTINUED | OUTPATIENT
Start: 2019-02-26 | End: 2019-02-28 | Stop reason: HOSPADM

## 2019-02-26 RX ORDER — METHYLPREDNISOLONE SODIUM SUCCINATE 125 MG/2ML
60 INJECTION, POWDER, LYOPHILIZED, FOR SOLUTION INTRAMUSCULAR; INTRAVENOUS ONCE
Status: COMPLETED | OUTPATIENT
Start: 2019-02-26 | End: 2019-02-26

## 2019-02-26 RX ORDER — MAGNESIUM SULFATE HEPTAHYDRATE 40 MG/ML
2 INJECTION, SOLUTION INTRAVENOUS ONCE
Status: COMPLETED | OUTPATIENT
Start: 2019-02-26 | End: 2019-02-26

## 2019-02-26 RX ADMIN — NYSTATIN 500000 UNITS: 100000 SUSPENSION ORAL at 20:19

## 2019-02-26 RX ADMIN — METHYLPREDNISOLONE SODIUM SUCCINATE 60 MG: 125 INJECTION, POWDER, FOR SOLUTION INTRAMUSCULAR; INTRAVENOUS at 08:16

## 2019-02-26 RX ADMIN — CARBIDOPA AND LEVODOPA 1 TABLET: 25; 100 TABLET, EXTENDED RELEASE ORAL at 06:36

## 2019-02-26 RX ADMIN — IPRATROPIUM BROMIDE 0.5 MG: 0.5 SOLUTION RESPIRATORY (INHALATION) at 16:03

## 2019-02-26 RX ADMIN — CALCIUM CARBONATE (ANTACID) CHEW TAB 500 MG 1 TABLET: 500 CHEW TAB at 06:36

## 2019-02-26 RX ADMIN — CARBIDOPA AND LEVODOPA 1 TABLET: 25; 100 TABLET, EXTENDED RELEASE ORAL at 18:22

## 2019-02-26 RX ADMIN — METHYLPREDNISOLONE SODIUM SUCCINATE 40 MG: 40 INJECTION, POWDER, FOR SOLUTION INTRAMUSCULAR; INTRAVENOUS at 01:35

## 2019-02-26 RX ADMIN — LORAZEPAM 0.5 MG: 0.5 TABLET ORAL at 20:42

## 2019-02-26 RX ADMIN — CALCIUM CARBONATE (ANTACID) CHEW TAB 500 MG 1 TABLET: 500 CHEW TAB at 18:22

## 2019-02-26 RX ADMIN — LORAZEPAM 0.5 MG: 0.5 TABLET ORAL at 00:13

## 2019-02-26 RX ADMIN — PANTOPRAZOLE SODIUM 40 MG: 40 TABLET, DELAYED RELEASE ORAL at 06:36

## 2019-02-26 RX ADMIN — IPRATROPIUM BROMIDE 0.5 MG: 0.5 SOLUTION RESPIRATORY (INHALATION) at 12:50

## 2019-02-26 RX ADMIN — ARFORMOTEROL TARTRATE 15 MCG: 15 SOLUTION RESPIRATORY (INHALATION) at 18:49

## 2019-02-26 RX ADMIN — ARFORMOTEROL TARTRATE 15 MCG: 15 SOLUTION RESPIRATORY (INHALATION) at 07:10

## 2019-02-26 RX ADMIN — SODIUM CHLORIDE SOLN NEBU 3% 4 ML: 3 NEBU SOLN at 10:01

## 2019-02-26 RX ADMIN — DOCUSATE SODIUM 100 MG: 100 CAPSULE, LIQUID FILLED ORAL at 09:46

## 2019-02-26 RX ADMIN — SODIUM CHLORIDE SOLN NEBU 3% 4 ML: 3 NEBU SOLN at 16:04

## 2019-02-26 RX ADMIN — NYSTATIN 500000 UNITS: 100000 SUSPENSION ORAL at 09:47

## 2019-02-26 RX ADMIN — MAGNESIUM SULFATE HEPTAHYDRATE 2 G: 40 INJECTION, SOLUTION INTRAVENOUS at 11:49

## 2019-02-26 RX ADMIN — BUDESONIDE 0.5 MG: 0.5 INHALANT RESPIRATORY (INHALATION) at 18:49

## 2019-02-26 RX ADMIN — IPRATROPIUM BROMIDE 0.5 MG: 0.5 SOLUTION RESPIRATORY (INHALATION) at 18:49

## 2019-02-26 RX ADMIN — BUDESONIDE 0.5 MG: 0.5 INHALANT RESPIRATORY (INHALATION) at 07:10

## 2019-02-26 RX ADMIN — CEFTRIAXONE 1 G: 1 INJECTION, SOLUTION INTRAVENOUS at 16:17

## 2019-02-26 RX ADMIN — GUAIFENESIN 600 MG: 600 TABLET, EXTENDED RELEASE ORAL at 09:46

## 2019-02-26 RX ADMIN — Medication 1 CAPSULE: at 20:18

## 2019-02-26 RX ADMIN — METHYLPREDNISOLONE SODIUM SUCCINATE 40 MG: 40 INJECTION, POWDER, FOR SOLUTION INTRAMUSCULAR; INTRAVENOUS at 09:47

## 2019-02-26 RX ADMIN — CALCIUM CARBONATE (ANTACID) CHEW TAB 500 MG 1 TABLET: 500 CHEW TAB at 11:49

## 2019-02-26 RX ADMIN — SERTRALINE HYDROCHLORIDE 50 MG: 50 TABLET ORAL at 09:46

## 2019-02-26 RX ADMIN — IPRATROPIUM BROMIDE 0.5 MG: 0.5 SOLUTION RESPIRATORY (INHALATION) at 07:10

## 2019-02-26 RX ADMIN — HEPARIN SODIUM 5000 UNITS: 5000 INJECTION, SOLUTION INTRAVENOUS; SUBCUTANEOUS at 09:47

## 2019-02-26 RX ADMIN — NYSTATIN 500000 UNITS: 100000 SUSPENSION ORAL at 11:49

## 2019-02-26 RX ADMIN — HEPARIN SODIUM 5000 UNITS: 5000 INJECTION, SOLUTION INTRAVENOUS; SUBCUTANEOUS at 20:19

## 2019-02-26 RX ADMIN — GUAIFENESIN 600 MG: 600 TABLET, EXTENDED RELEASE ORAL at 20:19

## 2019-02-26 RX ADMIN — NYSTATIN 500000 UNITS: 100000 SUSPENSION ORAL at 18:22

## 2019-02-26 RX ADMIN — METHYLPREDNISOLONE SODIUM SUCCINATE 40 MG: 40 INJECTION, POWDER, FOR SOLUTION INTRAMUSCULAR; INTRAVENOUS at 18:22

## 2019-02-27 LAB
ALBUMIN SERPL-MCNC: 3 G/DL (ref 3.5–5)
ANION GAP SERPL CALCULATED.3IONS-SCNC: 9.1 MMOL/L (ref 10–20)
BUN BLD-MCNC: 24 MG/DL (ref 7–20)
BUN/CREAT SERPL: 30 (ref 7.1–23.5)
CALCIUM SPEC-SCNC: 9 MG/DL (ref 8.4–10.2)
CHLORIDE SERPL-SCNC: 98 MMOL/L (ref 98–107)
CO2 SERPL-SCNC: 32 MMOL/L (ref 26–30)
CREAT BLD-MCNC: 0.8 MG/DL (ref 0.6–1.3)
DEPRECATED RDW RBC AUTO: 41.6 FL (ref 37–54)
ERYTHROCYTE [DISTWIDTH] IN BLOOD BY AUTOMATED COUNT: 12.8 % (ref 11.5–14.5)
GFR SERPL CREATININE-BSD FRML MDRD: 69 ML/MIN/1.73
GLUCOSE BLD-MCNC: 130 MG/DL (ref 74–98)
HCT VFR BLD AUTO: 38.9 % (ref 37–47)
HGB BLD-MCNC: 12.6 G/DL (ref 12–16)
MCH RBC QN AUTO: 28.6 PG (ref 27–31)
MCHC RBC AUTO-ENTMCNC: 32.4 G/DL (ref 30–37)
MCV RBC AUTO: 88.2 FL (ref 81–99)
PHOSPHATE SERPL-MCNC: 3 MG/DL (ref 2.5–4.5)
PLATELET # BLD AUTO: 183 10*3/MM3 (ref 130–400)
PMV BLD AUTO: 10.6 FL (ref 6–12)
POTASSIUM BLD-SCNC: 4.1 MMOL/L (ref 3.5–5.1)
RBC # BLD AUTO: 4.41 10*6/MM3 (ref 4.2–5.4)
SODIUM BLD-SCNC: 135 MMOL/L (ref 137–145)
WBC NRBC COR # BLD: 17.73 10*3/MM3 (ref 4.8–10.8)

## 2019-02-27 PROCEDURE — 80069 RENAL FUNCTION PANEL: CPT | Performed by: INTERNAL MEDICINE

## 2019-02-27 PROCEDURE — 99232 SBSQ HOSP IP/OBS MODERATE 35: CPT | Performed by: INTERNAL MEDICINE

## 2019-02-27 PROCEDURE — 25010000002 HEPARIN (PORCINE) PER 1000 UNITS: Performed by: INTERNAL MEDICINE

## 2019-02-27 PROCEDURE — 25010000002 METHYLPREDNISOLONE PER 40 MG: Performed by: INTERNAL MEDICINE

## 2019-02-27 PROCEDURE — 94799 UNLISTED PULMONARY SVC/PX: CPT

## 2019-02-27 PROCEDURE — 85027 COMPLETE CBC AUTOMATED: CPT | Performed by: INTERNAL MEDICINE

## 2019-02-27 PROCEDURE — 25010000002 CEFTRIAXONE SODIUM-DEXTROSE 1-3.74 GM-%(50ML) RECONSTITUTED SOLUTION: Performed by: INTERNAL MEDICINE

## 2019-02-27 PROCEDURE — 63710000001 PREDNISONE PER 1 MG: Performed by: INTERNAL MEDICINE

## 2019-02-27 PROCEDURE — 97116 GAIT TRAINING THERAPY: CPT

## 2019-02-27 RX ORDER — LIDOCAINE 50 MG/G
1 PATCH TOPICAL
Status: DISCONTINUED | OUTPATIENT
Start: 2019-02-27 | End: 2019-02-28 | Stop reason: HOSPADM

## 2019-02-27 RX ORDER — PREDNISONE 20 MG/1
20 TABLET ORAL ONCE
Status: COMPLETED | OUTPATIENT
Start: 2019-02-27 | End: 2019-02-27

## 2019-02-27 RX ORDER — PREDNISONE 20 MG/1
40 TABLET ORAL
Status: DISCONTINUED | OUTPATIENT
Start: 2019-02-28 | End: 2019-02-28 | Stop reason: HOSPADM

## 2019-02-27 RX ADMIN — IPRATROPIUM BROMIDE 0.5 MG: 0.5 SOLUTION RESPIRATORY (INHALATION) at 07:11

## 2019-02-27 RX ADMIN — METHYLPREDNISOLONE SODIUM SUCCINATE 40 MG: 40 INJECTION, POWDER, FOR SOLUTION INTRAMUSCULAR; INTRAVENOUS at 09:48

## 2019-02-27 RX ADMIN — DOCUSATE SODIUM 100 MG: 100 CAPSULE, LIQUID FILLED ORAL at 09:41

## 2019-02-27 RX ADMIN — CARBIDOPA AND LEVODOPA 1 TABLET: 25; 100 TABLET, EXTENDED RELEASE ORAL at 17:27

## 2019-02-27 RX ADMIN — NYSTATIN 500000 UNITS: 100000 SUSPENSION ORAL at 20:47

## 2019-02-27 RX ADMIN — SODIUM CHLORIDE SOLN NEBU 3% 4 ML: 3 NEBU SOLN at 00:53

## 2019-02-27 RX ADMIN — ARFORMOTEROL TARTRATE 15 MCG: 15 SOLUTION RESPIRATORY (INHALATION) at 07:10

## 2019-02-27 RX ADMIN — SERTRALINE HYDROCHLORIDE 50 MG: 50 TABLET ORAL at 09:41

## 2019-02-27 RX ADMIN — HEPARIN SODIUM 5000 UNITS: 5000 INJECTION, SOLUTION INTRAVENOUS; SUBCUTANEOUS at 09:43

## 2019-02-27 RX ADMIN — PREDNISONE 20 MG: 20 TABLET ORAL at 17:30

## 2019-02-27 RX ADMIN — Medication 1 CAPSULE: at 09:39

## 2019-02-27 RX ADMIN — ARFORMOTEROL TARTRATE 15 MCG: 15 SOLUTION RESPIRATORY (INHALATION) at 19:21

## 2019-02-27 RX ADMIN — Medication 1 CAPSULE: at 20:47

## 2019-02-27 RX ADMIN — GUAIFENESIN 600 MG: 600 TABLET, EXTENDED RELEASE ORAL at 20:47

## 2019-02-27 RX ADMIN — CARBIDOPA AND LEVODOPA 1 TABLET: 25; 100 TABLET, EXTENDED RELEASE ORAL at 06:32

## 2019-02-27 RX ADMIN — PANTOPRAZOLE SODIUM 40 MG: 40 TABLET, DELAYED RELEASE ORAL at 06:31

## 2019-02-27 RX ADMIN — NYSTATIN 500000 UNITS: 100000 SUSPENSION ORAL at 08:08

## 2019-02-27 RX ADMIN — CALCIUM CARBONATE (ANTACID) CHEW TAB 500 MG 1 TABLET: 500 CHEW TAB at 06:31

## 2019-02-27 RX ADMIN — SODIUM CHLORIDE, PRESERVATIVE FREE 10 ML: 5 INJECTION INTRAVENOUS at 20:47

## 2019-02-27 RX ADMIN — LIDOCAINE 1 PATCH: 50 PATCH CUTANEOUS at 20:46

## 2019-02-27 RX ADMIN — BUDESONIDE 0.5 MG: 0.5 INHALANT RESPIRATORY (INHALATION) at 19:20

## 2019-02-27 RX ADMIN — BUDESONIDE 0.5 MG: 0.5 INHALANT RESPIRATORY (INHALATION) at 07:11

## 2019-02-27 RX ADMIN — CALCIUM CARBONATE (ANTACID) CHEW TAB 500 MG 1 TABLET: 500 CHEW TAB at 17:27

## 2019-02-27 RX ADMIN — CALCIUM CARBONATE (ANTACID) CHEW TAB 500 MG 1 TABLET: 500 CHEW TAB at 12:11

## 2019-02-27 RX ADMIN — IPRATROPIUM BROMIDE 0.5 MG: 0.5 SOLUTION RESPIRATORY (INHALATION) at 19:21

## 2019-02-27 RX ADMIN — CEFTRIAXONE 1 G: 1 INJECTION, SOLUTION INTRAVENOUS at 17:26

## 2019-02-27 RX ADMIN — METHYLPREDNISOLONE SODIUM SUCCINATE 40 MG: 40 INJECTION, POWDER, FOR SOLUTION INTRAMUSCULAR; INTRAVENOUS at 01:32

## 2019-02-27 RX ADMIN — GUAIFENESIN 600 MG: 600 TABLET, EXTENDED RELEASE ORAL at 09:42

## 2019-02-27 RX ADMIN — NYSTATIN 500000 UNITS: 100000 SUSPENSION ORAL at 17:27

## 2019-02-27 RX ADMIN — TRAMADOL HYDROCHLORIDE 50 MG: 50 TABLET, FILM COATED ORAL at 20:47

## 2019-02-27 RX ADMIN — HEPARIN SODIUM 5000 UNITS: 5000 INJECTION, SOLUTION INTRAVENOUS; SUBCUTANEOUS at 20:46

## 2019-02-28 VITALS
RESPIRATION RATE: 18 BRPM | OXYGEN SATURATION: 96 % | HEIGHT: 62 IN | SYSTOLIC BLOOD PRESSURE: 148 MMHG | TEMPERATURE: 97.7 F | BODY MASS INDEX: 21.62 KG/M2 | HEART RATE: 94 BPM | WEIGHT: 117.5 LBS | DIASTOLIC BLOOD PRESSURE: 71 MMHG

## 2019-02-28 LAB
ALBUMIN SERPL-MCNC: 3.3 G/DL (ref 3.5–5)
ANION GAP SERPL CALCULATED.3IONS-SCNC: 7.4 MMOL/L (ref 10–20)
BACTERIA SPEC AEROBE CULT: NORMAL
BACTERIA SPEC AEROBE CULT: NORMAL
BUN BLD-MCNC: 25 MG/DL (ref 7–20)
BUN/CREAT SERPL: 27.8 (ref 7.1–23.5)
CALCIUM SPEC-SCNC: 9.3 MG/DL (ref 8.4–10.2)
CHLORIDE SERPL-SCNC: 97 MMOL/L (ref 98–107)
CO2 SERPL-SCNC: 34 MMOL/L (ref 26–30)
CREAT BLD-MCNC: 0.9 MG/DL (ref 0.6–1.3)
DEPRECATED RDW RBC AUTO: 42.5 FL (ref 37–54)
ERYTHROCYTE [DISTWIDTH] IN BLOOD BY AUTOMATED COUNT: 12.9 % (ref 11.5–14.5)
GFR SERPL CREATININE-BSD FRML MDRD: 60 ML/MIN/1.73
GLUCOSE BLD-MCNC: 127 MG/DL (ref 74–98)
HCT VFR BLD AUTO: 42.5 % (ref 37–47)
HGB BLD-MCNC: 13.4 G/DL (ref 12–16)
MCH RBC QN AUTO: 28.2 PG (ref 27–31)
MCHC RBC AUTO-ENTMCNC: 31.5 G/DL (ref 30–37)
MCV RBC AUTO: 89.5 FL (ref 81–99)
PHOSPHATE SERPL-MCNC: 3.5 MG/DL (ref 2.5–4.5)
PLATELET # BLD AUTO: 169 10*3/MM3 (ref 130–400)
PMV BLD AUTO: 10.6 FL (ref 6–12)
POTASSIUM BLD-SCNC: 4.4 MMOL/L (ref 3.5–5.1)
RBC # BLD AUTO: 4.75 10*6/MM3 (ref 4.2–5.4)
SODIUM BLD-SCNC: 134 MMOL/L (ref 137–145)
WBC NRBC COR # BLD: 16.8 10*3/MM3 (ref 4.8–10.8)

## 2019-02-28 PROCEDURE — 85027 COMPLETE CBC AUTOMATED: CPT | Performed by: INTERNAL MEDICINE

## 2019-02-28 PROCEDURE — 80069 RENAL FUNCTION PANEL: CPT | Performed by: INTERNAL MEDICINE

## 2019-02-28 PROCEDURE — 63710000001 PREDNISONE PER 1 MG: Performed by: INTERNAL MEDICINE

## 2019-02-28 PROCEDURE — 99238 HOSP IP/OBS DSCHRG MGMT 30/<: CPT | Performed by: INTERNAL MEDICINE

## 2019-02-28 PROCEDURE — 25010000002 HEPARIN (PORCINE) PER 1000 UNITS: Performed by: INTERNAL MEDICINE

## 2019-02-28 PROCEDURE — 94799 UNLISTED PULMONARY SVC/PX: CPT

## 2019-02-28 RX ORDER — NYSTATIN 100000 U/G
CREAM TOPICAL AS NEEDED
Qty: 15 G | Refills: 0 | Status: SHIPPED | OUTPATIENT
Start: 2019-02-28 | End: 2019-05-16 | Stop reason: HOSPADM

## 2019-02-28 RX ORDER — PREDNISONE 20 MG/1
30 TABLET ORAL
Qty: 10 TABLET | Refills: 0 | Status: SHIPPED | OUTPATIENT
Start: 2019-03-01 | End: 2019-03-07

## 2019-02-28 RX ORDER — LIDOCAINE 50 MG/G
1 PATCH TOPICAL EVERY 24 HOURS
Qty: 30 EACH | Refills: 0 | Status: SHIPPED | OUTPATIENT
Start: 2019-02-28 | End: 2019-04-07 | Stop reason: HOSPADM

## 2019-02-28 RX ORDER — L.ACID,PARA/B.BIFIDUM/S.THERM 8B CELL
1 CAPSULE ORAL 2 TIMES DAILY
Qty: 20 CAPSULE | Refills: 0 | Status: ON HOLD | OUTPATIENT
Start: 2019-02-28 | End: 2019-05-12

## 2019-02-28 RX ORDER — IPRATROPIUM BROMIDE AND ALBUTEROL SULFATE 2.5; .5 MG/3ML; MG/3ML
3 SOLUTION RESPIRATORY (INHALATION) EVERY 4 HOURS PRN
Qty: 360 ML | Refills: 0 | Status: SHIPPED | OUTPATIENT
Start: 2019-02-28 | End: 2019-04-17 | Stop reason: SDUPTHER

## 2019-02-28 RX ORDER — CEFUROXIME AXETIL 500 MG/1
500 TABLET ORAL EVERY 12 HOURS
Qty: 6 TABLET | Refills: 0 | Status: SHIPPED | OUTPATIENT
Start: 2019-02-28 | End: 2019-03-03

## 2019-02-28 RX ADMIN — CARBIDOPA AND LEVODOPA 1 TABLET: 25; 100 TABLET, EXTENDED RELEASE ORAL at 06:36

## 2019-02-28 RX ADMIN — Medication 1 CAPSULE: at 09:46

## 2019-02-28 RX ADMIN — PREDNISONE 40 MG: 20 TABLET ORAL at 09:00

## 2019-02-28 RX ADMIN — PANTOPRAZOLE SODIUM 40 MG: 40 TABLET, DELAYED RELEASE ORAL at 06:36

## 2019-02-28 RX ADMIN — SERTRALINE HYDROCHLORIDE 50 MG: 50 TABLET ORAL at 09:46

## 2019-02-28 RX ADMIN — CALCIUM CARBONATE (ANTACID) CHEW TAB 500 MG 1 TABLET: 500 CHEW TAB at 06:36

## 2019-02-28 RX ADMIN — HEPARIN SODIUM 5000 UNITS: 5000 INJECTION, SOLUTION INTRAVENOUS; SUBCUTANEOUS at 10:00

## 2019-02-28 RX ADMIN — BUDESONIDE 0.5 MG: 0.5 INHALANT RESPIRATORY (INHALATION) at 06:52

## 2019-02-28 RX ADMIN — DOCUSATE SODIUM 100 MG: 100 CAPSULE, LIQUID FILLED ORAL at 09:46

## 2019-02-28 RX ADMIN — IPRATROPIUM BROMIDE 0.5 MG: 0.5 SOLUTION RESPIRATORY (INHALATION) at 06:52

## 2019-02-28 RX ADMIN — GUAIFENESIN 600 MG: 600 TABLET, EXTENDED RELEASE ORAL at 09:46

## 2019-02-28 RX ADMIN — ARFORMOTEROL TARTRATE 15 MCG: 15 SOLUTION RESPIRATORY (INHALATION) at 06:52

## 2019-02-28 RX ADMIN — CALCIUM CARBONATE (ANTACID) CHEW TAB 500 MG 1 TABLET: 500 CHEW TAB at 11:47

## 2019-02-28 RX ADMIN — NYSTATIN 500000 UNITS: 100000 SUSPENSION ORAL at 11:48

## 2019-02-28 RX ADMIN — NYSTATIN 500000 UNITS: 100000 SUSPENSION ORAL at 09:00

## 2019-03-01 ENCOUNTER — READMISSION MANAGEMENT (OUTPATIENT)
Dept: CALL CENTER | Facility: HOSPITAL | Age: 79
End: 2019-03-01

## 2019-03-01 ENCOUNTER — TRANSITIONAL CARE MANAGEMENT TELEPHONE ENCOUNTER (OUTPATIENT)
Dept: INTERNAL MEDICINE | Facility: CLINIC | Age: 79
End: 2019-03-01

## 2019-03-01 NOTE — OUTREACH NOTE
"RAUL call completed.  Please refer to TCM call flowsheet for call documentation.    Pt states feeling a little bit better, \"not as nervous\", hoarseness and cough better, no sob or wheezing, wearing her O2 at 2L at all times as instructed at d/c. The majority of the call was spent answering questions and going over her medications. Her 3/5 pcp/raul appt rescheduled for 3/7 in the afternoon per pt request.  "

## 2019-03-01 NOTE — OUTREACH NOTE
Prep Survey      Responses   Facility patient discharged from?  [facility]   Is patient eligible?  Yes   Discharge diagnosis  Acute on chronic hypoxic and hypercapnic respiratory failure, present on admission,Acute COPD exacerbation,Bilateral upper lobe bacterial pneumonia,Chronic diastolic heart failure     Does the patient have one of the following disease processes/diagnoses(primary or secondary)?  COPD/Pneumonia   Does the patient have Home health ordered?  No   Is there a DME ordered?  No   Prep survey completed?  Yes          Alysha Puente RN

## 2019-03-04 ENCOUNTER — READMISSION MANAGEMENT (OUTPATIENT)
Dept: CALL CENTER | Facility: HOSPITAL | Age: 79
End: 2019-03-04

## 2019-03-04 NOTE — OUTREACH NOTE
COPD/PN Week 1 Survey      Responses   Facility patient discharged from?  [facility]   Does the patient have one of the following disease processes/diagnoses(primary or secondary)?  COPD/Pneumonia   Is there a successful TCM telephone encounter documented?  Yes          Loli Rinaldi RN

## 2019-03-07 ENCOUNTER — OFFICE VISIT (OUTPATIENT)
Dept: INTERNAL MEDICINE | Facility: CLINIC | Age: 79
End: 2019-03-07

## 2019-03-07 VITALS
HEIGHT: 62 IN | HEART RATE: 78 BPM | OXYGEN SATURATION: 96 % | SYSTOLIC BLOOD PRESSURE: 130 MMHG | WEIGHT: 115.4 LBS | BODY MASS INDEX: 21.23 KG/M2 | RESPIRATION RATE: 16 BRPM | TEMPERATURE: 97.5 F | DIASTOLIC BLOOD PRESSURE: 62 MMHG

## 2019-03-07 DIAGNOSIS — J44.1 COPD WITH ACUTE EXACERBATION (HCC): ICD-10-CM

## 2019-03-07 DIAGNOSIS — G25.81 RESTLESS LEGS SYNDROME: ICD-10-CM

## 2019-03-07 DIAGNOSIS — I87.2 VENOUS INSUFFICIENCY OF BOTH LOWER EXTREMITIES: Chronic | ICD-10-CM

## 2019-03-07 DIAGNOSIS — R06.09 DYSPNEA ON EXERTION: ICD-10-CM

## 2019-03-07 DIAGNOSIS — F41.8 MIXED ANXIETY DEPRESSIVE DISORDER: ICD-10-CM

## 2019-03-07 DIAGNOSIS — R06.2 WHEEZING: ICD-10-CM

## 2019-03-07 DIAGNOSIS — G44.209 TENSION HEADACHE: ICD-10-CM

## 2019-03-07 DIAGNOSIS — I50.32 CHRONIC DIASTOLIC HEART FAILURE (HCC): Chronic | ICD-10-CM

## 2019-03-07 DIAGNOSIS — R11.0 NAUSEA: ICD-10-CM

## 2019-03-07 DIAGNOSIS — R53.83 OTHER FATIGUE: ICD-10-CM

## 2019-03-07 DIAGNOSIS — I10 ESSENTIAL HYPERTENSION: Primary | Chronic | ICD-10-CM

## 2019-03-07 DIAGNOSIS — M54.50 LOW BACK PAIN WITHOUT SCIATICA, UNSPECIFIED BACK PAIN LATERALITY, UNSPECIFIED CHRONICITY: ICD-10-CM

## 2019-03-07 DIAGNOSIS — G25.0 ESSENTIAL TREMOR: ICD-10-CM

## 2019-03-07 DIAGNOSIS — J18.9 PNEUMONIA DUE TO INFECTIOUS ORGANISM, UNSPECIFIED LATERALITY, UNSPECIFIED PART OF LUNG: ICD-10-CM

## 2019-03-07 DIAGNOSIS — E55.9 VITAMIN D DEFICIENCY: ICD-10-CM

## 2019-03-07 DIAGNOSIS — K21.9 GASTROESOPHAGEAL REFLUX DISEASE WITHOUT ESOPHAGITIS: ICD-10-CM

## 2019-03-07 DIAGNOSIS — R53.83 FATIGUE, UNSPECIFIED TYPE: ICD-10-CM

## 2019-03-07 DIAGNOSIS — J44.9 CHRONIC OBSTRUCTIVE PULMONARY DISEASE, UNSPECIFIED COPD TYPE (HCC): ICD-10-CM

## 2019-03-07 DIAGNOSIS — R26.89 BALANCE PROBLEM: ICD-10-CM

## 2019-03-07 DIAGNOSIS — E78.5 HYPERLIPIDEMIA, UNSPECIFIED HYPERLIPIDEMIA TYPE: ICD-10-CM

## 2019-03-07 PROBLEM — J96.21 ACUTE ON CHRONIC RESPIRATORY FAILURE WITH HYPOXIA AND HYPERCAPNIA (HCC): Status: RESOLVED | Noted: 2018-01-23 | Resolved: 2019-03-07

## 2019-03-07 PROBLEM — J96.22 ACUTE ON CHRONIC RESPIRATORY FAILURE WITH HYPOXIA AND HYPERCAPNIA (HCC): Status: RESOLVED | Noted: 2018-01-23 | Resolved: 2019-03-07

## 2019-03-07 PROCEDURE — 99495 TRANSJ CARE MGMT MOD F2F 14D: CPT | Performed by: INTERNAL MEDICINE

## 2019-03-07 RX ORDER — BROMPHENIRAMINE MALEATE, PSEUDOEPHEDRINE HYDROCHLORIDE, AND DEXTROMETHORPHAN HYDROBROMIDE 2; 30; 10 MG/5ML; MG/5ML; MG/5ML
5 SYRUP ORAL 4 TIMES DAILY PRN
Qty: 120 ML | Refills: 0 | Status: SHIPPED | OUTPATIENT
Start: 2019-03-07 | End: 2019-04-07 | Stop reason: HOSPADM

## 2019-03-07 RX ORDER — BUDESONIDE AND FORMOTEROL FUMARATE DIHYDRATE 160; 4.5 UG/1; UG/1
2 AEROSOL RESPIRATORY (INHALATION)
Qty: 10.2 G | Refills: 3 | Status: SHIPPED | OUTPATIENT
Start: 2019-03-07 | End: 2019-09-05 | Stop reason: SDUPTHER

## 2019-03-07 NOTE — PROGRESS NOTES
Transitional Care Follow Up Visit  Subjective     Sadie Jo Tijerina is a 78 y.o. female who presents for a transitional care management visit.    Within 48 business hours after discharge our office contacted her via telephone to coordinate her care and needs.      I reviewed and discussed the details of that call along with the discharge summary, hospital problems, inpatient lab results, inpatient diagnostic studies, and consultation reports with Sadie.     Current outpatient and discharge medications have been reconciled for the patient.    Date of TCM Phone Call 1/10/2017 1/16/2018 3/1/2019   [facility]   Date of Admission 12/26/2016 1/11/2018 2/23/2019   Date of Discharge 1/7/2017 1/15/2018 2/28/2019   Discharge Disposition Home or Self Care Home or Self Care Home or Self Care     Risk for Readmission (LACE) Score: 11 (2/28/2019  6:00 AM)      History of Present Illness   Course During Hospital Stay:  In hosp from 2/25-2/28 for pneumonia ,resp failure and copd exac. Improved now . Patient still has some cough , mild soa, BP normal now, tremor pn inderal help.      The following portions of the patient's history were reviewed and updated as appropriate: allergies, current medications, past family history, past medical history, past social history, past surgical history and problem list.    Review of Systems   Constitutional: Negative.    Respiratory: Positive for cough and wheezing.    Cardiovascular: Negative.    Gastrointestinal: Negative.    Musculoskeletal: Negative.    Skin: Negative.    Neurological: Negative.    Psychiatric/Behavioral: Negative.        Objective   Physical Exam   Constitutional: She is oriented to person, place, and time. She appears well-developed and well-nourished.   Neck: Neck supple.   Cardiovascular: Normal rate, regular rhythm and normal heart sounds.   Pulmonary/Chest: Effort normal. She has wheezes (mild).    Abdominal: Bowel sounds are normal.   Neurological: She is alert and oriented to person, place, and time.   Skin: Skin is warm.   Psychiatric: She has a normal mood and affect.       Assessment/Plan   Sadie was seen today for transitional care management.    Diagnoses and all orders for this visit:         copd exac resolved after hospital treatment. S/p prednisone  Pneumonia s/p abx O2 prn and night time only  CHF resp failure stable now      Repeat cbc cmp bnp and CT chest

## 2019-03-09 ENCOUNTER — READMISSION MANAGEMENT (OUTPATIENT)
Dept: CALL CENTER | Facility: HOSPITAL | Age: 79
End: 2019-03-09

## 2019-03-09 NOTE — OUTREACH NOTE
COPD/PN Week 2 Survey      Responses   Facility patient discharged from?  [facility]   Does the patient have one of the following disease processes/diagnoses(primary or secondary)?  COPD/Pneumonia   Was the primary reason for admission:  COPD exacerbation   Week 2 attempt successful?  Yes   Call start time  1214   Call end time  1224   Discharge diagnosis  Acute on chronic hypoxic and hypercapnic respiratory failure, present on admission,Acute COPD exacerbation,Bilateral upper lobe bacterial pneumonia,Chronic diastolic heart failure     Is patient permission given to speak with other caregiver?  No   Meds reviewed with patient/caregiver?  Yes   Is the patient having any side effects they believe may be caused by any medication additions or changes?  Yes   Side effects comments   shaky hands, nervous from steroids.    Does the patient have all medications ordered at discharge?  Yes   Is the patient taking all medications as directed (includes completed medication regime)?  Yes   Does the patient have a primary care provider?   Yes   Does the patient have an appointment with their PCP or pulmonologist within 7 days of discharge?  Greater than 7 days   Comments regarding PCP   Kristian Wolff MD. Patient has seen since discharge.    Nursing Interventions  Verified appointment date/time/provider   Has the patient kept scheduled appointments due by today?  Yes   Has home health visited the patient within 72 hours of discharge?  N/A   DME comments  Patient has home O2 for nighttime and has home nebulizer.    Psychosocial issues?  No   Did the patient receive a copy of their discharge instructions?  Yes   Nursing interventions  Reviewed instructions with patient   What is the patient's perception of their health status since discharge?  Improving   Nursing Interventions  Nurse provided patient education   Is the patient/caregiver able to teach back the hierarchy of who to call/visit for symptoms/problems? PCP, Specialist,  Home health nurse, Urgent Care, ED, 911  Yes   Is the patient able to teach back COPD zones?  Yes   Nursing interventions  Education provided on various zones   Patient reports what zone on this call?  Green Zone   Green Zone  Usual activity and exercise level, Usual amount of phlegm/mucus without difficulty coughing up, Reports doing well   Green Zone interventions:  Take daily medications, Use oxygen as prescribed, Avoid indoor/outdoor triggers   Is the patient/caregiver able to teach back signs and symptoms of worsening condition:  Fever/chills, Shortness of breath, Chest pain   Is the patient/caregiver able to teach back importance of completing antibiotic course of treatment?  Yes   Week 2 call completed?  Yes          April Rodriguez RN

## 2019-03-12 ENCOUNTER — HOSPITAL ENCOUNTER (OUTPATIENT)
Dept: CT IMAGING | Facility: HOSPITAL | Age: 79
Discharge: HOME | End: 2019-03-12
Admitting: INTERNAL MEDICINE

## 2019-03-12 LAB
BASOPHILS # BLD AUTO: 0.02 10*3/MM3 (ref 0–0.2)
BASOPHILS NFR BLD AUTO: 0.2 % (ref 0–2.5)
BUN SERPL-MCNC: 19 MG/DL (ref 7–20)
BUN/CREAT SERPL: 21.1 (ref 7.1–23.5)
CALCIUM SERPL-MCNC: 9.5 MG/DL (ref 8.4–10.2)
CHLORIDE SERPL-SCNC: 98 MMOL/L (ref 98–107)
CO2 SERPL-SCNC: 35 MMOL/L (ref 26–30)
CREAT SERPL-MCNC: 0.9 MG/DL (ref 0.6–1.3)
EOSINOPHIL # BLD AUTO: 0.17 10*3/MM3 (ref 0–0.7)
EOSINOPHIL NFR BLD AUTO: 1.4 % (ref 0–7)
ERYTHROCYTE [DISTWIDTH] IN BLOOD BY AUTOMATED COUNT: 13.6 % (ref 11.5–14.5)
GLUCOSE SERPL-MCNC: 81 MG/DL (ref 74–98)
HCT VFR BLD AUTO: 39.9 % (ref 37–47)
HGB BLD-MCNC: 12.6 G/DL (ref 12–16)
IMM GRANULOCYTES # BLD AUTO: 0.04 10*3/MM3 (ref 0–0.06)
IMM GRANULOCYTES NFR BLD AUTO: 0.3 % (ref 0–0.6)
LYMPHOCYTES # BLD AUTO: 2.02 10*3/MM3 (ref 0.6–3.4)
LYMPHOCYTES NFR BLD AUTO: 16.5 % (ref 10–50)
MCH RBC QN AUTO: 28.3 PG (ref 27–31)
MCHC RBC AUTO-ENTMCNC: 31.6 G/DL (ref 30–37)
MCV RBC AUTO: 89.5 FL (ref 81–99)
MONOCYTES # BLD AUTO: 0.83 10*3/MM3 (ref 0–0.9)
MONOCYTES NFR BLD AUTO: 6.8 % (ref 0–12)
NEUTROPHILS # BLD AUTO: 9.13 10*3/MM3 (ref 2–6.9)
NEUTROPHILS NFR BLD AUTO: 74.8 % (ref 37–80)
NRBC BLD AUTO-RTO: 0 /100 WBC (ref 0–0)
PLATELET # BLD AUTO: 161 10*3/MM3 (ref 130–400)
POTASSIUM SERPL-SCNC: 4.6 MMOL/L (ref 3.5–5.1)
RBC # BLD AUTO: 4.46 10*6/MM3 (ref 4.2–5.4)
SODIUM SERPL-SCNC: 134 MMOL/L (ref 137–145)
WBC # BLD AUTO: 12.21 10*3/MM3 (ref 4.8–10.8)

## 2019-03-12 PROCEDURE — 71250 CT THORAX DX C-: CPT

## 2019-03-18 ENCOUNTER — READMISSION MANAGEMENT (OUTPATIENT)
Dept: CALL CENTER | Facility: HOSPITAL | Age: 79
End: 2019-03-18

## 2019-03-18 ENCOUNTER — TELEPHONE (OUTPATIENT)
Dept: INTERNAL MEDICINE | Facility: CLINIC | Age: 79
End: 2019-03-18

## 2019-03-18 NOTE — OUTREACH NOTE
COPD/PN Week 3 Survey      Responses   Facility patient discharged from?  [facility]   Does the patient have one of the following disease processes/diagnoses(primary or secondary)?  COPD/Pneumonia   Week 3 attempt successful?  Yes   Call start time  0812   Call end time  0818   Meds reviewed with patient/caregiver?  Yes   Is the patient taking all medications as directed (includes completed medication regime)?  Yes   Has the patient kept scheduled appointments due by today?  Yes   Did the patient receive a copy of their discharge instructions?  Yes   What is the patient's perception of their health status since discharge?  Worsening   Is the patient able to teach back COPD zones?  No   Nursing interventions  Education provided on various zones   Patient reports what zone on this call?  Yellow Zone   Yellow Zone  Increased shortness of air, Unable to complete daily activities, Medication is not helping relieve symptoms, Poor sleep and symptoms work patient up, Fever or feeling like have a chest cold, Increased or thicker phlegm/mucus   Yellow interventions  Continue to use daily medications, Use quick relief inhaler as ordered, Get plenty of rest, Call provider immediatly if symptoms do not improve, Use oxygen as ordered   Week 3 call completed?  Yes   Wrap up additional comments  She was very wheezy today, just got up, told her she needs to be seen today, said would have someone take her to  said has had chest cold for 2 days.            Jeanette Becker RN

## 2019-03-18 NOTE — TELEPHONE ENCOUNTER
Pt left a VM stating that her home health nurse states she is still congestion, pt requesting another antibiotic. Spoke with patient and put her on the schedule to see Norma tomorrow at 1:15PM to be evaluated

## 2019-03-19 ENCOUNTER — OFFICE VISIT (OUTPATIENT)
Dept: INTERNAL MEDICINE | Facility: CLINIC | Age: 79
End: 2019-03-19

## 2019-03-19 VITALS
HEART RATE: 86 BPM | OXYGEN SATURATION: 94 % | TEMPERATURE: 97.9 F | BODY MASS INDEX: 20.56 KG/M2 | WEIGHT: 111.75 LBS | DIASTOLIC BLOOD PRESSURE: 72 MMHG | HEIGHT: 62 IN | SYSTOLIC BLOOD PRESSURE: 126 MMHG

## 2019-03-19 DIAGNOSIS — J18.9 PNEUMONIA DUE TO INFECTIOUS ORGANISM, UNSPECIFIED LATERALITY, UNSPECIFIED PART OF LUNG: Primary | ICD-10-CM

## 2019-03-19 PROCEDURE — 99213 OFFICE O/P EST LOW 20 MIN: CPT | Performed by: PHYSICIAN ASSISTANT

## 2019-03-19 RX ORDER — METHYLPREDNISOLONE 4 MG/1
TABLET ORAL
Qty: 1 EACH | Refills: 0 | Status: SHIPPED | OUTPATIENT
Start: 2019-03-19 | End: 2019-04-07 | Stop reason: HOSPADM

## 2019-03-19 NOTE — PROGRESS NOTES
Chief Complaint   Patient presents with   • Pneumonia     pt states she was admitted to the hospital on the 23rd of feb and she is stil lhaving the same symptoms/ pt also states she saw dr. wolff on the 7th of march and had a cat scan done on 3-12-19 and she got a phone call yesterday saying it showed she still had pneumonia        Subjective   Sadie Tijerina is a 78 y.o. female    History of Present Illness     Patient presents today with complaint of continued fatigue and intermittent shortness of breath.  Significant past medical history of COPD.  She was admitted at [facility] for pneumonia with hypercapnia, prior to this she was treated at urgent care for bronchitis with doxycycline.  Patient reports upon discharge she completed Ceftin and prednisone taper.  She had follow-up with Dr. Wolff on 3/7 where CT of the chest was obtained as well as BMP and CBC.  She reports that she continues to have similar symptoms from when she was first admitted.  Patient describes decreased appetite, fatigue, intermittent cough, and sneezing.  She denies any acute shortness of breath.  She has had a few episodes of shortness of breath.  She has been compliant with albuterol nebulizer and daily inhalers.  Patient has follow-up scheduled with Dr. Mojica at the end of April.  She has been advised in the past that she needs to use BiPAP the patient is unable to tolerate the mask.    Past Medical History:   Diagnosis Date   • Arthritis    • Cancer (CMS/HCC)     uterine cancer (1981)   • Depression    • History of emphysema    • History of esophageal reflux    • History of recurrent UTI (urinary tract infection)    • History of renal calculi    • History of uterine cancer    • Hyperlipidemia    • Hypertension    • Pneumonia 02/2019     Past Surgical History:   Procedure Laterality Date   • FOOT SURGERY     • HAND SURGERY     • HYSTERECTOMY       Family History   Problem Relation Age of Onset   • Cancer Mother    • Heart  attack Father    • Arthritis Father    • Diabetes Daughter    • Hyperlipidemia Daughter    • Migraines Daughter      Social History     Socioeconomic History   • Marital status:      Spouse name: Not on file   • Number of children: Not on file   • Years of education: Not on file   • Highest education level: Not on file   Tobacco Use   • Smoking status: Current Every Day Smoker     Packs/day: 0.25     Types: Cigarettes   • Smokeless tobacco: Never Used   • Tobacco comment: pt states hasn't smoke since Pneumonia 2/2019   Substance and Sexual Activity   • Alcohol use: No   • Drug use: No   • Sexual activity: Defer     Comment:      Allergies   Allergen Reactions   • Morphine And Related      Review of Systems   Constitutional: Positive for activity change, appetite change, fatigue and unexpected weight loss. Negative for chills.   HENT: Positive for congestion (mild). Negative for postnasal drip, rhinorrhea, sore throat and trouble swallowing.    Respiratory: Positive for cough, shortness of breath (intermittent) and wheezing. Negative for chest tightness.    Cardiovascular: Negative for chest pain, palpitations and leg swelling.   Neurological: Positive for weakness (generalized ). Negative for dizziness and headache.     Objective     Vitals:    03/19/19 1311   BP: 126/72   Pulse: 86   Temp: 97.9 °F (36.6 °C)   SpO2: 94%       Physical Exam   Constitutional: She is oriented to person, place, and time. She appears well-developed and well-nourished. No distress.   HENT:   Head: Normocephalic and atraumatic.   Eyes: Conjunctivae and EOM are normal. Pupils are equal, round, and reactive to light.   Neck: Normal range of motion. Neck supple. No JVD present.   Cardiovascular: Normal rate, regular rhythm and normal heart sounds. Exam reveals no gallop and no friction rub.   No murmur heard.  Pulmonary/Chest: Effort normal. No respiratory distress. She has wheezes. She has no rales.   Crackles upon inspiration.     Lymphadenopathy:     She has no cervical adenopathy.   Neurological: She is alert and oriented to person, place, and time.   Skin: Skin is warm and dry. Capillary refill takes less than 2 seconds. She is not diaphoretic.   Psychiatric: She has a normal mood and affect. Her behavior is normal.   Nursing note and vitals reviewed.    Results for orders placed in visit on 03/07/19   CT Chest Without Contrast    Narrative EXAMINATION: CT CHEST WO CONTRAST-03/12/2019:      INDICATION: Pneumonia complicated / unresolved; J44.1-Chronic  obstructive pulmonary disease with (acute) exacerbation;  J18.9-Pneumonia, unspecified organism.         TECHNIQUE: 5 mm unenhanced images through the chest and upper abdomen.     The radiation dose reduction device was turned on for each scan per the  ALARA (As Low as Reasonably Achievable) protocol.     COMPARISON: No previous chest CT scan. Chest radiograph, 2 views  01/02/2018.     FINDINGS: There is coarse peribronchial thickening and mild reticular  nodular disease at the right lung apex. Much milder peribronchial  thickening is present in the left apex, proximal bronchi of the right  upper lobe, periphery of the right middle lobe and periphery of the  right lower lobe. There is lingular bronchiectasis with minimal, if any,  associated interstitial change. Left lower lobe appears spared from  bronchial disease or interstitial disease. A couple of granulomatous  calcifications are noted. No effusion is seen. Mediastinal window images  show no evidence of noncalcified adenopathy or pericardial effusion. The  included images of the upper abdomen show granulomatous calcifications  in the liver and spleen. Clips are noted in the gallbladder fossa. There  is a water density exophytic left renal cyst. The pancreatic tail and  adrenal glands appear grossly normal.       Impression Multifocal peribronchial thickening, sparing only the left  lower lobe, with reticular nodular disease also in  the right apex,  suggesting both chronic and active disease. Consider granulomatous  infection as well as mild or resolving bacterial pneumonia/bronchitis.     D:  03/13/2019  E:  03/13/2019     This report was finalized on 3/13/2019 10:57 PM by DR. Paulino Manley MD.          Assessment/Plan     Sadie was seen today for pneumonia.    Diagnoses and all orders for this visit:    Pneumonia due to infectious organism, unspecified laterality, unspecified part of lung  -     CT reviewed which showed resolving bacterial pneumonia/bronchitis.  It also mentions lingular bronchiectasis.  No effusion present.  Patient denies any fevers or acute shortness of breath.  Symptoms are most likely related to chronic lung disease as well as resolving pneumonia.  Will start Medrol Dosepak.  Keep scheduled follow-up with Dr. Mojica.  Also discussed additional options to BiPAP mask, as BiPAP is something she is most likely going to need to use.  Encourage patient smoking cessation.  If symptoms worsen return to clinic.  -     methylPREDNISolone (MEDROL, DANIEL,) 4 MG tablet; Take as directed on package instructions.        Return if symptoms worsen or fail to improve, for Next scheduled follow up.    Norma Barber PA-C

## 2019-03-25 ENCOUNTER — READMISSION MANAGEMENT (OUTPATIENT)
Dept: CALL CENTER | Facility: HOSPITAL | Age: 79
End: 2019-03-25

## 2019-03-25 NOTE — OUTREACH NOTE
COPD/PN Week 4 Survey      Responses   Facility patient discharged from?  [facility]   Does the patient have one of the following disease processes/diagnoses(primary or secondary)?  COPD/Pneumonia   Was the primary reason for admission:  COPD exacerbation   Week 4 attempt successful?  Yes   Call start time  1041   Call end time  1046   Discharge diagnosis  Acute on chronic hypoxic and hypercapnic respiratory failure, present on admission,Acute COPD exacerbation,Bilateral upper lobe bacterial pneumonia,Chronic diastolic heart failure     Is patient permission given to speak with other caregiver?  No   Meds reviewed with patient/caregiver?  Yes   Is the patient having any side effects they believe may be caused by any medication additions or changes?  No   Is the patient taking all medications as directed (includes completed medication regime)?  Yes   Has the patient kept scheduled appointments due by today?  Yes   Comments   seeing the doctor next tuesday   Is the patient still receiving Home Health Services?  N/A   DME comments  Patient has home O2 for nighttime and has home nebulizer.    Psychosocial issues?  No   What is the patient's perception of their health status since discharge?  Same   Nursing Interventions  Nurse provided patient education   Are the patient's immunizations up to date?   Yes   Nursing interventions  Advised patient to discuss with provider at next visit   If the patient is a current smoker, are they able to teach back resources for cessation?  Smoking cessation medications   Is the patient/caregiver able to teach back the hierarchy of who to call/visit for symptoms/problems? PCP, Specialist, Home health nurse, Urgent Care, ED, 911  Yes   Is the patient able to teach back COPD zones?  Yes   Nursing interventions  Education provided on various zones   Patient reports what zone on this call?  Yellow Zone   Yellow Zone  Increased shortness of air, Increased or thicker phlegm/mucus, Medication is  not helping relieve symptoms, Fever or feeling like have a chest cold, Poor Appetite, Unable to complete daily activities, Using quick relief inhaler/nebulizer more often, Increased swelling of ankles, Poor sleep and symptoms work patient up   Yellow interventions  Continue to use daily medications, Use quick relief inhaler as ordered, Use oxygen as ordered, Use other meds such as steroids or antibiotics as ordered, Do not smoke, Get plenty of rest, Call provider immediatly if symptoms do not improve   Week 4 call completed?  Yes   Would the patient like one additional call?  No   Graduated  Yes   Did the patient feel the follow up calls were helpful during their recovery period?  Yes   Was the number of calls appropriate?  Yes   Wrap up additional comments  she stated she coughed up a bunch , last night, feels like the prednisone is helping.          Traci Wilkins RN

## 2019-04-01 ENCOUNTER — APPOINTMENT (OUTPATIENT)
Dept: GENERAL RADIOLOGY | Facility: HOSPITAL | Age: 79
End: 2019-04-01

## 2019-04-01 ENCOUNTER — HOSPITAL ENCOUNTER (INPATIENT)
Facility: HOSPITAL | Age: 79
LOS: 6 days | Discharge: HOME HEALTH | End: 2019-04-07
Attending: EMERGENCY MEDICINE | Admitting: HOSPITALIST

## 2019-04-01 DIAGNOSIS — J44.1 CHRONIC OBSTRUCTIVE PULMONARY DISEASE WITH ACUTE EXACERBATION (HCC): Primary | ICD-10-CM

## 2019-04-01 DIAGNOSIS — Z74.09 IMPAIRED MOBILITY AND ADLS: ICD-10-CM

## 2019-04-01 DIAGNOSIS — Z74.09 IMPAIRED FUNCTIONAL MOBILITY, BALANCE, GAIT, AND ENDURANCE: ICD-10-CM

## 2019-04-01 DIAGNOSIS — Z78.9 IMPAIRED MOBILITY AND ADLS: ICD-10-CM

## 2019-04-01 LAB
ALBUMIN SERPL-MCNC: 3.8 G/DL (ref 3.5–5)
ALBUMIN/GLOB SERPL: 1.4 G/DL (ref 1–2)
ALP SERPL-CCNC: 88 U/L (ref 38–126)
ALT SERPL W P-5'-P-CCNC: 17 U/L (ref 13–69)
ANION GAP SERPL CALCULATED.3IONS-SCNC: 7.2 MMOL/L (ref 10–20)
AST SERPL-CCNC: 17 U/L (ref 15–46)
ATMOSPHERIC PRESS: 741 MMHG
BASE EXCESS BLDC CALC-SCNC: 4.7 MMOL/L (ref -2–2)
BASOPHILS # BLD AUTO: 0.08 10*3/MM3 (ref 0–0.2)
BASOPHILS NFR BLD AUTO: 0.4 % (ref 0–1.5)
BDY SITE: ABNORMAL
BILIRUB SERPL-MCNC: 0.4 MG/DL (ref 0.2–1.3)
BILIRUB UR QL STRIP: NEGATIVE
BUN BLD-MCNC: 17 MG/DL (ref 7–20)
BUN/CREAT SERPL: 21.3 (ref 7.1–23.5)
CALCIUM SPEC-SCNC: 9.7 MG/DL (ref 8.4–10.2)
CHLORIDE SERPL-SCNC: 98 MMOL/L (ref 98–107)
CLARITY UR: ABNORMAL
CO2 SERPL-SCNC: 34 MMOL/L (ref 26–30)
COLOR UR: ABNORMAL
CREAT BLD-MCNC: 0.8 MG/DL (ref 0.6–1.3)
D-LACTATE SERPL-SCNC: 1.1 MMOL/L (ref 0.5–2)
DEPRECATED RDW RBC AUTO: 44.6 FL (ref 37–54)
EOSINOPHIL # BLD AUTO: 0.08 10*3/MM3 (ref 0–0.4)
EOSINOPHIL NFR BLD AUTO: 0.4 % (ref 0.3–6.2)
ERYTHROCYTE [DISTWIDTH] IN BLOOD BY AUTOMATED COUNT: 13.7 % (ref 12.3–15.4)
GAS FLOW AIRWAY: 4 LPM
GFR SERPL CREATININE-BSD FRML MDRD: 69 ML/MIN/1.73
GLOBULIN UR ELPH-MCNC: 2.7 GM/DL
GLUCOSE BLD-MCNC: 120 MG/DL (ref 74–98)
GLUCOSE UR STRIP-MCNC: NEGATIVE MG/DL
HCO3 BLDC-SCNC: 30.6 MMOL/L (ref 22–28)
HCT VFR BLD AUTO: 40.6 % (ref 34–46.6)
HGB BLD-MCNC: 13.1 G/DL (ref 12–15.9)
HGB BLDA-MCNC: 13 G/DL (ref 12–18)
HGB UR QL STRIP.AUTO: NEGATIVE
HOROWITZ INDEX BLD+IHG-RTO: 36 %
IMM GRANULOCYTES # BLD AUTO: 0.12 10*3/MM3 (ref 0–0.05)
IMM GRANULOCYTES NFR BLD AUTO: 0.6 % (ref 0–0.5)
KETONES UR QL STRIP: NEGATIVE
LEUKOCYTE ESTERASE UR QL STRIP.AUTO: NEGATIVE
LIPASE SERPL-CCNC: 25 U/L (ref 23–300)
LYMPHOCYTES # BLD AUTO: 2.73 10*3/MM3 (ref 0.7–3.1)
LYMPHOCYTES NFR BLD AUTO: 14.6 % (ref 19.6–45.3)
MCH RBC QN AUTO: 28.9 PG (ref 26.6–33)
MCHC RBC AUTO-ENTMCNC: 32.3 G/DL (ref 31.5–35.7)
MCV RBC AUTO: 89.4 FL (ref 79–97)
MODALITY: ABNORMAL
MONOCYTES # BLD AUTO: 1.37 10*3/MM3 (ref 0.1–0.9)
MONOCYTES NFR BLD AUTO: 7.3 % (ref 5–12)
NEUTROPHILS # BLD AUTO: 14.26 10*3/MM3 (ref 1.4–7)
NEUTROPHILS NFR BLD AUTO: 76.7 % (ref 42.7–76)
NITRITE UR QL STRIP: NEGATIVE
NOTE: ABNORMAL
NRBC BLD AUTO-RTO: 0 /100 WBC (ref 0–0)
NT-PROBNP SERPL-MCNC: 198 PG/ML (ref 0–450)
PCO2 BLDC: 49.3 MM HG (ref 35–45)
PH BLDC: 7.4 PH UNITS (ref 7.31–7.47)
PH UR STRIP.AUTO: 5.5 [PH] (ref 5–8)
PLATELET # BLD AUTO: 236 10*3/MM3 (ref 140–450)
PMV BLD AUTO: 10.2 FL (ref 6–12)
PO2 BLDC: 36.1 MM HG (ref 75–100)
POTASSIUM BLD-SCNC: 4.2 MMOL/L (ref 3.5–5.1)
PROCALCITONIN SERPL-MCNC: <0.05 NG/ML
PROT SERPL-MCNC: 6.5 G/DL (ref 6.3–8.2)
PROT UR QL STRIP: NEGATIVE
RBC # BLD AUTO: 4.54 10*6/MM3 (ref 3.77–5.28)
SAO2 % BLDC FROM PO2: 74.7 % (ref 94–100)
SODIUM BLD-SCNC: 135 MMOL/L (ref 137–145)
SP GR UR STRIP: 1.02 (ref 1–1.03)
TROPONIN I SERPL-MCNC: <0.012 NG/ML (ref 0–0.03)
UROBILINOGEN UR QL STRIP: ABNORMAL
VENTILATOR MODE: ABNORMAL
WBC NRBC COR # BLD: 18.64 10*3/MM3 (ref 3.4–10.8)

## 2019-04-01 PROCEDURE — 94799 UNLISTED PULMONARY SVC/PX: CPT

## 2019-04-01 PROCEDURE — 25010000002 PIPERACILLIN SOD-TAZOBACTAM PER 1 G: Performed by: NURSE PRACTITIONER

## 2019-04-01 PROCEDURE — 93005 ELECTROCARDIOGRAM TRACING: CPT | Performed by: NURSE PRACTITIONER

## 2019-04-01 PROCEDURE — 84484 ASSAY OF TROPONIN QUANT: CPT | Performed by: NURSE PRACTITIONER

## 2019-04-01 PROCEDURE — 83690 ASSAY OF LIPASE: CPT | Performed by: NURSE PRACTITIONER

## 2019-04-01 PROCEDURE — 80053 COMPREHEN METABOLIC PANEL: CPT | Performed by: NURSE PRACTITIONER

## 2019-04-01 PROCEDURE — 85025 COMPLETE CBC W/AUTO DIFF WBC: CPT | Performed by: NURSE PRACTITIONER

## 2019-04-01 PROCEDURE — 25010000002 METHYLPREDNISOLONE PER 125 MG: Performed by: NURSE PRACTITIONER

## 2019-04-01 PROCEDURE — 87040 BLOOD CULTURE FOR BACTERIA: CPT | Performed by: NURSE PRACTITIONER

## 2019-04-01 PROCEDURE — 94640 AIRWAY INHALATION TREATMENT: CPT

## 2019-04-01 PROCEDURE — 84145 PROCALCITONIN (PCT): CPT | Performed by: NURSE PRACTITIONER

## 2019-04-01 PROCEDURE — 71046 X-RAY EXAM CHEST 2 VIEWS: CPT

## 2019-04-01 PROCEDURE — 83880 ASSAY OF NATRIURETIC PEPTIDE: CPT | Performed by: NURSE PRACTITIONER

## 2019-04-01 PROCEDURE — 99284 EMERGENCY DEPT VISIT MOD MDM: CPT

## 2019-04-01 PROCEDURE — 25010000002 ENOXAPARIN PER 10 MG: Performed by: INTERNAL MEDICINE

## 2019-04-01 PROCEDURE — 99223 1ST HOSP IP/OBS HIGH 75: CPT | Performed by: INTERNAL MEDICINE

## 2019-04-01 PROCEDURE — 82805 BLOOD GASES W/O2 SATURATION: CPT

## 2019-04-01 PROCEDURE — 83605 ASSAY OF LACTIC ACID: CPT | Performed by: NURSE PRACTITIONER

## 2019-04-01 PROCEDURE — 81003 URINALYSIS AUTO W/O SCOPE: CPT | Performed by: NURSE PRACTITIONER

## 2019-04-01 PROCEDURE — P9612 CATHETERIZE FOR URINE SPEC: HCPCS

## 2019-04-01 PROCEDURE — 25010000002 LEVOFLOXACIN PER 250 MG: Performed by: NURSE PRACTITIONER

## 2019-04-01 RX ORDER — METHYLPREDNISOLONE SODIUM SUCCINATE 40 MG/ML
40 INJECTION, POWDER, LYOPHILIZED, FOR SOLUTION INTRAMUSCULAR; INTRAVENOUS EVERY 12 HOURS
Status: DISCONTINUED | OUTPATIENT
Start: 2019-04-02 | End: 2019-04-03

## 2019-04-01 RX ORDER — BUDESONIDE 0.5 MG/2ML
0.5 INHALANT ORAL
Status: DISCONTINUED | OUTPATIENT
Start: 2019-04-01 | End: 2019-04-07 | Stop reason: HOSPADM

## 2019-04-01 RX ORDER — PRAVASTATIN SODIUM 20 MG
20 TABLET ORAL EVERY EVENING
Status: DISCONTINUED | OUTPATIENT
Start: 2019-04-01 | End: 2019-04-07 | Stop reason: HOSPADM

## 2019-04-01 RX ORDER — METHYLPREDNISOLONE SODIUM SUCCINATE 125 MG/2ML
125 INJECTION, POWDER, LYOPHILIZED, FOR SOLUTION INTRAMUSCULAR; INTRAVENOUS ONCE
Status: COMPLETED | OUTPATIENT
Start: 2019-04-01 | End: 2019-04-01

## 2019-04-01 RX ORDER — SODIUM CHLORIDE 0.9 % (FLUSH) 0.9 %
10 SYRINGE (ML) INJECTION AS NEEDED
Status: DISCONTINUED | OUTPATIENT
Start: 2019-04-01 | End: 2019-04-07 | Stop reason: HOSPADM

## 2019-04-01 RX ORDER — IPRATROPIUM BROMIDE AND ALBUTEROL SULFATE 2.5; .5 MG/3ML; MG/3ML
3 SOLUTION RESPIRATORY (INHALATION) EVERY 4 HOURS PRN
Status: DISCONTINUED | OUTPATIENT
Start: 2019-04-01 | End: 2019-04-07 | Stop reason: HOSPADM

## 2019-04-01 RX ORDER — IPRATROPIUM BROMIDE AND ALBUTEROL SULFATE 2.5; .5 MG/3ML; MG/3ML
3 SOLUTION RESPIRATORY (INHALATION) ONCE
Status: COMPLETED | OUTPATIENT
Start: 2019-04-01 | End: 2019-04-01

## 2019-04-01 RX ORDER — LEVOFLOXACIN 5 MG/ML
500 INJECTION, SOLUTION INTRAVENOUS EVERY 24 HOURS
Status: DISCONTINUED | OUTPATIENT
Start: 2019-04-02 | End: 2019-04-02

## 2019-04-01 RX ORDER — NYSTATIN 100000 U/G
CREAM TOPICAL AS NEEDED
Status: DISCONTINUED | OUTPATIENT
Start: 2019-04-01 | End: 2019-04-07 | Stop reason: HOSPADM

## 2019-04-01 RX ORDER — ACETAMINOPHEN 325 MG/1
650 TABLET ORAL EVERY 4 HOURS PRN
Status: DISCONTINUED | OUTPATIENT
Start: 2019-04-01 | End: 2019-04-07 | Stop reason: HOSPADM

## 2019-04-01 RX ORDER — SODIUM CHLORIDE 0.9 % (FLUSH) 0.9 %
3 SYRINGE (ML) INJECTION EVERY 12 HOURS SCHEDULED
Status: DISCONTINUED | OUTPATIENT
Start: 2019-04-01 | End: 2019-04-07 | Stop reason: HOSPADM

## 2019-04-01 RX ORDER — MECLIZINE HCL 12.5 MG/1
12.5 TABLET ORAL 3 TIMES DAILY PRN
Status: DISCONTINUED | OUTPATIENT
Start: 2019-04-01 | End: 2019-04-07 | Stop reason: HOSPADM

## 2019-04-01 RX ORDER — PROMETHAZINE HYDROCHLORIDE 12.5 MG/1
12.5 TABLET ORAL EVERY 8 HOURS PRN
Status: DISCONTINUED | OUTPATIENT
Start: 2019-04-01 | End: 2019-04-07 | Stop reason: HOSPADM

## 2019-04-01 RX ORDER — ONDANSETRON 2 MG/ML
4 INJECTION INTRAMUSCULAR; INTRAVENOUS EVERY 6 HOURS PRN
Status: DISCONTINUED | OUTPATIENT
Start: 2019-04-01 | End: 2019-04-07 | Stop reason: HOSPADM

## 2019-04-01 RX ORDER — L.ACID,PARA/B.BIFIDUM/S.THERM 8B CELL
1 CAPSULE ORAL 2 TIMES DAILY
Status: DISCONTINUED | OUTPATIENT
Start: 2019-04-01 | End: 2019-04-07 | Stop reason: HOSPADM

## 2019-04-01 RX ORDER — FLUTICASONE PROPIONATE 50 MCG
2 SPRAY, SUSPENSION (ML) NASAL DAILY
Status: DISCONTINUED | OUTPATIENT
Start: 2019-04-02 | End: 2019-04-07 | Stop reason: HOSPADM

## 2019-04-01 RX ORDER — GUAIFENESIN 600 MG/1
600 TABLET, EXTENDED RELEASE ORAL EVERY 12 HOURS SCHEDULED
Status: DISCONTINUED | OUTPATIENT
Start: 2019-04-01 | End: 2019-04-06

## 2019-04-01 RX ORDER — SODIUM CHLORIDE 0.9 % (FLUSH) 0.9 %
3-10 SYRINGE (ML) INJECTION AS NEEDED
Status: DISCONTINUED | OUTPATIENT
Start: 2019-04-01 | End: 2019-04-07 | Stop reason: HOSPADM

## 2019-04-01 RX ORDER — IPRATROPIUM BROMIDE AND ALBUTEROL SULFATE 2.5; .5 MG/3ML; MG/3ML
3 SOLUTION RESPIRATORY (INHALATION)
Status: DISCONTINUED | OUTPATIENT
Start: 2019-04-01 | End: 2019-04-07 | Stop reason: HOSPADM

## 2019-04-01 RX ORDER — PROPRANOLOL HYDROCHLORIDE 20 MG/1
60 TABLET ORAL EVERY 12 HOURS SCHEDULED
Status: DISCONTINUED | OUTPATIENT
Start: 2019-04-01 | End: 2019-04-07 | Stop reason: HOSPADM

## 2019-04-01 RX ORDER — CLONAZEPAM 0.5 MG/1
0.5 TABLET ORAL NIGHTLY PRN
Status: DISCONTINUED | OUTPATIENT
Start: 2019-04-01 | End: 2019-04-07 | Stop reason: HOSPADM

## 2019-04-01 RX ORDER — LEVOFLOXACIN 5 MG/ML
750 INJECTION, SOLUTION INTRAVENOUS ONCE
Status: COMPLETED | OUTPATIENT
Start: 2019-04-01 | End: 2019-04-01

## 2019-04-01 RX ORDER — PANTOPRAZOLE SODIUM 40 MG/1
40 TABLET, DELAYED RELEASE ORAL EVERY MORNING
Status: DISCONTINUED | OUTPATIENT
Start: 2019-04-02 | End: 2019-04-07 | Stop reason: HOSPADM

## 2019-04-01 RX ORDER — TRAMADOL HYDROCHLORIDE 50 MG/1
50 TABLET ORAL EVERY 6 HOURS PRN
Status: DISCONTINUED | OUTPATIENT
Start: 2019-04-01 | End: 2019-04-07 | Stop reason: HOSPADM

## 2019-04-01 RX ADMIN — TAZOBACTAM SODIUM AND PIPERACILLIN SODIUM 3.38 G: 375; 3 INJECTION, SOLUTION INTRAVENOUS at 19:24

## 2019-04-01 RX ADMIN — METHYLPREDNISOLONE SODIUM SUCCINATE 125 MG: 125 INJECTION, POWDER, FOR SOLUTION INTRAMUSCULAR; INTRAVENOUS at 16:03

## 2019-04-01 RX ADMIN — SODIUM CHLORIDE 1000 ML: 9 INJECTION, SOLUTION INTRAVENOUS at 19:23

## 2019-04-01 RX ADMIN — IPRATROPIUM BROMIDE AND ALBUTEROL SULFATE 3 ML: .5; 3 SOLUTION RESPIRATORY (INHALATION) at 15:25

## 2019-04-01 RX ADMIN — IPRATROPIUM BROMIDE AND ALBUTEROL SULFATE 3 ML: .5; 3 SOLUTION RESPIRATORY (INHALATION) at 22:33

## 2019-04-01 RX ADMIN — SERTRALINE HYDROCHLORIDE 50 MG: 50 TABLET ORAL at 22:15

## 2019-04-01 RX ADMIN — Medication 1 CAPSULE: at 22:15

## 2019-04-01 RX ADMIN — GUAIFENESIN 600 MG: 600 TABLET, EXTENDED RELEASE ORAL at 22:15

## 2019-04-01 RX ADMIN — BUDESONIDE 0.5 MG: 0.5 INHALANT RESPIRATORY (INHALATION) at 22:33

## 2019-04-01 RX ADMIN — PRAVASTATIN SODIUM 20 MG: 20 TABLET ORAL at 22:15

## 2019-04-01 RX ADMIN — ENOXAPARIN SODIUM 40 MG: 100 INJECTION SUBCUTANEOUS at 22:11

## 2019-04-01 RX ADMIN — SODIUM CHLORIDE, PRESERVATIVE FREE 3 ML: 5 INJECTION INTRAVENOUS at 22:20

## 2019-04-01 RX ADMIN — PROPRANOLOL HYDROCHLORIDE 60 MG: 20 TABLET ORAL at 22:14

## 2019-04-01 RX ADMIN — LEVOFLOXACIN 750 MG: 5 INJECTION, SOLUTION INTRAVENOUS at 19:55

## 2019-04-01 NOTE — ED PROVIDER NOTES
Subjective   History of Present Illness  L who presents due to worsening shortness of breath and cough.  From February 20 3 February 28 she was an inpatient here due to acute on chronic hypoxic respiratory failure, acute COPD exacerbation, chronic diastolic heart failure, and bilateral upper lobe bacterial pneumonia.  She states that she was discharged home and did not feel much better and is worsening.  She states that she is very short of breath.  She denies chest pain or pressure.  She states that she has been coughing so hard that her abdomen hurts.  She is using nebulizers at home but they are not helping at all.  She finished a course of prednisone 2 weeks ago.  She denies vomiting or diarrhea.  She does have decreased appetite.  She has felt very weak.  No lower extremity edema.  She wears oxygen 2 L per nasal cannula due to her COPD and she indicates if she does very much walking at all she is very short of breath and weak.  Review of Systems   All other systems reviewed and are negative.      Past Medical History:   Diagnosis Date   • Arthritis    • Cancer (CMS/[facility])     uterine cancer (1981)   • Depression    • History of emphysema    • History of esophageal reflux    • History of recurrent UTI (urinary tract infection)    • History of renal calculi    • History of uterine cancer    • Hyperlipidemia    • Hypertension    • Pneumonia 02/2019       Allergies   Allergen Reactions   • Morphine And Related        Past Surgical History:   Procedure Laterality Date   • FOOT SURGERY     • HAND SURGERY     • HYSTERECTOMY         Family History   Problem Relation Age of Onset   • Cancer Mother    • Heart attack Father    • Arthritis Father    • Diabetes Daughter    • Hyperlipidemia Daughter    • Migraines Daughter        Social History     Socioeconomic History   • Marital status:      Spouse name: Not on file   • Number of children: Not on file   • Years of education: Not on file   • Highest education level:  Not on file   Tobacco Use   • Smoking status: Current Every Day Smoker     Packs/day: 0.25     Types: Cigarettes   • Smokeless tobacco: Never Used   • Tobacco comment: pt states hasn't smoke since Pneumonia 2/2019   Substance and Sexual Activity   • Alcohol use: No   • Drug use: No   • Sexual activity: Defer     Comment:            Objective   Physical Exam   Constitutional: She is oriented to person, place, and time. She appears well-developed and well-nourished.   HENT:   Head: Normocephalic and atraumatic.   Mouth/Throat: Oropharynx is clear and moist.   Eyes: Conjunctivae and EOM are normal. Pupils are equal, round, and reactive to light.   Neck: Normal range of motion. Neck supple.   Cardiovascular: Normal rate, regular rhythm, normal heart sounds and intact distal pulses.   No murmur heard.  Pulmonary/Chest:   Pursed lip breathing.  Inspiratory and expiratory wheezes noted throughout.  Diminished breath sounds throughout.   Abdominal: Soft. Bowel sounds are normal. There is tenderness.   Generalized tenderness.  No guarding or rebound tenderness.  No CVA tenderness.   Musculoskeletal: Normal range of motion. She exhibits no edema.   Neurological: She is alert and oriented to person, place, and time.   Skin: Skin is warm and dry. Capillary refill takes less than 2 seconds.   Psychiatric: She has a normal mood and affect. Her behavior is normal. Judgment and thought content normal.   Nursing note and vitals reviewed.      Procedures           ED Course  ED Course as of Apr 01 2012   Mon Apr 01, 2019   1523 EKG interpreted by me: Sinus rhythm, normal rate, short OK, left axis, no acute ST/T changes, this is an abnormal EKG  [MP]   1629 White count is 18.64.  PCO2 is 49 and pH is 7.4.  [CM]   1856 FINAL REPORT     CLINICAL HISTORY:  . recent pneumonia, short of breath     FINDINGS:  There is emphysema and COPD. There is evidence of old calcified  granulomatous disease. Two views of the chest show lungs to  be  clear.  Pulmonary vascularity is normal.  Heart and mediastinum  are unremarkable.     IMPRESSION:  No acute disease.     Authenticated by Angel Patel MD on 04/01/2019 06:48:31 PM  [CM]   1911 She continues to feel short of breath.  I have ordered a another breathing treatment.  She thought that the prednisone did help some.  She continues to be very wheezy.  I do believe that she is somewhat dehydrated however it has been difficult to get an IV, but I am going to order fluids and I believe that we should probably admit this patient.  Going to get blood cultures with arts and give her a dose of Levaquin 750 Zosyn and vancomycin as she was recently in the hospital.  [CM]   1912 The patient indicated that she does have a follow-up appointment with Dr. Mojica on 23 April.  [CM]   1934 I had discussed this case with Dr. Louis and we felt since the patient is verbalizing that she is having worsening shortness of breath increasing fatigue, decreased ability to eat and drink we will admit her.  Her white count is 18.64 with a very slight left shift.  Blood cultures with regards were drawn and patient will be given Zosyn, Levaquin and Vanco.  [CM]   1936 I spoke with Dr. Guerrero and the patient will be admitted acute.  [CM]      ED Course User Index  [CM] Sarina Dumont APRN  [MP] Gil Louis MD                  [facility]      Final diagnoses:   Chronic obstructive pulmonary disease with acute exacerbation (CMS/[facility])            Sarina Dumont APRN  04/01/19 2013

## 2019-04-02 LAB
ANION GAP SERPL CALCULATED.3IONS-SCNC: 11.4 MMOL/L (ref 10–20)
BASOPHILS # BLD AUTO: 0.02 10*3/MM3 (ref 0–0.2)
BASOPHILS NFR BLD AUTO: 0.2 % (ref 0–1.5)
BUN BLD-MCNC: 14 MG/DL (ref 7–20)
BUN/CREAT SERPL: 23.3 (ref 7.1–23.5)
CALCIUM SPEC-SCNC: 8.9 MG/DL (ref 8.4–10.2)
CHLORIDE SERPL-SCNC: 99 MMOL/L (ref 98–107)
CO2 SERPL-SCNC: 32 MMOL/L (ref 26–30)
CREAT BLD-MCNC: 0.6 MG/DL (ref 0.6–1.3)
DEPRECATED RDW RBC AUTO: 43.2 FL (ref 37–54)
EOSINOPHIL # BLD AUTO: 0 10*3/MM3 (ref 0–0.4)
EOSINOPHIL NFR BLD AUTO: 0 % (ref 0.3–6.2)
ERYTHROCYTE [DISTWIDTH] IN BLOOD BY AUTOMATED COUNT: 13.2 % (ref 12.3–15.4)
GFR SERPL CREATININE-BSD FRML MDRD: 96 ML/MIN/1.73
GLUCOSE BLD-MCNC: 130 MG/DL (ref 74–98)
GLUCOSE BLDC GLUCOMTR-MCNC: 229 MG/DL (ref 70–130)
HCT VFR BLD AUTO: 35.9 % (ref 34–46.6)
HGB BLD-MCNC: 11.6 G/DL (ref 12–15.9)
IMM GRANULOCYTES # BLD AUTO: 0.08 10*3/MM3 (ref 0–0.05)
IMM GRANULOCYTES NFR BLD AUTO: 0.6 % (ref 0–0.5)
LYMPHOCYTES # BLD AUTO: 0.96 10*3/MM3 (ref 0.7–3.1)
LYMPHOCYTES NFR BLD AUTO: 7.5 % (ref 19.6–45.3)
MCH RBC QN AUTO: 28.8 PG (ref 26.6–33)
MCHC RBC AUTO-ENTMCNC: 32.3 G/DL (ref 31.5–35.7)
MCV RBC AUTO: 89.1 FL (ref 79–97)
MONOCYTES # BLD AUTO: 0.13 10*3/MM3 (ref 0.1–0.9)
MONOCYTES NFR BLD AUTO: 1 % (ref 5–12)
NEUTROPHILS # BLD AUTO: 11.62 10*3/MM3 (ref 1.4–7)
NEUTROPHILS NFR BLD AUTO: 90.7 % (ref 42.7–76)
NRBC BLD AUTO-RTO: 0 /100 WBC (ref 0–0)
PLATELET # BLD AUTO: 238 10*3/MM3 (ref 140–450)
PMV BLD AUTO: 10.7 FL (ref 6–12)
POTASSIUM BLD-SCNC: 4.4 MMOL/L (ref 3.5–5.1)
PROCALCITONIN SERPL-MCNC: <0.05 NG/ML
RBC # BLD AUTO: 4.03 10*6/MM3 (ref 3.77–5.28)
SODIUM BLD-SCNC: 138 MMOL/L (ref 137–145)
WBC NRBC COR # BLD: 12.81 10*3/MM3 (ref 3.4–10.8)

## 2019-04-02 PROCEDURE — 94799 UNLISTED PULMONARY SVC/PX: CPT

## 2019-04-02 PROCEDURE — 97161 PT EVAL LOW COMPLEX 20 MIN: CPT

## 2019-04-02 PROCEDURE — 82962 GLUCOSE BLOOD TEST: CPT

## 2019-04-02 PROCEDURE — 25010000002 ENOXAPARIN PER 10 MG: Performed by: INTERNAL MEDICINE

## 2019-04-02 PROCEDURE — 99232 SBSQ HOSP IP/OBS MODERATE 35: CPT | Performed by: HOSPITALIST

## 2019-04-02 PROCEDURE — 97165 OT EVAL LOW COMPLEX 30 MIN: CPT

## 2019-04-02 PROCEDURE — 25010000002 METHYLPREDNISOLONE PER 40 MG: Performed by: INTERNAL MEDICINE

## 2019-04-02 PROCEDURE — 85025 COMPLETE CBC W/AUTO DIFF WBC: CPT | Performed by: INTERNAL MEDICINE

## 2019-04-02 PROCEDURE — 80048 BASIC METABOLIC PNL TOTAL CA: CPT | Performed by: INTERNAL MEDICINE

## 2019-04-02 PROCEDURE — 84145 PROCALCITONIN (PCT): CPT | Performed by: HOSPITALIST

## 2019-04-02 RX ADMIN — IPRATROPIUM BROMIDE AND ALBUTEROL SULFATE 3 ML: .5; 3 SOLUTION RESPIRATORY (INHALATION) at 19:58

## 2019-04-02 RX ADMIN — Medication 1 CAPSULE: at 20:24

## 2019-04-02 RX ADMIN — Medication 1 CAPSULE: at 10:23

## 2019-04-02 RX ADMIN — TRAMADOL HYDROCHLORIDE 50 MG: 50 TABLET, FILM COATED ORAL at 17:32

## 2019-04-02 RX ADMIN — PANTOPRAZOLE SODIUM 40 MG: 40 TABLET, DELAYED RELEASE ORAL at 06:01

## 2019-04-02 RX ADMIN — BUDESONIDE 0.5 MG: 0.5 INHALANT RESPIRATORY (INHALATION) at 19:58

## 2019-04-02 RX ADMIN — PROPRANOLOL HYDROCHLORIDE 60 MG: 20 TABLET ORAL at 20:25

## 2019-04-02 RX ADMIN — PRAVASTATIN SODIUM 20 MG: 20 TABLET ORAL at 17:32

## 2019-04-02 RX ADMIN — ESTROGENS, CONJUGATED 0.62 MG: 0.62 TABLET, FILM COATED ORAL at 10:23

## 2019-04-02 RX ADMIN — IPRATROPIUM BROMIDE AND ALBUTEROL SULFATE 3 ML: .5; 3 SOLUTION RESPIRATORY (INHALATION) at 13:37

## 2019-04-02 RX ADMIN — SODIUM CHLORIDE, PRESERVATIVE FREE 3 ML: 5 INJECTION INTRAVENOUS at 10:24

## 2019-04-02 RX ADMIN — PROPRANOLOL HYDROCHLORIDE 60 MG: 20 TABLET ORAL at 10:23

## 2019-04-02 RX ADMIN — GUAIFENESIN 600 MG: 600 TABLET, EXTENDED RELEASE ORAL at 10:24

## 2019-04-02 RX ADMIN — BUDESONIDE 0.5 MG: 0.5 INHALANT RESPIRATORY (INHALATION) at 07:31

## 2019-04-02 RX ADMIN — IPRATROPIUM BROMIDE AND ALBUTEROL SULFATE 3 ML: .5; 3 SOLUTION RESPIRATORY (INHALATION) at 07:32

## 2019-04-02 RX ADMIN — SODIUM CHLORIDE, PRESERVATIVE FREE 3 ML: 5 INJECTION INTRAVENOUS at 20:25

## 2019-04-02 RX ADMIN — METHYLPREDNISOLONE SODIUM SUCCINATE 40 MG: 40 INJECTION, POWDER, FOR SOLUTION INTRAMUSCULAR; INTRAVENOUS at 04:28

## 2019-04-02 RX ADMIN — GUAIFENESIN 600 MG: 600 TABLET, EXTENDED RELEASE ORAL at 20:25

## 2019-04-02 RX ADMIN — ENOXAPARIN SODIUM 40 MG: 100 INJECTION SUBCUTANEOUS at 20:23

## 2019-04-02 RX ADMIN — METHYLPREDNISOLONE SODIUM SUCCINATE 40 MG: 40 INJECTION, POWDER, FOR SOLUTION INTRAMUSCULAR; INTRAVENOUS at 17:32

## 2019-04-02 RX ADMIN — SERTRALINE HYDROCHLORIDE 50 MG: 50 TABLET ORAL at 20:25

## 2019-04-02 NOTE — H&P
[facility]      Name:  Sadie Tijerina   Age:  78 y.o.  Sex:  female  :  1940  MRN:  [id number]   Visit Number:  [id number]  Admission Date:  2019  Date Of Service:  19  Primary Care Physician:  Kristian Wolff MD    Chief Complaint:     Generalized weakness and increased shortness of breath.    History Of Presenting Illness:      This is an 78-year-old female, familiar to me from her prior multiple admissions, with history of COPD on home oxygen, chronic tremors, hypertension and chronic diastolic heart failure was brought to the emergency room by her family with above complaints.  Patient states that she has been short of breath especially in the last 1 week with increasing chest congestion and wheezing.  Patient states that since discharge from the hospital 1 month ago she has never felt better.  Unfortunately, she continues to smoke 3-4 cigarettes/day.  She had refused to go to short-term rehabilitation last time and also had refused home health.  She does live alone.  She denies any chest pain or fevers.    Patient was evaluated in the emergency room.  She was hemodynamically stable and afebrile.  Initial oxygen saturation was 90% on room air.  Blood work done in the emergency room including CMP and CBC was fairly within normal range except for a white count of 18.6.  Lactic acid was 1.1 and pro calcitonin was within normal limits.  Troponin was less than 0.012 and BNP was 198.  Chest x-ray did not show any infiltrates.  She was given 1 L of normal saline bolus in the emergency room along with bronchodilators and Solu-Medrol.  Initially, there was suspicion that she may have pneumonia and was given Zosyn and Levaquin in the emergency room.  She is currently being admitted to the medical floor for further evaluation and management.  She is currently lying down on the bed and is comfortable at rest.    She has had multiple prior admissions  to this facility with COPD exacerbation.  Her last admission was from 2/23/2019 to 2/28/2019 for COPD exacerbation.  At that time she was noted to have bilateral upper lobe pneumonia and was treated with antibiotic therapy and was subsequently discharged home on Ceftin.    Review Of Systems:     General ROS: Patient denies any fevers, chills or loss of consciousness. Complains of generalized weakness.  Psychological ROS: No history of any hallucinations and delusions.  Ophthalmic ROS: No history of any diplopia or transient loss of vision.  ENT ROS: No history of sore throat, nasal congestion or ear pain.   Allergy and Immunology ROS: No history of rash or itching.  Hematological and Lymphatic ROS: No history of neck swelling or easy bleeding.  Endocrine ROS: No history of any recent unintentional weight gain or loss.  Respiratory ROS: As per history of presenting illness.  Cardiovascular ROS: No history of chest pain or palpitations.  Gastrointestinal ROS: No history of nausea and vomiting. Denies any abdominal pain. No diarrhea.  Genito-Urinary ROS: No history of dysuria or hematuria.  Musculoskeletal ROS: No muscle pain. No calf pain. Complains of chronic back pain.  Neurological ROS: No history of any focal weakness. No loss of consciousness. Denies any numbness.  Dermatological ROS: No history of any redness or pruritis.     Past Medical History:    Past Medical History:   Diagnosis Date   • Arthritis    • Cancer (CMS/HCC)     uterine cancer (1981)   • Depression    • History of emphysema    • History of esophageal reflux    • History of recurrent UTI (urinary tract infection)    • History of renal calculi    • History of uterine cancer    • Hyperlipidemia    • Hypertension    • Pneumonia 02/2019     Past Surgical history:    Past Surgical History:   Procedure Laterality Date   • FOOT SURGERY     • HAND SURGERY     • HYSTERECTOMY       Social History:    Social History     Socioeconomic History   • Marital  status:      Spouse name: Not on file   • Number of children: Not on file   • Years of education: Not on file   • Highest education level: Not on file   Tobacco Use   • Smoking status: Current Every Day Smoker     Packs/day: 0.25     Types: Cigarettes   • Smokeless tobacco: Never Used   • Tobacco comment: pt states hasn't smoke since Pneumonia 2/2019   Substance and Sexual Activity   • Alcohol use: No   • Drug use: No   • Sexual activity: Defer     Comment:      Family History:    Family History   Problem Relation Age of Onset   • Cancer Mother    • Heart attack Father    • Arthritis Father    • Diabetes Daughter    • Hyperlipidemia Daughter    • Migraines Daughter      Allergies:      Morphine and related    Home Medications:    Prior to Admission Medications     Prescriptions Last Dose Informant Patient Reported? Taking?    albuterol (PROVENTIL HFA;VENTOLIN HFA) 108 (90 Base) MCG/ACT inhaler   No No    Inhale 1 puff Every 4 (Four) Hours As Needed for Shortness of Air.    bacitracin-silver sulfadiazine-nystatin   No No    Apply  topically to the appropriate area as directed 2 (Two) Times a Day.    brompheniramine-pseudoephedrine-DM 30-2-10 MG/5ML syrup   No No    Take 5 mL by mouth 4 (Four) Times a Day As Needed for Congestion, Cough or Allergies.    budesonide-formoterol (SYMBICORT) 160-4.5 MCG/ACT inhaler   No No    Inhale 2 puffs 2 (Two) Times a Day.    clonazePAM (KlonoPIN) 0.5 MG tablet   No No    TAKE 1 TABLET BY MOUTH AT BEDTIME AS NEEDED for seizures    estrogens, conjugated, (PREMARIN) 0.625 MG tablet   No No    Take 1 tablet by mouth Daily. 200001    fluticasone (FLONASE) 50 MCG/ACT nasal spray   No No    2 sprays by Each Nare route Daily.    ipratropium-albuterol (DUO-NEB) 0.5-2.5 mg/3 ml nebulizer   No No    Inhale contents of 1 vial via nebulizer Every 4 (Four) Hours As Needed for Wheezing.    lactobacillus acidophilus (RISAQUAD) capsule capsule   No No    Take 1 capsule by mouth 2 (Two)  Times a Day.    lidocaine (LIDODERM) 5 %   No No    Place 1 patch on the skin as directed by provider Daily. Remove & Discard patch within 12 hours or as directed by MD    meclizine (ANTIVERT) 12.5 MG tablet   No No    TAKE 1 TABLET BY MOUTH THREE TIMES A DAY AS NEEDED for dizziness     methylPREDNISolone (MEDROL, DANIEL,) 4 MG tablet   No No    Take as directed on package instructions.    nystatin (MYCOSTATIN) 110754 UNIT/GM cream   No No    Apply  topically to the appropriate area as directed As Needed (Yeast infection).    nystatin (MYCOSTATIN) 157453 UNIT/ML suspension   No No    Swish and swallow 5 mL 4 (Four) Times a Day.    omeprazole (priLOSEC) 40 MG capsule   No No    Take 1 capsule by mouth Daily.    ondansetron (ZOFRAN) 4 MG tablet   Yes No    TAKE 1 TABLET BY MOUTH THREE TIMES A DAY AS NEEDED FOR NAUSEA    pravastatin (PRAVACHOL) 20 MG tablet   No No    Take 1 tablet by mouth Every Evening.    promethazine (PHENERGAN) 12.5 MG tablet   No No    Take 1 tablet by mouth Every 8 (Eight) Hours As Needed for Nausea or Vomiting.    propranolol (INDERAL) 60 MG tablet   No No    1 daily po    sertraline (ZOLOFT) 50 MG tablet   No No    Take 1 tablet by mouth every night at bedtime.    SPIRIVA HANDIHALER 18 MCG per inhalation capsule   No No    place 1 capsule into inhaler and inhale ONCE daily    traMADol (ULTRAM) 50 MG tablet   No No    Take 1 tablet by mouth Every 6 (Six) Hours As Needed (pain).           ED Medications:    Medications   sodium chloride 0.9 % flush 10 mL (not administered)   levoFLOXacin (LEVAQUIN) 750 mg/150 mL D5W (premix) (LEVAQUIN) 750 mg (750 mg Intravenous New Bag 4/1/19 1955)   vancomycin 1000 mg in sodium chloride 0.9% 250 mL IVPB (not administered)   ipratropium-albuterol (DUO-NEB) nebulizer solution 3 mL (3 mL Nebulization Given 4/1/19 1525)   methylPREDNISolone sodium succinate (SOLU-Medrol) injection 125 mg (125 mg Intravenous Given 4/1/19 1603)   piperacillin-tazobactam (ZOSYN) 3.375 g  in iso-osmotic dextrose 50 ml (premix) (0 g Intravenous Stopped 4/1/19 1954)   sodium chloride 0.9 % bolus 1,000 mL (1,000 mL Intravenous New Bag 4/1/19 1923)     Vital Signs:    Temp:  [97.3 °F (36.3 °C)] 97.3 °F (36.3 °C)  Heart Rate:  [89-94] 89  Resp:  [18-20] 18  BP: (116-159)/(49-79) 140/63        04/01/19  1434   Weight: 49.8 kg (109 lb 12.8 oz)     Body mass index is 20.08 kg/m².    Physical Exam:    General Appearance:  Alert and cooperative, not in any acute distress.   Head:  Atraumatic and normocephalic, without obvious abnormality.   Eyes:          PERRLA, conjunctivae and sclerae normal, no Icterus. No pallor. Extraocular movements are within normal limits.   Ears:  Ears appear intact with no abnormalities noted.   Throat: No oral lesions, no thrush, oral mucosa moist.   Neck: Supple, trachea midline, no thyromegaly, no carotid bruit.   Back:   No kyphoscoliosis present. No tenderness to palpation,   range of motion normal.   Lungs:   Chest shape is normal. Breath sounds heard bilaterally equally.  Bilateral extensive wheezing heard.  Bilateral basal crackles heard. No Pleural rub or bronchial breathing.   Heart:  Normal S1 and S2, no murmur, no gallop, no rub. No JVD.   Abdomen:   Normal bowel sounds, no masses, no organomegaly. Soft, non-tender, non-distended, no guarding, no rebound tenderness.   Extremities: Moves all extremities well, no edema, no cyanosis, no clubbing.   Pulses: Pulses palpable and equal bilaterally.   Skin: No bleeding, bruising or rash.   Neurologic: Alert and oriented x 3. Moves all four limbs equally.  Mild hand tremors noted. No facial asymmetry.     Laboratory data:    I have reviewed the labs done in the emergency room.    Results from last 7 days   Lab Units 04/01/19  1603   SODIUM mmol/L 135*   POTASSIUM mmol/L 4.2   CHLORIDE mmol/L 98   CO2 mmol/L 34.0*   BUN mg/dL 17   CREATININE mg/dL 0.80   CALCIUM mg/dL 9.7   BILIRUBIN mg/dL 0.4   ALK PHOS U/L 88   ALT (SGPT) U/L 17    AST (SGOT) U/L 17   GLUCOSE mg/dL 120*     Results from last 7 days   Lab Units 04/01/19  1603   WBC 10*3/mm3 18.64*   HEMOGLOBIN g/dL 13.1   HEMATOCRIT % 40.6   PLATELETS 10*3/mm3 236         Results from last 7 days   Lab Units 04/01/19  1603   TROPONIN I ng/mL <0.012     Results from last 7 days   Lab Units 04/01/19  1603   PROBNP pg/mL 198.0         Results from last 7 days   Lab Units 04/01/19  1603   LIPASE U/L 25         Results from last 7 days   Lab Units 04/01/19  1654   COLOR UA  Dark Yellow*   GLUCOSE UA  Negative   KETONES UA  Negative   LEUKOCYTES UA  Negative   PH, URINE  5.5   BILIRUBIN UA  Negative   UROBILINOGEN UA  1.0 E.U./dL     EKG:      EKG done in the emergency room was reviewed by me.  It shows sinus rhythm at 94 bpm.  Left axis deviation noted.  No significant ST-T changes noted except for inverted T waves in aVL.    Radiology:    Imaging Results (last 72 hours)     Procedure Component Value Units Date/Time    XR Chest 2 View [[id number]] Collected:  04/01/19 1848     Updated:  04/01/19 1849    Narrative:       FINAL REPORT    CLINICAL HISTORY:  . recent pneumonia, short of breath    FINDINGS:  There is emphysema and COPD. There is evidence of old calcified  granulomatous disease. Two views of the chest show lungs to be  clear.  Pulmonary vascularity is normal.  Heart and mediastinum  are unremarkable.      Impression:       No acute disease.    Authenticated by Angel Patel MD on 04/01/2019 06:48:31 PM        Assessment:    1.  Acute COPD exacerbation, present on admission.  2.  Chronic respiratory failure with hypoxia on home oxygen.  3.  Chronic diastolic heart failure.  4.  Essential hypertension.  5.  Chronic tobacco dependence.  6.  Restless leg syndrome.    Plan:    Ms. Tijerina is currently being admitted to the medical floor for COPD exacerbation.  We will continue her on oxygen, bronchodilators, budesonide and IV Solu-Medrol.  She will also be continued on BiPAP at night.  I am not  convinced that she has pneumonia at this time but will continue her on Levaquin for COPD exacerbation.  She will be placed on lactobacillus supplements.  I have strongly advised her to discontinue smoking which is likely the etiology for recurrent COPD exacerbations.  She refused nicotine patch.  Unfortunately, she refuses home health or rehab placement.    She will be continued on home medications.  She will be placed on Lovenox for DVT prophylaxis.  We will consult physical and occupational therapy.  We will also consult case management regarding home needs and readmission evaluation.      Nico Enriquez MD  04/01/19  8:04 PM    Dictated utilizing Dragon dictation.

## 2019-04-02 NOTE — THERAPY EVALUATION
Acute Care - Occupational Therapy Initial Evaluation   Friend     Patient Name: Sadie Tijerina  : 1940  MRN: [id number]  Today's Date: 2019  Onset of Illness/Injury or Date of Surgery: 19  Date of Referral to OT: 19  Referring Physician: Dr. Enriquez    Admit Date: 2019       ICD-10-CM ICD-9-CM   1. Chronic obstructive pulmonary disease with acute exacerbation (CMS/[facility]) J44.1 491.21   2. Impaired mobility and ADLs Z74.09 799.89     Patient Active Problem List   Diagnosis   • Calf cramp   • Mixed anxiety depressive disorder   • Dizziness   • Fatigue   • Gastroesophageal reflux disease without esophagitis   • Hematuria   • Hyperlipidemia   • Essential hypertension   • Low back pain   • Orthostatic hypotension   • Restless legs syndrome   • Essential tremor   • Vitamin D deficiency   • Balance problem   • Tension headache   • Tobacco abuse disorder   • Pneumonia due to infectious organism   • COPD with acute exacerbation (CMS/[facility])   • Venous insufficiency of both lower extremities   • Chronic diastolic heart failure (CMS/[facility])   • Chronic obstructive pulmonary disease with acute exacerbation (CMS/[facility])     Past Medical History:   Diagnosis Date   • Arthritis    • Cancer (CMS/[facility])     uterine cancer ()   • Depression    • History of emphysema    • History of esophageal reflux    • History of recurrent UTI (urinary tract infection)    • History of renal calculi    • History of uterine cancer    • Hyperlipidemia    • Hypertension    • Pneumonia 2019     Past Surgical History:   Procedure Laterality Date   • FOOT SURGERY     • HAND SURGERY     • HYSTERECTOMY            OT ASSESSMENT FLOWSHEET (last 12 hours)      Occupational Therapy Evaluation     Row Name 19 1041                   OT Evaluation Time/Intention    Subjective Information  complains of;pain  -AH        Document Type  evaluation  -AH        Mode of Treatment  occupational therapy  -AH        Patient Effort  adequate   -        Symptoms Noted During/After Treatment  fatigue  -           General Information    Patient Profile Reviewed?  yes  -        Onset of Illness/Injury or Date of Surgery  04/01/19  -        Referring Physician  Dr. Enriquez  -        Patient Observations  alert;cooperative;agree to therapy  -        Patient/Family Observations  Pt alone  -        General Observations of Patient  Pt received supine in bed on 3L O2 via nc  -        Prior Level of Function  independent:;all household mobility;ADL's  -        Equipment Currently Used at Home  walker, rolling;oxygen;cane, straight;cane, quad;rollator;wheelchair;hospital bed;shower chair;commode, bedside  -        Pertinent History of Current Functional Problem  COPD with acute exacerbation on home O2 at night, chronic tremors, HTN, chronic diastolic HF  -        Existing Precautions/Restrictions  fall;oxygen therapy device and L/min  -        Risks Reviewed  patient:;increased discomfort  -        Benefits Reviewed  patient:;improve function;increase independence;increase strength  -           Relationship/Environment    Primary Source of Support/Comfort  child(michelle)  -        Lives With  alone  -           Resource/Environmental Concerns    Current Living Arrangements  home/apartment/condo  -           Stairs Within Home, Primary    Stairs, Within Home, Primary  to 2nd level  -        Stairs Comment, Within Home, Primary  Pt stays on main level  -           Cognitive Assessment/Intervention- PT/OT    Orientation Status (Cognition)  oriented x 4  -        Follows Commands (Cognition)  WFL  -           Bed Mobility Assessment/Treatment    Bed Mobility Assessment/Treatment  supine-sit  -        Supine-Sit [place] (Bed Mobility)  conditional independence  -        Assistive Device (Bed Mobility)  head of bed elevated  -           Functional Mobility    Functional Mobility- Ind. Level  contact guard assist  -         Functional Mobility-Distance (Feet)  3  -        Functional Mobility- Safety Issues  supplemental O2  -AH           Transfer Assessment/Treatment    Transfer Assessment/Treatment  sit-stand transfer;stand-sit transfer  -           Sit-Stand Transfer    Sit-Stand [place] (Transfers)  contact guard  -           Stand-Sit Transfer    Stand-Sit [place] (Transfers)  contact guard  -           ADL Assessment/Intervention    BADL Assessment/Intervention  bathing;upper body dressing;lower body dressing;grooming;feeding;toileting  -           Bathing Assessment/Intervention    Bathing [place] Level  minimum assist (75% patient effort)  -           Upper Body Dressing Assessment/Training    Upper Body Dressing [place] Level  set up  -           Lower Body Dressing Assessment/Training    Lower Body Dressing [place] Level  contact guard assist  -           Grooming Assessment/Training    [place] Level (Grooming)  set up  -           Self-Feeding Assessment/Training    [place] Level (Feeding)  set up  -           Toileting Assessment/Training    [place] Level (Toileting)  contact guard assist  -AH           BADL Safety/Performance    Impairments, BADL Safety/Performance  endurance/activity tolerance;strength;shortness of breath  -           General ROM    GENERAL ROM COMMENTS  BUE WFL  -           MMT (Manual Muscle Testing)    General MMT Comments  BUE 4/5  -           Positioning and Restraints    Pre-Treatment Position  in bed  -        Post Treatment Position  chair  -        In Chair  sitting;call light within reach;encouraged to call for assist  -           Pain Assessment    Additional Documentation  Pain Scale: Numbers Pre/Post-Treatment (Group)  -           Pain Scale: Numbers Pre/Post-Treatment    Pain Scale: Numbers, Pretreatment  8/10  -        Pain Scale: Numbers, Post-Treatment  8/10  -        Pain Location  head  -        Pain  Intervention(s)  Repositioned;Ambulation/increased activity  -           Coping    Observed Emotional State  accepting;cooperative  -           Plan of Care Review    Plan of Care Reviewed With  patient  -           Clinical Impression (OT)    Date of Referral to OT  04/01/19  -        OT Diagnosis  generalized weakness  -        Patient/Family Goals Statement (OT Eval)  Pt wants to d/c home  -        Criteria for Skilled Therapeutic Interventions Met (OT Eval)  yes;treatment indicated  -        Rehab Potential (OT Eval)  good, to achieve stated therapy goals  -        Therapy Frequency (OT Eval)  3 times/wk  -        Care Plan Review (OT)  evaluation/treatment results reviewed;patient/other agree to care plan  -        Anticipated Discharge Disposition (OT)  home  -           Vital Signs    O2 Delivery Pre Treatment  supplemental O2 3L  -        O2 Delivery Intra Treatment  supplemental O2 3L  -        O2 Delivery Post Treatment  supplemental O2 3L  -           OT Goals    Transfer Goal Selection (OT)  transfer, OT goal 1  -        Dressing Goal Selection (OT)  dressing, OT goal 1  -        Toileting Goal Selection (OT)  toileting, OT goal 1  -        Strength Goal Selection (OT)  strength, OT goal 1  -        Functional Mobility Goal Selection (OT)  functional mobility, OT goal 1  -        Additional Documentation  Strength Goal Selection (OT) (Row);Functional Mobility Selection (OT) (Row)  -           Transfer Goal 1 (OT)    Activity/Assistive Device (Transfer Goal 1, OT)  sit-to-stand/stand-to-sit;walker, rolling  -        [place] Level/Cues Needed (Transfer Goal 1, OT)  conditional independence  -        Time Frame (Transfer Goal 1, OT)  long term goal (LTG);2 weeks  -        Progress/Outcome (Transfer Goal 1, OT)  goal ongoing  -           Dressing Goal 1 (OT)    Activity/Assistive Device (Dressing Goal 1, OT)  dressing skills, all  -         [place]/Cues Needed (Dressing Goal 1, OT)  set-up required  -        Time Frame (Dressing Goal 1, OT)  long term goal (LTG);2 weeks  -        Progress/Outcome (Dressing Goal 1, OT)  goal ongoing  -           Toileting Goal 1 (OT)    Activity/Device (Toileting Goal 1, OT)  toileting skills, all;commode  -        [place] Level/Cues Needed (Toileting Goal 1, OT)  supervision required  -        Time Frame (Toileting Goal 1, OT)  long term goal (LTG);2 weeks  -AH        Progress/Outcome (Toileting Goal 1, OT)  goal ongoing  -           Strength Goal 1 (OT)    Strength Goal 1 (OT)  Pt will perform UB strengthening ex using theraband for resistance  -        Time Frame (Strength Goal 1, OT)  long term goal (LTG);2 weeks  -        Progress/Outcome (Strength Goal 1, OT)  goal ongoing  -           Functional Mobility Goal 1 (OT)    Activity/Assistive Device (Functional Mobility Goal 1, OT)  walker, rolling  -        [place] Level/Cues Needed (Functional Mobility Goal 1, OT)  standby assist  -        Distance Goal 1 (Functional Mobility, OT)  150  -        Time Frame (Functional Mobility Goal 1, OT)  long term goal (LTG);2 weeks  -        Progress/Outcome (Functional Mobility Goal 1, OT)  goal ongoing  -           Living Environment    Home Accessibility  stairs within home  -          User Key  (r) = Recorded By, (t) = Taken By, (c) = Cosigned By    Initials Name Effective Dates    Radha Aguilar 03/07/18 -          Occupational Therapy Education     Title: PT OT SLP Therapies (Not Started)     Topic: Occupational Therapy (In Progress)     Point: ADL training (Done)     Description: Instruct learner(s) on proper safety adaptation and remediation techniques during self care or transfers.   Instruct in proper use of assistive devices.    Learning Progress Summary           Patient Acceptance, E,TB, VU by  at 4/2/2019 11:08 AM    Comment:  Role of OT/POC                                User Key     Initials Effective Dates Name Provider Type [facility] 03/07/18 -  Radha Escalona Occupational Therapist OT                  OT Recommendation and Plan  Outcome Summary/Treatment Plan (OT)  Anticipated Discharge Disposition (OT): home  Therapy Frequency (OT Eval): 3 times/wk  Plan of Care Review  Plan of Care Reviewed With: patient  Plan of Care Reviewed With: patient  Outcome Summary: OT evaluation completed today.  Pt presents with weakness, decreased endurance and decreased independence with self care tasks.  Pt is expected to benefit from skilled OT to improve her strength and independence with ADL tasks.    Outcome Measures     Row Name 04/02/19 1041             How much help from another is currently needed...    Putting on and taking off regular lower body clothing?  3  -AH      Bathing (including washing, rinsing, and drying)  3  -AH      Toileting (which includes using toilet bed pan or urinal)  3  -AH      Putting on and taking off regular upper body clothing  3  -AH      Taking care of personal grooming (such as brushing teeth)  3  -AH      Eating meals  3  -      Score  18  -         Functional Assessment    Outcome Measure Options  AM-PAC 6 Clicks Daily Activity (OT)  -        User Key  (r) = Recorded By, (t) = Taken By, (c) = Cosigned By    Initials Name Provider Type     Radha Escalona Occupational Therapist          Time Calculation:   Time Calculation- OT     Row Name 04/02/19 1110             Time Calculation- OT    OT Start Time  1041  -      OT Received On  04/02/19  -      OT Goal Re-Cert Due Date  04/12/19  -        User Key  (r) = Recorded By, (t) = Taken By, (c) = Cosigned By    Initials Name Provider Type     Radha Escalona Occupational Therapist        Therapy Charges for Today     Code Description Service Date Service Provider Modifiers Qty    [id number]  OT EVAL LOW COMPLEXITY 4 4/2/2019 Radha sEcalona GO 1               Radha   Iqra  4/2/2019

## 2019-04-02 NOTE — PROGRESS NOTES
Discharge Planning Assessment  [facility]     Patient Name: Sadie Tijerina  MRN: [id number]  Today's Date: 4/2/2019    Admit Date: 4/1/2019    Discharge Needs Assessment     Row Name 04/02/19 1407       Living Environment    Lives With  alone    Current Living Arrangements  home/apartment/condo    Primary Care Provided by  self    Provides Primary Care For  no one    Family Caregiver if Needed  child(michelle), adult    Quality of Family Relationships  involved    Able to Return to Prior Arrangements  yes       Resource/Environmental Concerns    Resource/Environmental Concerns  none    Transportation Concerns  car, none       Transition Planning    Patient/Family Anticipates Transition to  home    Patient/Family Anticipated Services at Transition  none       Discharge Needs Assessment    Readmission Within the Last 30 Days  no previous admission in last 30 days    Concerns to be Addressed  discharge planning    Equipment Currently Used at Home  oxygen;cane, straight;walker, rolling    Anticipated Changes Related to Illness  none    Offered/Gave Vendor List  yes        Discharge Plan     Row Name 04/02/19 1402       Plan    Plan  home     Patient/Family in Agreement with Plan  yes    Plan Comments  Spoke to pt alone She lives alone reports independent with ADLS She has oxygen  she reports she uses at [facility] and [facility] confirmed the orginal order is 2 LNC continously  They  have a new order for conserving device and plan on delivering to her home when she is discharged Confirmed address and phone number Discahrge plans is to return home         Destination      No service coordination in this encounter.      Durable Medical Equipment      Service Provider Request Status Selected Services Address Phone Number Fax Number    [facility] Pending - No Request Sent N/A [address] [facility] [address] [phone]      Dialysis/Infusion      No service coordination in this encounter.       Home Medical Care      No service coordination in this encounter.      Therapy      No service coordination in this encounter.      Community Resources      No service coordination in this encounter.          Demographic Summary     Row Name 04/02/19 1402       General Information    Admission Type  inpatient    Referral Source  admission list        Functional Status     Row Name 04/02/19 1407       Functional Status    Usual Activity Tolerance  good       Functional Status, IADL    Medications  independent    Meal Preparation  independent    Housekeeping  independent    Laundry  independent    Shopping  independent       Mental Status    General Appearance WDL  WDL        Psychosocial    No documentation.       Abuse/Neglect    No documentation.       Legal    No documentation.       Substance Abuse    No documentation.       Patient Forms    No documentation.           Sara Brooke

## 2019-04-02 NOTE — PROGRESS NOTES
[facility]    Name: Sadie Tijerina, 78 y.o. female  MRN: [id number], : 1940   Date of Admission: 2019   Date of Service: 19   PCP: Kristian Wolff MD     Hospital Course:   Sadie Tijerina is a 78 y.o. female with h/o current tobacco use, COPD on home O2 as needed, HTN, chronic tremors, chronic dCHF who was admitted on 2019 to med/surg for weakness, acute exacerbation of COPD.    Vitals on admission notable for AFVSS. Labs on admission notable for WBC 18K, lactic acid 1.1; procal neg x 1. Radiographs on admission notable for CXR unremarkable for acute cardiopulmonary process. Patient was given 1 L bolus, duonebs, solumedrol, zosyn, and levaquin in ER.     Consultants: PT/OT; SW/CM  Procedures: none    Antibiotics: levaquin d2   Micro:    BCx: ngtd x 2 sets  -------------------------------------------------------------------------------------------------------------------  Subjective   Chief Complaint: f/u COPD     Subjective:   Patient seen and examined this afternoon. Events from last night noted. 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 She denies any shortness of breath or chest pain. She is on RA on my evaluation. No conversational dyspnea. She feels she was discharged too early last admission.     Review of Systems:   Gen-no fevers, no chills  CV-no chest pain, no palpitations  Resp-no cough, no dyspnea  GI-no N/V/D, no abd pain    Objective   Objective:     Intake/Output Summary (Last 24 hours) at 2019  Last data filed at 2019 1239  Gross per 24 hour   Intake 1410 ml   Output 700 ml   Net 710 ml      SpO2: 96 %     Physical Examination:   Vitals:    19 0738 19 1100 19 1337 19 1343   BP:  120/55     BP Location:  Right arm     Patient Position:  Lying     Pulse: 89 84 85 88   Resp: 16 17 16 16   Temp:  98 °F (36.7 °C)     TempSrc:   Oral     SpO2: 99% 97% 93% 96%   Weight:       Height:          General:  Thin elderly female; sitting in chair; setting up her police scanner; no acute distress  Heart:   RRR, S1 S2 normal, no m/r/g  Lungs:   Prolonged exp phase; scattered wheezes  Abdomen:  soft, NT, ND, +BS  Extremities: no edema/cyanosis/clubbing  Neuro:  A&Ox3, no focal deficits  Psych:  appropriate mood  Skin:  No bleeding, No bruising, No rash    Pertinent laboratory and radiology data were reviewed:  Microbiology Results (last 10 days)     Procedure Component Value - Date/Time    Blood Culture With CLAYTON - Blood, Arm, Right [[id number]] Collected:  04/01/19 2042    Lab Status:  Preliminary result Specimen:  Blood from Arm, Right Updated:  04/02/19 0900     Blood Culture No growth at less than 24 hours    Blood Culture With CLAYTON - Blood, Arm, Right [[id number]] Collected:  04/01/19 1603    Lab Status:  Preliminary result Specimen:  Blood from Arm, Right Updated:  04/02/19 0730     Blood Culture No growth at less than 24 hours          Medications Reviewed:     budesonide 0.5 mg Nebulization BID - RT   enoxaparin 40 mg Subcutaneous Q24H   estrogens (conjugated) 0.625 mg Oral Daily   fluticasone 2 spray Each Nare Daily   guaiFENesin 600 mg Oral Q12H   ipratropium-albuterol 3 mL Nebulization Q6H - RT   lactobacillus acidophilus 1 capsule Oral BID   methylPREDNISolone sodium succinate 40 mg Intravenous Q12H   pantoprazole 40 mg Oral QAM   pravastatin 20 mg Oral Q PM   propranolol 60 mg Oral Q12H   sertraline 50 mg Oral Nightly   sodium chloride 3 mL Intravenous Q12H        Assessment /Plan   Assessment/Plan:   1. AECOPD, POA  2. Chronic respiratory failure with hypoxia, on supplemental O2 prn at home per patient  3. Chronic dCHF, not in exacerbation  4. HTN  5. Chronic tobacco use  6. Restless leg syndrome    Clinically improved since admission. Not requiring supplemental O2. procal negtive x 2. Will discontinue levaquin. Continue IV steroids,  duonebs, pulmicort. Will transition to oral steroids tomorrow pending clinical status. PT/OT/SW/CM consulted for DCP, appreciate recommendations, apparently patient has refused HH and rehab placement in prior admissions.     FEN: regular  DVT Prophylaxis: lovenox  PUD Prophylaxis: not indicated    Reason for continued hospitalization: above  Disposition: possible discharge in 1-2 days pending continued clinical improvement  Discussed with: patient, RN Sharon Jean MD   7:22 PM on 4/2/2019

## 2019-04-02 NOTE — PLAN OF CARE
Problem: Patient Care Overview  Goal: Plan of Care Review  Outcome: Ongoing (interventions implemented as appropriate)   04/02/19 1103   Coping/Psychosocial   Plan of Care Reviewed With patient   Plan of Care Review   Progress no change   OTHER   Outcome Summary OT evaluation completed today. Pt presents with weakness, decreased endurance and decreased independence with self care tasks. Pt is expected to benefit from skilled OT to improve her strength and independence with ADL tasks.

## 2019-04-02 NOTE — ED NOTES
2005: [facility] CALLED FOR BED ASSIGNMENT,  GIVEN @ THIS TIME. RN NOTIFIED.     Lachelle Garcia  04/01/19 2007

## 2019-04-02 NOTE — ED NOTES
Attempt to call report to 4th floor. Reports RN off the floor at this time. Will call ED RN back.      Melodie Novak RN  04/01/19 2011

## 2019-04-02 NOTE — THERAPY EVALUATION
Acute Care - Physical Therapy Initial Evaluation   [facility]     Patient Name: Sadie Tijerina  : 1940  MRN: [id number]  Today's Date: 2019   Onset of Illness/Injury or Date of Surgery: 19  Date of Referral to PT: 19  Referring Physician: Nico Enriquez MD      Admit Date: 2019    Visit Dx:     ICD-10-CM ICD-9-CM   1. Chronic obstructive pulmonary disease with acute exacerbation (CMS/[facility]) J44.1 491.21   2. Impaired mobility and ADLs Z74.09 799.89     Patient Active Problem List   Diagnosis   • Calf cramp   • Mixed anxiety depressive disorder   • Dizziness   • Fatigue   • Gastroesophageal reflux disease without esophagitis   • Hematuria   • Hyperlipidemia   • Essential hypertension   • Low back pain   • Orthostatic hypotension   • Restless legs syndrome   • Essential tremor   • Vitamin D deficiency   • Balance problem   • Tension headache   • Tobacco abuse disorder   • Pneumonia due to infectious organism   • COPD with acute exacerbation (CMS/[facility])   • Venous insufficiency of both lower extremities   • Chronic diastolic heart failure (CMS/[facility])   • Chronic obstructive pulmonary disease with acute exacerbation (CMS/[facility])     Past Medical History:   Diagnosis Date   • Arthritis    • Cancer (CMS/[facility])     uterine cancer ()   • Depression    • History of emphysema    • History of esophageal reflux    • History of recurrent UTI (urinary tract infection)    • History of renal calculi    • History of uterine cancer    • Hyperlipidemia    • Hypertension    • Pneumonia 2019     Past Surgical History:   Procedure Laterality Date   • FOOT SURGERY     • HAND SURGERY     • HYSTERECTOMY          PT ASSESSMENT (last 12 hours)      Physical Therapy Evaluation     Row Name 19 1058          PT Evaluation Time/Intention    Subjective Information  complains of;pain  -JR     Document Type  evaluation  -JR     Mode of Treatment  physical therapy  -JR     Patient Effort  adequate  -JR     Symptoms  Noted During/After Treatment  fatigue  -JR     Row Name 04/02/19 1058          General Information    Patient Profile Reviewed?  yes  -JR     Onset of Illness/Injury or Date of Surgery  04/01/19  -JR     Referring Physician  Nico Enriquez MD  -JR     Patient Observations  agree to therapy;alert;cooperative  -JR     Patient/Family Observations  No family present  -JR     General Observations of Patient  Supine with 3 LPM of oxygen per nasal cannula               -JR     Prior Level of Function  independent:;all household mobility  -JR     Equipment Currently Used at Home  walker, rolling;oxygen;cane, straight;cane, quad;rollator;wheelchair;hospital bed;shower chair;commode, bedside  -JR     Pertinent History of Current Functional Problem  COPD with actue exacerbation on home oxygen at night, chronic tremors, diastolic HF, HTN  -JR     Existing Precautions/Restrictions  fall;oxygen therapy device and L/min  -JR     Risks Reviewed  patient:;increased discomfort  -JR     Benefits Reviewed  patient:;increase independence;increase strength;increase balance;improve function  -JR     Row Name 04/02/19 1058          Relationship/Environment    Primary Source of Support/Comfort  child(michelle)  -JR     Lives With  alone  -JR     Row Name 04/02/19 1058          Resource/Environmental Concerns    Current Living Arrangements  home/apartment/condo  -JR     Row Name 04/02/19 1058          Stairs Within Home, Primary    Stairs, Within Home, Primary  To upper level  -JR     Stairs Comment, Within Home, Primary  Stays on main level  -JR     Row Name 04/02/19 1058          Cognitive Assessment/Intervention- PT/OT    Orientation Status (Cognition)  oriented x 4  -JR     Follows Commands (Cognition)  WFL  -JR     Row Name 04/02/19 1058          Bed Mobility Assessment/Treatment    Bed Mobility Assessment/Treatment  supine-sit  -JR     Supine-Sit [place] (Bed Mobility)  conditional independence  -JR     Assistive Device (Bed Mobility)   head of bed elevated  -     Row Name 04/02/19 1058          Transfer Assessment/Treatment    Transfer Assessment/Treatment  sit-stand transfer;stand-sit transfer  -     Sit-Stand [place] (Transfers)  contact guard  -     Stand-Sit [place] (Transfers)  contact guard  -     Row Name 04/02/19 1058          Gait/Stairs Assessment/Training    Gait/Stairs Assessment/Training  gait/ambulation independence;gait/ambulation assistive device  -     [place] Level (Gait)  contact guard  -     Assistive Device (Gait)  -- HHA  -     Distance in Feet (Gait)  3  -     Pattern (Gait)  step-through  -     Deviations/Abnormal Patterns (Gait)  stride length decreased;gait speed decreased;alia decreased  -     Bilateral Gait Deviations  heel strike decreased;forward flexed posture  -[facility] Name 04/02/19 1058          General ROM    GENERAL ROM COMMENTS  Grossly WFL  -[facility] Name 04/02/19 1058          MMT (Manual Muscle Testing)    General MMT Comments  BLE=4/5  -[facility] Name 04/02/19 1058          Pain Assessment    Additional Documentation  Pain Scale: Numbers Pre/Post-Treatment (Group)  -[facility] Name 04/02/19 1058          Pain Scale: Numbers Pre/Post-Treatment    Pain Scale: Numbers, Pretreatment  8/10  -     Pain Scale: Numbers, Post-Treatment  8/10  -     Pain Location  head  -     Pain Intervention(s)  Repositioned;Ambulation/increased activity  -[facility] Name 04/02/19 1058          Coping    Observed Emotional State  pleasant;cooperative  -[facility] Name 04/02/19 1058          Plan of Care Review    Plan of Care Reviewed With  patient  -[facility] Name 04/02/19 1058          Physical Therapy Clinical Impression    Date of Referral to PT  04/01/19  -JR     PT Diagnosis (PT Clinical Impression)  Weakness, poor balance, gait abn  -     Patient/Family Goals Statement (PT Clinical Impression)  Patient wants to feel better and go home  -     Criteria for Skilled  Interventions Met (PT Clinical Impression)  yes;treatment indicated  -JR     Pathology/Pathophysiology Noted (Describe Specifically for Each System)  pulmonary;musculoskeletal;neuromuscular  -JR     Impairments Found (describe specific impairments)  aerobic capacity/endurance;gait, locomotion, and balance;muscle performance  -JR     Rehab Potential (PT Clinical Summary)  good, to achieve stated therapy goals  -JR     Care Plan Review (PT)  evaluation/treatment results reviewed;care plan/treatment goals reviewed;risks/benefits reviewed;current/potential barriers reviewed;patient/other agree to care plan  -JR     Row Name 04/02/19 1058          Physical Therapy Goals    Bed Mobility Goal Selection (PT)  --  -JR     Transfer Goal Selection (PT)  transfer, PT goal 1  -JR     Gait Training Goal Selection (PT)  gait training, PT goal 1  -     Row Name 04/02/19 1058          Transfer Goal 1 (PT)    Activity/Assistive Device (Transfer Goal 1, PT)  sit-to-stand/stand-to-sit  -JR     [place] Level/Cues Needed (Transfer Goal 1, PT)  conditional independence  -JR     Time Frame (Transfer Goal 1, PT)  2 weeks  -JR     Progress/Outcome (Transfer Goal 1, PT)  goal ongoing  -JR     Row Name 04/02/19 1058          Gait Training Goal 1 (PT)    Activity/Assistive Device (Gait Training Goal 1, PT)  gait (walking locomotion);increase endurance/gait distance  -JR     [place] Level (Gait Training Goal 1, PT)  conditional independence  -JR     Distance (Gait Goal 1, PT)  400  -JR     Time Frame (Gait Training Goal 1, PT)  2 weeks  -JR     Barriers (Gait Training Goal 1, PT)  with minimal SOA  -JR     Progress/Outcome (Gait Training Goal 1, PT)  goal ongoing  -JR     Row Name 04/02/19 1058          Patient Education Goal (PT)    Activity (Patient Education Goal, PT)  Perform HEP x 15  -JR     [place]/Cues/Accuracy (Memory Goal 2, PT)  demonstrates adequately  -JR     Time Frame (Patient Education Goal, PT)  2 weeks  -JR      Progress/Outcome (Patient Education Goal, PT)  goal ongoing  -     Row Name 04/02/19 1058          Positioning and Restraints    Pre-Treatment Position  in bed  -JR     Post Treatment Position  chair  -JR     In Chair  sitting;call light within reach;encouraged to call for assist  -JR     Row Name 04/02/19 1058          Living Environment    Home Accessibility  stairs within home  -JR       User Key  (r) = Recorded By, (t) = Taken By, (c) = Cosigned By    Initials Name Provider Type    Estrella France, PT Physical Therapist        Physical Therapy Education     Title: PT OT SLP Therapies (Not Started)     Topic: Physical Therapy (In Progress)     Point: Mobility training (Done)     Learning Progress Summary           Patient Acceptance, E, VU by  at 4/2/2019 12:11 PM    Comment:  Role of PT                               User Key     Initials Effective Dates Name Provider Type Discipline     04/03/18 -  Estrella Ferraro, PT Physical Therapist PT              PT Recommendation and Plan  Anticipated Discharge Disposition (PT): home  Planned Therapy Interventions (PT Eval): balance training, strengthening, bed mobility training, transfer training, gait training, home exercise program, patient/family education  Therapy Frequency (PT Clinical Impression): daily  Outcome Summary/Treatment Plan (PT)  Anticipated Discharge Disposition (PT): home  Plan of Care Reviewed With: patient  Outcome Summary: Patient participates well in skilled PT and demonstrates decreased strength, balance, transfers and gait.  She is expected to benefit from additional PT services.  Outcome Measures     Row Name 04/02/19 1058 04/02/19 1041          How much help from another person do you currently need...    Turning from your back to your side while in flat bed without using bedrails?  4  -JR  --     Moving from lying on back to sitting on the side of a flat bed without bedrails?  4  -JR  --     Moving to and from a bed to a chair (including a  wheelchair)?  3  -JR  --     Standing up from a chair using your arms (e.g., wheelchair, bedside chair)?  3  -JR  --     Climbing 3-5 steps with a railing?  2  -JR  --     To walk in hospital room?  3  -JR  --     AM-PAC 6 Clicks Score  19  -JR  --        How much help from another is currently needed...    Putting on and taking off regular lower body clothing?  --  3  -AH     Bathing (including washing, rinsing, and drying)  --  3  -AH     Toileting (which includes using toilet bed pan or urinal)  --  3  -AH     Putting on and taking off regular upper body clothing  --  3  -AH     Taking care of personal grooming (such as brushing teeth)  --  3  -AH     Eating meals  --  3  -     Score  --  18  -        Functional Assessment    Outcome Measure Options  AM-PAC 6 Clicks Basic Mobility (PT)  -  AM-PAC 6 Clicks Daily Activity (OT)  -       User Key  (r) = Recorded By, (t) = Taken By, (c) = Cosigned By    Initials Name Provider Type    Radha Aguilar Occupational Therapist    Estrella France, PT Physical Therapist         Time Calculation:   PT Charges     Row Name 04/02/19 1218             Time Calculation    Start Time  1058  -      PT Received On  04/02/19  -      PT Goal Re-Cert Due Date  04/12/19  -        User Key  (r) = Recorded By, (t) = Taken By, (c) = Cosigned By    Initials Name Provider Type    Estrella France, PT Physical Therapist        Therapy Charges for Today     Code Description Service Date Service Provider Modifiers Qty    [id number] HC PT EVAL LOW COMPLEXITY 4 4/2/2019 Estrella Ferraro, PT GP 1          PT G-Codes  Outcome Measure Options: AM-PAC 6 Clicks Basic Mobility (PT)  AM-PAC 6 Clicks Score: 19  Score: 18      Estrella Ferraro PT  4/2/2019

## 2019-04-02 NOTE — PLAN OF CARE
Problem: Patient Care Overview  Goal: Plan of Care Review  Outcome: Ongoing (interventions implemented as appropriate)   04/02/19 1215   Coping/Psychosocial   Plan of Care Reviewed With patient   OTHER   Outcome Summary Patient participates well in skilled PT and demonstrates decreased strength, balance, transfers and gait. She is expected to benefit from additional PT services.

## 2019-04-02 NOTE — PLAN OF CARE
Problem: Patient Care Overview  Goal: Plan of Care Review  Outcome: Ongoing (interventions implemented as appropriate)   04/02/19 0409   Coping/Psychosocial   Plan of Care Reviewed With patient;family   Plan of Care Review   Progress no change   OTHER   Outcome Summary Patient is a new admit to the floor. She is pleasant, alert and oriented. Her vitals are stable and will continue to monitor.      Goal: Interprofessional Rounds/Family Conf  Outcome: Ongoing (interventions implemented as appropriate)   04/02/19 0409   Interdisciplinary Rounds/Family Conf   Participants family;pharmacy;patient;physician;nursing;respiratory therapy       Problem: Chronic Obstructive Pulmonary Disease (Adult)  Goal: Signs and Symptoms of Listed Potential Problems Will be Absent, Minimized or Managed (Chronic Obstructive Pulmonary Disease)  Outcome: Ongoing (interventions implemented as appropriate)   04/02/19 0409   Goal/Outcome Evaluation   Problems Assessed (Chronic Obstructive Pulmonary Disease (COPD)) all       Problem: Hospitalized Acutely Ill Older (Adult)  Goal: Signs and Symptoms of Listed Potential Problems Will be Absent, Minimized or Managed (Hospitalized Acutely Ill Older)  Outcome: Outcome(s) achieved Date Met: 04/02/19 04/02/19 0409   Goal/Outcome Evaluation   Problems Assessed (Acutely Ill Older Adult) all   Problems Present (Acute Older Adult) functional decline/self-care deficit       Problem: Pain, Chronic (Adult)  Goal: Identify Related Risk Factors and Signs and Symptoms  Outcome: Ongoing (interventions implemented as appropriate)    Goal: Acceptable Pain/Comfort Level and Functional Ability  Outcome: Ongoing (interventions implemented as appropriate)   04/02/19 0409   Pain, Chronic (Adult)   Acceptable Pain/Comfort Level and Functional Ability making progress toward outcome

## 2019-04-03 PROCEDURE — 25010000002 METHYLPREDNISOLONE PER 40 MG: Performed by: INTERNAL MEDICINE

## 2019-04-03 PROCEDURE — 63710000001 PREDNISONE PER 1 MG: Performed by: HOSPITALIST

## 2019-04-03 PROCEDURE — 94799 UNLISTED PULMONARY SVC/PX: CPT

## 2019-04-03 PROCEDURE — 25010000002 ENOXAPARIN PER 10 MG: Performed by: INTERNAL MEDICINE

## 2019-04-03 PROCEDURE — 99231 SBSQ HOSP IP/OBS SF/LOW 25: CPT | Performed by: HOSPITALIST

## 2019-04-03 PROCEDURE — 97116 GAIT TRAINING THERAPY: CPT

## 2019-04-03 RX ORDER — PREDNISONE 20 MG/1
60 TABLET ORAL
Status: DISCONTINUED | OUTPATIENT
Start: 2019-04-03 | End: 2019-04-05

## 2019-04-03 RX ADMIN — PREDNISONE 60 MG: 20 TABLET ORAL at 15:41

## 2019-04-03 RX ADMIN — METHYLPREDNISOLONE SODIUM SUCCINATE 40 MG: 40 INJECTION, POWDER, FOR SOLUTION INTRAMUSCULAR; INTRAVENOUS at 03:54

## 2019-04-03 RX ADMIN — IPRATROPIUM BROMIDE AND ALBUTEROL SULFATE 3 ML: .5; 3 SOLUTION RESPIRATORY (INHALATION) at 12:49

## 2019-04-03 RX ADMIN — CLONAZEPAM 0.5 MG: 0.5 TABLET ORAL at 21:50

## 2019-04-03 RX ADMIN — SERTRALINE HYDROCHLORIDE 50 MG: 50 TABLET ORAL at 21:50

## 2019-04-03 RX ADMIN — IPRATROPIUM BROMIDE AND ALBUTEROL SULFATE 3 ML: .5; 3 SOLUTION RESPIRATORY (INHALATION) at 00:47

## 2019-04-03 RX ADMIN — GUAIFENESIN 600 MG: 600 TABLET, EXTENDED RELEASE ORAL at 21:50

## 2019-04-03 RX ADMIN — ESTROGENS, CONJUGATED 0.62 MG: 0.62 TABLET, FILM COATED ORAL at 09:03

## 2019-04-03 RX ADMIN — ENOXAPARIN SODIUM 40 MG: 100 INJECTION SUBCUTANEOUS at 21:53

## 2019-04-03 RX ADMIN — Medication 1 CAPSULE: at 21:50

## 2019-04-03 RX ADMIN — Medication 1 CAPSULE: at 09:03

## 2019-04-03 RX ADMIN — SODIUM CHLORIDE, PRESERVATIVE FREE 3 ML: 5 INJECTION INTRAVENOUS at 14:00

## 2019-04-03 RX ADMIN — GUAIFENESIN 600 MG: 600 TABLET, EXTENDED RELEASE ORAL at 09:03

## 2019-04-03 RX ADMIN — IPRATROPIUM BROMIDE AND ALBUTEROL SULFATE 3 ML: .5; 3 SOLUTION RESPIRATORY (INHALATION) at 19:25

## 2019-04-03 RX ADMIN — SODIUM CHLORIDE, PRESERVATIVE FREE 3 ML: 5 INJECTION INTRAVENOUS at 21:52

## 2019-04-03 RX ADMIN — BUDESONIDE 0.5 MG: 0.5 INHALANT RESPIRATORY (INHALATION) at 19:25

## 2019-04-03 RX ADMIN — PANTOPRAZOLE SODIUM 40 MG: 40 TABLET, DELAYED RELEASE ORAL at 06:02

## 2019-04-03 RX ADMIN — PRAVASTATIN SODIUM 20 MG: 20 TABLET ORAL at 17:24

## 2019-04-03 RX ADMIN — PROPRANOLOL HYDROCHLORIDE 60 MG: 20 TABLET ORAL at 09:03

## 2019-04-03 RX ADMIN — PROPRANOLOL HYDROCHLORIDE 60 MG: 20 TABLET ORAL at 21:50

## 2019-04-03 NOTE — SIGNIFICANT NOTE
04/03/19 1143   Rehab Treatment   Discipline physical therapy assistant   Reason Treatment Not Performed other (see comments)  (Pt requests to come back after lunch. Therapy will f/u with pt at that time. )

## 2019-04-03 NOTE — PLAN OF CARE
Problem: Patient Care Overview  Goal: Plan of Care Review  Outcome: Ongoing (interventions implemented as appropriate)   04/02/19 2000 04/03/19 0138   Coping/Psychosocial   Plan of Care Reviewed With patient --    Plan of Care Review   Progress --  no change   OTHER   Outcome Summary --  VSS. 2L NC. Patient doesn't c/o pain/discomfort. Resting comfortably in bed. Will continue to monitor.        Problem: Chronic Obstructive Pulmonary Disease (Adult)  Intervention: Reduce/Relieve Breathlessness   04/02/19 2000 04/03/19 0138   Cognitive Interventions   Sensory Stimulation Regulation --  care clustered   Coping/Psychosocial Interventions   Environmental Support calm environment promoted --      Intervention: Prevent/Manage DVT/VTE Risk   04/02/19 0800   Interventions   VTE Prevention/Management sequential compression devices on;bilateral     Intervention: Optimize Nutrition and Fluid Status   04/03/19 0138   Nutrition Interventions   Oral Nutrition Promotion safe use of adaptive equipment encouraged     Intervention: Prevent/Manage Infection   04/02/19 0800 04/03/19 0138   Safety Interventions   Infection Management --  aseptic technique maintained   Safety Management   Infection Prevention single patient room provided --      Intervention: Optimize Functional Ability/Increase Activity Tolerance   04/03/19 0000   Activity   Activity Management activity adjusted per tolerance     Intervention: Optimize Oxygenation/Ventilation/Perfusion   04/02/19 2300 04/03/19 0138   Respiratory Interventions   Airway/Ventilation Management --  airway patency maintained   Positioning   Head of Bed (HOB) HOB elevated --      Intervention: Support/Optimize Psychosocial Response to Chronic Pulmonary Disease   04/02/19 2000   Coping/Psychosocial Interventions   Supportive Measures active listening utilized   Interventions   Trust Relationship/Rapport care explained;thoughts/feelings acknowledged   Psychosocial Support   Family/Support  System Care support provided       Goal: Signs and Symptoms of Listed Potential Problems Will be Absent, Minimized or Managed (Chronic Obstructive Pulmonary Disease)  Outcome: Ongoing (interventions implemented as appropriate)   04/03/19 0138   Goal/Outcome Evaluation   Problems Assessed (Chronic Obstructive Pulmonary Disease (COPD)) respiratory compromise   Problems Present (COPD, Bronch/Emphy) respiratory compromise       Problem: Pain, Chronic (Adult)  Intervention: Manage Persistent Pain Analgesia   04/03/19 0000 04/03/19 0138   Promote Effective Bowel Elimination   Bowel Intervention --  adequate fluid intake promoted   Safety Management   Medication Review/Management medications reviewed --      Intervention: Support Psychosocial Response to Persistent Pain   04/02/19 2000   Coping/Psychosocial Interventions   Supportive Measures active listening utilized   Psychosocial Support   Diversional Activities television     Intervention: Mutually Develop/Implement Persistent Pain Management Plan   04/03/19 0138   Promote Oxygenation/Perfusion   Pain Management Interventions see MAR   Cognitive Interventions   Sensory Stimulation Regulation care clustered       Goal: Identify Related Risk Factors and Signs and Symptoms  Outcome: Ongoing (interventions implemented as appropriate)   04/03/19 0138   Pain, Chronic (Adult)   Signs and Symptoms (Chronic Pain) fatigue/weakness     Goal: Acceptable Pain/Comfort Level and Functional Ability  Outcome: Ongoing (interventions implemented as appropriate)   04/02/19 0409   Pain, Chronic (Adult)   Acceptable Pain/Comfort Level and Functional Ability making progress toward outcome       Problem: Fall Risk (Adult)  Intervention: Monitor/Assist with Self Care   04/02/19 2300 04/03/19 0138   Daily Care Interventions   Self-Care Promotion --  independence encouraged   Activity   Activity Assistance Provided assistance, 1 person --      Intervention: Reduce Risk/Promote Restraint Free  Environment   04/02/19 2300 04/03/19 0000   Safety Management   Environmental Safety Modification assistive device/personal items within reach;clutter free environment maintained;lighting adjusted --    Safety Promotion/Fall Prevention --  activity supervised;safety round/check completed     Intervention: Review Medications/Identify Contributors to Fall Risk   04/03/19 0000   Safety Management   Medication Review/Management medications reviewed       Goal: Identify Related Risk Factors and Signs and Symptoms  Outcome: Ongoing (interventions implemented as appropriate)   04/03/19 0138   Fall Risk (Adult)   Related Risk Factors (Fall Risk) gait/mobility problems   Signs and Symptoms (Fall Risk) presence of risk factors     Goal: Absence of Fall  Outcome: Ongoing (interventions implemented as appropriate)   04/03/19 0138   Fall Risk (Adult)   Absence of Fall making progress toward outcome

## 2019-04-03 NOTE — PLAN OF CARE
Problem: Patient Care Overview  Goal: Plan of Care Review  Outcome: Ongoing (interventions implemented as appropriate)   04/03/19 5584   Coping/Psychosocial   Plan of Care Reviewed With patient   Plan of Care Review   Progress improving   OTHER   Outcome Summary Pt tolerates treatment well with use of rw pt was able to ambulate 116' cga. Pt required decreased assistance with mobility with use of AAD. See flowsheet for details.

## 2019-04-03 NOTE — PROGRESS NOTES
[facility]    Name: Sadie Tijerina, 78 y.o. female  MRN: [id number], : 1940   Date of Admission: 2019   Date of Service: 19   PCP: Kristian Wolff MD     Hospital Course:   Sadie Tijerina is a 78 y.o. female with h/o current tobacco use, COPD on home O2 as needed, HTN, chronic tremors, chronic dCHF who was admitted on 2019 to med/surg for weakness, acute exacerbation of COPD.    Vitals on admission notable for AFVSS. Labs on admission notable for WBC 18K, lactic acid 1.1; procal neg x 1. Radiographs on admission notable for CXR unremarkable for acute cardiopulmonary process. Patient was given 1 L bolus, duonebs, solumedrol, zosyn, and levaquin in ER.     She was started on IV steroids, nebulizer therapies and mucolytics on admission and symptoms have improved. She was also continued on levaquin for possible pneumonia, however CXR did not demonstrate any infiltrate, procal was negative x 2, and clinically patients symptoms consistent with COPD exacerbation, so levaquin was discontinued. PT/OT were consulted and have been working with patient. SW/CM was consulted for DCP. Patient agreeable to  for skilled nursing weekly for cardiopulmonary assessments.    Consultants: PT/OT; AVNI/CAPRI   Procedures: none    Antibiotics: S/p levaquin d2  Micro:  BCx: ngtd x 2 sets  -------------------------------------------------------------------------------------------------------------------  Subjective   Chief Complaint: f/u COPD     Subjective:   Patient seen and examined this morning. Events from last night noted. Patient denies any chest pain or pressure. She is breathing comfortably. She is agreeable to HH with skilled nursing for weekly cardiopulmonary assessments.    Review of Systems:   Gen-no fevers, no chills  CV-no chest pain, no palpitations  Resp-no cough, no dyspnea  GI-no N/V/D, no abd pain    Objective   Objective:     Intake/Output Summary  (Last 24 hours) at 4/3/2019 1312  Last data filed at 4/2/2019 2100  Gross per 24 hour   Intake --   Output 300 ml   Net -300 ml      SpO2: 98 %     Physical Examination:   Vitals:    04/03/19 0500 04/03/19 0700 04/03/19 1100 04/03/19 1249   BP:  143/58 140/68    BP Location:  Right arm Right arm    Patient Position:  Lying Lying    Pulse:   67 78   Resp:  17 17 18   Temp:  98.5 °F (36.9 °C) 97.6 °F (36.4 °C)    TempSrc:  Oral Oral    SpO2:  98% 100% 98%   Weight: 50.6 kg (111 lb 9.6 oz)      Height:          General:  Thin elderly female; resting in bed; has supplemental O2 via NC in place (uses nocturnal O2 at home)  Heart:   RRR, S1 S2 normal, no m/r/g  Lungs:   Prolonged exp phase; no wheezing or rhonchi  Abdomen:  soft, NT, ND, +BS  Extremities: no edema/cyanosis/clubbing  Neuro:  A&Ox3, no focal deficits  Psych:  appropriate mood  Skin:  No bleeding, No bruising, No rash    Pertinent laboratory and radiology data were reviewed:  Microbiology Results (last 10 days)     Procedure Component Value - Date/Time    Blood Culture With CLAYTON - Blood, Arm, Right [[id number]] Collected:  04/01/19 2042    Lab Status:  Preliminary result Specimen:  Blood from Arm, Right Updated:  04/02/19 2100     Blood Culture No growth at 24 hours    Blood Culture With CLAYTON - Blood, Arm, Right [[id number]] Collected:  04/01/19 1603    Lab Status:  Preliminary result Specimen:  Blood from Arm, Right Updated:  04/02/19 1930     Blood Culture No growth at 24 hours          Medications Reviewed:     budesonide 0.5 mg Nebulization BID - RT   enoxaparin 40 mg Subcutaneous Q24H   estrogens (conjugated) 0.625 mg Oral Daily   fluticasone 2 spray Each Nare Daily   guaiFENesin 600 mg Oral Q12H   ipratropium-albuterol 3 mL Nebulization Q6H - RT   lactobacillus acidophilus 1 capsule Oral BID   pantoprazole 40 mg Oral QAM   pravastatin 20 mg Oral Q PM   predniSONE 60 mg Oral Daily With Breakfast   propranolol 60 mg Oral Q12H   sertraline 50 mg Oral Nightly    sodium chloride 3 mL Intravenous Q12H        Assessment /Plan   Assessment/Plan:   1. AECOPD, POA, improved  2. Chronic respiratory failure with hypoxia, on supplemental O2 prn at home per patient  3. Chronic dCHF, not in exacerbation  4. HTN  5. Chronic tobacco use  6. Restless leg syndrome      Continues to improve. Will change IV steroids to PO today. Discussed with CM about patient acceptance of HH for skilled nursing. Patient declined PT/OT with HH, citing she can do exercises on her own. Continue PT/OT.     FEN: regular diet with ensure  DVT Prophylaxis: lovenox  PUD Prophylaxis: not indicated    Reason for continued hospitalization: above  Disposition: plan discharge tomorrow with long steroid taper  Discussed with: patient, RN CAPRI Coelho MD   1:12 PM on 4/3/2019

## 2019-04-03 NOTE — PROGRESS NOTES
Adult Nutrition  Assessment/PES    Patient Name:  Sadie Tijerina  YOB: 1940  MRN: [id number]  Admit Date:  4/1/2019    Assessment Date:  4/3/2019    Comments:  Rec. #1: Continue with diet as ordered. Pt. Exhibiting low p.o. Intake ~25% of meals on average per NSG. RD added Boost Plus BID. Rec. #2: Consider adding MVI. RD to follow pt. Consult RD PRN.     Reason for Assessment     Row Name 04/03/19 0959          Reason for Assessment    Reason For Assessment  diagnosis/disease state     Diagnosis  cardiac disease;substance use/abuse;pulmonary disease HTN, Tobacco, COPD, DHF     Identified At Risk by Screening Criteria  MST SCORE 2+           Anthropometrics     Row Name 04/03/19 0500          Anthropometrics    Weight  50.6 kg (111 lb 9.6 oz)         Labs/Tests/Procedures/Meds     Row Name 04/03/19 1001          Labs/Procedures/Meds    Lab Results Reviewed  reviewed, pertinent     Lab Results Comments  High: Gluc        Medications    Pertinent Medications Reviewed  reviewed, pertinent           Estimated/Assessed Needs     Row Name 04/03/19 1002          Calculation Measurements    Weight Used For Calculations  50.6 kg (111 lb 8.8 oz)        Estimated/Assessed Needs    Additional Documentation  [place]-St. Jeor Equation (Group);Calorie Requirements (Group);Fluid Requirements (Group);Protein Requirements (Group)        Calorie Requirements    Estimated Calorie Requirement Comment  ~7631-1323        KCAL/KG    14 Kcal/Kg (kcal)  708.4     15 Kcal/Kg (kcal)  759     18 Kcal/Kg (kcal)  910.8     20 Kcal/Kg (kcal)  1012     25 Kcal/Kg (kcal)  1265     30 Kcal/Kg (kcal)  1518     35 Kcal/Kg (kcal)  1771     40 Kcal/Kg (kcal)  2024     45 Kcal/Kg (kcal)  2277     50 Kcal/Kg (kcal)  2530        [place]-St. Jeor Equation    RMR ([place]-St. Jeor Equation)  929.25        Protein Requirements    Est Protein Requirement Amount (gms/kg)  1.5 gm protein ~61-76 gm/day     Estimated Protein Requirements  (gms/day)  75.9        Fluid Requirements    Estimated Fluid Requirement Method  [place]-Segar Formula     [place]-Segar Method (over 20 kg)  2512         Nutrition Prescription Ordered     Row Name 04/03/19 1002          Nutrition Prescription PO    Current PO Diet  Regular     Common Modifiers  Cardiac         Evaluation of Received Nutrient/Fluid Intake     Row Name 04/03/19 1003 04/03/19 1002       Calculation Measurements    Weight Used For Calculations  --  50.6 kg (111 lb 8.8 oz)       PO Evaluation    Number of Days PO Intake Evaluated  1 day  --    Number of Meals  1  --    % PO Intake  25  --        Evaluation of Prescribed Nutrient/Fluid Intake     Row Name 04/03/19 1002          Calculation Measurements    Weight Used For Calculations  50.6 kg (111 lb 8.8 oz)             Problem/Interventions:  Problem 1     Row Name 04/03/19 1003          Nutrition Diagnoses Problem 1    Problem 1  Inadequate Intake/Infusion     Inadequate Intake Type  Oral     Macronutrient  Kcal;Protein;Fluid;Carbohydrate;Fiber;Fat     Micronutrient  Vitamin;Mineral     Etiology (related to)  Factors Affecting Nutrition     Appetite  Poor at this Time     Signs/Symptoms (evidenced by)  PO Intake     Percent (%) intake recorded  25 %     Over number of meals  1         Problem 2     Row Name 04/03/19 1004          Nutrition Diagnoses Problem 2    Problem 2  Increased Nutrient Needs     Macronutrient  Kcal;Protein     Etiology (related to)  Medical Diagnosis     Pulmonary/Critical Care  COPD     Substance Use  Tobacco     Signs/Symptoms (evidenced by)  Other (comment) pulmonary dysfunction               Intervention Goal     Row Name 04/03/19 1004          Intervention Goal    General  Meet nutritional needs for age/condition     PO  PO intake (%) 50-75     Weight  No significant weight loss         Nutrition Intervention     Row Name 04/03/19 1005          Nutrition Intervention    RD/Tech Action  Recommend/ordered;Encourage  intake;Interview for preference;Advise available snack;Advise alternate selection     Recommended/Ordered  Supplement         Nutrition Prescription     Row Name 04/03/19 1005          Nutrition Prescription PO    PO Prescription  Begin/change supplement continue with diet as ordered     Supplement  Boost Plus     Supplement Frequency  2 times a day     New PO Prescription Ordered?  Yes         Education/Evaluation     Row Name 04/03/19 1006          Education    Education  Will Instruct as appropriate        Monitor/Evaluation    Monitor  Per protocol;PO intake;Supplement intake;Pertinent labs;Weight           Electronically signed by:  Scarlet Gaston RD  04/03/19 10:06 AM

## 2019-04-03 NOTE — THERAPY TREATMENT NOTE
Acute Care - Physical Therapy Treatment Note  [facility]     Patient Name: Sadie Tijerina  : 1940  MRN: [id number]  Today's Date: 4/3/2019  Onset of Illness/Injury or Date of Surgery: 19  Date of Referral to PT: 19  Referring Physician: Nico Enriquez MD    Admit Date: 2019    Visit Dx:    ICD-10-CM ICD-9-CM   1. Chronic obstructive pulmonary disease with acute exacerbation (CMS/HCC) J44.1 491.21   2. Impaired mobility and ADLs Z74.09 799.89   3. Impaired functional mobility, balance, gait, and endurance Z74.09 V49.89     Patient Active Problem List   Diagnosis   • Calf cramp   • Mixed anxiety depressive disorder   • Dizziness   • Fatigue   • Gastroesophageal reflux disease without esophagitis   • Hematuria   • Hyperlipidemia   • Essential hypertension   • Low back pain   • Orthostatic hypotension   • Restless legs syndrome   • Essential tremor   • Vitamin D deficiency   • Balance problem   • Tension headache   • Tobacco abuse disorder   • Pneumonia due to infectious organism   • COPD with acute exacerbation (CMS/HCC)   • Venous insufficiency of both lower extremities   • Chronic diastolic heart failure (CMS/HCC)   • Chronic obstructive pulmonary disease with acute exacerbation (CMS/HCC)       Therapy Treatment    Rehabilitation Treatment Summary     Row Name 19 1345             Treatment Time/Intention    Discipline  physical therapy assistant  -RM      Document Type  therapy note (daily note)  -RM      Subjective Information  complains of;weakness  -RM      Mode of Treatment  physical therapy  -RM      Care Plan Review  care plan/treatment goals reviewed  -RM      Patient Effort  adequate  -RM      Existing Precautions/Restrictions  fall  -RM      Recorded by [RM] Anibal Michelle, PTA 19 1508      Row Name 19 1345             Safety Issues, Functional Mobility    Safety Issues Affecting Function (Mobility)  safety precaution awareness;safety precautions  follow-through/compliance;insight into deficits/self awareness;impulsivity;awareness of need for assistance  -RM      Impairments Affecting Function (Mobility)  balance;endurance/activity tolerance;shortness of breath;strength  -RM      Recorded by [] Anibal Michelle, [facility] 04/03/19 1508      Row Name 04/03/19 1345             Transfer Assessment/Treatment    Transfer Assessment/Treatment  sit-stand transfer;stand-sit transfer  -RM      Recorded by [] Anibal Michelle, [facility] 04/03/19 1508      Row Name 04/03/19 1345             Sit-Stand Transfer    Sit-Stand [place] (Transfers)  contact guard;verbal cues  -RM      Assistive Device (Sit-Stand Transfers)  walker, front-wheeled  -RM      Recorded by [] Anibal Michelle, [facility] 04/03/19 1508      Row Name 04/03/19 1345             Stand-Sit Transfer    Stand-Sit [place] (Transfers)  contact guard;verbal cues  -RM      Assistive Device (Stand-Sit Transfers)  walker, front-wheeled  -RM      Recorded by [] Anibal Michelle, [facility] 04/03/19 1508      Row Name 04/03/19 1345             Gait/Stairs Assessment/Training    [place] Level (Gait)  contact guard  -RM      Assistive Device (Gait)  walker, front-wheeled  -RM      Distance in Feet (Gait)  116  -RM      Pattern (Gait)  step-through  -RM      Deviations/Abnormal Patterns (Gait)  stride length decreased;base of support, narrow;ataxic  -RM      Bilateral Gait Deviations  weight shift ability decreased;heel strike decreased  -RM      Recorded by [RM] Anibal Michelle, [facility] 04/03/19 1508      Row Name 04/03/19 1345             Positioning and Restraints    Pre-Treatment Position  sitting in chair/recliner  -RM      Post Treatment Position  chair  -RM      In Chair  reclined;call light within reach;encouraged to call for assist;with family/caregiver;notified nsg  -RM      Recorded by [] Anibal Michelle, [facility] 04/03/19 1508      Row Name 04/03/19 1345             Pain Scale: Numbers Pre/Post-Treatment     Pain Scale: Numbers, Pretreatment  0/10 - no pain  -RM      Pain Scale: Numbers, Post-Treatment  0/10 - no pain  -RM      Recorded by [] Anibal Michelle, [facility] 04/03/19 1508      Row Name 04/03/19 1345             Outcome Summary/Treatment Plan (PT)    Daily Summary of Progress (PT)  progress toward functional goals is good  -      Plan for Continued Treatment (PT)  Cont PT per POC.   -RM      Recorded by [] Anibal Michelle, [facility] 04/03/19 1508        User Key  (r) = Recorded By, (t) = Taken By, (c) = Cosigned By    Initials Name Effective Dates Discipline     Anibal Michelle, PTA 03/07/18 -  PT                   Physical Therapy Education     Title: PT OT SLP Therapies (Not Started)     Topic: Physical Therapy (In Progress)     Point: Mobility training (Done)     Learning Progress Summary           Patient Acceptance, E,TB,D, VU,NR by  at 4/3/2019  3:08 PM    Comment:  safety with use of walker and transfers    Acceptance, E, VU by  at 4/2/2019 12:11 PM    Comment:  Role of PT                               User Key     Initials Effective Dates Name Provider Type Discipline     04/03/18 -  Estrella Ferraro, PT Physical Therapist PT     03/07/18 -  Anibal Michelle, [facility] Physical Therapy Assistant PT                PT Recommendation and Plan     Outcome Summary/Treatment Plan (PT)  Daily Summary of Progress (PT): progress toward functional goals is good  Plan for Continued Treatment (PT): Cont PT per POC.   Plan of Care Reviewed With: patient  Progress: improving  Outcome Summary: Pt tolerates treatment well with use of rw pt was able to ambulate 116' cga. Pt required decreased assistance with mobility with use of AAD. See flowsheet for details.   Outcome Measures     Row Name 04/03/19 1345 04/02/19 1058 04/02/19 1041       How much help from another person do you currently need...    Turning from your back to your side while in flat bed without using bedrails?  4  -RM  4  -JR  --    Moving from lying  on back to sitting on the side of a flat bed without bedrails?  4  -RM  4  -JR  --    Moving to and from a bed to a chair (including a wheelchair)?  3  -RM  3  -JR  --    Standing up from a chair using your arms (e.g., wheelchair, bedside chair)?  3  -RM  3  -JR  --    Climbing 3-5 steps with a railing?  2  -RM  2  -JR  --    To walk in hospital room?  3  -RM  3  -JR  --    AM-PAC 6 Clicks Score  19  -RM  19  -JR  --       How much help from another is currently needed...    Putting on and taking off regular lower body clothing?  --  --  3  -AH    Bathing (including washing, rinsing, and drying)  --  --  3  -AH    Toileting (which includes using toilet bed pan or urinal)  --  --  3  -AH    Putting on and taking off regular upper body clothing  --  --  3  -AH    Taking care of personal grooming (such as brushing teeth)  --  --  3  -AH    Eating meals  --  --  3  -AH    Score  --  --  18  -AH       Functional Assessment    Outcome Measure Options  AM-PAC 6 Clicks Basic Mobility (PT)  -RM  AM-PAC 6 Clicks Basic Mobility (PT)  -JR  AM-PAC 6 Clicks Daily Activity (OT)  -AH      User Key  (r) = Recorded By, (t) = Taken By, (c) = Cosigned By    Initials Name Provider Type     Radha Escalona Occupational Therapist    Estrella France, PT Physical Therapist    Anibal Francois, DILEEP Physical Therapy Assistant         Time Calculation:   PT Charges     Row Name 04/03/19 1511             Time Calculation    Start Time  1345  -RM      Stop Time  1359  -RM      Time Calculation (min)  14 min  -RM      PT Received On  04/03/19  -RM      PT Goal Re-Cert Due Date  04/12/19  -RM         Time Calculation- PT    Total Timed Code Minutes- PT  14 minute(s)  -RM         Timed Charges    77717 - Gait Training Minutes   14  -RM        User Key  (r) = Recorded By, (t) = Taken By, (c) = Cosigned By    Initials Name Provider Type    Anibal Francois, DILEEP Physical Therapy Assistant        Therapy Charges for Today     Code Description  Service Date Service Provider Modifiers Qty    [id number]  GAIT TRAINING EA 15 MIN 4/3/2019 Anibal Michelle, PTA GP 1          PT G-Codes  Outcome Measure Options: AM-PAC 6 Clicks Basic Mobility (PT)  AM-PAC 6 Clicks Score: 19  Score: 18    Anibal Michelle, DILEEP  4/3/2019

## 2019-04-03 NOTE — PROGRESS NOTES
Continued Stay Note  [facility]     Patient Name: Sadie Tijerina  MRN: [id number]  Today's Date: 4/3/2019    Admit Date: 4/1/2019    Discharge Plan     Row Name 04/03/19 1604       Plan    Plan  Home with HH    Patient/Family in Agreement with Plan  yes    Plan Comments F/U with pt re DCP; her sister is at bedside.  Pt is agreeable to have HH for nursing only.  She reports that she had [facility] in the past and is okay to have them again.  CM will continue to follow.        Discharge Codes    No documentation.       Expected Discharge Date and Time     Expected Discharge Date Expected Discharge Time    Apr 4, 2019             Sara Galvez

## 2019-04-04 LAB
BASOPHILS # BLD AUTO: 0.03 10*3/MM3 (ref 0–0.2)
BASOPHILS NFR BLD AUTO: 0.2 % (ref 0–1.5)
DEPRECATED RDW RBC AUTO: 43.9 FL (ref 37–54)
EOSINOPHIL # BLD AUTO: 0 10*3/MM3 (ref 0–0.4)
EOSINOPHIL NFR BLD AUTO: 0 % (ref 0.3–6.2)
ERYTHROCYTE [DISTWIDTH] IN BLOOD BY AUTOMATED COUNT: 13.4 % (ref 12.3–15.4)
HCT VFR BLD AUTO: 37 % (ref 34–46.6)
HGB BLD-MCNC: 12 G/DL (ref 12–15.9)
IMM GRANULOCYTES # BLD AUTO: 0.12 10*3/MM3 (ref 0–0.05)
IMM GRANULOCYTES NFR BLD AUTO: 0.6 % (ref 0–0.5)
LYMPHOCYTES # BLD AUTO: 1.21 10*3/MM3 (ref 0.7–3.1)
LYMPHOCYTES NFR BLD AUTO: 6.5 % (ref 19.6–45.3)
MCH RBC QN AUTO: 29.4 PG (ref 26.6–33)
MCHC RBC AUTO-ENTMCNC: 32.4 G/DL (ref 31.5–35.7)
MCV RBC AUTO: 90.7 FL (ref 79–97)
MONOCYTES # BLD AUTO: 0.58 10*3/MM3 (ref 0.1–0.9)
MONOCYTES NFR BLD AUTO: 3.1 % (ref 5–12)
NEUTROPHILS # BLD AUTO: 16.8 10*3/MM3 (ref 1.4–7)
NEUTROPHILS NFR BLD AUTO: 89.6 % (ref 42.7–76)
NRBC BLD AUTO-RTO: 0 /100 WBC (ref 0–0)
PLATELET # BLD AUTO: 242 10*3/MM3 (ref 140–450)
PMV BLD AUTO: 10 FL (ref 6–12)
RBC # BLD AUTO: 4.08 10*6/MM3 (ref 3.77–5.28)
WBC NRBC COR # BLD: 18.74 10*3/MM3 (ref 3.4–10.8)

## 2019-04-04 PROCEDURE — 94799 UNLISTED PULMONARY SVC/PX: CPT

## 2019-04-04 PROCEDURE — 85025 COMPLETE CBC W/AUTO DIFF WBC: CPT | Performed by: HOSPITALIST

## 2019-04-04 PROCEDURE — 25010000002 ENOXAPARIN PER 10 MG: Performed by: INTERNAL MEDICINE

## 2019-04-04 PROCEDURE — 99231 SBSQ HOSP IP/OBS SF/LOW 25: CPT | Performed by: HOSPITALIST

## 2019-04-04 PROCEDURE — 63710000001 PREDNISONE PER 1 MG: Performed by: HOSPITALIST

## 2019-04-04 RX ADMIN — IPRATROPIUM BROMIDE AND ALBUTEROL SULFATE 3 ML: .5; 3 SOLUTION RESPIRATORY (INHALATION) at 12:31

## 2019-04-04 RX ADMIN — PREDNISONE 60 MG: 20 TABLET ORAL at 09:23

## 2019-04-04 RX ADMIN — PROPRANOLOL HYDROCHLORIDE 60 MG: 20 TABLET ORAL at 20:40

## 2019-04-04 RX ADMIN — SODIUM CHLORIDE, PRESERVATIVE FREE 3 ML: 5 INJECTION INTRAVENOUS at 09:27

## 2019-04-04 RX ADMIN — GUAIFENESIN 600 MG: 600 TABLET, EXTENDED RELEASE ORAL at 09:24

## 2019-04-04 RX ADMIN — ENOXAPARIN SODIUM 40 MG: 100 INJECTION SUBCUTANEOUS at 23:09

## 2019-04-04 RX ADMIN — GUAIFENESIN 600 MG: 600 TABLET, EXTENDED RELEASE ORAL at 20:40

## 2019-04-04 RX ADMIN — PRAVASTATIN SODIUM 20 MG: 20 TABLET ORAL at 18:13

## 2019-04-04 RX ADMIN — BUDESONIDE 0.5 MG: 0.5 INHALANT RESPIRATORY (INHALATION) at 07:10

## 2019-04-04 RX ADMIN — PANTOPRAZOLE SODIUM 40 MG: 40 TABLET, DELAYED RELEASE ORAL at 06:16

## 2019-04-04 RX ADMIN — SODIUM CHLORIDE, PRESERVATIVE FREE 3 ML: 5 INJECTION INTRAVENOUS at 21:02

## 2019-04-04 RX ADMIN — SERTRALINE HYDROCHLORIDE 50 MG: 50 TABLET ORAL at 20:40

## 2019-04-04 RX ADMIN — ESTROGENS, CONJUGATED 0.62 MG: 0.62 TABLET, FILM COATED ORAL at 09:23

## 2019-04-04 RX ADMIN — CLONAZEPAM 0.5 MG: 0.5 TABLET ORAL at 20:40

## 2019-04-04 RX ADMIN — PROPRANOLOL HYDROCHLORIDE 60 MG: 20 TABLET ORAL at 09:24

## 2019-04-04 RX ADMIN — Medication 1 CAPSULE: at 09:24

## 2019-04-04 RX ADMIN — IPRATROPIUM BROMIDE AND ALBUTEROL SULFATE 3 ML: .5; 3 SOLUTION RESPIRATORY (INHALATION) at 07:10

## 2019-04-04 RX ADMIN — BUDESONIDE 0.5 MG: 0.5 INHALANT RESPIRATORY (INHALATION) at 20:31

## 2019-04-04 RX ADMIN — FLUTICASONE PROPIONATE 2 SPRAY: 50 SPRAY, METERED NASAL at 12:50

## 2019-04-04 RX ADMIN — IPRATROPIUM BROMIDE AND ALBUTEROL SULFATE 3 ML: .5; 3 SOLUTION RESPIRATORY (INHALATION) at 20:30

## 2019-04-04 RX ADMIN — Medication 1 CAPSULE: at 20:40

## 2019-04-04 NOTE — PLAN OF CARE
Problem: Patient Care Overview  Goal: Plan of Care Review  Outcome: Ongoing (interventions implemented as appropriate)   04/04/19 0352   Coping/Psychosocial   Plan of Care Reviewed With patient;sibling   Plan of Care Review   Progress improving   OTHER   Outcome Summary Pt continues to utilize oxygen at night. anticipating discharge home tomorrow. Neb txs as ordered. Labs monitored daily.     Goal: Individualization and Mutuality  Outcome: Ongoing (interventions implemented as appropriate)    Goal: Discharge Needs Assessment  Outcome: Ongoing (interventions implemented as appropriate)      Problem: Chronic Obstructive Pulmonary Disease (Adult)  Goal: Signs and Symptoms of Listed Potential Problems Will be Absent, Minimized or Managed (Chronic Obstructive Pulmonary Disease)  Outcome: Ongoing (interventions implemented as appropriate)      Problem: Pain, Chronic (Adult)  Goal: Identify Related Risk Factors and Signs and Symptoms  Outcome: Outcome(s) achieved Date Met: 04/04/19    Goal: Acceptable Pain/Comfort Level and Functional Ability  Outcome: Ongoing (interventions implemented as appropriate)      Problem: Fall Risk (Adult)  Goal: Identify Related Risk Factors and Signs and Symptoms  Outcome: Outcome(s) achieved Date Met: 04/04/19    Goal: Absence of Fall  Outcome: Ongoing (interventions implemented as appropriate)

## 2019-04-04 NOTE — SIGNIFICANT NOTE
04/04/19 1335   Rehab Treatment   Discipline physical therapy assistant   Reason Treatment Not Performed patient/family declined treatment  (Pt declined treatment. Pt reports not feeling like it today has a sore throat. Will f/u with pt at a later date. )

## 2019-04-04 NOTE — SIGNIFICANT NOTE
04/04/19 1459   Rehab Treatment   Discipline occupational therapist   Reason Treatment Not Performed patient/family declined treatment  (patient c/o sore throat and cough; wishes to rest at this time. Will follow up tomorrow. )

## 2019-04-05 ENCOUNTER — APPOINTMENT (OUTPATIENT)
Dept: GENERAL RADIOLOGY | Facility: HOSPITAL | Age: 79
End: 2019-04-05

## 2019-04-05 LAB
ANION GAP SERPL CALCULATED.3IONS-SCNC: 7.6 MMOL/L (ref 10–20)
BASOPHILS # BLD AUTO: 0.04 10*3/MM3 (ref 0–0.2)
BASOPHILS NFR BLD AUTO: 0.2 % (ref 0–1.5)
BUN BLD-MCNC: 23 MG/DL (ref 7–20)
BUN/CREAT SERPL: 32.9 (ref 7.1–23.5)
CALCIUM SPEC-SCNC: 9 MG/DL (ref 8.4–10.2)
CHLORIDE SERPL-SCNC: 96 MMOL/L (ref 98–107)
CO2 SERPL-SCNC: 36 MMOL/L (ref 26–30)
CREAT BLD-MCNC: 0.7 MG/DL (ref 0.6–1.3)
DEPRECATED RDW RBC AUTO: 44.4 FL (ref 37–54)
EOSINOPHIL # BLD AUTO: 0 10*3/MM3 (ref 0–0.4)
EOSINOPHIL NFR BLD AUTO: 0 % (ref 0.3–6.2)
ERYTHROCYTE [DISTWIDTH] IN BLOOD BY AUTOMATED COUNT: 13.5 % (ref 12.3–15.4)
GFR SERPL CREATININE-BSD FRML MDRD: 81 ML/MIN/1.73
GLUCOSE BLD-MCNC: 120 MG/DL (ref 74–98)
HCT VFR BLD AUTO: 36.2 % (ref 34–46.6)
HGB BLD-MCNC: 11.8 G/DL (ref 12–15.9)
IMM GRANULOCYTES # BLD AUTO: 0.15 10*3/MM3 (ref 0–0.05)
IMM GRANULOCYTES NFR BLD AUTO: 0.7 % (ref 0–0.5)
LYMPHOCYTES # BLD AUTO: 2.01 10*3/MM3 (ref 0.7–3.1)
LYMPHOCYTES NFR BLD AUTO: 8.8 % (ref 19.6–45.3)
MCH RBC QN AUTO: 29.2 PG (ref 26.6–33)
MCHC RBC AUTO-ENTMCNC: 32.6 G/DL (ref 31.5–35.7)
MCV RBC AUTO: 89.6 FL (ref 79–97)
MONOCYTES # BLD AUTO: 1.12 10*3/MM3 (ref 0.1–0.9)
MONOCYTES NFR BLD AUTO: 4.9 % (ref 5–12)
NEUTROPHILS # BLD AUTO: 19.55 10*3/MM3 (ref 1.4–7)
NEUTROPHILS NFR BLD AUTO: 85.4 % (ref 42.7–76)
NRBC BLD AUTO-RTO: 0 /100 WBC (ref 0–0)
PLATELET # BLD AUTO: 262 10*3/MM3 (ref 140–450)
PMV BLD AUTO: 10.5 FL (ref 6–12)
POTASSIUM BLD-SCNC: 3.6 MMOL/L (ref 3.5–5.1)
PROCALCITONIN SERPL-MCNC: <0.05 NG/ML
RBC # BLD AUTO: 4.04 10*6/MM3 (ref 3.77–5.28)
S PYO AG THROAT QL: NEGATIVE
SODIUM BLD-SCNC: 136 MMOL/L (ref 137–145)
WBC NRBC COR # BLD: 22.87 10*3/MM3 (ref 3.4–10.8)

## 2019-04-05 PROCEDURE — 94799 UNLISTED PULMONARY SVC/PX: CPT

## 2019-04-05 PROCEDURE — 63710000001 PREDNISONE PER 1 MG: Performed by: HOSPITALIST

## 2019-04-05 PROCEDURE — 97116 GAIT TRAINING THERAPY: CPT

## 2019-04-05 PROCEDURE — 87081 CULTURE SCREEN ONLY: CPT | Performed by: HOSPITALIST

## 2019-04-05 PROCEDURE — 80048 BASIC METABOLIC PNL TOTAL CA: CPT | Performed by: HOSPITALIST

## 2019-04-05 PROCEDURE — 85025 COMPLETE CBC W/AUTO DIFF WBC: CPT | Performed by: HOSPITALIST

## 2019-04-05 PROCEDURE — 25010000002 ENOXAPARIN PER 10 MG: Performed by: INTERNAL MEDICINE

## 2019-04-05 PROCEDURE — 99231 SBSQ HOSP IP/OBS SF/LOW 25: CPT | Performed by: HOSPITALIST

## 2019-04-05 PROCEDURE — 84145 PROCALCITONIN (PCT): CPT | Performed by: HOSPITALIST

## 2019-04-05 PROCEDURE — 87880 STREP A ASSAY W/OPTIC: CPT | Performed by: HOSPITALIST

## 2019-04-05 PROCEDURE — 71045 X-RAY EXAM CHEST 1 VIEW: CPT

## 2019-04-05 RX ORDER — CEFDINIR 300 MG/1
300 CAPSULE ORAL EVERY 12 HOURS SCHEDULED
Status: DISCONTINUED | OUTPATIENT
Start: 2019-04-05 | End: 2019-04-07

## 2019-04-05 RX ORDER — PREDNISONE 20 MG/1
40 TABLET ORAL
Status: DISCONTINUED | OUTPATIENT
Start: 2019-04-06 | End: 2019-04-07 | Stop reason: HOSPADM

## 2019-04-05 RX ADMIN — PRAVASTATIN SODIUM 20 MG: 20 TABLET ORAL at 17:35

## 2019-04-05 RX ADMIN — CLONAZEPAM 0.5 MG: 0.5 TABLET ORAL at 20:45

## 2019-04-05 RX ADMIN — IPRATROPIUM BROMIDE AND ALBUTEROL SULFATE 3 ML: .5; 3 SOLUTION RESPIRATORY (INHALATION) at 19:09

## 2019-04-05 RX ADMIN — Medication 1 CAPSULE: at 09:28

## 2019-04-05 RX ADMIN — SODIUM CHLORIDE, PRESERVATIVE FREE 3 ML: 5 INJECTION INTRAVENOUS at 21:00

## 2019-04-05 RX ADMIN — BUDESONIDE 0.5 MG: 0.5 INHALANT RESPIRATORY (INHALATION) at 19:09

## 2019-04-05 RX ADMIN — GUAIFENESIN 600 MG: 600 TABLET, EXTENDED RELEASE ORAL at 20:46

## 2019-04-05 RX ADMIN — IPRATROPIUM BROMIDE AND ALBUTEROL SULFATE 3 ML: .5; 3 SOLUTION RESPIRATORY (INHALATION) at 13:29

## 2019-04-05 RX ADMIN — ESTROGENS, CONJUGATED 0.62 MG: 0.62 TABLET, FILM COATED ORAL at 09:28

## 2019-04-05 RX ADMIN — Medication 1 CAPSULE: at 20:45

## 2019-04-05 RX ADMIN — SERTRALINE HYDROCHLORIDE 50 MG: 50 TABLET ORAL at 20:46

## 2019-04-05 RX ADMIN — CEFDINIR 300 MG: 300 CAPSULE ORAL at 20:45

## 2019-04-05 RX ADMIN — GUAIFENESIN 600 MG: 600 TABLET, EXTENDED RELEASE ORAL at 09:28

## 2019-04-05 RX ADMIN — NYSTATIN 500000 UNITS: 100000 SUSPENSION ORAL at 20:46

## 2019-04-05 RX ADMIN — CEFDINIR 300 MG: 300 CAPSULE ORAL at 11:21

## 2019-04-05 RX ADMIN — ENOXAPARIN SODIUM 40 MG: 100 INJECTION SUBCUTANEOUS at 22:42

## 2019-04-05 RX ADMIN — PREDNISONE 60 MG: 20 TABLET ORAL at 09:00

## 2019-04-05 RX ADMIN — IPRATROPIUM BROMIDE AND ALBUTEROL SULFATE 3 ML: .5; 3 SOLUTION RESPIRATORY (INHALATION) at 00:16

## 2019-04-05 RX ADMIN — PROPRANOLOL HYDROCHLORIDE 60 MG: 20 TABLET ORAL at 09:28

## 2019-04-05 RX ADMIN — PROPRANOLOL HYDROCHLORIDE 60 MG: 20 TABLET ORAL at 20:45

## 2019-04-05 RX ADMIN — PANTOPRAZOLE SODIUM 40 MG: 40 TABLET, DELAYED RELEASE ORAL at 06:56

## 2019-04-05 RX ADMIN — SODIUM CHLORIDE, PRESERVATIVE FREE 3 ML: 5 INJECTION INTRAVENOUS at 09:29

## 2019-04-05 RX ADMIN — BUDESONIDE 0.5 MG: 0.5 INHALANT RESPIRATORY (INHALATION) at 07:07

## 2019-04-05 RX ADMIN — IPRATROPIUM BROMIDE AND ALBUTEROL SULFATE 3 ML: .5; 3 SOLUTION RESPIRATORY (INHALATION) at 07:07

## 2019-04-05 RX ADMIN — FLUTICASONE PROPIONATE 2 SPRAY: 50 SPRAY, METERED NASAL at 08:30

## 2019-04-05 NOTE — THERAPY TREATMENT NOTE
Acute Care - Physical Therapy Treatment Note  [facility]     Patient Name: Sadie Tijerina  : 1940  MRN: [id number]  Today's Date: 2019  Onset of Illness/Injury or Date of Surgery: 19  Date of Referral to PT: 19  Referring Physician: Nico Enriquez MD    Admit Date: 2019    Visit Dx:    ICD-10-CM ICD-9-CM   1. Chronic obstructive pulmonary disease with acute exacerbation (CMS/HCC) J44.1 491.21   2. Impaired mobility and ADLs Z74.09 799.89   3. Impaired functional mobility, balance, gait, and endurance Z74.09 V49.89     Patient Active Problem List   Diagnosis   • Calf cramp   • Mixed anxiety depressive disorder   • Dizziness   • Fatigue   • Gastroesophageal reflux disease without esophagitis   • Hematuria   • Hyperlipidemia   • Essential hypertension   • Low back pain   • Orthostatic hypotension   • Restless legs syndrome   • Essential tremor   • Vitamin D deficiency   • Balance problem   • Tension headache   • Tobacco abuse disorder   • Pneumonia due to infectious organism   • COPD with acute exacerbation (CMS/HCC)   • Venous insufficiency of both lower extremities   • Chronic diastolic heart failure (CMS/HCC)   • Chronic obstructive pulmonary disease with acute exacerbation (CMS/HCC)       Therapy Treatment    Rehabilitation Treatment Summary     Row Name 19 1328             Treatment Time/Intention    Discipline  physical therapy assistant  -RM      Document Type  therapy note (daily note)  -RM      Subjective Information  complains of;fatigue  -RM      Mode of Treatment  physical therapy  -RM      Care Plan Review  care plan/treatment goals reviewed  -RM      Patient Effort  adequate  -RM      Existing Precautions/Restrictions  fall  -RM      Recorded by [RM] Anibal Michelle, [facility] 19 9737      Row Name 19 1328             Vital Signs    Pre SpO2 (%)  94  -RM      O2 Delivery Pre Treatment  room air  -RM      Intra SpO2 (%)  92  -RM      O2 Delivery Intra Treatment   room air  -RM      Post SpO2 (%)  94  -RM      O2 Delivery Post Treatment  room air  -RM      Pre Patient Position  Supine  -RM      Intra Patient Position  Standing  -RM      Post Patient Position  Sitting  -RM      Recorded by [] Anibal Michelle, \[facility] [place] [facility]\"" 04/05/19 1457      Row Name 04/05/19 1328             Safety Issues, Functional Mobility    Safety Issues Affecting Function (Mobility)  safety precaution awareness;safety precautions follow-through/compliance;insight into deficits/self awareness;impulsivity;positioning of assistive device  -RM      Impairments Affecting Function (Mobility)  balance;endurance/activity tolerance;shortness of breath;strength  -RM      Recorded by [RM] Anibal Michelle, \[facility]\"" 04/05/19 1457      Row Name 04/05/19 1328             Bed Mobility Assessment/Treatment    Supine-Sit [place] (Bed Mobility)  conditional independence  -RM      Assistive Device (Bed Mobility)  bed rails;head of bed elevated  -RM      Recorded by [] Anibal Michelle, \[facility]\"" 04/05/19 1457      Row Name 04/05/19 1328             Sit-Stand Transfer    Sit-Stand [place] (Transfers)  stand by assist;contact guard;verbal cues  -RM      Assistive Device (Sit-Stand Transfers)  walker, front-wheeled  -RM      Recorded by [] Anibal Michelle, PTA 04/05/19 1457      Row Name 04/05/19 1328             Stand-Sit Transfer    Stand-Sit [place] (Transfers)  stand by assist;contact guard;verbal cues  -RM      Assistive Device (Stand-Sit Transfers)  walker, front-wheeled  -RM      Recorded by [] Anibal Michelle, \[facility]\"" 04/05/19 1457      Row Name 04/05/19 1328             Gait/Stairs Assessment/Training    [place] Level (Gait)  contact guard;verbal cues  -RM      Assistive Device (Gait)  walker, front-wheeled  -RM      Distance in Feet (Gait)  128  -RM      Pattern (Gait)  step-through  -RM      Deviations/Abnormal Patterns (Gait)  stride length decreased;alia decreased;base of support, narrow  -RM       Bilateral Gait Deviations  weight shift ability decreased  -RM      Recorded by [] Anibal Michelle, [facility] 04/05/19 1457      Row Name 04/05/19 1328             Positioning and Restraints    Pre-Treatment Position  in bed  -RM      Post Treatment Position  chair  -RM      In Chair  reclined;call light within reach;encouraged to call for assist;notified nsg  -RM      Recorded by [] Anibal Michelle, [facility] 04/05/19 1457      Row Name 04/05/19 1328             Pain Scale: Numbers Pre/Post-Treatment    Pain Scale: Numbers, Pretreatment  0/10 - no pain  -RM      Pain Scale: Numbers, Post-Treatment  0/10 - no pain  -RM      Recorded by [] Anibal Michelle, [facility] 04/05/19 1457      Row Name 04/05/19 1328             Outcome Summary/Treatment Plan (PT)    Daily Summary of Progress (PT)  progress toward functional goals is good  -      Plan for Continued Treatment (PT)  Cont PT per POC.   -RM      Recorded by [] Anibal Michelle, [facility] 04/05/19 1457        User Key  (r) = Recorded By, (t) = Taken By, (c) = Cosigned By    Initials Name Effective Dates Discipline     Anibal Michelle, PTA 03/07/18 -  PT                   Physical Therapy Education     Title: PT OT SLP Therapies (Not Started)     Topic: Physical Therapy (In Progress)     Point: Mobility training (Done)     Learning Progress Summary           Patient Acceptance, E,TB,D, VU,NR by  at 4/5/2019  2:57 PM    Comment:  proper use of walker,pursed lip breathing with activity.    Acceptance, E,TB,D, VU,NR by  at 4/3/2019  3:08 PM    Comment:  safety with use of walker and transfers    Acceptance, E, VU by  at 4/2/2019 12:11 PM    Comment:  Role of PT                               User Key     Initials Effective Dates Name Provider Type Discipline     04/03/18 -  Estrella Ferraro, PT Physical Therapist PT     03/07/18 -  Anibal Michelle, [facility] Physical Therapy Assistant PT                PT Recommendation and Plan     Outcome Summary/Treatment Plan  (PT)  Daily Summary of Progress (PT): progress toward functional goals is good  Plan for Continued Treatment (PT): Cont PT per POC.   Plan of Care Reviewed With: patient  Progress: improving  Outcome Summary: Pt tolerated treatment well with pt advancing gait distance to 128' with rw cga.  Pt reports after ambulation too fatigued to perform seated exercise. See flowsheet for details.   Outcome Measures     Row Name 04/05/19 1328 04/03/19 1345          How much help from another person do you currently need...    Turning from your back to your side while in flat bed without using bedrails?  4  -RM  4  -RM     Moving from lying on back to sitting on the side of a flat bed without bedrails?  4  -RM  4  -RM     Moving to and from a bed to a chair (including a wheelchair)?  3  -RM  3  -RM     Standing up from a chair using your arms (e.g., wheelchair, bedside chair)?  4  -RM  3  -RM     Climbing 3-5 steps with a railing?  3  -RM  2  -RM     To walk in hospital room?  3  -RM  3  -RM     AM-PAC 6 Clicks Score  21  -RM  19  -RM        Functional Assessment    Outcome Measure Options  AM-PAC 6 Clicks Basic Mobility (PT)  -RM  AM-PAC 6 Clicks Basic Mobility (PT)  -RM       User Key  (r) = Recorded By, (t) = Taken By, (c) = Cosigned By    Initials Name Provider Type    Anibal Francois, PTA Physical Therapy Assistant         Time Calculation:   PT Charges     Row Name 04/05/19 1500             Time Calculation    Start Time  1328  -RM      Stop Time  1352  -RM      Time Calculation (min)  24 min  -RM      PT Received On  04/05/19  -RM      PT Goal Re-Cert Due Date  04/12/19  -RM         Time Calculation- PT    Total Timed Code Minutes- PT  24 minute(s)  -RM         Timed Charges    15737 - Gait Training Minutes   24  -RM        User Key  (r) = Recorded By, (t) = Taken By, (c) = Cosigned By    Initials Name Provider Type    Anibal Francois, PTA Physical Therapy Assistant        Therapy Charges for Today     Code  Description Service Date Service Provider Modifiers Qty    [id number] HC GAIT TRAINING EA 15 MIN 4/5/2019 Anibal Michelle, PTA GP 2          PT G-Codes  Outcome Measure Options: AM-PAC 6 Clicks Basic Mobility (PT)  AM-PAC 6 Clicks Score: 21  Score: 18    Anibal Michelle PTA  4/5/2019

## 2019-04-05 NOTE — PROGRESS NOTES
[facility]    Name: Sadie Tijerina, 78 y.o. female  MRN: [id number], : 1940   Date of Admission: 2019   Date of Service: 19   PCP: Kristian Wolff MD     Hospital Course:   Sadie Tijerina is a 78 y.o. female with h/o current tobacco use, COPD on home O2 as needed, HTN, chronic tremors, chronic dCHF who was admitted on 2019 to med/surg for weakness, acute exacerbation of COPD.    Vitals on admission notable for AFVSS. Labs on admission notable for WBC 18K, lactic acid 1.1; procal neg x 1. Radiographs on admission notable for CXR unremarkable for acute cardiopulmonary process. Patient was given 1 L bolus, duonebs, solumedrol, zosyn, and levaquin in ER.     She was started on IV steroids, nebulizer therapies and mucolytics on admission and symptoms have improved. She was also continued on levaquin for possible pneumonia, however CXR did not demonstrate any infiltrate, procal was negative x 2, and clinically patients symptoms consistent with COPD exacerbation, so levaquin was discontinued. PT/OT were consulted and have been working with patient. SW/CM was consulted for DCP. Patient agreeable to  for skilled nursing weekly for cardiopulmonary assessments.    Consultants: PT/OT; AVNI/CAPRI   Procedures: none    Antibiotics: S/p levaquin d2  Micro:  BCx: ngtd x 2 sets  -------------------------------------------------------------------------------------------------------------------  Subjective   Chief Complaint: f/u COPD     Subjective:   Patient seen and examined this morning. Events from last night noted. Patient denies any chest pain or pressure. C/o sore throat.     Review of Systems:   Gen-no fevers, no chills  CV-no chest pain, no palpitations  GI-no N/V/D, no abd pain    Objective   Objective:     Intake/Output Summary (Last 24 hours) at 2019  Last data filed at 2019 1848  Gross per 24 hour   Intake 940 ml   Output 1100 ml   Net  -160 ml      SpO2: 100 %     Physical Examination:   Vitals:    04/04/19 1600 04/04/19 1924 04/04/19 2030 04/04/19 2048   BP: 139/59 145/47     BP Location: Left arm Right arm     Patient Position: Sitting Lying     Pulse: 72 70 84 82   Resp: 18 18 18 18   Temp: 97.8 °F (36.6 °C) 97.4 °F (36.3 °C)     TempSrc: Oral Oral     SpO2: 98% 98% 96% 100%   Weight:       Height:          General:  Thin elderly female; sitting up in bed; has supplemental O2 via NC in place (uses nocturnal O2 at home)  Mouth:  mild pharyngeal erythema, no exudates  Heart:   RRR, S1 S2 normal, no m/r/g  Lungs:   Prolonged exp phase; no wheezing or rhonchi  Abdomen:  soft, NT, ND, +BS  Extremities: no edema/cyanosis/clubbing  Neuro:  A&Ox3, no focal deficits  Psych:  appropriate mood  Skin:  No bleeding, No bruising, No rash    Pertinent laboratory and radiology data were reviewed:  Microbiology Results (last 10 days)     Procedure Component Value - Date/Time    Blood Culture With CLAYTON - Blood, Arm, Right [[id number]] Collected:  04/01/19 2042    Lab Status:  Preliminary result Specimen:  Blood from Arm, Right Updated:  04/04/19 2100     Blood Culture No growth at 3 days    Blood Culture With CLAYTON - Blood, Arm, Right [[id number]] Collected:  04/01/19 1603    Lab Status:  Preliminary result Specimen:  Blood from Arm, Right Updated:  04/04/19 1930     Blood Culture No growth at 3 days          Medications Reviewed:     budesonide 0.5 mg Nebulization BID - RT   enoxaparin 40 mg Subcutaneous Q24H   estrogens (conjugated) 0.625 mg Oral Daily   fluticasone 2 spray Each Nare Daily   guaiFENesin 600 mg Oral Q12H   ipratropium-albuterol 3 mL Nebulization Q6H - RT   lactobacillus acidophilus 1 capsule Oral BID   pantoprazole 40 mg Oral QAM   pravastatin 20 mg Oral Q PM   predniSONE 60 mg Oral Daily With Breakfast   propranolol 60 mg Oral Q12H   sertraline 50 mg Oral Nightly   sodium chloride 3 mL Intravenous Q12H        Assessment /Plan   Assessment/Plan:    1. AECOPD, POA, improved  2. Chronic respiratory failure with hypoxia, on supplemental O2 prn at home per patient  3. Chronic dCHF, not in exacerbation  4. HTN  5. Chronic tobacco use  6. Restless leg syndrome  7. Sore throat  8. Leukocytosis, suspect a component of steroid induced    Repeat CXR, procal, CBC, BMP in AM. Continue nebulizer therapies and steroids. Start chloraseptic spray for sore throat. Not able to do PT/OT today. Interested in HH for SN on DCP    FEN: regular diet with ensure  DVT Prophylaxis: lovenox  PUD Prophylaxis: not indicated    Reason for continued hospitalization: above  Disposition: hopefully home tomorrow with HH  Discussed with: patient, RN Laquita Jean MD   9:13 PM on 4/4/2019

## 2019-04-05 NOTE — PROGRESS NOTES
[facility]    Name: Sadie Tijerina, 78 y.o. female  MRN: [id number], : 1940   Date of Admission: 2019   Date of Service: 19   PCP: Kristian Wolff MD     Hospital Course:   Sadie Tijerina is a 78 y.o. female with h/o current tobacco use, COPD on home O2 as needed, HTN, chronic tremors, chronic dCHF who was admitted on 2019 to med/surg for weakness, acute exacerbation of COPD.    Vitals on admission notable for AFVSS. Labs on admission notable for WBC 18K, lactic acid 1.1; procal neg x 1. Radiographs on admission notable for CXR unremarkable for acute cardiopulmonary process. Patient was given 1 L bolus, duonebs, solumedrol, zosyn, and levaquin in ER.     She was started on IV steroids, nebulizer therapies and mucolytics on admission and symptoms have improved. She was also continued on levaquin for possible pneumonia, however CXR did not demonstrate any infiltrate, procal was negative x 2, and clinically patients symptoms consistent with COPD exacerbation, so levaquin was discontinued. PT/OT were consulted and have been working with patient. SW/CM was consulted for DCP. Patient agreeable to HH for skilled nursing weekly for cardiopulmonary assessments, CM has arranged for HH with [facility].    Consultants: PT/OT; AVNI/CAPRI   Procedures: none    Antibiotics: S/p levaquin d2  Micro:  BCx: ngtd x 2 sets  -------------------------------------------------------------------------------------------------------------------  Subjective   Chief Complaint: f/u COPD     Subjective:   Patient seen and examined this morning. Events from last night noted. Patient denies any chest pain or pressure. Continued sore throat. Not able to each much.    Review of Systems:   Gen-no fevers, no chills  CV-no chest pain, no palpitations  GI-no N/V/D, no abd pain    Objective   Objective:     Intake/Output Summary (Last 24 hours) at 2019 4824  Last data filed at  4/5/2019 1619  Gross per 24 hour   Intake 480 ml   Output 1500 ml   Net -1020 ml      SpO2: 95 %     Physical Examination:   Vitals:    04/05/19 1100 04/05/19 1329 04/05/19 1340 04/05/19 1619   BP: 145/62   133/66   BP Location: Right arm   Right arm   Patient Position: Lying   Sitting   Pulse: 86 79 81 81   Resp: 18 18 18 18   Temp: 97.9 °F (36.6 °C)   97.5 °F (36.4 °C)   TempSrc: Oral   Oral   SpO2: 94% 98% 97% 95%   Weight:       Height:          General:  Thin elderly female; resting in bed; has supplemental O2 via NC in place (uses nocturnal O2 at home)  Mouth:  mild pharyngeal erythema, +white exudates in pharyngeal recesses  Heart:   RRR, S1 S2 normal, no m/r/g  Lungs:   Prolonged exp phase; no wheezing or rhonchi  Abdomen:  soft, NT, ND, +BS  Extremities: no edema/cyanosis/clubbing  Neuro:  A&Ox3, no focal deficits  Psych:  appropriate mood  Skin:  No bleeding, No bruising, No rash    Pertinent laboratory and radiology data were reviewed:  Microbiology Results (last 10 days)     Procedure Component Value - Date/Time    Rapid Strep A Screen - Swab, Throat [[id number]]  (Normal) Collected:  04/05/19 1135    Lab Status:  Final result Specimen:  Swab from Throat Updated:  04/05/19 1244     Strep A Ag Negative    Blood Culture With CLAYTON - Blood, Arm, Right [[id number]] Collected:  04/01/19 2042    Lab Status:  Preliminary result Specimen:  Blood from Arm, Right Updated:  04/04/19 2100     Blood Culture No growth at 3 days    Blood Culture With CLAYTON - Blood, Arm, Right [[id number]] Collected:  04/01/19 1603    Lab Status:  Preliminary result Specimen:  Blood from Arm, Right Updated:  04/04/19 1930     Blood Culture No growth at 3 days          Medications Reviewed:     budesonide 0.5 mg Nebulization BID - RT   cefdinir 300 mg Oral Q12H   enoxaparin 40 mg Subcutaneous Q24H   estrogens (conjugated) 0.625 mg Oral Daily   fluticasone 2 spray Each Nare Daily   guaiFENesin 600 mg Oral Q12H   ipratropium-albuterol 3 mL  Nebulization Q6H - RT   lactobacillus acidophilus 1 capsule Oral BID   nystatin 5 mL Oral 4x Daily   pantoprazole 40 mg Oral QAM   pravastatin 20 mg Oral Q PM   predniSONE 60 mg Oral Daily With Breakfast   propranolol 60 mg Oral Q12H   sertraline 50 mg Oral Nightly   sodium chloride 3 mL Intravenous Q12H        Assessment /Plan   Assessment/Plan:   1. AECOPD, POA, improved  2. Chronic respiratory failure with hypoxia, on supplemental O2 prn at home per patient  3. Chronic dCHF, not in exacerbation  4. HTN  5. Chronic tobacco use  6. Restless leg syndrome  7. Sore throat  8. Leukocytosis, suspect a component of steroid induced + pharyngitis    Check strep screen. Start omnicef. Continue nebulizer therapies and steroids.     Re-evaluated this evening, white exudates more prominent, also seen on hard palate after dentures removed. Strep screen negative. suspect thrush. Will start nystatin swish and swallow. Patient instructed to remove dentures and soak overnight. Decrease PO steroids to 40mg starting tomorrow.    FEN: regular diet with ensure  DVT Prophylaxis: lovenox  PUD Prophylaxis: not indicated    Reason for continued hospitalization: above  Disposition: HH for SN with [facility]  Discussed with: patient, RN Shayla Jean MD   6:27 PM on 4/5/2019

## 2019-04-05 NOTE — SIGNIFICANT NOTE
04/05/19 1408   Rehab Treatment   Discipline occupational therapist   Reason Treatment Not Performed patient/family declined treatment  (Pt declined tx.)

## 2019-04-05 NOTE — PROGRESS NOTES
Continued Stay Note  [facility]     Patient Name: Sadie Tijerina  MRN: [id number]  Today's Date: 4/5/2019    Admit Date: 4/1/2019    Discharge Plan     Row Name 04/05/19 1702       Plan    Plan Comments  Discharge plan is to return  Home with Home Health [facility]         Discharge Codes    No documentation.       Expected Discharge Date and Time     Expected Discharge Date Expected Discharge Time    Apr 6, 2019             Sara Brooke

## 2019-04-05 NOTE — PLAN OF CARE
Problem: Patient Care Overview  Goal: Plan of Care Review  Outcome: Ongoing (interventions implemented as appropriate)   04/05/19 0957   Coping/Psychosocial   Plan of Care Reviewed With patient   Plan of Care Review   Progress improving   OTHER   Outcome Summary Pt tolerated treatment well with pt advancing gait distance to 128' with rw cga. Pt reports after ambulation too fatigued to perform seated exercise. See flowsheet for details.

## 2019-04-05 NOTE — PLAN OF CARE
Problem: Patient Care Overview  Goal: Plan of Care Review  Outcome: Ongoing (interventions implemented as appropriate)   04/05/19 3374   Coping/Psychosocial   Plan of Care Reviewed With patient   Plan of Care Review   Progress improving   OTHER   Outcome Summary Patient VSS. A+O. No report pain. 2 L NC. Expect discharge home with home health tomorrow.

## 2019-04-06 LAB
BACTERIA SPEC AEROBE CULT: NORMAL
BACTERIA SPEC AEROBE CULT: NORMAL
BASOPHILS # BLD AUTO: 0.03 10*3/MM3 (ref 0–0.2)
BASOPHILS NFR BLD AUTO: 0.2 % (ref 0–1.5)
DEPRECATED RDW RBC AUTO: 45.1 FL (ref 37–54)
EOSINOPHIL # BLD AUTO: 0 10*3/MM3 (ref 0–0.4)
EOSINOPHIL NFR BLD AUTO: 0 % (ref 0.3–6.2)
ERYTHROCYTE [DISTWIDTH] IN BLOOD BY AUTOMATED COUNT: 13.6 % (ref 12.3–15.4)
GLUCOSE BLDC GLUCOMTR-MCNC: 170 MG/DL (ref 70–130)
HCT VFR BLD AUTO: 37.5 % (ref 34–46.6)
HGB BLD-MCNC: 12 G/DL (ref 12–15.9)
IMM GRANULOCYTES # BLD AUTO: 0.14 10*3/MM3 (ref 0–0.05)
IMM GRANULOCYTES NFR BLD AUTO: 0.8 % (ref 0–0.5)
LYMPHOCYTES # BLD AUTO: 2.01 10*3/MM3 (ref 0.7–3.1)
LYMPHOCYTES NFR BLD AUTO: 11 % (ref 19.6–45.3)
MCH RBC QN AUTO: 29.1 PG (ref 26.6–33)
MCHC RBC AUTO-ENTMCNC: 32 G/DL (ref 31.5–35.7)
MCV RBC AUTO: 90.8 FL (ref 79–97)
MONOCYTES # BLD AUTO: 0.86 10*3/MM3 (ref 0.1–0.9)
MONOCYTES NFR BLD AUTO: 4.7 % (ref 5–12)
NEUTROPHILS # BLD AUTO: 15.28 10*3/MM3 (ref 1.4–7)
NEUTROPHILS NFR BLD AUTO: 83.3 % (ref 42.7–76)
NRBC BLD AUTO-RTO: 0 /100 WBC (ref 0–0)
PLATELET # BLD AUTO: 235 10*3/MM3 (ref 140–450)
PMV BLD AUTO: 10.1 FL (ref 6–12)
RBC # BLD AUTO: 4.13 10*6/MM3 (ref 3.77–5.28)
WBC NRBC COR # BLD: 18.32 10*3/MM3 (ref 3.4–10.8)

## 2019-04-06 PROCEDURE — 99231 SBSQ HOSP IP/OBS SF/LOW 25: CPT | Performed by: HOSPITALIST

## 2019-04-06 PROCEDURE — 85025 COMPLETE CBC W/AUTO DIFF WBC: CPT | Performed by: HOSPITALIST

## 2019-04-06 PROCEDURE — 94799 UNLISTED PULMONARY SVC/PX: CPT

## 2019-04-06 PROCEDURE — 25010000002 ENOXAPARIN PER 10 MG: Performed by: INTERNAL MEDICINE

## 2019-04-06 PROCEDURE — 82962 GLUCOSE BLOOD TEST: CPT

## 2019-04-06 PROCEDURE — 63710000001 PREDNISONE PER 1 MG: Performed by: HOSPITALIST

## 2019-04-06 RX ORDER — GUAIFENESIN/DEXTROMETHORPHAN 100-10MG/5
5 SYRUP ORAL EVERY 4 HOURS PRN
Status: DISCONTINUED | OUTPATIENT
Start: 2019-04-06 | End: 2019-04-07 | Stop reason: HOSPADM

## 2019-04-06 RX ADMIN — IPRATROPIUM BROMIDE AND ALBUTEROL SULFATE 3 ML: .5; 3 SOLUTION RESPIRATORY (INHALATION) at 12:46

## 2019-04-06 RX ADMIN — BUDESONIDE 0.5 MG: 0.5 INHALANT RESPIRATORY (INHALATION) at 18:27

## 2019-04-06 RX ADMIN — CEFDINIR 300 MG: 300 CAPSULE ORAL at 20:58

## 2019-04-06 RX ADMIN — ENOXAPARIN SODIUM 40 MG: 100 INJECTION SUBCUTANEOUS at 22:01

## 2019-04-06 RX ADMIN — CLONAZEPAM 0.5 MG: 0.5 TABLET ORAL at 20:58

## 2019-04-06 RX ADMIN — PHENOL 1 SPRAY: 1.5 LIQUID ORAL at 09:14

## 2019-04-06 RX ADMIN — PANTOPRAZOLE SODIUM 40 MG: 40 TABLET, DELAYED RELEASE ORAL at 06:07

## 2019-04-06 RX ADMIN — GUAIFENESIN AND DEXTROMETHORPHAN 5 ML: 100; 10 SYRUP ORAL at 20:58

## 2019-04-06 RX ADMIN — CEFDINIR 300 MG: 300 CAPSULE ORAL at 09:14

## 2019-04-06 RX ADMIN — SERTRALINE HYDROCHLORIDE 50 MG: 50 TABLET ORAL at 20:58

## 2019-04-06 RX ADMIN — ESTROGENS, CONJUGATED 0.62 MG: 0.62 TABLET, FILM COATED ORAL at 09:14

## 2019-04-06 RX ADMIN — IPRATROPIUM BROMIDE AND ALBUTEROL SULFATE 3 ML: .5; 3 SOLUTION RESPIRATORY (INHALATION) at 07:15

## 2019-04-06 RX ADMIN — PROPRANOLOL HYDROCHLORIDE 60 MG: 20 TABLET ORAL at 20:57

## 2019-04-06 RX ADMIN — IPRATROPIUM BROMIDE AND ALBUTEROL SULFATE 3 ML: .5; 3 SOLUTION RESPIRATORY (INHALATION) at 18:27

## 2019-04-06 RX ADMIN — NYSTATIN 500000 UNITS: 100000 SUSPENSION ORAL at 09:14

## 2019-04-06 RX ADMIN — Medication 1 CAPSULE: at 20:57

## 2019-04-06 RX ADMIN — NYSTATIN 500000 UNITS: 100000 SUSPENSION ORAL at 12:31

## 2019-04-06 RX ADMIN — NYSTATIN 500000 UNITS: 100000 SUSPENSION ORAL at 20:58

## 2019-04-06 RX ADMIN — PREDNISONE 40 MG: 20 TABLET ORAL at 09:15

## 2019-04-06 RX ADMIN — BUDESONIDE 0.5 MG: 0.5 INHALANT RESPIRATORY (INHALATION) at 07:15

## 2019-04-06 RX ADMIN — GUAIFENESIN 600 MG: 600 TABLET, EXTENDED RELEASE ORAL at 09:15

## 2019-04-06 RX ADMIN — PRAVASTATIN SODIUM 20 MG: 20 TABLET ORAL at 17:15

## 2019-04-06 RX ADMIN — IPRATROPIUM BROMIDE AND ALBUTEROL SULFATE 3 ML: .5; 3 SOLUTION RESPIRATORY (INHALATION) at 00:42

## 2019-04-06 RX ADMIN — Medication 1 CAPSULE: at 09:14

## 2019-04-06 RX ADMIN — NYSTATIN 500000 UNITS: 100000 SUSPENSION ORAL at 17:15

## 2019-04-06 RX ADMIN — FLUTICASONE PROPIONATE 2 SPRAY: 50 SPRAY, METERED NASAL at 09:15

## 2019-04-06 RX ADMIN — SODIUM CHLORIDE, PRESERVATIVE FREE 3 ML: 5 INJECTION INTRAVENOUS at 20:59

## 2019-04-06 RX ADMIN — SODIUM CHLORIDE, PRESERVATIVE FREE 3 ML: 5 INJECTION INTRAVENOUS at 09:15

## 2019-04-06 RX ADMIN — PROPRANOLOL HYDROCHLORIDE 60 MG: 20 TABLET ORAL at 09:14

## 2019-04-06 NOTE — PLAN OF CARE
Problem: Fall Risk (Adult)  Goal: Absence of Fall  Outcome: Ongoing (interventions implemented as appropriate)   04/06/19 3693   Fall Risk (Adult)   Absence of Fall making progress toward outcome

## 2019-04-06 NOTE — PROGRESS NOTES
[facility] - [facility] hospitals in the [place] FOLLOW-UP NOTE    Name: Sadie Tijerina, 78 y.o. female  MRN: [id number], : 1940   Date of Admission: 2019   Date of Service: 19   PCP: Kristian Wolff MD     Hospital Course:   Sadie Tijerina is a 78 y.o. female with h/o current tobacco use, COPD on home O2 as needed, HTN, chronic tremors, chronic dCHF who was admitted on 2019 to med/surg for weakness, acute exacerbation of COPD.    Vitals on admission notable for AFVSS. Labs on admission notable for WBC 18K, lactic acid 1.1; procal neg x 1. Radiographs on admission notable for CXR unremarkable for acute cardiopulmonary process. Patient was given 1 L bolus, duonebs, solumedrol, zosyn, and levaquin in ER.     She was started on IV steroids, nebulizer therapies and mucolytics on admission and symptoms have improved. She was also continued on levaquin for possible pneumonia, however CXR did not demonstrate any infiltrate, procal was negative x 2, and clinically patients symptoms consistent with COPD exacerbation, so levaquin was discontinued. PT/OT were consulted and have been working with patient. SW/CM was consulted for DCP. Patient agreeable to HH for skilled nursing weekly for cardiopulmonary assessments, CM has arranged for HH with [facility].    Consultants: PT/OT; AVNI/CAPRI   Procedures: none    Antibiotics: S/p levaquin d2  Micro:  BCx: ngtd x 2 sets  -------------------------------------------------------------------------------------------------------------------  Subjective   Chief Complaint: f/u COPD     Subjective:   Patient seen and examined this morning. Events from last night noted. Hoarseness today. Using nystatin. Sore throat mildly improved. Nonproductive cough.     Review of Systems:   Gen-no fevers, no chills  CV-no chest pain, no palpitations  GI-no N/V/D, no abd pain    Objective   Objective:     Intake/Output Summary (Last 24 hours) at 2019 1523  Last data filed at  4/6/2019 1500  Gross per 24 hour   Intake 700 ml   Output 1300 ml   Net -600 ml      SpO2: 98 %     Physical Examination:   Vitals:    04/06/19 0328 04/06/19 0517 04/06/19 0744 04/06/19 1228   BP: 147/68  177/71 145/57   BP Location: Right arm  Right arm Right arm   Patient Position: Lying  Lying Lying   Pulse: 84  82 78   Resp: 16  18 17   Temp: 97.5 °F (36.4 °C)  97.6 °F (36.4 °C) 96.9 °F (36.1 °C)   TempSrc: Oral  Oral Oral   SpO2: 97%  97% 98%   Weight:  55.4 kg (122 lb 3.2 oz)     Height:          General:  Thin elderly female; sitting upright position in bed with NC in place; listening to [facility] scanner.  Mouth:  mild pharyngeal erythema, white exudates improved  Heart:   RRR, S1 S2 normal, no m/r/g  Lungs:   Prolonged exp phase; no wheezing or rhonchi  Abdomen:  soft, NT, ND, +BS  Extremities: no edema/cyanosis/clubbing  Neuro:  A&Ox3, no focal deficits  Psych:  appropriate mood  Skin:  No bleeding, No bruising, No rash    Pertinent laboratory and radiology data were reviewed:  Microbiology Results (last 10 days)     Procedure Component Value - Date/Time    Rapid Strep A Screen - Swab, Throat [[id number]]  (Normal) Collected:  04/05/19 1135    Lab Status:  Final result Specimen:  Swab from Throat Updated:  04/05/19 1244     Strep A Ag Negative    Blood Culture With CLAYTON - Blood, Arm, Right [[id number]] Collected:  04/01/19 2042    Lab Status:  Preliminary result Specimen:  Blood from Arm, Right Updated:  04/05/19 2100     Blood Culture No growth at 4 days    Blood Culture With CLAYTON - Blood, Arm, Right [[id number]] Collected:  04/01/19 1603    Lab Status:  Preliminary result Specimen:  Blood from Arm, Right Updated:  04/05/19 1930     Blood Culture No growth at 4 days          Medications Reviewed:     budesonide 0.5 mg Nebulization BID - RT   cefdinir 300 mg Oral Q12H   enoxaparin 40 mg Subcutaneous Q24H   estrogens (conjugated) 0.625 mg Oral Daily   fluticasone 2 spray Each Nare Daily   ipratropium-albuterol 3 mL  Nebulization Q6H - RT   lactobacillus acidophilus 1 capsule Oral BID   nystatin 5 mL Oral 4x Daily   pantoprazole 40 mg Oral QAM   pravastatin 20 mg Oral Q PM   predniSONE 40 mg Oral Daily With Breakfast   propranolol 60 mg Oral Q12H   sertraline 50 mg Oral Nightly   sodium chloride 3 mL Intravenous Q12H        Assessment /Plan   Assessment/Plan:   1. AECOPD, POA, improved  2. Chronic respiratory failure with hypoxia, on supplemental O2 prn at home per patient  3. Chronic dCHF, not in exacerbation  4. HTN  5. Chronic tobacco use  6. Restless leg syndrome  7. Sore throat  8. Leukocytosis, suspect a component of steroid induced + pharyngitis    Sore throat improved mildly. WBC trending down. Will continue omnicef, until strep cultures resulted. Continue nystatin. Chloraseptic spray had been ordered previously, available at bedside today for patient use. Will discontinue guiafenisin tablets and start cough syrup formulation per patient request. On PO steroid taper. Appetite has been poor with sore throat, offered to change diet to softer foods, but patient declined.     FEN: regular diet with ensure  DVT Prophylaxis: lovenox  PUD Prophylaxis: not indicated    Reason for continued hospitalization: above  Disposition: hopefully pharyngitis improved enough to go home with  SN tomorrow  Discussed with: patient, DELMY Chew, and patients daughter    Rita Jean MD   3:23 PM on 4/6/2019

## 2019-04-06 NOTE — PLAN OF CARE
Problem: Pain, Chronic (Adult)  Goal: Acceptable Pain/Comfort Level and Functional Ability  Outcome: Ongoing (interventions implemented as appropriate)   04/06/19 0158   Pain, Chronic (Adult)   Acceptable Pain/Comfort Level and Functional Ability (no c/o of pain this shift)

## 2019-04-06 NOTE — SIGNIFICANT NOTE
04/06/19 1204   PT Deferred Reason   PT Deferred Reason Routine  (pt requested hold PT today until MD visits to see if discharged )

## 2019-04-07 VITALS
RESPIRATION RATE: 16 BRPM | DIASTOLIC BLOOD PRESSURE: 66 MMHG | TEMPERATURE: 97.8 F | OXYGEN SATURATION: 97 % | WEIGHT: 122.4 LBS | SYSTOLIC BLOOD PRESSURE: 148 MMHG | HEIGHT: 62 IN | BODY MASS INDEX: 22.52 KG/M2 | HEART RATE: 80 BPM

## 2019-04-07 LAB
ANION GAP SERPL CALCULATED.3IONS-SCNC: 7 MMOL/L (ref 10–20)
BACTERIA SPEC AEROBE CULT: NORMAL
BASOPHILS # BLD AUTO: 0.04 10*3/MM3 (ref 0–0.2)
BASOPHILS NFR BLD AUTO: 0.2 % (ref 0–1.5)
BUN BLD-MCNC: 30 MG/DL (ref 7–20)
BUN/CREAT SERPL: 37.5 (ref 7.1–23.5)
CALCIUM SPEC-SCNC: 9.1 MG/DL (ref 8.4–10.2)
CHLORIDE SERPL-SCNC: 95 MMOL/L (ref 98–107)
CO2 SERPL-SCNC: 37 MMOL/L (ref 26–30)
CREAT BLD-MCNC: 0.8 MG/DL (ref 0.6–1.3)
DEPRECATED RDW RBC AUTO: 43.8 FL (ref 37–54)
EOSINOPHIL # BLD AUTO: 0.01 10*3/MM3 (ref 0–0.4)
EOSINOPHIL NFR BLD AUTO: 0.1 % (ref 0.3–6.2)
ERYTHROCYTE [DISTWIDTH] IN BLOOD BY AUTOMATED COUNT: 13.5 % (ref 12.3–15.4)
GFR SERPL CREATININE-BSD FRML MDRD: 69 ML/MIN/1.73
GLUCOSE BLD-MCNC: 87 MG/DL (ref 74–98)
HCT VFR BLD AUTO: 36.1 % (ref 34–46.6)
HGB BLD-MCNC: 11.9 G/DL (ref 12–15.9)
IMM GRANULOCYTES # BLD AUTO: 0.2 10*3/MM3 (ref 0–0.05)
IMM GRANULOCYTES NFR BLD AUTO: 1.1 % (ref 0–0.5)
LYMPHOCYTES # BLD AUTO: 2.31 10*3/MM3 (ref 0.7–3.1)
LYMPHOCYTES NFR BLD AUTO: 12.2 % (ref 19.6–45.3)
MCH RBC QN AUTO: 29.2 PG (ref 26.6–33)
MCHC RBC AUTO-ENTMCNC: 33 G/DL (ref 31.5–35.7)
MCV RBC AUTO: 88.5 FL (ref 79–97)
MONOCYTES # BLD AUTO: 0.97 10*3/MM3 (ref 0.1–0.9)
MONOCYTES NFR BLD AUTO: 5.1 % (ref 5–12)
NEUTROPHILS # BLD AUTO: 15.47 10*3/MM3 (ref 1.4–7)
NEUTROPHILS NFR BLD AUTO: 81.3 % (ref 42.7–76)
NRBC BLD AUTO-RTO: 0 /100 WBC (ref 0–0)
PLATELET # BLD AUTO: 244 10*3/MM3 (ref 140–450)
PMV BLD AUTO: 10 FL (ref 6–12)
POTASSIUM BLD-SCNC: 4 MMOL/L (ref 3.5–5.1)
RBC # BLD AUTO: 4.08 10*6/MM3 (ref 3.77–5.28)
SODIUM BLD-SCNC: 135 MMOL/L (ref 137–145)
WBC NRBC COR # BLD: 19 10*3/MM3 (ref 3.4–10.8)

## 2019-04-07 PROCEDURE — 85025 COMPLETE CBC W/AUTO DIFF WBC: CPT | Performed by: HOSPITALIST

## 2019-04-07 PROCEDURE — 63710000001 PREDNISONE PER 1 MG: Performed by: HOSPITALIST

## 2019-04-07 PROCEDURE — 80048 BASIC METABOLIC PNL TOTAL CA: CPT | Performed by: HOSPITALIST

## 2019-04-07 PROCEDURE — 97116 GAIT TRAINING THERAPY: CPT

## 2019-04-07 PROCEDURE — 97530 THERAPEUTIC ACTIVITIES: CPT

## 2019-04-07 PROCEDURE — 94799 UNLISTED PULMONARY SVC/PX: CPT

## 2019-04-07 PROCEDURE — 99239 HOSP IP/OBS DSCHRG MGMT >30: CPT | Performed by: HOSPITALIST

## 2019-04-07 RX ORDER — PREDNISONE 10 MG/1
TABLET ORAL
Qty: 22 TABLET | Refills: 0 | Status: SHIPPED | OUTPATIENT
Start: 2019-04-07 | End: 2019-04-23 | Stop reason: SDUPTHER

## 2019-04-07 RX ADMIN — CEFDINIR 300 MG: 300 CAPSULE ORAL at 09:26

## 2019-04-07 RX ADMIN — NYSTATIN 500000 UNITS: 100000 SUSPENSION ORAL at 09:28

## 2019-04-07 RX ADMIN — ESTROGENS, CONJUGATED 0.62 MG: 0.62 TABLET, FILM COATED ORAL at 09:27

## 2019-04-07 RX ADMIN — SODIUM CHLORIDE, PRESERVATIVE FREE 3 ML: 5 INJECTION INTRAVENOUS at 09:28

## 2019-04-07 RX ADMIN — PROPRANOLOL HYDROCHLORIDE 60 MG: 20 TABLET ORAL at 09:27

## 2019-04-07 RX ADMIN — PANTOPRAZOLE SODIUM 40 MG: 40 TABLET, DELAYED RELEASE ORAL at 06:30

## 2019-04-07 RX ADMIN — IPRATROPIUM BROMIDE AND ALBUTEROL SULFATE 3 ML: .5; 3 SOLUTION RESPIRATORY (INHALATION) at 00:36

## 2019-04-07 RX ADMIN — PREDNISONE 40 MG: 20 TABLET ORAL at 09:27

## 2019-04-07 RX ADMIN — BUDESONIDE 0.5 MG: 0.5 INHALANT RESPIRATORY (INHALATION) at 07:26

## 2019-04-07 RX ADMIN — FLUTICASONE PROPIONATE 2 SPRAY: 50 SPRAY, METERED NASAL at 09:27

## 2019-04-07 RX ADMIN — IPRATROPIUM BROMIDE AND ALBUTEROL SULFATE 3 ML: .5; 3 SOLUTION RESPIRATORY (INHALATION) at 07:26

## 2019-04-07 RX ADMIN — Medication 1 CAPSULE: at 09:27

## 2019-04-07 NOTE — THERAPY TREATMENT NOTE
Acute Care - Physical Therapy Treatment Note  [facility]     Patient Name: Sadie Tijerina  : 1940  MRN: [id number]  Today's Date: 2019  Onset of Illness/Injury or Date of Surgery: 19  Date of Referral to PT: 19  Referring Physician: Nico Enriquez MD    Admit Date: 2019    Visit Dx:    ICD-10-CM ICD-9-CM   1. Chronic obstructive pulmonary disease with acute exacerbation (CMS/HCC) J44.1 491.21   2. Impaired mobility and ADLs Z74.09 799.89   3. Impaired functional mobility, balance, gait, and endurance Z74.09 V49.89     Patient Active Problem List   Diagnosis   • Calf cramp   • Mixed anxiety depressive disorder   • Dizziness   • Fatigue   • Gastroesophageal reflux disease without esophagitis   • Hematuria   • Hyperlipidemia   • Essential hypertension   • Low back pain   • Orthostatic hypotension   • Restless legs syndrome   • Essential tremor   • Vitamin D deficiency   • Balance problem   • Tension headache   • Tobacco abuse disorder   • Pneumonia due to infectious organism   • COPD with acute exacerbation (CMS/HCC)   • Venous insufficiency of both lower extremities   • Chronic diastolic heart failure (CMS/HCC)   • Chronic obstructive pulmonary disease with acute exacerbation (CMS/HCC)       Therapy Treatment    Rehabilitation Treatment Summary     Row Name 19 1146             Treatment Time/Intention    Discipline  physical therapy assistant  -CC      Document Type  therapy note (daily note)  -CC      Subjective Information  no complaints  -CC      Mode of Treatment  individual therapy  -CC      Care Plan Review  care plan/treatment goals reviewed  -CC      Patient Effort  good  -CC      Existing Precautions/Restrictions  fall  -CC      Recorded by [CC] Nika Chun, DILEEP 19 1242      Row Name 19 1146             Safety Issues, Functional Mobility    Safety Issues Affecting Function (Mobility)  safety precautions follow-through/compliance  -CC      Impairments  Affecting Function (Mobility)  balance;endurance/activity tolerance  -CC      Recorded by [CC] Nika Chun, [facility] 04/07/19 1242      Row Name 04/07/19 1146             Transfer Assessment/Treatment    Transfer Assessment/Treatment  sit-stand transfer;stand-sit transfer  -CC      Recorded by [CC] Nika Chun, PTA 04/07/19 1242      Row Name 04/07/19 1146             Sit-Stand Transfer    Sit-Stand [place] (Transfers)  independent  -CC      Assistive Device (Sit-Stand Transfers)  walker, front-wheeled  -CC      Recorded by [CC] Nika Chun, [facility] 04/07/19 1242      Row Name 04/07/19 1146             Stand-Sit Transfer    Stand-Sit [place] (Transfers)  independent  -CC      Assistive Device (Stand-Sit Transfers)  walker, front-wheeled  -CC      Recorded by [CC] Nika Chun, [facility] 04/07/19 1242      Row Name 04/07/19 1146             Gait/Stairs Assessment/Training    07557 - Gait Training Minutes   15  -CC      Gait/Stairs Assessment/Training  gait/ambulation independence  -CC      [place] Level (Gait)  set up;conditional independence  -CC      Assistive Device (Gait)  walker, front-wheeled  -CC      Distance in Feet (Gait)  150  -CC      Pattern (Gait)  swing-through  -CC      Deviations/Abnormal Patterns (Gait)  gait speed decreased  -CC      Bilateral Gait Deviations  heel strike decreased  -CC      Recorded by [CC] Nika Chun, [facility] 04/07/19 1242      Row Name 04/07/19 1146             Positioning and Restraints    Pre-Treatment Position  sitting in chair/recliner  -CC      Post Treatment Position  chair  -CC      In Chair  reclined;call light within reach  -CC      Recorded by [CC] Nika Chun, [facility] 04/07/19 1242      Row Name 04/07/19 1146             Pain Scale: Numbers Pre/Post-Treatment    Pain Scale: Numbers, Pretreatment  0/10 - no pain  -CC      Pain Scale: Numbers, Post-Treatment  0/10 - no pain  -CC      Recorded by [CC] Nika Chun, [facility] 04/07/19 1242       Row Name 04/07/19 1146             Outcome Summary/Treatment Plan (PT)    Daily Summary of Progress (PT)  progress toward functional goals is good  -CC      Plan for Continued Treatment (PT)  Con't with PT POC   -CC      Recorded by [] Nika Chun, [facility] 04/07/19 1242        User Key  (r) = Recorded By, (t) = Taken By, (c) = Cosigned By    Initials Name Effective Dates Discipline     Nika Chun, [facility] 03/07/18 -  PT                   Physical Therapy Education     Title: PT OT SLP Therapies (Not Started)     Topic: Physical Therapy (In Progress)     Point: Mobility training (Done)     Learning Progress Summary           Patient Acceptance, E,TB, VU by  at 4/7/2019 12:43 PM    Comment:  amb daily and increase reps, perform ex 3-45 days per week after d/c home    Acceptance, E,TB,D, VU,NR by  at 4/5/2019  2:57 PM    Comment:  proper use of walker,pursed lip breathing with activity.    Acceptance, E,TB,D, VU,NR by  at 4/3/2019  3:08 PM    Comment:  safety with use of walker and transfers    Acceptance, E, VU by  at 4/2/2019 12:11 PM    Comment:  Role of PT                   Point: Home exercise program (Done)     Learning Progress Summary           Patient Acceptance, E,TB, VU by  at 4/7/2019 12:43 PM    Comment:  amb daily and increase reps, perform ex 3-45 days per week after d/c home                               User Key     Initials Effective Dates Name Provider Type Discipline     04/03/18 -  Estrella Ferraro, PT Physical Therapist PT     03/07/18 -  Nika Chun, PTA Physical Therapy Assistant PT     03/07/18 -  Anibal Michelle, PTA Physical Therapy Assistant PT                PT Recommendation and Plan     Outcome Summary/Treatment Plan (PT)  Daily Summary of Progress (PT): progress toward functional goals is good  Plan for Continued Treatment (PT): Con't with PT POC   Plan of Care Reviewed With: patient  Progress: improving  Outcome Summary: Pt with increased distance amb with  decreased assist and no path deviations or LOB noted  and decreased assist for transfers   Outcome Measures     Row Name 04/07/19 1146 04/05/19 1328          How much help from another person do you currently need...    Turning from your back to your side while in flat bed without using bedrails?  4  -CC  4  -RM     Moving from lying on back to sitting on the side of a flat bed without bedrails?  4  -CC  4  -RM     Moving to and from a bed to a chair (including a wheelchair)?  4  -CC  3  -RM     Standing up from a chair using your arms (e.g., wheelchair, bedside chair)?  4  -CC  4  -RM     Climbing 3-5 steps with a railing?  3  -CC  3  -RM     To walk in hospital room?  4  -CC  3  -RM     AM-PAC 6 Clicks Score  23  -CC  21  -RM        Functional Assessment    Outcome Measure Options  AM-PAC 6 Clicks Basic Mobility (PT)  -CC  AM-PAC 6 Clicks Basic Mobility (PT)  -RM       User Key  (r) = Recorded By, (t) = Taken By, (c) = Cosigned By    Initials Name Provider Type    Nika Johnson PTA Physical Therapy Assistant    RM Anibal Michelle, DILEEP Physical Therapy Assistant         Time Calculation:   PT Charges     Row Name 04/07/19 1146             Time Calculation    Start Time  1146  -CC      PT Received On  04/07/19  -      PT Goal Re-Cert Due Date  04/12/19  -         Time Calculation- PT    Total Timed Code Minutes- PT  23 minute(s)  -CC         Timed Charges    73701 - Gait Training Minutes   15  -CC      11631 - PT Therapeutic Activity Minutes  8  -CC        User Key  (r) = Recorded By, (t) = Taken By, (c) = Cosigned By    Initials Name Provider Type    Nika Johnson PTA Physical Therapy Assistant        Therapy Charges for Today     Code Description Service Date Service Provider Modifiers Qty    47054223317 HC GAIT TRAINING EA 15 MIN 4/7/2019 Nika Chun, DILEEP GP 1    40944911007 HC PT THERAPEUTIC ACT EA 15 MIN 4/7/2019 Nika Chun, DILEEP GP 1          PT G-Codes  Outcome Measure  Options: AM-PAC 6 Clicks Basic Mobility (PT)  AM-PAC 6 Clicks Score: 23  Score: 18    Nika Chun, PTA  4/7/2019

## 2019-04-07 NOTE — DISCHARGE INSTRUCTIONS
1. follow-up with PCP in 1 week, please call on 4/8/19 to make appointment  2. please seek prompt medical attention if persistent fevers, chest pain, increased shortness of breath, or any other worrisome symptoms

## 2019-04-07 NOTE — PROGRESS NOTES
Case Management Discharge Note    Final Note:  Discharged home [facility] Sp[facility] to [facility] at [facility]     Destination      No service has been selected for the patient.      Durable Medical Equipment      No service has been selected for the patient.      Dialysis/Infusion      No service has been selected for the patient.      Home Medical Care - Selection Complete      Service Provider Request Status Selected Services Address Phone Number Fax Number    [facility] Selected Home Health Services [address] [address] [phone]      Therapy      No service has been selected for the patient.      Community Resources      No service has been selected for the patient.        Transportation Services  Private: Car    Final Discharge Disposition Code: 06 - home with home health care

## 2019-04-07 NOTE — DISCHARGE SUMMARY
[facility] - Hospitalist - Patient Discharge Summary    Patient Name: Sadie Tijerina YOB: 1940 MRN: [id number]   Admit Date: 4/1/2019   Discharge Date: 4/7/2019   Date of Service: 4/7/2019   Admitting Physician: Nico Enriquez MD   Attending Physician: Rita Jean MD   Primary Care Physician: Kristian Wolff MD     Admitting Diagnosis:   Chronic obstructive pulmonary disease with acute exacerbation (CMS/[facility]) [J44.1]     Discharge Diagnosis:   1. AECOPD, POA, improved  2. Chronic respiratory failure with hypoxia, on supplemental O2 prn at home per patient  3. Chronic dCHF, not in exacerbation  4. HTN  5. Chronic tobacco use  6. Restless leg syndrome  7. Thrush with pharyngitis  8. Leukocytosis, suspect a component of steroid induced + pharyngitis      Consults:   Consults     No orders found from 3/3/2019 to 4/2/2019.        Consulting Physician(s)             None            Procedures/Interventions/Operations:   None    Hospital Course:   Sadie Tijerina is a 78 y.o. female with h/o current tobacco use, COPD on home O2 as needed, HTN, chronic tremors, chronic dCHF who was admitted on 4/1/2019 to med/surg for weakness, acute exacerbation of COPD.     Vitals on admission notable for AFVSS. Labs on admission notable for WBC 18K, lactic acid 1.1; procal neg x 1. Radiographs on admission notable for CXR unremarkable for acute cardiopulmonary process. Patient was given 1 L bolus, duonebs, solumedrol, zosyn, and levaquin in ER.      She was started on IV steroids, nebulizer therapies and mucolytics on admission and symptoms have improved. She was also continued on levaquin for possible pneumonia, however CXR did not demonstrate any infiltrate, procal was negative x 2, and clinically patients symptoms consistent with COPD exacerbation, so levaquin was discontinued.     She started to have sore throat and rapid strep obtained, this was negative. She was started on omnicef. She  continued to have pain and developed white deposits consistent with thrush, was started on nystatin swish and swallow with improvement in symptoms. She is able to tolerate PO intake on my evaluation, recommended to focus on soft bland foods. Strep culture was negative. Omnicef was discontinued. She will need to complete 5 days total of nystatin solution for thrush and pharyngitis.     PT/OT were consulted and have been working with patient. SW/CM was consulted for DCP. Patient agreeable to HH for skilled nursing weekly for cardiopulmonary assessments, CM has arranged for HH with [facility]. She continues to decline PT/OT with HH on my evaluations. She is encouraged to discuss with her PCP if she would like PT/OT at home.    Discharge Follow Up Recommendations for labs/diagnostics:  Follow-up with PCP in 1 week    Discharge Examination:   Vital Signs:  Temp:  [96.9 °F (36.1 °C)-98.1 °F (36.7 °C)] 97.8 °F (36.6 °C)  Heart Rate:  [77-88] 80  Resp:  [15-17] 16  BP: (141-148)/(55-66) 148/66    General Appearance:  Thin elderly female; Alert and cooperative, not in any acute distress.   Head:  Atraumatic and normocephalic, without obvious abnormality.   Eyes:          PERRL, conjunctivae and sclerae normal, no Icterus. No pallor. Extraocular movements are within normal limits.   Ears:  Ears appear intact with no abnormalities noted.   Throat: No oral lesions, oral mucosa moist. Erythema of posterior pharynx has improved. There are no white exudates on posterior pharynx or hard palate   Lungs:   Chest shape is normal. Faint exp wheezes. Prolonged exp phase   Heart:  Normal S1 and S2, no murmur, no gallop, no rub.   Abdomen:   Normal bowel sounds, no masses. Soft, non-tender, non-distended, no guarding, no rebound tenderness.   Extremities: Moves all extremities well, no edema, no cyanosis, no clubbing.   Pulses: Pulses palpable and equal bilaterally.   Skin: No bleeding, bruising or rash.   Lymph nodes: No palpable  adenopathy.   Neurologic: Alert and oriented x 3. Moves all four limbs equally. No tremors. No facial asymmetry.     Discharge Condition:   good     Discharge Disposition:   Home with HH for SN    Code status during the hospital stay:  Code Status and Medical Interventions:   Ordered at: 04/01/19 2119     Level Of Support Discussed With:    Patient     Code Status:    CPR     Medical Interventions (Level of Support Prior to Arrest):    Full       Discharge Instructions:   Diet:  soft/bland foods  Activity: as tolerated  Patient education performed: discussed new medications, follow-up  Pending labs and studies:   Physician instructions:   1. follow-up with PCP in 1 week, please call on 4/8/19 to make appointment  2. please seek prompt medical attention if persistent fevers, chest pain, increased shortness of breath, or any other worrisome symptoms    Followup Appointments:   Follow-up Information     Kristian Wolff MD Follow up in 1 week(s).    Specialty:  Internal Medicine  Why:  please call on 4/8/19 to make appointment  Contact information:  [address] [place] [address] [phone]                    Anticipated Problems: none     Discharge Medications:      Discharge Medications      ASK your doctor about these medications      Instructions Start Date   albuterol sulfate  (90 Base) MCG/ACT inhaler  Commonly known as:  PROVENTIL HFA;VENTOLIN HFA;PROAIR HFA   1 puff, Inhalation, Every 4 Hours PRN      bacitracin-silver sulfadiazine-nystatin   Topical, 2 Times Daily      brompheniramine-pseudoephedrine-DM 30-2-10 MG/5ML syrup   5 mL, Oral, 4 Times Daily PRN      budesonide-formoterol 160-4.5 MCG/ACT inhaler  Commonly known as:  SYMBICORT   2 puffs, Inhalation, 2 Times Daily - RT      clonazePAM 0.5 MG tablet  Commonly known as:  KlonoPIN   TAKE 1 TABLET BY MOUTH AT BEDTIME AS NEEDED for seizures      estrogens (conjugated) 0.625 MG tablet  Commonly known as:  PREMARIN   0.625 mg, Oral,  Daily, 200001      fluticasone 50 MCG/ACT nasal spray  Commonly known as:  FLONASE   2 sprays, Each Nare, Daily      ipratropium-albuterol 0.5-2.5 mg/3 ml nebulizer  Commonly known as:  DUO-NEB   3 mL, Nebulization, Every 4 Hours PRN      lactobacillus acidophilus capsule capsule   1 capsule, Oral, 2 Times Daily      lidocaine 5 %  Commonly known as:  LIDODERM   1 patch, Transdermal, Every 24 Hours, Remove & Discard patch within 12 hours or as directed by MD      meclizine 12.5 MG tablet  Commonly known as:  ANTIVERT   TAKE 1 TABLET BY MOUTH THREE TIMES A DAY AS NEEDED for dizziness       methylPREDNISolone 4 MG tablet  Commonly known as:  MEDROL (DANIEL)   Take as directed on package instructions.      nystatin 101900 UNIT/GM cream  Commonly known as:  MYCOSTATIN   Topical, As Needed      nystatin 739147 UNIT/ML suspension  Commonly known as:  MYCOSTATIN   500,000 Units, Swish & Swallow, 4 Times Daily      omeprazole 40 MG capsule  Commonly known as:  priLOSEC   40 mg, Oral, Daily      ondansetron 4 MG tablet  Commonly known as:  ZOFRAN   TAKE 1 TABLET BY MOUTH THREE TIMES A DAY AS NEEDED FOR NAUSEA      pravastatin 20 MG tablet  Commonly known as:  PRAVACHOL   20 mg, Oral, Every Evening      promethazine 12.5 MG tablet  Commonly known as:  PHENERGAN   12.5 mg, Oral, Every 8 Hours PRN      propranolol 60 MG tablet  Commonly known as:  INDERAL   1 daily po      sertraline 50 MG tablet  Commonly known as:  ZOLOFT   50 mg, Oral, Every Night at Bedtime      SPIRIVA HANDIHALER 18 MCG per inhalation capsule  Generic drug:  tiotropium   place 1 capsule into inhaler and inhale ONCE daily      traMADol 50 MG tablet  Commonly known as:  ULTRAM   50 mg, Oral, Every 6 Hours PRN              Pertinent Data/Imaging:   Lab Results   Component Value Date    WBC 19.00 (H) 04/07/2019    HGB 11.9 (L) 04/07/2019    HCT 36.1 04/07/2019    MCV 88.5 04/07/2019     04/07/2019      Lab Results   Component Value Date    CREATININE 0.80  04/07/2019    BUN 30 (H) 04/07/2019     (L) 04/07/2019    K 4.0 04/07/2019    CL 95 (L) 04/07/2019    CO2 37.0 (H) 04/07/2019      Lab Results   Component Value Date    INR 1.0 01/07/2016        Imaging Results (all)     Procedure Component Value Units Date/Time    XR Chest 1 View [[id number]] Collected:  04/05/19 0827     Updated:  04/05/19 0829    Narrative:       PROCEDURE: XR CHEST 1 VW-     HISTORY: shortness of breath; J44.1-Chronic obstructive pulmonary  disease with (acute) exacerbation; Z74.09-Other reduced mobility;  Z74.09-Other reduced mobility     COMPARISON: 04/01/2019.     FINDINGS: The heart is normal in size. The mediastinum is unremarkable.  The lungs are clear. There is no pneumothorax.  There are no acute  osseous abnormalities.       Impression:       No acute cardiopulmonary process.     Continued followup is recommended.     This report was finalized on 4/5/2019 8:27 AM by Sylvain Dent M.D..    XR Chest 2 View [[id number]] Collected:  04/01/19 1848     Updated:  04/01/19 1849    Narrative:       FINAL REPORT    CLINICAL HISTORY:  . recent pneumonia, short of breath    FINDINGS:  There is emphysema and COPD. There is evidence of old calcified  granulomatous disease. Two views of the chest show lungs to be  clear.  Pulmonary vascularity is normal.  Heart and mediastinum  are unremarkable.      Impression:       No acute disease.    Authenticated by Angel Patel MD on 04/01/2019 06:48:31 PM            Time Spent on Discharge: 32 min which included; examination and discussion with patient, primary nurse CAPRI Coelho Review of medical record, review of medications, printing of scripts, ensuring adequate followup, along with documentation.     Electronically signed by Rita Jean MD, 04/07/19, 11:14 AM.

## 2019-04-07 NOTE — PLAN OF CARE
Problem: Patient Care Overview  Goal: Plan of Care Review  Outcome: Ongoing (interventions implemented as appropriate)   04/07/19 1146   Coping/Psychosocial   Plan of Care Reviewed With patient   Plan of Care Review   Progress improving   OTHER   Outcome Summary Pt with increased distance amb with decreased assist and no path deviations or LOB noted and decreased assist for transfers

## 2019-04-07 NOTE — PLAN OF CARE
Problem: Patient Care Overview  Goal: Plan of Care Review  Outcome: Ongoing (interventions implemented as appropriate)   04/06/19 0156   Coping/Psychosocial   Plan of Care Reviewed With patient   Plan of Care Review   Progress improving   OTHER   Outcome Summary Possible D/C tomorrow

## 2019-04-08 ENCOUNTER — READMISSION MANAGEMENT (OUTPATIENT)
Dept: CALL CENTER | Facility: HOSPITAL | Age: 79
End: 2019-04-08

## 2019-04-08 ENCOUNTER — TRANSITIONAL CARE MANAGEMENT TELEPHONE ENCOUNTER (OUTPATIENT)
Dept: INTERNAL MEDICINE | Facility: CLINIC | Age: 79
End: 2019-04-08

## 2019-04-08 RX ORDER — CLONAZEPAM 0.5 MG/1
TABLET ORAL
Qty: 30 TABLET | Refills: 0 | Status: SHIPPED | OUTPATIENT
Start: 2019-04-08 | End: 2019-05-16 | Stop reason: SDUPTHER

## 2019-04-08 NOTE — OUTREACH NOTE
Prep Survey      Responses   Facility patient discharged from?  [facility]   Is patient eligible?  Yes   Discharge diagnosis  acute COPD exacerbation   Does the patient have one of the following disease processes/diagnoses(primary or secondary)?  COPD/Pneumonia   Does the patient have Home health ordered?  Yes   What is the Home health agency?   [facility]    Is there a DME ordered?  Yes   What DME was ordered?  conserving device for O2 from [facility]   Prep survey completed?  Yes          Deanna Sorensen RN

## 2019-04-08 NOTE — OUTREACH NOTE
RAUL call completed and has upcoming hospital follow up 4/10/19 with Dr Wolff. See TCM call flowsheet for details.    Pt states feeling hoarse this am and taking nystatin for thrush. Eating and drinking okay, just not much appetite. Breathing better. She reports will be needing refills on several of her medications, will get at her appt on 4/10. Still waiting on a call from HH. Pt to call if she doesn't hear from them (HH contact info on AVS).

## 2019-04-10 ENCOUNTER — OFFICE VISIT (OUTPATIENT)
Dept: INTERNAL MEDICINE | Facility: CLINIC | Age: 79
End: 2019-04-10

## 2019-04-10 ENCOUNTER — READMISSION MANAGEMENT (OUTPATIENT)
Dept: CALL CENTER | Facility: HOSPITAL | Age: 79
End: 2019-04-10

## 2019-04-10 VITALS
HEIGHT: 62 IN | HEART RATE: 86 BPM | DIASTOLIC BLOOD PRESSURE: 72 MMHG | BODY MASS INDEX: 20.8 KG/M2 | TEMPERATURE: 97.7 F | WEIGHT: 113 LBS | SYSTOLIC BLOOD PRESSURE: 130 MMHG | OXYGEN SATURATION: 97 %

## 2019-04-10 DIAGNOSIS — I10 ESSENTIAL HYPERTENSION: Chronic | ICD-10-CM

## 2019-04-10 DIAGNOSIS — R53.1 WEAKNESS: ICD-10-CM

## 2019-04-10 DIAGNOSIS — D72.829 LEUKOCYTOSIS, UNSPECIFIED TYPE: ICD-10-CM

## 2019-04-10 DIAGNOSIS — Z72.0 TOBACCO ABUSE DISORDER: Chronic | ICD-10-CM

## 2019-04-10 DIAGNOSIS — J44.1 COPD WITH ACUTE EXACERBATION (HCC): Primary | ICD-10-CM

## 2019-04-10 DIAGNOSIS — F41.8 MIXED ANXIETY DEPRESSIVE DISORDER: ICD-10-CM

## 2019-04-10 DIAGNOSIS — G25.81 RESTLESS LEGS SYNDROME: ICD-10-CM

## 2019-04-10 PROBLEM — J18.9 PNEUMONIA DUE TO INFECTIOUS ORGANISM: Status: RESOLVED | Noted: 2018-01-15 | Resolved: 2019-04-10

## 2019-04-10 PROCEDURE — 99496 TRANSJ CARE MGMT HIGH F2F 7D: CPT | Performed by: INTERNAL MEDICINE

## 2019-04-10 RX ORDER — MECLIZINE HCL 12.5 MG/1
12.5 TABLET ORAL 3 TIMES DAILY PRN
Qty: 90 TABLET | Refills: 0 | Status: SHIPPED | OUTPATIENT
Start: 2019-04-10 | End: 2019-09-05

## 2019-04-10 RX ORDER — SERTRALINE HYDROCHLORIDE 100 MG/1
100 TABLET, FILM COATED ORAL
Qty: 30 TABLET | Refills: 3 | Status: SHIPPED | OUTPATIENT
Start: 2019-04-10 | End: 2019-09-05 | Stop reason: SDUPTHER

## 2019-04-10 RX ORDER — TRAMADOL HYDROCHLORIDE 50 MG/1
50 TABLET ORAL EVERY 8 HOURS PRN
Qty: 40 TABLET | Refills: 0 | Status: SHIPPED | OUTPATIENT
Start: 2019-04-10 | End: 2019-05-29 | Stop reason: HOSPADM

## 2019-04-10 NOTE — PROGRESS NOTES
Transitional Care Follow Up Visit  Subjective     Sadie Jo Tijerina is a 78 y.o. female who presents for a transitional care management visit.    Within 48 business hours after discharge our office contacted her via telephone to coordinate her care and needs.      I reviewed and discussed the details of that call along with the discharge summary, hospital problems, inpatient lab results, inpatient diagnostic studies, and consultation reports with Sadie.     Current outpatient and discharge medications have been reconciled for the patient.    Date of TCM Phone Call 1/10/2017 1/16/2018 3/1/2019 4/8/2019   [facility]   Date of Admission 12/26/2016 1/11/2018 2/23/2019 4/1/2019   Date of Discharge 1/7/2017 1/15/2018 2/28/2019 4/7/2019   Discharge Disposition Home or Self Care Home or Self Care Home or Self Care Home-Health Care [facility]     Risk for Readmission (LACE) Score: 12 (4/7/2019  6:00 AM)      History of Present Illness   Course During Hospital Stay:  Patient here for hospital follow-up.  Recent COPD exacerbation improved on medications.  Patient still on steroid and still has some cough.  Blood pressure stable now.  Patient still smokes.  Recent thrush after antibiotics patient is using nystatin suspension improved.  Patient still feels weak.  Also requested tramadol for arthritis pain.  Patient still has anxious nervousness.  Blood test that showed leukocytosis in the hospital.     The following portions of the patient's history were reviewed and updated as appropriate: allergies, current medications, past family history, past medical history, past social history, past surgical history and problem list.    Review of Systems   Constitutional: Negative.    Respiratory: Positive for cough.    Cardiovascular: Negative.    Gastrointestinal: Negative.    Musculoskeletal: Negative.    Skin: Negative.    Neurological: Negative.     Psychiatric/Behavioral: Negative.        Objective   Physical Exam   Constitutional: She is oriented to person, place, and time. She appears well-nourished.   Neck: Neck supple.   Cardiovascular: Normal rate, regular rhythm and normal heart sounds.   Pulmonary/Chest: Effort normal and breath sounds normal.   Abdominal: Bowel sounds are normal.   Neurological: She is alert and oriented to person, place, and time.   Skin: Skin is warm.   Psychiatric: She has a normal mood and affect.       Assessment/Plan   There are no diagnoses linked to this encounter.      1.     2. COPD exacerbation chronic respiratory failure with hypoxia, on supplemental O2 prn at home , mucinex start continue prednisone and inhaler , mucinex start   3. CHF, stable now  4. HTN continue continue medication  5. Chronic tobacco use encourage patient to quit  6. Restless leg syndrome continue medication  7. Thrush with pharyngitis, medicine helps  8. Leukocytosis,  repeat  9. Weakness, need HH for PT OT  10. Arthritis pain refill tramadol   11. Anxiety increase to 100 zoloft    3 weeks with others

## 2019-04-10 NOTE — OUTREACH NOTE
COPD/PN Week 1 Survey      Responses   Facility patient discharged from?  [facility]   Does the patient have one of the following disease processes/diagnoses(primary or secondary)?  COPD/Pneumonia   Is there a successful TCM telephone encounter documented?  Yes [completed on 4/8]          Jo Galvez RN

## 2019-04-15 ENCOUNTER — TELEPHONE (OUTPATIENT)
Dept: INTERNAL MEDICINE | Facility: CLINIC | Age: 79
End: 2019-04-15

## 2019-04-15 NOTE — TELEPHONE ENCOUNTER
"I received the following email on 19 from [facility].     \"I admitted Sadie Tijerina, :1940, for home health services. I am going to refer her to physical and occupational therapy due to weakness and lack of endurance when performing ADLS. Just wanted Dr Wolff to know.     Thanks,  Milli\"  "

## 2019-04-16 ENCOUNTER — READMISSION MANAGEMENT (OUTPATIENT)
Dept: CALL CENTER | Facility: HOSPITAL | Age: 79
End: 2019-04-16

## 2019-04-16 NOTE — OUTREACH NOTE
COPD/PN Week 2 Survey      Responses   Facility patient discharged from?  [facility]   Does the patient have one of the following disease processes/diagnoses(primary or secondary)?  COPD/Pneumonia   Was the primary reason for admission:  COPD exacerbation   Week 2 attempt successful?  Yes   Call start time  1131   Call end time  1136   Discharge diagnosis  acute COPD exacerbation   Meds reviewed with patient/caregiver?  Yes   Is the patient having any side effects they believe may be caused by any medication additions or changes?  No   Does the patient have all medications ordered at discharge?  Yes   Is the patient taking all medications as directed (includes completed medication regime)?  Yes   Does the patient have a primary care provider?   Yes   Does the patient have an appointment with their PCP or pulmonologist within 7 days of discharge?  Yes   Comments regarding PCP   Kristian Wolff MD. Patient has seen since discharge.    Has the patient kept scheduled appointments due by today?  Yes   What is the Home health agency?   [facility]    Has home health visited the patient within 72 hours of discharge?  Yes   What DME was ordered?  conserving device for O2 from [facility]   DME comments  Patient has home O2 for nighttime and has home nebulizer.    Psychosocial issues?  No   Comments  Patient is still having some SOB, hoarseness in voice. She reports good po intake and is feeling better.    Did the patient receive a copy of their discharge instructions?  Yes   Nursing interventions  Reviewed instructions with patient   What is the patient's perception of their health status since discharge?  Improving   Nursing Interventions  Nurse provided patient education   Are the patient's immunizations up to date?   Yes   Nursing interventions  Advised patient to discuss with provider at next visit   If the patient is a current smoker, are they able to teach back resources for cessation?  Smoking cessation medications   Is the  patient/caregiver able to teach back the hierarchy of who to call/visit for symptoms/problems? PCP, Specialist, Home health nurse, Urgent Care, ED, 911  Yes   Is the patient able to teach back COPD zones?  Yes   Nursing interventions  Education provided on various zones   Patient reports what zone on this call?  Yellow Zone   Yellow Zone  Increased shortness of air, Increased or thicker phlegm/mucus, Medication is not helping relieve symptoms, Fever or feeling like have a chest cold, Poor Appetite, Unable to complete daily activities, Using quick relief inhaler/nebulizer more often, Increased swelling of ankles, Poor sleep and symptoms work patient up   Yellow interventions  Continue to use daily medications, Use quick relief inhaler as ordered, Use oxygen as ordered, Use other meds such as steroids or antibiotics as ordered, Do not smoke, Get plenty of rest, Call provider immediatly if symptoms do not improve   Week 2 call completed?  Yes          Dante Montoya RN

## 2019-04-22 ENCOUNTER — APPOINTMENT (OUTPATIENT)
Dept: GENERAL RADIOLOGY | Facility: HOSPITAL | Age: 79
End: 2019-04-22

## 2019-04-22 ENCOUNTER — HOSPITAL ENCOUNTER (EMERGENCY)
Facility: HOSPITAL | Age: 79
Discharge: HOME | End: 2019-04-23
Attending: STUDENT IN AN ORGANIZED HEALTH CARE EDUCATION/TRAINING PROGRAM | Admitting: STUDENT IN AN ORGANIZED HEALTH CARE EDUCATION/TRAINING PROGRAM

## 2019-04-22 DIAGNOSIS — J44.1 ACUTE EXACERBATION OF CHRONIC OBSTRUCTIVE PULMONARY DISEASE (COPD) (HCC): Primary | ICD-10-CM

## 2019-04-22 LAB
ALBUMIN SERPL-MCNC: 3.7 G/DL (ref 3.5–5)
ALBUMIN/GLOB SERPL: 1.2 G/DL (ref 1–2)
ALP SERPL-CCNC: 68 U/L (ref 38–126)
ALT SERPL W P-5'-P-CCNC: 24 U/L (ref 13–69)
ANION GAP SERPL CALCULATED.3IONS-SCNC: 11.2 MMOL/L (ref 10–20)
AST SERPL-CCNC: 19 U/L (ref 15–46)
BASOPHILS # BLD AUTO: 0.05 10*3/MM3 (ref 0–0.2)
BASOPHILS NFR BLD AUTO: 0.4 % (ref 0–1.5)
BILIRUB SERPL-MCNC: 0.5 MG/DL (ref 0.2–1.3)
BUN BLD-MCNC: 21 MG/DL (ref 7–20)
BUN/CREAT SERPL: 23.3 (ref 7.1–23.5)
CALCIUM SPEC-SCNC: 9.2 MG/DL (ref 8.4–10.2)
CHLORIDE SERPL-SCNC: 97 MMOL/L (ref 98–107)
CO2 SERPL-SCNC: 34 MMOL/L (ref 26–30)
CREAT BLD-MCNC: 0.9 MG/DL (ref 0.6–1.3)
DEPRECATED RDW RBC AUTO: 49.1 FL (ref 37–54)
EOSINOPHIL # BLD AUTO: 0.2 10*3/MM3 (ref 0–0.4)
EOSINOPHIL NFR BLD AUTO: 1.4 % (ref 0.3–6.2)
ERYTHROCYTE [DISTWIDTH] IN BLOOD BY AUTOMATED COUNT: 14.1 % (ref 12.3–15.4)
GFR SERPL CREATININE-BSD FRML MDRD: 60 ML/MIN/1.73
GLOBULIN UR ELPH-MCNC: 3.1 GM/DL
GLUCOSE BLD-MCNC: 105 MG/DL (ref 74–98)
HCT VFR BLD AUTO: 39.7 % (ref 34–46.6)
HGB BLD-MCNC: 12.6 G/DL (ref 12–15.9)
IMM GRANULOCYTES # BLD AUTO: 0.05 10*3/MM3 (ref 0–0.05)
IMM GRANULOCYTES NFR BLD AUTO: 0.4 % (ref 0–0.5)
LYMPHOCYTES # BLD AUTO: 2.54 10*3/MM3 (ref 0.7–3.1)
LYMPHOCYTES NFR BLD AUTO: 18.1 % (ref 19.6–45.3)
MCH RBC QN AUTO: 30.1 PG (ref 26.6–33)
MCHC RBC AUTO-ENTMCNC: 31.7 G/DL (ref 31.5–35.7)
MCV RBC AUTO: 94.7 FL (ref 79–97)
MONOCYTES # BLD AUTO: 1.07 10*3/MM3 (ref 0.1–0.9)
MONOCYTES NFR BLD AUTO: 7.6 % (ref 5–12)
NEUTROPHILS # BLD AUTO: 10.13 10*3/MM3 (ref 1.7–7)
NEUTROPHILS NFR BLD AUTO: 72.1 % (ref 42.7–76)
NRBC BLD AUTO-RTO: 0 /100 WBC (ref 0–0.2)
NT-PROBNP SERPL-MCNC: 214 PG/ML (ref 0–450)
PLATELET # BLD AUTO: 154 10*3/MM3 (ref 140–450)
PMV BLD AUTO: 9.5 FL (ref 6–12)
POTASSIUM BLD-SCNC: 4.2 MMOL/L (ref 3.5–5.1)
PROT SERPL-MCNC: 6.8 G/DL (ref 6.3–8.2)
RBC # BLD AUTO: 4.19 10*6/MM3 (ref 3.77–5.28)
SODIUM BLD-SCNC: 138 MMOL/L (ref 137–145)
TROPONIN I SERPL-MCNC: <0.012 NG/ML (ref 0–0.03)
WBC NRBC COR # BLD: 14.04 10*3/MM3 (ref 3.4–10.8)

## 2019-04-22 PROCEDURE — 96375 TX/PRO/DX INJ NEW DRUG ADDON: CPT

## 2019-04-22 PROCEDURE — 25010000002 MAGNESIUM SULFATE 2 GM/50ML SOLUTION: Performed by: STUDENT IN AN ORGANIZED HEALTH CARE EDUCATION/TRAINING PROGRAM

## 2019-04-22 PROCEDURE — 71045 X-RAY EXAM CHEST 1 VIEW: CPT

## 2019-04-22 PROCEDURE — 94640 AIRWAY INHALATION TREATMENT: CPT

## 2019-04-22 PROCEDURE — 96366 THER/PROPH/DIAG IV INF ADDON: CPT

## 2019-04-22 PROCEDURE — 94799 UNLISTED PULMONARY SVC/PX: CPT

## 2019-04-22 PROCEDURE — 84484 ASSAY OF TROPONIN QUANT: CPT | Performed by: STUDENT IN AN ORGANIZED HEALTH CARE EDUCATION/TRAINING PROGRAM

## 2019-04-22 PROCEDURE — 25010000002 METHYLPREDNISOLONE PER 125 MG: Performed by: STUDENT IN AN ORGANIZED HEALTH CARE EDUCATION/TRAINING PROGRAM

## 2019-04-22 PROCEDURE — 83880 ASSAY OF NATRIURETIC PEPTIDE: CPT | Performed by: STUDENT IN AN ORGANIZED HEALTH CARE EDUCATION/TRAINING PROGRAM

## 2019-04-22 PROCEDURE — 85025 COMPLETE CBC W/AUTO DIFF WBC: CPT | Performed by: STUDENT IN AN ORGANIZED HEALTH CARE EDUCATION/TRAINING PROGRAM

## 2019-04-22 PROCEDURE — 99284 EMERGENCY DEPT VISIT MOD MDM: CPT

## 2019-04-22 PROCEDURE — 80053 COMPREHEN METABOLIC PANEL: CPT | Performed by: STUDENT IN AN ORGANIZED HEALTH CARE EDUCATION/TRAINING PROGRAM

## 2019-04-22 PROCEDURE — 96365 THER/PROPH/DIAG IV INF INIT: CPT

## 2019-04-22 PROCEDURE — 84145 PROCALCITONIN (PCT): CPT | Performed by: STUDENT IN AN ORGANIZED HEALTH CARE EDUCATION/TRAINING PROGRAM

## 2019-04-22 PROCEDURE — 93005 ELECTROCARDIOGRAM TRACING: CPT | Performed by: STUDENT IN AN ORGANIZED HEALTH CARE EDUCATION/TRAINING PROGRAM

## 2019-04-22 RX ORDER — SODIUM CHLORIDE 0.9 % (FLUSH) 0.9 %
10 SYRINGE (ML) INJECTION AS NEEDED
Status: DISCONTINUED | OUTPATIENT
Start: 2019-04-22 | End: 2019-04-23 | Stop reason: HOSPADM

## 2019-04-22 RX ORDER — MAGNESIUM SULFATE HEPTAHYDRATE 40 MG/ML
2 INJECTION, SOLUTION INTRAVENOUS ONCE
Status: COMPLETED | OUTPATIENT
Start: 2019-04-22 | End: 2019-04-23

## 2019-04-22 RX ORDER — IPRATROPIUM BROMIDE AND ALBUTEROL SULFATE 2.5; .5 MG/3ML; MG/3ML
3 SOLUTION RESPIRATORY (INHALATION) ONCE
Status: COMPLETED | OUTPATIENT
Start: 2019-04-22 | End: 2019-04-22

## 2019-04-22 RX ORDER — METHYLPREDNISOLONE SODIUM SUCCINATE 125 MG/2ML
125 INJECTION, POWDER, LYOPHILIZED, FOR SOLUTION INTRAMUSCULAR; INTRAVENOUS ONCE
Status: COMPLETED | OUTPATIENT
Start: 2019-04-22 | End: 2019-04-22

## 2019-04-22 RX ORDER — IPRATROPIUM BROMIDE AND ALBUTEROL SULFATE 2.5; .5 MG/3ML; MG/3ML
3 SOLUTION RESPIRATORY (INHALATION) EVERY 4 HOURS PRN
Qty: 360 ML | Refills: 0 | Status: SHIPPED | OUTPATIENT
Start: 2019-04-22 | End: 2019-04-23

## 2019-04-22 RX ADMIN — METHYLPREDNISOLONE SODIUM SUCCINATE 125 MG: 125 INJECTION, POWDER, FOR SOLUTION INTRAMUSCULAR; INTRAVENOUS at 23:14

## 2019-04-22 RX ADMIN — IPRATROPIUM BROMIDE AND ALBUTEROL SULFATE 3 ML: .5; 3 SOLUTION RESPIRATORY (INHALATION) at 23:26

## 2019-04-22 RX ADMIN — MAGNESIUM SULFATE HEPTAHYDRATE 2 G: 40 INJECTION, SOLUTION INTRAVENOUS at 23:15

## 2019-04-23 ENCOUNTER — OFFICE VISIT (OUTPATIENT)
Dept: PULMONOLOGY | Facility: CLINIC | Age: 79
End: 2019-04-23

## 2019-04-23 VITALS
HEART RATE: 82 BPM | DIASTOLIC BLOOD PRESSURE: 63 MMHG | WEIGHT: 114 LBS | SYSTOLIC BLOOD PRESSURE: 127 MMHG | RESPIRATION RATE: 20 BRPM | TEMPERATURE: 97.8 F | OXYGEN SATURATION: 96 % | HEIGHT: 62 IN | BODY MASS INDEX: 20.98 KG/M2

## 2019-04-23 VITALS
OXYGEN SATURATION: 93 % | SYSTOLIC BLOOD PRESSURE: 109 MMHG | BODY MASS INDEX: 20.24 KG/M2 | RESPIRATION RATE: 18 BRPM | HEART RATE: 95 BPM | DIASTOLIC BLOOD PRESSURE: 60 MMHG | HEIGHT: 62 IN | WEIGHT: 110 LBS

## 2019-04-23 DIAGNOSIS — F17.200 SMOKING: ICD-10-CM

## 2019-04-23 DIAGNOSIS — J44.9 CHRONIC OBSTRUCTIVE PULMONARY DISEASE, UNSPECIFIED COPD TYPE (HCC): Primary | ICD-10-CM

## 2019-04-23 DIAGNOSIS — R06.02 SOB (SHORTNESS OF BREATH): Primary | ICD-10-CM

## 2019-04-23 DIAGNOSIS — R06.02 SHORTNESS OF BREATH: ICD-10-CM

## 2019-04-23 DIAGNOSIS — G47.34 NOCTURNAL HYPOXEMIA: ICD-10-CM

## 2019-04-23 DIAGNOSIS — B37.0 ORAL CANDIDA: ICD-10-CM

## 2019-04-23 LAB
HOLD SPECIMEN: NORMAL
PROCALCITONIN SERPL-MCNC: <0.05 NG/ML
WHOLE BLOOD HOLD SPECIMEN: NORMAL
WHOLE BLOOD HOLD SPECIMEN: NORMAL

## 2019-04-23 PROCEDURE — 99214 OFFICE O/P EST MOD 30 MIN: CPT | Performed by: NURSE PRACTITIONER

## 2019-04-23 PROCEDURE — 94060 EVALUATION OF WHEEZING: CPT | Performed by: INTERNAL MEDICINE

## 2019-04-23 PROCEDURE — 94726 PLETHYSMOGRAPHY LUNG VOLUMES: CPT | Performed by: INTERNAL MEDICINE

## 2019-04-23 PROCEDURE — 94618 PULMONARY STRESS TESTING: CPT | Performed by: NURSE PRACTITIONER

## 2019-04-23 PROCEDURE — 94729 DIFFUSING CAPACITY: CPT | Performed by: INTERNAL MEDICINE

## 2019-04-23 RX ORDER — ROFLUMILAST 500 UG/1
500 TABLET ORAL DAILY
Qty: 30 TABLET | Refills: 5 | Status: ON HOLD | OUTPATIENT
Start: 2019-04-23 | End: 2019-05-12

## 2019-04-23 RX ORDER — FLUCONAZOLE 150 MG/1
150 TABLET ORAL ONCE
Qty: 1 TABLET | Refills: 0 | Status: SHIPPED | OUTPATIENT
Start: 2019-04-23 | End: 2019-04-23

## 2019-04-23 RX ORDER — PREDNISONE 10 MG/1
TABLET ORAL
Qty: 22 TABLET | Refills: 0 | Status: SHIPPED | OUTPATIENT
Start: 2019-04-23 | End: 2019-05-01

## 2019-04-24 ENCOUNTER — READMISSION MANAGEMENT (OUTPATIENT)
Dept: CALL CENTER | Facility: HOSPITAL | Age: 79
End: 2019-04-24

## 2019-04-24 NOTE — OUTREACH NOTE
"COPD/PN Week 2 Survey      Responses   Facility patient discharged from?  [facility]   Does the patient have one of the following disease processes/diagnoses(primary or secondary)?  COPD/Pneumonia   Was the primary reason for admission:  COPD exacerbation   Call start time  1639   Call end time  1641   Discharge diagnosis  acute COPD exacerbation   Is patient permission given to speak with other caregiver?  Yes   List who call center can speak with  dtr   Meds reviewed with patient/caregiver?  Yes   Is the patient taking all medications as directed (includes completed medication regime)?  Yes   Has the patient kept scheduled appointments due by today?  Yes   What is the Home health agency?   [facility]    DME comments  Patient has home O2 for nighttime and has home nebulizer.    Psychosocial issues?  No   Comments  Patient is still having some SOB, hoarseness in voice. She feels better today per her report.    What is the patient's perception of their health status since discharge?  Improving   Nursing Interventions  Nurse provided patient education   Is the patient/caregiver able to teach back the hierarchy of who to call/visit for symptoms/problems? PCP, Specialist, Home health nurse, Urgent Care, ED, 911  Yes   Additional teach back comments  Pt still smoking a \"small amt\"    Is the patient able to teach back COPD zones?  Yes   Patient reports what zone on this call?  Yellow Zone   Yellow Zone  Increased or thicker phlegm/mucus, Using quick relief inhaler/nebulizer more often, Medication is not helping relieve symptoms   Yellow interventions  Do not smoke, Get plenty of rest, Continue to use daily medications          Mariajose Washington RN  "

## 2019-04-25 ENCOUNTER — TELEPHONE (OUTPATIENT)
Dept: INTERNAL MEDICINE | Facility: CLINIC | Age: 79
End: 2019-04-25

## 2019-04-25 NOTE — TELEPHONE ENCOUNTER
Milli from [facility] ([phone]) left a VM today at 9:23AM is wanting verbal order for pt to have telehealth, I contacted Milli back and gave verbal.

## 2019-04-26 LAB
BASOPHILS # BLD AUTO: 0.03 10*3/MM3 (ref 0–0.2)
BASOPHILS NFR BLD AUTO: 0.2 % (ref 0–1.5)
BUN SERPL-MCNC: 25 MG/DL (ref 7–20)
BUN/CREAT SERPL: 35.7 (ref 7.1–23.5)
CALCIUM SERPL-MCNC: 9.5 MG/DL (ref 8.4–10.2)
CHLORIDE SERPL-SCNC: 99 MMOL/L (ref 98–107)
CO2 SERPL-SCNC: 33 MMOL/L (ref 26–30)
CREAT SERPL-MCNC: 0.7 MG/DL (ref 0.6–1.3)
EOSINOPHIL # BLD AUTO: 0.14 10*3/MM3 (ref 0–0.4)
EOSINOPHIL NFR BLD AUTO: 1.1 % (ref 0.3–6.2)
ERYTHROCYTE [DISTWIDTH] IN BLOOD BY AUTOMATED COUNT: 14.1 % (ref 12.3–15.4)
GLUCOSE SERPL-MCNC: 83 MG/DL (ref 74–98)
HCT VFR BLD AUTO: 38.7 % (ref 34–46.6)
HGB BLD-MCNC: 12.2 G/DL (ref 12–15.9)
IMM GRANULOCYTES # BLD AUTO: 0.06 10*3/MM3 (ref 0–0.05)
IMM GRANULOCYTES NFR BLD AUTO: 0.5 % (ref 0–0.5)
LYMPHOCYTES # BLD AUTO: 3.2 10*3/MM3 (ref 0.7–3.1)
LYMPHOCYTES NFR BLD AUTO: 24.2 % (ref 19.6–45.3)
MCH RBC QN AUTO: 29 PG (ref 26.6–33)
MCHC RBC AUTO-ENTMCNC: 31.5 G/DL (ref 31.5–35.7)
MCV RBC AUTO: 92.1 FL (ref 79–97)
MONOCYTES # BLD AUTO: 0.95 10*3/MM3 (ref 0.1–0.9)
MONOCYTES NFR BLD AUTO: 7.2 % (ref 5–12)
NEUTROPHILS # BLD AUTO: 8.83 10*3/MM3 (ref 1.7–7)
NEUTROPHILS NFR BLD AUTO: 66.8 % (ref 42.7–76)
NRBC BLD AUTO-RTO: 0 /100 WBC (ref 0–0.2)
PLATELET # BLD AUTO: 205 10*3/MM3 (ref 140–450)
POTASSIUM SERPL-SCNC: 3.8 MMOL/L (ref 3.5–5.1)
RBC # BLD AUTO: 4.2 10*6/MM3 (ref 3.77–5.28)
SODIUM SERPL-SCNC: 139 MMOL/L (ref 137–145)
WBC # BLD AUTO: 13.21 10*3/MM3 (ref 3.4–10.8)

## 2019-04-29 ENCOUNTER — TELEPHONE (OUTPATIENT)
Dept: INTERNAL MEDICINE | Facility: CLINIC | Age: 79
End: 2019-04-29

## 2019-04-29 NOTE — TELEPHONE ENCOUNTER
Anirudh Menjivar, an OT with [facility], ( [phone] )   Left a VM 4/26/19 at 2:01PM requesting verbal orders for OT once weekly for 5 weeks. He gave verbal okay to leave a vm regarding this. I left verbal order on vm.

## 2019-05-01 ENCOUNTER — OFFICE VISIT (OUTPATIENT)
Dept: INTERNAL MEDICINE | Facility: CLINIC | Age: 79
End: 2019-05-01

## 2019-05-01 VITALS
WEIGHT: 110 LBS | SYSTOLIC BLOOD PRESSURE: 114 MMHG | DIASTOLIC BLOOD PRESSURE: 66 MMHG | HEART RATE: 83 BPM | BODY MASS INDEX: 20.24 KG/M2 | HEIGHT: 62 IN | OXYGEN SATURATION: 93 % | TEMPERATURE: 97.1 F

## 2019-05-01 DIAGNOSIS — R63.4 WEIGHT LOSS: ICD-10-CM

## 2019-05-01 DIAGNOSIS — G62.9 NEUROPATHY: ICD-10-CM

## 2019-05-01 DIAGNOSIS — F41.8 MIXED ANXIETY DEPRESSIVE DISORDER: ICD-10-CM

## 2019-05-01 DIAGNOSIS — J44.1 COPD WITH ACUTE EXACERBATION (HCC): Primary | ICD-10-CM

## 2019-05-01 DIAGNOSIS — R79.9 ABNORMAL FINDING OF BLOOD CHEMISTRY: ICD-10-CM

## 2019-05-01 DIAGNOSIS — I10 ESSENTIAL HYPERTENSION: Chronic | ICD-10-CM

## 2019-05-01 PROCEDURE — 99214 OFFICE O/P EST MOD 30 MIN: CPT | Performed by: INTERNAL MEDICINE

## 2019-05-01 NOTE — PROGRESS NOTES
Subjective   Sadie Tijerina is a 78 y.o. female.     Chief Complaint   Patient presents with   • Follow-up       History of Present Illness   Patient here for follow-up.  Recent COPD exacerbation white blood cell still elevated.  Patient still has some short of breath and cough.  Patient is seeing lung doctor.  Chest x-ray showed no pneumonia.  Weight loss 10 pounds recently due to sickness.  Hypertension stable on medication.  Anxiety depression.  Patient self discontinued sertraline.  Patient also complains of both feet numbness tingling.    Current Outpatient Medications:   •  albuterol (PROVENTIL HFA;VENTOLIN HFA) 108 (90 Base) MCG/ACT inhaler, Inhale 1 puff Every 4 (Four) Hours As Needed for Shortness of Air., Disp: 18 g, Rfl: 3  •  bacitracin-silver sulfadiazine-nystatin, Apply  topically to the appropriate area as directed 2 (Two) Times a Day., Disp: 1 tube, Rfl: 1  •  budesonide-formoterol (SYMBICORT) 160-4.5 MCG/ACT inhaler, Inhale 2 puffs 2 (Two) Times a Day., Disp: 10.2 g, Rfl: 3  •  clonazePAM (KlonoPIN) 0.5 MG tablet, TAKE 1 TABLET BY MOUTH AT BEDTIME AS NEEDED for seizures, Disp: 30 tablet, Rfl: 0  •  estrogens, conjugated, (PREMARIN) 0.625 MG tablet, Take 1 tablet by mouth Daily. 200001, Disp: 90 tablet, Rfl: 3  •  fluticasone (FLONASE) 50 MCG/ACT nasal spray, 2 sprays by Each Nare route Daily., Disp: , Rfl:   •  ipratropium (ATROVENT) 0.02 % nebulizer solution, Take 2.5 mL by nebulization 4 (Four) Times a Day As Needed for Wheezing or Shortness of Air., Disp: 12.5 mL, Rfl: 5  •  lactobacillus acidophilus (RISAQUAD) capsule capsule, Take 1 capsule by mouth 2 (Two) Times a Day., Disp: 20 capsule, Rfl: 0  •  meclizine (ANTIVERT) 12.5 MG tablet, Take 1 tablet by mouth 3 (Three) Times a Day As Needed for dizziness., Disp: 90 tablet, Rfl: 0  •  nystatin (MYCOSTATIN) 050056 UNIT/GM cream, Apply  topically to the appropriate area as directed As Needed (Yeast infection)., Disp: 15 g, Rfl: 0  •   omeprazole (priLOSEC) 40 MG capsule, Take 1 capsule by mouth Daily., Disp: 90 capsule, Rfl: 3  •  pravastatin (PRAVACHOL) 20 MG tablet, Take 1 tablet by mouth Every Evening., Disp: 30 tablet, Rfl: 5  •  promethazine (PHENERGAN) 12.5 MG tablet, Take 1 tablet by mouth Every 8 (Eight) Hours As Needed for Nausea or Vomiting., Disp: 10 tablet, Rfl: 0  •  propranolol (INDERAL) 60 MG tablet, 1 daily po, Disp: 30 tablet, Rfl: 1  •  roflumilast (DALIRESP) 500 MCG tablet tablet, Take 1 tablet by mouth Daily., Disp: 30 tablet, Rfl: 5  •  sertraline (ZOLOFT) 100 MG tablet, Take 1 tablet by mouth every night at bedtime., Disp: 30 tablet, Rfl: 3  •  SPIRIVA HANDIHALER 18 MCG per inhalation capsule, place 1 capsule into inhaler and inhale ONCE daily, Disp: 30 capsule, Rfl: 4  •  traMADol (ULTRAM) 50 MG tablet, Take 1 tablet by mouth Every 8 (Eight) Hours As Needed (pain)., Disp: 40 tablet, Rfl: 0    The following portions of the patient's history were reviewed and updated as appropriate: allergies, current medications, past family history, past medical history, past social history, past surgical history and problem list.    Review of Systems   Constitutional: Positive for unexpected weight change.   Respiratory: Positive for cough and shortness of breath.    Cardiovascular: Negative.    Gastrointestinal: Negative.    Musculoskeletal: Negative.    Skin: Negative.    Neurological: Positive for numbness.   Psychiatric/Behavioral: The patient is nervous/anxious.        Objective   Physical Exam   Constitutional: She is oriented to person, place, and time. She appears well-developed and well-nourished.   Neck: Neck supple.   Cardiovascular: Normal rate, regular rhythm and normal heart sounds.   Pulmonary/Chest: Effort normal and breath sounds normal.   Abdominal: Bowel sounds are normal.   Neurological: She is alert and oriented to person, place, and time.   Numbness to touch both feet   Skin: Skin is warm.   Psychiatric: She has a  normal mood and affect.       All tests have been reviewed.    Assessment/Plan   Diagnoses and all orders for this visit:    COPD with acute exacerbation (CMS/HCC) continue meds and follow up with lung doctor, WBC high repeat , continue med    Weight loss 10 Ib after copd exac, encourage food intake    Essential hypertension continue med    Mixed anxiety depressive disorder, resume sertraline    Feet neuropathy, do A1c   3 weeks with others            1.  Below is the historical record for reference only:  2.   3. COPD exacerbation chronic respiratory failure with hypoxia, on supplemental O2 prn at home , mucinex start continue prednisone and inhaler ,  4. CHF, stable now  5. HTN continue continue medication  6. Chronic tobacco use encourage patient to quit  7. Restless leg syndrome continue medication  8. Thrush with pharyngitis, medicine helps  9. Leukocytosis,  repeat  10. Weakness, need HH for PT OT  11. Arthritis pain refill tramadol   12. Anxiety increase to 100 zoloft   Acute left-sided low back pain without sciatica XR Spine Lumbar 4+ View; DJD--   Benign paroxysmal positional vertigo, unspecified laterality--   Tobacco abuse disorder occa, to quit  Anxiety stable on med and continue  Dizzy loss of balance, when standing up probably due to lightheaded  D/c propranolol no help, resume. carotid, echo, holter unremarkble    MRI head micro ischemic changes continue ASA 81mg daily    TMJ, continue aleve or tylenol for now   allergy, d/c medicine  Vitamin D deficiency start Vit D3 1000iu  Low back pain improved after Flexeril and Tylenol,refill tramadol, xray:mild DJD refill tramadol  HRT patient still wants it.wean very slow, --  hyperlipidemia, continue pravastatin   RLS, continue clonazepam   Tremor resume propranolol d/c sinemet for not helping  Knee OA xr OA, refused cortisone shot, or glucosamine, tramadol  Refused disrobing for PE             3 weeks with others

## 2019-05-02 ENCOUNTER — READMISSION MANAGEMENT (OUTPATIENT)
Dept: CALL CENTER | Facility: HOSPITAL | Age: 79
End: 2019-05-02

## 2019-05-02 ENCOUNTER — TELEPHONE (OUTPATIENT)
Dept: INTERNAL MEDICINE | Facility: CLINIC | Age: 79
End: 2019-05-02

## 2019-05-02 NOTE — TELEPHONE ENCOUNTER
Jen Longoria [phone], a PT with [facility], left a VM today at 11:55AM requesting verbal for PT with Sadie for twice a week for 4 weeks. Called back and gave verbal.

## 2019-05-02 NOTE — OUTREACH NOTE
COPD/PN Week 4 Survey      Responses   Facility patient discharged from?  [facility]   Does the patient have one of the following disease processes/diagnoses(primary or secondary)?  COPD/Pneumonia   Was the primary reason for admission:  COPD exacerbation   Week 4 attempt successful?  Yes   Call start time  1444   Call end time  1448   Discharge diagnosis  acute COPD exacerbation   Meds reviewed with patient/caregiver?  Yes   Is the patient having any side effects they believe may be caused by any medication additions or changes?  No   Is the patient taking all medications as directed (includes completed medication regime)?  Yes   Medication comments  Reports she has another new prescription that she will start tomorrow. Completed Prednisone.   Has the patient kept scheduled appointments due by today?  Yes   Is the patient still receiving Home Health Services?  Yes   Home health comments  Reports HH seeing her at time of call.   DME comments  Patient has home O2 for nighttime and has home nebulizer.    Psychosocial issues?  No   Comments  Still hoarse. Reports doing the same as before.   What is the patient's perception of their health status since discharge?  Same   Nursing Interventions  Nurse provided patient education   Are the patient's immunizations up to date?   Yes   Nursing interventions  Educated on importance of maintaining up to date immunizations as advised by provider   If the patient is a current smoker, are they able to teach back resources for cessation?  Smoking cessation medications   Is the patient/caregiver able to teach back the hierarchy of who to call/visit for symptoms/problems? PCP, Specialist, Home health nurse, Urgent Care, ED, 911  Yes   Is the patient able to teach back COPD zones?  Yes   Nursing interventions  Education provided on various zones   Patient reports what zone on this call?  Yellow Zone   Yellow Zone  Increased shortness of air, Unable to complete daily activities, Increased  or thicker phlegm/mucus, Fever or feeling like have a chest cold, Poor sleep and symptoms work patient up, Using quick relief inhaler/nebulizer more often, Increased swelling of ankles, Poor Appetite, Medication is not helping relieve symptoms   Yellow interventions  Do not smoke, Get plenty of rest, Continue to use daily medications, Use other meds such as steroids or antibiotics as ordered, Call provider immediatly if symptoms do not improve   Week 4 call completed?  Yes   Would the patient like one additional call?  No   Graduated  Yes          Christiano Harden RN

## 2019-05-04 ENCOUNTER — HOSPITAL ENCOUNTER (EMERGENCY)
Facility: HOSPITAL | Age: 79
Discharge: HOME | End: 2019-05-04
Attending: STUDENT IN AN ORGANIZED HEALTH CARE EDUCATION/TRAINING PROGRAM | Admitting: STUDENT IN AN ORGANIZED HEALTH CARE EDUCATION/TRAINING PROGRAM

## 2019-05-04 ENCOUNTER — APPOINTMENT (OUTPATIENT)
Dept: GENERAL RADIOLOGY | Facility: HOSPITAL | Age: 79
End: 2019-05-04

## 2019-05-04 VITALS
OXYGEN SATURATION: 97 % | TEMPERATURE: 97.4 F | RESPIRATION RATE: 16 BRPM | BODY MASS INDEX: 19.49 KG/M2 | HEART RATE: 80 BPM | WEIGHT: 110 LBS | SYSTOLIC BLOOD PRESSURE: 141 MMHG | HEIGHT: 63 IN | DIASTOLIC BLOOD PRESSURE: 64 MMHG

## 2019-05-04 DIAGNOSIS — J44.9 CHRONIC OBSTRUCTIVE PULMONARY DISEASE, UNSPECIFIED COPD TYPE (HCC): Primary | ICD-10-CM

## 2019-05-04 DIAGNOSIS — F41.9 ANXIETY: ICD-10-CM

## 2019-05-04 LAB
A-A DO2: ABNORMAL MMHG
ALBUMIN SERPL-MCNC: 3.3 G/DL (ref 3.5–5)
ALBUMIN/GLOB SERPL: 1.2 G/DL (ref 1–2)
ALP SERPL-CCNC: 60 U/L (ref 38–126)
ALT SERPL W P-5'-P-CCNC: 27 U/L (ref 13–69)
ANION GAP SERPL CALCULATED.3IONS-SCNC: 7.9 MMOL/L (ref 10–20)
ARTERIAL PATENCY WRIST A: POSITIVE
AST SERPL-CCNC: 16 U/L (ref 15–46)
ATMOSPHERIC PRESS: 732 MMHG
BASE EXCESS BLDA CALC-SCNC: 10.4 MMOL/L (ref 0–2)
BASOPHILS # BLD AUTO: 0.06 10*3/MM3 (ref 0–0.2)
BASOPHILS NFR BLD AUTO: 0.4 % (ref 0–1.5)
BDY SITE: ABNORMAL
BILIRUB SERPL-MCNC: 0.4 MG/DL (ref 0.2–1.3)
BUN BLD-MCNC: 18 MG/DL (ref 7–20)
BUN/CREAT SERPL: 25.7 (ref 7.1–23.5)
CALCIUM SPEC-SCNC: 9.1 MG/DL (ref 8.4–10.2)
CHLORIDE SERPL-SCNC: 93 MMOL/L (ref 98–107)
CO2 SERPL-SCNC: 40 MMOL/L (ref 26–30)
COHGB MFR BLD: 1.7 % (ref 0–2)
CREAT BLD-MCNC: 0.7 MG/DL (ref 0.6–1.3)
DEPRECATED RDW RBC AUTO: 46.7 FL (ref 37–54)
EOSINOPHIL # BLD AUTO: 0.21 10*3/MM3 (ref 0–0.4)
EOSINOPHIL NFR BLD AUTO: 1.4 % (ref 0.3–6.2)
ERYTHROCYTE [DISTWIDTH] IN BLOOD BY AUTOMATED COUNT: 13.6 % (ref 12.3–15.4)
GFR SERPL CREATININE-BSD FRML MDRD: 81 ML/MIN/1.73
GLOBULIN UR ELPH-MCNC: 2.7 GM/DL
GLUCOSE BLD-MCNC: 120 MG/DL (ref 74–98)
HCO3 BLDA-SCNC: 37.8 MMOL/L (ref 22–28)
HCT VFR BLD AUTO: 40.1 % (ref 34–46.6)
HCT VFR BLD CALC: 39.5 %
HGB BLD-MCNC: 12.7 G/DL (ref 12–15.9)
HGB BLDA-MCNC: 12.9 G/DL (ref 12–18)
IMM GRANULOCYTES # BLD AUTO: 0.09 10*3/MM3 (ref 0–0.05)
IMM GRANULOCYTES NFR BLD AUTO: 0.6 % (ref 0–0.5)
LYMPHOCYTES # BLD AUTO: 2.75 10*3/MM3 (ref 0.7–3.1)
LYMPHOCYTES NFR BLD AUTO: 18 % (ref 19.6–45.3)
MCH RBC QN AUTO: 29.9 PG (ref 26.6–33)
MCHC RBC AUTO-ENTMCNC: 31.7 G/DL (ref 31.5–35.7)
MCV RBC AUTO: 94.4 FL (ref 79–97)
METHGB BLD QL: 0.9 % (ref 0–1.5)
MODALITY: ABNORMAL
MONOCYTES # BLD AUTO: 1.14 10*3/MM3 (ref 0.1–0.9)
MONOCYTES NFR BLD AUTO: 7.4 % (ref 5–12)
NEUTROPHILS # BLD AUTO: 11.06 10*3/MM3 (ref 1.7–7)
NEUTROPHILS NFR BLD AUTO: 72.2 % (ref 42.7–76)
NOTE: ABNORMAL
NRBC BLD AUTO-RTO: 0 /100 WBC (ref 0–0.2)
OXYHGB MFR BLDV: 95.4 % (ref 94–99)
PCO2 BLDA: 62.7 MM HG (ref 35–45)
PCO2 TEMP ADJ BLD: ABNORMAL MM HG (ref 35–45)
PH BLDA: 7.39 PH UNITS (ref 7.3–7.5)
PH, TEMP CORRECTED: ABNORMAL PH UNITS
PLATELET # BLD AUTO: 200 10*3/MM3 (ref 140–450)
PMV BLD AUTO: 9.5 FL (ref 6–12)
PO2 BLDA: 93.6 MM HG (ref 75–100)
PO2 TEMP ADJ BLD: ABNORMAL MM HG (ref 83–108)
POTASSIUM BLD-SCNC: 3.9 MMOL/L (ref 3.5–5.1)
PROT SERPL-MCNC: 6 G/DL (ref 6.3–8.2)
RBC # BLD AUTO: 4.25 10*6/MM3 (ref 3.77–5.28)
SAO2 % BLDCOA: 97.9 % (ref 94–100)
SODIUM BLD-SCNC: 137 MMOL/L (ref 137–145)
TROPONIN I SERPL-MCNC: <0.012 NG/ML (ref 0–0.03)
VENTILATOR MODE: ABNORMAL
WBC NRBC COR # BLD: 15.31 10*3/MM3 (ref 3.4–10.8)

## 2019-05-04 PROCEDURE — 25010000002 DEXAMETHASONE PER 1 MG: Performed by: STUDENT IN AN ORGANIZED HEALTH CARE EDUCATION/TRAINING PROGRAM

## 2019-05-04 PROCEDURE — 71046 X-RAY EXAM CHEST 2 VIEWS: CPT

## 2019-05-04 PROCEDURE — 96375 TX/PRO/DX INJ NEW DRUG ADDON: CPT

## 2019-05-04 PROCEDURE — 94640 AIRWAY INHALATION TREATMENT: CPT

## 2019-05-04 PROCEDURE — 80053 COMPREHEN METABOLIC PANEL: CPT | Performed by: STUDENT IN AN ORGANIZED HEALTH CARE EDUCATION/TRAINING PROGRAM

## 2019-05-04 PROCEDURE — 82805 BLOOD GASES W/O2 SATURATION: CPT

## 2019-05-04 PROCEDURE — 82375 ASSAY CARBOXYHB QUANT: CPT

## 2019-05-04 PROCEDURE — 36600 WITHDRAWAL OF ARTERIAL BLOOD: CPT

## 2019-05-04 PROCEDURE — 25010000002 LORAZEPAM PER 2 MG: Performed by: STUDENT IN AN ORGANIZED HEALTH CARE EDUCATION/TRAINING PROGRAM

## 2019-05-04 PROCEDURE — 85025 COMPLETE CBC W/AUTO DIFF WBC: CPT | Performed by: STUDENT IN AN ORGANIZED HEALTH CARE EDUCATION/TRAINING PROGRAM

## 2019-05-04 PROCEDURE — 84484 ASSAY OF TROPONIN QUANT: CPT | Performed by: STUDENT IN AN ORGANIZED HEALTH CARE EDUCATION/TRAINING PROGRAM

## 2019-05-04 PROCEDURE — 25010000002 MAGNESIUM SULFATE 2 GM/50ML SOLUTION: Performed by: STUDENT IN AN ORGANIZED HEALTH CARE EDUCATION/TRAINING PROGRAM

## 2019-05-04 PROCEDURE — 96374 THER/PROPH/DIAG INJ IV PUSH: CPT

## 2019-05-04 PROCEDURE — 25010000002 METHYLPREDNISOLONE PER 125 MG: Performed by: STUDENT IN AN ORGANIZED HEALTH CARE EDUCATION/TRAINING PROGRAM

## 2019-05-04 PROCEDURE — 83050 HGB METHEMOGLOBIN QUAN: CPT

## 2019-05-04 PROCEDURE — 93005 ELECTROCARDIOGRAM TRACING: CPT | Performed by: STUDENT IN AN ORGANIZED HEALTH CARE EDUCATION/TRAINING PROGRAM

## 2019-05-04 PROCEDURE — 99284 EMERGENCY DEPT VISIT MOD MDM: CPT

## 2019-05-04 RX ORDER — IPRATROPIUM BROMIDE AND ALBUTEROL SULFATE 2.5; .5 MG/3ML; MG/3ML
3 SOLUTION RESPIRATORY (INHALATION) ONCE
Status: COMPLETED | OUTPATIENT
Start: 2019-05-04 | End: 2019-05-04

## 2019-05-04 RX ORDER — MAGNESIUM SULFATE HEPTAHYDRATE 40 MG/ML
2 INJECTION, SOLUTION INTRAVENOUS ONCE
Status: COMPLETED | OUTPATIENT
Start: 2019-05-04 | End: 2019-05-04

## 2019-05-04 RX ORDER — METHYLPREDNISOLONE SODIUM SUCCINATE 125 MG/2ML
125 INJECTION, POWDER, LYOPHILIZED, FOR SOLUTION INTRAMUSCULAR; INTRAVENOUS ONCE
Status: COMPLETED | OUTPATIENT
Start: 2019-05-04 | End: 2019-05-04

## 2019-05-04 RX ORDER — DEXAMETHASONE SODIUM PHOSPHATE 10 MG/ML
10 INJECTION INTRAMUSCULAR; INTRAVENOUS ONCE
Status: COMPLETED | OUTPATIENT
Start: 2019-05-04 | End: 2019-05-04

## 2019-05-04 RX ORDER — LORAZEPAM 2 MG/ML
0.5 INJECTION INTRAMUSCULAR ONCE
Status: COMPLETED | OUTPATIENT
Start: 2019-05-04 | End: 2019-05-04

## 2019-05-04 RX ADMIN — DEXAMETHASONE SODIUM PHOSPHATE 10 MG: 10 INJECTION INTRAMUSCULAR; INTRAVENOUS at 12:46

## 2019-05-04 RX ADMIN — LORAZEPAM 0.5 MG: 2 INJECTION, SOLUTION INTRAMUSCULAR; INTRAVENOUS at 10:06

## 2019-05-04 RX ADMIN — METHYLPREDNISOLONE SODIUM SUCCINATE 125 MG: 125 INJECTION, POWDER, FOR SOLUTION INTRAMUSCULAR; INTRAVENOUS at 09:54

## 2019-05-04 RX ADMIN — IPRATROPIUM BROMIDE AND ALBUTEROL SULFATE 3 ML: .5; 3 SOLUTION RESPIRATORY (INHALATION) at 09:56

## 2019-05-04 RX ADMIN — MAGNESIUM SULFATE HEPTAHYDRATE 2 G: 40 INJECTION, SOLUTION INTRAVENOUS at 09:54

## 2019-05-04 NOTE — ED PROVIDER NOTES
Subjective   Patient is an 78-year-old female with a history of COPD who presents with weakness and shortness of breath.  According to medical records the patient is supposed to be wearing home oxygen 24 hours a day, but the patient states that sometimes during the day she will not wear it.  Patient denies fever, chills, sweats, chest pain, abdominal pain, or diarrhea.  States at home health thought that she was dehydrated when I saw her yesterday and daughter states he cannot get her to drink water.  Patient recently saw a pulmonologist and was started on a new medication, but patient or daughter cannot tell me what the name of this medicine is.  Patient does continue to smoke.  Currently her shortness of breath is much worse with any exertion and oxygen does make this better as well as rest.  Patient has had multiple visits to see a  in the past months.            Review of Systems   All other systems reviewed and are negative.      Past Medical History:   Diagnosis Date   • Arthritis    • Cancer (CMS/[facility])     uterine cancer (1981)   • Depression    • History of emphysema    • History of esophageal reflux    • History of recurrent UTI (urinary tract infection)    • History of renal calculi    • History of uterine cancer    • Hyperlipidemia    • Hypertension    • Pneumonia 02/2019       Allergies   Allergen Reactions   • Morphine And Related        Past Surgical History:   Procedure Laterality Date   • FOOT SURGERY     • HAND SURGERY     • HYSTERECTOMY         Family History   Problem Relation Age of Onset   • Cancer Mother    • Heart attack Father    • Arthritis Father    • Diabetes Daughter    • Hyperlipidemia Daughter    • Migraines Daughter        Social History     Socioeconomic History   • Marital status:      Spouse name: Not on file   • Number of children: Not on file   • Years of education: Not on file   • Highest education level: Not on file   Tobacco Use   • Smoking status: Current Some Day  Smoker     Packs/day: 0.25     Types: Cigarettes   • Smokeless tobacco: Never Used   Substance and Sexual Activity   • Alcohol use: No   • Drug use: No   • Sexual activity: Defer     Comment:            Objective   Physical Exam   Nursing note and vitals reviewed.    GEN: Mild respiratory distress   head: Normocephalic, atraumatic  Eyes: Pupils equal round reactive to light  ENT: Posterior pharynx normal in appearance, oral mucosa is moist  Chest: Nontender to palpation  Cardiovascular: Regular rate  Lungs: Prominent bilateral wheezes   abdomen: Soft, nontender, nondistended, no peritoneal signs  Extremities: No edema, normal appearance  Neuro: GCS 15  Psych: Seems anxious      Procedures           ED Course  ED Course as of May 04 1252   Sat May 04, 2019   1036 EKG shows sinus rhythm with a rate of 90.  Nonspecific T waves in leads I and aVL.  Abnormal EKG.  Interpreted by me  [DT]      ED Course User Index  [DT] Christiano Good MD                  MDM  Number of Diagnoses or Management Options  Anxiety:   Chronic obstructive pulmonary disease, unspecified COPD type (CMS/[facility]):   Diagnosis management comments: Differential diagnosis for this patient would include COPD, asthma, bronchitis, pneumonia, congestive heart failure, pulmonary embolus, acute coronary syndrome, anxiety, or other concerns.  Initial clinical impression is that her appearance is consistent with COPD.  Did initiate my usual COPD management medications.  Very large anxiety component.  tx'ed with small dose of ativan and symptoms resolved       Amount and/or Complexity of Data Reviewed  Clinical lab tests: ordered and reviewed  Tests in the radiology section of CPT®: reviewed  Decide to obtain previous medical records or to obtain history from someone other than the patient: yes  Obtain history from someone other than the patient: yes  Review and summarize past medical records: yes  Independent visualization of images, tracings, or  specimens: yes          Final diagnoses:   Chronic obstructive pulmonary disease, unspecified COPD type (CMS/HCC)   Anxiety            Christiano Good MD  05/04/19 2969

## 2019-05-08 ENCOUNTER — TELEPHONE (OUTPATIENT)
Dept: INTERNAL MEDICINE | Facility: CLINIC | Age: 79
End: 2019-05-08

## 2019-05-08 NOTE — TELEPHONE ENCOUNTER
"Received the following email   \"Could we have an order to send nurse back out? We are out of nursing orders and the patients physical therapist said the patient has been having some breathing issues and trips to ER and feels  a nurse visit would be helpful?  You can answer me back via email or I can look in EPIC (either way works)    Thank You, Mack Abernathy RN [facility]\"    Gave verbal order by replying to the email.     "

## 2019-05-11 ENCOUNTER — APPOINTMENT (OUTPATIENT)
Dept: GENERAL RADIOLOGY | Facility: HOSPITAL | Age: 79
End: 2019-05-11

## 2019-05-11 ENCOUNTER — HOSPITAL ENCOUNTER (INPATIENT)
Facility: HOSPITAL | Age: 79
LOS: 4 days | Discharge: HOME | End: 2019-05-16
Attending: EMERGENCY MEDICINE | Admitting: FAMILY MEDICINE

## 2019-05-11 DIAGNOSIS — J96.02 ACUTE RESPIRATORY FAILURE WITH HYPOXIA AND HYPERCAPNIA (HCC): ICD-10-CM

## 2019-05-11 DIAGNOSIS — Z74.09 IMPAIRED MOBILITY AND ADLS: ICD-10-CM

## 2019-05-11 DIAGNOSIS — J44.1 COPD EXACERBATION (HCC): Primary | ICD-10-CM

## 2019-05-11 DIAGNOSIS — J44.9 CHRONIC OBSTRUCTIVE PULMONARY DISEASE, UNSPECIFIED COPD TYPE (HCC): ICD-10-CM

## 2019-05-11 DIAGNOSIS — Z78.9 IMPAIRED MOBILITY AND ADLS: ICD-10-CM

## 2019-05-11 DIAGNOSIS — R06.03 RESPIRATORY DISTRESS: ICD-10-CM

## 2019-05-11 DIAGNOSIS — J96.01 ACUTE RESPIRATORY FAILURE WITH HYPOXIA AND HYPERCAPNIA (HCC): ICD-10-CM

## 2019-05-11 DIAGNOSIS — R06.89 HYPERCAPNIA: ICD-10-CM

## 2019-05-11 LAB
A-A DO2: ABNORMAL MMHG
ALBUMIN SERPL-MCNC: 3.7 G/DL (ref 3.5–5)
ALBUMIN/GLOB SERPL: 1.2 G/DL (ref 1–2)
ALP SERPL-CCNC: 79 U/L (ref 38–126)
ALT SERPL W P-5'-P-CCNC: 28 U/L (ref 13–69)
ANION GAP SERPL CALCULATED.3IONS-SCNC: 10.4 MMOL/L (ref 10–20)
ARTERIAL PATENCY WRIST A: POSITIVE
AST SERPL-CCNC: 21 U/L (ref 15–46)
ATMOSPHERIC PRESS: 727 MMHG
BASE EXCESS BLDA CALC-SCNC: 12 MMOL/L (ref 0–2)
BASOPHILS # BLD AUTO: 0.05 10*3/MM3 (ref 0–0.2)
BASOPHILS NFR BLD AUTO: 0.3 % (ref 0–1.5)
BDY SITE: ABNORMAL
BILIRUB SERPL-MCNC: 0.4 MG/DL (ref 0.2–1.3)
BUN BLD-MCNC: 12 MG/DL (ref 7–20)
BUN/CREAT SERPL: 17.1 (ref 7.1–23.5)
CALCIUM SPEC-SCNC: 9.2 MG/DL (ref 8.4–10.2)
CHLORIDE SERPL-SCNC: 90 MMOL/L (ref 98–107)
CO2 SERPL-SCNC: 42 MMOL/L (ref 26–30)
COHGB MFR BLD: 1.5 % (ref 0–2)
CREAT BLD-MCNC: 0.7 MG/DL (ref 0.6–1.3)
DEPRECATED RDW RBC AUTO: 45.7 FL (ref 37–54)
EOSINOPHIL # BLD AUTO: 0.12 10*3/MM3 (ref 0–0.4)
EOSINOPHIL NFR BLD AUTO: 0.7 % (ref 0.3–6.2)
ERYTHROCYTE [DISTWIDTH] IN BLOOD BY AUTOMATED COUNT: 13.4 % (ref 12.3–15.4)
GAS FLOW AIRWAY: 2 LPM
GFR SERPL CREATININE-BSD FRML MDRD: 81 ML/MIN/1.73
GLOBULIN UR ELPH-MCNC: 3.1 GM/DL
GLUCOSE BLD-MCNC: 141 MG/DL (ref 74–98)
HCO3 BLDA-SCNC: 40.6 MMOL/L (ref 22–28)
HCT VFR BLD AUTO: 40.3 % (ref 34–46.6)
HCT VFR BLD CALC: 38.8 %
HGB BLD-MCNC: 12.8 G/DL (ref 12–15.9)
HGB BLDA-MCNC: 12.6 G/DL (ref 12–18)
HOROWITZ INDEX BLD+IHG-RTO: 28 %
IMM GRANULOCYTES # BLD AUTO: 0.08 10*3/MM3 (ref 0–0.05)
IMM GRANULOCYTES NFR BLD AUTO: 0.5 % (ref 0–0.5)
LYMPHOCYTES # BLD AUTO: 2.42 10*3/MM3 (ref 0.7–3.1)
LYMPHOCYTES NFR BLD AUTO: 14.9 % (ref 19.6–45.3)
MCH RBC QN AUTO: 29.3 PG (ref 26.6–33)
MCHC RBC AUTO-ENTMCNC: 31.8 G/DL (ref 31.5–35.7)
MCV RBC AUTO: 92.2 FL (ref 79–97)
METHGB BLD QL: 0.5 % (ref 0–1.5)
MODALITY: ABNORMAL
MONOCYTES # BLD AUTO: 1.31 10*3/MM3 (ref 0.1–0.9)
MONOCYTES NFR BLD AUTO: 8.1 % (ref 5–12)
NEUTROPHILS # BLD AUTO: 12.26 10*3/MM3 (ref 1.7–7)
NEUTROPHILS NFR BLD AUTO: 75.5 % (ref 42.7–76)
NOTE: ABNORMAL
NRBC BLD AUTO-RTO: 0 /100 WBC (ref 0–0.2)
NT-PROBNP SERPL-MCNC: 130 PG/ML (ref 0–450)
OXYHGB MFR BLDV: 97.2 % (ref 94–99)
PCO2 BLDA: 73.5 MM HG (ref 35–45)
PCO2 TEMP ADJ BLD: ABNORMAL MM HG (ref 35–45)
PH BLDA: 7.35 PH UNITS (ref 7.3–7.5)
PH, TEMP CORRECTED: ABNORMAL PH UNITS
PLATELET # BLD AUTO: 238 10*3/MM3 (ref 140–450)
PMV BLD AUTO: 9.8 FL (ref 6–12)
PO2 BLDA: 127 MM HG (ref 75–100)
PO2 TEMP ADJ BLD: ABNORMAL MM HG (ref 83–108)
POTASSIUM BLD-SCNC: 4.4 MMOL/L (ref 3.5–5.1)
PROT SERPL-MCNC: 6.8 G/DL (ref 6.3–8.2)
RBC # BLD AUTO: 4.37 10*6/MM3 (ref 3.77–5.28)
SAO2 % BLDCOA: 99.2 % (ref 94–100)
SODIUM BLD-SCNC: 138 MMOL/L (ref 137–145)
TROPONIN I SERPL-MCNC: <0.012 NG/ML (ref 0–0.03)
VENTILATOR MODE: ABNORMAL
WBC NRBC COR # BLD: 16.24 10*3/MM3 (ref 3.4–10.8)

## 2019-05-11 PROCEDURE — 83050 HGB METHEMOGLOBIN QUAN: CPT

## 2019-05-11 PROCEDURE — 84484 ASSAY OF TROPONIN QUANT: CPT | Performed by: EMERGENCY MEDICINE

## 2019-05-11 PROCEDURE — 25010000002 METHYLPREDNISOLONE PER 40 MG: Performed by: EMERGENCY MEDICINE

## 2019-05-11 PROCEDURE — 82805 BLOOD GASES W/O2 SATURATION: CPT

## 2019-05-11 PROCEDURE — 94640 AIRWAY INHALATION TREATMENT: CPT

## 2019-05-11 PROCEDURE — 80053 COMPREHEN METABOLIC PANEL: CPT | Performed by: EMERGENCY MEDICINE

## 2019-05-11 PROCEDURE — 36600 WITHDRAWAL OF ARTERIAL BLOOD: CPT

## 2019-05-11 PROCEDURE — 85025 COMPLETE CBC W/AUTO DIFF WBC: CPT | Performed by: EMERGENCY MEDICINE

## 2019-05-11 PROCEDURE — 84145 PROCALCITONIN (PCT): CPT | Performed by: EMERGENCY MEDICINE

## 2019-05-11 PROCEDURE — 82375 ASSAY CARBOXYHB QUANT: CPT

## 2019-05-11 PROCEDURE — 83880 ASSAY OF NATRIURETIC PEPTIDE: CPT | Performed by: EMERGENCY MEDICINE

## 2019-05-11 PROCEDURE — 93005 ELECTROCARDIOGRAM TRACING: CPT | Performed by: EMERGENCY MEDICINE

## 2019-05-11 PROCEDURE — 71045 X-RAY EXAM CHEST 1 VIEW: CPT

## 2019-05-11 PROCEDURE — 99285 EMERGENCY DEPT VISIT HI MDM: CPT

## 2019-05-11 PROCEDURE — 94799 UNLISTED PULMONARY SVC/PX: CPT

## 2019-05-11 RX ORDER — MAGNESIUM SULFATE HEPTAHYDRATE 40 MG/ML
2 INJECTION, SOLUTION INTRAVENOUS ONCE
Status: DISCONTINUED | OUTPATIENT
Start: 2019-05-11 | End: 2019-05-16 | Stop reason: HOSPADM

## 2019-05-11 RX ORDER — METHYLPREDNISOLONE SODIUM SUCCINATE 40 MG/ML
80 INJECTION, POWDER, LYOPHILIZED, FOR SOLUTION INTRAMUSCULAR; INTRAVENOUS ONCE
Status: COMPLETED | OUTPATIENT
Start: 2019-05-11 | End: 2019-05-11

## 2019-05-11 RX ORDER — SODIUM CHLORIDE 0.9 % (FLUSH) 0.9 %
10 SYRINGE (ML) INJECTION AS NEEDED
Status: DISCONTINUED | OUTPATIENT
Start: 2019-05-11 | End: 2019-05-16 | Stop reason: HOSPADM

## 2019-05-11 RX ORDER — ALBUTEROL SULFATE 2.5 MG/3ML
5 SOLUTION RESPIRATORY (INHALATION) ONCE
Status: COMPLETED | OUTPATIENT
Start: 2019-05-11 | End: 2019-05-11

## 2019-05-11 RX ORDER — IPRATROPIUM BROMIDE AND ALBUTEROL SULFATE 2.5; .5 MG/3ML; MG/3ML
3 SOLUTION RESPIRATORY (INHALATION) ONCE
Status: COMPLETED | OUTPATIENT
Start: 2019-05-11 | End: 2019-05-11

## 2019-05-11 RX ADMIN — ALBUTEROL SULFATE 5 MG: 2.5 SOLUTION RESPIRATORY (INHALATION) at 22:20

## 2019-05-11 RX ADMIN — IPRATROPIUM BROMIDE AND ALBUTEROL SULFATE 3 ML: .5; 3 SOLUTION RESPIRATORY (INHALATION) at 22:20

## 2019-05-11 RX ADMIN — METHYLPREDNISOLONE SODIUM SUCCINATE 80 MG: 40 INJECTION, POWDER, FOR SOLUTION INTRAMUSCULAR; INTRAVENOUS at 22:43

## 2019-05-12 PROBLEM — J96.02 ACUTE RESPIRATORY FAILURE WITH HYPOXIA AND HYPERCAPNIA (HCC): Status: ACTIVE | Noted: 2019-05-12

## 2019-05-12 PROBLEM — J96.01 ACUTE RESPIRATORY FAILURE WITH HYPOXIA AND HYPERCAPNIA (HCC): Status: ACTIVE | Noted: 2019-05-12

## 2019-05-12 PROBLEM — J20.9 ACUTE BRONCHITIS: Status: ACTIVE | Noted: 2019-05-12

## 2019-05-12 PROBLEM — J44.1 COPD EXACERBATION (HCC): Status: ACTIVE | Noted: 2019-05-12

## 2019-05-12 LAB
ANION GAP SERPL CALCULATED.3IONS-SCNC: 9.3 MMOL/L (ref 10–20)
BASOPHILS # BLD AUTO: 0.02 10*3/MM3 (ref 0–0.2)
BASOPHILS NFR BLD AUTO: 0.2 % (ref 0–1.5)
BUN BLD-MCNC: 13 MG/DL (ref 7–20)
BUN/CREAT SERPL: 21.7 (ref 7.1–23.5)
CALCIUM SPEC-SCNC: 8.8 MG/DL (ref 8.4–10.2)
CHLORIDE SERPL-SCNC: 93 MMOL/L (ref 98–107)
CO2 SERPL-SCNC: 38 MMOL/L (ref 26–30)
CREAT BLD-MCNC: 0.6 MG/DL (ref 0.6–1.3)
CRP SERPL-MCNC: 2.2 MG/DL (ref 0–1)
DEPRECATED RDW RBC AUTO: 44.7 FL (ref 37–54)
EOSINOPHIL # BLD AUTO: 0 10*3/MM3 (ref 0–0.4)
EOSINOPHIL NFR BLD AUTO: 0 % (ref 0.3–6.2)
ERYTHROCYTE [DISTWIDTH] IN BLOOD BY AUTOMATED COUNT: 13.2 % (ref 12.3–15.4)
GFR SERPL CREATININE-BSD FRML MDRD: 96 ML/MIN/1.73
GLUCOSE BLD-MCNC: 183 MG/DL (ref 74–98)
HCT VFR BLD AUTO: 37.5 % (ref 34–46.6)
HGB BLD-MCNC: 12.2 G/DL (ref 12–15.9)
IMM GRANULOCYTES # BLD AUTO: 0.07 10*3/MM3 (ref 0–0.05)
IMM GRANULOCYTES NFR BLD AUTO: 0.7 % (ref 0–0.5)
LYMPHOCYTES # BLD AUTO: 0.78 10*3/MM3 (ref 0.7–3.1)
LYMPHOCYTES NFR BLD AUTO: 7.8 % (ref 19.6–45.3)
MAGNESIUM SERPL-MCNC: 1.8 MG/DL (ref 1.6–2.3)
MCH RBC QN AUTO: 30.2 PG (ref 26.6–33)
MCHC RBC AUTO-ENTMCNC: 32.5 G/DL (ref 31.5–35.7)
MCV RBC AUTO: 92.8 FL (ref 79–97)
MONOCYTES # BLD AUTO: 0.05 10*3/MM3 (ref 0.1–0.9)
MONOCYTES NFR BLD AUTO: 0.5 % (ref 5–12)
NEUTROPHILS # BLD AUTO: 9.05 10*3/MM3 (ref 1.7–7)
NEUTROPHILS NFR BLD AUTO: 90.8 % (ref 42.7–76)
NRBC BLD AUTO-RTO: 0 /100 WBC (ref 0–0.2)
PLATELET # BLD AUTO: 211 10*3/MM3 (ref 140–450)
PMV BLD AUTO: 9.9 FL (ref 6–12)
POTASSIUM BLD-SCNC: 4.3 MMOL/L (ref 3.5–5.1)
PROCALCITONIN SERPL-MCNC: <0.05 NG/ML
RBC # BLD AUTO: 4.04 10*6/MM3 (ref 3.77–5.28)
SODIUM BLD-SCNC: 136 MMOL/L (ref 137–145)
TROPONIN I SERPL-MCNC: <0.012 NG/ML (ref 0–0.03)
TROPONIN I SERPL-MCNC: <0.012 NG/ML (ref 0–0.03)
WBC NRBC COR # BLD: 9.97 10*3/MM3 (ref 3.4–10.8)

## 2019-05-12 PROCEDURE — 87581 M.PNEUMON DNA AMP PROBE: CPT | Performed by: INTERNAL MEDICINE

## 2019-05-12 PROCEDURE — 86140 C-REACTIVE PROTEIN: CPT | Performed by: INTERNAL MEDICINE

## 2019-05-12 PROCEDURE — 94660 CPAP INITIATION&MGMT: CPT

## 2019-05-12 PROCEDURE — 85025 COMPLETE CBC W/AUTO DIFF WBC: CPT | Performed by: INTERNAL MEDICINE

## 2019-05-12 PROCEDURE — 25010000002 METHYLPREDNISOLONE PER 40 MG: Performed by: INTERNAL MEDICINE

## 2019-05-12 PROCEDURE — 63710000001 PROMETHAZINE PER 12.5 MG: Performed by: INTERNAL MEDICINE

## 2019-05-12 PROCEDURE — 94799 UNLISTED PULMONARY SVC/PX: CPT

## 2019-05-12 PROCEDURE — 97161 PT EVAL LOW COMPLEX 20 MIN: CPT

## 2019-05-12 PROCEDURE — 99223 1ST HOSP IP/OBS HIGH 75: CPT | Performed by: INTERNAL MEDICINE

## 2019-05-12 PROCEDURE — 87798 DETECT AGENT NOS DNA AMP: CPT | Performed by: INTERNAL MEDICINE

## 2019-05-12 PROCEDURE — 87633 RESP VIRUS 12-25 TARGETS: CPT | Performed by: INTERNAL MEDICINE

## 2019-05-12 PROCEDURE — 87205 SMEAR GRAM STAIN: CPT | Performed by: INTERNAL MEDICINE

## 2019-05-12 PROCEDURE — 87070 CULTURE OTHR SPECIMN AEROBIC: CPT | Performed by: INTERNAL MEDICINE

## 2019-05-12 PROCEDURE — 84484 ASSAY OF TROPONIN QUANT: CPT | Performed by: INTERNAL MEDICINE

## 2019-05-12 PROCEDURE — 80048 BASIC METABOLIC PNL TOTAL CA: CPT | Performed by: INTERNAL MEDICINE

## 2019-05-12 PROCEDURE — 83735 ASSAY OF MAGNESIUM: CPT | Performed by: INTERNAL MEDICINE

## 2019-05-12 PROCEDURE — 25010000002 ENOXAPARIN PER 10 MG: Performed by: INTERNAL MEDICINE

## 2019-05-12 PROCEDURE — 87486 CHLMYD PNEUM DNA AMP PROBE: CPT | Performed by: INTERNAL MEDICINE

## 2019-05-12 RX ORDER — METHYLPREDNISOLONE SODIUM SUCCINATE 40 MG/ML
40 INJECTION, POWDER, LYOPHILIZED, FOR SOLUTION INTRAMUSCULAR; INTRAVENOUS EVERY 6 HOURS
Status: DISCONTINUED | OUTPATIENT
Start: 2019-05-12 | End: 2019-05-14

## 2019-05-12 RX ORDER — IPRATROPIUM BROMIDE AND ALBUTEROL SULFATE 2.5; .5 MG/3ML; MG/3ML
3 SOLUTION RESPIRATORY (INHALATION)
Status: DISCONTINUED | OUTPATIENT
Start: 2019-05-12 | End: 2019-05-16 | Stop reason: HOSPADM

## 2019-05-12 RX ORDER — L.ACID,PARA/B.BIFIDUM/S.THERM 8B CELL
1 CAPSULE ORAL 2 TIMES DAILY
Status: DISCONTINUED | OUTPATIENT
Start: 2019-05-12 | End: 2019-05-16 | Stop reason: HOSPADM

## 2019-05-12 RX ORDER — SODIUM CHLORIDE 0.9 % (FLUSH) 0.9 %
3 SYRINGE (ML) INJECTION EVERY 12 HOURS SCHEDULED
Status: DISCONTINUED | OUTPATIENT
Start: 2019-05-12 | End: 2019-05-16 | Stop reason: HOSPADM

## 2019-05-12 RX ORDER — BUDESONIDE 0.5 MG/2ML
0.5 INHALANT ORAL
Status: DISCONTINUED | OUTPATIENT
Start: 2019-05-12 | End: 2019-05-12

## 2019-05-12 RX ORDER — SODIUM CHLORIDE 0.9 % (FLUSH) 0.9 %
3-10 SYRINGE (ML) INJECTION AS NEEDED
Status: DISCONTINUED | OUTPATIENT
Start: 2019-05-12 | End: 2019-05-16 | Stop reason: HOSPADM

## 2019-05-12 RX ORDER — PRAVASTATIN SODIUM 20 MG
20 TABLET ORAL EVERY EVENING
Status: DISCONTINUED | OUTPATIENT
Start: 2019-05-12 | End: 2019-05-16 | Stop reason: HOSPADM

## 2019-05-12 RX ORDER — SODIUM CHLORIDE 9 MG/ML
75 INJECTION, SOLUTION INTRAVENOUS CONTINUOUS
Status: DISCONTINUED | OUTPATIENT
Start: 2019-05-12 | End: 2019-05-13

## 2019-05-12 RX ORDER — CLONAZEPAM 0.5 MG/1
0.5 TABLET ORAL NIGHTLY PRN
Status: DISCONTINUED | OUTPATIENT
Start: 2019-05-12 | End: 2019-05-16 | Stop reason: HOSPADM

## 2019-05-12 RX ORDER — GUAIFENESIN 600 MG/1
600 TABLET, EXTENDED RELEASE ORAL 2 TIMES DAILY
Status: DISCONTINUED | OUTPATIENT
Start: 2019-05-12 | End: 2019-05-16 | Stop reason: HOSPADM

## 2019-05-12 RX ORDER — PROPRANOLOL HYDROCHLORIDE 20 MG/1
60 TABLET ORAL DAILY
Status: DISCONTINUED | OUTPATIENT
Start: 2019-05-12 | End: 2019-05-12

## 2019-05-12 RX ORDER — ONDANSETRON 2 MG/ML
4 INJECTION INTRAMUSCULAR; INTRAVENOUS EVERY 6 HOURS PRN
Status: DISCONTINUED | OUTPATIENT
Start: 2019-05-12 | End: 2019-05-16 | Stop reason: HOSPADM

## 2019-05-12 RX ORDER — METHYLPREDNISOLONE SODIUM SUCCINATE 40 MG/ML
40 INJECTION, POWDER, LYOPHILIZED, FOR SOLUTION INTRAMUSCULAR; INTRAVENOUS EVERY 8 HOURS
Status: DISCONTINUED | OUTPATIENT
Start: 2019-05-12 | End: 2019-05-12

## 2019-05-12 RX ORDER — MECLIZINE HCL 12.5 MG/1
12.5 TABLET ORAL 3 TIMES DAILY PRN
Status: DISCONTINUED | OUTPATIENT
Start: 2019-05-12 | End: 2019-05-16 | Stop reason: HOSPADM

## 2019-05-12 RX ORDER — TRAMADOL HYDROCHLORIDE 50 MG/1
50 TABLET ORAL EVERY 8 HOURS PRN
Status: DISCONTINUED | OUTPATIENT
Start: 2019-05-12 | End: 2019-05-16 | Stop reason: HOSPADM

## 2019-05-12 RX ORDER — PANTOPRAZOLE SODIUM 40 MG/1
40 TABLET, DELAYED RELEASE ORAL EVERY MORNING
Status: DISCONTINUED | OUTPATIENT
Start: 2019-05-12 | End: 2019-05-16 | Stop reason: HOSPADM

## 2019-05-12 RX ORDER — PROMETHAZINE HYDROCHLORIDE 12.5 MG/1
12.5 TABLET ORAL EVERY 8 HOURS PRN
Status: DISCONTINUED | OUTPATIENT
Start: 2019-05-12 | End: 2019-05-16 | Stop reason: HOSPADM

## 2019-05-12 RX ORDER — IPRATROPIUM BROMIDE AND ALBUTEROL SULFATE 2.5; .5 MG/3ML; MG/3ML
3 SOLUTION RESPIRATORY (INHALATION)
Status: DISCONTINUED | OUTPATIENT
Start: 2019-05-12 | End: 2019-05-12

## 2019-05-12 RX ORDER — BUDESONIDE 0.5 MG/2ML
1 INHALANT ORAL
Status: DISCONTINUED | OUTPATIENT
Start: 2019-05-12 | End: 2019-05-16 | Stop reason: HOSPADM

## 2019-05-12 RX ORDER — IPRATROPIUM BROMIDE AND ALBUTEROL SULFATE 2.5; .5 MG/3ML; MG/3ML
3 SOLUTION RESPIRATORY (INHALATION) ONCE
Status: COMPLETED | OUTPATIENT
Start: 2019-05-12 | End: 2019-05-12

## 2019-05-12 RX ORDER — CHOLECALCIFEROL (VITAMIN D3) 125 MCG
5 CAPSULE ORAL NIGHTLY PRN
Status: DISCONTINUED | OUTPATIENT
Start: 2019-05-12 | End: 2019-05-16 | Stop reason: HOSPADM

## 2019-05-12 RX ORDER — IPRATROPIUM BROMIDE AND ALBUTEROL SULFATE 2.5; .5 MG/3ML; MG/3ML
3 SOLUTION RESPIRATORY (INHALATION) EVERY 4 HOURS PRN
Status: DISCONTINUED | OUTPATIENT
Start: 2019-05-12 | End: 2019-05-16 | Stop reason: HOSPADM

## 2019-05-12 RX ORDER — PROPRANOLOL HCL 60 MG
60 CAPSULE, EXTENDED RELEASE 24HR ORAL DAILY
Status: DISCONTINUED | OUTPATIENT
Start: 2019-05-12 | End: 2019-05-16 | Stop reason: HOSPADM

## 2019-05-12 RX ORDER — FLUTICASONE PROPIONATE 50 MCG
2 SPRAY, SUSPENSION (ML) NASAL DAILY
Status: DISCONTINUED | OUTPATIENT
Start: 2019-05-12 | End: 2019-05-16 | Stop reason: HOSPADM

## 2019-05-12 RX ADMIN — BUDESONIDE 1 MG: 0.5 INHALANT RESPIRATORY (INHALATION) at 19:10

## 2019-05-12 RX ADMIN — SODIUM CHLORIDE 75 ML/HR: 9 INJECTION, SOLUTION INTRAVENOUS at 15:37

## 2019-05-12 RX ADMIN — BUDESONIDE 0.5 MG: 0.5 INHALANT RESPIRATORY (INHALATION) at 07:10

## 2019-05-12 RX ADMIN — MELATONIN TAB 5 MG 5 MG: 5 TAB at 21:27

## 2019-05-12 RX ADMIN — ESTROGENS, CONJUGATED 0.62 MG: 0.62 TABLET, FILM COATED ORAL at 09:04

## 2019-05-12 RX ADMIN — SODIUM CHLORIDE 75 ML/HR: 9 INJECTION, SOLUTION INTRAVENOUS at 02:47

## 2019-05-12 RX ADMIN — METHYLPREDNISOLONE SODIUM SUCCINATE 40 MG: 40 INJECTION, POWDER, FOR SOLUTION INTRAMUSCULAR; INTRAVENOUS at 21:28

## 2019-05-12 RX ADMIN — PROPRANOLOL HYDROCHLORIDE 60 MG: 60 CAPSULE, EXTENDED RELEASE ORAL at 09:04

## 2019-05-12 RX ADMIN — GUAIFENESIN 600 MG: 600 TABLET, EXTENDED RELEASE ORAL at 09:04

## 2019-05-12 RX ADMIN — ENOXAPARIN SODIUM 40 MG: 40 INJECTION SUBCUTANEOUS at 02:46

## 2019-05-12 RX ADMIN — IPRATROPIUM BROMIDE AND ALBUTEROL SULFATE 3 ML: .5; 3 SOLUTION RESPIRATORY (INHALATION) at 19:10

## 2019-05-12 RX ADMIN — GUAIFENESIN 600 MG: 600 TABLET, EXTENDED RELEASE ORAL at 21:28

## 2019-05-12 RX ADMIN — SERTRALINE HYDROCHLORIDE 100 MG: 50 TABLET ORAL at 09:04

## 2019-05-12 RX ADMIN — FLUTICASONE PROPIONATE 2 SPRAY: 50 SPRAY, METERED NASAL at 09:05

## 2019-05-12 RX ADMIN — DOXYCYCLINE 100 MG: 100 INJECTION, POWDER, LYOPHILIZED, FOR SOLUTION INTRAVENOUS at 02:29

## 2019-05-12 RX ADMIN — PANTOPRAZOLE SODIUM 40 MG: 40 TABLET, DELAYED RELEASE ORAL at 06:44

## 2019-05-12 RX ADMIN — IPRATROPIUM BROMIDE AND ALBUTEROL SULFATE 3 ML: .5; 3 SOLUTION RESPIRATORY (INHALATION) at 00:59

## 2019-05-12 RX ADMIN — SODIUM CHLORIDE, PRESERVATIVE FREE 3 ML: 5 INJECTION INTRAVENOUS at 09:06

## 2019-05-12 RX ADMIN — IPRATROPIUM BROMIDE AND ALBUTEROL SULFATE 3 ML: .5; 3 SOLUTION RESPIRATORY (INHALATION) at 07:10

## 2019-05-12 RX ADMIN — IPRATROPIUM BROMIDE AND ALBUTEROL SULFATE 3 ML: .5; 3 SOLUTION RESPIRATORY (INHALATION) at 17:15

## 2019-05-12 RX ADMIN — CLONAZEPAM 0.5 MG: 0.5 TABLET ORAL at 21:30

## 2019-05-12 RX ADMIN — PRAVASTATIN SODIUM 20 MG: 20 TABLET ORAL at 16:15

## 2019-05-12 RX ADMIN — IPRATROPIUM BROMIDE AND ALBUTEROL SULFATE 3 ML: .5; 3 SOLUTION RESPIRATORY (INHALATION) at 13:05

## 2019-05-12 RX ADMIN — METHYLPREDNISOLONE SODIUM SUCCINATE 40 MG: 40 INJECTION, POWDER, FOR SOLUTION INTRAMUSCULAR; INTRAVENOUS at 15:34

## 2019-05-12 RX ADMIN — Medication 1 CAPSULE: at 09:04

## 2019-05-12 RX ADMIN — SODIUM CHLORIDE, PRESERVATIVE FREE 3 ML: 5 INJECTION INTRAVENOUS at 02:46

## 2019-05-12 RX ADMIN — PROMETHAZINE HYDROCHLORIDE 12.5 MG: 12.5 TABLET ORAL at 21:27

## 2019-05-12 RX ADMIN — Medication 1 CAPSULE: at 21:27

## 2019-05-12 RX ADMIN — CLONAZEPAM 0.5 MG: 0.5 TABLET ORAL at 02:46

## 2019-05-12 RX ADMIN — METHYLPREDNISOLONE SODIUM SUCCINATE 40 MG: 40 INJECTION, POWDER, FOR SOLUTION INTRAMUSCULAR; INTRAVENOUS at 06:44

## 2019-05-12 RX ADMIN — DOXYCYCLINE 100 MG: 100 INJECTION, POWDER, LYOPHILIZED, FOR SOLUTION INTRAVENOUS at 15:34

## 2019-05-12 NOTE — PAYOR COMM NOTE
"Please review for inpatient auth  Sara [phone],  Fax [phone]  Tax ID:  [id number]    ICD 10 code:  J44.1, J96.02    Sadie Tijerina (78 y.o. Female)     Date of Birth Social Security Number Address Home Phone MRN    1940  [address] [id number] [phone] [id number]    Orthodox Marital Status          Episcopalian        Admission Date Admission Type Admitting Provider Attending Provider Department, Room/Bed    19 Emergency Antolin Perdomo DO Gandee, Julie G, DO [facility] INTENSIVE CARE, I    Discharge Date Discharge Disposition Discharge Destination                       Attending Provider:  Estrella Cadet DO    Allergies:  Morphine And Related    Isolation:  Contact   Infection:  MDRO (18), 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)   Code Status:  CPR    Ht:  165.1 cm (65\")   Wt:  49.2 kg (108 lb 8 oz)    Admission Cmt:  None   Principal Problem:  None                Active Insurance as of 2019     Primary Coverage     Payor Plan Insurance Group Employer/Plan Group    HUMANA HUMANA [id number]     Payor Plan Address Payor Plan Phone Number Payor Plan Fax Number Effective Dates    [address] [address] [phone]  2013 - None Entered    [facility] [id number]       Subscriber Name Subscriber Birth Date Member ID       SADIE TIJERINA 1940 [id number]                 Emergency Contacts      (Rel.) Home Phone Work Phone Mobile Phone    HighgroveAva baca (Daughter) [phone] -- [phone]    Neris Giles (Daughter) [phone] -- [phone]    TijerinaAlfonzo woods (Son) [phone] -- [phone]               History & Physical      Antolin Perdomo DO at 2019  1:14 AM              [facility] HOSPITALIST   HISTORY AND PHYSICAL      Name:  Sadie Tijerina   Age:  78 y.o.  Sex:  female  :  1940  MRN:  [id number]   Visit Number:  [id number]  Admission Date:  2019  Date Of Service:  19  Primary " Care Physician:  Kristian Wolff MD    History Obtained From:    patient    Chief Complaint:     Dyspnea    History Of Presenting Illness:      Patient is a 78-year-old  female with COPD on home O2, hypertension, diastolic congestive heart failure, uterine cancer, hyperlipidemia who has had multiple admissions and visits to the ER since January.  She was just discharged from this hospital on 4/7/2019 for COPD exacerbation.  She actually had pneumonia in February with stenotrophomonas grew out in her sputum.  She had been to the ER 1 week ago, given steroids, felt better, now she is feeling worse again.  She has productive yellow sputum.  She has a cough with difficulty clearing secretions.  She has progressive shortness of breath over the past few days.  She has worsening dyspnea when she moves around.  Nothing seems to be making this better.  She states that she has never really felt that much better since the start of the year.  She has some nausea without vomiting.  She denies any chest pressure.  She denies any fevers or chills.  In the emergency room her white count is 16.24.  Procalcitonin is negative.  She has not been eating or drinking well.  She also is very weak.  She refuses BiPAP, as she has on previous admissions.  Otherwise she has a full code.  She follows with a pulmonologist in [place].    Review Of Systems:     General ROS: negative  Psychological ROS: negative  Ophthalmic ROS: negative  ENT ROS: negative  Allergy and Immunology ROS: negative  Hematological and Lymphatic ROS: negative  Endocrine ROS: negative  Breast ROS: negative  Respiratory ROS: positive for - cough, shortness of breath, sputum changes and wheezing  Cardiovascular ROS: positive for - dyspnea on exertion  Gastrointestinal ROS: negative  Genito-Urinary ROS: negative  Musculoskeletal ROS: positive for - muscular weakness  Neurological ROS: negative  Dermatological ROS: negative       Past Medical History:    COPD on home  O2, chronic tremors, hypertension, diastolic CHF, uterine cancer, hyperlipidemia    Past Surgical history:    Foot surgery, hand surgery, total abdominal hysterectomy      Social History:    Continues to smoke as she has for most of her life.  Denies alcohol or drugs.  Lives alone.  She is a full code.    Family History:    Mother  of cancer, father  of an MI, daughter has diabetes type 2    Allergies:      Morphine and related    Home Medications:    Prior to Admission Medications     Prescriptions Last Dose Informant Patient Reported? Taking?    albuterol (PROVENTIL HFA;VENTOLIN HFA) 108 (90 Base) MCG/ACT inhaler   No No    Inhale 1 puff Every 4 (Four) Hours As Needed for Shortness of Air.    bacitracin-silver sulfadiazine-nystatin   No No    Apply  topically to the appropriate area as directed 2 (Two) Times a Day.    budesonide-formoterol (SYMBICORT) 160-4.5 MCG/ACT inhaler   No No    Inhale 2 puffs 2 (Two) Times a Day.    clonazePAM (KlonoPIN) 0.5 MG tablet   No No    TAKE 1 TABLET BY MOUTH AT BEDTIME AS NEEDED for seizures    estrogens, conjugated, (PREMARIN) 0.625 MG tablet   No No    Take 1 tablet by mouth Daily.     fluticasone (FLONASE) 50 MCG/ACT nasal spray   No No    2 sprays by Each Nare route Daily.    ipratropium (ATROVENT) 0.02 % nebulizer solution   No No    Take 2.5 mL by nebulization 4 (Four) Times a Day As Needed for Wheezing or Shortness of Air.    lactobacillus acidophilus (RISAQUAD) capsule capsule   No No    Take 1 capsule by mouth 2 (Two) Times a Day.    meclizine (ANTIVERT) 12.5 MG tablet   No No    Take 1 tablet by mouth 3 (Three) Times a Day As Needed for dizziness.    nystatin (MYCOSTATIN) 293709 UNIT/GM cream   No No    Apply  topically to the appropriate area as directed As Needed (Yeast infection).    omeprazole (priLOSEC) 40 MG capsule   No No    Take 1 capsule by mouth Daily.    pravastatin (PRAVACHOL) 20 MG tablet   No No    Take 1 tablet by mouth Every Evening.     promethazine (PHENERGAN) 12.5 MG tablet   No No    Take 1 tablet by mouth Every 8 (Eight) Hours As Needed for Nausea or Vomiting.    propranolol (INDERAL) 60 MG tablet   No No    1 daily po    roflumilast (DALIRESP) 500 MCG tablet tablet   No No    Take 1 tablet by mouth Daily.    sertraline (ZOLOFT) 100 MG tablet   No No    Take 1 tablet by mouth every night at bedtime.    SPIRIVA HANDIHALER 18 MCG per inhalation capsule   No No    place 1 capsule into inhaler and inhale ONCE daily    traMADol (ULTRAM) 50 MG tablet   No No    Take 1 tablet by mouth Every 8 (Eight) Hours As Needed (pain).             Hospital Scheduled Meds:      magnesium sulfate 2 g Intravenous Once             Vital Signs:    Temp:  [97.4 °F (36.3 °C)] 97.4 °F (36.3 °C)  Heart Rate:  [78-93] 83  Resp:  [20-24] 20  BP: (140-143)/(84-85) 140/84        05/11/19  2154   Weight: 51.3 kg (113 lb)       Body mass index is 18.8 kg/m².    Physical Exam:      General Appearance:    Alert, cooperative, in no acute distress   Head:    Normocephalic, without obvious abnormality, atraumatic   Eyes:            Lids and lashes normal, conjunctivae and sclerae normal, no   icterus, no pallor, corneas clear, PERRLA   Ears:    Ears appear intact with no abnormalities noted   Throat:   No oral lesions, no thrush, oral mucosa moist   Neck:   No adenopathy, supple, trachea midline, no thyromegaly, no     carotid bruit, no JVD   Back:     No kyphosis present, no scoliosis present, no skin lesions,       erythema or scars, no tenderness to percussion or                   palpation,   range of motion normal   Lungs:    Expiratory wheezes bilaterally with mild use of accessory muscles of respiration    Heart:    Regular rhythm and normal rate, normal S1 and S2, no            murmur, no gallop, no rub, no click   Breast Exam:    Deferred   Abdomen:     Normal bowel sounds, no masses, no organomegaly, soft        non-tender, non-distended, no guarding, no rebound                  tenderness   Genitalia:    Deferred   Extremities:   Moves all extremities 4/5 strength, no edema, no cyanosis, no  redness   Pulses:   Pulses palpable and equal bilaterally   Skin:   No bleeding, bruising or rash   Lymph nodes:   No palpable adenopathy   Neurologic:   Cranial nerves 2 - 12 grossly intact, sensation intact, DTR        present and equal bilaterally       EKG:      Normal sinus rhythm with no acute ST-T changes.    Telemetry:      Normal sinus rhythm    I have personally looked at both the EKG and the telemetry strips.    Labs:    Results from last 7 days   Lab Units 05/11/19  2216   WBC 10*3/mm3 16.24*   HEMOGLOBIN g/dL 12.8   HEMATOCRIT % 40.3   MCV fL 92.2   MCHC g/dL 31.8   PLATELETS 10*3/mm3 238     Results from last 7 days   Lab Units 05/11/19  2235   PH, ARTERIAL pH units 7.351   PO2 ART mm Hg 127.0*   PCO2, ARTERIAL mm Hg 73.5*   HCO3 ART mmol/L 40.6*     Results from last 7 days   Lab Units 05/11/19 2216   SODIUM mmol/L 138   POTASSIUM mmol/L 4.4   CHLORIDE mmol/L 90*   CO2 mmol/L 42.0*   BUN mg/dL 12   CREATININE mg/dL 0.70   EGFR IF NONAFRICN AM mL/min/1.73 81   CALCIUM mg/dL 9.2   GLUCOSE mg/dL 141*   ALBUMIN g/dL 3.70   BILIRUBIN mg/dL 0.4   ALK PHOS U/L 79   AST (SGOT) U/L 21   ALT (SGPT) U/L 28   Estimated Creatinine Clearance: 45.4 mL/min (by C-G formula based on SCr of 0.7 mg/dL).  No results found for: AMMONIA  Results from last 7 days   Lab Units 05/11/19  2216   TROPONIN I ng/mL <0.012     Results from last 7 days   Lab Units 05/11/19  2216   PROBNP pg/mL 130.0     Lab Results   Component Value Date    HGBA1C 5.6 01/27/2018     Lab Results   Component Value Date    TSH 3.990 07/24/2018    FREET4 0.94 10/17/2014     No results found for: PREGTESTUR, PREGSERUM, HCG, HCGQUANT  Pain Management Panel     There is no flowsheet data to display.                          Radiology:    Imaging Results (last 7 days)     Procedure Component Value Units Date/Time    XR Chest 1 View  [[id number]] Updated:  05/11/19 2226          Assessment:    1.  Acute on chronic, respiratory failure with hypoxia and hypercapnia  2.  COPD exacerbation present on admission  3.  Acute bronchitis present on admission  4.  Essential hypertension  5.  Chronic diastolic CHF, not in exacerbation  6.  History of uterine cancer      Plan:     Patient absolutely refuses BiPAP, though it was offered.  We will give Solu-Medrol 40 mg every 8 hours.  Placed on doxycycline 100 mg every 12 hours.  Check sputum culture.  Will place on DuoNeb, Mucinex, flutter valve.  Also will give budesonide.  Check respiratory panel.  Consult PT and OT to work with the patient.  Will place in the ICU in case she worsens, as she would have to be intubated.  Check lab work in the a.m.  Will check serial troponins.  Echocardiogram as well.  Consult Dr. Mojica from pulmonary medicine.  Further recommendations will depend on the clinical course.    Antolin Perdomo DO  05/12/19  1:14 AM    Electronically signed by Antolin Perdomo DO at 5/12/2019  1:23 AM          Emergency Department Notes      Juice Dawn DO at 5/11/2019 10:41 PM      Procedure Orders    1. Critical Care [[id number]] ordered by Juice Dwan DO at 05/12/19 0134                Subjective   78-year-old female with a past medical history significant for COPD who is home O2 dependent on 2 L nasal cannula at all times presents to the ED with chief complaint of shortness of breath.  The patient states that she has been short of breath for approximately 1 week but it got much worse over the last 2 days.  Patient also notes that she was seen here 1 week ago when her symptoms started.  She was given magnesium and Decadron and a breathing treatment which did improve her symptoms.  She had felt well until the day before yesterday.  She is complaining of chest tightness and wheezing.  She is complaining of difficulty breathing.  She denies fever or chills.  Cough is minimally  productive of sputum.  No nausea vomiting diarrhea or abdominal pain.  No other complaints at this time.            Review of Systems   Constitutional: Negative for fatigue and fever.   Respiratory: Positive for cough, chest tightness, shortness of breath and wheezing.    Cardiovascular: Negative for chest pain and palpitations.   All other systems reviewed and are negative.      Past Medical History:   Diagnosis Date   • Arthritis    • Cancer (CMS/HCC)     uterine cancer (1981)   • Depression    • History of emphysema    • History of esophageal reflux    • History of recurrent UTI (urinary tract infection)    • History of renal calculi    • History of uterine cancer    • Hyperlipidemia    • Hypertension    • Pneumonia 02/2019       Allergies   Allergen Reactions   • Morphine And Related Irritability       Past Surgical History:   Procedure Laterality Date   • FOOT SURGERY     • HAND SURGERY     • HYSTERECTOMY         Family History   Problem Relation Age of Onset   • Cancer Mother    • Heart attack Father    • Arthritis Father    • Diabetes Daughter    • Hyperlipidemia Daughter    • Migraines Daughter        Social History     Socioeconomic History   • Marital status:      Spouse name: Not on file   • Number of children: Not on file   • Years of education: Not on file   • Highest education level: Not on file   Tobacco Use   • Smoking status: Current Some Day Smoker     Packs/day: 0.25     Types: Cigarettes   • Smokeless tobacco: Never Used   Substance and Sexual Activity   • Alcohol use: No   • Drug use: No   • Sexual activity: Defer     Comment:            Objective   Physical Exam   Constitutional: She is oriented to person, place, and time. She appears distressed.   Chronically ill appearing    HENT:   Head: Normocephalic and atraumatic.   Nose: Nose normal.   Eyes: Conjunctivae and EOM are normal.   Cardiovascular: Normal rate and regular rhythm.   Pulmonary/Chest: She is in respiratory distress.    Tachypnea..  Significantly decreased breath sounds bilaterally.  There is end expiratory wheezing.  Accessory muscle use and conversational dyspnea present.   Abdominal: Soft. She exhibits no distension. There is no tenderness. There is no guarding.   Musculoskeletal: She exhibits no edema or deformity.   Neurological: She is alert and oriented to person, place, and time. No cranial nerve deficit.   Skin: She is not diaphoretic.   Nursing note and vitals reviewed.      Critical Care  Performed by: Juice Dawn DO  Authorized by: Juice Dawn DO     Critical care provider statement:     Critical care time (minutes):  75    Critical care was necessary to treat or prevent imminent or life-threatening deterioration of the following conditions:  Cardiac failure, respiratory failure, sepsis, shock and circulatory failure    Critical care was time spent personally by me on the following activities:  Ordering and performing treatments and interventions, development of treatment plan with patient or surrogate, discussions with consultants, evaluation of patient's response to treatment, examination of patient, ordering and review of laboratory studies, ordering and review of radiographic studies, pulse oximetry, re-evaluation of patient's condition and review of old charts              ED Course  ED Course as of May 12 0139   Sat May 11, 2019   2327 EKG interpreted by me.  Sinus rhythm.  Rate of 88.  Occasional PVCs.  No ST segment depression or elevation.  No T wave abnormalities.  Abnormal EKG.  [CG]      ED Course User Index  [CG] Juice Dawn DO        77 yo f presents with respiratory distress. The patient has decreased breath sounds bilaterally with accessory muscle use. Treated with continuous nebs, magnesium and steroids. ABG shows hypercapnia, worse than her most recent ABGs. Pt refusing Bipap. Labs without significant abnormalities. EKG nonspecific. CXR negative for pneumonia. On reexamination patient  still has increased work of breath and course wheezing diffusely. Discussed the case with Dr. Perdomo, accepts the patient for admission to ICU. Pt required multiple reassessments while in my care for evaluation of respiratory status and response to medical interventions.           MDM      Final diagnoses:   COPD exacerbation (CMS/[facility])   Hypercapnia   Respiratory distress            Juice Dawn DO  05/12/19 0140      Electronically signed by Juice Dawn DO at 5/12/2019  1:40 AM     Melodie Angel at 5/12/2019 12:28 AM        Dr. Perdomo called for Dr. Dawn with answer.     Melodie Angel  05/12/19 0029      Electronically signed by Melodie Angel at 5/12/2019 12:29 AM     Melodie Angel at 5/12/2019 12:34 AM        House Supervisor called about bed placement for admit. She stated she would call back with bed assignment.      Melodie Angel  05/12/19 0034      Electronically signed by Melodie Angel at 5/12/2019 12:34 AM     Melodie Angel at 5/12/2019 12:45 AM        Patient will be going to room ICU bed 5. DELMY Harper notified.      Melodie Angel  05/12/19 0046      Electronically signed by Melodie Angel at 5/12/2019 12:46 AM       ICU Vital Signs     Row Name 05/12/19 0904 05/12/19 0710 05/12/19 0701 05/12/19 0602 05/12/19 0502       Vitals    Pulse  92  89  89  97  91    Heart Rate Source  --  Monitor  --  Monitor  --    Resp  --  18  --  21  --    Resp Rate Source  --  Visual  --  Monitor  --    BP  137/63  --  133/72  131/62  132/74    Noninvasive MAP (mmHg)  --  --  106  91  106    BP Location  --  --  --  Left arm  --    BP Method  --  --  --  Automatic  --    Patient Position  --  --  --  Lying  --       Oxygen Therapy    SpO2  --  98 %  95 %  95 %  96 %    Pulse Oximetry Type  --  Continuous  --  Continuous  --    Device (Oxygen Therapy)  --  nasal cannula  --  nasal cannula  --    Flow (L/min)  --  2  --  2  --    Row Name 05/12/19 0402 05/12/19  "0302 05/12/19 0232 05/12/19 0202 05/12/19 0147       Vitals    Temp  97.6 °F (36.4 °C)  --  --  --  --    Temp src  Oral  --  --  --  --    Pulse  91  93  100  95  96    Heart Rate Source  Monitor  --  Monitor  Monitor  Monitor    Resp  24  --  21 21 23    Resp Rate Source  Monitor  --  Monitor  Monitor  Monitor    BP  142/68  133/66  147/82  130/57  135/65    Noninvasive MAP (mmHg)  116  102  134  77  88    BP Location  Left arm  Left arm  Left arm  Left arm  Left arm    BP Method  Automatic  Automatic  Automatic  Automatic  --    Patient Position  Lying  Lying  Lying  Lying  --       Oxygen Therapy    SpO2  96 %  98 %  97 %  98 %  98 %    Pulse Oximetry Type  Continuous  Continuous  Continuous  Continuous  Continuous    Device (Oxygen Therapy)  nasal cannula  nasal cannula  nasal cannula  nasal cannula  nasal cannula    Flow (L/min)  2  2  2  2  2    Row Name 05/12/19 0131 05/12/19 0129 05/12/19 0120 05/12/19 0107 05/12/19 0059       Height and Weight    Height  --  --  165.1 cm (65\")  --  --    Height Method  --  --  Stated  --  --    Weight  --  --  49.2 kg (108 lb 8 oz)  --  --    Weight Method  --  --  Bed scale  --  --    Ideal Body Weight (IBW) (kg)  --  --  57.29  --  --    BSA (Calculated - sq m)  --  --  1.53 sq meters  --  --    BMI (Calculated)  --  --  18.1  --  --    Weight in (lb) to have BMI = 25  --  --  149.9  --  --       Vitals    Temp  --  --  97.6 °F (36.4 °C)  --  --    Temp src  --  --  Oral  --  --    Pulse  92  95  94  83  93    Heart Rate Source  Monitor  Monitor  Monitor  --  Monitor    Resp  --  23 21 20 20    Resp Rate Source  --  Monitor  Visual  --  Visual    BP  146/66  153/84  140/75  --  --    Noninvasive MAP (mmHg)  111  118  81  --  --    BP Location  Left arm  Right arm  Left arm  --  --    BP Method  Automatic  Automatic  Automatic  --  --    Patient Position  Lying  Lying  Lying  --  --       Oxygen Therapy    SpO2  99 %  99 %  99 %  99 %  97 %    Pulse Oximetry Type  " "Continuous  Continuous  Continuous  --  Continuous    Device (Oxygen Therapy)  nasal cannula  nasal cannula  nasal cannula  --  nasal cannula    Flow (L/min)  2  2  2  --  2    Row Name 05/12/19 0006 05/11/19 2253 05/11/19 2220 05/11/19 2154          Height and Weight    Height  --  --  --  165.1 cm (65\")     Height Method  --  --  --  Stated     Weight  --  --  --  51.3 kg (113 lb)     Weight Method  --  --  --  Standing scale     Ideal Body Weight (IBW) (kg)  --  --  --  57.29     BSA (Calculated - sq m)  --  --  --  1.55 sq meters     BMI (Calculated)  --  --  --  18.8     Weight in (lb) to have BMI = 25  --  --  --  149.9        Vitals    Temp  --  --  --  97.4 °F (36.3 °C)     Temp src  --  --  --  Oral     Pulse  78  90  92  91     Heart Rate Source  Monitor  --  Monitor  Monitor     Resp  20  24  24  24     Resp Rate Source  Visual  --  Visual  Visual     BP  140/84  --  --  143/85     BP Location  Right arm  --  --  Right arm     BP Method  Automatic  --  --  Automatic     Patient Position  Sitting  --  --  Sitting        Oxygen Therapy    SpO2  96 %  99 %  99 %  96 %     Pulse Oximetry Type  Continuous  --  Continuous  --     Device (Oxygen Therapy)  --  --  nasal cannula  nasal cannula     Flow (L/min)  --  --  2  2         Hospital Medications (active)       Dose Frequency Start End    albuterol (PROVENTIL) nebulizer solution 0.083% 2.5 mg/3mL 5 mg Once 5/11/2019 5/11/2019    Sig - Route: Take 5 mg by nebulization 1 (One) Time. - Nebulization    budesonide (PULMICORT) nebulizer solution 0.5 mg 0.5 mg 2 Times Daily - RT 5/12/2019     Sig - Route: Take 2 mL by nebulization 2 (Two) Times a Day. - Nebulization    clonazePAM (KlonoPIN) tablet 0.5 mg 0.5 mg Nightly PRN 5/12/2019     Sig - Route: Take 1 tablet by mouth At Night As Needed for Seizures. - Oral    doxycycline (VIBRAMYCIN) 100 mg in sodium chloride 0.9 % 250 mL IVPB 100 mg Every 12 Hours 5/12/2019 5/17/2019    Sig - Route: Infuse 100 mg into a " venous catheter Every 12 (Twelve) Hours. - Intravenous    enoxaparin (LOVENOX) syringe 40 mg 40 mg Every 24 Hours 5/12/2019     Sig - Route: Inject 0.4 mL under the skin into the appropriate area as directed Daily. - Subcutaneous    estrogens (conjugated) (PREMARIN) tablet 0.625 mg 0.625 mg Daily 5/12/2019     Sig - Route: Take 1 tablet by mouth Daily. - Oral    fluticasone (FLONASE) 50 MCG/ACT nasal spray 2 spray 2 spray Daily 5/12/2019     Sig - Route: 2 sprays by Each Nare route Daily. - Each Nare    guaiFENesin (MUCINEX) 12 hr tablet 600 mg 600 mg 2 Times Daily 5/12/2019     Sig - Route: Take 1 tablet by mouth 2 (Two) Times a Day. - Oral    ipratropium-albuterol (DUO-NEB) nebulizer solution 3 mL 3 mL Once 5/11/2019 5/11/2019    Sig - Route: Take 3 mL by nebulization 1 (One) Time. - Nebulization    ipratropium-albuterol (DUO-NEB) nebulizer solution 3 mL 3 mL Once 5/12/2019 5/12/2019    Sig - Route: Take 3 mL by nebulization 1 (One) Time. - Nebulization    ipratropium-albuterol (DUO-NEB) nebulizer solution 3 mL 3 mL Every 6 Hours - RT 5/12/2019     Sig - Route: Take 3 mL by nebulization Every 6 (Six) Hours. - Nebulization    lactobacillus acidophilus (RISAQUAD) capsule 1 capsule 1 capsule 2 Times Daily 5/12/2019     Sig - Route: Take 1 capsule by mouth 2 (Two) Times a Day. - Oral    magnesium sulfate 2g/50 mL (PREMIX) infusion 2 g Once 5/11/2019     Sig - Route: Infuse 50 mL into a venous catheter 1 (One) Time. - Intravenous    meclizine (ANTIVERT) tablet 12.5 mg 12.5 mg 3 Times Daily PRN 5/12/2019     Sig - Route: Take 1 tablet by mouth 3 (Three) Times a Day As Needed for dizziness. - Oral    melatonin tablet 5 mg 5 mg Nightly PRN 5/12/2019     Sig - Route: Take 1 tablet by mouth At Night As Needed for Sleep. - Oral    methylPREDNISolone sodium succinate (SOLU-Medrol) injection 40 mg 40 mg Every 8 Hours 5/12/2019     Sig - Route: Infuse 1 mL into a venous catheter Every 8 (Eight) Hours. - Intravenous     "methylPREDNISolone sodium succinate (SOLU-Medrol) injection 80 mg 80 mg Once 5/11/2019 5/11/2019    Sig - Route: Infuse 2 mL into a venous catheter 1 (One) Time. - Intravenous    ondansetron (ZOFRAN) injection 4 mg 4 mg Every 6 Hours PRN 5/12/2019     Sig - Route: Infuse 2 mL into a venous catheter Every 6 (Six) Hours As Needed for Nausea or Vomiting. - Intravenous    pantoprazole (PROTONIX) EC tablet 40 mg 40 mg Every Morning 5/12/2019     Sig - Route: Take 1 tablet by mouth Every Morning. - Oral    pravastatin (PRAVACHOL) tablet 20 mg 20 mg Every Evening 5/12/2019     Sig - Route: Take 1 tablet by mouth Every Evening. - Oral    promethazine (PHENERGAN) tablet 12.5 mg 12.5 mg Every 8 Hours PRN 5/12/2019     Sig - Route: Take 1 tablet by mouth Every 8 (Eight) Hours As Needed for Nausea or Vomiting. - Oral    propranolol LA (INDERAL LA) 24 hr capsule 60 mg 60 mg Daily 5/12/2019     Sig - Route: Take 1 capsule by mouth Daily. - Oral    sertraline (ZOLOFT) tablet 100 mg 100 mg Daily 5/12/2019     Sig - Route: Take 2 tablets by mouth Daily. - Oral    sodium chloride 0.9 % flush 10 mL 10 mL As Needed 5/11/2019     Sig - Route: Infuse 10 mL into a venous catheter As Needed for Line Care. - Intravenous    Linked Group 1:  \"And\" Linked Group Details        sodium chloride 0.9 % flush 3 mL 3 mL Every 12 Hours Scheduled 5/12/2019     Sig - Route: Infuse 3 mL into a venous catheter Every 12 (Twelve) Hours. - Intravenous    sodium chloride 0.9 % flush 3-10 mL 3-10 mL As Needed 5/12/2019     Sig - Route: Infuse 3-10 mL into a venous catheter As Needed for Line Care. - Intravenous    sodium chloride 0.9 % infusion 75 mL/hr Continuous 5/12/2019     Sig - Route: Infuse 75 mL/hr into a venous catheter Continuous. - Intravenous    traMADol (ULTRAM) tablet 50 mg 50 mg Every 8 Hours PRN 5/12/2019     Sig - Route: Take 1 tablet by mouth Every 8 (Eight) Hours As Needed (pain). - Oral    budesonide (PULMICORT) nebulizer solution 0.5 mg " (Discontinued) 0.5 mg 2 Times Daily - RT 5/12/2019 5/12/2019    Sig - Route: Take 2 mL by nebulization 2 (Two) Times a Day. - Nebulization    ipratropium-albuterol (DUO-NEB) nebulizer solution 3 mL (Discontinued) 3 mL Every 6 Hours - RT 5/12/2019 5/12/2019    Sig - Route: Take 3 mL by nebulization Every 6 (Six) Hours. - Nebulization    propranolol (INDERAL) tablet 60 mg (Discontinued) 60 mg Daily 5/12/2019 5/12/2019    Sig - Route: Take 3 tablets by mouth Daily. - Oral            Lab Results (last 24 hours)     Procedure Component Value Units Date/Time    Basic Metabolic Panel [[id number]]  (Abnormal) Collected:  05/12/19 0739    Specimen:  Blood Updated:  05/12/19 0825     Glucose 183 mg/dL      BUN 13 mg/dL      Creatinine 0.60 mg/dL      Sodium 136 mmol/L      Potassium 4.3 mmol/L      Chloride 93 mmol/L      CO2 38.0 mmol/L      Calcium 8.8 mg/dL      eGFR Non African Amer 96 mL/min/1.73      BUN/Creatinine Ratio 21.7     Anion Gap 9.3 mmol/L     Narrative:       The MDRD GFR formula is only valid for adults with stable renal function between ages 18 and 70.    Magnesium [[id number]]  (Normal) Collected:  05/12/19 0739    Specimen:  Blood Updated:  05/12/19 0825     Magnesium 1.8 mg/dL     Troponin [[id number]]  (Normal) Collected:  05/12/19 0739    Specimen:  Blood Updated:  05/12/19 0825     Troponin I <0.012 ng/mL     Narrative:       Normal Patient Upper Reference Limit (URL) (99th Percentile)=0.03 ng/mL   Non-AMI Illness Reference Limit=0.03-0.11 ng/mL   AMI Confirmation=0.12 ng/mL and above    CBC Auto Differential [[id number]]  (Abnormal) Collected:  05/12/19 0739    Specimen:  Blood Updated:  05/12/19 0802     WBC 9.97 10*3/mm3      RBC 4.04 10*6/mm3      Hemoglobin 12.2 g/dL      Hematocrit 37.5 %      MCV 92.8 fL      MCH 30.2 pg      MCHC 32.5 g/dL      RDW 13.2 %      RDW-SD 44.7 fl      MPV 9.9 fL      Platelets 211 10*3/mm3      Neutrophil % 90.8 %      Lymphocyte % 7.8 %      Monocyte % 0.5 %       Eosinophil % 0.0 %      Basophil % 0.2 %      Immature Grans % 0.7 %      Neutrophils, Absolute 9.05 10*3/mm3      Lymphocytes, Absolute 0.78 10*3/mm3      Monocytes, Absolute 0.05 10*3/mm3      Eosinophils, Absolute 0.00 10*3/mm3      Basophils, Absolute 0.02 10*3/mm3      Immature Grans, Absolute 0.07 10*3/mm3      nRBC 0.0 /100 WBC     Respiratory Culture - Sputum, Cough [[id number]] Collected:  05/12/19 0316    Specimen:  Sputum from Cough Updated:  05/12/19 0448     Gram Stain Greater than 20 WBCs per low power field      Less than 10 Epithelial cells per low power field      Occasional Gram positive cocci in pairs    Respiratory Panel, PCR - Swab, Nasopharynx [[id number]] Collected:  05/12/19 0416    Specimen:  Swab from Nasopharynx Updated:  05/12/19 0425    C-reactive Protein [[id number]]  (Abnormal) Collected:  05/12/19 0151    Specimen:  Blood Updated:  05/12/19 0220     C-Reactive Protein 2.20 mg/dL     Troponin [[id number]]  (Normal) Collected:  05/12/19 0151    Specimen:  Blood Updated:  05/12/19 0220     Troponin I <0.012 ng/mL     Narrative:       Normal Patient Upper Reference Limit (URL) (99th Percentile)=0.03 ng/mL   Non-AMI Illness Reference Limit=0.03-0.11 ng/mL   AMI Confirmation=0.12 ng/mL and above    Procalcitonin [[id number]]  (Normal) Collected:  05/11/19 2302    Specimen:  Blood Updated:  05/12/19 0010     Procalcitonin <0.05 ng/mL     Narrative:       As a Marker for Sepsis (Non-Neonates):   1. <0.5 ng/mL represents a low risk of severe sepsis and/or septic shock.  2. >2 ng/mL represents a high risk of severe sepsis and/or septic shock.    As a Marker for Lower Respiratory Tract Infections that require antibiotic therapy:    PCT on Admission     Antibiotic Therapy       6-12 Hrs later  > 0.5                Strongly Recommended             >0.25 - <0.5         Recommended  0.1 - 0.25           Discouraged              Remeasure/reassess PCT  <0.1                 Strongly Discouraged      Remeasure/reassess PCT                     PCT values of < 0.5 ng/mL do not exclude an infection, because localized infections (without systemic signs) may be associated with such low concentrations, or a systemic infection in its initial stages (< 6 hours). Furthermore, increased PCT can occur without infection. PCT concentrations between 0.5 and 2.0 ng/mL should be interpreted taking into account the patient's history. It is recommended to retest PCT within 6-24 hours if any concentrations < 2 ng/mL are obtained.    Comprehensive Metabolic Panel [[id number]]  (Abnormal) Collected:  05/11/19 2216    Specimen:  Blood Updated:  05/11/19 2254     Glucose 141 mg/dL      BUN 12 mg/dL      Creatinine 0.70 mg/dL      Sodium 138 mmol/L      Potassium 4.4 mmol/L      Chloride 90 mmol/L      CO2 42.0 mmol/L      Calcium 9.2 mg/dL      Total Protein 6.8 g/dL      Albumin 3.70 g/dL      ALT (SGPT) 28 U/L      AST (SGOT) 21 U/L      Alkaline Phosphatase 79 U/L      Total Bilirubin 0.4 mg/dL      eGFR Non African Amer 81 mL/min/1.73      Globulin 3.1 gm/dL      A/G Ratio 1.2 g/dL      BUN/Creatinine Ratio 17.1     Anion Gap 10.4 mmol/L     Narrative:       The MDRD GFR formula is only valid for adults with stable renal function between ages 18 and 70.    BNP [[id number]]  (Normal) Collected:  05/11/19 2216    Specimen:  Blood Updated:  05/11/19 2249     proBNP 130.0 pg/mL     Troponin [[id number]]  (Normal) Collected:  05/11/19 2216    Specimen:  Blood Updated:  05/11/19 2249     Troponin I <0.012 ng/mL     Narrative:       Normal Patient Upper Reference Limit (URL) (99th Percentile)=0.03 ng/mL   Non-AMI Illness Reference Limit=0.03-0.11 ng/mL   AMI Confirmation=0.12 ng/mL and above    Blood Gas, Arterial With Co-Ox [[id number]]  (Abnormal) Collected:  05/11/19 2235    Specimen:  Arterial Blood Updated:  05/11/19 2236     Site Right Radial     Miguel's Test Positive     pH, Arterial 7.351 pH units      pCO2, Arterial 73.5 mm Hg       Comment: 86 Value above critical limit        pO2, Arterial 127.0 mm Hg      Comment: 83 Value above reference range        HCO3, Arterial 40.6 mmol/L      Comment: 83 Value above reference range        Base Excess, Arterial 12.0 mmol/L      Comment: 83 Value above reference range        O2 Saturation, Arterial 99.2 %      Comment: 83 Value above reference range        Hemoglobin, Blood Gas 12.6 g/dL      Hematocrit, Blood Gas 38.8 %      Oxyhemoglobin 97.2 %      Methemoglobin 0.50 %      Carboxyhemoglobin 1.5 %      A-a Gradiant -- mmHg      Comment: UNABLE TO CALCULATE        Barometric Pressure for Blood Gas 727 mmHg      Modality Nasal Cannula     FIO2 28 %      Flow Rate 2.0 lpm      Ventilator Mode NA     Comment: Meter: [id number]     :  [id number]        Note --     pH, Temp Corrected -- pH Units      pCO2, Temperature Corrected -- mm Hg      pO2, Temperature Corrected -- mm Hg     CBC & Differential [[id number]] Collected:  05/11/19 2216    Specimen:  Blood Updated:  05/11/19 2221    Narrative:       The following orders were created for panel order CBC & Differential.  Procedure                               Abnormality         Status                     ---------                               -----------         ------                     CBC Auto Differential[[id number]]        Abnormal            Final result                 Please view results for these tests on the individual orders.    CBC Auto Differential [[id number]]  (Abnormal) Collected:  05/11/19 2216    Specimen:  Blood Updated:  05/11/19 2221     WBC 16.24 10*3/mm3      RBC 4.37 10*6/mm3      Hemoglobin 12.8 g/dL      Hematocrit 40.3 %      MCV 92.2 fL      MCH 29.3 pg      MCHC 31.8 g/dL      RDW 13.4 %      RDW-SD 45.7 fl      MPV 9.8 fL      Platelets 238 10*3/mm3      Neutrophil % 75.5 %      Lymphocyte % 14.9 %      Monocyte % 8.1 %      Eosinophil % 0.7 %      Basophil % 0.3 %      Immature Grans % 0.5 %      Neutrophils,  Absolute 12.26 10*3/mm3      Lymphocytes, Absolute 2.42 10*3/mm3      Monocytes, Absolute 1.31 10*3/mm3      Eosinophils, Absolute 0.12 10*3/mm3      Basophils, Absolute 0.05 10*3/mm3      Immature Grans, Absolute 0.08 10*3/mm3      nRBC 0.0 /100 WBC         Imaging Results (last 24 hours)     Procedure Component Value Units Date/Time    XR Chest 1 View [[id number]] Collected:  05/12/19 0627     Updated:  05/12/19 0631    Narrative:       PROCEDURE: XR CHEST 1 VW-     HISTORY: dyspnea        COMPARISON: 05/04/2019.     FINDINGS:  The heart size is normal. The mediastinum is normal.There is  right apical scarring which is stable. No acute pulmonary abnormality is  identified. There is no pneumothorax. The bony thorax in intact.       Impression:       No acute cardiopulmonary process.           This report was finalized on 5/12/2019 6:29 AM by Richy Yoon MD.        Physician Progress Notes (last 24 hours) (Notes from 5/11/2019  9:05 AM through 5/12/2019  9:05 AM)     No notes of this type exist for this encounter.        Consult Notes (last 24 hours) (Notes from 5/11/2019  9:05 AM through 5/12/2019  9:05 AM)     No notes of this type exist for this encounter.

## 2019-05-12 NOTE — ED NOTES
House Supervisor called about bed placement for admit. She stated she would call back with bed assignment.      Melodie Angel  05/12/19 0034

## 2019-05-12 NOTE — PLAN OF CARE
Problem: Patient Care Overview  Goal: Plan of Care Review  Outcome: Ongoing (interventions implemented as appropriate)   05/12/19 1226   Coping/Psychosocial   Plan of Care Reviewed With patient   Plan of Care Review   Progress improving       Problem: NPPV/CPAP (Adult)  Goal: Signs and Symptoms of Listed Potential Problems Will be Absent, Minimized or Managed (NPPV/CPAP)  Outcome: Ongoing (interventions implemented as appropriate)   05/12/19 1226   Goal/Outcome Evaluation   Problems Assessed (NPPV/CPAP) all   Problems Present (NPPV/CPAP) none

## 2019-05-12 NOTE — CONSULTS
"Date of consultation:   May 12, 2019    Requested by:   Hospitalist Service.   PCP: Kristian Wolff MD    Reason:  Shortness of breath and cough.    History of Present Illness:  78 y.o. female  who was admitted to the hospital with shortness of breath and cough.  The patient was actually recently seen in our office and due to progressive symptoms Daliresp was added.  The patient says that for the past 1-2 weeks and actually was in the ER about 1 week ago and was given steroids.  After she started feeling better she once again started worsening with increased production of yellow sputum.  The patient actually says that the sputum is somewhat thick and difficult to expectorate.  She denies any sick contacts.  Upon evaluation in the ER she was found to have an elevated white count with negative procalcitonin.      Upon detailed questioning, the patient continues to have progressive worsening in her symptoms as well as progressive decline in her exercise capacity.  She says that she started taking the Daliresp and had significant diarrhea therefore stopped taking it after 4 days.    She continues to smoke \"1-2 cigarettes/day\".  She used to smoke one third of a pack per day for about 55 years.  She is not on oxygen at home at baseline.  She denies any pets, sick contacts or mold in the house.    Review of System: All other review of systems negative except indicated in HPI.    Past Medical History:  Past Medical History:   Diagnosis Date   • Arthritis    • Cancer (CMS/HCC)     uterine cancer (1981)   • COPD (chronic obstructive pulmonary disease) (CMS/HCC)    • Depression    • Elevated cholesterol    • GERD (gastroesophageal reflux disease)    • History of emphysema    • History of esophageal reflux    • History of recurrent UTI (urinary tract infection)    • History of renal calculi    • History of uterine cancer    • Hyperlipidemia    • Hypertension    • Pneumonia 02/2019         Past Surgical History:  Past Surgical " "History:   Procedure Laterality Date   • FOOT SURGERY     • HAND SURGERY     • HYSTERECTOMY           Family History:  Family History   Problem Relation Age of Onset   • Cancer Mother    • Heart attack Father    • Arthritis Father    • Heart disease Father    • Diabetes Daughter    • Hyperlipidemia Daughter    • Migraines Daughter    • Heart disease Sister    • Diabetes Son    • Glaucoma Son          Social History:  Social History     Socioeconomic History   • Marital status:      Spouse name: Not on file   • Number of children: Not on file   • Years of education: Not on file   • Highest education level: Not on file   Tobacco Use   • Smoking status: Current Some Day Smoker     Packs/day: 0.25     Types: Cigarettes   • Smokeless tobacco: Never Used   Substance and Sexual Activity   • Alcohol use: No   • Drug use: No   • Sexual activity: Defer     Comment:          Physical Exam:  /65   Pulse 90   Temp 97.3 °F (36.3 °C) (Oral)   Resp 22   Ht 165.1 cm (65\")   Wt 49.2 kg (108 lb 8 oz)   SpO2 97%   BMI 18.06 kg/m²     Constitutional:             General: Mild respiratory distress noted.    Head/Face/Eyes:             No significant facial abnormalities seen.             Extra ocular movement was intact.             Pupils appeared equal    ENT:             Hearing was intact.               No nasal erythema noted.              Oropharynx was moist.              Dentures noted.    Neck:             Supple. No JVD noted.              Thyroid gland did not seem to be enlarged    Cardiovascular:              S1 + S2. Regular.    Respiratory:            Respiratory effort was mildly labored.              Decreased Air Entry Bilaterally with moderate wheezing.    Abdomen:            Soft.  Bowel sounds were positive.  No obvious organomegaly.    Extremities:            No edema noted.            No cyanosis noted            No clubbing noted            Gait could not be assessed at this " time.    Neurologic/Psychiatric:             Affect appeared fair.             Awake, alert and oriented x 3.             Able to follow simple commands.    Skin:             No rash noted.             Warm and dry          Labs: Reviewed. Pertinent labs were noted.     Lab Results   Component Value Date    WBC 9.97 05/12/2019    HGB 12.2 05/12/2019    HCT 37.5 05/12/2019    MCV 92.8 05/12/2019     05/12/2019       Lab Results   Component Value Date    GLUCOSE 183 (H) 05/12/2019    CALCIUM 8.8 05/12/2019     (L) 05/12/2019    K 4.3 05/12/2019    CO2 38.0 (H) 05/12/2019    CL 93 (L) 05/12/2019    BUN 13 05/12/2019    CREATININE 0.60 05/12/2019    EGFRIFAFRI 98 04/26/2019    EGFRIFNONA 96 05/12/2019    BCR 21.7 05/12/2019    ANIONGAP 9.3 (L) 05/12/2019       Lab Results   Component Value Date    .0 (H) 10/27/2017    BNP 1,810 (C) 01/23/2016    BNP 46.2 07/06/2015       Lab Results   Component Value Date    INR 1.0 01/07/2016       Lab Results   Component Value Date    CRP 2.20 (H) 05/12/2019    CRP 2.50 (H) 02/06/2018         ABG: Reviewed  Lab Results   Component Value Date    PHART 7.351 05/11/2019    DEU1NMU 73.5 (C) 05/11/2019    PO2ART 127.0 (H) 05/11/2019    HGBBG 12.6 05/11/2019    S5RHKTIA 99.2 05/11/2019    FCOHB 0.9 (L) 12/31/2016    CARBOXYHGB 1.5 05/11/2019    FMETHB <0.0 (L) 12/31/2016       Micro:   Lab Results   Component Value Date    BLOODCX No growth at 5 days 04/01/2019    BLOODCX No growth at 5 days 04/01/2019    BLOODCX No growth at 5 days 02/23/2019           sodium chloride 75 mL/hr Last Rate: 75 mL/hr (05/12/19 0247)       Imaging Study: Images reviewed personally and discussed with patient   Imaging Results (last 24 hours)     Procedure Component Value Units Date/Time    XR Chest 1 View [[id number]] Collected:  05/12/19 0627     Updated:  05/12/19 0631    Narrative:       PROCEDURE: XR CHEST 1 VW-     HISTORY: dyspnea        COMPARISON: 05/04/2019.     FINDINGS:  The heart  size is normal. The mediastinum is normal.There is  right apical scarring which is stable. No acute pulmonary abnormality is  identified. There is no pneumothorax. The bony thorax in intact.       Impression:       No acute cardiopulmonary process.           This report was finalized on 5/12/2019 6:29 AM by Richy Yoon MD.            ECHO:  Results for orders placed during the hospital encounter of 01/22/18   Adult Transthoracic Echo Complete W/ Cont if Necessary Per Protocol    Narrative Technically limited study   1) Normal LV systolic function ( EF is over 55%)  2) Mild elevation in LVedp with normal left atrial size   3) Trace MR   4) Mild TR with PAsp of 46 mm of hg   5) Borderline RV size and normal function          Assessment:  1.  Acute hypoxic and hypercarbic respiratory failure  2.  Likely acute exacerbation of COPD  3.  Smoking.   4.  Emphysema on the CT scan  5.  Chronic respiratory failure.  6.  History of stenotrophomonas respiratory infection in 2018.    Discussion/Recommendations:   The patient continues to refuse BiPAP, no matter what.  I once again offered multiple modalities including trial of noninvasive ventilation with very low tidal volumes, low respiratory rates and pressures etc.  I also again informed the patient that she clearly has chronic respiratory acidosis/failure.    I also offered multiple different masks.  After much discussion and debate and after encouragement from her family members, she is willing to try nasal mask and initially I will start a very low pressure of 10/4.    I encouraged the patient to start using the BiPAP during the daytime, to get used to it.  She will however need to use it on a regular basis at night.    I will modify nebulized treatments, as appropriate.      Since the patient has had multiple recent exacerbations, I offered her long term use of steroids, at least for the next 4 to 6 weeks.  I told her about the side effects but since the patient is  having significant issues with breathing, she is willing to try steroids.    Another option would be azithromycin every other day.    I will suggest repeating ABG in the morning.    Recommendations were also discussed with the referring provider.     I would like to thank you for the opportunity to participate in the care of this patient.  We will communicate changes and recommendations, if and when necessary.      This document was electronically signed by Ce Mojica MD on 05/12/19 at 12:30 PM      Dictated utilizing Dragon dictation.

## 2019-05-12 NOTE — NURSING NOTE
Patient to be transferred to room 425.  Patient has not voided since 0230.  States she has the urge to go but wants to wait until she is transferred and has a bathroom.

## 2019-05-12 NOTE — PROGRESS NOTES
[facility]   FOLLOW UP NOTE    Name:  Sadie Tijerina   Age:  78 y.o.  Sex:  female  :  1940  MRN:  [id number]   Visit Number:  [id number]  Admission Date:  2019  Date Of Service:  19  Primary Care Physician:  Kristian Wolff MD    Patient was seen and examined. Pertinent laboratory and radiology data were reviewed. She was just admitted earlier this morning and seen in ICU. She reports starting to feel slightly better. She has two word conversational dypsnea.    Vital signs:    Vital Signs (last 24 hours)        0700  -   0659  0700  -   1104   Most Recent    Temp (°F) 97.4 -  97.6      97.3     97.3 (36.3)    Heart Rate 78 -  100    89 -  95     90    Resp 20 -  24    18 -  22     22    /57 -  153/84    130/65 -  137/63     130/65    SpO2 (%) 95 -  99    95 -  98     97              1.  Acute on chronic, respiratory failure with hypoxia and hypercapnia, improving clinically  2.  COPD exacerbation present on admission, improving  3.  Acute bronchitis present on admission, uncertain organism, prior to admission  4.  Essential hypertension  5.  Chronic diastolic CHF, not in exacerbation  6.  History of uterine cancer        Plan:   She has not had acute respiratory distress and does not want to stay in ICU, requesting to be transferred to floor bed.   Continue solumedrol, doxycycline, duoneb, titrate oxygen as needed.   Discussed with nursing. She refused bipap if needed. Continue PT, OT. Up to chair. She will be transferred to telemetry floor. The patient feels she was discharged too quickly on previous hospital admission.  consult CM to see if acute rehab placement may be indicated.     If she worsens, she does want intubated but currently in no distress. She is full code.   Protonix. Lovenox. Anticipate discharge home in the next 2-3 days. No family here.                Estrella Cadet DO  19  11:04 AM

## 2019-05-12 NOTE — ED PROVIDER NOTES
Subjective   78-year-old female with a past medical history significant for COPD who is home O2 dependent on 2 L nasal cannula at all times presents to the ED with chief complaint of shortness of breath.  The patient states that she has been short of breath for approximately 1 week but it got much worse over the last 2 days.  Patient also notes that she was seen here 1 week ago when her symptoms started.  She was given magnesium and Decadron and a breathing treatment which did improve her symptoms.  She had felt well until the day before yesterday.  She is complaining of chest tightness and wheezing.  She is complaining of difficulty breathing.  She denies fever or chills.  Cough is minimally productive of sputum.  No nausea vomiting diarrhea or abdominal pain.  No other complaints at this time.            Review of Systems   Constitutional: Negative for fatigue and fever.   Respiratory: Positive for cough, chest tightness, shortness of breath and wheezing.    Cardiovascular: Negative for chest pain and palpitations.   All other systems reviewed and are negative.      Past Medical History:   Diagnosis Date   • Arthritis    • Cancer (CMS/[facility])     uterine cancer (1981)   • Depression    • History of emphysema    • History of esophageal reflux    • History of recurrent UTI (urinary tract infection)    • History of renal calculi    • History of uterine cancer    • Hyperlipidemia    • Hypertension    • Pneumonia 02/2019       Allergies   Allergen Reactions   • Morphine And Related Irritability       Past Surgical History:   Procedure Laterality Date   • FOOT SURGERY     • HAND SURGERY     • HYSTERECTOMY         Family History   Problem Relation Age of Onset   • Cancer Mother    • Heart attack Father    • Arthritis Father    • Diabetes Daughter    • Hyperlipidemia Daughter    • Migraines Daughter        Social History     Socioeconomic History   • Marital status:      Spouse name: Not on file   • Number of  children: Not on file   • Years of education: Not on file   • Highest education level: Not on file   Tobacco Use   • Smoking status: Current Some Day Smoker     Packs/day: 0.25     Types: Cigarettes   • Smokeless tobacco: Never Used   Substance and Sexual Activity   • Alcohol use: No   • Drug use: No   • Sexual activity: Defer     Comment:            Objective   Physical Exam   Constitutional: She is oriented to person, place, and time. She appears distressed.   Chronically ill appearing    HENT:   Head: Normocephalic and atraumatic.   Nose: Nose normal.   Eyes: Conjunctivae and EOM are normal.   Cardiovascular: Normal rate and regular rhythm.   Pulmonary/Chest: She is in respiratory distress.   Tachypnea..  Significantly decreased breath sounds bilaterally.  There is end expiratory wheezing.  Accessory muscle use and conversational dyspnea present.   Abdominal: Soft. She exhibits no distension. There is no tenderness. There is no guarding.   Musculoskeletal: She exhibits no edema or deformity.   Neurological: She is alert and oriented to person, place, and time. No cranial nerve deficit.   Skin: She is not diaphoretic.   Nursing note and vitals reviewed.      Critical Care  Performed by: Juice Dawn DO  Authorized by: Juice Dawn DO     Critical care provider statement:     Critical care time (minutes):  75    Critical care was necessary to treat or prevent imminent or life-threatening deterioration of the following conditions:  Cardiac failure, respiratory failure, sepsis, shock and circulatory failure    Critical care was time spent personally by me on the following activities:  Ordering and performing treatments and interventions, development of treatment plan with patient or surrogate, discussions with consultants, evaluation of patient's response to treatment, examination of patient, ordering and review of laboratory studies, ordering and review of radiographic studies, pulse oximetry,  re-evaluation of patient's condition and review of old charts               ED Course  ED Course as of May 12 0139   Sat May 11, 2019   2327 EKG interpreted by me.  Sinus rhythm.  Rate of 88.  Occasional PVCs.  No ST segment depression or elevation.  No T wave abnormalities.  Abnormal EKG.  [CG]      ED Course User Index  [CG] Juice Dawn, DO        77 yo f presents with respiratory distress. The patient has decreased breath sounds bilaterally with accessory muscle use. Treated with continuous nebs, magnesium and steroids. ABG shows hypercapnia, worse than her most recent ABGs. Pt refusing Bipap. Labs without significant abnormalities. EKG nonspecific. CXR negative for pneumonia. On reexamination patient still has increased work of breath and course wheezing diffusely. Discussed the case with Dr. Perdomo, accepts the patient for admission to ICU. Pt required multiple reassessments while in my care for evaluation of respiratory status and response to medical interventions.           MDM      Final diagnoses:   COPD exacerbation (CMS/[facility])   Hypercapnia   Respiratory distress            Juice Dawn, DO  05/12/19 0140

## 2019-05-12 NOTE — ED NOTES
Patient will be going to room ICU bed 5. DELMY Harper notified.      Melodie Angel  05/12/19 0046

## 2019-05-12 NOTE — PLAN OF CARE
Problem: Patient Care Overview  Goal: Plan of Care Review  Outcome: Ongoing (interventions implemented as appropriate)   05/12/19 1050   Coping/Psychosocial   Plan of Care Reviewed With patient   Plan of Care Review   Progress improving   OTHER   Outcome Summary Pt seen for PT eval today. Pt was able to ambulate 24' with Jas and a chair follow. Pt limited with ambulation distance due to impaired endurance and activity tolerance. Pt would benefit from skilled PTx to address these deficits and improve independence with functional mobility.

## 2019-05-12 NOTE — THERAPY EVALUATION
Acute Care - Physical Therapy Initial Evaluation   Friend     Patient Name: Sadie Tijerina  : 1940  MRN: [id number]  Today's Date: 2019      Date of Referral to PT: 19  Referring Physician: Dr. Perdomo      Admit Date: 2019    Visit Dx:     ICD-10-CM ICD-9-CM   1. COPD exacerbation (CMS/[facility]) J44.1 491.21   2. Hypercapnia R06.89 786.09   3. Respiratory distress R06.03 786.09     Patient Active Problem List   Diagnosis   • Calf cramp   • Mixed anxiety depressive disorder   • Dizziness   • Fatigue   • Gastroesophageal reflux disease without esophagitis   • Hematuria   • Hyperlipidemia   • Essential hypertension   • Low back pain   • Orthostatic hypotension   • Restless legs syndrome   • Essential tremor   • Vitamin D deficiency   • Balance problem   • Tension headache   • Tobacco abuse disorder   • COPD with acute exacerbation (CMS/[facility])   • Venous insufficiency of both lower extremities   • Chronic diastolic heart failure (CMS/[facility])   • Weight loss   • Neuropathy   • COPD exacerbation (CMS/[facility])   • Acute respiratory failure with hypoxia and hypercapnia (CMS/[facility])   • Acute bronchitis     Past Medical History:   Diagnosis Date   • Arthritis    • Cancer (CMS/[facility])     uterine cancer ()   • COPD (chronic obstructive pulmonary disease) (CMS/[facility])    • Depression    • Elevated cholesterol    • GERD (gastroesophageal reflux disease)    • History of emphysema    • History of esophageal reflux    • History of recurrent UTI (urinary tract infection)    • History of renal calculi    • History of uterine cancer    • Hyperlipidemia    • Hypertension    • Pneumonia 2019     Past Surgical History:   Procedure Laterality Date   • FOOT SURGERY     • HAND SURGERY     • HYSTERECTOMY          PT ASSESSMENT (last 12 hours)      Physical Therapy Evaluation     Row Name 19 0947          PT Evaluation Time/Intention    Subjective Information  complains of;fatigue  -MS     Document Type  evaluation   -MS     Mode of Treatment  physical therapy  -MS     Total Evaluation Minutes, Physical Therapy  29  -MS     Patient Effort  good  -MS     Symptoms Noted During/After Treatment  fatigue  -MS     Row Name 05/12/19 0923          General Information    Patient Profile Reviewed?  yes  -MS     Referring Physician  Dr. Perdomo  -MS     Patient Observations  alert;cooperative;agree to therapy  -MS     Patient/Family Observations  Pt received alone supine in bed   -MS     Prior Level of Function  independent:;all household mobility;ADL's  -MS     Equipment Currently Used at Home  cane, straight;walker, rolling  -MS     Pertinent History of Current Functional Problem  COPD exacerbation, resp failure, PMHx; HTN, CHF, HLD   -MS     Risks Reviewed  patient:;increased discomfort  -MS     Benefits Reviewed  patient:;improve function;increase independence;increase strength;increase balance  -MS     Barriers to Rehab  medically complex  -MS     Row Name 05/12/19 0956          Relationship/Environment    Lives With  alone  -MS     Row Name 05/12/19 0939          Resource/Environmental Concerns    Current Living Arrangements  home/apartment/condo  -MS     Row Name 05/12/19 0931          Home Main Entrance    Number of Stairs, Main Entrance  two  -MS     Stair Railings, Main Entrance  none  -MS     Row Name 05/12/19 0920          Cognitive Assessment/Intervention- PT/OT    Orientation Status (Cognition)  oriented x 4  -MS     Follows Commands (Cognition)  WNL  -MS     Safety Deficit (Cognitive)  insight into deficits/self awareness;awareness of need for assistance  -MS     Row Name 05/12/19 0955          Safety Issues, Functional Mobility    Safety Issues Affecting Function (Mobility)  insight into deficits/self awareness;judgment;safety precaution awareness  -MS     Row Name 05/12/19 0909          Bed Mobility Assessment/Treatment    Bed Mobility Assessment/Treatment  supine-sit  -MS     Supine-Sit [place] (Bed Mobility)   supervision  -MS     Assistive Device (Bed Mobility)  bed rails  -MS     Row Name 05/12/19 0947          Transfer Assessment/Treatment    Transfer Assessment/Treatment  stand-sit transfer;sit-stand transfer  -MS     Sit-Stand [place] (Transfers)  minimum assist (75% patient effort)  -MS     Stand-Sit [place] (Transfers)  supervision  -MS     Row Name 05/12/19 0947          Sit-Stand Transfer    Assistive Device (Sit-Stand Transfers)  walker, front-wheeled  -MS     Row Name 05/12/19 0947          Stand-Sit Transfer    Assistive Device (Stand-Sit Transfers)  walker, front-wheeled  -MS     Row Name 05/12/19 0947          Gait/Stairs Assessment/Training    Gait/Stairs Assessment/Training  gait/ambulation independence;gait/ambulation assistive device;distance ambulated;gait deviations;gait pattern  -MS     [place] Level (Gait)  minimum assist (75% patient effort)  -MS     Assistive Device (Gait)  walker, front-wheeled;other (see comments) chair follow   -MS     Distance in Feet (Gait)  24  -MS     Pattern (Gait)  step-to  -MS     Deviations/Abnormal Patterns (Gait)  base of support, narrow;alia decreased;gait speed decreased;stride length decreased Pt instructed on increasing GWYN and step length   -MS     Row Name 05/12/19 0947          General ROM    GENERAL ROM COMMENTS  WFL  -MS     Row Name 05/12/19 0947          MMT (Manual Muscle Testing)    General MMT Comments  Gross 4/5   -MS     Row Name 05/12/19 0947          Pain Assessment    Additional Documentation  Pain Scale: Numbers Pre/Post-Treatment (Group)  -MS     Row Name 05/12/19 0947          Pain Scale: Numbers Pre/Post-Treatment    Pain Scale: Numbers, Pretreatment  0/10 - no pain  -MS     Pain Scale: Numbers, Post-Treatment  0/10 - no pain  -MS     Row Name 05/12/19 0947          Coping    Observed Emotional State  accepting;calm  -MS     Verbalized Emotional State  acceptance  -MS     Row Name 05/12/19 0947          Plan of Care Review     Plan of Care Reviewed With  patient  -MS     Row Name 05/12/19 0947          Physical Therapy Clinical Impression    Date of Referral to PT  05/11/19  -MS     PT Diagnosis (PT Clinical Impression)  Impaired gait and endurance   -MS     Prognosis (PT Clinical Impression)  Good to achieve stated goals   -MS     Patient/Family Goals Statement (PT Clinical Impression)  To return home   -MS     Criteria for Skilled Interventions Met (PT Clinical Impression)  yes;treatment indicated  -MS     Pathology/Pathophysiology Noted (Describe Specifically for Each System)  musculoskeletal;neuromuscular;cardiovascular;pulmonary;integumentary;endocrine/metabolic  -MS     Impairments Found (describe specific impairments)  aerobic capacity/endurance;gait, locomotion, and balance;muscle performance;motor function;posture;ROM;neuromotor development and sensory integration  -MS     Functional Limitations in Following Categories (Describe Specific Limitations)  self-care;home management  -MS     Disability: Inability to Perform Actions/Activities of Required Roles (describe specific disability)  community/leisure  -MS     Rehab Potential (PT Clinical Summary)  good, to achieve stated therapy goals  -MS     Care Plan Review (PT)  risks/benefits reviewed;care plan/treatment goals reviewed;evaluation/treatment results reviewed;current/potential barriers reviewed;patient/other agree to care plan  -MS     Row Name 05/12/19 0947          Vital Signs    Pre SpO2 (%)  95  -MS     O2 Delivery Pre Treatment  supplemental O2 2L  -MS     Intra SpO2 (%)  94  -MS     O2 Delivery Intra Treatment  supplemental O2 2L   -MS     Post SpO2 (%)  94  -MS     O2 Delivery Post Treatment  supplemental O2 2L  -MS     Pre Patient Position  Supine  -MS     Post Patient Position  Sitting  -MS     Row Name 05/12/19 0947          Physical Therapy Goals    Bed Mobility Goal Selection (PT)  bed mobility, PT goal 1  -MS     Transfer Goal Selection (PT)  transfer, PT goal 1   -MS     Gait Training Goal Selection (PT)  gait training, PT goal 1  -MS     Row Name 05/12/19 0947          Bed Mobility Goal 1 (PT)    Activity/Assistive Device (Bed Mobility Goal 1, PT)  sit to supine/supine to sit  -MS     [place] Level/Cues Needed (Bed Mobility Goal 1, PT)  conditional independence  -MS     Time Frame (Bed Mobility Goal 1, PT)  10 days  -MS     Row Name 05/12/19 0947          Transfer Goal 1 (PT)    Activity/Assistive Device (Transfer Goal 1, PT)  sit-to-stand/stand-to-sit;walker, rolling  -MS     [place] Level/Cues Needed (Transfer Goal 1, PT)  conditional independence  -MS     Time Frame (Transfer Goal 1, PT)  10 days  -MS     Row Name 05/12/19 0947          Gait Training Goal 1 (PT)    Activity/Assistive Device (Gait Training Goal 1, PT)  gait (walking locomotion);assistive device use;decrease asymmetrical patterns;decrease fall risk;diminish gait deviation;walker, rolling  -MS     [place] Level (Gait Training Goal 1, PT)  conditional independence  -MS     Distance (Gait Goal 1, PT)  200'  -MS     Time Frame (Gait Training Goal 1, PT)  10 days  -MS     Row Name 05/12/19 0947          Patient Education Goal (PT)    Activity (Patient Education Goal, PT)  Perform B LE ther ex x15 reps AROM   -MS     [place]/Cues/Accuracy (Memory Goal 2, PT)  independent  -MS     Time Frame (Patient Education Goal, PT)  10 days  -MS     Row Name 05/12/19 0947          Positioning and Restraints    Pre-Treatment Position  in bed  -MS     Post Treatment Position  chair  -MS     In Chair  reclined;notified nsg;call light within reach;encouraged to call for assist  -MS     Row Name 05/12/19 0947          Living Environment    Home Accessibility  stairs to enter home  -MS       User Key  (r) = Recorded By, (t) = Taken By, (c) = Cosigned By    Initials Name Provider Type    Griffin Peters, PT Physical Therapist        Physical Therapy Education     Title: PT OT SLP Therapies (Not Started)      Topic: Physical Therapy (Done)     Point: Mobility training (Done)     Learning Progress Summary           Patient Acceptance, E, VU by MS at 5/12/2019 10:49 AM                   Point: Home exercise program (Done)     Learning Progress Summary           Patient Acceptance, E, VU by MS at 5/12/2019 10:49 AM                   Point: Body mechanics (Done)     Learning Progress Summary           Patient Acceptance, E, VU by MS at 5/12/2019 10:49 AM                   Point: Precautions (Done)     Learning Progress Summary           Patient Acceptance, E, VU by MS at 5/12/2019 10:49 AM                               User Key     Initials Effective Dates Name Provider Type Discipline    MS 05/01/19 -  Griffin Solares, PT Physical Therapist PT              PT Recommendation and Plan  Anticipated Discharge Disposition (PT): anticipate therapy at next level of care  Planned Therapy Interventions (PT Eval): balance training, bed mobility training, home exercise program, gait training, manual therapy techniques, neuromuscular re-education, patient/family education, postural re-education, ROM (range of motion), stair training, stretching, strengthening, transfer training  Therapy Frequency (PT Clinical Impression): daily  Outcome Summary/Treatment Plan (PT)  Anticipated Discharge Disposition (PT): anticipate therapy at next level of care  Plan of Care Reviewed With: patient  Progress: improving  Outcome Summary: Pt seen for PT eval today. Pt was able to ambulate 24' with Jas and a chair follow. Pt limited with ambulation distance due to impaired endurance and activity tolerance. Pt would benefit from skilled PTx to address these deficits and improve independence with functional mobility.  Outcome Measures     Row Name 05/12/19 0947             How much help from another person do you currently need...    Turning from your back to your side while in flat bed without using bedrails?  3  -MS      Moving from lying on back to  sitting on the side of a flat bed without bedrails?  3  -MS      Moving to and from a bed to a chair (including a wheelchair)?  3  -MS      Standing up from a chair using your arms (e.g., wheelchair, bedside chair)?  3  -MS      Climbing 3-5 steps with a railing?  2  -MS      To walk in hospital room?  3  -MS      AM-PAC 6 Clicks Score  17  -MS         Functional Assessment    Outcome Measure Options  AM-PAC 6 Clicks Basic Mobility (PT)  -MS        User Key  (r) = Recorded By, (t) = Taken By, (c) = Cosigned By    Initials Name Provider Type    Griffin Peters, PT Physical Therapist         Time Calculation:   PT Charges     Row Name 05/12/19 1050             Time Calculation    Start Time  0918  -MS      Stop Time  0947  -MS      Time Calculation (min)  29 min  -MS        User Key  (r) = Recorded By, (t) = Taken By, (c) = Cosigned By    Initials Name Provider Type    Griffin Peters, YANNICK Physical Therapist        Therapy Charges for Today     Code Description Service Date Service Provider Modifiers Qty    62118795068 HC PT EVAL LOW COMPLEXITY 4 5/12/2019 Griffin Solares, PT GP 1          PT G-Codes  Outcome Measure Options: AM-PAC 6 Clicks Basic Mobility (PT)  AM-PAC 6 Clicks Score: 17      Griffin Solares PT  5/12/2019

## 2019-05-12 NOTE — PROGRESS NOTES
Discharge Planning Assessment   [place]     Patient Name: Sadie Tijerina  MRN: [id number]  Today's Date: 5/12/2019    Admit Date: 5/11/2019    Discharge Needs Assessment     Row Name 05/12/19 9679       Living Environment    Lives With  alone    Name(s) of Who Lives With Patient  Pt lives alone but has many family members living nearby if she needs anything      Current Living Arrangements  home/apartment/condo    Duration at Residence  11 yrs    Primary Care Provided by  self    Provides Primary Care For  no one    Family Caregiver if Needed  -- Pt has HH with [facility] periodically but can take care of herself    Quality of Family Relationships  supportive    Able to Return to Prior Arrangements  yes       Resource/Environmental Concerns    Resource/Environmental Concerns  none       Transition Planning    Patient/Family Anticipates Transition to  home    Transportation Anticipated  family or friend will provide       Discharge Needs Assessment    Readmission Within the Last 30 Days  no previous admission in last 30 days    Equipment Currently Used at Home  oxygen Pt uses O2@2L per [place] continuously but can't remember the name of the company that manages it         Discharge Plan     Row Name 05/12/19 0392       Plan    Plan  Spoke with pt and son about discharge plans  Confirmed current address and phone numbers  Ava Polk, daughter [phone] and Neris Giles, daughter [phone]  Pt has no POA  Pt able to perform ADL moderaely well  Pt lives alone with numerous family members living in the same general area to help her if needed  Pt is very independent  Her family does the shopping and drives her to appts etc  PCP DR Wolff   DME O2@2L per [place] continuously but can't remember the name of the company that manages her O2  Pt uses [facility] and currently using their services   Will continue to assess pt for any further needs prior to discharge         Destination      No service coordination in this encounter.       Durable Medical Equipment      No service coordination in this encounter.      Dialysis/Infusion      No service coordination in this encounter.      Home Medical Care      No service coordination in this encounter.      Therapy      No service coordination in this encounter.      Community Resources      No service coordination in this encounter.        Expected Discharge Date and Time     Expected Discharge Date Expected Discharge Time    May 14, 2019         Demographic Summary     Row Name 05/12/19 1601       General Information    Admission Type  inpatient    Arrived From  emergency department    Referral Source  admission list    Reason for Consult  discharge planning    Preferred Language  English       Contact Information    Permission Granted to Share Info With      Contact Information Obtained for          Functional Status     Row Name 05/12/19 1607       Functional Status    Usual Activity Tolerance  moderate       Functional Status, IADL    Medications  independent    Meal Preparation  independent    Housekeeping  independent    Laundry  independent    Shopping  assistive person    IADL Comments  Pt can perform ADL moderatly well  Family members do shopping and driving         Employment/    Employment Status  retired        Psychosocial    No documentation.       Abuse/Neglect    No documentation.       Legal    No documentation.       Substance Abuse    No documentation.       Patient Forms    No documentation.           Sharon Orr RN

## 2019-05-12 NOTE — H&P
[facility]   HISTORY AND PHYSICAL      Name:  Sadie Tijerina   Age:  78 y.o.  Sex:  female  :  1940  MRN:  [id number]   Visit Number:  [id number]  Admission Date:  2019  Date Of Service:  19  Primary Care Physician:  Kristian Wolff MD    History Obtained From:    patient    Chief Complaint:     Dyspnea    History Of Presenting Illness:      Patient is a 78-year-old  female with COPD on home O2, hypertension, diastolic congestive heart failure, uterine cancer, hyperlipidemia who has had multiple admissions and visits to the ER since January.  She was just discharged from this hospital on 2019 for COPD exacerbation.  She actually had pneumonia in February with stenotrophomonas grew out in her sputum.  She had been to the ER 1 week ago, given steroids, felt better, now she is feeling worse again.  She has productive yellow sputum.  She has a cough with difficulty clearing secretions.  She has progressive shortness of breath over the past few days.  She has worsening dyspnea when she moves around.  Nothing seems to be making this better.  She states that she has never really felt that much better since the start of the year.  She has some nausea without vomiting.  She denies any chest pressure.  She denies any fevers or chills.  In the emergency room her white count is 16.24.  Procalcitonin is negative.  She has not been eating or drinking well.  She also is very weak.  She refuses BiPAP, as she has on previous admissions.  Otherwise she has a full code.  She follows with a pulmonologist in [place].    Review Of Systems:     General ROS: negative  Psychological ROS: negative  Ophthalmic ROS: negative  ENT ROS: negative  Allergy and Immunology ROS: negative  Hematological and Lymphatic ROS: negative  Endocrine ROS: negative  Breast ROS: negative  Respiratory ROS: positive for - cough, shortness of breath, sputum changes and wheezing  Cardiovascular ROS:  positive for - dyspnea on exertion  Gastrointestinal ROS: negative  Genito-Urinary ROS: negative  Musculoskeletal ROS: positive for - muscular weakness  Neurological ROS: negative  Dermatological ROS: negative       Past Medical History:    COPD on home O2, chronic tremors, hypertension, diastolic CHF, uterine cancer, hyperlipidemia    Past Surgical history:    Foot surgery, hand surgery, total abdominal hysterectomy      Social History:    Continues to smoke as she has for most of her life.  Denies alcohol or drugs.  Lives alone.  She is a full code.    Family History:    Mother  of cancer, father  of an MI, daughter has diabetes type 2    Allergies:      Morphine and related    Home Medications:    Prior to Admission Medications     Prescriptions Last Dose Informant Patient Reported? Taking?    albuterol (PROVENTIL HFA;VENTOLIN HFA) 108 (90 Base) MCG/ACT inhaler   No No    Inhale 1 puff Every 4 (Four) Hours As Needed for Shortness of Air.    bacitracin-silver sulfadiazine-nystatin   No No    Apply  topically to the appropriate area as directed 2 (Two) Times a Day.    budesonide-formoterol (SYMBICORT) 160-4.5 MCG/ACT inhaler   No No    Inhale 2 puffs 2 (Two) Times a Day.    clonazePAM (KlonoPIN) 0.5 MG tablet   No No    TAKE 1 TABLET BY MOUTH AT BEDTIME AS NEEDED for seizures    estrogens, conjugated, (PREMARIN) 0.625 MG tablet   No No    Take 1 tablet by mouth Daily.     fluticasone (FLONASE) 50 MCG/ACT nasal spray   No No    2 sprays by Each Nare route Daily.    ipratropium (ATROVENT) 0.02 % nebulizer solution   No No    Take 2.5 mL by nebulization 4 (Four) Times a Day As Needed for Wheezing or Shortness of Air.    lactobacillus acidophilus (RISAQUAD) capsule capsule   No No    Take 1 capsule by mouth 2 (Two) Times a Day.    meclizine (ANTIVERT) 12.5 MG tablet   No No    Take 1 tablet by mouth 3 (Three) Times a Day As Needed for dizziness.    nystatin (MYCOSTATIN) 182450 UNIT/GM cream   No No     Apply  topically to the appropriate area as directed As Needed (Yeast infection).    omeprazole (priLOSEC) 40 MG capsule   No No    Take 1 capsule by mouth Daily.    pravastatin (PRAVACHOL) 20 MG tablet   No No    Take 1 tablet by mouth Every Evening.    promethazine (PHENERGAN) 12.5 MG tablet   No No    Take 1 tablet by mouth Every 8 (Eight) Hours As Needed for Nausea or Vomiting.    propranolol (INDERAL) 60 MG tablet   No No    1 daily po    roflumilast (DALIRESP) 500 MCG tablet tablet   No No    Take 1 tablet by mouth Daily.    sertraline (ZOLOFT) 100 MG tablet   No No    Take 1 tablet by mouth every night at bedtime.    SPIRIVA HANDIHALER 18 MCG per inhalation capsule   No No    place 1 capsule into inhaler and inhale ONCE daily    traMADol (ULTRAM) 50 MG tablet   No No    Take 1 tablet by mouth Every 8 (Eight) Hours As Needed (pain).             Hospital Scheduled Meds:      magnesium sulfate 2 g Intravenous Once             Vital Signs:    Temp:  [97.4 °F (36.3 °C)] 97.4 °F (36.3 °C)  Heart Rate:  [78-93] 83  Resp:  [20-24] 20  BP: (140-143)/(84-85) 140/84        05/11/19  2154   Weight: 51.3 kg (113 lb)       Body mass index is 18.8 kg/m².    Physical Exam:      General Appearance:    Alert, cooperative, in no acute distress   Head:    Normocephalic, without obvious abnormality, atraumatic   Eyes:            Lids and lashes normal, conjunctivae and sclerae normal, no   icterus, no pallor, corneas clear, PERRLA   Ears:    Ears appear intact with no abnormalities noted   Throat:   No oral lesions, no thrush, oral mucosa moist   Neck:   No adenopathy, supple, trachea midline, no thyromegaly, no     carotid bruit, no JVD   Back:     No kyphosis present, no scoliosis present, no skin lesions,       erythema or scars, no tenderness to percussion or                   palpation,   range of motion normal   Lungs:    Expiratory wheezes bilaterally with mild use of accessory muscles of respiration    Heart:    Regular  rhythm and normal rate, normal S1 and S2, no            murmur, no gallop, no rub, no click   Breast Exam:    Deferred   Abdomen:     Normal bowel sounds, no masses, no organomegaly, soft        non-tender, non-distended, no guarding, no rebound                 tenderness   Genitalia:    Deferred   Extremities:   Moves all extremities 4/5 strength, no edema, no cyanosis, no  redness   Pulses:   Pulses palpable and equal bilaterally   Skin:   No bleeding, bruising or rash   Lymph nodes:   No palpable adenopathy   Neurologic:   Cranial nerves 2 - 12 grossly intact, sensation intact, DTR        present and equal bilaterally       EKG:      Normal sinus rhythm with no acute ST-T changes.    Telemetry:      Normal sinus rhythm    I have personally looked at both the EKG and the telemetry strips.    Labs:    Results from last 7 days   Lab Units 05/11/19  2216   WBC 10*3/mm3 16.24*   HEMOGLOBIN g/dL 12.8   HEMATOCRIT % 40.3   MCV fL 92.2   MCHC g/dL 31.8   PLATELETS 10*3/mm3 238     Results from last 7 days   Lab Units 05/11/19  2235   PH, ARTERIAL pH units 7.351   PO2 ART mm Hg 127.0*   PCO2, ARTERIAL mm Hg 73.5*   HCO3 ART mmol/L 40.6*     Results from last 7 days   Lab Units 05/11/19  2216   SODIUM mmol/L 138   POTASSIUM mmol/L 4.4   CHLORIDE mmol/L 90*   CO2 mmol/L 42.0*   BUN mg/dL 12   CREATININE mg/dL 0.70   EGFR IF NONAFRICN AM mL/min/1.73 81   CALCIUM mg/dL 9.2   GLUCOSE mg/dL 141*   ALBUMIN g/dL 3.70   BILIRUBIN mg/dL 0.4   ALK PHOS U/L 79   AST (SGOT) U/L 21   ALT (SGPT) U/L 28   Estimated Creatinine Clearance: 45.4 mL/min (by C-G formula based on SCr of 0.7 mg/dL).  No results found for: AMMONIA  Results from last 7 days   Lab Units 05/11/19  2216   TROPONIN I ng/mL <0.012     Results from last 7 days   Lab Units 05/11/19  2216   PROBNP pg/mL 130.0     Lab Results   Component Value Date    HGBA1C 5.6 01/27/2018     Lab Results   Component Value Date    TSH 3.990 07/24/2018    FREET4 0.94 10/17/2014     No  results found for: PREGTESTUR, PREGSERUM, HCG, HCGQUANT  Pain Management Panel     There is no flowsheet data to display.                          Radiology:    Imaging Results (last 7 days)     Procedure Component Value Units Date/Time    XR Chest 1 View [[id number]] Updated:  05/11/19 2222          Assessment:    1.  Acute on chronic, respiratory failure with hypoxia and hypercapnia  2.  COPD exacerbation present on admission  3.  Acute bronchitis present on admission  4.  Essential hypertension  5.  Chronic diastolic CHF, not in exacerbation  6.  History of uterine cancer      Plan:     Patient absolutely refuses BiPAP, though it was offered.  We will give Solu-Medrol 40 mg every 8 hours.  Placed on doxycycline 100 mg every 12 hours.  Check sputum culture.  Will place on DuoNeb, Mucinex, flutter valve.  Also will give budesonide.  Check respiratory panel.  Consult PT and OT to work with the patient.  Will place in the ICU in case she worsens, as she would have to be intubated.  Check lab work in the a.m.  Will check serial troponins.  Echocardiogram as well.  Consult Dr. Mojica from pulmonary medicine.  Further recommendations will depend on the clinical course.    Antolin Perdomo DO  05/12/19  1:14 AM

## 2019-05-13 ENCOUNTER — APPOINTMENT (OUTPATIENT)
Dept: CARDIOLOGY | Facility: HOSPITAL | Age: 79
End: 2019-05-13

## 2019-05-13 LAB
A-A DO2: ABNORMAL MMHG
ANION GAP SERPL CALCULATED.3IONS-SCNC: 7 MMOL/L (ref 10–20)
ARTERIAL PATENCY WRIST A: POSITIVE
ATMOSPHERIC PRESS: 728 MMHG
BASE EXCESS BLDA CALC-SCNC: 7.5 MMOL/L (ref 0–2)
BASOPHILS # BLD AUTO: 0.04 10*3/MM3 (ref 0–0.2)
BASOPHILS NFR BLD AUTO: 0.2 % (ref 0–1.5)
BDY SITE: ABNORMAL
BH CV ECHO MEAS - EF(MOD-BP): 70 %
BH CV ECHO MEAS - IVSD: 1.2 CM
BH CV ECHO MEAS - LA DIMENSION: 3.1 CM
BH CV ECHO MEAS - LVPWD: 1.2 CM
BH CV ECHO MEAS - RAP SYSTOLE: 10 MMHG
BH CV ECHO MEAS - RVSP: 44 MMHG
BUN BLD-MCNC: 12 MG/DL (ref 7–20)
BUN/CREAT SERPL: 24 (ref 7.1–23.5)
CALCIUM SPEC-SCNC: 8.3 MG/DL (ref 8.4–10.2)
CHLORIDE SERPL-SCNC: 100 MMOL/L (ref 98–107)
CO2 SERPL-SCNC: 36 MMOL/L (ref 26–30)
COHGB MFR BLD: <0.7 % (ref 0–2)
CREAT BLD-MCNC: 0.5 MG/DL (ref 0.6–1.3)
DEPRECATED RDW RBC AUTO: 45.3 FL (ref 37–54)
EOSINOPHIL # BLD AUTO: 0 10*3/MM3 (ref 0–0.4)
EOSINOPHIL NFR BLD AUTO: 0 % (ref 0.3–6.2)
ERYTHROCYTE [DISTWIDTH] IN BLOOD BY AUTOMATED COUNT: 13.2 % (ref 12.3–15.4)
GAS FLOW AIRWAY: 2 LPM
GFR SERPL CREATININE-BSD FRML MDRD: 119 ML/MIN/1.73
GLUCOSE BLD-MCNC: 176 MG/DL (ref 74–98)
HCO3 BLDA-SCNC: 34.5 MMOL/L (ref 22–28)
HCT VFR BLD AUTO: 35 % (ref 34–46.6)
HCT VFR BLD CALC: 34.6 %
HGB BLD-MCNC: 11.4 G/DL (ref 12–15.9)
HGB BLDA-MCNC: 11.3 G/DL (ref 12–18)
HOROWITZ INDEX BLD+IHG-RTO: 28 %
IMM GRANULOCYTES # BLD AUTO: 0.21 10*3/MM3 (ref 0–0.05)
IMM GRANULOCYTES NFR BLD AUTO: 0.9 % (ref 0–0.5)
LEFT ATRIUM VOLUME INDEX: 20 ML/M2
LV EF 2D ECHO EST: 70 %
LYMPHOCYTES # BLD AUTO: 0.82 10*3/MM3 (ref 0.7–3.1)
LYMPHOCYTES NFR BLD AUTO: 3.6 % (ref 19.6–45.3)
MAXIMAL PREDICTED HEART RATE: 140 BPM
MCH RBC QN AUTO: 30.6 PG (ref 26.6–33)
MCHC RBC AUTO-ENTMCNC: 32.6 G/DL (ref 31.5–35.7)
MCV RBC AUTO: 94.1 FL (ref 79–97)
METHGB BLD QL: 0.5 % (ref 0–1.5)
MODALITY: ABNORMAL
MONOCYTES # BLD AUTO: 0.23 10*3/MM3 (ref 0.1–0.9)
MONOCYTES NFR BLD AUTO: 1 % (ref 5–12)
NEUTROPHILS # BLD AUTO: 21.68 10*3/MM3 (ref 1.7–7)
NEUTROPHILS NFR BLD AUTO: 94.3 % (ref 42.7–76)
NOTE: ABNORMAL
NRBC BLD AUTO-RTO: 0 /100 WBC (ref 0–0.2)
OXYHGB MFR BLDV: 97.9 % (ref 94–99)
PCO2 BLDA: 60.5 MM HG (ref 35–45)
PCO2 TEMP ADJ BLD: ABNORMAL MM HG (ref 35–45)
PH BLDA: 7.36 PH UNITS (ref 7.3–7.5)
PH, TEMP CORRECTED: ABNORMAL PH UNITS
PLATELET # BLD AUTO: 211 10*3/MM3 (ref 140–450)
PMV BLD AUTO: 9.8 FL (ref 6–12)
PO2 BLDA: 113 MM HG (ref 75–100)
PO2 TEMP ADJ BLD: ABNORMAL MM HG (ref 83–108)
POTASSIUM BLD-SCNC: 4 MMOL/L (ref 3.5–5.1)
RBC # BLD AUTO: 3.72 10*6/MM3 (ref 3.77–5.28)
SAO2 % BLDCOA: 99 % (ref 94–100)
SODIUM BLD-SCNC: 139 MMOL/L (ref 137–145)
STRESS TARGET HR: 119 BPM
VENTILATOR MODE: ABNORMAL
WBC NRBC COR # BLD: 22.98 10*3/MM3 (ref 3.4–10.8)

## 2019-05-13 PROCEDURE — 92610 EVALUATE SWALLOWING FUNCTION: CPT

## 2019-05-13 PROCEDURE — 85025 COMPLETE CBC W/AUTO DIFF WBC: CPT | Performed by: FAMILY MEDICINE

## 2019-05-13 PROCEDURE — 36600 WITHDRAWAL OF ARTERIAL BLOOD: CPT

## 2019-05-13 PROCEDURE — 25010000002 ENOXAPARIN PER 10 MG: Performed by: INTERNAL MEDICINE

## 2019-05-13 PROCEDURE — 93306 TTE W/DOPPLER COMPLETE: CPT

## 2019-05-13 PROCEDURE — 94660 CPAP INITIATION&MGMT: CPT

## 2019-05-13 PROCEDURE — 94799 UNLISTED PULMONARY SVC/PX: CPT

## 2019-05-13 PROCEDURE — 99232 SBSQ HOSP IP/OBS MODERATE 35: CPT | Performed by: NURSE PRACTITIONER

## 2019-05-13 PROCEDURE — 82805 BLOOD GASES W/O2 SATURATION: CPT

## 2019-05-13 PROCEDURE — 63710000001 PROMETHAZINE PER 12.5 MG: Performed by: INTERNAL MEDICINE

## 2019-05-13 PROCEDURE — 97110 THERAPEUTIC EXERCISES: CPT

## 2019-05-13 PROCEDURE — 80048 BASIC METABOLIC PNL TOTAL CA: CPT | Performed by: FAMILY MEDICINE

## 2019-05-13 PROCEDURE — 83050 HGB METHEMOGLOBIN QUAN: CPT

## 2019-05-13 PROCEDURE — 97165 OT EVAL LOW COMPLEX 30 MIN: CPT

## 2019-05-13 PROCEDURE — 93306 TTE W/DOPPLER COMPLETE: CPT | Performed by: INTERNAL MEDICINE

## 2019-05-13 PROCEDURE — 82375 ASSAY CARBOXYHB QUANT: CPT

## 2019-05-13 PROCEDURE — 25010000002 METHYLPREDNISOLONE PER 40 MG: Performed by: INTERNAL MEDICINE

## 2019-05-13 PROCEDURE — 99232 SBSQ HOSP IP/OBS MODERATE 35: CPT | Performed by: INTERNAL MEDICINE

## 2019-05-13 RX ADMIN — GUAIFENESIN 600 MG: 600 TABLET, EXTENDED RELEASE ORAL at 09:47

## 2019-05-13 RX ADMIN — DOXYCYCLINE 100 MG: 100 INJECTION, POWDER, LYOPHILIZED, FOR SOLUTION INTRAVENOUS at 02:56

## 2019-05-13 RX ADMIN — Medication 1 CAPSULE: at 09:47

## 2019-05-13 RX ADMIN — ENOXAPARIN SODIUM 40 MG: 40 INJECTION SUBCUTANEOUS at 02:56

## 2019-05-13 RX ADMIN — DOXYCYCLINE 100 MG: 100 INJECTION, POWDER, LYOPHILIZED, FOR SOLUTION INTRAVENOUS at 15:18

## 2019-05-13 RX ADMIN — METHYLPREDNISOLONE SODIUM SUCCINATE 40 MG: 40 INJECTION, POWDER, FOR SOLUTION INTRAMUSCULAR; INTRAVENOUS at 20:24

## 2019-05-13 RX ADMIN — ESTROGENS, CONJUGATED 0.62 MG: 0.62 TABLET, FILM COATED ORAL at 10:48

## 2019-05-13 RX ADMIN — GUAIFENESIN 600 MG: 600 TABLET, EXTENDED RELEASE ORAL at 20:24

## 2019-05-13 RX ADMIN — IPRATROPIUM BROMIDE AND ALBUTEROL SULFATE 3 ML: .5; 3 SOLUTION RESPIRATORY (INHALATION) at 13:22

## 2019-05-13 RX ADMIN — SODIUM CHLORIDE, PRESERVATIVE FREE 3 ML: 5 INJECTION INTRAVENOUS at 20:25

## 2019-05-13 RX ADMIN — IPRATROPIUM BROMIDE AND ALBUTEROL SULFATE 3 ML: .5; 3 SOLUTION RESPIRATORY (INHALATION) at 01:39

## 2019-05-13 RX ADMIN — PROPRANOLOL HYDROCHLORIDE 60 MG: 60 CAPSULE, EXTENDED RELEASE ORAL at 10:48

## 2019-05-13 RX ADMIN — SODIUM CHLORIDE 75 ML/HR: 9 INJECTION, SOLUTION INTRAVENOUS at 06:05

## 2019-05-13 RX ADMIN — METHYLPREDNISOLONE SODIUM SUCCINATE 40 MG: 40 INJECTION, POWDER, FOR SOLUTION INTRAMUSCULAR; INTRAVENOUS at 09:47

## 2019-05-13 RX ADMIN — SERTRALINE HYDROCHLORIDE 100 MG: 50 TABLET ORAL at 09:47

## 2019-05-13 RX ADMIN — PROMETHAZINE HYDROCHLORIDE 12.5 MG: 12.5 TABLET ORAL at 20:24

## 2019-05-13 RX ADMIN — PANTOPRAZOLE SODIUM 40 MG: 40 TABLET, DELAYED RELEASE ORAL at 06:05

## 2019-05-13 RX ADMIN — BUDESONIDE 1 MG: 0.5 INHALANT RESPIRATORY (INHALATION) at 06:45

## 2019-05-13 RX ADMIN — IPRATROPIUM BROMIDE AND ALBUTEROL SULFATE 3 ML: .5; 3 SOLUTION RESPIRATORY (INHALATION) at 18:41

## 2019-05-13 RX ADMIN — SODIUM CHLORIDE, PRESERVATIVE FREE 3 ML: 5 INJECTION INTRAVENOUS at 09:48

## 2019-05-13 RX ADMIN — MELATONIN TAB 5 MG 5 MG: 5 TAB at 20:24

## 2019-05-13 RX ADMIN — FLUTICASONE PROPIONATE 2 SPRAY: 50 SPRAY, METERED NASAL at 09:46

## 2019-05-13 RX ADMIN — CLONAZEPAM 0.5 MG: 0.5 TABLET ORAL at 20:24

## 2019-05-13 RX ADMIN — METHYLPREDNISOLONE SODIUM SUCCINATE 40 MG: 40 INJECTION, POWDER, FOR SOLUTION INTRAMUSCULAR; INTRAVENOUS at 02:56

## 2019-05-13 RX ADMIN — TRAMADOL HYDROCHLORIDE 50 MG: 50 TABLET, FILM COATED ORAL at 10:19

## 2019-05-13 RX ADMIN — PRAVASTATIN SODIUM 20 MG: 20 TABLET ORAL at 20:24

## 2019-05-13 RX ADMIN — Medication 1 CAPSULE: at 20:24

## 2019-05-13 RX ADMIN — METHYLPREDNISOLONE SODIUM SUCCINATE 40 MG: 40 INJECTION, POWDER, FOR SOLUTION INTRAMUSCULAR; INTRAVENOUS at 15:18

## 2019-05-13 RX ADMIN — IPRATROPIUM BROMIDE AND ALBUTEROL SULFATE 3 ML: .5; 3 SOLUTION RESPIRATORY (INHALATION) at 06:44

## 2019-05-13 RX ADMIN — BUDESONIDE 1 MG: 0.5 INHALANT RESPIRATORY (INHALATION) at 18:41

## 2019-05-13 NOTE — PLAN OF CARE
Problem: Patient Care Overview  Goal: Plan of Care Review  Outcome: Ongoing (interventions implemented as appropriate)   05/13/19 4181   Coping/Psychosocial   Plan of Care Reviewed With patient   Plan of Care Review   Progress improving   OTHER   Outcome Summary VSS. Tolerating Bi-pap well. Continue POC.

## 2019-05-13 NOTE — PROGRESS NOTES
Adult Nutrition  Assessment/PES    Patient Name:  Sadie Tijerina  YOB: 1940  MRN: [id number]  Admit Date:  5/11/2019    Assessment Date:  5/13/2019    Comments:  Rec #1: Continue current diet order; Continuing positive intake trends. Pt receiving 83% PO intake over 6 meals. Rec #2: Consider MVI with minerals daily. Rec #3: Continue to monitor/replace electrolytes PRN. Consult RD PRN.       Reason for Assessment     Row Name 05/13/19 1318          Reason for Assessment    Reason For Assessment  diagnosis/disease state;identified at risk by screening criteria;nurse/nurse practitioner consult     Diagnosis  cardiac disease;pulmonary disease;diabetes diagnosis/complications COPD, HTN, CHF, DLD, Acute hypoxic Resp Fx     Identified At Risk by Screening Criteria  MST SCORE 2+;reduced oral intake over the last month           Anthropometrics     Row Name 05/13/19 1322          Anthropometrics    Weight  51.4 kg (113 lb 6.4 oz)         Labs/Tests/Procedures/Meds     Row Name 05/13/19 1328          Labs/Procedures/Meds    Lab Results Reviewed  reviewed, pertinent     Lab Results Comments  High: Glu, CRP  Low: Creat        Medications    Pertinent Medications Reviewed  reviewed     Pertinent Medications Comments  Lactobacillus, Prednisone           Estimated/Assessed Needs     Row Name 05/13/19 1352          Calculation Measurements    Weight Used For Calculations  51.4 kg (113 lb 5.1 oz)        Estimated/Assessed Needs    Additional Documentation  Fluid Requirements (Group);Love-St. Jeor Equation (Group);Protein Requirements (Group);Calorie Requirements (Group)        Calorie Requirements    Weight Used For Calorie Calculations  -- AF 1.2     Estimated Calorie Need Method  Love-St [place]or     Estimated Calorie Requirement Comment  3810-0691        KCAL/KG    14 Kcal/Kg (kcal)  719.6     15 Kcal/Kg (kcal)  771     18 Kcal/Kg (kcal)  925.2     20 Kcal/Kg (kcal)  1028     25 Kcal/Kg (kcal)  1285     30  Kcal/Kg (kcal)  1542     35 Kcal/Kg (kcal)  1799     40 Kcal/Kg (kcal)  2056     45 Kcal/Kg (kcal)  2313     50 Kcal/Kg (kcal)  2570        Bryan-St. Jeor Equation    RMR (Bryan-St. Jeor Equation)  937.25        Protein Requirements    Est Protein Requirement Amount (gms/kg)  1.5 gm protein 62-77 gm     Estimated Protein Requirements (gms/day)  77.1        Fluid Requirements    Estimated Fluid Requirement Method  Altagracia-Segar Formula     Altagracia-Segar Method (over 20 kg)  2528         Nutrition Prescription Ordered     Row Name 05/13/19 1354          Nutrition Prescription PO    Current PO Diet  Regular         Evaluation of Received Nutrient/Fluid Intake     Row Name 05/13/19 1357          Calculation Measurements    Weight Used For Calculations  51.4 kg (113 lb 5.1 oz)        PO Evaluation    Number of Days PO Intake Evaluated  2 days     Number of Meals  6     % PO Intake  83         Evaluation of Prescribed Nutrient/Fluid Intake     Row Name 05/13/19 1352          Calculation Measurements    Weight Used For Calculations  51.4 kg (113 lb 5.1 oz)             Problem/Interventions:  Problem 1     Row Name 05/13/19 1353          Nutrition Diagnoses Problem 1    Problem 1  Increased Nutrient Needs     Macronutrient  Kcal;Carbohydrate;Fluid;Protein     Micronutrient  Vitamin;Mineral     Etiology (related to)  Medical Diagnosis     Pulmonary/Critical Care  COPD     Signs/Symptoms (evidenced by)  Other (comment) Pulmonary dysfunction                 Intervention Goal     Row Name 05/13/19 1350          Intervention Goal    General  Meet nutritional needs for age/condition     PO  Meet estimated needs;Increase intake;PO intake (%);Continue positive trend     PO Intake %  100 %     Weight  Maintain weight         Nutrition Intervention     Row Name 05/13/19 1354          Nutrition Intervention    RD/Tech Action  Follow Tx progress;Care plan reviewd;Encourage intake         Nutrition Prescription     Row Name  05/13/19 1357          Nutrition Prescription PO    PO Prescription  Other (comment) Continue current diet order     New PO Prescription Ordered?  No, recommended        Other Orders    Obtain Weight  Daily     Obtain Weight Ordered?  No, recommended     Supplement  Vitamin mineral supplement     Supplement Ordered?  No, recommended         Education/Evaluation     Row Name 05/13/19 7990          Education    Education  Will Instruct as appropriate        Monitor/Evaluation    Monitor  Per protocol;I&O;PO intake;Pertinent labs;Weight           Electronically signed by:  Agatha Kate, VICKI  05/13/19 2:00 PM

## 2019-05-13 NOTE — PROGRESS NOTES
"  CC: Acute Respiratory Failure.     S: Continues to be short of breath with minimal exertion.  Was able to use the noninvasive ventilation device with a nasal pillows.    ROS: Positive for shortness of breath, mild anxiety & headache. Denies chest pain, diarrhea or fever.    O: /54 (BP Location: Right arm, Patient Position: Lying)   Pulse 89   Temp 98 °F (36.7 °C) (Oral)   Resp 16   Ht 157.5 cm (62\")   Wt 54.7 kg (120 lb 9.6 oz)   SpO2 98%   BMI 22.06 kg/m²     General: Moderate respiratory distress noted.  Neck: No JVD   Cardiovascular: S1 + S2. Regular.   Respiratory: Decreased A/E. B/L scattered wheezing heard. No crackles noted  Extremities: No edema noted.  Neurologic:  AAOx3. Was able to follow commands     Labs: Reviewed.     Results from last 7 days   Lab Units 05/13/19  0531 05/12/19  0739 05/11/19  2216   SODIUM mmol/L 139 136* 138   POTASSIUM mmol/L 4.0 4.3 4.4   CHLORIDE mmol/L 100 93* 90*   CO2 mmol/L 36.0* 38.0* 42.0*   BUN mg/dL 12 13 12   CREATININE mg/dL 0.50* 0.60 0.70   CALCIUM mg/dL 8.3* 8.8 9.2   BILIRUBIN mg/dL  --   --  0.4   ALK PHOS U/L  --   --  79   ALT (SGPT) U/L  --   --  28   AST (SGOT) U/L  --   --  21   GLUCOSE mg/dL 176* 183* 141*       Results from last 7 days   Lab Units 05/12/19  0739   MAGNESIUM mg/dL 1.8         Results from last 7 days   Lab Units 05/13/19  0531 05/12/19  0739 05/11/19  2216   WBC 10*3/mm3 22.98* 9.97 16.24*   HEMOGLOBIN g/dL 11.4* 12.2 12.8   PLATELETS 10*3/mm3 211 211 238     Lab Results   Component Value Date    PHART 7.364 05/13/2019    RVZ0LIT 60.5 (C) 05/13/2019    PO2ART 113.0 (H) 05/13/2019    HGBBG 11.3 (L) 05/13/2019    A2TFMZYU 99.0 05/13/2019    FCOHB 0.9 (L) 12/31/2016    CARBOXYHGB <0.7 05/13/2019    FMETHB <0.0 (L) 12/31/2016       Assessment & Recommendations/Plan:   1.  Acute hypoxic and hypercarbic respiratory failure  Improved.   Due to the severity of COPD, I feel that she will need to be considered for noninvasive ventilation " device with nasal pillows at home.    2.  Likely acute exacerbation of COPD  Will continue SoluMedrol for now.     3.  Smoking.   She was strongly advised to quit smoking.    4.  Emphysema on the CT scan    5.  Chronic respiratory failure  Used AVAPS last night with nasal pillows.     We will asked the case management team to assess if her insurance will cover the noninvasive ventilation device.    The patient will need to use the noninvasive ventilator for nocturnal and daytime use. Due to severity of the condition, BiPAP alone would be insufficient. Severe COPD is the primary cause of chronic respiratory failure in this patient requiring noninvasive ventilator. There maybe serious detriment to the patient's health, including the possibility of recurrent exacerbations, worsening symptoms/respiratory status etc., if noninvasive ventilator is not used.    BiPAP would not be optimal in this patient, although can be used as a last resort back-up.    We have reviewed patient's current orders and changes, if any, have been suggested to primary care team. Plan was also discussed with nursing staff, as necessary.       This document was electronically signed by Ce Mojica MD on May 13, 2019 at 11:53 AM    Dictated utilizing Dragon dictation.

## 2019-05-13 NOTE — PLAN OF CARE
Problem: Patient Care Overview  Goal: Plan of Care Review  Outcome: Ongoing (interventions implemented as appropriate)   05/13/19 1386   Coping/Psychosocial   Plan of Care Reviewed With patient;daughter   Plan of Care Review   Progress no change   OTHER   Outcome Summary Pt seen for OT evaluation today. Pt presents with weakness and decreased independence with self care and functional mobility tasks. Pt is expected to benefit from skilled OT to improve her strength, endurance and independence with ADL tasks.

## 2019-05-13 NOTE — PLAN OF CARE
Problem: Patient Care Overview  Goal: Plan of Care Review  Outcome: Ongoing (interventions implemented as appropriate)   05/13/19 6549   Coping/Psychosocial   Plan of Care Reviewed With patient   Plan of Care Review   Progress improving   OTHER   Outcome Summary Bedside eval of swallow completed. No oral phase dysphagia and no overt s/s aspiration or other pharygneal phase dysphagia with any trial. See report. No observed difficulty per RN. Recommend: 1. reg diet with thin liq as bibi, 2. meds whole with pudding/applesauce as bibi, 3. aspiration precautions, 4. reflux precautions, 5. chin down/chin tuck with liquids. No specialized ST needed at this time.

## 2019-05-13 NOTE — THERAPY EVALUATION
Acute Care - Speech Language Pathology   Swallow Initial Evaluation  Friend     Patient Name: Sadie Tijerina  : 1940  MRN: [id number]  Today's Date: 2019        Referring Physician: Dr. Perdomo      Admit Date: 2019    Visit Dx:     ICD-10-CM ICD-9-CM   1. COPD exacerbation (CMS/[facility]) J44.1 491.21   2. Hypercapnia R06.89 786.09   3. Respiratory distress R06.03 786.09     Patient Active Problem List   Diagnosis   • Calf cramp   • Mixed anxiety depressive disorder   • Dizziness   • Fatigue   • Gastroesophageal reflux disease without esophagitis   • Hematuria   • Hyperlipidemia   • Essential hypertension   • Low back pain   • Orthostatic hypotension   • Restless legs syndrome   • Essential tremor   • Vitamin D deficiency   • Balance problem   • Tension headache   • Tobacco abuse disorder   • COPD with acute exacerbation (CMS/[facility])   • Venous insufficiency of both lower extremities   • Chronic diastolic heart failure (CMS/[facility])   • Weight loss   • Neuropathy   • COPD exacerbation (CMS/[facility])   • Acute respiratory failure with hypoxia and hypercapnia (CMS/[facility])   • Acute bronchitis     Past Medical History:   Diagnosis Date   • Arthritis    • Cancer (CMS/[facility])     uterine cancer ()   • COPD (chronic obstructive pulmonary disease) (CMS/[facility])    • Depression    • Elevated cholesterol    • GERD (gastroesophageal reflux disease)    • History of emphysema    • History of esophageal reflux    • History of recurrent UTI (urinary tract infection)    • History of renal calculi    • History of uterine cancer    • Hyperlipidemia    • Hypertension    • Pneumonia 2019     Past Surgical History:   Procedure Laterality Date   • FOOT SURGERY     • HAND SURGERY     • HYSTERECTOMY          SWALLOW EVALUATION (last 72 hours)      SLP Adult Swallow Evaluation     Row Name 19 1455                   Rehab Evaluation    Document Type  evaluation  -TM        Subjective Information  no complaints  -TM         Patient Observations  alert;cooperative  -TM        Patient Effort  good  -TM        Symptoms Noted During/After Treatment  none  -TM           General Information    Patient Profile Reviewed  yes  -TM        Pertinent History Of Current Problem  COPD, cardiac, GERD, hx CA  -TM        Current Method of Nutrition  regular textures;thin liquids  -TM        Prior Level of Function-Communication  WFL  -TM        Prior Level of Function-Swallowing  no diet consistency restrictions  -TM        Plans/Goals Discussed with  patient and family  -TM        Barriers to Rehab  none identified  -TM        Patient's Goals for Discharge  patient did not state  -TM           Pain Assessment    Additional Documentation  Pain Scale: Numbers Pre/Post-Treatment (Group)  -TM           Pain Scale: Numbers Pre/Post-Treatment    Pain Scale: Numbers, Pretreatment  0/10 - no pain  -TM        Pain Scale: Numbers, Post-Treatment  0/10 - no pain  -TM           Oral Motor and Function    Oral Lesions or Structural Abnormalities and/or variants  none identified  -TM        Dentition Assessment  upper dentures/partial in place;lower dentures/partial in place  -TM        Secretion Management  WNL/WFL  -TM        Mucosal Quality  moist, healthy  -TM        Volitional Cough  WFL  -TM           Oral Musculature and Cranial Nerve Assessment    Oral Motor General Assessment  WFL  -TM           General Eating/Swallowing Observations    Respiratory Support Currently in Use  room air  -TM        Eating/Swallowing Skills  fed by SLP  -TM        Utensils Used  spoon;cup;straw  -TM        Consistencies Trialed  regular textures;pureed;thin liquids  -TM        Pre SpO2 (%)  94  -TM        Post SpO2 (%)  93  -TM           Respiratory    Respiratory Status  room air;WFL;during swallowing/eating  -TM           Clinical Swallow Eval    Oral Prep Phase  WFL  -TM        Oral Transit  WFL  -TM        Oral Residue  WFL  -TM        Pharyngeal Phase  no overt  signs/symptoms of pharyngeal impairment  -TM        Esophageal Phase  suspected esophageal impairment prior dx of GERD  -TM        Clinical Swallow Evaluation Summary  Oral phase was WFL.  No overt s/s aspiration or other pharyngeal phase dysphagia.  Pt. stated that she has only occasional difficulty with thin liquids but she held her head back to swallow.  Pt. was given instruction and education to keep chin down with liquids to reduce risk of aspiration.  She and family verbalized understanding.  Recommend:  1. continue regular diet with thin liq as bibi,  2. meds whole with pudding/applesauce,  3. aspiration precautions,  4. reflux precautions,  5. chin down/chin tuck with liquids.    -TM           Esophageal Phase Concerns    Esophageal Phase Concerns  other (see comments) prior dx of GERD  -TM           Clinical Impression    SLP Swallowing Diagnosis  functional oral phase;functional pharyngeal phase  -TM        Functional Impact  risk of aspiration/pneumonia if she holds chin up/head back with drinking liquids  -TM        Swallow Criteria for Skilled Therapeutic Interventions Met  no problems identified which require skilled intervention  -TM           Recommendations    Therapy Frequency (Swallow)  evaluation only  -TM        SLP Diet Recommendation  regular textures;thin liquids  -TM        Recommended Precautions and Strategies  upright posture during/after eating;chin Tuck chin down with drinking liquids  -TM        SLP Rec. for Method of Medication Administration  meds whole;with pudding or applesauce  -TM        Monitor for Signs of Aspiration  notify SLP if any concerns;cough  -TM        Anticipated Dischage Disposition  unknown  -TM          User Key  (r) = Recorded By, (t) = Taken By, (c) = Cosigned By    Initials Name Effective Dates     Oumou Gay 03/07/18 -           EDUCATION  The patient has been educated in the following areas:   Dysphagia (Swallowing Impairment).    SLP Recommendation  and Plan  SLP Swallowing Diagnosis: functional oral phase, functional pharyngeal phase  SLP Diet Recommendation: regular textures, thin liquids  Recommended Precautions and Strategies: upright posture during/after eating, chin Tuck(chin down with drinking liquids)  SLP Rec. for Method of Medication Administration: meds whole, with pudding or applesauce     Monitor for Signs of Aspiration: notify SLP if any concerns, cough     Swallow Criteria for Skilled Therapeutic Interventions Met: no problems identified which require skilled intervention  Anticipated Dischage Disposition: unknown     Therapy Frequency (Swallow): evaluation only          Plan of Care Reviewed With: patient  Plan of Care Review  Plan of Care Reviewed With: patient  Progress: improving  Outcome Summary: Bedside eval of swallow completed.  No oral phase dysphagia and no overt s/s aspiration or other pharygneal phase dysphagia with any trial.  See report.  No observed difficulty per RN.  Recommend:  1. reg diet with thin liq as bibi,  2. meds whole with pudding/applesauce as bibi,  3. aspiration precautions,  4. reflux precautions,  5. chin down/chin tuck with liquids.  No specialized ST needed at this time.            Time Calculation:   Time Calculation- SLP     Row Name 05/13/19 1527             Time Calculation- SLP    SLP Start Time  1455  -      SLP Stop Time  1510  -      SLP Time Calculation (min)  15 min  -      SLP Received On  05/13/19  -        User Key  (r) = Recorded By, (t) = Taken By, (c) = Cosigned By    Initials Name Provider Type     Oumou Gay Speech and Language Pathologist          Therapy Charges for Today     Code Description Service Date Service Provider Modifiers Qty    [id number]  ST EVAL ORAL PHARYNG SWALLOW 4 5/13/2019 Oumou Gay  1               Oumou Gay  5/13/2019

## 2019-05-13 NOTE — PLAN OF CARE
Problem: Patient Care Overview  Goal: Plan of Care Review  Outcome: Ongoing (interventions implemented as appropriate)   05/13/19 1022   Coping/Psychosocial   Plan of Care Reviewed With patient   Plan of Care Review   Progress improving       Problem: NPPV/CPAP (Adult)  Goal: Signs and Symptoms of Listed Potential Problems Will be Absent, Minimized or Managed (NPPV/CPAP)  Outcome: Ongoing (interventions implemented as appropriate)   05/13/19 1022   Goal/Outcome Evaluation   Problems Assessed (NPPV/CPAP) all   Problems Present (NPPV/CPAP) none

## 2019-05-13 NOTE — CONSULTS
Adult Nutrition  Assessment/PES    Patient Name:  Sadie Tijerina  YOB: 1940  MRN: [id number]  Admit Date:  5/11/2019    Assessment Date:  5/13/2019    Comments:  Rec #1: Continue current diet order; Continuing positive intake trends. Pt receiving 83% PO intake over 6 meals. Rec #2: Consider MVI with minerals daily. Rec #3: Continue to monitor/replace electrolytes PRN. Consult RD PRN.       Reason for Assessment     Row Name 05/13/19 1318          Reason for Assessment    Reason For Assessment  diagnosis/disease state;identified at risk by screening criteria;nurse/nurse practitioner consult     Diagnosis  cardiac disease;pulmonary disease;diabetes diagnosis/complications COPD, HTN, CHF, DLD, Acute hypoxic Resp Fx     Identified At Risk by Screening Criteria  MST SCORE 2+;reduced oral intake over the last month           Anthropometrics     Row Name 05/13/19 1322          Anthropometrics    Weight  51.4 kg (113 lb 6.4 oz)         Labs/Tests/Procedures/Meds     Row Name 05/13/19 1328          Labs/Procedures/Meds    Lab Results Reviewed  reviewed, pertinent     Lab Results Comments  High: Glu, CRP  Low: Creat        Medications    Pertinent Medications Reviewed  reviewed     Pertinent Medications Comments  Lactobacillus, Prednisone           Estimated/Assessed Needs     Row Name 05/13/19 1352          Calculation Measurements    Weight Used For Calculations  51.4 kg (113 lb 5.1 oz)        Estimated/Assessed Needs    Additional Documentation  Fluid Requirements (Group);[place]-St. Jeor Equation (Group);Protein Requirements (Group);Calorie Requirements (Group)        Calorie Requirements    Weight Used For Calorie Calculations  -- AF 1.2     Estimated Calorie Need Method  [place]     Estimated Calorie Requirement Comment  0925-9965        KCAL/KG    14 Kcal/Kg (kcal)  719.6     15 Kcal/Kg (kcal)  771     18 Kcal/Kg (kcal)  925.2     20 Kcal/Kg (kcal)  1028     25 Kcal/Kg (kcal)  1285     30  Kcal/Kg (kcal)  1542     35 Kcal/Kg (kcal)  1799     40 Kcal/Kg (kcal)  2056     45 Kcal/Kg (kcal)  2313     50 Kcal/Kg (kcal)  2570        [place]-St. Jeor Equation    RMR ([place]-St. Jeor Equation)  937.25        Protein Requirements    Est Protein Requirement Amount (gms/kg)  1.5 gm protein 62-77 gm     Estimated Protein Requirements (gms/day)  77.1        Fluid Requirements    Estimated Fluid Requirement Method  Altagracia-Segar Formula     Altagracia-Segar Method (over 20 kg)  2528         Nutrition Prescription Ordered     Row Name 05/13/19 1354          Nutrition Prescription PO    Current PO Diet  Regular         Evaluation of Received Nutrient/Fluid Intake     Row Name 05/13/19 1358          Calculation Measurements    Weight Used For Calculations  51.4 kg (113 lb 5.1 oz)        PO Evaluation    Number of Days PO Intake Evaluated  2 days     Number of Meals  6     % PO Intake  83         Evaluation of Prescribed Nutrient/Fluid Intake     Row Name 05/13/19 1352          Calculation Measurements    Weight Used For Calculations  51.4 kg (113 lb 5.1 oz)             Problem/Interventions:  Problem 1     Row Name 05/13/19 1354          Nutrition Diagnoses Problem 1    Problem 1  Increased Nutrient Needs     Macronutrient  Kcal;Carbohydrate;Fluid;Protein     Micronutrient  Vitamin;Mineral     Etiology (related to)  Medical Diagnosis     Pulmonary/Critical Care  COPD     Signs/Symptoms (evidenced by)  Other (comment) Pulmonary dysfunction                 Intervention Goal     Row Name 05/13/19 1359          Intervention Goal    General  Meet nutritional needs for age/condition     PO  Meet estimated needs;Increase intake;PO intake (%);Continue positive trend     PO Intake %  100 %     Weight  Maintain weight         Nutrition Intervention     Row Name 05/13/19 1352          Nutrition Intervention    RD/Tech Action  Follow Tx progress;Care plan reviewd;Encourage intake         Nutrition Prescription     Row Name  05/13/19 1357          Nutrition Prescription PO    PO Prescription  Other (comment) Continue current diet order     New PO Prescription Ordered?  No, recommended        Other Orders    Obtain Weight  Daily     Obtain Weight Ordered?  No, recommended     Supplement  Vitamin mineral supplement     Supplement Ordered?  No, recommended         Education/Evaluation     Row Name 05/13/19 5734          Education    Education  Will Instruct as appropriate        Monitor/Evaluation    Monitor  Per protocol;I&O;PO intake;Pertinent labs;Weight           Electronically signed by:  Agatha Kate RD  05/13/19 2:01 PM

## 2019-05-13 NOTE — PROGRESS NOTES
PROGRESS NOTE        Date of Admission: 5/11/2019  Length of Stay: 1  Primary Care Physician: Kristian Wolff MD    Subjective   Chief Complaint: Cough and dyspnea  HPI:   This is an 78-year-old female with history of COPD on home oxygen, hypertension, diastolic congestive heart failure, dyslipidemia who has had multiple admissions and visits to the emergency room.  She was discharged from the hospital on 4/7/2019 for COPD exacerbation.  She does have a history of having pneumonia in February 2019 which did grow out stenotrophomonas.  According to the patient should been having some yellowish sputum production and cough.  She does get more progressively short of breath over the last few days prior to admission.  She was seen and evaluated in the emergency room chest x-ray did not show any evidence of pneumonia.  He is followed by pulmonology group in [place].  She has refused BiPAP in the past and she was noted upon admission to have acute on chronic hypoxic/hypercapnic respiratory failure.  Dr. Mojica with pulmonology was consulted and had a long discussion with the patient and she was in agreement to try a nasal mask which she did wear last night.  Her ABG done this morning does show some improvement.  She is sitting in bed still complaining of cough and shortness of breath but it is some better.  She denies any chest pain, abdominal pain, nausea or vomiting.           Review Of Systems:   Review of Systems  General ROS: Patient denies any fevers, chills or loss of consciousness.    ENT ROS: Denies sore throat, nasal congestion or ear pain.   Hematological and Lymphatic ROS: Denies neck swelling or easy bleeding.  Endocrine ROS: Denies any recent unintentional weight gain or loss.  Respiratory ROS: Denies cough or shortness of breath.   Cardiovascular ROS: Denies chest pain or palpitations. No history of exertional chest pain.   Gastrointestinal ROS: Denies nausea and vomiting. Denies any abdominal pain. No  diarrhea.  Genito-Urinary ROS: Denies dysuria or hematuria.  Musculoskeletal ROS: Denies back pain. No muscle pain. No calf pain.   Neurological ROS: Denies any focal weakness. No loss of consciousness. Denies any numbness. Denies neck pain.   Dermatological ROS: Denies any redness or pruritis.    Objective      Temp:  [97.2 °F (36.2 °C)-98 °F (36.7 °C)] 98 °F (36.7 °C)  Heart Rate:  [81-95] 89  Resp:  [14-22] 16  BP: (100-157)/(54-82) 133/54  FiO2 (%):  [30 %] 30 %  Physical Exam    General Appearance:  Alert and cooperative, not in any acute distress.   Head:  Atraumatic and normocephalic, without obvious abnormality.   Eyes:          PERRLA, conjunctivae and sclerae normal, no Icterus. No pallor. Extraocular movements are within normal limits.   Ears:  Ears appear intact with no abnormalities noted.   Throat: No oral lesions, no thrush, oral mucosa moist.   Neck: Supple, trachea midline, no thyromegaly, no carotid bruit.       Lungs:   Chest shape is normal. Breath sounds heard bilaterally equally.  No crackles bilateral wheezing. No Pleural rub or bronchial breathing.   Heart:  Normal S1 and S2, no murmur, no gallop, no rub. No JVD   Abdomen:   Normal bowel sounds, no masses, no organomegaly. Soft    non-tender, non-distended, no guarding, no rebound             tenderness   Extremities: Moves all extremities well, no edema, no cyanosis, no clubbing.   Pulses: Pulses palpable and equal bilaterally   Skin: No bleeding, bruising or rash       Neurologic:    Psychiatric/Behavior:     Cranial nerves 2 - 12 grossly intact, sensation intact, Motor power is normal and equal bilaterally.  Mood normal, behavior normal       Results Review:    I have reviewed the labs, radiology results and diagnostic studies.    Results from last 7 days   Lab Units 05/13/19  0531   WBC 10*3/mm3 22.98*   HEMOGLOBIN g/dL 11.4*   PLATELETS 10*3/mm3 211     Results from last 7 days   Lab Units 05/13/19  0531   SODIUM mmol/L 139   POTASSIUM  mmol/L 4.0   CO2 mmol/L 36.0*   CREATININE mg/dL 0.50*   GLUCOSE mg/dL 176*       Culture Data:    Radiology Data:   Cardiology Data:    I have reviewed the medications.    Assessment/Plan     Assessment and Plan:    1.  Acute on chronic hypoxic/hypercapnic respiratory failure-she was able to tolerate BiPAP last night with the nasal.  Dr. Mojica with pulmonology is following along with the hospitalist service    2.  COPD with exacerbation-patient is on bronchodilators, IV steroids and doxycycline.  Sputum culture has been ordered.  She is also getting Mucinex as well as budesonide and does have a flutter valve.    3.  Acute bronchitis present on admission    4.  Chronic essential hypertension-blood pressure stable    5.  Chronic diastolic congestive heart failure-stable    6.  Leukocytosis likely secondary to steroids continue to monitor.      DVT prophylaxis:  Lovenox  Discharge Planning:   Ce Herron, RUBY 05/13/19 9:58 AM

## 2019-05-13 NOTE — THERAPY EVALUATION
Acute Care - Occupational Therapy Initial Evaluation   [facility]     Patient Name: Sadie Tijerina  : 1940  MRN: [id number]  Today's Date: 2019  Onset of Illness/Injury or Date of Surgery: 19  Date of Referral to OT: 19  Referring Physician: Dr. Perdomo    Admit Date: 2019       ICD-10-CM ICD-9-CM   1. COPD exacerbation (CMS/[facility]) J44.1 491.21   2. Hypercapnia R06.89 786.09   3. Respiratory distress R06.03 786.09   4. Impaired mobility and ADLs Z74.09 799.89     Patient Active Problem List   Diagnosis   • Calf cramp   • Mixed anxiety depressive disorder   • Dizziness   • Fatigue   • Gastroesophageal reflux disease without esophagitis   • Hematuria   • Hyperlipidemia   • Essential hypertension   • Low back pain   • Orthostatic hypotension   • Restless legs syndrome   • Essential tremor   • Vitamin D deficiency   • Balance problem   • Tension headache   • Tobacco abuse disorder   • COPD with acute exacerbation (CMS/[facility])   • Venous insufficiency of both lower extremities   • Chronic diastolic heart failure (CMS/[facility])   • Weight loss   • Neuropathy   • COPD exacerbation (CMS/[facility])   • Acute respiratory failure with hypoxia and hypercapnia (CMS/[facility])   • Acute bronchitis     Past Medical History:   Diagnosis Date   • Arthritis    • Cancer (CMS/[facility])     uterine cancer ()   • COPD (chronic obstructive pulmonary disease) (CMS/[facility])    • Depression    • Elevated cholesterol    • GERD (gastroesophageal reflux disease)    • History of emphysema    • History of esophageal reflux    • History of recurrent UTI (urinary tract infection)    • History of renal calculi    • History of uterine cancer    • Hyperlipidemia    • Hypertension    • Pneumonia 2019     Past Surgical History:   Procedure Laterality Date   • FOOT SURGERY     • HAND SURGERY     • HYSTERECTOMY            OT ASSESSMENT FLOWSHEET (last 12 hours)      Occupational Therapy Evaluation     Row Name 19 1346                   OT  Evaluation Time/Intention    Subjective Information  complains of;pain  -        Document Type  evaluation  -        Mode of Treatment  occupational therapy  -        Patient Effort  good  -        Symptoms Noted During/After Treatment  fatigue  -           General Information    Patient Profile Reviewed?  yes  -        Onset of Illness/Injury or Date of Surgery  05/11/19  -        Referring Physician  Dr. Perdomo  -        Patient Observations  alert;cooperative;agree to therapy  -        Patient/Family Observations  Pts daughter was present   -        General Observations of Patient  Pt received supine in bed on bipap machine  -        Prior Level of Function  independent:;all household mobility;ADL's  -        Equipment Currently Used at Home  cane, straight;cane, quad;rollator;walker, rolling;wheelchair;oxygen  -        Pertinent History of Current Functional Problem  COPD exacerbation, respiratory failure, HTN, CHF, HLD  -        Existing Precautions/Restrictions  fall;oxygen therapy device and L/min  -        Risks Reviewed  patient:;increased discomfort  -        Benefits Reviewed  patient:;improve function;increase independence;increase strength  -           Relationship/Environment    Primary Source of Support/Comfort  child(michelle)  -        Lives With  alone  -           Resource/Environmental Concerns    Current Living Arrangements  home/apartment/condo  -           Home Main Entrance    Number of Stairs, Main Entrance  two  -           Stairs Within Home, Primary    Stairs, Within Home, Primary  to utility room  -        Number of Stairs, Within Home, Primary  one  -           Cognitive Assessment/Intervention- PT/OT    Orientation Status (Cognition)  oriented x 4  -        Follows Commands (Cognition)  WFL  -           Bed Mobility Assessment/Treatment    Bed Mobility Assessment/Treatment  supine-sit  -        Supine-Sit [place] (Bed Mobility)   supervision  -        Assistive Device (Bed Mobility)  head of bed elevated  -           Functional Mobility    Functional Mobility- Ind. Level  contact guard assist  -        Functional Mobility-Distance (Feet)  3  -        Functional Mobility- Safety Issues  supplemental O2  -           Transfer Assessment/Treatment    Transfer Assessment/Treatment  sit-stand transfer;stand-sit transfer  -           Sit-Stand Transfer    Sit-Stand [place] (Transfers)  contact guard  -           Stand-Sit Transfer    Stand-Sit [place] (Transfers)  contact guard  [facility]           ADL Assessment/Intervention    BADL Assessment/Intervention  bathing;upper body dressing;lower body dressing;grooming;feeding;toileting  -           Bathing Assessment/Intervention    Bathing [place] Level  minimum assist (75% patient effort)  -           Upper Body Dressing Assessment/Training    Upper Body Dressing [place] Level  set up  -           Lower Body Dressing Assessment/Training    Lower Body Dressing [place] Level  minimum assist (75% patient effort)  -           Grooming Assessment/Training    [place] Level (Grooming)  set up  -           Self-Feeding Assessment/Training    [place] Level (Feeding)  set up  -           Toileting Assessment/Training    [place] Level (Toileting)  contact guard assist  -           BADL Safety/Performance    Impairments, BADL Safety/Performance  endurance/activity tolerance;shortness of breath;strength  -           General ROM    GENERAL ROM COMMENTS  [facility]           MMT (Manual Muscle Testing)    General MMT Comments  [facility]           Positioning and Restraints    Pre-Treatment Position  in bed  -        Post Treatment Position  chair  -        In Chair  reclined;call light within reach;encouraged to call for assist;with family/caregiver  -           Pain Assessment    Additional Documentation  Pain Scale: Numbers  Pre/Post-Treatment (Group)  -           Pain Scale: Numbers Pre/Post-Treatment    Pain Scale: Numbers, Pretreatment  8/10  -        Pain Scale: Numbers, Post-Treatment  8/10  -        Pain Location - Side  Bilateral  -        Pain Location  foot  -        Pain Intervention(s)  Repositioned;Ambulation/increased activity  -           Coping    Observed Emotional State  accepting;cooperative  -        Verbalized Emotional State  acceptance  -           Plan of Care Review    Plan of Care Reviewed With  patient;daughter  -           Clinical Impression (OT)    Date of Referral to OT  05/12/19  -        OT Diagnosis  generalized weakness  -        Patient/Family Goals Statement (OT Eval)  Pt wants to d/c home with home health  -        Criteria for Skilled Therapeutic Interventions Met (OT Eval)  yes;treatment indicated  -        Rehab Potential (OT Eval)  good, to achieve stated therapy goals  -        Therapy Frequency (OT Eval)  3 times/wk  -        Care Plan Review (OT)  evaluation/treatment results reviewed;patient/other agree to care plan  -        Care Plan Review, Other Participant (OT Eval)  daughter  -        Anticipated Discharge Disposition (OT)  home with home health  -           Vital Signs    O2 Delivery Pre Treatment  other (see comments) bi-pap  -        O2 Delivery Intra Treatment  other (see comments) bi-pap  -        O2 Delivery Post Treatment  other (see comments) bi-pap  -           OT Goals    Transfer Goal Selection (OT)  transfer, OT goal 1  -        Dressing Goal Selection (OT)  dressing, OT goal 1  -        Toileting Goal Selection (OT)  toileting, OT goal 1  -        Strength Goal Selection (OT)  strength, OT goal 1  -        Functional Mobility Goal Selection (OT)  functional mobility, OT goal 1  -        Additional Documentation  Strength Goal Selection (OT) (Row);Functional Mobility Selection (OT) (Row)  -           Transfer Goal 1 (OT)     Activity/Assistive Device (Transfer Goal 1, OT)  sit-to-stand/stand-to-sit;walker, rolling  -AH        [place] Level/Cues Needed (Transfer Goal 1, OT)  conditional independence  -AH        Time Frame (Transfer Goal 1, OT)  by discharge  -        Progress/Outcome (Transfer Goal 1, OT)  goal ongoing  -AH           Dressing Goal 1 (OT)    Activity/Assistive Device (Dressing Goal 1, OT)  lower body dressing  -AH        [place]/Cues Needed (Dressing Goal 1, OT)  supervision required  -AH        Time Frame (Dressing Goal 1, OT)  by discharge  -AH        Progress/Outcome (Dressing Goal 1, OT)  goal ongoing  -AH           Toileting Goal 1 (OT)    Activity/Device (Toileting Goal 1, OT)  toileting skills, all  -AH        [place] Level/Cues Needed (Toileting Goal 1, OT)  standby assist  -AH        Time Frame (Toileting Goal 1, OT)  by discharge  -        Progress/Outcome (Toileting Goal 1, OT)  goal ongoing  -           Strength Goal 1 (OT)    Strength Goal 1 (OT)  Pt will perform UB strengthening ex using theraband for resistance.  -AH        Time Frame (Strength Goal 1, OT)  by discharge  -        Progress/Outcome (Strength Goal 1, OT)  goal ongoing  -AH           Functional Mobility Goal 1 (OT)    Activity/Assistive Device (Functional Mobility Goal 1, OT)  walker, rolling  -AH        [place] Level/Cues Needed (Functional Mobility Goal 1, OT)  contact guard assist  -AH        Distance Goal 1 (Functional Mobility, OT)  300  -AH        Time Frame (Functional Mobility Goal 1, OT)  by discharge  -        Progress/Outcome (Functional Mobility Goal 1, OT)  goal ongoing  -           Living Environment    Home Accessibility  stairs to enter home;stairs within home  -          User Key  (r) = Recorded By, (t) = Taken By, (c) = Cosigned By    Initials Name Effective Dates    Radha Aguilar 03/07/18 -          Occupational Therapy Education     Title: PT OT SLP Therapies (Not Started)     Topic:  Occupational Therapy (In Progress)     Point: ADL training (Done)     Description: Instruct learner(s) on proper safety adaptation and remediation techniques during self care or transfers.   Instruct in proper use of assistive devices.    Learning Progress Summary           Patient Acceptance, AMOS VU by  at 5/13/2019  3:43 PM    Comment:  Role of OT/POC                               User Key     Initials Effective Dates Name Provider Type Discipline     03/07/18 -  Radha Escalona Occupational Therapist OT                  OT Recommendation and Plan  Outcome Summary/Treatment Plan (OT)  Anticipated Discharge Disposition (OT): home with home health  Therapy Frequency (OT Eval): 3 times/wk  Plan of Care Review  Plan of Care Reviewed With: patient, daughter  Plan of Care Reviewed With: patient, daughter  Outcome Summary: Pt seen for OT evaluation today.  Pt presents with weakness and decreased independence with self care and functional mobility tasks.  Pt is expected to benefit from skilled OT to improve her strength, endurance and independence with ADL tasks.    Outcome Measures     Row Name 05/13/19 1346 05/12/19 0947          How much help from another person do you currently need...    Turning from your back to your side while in flat bed without using bedrails?  --  3  -MS     Moving from lying on back to sitting on the side of a flat bed without bedrails?  --  3  -MS     Moving to and from a bed to a chair (including a wheelchair)?  --  3  -MS     Standing up from a chair using your arms (e.g., wheelchair, bedside chair)?  --  3  -MS     Climbing 3-5 steps with a railing?  --  2  -MS     To walk in hospital room?  --  3  -MS     AM-PAC 6 Clicks Score  --  17  -MS        How much help from another is currently needed...    Putting on and taking off regular lower body clothing?  3  -AH  --     Bathing (including washing, rinsing, and drying)  3  -AH  --     Toileting (which includes using toilet bed pan or  urinal)  3  -AH  --     Putting on and taking off regular upper body clothing  3  -AH  --     Taking care of personal grooming (such as brushing teeth)  3  -AH  --     Eating meals  3  -  --     Score  18  -  --        Functional Assessment    Outcome Measure Options  AM-PAC 6 Clicks Daily Activity (OT)  -  AM-PAC 6 Clicks Basic Mobility (PT)  -MS       User Key  (r) = Recorded By, (t) = Taken By, (c) = Cosigned By    Initials Name Provider Type    Radha Aguilar Occupational Therapist    MS Griffin Solares, PT Physical Therapist          Time Calculation:   Time Calculation- OT     Row Name 05/13/19 1546             Time Calculation- OT    OT Start Time  1346  -      OT Received On  05/13/19  -      OT Goal Re-Cert Due Date  05/23/19  -        User Key  (r) = Recorded By, (t) = Taken By, (c) = Cosigned By    Initials Name Provider Type    Radha Aguilar Occupational Therapist        Therapy Charges for Today     Code Description Service Date Service Provider Modifiers Qty    42884684706  OT EVAL LOW COMPLEXITY 4 5/13/2019 Radha Escalona GO 1               Radha Escalona  5/13/2019

## 2019-05-13 NOTE — PLAN OF CARE
Problem: Patient Care Overview  Goal: Plan of Care Review  Outcome: Ongoing (interventions implemented as appropriate)   05/13/19 3101   Coping/Psychosocial   Plan of Care Reviewed With patient;grandchild(michelle)   Plan of Care Review   Progress improving   OTHER   Outcome Summary Pt tolerated treatment well with pt ambulating 136' sba-cga with rw with x 2 standing rests.

## 2019-05-13 NOTE — THERAPY TREATMENT NOTE
Acute Care - Physical Therapy Treatment Note   Friend     Patient Name: Sadie Tijerina  : 1940  MRN: [id number]  Today's Date: 2019  Onset of Illness/Injury or Date of Surgery: 19  Date of Referral to PT: 19  Referring Physician: Dr. Perdomo    Admit Date: 2019    Visit Dx:    ICD-10-CM ICD-9-CM   1. COPD exacerbation (CMS/[facility]CC) J44.1 491.21   2. Hypercapnia R06.89 786.09   3. Respiratory distress R06.03 786.09   4. Impaired mobility and ADLs Z74.09 799.89     Patient Active Problem List   Diagnosis   • Calf cramp   • Mixed anxiety depressive disorder   • Dizziness   • Fatigue   • Gastroesophageal reflux disease without esophagitis   • Hematuria   • Hyperlipidemia   • Essential hypertension   • Low back pain   • Orthostatic hypotension   • Restless legs syndrome   • Essential tremor   • Vitamin D deficiency   • Balance problem   • Tension headache   • Tobacco abuse disorder   • COPD with acute exacerbation (CMS/HCC)   • Venous insufficiency of both lower extremities   • Chronic diastolic heart failure (CMS/HCC)   • Weight loss   • Neuropathy   • COPD exacerbation (CMS/HCC)   • Acute respiratory failure with hypoxia and hypercapnia (CMS/HCC)   • Acute bronchitis       Therapy Treatment    Rehabilitation Treatment Summary     Row Name 19 1442             Treatment Time/Intention    Discipline  physical therapy assistant  -RM      Document Type  therapy note (daily note)  -RM      Subjective Information  complains of;fatigue  -RM      Mode of Treatment  physical therapy  -RM      Patient Effort  good  -RM      Existing Precautions/Restrictions  fall;oxygen therapy device and L/min  -RM      Recorded by [RM] Anibal Michelle, PTA 19 1614      Row Name 19 1442             Vital Signs    Pre SpO2 (%)  95  -RM      O2 Delivery Pre Treatment  room air  -RM      Post SpO2 (%)  94  -RM      O2 Delivery Post Treatment  room air  -RM      Pre Patient Position  Sitting   -RM      Post Patient Position  Sitting  -RM      Recorded by [] Anibal Michelle, PTA 05/13/19 1614      Row Name 05/13/19 1442             Safety Issues, Functional Mobility    Impairments Affecting Function (Mobility)  endurance/activity tolerance;strength;shortness of breath  -RM      Recorded by [RM] Anibal Michelle, PTA 05/13/19 1614      Row Name 05/13/19 1442             Sit-Stand Transfer    Sit-Stand [place] (Transfers)  stand by assist;contact guard;verbal cues  -RM      Assistive Device (Sit-Stand Transfers)  walker, front-wheeled  -RM      Recorded by [] Anibal Michelle, PTA 05/13/19 1614      Row Name 05/13/19 1442             Stand-Sit Transfer    Stand-Sit [place] (Transfers)  stand by assist;contact guard  -RM      Assistive Device (Stand-Sit Transfers)  walker, front-wheeled  -RM      Recorded by [] Anibal Michelle, [facility] 05/13/19 1614      Row Name 05/13/19 1442             Gait/Stairs Assessment/Training    [place] Level (Gait)  contact guard;verbal cues  -RM      Assistive Device (Gait)  walker, front-wheeled  -RM      Distance in Feet (Gait)  136 x 2 standing rests   -RM      Pattern (Gait)  swing-through  -RM      Deviations/Abnormal Patterns (Gait)  base of support, narrow;gait speed decreased;stride length decreased  -RM      Recorded by [] Anibal Michelle, PTA 05/13/19 1614      Row Name 05/13/19 1442             Positioning and Restraints    Pre-Treatment Position  sitting in chair/recliner  -RM      Post Treatment Position  chair  -RM      In Chair  reclined;call light within reach;encouraged to call for assist  -RM      Recorded by [] Anibal Michelle, [facility] 05/13/19 1614      Row Name 05/13/19 1442             Outcome Summary/Treatment Plan (PT)    Daily Summary of Progress (PT)  progress toward functional goals is good  -RM      Plan for Continued Treatment (PT)  Cont PT per POC.   -RM      Recorded by [RM] Anibal Michelle, [facility] 05/13/19 1614        User  Key  (r) = Recorded By, (t) = Taken By, (c) = Cosigned By    Initials Name Effective Dates Discipline    RM Anibal Michelle, PTA 03/07/18 -  PT               Rehab Goal Summary     Row Name 05/13/19 1346             Occupational Therapy Goals    Transfer Goal Selection (OT)  transfer, OT goal 1  -AH      Dressing Goal Selection (OT)  dressing, OT goal 1  -AH      Toileting Goal Selection (OT)  toileting, OT goal 1  -AH      Strength Goal Selection (OT)  strength, OT goal 1  -AH      Functional Mobility Goal Selection (OT)  functional mobility, OT goal 1  -AH         Transfer Goal 1 (OT)    Activity/Assistive Device (Transfer Goal 1, OT)  sit-to-stand/stand-to-sit;walker, rolling  -AH      [place] Level/Cues Needed (Transfer Goal 1, OT)  conditional independence  -AH      Time Frame (Transfer Goal 1, OT)  by discharge  -      Progress/Outcome (Transfer Goal 1, OT)  goal ongoing  -AH         Dressing Goal 1 (OT)    Activity/Assistive Device (Dressing Goal 1, OT)  lower body dressing  -AH      [place]/Cues Needed (Dressing Goal 1, OT)  supervision required  -AH      Time Frame (Dressing Goal 1, OT)  by discharge  -AH      Progress/Outcome (Dressing Goal 1, OT)  goal ongoing  -AH         Toileting Goal 1 (OT)    Activity/Device (Toileting Goal 1, OT)  toileting skills, all  -AH      [place] Level/Cues Needed (Toileting Goal 1, OT)  standby assist  -AH      Time Frame (Toileting Goal 1, OT)  by discharge  -AH      Progress/Outcome (Toileting Goal 1, OT)  goal ongoing  -AH         Strength Goal 1 (OT)    Strength Goal 1 (OT)  Pt will perform UB strengthening ex using theraband for resistance.  -AH      Time Frame (Strength Goal 1, OT)  by discharge  -AH      Progress/Outcome (Strength Goal 1, OT)  goal ongoing  -AH         Functional Mobility Goal 1 (OT)    Activity/Assistive Device (Functional Mobility Goal 1, OT)  walker, rolling  -AH      [place] Level/Cues Needed (Functional Mobility Goal 1,  OT)  contact guard assist  -      Distance Goal 1 (Functional Mobility, OT)  300  -AH      Time Frame (Functional Mobility Goal 1, OT)  by discharge  -      Progress/Outcome (Functional Mobility Goal 1, OT)  goal ongoing  -        User Key  (r) = Recorded By, (t) = Taken By, (c) = Cosigned By    Initials Name Provider Type Discipline    Radha Aguilar Occupational Therapist OT          Physical Therapy Education     Title: PT OT SLP Therapies (Not Started)     Topic: Physical Therapy (Done)     Point: Mobility training (Done)     Learning Progress Summary           Patient Acceptance, E,TB,D, VU,NR by RM at 5/13/2019  4:14 PM    Comment:  Pacing with activity    Acceptance, E, VU by MS at 5/12/2019 10:49 AM                   Point: Home exercise program (Done)     Learning Progress Summary           Patient Acceptance, E, VU by MS at 5/12/2019 10:49 AM                   Point: Body mechanics (Done)     Learning Progress Summary           Patient Acceptance, E, VU by MS at 5/12/2019 10:49 AM                   Point: Precautions (Done)     Learning Progress Summary           Patient Acceptance, E, VU by MS at 5/12/2019 10:49 AM                               User Key     Initials Effective Dates Name Provider Type Discipline     03/07/18 -  Anibal Michelle, PTA Physical Therapy Assistant PT    MS 05/01/19 -  Griffin Solares, PT Physical Therapist PT                PT Recommendation and Plan     Outcome Summary/Treatment Plan (PT)  Daily Summary of Progress (PT): progress toward functional goals is good  Plan for Continued Treatment (PT): Cont PT per POC.   Plan of Care Reviewed With: patient, grandchild(michelle)  Progress: improving  Outcome Summary: Pt tolerated treatment well with pt ambulating 136' sba-cga with rw with x 2 standing  rests.  Outcome Measures     Row Name 05/13/19 1346 05/12/19 0978          How much help from another person do you currently need...    Turning from your back to your side  while in flat bed without using bedrails?  --  3  -MS     Moving from lying on back to sitting on the side of a flat bed without bedrails?  --  3  -MS     Moving to and from a bed to a chair (including a wheelchair)?  --  3  -MS     Standing up from a chair using your arms (e.g., wheelchair, bedside chair)?  --  3  -MS     Climbing 3-5 steps with a railing?  --  2  -MS     To walk in hospital room?  --  3  -MS     AM-[facility] 6 Clicks Score  --  17  -MS        How much help from another is currently needed...    Putting on and taking off regular lower body clothing?  3  -AH  --     Bathing (including washing, rinsing, and drying)  3  -AH  --     Toileting (which includes using toilet bed pan or urinal)  3  -AH  --     Putting on and taking off regular upper body clothing  3  -AH  --     Taking care of personal grooming (such as brushing teeth)  3  -AH  --     Eating meals  3  -  --     Score  18  -AH  --        Functional Assessment    Outcome Measure Options  AM-[facility] 6 Clicks Daily Activity (OT)  -  AM-[facility] 6 Clicks Basic Mobility (PT)  -MS       User Key  (r) = Recorded By, (t) = Taken By, (c) = Cosigned By    Initials Name Provider Type    Radha Aguilar Occupational Therapist    Griffin Peters, PT Physical Therapist         Time Calculation:   PT Charges     Row Name 05/13/19 1616             Time Calculation    Start Time  1442  -RM      Stop Time  1459  -RM      Time Calculation (min)  17 min  -RM         Time Calculation- PT    Total Timed Code Minutes- PT  17 minute(s)  -RM         Timed Charges    15232 - PT Therapeutic Exercise Minutes  17  -RM        User Key  (r) = Recorded By, (t) = Taken By, (c) = Cosigned By    Initials Name Provider Type    Anibal Francois, PTA Physical Therapy Assistant        Therapy Charges for Today     Code Description Service Date Service Provider Modifiers Qty    [id number] HC PT THER PROC EA 15 MIN 5/13/2019 Anibal Michelle, DILEEP GP 1          PT G-Codes  Outcome  Measure Options: AM-PAC 6 Clicks Daily Activity (OT)  AM-PAC 6 Clicks Score: 17  Score: 18    Anibal Michelle, PTA  5/13/2019

## 2019-05-14 LAB
ANION GAP SERPL CALCULATED.3IONS-SCNC: 8.5 MMOL/L (ref 10–20)
B PERT.PT PRMT NPH QL NAA+NON-PROBE: NOT DETECTED
BACTERIA SPEC RESP CULT: NORMAL
BASOPHILS # BLD AUTO: 0.04 10*3/MM3 (ref 0–0.2)
BASOPHILS NFR BLD AUTO: 0.2 % (ref 0–1.5)
BUN BLD-MCNC: 20 MG/DL (ref 7–20)
BUN/CREAT SERPL: 40 (ref 7.1–23.5)
C PNEUM DNA NPH QL NAA+NON-PROBE: NOT DETECTED
CALCIUM SPEC-SCNC: 8.8 MG/DL (ref 8.4–10.2)
CHLORIDE SERPL-SCNC: 99 MMOL/L (ref 98–107)
CO2 SERPL-SCNC: 32 MMOL/L (ref 26–30)
CREAT BLD-MCNC: 0.5 MG/DL (ref 0.6–1.3)
DEPRECATED RDW RBC AUTO: 46.8 FL (ref 37–54)
EOSINOPHIL # BLD AUTO: 0 10*3/MM3 (ref 0–0.4)
EOSINOPHIL NFR BLD AUTO: 0 % (ref 0.3–6.2)
ERYTHROCYTE [DISTWIDTH] IN BLOOD BY AUTOMATED COUNT: 13.3 % (ref 12.3–15.4)
FLUAV H1 RNA NPH QL NAA+NON-PROBE: NOT DETECTED
FLUAV H3 RNA NPH QL NAA+NON-PROBE: NOT DETECTED
FLUAV RNA SPEC QL NAA+PROBE: NOT DETECTED
FLUBV RNA SPEC QL NAA+PROBE: NOT DETECTED
GFR SERPL CREATININE-BSD FRML MDRD: 119 ML/MIN/1.73
GLUCOSE BLD-MCNC: 128 MG/DL (ref 74–98)
GRAM STN SPEC: NORMAL
HADV DNA SPEC QL NAA+PROBE: NOT DETECTED
HCOV 229E RNA NPH QL NAA+NON-PROBE: NOT DETECTED
HCOV HKU1 RNA NPH QL NAA+NON-PROBE: NOT DETECTED
HCOV NL63 RNA NPH QL NAA+NON-PROBE: NOT DETECTED
HCOV OC43 RNA NPH QL NAA+NON-PROBE: NOT DETECTED
HCT VFR BLD AUTO: 40.7 % (ref 34–46.6)
HGB BLD-MCNC: 12.8 G/DL (ref 12–15.9)
HMPV RNA SPEC QL NAA+PROBE: NOT DETECTED
HPIV1 RNA SPEC QL NAA+PROBE: NOT DETECTED
HPIV2 SPEC QL CULT: NOT DETECTED
HPIV3 SPEC QL CULT: NOT DETECTED
HPIV4 RNA NPH QL NAA+NON-PROBE: NOT DETECTED
IMM GRANULOCYTES # BLD AUTO: 0.24 10*3/MM3 (ref 0–0.05)
IMM GRANULOCYTES NFR BLD AUTO: 0.9 % (ref 0–0.5)
INR PPP: NOT DETECTED
LYMPHOCYTES # BLD AUTO: 0.89 10*3/MM3 (ref 0.7–3.1)
LYMPHOCYTES NFR BLD AUTO: 3.5 % (ref 19.6–45.3)
M PNEUMO DNA SPEC QL NAA+PROBE: NOT DETECTED
MCH RBC QN AUTO: 30 PG (ref 26.6–33)
MCHC RBC AUTO-ENTMCNC: 31.4 G/DL (ref 31.5–35.7)
MCV RBC AUTO: 95.5 FL (ref 79–97)
MONOCYTES # BLD AUTO: 0.29 10*3/MM3 (ref 0.1–0.9)
MONOCYTES NFR BLD AUTO: 1.1 % (ref 5–12)
NEUTROPHILS # BLD AUTO: 24.04 10*3/MM3 (ref 1.7–7)
NEUTROPHILS NFR BLD AUTO: 94.3 % (ref 42.7–76)
NRBC BLD AUTO-RTO: 0 /100 WBC (ref 0–0.2)
PLATELET # BLD AUTO: 223 10*3/MM3 (ref 140–450)
PMV BLD AUTO: 9.7 FL (ref 6–12)
POTASSIUM BLD-SCNC: 4.5 MMOL/L (ref 3.5–5.1)
RBC # BLD AUTO: 4.26 10*6/MM3 (ref 3.77–5.28)
RHINOVIRUS RNA SPEC QL NAA+PROBE: NOT DETECTED
RSV AG SPEC QL: NOT DETECTED
SODIUM BLD-SCNC: 135 MMOL/L (ref 137–145)
WBC NRBC COR # BLD: 25.5 10*3/MM3 (ref 3.4–10.8)

## 2019-05-14 PROCEDURE — 85025 COMPLETE CBC W/AUTO DIFF WBC: CPT | Performed by: NURSE PRACTITIONER

## 2019-05-14 PROCEDURE — 94799 UNLISTED PULMONARY SVC/PX: CPT

## 2019-05-14 PROCEDURE — 25010000002 METHYLPREDNISOLONE PER 40 MG: Performed by: INTERNAL MEDICINE

## 2019-05-14 PROCEDURE — 25010000002 METHYLPREDNISOLONE PER 40 MG: Performed by: NURSE PRACTITIONER

## 2019-05-14 PROCEDURE — 97110 THERAPEUTIC EXERCISES: CPT

## 2019-05-14 PROCEDURE — 25010000002 ENOXAPARIN PER 10 MG: Performed by: INTERNAL MEDICINE

## 2019-05-14 PROCEDURE — 80048 BASIC METABOLIC PNL TOTAL CA: CPT | Performed by: NURSE PRACTITIONER

## 2019-05-14 PROCEDURE — 99232 SBSQ HOSP IP/OBS MODERATE 35: CPT | Performed by: NURSE PRACTITIONER

## 2019-05-14 RX ORDER — METHYLPREDNISOLONE SODIUM SUCCINATE 40 MG/ML
40 INJECTION, POWDER, LYOPHILIZED, FOR SOLUTION INTRAMUSCULAR; INTRAVENOUS EVERY 12 HOURS
Status: DISCONTINUED | OUTPATIENT
Start: 2019-05-14 | End: 2019-05-16 | Stop reason: HOSPADM

## 2019-05-14 RX ORDER — METHYLPREDNISOLONE SODIUM SUCCINATE 40 MG/ML
40 INJECTION, POWDER, LYOPHILIZED, FOR SOLUTION INTRAMUSCULAR; INTRAVENOUS EVERY 8 HOURS
Status: DISCONTINUED | OUTPATIENT
Start: 2019-05-14 | End: 2019-05-14

## 2019-05-14 RX ADMIN — GUAIFENESIN 600 MG: 600 TABLET, EXTENDED RELEASE ORAL at 09:02

## 2019-05-14 RX ADMIN — SODIUM CHLORIDE, PRESERVATIVE FREE 3 ML: 5 INJECTION INTRAVENOUS at 09:02

## 2019-05-14 RX ADMIN — METHYLPREDNISOLONE SODIUM SUCCINATE 40 MG: 40 INJECTION, POWDER, FOR SOLUTION INTRAMUSCULAR; INTRAVENOUS at 23:09

## 2019-05-14 RX ADMIN — CLONAZEPAM 0.5 MG: 0.5 TABLET ORAL at 23:09

## 2019-05-14 RX ADMIN — Medication 1 CAPSULE: at 09:02

## 2019-05-14 RX ADMIN — IPRATROPIUM BROMIDE AND ALBUTEROL SULFATE 3 ML: .5; 3 SOLUTION RESPIRATORY (INHALATION) at 00:34

## 2019-05-14 RX ADMIN — ENOXAPARIN SODIUM 40 MG: 40 INJECTION SUBCUTANEOUS at 03:00

## 2019-05-14 RX ADMIN — PRAVASTATIN SODIUM 20 MG: 20 TABLET ORAL at 20:29

## 2019-05-14 RX ADMIN — PROPRANOLOL HYDROCHLORIDE 60 MG: 60 CAPSULE, EXTENDED RELEASE ORAL at 09:02

## 2019-05-14 RX ADMIN — DOXYCYCLINE 100 MG: 100 INJECTION, POWDER, LYOPHILIZED, FOR SOLUTION INTRAVENOUS at 03:00

## 2019-05-14 RX ADMIN — IPRATROPIUM BROMIDE AND ALBUTEROL SULFATE 3 ML: .5; 3 SOLUTION RESPIRATORY (INHALATION) at 13:42

## 2019-05-14 RX ADMIN — METHYLPREDNISOLONE SODIUM SUCCINATE 40 MG: 40 INJECTION, POWDER, FOR SOLUTION INTRAMUSCULAR; INTRAVENOUS at 03:00

## 2019-05-14 RX ADMIN — MELATONIN TAB 5 MG 5 MG: 5 TAB at 23:09

## 2019-05-14 RX ADMIN — BUDESONIDE 1 MG: 0.5 INHALANT RESPIRATORY (INHALATION) at 06:55

## 2019-05-14 RX ADMIN — SODIUM CHLORIDE, PRESERVATIVE FREE 10 ML: 5 INJECTION INTRAVENOUS at 11:19

## 2019-05-14 RX ADMIN — GUAIFENESIN 600 MG: 600 TABLET, EXTENDED RELEASE ORAL at 20:29

## 2019-05-14 RX ADMIN — Medication 1 CAPSULE: at 20:29

## 2019-05-14 RX ADMIN — SERTRALINE HYDROCHLORIDE 100 MG: 50 TABLET ORAL at 09:02

## 2019-05-14 RX ADMIN — ESTROGENS, CONJUGATED 0.62 MG: 0.62 TABLET, FILM COATED ORAL at 09:02

## 2019-05-14 RX ADMIN — DOXYCYCLINE 100 MG: 100 INJECTION, POWDER, LYOPHILIZED, FOR SOLUTION INTRAVENOUS at 16:24

## 2019-05-14 RX ADMIN — METHYLPREDNISOLONE SODIUM SUCCINATE 40 MG: 40 INJECTION, POWDER, FOR SOLUTION INTRAMUSCULAR; INTRAVENOUS at 11:18

## 2019-05-14 RX ADMIN — PANTOPRAZOLE SODIUM 40 MG: 40 TABLET, DELAYED RELEASE ORAL at 06:09

## 2019-05-14 RX ADMIN — BUDESONIDE 1 MG: 0.5 INHALANT RESPIRATORY (INHALATION) at 18:45

## 2019-05-14 RX ADMIN — FLUTICASONE PROPIONATE 2 SPRAY: 50 SPRAY, METERED NASAL at 09:03

## 2019-05-14 RX ADMIN — IPRATROPIUM BROMIDE AND ALBUTEROL SULFATE 3 ML: .5; 3 SOLUTION RESPIRATORY (INHALATION) at 06:55

## 2019-05-14 RX ADMIN — IPRATROPIUM BROMIDE AND ALBUTEROL SULFATE 3 ML: .5; 3 SOLUTION RESPIRATORY (INHALATION) at 18:45

## 2019-05-14 NOTE — PROGRESS NOTES
PROGRESS NOTE        Date of Admission: 5/11/2019  Length of Stay: 2  Primary Care Physician: Kristian Wolff MD    Subjective   Chief Complaint: Cough and dyspnea  HPI:   This is an 78-year-old female with history of COPD on home oxygen, hypertension, diastolic congestive heart failure, dyslipidemia who has had multiple admissions and visits to the emergency room.  She was discharged from the hospital on 4/7/2019 for COPD exacerbation.  She does have a history of having pneumonia in February 2019 which did grow out stenotrophomonas.  According to the patient should been having some yellowish sputum production and cough.  She does get more progressively short of breath over the last few days prior to admission.  She was seen and evaluated in the emergency room chest x-ray did not show any evidence of pneumonia.  He is followed by pulmonology group in [place].  She has refused BiPAP in the past and she was noted upon admission to have acute on chronic hypoxic/hypercapnic respiratory failure.  Dr. Mojica with pulmonology was consulted and had a long discussion with the patient and she was in agreement to try a nasal mask which she did wear last night.  Immunology is working to switch her over from BiPAP to a noninvasive ventilation.  She will be placed on that tonight and titrated to see how she can tolerate.  PT OT has been working with the patient and she has been able to walk over 150 feet.  Her daughter had expressed some concerns that she felt that her mother was weak however she does appear to be doing rather well with therapy.  We will likely plan to discharge home tomorrow and this has been relayed to the patient and her daughter.  He does have some leukocytosis with a white count of 25,000 which is likely secondary to the steroids.  We will start to wean the steroids.       Review Of Systems:   Systems reviewed on 5/14/2019 with the following findings.  Review of Systems  General ROS: Patient denies any  fevers, chills or loss of consciousness.    ENT ROS: Denies sore throat, nasal congestion or ear pain.   Hematological and Lymphatic ROS: Denies neck swelling or easy bleeding.  Endocrine ROS: Denies any recent unintentional weight gain or loss.  Respiratory ROS: Denies cough or shortness of breath.   Cardiovascular ROS: Denies chest pain or palpitations. No history of exertional chest pain.   Gastrointestinal ROS: Denies nausea and vomiting. Denies any abdominal pain. No diarrhea.  Genito-Urinary ROS: Denies dysuria or hematuria.  Musculoskeletal ROS: Denies back pain. No muscle pain. No calf pain.   Neurological ROS: Denies any focal weakness. No loss of consciousness. Denies any numbness. Denies neck pain.   Dermatological ROS: Denies any redness or pruritis.    Objective      Temp:  [97.2 °F (36.2 °C)-97.6 °F (36.4 °C)] 97.2 °F (36.2 °C)  Heart Rate:  [68-94] 68  Resp:  [16-19] 18  BP: (151-171)/(68-77) 151/72  FiO2 (%):  [30 %] 30 %  Physical Exam   clinical examination done on 5/14/2019 with the following findings.    General Appearance:  Alert and cooperative, not in any acute distress.   Head:  Atraumatic and normocephalic, without obvious abnormality.   Eyes:          PERRLA, conjunctivae and sclerae normal, no Icterus. No pallor. Extraocular movements are within normal limits.   Ears:  Ears appear intact with no abnormalities noted.   Throat: No oral lesions, no thrush, oral mucosa moist.   Neck: Supple, trachea midline, no thyromegaly, no carotid bruit.       Lungs:   Chest shape is normal. Breath sounds heard bilaterally equally.  No crackles bilateral wheezing. No Pleural rub or bronchial breathing.   Heart:  Normal S1 and S2, no murmur, no gallop, no rub. No JVD   Abdomen:   Normal bowel sounds, no masses, no organomegaly. Soft    non-tender, non-distended, no guarding, no rebound             tenderness   Extremities: Moves all extremities well, no edema, no cyanosis, no clubbing.   Pulses: Pulses  palpable and equal bilaterally   Skin: No bleeding, bruising or rash       Neurologic:    Psychiatric/Behavior:     Cranial nerves 2 - 12 grossly intact, sensation intact, Motor power is normal and equal bilaterally.  Mood normal, behavior normal       Results Review:    I have reviewed the labs, radiology results and diagnostic studies.    Results from last 7 days   Lab Units 05/14/19  0536   WBC 10*3/mm3 25.50*   HEMOGLOBIN g/dL 12.8   PLATELETS 10*3/mm3 223     Results from last 7 days   Lab Units 05/14/19  0536   SODIUM mmol/L 135*   POTASSIUM mmol/L 4.5   CO2 mmol/L 32.0*   CREATININE mg/dL 0.50*   GLUCOSE mg/dL 128*       Culture Data:    Radiology Data:   Cardiology Data:    I have reviewed the medications.    Assessment/Plan     Assessment and Plan:    1.  Acute on chronic hypoxic/hypercapnic respiratory failure-rheumatology is working to switch her from BiPAP to positive pressure ventilation.  We will place her on that tonight and see how she tolerates and get a repeat ABG in the morning.    2.  COPD with exacerbation-patient is on bronchodilators, IV steroids and doxycycline.  Sputum culture has been ordered.  She is also getting Mucinex as well as budesonide and does have a flutter valve.    3.  Acute bronchitis present on admission-stable    4.  Chronic essential hypertension-blood pressure stable    5.  Chronic diastolic congestive heart failure-stable    6.  Leukocytosis likely secondary to steroids continue to monitor.-This is likely secondary to the steroids.  We will continue to monitor and I have started weaning the steroids.    DVT prophylaxis:  Lovenox  Discharge Planning:   RUBY Gtz 05/14/19 11:52 AM

## 2019-05-14 NOTE — PLAN OF CARE
Problem: Patient Care Overview  Goal: Plan of Care Review  Outcome: Ongoing (interventions implemented as appropriate)   05/14/19 0955   Coping/Psychosocial   Plan of Care Reviewed With patient   Plan of Care Review   Progress improving       Problem: Fall Risk (Adult)  Goal: Absence of Fall  Outcome: Ongoing (interventions implemented as appropriate)      Problem: Skin Injury Risk (Adult)  Goal: Skin Health and Integrity  Outcome: Ongoing (interventions implemented as appropriate)      Problem: Chronic Obstructive Pulmonary Disease (Adult)  Goal: Signs and Symptoms of Listed Potential Problems Will be Absent, Minimized or Managed (Chronic Obstructive Pulmonary Disease)  Outcome: Ongoing (interventions implemented as appropriate)

## 2019-05-14 NOTE — PROGRESS NOTES
"I have reviewed the case in great detail with Dr. Mojica. I have personally and independently reviewed her labs/imaging/records from this hospitalization, including ER staff and admitting/attending physicians history and physical exam and progress notes, to establish a comprehensive understanding of her hospital course, as well as to establish plan of care. Dr. Mojica and I have discussed the reason for initial consult & changes in clinical course during the patient's hospital stay.       CC: Shortness of breath/acute respiratory failure    S: She is sitting up in a chair.  She continues to be short of breath with exertion. Tried BiPAP machine.    ROS: Positive for cough, shortness of breath, and mild anxiety. Denies chest pain, diarrhea or fever.    O: /72 (BP Location: Left arm, Patient Position: Lying)   Pulse 73   Temp 97.2 °F (36.2 °C) (Oral)   Resp 16   Ht 157.5 cm (62\")   Wt 57 kg (125 lb 11.2 oz)   SpO2 97%   BMI 22.99 kg/m²     General: No respiratory distress noted.  Neck: No JVD.   Cardiovascular: S1 + S2. Regular.   Respiratory: Decreased air entry with scattered wheezing bilateral. No crackles noted.  Extremities: No edema noted.  Neurologic: AAOx3. Was able to follow commands     Labs: Reviewed.    Results from last 7 days   Lab Units 05/14/19  0536 05/13/19  0531 05/12/19  0739   SODIUM mmol/L 135* 139 136*   POTASSIUM mmol/L 4.5 4.0 4.3   CHLORIDE mmol/L 99 100 93*   CO2 mmol/L 32.0* 36.0* 38.0*   BUN mg/dL 20 12 13   CREATININE mg/dL 0.50* 0.50* 0.60   CALCIUM mg/dL 8.8 8.3* 8.8   GLUCOSE mg/dL 128* 176* 183*       Results from last 7 days   Lab Units 05/12/19  0739   MAGNESIUM mg/dL 1.8       Results from last 7 days   Lab Units 05/14/19 0536 05/13/19  0531 05/12/19  0739   WBC 10*3/mm3 25.50* 22.98* 9.97   NEUTROPHIL % % 94.3* 94.3* 90.8*   HEMOGLOBIN g/dL 12.8 11.4* 12.2   HEMATOCRIT % 40.7 35.0 37.5   PLATELETS 10*3/mm3 223 211 211             Lab Results   Component Value Date    " PROCALCITO <0.05 05/11/2019    PROCALCITO <0.05 04/22/2019    PROCALCITO <0.05 04/05/2019       Lab Results   Component Value Date    PROBNP 130.0 05/11/2019    PROBNP 214.0 04/22/2019    PROBNP 198.0 04/01/2019       Lab Results   Component Value Date    CRP 2.20 (H) 05/12/2019    CRP 2.50 (H) 02/06/2018        Site   Date Value Ref Range Status   05/13/2019 Right Radial  Final     Miguel's Test   Date Value Ref Range Status   05/13/2019 Positive  Final     pH, Arterial   Date Value Ref Range Status   05/13/2019 7.364 7.300 - 7.500 pH units Final     pCO2, Arterial   Date Value Ref Range Status   05/13/2019 60.5 (C) 35.0 - 45.0 mm Hg Final     Comment:     83 Value above reference range     pO2, Arterial   Date Value Ref Range Status   05/13/2019 113.0 (H) 75.0 - 100.0 mm Hg Final     Comment:     83 Value above reference range     HCO3, Arterial   Date Value Ref Range Status   05/13/2019 34.5 (H) 22.0 - 28.0 mmol/L Final     Comment:     83 Value above reference range     Base Excess, Arterial   Date Value Ref Range Status   05/13/2019 7.5 (H) 0.0 - 2.0 mmol/L Final     Comment:     83 Value above reference range     O2 Saturation, Arterial   Date Value Ref Range Status   05/13/2019 99.0 94.0 - 100.0 % Final     Hemoglobin, Blood Gas   Date Value Ref Range Status   05/13/2019 11.3 (L) 12 - 18 g/dL Final     Comment:     84 Value below reference range     Hematocrit, Blood Gas   Date Value Ref Range Status   05/13/2019 34.6 % Final     Comment:     84 Value below reference range     Oxyhemoglobin   Date Value Ref Range Status   05/13/2019 97.9 94 - 99 % Final     Methemoglobin   Date Value Ref Range Status   05/13/2019 0.50 0.00 - 1.50 % Final     Carboxyhemoglobin   Date Value Ref Range Status   05/13/2019 <0.7 0 - 2 % Final     Comment:     94 Value below reportable range < 0.7     Barometric Pressure for Blood Gas   Date Value Ref Range Status   05/13/2019 728 mmHg Final     Modality   Date Value Ref Range Status    05/13/2019 Nasal Cannula  Final     FIO2   Date Value Ref Range Status   05/13/2019 28 % Final         CXRay: Latest imaging study was reviewed personally.   Imaging Results (last 24 hours)     ** No results found for the last 24 hours. **          Assessment & Recommendations/Plan:   1.  Acute hypoxic and hypercarbic respiratory failure  Use AVAPS at night and one hour on and 3 hours off during daytime.    2.  Likely acute exacerbation of COPD  Continue nebulized treatments and steroids.  I will decrease the frequency of steroids today.    3.  Smoking  Advised her to stop smoking altogether.    4.  Emphysema on the CT scan    5.  Chronic respiratory failure.  Noninvasive ventilation has been ordered for home use.  Patient will continue to use AVAPS while in hospital.    6.  History of stenotrophomonas respiratory infection in 2018     The patient will need to use the noninvasive ventilator for nocturnal and daytime use. Due to severity of the condition, BiPAP alone would be insufficient.  Severe chronic obstructive pulmonary disease seems to be the primary cause of chronic respiratory failure in this patient requiring noninvasive ventilator. There maybe serious detriment to the patient's health, including the possibility of recurrent exacerbations, worsening symptoms/respiratory status etc., if noninvasive ventilator is not used.      We have reviewed patient's current orders and changes, if any, have been suggested to primary care team. Plan was also discussed with nursing staff, as necessary.         This document was electronically signed by RUBY Suazo on 05/14/19 at 9:06 AM      Dictated utilizing Dragon dictation.

## 2019-05-14 NOTE — PLAN OF CARE
Problem: Patient Care Overview  Goal: Plan of Care Review  Outcome: Ongoing (interventions implemented as appropriate)   05/13/19 1615 05/13/19 2000   Coping/Psychosocial   Plan of Care Reviewed With --  patient   Plan of Care Review   Progress improving --      Goal: Discharge Needs Assessment   05/12/19 0214 05/12/19 1609   Disability   Equipment Currently Used at Home --  oxygen  (Pt uses O2@2L per [place] continuously but can't remember the name of the company that manages it )   Discharge Needs Assessment   Readmission Within the Last 30 Days --  no previous admission in last 30 days   Patient/Family Anticipates Transition to --  home   Patient/Family Anticipated Services at Transition home health care;outpatient care;rehabilitation services --    Transportation Anticipated --  family or friend will provide     Goal: Interprofessional Rounds/Family Conf  Outcome: Ongoing (interventions implemented as appropriate)   05/14/19 0201   Interdisciplinary Rounds/Family Conf   Participants nursing;patient;pharmacy;physician;physical therapy       Problem: Fall Risk (Adult)  Intervention: Monitor/Assist with Self Care   05/14/19 0103 05/14/19 0201   Daily Care Interventions   Self-Care Promotion --  independence encouraged   Activity   Activity Assistance Provided assistance, 1 person --      Intervention: Reduce Risk/Promote Restraint Free Environment   05/14/19 0103   Safety Management   Environmental Safety Modification assistive device/personal items within reach;clutter free environment maintained;room organization consistent   Safety Promotion/Fall Prevention fall prevention program maintained;safety round/check completed;nonskid shoes/slippers when out of bed     Intervention: Review Medications/Identify Contributors to Fall Risk   05/13/19 2000   Safety Management   Medication Review/Management medications reviewed;high risk medications identified       Goal: Identify Related Risk Factors and Signs and  Symptoms  Outcome: Ongoing (interventions implemented as appropriate)   05/14/19 0201   Fall Risk (Adult)   Related Risk Factors (Fall Risk) gait/mobility problems     Goal: Absence of Fall  Outcome: Ongoing (interventions implemented as appropriate)   05/14/19 0201   Fall Risk (Adult)   Absence of Fall making progress toward outcome       Problem: Skin Injury Risk (Adult)  Intervention: Promote/Optimize Nutrition   05/14/19 0201   Nutrition Interventions   Oral Nutrition Promotion physical activity promoted     Intervention: Prevent/Manage Excess Moisture   05/12/19 0300 05/13/19 2000   Hygiene Care   Perineal Care perineum cleansed --    Skin Interventions   Skin Protection --  adhesive use limited;incontinence pads utilized     Intervention: Maintain Head of Bed Elevation Less Than 30 Degrees as Tolerated   05/14/19 0103   Positioning   Head of Bed (HOB) HOB at 30-45 degrees     Intervention: Prevent/Minimize Shear/Friction Injuries   05/13/19 2000 05/14/19 0103   Skin Interventions   Pressure Reduction Devices pressure-redistributing mattress utilized --    Positioning   Positioning/Transfer Devices --  pillows;in use     Intervention: Prevent or Minimize Pressure   05/13/19 2000 05/14/19 0103   Skin Interventions   Pressure Reduction Techniques frequent weight shift encouraged;weight shift assistance provided --    Positioning   Body Position --  supine       Goal: Identify Related Risk Factors and Signs and Symptoms  Outcome: Ongoing (interventions implemented as appropriate)   05/14/19 0201   Skin Injury Risk (Adult)   Related Risk Factors (Skin Injury Risk) mobility impaired     Goal: Skin Health and Integrity  Outcome: Ongoing (interventions implemented as appropriate)   05/14/19 0201   Skin Injury Risk (Adult)   Skin Health and Integrity making progress toward outcome       Problem: NPPV/CPAP (Adult)  Intervention: Monitor and Manage Anxiety Related to NPPV/CPAP   05/13/19 2000   Interventions   Trust  Relationship/Rapport care explained;questions encouraged;questions answered;thoughts/feelings acknowledged;reassurance provided   Safety Management   Medication Review/Management medications reviewed;high risk medications identified     Intervention: Prevent Aspiration During Therapy   05/14/19 0103   Positioning   Head of Bed (HOB) HOB at 30-45 degrees     Intervention: Optimize Tolerance of Noninvasive Ventilation   05/14/19 0201   Respiratory Interventions   Airway/Ventilation Management calming measures promoted     Intervention: Prevent/Minimize Device Friction/Shearing and Pressure Points   05/14/19 0201   Respiratory Interventions   NPPV/CPAP Maintenance home PAP equipment/settings used       Goal: Signs and Symptoms of Listed Potential Problems Will be Absent, Minimized or Managed (NPPV/CPAP)  Outcome: Ongoing (interventions implemented as appropriate)   05/13/19 1022   Goal/Outcome Evaluation   Problems Assessed (NPPV/CPAP) all   Problems Present (NPPV/CPAP) none

## 2019-05-14 NOTE — PROGRESS NOTES
Continued Stay Note  TOÑA Friend     Patient Name: Sadie Tijerina  MRN: [id number]  Today's Date: 5/14/2019    Admit Date: 5/11/2019    Discharge Plan     Row Name 05/14/19 1053       Plan    Plan Comments  Pt will need a [employer] pt preference is [facility] called  Dann Jeffries  he will come to the hospital for needed information         Discharge Codes    No documentation.       Expected Discharge Date and Time     Expected Discharge Date Expected Discharge Time    May 14, 2019             Sara Brooke

## 2019-05-14 NOTE — PLAN OF CARE
Problem: Patient Care Overview  Goal: Plan of Care Review  Outcome: Ongoing (interventions implemented as appropriate)   05/14/19 8574   Coping/Psychosocial   Plan of Care Reviewed With patient   Plan of Care Review   Progress improving   OTHER   Outcome Summary Pt tolerates treatment well. Pt was able to advance gait distance to 176' with rw and cga. Pt did not require standing rests this treatment however pt was willing to wear O2 during this treatment. O2 saturations remained %. See flowsheet for details.

## 2019-05-14 NOTE — THERAPY TREATMENT NOTE
Acute Care - Physical Therapy Treatment Note   [facility]     Patient Name: Sadie Tijerina  : 1940  MRN: [id number]  Today's Date: 2019  Onset of Illness/Injury or Date of Surgery: 19  Date of Referral to PT: 19  Referring Physician: Dr. Perdomo    Admit Date: 2019    Visit Dx:    ICD-10-CM ICD-9-CM   1. COPD exacerbation (CMS/HCC) J44.1 491.21   2. Hypercapnia R06.89 786.09   3. Respiratory distress R06.03 786.09   4. Impaired mobility and ADLs Z74.09 799.89     Patient Active Problem List   Diagnosis   • Calf cramp   • Mixed anxiety depressive disorder   • Dizziness   • Fatigue   • Gastroesophageal reflux disease without esophagitis   • Hematuria   • Hyperlipidemia   • Essential hypertension   • Low back pain   • Orthostatic hypotension   • Restless legs syndrome   • Essential tremor   • Vitamin D deficiency   • Balance problem   • Tension headache   • Tobacco abuse disorder   • COPD with acute exacerbation (CMS/HCC)   • Venous insufficiency of both lower extremities   • Chronic diastolic heart failure (CMS/HCC)   • Weight loss   • Neuropathy   • COPD exacerbation (CMS/HCC)   • Acute respiratory failure with hypoxia and hypercapnia (CMS/HCC)   • Acute bronchitis       Therapy Treatment    Rehabilitation Treatment Summary     Row Name 19 1055             Treatment Time/Intention    Discipline  physical therapy assistant  -RM      Document Type  therapy note (daily note)  -RM      Subjective Information  complains of;weakness;fatigue  -RM      Mode of Treatment  physical therapy  -RM      Care Plan Review  care plan/treatment goals reviewed  -RM      Patient Effort  good  -RM      Existing Precautions/Restrictions  fall;oxygen therapy device and L/min  -RM      Treatment Considerations/Comments  2 lpm PNC   -RM      Recorded by [RM] Anibal Michelle, PTA 19 1135      Row Name 19 1055             Vital Signs    Pre SpO2 (%)  100  -RM      O2 Delivery Pre Treatment   supplemental O2  -RM      Intra SpO2 (%)  100  -RM      O2 Delivery Intra Treatment  supplemental O2  -RM      Post SpO2 (%)  99  -RM      O2 Delivery Post Treatment  supplemental O2  -RM      Pre Patient Position  Sitting  -RM      Intra Patient Position  Standing  -RM      Post Patient Position  Sitting  -RM      Recorded by [RM] Anibal Michelle, PTA 05/14/19 1135      Row Name 05/14/19 1055             Safety Issues, Functional Mobility    Safety Issues Affecting Function (Mobility)  safety precaution awareness;safety precautions follow-through/compliance;positioning of assistive device  -RM      Impairments Affecting Function (Mobility)  endurance/activity tolerance;shortness of breath  -RM      Recorded by [RM] Anibal Michelle, PTA 05/14/19 1135      Row Name 05/14/19 1055             Sit-Stand Transfer    Sit-Stand [place] (Transfers)  stand by assist  -RM      Assistive Device (Sit-Stand Transfers)  walker, front-wheeled  -RM      Recorded by [] Anibal Michelle, PTA 05/14/19 1135      Row Name 05/14/19 1055             Stand-Sit Transfer    Stand-Sit [place] (Transfers)  stand by assist;verbal cues  -RM      Assistive Device (Stand-Sit Transfers)  walker, front-wheeled verbal cues for safety with transfer  -RM      Recorded by [] Anibal Michelle, PTA 05/14/19 1135      Row Name 05/14/19 1055             Gait/Stairs Assessment/Training    [place] Level (Gait)  contact guard;verbal cues  -RM      Assistive Device (Gait)  walker, front-wheeled  -RM      Distance in Feet (Gait)  176  -RM      Pattern (Gait)  step-through  -RM      Deviations/Abnormal Patterns (Gait)  base of support, narrow;gait speed decreased  -RM      Recorded by [RM] Anibal Michelle, PTA 05/14/19 1135      Row Name 05/14/19 1055             Positioning and Restraints    Pre-Treatment Position  sitting in chair/recliner  -RM      Post Treatment Position  chair  -RM      In Chair  reclined;call light within  reach;encouraged to call for assist;notified nsg  -RM      Recorded by [] Anibal Michelle, hospitals 05/14/19 1135      Row Name 05/14/19 1055             Pain Scale: Numbers Pre/Post-Treatment    Pain Scale: Numbers, Pretreatment  8/10  -RM      Pain Scale: Numbers, Post-Treatment  8/10  -RM      Pain Location - Side  Bilateral  -RM      Pain Location  foot  -RM      Pain Intervention(s)  Repositioned;Ambulation/increased activity  -RM      Recorded by [] Anibal Michelle, hospitals 05/14/19 1135      Row Name 05/14/19 1055             Outcome Summary/Treatment Plan (PT)    Daily Summary of Progress (PT)  progress toward functional goals is good  -RM      Plan for Continued Treatment (PT)  Cont PT per POC.   -RM      Recorded by [] Anibal Michelle, hospitals 05/14/19 1135        User Key  (r) = Recorded By, (t) = Taken By, (c) = Cosigned By    Initials Name Effective Dates Discipline     Anibal Michelle, hospitals 03/07/18 -  PT                   Physical Therapy Education     Title: PT OT SLP Therapies (Not Started)     Topic: Physical Therapy (Done)     Point: Mobility training (Done)     Learning Progress Summary           Patient Acceptance, E,TB,D, VU,NR by  at 5/14/2019 11:36 AM    Comment:  safety with stand to sit transfer.    Acceptance, E,TB,D, VU,NR by  at 5/13/2019  4:14 PM    Comment:  Pacing with activity    Acceptance, E, VU by MS at 5/12/2019 10:49 AM                   Point: Home exercise program (Done)     Learning Progress Summary           Patient Acceptance, E, VU by MS at 5/12/2019 10:49 AM                   Point: Body mechanics (Done)     Learning Progress Summary           Patient Acceptance, E, VU by MS at 5/12/2019 10:49 AM                   Point: Precautions (Done)     Learning Progress Summary           Patient Acceptance, E, VU by MS at 5/12/2019 10:49 AM                               User Key     Initials Effective Dates Name Provider Type Discipline     03/07/18 -  Anibal Michelle,  PTA Physical Therapy Assistant PT    MS 05/01/19 -  Griffin Solares PT Physical Therapist PT                PT Recommendation and Plan     Outcome Summary/Treatment Plan (PT)  Daily Summary of Progress (PT): progress toward functional goals is good  Plan for Continued Treatment (PT): Cont PT per POC.   Plan of Care Reviewed With: patient  Progress: improving  Outcome Summary: Pt tolerates treatment well. Pt was able to advance gait distance to 176' with rw and cga. Pt did not require standing rests this treatment however pt was willing to wear O2 during this treatment. O2 saturations remained %. See flowsheet for details.   Outcome Measures     Row Name 05/14/19 1055 05/13/19 1346 05/12/19 0947       How much help from another person do you currently need...    Turning from your back to your side while in flat bed without using bedrails?  3  -RM  --  3  -MS    Moving from lying on back to sitting on the side of a flat bed without bedrails?  3  -RM  --  3  -MS    Moving to and from a bed to a chair (including a wheelchair)?  3  -RM  --  3  -MS    Standing up from a chair using your arms (e.g., wheelchair, bedside chair)?  4  -RM  --  3  -MS    Climbing 3-5 steps with a railing?  2  -RM  --  2  -MS    To walk in hospital room?  3  -RM  --  3  -MS    AM-PAC 6 Clicks Score  18  -RM  --  17  -MS       How much help from another is currently needed...    Putting on and taking off regular lower body clothing?  --  3  -AH  --    Bathing (including washing, rinsing, and drying)  --  3  -AH  --    Toileting (which includes using toilet bed pan or urinal)  --  3  -AH  --    Putting on and taking off regular upper body clothing  --  3  -AH  --    Taking care of personal grooming (such as brushing teeth)  --  3  -AH  --    Eating meals  --  3  -AH  --    Score  --  18  -AH  --       Functional Assessment    Outcome Measure Options  AM-PAC 6 Clicks Basic Mobility (PT)  -RM  AM-PAC 6 Clicks Daily Activity (OT)  -  AM-PAC  6 Clicks Basic Mobility (PT)  -MS      User Key  (r) = Recorded By, (t) = Taken By, (c) = Cosigned By    Initials Name Provider Type    Radha Aguilar Occupational Therapist    RM Anibal Michelle, PTA Physical Therapy Assistant    MS Valenciaer Griffin, PT Physical Therapist         Time Calculation:   PT Charges     Row Name 05/14/19 1141             Time Calculation    Start Time  1055  -RM      Stop Time  1114  -RM      Time Calculation (min)  19 min  -RM      PT Received On  05/12/19  -RM      PT Goal Re-Cert Due Date  05/22/19  -RM         Time Calculation- PT    Total Timed Code Minutes- PT  19 minute(s)  -RM         Timed Charges    68423 - PT Therapeutic Exercise Minutes  19  -RM        User Key  (r) = Recorded By, (t) = Taken By, (c) = Cosigned By    Initials Name Provider Type    Anibal Francois, DILEEP Physical Therapy Assistant        Therapy Charges for Today     Code Description Service Date Service Provider Modifiers Qty    21277798464 HC PT THER PROC EA 15 MIN 5/13/2019 Anibal Michelle, PTA GP 1    05735665326 HC PT THER PROC EA 15 MIN 5/14/2019 Anibal Michelle, PTA GP 1          PT G-Codes  Outcome Measure Options: AM-PAC 6 Clicks Basic Mobility (PT)  AM-PAC 6 Clicks Score: 18  Score: 18    Anibal Michelle PTA  5/14/2019

## 2019-05-15 LAB
ANION GAP SERPL CALCULATED.3IONS-SCNC: 11 MMOL/L (ref 10–20)
BASOPHILS # BLD AUTO: 0.04 10*3/MM3 (ref 0–0.2)
BASOPHILS NFR BLD AUTO: 0.2 % (ref 0–1.5)
BUN BLD-MCNC: 25 MG/DL (ref 7–20)
BUN/CREAT SERPL: 50 (ref 7.1–23.5)
CALCIUM SPEC-SCNC: 8.6 MG/DL (ref 8.4–10.2)
CHLORIDE SERPL-SCNC: 98 MMOL/L (ref 98–107)
CO2 SERPL-SCNC: 32 MMOL/L (ref 26–30)
CREAT BLD-MCNC: 0.5 MG/DL (ref 0.6–1.3)
DEPRECATED RDW RBC AUTO: 44.4 FL (ref 37–54)
EOSINOPHIL # BLD AUTO: 0 10*3/MM3 (ref 0–0.4)
EOSINOPHIL NFR BLD AUTO: 0 % (ref 0.3–6.2)
ERYTHROCYTE [DISTWIDTH] IN BLOOD BY AUTOMATED COUNT: 13.6 % (ref 12.3–15.4)
GFR SERPL CREATININE-BSD FRML MDRD: 119 ML/MIN/1.73
GLUCOSE BLD-MCNC: 111 MG/DL (ref 74–98)
HCT VFR BLD AUTO: 36.9 % (ref 34–46.6)
HGB BLD-MCNC: 12.1 G/DL (ref 12–15.9)
IMM GRANULOCYTES # BLD AUTO: 0.16 10*3/MM3 (ref 0–0.05)
IMM GRANULOCYTES NFR BLD AUTO: 0.7 % (ref 0–0.5)
LYMPHOCYTES # BLD AUTO: 0.85 10*3/MM3 (ref 0.7–3.1)
LYMPHOCYTES NFR BLD AUTO: 3.7 % (ref 19.6–45.3)
MCH RBC QN AUTO: 29.5 PG (ref 26.6–33)
MCHC RBC AUTO-ENTMCNC: 32.8 G/DL (ref 31.5–35.7)
MCV RBC AUTO: 90 FL (ref 79–97)
MONOCYTES # BLD AUTO: 0.36 10*3/MM3 (ref 0.1–0.9)
MONOCYTES NFR BLD AUTO: 1.6 % (ref 5–12)
NEUTROPHILS # BLD AUTO: 21.47 10*3/MM3 (ref 1.7–7)
NEUTROPHILS NFR BLD AUTO: 93.8 % (ref 42.7–76)
NRBC BLD AUTO-RTO: 0 /100 WBC (ref 0–0.2)
PLATELET # BLD AUTO: 206 10*3/MM3 (ref 140–450)
PMV BLD AUTO: 10.1 FL (ref 6–12)
POTASSIUM BLD-SCNC: 4 MMOL/L (ref 3.5–5.1)
RBC # BLD AUTO: 4.1 10*6/MM3 (ref 3.77–5.28)
SODIUM BLD-SCNC: 137 MMOL/L (ref 137–145)
WBC NRBC COR # BLD: 22.88 10*3/MM3 (ref 3.4–10.8)

## 2019-05-15 PROCEDURE — 97116 GAIT TRAINING THERAPY: CPT

## 2019-05-15 PROCEDURE — 99232 SBSQ HOSP IP/OBS MODERATE 35: CPT | Performed by: NURSE PRACTITIONER

## 2019-05-15 PROCEDURE — 94660 CPAP INITIATION&MGMT: CPT

## 2019-05-15 PROCEDURE — 94799 UNLISTED PULMONARY SVC/PX: CPT

## 2019-05-15 PROCEDURE — 85025 COMPLETE CBC W/AUTO DIFF WBC: CPT | Performed by: NURSE PRACTITIONER

## 2019-05-15 PROCEDURE — 25010000002 METHYLPREDNISOLONE PER 40 MG: Performed by: NURSE PRACTITIONER

## 2019-05-15 PROCEDURE — 25010000002 ENOXAPARIN PER 10 MG: Performed by: INTERNAL MEDICINE

## 2019-05-15 PROCEDURE — 97530 THERAPEUTIC ACTIVITIES: CPT

## 2019-05-15 PROCEDURE — 80048 BASIC METABOLIC PNL TOTAL CA: CPT | Performed by: NURSE PRACTITIONER

## 2019-05-15 RX ORDER — DOXYCYCLINE 100 MG/1
100 CAPSULE ORAL EVERY 12 HOURS SCHEDULED
Status: DISCONTINUED | OUTPATIENT
Start: 2019-05-15 | End: 2019-05-16 | Stop reason: HOSPADM

## 2019-05-15 RX ADMIN — Medication 1 CAPSULE: at 21:12

## 2019-05-15 RX ADMIN — PRAVASTATIN SODIUM 20 MG: 20 TABLET ORAL at 21:12

## 2019-05-15 RX ADMIN — NYSTATIN 500000 UNITS: 500000 SUSPENSION ORAL at 21:12

## 2019-05-15 RX ADMIN — PROPRANOLOL HYDROCHLORIDE 60 MG: 60 CAPSULE, EXTENDED RELEASE ORAL at 09:25

## 2019-05-15 RX ADMIN — MELATONIN TAB 5 MG 5 MG: 5 TAB at 21:12

## 2019-05-15 RX ADMIN — SODIUM CHLORIDE, PRESERVATIVE FREE 3 ML: 5 INJECTION INTRAVENOUS at 09:26

## 2019-05-15 RX ADMIN — TRAMADOL HYDROCHLORIDE 50 MG: 50 TABLET, FILM COATED ORAL at 21:12

## 2019-05-15 RX ADMIN — DOXYCYCLINE 100 MG: 100 INJECTION, POWDER, LYOPHILIZED, FOR SOLUTION INTRAVENOUS at 02:30

## 2019-05-15 RX ADMIN — NYSTATIN 500000 UNITS: 500000 SUSPENSION ORAL at 17:56

## 2019-05-15 RX ADMIN — METHYLPREDNISOLONE SODIUM SUCCINATE 40 MG: 40 INJECTION, POWDER, FOR SOLUTION INTRAMUSCULAR; INTRAVENOUS at 12:15

## 2019-05-15 RX ADMIN — SERTRALINE HYDROCHLORIDE 100 MG: 50 TABLET ORAL at 09:25

## 2019-05-15 RX ADMIN — ESTROGENS, CONJUGATED 0.62 MG: 0.62 TABLET, FILM COATED ORAL at 09:26

## 2019-05-15 RX ADMIN — PANTOPRAZOLE SODIUM 40 MG: 40 TABLET, DELAYED RELEASE ORAL at 06:35

## 2019-05-15 RX ADMIN — BUDESONIDE 1 MG: 0.5 INHALANT RESPIRATORY (INHALATION) at 07:11

## 2019-05-15 RX ADMIN — GUAIFENESIN 600 MG: 600 TABLET, EXTENDED RELEASE ORAL at 21:12

## 2019-05-15 RX ADMIN — DOXYCYCLINE 100 MG: 100 CAPSULE ORAL at 17:56

## 2019-05-15 RX ADMIN — SODIUM CHLORIDE, PRESERVATIVE FREE 3 ML: 5 INJECTION INTRAVENOUS at 21:08

## 2019-05-15 RX ADMIN — ENOXAPARIN SODIUM 40 MG: 40 INJECTION SUBCUTANEOUS at 02:30

## 2019-05-15 RX ADMIN — METHYLPREDNISOLONE SODIUM SUCCINATE 40 MG: 40 INJECTION, POWDER, FOR SOLUTION INTRAMUSCULAR; INTRAVENOUS at 22:28

## 2019-05-15 RX ADMIN — IPRATROPIUM BROMIDE AND ALBUTEROL SULFATE 3 ML: .5; 3 SOLUTION RESPIRATORY (INHALATION) at 07:12

## 2019-05-15 RX ADMIN — IPRATROPIUM BROMIDE AND ALBUTEROL SULFATE 3 ML: .5; 3 SOLUTION RESPIRATORY (INHALATION) at 13:20

## 2019-05-15 RX ADMIN — CLONAZEPAM 0.5 MG: 0.5 TABLET ORAL at 21:12

## 2019-05-15 RX ADMIN — IPRATROPIUM BROMIDE AND ALBUTEROL SULFATE 3 ML: .5; 3 SOLUTION RESPIRATORY (INHALATION) at 19:30

## 2019-05-15 RX ADMIN — GUAIFENESIN 600 MG: 600 TABLET, EXTENDED RELEASE ORAL at 09:25

## 2019-05-15 RX ADMIN — BUDESONIDE 1 MG: 0.5 INHALANT RESPIRATORY (INHALATION) at 19:30

## 2019-05-15 RX ADMIN — IPRATROPIUM BROMIDE AND ALBUTEROL SULFATE 3 ML: .5; 3 SOLUTION RESPIRATORY (INHALATION) at 01:14

## 2019-05-15 RX ADMIN — Medication 1 CAPSULE: at 09:26

## 2019-05-15 NOTE — PROGRESS NOTES
PROGRESS NOTE        Date of Admission: 5/11/2019  Length of Stay: 3  Primary Care Physician: Kristian Wolff MD    Subjective   Chief Complaint: Cough and dyspnea  HPI:   This is an 78-year-old female with history of COPD on home oxygen, hypertension, diastolic congestive heart failure, dyslipidemia who has had multiple admissions and visits to the emergency room.  She was discharged from the hospital on 4/7/2019 for COPD exacerbation.  She does have a history of having pneumonia in February 2019 which did grow out stenotrophomonas.  According to the patient should been having some yellowish sputum production and cough.  She does get more progressively short of breath over the last few days prior to admission.  She was seen and evaluated in the emergency room chest x-ray did not show any evidence of pneumonia.  He is followed by pulmonology group in [place].  She has refused BiPAP in the past and she was noted upon admission to have acute on chronic hypoxic/hypercapnic respiratory failure.  Dr. Mojica with pulmonology was consulted and had a long discussion with the patient and she was in agreement to try a nasal mask which she did wear last night.  Immunology is working to switch her over from BiPAP to a noninvasive ventilation.      Patient was able to wear the noninvasive ventilation last evening.  Case management has been consulted to work on arranging a home unit.  I have seen and evaluated patient at bedside today.  She is sitting up in bed in a chair today.  She does look much better and lung sounds are better.  Due to the severity of her condition pulmonology feels that BiPAP alone would be insufficient.  Given that her COPD is the primary cause of her chronic respiratory failure.  They do feel that the patient will require noninvasive ventilator.  Respiratory panel was done which was negative.       Review Of Systems:   Systems reviewed on 5/15/2019 with the following findings:  Review of  Systems  General ROS: Patient denies any fevers, chills or loss of consciousness.    ENT ROS: Denies sore throat, nasal congestion or ear pain.   Hematological and Lymphatic ROS: Denies neck swelling or easy bleeding.  Endocrine ROS: Denies any recent unintentional weight gain or loss.  Respiratory ROS: Denies cough or shortness of breath.   Cardiovascular ROS: Denies chest pain or palpitations. No history of exertional chest pain.   Gastrointestinal ROS: Denies nausea and vomiting. Denies any abdominal pain. No diarrhea.  Genito-Urinary ROS: Denies dysuria or hematuria.  Musculoskeletal ROS: Denies back pain. No muscle pain. No calf pain.   Neurological ROS: Denies any focal weakness. No loss of consciousness. Denies any numbness. Denies neck pain.   Dermatological ROS: Denies any redness or pruritis.    Objective      Temp:  [97.5 °F (36.4 °C)-98.1 °F (36.7 °C)] 98 °F (36.7 °C)  Heart Rate:  [71-97] 82  Resp:  [12-22] 19  BP: (101-153)/(51-66) 101/66  FiO2 (%):  [30 %] 30 %  Physical Exam   physical examination done on 5/15/2019 with the following findings:    General Appearance:  Alert and cooperative, not in any acute distress.   Head:  Atraumatic and normocephalic, without obvious abnormality.   Eyes:          PERRLA, conjunctivae and sclerae normal, no Icterus. No pallor. Extraocular movements are within normal limits.   Ears:  Ears appear intact with no abnormalities noted.   Throat: No oral lesions, no thrush, oral mucosa moist.   Neck: Supple, trachea midline, no thyromegaly, no carotid bruit.       Lungs:   Chest shape is normal. Breath sounds heard bilaterally but she is decreased in bases.  No crackles  Few bilateral wheezing. No Pleural rub or bronchial breathing.   Heart:  Normal S1 and S2, no murmur, no gallop, no rub. No JVD   Abdomen:   Normal bowel sounds, no masses, no organomegaly. Soft    non-tender, non-distended, no guarding, no rebound             tenderness   Extremities: Moves all  extremities well, no edema, no cyanosis, no clubbing.   Pulses: Pulses palpable and equal bilaterally   Skin: No bleeding, bruising or rash       Neurologic:    Psychiatric/Behavior:     Cranial nerves 2 - 12 grossly intact, sensation intact, Motor power is normal and equal bilaterally.  Mood normal, behavior normal       Results Review:    I have reviewed the labs, radiology results and diagnostic studies.    Results from last 7 days   Lab Units 05/15/19  0603   WBC 10*3/mm3 22.88*   HEMOGLOBIN g/dL 12.1   PLATELETS 10*3/mm3 206     Results from last 7 days   Lab Units 05/15/19  0603   SODIUM mmol/L 137   POTASSIUM mmol/L 4.0   CO2 mmol/L 32.0*   CREATININE mg/dL 0.50*   GLUCOSE mg/dL 111*       Culture Data:    Radiology Data:   Cardiology Data:    I have reviewed the medications.    Assessment/Plan     Assessment and Plan:    1.  Acute on chronic hypoxic/hypercapnic respiratory failure-rheumatology is working to switch her from BiPAP to positive pressure ventilation.  Case management is working on getting noninvasive ventilation at home.    2.  COPD with exacerbation-patient is on bronchodilators, IV steroids and doxycycline.  She is also getting Mucinex as well as budesonide and does have a flutter valve.    3.  Acute bronchitis present on admission-stable Same treatment as #2    4.  Chronic essential hypertension-blood pressure stable    5.  Chronic diastolic congestive heart failure-stable    6.  Leukocytosis likely secondary to steroids continue to monitor.-  Improving..    DVT prophylaxis:  Lovenox  Discharge Planning:   RUBY Gtz 05/15/19 12:31 PM

## 2019-05-15 NOTE — THERAPY TREATMENT NOTE
Acute Care - Occupational Therapy Treatment Note   [facility]     Patient Name: Sadie Tijerina  : 1940  MRN: [id number]  Today's Date: 5/15/2019  Onset of Illness/Injury or Date of Surgery: 19  Date of Referral to OT: 19  Referring Physician: Dr. Perdomo    Admit Date: 2019       ICD-10-CM ICD-9-CM   1. COPD exacerbation (CMS/[facility]) J44.1 491.21   2. Hypercapnia R06.89 786.09   3. Respiratory distress R06.03 786.09   4. Impaired mobility and ADLs Z74.09 799.89     Patient Active Problem List   Diagnosis   • Calf cramp   • Mixed anxiety depressive disorder   • Dizziness   • Fatigue   • Gastroesophageal reflux disease without esophagitis   • Hematuria   • Hyperlipidemia   • Essential hypertension   • Low back pain   • Orthostatic hypotension   • Restless legs syndrome   • Essential tremor   • Vitamin D deficiency   • Balance problem   • Tension headache   • Tobacco abuse disorder   • COPD with acute exacerbation (CMS/[facility])   • Venous insufficiency of both lower extremities   • Chronic diastolic heart failure (CMS/[facility])   • Weight loss   • Neuropathy   • COPD exacerbation (CMS/[facility])   • Acute respiratory failure with hypoxia and hypercapnia (CMS/[facility])   • Acute bronchitis     Past Medical History:   Diagnosis Date   • Arthritis    • Cancer (CMS/[facility])     uterine cancer ()   • COPD (chronic obstructive pulmonary disease) (CMS/[facility])    • Depression    • Elevated cholesterol    • GERD (gastroesophageal reflux disease)    • History of emphysema    • History of esophageal reflux    • History of recurrent UTI (urinary tract infection)    • History of renal calculi    • History of uterine cancer    • Hyperlipidemia    • Hypertension    • Pneumonia 2019     Past Surgical History:   Procedure Laterality Date   • FOOT SURGERY     • HAND SURGERY     • HYSTERECTOMY         Therapy Treatment    Rehabilitation Treatment Summary     Row Name 05/15/19 1108             Treatment Time/Intention    Discipline   occupational therapist  -      Document Type  therapy note (daily note)  [facility]      Subjective Information  complains of;weakness  -      Mode of Treatment  occupational therapy  -      Patient/Family Observations  Pt received supine in bed on 2L O2 via nc  -      Care Plan Review  care plan/treatment goals reviewed;patient/other agree to care plan  -      Patient Effort  good  -      Existing Precautions/Restrictions  fall;oxygen therapy device and L/min  -      Treatment Considerations/Comments  2L O2  -      Recorded by [] Radha Escalona 05/15/19 1442      Row Name 05/15/19 1108             Vital Signs    Pre SpO2 (%)  98  -      O2 Delivery Pre Treatment  supplemental O2  -AH      Intra SpO2 (%)  100  -      O2 Delivery Intra Treatment  supplemental O2  -AH      Post SpO2 (%)  100  -      O2 Delivery Post Treatment  supplemental O2  -      Recorded by [] Radha Escalona 05/15/19 1442      Row Name 05/15/19 1108             Bed Mobility Assessment/Treatment    Bed Mobility Assessment/Treatment  supine-sit  -      Supine-Sit [place] (Bed Mobility)  contact guard  -      Assistive Device (Bed Mobility)  head of bed elevated  -      Recorded by [] Radha Escalona 05/15/19 1442      Row Name 05/15/19 1108             Functional Mobility    Functional Mobility- Ind. Level  contact guard assist  -      Functional Mobility- Device  rolling walker  -      Functional Mobility-Distance (Feet)  84  -      Functional Mobility- Safety Issues  supplemental O2  -      Recorded by [] Radha Escalona 05/15/19 1442      Row Name 05/15/19 1108             Transfer Assessment/Treatment    Transfer Assessment/Treatment  sit-stand transfer;stand-sit transfer  -      Recorded by [] Radha Escalona 05/15/19 1442      Row Name 05/15/19 1108             Sit-Stand Transfer    Sit-Stand [place] (Transfers)  contact guard  -      Assistive Device (Sit-Stand Transfers)  walker,  front-wheeled  -AH      Recorded by [] Radha Escalona 05/15/19 1442      Row Name 05/15/19 1108             Stand-Sit Transfer    Stand-Sit [place] (Transfers)  contact guard  -      Assistive Device (Stand-Sit Transfers)  walker, front-wheeled  -AH      Recorded by [[facility] Radha Escalona 05/15/19 1442      Row Name 05/15/19 1108             Positioning and Restraints    Pre-Treatment Position  in bed  -      Post Treatment Position  chair  -AH      In Chair  reclined;call light within reach;encouraged to call for assist  -      Recorded by [[facility] Radha Escalona 05/15/19 1442      Row Name 05/15/19 1108             Pain Assessment    Additional Documentation  Pain Scale: Numbers Pre/Post-Treatment (Group)  -      Recorded by [Radha Gamboa 05/15/19 1442      Row Name 05/15/19 1108             Pain Scale: Numbers Pre/Post-Treatment    Pain Scale: Numbers, Pretreatment  0/10 - no pain  -      Pain Scale: Numbers, Post-Treatment  0/10 - no pain  -      Recorded by [Radha Gamboa 05/15/19 1442      Row Name 05/15/19 1108             Coping    Observed Emotional State  accepting;cooperative  -      Verbalized Emotional State  acceptance  -      Recorded by [[facility] Radha Escalona 05/15/19 1442      Row Name 05/15/19 1108             Plan of Care Review    Plan of Care Reviewed With  patient  -      Recorded by [Radha Gamboa 05/15/19 1442      Row Name 05/15/19 1108             Outcome Summary/Treatment Plan (OT)    Daily Summary of Progress (OT)  progress toward functional goals as expected  -      Anticipated Discharge Disposition (OT)  home with home health  -      Recorded by [Radha Gamboa 05/15/19 1442        User Key  (r) = Recorded By, (t) = Taken By, (c) = Cosigned By    Initials Name Effective Dates Discipline    Radha Aguilar 03/07/18 -  OT           Rehab Goal Summary     Row Name 05/15/19 1108             Transfer Goal 1 (OT)    Progress/Outcome (Transfer Goal 1,  OT)  goal ongoing  -         Dressing Goal 1 (OT)    Progress/Outcome (Dressing Goal 1, OT)  goal ongoing  -         Toileting Goal 1 (OT)    Progress/Outcome (Toileting Goal 1, OT)  goal ongoing  -         Strength Goal 1 (OT)    Progress/Outcome (Strength Goal 1, OT)  goal ongoing  -         Functional Mobility Goal 1 (OT)    Progress/Outcome (Functional Mobility Goal 1, OT)  goal ongoing  -        User Key  (r) = Recorded By, (t) = Taken By, (c) = Cosigned By    Initials Name Provider Type Discipline     Radha Escalona Occupational Therapist OT        Occupational Therapy Education     Title: PT OT SLP Therapies (Not Started)     Topic: Occupational Therapy (In Progress)     Point: ADL training (Done)     Description: Instruct learner(s) on proper safety adaptation and remediation techniques during self care or transfers.   Instruct in proper use of assistive devices.    Learning Progress Summary           Patient Acceptance, E, VU by  at 5/15/2019  2:42 PM    Comment:  benefit of activity    Acceptance, E, VU by  at 5/13/2019  3:43 PM    Comment:  Role of OT/POC                               User Key     Initials Effective Dates Name Provider Type Discipline     03/07/18 -  Radha Escalona Occupational Therapist OT                OT Recommendation and Plan  Outcome Summary/Treatment Plan (OT)  Daily Summary of Progress (OT): progress toward functional goals as expected  Anticipated Discharge Disposition (OT): home with home health  Therapy Frequency (OT Eval): 3 times/wk  Daily Summary of Progress (OT): progress toward functional goals as expected  Plan of Care Review  Plan of Care Reviewed With: patient  Plan of Care Reviewed With: patient  Outcome Summary: Pt walked 84' with cga using RW.  Pt cga for bed mobility and transfers.  Pt left sitting reclined in her chair with call light within reach.  Outcome Measures     Row Name 05/15/19 1108 05/14/19 1055 05/13/19 1346       How much help from  another person do you currently need...    Turning from your back to your side while in flat bed without using bedrails?  --  3  -RM  --    Moving from lying on back to sitting on the side of a flat bed without bedrails?  --  3  -RM  --    Moving to and from a bed to a chair (including a wheelchair)?  --  3  -RM  --    Standing up from a chair using your arms (e.g., wheelchair, bedside chair)?  --  4  -RM  --    Climbing 3-5 steps with a railing?  --  2  -RM  --    To walk in hospital room?  --  3  -RM  --    AM-PAC 6 Clicks Score  --  18  -RM  --       How much help from another is currently needed...    Putting on and taking off regular lower body clothing?  3  -AH  --  3  -AH    Bathing (including washing, rinsing, and drying)  3  -AH  --  3  -AH    Toileting (which includes using toilet bed pan or urinal)  3  -AH  --  3  -AH    Putting on and taking off regular upper body clothing  3  -AH  --  3  -AH    Taking care of personal grooming (such as brushing teeth)  3  -AH  --  3  -AH    Eating meals  3  -AH  --  3  -AH    Score  18  -AH  --  18  -AH       Functional Assessment    Outcome Measure Options  AM-PAC 6 Clicks Daily Activity (OT)  -  AM-[facility] 6 Clicks Basic Mobility (PT)  -  AM-[facility] 6 Clicks Daily Activity (OT)  -      User Key  (r) = Recorded By, (t) = Taken By, (c) = Cosigned By    Initials Name Provider Type     Radha Escalona Occupational Therapist    Anibal Francois, [facility] Physical Therapy Assistant           Time Calculation:   Time Calculation- OT     Row Name 05/15/19 1444             Time Calculation- OT    OT Start Time  1108  -      OT Stop Time  1126  -      OT Time Calculation (min)  18 min  -      Total Timed Code Minutes- OT  18 minute(s)  -      OT Received On  05/15/19  -      OT Goal Re-Cert Due Date  05/23/19  -         Timed Charges    81735 - OT Therapeutic Activity Minutes  18  -        User Key  (r) = Recorded By, (t) = Taken By, (c) = Cosigned By    Initials  Name Provider Type    Radha Aguilar Occupational Therapist        Therapy Charges for Today     Code Description Service Date Service Provider Modifiers Qty    40692304766 HC OT THERAPEUTIC ACT EA 15 MIN 5/15/2019 Radha Escalona GO 1               Radha Escalona  5/15/2019

## 2019-05-15 NOTE — PLAN OF CARE
Problem: Patient Care Overview  Goal: Plan of Care Review  Outcome: Ongoing (interventions implemented as appropriate)      Problem: Fall Risk (Adult)  Goal: Absence of Fall  Outcome: Ongoing (interventions implemented as appropriate)      Problem: Skin Injury Risk (Adult)  Goal: Identify Related Risk Factors and Signs and Symptoms  Outcome: Ongoing (interventions implemented as appropriate)      Problem: NPPV/CPAP (Adult)  Goal: Signs and Symptoms of Listed Potential Problems Will be Absent, Minimized or Managed (NPPV/CPAP)  Outcome: Ongoing (interventions implemented as appropriate)      Problem: Chronic Obstructive Pulmonary Disease (Adult)  Goal: Signs and Symptoms of Listed Potential Problems Will be Absent, Minimized or Managed (Chronic Obstructive Pulmonary Disease)  Outcome: Ongoing (interventions implemented as appropriate)

## 2019-05-15 NOTE — DISCHARGE PLACEMENT REQUEST
"Sadie Tijerina (78 y.o. 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), 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)   Code Status:  CPR    Ht:  157.5 cm (62\")   Wt:  57 kg (125 lb 10.6 oz)    Admission Cmt:  None   Principal Problem:  None                Active Insurance as of 5/11/2019     Primary Coverage     Payor Plan Insurance Group Employer/Plan Group    HUMA[employer] HUMANA [id number]     Payor Plan Address Payor Plan Phone Number Payor Plan Fax Number Effective Dates    [address] [address] [phone]  1/1/2013 - None Entered    [facility] [id number]       Subscriber Name Subscriber Birth Date Member ID       SADIE TIJERINA 1940 [id number]                 Emergency Contacts      (Rel.) Home Phone Work Phone Mobile Phone    Ava Polk (Daughter) [phone] -- [phone]    Neris Giles (Daughter) [phone] -- [phone]    Tijerina, Jeff (Son) [phone] -- [phone]            Insurance Information                [employer]/HU[employer] Phone: [phone]    Subscriber: Sadie Tijerina Subscriber#: [id number]    Group#: [id number] Precert#:           "

## 2019-05-15 NOTE — THERAPY TREATMENT NOTE
Acute Care - Physical Therapy Treatment Note   [facility]     Patient Name: Sadie Tijerina  : 1940  MRN: [id number]  Today's Date: 5/15/2019  Onset of Illness/Injury or Date of Surgery: 19  Date of Referral to PT: 19  Referring Physician: Dr. Perdomo    Admit Date: 2019    Visit Dx:    ICD-10-CM ICD-9-CM   1. COPD exacerbation (CMS/HCC) J44.1 491.21   2. Hypercapnia R06.89 786.09   3. Respiratory distress R06.03 786.09   4. Impaired mobility and ADLs Z74.09 799.89     Patient Active Problem List   Diagnosis   • Calf cramp   • Mixed anxiety depressive disorder   • Dizziness   • Fatigue   • Gastroesophageal reflux disease without esophagitis   • Hematuria   • Hyperlipidemia   • Essential hypertension   • Low back pain   • Orthostatic hypotension   • Restless legs syndrome   • Essential tremor   • Vitamin D deficiency   • Balance problem   • Tension headache   • Tobacco abuse disorder   • COPD with acute exacerbation (CMS/HCC)   • Venous insufficiency of both lower extremities   • Chronic diastolic heart failure (CMS/HCC)   • Weight loss   • Neuropathy   • COPD exacerbation (CMS/HCC)   • Acute respiratory failure with hypoxia and hypercapnia (CMS/HCC)   • Acute bronchitis       Therapy Treatment    Rehabilitation Treatment Summary     Row Name 05/15/19 1555 05/15/19 1108          Treatment Time/Intention    Discipline  physical therapy assistant  -  occupational therapist  -     Document Type  therapy note (daily note)  -  therapy note (daily note)  -     Subjective Information  complains of;fatigue  -  complains of;weakness  -     Mode of Treatment  occupational therapy  -  occupational therapy  -     Patient/Family Observations  --  Pt received supine in bed on 2L O2 via nc  -     Care Plan Review  care plan/treatment goals reviewed  -  care plan/treatment goals reviewed;patient/other agree to care plan  -     Care Plan Review, Other Participant(s)  daughter  -   --     Patient Effort  adequate  -RM  good  -AH     Existing Precautions/Restrictions  fall;oxygen therapy device and L/min  -RM  fall;oxygen therapy device and L/min  -AH     Treatment Considerations/Comments  3 lpm    -RM  2L O2  -AH     Recorded by [] Anibal Michelle, [facility] 05/15/19 1620 [] Radha Escalona 05/15/19 1442     Row Name 05/15/19 1555 05/15/19 1108          Vital Signs    Pre SpO2 (%)  100  -RM  98  -AH     O2 Delivery Pre Treatment  supplemental O2  -RM  supplemental O2  -AH     Intra SpO2 (%)  --  100  -AH     O2 Delivery Intra Treatment  --  supplemental O2  -AH     Post SpO2 (%)  100  -RM  100  -AH     O2 Delivery Post Treatment  supplemental O2  -RM  supplemental O2  -AH     Pre Patient Position  Supine  -RM  --     Intra Patient Position  Standing  -RM  --     Post Patient Position  Supine  -RM  --     Recorded by [] Anibal Michelle, [facility] 05/15/19 1620 [] Radha Escalona 05/15/19 1442     Row Name 05/15/19 1555 05/15/19 1108          Bed Mobility Assessment/Treatment    Bed Mobility Assessment/Treatment  sit-supine  -RM  supine-sit  -AH     Supine-Sit [place] (Bed Mobility)  minimum assist (75% patient effort)  -  contact guard  -     Sit-Supine [place] (Bed Mobility)  conditional independence  -  --     Assistive Device (Bed Mobility)  bed rails;head of bed elevated  -  head of bed elevated  -     Recorded by [] Anibal Michelle, PTA 05/15/19 1620 [] Radha Escalona 05/15/19 1442     Row Name 05/15/19 1108             Functional Mobility    Functional Mobility- Ind. Level  contact guard assist  -      Functional Mobility- Device  rolling walker  -      Functional Mobility-Distance (Feet)  84  -AH      Functional Mobility- Safety Issues  supplemental O2  -AH      Recorded by [] Radha Escalona 05/15/19 1442      Row Name 05/15/19 1108             Transfer Assessment/Treatment    Transfer Assessment/Treatment  sit-stand transfer;stand-sit transfer  -       Recorded by [] Radha Escalona 05/15/19 1442      Row Name 05/15/19 1555 05/15/19 1108          Sit-Stand Transfer    Sit-Stand [place] (Transfers)  contact guard;verbal cues  -  contact guard  -     Assistive Device (Sit-Stand Transfers)  walker, front-wheeled  -RM  walker, front-wheeled  -AH     Recorded by [] Anibal Michelle, [facility] 05/15/19 1620 [] Iqra Radha 05/15/19 1442     Row Name 05/15/19 1555 05/15/19 1108          Stand-Sit Transfer    Stand-Sit [place] (Transfers)  stand by assist  -  contact guard  -     Assistive Device (Stand-Sit Transfers)  walker, front-wheeled  -RM  walker, front-wheeled  -AH     Recorded by [] Anibal Michelle, [facility] 05/15/19 1620 [] Iqra Radha 05/15/19 1442     Row Name 05/15/19 1555             Gait/Stairs Assessment/Training    [place] Level (Gait)  contact guard;stand by assist  -      Assistive Device (Gait)  walker, front-wheeled  -RM      Distance in Feet (Gait)  76  -RM      Pattern (Gait)  step-through  -RM      Deviations/Abnormal Patterns (Gait)  alia decreased;stride length decreased  -RM      Recorded by [] Anibal Michelle, [facility] 05/15/19 1620      Row Name 05/15/19 1555 05/15/19 1108          Positioning and Restraints    Pre-Treatment Position  in bed  -RM  in bed  -     Post Treatment Position  bed  -  chair  -     In Bed  supine;call light within reach;encouraged to call for assist;with family/caregiver;[place] [facility] [place]  -  --     In Chair  --  reclined;call light within reach;encouraged to call for assist  -     Recorded by [] Anibal Michelle, [facility] 05/15/19 1620 [] Iqra Radha 05/15/19 1442     Row Name 05/15/19 1108             Pain Assessment    Additional Documentation  Pain Scale: Numbers Pre/Post-Treatment (Group)  -AH      Recorded by [] Radha Escalona 05/15/19 1442      Row Name 05/15/19 1555 05/15/19 1108          Pain Scale: Numbers Pre/Post-Treatment    Pain Scale: Numbers, Pretreatment  0/10  - no pain  -RM  0/10 - no pain  -     Pain Scale: Numbers, Post-Treatment  0/10 - no pain  -RM  0/10 - no pain  -     Recorded by [] Anibal Michelle, PTA 05/15/19 1620 [] Iqra Radha 05/15/19 1442     Row Name 05/15/19 1108             Coping    Observed Emotional State  accepting;cooperative  -      Verbalized Emotional State  acceptance  -      Recorded by [] Iqra Radha 05/15/19 1442      Row Name 05/15/19 1108             Plan of Care Review    Plan of Care Reviewed With  patient  -      Recorded by [] Brady, [facility] 05/15/19 1442      Row Name 05/15/19 1108             Outcome Summary/Treatment Plan (OT)    Daily Summary of Progress (OT)  progress toward functional goals as expected  -      Anticipated Discharge Disposition (OT)  home with home health  -      Recorded by [] Iqra Radha 05/15/19 1442      Row Name 05/15/19 1553             Outcome Summary/Treatment Plan (PT)    Daily Summary of Progress (PT)  progress towards functional goals is fair  -      Plan for Continued Treatment (PT)  Cont PT per POC.   -RM      Recorded by [] Anibal Michelle, PTA 05/15/19 1620        User Key  (r) = Recorded By, (t) = Taken By, (c) = Cosigned By    Initials Name Effective Dates Discipline     Iqra, Radha 03/07/18 -  OT     Anibal Michelle, PTA 03/07/18 -  PT               Rehab Goal Summary     Row Name 05/15/19 1108             Transfer Goal 1 (OT)    Progress/Outcome (Transfer Goal 1, OT)  goal ongoing  -         Dressing Goal 1 (OT)    Progress/Outcome (Dressing Goal 1, OT)  goal ongoing  -         Toileting Goal 1 (OT)    Progress/Outcome (Toileting Goal 1, OT)  goal ongoing  -         Strength Goal 1 (OT)    Progress/Outcome (Strength Goal 1, OT)  goal ongoing  -         Functional Mobility Goal 1 (OT)    Progress/Outcome (Functional Mobility Goal 1, OT)  goal ongoing  -        User Key  (r) = Recorded By, (t) = Taken By, (c) = Cosigned By    Initials Name  Provider Type Discipline    Radha Aguilar Occupational Therapist OT          Physical Therapy Education     Title: PT OT SLP Therapies (Not Started)     Topic: Physical Therapy (Done)     Point: Mobility training (Done)     Learning Progress Summary           Patient Acceptance, E,TB,D, VU,NR by  at 5/15/2019  4:20 PM    Comment:  Pursed lip breathing during activity, safety with transfers    Acceptance, E,TB,D, VU,NR by  at 5/14/2019 11:36 AM    Comment:  safety with stand to sit transfer.    Acceptance, E,TB,D, VU,NR by  at 5/13/2019  4:14 PM    Comment:  Pacing with activity    Acceptance, E, VU by MS at 5/12/2019 10:49 AM                   Point: Home exercise program (Done)     Learning Progress Summary           Patient Acceptance, E, VU by MS at 5/12/2019 10:49 AM                   Point: Body mechanics (Done)     Learning Progress Summary           Patient Acceptance, E, VU by MS at 5/12/2019 10:49 AM                   Point: Precautions (Done)     Learning Progress Summary           Patient Acceptance, E, VU by MS at 5/12/2019 10:49 AM                               User Key     Initials Effective Dates Name Provider Type Discipline     03/07/18 -  Anibal Michelle, PTA Physical Therapy Assistant PT    MS 05/01/19 -  Griffin Solares, YANNICK Physical Therapist PT                PT Recommendation and Plan     Outcome Summary/Treatment Plan (PT)  Daily Summary of Progress (PT): progress towards functional goals is fair  Plan for Continued Treatment (PT): Cont PT per POC.   Plan of Care Reviewed With: patient, daughter  Progress: declining  Outcome Summary: Pt reports being uic most of day and feeling very tired. Pt ambulated a decreased distance of 76' with sba-cga with rw and 3 lpm pnc. Patient was 100% prior to ambulating and 100% when returned to supine in bed. See flowsheet for details.   Outcome Measures     Row Name 05/15/19 1108 05/14/19 1055 05/13/19 1346       How much help from another  person do you currently need...    Turning from your back to your side while in flat bed without using bedrails?  --  3  -RM  --    Moving from lying on back to sitting on the side of a flat bed without bedrails?  --  3  -RM  --    Moving to and from a bed to a chair (including a wheelchair)?  --  3  -RM  --    Standing up from a chair using your arms (e.g., wheelchair, bedside chair)?  --  4  -RM  --    Climbing 3-5 steps with a railing?  --  2  -RM  --    To walk in hospital room?  --  3  -RM  --    AM-PAC 6 Clicks Score  --  18  -RM  --       How much help from another is currently needed...    Putting on and taking off regular lower body clothing?  3  -AH  --  3  -AH    Bathing (including washing, rinsing, and drying)  3  -AH  --  3  -AH    Toileting (which includes using toilet bed pan or urinal)  3  -AH  --  3  -AH    Putting on and taking off regular upper body clothing  3  -AH  --  3  -AH    Taking care of personal grooming (such as brushing teeth)  3  -AH  --  3  -AH    Eating meals  3  -AH  --  3  -AH    Score  18  -AH  --  18  -AH       Functional Assessment    Outcome Measure Options  AM-PAC 6 Clicks Daily Activity (OT)  -AH  AM-PAC 6 Clicks Basic Mobility (PT)  -RM  AM-PAC 6 Clicks Daily Activity (OT)  -AH      User Key  (r) = Recorded By, (t) = Taken By, (c) = Cosigned By    Initials Name Provider Type    Radha Aguilar Occupational Therapist    Anibal Francois, PTA Physical Therapy Assistant         Time Calculation:   PT Charges     Row Name 05/15/19 1622             Time Calculation    Start Time  1555  -RM      Stop Time  1606  -RM      Time Calculation (min)  11 min  -RM      PT Received On  05/15/19  -RM      PT Goal Re-Cert Due Date  05/22/19  -RM         Time Calculation- PT    Total Timed Code Minutes- PT  11 minute(s)  -RM         Timed Charges    16929 - Gait Training Minutes   11  -RM        User Key  (r) = Recorded By, (t) = Taken By, (c) = Cosigned By    Initials Name Provider  Type     Anibal Michelle, PTA Physical Therapy Assistant        Therapy Charges for Today     Code Description Service Date Service Provider Modifiers Qty    52199442733 HC PT THER PROC EA 15 MIN 5/14/2019 Anibal Michelle, PTA GP 1    28191804824 HC GAIT TRAINING EA 15 MIN 5/15/2019 Anibal Michelle, PTA GP 1          PT G-Codes  Outcome Measure Options: AM-PAC 6 Clicks Daily Activity (OT)  AM-PAC 6 Clicks Score: 18  Score: 18    Anibal Michelle PTA  5/15/2019

## 2019-05-15 NOTE — PLAN OF CARE
Problem: Patient Care Overview  Goal: Plan of Care Review  Outcome: Ongoing (interventions implemented as appropriate)   05/15/19 4450   Coping/Psychosocial   Plan of Care Reviewed With patient;daughter   Plan of Care Review   Progress declining   OTHER   Outcome Summary Pt reports being uic most of day and feeling very tired. Pt ambulated a decreased distance of 76' with sba-cga with rw and 3 lpm pnc. Patient was 100% prior to ambulating and 100% when returned to supine in bed. See flowsheet for details.

## 2019-05-15 NOTE — PROGRESS NOTES
"I have reviewed the case in great detail with Dr. Mojica. I have personally and independently reviewed her labs/imaging/records from this hospitalization, including ER staff and admitting/attending physicians history and physical exam and progress notes, to establish a comprehensive understanding of her hospital course, as well as to establish plan of care. Dr. Mojica and I have discussed the reason for initial consult & changes in clinical course during the patient's hospital stay.       CC: Shortness of breath/acute respiratory failure    S: She is sitting up in a chair.  She continues to be short of breath with exertion. Tried BiPAP machine.    ROS: Positive for cough, shortness of breath, and mild anxiety. Denies chest pain, diarrhea or fever.    O: /64 (BP Location: Right arm, Patient Position: Lying)   Pulse 81   Temp 97.6 °F (36.4 °C) (Oral)   Resp 19   Ht 157.5 cm (62\")   Wt 57 kg (125 lb 10.6 oz)   SpO2 98%   BMI 22.98 kg/m²     General: No respiratory distress noted.  Neck: No JVD.   Cardiovascular: S1 + S2. Regular.   Respiratory: Decreased air entry with scattered wheezing bilateral. No crackles noted.  Extremities: No edema noted.  Neurologic: AAOx3. Was able to follow commands     Labs: Reviewed.    Results from last 7 days   Lab Units 05/15/19  0603 05/14/19  0536 05/13/19  0531   SODIUM mmol/L 137 135* 139   POTASSIUM mmol/L 4.0 4.5 4.0   CHLORIDE mmol/L 98 99 100   CO2 mmol/L 32.0* 32.0* 36.0*   BUN mg/dL 25* 20 12   CREATININE mg/dL 0.50* 0.50* 0.50*   CALCIUM mg/dL 8.6 8.8 8.3*   GLUCOSE mg/dL 111* 128* 176*       Results from last 7 days   Lab Units 05/12/19  0739   MAGNESIUM mg/dL 1.8       Results from last 7 days   Lab Units 05/15/19  0603 05/14/19  0536 05/13/19  0531   WBC 10*3/mm3 22.88* 25.50* 22.98*   NEUTROPHIL % % 93.8* 94.3* 94.3*   HEMOGLOBIN g/dL 12.1 12.8 11.4*   HEMATOCRIT % 36.9 40.7 35.0   PLATELETS 10*3/mm3 206 223 211             Lab Results   Component Value Date    " PROCALCITO <0.05 05/11/2019    PROCALCITO <0.05 04/22/2019    PROCALCITO <0.05 04/05/2019       Lab Results   Component Value Date    PROBNP 130.0 05/11/2019    PROBNP 214.0 04/22/2019    PROBNP 198.0 04/01/2019       Lab Results   Component Value Date    CRP 2.20 (H) 05/12/2019    CRP 2.50 (H) 02/06/2018        Site   Date Value Ref Range Status   05/13/2019 Right Radial  Final     Miguel's Test   Date Value Ref Range Status   05/13/2019 Positive  Final     pH, Arterial   Date Value Ref Range Status   05/13/2019 7.364 7.300 - 7.500 pH units Final     pCO2, Arterial   Date Value Ref Range Status   05/13/2019 60.5 (C) 35.0 - 45.0 mm Hg Final     Comment:     83 Value above reference range     pO2, Arterial   Date Value Ref Range Status   05/13/2019 113.0 (H) 75.0 - 100.0 mm Hg Final     Comment:     83 Value above reference range     HCO3, Arterial   Date Value Ref Range Status   05/13/2019 34.5 (H) 22.0 - 28.0 mmol/L Final     Comment:     83 Value above reference range     Base Excess, Arterial   Date Value Ref Range Status   05/13/2019 7.5 (H) 0.0 - 2.0 mmol/L Final     Comment:     83 Value above reference range     O2 Saturation, Arterial   Date Value Ref Range Status   05/13/2019 99.0 94.0 - 100.0 % Final     Hemoglobin, Blood Gas   Date Value Ref Range Status   05/13/2019 11.3 (L) 12 - 18 g/dL Final     Comment:     84 Value below reference range     Hematocrit, Blood Gas   Date Value Ref Range Status   05/13/2019 34.6 % Final     Comment:     84 Value below reference range     Oxyhemoglobin   Date Value Ref Range Status   05/13/2019 97.9 94 - 99 % Final     Methemoglobin   Date Value Ref Range Status   05/13/2019 0.50 0.00 - 1.50 % Final     Carboxyhemoglobin   Date Value Ref Range Status   05/13/2019 <0.7 0 - 2 % Final     Comment:     94 Value below reportable range < 0.7     Barometric Pressure for Blood Gas   Date Value Ref Range Status   05/13/2019 728 mmHg Final     Modality   Date Value Ref Range Status    05/13/2019 Nasal Cannula  Final     FIO2   Date Value Ref Range Status   05/13/2019 28 % Final         CXRay: Latest imaging study was reviewed personally.   Imaging Results (last 24 hours)     ** No results found for the last 24 hours. **          Assessment & Recommendations/Plan:   1.  Acute hypoxic and hypercarbic respiratory failure  Use AVAPS at night and one hour on and 3 hours off during daytime.    2.  Likely acute exacerbation of COPD  Continue nebulized treatments and steroids.  I will decrease the frequency of steroids today.    3.  Smoking  Advised her to stop smoking altogether.    4.  Emphysema on the CT scan    5.  Chronic respiratory failure.  Noninvasive ventilation has been ordered for home use.  Patient will continue to use AVAPS while in hospital.    6.  History of stenotrophomonas respiratory infection in 2018     The patient will need to use the noninvasive ventilator for nocturnal and daytime use. Due to severity of the condition, BiPAP alone would be insufficient.  Severe chronic obstructive pulmonary disease seems to be the primary cause of chronic respiratory failure in this patient requiring noninvasive ventilator. There maybe serious detriment to the patient's health, including the possibility of recurrent exacerbations, worsening symptoms/respiratory status etc., if noninvasive ventilator is not used.      We have reviewed patient's current orders and changes, if any, have been suggested to primary care team. Plan was also discussed with nursing staff, as necessary.         This document was electronically signed by RUBY Suazo on 05/15/19 at 9:02 AM      Dictated utilizing Dragon dictation.

## 2019-05-15 NOTE — PROGRESS NOTES
Continued Stay Note  TOÑA Friend     Patient Name: Sadie Tijerina  MRN: [id number]  Today's Date: 5/15/2019    Admit Date: 5/11/2019    Discharge Plan     Row Name 05/15/19 0830       Plan    Plan  DME    Patient/Family in Agreement with Plan  yes    Plan Comments F/U with pt re insurance coverage.  Pt confirms that she only has Part A [facility] because it was too expensive to have Part B.  Pt reports she receives $700+ from [facility] and $700+ from  [employer].  Pt confirms that she has oxygen, but her daughter pays the bill.  Pt has [facility] extra help for medications.  MK spend down discussed.    Spoke to pt's Ava flores, via phone call.  Per Ava, pt private pays for oxygen at $40/month.  Ava reports that after bills and groceries, pt has approx $60 to $70 left.  Per Ava, pt reports that if she tries to get Part B now, she will be penalized for the back cost amount of having the part B, so pt cannot afford that.  MK spend down and where to apply discussed.      Spoke with Dann with [employer].  They do not have a sindy program and are not able to help with cost of Trilogy.  Dann recommends pt checking facebook to purchase a Cpap/Bipap.       Called [facility]; spoke with Scarlett.  They do not have a program to assist with cost of Trilogy.  She recommends finding a company that might help with Bipap.      Called [facility]; spoke with Isak.  Per Isak, they are not able to help with Trilogy, but can help by getting pt a used Bipap until she can get insurance assistance resolved.  Pt will need to show proof that they have applied and are trying to get help from .  [facility] requests if foundation can help with the cost of the mask (approx $150), they will supply the tubing.  They do not have a bipap on site, but are checking all other locations. CM will f/u tomorrow.        Discharge Codes    No documentation.       Expected Discharge Date and Time     Expected Discharge Date Expected Discharge Time    May 17,  2019             Sara Galvez

## 2019-05-15 NOTE — SIGNIFICANT NOTE
05/15/19 1500   Rehab Treatment   Discipline physical therapy assistant   Reason Treatment Not Performed patient/family declined treatment  (Pt reports not ready at this time for therapy. Will f/u with pt at another time. )

## 2019-05-15 NOTE — PLAN OF CARE
Problem: NPPV/CPAP (Adult)  Goal: Signs and Symptoms of Listed Potential Problems Will be Absent, Minimized or Managed (NPPV/CPAP)  Outcome: Ongoing (interventions implemented as appropriate)   05/15/19 6016   Goal/Outcome Evaluation   Problems Assessed (NPPV/CPAP) situational response;dry mucous membranes;hypoxia/hypoxemia   Problems Present (NPPV/CPAP) none

## 2019-05-15 NOTE — PLAN OF CARE
Problem: Patient Care Overview  Goal: Plan of Care Review  Outcome: Ongoing (interventions implemented as appropriate)   05/15/19 7963   Coping/Psychosocial   Plan of Care Reviewed With patient   Plan of Care Review   Progress improving   OTHER   Outcome Summary Pt walked 84' with cga using RW. Pt cga for bed mobility and transfers. Pt left sitting reclined in her chair with call light within reach.

## 2019-05-15 NOTE — PLAN OF CARE
Problem: NPPV/CPAP (Adult)  Intervention: Monitor and Manage Anxiety Related to NPPV/CPAP   05/15/19 0047   Interventions   Trust Relationship/Rapport care explained;choices provided;questions answered     Intervention: Prevent Aspiration During Therapy   05/15/19 0047   Positioning   Head of Bed (HOB) HOB elevated       Goal: Signs and Symptoms of Listed Potential Problems Will be Absent, Minimized or Managed (NPPV/CPAP)  Outcome: Ongoing (interventions implemented as appropriate)   05/15/19 0047   Goal/Outcome Evaluation   Problems Assessed (NPPV/CPAP) situational response;hypoxia/hypoxemia;dry mucous membranes;skin breakdown   Problems Present (NPPV/CPAP) none

## 2019-05-15 NOTE — PLAN OF CARE
Problem: Patient Care Overview  Goal: Plan of Care Review  Outcome: Ongoing (interventions implemented as appropriate)   05/15/19 0118   Coping/Psychosocial   Plan of Care Reviewed With patient   Plan of Care Review   Progress improving   OTHER   Outcome Summary VSS. Tolerating bipap. Resting comfortably. Continue POC.

## 2019-05-16 VITALS
SYSTOLIC BLOOD PRESSURE: 145 MMHG | WEIGHT: 125.66 LBS | HEART RATE: 84 BPM | DIASTOLIC BLOOD PRESSURE: 71 MMHG | OXYGEN SATURATION: 98 % | HEIGHT: 62 IN | BODY MASS INDEX: 23.12 KG/M2 | RESPIRATION RATE: 18 BRPM | TEMPERATURE: 98.1 F

## 2019-05-16 LAB
ANION GAP SERPL CALCULATED.3IONS-SCNC: 7.3 MMOL/L (ref 10–20)
BASOPHILS # BLD AUTO: 0.02 10*3/MM3 (ref 0–0.2)
BASOPHILS NFR BLD AUTO: 0.1 % (ref 0–1.5)
BUN BLD-MCNC: 28 MG/DL (ref 7–20)
BUN/CREAT SERPL: 46.7 (ref 7.1–23.5)
CALCIUM SPEC-SCNC: 8.5 MG/DL (ref 8.4–10.2)
CHLORIDE SERPL-SCNC: 93 MMOL/L (ref 98–107)
CO2 SERPL-SCNC: 37 MMOL/L (ref 26–30)
CREAT BLD-MCNC: 0.6 MG/DL (ref 0.6–1.3)
DEPRECATED RDW RBC AUTO: 44.9 FL (ref 37–54)
EOSINOPHIL # BLD AUTO: 0 10*3/MM3 (ref 0–0.4)
EOSINOPHIL NFR BLD AUTO: 0 % (ref 0.3–6.2)
ERYTHROCYTE [DISTWIDTH] IN BLOOD BY AUTOMATED COUNT: 13.2 % (ref 12.3–15.4)
GFR SERPL CREATININE-BSD FRML MDRD: 96 ML/MIN/1.73
GLUCOSE BLD-MCNC: 123 MG/DL (ref 74–98)
HCT VFR BLD AUTO: 36 % (ref 34–46.6)
HGB BLD-MCNC: 11.7 G/DL (ref 12–15.9)
IMM GRANULOCYTES # BLD AUTO: 0.15 10*3/MM3 (ref 0–0.05)
IMM GRANULOCYTES NFR BLD AUTO: 0.7 % (ref 0–0.5)
LYMPHOCYTES # BLD AUTO: 1 10*3/MM3 (ref 0.7–3.1)
LYMPHOCYTES NFR BLD AUTO: 5 % (ref 19.6–45.3)
MCH RBC QN AUTO: 29.8 PG (ref 26.6–33)
MCHC RBC AUTO-ENTMCNC: 32.5 G/DL (ref 31.5–35.7)
MCV RBC AUTO: 91.6 FL (ref 79–97)
MONOCYTES # BLD AUTO: 0.37 10*3/MM3 (ref 0.1–0.9)
MONOCYTES NFR BLD AUTO: 1.8 % (ref 5–12)
NEUTROPHILS # BLD AUTO: 18.48 10*3/MM3 (ref 1.7–7)
NEUTROPHILS NFR BLD AUTO: 92.4 % (ref 42.7–76)
NRBC BLD AUTO-RTO: 0 /100 WBC (ref 0–0.2)
PLATELET # BLD AUTO: 232 10*3/MM3 (ref 140–450)
PMV BLD AUTO: 9.7 FL (ref 6–12)
POTASSIUM BLD-SCNC: 4.3 MMOL/L (ref 3.5–5.1)
RBC # BLD AUTO: 3.93 10*6/MM3 (ref 3.77–5.28)
SODIUM BLD-SCNC: 133 MMOL/L (ref 137–145)
WBC NRBC COR # BLD: 20.02 10*3/MM3 (ref 3.4–10.8)

## 2019-05-16 PROCEDURE — 99232 SBSQ HOSP IP/OBS MODERATE 35: CPT | Performed by: NURSE PRACTITIONER

## 2019-05-16 PROCEDURE — 97116 GAIT TRAINING THERAPY: CPT

## 2019-05-16 PROCEDURE — 94799 UNLISTED PULMONARY SVC/PX: CPT

## 2019-05-16 PROCEDURE — 94660 CPAP INITIATION&MGMT: CPT

## 2019-05-16 PROCEDURE — 85025 COMPLETE CBC W/AUTO DIFF WBC: CPT | Performed by: NURSE PRACTITIONER

## 2019-05-16 PROCEDURE — 99239 HOSP IP/OBS DSCHRG MGMT >30: CPT | Performed by: NURSE PRACTITIONER

## 2019-05-16 PROCEDURE — 97530 THERAPEUTIC ACTIVITIES: CPT

## 2019-05-16 PROCEDURE — 80048 BASIC METABOLIC PNL TOTAL CA: CPT | Performed by: NURSE PRACTITIONER

## 2019-05-16 PROCEDURE — 25010000002 ENOXAPARIN PER 10 MG: Performed by: INTERNAL MEDICINE

## 2019-05-16 PROCEDURE — 97110 THERAPEUTIC EXERCISES: CPT

## 2019-05-16 PROCEDURE — 25010000002 METHYLPREDNISOLONE PER 40 MG: Performed by: NURSE PRACTITIONER

## 2019-05-16 RX ORDER — L.ACID,PARA/B.BIFIDUM/S.THERM 8B CELL
1 CAPSULE ORAL 2 TIMES DAILY
Qty: 10 CAPSULE | Refills: 0 | Status: SHIPPED | OUTPATIENT
Start: 2019-05-16 | End: 2019-05-29 | Stop reason: HOSPADM

## 2019-05-16 RX ORDER — SODIUM CHLORIDE/ALOE VERA
GEL (GRAM) NASAL
Status: DISCONTINUED | OUTPATIENT
Start: 2019-05-16 | End: 2019-05-16 | Stop reason: HOSPADM

## 2019-05-16 RX ORDER — SODIUM CHLORIDE/ALOE VERA
GEL (GRAM) NASAL
Qty: 22 G | Refills: 0 | Status: SHIPPED | OUTPATIENT
Start: 2019-05-16 | End: 2020-01-01 | Stop reason: HOSPADM

## 2019-05-16 RX ORDER — DOXYCYCLINE 100 MG/1
100 CAPSULE ORAL EVERY 12 HOURS SCHEDULED
Qty: 4 CAPSULE | Refills: 0 | Status: SHIPPED | OUTPATIENT
Start: 2019-05-16 | End: 2019-05-18

## 2019-05-16 RX ORDER — GUAIFENESIN 600 MG/1
600 TABLET, EXTENDED RELEASE ORAL 2 TIMES DAILY
Qty: 20 TABLET | Refills: 0 | Status: SHIPPED | OUTPATIENT
Start: 2019-05-16 | End: 2020-05-07

## 2019-05-16 RX ADMIN — ESTROGENS, CONJUGATED 0.62 MG: 0.62 TABLET, FILM COATED ORAL at 08:58

## 2019-05-16 RX ADMIN — DOXYCYCLINE 100 MG: 100 CAPSULE ORAL at 08:58

## 2019-05-16 RX ADMIN — ENOXAPARIN SODIUM 40 MG: 40 INJECTION SUBCUTANEOUS at 02:51

## 2019-05-16 RX ADMIN — IPRATROPIUM BROMIDE AND ALBUTEROL SULFATE 3 ML: .5; 3 SOLUTION RESPIRATORY (INHALATION) at 06:48

## 2019-05-16 RX ADMIN — IPRATROPIUM BROMIDE AND ALBUTEROL SULFATE 3 ML: .5; 3 SOLUTION RESPIRATORY (INHALATION) at 13:07

## 2019-05-16 RX ADMIN — SERTRALINE HYDROCHLORIDE 100 MG: 50 TABLET ORAL at 08:58

## 2019-05-16 RX ADMIN — NYSTATIN 500000 UNITS: 500000 SUSPENSION ORAL at 08:58

## 2019-05-16 RX ADMIN — PANTOPRAZOLE SODIUM 40 MG: 40 TABLET, DELAYED RELEASE ORAL at 06:07

## 2019-05-16 RX ADMIN — IPRATROPIUM BROMIDE AND ALBUTEROL SULFATE 3 ML: .5; 3 SOLUTION RESPIRATORY (INHALATION) at 00:48

## 2019-05-16 RX ADMIN — GUAIFENESIN 600 MG: 600 TABLET, EXTENDED RELEASE ORAL at 08:58

## 2019-05-16 RX ADMIN — SODIUM CHLORIDE, PRESERVATIVE FREE 3 ML: 5 INJECTION INTRAVENOUS at 08:58

## 2019-05-16 RX ADMIN — NYSTATIN 500000 UNITS: 500000 SUSPENSION ORAL at 12:08

## 2019-05-16 RX ADMIN — BUDESONIDE 1 MG: 0.5 INHALANT RESPIRATORY (INHALATION) at 06:48

## 2019-05-16 RX ADMIN — Medication 1 CAPSULE: at 08:58

## 2019-05-16 RX ADMIN — PROPRANOLOL HYDROCHLORIDE 60 MG: 60 CAPSULE, EXTENDED RELEASE ORAL at 08:58

## 2019-05-16 RX ADMIN — METHYLPREDNISOLONE SODIUM SUCCINATE 40 MG: 40 INJECTION, POWDER, FOR SOLUTION INTRAMUSCULAR; INTRAVENOUS at 12:08

## 2019-05-16 NOTE — THERAPY TREATMENT NOTE
Acute Care - Occupational Therapy Treatment Note   [facility]     Patient Name: Sadie Tijerina  : 1940  MRN: [id number]  Today's Date: 2019  Onset of Illness/Injury or Date of Surgery: 19  Date of Referral to OT: 19  Referring Physician: Dr. Perdomo    Admit Date: 2019       ICD-10-CM ICD-9-CM   1. COPD exacerbation (CMS/[facility]) J44.1 491.21   2. Hypercapnia R06.89 786.09   3. Respiratory distress R06.03 786.09   4. Impaired mobility and ADLs Z74.09 799.89     Patient Active Problem List   Diagnosis   • Calf cramp   • Mixed anxiety depressive disorder   • Dizziness   • Fatigue   • Gastroesophageal reflux disease without esophagitis   • Hematuria   • Hyperlipidemia   • Essential hypertension   • Low back pain   • Orthostatic hypotension   • Restless legs syndrome   • Essential tremor   • Vitamin D deficiency   • Balance problem   • Tension headache   • Tobacco abuse disorder   • COPD with acute exacerbation (CMS/[facility])   • Venous insufficiency of both lower extremities   • Chronic diastolic heart failure (CMS/[facility])   • Weight loss   • Neuropathy   • COPD exacerbation (CMS/[facility])   • Acute respiratory failure with hypoxia and hypercapnia (CMS/[facility])   • Acute bronchitis     Past Medical History:   Diagnosis Date   • Arthritis    • Cancer (CMS/[facility])     uterine cancer ()   • COPD (chronic obstructive pulmonary disease) (CMS/[facility])    • Depression    • Elevated cholesterol    • GERD (gastroesophageal reflux disease)    • History of emphysema    • History of esophageal reflux    • History of recurrent UTI (urinary tract infection)    • History of renal calculi    • History of uterine cancer    • Hyperlipidemia    • Hypertension    • Pneumonia 2019     Past Surgical History:   Procedure Laterality Date   • FOOT SURGERY     • HAND SURGERY     • HYSTERECTOMY         Therapy Treatment    Rehabilitation Treatment Summary     Row Name 19 0936             Treatment Time/Intention    Discipline   occupational therapist  -      Document Type  therapy note (daily note)  -      Subjective Information  complains of;weakness;pain  -      Mode of Treatment  occupational therapy  -      Patient/Family Observations  Pt received supine in bed on 2L O2 via nc  -      Care Plan Review  care plan/treatment goals reviewed;patient/other agree to care plan  -      Patient Effort  adequate  -      Existing Precautions/Restrictions  fall;oxygen therapy device and L/min  -      Recorded by [] Radha Escalona 05/16/19 1025      Row Name 05/16/19 0936             Vital Signs    Pre SpO2 (%)  98  -      O2 Delivery Pre Treatment  supplemental O2 2L  -AH      Intra SpO2 (%)  95  -      O2 Delivery Intra Treatment  supplemental O2 2L  -AH      Post SpO2 (%)  98  -      O2 Delivery Post Treatment  supplemental O2 2L  -      Recorded by [] Radha Escalona 05/16/19 1025      Row Name 05/16/19 0936             Bed Mobility Assessment/Treatment    Bed Mobility Assessment/Treatment  supine-sit  -      Supine-Sit [place] (Bed Mobility)  conditional independence  -      Assistive Device (Bed Mobility)  head of bed elevated  -      Recorded by [] Radha Escalona 05/16/19 1025      Row Name 05/16/19 0936             Functional Mobility    Functional Mobility- Ind. Level  contact guard assist  -      Functional Mobility- Device  rolling walker  -      Functional Mobility-Distance (Feet)  92  -      Functional Mobility- Safety Issues  supplemental O2  -      Recorded by [] Radha Escalona 05/16/19 1025      Row Name 05/16/19 0936             Transfer Assessment/Treatment    Transfer Assessment/Treatment  sit-stand transfer;stand-sit transfer  -      Recorded by [] Radha Escalona 05/16/19 1025      Row Name 05/16/19 0936             Sit-Stand Transfer    Sit-Stand [place] (Transfers)  stand by assist  -AH      Assistive Device (Sit-Stand Transfers)  walker, front-wheeled  -       Recorded by [] Radha Escalona 05/16/19 1025      Row Name 05/16/19 0936             Stand-Sit Transfer    Stand-Sit [place] (Transfers)  stand by assist  -      Assistive Device (Stand-Sit Transfers)  walker, front-wheeled  -      Recorded by [Radha Gamboa 05/16/19 1025      Row Name 05/16/19 0936             Lower Body Dressing Assessment/Training    Lower Body Dressing [place] Level  don;doff;socks;independent  -      Recorded by [Radha Gamboa 05/16/19 1025      Row Name 05/16/19 0936             Motor Skills Assessment/Interventions    Additional Documentation  Therapeutic Exercise (Group)  -      Recorded by [Radha Gamboa 05/16/19 1025      [facility] Name 05/16/19 0936             Therapeutic Exercise    Upper Extremity Range of Motion (Therapeutic Exercise)  shoulder flexion/extension, bilateral;shoulder horizontal abduction/adduction, bilateral;elbow flexion/extension, bilateral  -      Exercise Type (Therapeutic Exercise)  AROM (active range of motion)  -      Position (Therapeutic Exercise)  seated  -      Sets/Reps (Therapeutic Exercise)  1 x 10  -      Expected Outcome (Therapeutic Exercise)  improve performance, BADLs  -      Recorded by [Radha Gamboa 05/16/19 1025      [facility] Name 05/16/19 0936             Positioning and Restraints    Pre-Treatment Position  in bed  -      Post Treatment Position  chair  -      In Chair  sitting;call light within reach;with PT  -      Recorded by [Radha Gamboa 05/16/19 1025      Row Name 05/16/19 0936             Pain Assessment    Additional Documentation  Pain Scale: Numbers Pre/Post-Treatment (Group)  -AH      Recorded by [Radha Gamboa 05/16/19 1025      [facility] Name 05/16/19 0936             Pain Scale: Numbers Pre/Post-Treatment    Pain Scale: Numbers, Pretreatment  8/10  -      Pain Scale: Numbers, Post-Treatment  8/10  -      Pain Location - Side  Bilateral  -      Pain Location  foot  -      Pain  Intervention(s)  Repositioned;Ambulation/increased activity  -      Recorded by [] Radha Escalona 05/16/19 1025      Row Name 05/16/19 0936             Coping    Observed Emotional State  accepting;cooperative  -      Verbalized Emotional State  acceptance  -      Recorded by [] Radha Escalona 05/16/19 1025      Row Name 05/16/19 0936             Plan of Care Review    Plan of Care Reviewed With  patient  -      Recorded by [] Radha Escalona 05/16/19 1025      Row Name 05/16/19 0936             Outcome Summary/Treatment Plan (OT)    Daily Summary of Progress (OT)  progress toward functional goals as expected  -      Anticipated Discharge Disposition (OT)  home with home health  -      Recorded by [] Radha Escalona 05/16/19 1025        User Key  (r) = Recorded By, (t) = Taken By, (c) = Cosigned By    Initials Name Effective Dates Discipline    Radha Aguilar 03/07/18 -  OT           Rehab Goal Summary     Row Name 05/16/19 0936             Transfer Goal 1 (OT)    Progress/Outcome (Transfer Goal 1, OT)  goal met  -AH         Dressing Goal 1 (OT)    Progress/Outcome (Dressing Goal 1, OT)  goal partially met  -AH         Toileting Goal 1 (OT)    Progress/Outcome (Toileting Goal 1, OT)  goal ongoing  -AH         Strength Goal 1 (OT)    Progress/Outcome (Strength Goal 1, OT)  goal ongoing  -AH         Functional Mobility Goal 1 (OT)    Progress/Outcome (Functional Mobility Goal 1, OT)  goal ongoing  -AH        User Key  (r) = Recorded By, (t) = Taken By, (c) = Cosigned By    Initials Name Provider Type Discipline    Radha Aguilar Occupational Therapist OT        Occupational Therapy Education     Title: PT OT SLP Therapies (Not Started)     Topic: Occupational Therapy (In Progress)     Point: ADL training (Done)     Description: Instruct learner(s) on proper safety adaptation and remediation techniques during self care or transfers.   Instruct in proper use of assistive devices.    Learning  Progress Summary           Patient Acceptance, E,TB, VU by  at 5/16/2019 10:25 AM    Comment:  benefit of activity and UB ex to improve her functional strength for ADL tasks.    Acceptance, E, VU by  at 5/15/2019  2:42 PM    Comment:  benefit of activity    Acceptance, E, VU by  at 5/13/2019  3:43 PM    Comment:  Role of OT/POC                   Point: Home exercise program (Done)     Description: Instruct learner(s) on appropriate technique for monitoring, assisting and/or progressing therapeutic exercises/activities.    Learning Progress Summary           Patient Acceptance, E,TB, VU by  at 5/16/2019 10:25 AM    Comment:  benefit of activity and UB ex to improve her functional strength for ADL tasks.                               User Key     Initials Effective Dates Name Provider Type Discipline     03/07/18 -  Radha Escalona Occupational Therapist OT                OT Recommendation and Plan  Outcome Summary/Treatment Plan (OT)  Daily Summary of Progress (OT): progress toward functional goals as expected  Anticipated Discharge Disposition (OT): home with home health  Therapy Frequency (OT Eval): 3 times/wk  Daily Summary of Progress (OT): progress toward functional goals as expected  Plan of Care Review  Plan of Care Reviewed With: patient  Plan of Care Reviewed With: patient  Outcome Summary: Pt walked 92' with cga using RW and 2L o2 via nc.  Pt able to manage her socks independently.  Pt completed ther ex as per flow sheet.   Outcome Measures     Row Name 05/16/19 0936 05/15/19 1108 05/14/19 1055       How much help from another person do you currently need...    Turning from your back to your side while in flat bed without using bedrails?  --  --  3  -RM    Moving from lying on back to sitting on the side of a flat bed without bedrails?  --  --  3  -RM    Moving to and from a bed to a chair (including a wheelchair)?  --  --  3  -RM    Standing up from a chair using your arms (e.g., wheelchair,  bedside chair)?  --  --  4  -RM    Climbing 3-5 steps with a railing?  --  --  2  -RM    To walk in hospital room?  --  --  3  -RM    AM-PAC 6 Clicks Score  --  --  18  -RM       How much help from another is currently needed...    Putting on and taking off regular lower body clothing?  3  -  3  -AH  --    Bathing (including washing, rinsing, and drying)  3  -  3  -AH  --    Toileting (which includes using toilet bed pan or urinal)  3  -  3  -AH  --    Putting on and taking off regular upper body clothing  4  -  3  -AH  --    Taking care of personal grooming (such as brushing teeth)  3  -  3  -AH  --    Eating meals  4  -  3  -AH  --    Score  20  -AH  18  -AH  --       Functional Assessment    Outcome Measure Options  AM-PAC 6 Clicks Daily Activity (OT)  -  AM-[facility] 6 Clicks Daily Activity (OT)  -  AM-[facility] 6 Clicks Basic Mobility (PT)  -    Row Name 05/13/19 1346             How much help from another is currently needed...    Putting on and taking off regular lower body clothing?  3  -AH      Bathing (including washing, rinsing, and drying)  3  -AH      Toileting (which includes using toilet bed pan or urinal)  3  -AH      Putting on and taking off regular upper body clothing  3  -AH      Taking care of personal grooming (such as brushing teeth)  3  -AH      Eating meals  3  -      Score  18  -         Functional Assessment    Outcome Measure Options  AM-[facility] 6 Clicks Daily Activity (OT)  -        User Key  (r) = Recorded By, (t) = Taken By, (c) = Cosigned By    Initials Name Provider Type     Radha Escalona Occupational Therapist     Anibal Michelle, \[facility] [place] [facility]\"" Physical Therapy Assistant           Time Calculation:   Time Calculation- OT     Row Name 05/16/19 1027             Time Calculation- OT    OT Start Time  0936  -      OT Stop Time  0956  -      OT Time Calculation (min)  20 min  -      Total Timed Code Minutes- OT  20 minute(s)  -         Timed Charges    67333 - OT Therapeutic  Exercise Minutes  8  -      97522 - OT Therapeutic Activity Minutes  12  -        User Key  (r) = Recorded By, (t) = Taken By, (c) = Cosigned By    Initials Name Provider Type    Radha Aguilar Occupational Therapist        Therapy Charges for Today     Code Description Service Date Service Provider Modifiers Qty    [id number]  OT THERAPEUTIC ACT EA 15 MIN 5/15/2019 Radha Escalona 1    01039887328 HC OT THERAPEUTIC ACT EA 15 MIN 5/16/2019 Radha Escalona 1               Radha Escalona  5/16/2019

## 2019-05-16 NOTE — THERAPY TREATMENT NOTE
Acute Care - Physical Therapy Treatment Note   Phuc     Patient Name: Sadie Tijerina  : 1940  MRN: [id number]  Today's Date: 2019  Onset of Illness/Injury or Date of Surgery: 19  Date of Referral to PT: 19  Referring Physician: Dr. Perdomo    Admit Date: 2019    Visit Dx:    ICD-10-CM ICD-9-CM   1. COPD exacerbation (CMS/HCC) J44.1 491.21   2. Hypercapnia R06.89 786.09   3. Respiratory distress R06.03 786.09   4. Impaired mobility and ADLs Z74.09 799.89     Patient Active Problem List   Diagnosis   • Calf cramp   • Mixed anxiety depressive disorder   • Dizziness   • Fatigue   • Gastroesophageal reflux disease without esophagitis   • Hematuria   • Hyperlipidemia   • Essential hypertension   • Low back pain   • Orthostatic hypotension   • Restless legs syndrome   • Essential tremor   • Vitamin D deficiency   • Balance problem   • Tension headache   • Tobacco abuse disorder   • COPD with acute exacerbation (CMS/HCC)   • Venous insufficiency of both lower extremities   • Chronic diastolic heart failure (CMS/HCC)   • Weight loss   • Neuropathy   • COPD exacerbation (CMS/HCC)   • Acute respiratory failure with hypoxia and hypercapnia (CMS/HCC)   • Acute bronchitis       Therapy Treatment    Rehabilitation Treatment Summary     Row Name 19 0938 19 0936          Treatment Time/Intention    Discipline  physical therapy assistant  -  occupational therapist  -     Document Type  therapy note (daily note)  -  therapy note (daily note)  -     Subjective Information  complains of;weakness;fatigue;pain  -  complains of;weakness;pain  -     Mode of Treatment  physical therapy  -  occupational therapy  -     Patient/Family Observations  --  Pt received supine in bed on 2L O2 via nc  -     Care Plan Review  care plan/treatment goals reviewed  -  care plan/treatment goals reviewed;patient/other agree to care plan  -     Patient Effort  adequate  -RM  adequate   -     Existing Precautions/Restrictions  fall;oxygen therapy device and L/min  -RM  fall;oxygen therapy device and L/min  -AH     Treatment Considerations/Comments  2 lpm O2 PNC   -RM  --     Recorded by [] Anibal Michelle, \[facility]\"" 05/16/19 1207 [] Radha Escalona 05/16/19 1025     Row Name 05/16/19 0938 05/16/19 0936          Vital Signs    Pre SpO2 (%)  98  -RM  98  -AH     O2 Delivery Pre Treatment  supplemental O2  -RM  supplemental O2 2L  -AH     Intra SpO2 (%)  95  -RM  95  -AH     O2 Delivery Intra Treatment  supplemental O2  -RM  supplemental O2 2L  -AH     Post SpO2 (%)  98  -RM  98  -AH     O2 Delivery Post Treatment  supplemental O2  -RM  supplemental O2 2L  -AH     Pre Patient Position  Supine  -RM  --     Intra Patient Position  Standing  -RM  --     Post Patient Position  Sitting  -RM  --     Recorded by [] Anibal Michelle, \[facility]\"" 05/16/19 1207 [] Radha Escalona 05/16/19 1025     Row Name 05/16/19 0938 05/16/19 0936          Bed Mobility Assessment/Treatment    Bed Mobility Assessment/Treatment  --  supine-sit  -     Supine-Sit [place] (Bed Mobility)  conditional independence  -  conditional independence  -     Assistive Device (Bed Mobility)  head of bed elevated  -  head of bed elevated  -     Recorded by [] Anibal Michelle, \[facility]\"" 05/16/19 1207 [] Radha Escalona 05/16/19 1025     Row Name 05/16/19 0936             Functional Mobility    Functional Mobility- Ind. Level  contact guard assist  -      Functional Mobility- Device  rolling walker  -      Functional Mobility-Distance (Feet)  92  -      Functional Mobility- Safety Issues  supplemental O2  -      Recorded by [] Radha Escalona 05/16/19 1025      Row Name 05/16/19 0936             Transfer Assessment/Treatment    Transfer Assessment/Treatment  sit-stand transfer;stand-sit transfer  -      Recorded by [] Radha Escalona 05/16/19 1025      Row Name 05/16/19 0938 05/16/19 0936          Sit-Stand Transfer     Sit-Stand [place] (Transfers)  stand by assist;verbal cues  -  stand by assist  -     Assistive Device (Sit-Stand Transfers)  walker, front-wheeled  -RM  walker, front-wheeled  -     Recorded by [] Anibal Michelle, [facility] 05/16/19 1207 [] Iqra Radha 05/16/19 1025     Row Name 05/16/19 0938 05/16/19 0936          Stand-Sit Transfer    Stand-Sit [place] (Transfers)  stand by assist;verbal cues  -  stand by assist  -     Assistive Device (Stand-Sit Transfers)  walker, front-wheeled  -RM  walker, front-wheeled  -     Recorded by [] Anibal Michelle, [facility] 05/16/19 1207 [] Iqra [facility] 05/16/19 1025     Row Name 05/16/19 0938             Gait/Stairs Assessment/Training    [place] Level (Gait)  contact guard;stand by assist;verbal cues  -      Assistive Device (Gait)  walker, front-wheeled  -      Distance in Feet (Gait)  92  -RM      Pattern (Gait)  step-through  -      Deviations/Abnormal Patterns (Gait)  base of support, narrow;gait speed decreased;stride length decreased  -      Recorded by [] Anibal Michelle, [facility] 05/16/19 1207      Row Name 05/16/19 0936             Lower Body Dressing Assessment/Training    Lower Body Dressing [place] Level  don;doff;socks;independent  -      Recorded by [] Iqra [facility] 05/16/19 1025      Row Name 05/16/19 0936             Motor Skills Assessment/Interventions    Additional Documentation  Therapeutic Exercise (Group)  -      Recorded by [] Iqra [facility] 05/16/19 1025      Row Name 05/16/19 0938 05/16/19 0936          Therapeutic Exercise    Upper Extremity Range of Motion (Therapeutic Exercise)  --  shoulder flexion/extension, bilateral;shoulder horizontal abduction/adduction, bilateral;elbow flexion/extension, bilateral  -     Lower Extremity (Therapeutic Exercise)  gastroc stretch, bilateral;gluteal sets;LAQ (long arc quad), bilateral;marching while seated  -  --     Exercise Type (Therapeutic Exercise)  AROM  (active range of motion)  -RM  AROM (active range of motion)  -     Position (Therapeutic Exercise)  seated  -RM  seated  -     Sets/Reps (Therapeutic Exercise)  2x10  -RM  1 x 10  -AH     Expected Outcome (Therapeutic Exercise)  --  improve performance, BADLs  -     Recorded by [] Anibal Michelle, \[facility]\"" 05/16/19 1207 [] Iqra Radha 05/16/19 1025     Row Name 05/16/19 0938 05/16/19 0936          Positioning and Restraints    Pre-Treatment Position  in bed  -RM  in bed  -AH     Post Treatment Position  chair  -RM  chair  -AH     In Chair  reclined;call light within reach;encouraged to call for assist;heels elevated;notified nsg  -RM  sitting;call light within reach;with PT  -AH     Recorded by [] Anibal Michelle, \[facility]\"" 05/16/19 1207 [] [place], [facility] 05/16/19 1025     Row Name 05/16/19 0936             Pain Assessment    Additional Documentation  Pain Scale: Numbers Pre/Post-Treatment (Group)  -      Recorded by [] Iqra Radha 05/16/19 1025      Row Name 05/16/19 0938 05/16/19 0936          Pain Scale: Numbers Pre/Post-Treatment    Pain Scale: Numbers, Pretreatment  8/10  -RM  8/10  -AH     Pain Scale: Numbers, Post-Treatment  8/10  -RM  8/10  -AH     Pain Location - Side  Bilateral  -  Bilateral  -     Pain Location  foot  -  foot  -     Pain Intervention(s)  --  Repositioned;Ambulation/increased activity  -     Recorded by [] Anibal Michelle, PTA 05/16/19 1207 [] Iqra [facility] 05/16/19 1025     Row Name 05/16/19 0936             Coping    Observed Emotional State  accepting;cooperative  -      Verbalized Emotional State  acceptance  -      Recorded by [] Iqra Radha 05/16/19 1025      Row Name 05/16/19 0936             Plan of Care Review    Plan of Care Reviewed With  patient  -      Recorded by [] Iqra Radha 05/16/19 1025      Row Name 05/16/19 0936             Outcome Summary/Treatment Plan (OT)    Daily Summary of Progress (OT)  progress toward  functional goals as expected  -AH      Anticipated Discharge Disposition (OT)  home with home health  -      Recorded by [] Radha Escalona 05/16/19 1025      Row Name 05/16/19 0938             Outcome Summary/Treatment Plan (PT)    Daily Summary of Progress (PT)  progress towards functional goals is fair  -      Plan for Continued Treatment (PT)  Cont PT per POC.   -RM      Recorded by [] Anibal Michelle, PTA 05/16/19 1207        User Key  (r) = Recorded By, (t) = Taken By, (c) = Cosigned By    Initials Name Effective Dates Discipline    DENIS [place]ha 03/07/18 -  OT    RM Anibal Michelle, PTA 03/07/18 -  PT               Rehab Goal Summary     Row Name 05/16/19 0936             Transfer Goal 1 (OT)    Progress/Outcome (Transfer Goal 1, OT)  goal met  -         Dressing Goal 1 (OT)    Progress/Outcome (Dressing Goal 1, OT)  goal partially met  -         Toileting Goal 1 (OT)    Progress/Outcome (Toileting Goal 1, OT)  goal ongoing  -AH         Strength Goal 1 (OT)    Progress/Outcome (Strength Goal 1, OT)  goal ongoing  -AH         Functional Mobility Goal 1 (OT)    Progress/Outcome (Functional Mobility Goal 1, OT)  goal ongoing  -AH        User Key  (r) = Recorded By, (t) = Taken By, (c) = Cosigned By    Initials Name Provider Type Discipline    Radha Aguilar Occupational Therapist OT          Physical Therapy Education     Title: PT OT SLP Therapies (Not Started)     Topic: Physical Therapy (Done)     Point: Mobility training (Done)     Learning Progress Summary           Patient Acceptance, E,TB,D, VU,NR by  at 5/16/2019 12:08 PM    Acceptance, E,TB,D, VU,NR by  at 5/15/2019  4:20 PM    Comment:  Pursed lip breathing during activity, safety with transfers    Acceptance, E,TB,D, VU,NR by  at 5/14/2019 11:36 AM    Comment:  safety with stand to sit transfer.    Acceptance, E,TB,D, VU,NR by  at 5/13/2019  4:14 PM    Comment:  Pacing with activity    Acceptance, E, VU by MS at 5/12/2019  10:49 AM                   Point: Home exercise program (Done)     Learning Progress Summary           Patient Acceptance, E,TB,D, VU,NR by RM at 5/16/2019 12:08 PM    Acceptance, E, VU by MS at 5/12/2019 10:49 AM                   Point: Body mechanics (Done)     Learning Progress Summary           Patient Acceptance, E, VU by MS at 5/12/2019 10:49 AM                   Point: Precautions (Done)     Learning Progress Summary           Patient Acceptance, E, VU by MS at 5/12/2019 10:49 AM                               User Key     Initials Effective Dates Name Provider Type Discipline     03/07/18 -  Anibal Mcihelle, PTA Physical Therapy Assistant PT    MS 05/01/19 -  Griffin Solares, PT Physical Therapist PT                PT Recommendation and Plan     Outcome Summary/Treatment Plan (PT)  Daily Summary of Progress (PT): progress towards functional goals is fair  Plan for Continued Treatment (PT): Cont PT per POC.   Plan of Care Reviewed With: patient  Progress: improving  Outcome Summary: Pt was able to tolerate increased activity this treatment with pt ambulating 92' with rw cga-sba as well as performing seated B LE exercise .  See flowsheet for details   Outcome Measures     Row Name 05/16/19 0938 05/16/19 0936 05/15/19 1108       How much help from another person do you currently need...    Turning from your back to your side while in flat bed without using bedrails?  4  -RM  --  --    Moving from lying on back to sitting on the side of a flat bed without bedrails?  4  -RM  --  --    Moving to and from a bed to a chair (including a wheelchair)?  3  -RM  --  --    Standing up from a chair using your arms (e.g., wheelchair, bedside chair)?  3  -RM  --  --    Climbing 3-5 steps with a railing?  3  -RM  --  --    To walk in hospital room?  3  -RM  --  --    AM-PAC 6 Clicks Score  20  -RM  --  --       How much help from another is currently needed...    Putting on and taking off regular lower body clothing?  --   3  -AH  3  -AH    Bathing (including washing, rinsing, and drying)  --  3  -AH  3  -AH    Toileting (which includes using toilet bed pan or urinal)  --  3  -AH  3  -AH    Putting on and taking off regular upper body clothing  --  4  -AH  3  -AH    Taking care of personal grooming (such as brushing teeth)  --  3  -AH  3  -AH    Eating meals  --  4  -AH  3  -AH    Score  --  20  -AH  18  -AH       Functional Assessment    Outcome Measure Options  AM-PAC 6 Clicks Basic Mobility (PT)  -RM  [facility] 6 Clicks Daily Activity (OT)  -  [facility]  -    Row Name 05/14/19 1055 05/13/19 1346          How much help from another person do you currently need...    Turning from your back to your side while in flat bed without using bedrails?  3  -RM  --     Moving from lying on back to sitting on the side of a flat bed without bedrails?  3  -RM  --     Moving to and from a bed to a chair (including a wheelchair)?  3  -RM  --     Standing up from a chair using your arms (e.g., wheelchair, bedside chair)?  4  -RM  --     Climbing 3-5 steps with a railing?  2  -RM  --     To walk in hospital room?  3  -RM  --     AM-Merged with [facility] 6 Clicks Score  18  -RM  --        How much help from another is currently needed...    Putting on and taking off regular lower body clothing?  --  3  -AH     Bathing (including washing, rinsing, and drying)  --  3  -AH     Toileting (which includes using toilet bed pan or urinal)  --  3  -AH     Putting on and taking off regular upper body clothing  --  3  -AH     Taking care of personal grooming (such as brushing teeth)  --  3  -AH     Eating meals  --  3  -     Score  --  18  -AH        Functional Assessment    Outcome Measure Options  AM-PAC 6 Clicks Basic Mobility (PT)  -RM  [facility] 6 Clicks Daily Activity (OT)  -       User Key  (r) = Recorded By, (t) = Taken By, (c) = Cosigned By    Initials Name Provider Type     Radha Escalona Occupational Therapist    Anibal Francois, PTA  Physical Therapy Assistant         Time Calculation:   PT Charges     Row Name 05/16/19 1210             Time Calculation    Start Time  0938  -RM      Stop Time  1010  -RM      Time Calculation (min)  32 min  -RM      PT Received On  05/16/19  -RM      PT Goal Re-Cert Due Date  05/22/19  -RM         Time Calculation- PT    Total Timed Code Minutes- PT  32 minute(s)  -RM         Timed Charges    92672 - PT Therapeutic Exercise Minutes  15  -RM      40615 - Gait Training Minutes   13  -RM        User Key  (r) = Recorded By, (t) = Taken By, (c) = Cosigned By    Initials Name Provider Type    Anibal Francois, PTA Physical Therapy Assistant        Therapy Charges for Today     Code Description Service Date Service Provider Modifiers Qty    57539959592 HC GAIT TRAINING EA 15 MIN 5/15/2019 Anibal Michelle, PTA GP 1    36838595760 HC PT THER PROC EA 15 MIN 5/16/2019 Anibal Michelle, PTA GP 1    55719569750 HC GAIT TRAINING EA 15 MIN 5/16/2019 Anibal Michelle, PTA GP 1          PT G-Codes  Outcome Measure Options: AM-PAC 6 Clicks Basic Mobility (PT)  AM-PAC 6 Clicks Score: 20  Score: 20    Anibal Michelle PTA  5/16/2019

## 2019-05-16 NOTE — PLAN OF CARE
Problem: Patient Care Overview  Goal: Plan of Care Review  Outcome: Ongoing (interventions implemented as appropriate)   05/16/19 0666   Coping/Psychosocial   Plan of Care Reviewed With patient   Plan of Care Review   Progress no change   OTHER   Outcome Summary Pt. has been up to [facility] this a.m. VSS. Pt. has rested quietly during the night. Awaiting insurance approval for one of her inhalers. Pt. has generalized weakness and may need more physical therapy prior to going home. Will pass along to the oncoming nurse to discuss this with the MD. Pt. may need some rehab.

## 2019-05-16 NOTE — PROGRESS NOTES
Case Management Discharge Note         Destination      No service has been selected for the patient.      Durable Medical Equipment - Selection Complete      Service Provider Request Status Selected Services Address Phone Number Fax Number    [facility] Selected Durable Medical Equipment [address] [place] [address] [phone]       Sara Galvez 5/16/2019 1505    [facility]                 Dialysis/Infusion      No service has been selected for the patient.      Home Medical Care      No service has been selected for the patient.      Therapy      No service has been selected for the patient.      Community Resources      No service has been selected for the patient.        Transportation Services  Private: Car    Final Discharge Disposition Code: 01 - home or self-care

## 2019-05-16 NOTE — PLAN OF CARE
Problem: NPPV/CPAP (Adult)  Goal: Signs and Symptoms of Listed Potential Problems Will be Absent, Minimized or Managed (NPPV/CPAP)   05/16/19 0617   Goal/Outcome Evaluation   Problems Assessed (NPPV/CPAP) all   Problems Present (NPPV/CPAP) none

## 2019-05-16 NOTE — DISCHARGE PLACEMENT REQUEST
"LITO Gilils RN  Case Management  Phone:  [phone]; Fax:  [phone]    Bipap order and clinicals attached.    Sadie Tijerina (78 y.o. Female)     Date of Birth Social Security Number Address Home Phone MRN    1940  [address] [id number] [phone] [id number]    Advent Marital Status          [employer]        Admission Date Admission Type Admitting Provider Attending Provider Department, Room/Bed    19 Emergency Antolin Perdomo DO Gandee, Julie G, DO [facility] MED SURG  4, 425/1    Discharge Date Discharge Disposition Discharge Destination         Home or Self Care              Attending Provider:  Estrella Cadet DO    Allergies:  Morphine And Related    Isolation:  Contact   Infection:  MDRO (18), 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)   Code Status:  CPR    Ht:  157.5 cm (62\")   Wt:  57 kg (125 lb 10.6 oz)    Admission Cmt:  None   Principal Problem:  Acute respiratory failure with hypoxia and hypercapnia (CMS/HCC) [J96.01,J96.02]                 Active Insurance as of 2019     Primary Coverage     Payor Plan Insurance Group Employer/Plan Group    HUMANA HUMANA [id number]     Payor Plan Address Payor Plan Phone Number Payor Plan Fax Number Effective Dates    [address] [address] [phone]  2013 - None Entered    [facility] [id number]       Subscriber Name Subscriber Birth Date Member ID       SADIE TIJERINA 1940 [id number]                 Emergency Contacts      (Rel.) Home Phone Work Phone Mobile Phone    Ava Polk (Daughter) [phone] -- [phone]    Neris Giles (Daughter) [phone] -- [phone]    Alfonzo Tijerina (Son) [phone] -- [phone]        [facility] MED SURG  4  [address] [address]  Dept. Phone:  [phone]  Dept. Fax:   Date Ordered: May 16, 2019         Patient:  Sadie Tijerina MRN:  [id number]   [address] [id number] :  " "1940  SSN:    Phone: [phone] Sex:  F     Weight: 57 kg (125 lb 10.6 oz)         Ht Readings from Last 1 Encounters:   19 157.5 cm (62\")         Generic BiPAP Order              (Order ID: [id number])    Diagnosis:  Acute respiratory failure with hypoxia and hypercapnia (CMS/HCC) (J96.01,J96.02 [ICD-10-CM] 518.81 [ICD-9-CM])   Quantity:  1  Comments: 10/4     Equipment: BiPAP  PAP Accessories: Fit per patient's preference  Length of Need (99 Months = Lifetime): 99 Months = Lifetime        Authorizing Provider's Phone: [phone]   Authorizing Provider:Ce Herron APRN  Authorizing Provider's NPI: [id number]  Order Entered By: Ce Herron APRN 2019  1:22 PM     Electronically signed by: Ce Herron APRN 2019  1:22 PM               Discharge Summary      Ce Herron APRN at 2019  1:23 PM              [facility]   DISCHARGE SUMMARY    Name:  Sadie Tijerina   Age:  78 y.o.  Sex:  female  :  1940  MRN:  [id number]   Visit Number:  [id number]  Primary Care Physician:  Kristian Wolff MD  Date of Discharge:  2019  Admission Date:  2019      Presenting Problem:    COPD exacerbation (CMS/HCC) [J44.1]       Discharge Diagnosis:       Acute respiratory failure with hypoxia and hypercapnia (CMS/HCC)    Hyperlipidemia    Essential hypertension    Restless legs syndrome    Essential tremor    Chronic diastolic heart failure (CMS/HCC)    COPD exacerbation (CMS/HCC)    Acute bronchitis        Past Medical History:  Past Medical History:   Diagnosis Date   • Arthritis    • Cancer (CMS/HCC)     uterine cancer ()   • COPD (chronic obstructive pulmonary disease) (CMS/HCC)    • Depression    • Elevated cholesterol    • GERD (gastroesophageal reflux disease)    • History of emphysema    • History of esophageal reflux    • History of recurrent UTI (urinary tract infection)    • History of renal calculi  "   • History of uterine cancer    • Hyperlipidemia    • Hypertension    • Pneumonia 02/2019         Consults:     Consults     Date and Time Order Name Status Description    5/12/2019 0135 Inpatient Pulmonology Consult Completed           Procedures Performed:  None             History of presenting illness/Hospital Course:  This is an 78-year-old female with history of COPD on home oxygen, hypertension, diastolic congestive heart failure, dyslipidemia who has had multiple admissions and visits to the emergency room.  She was discharged from the hospital on 4/7/2019 for COPD exacerbation.  She does have a history of having pneumonia in February 2019 which did grow out stenotrophomonas.  According to the patient should been having some yellowish sputum production and cough.  She does get more progressively short of breath over the last few days prior to admission.  She was seen and evaluated in the emergency room chest x-ray did not show any evidence of pneumonia.  He is followed by pulmonology group in [place].  She has refused BiPAP in the past and she was noted upon admission to have acute on chronic hypoxic/hypercapnic respiratory failure.      Upon admission patient was placed on BiPAP.  She does appear to be tolerating the BiPAP and is wearing it at night.  He was placed on IV antibiotics in the form of doxycycline as well as bronchodilators and mucolytic's.  Dr. Mojica with pulmonology was consulted and has been following along with the hospitalist service.  Patient is in agreement to wear the BiPAP at home as her ABGs and respiratory status has much improved.  Case management was consulted to arrange for BiPAP at home.    PT OT was consulted and patient has been walking with therapy.  She was able to walk 100 feet or more yesterday and did walk 92 feet with therapy today.  She does require supplemental oxygen 2 L nasal cannula.  We will plan to discharge the patient home with home health.  Patient has been  cleared from the pulmonary standpoint for discharge.  From the hospitalist standpoint she is symptomatically improved and hemodynamically stable for discharge home with home health and appropriate follow-ups.  We will have her follow-up with Dr. Mojica or Jaye nurse practitioner in the office.  She can follow-up with her primary care provider in the next 1 to 2 weeks.  She is otherwise hemodynamically stable from the hospitalist standpoint for discharge home today.      Vital Signs:    Temp:  [97.2 °F (36.2 °C)-98.1 °F (36.7 °C)] 98.1 °F (36.7 °C)  Heart Rate:  [75-89] 84  Resp:  [12-20] 18  BP: (140-157)/(58-71) 145/71  FiO2 (%):  [30 %] 30 %    Physical Exam:  General Appearance:    Alert and cooperative, not in any acute distress.   Head:    Atraumatic and normocephalic, without obvious abnormality.   Eyes:            PERRLA, conjunctivae and sclerae normal, no Icterus. No pallor. Extra-occular movements are within normal limits.   Ears:    Ears appear intact with no abnormalities noted.   Throat:   No oral lesions, no thrush, oral mucosa moist.   Neck:   Supple, trachea midline, no thyromegaly, no carotid bruit.   Back:     No kyphoscoliosis present. No tenderness to palpation,   range of motion normal.   Lungs:     Chest shape is normal. Breath sounds heard bilaterally   .Decreaed in bases.  No crackles or wheezing. No Pleural rub or bronchial breathing.    Heart:    Normal S1 and S2, no murmur, no gallop, no rub. No JVD   Abdomen:     Normal bowel sounds, no masses, no organomegaly. Soft        non-tender, non-distended, no guarding, no rebound                tenderness   Extremities:   Moves all extremities well, no edema, no cyanosis, no             clubbing   Pulses:   Pulses palpable and equal bilaterally   Skin:   No bleeding, bruising or rash   :  Psychiatric/Behavior:        Normal mood, normal behavior   Neurologic:   Cranial nerves 2 - 12 grossly intact, sensation intact, Motor power is normal and  equal bilaterally.           Pertinent Lab Results:     Results from last 7 days   Lab Units 05/16/19  0543 05/15/19  0603 05/14/19  0536  05/11/19  2216   SODIUM mmol/L 133* 137 135*   < > 138   POTASSIUM mmol/L 4.3 4.0 4.5   < > 4.4   CHLORIDE mmol/L 93* 98 99   < > 90*   CO2 mmol/L 37.0* 32.0* 32.0*   < > 42.0*   BUN mg/dL 28* 25* 20   < > 12   CREATININE mg/dL 0.60 0.50* 0.50*   < > 0.70   CALCIUM mg/dL 8.5 8.6 8.8   < > 9.2   BILIRUBIN mg/dL  --   --   --   --  0.4   ALK PHOS U/L  --   --   --   --  79   ALT (SGPT) U/L  --   --   --   --  28   AST (SGOT) U/L  --   --   --   --  21   GLUCOSE mg/dL 123* 111* 128*   < > 141*    < > = values in this interval not displayed.     Results from last 7 days   Lab Units 05/16/19  0544 05/15/19  0603 05/14/19  0536   WBC 10*3/mm3 20.02* 22.88* 25.50*   HEMOGLOBIN g/dL 11.7* 12.1 12.8   HEMATOCRIT % 36.0 36.9 40.7   PLATELETS 10*3/mm3 232 206 223                Pertinent Radiology Results:    Imaging Results (all)     Procedure Component Value Units Date/Time    XR Chest 1 View [[id number]] Collected:  05/12/19 0627     Updated:  05/12/19 0631    Narrative:       PROCEDURE: XR CHEST 1 VW-     HISTORY: dyspnea        COMPARISON: 05/04/2019.     FINDINGS:  The heart size is normal. The mediastinum is normal.There is  right apical scarring which is stable. No acute pulmonary abnormality is  identified. There is no pneumothorax. The bony thorax in intact.       Impression:       No acute cardiopulmonary process.           This report was finalized on 5/12/2019 6:29 AM by Richy Yoon MD.          Condition on Discharge:    Stable        Discharge Disposition:    Home or Self Care    Discharge Medication:       Discharge Medications      New Medications      Instructions Start Date   doxycycline 100 MG capsule  Commonly known as:  MONODOX   100 mg, Oral, Every 12 Hours Scheduled      guaiFENesin 600 MG 12 hr tablet  Commonly known as:  MUCINEX   600 mg, Oral, 2 Times Daily       nystatin [id number] UNIT/ML suspension  Commonly known as:  MYCOSTATIN   5 mL, Swish & Swallow, 4 Times Daily      saline gel nasal gel   Topical, Every 1 Hour PRN         Changes to Medications      Instructions Start Date   clonazePAM 0.5 MG tablet  Commonly known as:  KlonoPIN  What changed:    · how much to take  · how to take this  · when to take this   TAKE 1 TABLET BY MOUTH AT BEDTIME AS NEEDED for seizures         Continue These Medications      Instructions Start Date   albuterol sulfate  (90 Base) MCG/ACT inhaler  Commonly known as:  PROVENTIL HFA;VENTOLIN HFA;PROAIR HFA   1 puff, Inhalation, Every 4 Hours PRN      budesonide-formoterol 160-4.5 MCG/ACT inhaler  Commonly known as:  SYMBICORT   2 puffs, Inhalation, 2 Times Daily - RT      estrogens (conjugated) 0.625 MG tablet  Commonly known as:  PREMARIN   0.625 mg, Oral, Daily, 200001      fluticasone 50 MCG/ACT nasal spray  Commonly known as:  FLONASE   2 sprays, Each Nare, Daily      ipratropium 0.02 % nebulizer solution  Commonly known as:  ATROVENT   500 mcg, Nebulization, 4 Times Daily PRN      lactobacillus acidophilus capsule capsule   1 capsule, Oral, 2 Times Daily      meclizine 12.5 MG tablet  Commonly known as:  ANTIVERT   12.5 mg, Oral, 3 Times Daily PRN      omeprazole 40 MG capsule  Commonly known as:  priLOSEC   40 mg, Oral, Daily      pravastatin 20 MG tablet  Commonly known as:  PRAVACHOL   20 mg, Oral, Every Evening      promethazine 12.5 MG tablet  Commonly known as:  PHENERGAN   12.5 mg, Oral, Every 8 Hours PRN      propranolol 60 MG tablet  Commonly known as:  INDERAL   1 daily po      sertraline 100 MG tablet  Commonly known as:  ZOLOFT   100 mg, Oral, Every Night at Bedtime      SPIRIVA HANDIHALER 18 MCG per inhalation capsule  Generic drug:  tiotropium   place 1 capsule into inhaler and inhale ONCE daily      traMADol 50 MG tablet  Commonly known as:  ULTRAM   50 mg, Oral, Every 8 Hours PRN         Stop These Medications     bacitracin-silver sulfadiazine-nystatin     nystatin [id number] UNIT/GM cream  Commonly known as:  MYCOSTATIN            Discharge Diet:     Diet Instructions     Diet: Cardiac; Thin      Discharge Diet:  Cardiac    Fluid Consistency:  Thin          Activity at Discharge:     Activity Instructions     Discharge Activity            Follow-up Appointments:    Future Appointments   Date Time Provider Department Center   5/21/2019  9:30 AM Kristian Wolff MD [facility] None   6/12/2019  1:15 PM Kristian Wolff MD [facility] None   6/25/2019  1:00 PM Jaye Taylor APRN [facility] None        Discharge Instructions:  Bipap at hs 10/4 with 2 liters oxygen  Oxygen 2 liters nasal canula           RUBY Gtz  05/16/19  1:25 PM    Time:  35  minutes were spent reviewing labs, history, evaluating patient and discharge planning.            Electronically signed by Ce Herron APRN at 5/16/2019  1:33 PM

## 2019-05-16 NOTE — PLAN OF CARE
Problem: Patient Care Overview  Goal: Plan of Care Review  Outcome: Ongoing (interventions implemented as appropriate)   05/16/19 1208   Coping/Psychosocial   Plan of Care Reviewed With patient   Plan of Care Review   Progress improving   OTHER   Outcome Summary Pt was able to tolerate increased activity this treatment with pt ambulating 92' with rw cga-sba as well as performing seated B LE exercise . See flowsheet for details

## 2019-05-16 NOTE — DISCHARGE SUMMARY
[facility]   DISCHARGE SUMMARY    Name:  Sadie Tijerina   Age:  78 y.o.  Sex:  female  :  1940  MRN:  [id number]   Visit Number:  [id number]  Primary Care Physician:  Kristian Wolff MD  Date of Discharge:  2019  Admission Date:  2019      Presenting Problem:    COPD exacerbation (CMS/[facility]) [J44.1]       Discharge Diagnosis:       Acute respiratory failure with hypoxia and hypercapnia (CMS/[facility])    Hyperlipidemia    Essential hypertension    Restless legs syndrome    Essential tremor    Chronic diastolic heart failure (CMS/[facility])    COPD exacerbation (CMS/[facility])    Acute bronchitis        Past Medical History:  Past Medical History:   Diagnosis Date   • Arthritis    • Cancer (CMS/[facility])     uterine cancer ()   • COPD (chronic obstructive pulmonary disease) (CMS/[facility])    • Depression    • Elevated cholesterol    • GERD (gastroesophageal reflux disease)    • History of emphysema    • History of esophageal reflux    • History of recurrent UTI (urinary tract infection)    • History of renal calculi    • History of uterine cancer    • Hyperlipidemia    • Hypertension    • Pneumonia 2019         Consults:     Consults     Date and Time Order Name Status Description    2019 0135 Inpatient Pulmonology Consult Completed           Procedures Performed:  None             History of presenting illness/Hospital Course:  This is an 78-year-old female with history of COPD on home oxygen, hypertension, diastolic congestive heart failure, dyslipidemia who has had multiple admissions and visits to the emergency room.  She was discharged from the hospital on 2019 for COPD exacerbation.  She does have a history of having pneumonia in 2019 which did grow out stenotrophomonas.  According to the patient should been having some yellowish sputum production and cough.  She does get more progressively short of breath over the last few days prior to admission.  She was seen and  evaluated in the emergency room chest x-ray did not show any evidence of pneumonia.  He is followed by pulmonology group in [place].  She has refused BiPAP in the past and she was noted upon admission to have acute on chronic hypoxic/hypercapnic respiratory failure.      Upon admission patient was placed on BiPAP.  She does appear to be tolerating the BiPAP and is wearing it at night.  He was placed on IV antibiotics in the form of doxycycline as well as bronchodilators and mucolytic's.  Dr. Mojica with pulmonology was consulted and has been following along with the hospitalist service.  Patient is in agreement to wear the BiPAP at home as her ABGs and respiratory status has much improved.  Case management was consulted to arrange for BiPAP at home.    PT OT was consulted and patient has been walking with therapy.  She was able to walk 100 feet or more yesterday and did walk 92 feet with therapy today.  She does require supplemental oxygen 2 L nasal cannula.  We will plan to discharge the patient home with home health.  Patient has been cleared from the pulmonary standpoint for discharge.  From the hospitalist standpoint she is symptomatically improved and hemodynamically stable for discharge home with home health and appropriate follow-ups.  We will have her follow-up with Dr. Mojica or Jaye nurse practitioner in the office.  She can follow-up with her primary care provider in the next 1 to 2 weeks.  She is otherwise hemodynamically stable from the hospitalist standpoint for discharge home today.      Vital Signs:    Temp:  [97.2 °F (36.2 °C)-98.1 °F (36.7 °C)] 98.1 °F (36.7 °C)  Heart Rate:  [75-89] 84  Resp:  [12-20] 18  BP: (140-157)/(58-71) 145/71  FiO2 (%):  [30 %] 30 %    Physical Exam:  General Appearance:    Alert and cooperative, not in any acute distress.   Head:    Atraumatic and normocephalic, without obvious abnormality.   Eyes:            PERRLA, conjunctivae and sclerae normal, no Icterus. No  pallor. Extra-occular movements are within normal limits.   Ears:    Ears appear intact with no abnormalities noted.   Throat:   No oral lesions, no thrush, oral mucosa moist.   Neck:   Supple, trachea midline, no thyromegaly, no carotid bruit.   Back:     No kyphoscoliosis present. No tenderness to palpation,   range of motion normal.   Lungs:     Chest shape is normal. Breath sounds heard bilaterally   .Decreaed in bases.  No crackles or wheezing. No Pleural rub or bronchial breathing.    Heart:    Normal S1 and S2, no murmur, no gallop, no rub. No JVD   Abdomen:     Normal bowel sounds, no masses, no organomegaly. Soft        non-tender, non-distended, no guarding, no rebound                tenderness   Extremities:   Moves all extremities well, no edema, no cyanosis, no             clubbing   Pulses:   Pulses palpable and equal bilaterally   Skin:   No bleeding, bruising or rash   :  Psychiatric/Behavior:        Normal mood, normal behavior   Neurologic:   Cranial nerves 2 - 12 grossly intact, sensation intact, Motor power is normal and equal bilaterally.           Pertinent Lab Results:     Results from last 7 days   Lab Units 05/16/19  0543 05/15/19  0603 05/14/19  0536  05/11/19  2216   SODIUM mmol/L 133* 137 135*   < > 138   POTASSIUM mmol/L 4.3 4.0 4.5   < > 4.4   CHLORIDE mmol/L 93* 98 99   < > 90*   CO2 mmol/L 37.0* 32.0* 32.0*   < > 42.0*   BUN mg/dL 28* 25* 20   < > 12   CREATININE mg/dL 0.60 0.50* 0.50*   < > 0.70   CALCIUM mg/dL 8.5 8.6 8.8   < > 9.2   BILIRUBIN mg/dL  --   --   --   --  0.4   ALK PHOS U/L  --   --   --   --  79   ALT (SGPT) U/L  --   --   --   --  28   AST (SGOT) U/L  --   --   --   --  21   GLUCOSE mg/dL 123* 111* 128*   < > 141*    < > = values in this interval not displayed.     Results from last 7 days   Lab Units 05/16/19  0544 05/15/19  0603 05/14/19  0536   WBC 10*3/mm3 20.02* 22.88* 25.50*   HEMOGLOBIN g/dL 11.7* 12.1 12.8   HEMATOCRIT % 36.0 36.9 40.7   PLATELETS 10*3/mm3  232 206 223                Pertinent Radiology Results:    Imaging Results (all)     Procedure Component Value Units Date/Time    XR Chest 1 View [[id number]] Collected:  05/12/19 0627     Updated:  05/12/19 0631    Narrative:       PROCEDURE: XR CHEST 1 VW-     HISTORY: dyspnea        COMPARISON: 05/04/2019.     FINDINGS:  The heart size is normal. The mediastinum is normal.There is  right apical scarring which is stable. No acute pulmonary abnormality is  identified. There is no pneumothorax. The bony thorax in intact.       Impression:       No acute cardiopulmonary process.           This report was finalized on 5/12/2019 6:29 AM by Richy Yoon MD.          Condition on Discharge:    Stable        Discharge Disposition:    Home or Self Care    Discharge Medication:       Discharge Medications      New Medications      Instructions Start Date   doxycycline 100 MG capsule  Commonly known as:  MONODOX   100 mg, Oral, Every 12 Hours Scheduled      guaiFENesin 600 MG 12 hr tablet  Commonly known as:  MUCINEX   600 mg, Oral, 2 Times Daily      nystatin [id number] UNIT/ML suspension  Commonly known as:  MYCOSTATIN   5 mL, Swish & Swallow, 4 Times Daily      saline gel nasal gel   Topical, Every 1 Hour PRN         Changes to Medications      Instructions Start Date   clonazePAM 0.5 MG tablet  Commonly known as:  KlonoPIN  What changed:    · how much to take  · how to take this  · when to take this   TAKE 1 TABLET BY MOUTH AT BEDTIME AS NEEDED for seizures         Continue These Medications      Instructions Start Date   albuterol sulfate  (90 Base) MCG/ACT inhaler  Commonly known as:  PROVENTIL HFA;VENTOLIN HFA;PROAIR HFA   1 puff, Inhalation, Every 4 Hours PRN      budesonide-formoterol 160-4.5 MCG/ACT inhaler  Commonly known as:  SYMBICORT   2 puffs, Inhalation, 2 Times Daily - RT      estrogens (conjugated) 0.625 MG tablet  Commonly known as:  PREMARIN   0.625 mg, Oral, Daily, 200001      fluticasone 50 MCG/ACT  nasal spray  Commonly known as:  FLONASE   2 sprays, Each Nare, Daily      ipratropium 0.02 % nebulizer solution  Commonly known as:  ATROVENT   500 mcg, Nebulization, 4 Times Daily PRN      lactobacillus acidophilus capsule capsule   1 capsule, Oral, 2 Times Daily      meclizine 12.5 MG tablet  Commonly known as:  ANTIVERT   12.5 mg, Oral, 3 Times Daily PRN      omeprazole 40 MG capsule  Commonly known as:  priLOSEC   40 mg, Oral, Daily      pravastatin 20 MG tablet  Commonly known as:  PRAVACHOL   20 mg, Oral, Every Evening      promethazine 12.5 MG tablet  Commonly known as:  PHENERGAN   12.5 mg, Oral, Every 8 Hours PRN      propranolol 60 MG tablet  Commonly known as:  INDERAL   1 daily po      sertraline 100 MG tablet  Commonly known as:  ZOLOFT   100 mg, Oral, Every Night at Bedtime      SPIRIVA HANDIHALER 18 MCG per inhalation capsule  Generic drug:  tiotropium   place 1 capsule into inhaler and inhale ONCE daily      traMADol 50 MG tablet  Commonly known as:  ULTRAM   50 mg, Oral, Every 8 Hours PRN         Stop These Medications    bacitracin-silver sulfadiazine-nystatin     nystatin [id number] UNIT/GM cream  Commonly known as:  MYCOSTATIN            Discharge Diet:     Diet Instructions     Diet: Cardiac; Thin      Discharge Diet:  Cardiac    Fluid Consistency:  Thin          Activity at Discharge:     Activity Instructions     Discharge Activity            Follow-up Appointments:    Future Appointments   Date Time Provider Department Center   5/21/2019  9:30 AM Kristian Wolff MD [facility] None   6/12/2019  1:15 PM Kristian Wolff MD [facility] None   6/25/2019  1:00 PM Jaye Taylor APRN [facility] None        Discharge Instructions:  Bipap at hs 10/4 with 2 liters oxygen  Oxygen 2 liters nasal RUBY Alejo  05/16/19  1:25 PM    Time:  35  minutes were spent reviewing labs, history, evaluating patient and discharge planning.

## 2019-05-16 NOTE — PROGRESS NOTES
"  CC: Shortness of breath/acute respiratory failure    S: She is sitting up in a chair.  She continues to be short of breath with exertion. Unfortunately she does not have insurance that will cover non-invasive ventilation. She is worried about this when I talk with her.    ROS: Positive for cough, shortness of breath, and mild anxiety. Denies chest pain, diarrhea or fever.    O: /71 (BP Location: Left arm, Patient Position: Lying)   Pulse 84   Temp 98.1 °F (36.7 °C) (Oral)   Resp 18   Ht 157.5 cm (62\")   Wt 57 kg (125 lb 10.6 oz)   SpO2 98%   BMI 22.98 kg/m²     General: No respiratory distress noted.  Neck: No JVD.   Cardiovascular: S1 + S2. Regular.   Respiratory: Decreased air entry with scattered wheezing bilateral. No crackles noted.  Extremities: No edema noted.  Neurologic: AAOx3. Was able to follow commands     Labs: Reviewed.    Results from last 7 days   Lab Units 05/16/19  0543 05/15/19  0603   SODIUM mmol/L 133* 137   POTASSIUM mmol/L 4.3 4.0   CHLORIDE mmol/L 93* 98   CO2 mmol/L 37.0* 32.0*   BUN mg/dL 28* 25*   CREATININE mg/dL 0.60 0.50*   CALCIUM mg/dL 8.5 8.6   GLUCOSE mg/dL 123* 111*             Results from last 7 days   Lab Units 05/16/19  0544 05/15/19  0603   WBC 10*3/mm3 20.02* 22.88*   NEUTROPHIL % % 92.4* 93.8*   HEMOGLOBIN g/dL 11.7* 12.1   HEMATOCRIT % 36.0 36.9   PLATELETS 10*3/mm3 232 206             Lab Results   Component Value Date    PROCALCITO <0.05 05/11/2019    PROCALCITO <0.05 04/22/2019    PROCALCITO <0.05 04/05/2019       Lab Results   Component Value Date    PROBNP 130.0 05/11/2019    PROBNP 214.0 04/22/2019    PROBNP 198.0 04/01/2019       Lab Results   Component Value Date    CRP 2.20 (H) 05/12/2019    CRP 2.50 (H) 02/06/2018       No current facility-administered medications for this encounter.   No results found for: SITE, ALLENTEST, PHART, WOM8OUD, PO2ART, FBJ8RND, BASEEXCESS, N2FCHBXA, HGBBG, HCTABG, OXYHEMOGLOBI, METHHGBN, CARBOXYHGB, CO2CT, BAROMETRIC, " MODALITY, FIO2      CXRay: Latest imaging study was reviewed personally.   Imaging Results (last 24 hours)     ** No results found for the last 24 hours. **          Assessment & Recommendations/Plan:   1.  Acute hypoxic and hypercarbic respiratory failure  Use AVAPS at night and one hour on and 3 hours off during daytime.    2.  Likely acute exacerbation of COPD  Continue nebulized treatments and steroids.      3.  Smoking  Advised her to stop smoking altogether.    4.  Emphysema on the CT scan    5.  Chronic respiratory failure.  Will try to find a DME company that will work with the patient and possibly loan a BiPAP, since her insurance will not cover non-invasive ventilation. She verbalizes understanding. Case management is already working on this.    6.  History of stenotrophomonas respiratory infection in 2018     We have reviewed patient's current orders and changes, if any, have been suggested to primary care team. Plan was also discussed with nursing staff, as necessary.         This document was electronically signed by RUBY Suazo on 05/21/19 at 3:05 PM      Dictated utilizing Dragon dictation.

## 2019-05-16 NOTE — PROGRESS NOTES
Continued Stay Note   [facility]     Patient Name: Sadie Tijerina  MRN: [id number]  Today's Date: 5/16/2019    Admit Date: 5/11/2019    Discharge Plan     Row Name 05/16/19 1515       Plan    Plan  Home with bipap    Patient/Family in Agreement with Plan  yes    Plan Comments Received Bipap order with settings.  Spoke with Isak from [employer].  They have a used Bipap and are able to deliver to pt's house this evening.  They are supplying equipment and tubing to pt w/o charge until pt can get signed up for help services through  or obtains Part B.  Order and clinicals efaxed.      Spoke to pt's daughter, Ava, via phone call.  Update given.  Ava is in agreement with plan.  Ava is working on trying to get pt signed up for MK or assistance to help pay for her  part B.  Per Ava, she will be able to be at the hospital between 3:30 to 4 pm to get pt.  Pt will need to take her mask home.  DELMY Monge updated.        Discharge Codes    No documentation.       Expected Discharge Date and Time     Expected Discharge Date Expected Discharge Time    May 16, 2019             Sara Galvez

## 2019-05-16 NOTE — PLAN OF CARE
Problem: Patient Care Overview  Goal: Plan of Care Review  Outcome: Ongoing (interventions implemented as appropriate)   05/16/19 1026   Coping/Psychosocial   Plan of Care Reviewed With patient   Plan of Care Review   Progress improving   OTHER   Outcome Summary Pt walked 92' with cga using RW and 2L o2 via nc. Pt able to manage her socks independently. Pt completed ther ex as per flow sheet.

## 2019-05-17 ENCOUNTER — TELEPHONE (OUTPATIENT)
Dept: INTERNAL MEDICINE | Facility: CLINIC | Age: 79
End: 2019-05-17

## 2019-05-17 ENCOUNTER — READMISSION MANAGEMENT (OUTPATIENT)
Dept: CALL CENTER | Facility: HOSPITAL | Age: 79
End: 2019-05-17

## 2019-05-17 ENCOUNTER — TRANSITIONAL CARE MANAGEMENT TELEPHONE ENCOUNTER (OUTPATIENT)
Dept: INTERNAL MEDICINE | Facility: CLINIC | Age: 79
End: 2019-05-17

## 2019-05-17 RX ORDER — CLONAZEPAM 0.5 MG/1
TABLET ORAL
Qty: 30 TABLET | Refills: 0 | Status: SHIPPED | OUTPATIENT
Start: 2019-05-17 | End: 2019-05-29 | Stop reason: HOSPADM

## 2019-05-17 NOTE — OUTREACH NOTE
Prep Survey      Responses   Facility patient discharged from?  [facility]   Is patient eligible?  Yes   Discharge diagnosis  Acute resp. failure with hypoxia and hypercapnia, HLD, Essential HTN, RLS,  Essential Tremor, Chronic diastolic HF, COPD EXAC. acute bronchitis,   Does the patient have one of the following disease processes/diagnoses(primary or secondary)?  COPD/Pneumonia [Also CHF]   Does the patient have Home health ordered?  No   Is there a DME ordered?  Yes   What DME was ordered?  [employer] for Bipap   Comments regarding appointments  See AVS   Prep survey completed?  Yes          April Martin RN

## 2019-05-17 NOTE — OUTREACH NOTE
RAUL call completed. See TCM call flowsheet for details.    Pt states feeling pretty good. She states breathing is getting better. She states still coughing but better. She states wearing her O2 at all times and the bipap at night. Pt was asking about [facility] seeing her since she's been in the hospital and I advised her to call them if she doesn't hear from them and she verb understanding. Her PCP appt is currently scheduled 5/21 in the am but the pt is requesting an afternoon appt, no afternoon appts with Dr Wolff and pt denies to see another provider. Msg routed to PCP office with appt request.

## 2019-05-18 ENCOUNTER — READMISSION MANAGEMENT (OUTPATIENT)
Dept: CALL CENTER | Facility: HOSPITAL | Age: 79
End: 2019-05-18

## 2019-05-18 NOTE — OUTREACH NOTE
Medical Week 1 Survey      Responses   Facility patient discharged from?  [facility]   Does the patient have one of the following disease processes/diagnoses(primary or secondary)?  COPD/Pneumonia          Jessie Britton RN

## 2019-05-20 NOTE — OUTREACH NOTE
COPD/PN Week 1 Survey      Responses   Facility patient discharged from?  [facility]   Does the patient have one of the following disease processes/diagnoses(primary or secondary)?  COPD/Pneumonia   Is there a successful TCM telephone encounter documented?  Yes          Jessie Britton RN

## 2019-05-24 ENCOUNTER — READMISSION MANAGEMENT (OUTPATIENT)
Dept: CALL CENTER | Facility: HOSPITAL | Age: 79
End: 2019-05-24

## 2019-05-24 NOTE — OUTREACH NOTE
COPD/PN Week 2 Survey      Responses   Facility patient discharged from?  [facility]   Does the patient have one of the following disease processes/diagnoses(primary or secondary)?  COPD/Pneumonia   Was the primary reason for admission:  COPD exacerbation   Week 2 attempt successful?  Yes   Call start time  1259   Call end time  1319   Discharge diagnosis  Acute resp. failure with hypoxia and hypercapnia, HLD, Essential HTN, RLS,  Essential Tremor, Chronic diastolic HF, COPD EXAC. acute bronchitis,   Meds reviewed with patient/caregiver?  Yes   Is the patient having any side effects they believe may be caused by any medication additions or changes?  No   Does the patient have all medications ordered at discharge?  Yes   Is the patient taking all medications as directed (includes completed medication regime)?  Yes   Does the patient have a primary care provider?   Yes   Does the patient have an appointment with their PCP or pulmonologist within 7 days of discharge?  Yes   Comments regarding PCP   Kristian Wolff MD. Patient has seen since discharge.    Has the patient kept scheduled appointments due by today?  Yes   Has home health visited the patient within 72 hours of discharge?  N/A   What DME was ordered?  [employer] for Bipap   Has all DME been delivered?  Yes   DME comments  Patient has home O2 for nighttime and has home nebulizer.    Psychosocial issues?  No   Comments  Patient report she is not feeling good lately. She is weak and states she is alittle winded. Encouraged patient to make followup with PCP    Did the patient receive a copy of their discharge instructions?  Yes   Nursing interventions  Reviewed instructions with patient   What is the patient's perception of their health status since discharge?  Improving   Nursing Interventions  Nurse provided patient education   Are the patient's immunizations up to date?   Yes   Nursing interventions  Educated on importance of maintaining up to date  immunizations as advised by provider   If the patient is a current smoker, are they able to teach back resources for cessation?  Smoking cessation medications   Is the patient/caregiver able to teach back the hierarchy of who to call/visit for symptoms/problems? PCP, Specialist, Home health nurse, Urgent Care, ED, 911  Yes   Additional teach back comments   Encouraged patient to seek medical treatment if develops fever, chills, SOB, chest pain, or coughs up blood. Patient verbalized understanding   Is the patient able to teach back COPD zones?  Yes   Nursing interventions  Education provided on various zones   Patient reports what zone on this call?  Yellow Zone   Yellow Zone  Increased shortness of air, Unable to complete daily activities, Increased or thicker phlegm/mucus, Fever or feeling like have a chest cold, Poor sleep and symptoms work patient up, Using quick relief inhaler/nebulizer more often, Increased swelling of ankles, Poor Appetite, Medication is not helping relieve symptoms   Yellow interventions  Do not smoke, Get plenty of rest, Continue to use daily medications, Use other meds such as steroids or antibiotics as ordered, Call provider immediatly if symptoms do not improve   Week 2 call completed?  Yes          Dante Montoya RN

## 2019-05-26 ENCOUNTER — APPOINTMENT (OUTPATIENT)
Dept: GENERAL RADIOLOGY | Facility: HOSPITAL | Age: 79
End: 2019-05-26

## 2019-05-26 ENCOUNTER — HOSPITAL ENCOUNTER (OUTPATIENT)
Facility: HOSPITAL | Age: 79
Setting detail: OBSERVATION
Discharge: NURSING, REHAB OR LONG-TERM CARE | End: 2019-05-29
Attending: EMERGENCY MEDICINE | Admitting: INTERNAL MEDICINE

## 2019-05-26 DIAGNOSIS — J44.1 COPD WITH ACUTE EXACERBATION (HCC): Primary | ICD-10-CM

## 2019-05-26 DIAGNOSIS — Z91.199 MEDICAL NON-COMPLIANCE: ICD-10-CM

## 2019-05-26 DIAGNOSIS — J96.22 ACUTE ON CHRONIC RESPIRATORY FAILURE WITH HYPERCAPNIA (HCC): ICD-10-CM

## 2019-05-26 DIAGNOSIS — Z78.9 IMPAIRED MOBILITY AND ADLS: ICD-10-CM

## 2019-05-26 DIAGNOSIS — Z74.09 IMPAIRED FUNCTIONAL MOBILITY, BALANCE, GAIT, AND ENDURANCE: ICD-10-CM

## 2019-05-26 DIAGNOSIS — Z74.09 IMPAIRED MOBILITY AND ADLS: ICD-10-CM

## 2019-05-26 LAB
A-A DO2: ABNORMAL MMHG
ALBUMIN SERPL-MCNC: 3.6 G/DL (ref 3.5–5)
ALBUMIN/GLOB SERPL: 1.2 G/DL (ref 1–2)
ALP SERPL-CCNC: 77 U/L (ref 38–126)
ALT SERPL W P-5'-P-CCNC: 30 U/L (ref 13–69)
ANION GAP SERPL CALCULATED.3IONS-SCNC: 8.5 MMOL/L (ref 10–20)
ARTERIAL PATENCY WRIST A: POSITIVE
AST SERPL-CCNC: 21 U/L (ref 15–46)
ATMOSPHERIC PRESS: 735 MMHG
BASE EXCESS BLDA CALC-SCNC: 12 MMOL/L (ref 0–2)
BASOPHILS # BLD AUTO: 0.07 10*3/MM3 (ref 0–0.2)
BASOPHILS NFR BLD AUTO: 0.4 % (ref 0–1.5)
BDY SITE: ABNORMAL
BILIRUB SERPL-MCNC: 0.4 MG/DL (ref 0.2–1.3)
BUN BLD-MCNC: 19 MG/DL (ref 7–20)
BUN/CREAT SERPL: 31.7 (ref 7.1–23.5)
CALCIUM SPEC-SCNC: 9 MG/DL (ref 8.4–10.2)
CHLORIDE SERPL-SCNC: 92 MMOL/L (ref 98–107)
CO2 SERPL-SCNC: 41 MMOL/L (ref 26–30)
COHGB MFR BLD: 1.3 % (ref 0–2)
CREAT BLD-MCNC: 0.6 MG/DL (ref 0.6–1.3)
D-LACTATE SERPL-SCNC: 0.6 MMOL/L (ref 0.5–2)
DEPRECATED RDW RBC AUTO: 46.7 FL (ref 37–54)
EOSINOPHIL # BLD AUTO: 0.19 10*3/MM3 (ref 0–0.4)
EOSINOPHIL NFR BLD AUTO: 1.1 % (ref 0.3–6.2)
ERYTHROCYTE [DISTWIDTH] IN BLOOD BY AUTOMATED COUNT: 13.5 % (ref 12.3–15.4)
GAS FLOW AIRWAY: 2.5 LPM
GFR SERPL CREATININE-BSD FRML MDRD: 96 ML/MIN/1.73
GLOBULIN UR ELPH-MCNC: 2.9 GM/DL
GLUCOSE BLD-MCNC: 111 MG/DL (ref 74–98)
HCO3 BLDA-SCNC: 40.6 MMOL/L (ref 22–28)
HCT VFR BLD AUTO: 38.8 % (ref 34–46.6)
HCT VFR BLD CALC: 37.5 %
HGB BLD-MCNC: 12.4 G/DL (ref 12–15.9)
HGB BLDA-MCNC: 12.2 G/DL (ref 12–18)
HOLD SPECIMEN: NORMAL
HOROWITZ INDEX BLD+IHG-RTO: 30 %
IMM GRANULOCYTES # BLD AUTO: 0.07 10*3/MM3 (ref 0–0.05)
IMM GRANULOCYTES NFR BLD AUTO: 0.4 % (ref 0–0.5)
LYMPHOCYTES # BLD AUTO: 2.23 10*3/MM3 (ref 0.7–3.1)
LYMPHOCYTES NFR BLD AUTO: 12.9 % (ref 19.6–45.3)
MCH RBC QN AUTO: 30.2 PG (ref 26.6–33)
MCHC RBC AUTO-ENTMCNC: 32 G/DL (ref 31.5–35.7)
MCV RBC AUTO: 94.6 FL (ref 79–97)
METHGB BLD QL: 1 % (ref 0–1.5)
MODALITY: ABNORMAL
MONOCYTES # BLD AUTO: 1.26 10*3/MM3 (ref 0.1–0.9)
MONOCYTES NFR BLD AUTO: 7.3 % (ref 5–12)
NEUTROPHILS # BLD AUTO: 13.48 10*3/MM3 (ref 1.7–7)
NEUTROPHILS NFR BLD AUTO: 77.9 % (ref 42.7–76)
NOTE: ABNORMAL
NRBC BLD AUTO-RTO: 0 /100 WBC (ref 0–0.2)
NT-PROBNP SERPL-MCNC: 166 PG/ML (ref 0–450)
OXYHGB MFR BLDV: 96.9 % (ref 94–99)
PCO2 BLDA: 73.6 MM HG (ref 35–45)
PCO2 TEMP ADJ BLD: ABNORMAL MM HG (ref 35–45)
PH BLDA: 7.35 PH UNITS (ref 7.3–7.5)
PH, TEMP CORRECTED: ABNORMAL PH UNITS
PLATELET # BLD AUTO: 213 10*3/MM3 (ref 140–450)
PMV BLD AUTO: 9.5 FL (ref 6–12)
PO2 BLDA: 127 MM HG (ref 75–100)
PO2 TEMP ADJ BLD: ABNORMAL MM HG (ref 83–108)
POTASSIUM BLD-SCNC: 4.5 MMOL/L (ref 3.5–5.1)
PROCALCITONIN SERPL-MCNC: <0.05 NG/ML
PROT SERPL-MCNC: 6.5 G/DL (ref 6.3–8.2)
RBC # BLD AUTO: 4.1 10*6/MM3 (ref 3.77–5.28)
SAO2 % BLDCOA: 99.2 % (ref 94–100)
SODIUM BLD-SCNC: 137 MMOL/L (ref 137–145)
TROPONIN I SERPL-MCNC: <0.012 NG/ML (ref 0–0.03)
VENTILATOR MODE: ABNORMAL
WBC NRBC COR # BLD: 17.3 10*3/MM3 (ref 3.4–10.8)
WHOLE BLOOD HOLD SPECIMEN: NORMAL
WHOLE BLOOD HOLD SPECIMEN: NORMAL

## 2019-05-26 PROCEDURE — 71046 X-RAY EXAM CHEST 2 VIEWS: CPT

## 2019-05-26 PROCEDURE — 36600 WITHDRAWAL OF ARTERIAL BLOOD: CPT

## 2019-05-26 PROCEDURE — 85025 COMPLETE CBC W/AUTO DIFF WBC: CPT | Performed by: EMERGENCY MEDICINE

## 2019-05-26 PROCEDURE — 84145 PROCALCITONIN (PCT): CPT | Performed by: EMERGENCY MEDICINE

## 2019-05-26 PROCEDURE — 96375 TX/PRO/DX INJ NEW DRUG ADDON: CPT

## 2019-05-26 PROCEDURE — 83050 HGB METHEMOGLOBIN QUAN: CPT

## 2019-05-26 PROCEDURE — 82805 BLOOD GASES W/O2 SATURATION: CPT

## 2019-05-26 PROCEDURE — 84484 ASSAY OF TROPONIN QUANT: CPT | Performed by: EMERGENCY MEDICINE

## 2019-05-26 PROCEDURE — 25010000002 METHYLPREDNISOLONE PER 125 MG: Performed by: EMERGENCY MEDICINE

## 2019-05-26 PROCEDURE — 83880 ASSAY OF NATRIURETIC PEPTIDE: CPT | Performed by: EMERGENCY MEDICINE

## 2019-05-26 PROCEDURE — 83605 ASSAY OF LACTIC ACID: CPT | Performed by: EMERGENCY MEDICINE

## 2019-05-26 PROCEDURE — 96365 THER/PROPH/DIAG IV INF INIT: CPT

## 2019-05-26 PROCEDURE — 82375 ASSAY CARBOXYHB QUANT: CPT

## 2019-05-26 PROCEDURE — 36415 COLL VENOUS BLD VENIPUNCTURE: CPT

## 2019-05-26 PROCEDURE — 80053 COMPREHEN METABOLIC PANEL: CPT | Performed by: EMERGENCY MEDICINE

## 2019-05-26 PROCEDURE — 99285 EMERGENCY DEPT VISIT HI MDM: CPT

## 2019-05-26 PROCEDURE — 93005 ELECTROCARDIOGRAM TRACING: CPT | Performed by: EMERGENCY MEDICINE

## 2019-05-26 PROCEDURE — 94799 UNLISTED PULMONARY SVC/PX: CPT

## 2019-05-26 PROCEDURE — 25010000002 MAGNESIUM SULFATE 2 GM/50ML SOLUTION: Performed by: EMERGENCY MEDICINE

## 2019-05-26 RX ORDER — SODIUM CHLORIDE 0.9 % (FLUSH) 0.9 %
10 SYRINGE (ML) INJECTION AS NEEDED
Status: DISCONTINUED | OUTPATIENT
Start: 2019-05-26 | End: 2019-05-29 | Stop reason: HOSPADM

## 2019-05-26 RX ORDER — MAGNESIUM SULFATE HEPTAHYDRATE 40 MG/ML
2 INJECTION, SOLUTION INTRAVENOUS ONCE
Status: COMPLETED | OUTPATIENT
Start: 2019-05-26 | End: 2019-05-26

## 2019-05-26 RX ORDER — IPRATROPIUM BROMIDE AND ALBUTEROL SULFATE 2.5; .5 MG/3ML; MG/3ML
9 SOLUTION RESPIRATORY (INHALATION) ONCE
Status: COMPLETED | OUTPATIENT
Start: 2019-05-26 | End: 2019-05-26

## 2019-05-26 RX ORDER — METHYLPREDNISOLONE SODIUM SUCCINATE 125 MG/2ML
125 INJECTION, POWDER, LYOPHILIZED, FOR SOLUTION INTRAMUSCULAR; INTRAVENOUS ONCE
Status: COMPLETED | OUTPATIENT
Start: 2019-05-26 | End: 2019-05-26

## 2019-05-26 RX ADMIN — MAGNESIUM SULFATE HEPTAHYDRATE 2 G: 40 INJECTION, SOLUTION INTRAVENOUS at 21:48

## 2019-05-26 RX ADMIN — IPRATROPIUM BROMIDE AND ALBUTEROL SULFATE 9 ML: .5; 3 SOLUTION RESPIRATORY (INHALATION) at 21:49

## 2019-05-26 RX ADMIN — METHYLPREDNISOLONE SODIUM SUCCINATE 125 MG: 125 INJECTION, POWDER, FOR SOLUTION INTRAMUSCULAR; INTRAVENOUS at 21:49

## 2019-05-27 ENCOUNTER — READMISSION MANAGEMENT (OUTPATIENT)
Dept: CALL CENTER | Facility: HOSPITAL | Age: 79
End: 2019-05-27

## 2019-05-27 PROBLEM — J44.1 COPD WITH ACUTE EXACERBATION (HCC): Status: ACTIVE | Noted: 2019-05-27

## 2019-05-27 PROCEDURE — G0378 HOSPITAL OBSERVATION PER HR: HCPCS

## 2019-05-27 PROCEDURE — 96376 TX/PRO/DX INJ SAME DRUG ADON: CPT

## 2019-05-27 PROCEDURE — 96372 THER/PROPH/DIAG INJ SC/IM: CPT

## 2019-05-27 PROCEDURE — 96361 HYDRATE IV INFUSION ADD-ON: CPT

## 2019-05-27 PROCEDURE — 94660 CPAP INITIATION&MGMT: CPT

## 2019-05-27 PROCEDURE — 93005 ELECTROCARDIOGRAM TRACING: CPT | Performed by: INTERNAL MEDICINE

## 2019-05-27 PROCEDURE — 99220 PR INITIAL OBSERVATION CARE/DAY 70 MINUTES: CPT | Performed by: FAMILY MEDICINE

## 2019-05-27 PROCEDURE — 94799 UNLISTED PULMONARY SVC/PX: CPT

## 2019-05-27 PROCEDURE — 97165 OT EVAL LOW COMPLEX 30 MIN: CPT

## 2019-05-27 PROCEDURE — 25010000002 METHYLPREDNISOLONE PER 125 MG: Performed by: FAMILY MEDICINE

## 2019-05-27 PROCEDURE — 97161 PT EVAL LOW COMPLEX 20 MIN: CPT

## 2019-05-27 PROCEDURE — 25010000002 ENOXAPARIN PER 10 MG: Performed by: FAMILY MEDICINE

## 2019-05-27 RX ORDER — SODIUM CHLORIDE, SODIUM LACTATE, POTASSIUM CHLORIDE, CALCIUM CHLORIDE 600; 310; 30; 20 MG/100ML; MG/100ML; MG/100ML; MG/100ML
75 INJECTION, SOLUTION INTRAVENOUS CONTINUOUS
Status: ACTIVE | OUTPATIENT
Start: 2019-05-27 | End: 2019-05-27

## 2019-05-27 RX ORDER — FLUTICASONE PROPIONATE 50 MCG
2 SPRAY, SUSPENSION (ML) NASAL DAILY
Status: DISCONTINUED | OUTPATIENT
Start: 2019-05-27 | End: 2019-05-29 | Stop reason: HOSPADM

## 2019-05-27 RX ORDER — BUDESONIDE 0.5 MG/2ML
0.5 INHALANT ORAL
Status: DISCONTINUED | OUTPATIENT
Start: 2019-05-27 | End: 2019-05-29 | Stop reason: HOSPADM

## 2019-05-27 RX ORDER — PANTOPRAZOLE SODIUM 40 MG/1
40 TABLET, DELAYED RELEASE ORAL
Status: DISCONTINUED | OUTPATIENT
Start: 2019-05-27 | End: 2019-05-29 | Stop reason: HOSPADM

## 2019-05-27 RX ORDER — SODIUM CHLORIDE 0.9 % (FLUSH) 0.9 %
3-10 SYRINGE (ML) INJECTION AS NEEDED
Status: DISCONTINUED | OUTPATIENT
Start: 2019-05-27 | End: 2019-05-29 | Stop reason: HOSPADM

## 2019-05-27 RX ORDER — IPRATROPIUM BROMIDE AND ALBUTEROL SULFATE 2.5; .5 MG/3ML; MG/3ML
3 SOLUTION RESPIRATORY (INHALATION) EVERY 4 HOURS PRN
Status: DISCONTINUED | OUTPATIENT
Start: 2019-05-27 | End: 2019-05-29 | Stop reason: HOSPADM

## 2019-05-27 RX ORDER — PRAVASTATIN SODIUM 20 MG
20 TABLET ORAL EVERY EVENING
Status: DISCONTINUED | OUTPATIENT
Start: 2019-05-27 | End: 2019-05-29 | Stop reason: HOSPADM

## 2019-05-27 RX ORDER — GUAIFENESIN/DEXTROMETHORPHAN 100-10MG/5
5 SYRUP ORAL EVERY 4 HOURS PRN
Status: DISCONTINUED | OUTPATIENT
Start: 2019-05-27 | End: 2019-05-29 | Stop reason: HOSPADM

## 2019-05-27 RX ORDER — BENZONATATE 100 MG/1
100 CAPSULE ORAL 3 TIMES DAILY PRN
Status: DISCONTINUED | OUTPATIENT
Start: 2019-05-27 | End: 2019-05-29 | Stop reason: HOSPADM

## 2019-05-27 RX ORDER — METHYLPREDNISOLONE SODIUM SUCCINATE 125 MG/2ML
60 INJECTION, POWDER, LYOPHILIZED, FOR SOLUTION INTRAMUSCULAR; INTRAVENOUS EVERY 8 HOURS
Status: DISCONTINUED | OUTPATIENT
Start: 2019-05-27 | End: 2019-05-28

## 2019-05-27 RX ORDER — ONDANSETRON 2 MG/ML
4 INJECTION INTRAMUSCULAR; INTRAVENOUS EVERY 6 HOURS PRN
Status: DISCONTINUED | OUTPATIENT
Start: 2019-05-27 | End: 2019-05-29 | Stop reason: HOSPADM

## 2019-05-27 RX ORDER — GUAIFENESIN 600 MG/1
600 TABLET, EXTENDED RELEASE ORAL 2 TIMES DAILY
Status: DISCONTINUED | OUTPATIENT
Start: 2019-05-27 | End: 2019-05-29 | Stop reason: HOSPADM

## 2019-05-27 RX ORDER — IPRATROPIUM BROMIDE AND ALBUTEROL SULFATE 2.5; .5 MG/3ML; MG/3ML
3 SOLUTION RESPIRATORY (INHALATION)
Status: DISCONTINUED | OUTPATIENT
Start: 2019-05-27 | End: 2019-05-29 | Stop reason: HOSPADM

## 2019-05-27 RX ORDER — CHOLECALCIFEROL (VITAMIN D3) 125 MCG
5 CAPSULE ORAL NIGHTLY PRN
Status: DISCONTINUED | OUTPATIENT
Start: 2019-05-27 | End: 2019-05-29 | Stop reason: HOSPADM

## 2019-05-27 RX ORDER — PROPRANOLOL HYDROCHLORIDE 20 MG/1
60 TABLET ORAL DAILY
Status: DISCONTINUED | OUTPATIENT
Start: 2019-05-27 | End: 2019-05-29 | Stop reason: HOSPADM

## 2019-05-27 RX ORDER — MULTIPLE VITAMINS W/ MINERALS TAB 9MG-400MCG
1 TAB ORAL DAILY
Status: DISCONTINUED | OUTPATIENT
Start: 2019-05-27 | End: 2019-05-29 | Stop reason: HOSPADM

## 2019-05-27 RX ORDER — ACETAMINOPHEN 325 MG/1
650 TABLET ORAL EVERY 4 HOURS PRN
Status: DISCONTINUED | OUTPATIENT
Start: 2019-05-27 | End: 2019-05-29 | Stop reason: HOSPADM

## 2019-05-27 RX ORDER — ONDANSETRON 4 MG/1
4 TABLET, FILM COATED ORAL EVERY 6 HOURS PRN
Status: DISCONTINUED | OUTPATIENT
Start: 2019-05-27 | End: 2019-05-29 | Stop reason: HOSPADM

## 2019-05-27 RX ORDER — TRAMADOL HYDROCHLORIDE 50 MG/1
50 TABLET ORAL EVERY 8 HOURS PRN
Status: DISCONTINUED | OUTPATIENT
Start: 2019-05-27 | End: 2019-05-29 | Stop reason: HOSPADM

## 2019-05-27 RX ORDER — PROMETHAZINE HYDROCHLORIDE 12.5 MG/1
12.5 TABLET ORAL EVERY 8 HOURS PRN
Status: DISCONTINUED | OUTPATIENT
Start: 2019-05-27 | End: 2019-05-29 | Stop reason: HOSPADM

## 2019-05-27 RX ORDER — SODIUM CHLORIDE 0.9 % (FLUSH) 0.9 %
3 SYRINGE (ML) INJECTION EVERY 12 HOURS SCHEDULED
Status: DISCONTINUED | OUTPATIENT
Start: 2019-05-27 | End: 2019-05-29 | Stop reason: HOSPADM

## 2019-05-27 RX ORDER — BISACODYL 10 MG
10 SUPPOSITORY, RECTAL RECTAL DAILY PRN
Status: DISCONTINUED | OUTPATIENT
Start: 2019-05-27 | End: 2019-05-29 | Stop reason: HOSPADM

## 2019-05-27 RX ORDER — CLONAZEPAM 0.5 MG/1
0.5 TABLET ORAL NIGHTLY PRN
Status: DISCONTINUED | OUTPATIENT
Start: 2019-05-27 | End: 2019-05-29 | Stop reason: HOSPADM

## 2019-05-27 RX ORDER — CLONAZEPAM 0.5 MG/1
0.5 TABLET ORAL NIGHTLY PRN
Status: DISCONTINUED | OUTPATIENT
Start: 2019-05-27 | End: 2019-05-27

## 2019-05-27 RX ADMIN — METHYLPREDNISOLONE SODIUM SUCCINATE 60 MG: 125 INJECTION, POWDER, FOR SOLUTION INTRAMUSCULAR; INTRAVENOUS at 21:21

## 2019-05-27 RX ADMIN — IPRATROPIUM BROMIDE AND ALBUTEROL SULFATE 3 ML: .5; 3 SOLUTION RESPIRATORY (INHALATION) at 12:24

## 2019-05-27 RX ADMIN — NYSTATIN 500000 UNITS: 500000 SUSPENSION ORAL at 21:24

## 2019-05-27 RX ADMIN — ENOXAPARIN SODIUM 40 MG: 40 INJECTION SUBCUTANEOUS at 02:12

## 2019-05-27 RX ADMIN — MULTIPLE VITAMINS W/ MINERALS TAB 1 TABLET: TAB at 08:54

## 2019-05-27 RX ADMIN — SODIUM CHLORIDE, PRESERVATIVE FREE 10 ML: 5 INJECTION INTRAVENOUS at 08:56

## 2019-05-27 RX ADMIN — GUAIFENESIN 600 MG: 600 TABLET, EXTENDED RELEASE ORAL at 08:54

## 2019-05-27 RX ADMIN — NYSTATIN 500000 UNITS: 500000 SUSPENSION ORAL at 16:50

## 2019-05-27 RX ADMIN — BUDESONIDE 0.5 MG: 0.5 INHALANT RESPIRATORY (INHALATION) at 06:55

## 2019-05-27 RX ADMIN — FLUTICASONE PROPIONATE 2 SPRAY: 50 SPRAY, METERED NASAL at 09:05

## 2019-05-27 RX ADMIN — PANTOPRAZOLE SODIUM 40 MG: 40 TABLET, DELAYED RELEASE ORAL at 05:53

## 2019-05-27 RX ADMIN — TRAMADOL HYDROCHLORIDE 50 MG: 50 TABLET, FILM COATED ORAL at 22:59

## 2019-05-27 RX ADMIN — SODIUM CHLORIDE, POTASSIUM CHLORIDE, SODIUM LACTATE AND CALCIUM CHLORIDE 75 ML/HR: 600; 310; 30; 20 INJECTION, SOLUTION INTRAVENOUS at 02:12

## 2019-05-27 RX ADMIN — SERTRALINE HYDROCHLORIDE 100 MG: 50 TABLET ORAL at 21:20

## 2019-05-27 RX ADMIN — IPRATROPIUM BROMIDE AND ALBUTEROL SULFATE 3 ML: .5; 3 SOLUTION RESPIRATORY (INHALATION) at 19:17

## 2019-05-27 RX ADMIN — BENZOCAINE AND MENTHOL 1 LOZENGE: 15; 3.6 LOZENGE ORAL at 21:20

## 2019-05-27 RX ADMIN — IPRATROPIUM BROMIDE AND ALBUTEROL SULFATE 3 ML: .5; 3 SOLUTION RESPIRATORY (INHALATION) at 02:50

## 2019-05-27 RX ADMIN — NICOTINE 1 PATCH: 7 PATCH TRANSDERMAL at 08:52

## 2019-05-27 RX ADMIN — IPRATROPIUM BROMIDE AND ALBUTEROL SULFATE 3 ML: .5; 3 SOLUTION RESPIRATORY (INHALATION) at 06:55

## 2019-05-27 RX ADMIN — NYSTATIN 500000 UNITS: 500000 SUSPENSION ORAL at 12:08

## 2019-05-27 RX ADMIN — ESTROGENS, CONJUGATED 0.62 MG: 0.62 TABLET, FILM COATED ORAL at 08:54

## 2019-05-27 RX ADMIN — BUDESONIDE 0.5 MG: 0.5 INHALANT RESPIRATORY (INHALATION) at 02:50

## 2019-05-27 RX ADMIN — METHYLPREDNISOLONE SODIUM SUCCINATE 60 MG: 125 INJECTION, POWDER, FOR SOLUTION INTRAMUSCULAR; INTRAVENOUS at 14:29

## 2019-05-27 RX ADMIN — METHYLPREDNISOLONE SODIUM SUCCINATE 60 MG: 125 INJECTION, POWDER, FOR SOLUTION INTRAMUSCULAR; INTRAVENOUS at 05:54

## 2019-05-27 RX ADMIN — PRAVASTATIN SODIUM 20 MG: 20 TABLET ORAL at 16:50

## 2019-05-27 RX ADMIN — SERTRALINE HYDROCHLORIDE 100 MG: 50 TABLET ORAL at 02:12

## 2019-05-27 RX ADMIN — NYSTATIN 500000 UNITS: 500000 SUSPENSION ORAL at 08:52

## 2019-05-27 RX ADMIN — GUAIFENESIN 600 MG: 600 TABLET, EXTENDED RELEASE ORAL at 21:23

## 2019-05-27 RX ADMIN — SODIUM CHLORIDE, PRESERVATIVE FREE 3 ML: 5 INJECTION INTRAVENOUS at 21:20

## 2019-05-27 RX ADMIN — PROPRANOLOL HYDROCHLORIDE 60 MG: 20 TABLET ORAL at 08:54

## 2019-05-27 RX ADMIN — BUDESONIDE 0.5 MG: 0.5 INHALANT RESPIRATORY (INHALATION) at 19:17

## 2019-05-27 NOTE — OUTREACH NOTE
COPD/PN Week 3 Survey      Responses   Facility patient discharged from?  [facility]   Does the patient have one of the following disease processes/diagnoses(primary or secondary)?  COPD/Pneumonia   Was the primary reason for admission:  COPD exacerbation   Week 3 attempt successful?  No   Revoke  Readmitted          Aurora Dimas RN

## 2019-05-28 LAB
A-A DO2: ABNORMAL MMHG
ANION GAP SERPL CALCULATED.3IONS-SCNC: 9.3 MMOL/L (ref 10–20)
ARTERIAL PATENCY WRIST A: POSITIVE
ATMOSPHERIC PRESS: 731 MMHG
BASE EXCESS BLDA CALC-SCNC: 11 MMOL/L (ref 0–2)
BASOPHILS # BLD AUTO: 0.04 10*3/MM3 (ref 0–0.2)
BASOPHILS NFR BLD AUTO: 0.2 % (ref 0–1.5)
BDY SITE: ABNORMAL
BUN BLD-MCNC: 20 MG/DL (ref 7–20)
BUN/CREAT SERPL: 33.3 (ref 7.1–23.5)
CALCIUM SPEC-SCNC: 8.6 MG/DL (ref 8.4–10.2)
CHLORIDE SERPL-SCNC: 94 MMOL/L (ref 98–107)
CO2 SERPL-SCNC: 35 MMOL/L (ref 26–30)
COHGB MFR BLD: <0.7 % (ref 0–2)
CREAT BLD-MCNC: 0.6 MG/DL (ref 0.6–1.3)
DEPRECATED RDW RBC AUTO: 45.3 FL (ref 37–54)
EOSINOPHIL # BLD AUTO: 0 10*3/MM3 (ref 0–0.4)
EOSINOPHIL NFR BLD AUTO: 0 % (ref 0.3–6.2)
ERYTHROCYTE [DISTWIDTH] IN BLOOD BY AUTOMATED COUNT: 13.2 % (ref 12.3–15.4)
GAS FLOW AIRWAY: 2 LPM
GFR SERPL CREATININE-BSD FRML MDRD: 96 ML/MIN/1.73
GLUCOSE BLD-MCNC: 143 MG/DL (ref 74–98)
HCO3 BLDA-SCNC: 37 MMOL/L (ref 22–28)
HCT VFR BLD AUTO: 33.6 % (ref 34–46.6)
HCT VFR BLD CALC: 33.8 %
HGB BLD-MCNC: 10.8 G/DL (ref 12–15.9)
HGB BLDA-MCNC: 11 G/DL (ref 12–18)
HOROWITZ INDEX BLD+IHG-RTO: 28 %
IMM GRANULOCYTES # BLD AUTO: 0.21 10*3/MM3 (ref 0–0.05)
IMM GRANULOCYTES NFR BLD AUTO: 0.8 % (ref 0–0.5)
LYMPHOCYTES # BLD AUTO: 1.21 10*3/MM3 (ref 0.7–3.1)
LYMPHOCYTES NFR BLD AUTO: 4.8 % (ref 19.6–45.3)
MCH RBC QN AUTO: 30.1 PG (ref 26.6–33)
MCHC RBC AUTO-ENTMCNC: 32.1 G/DL (ref 31.5–35.7)
MCV RBC AUTO: 93.6 FL (ref 79–97)
METHGB BLD QL: 1 % (ref 0–1.5)
MODALITY: ABNORMAL
MONOCYTES # BLD AUTO: 0.39 10*3/MM3 (ref 0.1–0.9)
MONOCYTES NFR BLD AUTO: 1.5 % (ref 5–12)
NEUTROPHILS # BLD AUTO: 23.57 10*3/MM3 (ref 1.7–7)
NEUTROPHILS NFR BLD AUTO: 92.7 % (ref 42.7–76)
NOTE: ABNORMAL
NRBC BLD AUTO-RTO: 0 /100 WBC (ref 0–0.2)
OXYHGB MFR BLDV: 96.5 % (ref 94–99)
PCO2 BLDA: 55 MM HG (ref 35–45)
PCO2 TEMP ADJ BLD: ABNORMAL MM HG (ref 35–45)
PH BLDA: 7.44 PH UNITS (ref 7.3–7.5)
PH, TEMP CORRECTED: ABNORMAL PH UNITS
PLATELET # BLD AUTO: 212 10*3/MM3 (ref 140–450)
PMV BLD AUTO: 9.9 FL (ref 6–12)
PO2 BLDA: 89.4 MM HG (ref 75–100)
PO2 TEMP ADJ BLD: ABNORMAL MM HG (ref 83–108)
POTASSIUM BLD-SCNC: 4.3 MMOL/L (ref 3.5–5.1)
RBC # BLD AUTO: 3.59 10*6/MM3 (ref 3.77–5.28)
SAO2 % BLDCOA: 98 % (ref 94–100)
SODIUM BLD-SCNC: 134 MMOL/L (ref 137–145)
VENTILATOR MODE: ABNORMAL
WBC NRBC COR # BLD: 25.42 10*3/MM3 (ref 3.4–10.8)

## 2019-05-28 PROCEDURE — 85060 BLOOD SMEAR INTERPRETATION: CPT | Performed by: NURSE PRACTITIONER

## 2019-05-28 PROCEDURE — 82805 BLOOD GASES W/O2 SATURATION: CPT

## 2019-05-28 PROCEDURE — 94799 UNLISTED PULMONARY SVC/PX: CPT

## 2019-05-28 PROCEDURE — 25010000002 ENOXAPARIN PER 10 MG: Performed by: FAMILY MEDICINE

## 2019-05-28 PROCEDURE — 97110 THERAPEUTIC EXERCISES: CPT

## 2019-05-28 PROCEDURE — 96372 THER/PROPH/DIAG INJ SC/IM: CPT

## 2019-05-28 PROCEDURE — 94660 CPAP INITIATION&MGMT: CPT

## 2019-05-28 PROCEDURE — 97530 THERAPEUTIC ACTIVITIES: CPT

## 2019-05-28 PROCEDURE — G0378 HOSPITAL OBSERVATION PER HR: HCPCS

## 2019-05-28 PROCEDURE — 99225 PR SBSQ OBSERVATION CARE/DAY 25 MINUTES: CPT | Performed by: NURSE PRACTITIONER

## 2019-05-28 PROCEDURE — 82375 ASSAY CARBOXYHB QUANT: CPT

## 2019-05-28 PROCEDURE — 36600 WITHDRAWAL OF ARTERIAL BLOOD: CPT

## 2019-05-28 PROCEDURE — 96376 TX/PRO/DX INJ SAME DRUG ADON: CPT

## 2019-05-28 PROCEDURE — 25010000002 METHYLPREDNISOLONE PER 125 MG: Performed by: FAMILY MEDICINE

## 2019-05-28 PROCEDURE — 80048 BASIC METABOLIC PNL TOTAL CA: CPT | Performed by: FAMILY MEDICINE

## 2019-05-28 PROCEDURE — 25010000002 METHYLPREDNISOLONE PER 125 MG: Performed by: NURSE PRACTITIONER

## 2019-05-28 PROCEDURE — 25010000002 METHYLPREDNISOLONE PER 40 MG: Performed by: NURSE PRACTITIONER

## 2019-05-28 PROCEDURE — 83050 HGB METHEMOGLOBIN QUAN: CPT

## 2019-05-28 PROCEDURE — 85025 COMPLETE CBC W/AUTO DIFF WBC: CPT | Performed by: FAMILY MEDICINE

## 2019-05-28 RX ORDER — METHYLPREDNISOLONE SODIUM SUCCINATE 40 MG/ML
40 INJECTION, POWDER, LYOPHILIZED, FOR SOLUTION INTRAMUSCULAR; INTRAVENOUS EVERY 8 HOURS
Status: DISCONTINUED | OUTPATIENT
Start: 2019-05-28 | End: 2019-05-29

## 2019-05-28 RX ADMIN — CLONAZEPAM 0.5 MG: 0.5 TABLET ORAL at 23:34

## 2019-05-28 RX ADMIN — IPRATROPIUM BROMIDE AND ALBUTEROL SULFATE 3 ML: .5; 3 SOLUTION RESPIRATORY (INHALATION) at 00:10

## 2019-05-28 RX ADMIN — TRAMADOL HYDROCHLORIDE 50 MG: 50 TABLET, FILM COATED ORAL at 23:34

## 2019-05-28 RX ADMIN — SERTRALINE HYDROCHLORIDE 100 MG: 50 TABLET ORAL at 21:20

## 2019-05-28 RX ADMIN — ESTROGENS, CONJUGATED 0.62 MG: 0.62 TABLET, FILM COATED ORAL at 08:42

## 2019-05-28 RX ADMIN — METHYLPREDNISOLONE SODIUM SUCCINATE 40 MG: 125 INJECTION, POWDER, FOR SOLUTION INTRAMUSCULAR; INTRAVENOUS at 21:19

## 2019-05-28 RX ADMIN — BUDESONIDE 0.5 MG: 0.5 INHALANT RESPIRATORY (INHALATION) at 19:40

## 2019-05-28 RX ADMIN — METHYLPREDNISOLONE SODIUM SUCCINATE 60 MG: 125 INJECTION, POWDER, FOR SOLUTION INTRAMUSCULAR; INTRAVENOUS at 05:36

## 2019-05-28 RX ADMIN — NYSTATIN 500000 UNITS: 500000 SUSPENSION ORAL at 08:41

## 2019-05-28 RX ADMIN — SODIUM CHLORIDE, PRESERVATIVE FREE 3 ML: 5 INJECTION INTRAVENOUS at 21:20

## 2019-05-28 RX ADMIN — TRAMADOL HYDROCHLORIDE 50 MG: 50 TABLET, FILM COATED ORAL at 05:36

## 2019-05-28 RX ADMIN — ENOXAPARIN SODIUM 40 MG: 40 INJECTION SUBCUTANEOUS at 03:41

## 2019-05-28 RX ADMIN — PANTOPRAZOLE SODIUM 40 MG: 40 TABLET, DELAYED RELEASE ORAL at 05:36

## 2019-05-28 RX ADMIN — PROPRANOLOL HYDROCHLORIDE 60 MG: 20 TABLET ORAL at 08:41

## 2019-05-28 RX ADMIN — GUAIFENESIN 600 MG: 600 TABLET, EXTENDED RELEASE ORAL at 08:41

## 2019-05-28 RX ADMIN — SODIUM CHLORIDE, PRESERVATIVE FREE 10 ML: 5 INJECTION INTRAVENOUS at 05:36

## 2019-05-28 RX ADMIN — IPRATROPIUM BROMIDE AND ALBUTEROL SULFATE 3 ML: .5; 3 SOLUTION RESPIRATORY (INHALATION) at 06:59

## 2019-05-28 RX ADMIN — NYSTATIN 500000 UNITS: 500000 SUSPENSION ORAL at 12:21

## 2019-05-28 RX ADMIN — IPRATROPIUM BROMIDE AND ALBUTEROL SULFATE 3 ML: .5; 3 SOLUTION RESPIRATORY (INHALATION) at 12:42

## 2019-05-28 RX ADMIN — SODIUM CHLORIDE, PRESERVATIVE FREE 3 ML: 5 INJECTION INTRAVENOUS at 08:42

## 2019-05-28 RX ADMIN — BUDESONIDE 0.5 MG: 0.5 INHALANT RESPIRATORY (INHALATION) at 06:59

## 2019-05-28 RX ADMIN — FLUTICASONE PROPIONATE 2 SPRAY: 50 SPRAY, METERED NASAL at 08:42

## 2019-05-28 RX ADMIN — IPRATROPIUM BROMIDE AND ALBUTEROL SULFATE 3 ML: .5; 3 SOLUTION RESPIRATORY (INHALATION) at 19:40

## 2019-05-28 RX ADMIN — MULTIPLE VITAMINS W/ MINERALS TAB 1 TABLET: TAB at 08:42

## 2019-05-28 RX ADMIN — NYSTATIN 500000 UNITS: 500000 SUSPENSION ORAL at 18:08

## 2019-05-28 RX ADMIN — GUAIFENESIN 600 MG: 600 TABLET, EXTENDED RELEASE ORAL at 21:20

## 2019-05-28 RX ADMIN — METHYLPREDNISOLONE SODIUM SUCCINATE 40 MG: 125 INJECTION, POWDER, FOR SOLUTION INTRAMUSCULAR; INTRAVENOUS at 13:29

## 2019-05-28 RX ADMIN — NYSTATIN 500000 UNITS: 500000 SUSPENSION ORAL at 21:20

## 2019-05-28 RX ADMIN — PRAVASTATIN SODIUM 20 MG: 20 TABLET ORAL at 18:08

## 2019-05-28 RX ADMIN — NICOTINE 1 PATCH: 7 PATCH TRANSDERMAL at 08:41

## 2019-05-29 VITALS
WEIGHT: 104.8 LBS | HEART RATE: 73 BPM | OXYGEN SATURATION: 99 % | SYSTOLIC BLOOD PRESSURE: 144 MMHG | TEMPERATURE: 97.5 F | BODY MASS INDEX: 19.29 KG/M2 | RESPIRATION RATE: 16 BRPM | DIASTOLIC BLOOD PRESSURE: 65 MMHG | HEIGHT: 62 IN

## 2019-05-29 PROBLEM — Z91.199 NONCOMPLIANCE: Status: ACTIVE | Noted: 2019-05-29

## 2019-05-29 LAB
ANION GAP SERPL CALCULATED.3IONS-SCNC: 11.2 MMOL/L (ref 10–20)
BASOPHILS # BLD AUTO: 0.02 10*3/MM3 (ref 0–0.2)
BASOPHILS NFR BLD AUTO: 0.1 % (ref 0–1.5)
BUN BLD-MCNC: 22 MG/DL (ref 7–20)
BUN/CREAT SERPL: 36.7 (ref 7.1–23.5)
CALCIUM SPEC-SCNC: 8.8 MG/DL (ref 8.4–10.2)
CHLORIDE SERPL-SCNC: 91 MMOL/L (ref 98–107)
CO2 SERPL-SCNC: 37 MMOL/L (ref 26–30)
CREAT BLD-MCNC: 0.6 MG/DL (ref 0.6–1.3)
DEPRECATED RDW RBC AUTO: 43.6 FL (ref 37–54)
EOSINOPHIL # BLD AUTO: 0 10*3/MM3 (ref 0–0.4)
EOSINOPHIL NFR BLD AUTO: 0 % (ref 0.3–6.2)
ERYTHROCYTE [DISTWIDTH] IN BLOOD BY AUTOMATED COUNT: 13.3 % (ref 12.3–15.4)
GFR SERPL CREATININE-BSD FRML MDRD: 96 ML/MIN/1.73
GLUCOSE BLD-MCNC: 127 MG/DL (ref 74–98)
HCT VFR BLD AUTO: 34.6 % (ref 34–46.6)
HGB BLD-MCNC: 11.4 G/DL (ref 12–15.9)
IMM GRANULOCYTES # BLD AUTO: 0.09 10*3/MM3 (ref 0–0.05)
IMM GRANULOCYTES NFR BLD AUTO: 0.5 % (ref 0–0.5)
LYMPHOCYTES # BLD AUTO: 0.96 10*3/MM3 (ref 0.7–3.1)
LYMPHOCYTES NFR BLD AUTO: 5.4 % (ref 19.6–45.3)
MCH RBC QN AUTO: 29.7 PG (ref 26.6–33)
MCHC RBC AUTO-ENTMCNC: 32.9 G/DL (ref 31.5–35.7)
MCV RBC AUTO: 90.1 FL (ref 79–97)
MONOCYTES # BLD AUTO: 0.35 10*3/MM3 (ref 0.1–0.9)
MONOCYTES NFR BLD AUTO: 2 % (ref 5–12)
NEUTROPHILS # BLD AUTO: 16.21 10*3/MM3 (ref 1.7–7)
NEUTROPHILS NFR BLD AUTO: 92 % (ref 42.7–76)
NRBC BLD AUTO-RTO: 0 /100 WBC (ref 0–0.2)
PLATELET # BLD AUTO: 196 10*3/MM3 (ref 140–450)
PMV BLD AUTO: 9.8 FL (ref 6–12)
POTASSIUM BLD-SCNC: 4.2 MMOL/L (ref 3.5–5.1)
RBC # BLD AUTO: 3.84 10*6/MM3 (ref 3.77–5.28)
SODIUM BLD-SCNC: 135 MMOL/L (ref 137–145)
WBC NRBC COR # BLD: 17.63 10*3/MM3 (ref 3.4–10.8)

## 2019-05-29 PROCEDURE — 85025 COMPLETE CBC W/AUTO DIFF WBC: CPT | Performed by: NURSE PRACTITIONER

## 2019-05-29 PROCEDURE — 94799 UNLISTED PULMONARY SVC/PX: CPT

## 2019-05-29 PROCEDURE — 97110 THERAPEUTIC EXERCISES: CPT

## 2019-05-29 PROCEDURE — 99225 PR SBSQ OBSERVATION CARE/DAY 25 MINUTES: CPT | Performed by: NURSE PRACTITIONER

## 2019-05-29 PROCEDURE — G0378 HOSPITAL OBSERVATION PER HR: HCPCS

## 2019-05-29 PROCEDURE — 96372 THER/PROPH/DIAG INJ SC/IM: CPT

## 2019-05-29 PROCEDURE — 80048 BASIC METABOLIC PNL TOTAL CA: CPT | Performed by: NURSE PRACTITIONER

## 2019-05-29 PROCEDURE — 25010000002 ENOXAPARIN PER 10 MG: Performed by: FAMILY MEDICINE

## 2019-05-29 PROCEDURE — 97116 GAIT TRAINING THERAPY: CPT

## 2019-05-29 PROCEDURE — 96376 TX/PRO/DX INJ SAME DRUG ADON: CPT

## 2019-05-29 PROCEDURE — 25010000002 METHYLPREDNISOLONE PER 40 MG: Performed by: NURSE PRACTITIONER

## 2019-05-29 RX ORDER — IPRATROPIUM BROMIDE AND ALBUTEROL SULFATE 2.5; .5 MG/3ML; MG/3ML
3 SOLUTION RESPIRATORY (INHALATION) EVERY 6 HOURS PRN
Qty: 360 ML
Start: 2019-05-29 | End: 2019-07-24 | Stop reason: SDUPTHER

## 2019-05-29 RX ORDER — METHYLPREDNISOLONE SODIUM SUCCINATE 40 MG/ML
40 INJECTION, POWDER, LYOPHILIZED, FOR SOLUTION INTRAMUSCULAR; INTRAVENOUS EVERY 24 HOURS
Status: DISCONTINUED | OUTPATIENT
Start: 2019-05-30 | End: 2019-05-29 | Stop reason: HOSPADM

## 2019-05-29 RX ORDER — PROPRANOLOL HYDROCHLORIDE 60 MG/1
60 TABLET ORAL DAILY
Start: 2019-05-30 | End: 2019-09-05

## 2019-05-29 RX ORDER — PREDNISONE 20 MG/1
20 TABLET ORAL DAILY
Qty: 2 TABLET | Refills: 0 | Status: SHIPPED | OUTPATIENT
Start: 2019-05-29 | End: 2019-05-31

## 2019-05-29 RX ORDER — CLONAZEPAM 0.5 MG/1
0.5 TABLET ORAL NIGHTLY PRN
Qty: 3 TABLET | Refills: 0 | Status: SHIPPED | OUTPATIENT
Start: 2019-05-29 | End: 2019-06-03 | Stop reason: SDUPTHER

## 2019-05-29 RX ORDER — TRAMADOL HYDROCHLORIDE 50 MG/1
50 TABLET ORAL EVERY 8 HOURS PRN
Qty: 10 TABLET | Refills: 0 | Status: SHIPPED | OUTPATIENT
Start: 2019-05-29 | End: 2019-10-09 | Stop reason: SDUPTHER

## 2019-05-29 RX ADMIN — BUDESONIDE 0.5 MG: 0.5 INHALANT RESPIRATORY (INHALATION) at 06:54

## 2019-05-29 RX ADMIN — IPRATROPIUM BROMIDE AND ALBUTEROL SULFATE 3 ML: .5; 3 SOLUTION RESPIRATORY (INHALATION) at 06:54

## 2019-05-29 RX ADMIN — PRAVASTATIN SODIUM 20 MG: 20 TABLET ORAL at 16:37

## 2019-05-29 RX ADMIN — SODIUM CHLORIDE, PRESERVATIVE FREE 10 ML: 5 INJECTION INTRAVENOUS at 05:35

## 2019-05-29 RX ADMIN — NICOTINE 1 PATCH: 7 PATCH TRANSDERMAL at 16:38

## 2019-05-29 RX ADMIN — NYSTATIN 500000 UNITS: 500000 SUSPENSION ORAL at 12:23

## 2019-05-29 RX ADMIN — METHYLPREDNISOLONE SODIUM SUCCINATE 40 MG: 125 INJECTION, POWDER, FOR SOLUTION INTRAMUSCULAR; INTRAVENOUS at 05:35

## 2019-05-29 RX ADMIN — GUAIFENESIN 600 MG: 600 TABLET, EXTENDED RELEASE ORAL at 09:02

## 2019-05-29 RX ADMIN — ENOXAPARIN SODIUM 40 MG: 40 INJECTION SUBCUTANEOUS at 03:43

## 2019-05-29 RX ADMIN — IPRATROPIUM BROMIDE AND ALBUTEROL SULFATE 3 ML: .5; 3 SOLUTION RESPIRATORY (INHALATION) at 12:05

## 2019-05-29 RX ADMIN — PANTOPRAZOLE SODIUM 40 MG: 40 TABLET, DELAYED RELEASE ORAL at 05:35

## 2019-05-29 RX ADMIN — FLUTICASONE PROPIONATE 2 SPRAY: 50 SPRAY, METERED NASAL at 09:01

## 2019-05-29 RX ADMIN — MULTIPLE VITAMINS W/ MINERALS TAB 1 TABLET: TAB at 09:02

## 2019-05-29 RX ADMIN — PROPRANOLOL HYDROCHLORIDE 60 MG: 20 TABLET ORAL at 09:02

## 2019-05-29 RX ADMIN — SODIUM CHLORIDE, PRESERVATIVE FREE 3 ML: 5 INJECTION INTRAVENOUS at 09:04

## 2019-05-29 RX ADMIN — ESTROGENS, CONJUGATED 0.62 MG: 0.62 TABLET, FILM COATED ORAL at 09:02

## 2019-05-29 RX ADMIN — NYSTATIN 500000 UNITS: 500000 SUSPENSION ORAL at 09:02

## 2019-06-03 RX ORDER — CLONAZEPAM 0.5 MG/1
0.5 TABLET ORAL NIGHTLY PRN
Qty: 30 TABLET | Refills: 0 | Status: SHIPPED | OUTPATIENT
Start: 2019-06-03 | End: 2019-06-08 | Stop reason: SDUPTHER

## 2019-06-04 NOTE — PROGRESS NOTES
[facility]    Name:  Sadie Tijerina   Age:  79 y.o.  Sex:  female  :  1940  MRN:  [id number]   Visit Number:  [id number]  Admission Date:  2019  Date Of Service:  19  Primary Care Physician:  Kristian Wolff MD      Peripheral smear has been reviewed.  I have sent an internal message to her primary care provider to asked that he follow-up with a CBC and repeat peripheral smear as recommended by pathologist.  Patient is currently in rehab.  She does have an appointment with Dr. Wolff on .    Vital signs:    Vital Signs (last 24 hours)     None              RUBY Gtz  19  1:18 PM

## 2019-06-05 DIAGNOSIS — D72.829 LEUKOCYTOSIS, UNSPECIFIED TYPE: Primary | ICD-10-CM

## 2019-06-05 LAB
CYTOLOGIST CVX/VAG CYTO: NORMAL
PATH INTERP BLD-IMP: NORMAL

## 2019-06-06 ENCOUNTER — NURSING HOME (OUTPATIENT)
Dept: INTERNAL MEDICINE | Facility: CLINIC | Age: 79
End: 2019-06-06

## 2019-06-06 DIAGNOSIS — R09.02 CHRONIC OBSTRUCTIVE PULMONARY DISEASE WITH HYPOXIA (HCC): Primary | ICD-10-CM

## 2019-06-06 DIAGNOSIS — I50.32 CHRONIC DIASTOLIC HEART FAILURE (HCC): Chronic | ICD-10-CM

## 2019-06-06 DIAGNOSIS — I10 ESSENTIAL HYPERTENSION: Chronic | ICD-10-CM

## 2019-06-06 DIAGNOSIS — K21.9 GASTROESOPHAGEAL REFLUX DISEASE WITHOUT ESOPHAGITIS: ICD-10-CM

## 2019-06-06 DIAGNOSIS — R53.81 DEBILITY: ICD-10-CM

## 2019-06-06 DIAGNOSIS — J44.9 CHRONIC OBSTRUCTIVE PULMONARY DISEASE WITH HYPOXIA (HCC): Primary | ICD-10-CM

## 2019-06-06 DIAGNOSIS — E78.5 HYPERLIPIDEMIA, UNSPECIFIED HYPERLIPIDEMIA TYPE: ICD-10-CM

## 2019-06-06 DIAGNOSIS — J41.0 SIMPLE CHRONIC BRONCHITIS (HCC): ICD-10-CM

## 2019-06-06 PROCEDURE — 99305 1ST NF CARE MODERATE MDM 35: CPT | Performed by: INTERNAL MEDICINE

## 2019-06-08 RX ORDER — CLONAZEPAM 0.5 MG/1
0.5 TABLET ORAL NIGHTLY
Qty: 30 TABLET | Refills: 2 | Status: SHIPPED | OUTPATIENT
Start: 2019-06-08 | End: 2019-08-13 | Stop reason: SDUPTHER

## 2019-06-11 ENCOUNTER — TELEPHONE (OUTPATIENT)
Dept: INTERNAL MEDICINE | Facility: CLINIC | Age: 79
End: 2019-06-11

## 2019-06-12 ENCOUNTER — TELEPHONE (OUTPATIENT)
Dept: INTERNAL MEDICINE | Facility: CLINIC | Age: 79
End: 2019-06-12

## 2019-06-12 NOTE — TELEPHONE ENCOUNTER
I have already spoke to the nurse at [place], we will go up to 40 TID and then taper up as needed.

## 2019-06-14 VITALS
RESPIRATION RATE: 22 BRPM | WEIGHT: 110 LBS | OXYGEN SATURATION: 96 % | DIASTOLIC BLOOD PRESSURE: 56 MMHG | SYSTOLIC BLOOD PRESSURE: 133 MMHG | BODY MASS INDEX: 20.12 KG/M2 | HEART RATE: 80 BPM | TEMPERATURE: 98 F

## 2019-06-14 PROBLEM — J44.9 COPD (CHRONIC OBSTRUCTIVE PULMONARY DISEASE) (HCC): Status: ACTIVE | Noted: 2019-06-14

## 2019-06-14 NOTE — PROGRESS NOTES
Nursing Home Progress Note        Luis Eduardo Perry DO ?  RUBY Tolentino ?  [address], [place] [address]  Phone: [phone]  Fax: [phone] Kip Cortez MD ?  Fred Cueva DO [x]   [address] [place] [address]  Phone: [phone]  Fax: [phone]     PATIENT NAME: Sadie Tijerina                                                                          YOB: 1940           DATE OF SERVICE: 06/06/2019  FACILITY:  [x] [place]   [] [place]   [] [facility]   [] [facility]   [] Other ______________________________________________________________________     CHIEF COMPLAINT:  COPD, Debility      HISTORY OF PRESENT ILLNESS:   Patient is a pleasant 78-year-old white female with a history of COPD with chronic hypoxic/hypercapnic respiratory failure, diastolic heart failure, and hypertension who presented to the emergency room with increased weakness, poor p.o. intake, and shortness of breath with wheezing.  Patient was treated for COPD exacerbation with IV steroids and antibiotics with good recovery at [facility].  Patient was then transferred to [facility] for strengthening given her general debility due to chronic medical issues.  She has been using her BiPAP appropriately since being in the facility.    She does mention difficulty falling asleep is a problem for her.  She requests medication to help her sleep better.  It is notable that the patient is on clonazepam 0.5 mg nightly as needed.  This may have not been given yesterday.     PAST MEDICAL & SURGICAL HISTORY:   Past Medical History:   Diagnosis Date   • Arthritis    • Cancer (CMS/[facility])     uterine cancer (1981)   • COPD (chronic obstructive pulmonary disease) (CMS/[facility])    • Depression    • Elevated cholesterol    • GERD (gastroesophageal reflux disease)    • History of emphysema    • History of esophageal reflux    • History of recurrent UTI (urinary tract infection)    •  History of renal calculi    • History of uterine cancer    • Hyperlipidemia    • Hypertension    • Pneumonia 02/2019      Past Surgical History:   Procedure Laterality Date   • FOOT SURGERY     • HAND SURGERY     • HYSTERECTOMY           MEDICATIONS:  I have reviewed and reconciled the patients medication list in the patients chart at the skilled nursing facility today.      ALLERGIES:  Allergies   Allergen Reactions   • Morphine And Related Irritability         SOCIAL HISTORY:  Social History     Socioeconomic History   • Marital status:      Spouse name: Not on file   • Number of children: Not on file   • Years of education: Not on file   • Highest education level: Not on file   Tobacco Use   • Smoking status: Current Some Day Smoker     Packs/day: 0.25     Types: Cigarettes   • Smokeless tobacco: Never Used   Substance and Sexual Activity   • Alcohol use: No   • Drug use: No   • Sexual activity: Defer     Comment:        FAMILY HISTORY:  Family History   Problem Relation Age of Onset   • Cancer Mother    • Heart attack Father    • Arthritis Father    • Heart disease Father    • Diabetes Daughter    • Hyperlipidemia Daughter    • Migraines Daughter    • Heart disease Sister    • Diabetes Son    • Glaucoma Son        REVIEW OF SYSTEMS:  Review of Systems   Constitutional: Positive for fatigue. Negative for chills and fever.   HENT: Negative for congestion, ear pain, rhinorrhea, sinus pressure and sore throat.    Eyes: Negative for visual disturbance.   Respiratory: Negative for cough, chest tightness, shortness of breath and wheezing.    Cardiovascular: Negative for chest pain, palpitations and leg swelling.   Gastrointestinal: Negative for abdominal pain, blood in stool, constipation, diarrhea, nausea and vomiting.   Endocrine: Negative for polydipsia and polyuria.   Genitourinary: Negative for dysuria and hematuria.   Musculoskeletal: Negative for arthralgias and back pain.   Skin: Negative for  rash.   Neurological: Negative for dizziness, light-headedness, numbness and headaches.   Psychiatric/Behavioral: Positive for sleep disturbance. Negative for dysphoric mood. The patient is not nervous/anxious.           PHYSICAL EXAMINATION:     VITAL SIGNS:  /56   Pulse 80   Temp 98 °F (36.7 °C)   Resp 22   Wt 49.9 kg (110 lb)   SpO2 96%   BMI 20.12 kg/m²     Physical Exam   Constitutional: She is oriented to person, place, and time. She appears well-developed and well-nourished.   Frail elderly female on nasal cannula oxygen   HENT:   Head: Normocephalic and atraumatic.   Mouth/Throat: Oropharynx is clear and moist. No oropharyngeal exudate.   Eyes: Conjunctivae and EOM are normal. Pupils are equal, round, and reactive to light. No scleral icterus.   Neck: Normal range of motion. Neck supple. No thyromegaly present.   Cardiovascular: Normal rate, regular rhythm and normal heart sounds. Exam reveals no gallop and no friction rub.   No murmur heard.  Pulmonary/Chest: Effort normal and breath sounds normal. No respiratory distress. She has no wheezes.   Abdominal: Soft. Bowel sounds are normal. She exhibits no distension. There is no tenderness.   Musculoskeletal: Normal range of motion.   Lymphadenopathy:     She has no cervical adenopathy.   Neurological: She is alert and oriented to person, place, and time.   Skin: Skin is warm and dry. No rash noted.   Psychiatric: She has a normal mood and affect. Her behavior is normal.   Nursing note and vitals reviewed.      RECORDS REVIEW:   Discharge summary [facility]     Diagnoses and all orders for this visit:    Chronic obstructive pulmonary disease with hypoxia (CMS/HCC)    Essential hypertension    Chronic diastolic heart failure (CMS/HCC)    Hyperlipidemia, unspecified hyperlipidemia type    Simple chronic bronchitis (CMS/HCC)    Gastroesophageal reflux disease without esophagitis    Debility        PLAN    COPD with hypoxia  -    Treated in hospital respiratory function is gradually improving.  Continue nasal cannula oxygen.  Continue physical therapy for strengthening.    Essential hypertension  -Stable on current medications    Chronic diastolic heart failure  -Controlled with current medications    Hyperlipidemia  -Stable on statin.    GERD  -Controlled with Prilosec    Tobacco abuse  -Not smoking while in facility.    Insomnia  -Continue Klonopin nightly.  Dose will be scheduled rather than as needed in the facility.  This should help with compliance for CPAP.          [x]  Discussed Patient in detail with nursing/staff, addressed all needs today.     [x]  Plan of Care Reviewed   []  PT/OT Reviewed   []  Order Changes  []  Discharge Plans Reviewed  [x]  Advance Directive on file with Nursing Home.   [x]  POA on file with Nursing Home.    [x]  Code Status listed and reviewed.          Fred Cueva DO.  6/14/2019

## 2019-06-24 ENCOUNTER — NURSING HOME (OUTPATIENT)
Dept: INTERNAL MEDICINE | Facility: CLINIC | Age: 79
End: 2019-06-24

## 2019-06-24 DIAGNOSIS — I50.32 CHRONIC DIASTOLIC HEART FAILURE (HCC): Chronic | ICD-10-CM

## 2019-06-24 DIAGNOSIS — R60.0 BILATERAL LOWER EXTREMITY EDEMA: ICD-10-CM

## 2019-06-24 DIAGNOSIS — R53.81 DEBILITY: ICD-10-CM

## 2019-06-24 DIAGNOSIS — I10 ESSENTIAL HYPERTENSION: Chronic | ICD-10-CM

## 2019-06-24 DIAGNOSIS — J41.0 SIMPLE CHRONIC BRONCHITIS (HCC): ICD-10-CM

## 2019-06-24 DIAGNOSIS — B37.0 ORAL CANDIDIASIS: Primary | ICD-10-CM

## 2019-06-24 PROCEDURE — 99308 SBSQ NF CARE LOW MDM 20: CPT | Performed by: PHYSICIAN ASSISTANT

## 2019-06-25 ENCOUNTER — HOSPITAL ENCOUNTER (OUTPATIENT)
Dept: GENERAL RADIOLOGY | Facility: HOSPITAL | Age: 79
Discharge: HOME | End: 2019-06-25
Admitting: NURSE PRACTITIONER

## 2019-06-25 ENCOUNTER — OFFICE VISIT (OUTPATIENT)
Dept: PULMONOLOGY | Facility: CLINIC | Age: 79
End: 2019-06-25

## 2019-06-25 VITALS
SYSTOLIC BLOOD PRESSURE: 110 MMHG | RESPIRATION RATE: 18 BRPM | WEIGHT: 126.8 LBS | BODY MASS INDEX: 23.34 KG/M2 | DIASTOLIC BLOOD PRESSURE: 62 MMHG | HEIGHT: 62 IN | HEART RATE: 84 BPM | OXYGEN SATURATION: 96 %

## 2019-06-25 VITALS
TEMPERATURE: 98.8 F | SYSTOLIC BLOOD PRESSURE: 154 MMHG | HEART RATE: 88 BPM | RESPIRATION RATE: 22 BRPM | OXYGEN SATURATION: 97 % | DIASTOLIC BLOOD PRESSURE: 80 MMHG

## 2019-06-25 DIAGNOSIS — R06.02 SHORTNESS OF BREATH: ICD-10-CM

## 2019-06-25 DIAGNOSIS — J44.9 CHRONIC OBSTRUCTIVE PULMONARY DISEASE, UNSPECIFIED COPD TYPE (HCC): Primary | ICD-10-CM

## 2019-06-25 DIAGNOSIS — J96.12 CHRONIC RESPIRATORY FAILURE WITH HYPOXIA AND HYPERCAPNIA (HCC): ICD-10-CM

## 2019-06-25 DIAGNOSIS — G47.34 NOCTURNAL HYPOXEMIA: ICD-10-CM

## 2019-06-25 DIAGNOSIS — J96.11 CHRONIC RESPIRATORY FAILURE WITH HYPOXIA AND HYPERCAPNIA (HCC): ICD-10-CM

## 2019-06-25 PROCEDURE — 99214 OFFICE O/P EST MOD 30 MIN: CPT | Performed by: NURSE PRACTITIONER

## 2019-06-25 PROCEDURE — 71046 X-RAY EXAM CHEST 2 VIEWS: CPT

## 2019-06-25 RX ORDER — PREDNISONE 20 MG/1
20 TABLET ORAL DAILY
COMMUNITY
End: 2019-09-05

## 2019-06-28 ENCOUNTER — TELEPHONE (OUTPATIENT)
Dept: INTERNAL MEDICINE | Facility: CLINIC | Age: 79
End: 2019-06-28

## 2019-06-28 NOTE — PROGRESS NOTES
Nursing Home Progress Note        Luis Eduardo Perry, DO ?  Albina Acuna, RUBY ?  [address] [facility] [facility], [place] [address]  Phone: [phone]  Fax: [phone] Kip Cortez MD ?  Fred Cueva, DO ?  Norma Barber PA-C [x]   [address] [place] [address]  Phone: [phone]  Fax: [phone]     PATIENT NAME: Sadie Tijerina                                                                          YOB: 1940           DATE OF SERVICE: 06/24/2019  FACILITY:  [x] [place]   [] [place]   [] [facility]   [] [facility]   [] Other ______________________________________________________________________     CHIEF COMPLAINT:  Follow up on sore throat.      HISTORY OF PRESENT ILLNESS:   Ms. Tijerina is an 80 y/o female with history of COPD, GERD, HTN, and chronic low back pain.  She was admitted to [facility] for rehabilitation after acute exacerbation of COPD.  She presents today for follow up on her sore throat.  Patient reports that she has to wear BiPAP machine at night and it has caused sore throat in the past.  She denies any fever, cough, or increased shortness of breath.  She has noted some mild lower extremity edema.  She does spend the majority of her day sitting upright in a wheelchair.  She reports good appetite, sleep habits, and mood.  She anticipates going home later this week, as long as she feels strong enough.  Patient does live alone, but has family that is able to stop by daily.      PAST MEDICAL & SURGICAL HISTORY:   Past Medical History:   Diagnosis Date   • Arthritis    • Cancer (CMS/HCC)     uterine cancer (1981)   • COPD (chronic obstructive pulmonary disease) (CMS/[facility])    • Depression    • Elevated cholesterol    • GERD (gastroesophageal reflux disease)    • History of emphysema    • History of esophageal reflux    • History of recurrent UTI (urinary tract infection)    • History of renal calculi    • History of uterine cancer    • Hyperlipidemia    •  Hypertension    • Pneumonia 02/2019      Past Surgical History:   Procedure Laterality Date   • FOOT SURGERY     • HAND SURGERY     • HYSTERECTOMY           MEDICATIONS:  I have reviewed and reconciled the patients medication list in the patients chart at the skilled nursing facility today.      ALLERGIES:  Allergies   Allergen Reactions   • Morphine And Related Irritability         SOCIAL HISTORY:  Social History     Socioeconomic History   • Marital status:      Spouse name: Not on file   • Number of children: Not on file   • Years of education: Not on file   • Highest education level: Not on file   Tobacco Use   • Smoking status: Current Some Day Smoker     Packs/day: 0.25     Types: Cigarettes   • Smokeless tobacco: Never Used   Substance and Sexual Activity   • Alcohol use: No   • Drug use: No   • Sexual activity: Defer     Comment:        FAMILY HISTORY:  Family History   Problem Relation Age of Onset   • Cancer Mother    • Heart attack Father    • Arthritis Father    • Heart disease Father    • Diabetes Daughter    • Hyperlipidemia Daughter    • Migraines Daughter    • Heart disease Sister    • Diabetes Son    • Glaucoma Son        REVIEW OF SYSTEMS:  Review of Systems   Constitutional: Negative for activity change, appetite change, chills, fatigue and fever.   HENT: Positive for sore throat. Negative for congestion, postnasal drip, rhinorrhea, trouble swallowing and voice change.    Respiratory: Negative for cough, shortness of breath and wheezing.    Cardiovascular: Positive for leg swelling. Negative for chest pain.   Musculoskeletal: Positive for arthralgias.   Neurological: Negative for dizziness, weakness and light-headedness.   Psychiatric/Behavioral: Negative for sleep disturbance.          PHYSICAL EXAMINATION:     VITAL SIGNS:  /80   Pulse 88   Temp 98.8 °F (37.1 °C)   Resp 22   SpO2 97% Comment: fio2 21pm    Physical Exam   Constitutional: She appears well-developed and  well-nourished. No distress.   HENT:   Head: Normocephalic and atraumatic.   Mouth/Throat: Uvula is midline and mucous membranes are normal. Posterior oropharyngeal erythema present.   White exudate noted to the palate and oropharynx.  Consistent with candida.    Eyes: Conjunctivae and EOM are normal. Pupils are equal, round, and reactive to light.   Neck: Normal range of motion. Neck supple. No JVD present.   Cardiovascular: Normal rate, regular rhythm, normal heart sounds and intact distal pulses.   Pulmonary/Chest: Effort normal and breath sounds normal. No respiratory distress. She has no wheezes. She has no rales.   Abdominal: Soft. Bowel sounds are normal. She exhibits no distension. There is no tenderness.   Musculoskeletal: Normal range of motion. She exhibits edema (mild, bilateral LE edema.).   Neurological: She is alert.   Skin: Skin is warm and dry. Capillary refill takes less than 2 seconds. She is not diaphoretic.   Psychiatric: She has a normal mood and affect. Her behavior is normal.   Nursing note and vitals reviewed.      RECORDS REVIEW:   I have reviewed and interpreted the following lab test results  today.  6/3:  Na. 133, K. 4.3, BUN 24, Cr. 0.8, Hgb 12.0, Hct 37.0, WBC 15.9    ASSESSMENT     Diagnoses and all orders for this visit:    Oral candidiasis    Bilateral lower extremity edema    Essential hypertension    Chronic diastolic heart failure (CMS/HCC)    Simple chronic bronchitis (CMS/HCC)    Debility      PLAN  Oral candidiasis:  Continue with nystatin.  Monitor for any acute changes.  LE edema:  Most likely dependent.  Encouraged elevation.  Will start using compression hose as tolerated  HTN:  OK control.  Patient clinically stable upon presentation.  Continue to monitor.  CHF:  Patient clinically stable upon presentation.  Continue current medications.    COPD:  No reports of increased o2 demand.  Continue with current medications.  Patient reports that she feels ready to go home at the  end of this week.  Encouraged patient to discuss this with family as well as follow recommendations from PT/OT.   Debility:  Continue with assistance at SNF with ADLs.     [x]  Discussed Patient in detail with nursing/staff, addressed all needs today.     [x]  Plan of Care Reviewed   []  PT/OT Reviewed   []  Order Changes  []  Discharge Plans Reviewed  [x]  Advance Directive on file with Nursing Home.   [x]  POA on file with Nursing Home.    [x]  Code Status listed:  [x]  Full Code   []  DNR         Norma Barber PA-C.  6/27/2019

## 2019-07-01 ENCOUNTER — LAB REQUISITION (OUTPATIENT)
Dept: LAB | Facility: HOSPITAL | Age: 79
End: 2019-07-01

## 2019-07-01 ENCOUNTER — NURSING HOME (OUTPATIENT)
Dept: INTERNAL MEDICINE | Facility: CLINIC | Age: 79
End: 2019-07-01

## 2019-07-01 DIAGNOSIS — Z00.00 ROUTINE GENERAL MEDICAL EXAMINATION AT A HEALTH CARE FACILITY: ICD-10-CM

## 2019-07-01 DIAGNOSIS — J41.0 SIMPLE CHRONIC BRONCHITIS (HCC): ICD-10-CM

## 2019-07-01 DIAGNOSIS — I10 ESSENTIAL HYPERTENSION: Chronic | ICD-10-CM

## 2019-07-01 DIAGNOSIS — B37.0 ORAL CANDIDA: ICD-10-CM

## 2019-07-01 DIAGNOSIS — R60.0 BILATERAL LOWER EXTREMITY EDEMA: Primary | ICD-10-CM

## 2019-07-01 DIAGNOSIS — I50.32 CHRONIC DIASTOLIC HEART FAILURE (HCC): Chronic | ICD-10-CM

## 2019-07-01 LAB
ANION GAP SERPL CALCULATED.3IONS-SCNC: 18 MMOL/L (ref 5–15)
BASOPHILS # BLD AUTO: 0.03 10*3/MM3 (ref 0–0.2)
BASOPHILS NFR BLD AUTO: 0.2 % (ref 0–1.5)
BUN BLD-MCNC: 23 MG/DL (ref 8–23)
BUN/CREAT SERPL: 25.6 (ref 7–25)
CALCIUM SPEC-SCNC: 9.8 MG/DL (ref 8.6–10.5)
CHLORIDE SERPL-SCNC: 95 MMOL/L (ref 98–107)
CO2 SERPL-SCNC: 29 MMOL/L (ref 22–29)
CREAT BLD-MCNC: 0.9 MG/DL (ref 0.57–1)
DEPRECATED RDW RBC AUTO: 53.2 FL (ref 37–54)
EOSINOPHIL # BLD AUTO: 0.04 10*3/MM3 (ref 0–0.4)
EOSINOPHIL NFR BLD AUTO: 0.3 % (ref 0.3–6.2)
ERYTHROCYTE [DISTWIDTH] IN BLOOD BY AUTOMATED COUNT: 13.5 % (ref 12.3–15.4)
GFR SERPL CREATININE-BSD FRML MDRD: 60 ML/MIN/1.73
GLUCOSE BLD-MCNC: 58 MG/DL (ref 65–99)
HCT VFR BLD AUTO: 45.3 % (ref 34–46.6)
HGB BLD-MCNC: 12.4 G/DL (ref 12–15.9)
IMM GRANULOCYTES # BLD AUTO: 0.14 10*3/MM3 (ref 0–0.05)
IMM GRANULOCYTES NFR BLD AUTO: 1 % (ref 0–0.5)
LYMPHOCYTES # BLD AUTO: 1.01 10*3/MM3 (ref 0.7–3.1)
LYMPHOCYTES NFR BLD AUTO: 6.9 % (ref 19.6–45.3)
MCH RBC QN AUTO: 29 PG (ref 26.6–33)
MCHC RBC AUTO-ENTMCNC: 27.4 G/DL (ref 31.5–35.7)
MCV RBC AUTO: 105.8 FL (ref 79–97)
MONOCYTES # BLD AUTO: 0.42 10*3/MM3 (ref 0.1–0.9)
MONOCYTES NFR BLD AUTO: 2.9 % (ref 5–12)
NEUTROPHILS # BLD AUTO: 12.92 10*3/MM3 (ref 1.7–7)
NEUTROPHILS NFR BLD AUTO: 88.7 % (ref 42.7–76)
NRBC BLD AUTO-RTO: 0 /100 WBC (ref 0–0.2)
NT-PROBNP SERPL-MCNC: 423.9 PG/ML (ref 5–1800)
PLATELET # BLD AUTO: 212 10*3/MM3 (ref 140–450)
PMV BLD AUTO: 10.1 FL (ref 6–12)
POTASSIUM BLD-SCNC: 4.4 MMOL/L (ref 3.5–5.2)
RBC # BLD AUTO: 4.28 10*6/MM3 (ref 3.77–5.28)
SODIUM BLD-SCNC: 142 MMOL/L (ref 136–145)
WBC NRBC COR # BLD: 14.56 10*3/MM3 (ref 3.4–10.8)

## 2019-07-01 PROCEDURE — 99309 SBSQ NF CARE MODERATE MDM 30: CPT | Performed by: PHYSICIAN ASSISTANT

## 2019-07-01 PROCEDURE — 80048 BASIC METABOLIC PNL TOTAL CA: CPT

## 2019-07-01 PROCEDURE — 83880 ASSAY OF NATRIURETIC PEPTIDE: CPT

## 2019-07-01 PROCEDURE — 85025 COMPLETE CBC W/AUTO DIFF WBC: CPT

## 2019-07-02 ENCOUNTER — NURSING HOME (OUTPATIENT)
Dept: INTERNAL MEDICINE | Facility: CLINIC | Age: 79
End: 2019-07-02

## 2019-07-02 VITALS
RESPIRATION RATE: 18 BRPM | HEART RATE: 85 BPM | OXYGEN SATURATION: 98 % | SYSTOLIC BLOOD PRESSURE: 125 MMHG | DIASTOLIC BLOOD PRESSURE: 63 MMHG | TEMPERATURE: 97.3 F

## 2019-07-02 VITALS
WEIGHT: 129.4 LBS | OXYGEN SATURATION: 92 % | SYSTOLIC BLOOD PRESSURE: 125 MMHG | DIASTOLIC BLOOD PRESSURE: 63 MMHG | BODY MASS INDEX: 23.66 KG/M2 | TEMPERATURE: 97.3 F | RESPIRATION RATE: 18 BRPM | HEART RATE: 85 BPM

## 2019-07-02 DIAGNOSIS — I10 ESSENTIAL HYPERTENSION: ICD-10-CM

## 2019-07-02 DIAGNOSIS — J44.9 CHRONIC OBSTRUCTIVE PULMONARY DISEASE WITH HYPOXIA (HCC): ICD-10-CM

## 2019-07-02 DIAGNOSIS — R09.02 CHRONIC OBSTRUCTIVE PULMONARY DISEASE WITH HYPOXIA (HCC): ICD-10-CM

## 2019-07-02 DIAGNOSIS — D72.829 LEUKOCYTOSIS, UNSPECIFIED TYPE: ICD-10-CM

## 2019-07-02 DIAGNOSIS — E78.5 HYPERLIPIDEMIA, UNSPECIFIED HYPERLIPIDEMIA TYPE: ICD-10-CM

## 2019-07-02 DIAGNOSIS — I50.32 CHRONIC DIASTOLIC HEART FAILURE (HCC): ICD-10-CM

## 2019-07-02 DIAGNOSIS — R53.81 DEBILITY: ICD-10-CM

## 2019-07-02 DIAGNOSIS — J96.11 CHRONIC RESPIRATORY FAILURE WITH HYPOXIA (HCC): ICD-10-CM

## 2019-07-02 DIAGNOSIS — B37.0 ORAL CANDIDIASIS: Primary | ICD-10-CM

## 2019-07-02 DIAGNOSIS — K21.9 GASTROESOPHAGEAL REFLUX DISEASE WITHOUT ESOPHAGITIS: ICD-10-CM

## 2019-07-02 DIAGNOSIS — J41.0 SIMPLE CHRONIC BRONCHITIS (HCC): ICD-10-CM

## 2019-07-02 PROCEDURE — 99308 SBSQ NF CARE LOW MDM 20: CPT | Performed by: INTERNAL MEDICINE

## 2019-07-08 NOTE — PROGRESS NOTES
Nursing Home Progress Note        Luis Eduardo Perry DO ?  Albina Acuna, APRN ?  [address] [facility], [place] [address]  Phone: [phone]  Fax: [phone] Kip Cortez MD ?  Fred Cueva DO [x]   [address] [place] [address]  Phone: [phone]  Fax: [phone]     PATIENT NAME: Sadie Tijerina                                                                          YOB: 1940           DATE OF SERVICE: 07/02/2019   FACILITY:  [x] [place]   [] [place]   [] [facility]   [] [facility]   [] Other ______________________________________________________________________     CHIEF COMPLAINT:  COPD, Debility      HISTORY OF PRESENT ILLNESS:   Patient was resting comfortably this morning on wheelchair with no new complaints or concerns.  She has intermittently been forgetting that providers are seeing her and seems to be frustrated with her current placement condition.  She continues to be on prednisone taper for COPD and as a result, she appears to be having some lower extremity swelling.    PAST MEDICAL & SURGICAL HISTORY:   Past Medical History:   Diagnosis Date   • Arthritis    • Cancer (CMS/HCC)     uterine cancer (1981)   • COPD (chronic obstructive pulmonary disease) (CMS/[facility])    • Depression    • Elevated cholesterol    • GERD (gastroesophageal reflux disease)    • History of emphysema    • History of esophageal reflux    • History of recurrent UTI (urinary tract infection)    • History of renal calculi    • History of uterine cancer    • Hyperlipidemia    • Hypertension    • Pneumonia 02/2019      Past Surgical History:   Procedure Laterality Date   • FOOT SURGERY     • HAND SURGERY     • HYSTERECTOMY           MEDICATIONS:  I have reviewed and reconciled the patients medication list in the patients chart at the skilled nursing facility today.      ALLERGIES:  Allergies   Allergen Reactions   • Morphine And Related Irritability         SOCIAL HISTORY:  Social History      Socioeconomic History   • Marital status:      Spouse name: Not on file   • Number of children: Not on file   • Years of education: Not on file   • Highest education level: Not on file   Tobacco Use   • Smoking status: Current Some Day Smoker     Packs/day: 0.25     Types: Cigarettes   • Smokeless tobacco: Never Used   Substance and Sexual Activity   • Alcohol use: No   • Drug use: No   • Sexual activity: Defer     Comment:        FAMILY HISTORY:  Family History   Problem Relation Age of Onset   • Cancer Mother    • Heart attack Father    • Arthritis Father    • Heart disease Father    • Diabetes Daughter    • Hyperlipidemia Daughter    • Migraines Daughter    • Heart disease Sister    • Diabetes Son    • Glaucoma Son        REVIEW OF SYSTEMS:  Review of Systems   Constitutional: Positive for fatigue. Negative for chills and fever.   HENT: Negative for congestion, ear pain, rhinorrhea, sinus pressure and sore throat.    Eyes: Negative for visual disturbance.   Respiratory: Negative for cough, chest tightness, shortness of breath and wheezing.    Cardiovascular: Negative for chest pain, palpitations and leg swelling.   Gastrointestinal: Negative for abdominal pain, blood in stool, constipation, diarrhea, nausea and vomiting.   Endocrine: Negative for polydipsia and polyuria.   Genitourinary: Negative for dysuria and hematuria.   Musculoskeletal: Negative for arthralgias and back pain.   Skin: Negative for rash.   Neurological: Negative for dizziness, light-headedness, numbness and headaches.   Psychiatric/Behavioral: Positive for sleep disturbance. Negative for dysphoric mood. The patient is not nervous/anxious.           PHYSICAL EXAMINATION:     VITAL SIGNS:  /63   Pulse 85   Temp 97.3 °F (36.3 °C)   Resp 18   Wt 58.7 kg (129 lb 6.4 oz)   SpO2 92% Comment: 2L  BMI 23.66 kg/m²     Physical Exam   Constitutional: She is oriented to person, place, and time. She appears well-developed and  well-nourished.   Frail elderly female on nasal cannula oxygen   HENT:   Head: Normocephalic and atraumatic.   Mouth/Throat: Oropharynx is clear and moist. No oropharyngeal exudate.   Eyes: Conjunctivae and EOM are normal. Pupils are equal, round, and reactive to light. No scleral icterus.   Neck: Normal range of motion. Neck supple. No thyromegaly present.   Cardiovascular: Normal rate, regular rhythm and normal heart sounds. Exam reveals no gallop and no friction rub.   No murmur heard.  Pulmonary/Chest: Effort normal and breath sounds normal. No respiratory distress. She has no wheezes.   Abdominal: Soft. Bowel sounds are normal. She exhibits no distension. There is no tenderness.   Musculoskeletal: Normal range of motion.   Lymphadenopathy:     She has no cervical adenopathy.   Neurological: She is alert and oriented to person, place, and time.   Skin: Skin is warm and dry. No rash noted.   Psychiatric: She has a normal mood and affect. Her behavior is normal.   Nursing note and vitals reviewed.      RECORDS REVIEW:   Labs 7/2/2019: BNP, BMP in normal range.    ASSESSMENT     Diagnoses and all orders for this visit:    Oral candidiasis    Essential hypertension    Chronic diastolic heart failure (CMS/HCC)    Simple chronic bronchitis (CMS/HCC)    Debility    Hyperlipidemia, unspecified hyperlipidemia type    Gastroesophageal reflux disease without esophagitis    Chronic obstructive pulmonary disease with hypoxia (CMS/HCC)    Leukocytosis, unspecified type    Chronic respiratory failure with hypoxia (CMS/HCC)        PLAN    Lower extremity swelling  -Likely a result of prednisone at high-dose resulting in lower extremity swelling.  Patient was reassured that as she is tapered down on steroids this should self resolve.  Patient does not appear to have volume overload otherwise.    COPD with hypoxia  -   Treated in hospital respiratory function is gradually improving.  Continue nasal cannula oxygen.  Continue  physical therapy for strengthening.    Essential hypertension  -Stable on current medications    Chronic diastolic heart failure  -Controlled with current medications    Hyperlipidemia  -Stable on statin.    GERD  -Controlled with Prilosec    Tobacco abuse  -Not smoking while in facility.    Insomnia  -Continue Klonopin nightly.  Dose will be scheduled rather than as needed in the facility.  This should help with compliance for CPAP.           [x]  Discussed Patient in detail with nursing/staff, addressed all needs today.     [x]  Plan of Care Reviewed   []  PT/OT Reviewed   []  Order Changes  []  Discharge Plans Reviewed  [x]  Advance Directive on file with Nursing Home.   [x]  POA on file with Nursing Home.    [x]  Code Status listed and reviewed.          Fred Cueva DO.  7/7/2019

## 2019-07-09 NOTE — PROGRESS NOTES
Nursing Home Progress Note        Luis Eduardo Perry, DO ?  Albina Acuna, APRN ?  [address] [facility], [place] [address]  Phone: [phone]  Fax: [phone] Kip Cortez MD ?  Fred Cueva, DO ?  Norma Barber PA-C [x]   [address] [place] [address]  Phone: [phone]  Fax: [phone]     PATIENT NAME: Sadie Tijerina                                                                          YOB: 1940           DATE OF SERVICE: 07/01/2019  FACILITY:  [x] [place]   [] [place]   [] [facility]   [] [facility]   [] Other ______________________________________________________________________     CHIEF COMPLAINT:  Lower extremity edema.      HISTORY OF PRESENT ILLNESS:   Ms. Tijerina is an 80 y/o female with history of COPD, GERD, HTN, and chronic low back pain.  She was admitted to [facility] for rehabilitation after acute exacerbation of COPD.  She presents today with complaint of lower extremity edema.  Patient has noted both legs have been swelling mildly.  She has tried compression hose for this.  She does spend the majority of her day sitting upright in a wheelchair.  She has had some associated shortness of breath.  Past medical history significant for COPD.  She is on O2 therapy.  Denies any significant cough.     PAST MEDICAL & SURGICAL HISTORY:   Past Medical History:   Diagnosis Date   • Arthritis    • Cancer (CMS/HCC)     uterine cancer (1981)   • COPD (chronic obstructive pulmonary disease) (CMS/HCC)    • Depression    • Elevated cholesterol    • GERD (gastroesophageal reflux disease)    • History of emphysema    • History of esophageal reflux    • History of recurrent UTI (urinary tract infection)    • History of renal calculi    • History of uterine cancer    • Hyperlipidemia    • Hypertension    • Pneumonia 02/2019      Past Surgical History:   Procedure Laterality Date   • FOOT SURGERY     • HAND SURGERY     • HYSTERECTOMY           MEDICATIONS:  I  have reviewed and reconciled the patients medication list in the patients chart at the skilled nursing facility today.      ALLERGIES:  Allergies   Allergen Reactions   • Morphine And Related Irritability         SOCIAL HISTORY:  Social History     Socioeconomic History   • Marital status:      Spouse name: Not on file   • Number of children: Not on file   • Years of education: Not on file   • Highest education level: Not on file   Tobacco Use   • Smoking status: Current Some Day Smoker     Packs/day: 0.25     Types: Cigarettes   • Smokeless tobacco: Never Used   Substance and Sexual Activity   • Alcohol use: No   • Drug use: No   • Sexual activity: Defer     Comment:        FAMILY HISTORY:  Family History   Problem Relation Age of Onset   • Cancer Mother    • Heart attack Father    • Arthritis Father    • Heart disease Father    • Diabetes Daughter    • Hyperlipidemia Daughter    • Migraines Daughter    • Heart disease Sister    • Diabetes Son    • Glaucoma Son        REVIEW OF SYSTEMS:  Review of Systems   Constitutional: Negative for activity change, appetite change, chills, fatigue and fever.   HENT: Negative for congestion, postnasal drip, rhinorrhea, sore throat, trouble swallowing and voice change.    Respiratory: Negative for cough, shortness of breath and wheezing.    Cardiovascular: Positive for leg swelling. Negative for chest pain.   Musculoskeletal: Positive for arthralgias.   Neurological: Negative for dizziness, weakness and light-headedness.   Psychiatric/Behavioral: Negative for sleep disturbance.          PHYSICAL EXAMINATION:     VITAL SIGNS:  /63   Pulse 85   Temp 97.3 °F (36.3 °C)   Resp 18   SpO2 98%     Physical Exam   Constitutional: She appears well-developed and well-nourished. No distress.   No acute distress, sitting upright in wheelchair.   HENT:   Head: Normocephalic and atraumatic.   Mouth/Throat: Uvula is midline and mucous membranes are normal.   Eyes:  Conjunctivae and EOM are normal. Pupils are equal, round, and reactive to light.   Neck: Normal range of motion. Neck supple. No JVD present.   Cardiovascular: Normal rate, regular rhythm, normal heart sounds and intact distal pulses.   Pulmonary/Chest: Effort normal and breath sounds normal. No respiratory distress. She has no wheezes. She has no rales.   Abdominal: Soft. Bowel sounds are normal. She exhibits no distension. There is no tenderness.   Musculoskeletal: Normal range of motion. She exhibits edema (mild, bilateral LE edema.).   Neurological: She is alert.   Skin: Skin is warm and dry. Capillary refill takes less than 2 seconds. She is not diaphoretic.   Psychiatric: She has a normal mood and affect. Her behavior is normal.   Nursing note and vitals reviewed.      RECORDS REVIEW:   No recent labs reviewed.    ASSESSMENT     Diagnoses and all orders for this visit:    Bilateral lower extremity edema    Oral candida    Essential hypertension    Simple chronic bronchitis (CMS/HCC)    Chronic diastolic heart failure (CMS/HCC)      PLAN  Oral candidiasis: Resolved with use of nystatin.  LE edema: Continue compression hose as tolerated.  Recommend elevation as much as possible.  Will obtain a BNP, BMP, and CBC.  HTN: Controlled.  Continue to monitor.  CHF:  Patient clinically stable upon presentation.  Continue current medications.    COPD:  No reports of increased o2 demand.  Continue with current medications.       [x]  Discussed Patient in detail with nursing/staff, addressed all needs today.     [x]  Plan of Care Reviewed   []  PT/OT Reviewed   []  Order Changes  []  Discharge Plans Reviewed  [x]  Advance Directive on file with Nursing Home.   [x]  POA on file with Nursing Home.    [x]  Code Status listed:  [x]  Full Code   []  DNR         Norma Barber PA-C.  7/9/2019

## 2019-07-15 ENCOUNTER — NURSING HOME (OUTPATIENT)
Dept: INTERNAL MEDICINE | Facility: CLINIC | Age: 79
End: 2019-07-15

## 2019-07-15 VITALS
TEMPERATURE: 97.3 F | RESPIRATION RATE: 16 BRPM | OXYGEN SATURATION: 96 % | SYSTOLIC BLOOD PRESSURE: 137 MMHG | HEART RATE: 83 BPM | DIASTOLIC BLOOD PRESSURE: 60 MMHG

## 2019-07-15 DIAGNOSIS — I10 ESSENTIAL HYPERTENSION: Chronic | ICD-10-CM

## 2019-07-15 DIAGNOSIS — B37.0 ORAL CANDIDIASIS: Primary | ICD-10-CM

## 2019-07-15 DIAGNOSIS — D72.829 LEUKOCYTOSIS, UNSPECIFIED TYPE: ICD-10-CM

## 2019-07-15 DIAGNOSIS — I50.32 CHRONIC DIASTOLIC HEART FAILURE (HCC): Chronic | ICD-10-CM

## 2019-07-15 DIAGNOSIS — J41.0 SIMPLE CHRONIC BRONCHITIS (HCC): ICD-10-CM

## 2019-07-15 PROCEDURE — 99309 SBSQ NF CARE MODERATE MDM 30: CPT | Performed by: PHYSICIAN ASSISTANT

## 2019-07-17 DIAGNOSIS — J96.02 ACUTE RESPIRATORY FAILURE WITH HYPOXIA AND HYPERCAPNIA (HCC): Primary | ICD-10-CM

## 2019-07-17 DIAGNOSIS — J96.01 ACUTE RESPIRATORY FAILURE WITH HYPOXIA AND HYPERCAPNIA (HCC): Primary | ICD-10-CM

## 2019-07-17 DIAGNOSIS — J44.9 CHRONIC OBSTRUCTIVE PULMONARY DISEASE, UNSPECIFIED COPD TYPE (HCC): ICD-10-CM

## 2019-07-18 DIAGNOSIS — J96.90 RESPIRATORY FAILURE, UNSPECIFIED CHRONICITY, UNSPECIFIED WHETHER WITH HYPOXIA OR HYPERCAPNIA (HCC): ICD-10-CM

## 2019-07-18 DIAGNOSIS — J44.9 CHRONIC OBSTRUCTIVE PULMONARY DISEASE, UNSPECIFIED COPD TYPE (HCC): Primary | ICD-10-CM

## 2019-07-19 ENCOUNTER — OUTSIDE FACILITY SERVICE (OUTPATIENT)
Dept: INTERNAL MEDICINE | Facility: HOSPITAL | Age: 79
End: 2019-07-19

## 2019-07-20 NOTE — PROGRESS NOTES
Nursing Home Progress Note        Luis Eduardo Perry, DO ?  Albina Acuna, APRN ?  [address], [place] [address]  Phone: [phone]  Fax: [phone] Kip Cortez MD ?  Fred Cueva, DO ?  Norma Barber PA-C [x]   [address] [place] [address]  Phone: [phone]  Fax: [phone]     PATIENT NAME: Sadie Tijerina                                                                          YOB: 1940           DATE OF SERVICE: 07/15/2019  FACILITY:  [x] [place]   [] [facility]   [] [facility]   [] [facility]   [] Other ______________________________________________________________________     CHIEF COMPLAINT:  Sore throat and hoarse voice.       HISTORY OF PRESENT ILLNESS:   Ms. Tijerina is an 78 y/o female with history of COPD, GERD, HTN, and chronic low back pain.  She was admitted to [facility] for rehabilitation after acute exacerbation of COPD.  She presents today with complaint of sore throat and hoarse voice.  Patient has history of oral candidiasis.  She recently completed nystatin swish and swallow.  She feels that her symptoms are returning.  She has been compliant with her Pap machine.  She is also noted infrequent cough that is nonproductive.  Past medical history significant for COPD.  She is continued participation with PT/OT.  No reports of increased O2 demand.      PAST MEDICAL & SURGICAL HISTORY:   Past Medical History:   Diagnosis Date   • Arthritis    • Cancer (CMS/HCC)     uterine cancer (1981)   • COPD (chronic obstructive pulmonary disease) (CMS/[facility])    • Depression    • Elevated cholesterol    • GERD (gastroesophageal reflux disease)    • History of emphysema    • History of esophageal reflux    • History of recurrent UTI (urinary tract infection)    • History of renal calculi    • History of uterine cancer    • Hyperlipidemia    • Hypertension    • Pneumonia 02/2019      Past Surgical History:   Procedure Laterality Date   • FOOT SURGERY      • HAND SURGERY     • HYSTERECTOMY           MEDICATIONS:  I have reviewed and reconciled the patients medication list in the patients chart at the skilled nursing facility today.      ALLERGIES:  Allergies   Allergen Reactions   • Morphine And Related Irritability         SOCIAL HISTORY:  Social History     Socioeconomic History   • Marital status:      Spouse name: Not on file   • Number of children: Not on file   • Years of education: Not on file   • Highest education level: Not on file   Tobacco Use   • Smoking status: Current Some Day Smoker     Packs/day: 0.25     Types: Cigarettes   • Smokeless tobacco: Never Used   Substance and Sexual Activity   • Alcohol use: No   • Drug use: No   • Sexual activity: Defer     Comment:        FAMILY HISTORY:  Family History   Problem Relation Age of Onset   • Cancer Mother    • Heart attack Father    • Arthritis Father    • Heart disease Father    • Diabetes Daughter    • Hyperlipidemia Daughter    • Migraines Daughter    • Heart disease Sister    • Diabetes Son    • Glaucoma Son        REVIEW OF SYSTEMS:  Review of Systems   Constitutional: Positive for chills. Negative for activity change, appetite change, fatigue and fever.   HENT: Positive for sore throat and voice change. Negative for congestion, postnasal drip, rhinorrhea and trouble swallowing.    Respiratory: Positive for cough (infrequent). Negative for shortness of breath and wheezing.    Cardiovascular: Positive for leg swelling (improving). Negative for chest pain.   Musculoskeletal: Positive for arthralgias.   Neurological: Negative for dizziness, weakness and light-headedness.   Psychiatric/Behavioral: Negative for sleep disturbance.          PHYSICAL EXAMINATION:     VITAL SIGNS:  /60   Pulse 83   Temp 97.3 °F (36.3 °C)   Resp 16   SpO2 96%     Physical Exam   Constitutional: She appears well-developed and well-nourished. No distress.   No acute distress, sitting upright in  wheelchair. Patient on O2 via NC.    HENT:   Head: Normocephalic and atraumatic.   Mouth/Throat: Uvula is midline and mucous membranes are normal. No oropharyngeal exudate or posterior oropharyngeal edema.   Mild erythema noted to the posterior oropharynx.    Eyes: Conjunctivae and EOM are normal. Pupils are equal, round, and reactive to light.   Neck: Normal range of motion. Neck supple. No JVD present.   Cardiovascular: Normal rate, regular rhythm, normal heart sounds and intact distal pulses.   Pulmonary/Chest: Effort normal and breath sounds normal. No respiratory distress. She has no wheezes. She has no rales.   Abdominal: Soft. Bowel sounds are normal. She exhibits no distension. There is no tenderness.   Musculoskeletal: Normal range of motion. She exhibits edema (trace, bilateral).   Neurological: She is alert.   Skin: Skin is warm and dry. Capillary refill takes less than 2 seconds. She is not diaphoretic.   Psychiatric: She has a normal mood and affect. Her behavior is normal.   Nursing note and vitals reviewed.      RECORDS REVIEW:   7/10: Hemoglobin 11.2, hematocrit 34.9, WBC 13.6, PT 9.3/INR 0.9.    ASSESSMENT     Diagnoses and all orders for this visit:    Oral candidiasis    Leukocytosis, unspecified type    Essential hypertension    Simple chronic bronchitis (CMS/HCC)    Chronic diastolic heart failure (CMS/HCC)      PLAN  Oral candidiasis: Restart nystatin.  Will place referral to ENT.  Suspect some of patient's symptoms are related to use of nightly Pap machine.  Leukocytosis: Improving.  Most likely steroid-induced.  Steroid recently discontinued.  We will continue to monitor.  HTN: Controlled.  Continue to monitor.  CHF:  Patient clinically stable upon presentation.  Continue current medications.    COPD:  No reports of increased o2 demand.  Continue with current medications.       [x]  Discussed Patient in detail with nursing/staff, addressed all needs today.     [x]  Plan of Care Reviewed   []   PT/OT Reviewed   []  Order Changes  []  Discharge Plans Reviewed  [x]  Advance Directive on file with Nursing Home.   [x]  POA on file with Nursing Home.    [x]  Code Status listed:  [x]  Full Code   []  DNR         Norma Barber PA-C.  7/20/2019

## 2019-07-22 RX ORDER — ALBUTEROL SULFATE 2.5 MG/3ML
SOLUTION RESPIRATORY (INHALATION)
Qty: 150 ML | Refills: 11 | Status: SHIPPED | OUTPATIENT
Start: 2019-07-22 | End: 2019-12-05

## 2019-07-24 RX ORDER — IPRATROPIUM BROMIDE AND ALBUTEROL SULFATE 2.5; .5 MG/3ML; MG/3ML
3 SOLUTION RESPIRATORY (INHALATION) EVERY 6 HOURS PRN
Qty: 360 ML
Start: 2019-07-24 | End: 2020-01-07 | Stop reason: SDUPTHER

## 2019-07-30 ENCOUNTER — TELEPHONE (OUTPATIENT)
Dept: PULMONOLOGY | Facility: CLINIC | Age: 79
End: 2019-07-30

## 2019-07-30 DIAGNOSIS — J96.90 RESPIRATORY FAILURE, UNSPECIFIED CHRONICITY, UNSPECIFIED WHETHER WITH HYPOXIA OR HYPERCAPNIA (HCC): Primary | ICD-10-CM

## 2019-07-30 DIAGNOSIS — J44.9 CHRONIC OBSTRUCTIVE PULMONARY DISEASE, UNSPECIFIED COPD TYPE (HCC): ICD-10-CM

## 2019-07-30 NOTE — TELEPHONE ENCOUNTER
Patient informed that she needs 3 lpm oxygen at night when she sleeps. She states that they use [employer] for oxygen needs and [employer] for BiPAP but will eventually get both at [facility] when ready. I informed her I would be sending the orders to both companies for now.

## 2019-08-13 RX ORDER — CLONAZEPAM 0.5 MG/1
0.5 TABLET ORAL NIGHTLY
Qty: 30 TABLET | Refills: 2 | Status: SHIPPED | OUTPATIENT
Start: 2019-08-13 | End: 2019-09-05

## 2019-08-14 RX ORDER — CLONAZEPAM 0.5 MG/1
TABLET ORAL
Qty: 30 TABLET | Refills: 0 | Status: SHIPPED | OUTPATIENT
Start: 2019-08-14 | End: 2019-10-09 | Stop reason: SDUPTHER

## 2019-08-21 ENCOUNTER — TELEPHONE (OUTPATIENT)
Dept: INTERNAL MEDICINE | Facility: CLINIC | Age: 79
End: 2019-08-21

## 2019-08-21 RX ORDER — ALBUTEROL SULFATE 90 UG/1
1 AEROSOL, METERED RESPIRATORY (INHALATION) EVERY 4 HOURS PRN
Qty: 18 G | Refills: 3 | Status: SHIPPED | OUTPATIENT
Start: 2019-08-21 | End: 2020-01-01

## 2019-08-21 NOTE — TELEPHONE ENCOUNTER
Reading pulmonologist note patient supposed to stop taking prednisone after taking prednisone 5 mg daily for a week.

## 2019-08-21 NOTE — TELEPHONE ENCOUNTER
Pts daughter, Ava, left a VM today at 2:30PM requesting a refill on prednisone 5mg or 10mg for her breathing. In her chart there is a 20mg RX for prednisone from a previous provider, please advise?

## 2019-08-22 ENCOUNTER — HOSPITAL ENCOUNTER (EMERGENCY)
Facility: HOSPITAL | Age: 79
Discharge: HOME | End: 2019-08-22
Attending: EMERGENCY MEDICINE | Admitting: EMERGENCY MEDICINE

## 2019-08-22 ENCOUNTER — APPOINTMENT (OUTPATIENT)
Dept: GENERAL RADIOLOGY | Facility: HOSPITAL | Age: 79
End: 2019-08-22

## 2019-08-22 VITALS
RESPIRATION RATE: 18 BRPM | TEMPERATURE: 97.5 F | SYSTOLIC BLOOD PRESSURE: 135 MMHG | OXYGEN SATURATION: 100 % | DIASTOLIC BLOOD PRESSURE: 62 MMHG | WEIGHT: 119.2 LBS | BODY MASS INDEX: 21.94 KG/M2 | HEART RATE: 67 BPM | HEIGHT: 62 IN

## 2019-08-22 DIAGNOSIS — J44.1 COPD WITH ACUTE EXACERBATION (HCC): Primary | ICD-10-CM

## 2019-08-22 LAB
A-A DO2: ABNORMAL
ALBUMIN SERPL-MCNC: 3.8 G/DL (ref 3.5–5.2)
ALBUMIN/GLOB SERPL: 1 G/DL
ALP SERPL-CCNC: 76 U/L (ref 39–117)
ALT SERPL W P-5'-P-CCNC: 11 U/L (ref 1–33)
ANION GAP SERPL CALCULATED.3IONS-SCNC: 12.1 MMOL/L (ref 5–15)
ARTERIAL PATENCY WRIST A: POSITIVE
AST SERPL-CCNC: 19 U/L (ref 1–32)
ATMOSPHERIC PRESS: 733 MMHG
BASE EXCESS BLDA CALC-SCNC: 7.5 MMOL/L (ref 0–2)
BASOPHILS # BLD AUTO: 0.08 10*3/MM3 (ref 0–0.2)
BASOPHILS NFR BLD AUTO: 0.5 % (ref 0–1.5)
BDY SITE: ABNORMAL
BILIRUB SERPL-MCNC: 0.4 MG/DL (ref 0.2–1.2)
BUN BLD-MCNC: 15 MG/DL (ref 8–23)
BUN/CREAT SERPL: 15.2 (ref 7–25)
CALCIUM SPEC-SCNC: 9.6 MG/DL (ref 8.6–10.5)
CHLORIDE SERPL-SCNC: 95 MMOL/L (ref 98–107)
CO2 SERPL-SCNC: 28.9 MMOL/L (ref 22–29)
COHGB MFR BLD: 1.6 % (ref 0–2)
CREAT BLD-MCNC: 0.99 MG/DL (ref 0.57–1)
DEPRECATED RDW RBC AUTO: 44.3 FL (ref 37–54)
EOSINOPHIL # BLD AUTO: 0.36 10*3/MM3 (ref 0–0.4)
EOSINOPHIL NFR BLD AUTO: 2.5 % (ref 0.3–6.2)
ERYTHROCYTE [DISTWIDTH] IN BLOOD BY AUTOMATED COUNT: 13.5 % (ref 12.3–15.4)
GFR SERPL CREATININE-BSD FRML MDRD: 54 ML/MIN/1.73
GLOBULIN UR ELPH-MCNC: 4 GM/DL
GLUCOSE BLD-MCNC: 122 MG/DL (ref 65–99)
HCO3 BLDA-SCNC: 33.9 MMOL/L (ref 22–28)
HCT VFR BLD AUTO: 43 % (ref 34–46.6)
HCT VFR BLD CALC: 39.8 %
HGB BLD-MCNC: 13.4 G/DL (ref 12–15.9)
HGB BLDA-MCNC: 13 G/DL (ref 12–18)
HOLD SPECIMEN: NORMAL
HOROWITZ INDEX BLD+IHG-RTO: 21 %
IMM GRANULOCYTES # BLD AUTO: 0.04 10*3/MM3 (ref 0–0.05)
IMM GRANULOCYTES NFR BLD AUTO: 0.3 % (ref 0–0.5)
LYMPHOCYTES # BLD AUTO: 2.75 10*3/MM3 (ref 0.7–3.1)
LYMPHOCYTES NFR BLD AUTO: 18.8 % (ref 19.6–45.3)
MCH RBC QN AUTO: 28 PG (ref 26.6–33)
MCHC RBC AUTO-ENTMCNC: 31.2 G/DL (ref 31.5–35.7)
MCV RBC AUTO: 89.8 FL (ref 79–97)
METHGB BLD QL: 0.8 % (ref 0–1.5)
MODALITY: ABNORMAL
MONOCYTES # BLD AUTO: 0.94 10*3/MM3 (ref 0.1–0.9)
MONOCYTES NFR BLD AUTO: 6.4 % (ref 5–12)
NEUTROPHILS # BLD AUTO: 10.46 10*3/MM3 (ref 1.7–7)
NEUTROPHILS NFR BLD AUTO: 71.5 % (ref 42.7–76)
NOTE: ABNORMAL
NRBC BLD AUTO-RTO: 0 /100 WBC (ref 0–0.2)
NT-PROBNP SERPL-MCNC: 189 PG/ML (ref 5–1800)
OXYHGB MFR BLDV: 88.9 % (ref 94–99)
PCO2 BLDA: 54.8 MM HG (ref 35–45)
PCO2 TEMP ADJ BLD: ABNORMAL MM[HG]
PH BLDA: 7.4 PH UNITS (ref 7.3–7.5)
PH, TEMP CORRECTED: ABNORMAL
PLATELET # BLD AUTO: 248 10*3/MM3 (ref 140–450)
PMV BLD AUTO: 9.1 FL (ref 6–12)
PO2 BLDA: 57.3 MM HG (ref 75–100)
PO2 TEMP ADJ BLD: ABNORMAL MM[HG]
POTASSIUM BLD-SCNC: 4.6 MMOL/L (ref 3.5–5.2)
PROT SERPL-MCNC: 7.8 G/DL (ref 6–8.5)
RBC # BLD AUTO: 4.79 10*6/MM3 (ref 3.77–5.28)
SAO2 % BLDCOA: 91.1 % (ref 94–100)
SODIUM BLD-SCNC: 136 MMOL/L (ref 136–145)
TROPONIN T SERPL-MCNC: <0.01 NG/ML (ref 0–0.03)
VENTILATOR MODE: ABNORMAL
WBC NRBC COR # BLD: 14.63 10*3/MM3 (ref 3.4–10.8)
WHOLE BLOOD HOLD SPECIMEN: NORMAL
WHOLE BLOOD HOLD SPECIMEN: NORMAL

## 2019-08-22 PROCEDURE — 85025 COMPLETE CBC W/AUTO DIFF WBC: CPT

## 2019-08-22 PROCEDURE — 82375 ASSAY CARBOXYHB QUANT: CPT

## 2019-08-22 PROCEDURE — 94799 UNLISTED PULMONARY SVC/PX: CPT

## 2019-08-22 PROCEDURE — 94640 AIRWAY INHALATION TREATMENT: CPT

## 2019-08-22 PROCEDURE — 99283 EMERGENCY DEPT VISIT LOW MDM: CPT

## 2019-08-22 PROCEDURE — 84484 ASSAY OF TROPONIN QUANT: CPT

## 2019-08-22 PROCEDURE — 96374 THER/PROPH/DIAG INJ IV PUSH: CPT

## 2019-08-22 PROCEDURE — 36600 WITHDRAWAL OF ARTERIAL BLOOD: CPT

## 2019-08-22 PROCEDURE — 83880 ASSAY OF NATRIURETIC PEPTIDE: CPT

## 2019-08-22 PROCEDURE — 71046 X-RAY EXAM CHEST 2 VIEWS: CPT

## 2019-08-22 PROCEDURE — 25010000002 METHYLPREDNISOLONE PER 125 MG: Performed by: PHYSICIAN ASSISTANT

## 2019-08-22 PROCEDURE — 80053 COMPREHEN METABOLIC PANEL: CPT

## 2019-08-22 PROCEDURE — 93005 ELECTROCARDIOGRAM TRACING: CPT | Performed by: PHYSICIAN ASSISTANT

## 2019-08-22 PROCEDURE — 82805 BLOOD GASES W/O2 SATURATION: CPT

## 2019-08-22 PROCEDURE — 83050 HGB METHEMOGLOBIN QUAN: CPT

## 2019-08-22 RX ORDER — PREDNISONE 20 MG/1
TABLET ORAL
Qty: 10 TABLET | Refills: 0 | Status: SHIPPED | OUTPATIENT
Start: 2019-08-22 | End: 2019-09-05

## 2019-08-22 RX ORDER — METHYLPREDNISOLONE SODIUM SUCCINATE 125 MG/2ML
125 INJECTION, POWDER, LYOPHILIZED, FOR SOLUTION INTRAMUSCULAR; INTRAVENOUS ONCE
Status: COMPLETED | OUTPATIENT
Start: 2019-08-22 | End: 2019-08-22

## 2019-08-22 RX ORDER — IPRATROPIUM BROMIDE AND ALBUTEROL SULFATE 2.5; .5 MG/3ML; MG/3ML
3 SOLUTION RESPIRATORY (INHALATION) ONCE
Status: COMPLETED | OUTPATIENT
Start: 2019-08-22 | End: 2019-08-22

## 2019-08-22 RX ORDER — SODIUM CHLORIDE 0.9 % (FLUSH) 0.9 %
10 SYRINGE (ML) INJECTION AS NEEDED
Status: DISCONTINUED | OUTPATIENT
Start: 2019-08-22 | End: 2019-08-22 | Stop reason: HOSPADM

## 2019-08-22 RX ORDER — CEFUROXIME AXETIL 500 MG/1
500 TABLET ORAL 2 TIMES DAILY
Qty: 10 TABLET | Refills: 0 | Status: SHIPPED | OUTPATIENT
Start: 2019-08-22 | End: 2019-08-27

## 2019-08-22 RX ORDER — AZITHROMYCIN 250 MG/1
TABLET, FILM COATED ORAL
Qty: 6 TABLET | Refills: 0 | Status: SHIPPED | OUTPATIENT
Start: 2019-08-22 | End: 2019-09-05

## 2019-08-22 RX ADMIN — IPRATROPIUM BROMIDE AND ALBUTEROL SULFATE 3 ML: .5; 3 SOLUTION RESPIRATORY (INHALATION) at 17:10

## 2019-08-22 RX ADMIN — METHYLPREDNISOLONE SODIUM SUCCINATE 125 MG: 125 INJECTION, POWDER, FOR SOLUTION INTRAMUSCULAR; INTRAVENOUS at 17:20

## 2019-08-22 NOTE — ED PROVIDER NOTES
"Subjective   This patient has a history of COPD.  She uses oxygen 2 L per nasal cannula continuously at home.  She last used [employer] around 2:00 PM.  She states for the past few days she has had shortness of breath and \"smothering\" she states she had a cough that is productive with yellow sputum.  She denies hemoptysis.  No chest pain.  No swelling in her bilateral lower extremities.  She called her PCP yesterday who called her in prednisone for this however she is not getting any better.  No fever.  No acute distress here.            Review of Systems   Respiratory: Positive for cough, shortness of breath and wheezing.    Cardiovascular: Negative for chest pain and leg swelling.   All other systems reviewed and are negative.      Past Medical History:   Diagnosis Date   • Arthritis    • Cancer (CMS/[facility])     uterine cancer (1981)   • COPD (chronic obstructive pulmonary disease) (CMS/[facility])    • Depression    • Elevated cholesterol    • GERD (gastroesophageal reflux disease)    • History of emphysema    • History of esophageal reflux    • History of recurrent UTI (urinary tract infection)    • History of renal calculi    • History of uterine cancer    • Hyperlipidemia    • Hypertension    • Pneumonia 02/2019       Allergies   Allergen Reactions   • Morphine And Related Irritability       Past Surgical History:   Procedure Laterality Date   • FOOT SURGERY     • HAND SURGERY     • HYSTERECTOMY         Family History   Problem Relation Age of Onset   • Cancer Mother    • Heart attack Father    • Arthritis Father    • Heart disease Father    • Diabetes Daughter    • Hyperlipidemia Daughter    • Migraines Daughter    • Heart disease Sister    • Diabetes Son    • Glaucoma Son        Social History     Socioeconomic History   • Marital status:      Spouse name: Not on file   • Number of children: Not on file   • Years of education: Not on file   • Highest education level: Not on file   Tobacco Use   • Smoking status: " Current Some Day Smoker     Packs/day: 0.25     Types: Cigarettes   • Smokeless tobacco: Never Used   Substance and Sexual Activity   • Alcohol use: No   • Drug use: No   • Sexual activity: Defer     Comment:            Objective   Physical Exam   Constitutional: She is oriented to person, place, and time. She appears well-developed and well-nourished.  Non-toxic appearance. She does not appear ill. No distress.   HENT:   Head: Normocephalic and atraumatic.   Neck: Normal range of motion.   Cardiovascular: Normal rate and regular rhythm.   Pulmonary/Chest: Effort normal. She has decreased breath sounds. She has wheezes.   Abdominal: Soft. There is no tenderness.   Musculoskeletal: Normal range of motion.        Right lower leg: She exhibits no tenderness and no edema.        Left lower leg: She exhibits no tenderness and no edema.   Neurological: She is alert and oriented to person, place, and time.   Skin: Skin is warm and dry. Capillary refill takes less than 2 seconds.   Psychiatric: She has a normal mood and affect. Her behavior is normal.       Procedures           ED Course                  MDM      Final diagnoses:   COPD with acute exacerbation (CMS/[facility])            Santiago Villavicencio PA-C  08/22/19 2174

## 2019-08-22 NOTE — DISCHARGE INSTRUCTIONS
Please take the steroids that Dr. Wolff called in for you beginning tomorrow. Return to the ER for worsening symptoms.

## 2019-08-27 RX ORDER — PROPRANOLOL HYDROCHLORIDE 40 MG/1
TABLET ORAL
Qty: 90 TABLET | Refills: 0 | Status: SHIPPED | OUTPATIENT
Start: 2019-08-27 | End: 2019-09-05

## 2019-09-05 ENCOUNTER — OFFICE VISIT (OUTPATIENT)
Dept: INTERNAL MEDICINE | Facility: CLINIC | Age: 79
End: 2019-09-05

## 2019-09-05 VITALS
BODY MASS INDEX: 22.26 KG/M2 | DIASTOLIC BLOOD PRESSURE: 86 MMHG | OXYGEN SATURATION: 97 % | HEART RATE: 76 BPM | HEIGHT: 62 IN | SYSTOLIC BLOOD PRESSURE: 126 MMHG | WEIGHT: 121 LBS | RESPIRATION RATE: 15 BRPM | TEMPERATURE: 98.2 F

## 2019-09-05 DIAGNOSIS — J44.1 COPD WITH ACUTE EXACERBATION (HCC): Primary | ICD-10-CM

## 2019-09-05 DIAGNOSIS — I10 ESSENTIAL HYPERTENSION: ICD-10-CM

## 2019-09-05 DIAGNOSIS — R26.81 UNSTEADY GAIT: ICD-10-CM
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
## 2019-09-05 DIAGNOSIS — S50.312A ABRASION OF LEFT ELBOW, INITIAL ENCOUNTER: ICD-10-CM

## 2019-09-05 PROCEDURE — 90653 IIV ADJUVANT VACCINE IM: CPT | Performed by: NURSE PRACTITIONER

## 2019-09-05 PROCEDURE — 90471 IMMUNIZATION ADMIN: CPT | Performed by: NURSE PRACTITIONER

## 2019-09-05 PROCEDURE — 99214 OFFICE O/P EST MOD 30 MIN: CPT | Performed by: NURSE PRACTITIONER

## 2019-09-05 RX ORDER — SERTRALINE HYDROCHLORIDE 100 MG/1
TABLET, FILM COATED ORAL
COMMUNITY
End: 2019-09-17

## 2019-09-05 RX ORDER — PREDNISONE 10 MG/1
10 TABLET ORAL DAILY
Qty: 5 TABLET | Refills: 0 | Status: SHIPPED | OUTPATIENT
Start: 2019-09-05 | End: 2019-09-10

## 2019-09-05 RX ORDER — SERTRALINE HYDROCHLORIDE 100 MG/1
100 TABLET, FILM COATED ORAL
Qty: 90 TABLET | Refills: 3 | Status: SHIPPED | OUTPATIENT
Start: 2019-09-05 | End: 2019-12-05 | Stop reason: SDUPTHER

## 2019-09-05 RX ORDER — PROPRANOLOL HYDROCHLORIDE 40 MG/1
40 TABLET ORAL 3 TIMES DAILY
COMMUNITY
End: 2019-09-05

## 2019-09-05 RX ORDER — BUDESONIDE AND FORMOTEROL FUMARATE DIHYDRATE 160; 4.5 UG/1; UG/1
2 AEROSOL RESPIRATORY (INHALATION)
Qty: 10.2 G | Refills: 4 | Status: SHIPPED | OUTPATIENT
Start: 2019-09-05 | End: 2020-01-27

## 2019-09-05 RX ORDER — OMEPRAZOLE 40 MG/1
40 CAPSULE, DELAYED RELEASE ORAL DAILY
Qty: 90 CAPSULE | Refills: 3 | Status: SHIPPED | OUTPATIENT
Start: 2019-09-05 | End: 2019-12-05 | Stop reason: SDUPTHER

## 2019-09-05 RX ORDER — PROPRANOLOL HYDROCHLORIDE 40 MG/1
40 TABLET ORAL 3 TIMES DAILY
Qty: 270 TABLET | Refills: 3 | Status: SHIPPED | OUTPATIENT
Start: 2019-09-05 | End: 2019-10-20 | Stop reason: SDUPTHER

## 2019-09-05 RX ORDER — PROPRANOLOL HYDROCHLORIDE 40 MG/1
40 TABLET ORAL 3 TIMES DAILY
Qty: 90 TABLET | Refills: 3 | Status: SHIPPED | OUTPATIENT
Start: 2019-09-05 | End: 2019-09-05

## 2019-09-05 NOTE — PROGRESS NOTES
Chief Complaint / Reason:      Chief Complaint   Patient presents with   • Shortness of Breath     er fup.        Subjective     HPI  Patient presents today for ER follow-up regarding COPD exacerbation.  She was seen at [facility] emergency department 8/22/19. patient uses home O2 and does have nebs in which she has tried.  Does have ongoing shortness of breath and has had a productive cough.  Denies chest pain, heart palpitations fever chills.  Patient did contact PCP the day prior to coming to the ER and was started on steroids but did not have any relief of her symptoms.  Patient did have BNP troponin and CBC ABG CMP and chest x-ray.  The chest x-ray only showed chronic appearing findings.  Patient was prescribed oral antibiotics.  Patient states she did have wheezing and she recently fell at home prior to this appointment today.  Denies normal range of motion but states discomfort with extending as it pulls the skin.  She denies any loss of consciousness but does have an abrasion on left arm.  She denies hitting head but states that she is unsure if she hit her ribs or they are sore on the left side but she has no bruising.  She is accompanied by her daughter.   History taken from: patient/daughter    PMH/FH/Social History were reviewed and updated appropriately in the electronic medical record.   Past Medical History:   Diagnosis Date   • Arthritis    • Cancer (CMS/HCC)     uterine cancer (1981)   • COPD (chronic obstructive pulmonary disease) (CMS/HCC)    • Depression    • Elevated cholesterol    • GERD (gastroesophageal reflux disease)    • History of emphysema    • History of esophageal reflux    • History of recurrent UTI (urinary tract infection)    • History of renal calculi    • History of uterine cancer    • Hyperlipidemia    • Hypertension    • Pneumonia 02/2019     Past Surgical History:   Procedure Laterality Date   • FOOT SURGERY     • HAND SURGERY     • HYSTERECTOMY       Social  History     Socioeconomic History   • Marital status:      Spouse name: Not on file   • Number of children: Not on file   • Years of education: Not on file   • Highest education level: Not on file   Tobacco Use   • Smoking status: Current Some Day Smoker     Packs/day: 0.25     Types: Cigarettes   • Smokeless tobacco: Never Used   Substance and Sexual Activity   • Alcohol use: No   • Drug use: No   • Sexual activity: Defer     Comment:      Family History   Problem Relation Age of Onset   • Cancer Mother    • Heart attack Father    • Arthritis Father    • Heart disease Father    • Diabetes Daughter    • Hyperlipidemia Daughter    • Migraines Daughter    • Heart disease Sister    • Diabetes Son    • Glaucoma Son        Review of Systems:   Review of Systems   Constitutional: Positive for activity change, appetite change, fatigue and unexpected weight loss. Negative for chills.   HENT: Negative for congestion, postnasal drip, rhinorrhea, sore throat and trouble swallowing.    Respiratory: Positive for cough, shortness of breath (intermittent) and wheezing. Negative for chest tightness.    Cardiovascular: Negative for chest pain, palpitations and leg swelling.   Musculoskeletal: Positive for arthralgias, gait problem and myalgias.   Skin: Positive for color change and bruise.        Abrasion   Allergic/Immunologic: Positive for environmental allergies.   Neurological: Positive for weakness (generalized ). Negative for dizziness and headache.   Psychiatric/Behavioral: Positive for sleep disturbance.         All other systems were reviewed and are negative.  Exceptions are noted in the subjective or above.      Objective     Vital Signs  Vitals:    09/05/19 1308   BP: 126/86   Pulse: 76   Resp: 15   Temp: 98.2 °F (36.8 °C)   SpO2: 97%       Body mass index is 22.13 kg/m².    Physical Exam   Constitutional: She is oriented to person, place, and time. She appears well-developed and well-nourished. No  distress.   HENT:   Head: Normocephalic and atraumatic.   Eyes: Conjunctivae and EOM are normal. Pupils are equal, round, and reactive to light.   Neck: Normal range of motion. Neck supple. No JVD present.   Cardiovascular: Normal rate, regular rhythm and normal heart sounds. Exam reveals no gallop and no friction rub.   No murmur heard.  Pulmonary/Chest: Effort normal. No respiratory distress. She has wheezes. She has no rales. She exhibits tenderness.       Abdominal: Soft. Bowel sounds are normal.   Lymphadenopathy:     She has no cervical adenopathy.   Neurological: She is alert and oriented to person, place, and time. Gait (Uses assistive device for ambulation) abnormal.   Skin: Skin is warm and dry. Capillary refill takes less than 2 seconds. Abrasion and bruising noted. She is not diaphoretic. There is erythema.        Psychiatric: She has a normal mood and affect. Her behavior is normal.   Nursing note and vitals reviewed.           Results Review:    I reviewed the patient's previous clinical results.       Medication Review:     Current Outpatient Medications:   •  albuterol (PROVENTIL) (2.5 MG/3ML) 0.083% nebulizer solution, INHALE 1 vial via nebulizer  EVERY FOUR HOURS AS NEEDED FOR WHEEZING, Disp: 150 mL, Rfl: 11  •  albuterol sulfate  (90 Base) MCG/ACT inhaler, Inhale 1 puff Every 4 (Four) Hours As Needed for Shortness of Air., Disp: 18 g, Rfl: 3  •  budesonide-formoterol (SYMBICORT) 160-4.5 MCG/ACT inhaler, Inhale 2 puffs 2 (Two) Times a Day., Disp: 10.2 g, Rfl: 4  •  clonazePAM (KlonoPIN) 0.5 MG tablet, TAKE 1 TABLET BY MOUTH AT BEDTIME AS NEEDED for seizures, Disp: 30 tablet, Rfl: 0  •  estrogens, conjugated, (PREMARIN) 0.625 MG tablet, Take 1 tablet by mouth Daily. 200001, Disp: 90 tablet, Rfl: 3  •  fluticasone (FLONASE) 50 MCG/ACT nasal spray, 2 sprays by Each Nare route Daily., Disp: , Rfl:   •  guaiFENesin (MUCINEX) 600 MG 12 hr tablet, Take 1 tablet by mouth 2 (Two) Times a Day., Disp:  20 tablet, Rfl: 0  •  ipratropium-albuterol (DUO-NEB) 0.5-2.5 mg/3 ml nebulizer, Take 3 mL by nebulization Every 6 (Six) Hours As Needed for Wheezing or Shortness of Air., Disp: 360 mL, Rfl:   •  omeprazole (priLOSEC) 40 MG capsule, Take 1 capsule by mouth Daily., Disp: 90 capsule, Rfl: 3  •  pravastatin (PRAVACHOL) 20 MG tablet, Take 1 tablet by mouth Every Evening., Disp: 30 tablet, Rfl: 5  •  promethazine (PHENERGAN) 12.5 MG tablet, Take 1 tablet by mouth Every 8 (Eight) Hours As Needed for Nausea or Vomiting., Disp: 10 tablet, Rfl: 0  •  propranolol (INDERAL) 40 MG tablet, Take 1 tablet by mouth 3 (Three) Times a Day., Disp: 270 tablet, Rfl: 3  •  saline (AYR) gel nasal gel, Apply topically to the appropriate area as directed Every 1 (One) Hour As Needed for nasal irritation, Disp: 22 g, Rfl: 0  •  sertraline (ZOLOFT) 100 MG tablet, Take 1 tablet by mouth every night at bedtime., Disp: 90 tablet, Rfl: 3  •  SPIRIVA HANDIHALER 18 MCG per inhalation capsule, place 1 capsule into inhaler and inhale ONCE daily, Disp: 30 capsule, Rfl: 4  •  traMADol (ULTRAM) 50 MG tablet, Take 1 tablet by mouth Every 8 (Eight) Hours As Needed (pain)., Disp: 10 tablet, Rfl: 0    Assessment/Plan   Sadie was seen today for shortness of breath.    Diagnoses and all orders for this visit:    COPD with acute exacerbation (CMS/[facility])  Stable without worsening signs and symptoms.  Recommend patient cough deep breathe and prevent pneumonia stay up-to-date on vaccinations.Discussed prevention of COPD exacerbation  Abrasion of left elbow, initial encounter  -     Dressing Border Mepilex CHRISTIANA [id number]  Discussed worsening signs and symptoms may need imaging if symptoms do not improve.  Fall, initial encounter  Discussed fall prevention and safety with patient.  She stated understanding  Essential hypertension  Stable without worsening signs and symptoms on medication  Unsteady gait  Discussed fall prevention and safety with patient and daughter  Other  orders  -     Discontinue: propranolol (INDERAL) 40 MG tablet; Take 1 tablet by mouth 3 (Three) Times a Day.  -     omeprazole (priLOSEC) 40 MG capsule; Take 1 capsule by mouth Daily.  -     sertraline (ZOLOFT) 100 MG tablet; Take 1 tablet by mouth every night at bedtime.  -     estrogens, conjugated, (PREMARIN) 0.625 MG tablet; Take 1 tablet by mouth Daily. 200001  -     budesonide-formoterol (SYMBICORT) 160-4.5 MCG/ACT inhaler; Inhale 2 puffs 2 (Two) Times a Day.  -     propranolol (INDERAL) 40 MG tablet; Take 1 tablet by mouth 3 (Three) Times a Day.  -     Fluad Tri 65yr (0109-6517)  -     predniSONE (DELTASONE) 10 MG tablet; Take 1 tablet by mouth Daily for 5 days.        Return if symptoms worsen or fail to improve, for Follow up with Dr. Vermeesch.    Jasmin Nayak, APRN  09/05/2019

## 2019-09-25 ENCOUNTER — OFFICE VISIT (OUTPATIENT)
Dept: PULMONOLOGY | Facility: CLINIC | Age: 79
End: 2019-09-25

## 2019-09-25 VITALS
WEIGHT: 121 LBS | BODY MASS INDEX: 22.26 KG/M2 | RESPIRATION RATE: 16 BRPM | HEART RATE: 73 BPM | OXYGEN SATURATION: 94 % | HEIGHT: 62 IN | SYSTOLIC BLOOD PRESSURE: 122 MMHG | DIASTOLIC BLOOD PRESSURE: 70 MMHG

## 2019-09-25 DIAGNOSIS — J96.90 RESPIRATORY FAILURE, UNSPECIFIED CHRONICITY, UNSPECIFIED WHETHER WITH HYPOXIA OR HYPERCAPNIA (HCC): ICD-10-CM

## 2019-09-25 DIAGNOSIS — R09.02 HYPOXIA: ICD-10-CM

## 2019-09-25 DIAGNOSIS — J30.89 OTHER ALLERGIC RHINITIS: ICD-10-CM

## 2019-09-25 DIAGNOSIS — J44.9 CHRONIC OBSTRUCTIVE PULMONARY DISEASE, UNSPECIFIED COPD TYPE (HCC): ICD-10-CM

## 2019-09-25 DIAGNOSIS — R06.02 SOB (SHORTNESS OF BREATH): Primary | ICD-10-CM

## 2019-09-25 PROCEDURE — 99214 OFFICE O/P EST MOD 30 MIN: CPT | Performed by: INTERNAL MEDICINE

## 2019-09-25 RX ORDER — PREDNISONE 1 MG/1
5 TABLET ORAL DAILY
Qty: 180 TABLET | Refills: 1 | Status: SHIPPED | OUTPATIENT
Start: 2019-09-25 | End: 2020-03-23

## 2019-09-25 NOTE — PROGRESS NOTES
"Chief Complaint   Patient presents with   • Follow-up   • Breathing Problem       Subjective   Sadie Tijerina is a 79 y.o. female.     History of Present Illness   Patient comes today for follow up of shortness of breath and COPD.     Patient says that her symptoms have been stable since the last clinic visit. she reports no recent exacerbations.     Patient is using medications, as prescribed. Exercise tolerance has also remained stable.     The patient says that she is using oxygen as prescribed and feels that it appears to be helping some of her symptoms. She is having issues traveling with the portable tanks. She says that they \"are too heavy\".     Patient also has chronic respiratory failure and she doesn't report any issues with BiPAP. Patient says that she is having issues no issues with her current mask.     Patient says that she has been using her nasal sprays on a regular basis and describes no significant ongoing issues other than occasional congestion.       The following portions of the patient's history were reviewed and updated as appropriate: allergies, current medications, past family history, past medical history, past social history and past surgical history.    Review of Systems   HENT: Positive for sore throat. Negative for sinus pressure and sneezing.    Respiratory: Positive for cough, shortness of breath and wheezing.        Objective   Visit Vitals  /70   Pulse 73   Resp 16   Ht 157.5 cm (62.01\")   Wt 54.9 kg (121 lb)   SpO2 94%   BMI 22.13 kg/m²   88% on RA at rest.   94% on 2 LPM at rest.     Physical Exam   Constitutional: She is oriented to person, place, and time. She appears well-developed and well-nourished.   HENT:   Head: Normocephalic and atraumatic.   Dentures.    Eyes: EOM are normal.   Neck: Neck supple.   Cardiovascular: Normal rate and regular rhythm.   Pulmonary/Chest: Effort normal. No respiratory distress.   Somewhat decreased A/E without wheezing noted.  Minimal " crackles in bases.    Musculoskeletal: She exhibits no edema.   In wheelchair.   Neurological: She is alert and oriented to person, place, and time.   Skin: Skin is warm and dry.   Psychiatric: She has a normal mood and affect.   Vitals reviewed.      Assessment/Plan   Sadie was seen today for follow-up and breathing problem.    Diagnoses and all orders for this visit:    SOB (shortness of breath)  -     Oxygen Therapy    Chronic obstructive pulmonary disease, unspecified COPD type (CMS/HCC)    Respiratory failure, unspecified chronicity, unspecified whether with hypoxia or hypercapnia (CMS/HCC)    Hypoxia  -     Oxygen Therapy    Other allergic rhinitis    Other orders  -     predniSONE (DELTASONE) 5 MG tablet; Take 1 tablet by mouth Daily for 180 days.         Return in about 6 months (around 3/25/2020) for Recheck, For Jaye.    DISCUSSION (if any):  Last PFTs showed severe COPD. April 2019    We have reviewed her pulmonary medications in great detail.    Any needed adjustments to her pulmonary medications, either for clinical or insurance coverage reasons, have been made and are reflected in the orders.    Compliance with medications stressed.     Side effects of prescribed medications discussed with the patient    Since she is having difficulty carrying portable tanks, I believe she would benefit from portable oxygen concentrator as it would provide for better mobility that is definitely needed in this patient with hypoxemia and underlying lung disease.    Patient was advised to continue her nasal spray, especially given improvement in symptoms overall.    Continue treatment with BiPAP at a pressure of 10/4, with a full-face mask.    Due to severity of COPD and the fact that without the prednisone, she starts having more shortness of breath and also has some cough.    We will start her on Prednisone 5 mg PO Daily for now.    The patient says that she is up-to-date with influenza and pneumonia vaccinations.        Dictated utilizing Dragon dictation.    This document was electronically signed by Ce Mojica MD on 09/25/19 at 3:07 PM

## 2019-10-09 RX ORDER — CLONAZEPAM 0.5 MG/1
TABLET ORAL
Qty: 30 TABLET | Refills: 0 | Status: SHIPPED | OUTPATIENT
Start: 2019-10-09 | End: 2019-12-05 | Stop reason: SDUPTHER

## 2019-10-09 RX ORDER — TRAMADOL HYDROCHLORIDE 50 MG/1
50 TABLET ORAL EVERY 8 HOURS PRN
Qty: 10 TABLET | Refills: 0 | Status: SHIPPED | OUTPATIENT
Start: 2019-10-09 | End: 2019-10-20 | Stop reason: SDUPTHER

## 2019-10-09 NOTE — TELEPHONE ENCOUNTER
Pts daughter, Ava, left a VM today at 12:30PM requesting refill on Tramadol and Clonazepam for ptYola FATIMA on file up to date.

## 2019-10-19 ENCOUNTER — TELEPHONE (OUTPATIENT)
Dept: INTERNAL MEDICINE | Facility: CLINIC | Age: 79
End: 2019-10-19

## 2019-10-19 NOTE — TELEPHONE ENCOUNTER
Pt called regarding her Tramadol, she states that she needs a refill on this. She also states that her Propranolol she takes for tremors a doctor in nursing home had prescribed  her on 40 MG 3x day she states this prescription ran out and wanted a refill on the 60MG pills since she can take this twice a day where it would equal her 120MG. Ok to do? Printed AKUA off.

## 2019-10-20 RX ORDER — PROPRANOLOL HYDROCHLORIDE 60 MG/1
60 TABLET ORAL 2 TIMES DAILY
Qty: 60 TABLET | Refills: 1 | Status: SHIPPED | OUTPATIENT
Start: 2019-10-20 | End: 2019-12-05 | Stop reason: SDUPTHER

## 2019-10-20 RX ORDER — TRAMADOL HYDROCHLORIDE 50 MG/1
50 TABLET ORAL EVERY 8 HOURS PRN
Qty: 20 TABLET | Refills: 0 | Status: SHIPPED | OUTPATIENT
Start: 2019-10-20 | End: 2020-01-01

## 2019-10-20 RX ORDER — TRAMADOL HYDROCHLORIDE 50 MG/1
50 TABLET ORAL EVERY 8 HOURS PRN
Qty: 20 TABLET | Refills: 0 | Status: SHIPPED | OUTPATIENT
Start: 2019-10-20 | End: 2019-10-20 | Stop reason: SDUPTHER

## 2019-11-26 RX ORDER — TIOTROPIUM BROMIDE 18 UG/1
CAPSULE ORAL; RESPIRATORY (INHALATION)
Qty: 30 CAPSULE | Refills: 0 | Status: SHIPPED | OUTPATIENT
Start: 2019-11-26 | End: 2019-12-20

## 2019-11-26 NOTE — TELEPHONE ENCOUNTER
"Ava caicedo daughter called the [facility] at 12:27 pm and left the following message    \"Sadie Forrest Dr. Wolff is her doctor her birthday is [birth year]. Her telephone no. is [phone]. She needs her Spiriva medication refilled and Dr. Wolff needs to fax that in to [facility] here in [place]. So please have Dr. Wolff to fax in a new prescription for Spiriva for my mother Sadie Forrest. My name is Ava Polk her daughter and you can call me as well as [phone] and this is for Sadie Forrest my mother. Thank you.\"    "

## 2019-12-05 ENCOUNTER — OFFICE VISIT (OUTPATIENT)
Dept: INTERNAL MEDICINE | Facility: CLINIC | Age: 79
End: 2019-12-05

## 2019-12-05 VITALS
DIASTOLIC BLOOD PRESSURE: 86 MMHG | WEIGHT: 117.4 LBS | TEMPERATURE: 96.9 F | BODY MASS INDEX: 21.6 KG/M2 | OXYGEN SATURATION: 98 % | HEIGHT: 62 IN | SYSTOLIC BLOOD PRESSURE: 122 MMHG | HEART RATE: 82 BPM

## 2019-12-05 DIAGNOSIS — I10 ESSENTIAL HYPERTENSION: Primary | Chronic | ICD-10-CM

## 2019-12-05 DIAGNOSIS — K21.9 GASTROESOPHAGEAL REFLUX DISEASE WITHOUT ESOPHAGITIS: ICD-10-CM

## 2019-12-05 DIAGNOSIS — E78.5 HYPERLIPIDEMIA, UNSPECIFIED HYPERLIPIDEMIA TYPE: ICD-10-CM

## 2019-12-05 DIAGNOSIS — J41.0 SIMPLE CHRONIC BRONCHITIS (HCC): ICD-10-CM

## 2019-12-05 DIAGNOSIS — Z72.0 TOBACCO ABUSE DISORDER: Chronic | ICD-10-CM

## 2019-12-05 DIAGNOSIS — I50.32 CHRONIC DIASTOLIC HEART FAILURE (HCC): Chronic | ICD-10-CM

## 2019-12-05 PROBLEM — Z91.199 NONCOMPLIANCE: Status: RESOLVED | Noted: 2019-05-29 | Resolved: 2019-12-05

## 2019-12-05 PROBLEM — J44.1 COPD WITH ACUTE EXACERBATION (HCC): Status: RESOLVED | Noted: 2019-05-27 | Resolved: 2019-12-05

## 2019-12-05 PROBLEM — J44.1 COPD EXACERBATION (HCC): Status: RESOLVED | Noted: 2019-05-12 | Resolved: 2019-12-05

## 2019-12-05 PROBLEM — J20.9 ACUTE BRONCHITIS: Status: RESOLVED | Noted: 2019-05-12 | Resolved: 2019-12-05

## 2019-12-05 PROBLEM — J96.02 ACUTE RESPIRATORY FAILURE WITH HYPOXIA AND HYPERCAPNIA (HCC): Status: RESOLVED | Noted: 2019-05-12 | Resolved: 2019-12-05

## 2019-12-05 PROBLEM — J96.01 ACUTE RESPIRATORY FAILURE WITH HYPOXIA AND HYPERCAPNIA (HCC): Status: RESOLVED | Noted: 2019-05-12 | Resolved: 2019-12-05

## 2019-12-05 PROCEDURE — 99214 OFFICE O/P EST MOD 30 MIN: CPT | Performed by: INTERNAL MEDICINE

## 2019-12-05 RX ORDER — SERTRALINE HYDROCHLORIDE 100 MG/1
100 TABLET, FILM COATED ORAL
Qty: 90 TABLET | Refills: 3 | Status: SHIPPED | OUTPATIENT
Start: 2019-12-05 | End: 2020-01-01 | Stop reason: SDUPTHER

## 2019-12-05 RX ORDER — PROPRANOLOL HYDROCHLORIDE 60 MG/1
60 TABLET ORAL 2 TIMES DAILY
Qty: 60 TABLET | Refills: 1 | Status: SHIPPED | OUTPATIENT
Start: 2019-12-05 | End: 2020-01-01 | Stop reason: SDUPTHER

## 2019-12-05 RX ORDER — OMEPRAZOLE 40 MG/1
40 CAPSULE, DELAYED RELEASE ORAL DAILY
Qty: 90 CAPSULE | Refills: 3 | Status: SHIPPED | OUTPATIENT
Start: 2019-12-05 | End: 2020-01-03 | Stop reason: SDUPTHER

## 2019-12-05 RX ORDER — PRAVASTATIN SODIUM 20 MG
20 TABLET ORAL EVERY EVENING
Qty: 30 TABLET | Refills: 5 | Status: SHIPPED | OUTPATIENT
Start: 2019-12-05 | End: 2020-01-07 | Stop reason: SDUPTHER

## 2019-12-05 RX ORDER — CLONAZEPAM 0.5 MG/1
0.5 TABLET ORAL NIGHTLY PRN
Qty: 30 TABLET | Refills: 2 | Status: SHIPPED | OUTPATIENT
Start: 2019-12-05 | End: 2020-01-03 | Stop reason: SDUPTHER

## 2019-12-05 NOTE — PROGRESS NOTES
Subjective   Sadie Tijerina is a 79 y.o. female.     Chief Complaint   Patient presents with   • Follow-up     3 month f/u r/t COPD, HTN,    • Medication Problem     Insurance is not going to cover Spiriva or Symbicort next year.        History of Present Illness   Patient here for follow-up.  Hypertension stable on medication.  COPD severe oxygen and the medications.  Patient still has exertional short of breath.  GERD stable.  Patient still smokes.  CHF is stable.  Hyperlipidemia stable on medication.  Essential tremor stable patient needs medicine refill.  Anxiety stable.  Restless leg syndrome patient want medication refill.    Current Outpatient Medications:   •  albuterol sulfate  (90 Base) MCG/ACT inhaler, Inhale 1 puff Every 4 (Four) Hours As Needed for Shortness of Air., Disp: 18 g, Rfl: 3  •  budesonide-formoterol (SYMBICORT) 160-4.5 MCG/ACT inhaler, Inhale 2 puffs 2 (Two) Times a Day., Disp: 10.2 g, Rfl: 4  •  clonazePAM (KlonoPIN) 0.5 MG tablet, TAKE 1 TABLET BY MOUTH AT BEDTIME AS NEEDED for seizures, Disp: 30 tablet, Rfl: 0  •  estrogens, conjugated, (PREMARIN) 0.625 MG tablet, Take 1 tablet by mouth Daily. 200001, Disp: 90 tablet, Rfl: 3  •  fluticasone (FLONASE) 50 MCG/ACT nasal spray, 2 sprays by Each Nare route Daily., Disp: , Rfl:   •  guaiFENesin (MUCINEX) 600 MG 12 hr tablet, Take 1 tablet by mouth 2 (Two) Times a Day., Disp: 20 tablet, Rfl: 0  •  ipratropium (ATROVENT) 0.02 % nebulizer solution, INHALE THE CONTENTS OF 1 VIAL VIA NEBULIZER FOUR TIMES A DAY AS NEEDED FOR WHEEZING OR SHORTNESS OF AIR, Disp: , Rfl: 5  •  ipratropium-albuterol (DUO-NEB) 0.5-2.5 mg/3 ml nebulizer, Take 3 mL by nebulization Every 6 (Six) Hours As Needed for Wheezing or Shortness of Air., Disp: 360 mL, Rfl:   •  omeprazole (priLOSEC) 40 MG capsule, Take 1 capsule by mouth Daily., Disp: 90 capsule, Rfl: 3  •  pravastatin (PRAVACHOL) 20 MG tablet, Take 1 tablet by mouth Every Evening., Disp: 30 tablet, Rfl:  5  •  predniSONE (DELTASONE) 5 MG tablet, Take 1 tablet by mouth Daily for 180 days., Disp: 180 tablet, Rfl: 1  •  promethazine (PHENERGAN) 12.5 MG tablet, Take 1 tablet by mouth Every 8 (Eight) Hours As Needed for Nausea or Vomiting., Disp: 10 tablet, Rfl: 0  •  propranolol (INDERAL) 60 MG tablet, Take 1 tablet by mouth 2 (Two) Times a Day., Disp: 60 tablet, Rfl: 1  •  saline (AYR) gel nasal gel, Apply topically to the appropriate area as directed Every 1 (One) Hour As Needed for nasal irritation, Disp: 22 g, Rfl: 0  •  sertraline (ZOLOFT) 100 MG tablet, Take 1 tablet by mouth every night at bedtime., Disp: 90 tablet, Rfl: 3  •  SPIRIVA HANDIHALER 18 MCG per inhalation capsule, INHALE CONTENTS 1 CAPSULE BY MOUTH ONE TIME A DAY , Disp: 30 capsule, Rfl: 0  •  traMADol (ULTRAM) 50 MG tablet, Take 1 tablet by mouth Every 8 (Eight) Hours As Needed (pain)., Disp: 20 tablet, Rfl: 0    The following portions of the patient's history were reviewed and updated as appropriate: allergies, current medications, past family history, past medical history, past social history, past surgical history and problem list.    Review of Systems   Constitutional: Negative.    Respiratory: Positive for shortness of breath.    Cardiovascular: Negative.    Gastrointestinal: Negative.    Musculoskeletal: Negative.    Skin: Negative.    Neurological: Negative.    Psychiatric/Behavioral: Negative.        Objective   Physical Exam   Constitutional: She is oriented to person, place, and time. She appears well-developed and well-nourished.   Neck: Neck supple.   Cardiovascular: Normal rate, regular rhythm and normal heart sounds.   Pulmonary/Chest: Effort normal and breath sounds normal.   With o2    Abdominal: Bowel sounds are normal.   Neurological: She is alert and oriented to person, place, and time.   Skin: Skin is warm.   Psychiatric: She has a normal mood and affect.       All tests have been reviewed.    Assessment/Plan   Diagnoses and all  orders for this visit:    Essential hypertension continue medication    Hyperlipidemia, unspecified hyperlipidemia type continue medication    Chronic diastolic heart failure (CMS/HCC)    Simple chronic bronchitis (CMS/HCC)    Gastroesophageal reflux disease without esophagitis continue medication    Tobacco abuse disorder encourage patient to quit    Restless leg syndrome refill medication    Copd refill medication    Other orders  -     clonazePAM (KlonoPIN) 0.5 MG tablet; Take 1 tablet by mouth At Night As Needed for Seizures.  -     sertraline (ZOLOFT) 100 MG tablet; Take 1 tablet by mouth every night at bedtime.  -     propranolol (INDERAL) 60 MG tablet; Take 1 tablet by mouth 2 (Two) Times a Day.  -     pravastatin (PRAVACHOL) 20 MG tablet; Take 1 tablet by mouth Every Evening.  -     estrogens, conjugated, (PREMARIN) 0.625 MG tablet; Take 1 tablet by mouth Daily. 200001  -     omeprazole (priLOSEC) 40 MG capsule; Take 1 capsule by mouth Daily.            1.    Below is the historical record for reference only:  2. CHF, stable now  3. HTN continue continue medication  4. Chronic tobacco use encourage patient to quit  5. Restless leg syndrome continue medication  6. Thrush with pharyngitis, medicine helps  7. Leukocytosis,  repeat  8. Weakness, need HH for PT OT  9. Arthritis pain refill tramadol    left-sided low back pain without sciatica XR Spine Lumbar 4+ View; DJD--   Benign paroxysmal positional vertigo,    Tobacco abuse disorder occa, to quit  Anxiety stable on med and continue  Dizzy loss of balance, when standing up probably due to lightheaded  D/c propranolol no help, resume. carotid, echo, holter unremarkble    MRI head micro ischemic changes continue ASA 81mg daily    TMJ, continue aleve or tylenol for now   allergy, d/c medicine   Vitamin D deficiency cont Vit D3 1000iu  Low back pain improved after Flexeril and Tylenol,refill tramadol, xray:mild DJD refill tramadol  HRT patient still wants it.wean  very slow, --  hyperlipidemia, continue pravastatin   RLS, continue clonazepam   Tremor cont propranolol d/c sinemet for not helping  Knee OA xr OA, refused cortisone shot, or glucosamine, tramadol  Refused disrobing for PE

## 2019-12-20 RX ORDER — TIOTROPIUM BROMIDE 18 UG/1
CAPSULE ORAL; RESPIRATORY (INHALATION)
Qty: 30 CAPSULE | Refills: 0 | Status: SHIPPED | OUTPATIENT
Start: 2019-12-20 | End: 2020-01-27 | Stop reason: SDUPTHER

## 2020-01-01 ENCOUNTER — APPOINTMENT (OUTPATIENT)
Dept: CT IMAGING | Facility: HOSPITAL | Age: 80
End: 2020-01-01

## 2020-01-01 ENCOUNTER — HOSPITAL ENCOUNTER (INPATIENT)
Facility: HOSPITAL | Age: 80
LOS: 11 days | Discharge: HOME | End: 2020-12-01
Attending: EMERGENCY MEDICINE | Admitting: INTERNAL MEDICINE

## 2020-01-01 ENCOUNTER — OFFICE VISIT (OUTPATIENT)
Dept: PULMONOLOGY | Facility: CLINIC | Age: 80
End: 2020-01-01

## 2020-01-01 ENCOUNTER — OFFICE VISIT (OUTPATIENT)
Dept: INTERNAL MEDICINE | Facility: CLINIC | Age: 80
End: 2020-01-01

## 2020-01-01 ENCOUNTER — APPOINTMENT (OUTPATIENT)
Dept: GENERAL RADIOLOGY | Facility: HOSPITAL | Age: 80
End: 2020-01-01

## 2020-01-01 ENCOUNTER — READMISSION MANAGEMENT (OUTPATIENT)
Dept: CALL CENTER | Facility: HOSPITAL | Age: 80
End: 2020-01-01

## 2020-01-01 ENCOUNTER — OUTSIDE FACILITY SERVICE (OUTPATIENT)
Dept: INTERNAL MEDICINE | Facility: CLINIC | Age: 80
End: 2020-01-01

## 2020-01-01 ENCOUNTER — TRANSITIONAL CARE MANAGEMENT TELEPHONE ENCOUNTER (OUTPATIENT)
Dept: CALL CENTER | Facility: HOSPITAL | Age: 80
End: 2020-01-01

## 2020-01-01 ENCOUNTER — HOSPITAL ENCOUNTER (OUTPATIENT)
Facility: HOSPITAL | Age: 80
Setting detail: OBSERVATION
Discharge: NURSING, REHAB OR LONG-TERM CARE | End: 2020-12-09
Attending: STUDENT IN AN ORGANIZED HEALTH CARE EDUCATION/TRAINING PROGRAM | Admitting: FAMILY MEDICINE

## 2020-01-01 ENCOUNTER — HOSPITAL ENCOUNTER (INPATIENT)
Facility: HOSPITAL | Age: 80
LOS: 12 days | Discharge: HOME HEALTH | End: 2020-09-28
Attending: EMERGENCY MEDICINE | Admitting: INTERNAL MEDICINE

## 2020-01-01 ENCOUNTER — TELEPHONE (OUTPATIENT)
Dept: INTERNAL MEDICINE | Facility: CLINIC | Age: 80
End: 2020-01-01

## 2020-01-01 ENCOUNTER — APPOINTMENT (OUTPATIENT)
Dept: ULTRASOUND IMAGING | Facility: HOSPITAL | Age: 80
End: 2020-01-01

## 2020-01-01 ENCOUNTER — HOSPITAL ENCOUNTER (INPATIENT)
Facility: HOSPITAL | Age: 80
LOS: 6 days | Discharge: NURSING, REHAB OR LONG-TERM CARE | End: 2020-11-11
Attending: EMERGENCY MEDICINE | Admitting: FAMILY MEDICINE

## 2020-01-01 VITALS
RESPIRATION RATE: 20 BRPM | OXYGEN SATURATION: 98 % | TEMPERATURE: 97.9 F | HEART RATE: 75 BPM | WEIGHT: 133.6 LBS | HEIGHT: 62 IN | BODY MASS INDEX: 24.59 KG/M2 | SYSTOLIC BLOOD PRESSURE: 112 MMHG | DIASTOLIC BLOOD PRESSURE: 53 MMHG

## 2020-01-01 VITALS
DIASTOLIC BLOOD PRESSURE: 66 MMHG | SYSTOLIC BLOOD PRESSURE: 118 MMHG | OXYGEN SATURATION: 98 % | WEIGHT: 134.12 LBS | BODY MASS INDEX: 24.68 KG/M2 | HEIGHT: 62 IN | HEART RATE: 74 BPM | TEMPERATURE: 97.8 F

## 2020-01-01 VITALS
BODY MASS INDEX: 26.86 KG/M2 | TEMPERATURE: 99 F | WEIGHT: 145.94 LBS | RESPIRATION RATE: 20 BRPM | OXYGEN SATURATION: 95 % | DIASTOLIC BLOOD PRESSURE: 63 MMHG | HEART RATE: 102 BPM | HEIGHT: 62 IN | SYSTOLIC BLOOD PRESSURE: 149 MMHG

## 2020-01-01 VITALS
HEART RATE: 78 BPM | BODY MASS INDEX: 26.01 KG/M2 | OXYGEN SATURATION: 100 % | SYSTOLIC BLOOD PRESSURE: 125 MMHG | TEMPERATURE: 97.8 F | WEIGHT: 141.31 LBS | RESPIRATION RATE: 16 BRPM | HEIGHT: 62 IN | DIASTOLIC BLOOD PRESSURE: 44 MMHG

## 2020-01-01 VITALS
WEIGHT: 131 LBS | HEIGHT: 62 IN | BODY MASS INDEX: 24.11 KG/M2 | DIASTOLIC BLOOD PRESSURE: 70 MMHG | RESPIRATION RATE: 18 BRPM | SYSTOLIC BLOOD PRESSURE: 122 MMHG | HEART RATE: 57 BPM | OXYGEN SATURATION: 94 %

## 2020-01-01 VITALS
DIASTOLIC BLOOD PRESSURE: 82 MMHG | HEART RATE: 79 BPM | WEIGHT: 129.12 LBS | TEMPERATURE: 97.8 F | SYSTOLIC BLOOD PRESSURE: 124 MMHG | RESPIRATION RATE: 16 BRPM | OXYGEN SATURATION: 98 % | BODY MASS INDEX: 23.76 KG/M2 | HEIGHT: 62 IN

## 2020-01-01 VITALS
SYSTOLIC BLOOD PRESSURE: 138 MMHG | DIASTOLIC BLOOD PRESSURE: 56 MMHG | HEART RATE: 85 BPM | OXYGEN SATURATION: 97 % | RESPIRATION RATE: 18 BRPM | HEIGHT: 62 IN | TEMPERATURE: 97.5 F | WEIGHT: 135.8 LBS | BODY MASS INDEX: 24.99 KG/M2

## 2020-01-01 VITALS
TEMPERATURE: 97.1 F | SYSTOLIC BLOOD PRESSURE: 124 MMHG | BODY MASS INDEX: 24.66 KG/M2 | HEART RATE: 75 BPM | DIASTOLIC BLOOD PRESSURE: 72 MMHG | WEIGHT: 134 LBS | RESPIRATION RATE: 18 BRPM | OXYGEN SATURATION: 98 % | HEIGHT: 62 IN

## 2020-01-01 DIAGNOSIS — M79.89 LEG SWELLING: ICD-10-CM

## 2020-01-01 DIAGNOSIS — I10 ESSENTIAL HYPERTENSION: ICD-10-CM

## 2020-01-01 DIAGNOSIS — F41.8 MIXED ANXIETY DEPRESSIVE DISORDER: ICD-10-CM

## 2020-01-01 DIAGNOSIS — S81.801A NON-HEALING WOUND OF RIGHT LOWER EXTREMITY: ICD-10-CM

## 2020-01-01 DIAGNOSIS — J18.0 BILATERAL BRONCHOPNEUMONIA: Primary | ICD-10-CM

## 2020-01-01 DIAGNOSIS — K21.9 GASTROESOPHAGEAL REFLUX DISEASE WITHOUT ESOPHAGITIS: ICD-10-CM

## 2020-01-01 DIAGNOSIS — J44.9 CHRONIC OBSTRUCTIVE PULMONARY DISEASE, UNSPECIFIED COPD TYPE (HCC): Primary | ICD-10-CM

## 2020-01-01 DIAGNOSIS — J41.0 SIMPLE CHRONIC BRONCHITIS (HCC): ICD-10-CM

## 2020-01-01 DIAGNOSIS — R06.89 HYPERCAPNIA: ICD-10-CM

## 2020-01-01 DIAGNOSIS — R26.89 BALANCE PROBLEM: ICD-10-CM

## 2020-01-01 DIAGNOSIS — I50.32 CHRONIC DIASTOLIC HEART FAILURE (HCC): Chronic | ICD-10-CM

## 2020-01-01 DIAGNOSIS — Z72.0 TOBACCO ABUSE DISORDER: Chronic | ICD-10-CM

## 2020-01-01 DIAGNOSIS — R00.0 TACHYCARDIA: ICD-10-CM

## 2020-01-01 DIAGNOSIS — J96.12 CHRONIC RESPIRATORY FAILURE WITH HYPOXIA AND HYPERCAPNIA (HCC): ICD-10-CM

## 2020-01-01 DIAGNOSIS — J18.9 PNEUMONIA OF BOTH LUNGS DUE TO INFECTIOUS ORGANISM, UNSPECIFIED PART OF LUNG: Primary | ICD-10-CM

## 2020-01-01 DIAGNOSIS — F41.9 ANXIETY: ICD-10-CM

## 2020-01-01 DIAGNOSIS — G62.9 NEUROPATHY: ICD-10-CM

## 2020-01-01 DIAGNOSIS — R09.02 HYPOXIA: ICD-10-CM

## 2020-01-01 DIAGNOSIS — R06.03 RESPIRATORY DISTRESS: ICD-10-CM

## 2020-01-01 DIAGNOSIS — J96.20 ACUTE ON CHRONIC RESPIRATORY FAILURE, UNSPECIFIED WHETHER WITH HYPOXIA OR HYPERCAPNIA (HCC): Primary | ICD-10-CM

## 2020-01-01 DIAGNOSIS — J44.1 ACUTE EXACERBATION OF CHRONIC OBSTRUCTIVE PULMONARY DISEASE (COPD) (HCC): ICD-10-CM

## 2020-01-01 DIAGNOSIS — J96.11 CHRONIC RESPIRATORY FAILURE WITH HYPOXIA AND HYPERCAPNIA (HCC): ICD-10-CM

## 2020-01-01 DIAGNOSIS — J18.9 PNEUMONITIS: Primary | ICD-10-CM

## 2020-01-01 DIAGNOSIS — J18.9 PNEUMONIA OF BOTH LOWER LOBES DUE TO INFECTIOUS ORGANISM: Primary | ICD-10-CM

## 2020-01-01 DIAGNOSIS — R06.02 SOB (SHORTNESS OF BREATH): ICD-10-CM

## 2020-01-01 DIAGNOSIS — J30.89 OTHER ALLERGIC RHINITIS: ICD-10-CM

## 2020-01-01 DIAGNOSIS — J96.11 CHRONIC RESPIRATORY FAILURE WITH HYPOXIA (HCC): ICD-10-CM

## 2020-01-01 DIAGNOSIS — R29.898 WEAKNESS OF BOTH LOWER EXTREMITIES: Primary | ICD-10-CM

## 2020-01-01 DIAGNOSIS — E87.20 LACTIC ACIDOSIS: ICD-10-CM

## 2020-01-01 DIAGNOSIS — I10 ESSENTIAL HYPERTENSION: Primary | Chronic | ICD-10-CM

## 2020-01-01 DIAGNOSIS — R13.10 DYSPHAGIA, UNSPECIFIED TYPE: ICD-10-CM

## 2020-01-01 DIAGNOSIS — L03.115 CELLULITIS OF RIGHT LOWER EXTREMITY: ICD-10-CM

## 2020-01-01 LAB
A-A DO2: 17.5 MMHG
A-A DO2: 18.8 MMHG
A-A DO2: 20.5 MMHG
A-A DO2: 32.6 MMHG
A-A DO2: 95.3 MMHG
A-A DO2: ABNORMAL
ALBUMIN SERPL-MCNC: 2.8 G/DL (ref 3.5–5.2)
ALBUMIN SERPL-MCNC: 2.8 G/DL (ref 3.5–5.2)
ALBUMIN SERPL-MCNC: 2.9 G/DL (ref 3.5–5.2)
ALBUMIN SERPL-MCNC: 3 G/DL (ref 3.5–5.2)
ALBUMIN SERPL-MCNC: 3.1 G/DL (ref 3.5–5.2)
ALBUMIN SERPL-MCNC: 3.1 G/DL (ref 3.5–5.2)
ALBUMIN SERPL-MCNC: 3.2 G/DL (ref 3.5–5.2)
ALBUMIN SERPL-MCNC: 3.5 G/DL (ref 3.5–5.2)
ALBUMIN SERPL-MCNC: 3.6 G/DL (ref 3.5–5.2)
ALBUMIN SERPL-MCNC: 3.8 G/DL (ref 3.5–5.2)
ALBUMIN SERPL-MCNC: 3.9 G/DL (ref 3.5–5.2)
ALBUMIN SERPL-MCNC: 3.9 G/DL (ref 3.5–5.2)
ALBUMIN/GLOB SERPL: 1.2 G/DL
ALBUMIN/GLOB SERPL: 1.2 G/DL
ALBUMIN/GLOB SERPL: 1.3 G/DL
ALBUMIN/GLOB SERPL: 1.3 G/DL
ALBUMIN/GLOB SERPL: 1.4 G/DL
ALBUMIN/GLOB SERPL: 1.4 G/DL
ALBUMIN/GLOB SERPL: 1.5 G/DL
ALP SERPL-CCNC: 114 U/L (ref 39–117)
ALP SERPL-CCNC: 49 U/L (ref 39–117)
ALP SERPL-CCNC: 68 U/L (ref 39–117)
ALP SERPL-CCNC: 77 U/L (ref 39–117)
ALP SERPL-CCNC: 81 U/L (ref 39–117)
ALP SERPL-CCNC: 82 U/L (ref 39–117)
ALP SERPL-CCNC: 98 U/L (ref 39–117)
ALT SERPL W P-5'-P-CCNC: 105 U/L (ref 1–33)
ALT SERPL W P-5'-P-CCNC: 11 U/L (ref 1–33)
ALT SERPL W P-5'-P-CCNC: 113 U/L (ref 1–33)
ALT SERPL W P-5'-P-CCNC: 12 U/L (ref 1–33)
ALT SERPL W P-5'-P-CCNC: 19 U/L (ref 1–33)
ALT SERPL W P-5'-P-CCNC: 47 U/L (ref 1–33)
ALT SERPL W P-5'-P-CCNC: 65 U/L (ref 1–33)
ANION GAP SERPL CALCULATED.3IONS-SCNC: 10.5 MMOL/L (ref 5–15)
ANION GAP SERPL CALCULATED.3IONS-SCNC: 10.5 MMOL/L (ref 5–15)
ANION GAP SERPL CALCULATED.3IONS-SCNC: 12 MMOL/L (ref 5–15)
ANION GAP SERPL CALCULATED.3IONS-SCNC: 12.4 MMOL/L (ref 5–15)
ANION GAP SERPL CALCULATED.3IONS-SCNC: 14.6 MMOL/L (ref 5–15)
ANION GAP SERPL CALCULATED.3IONS-SCNC: 2.6 MMOL/L (ref 5–15)
ANION GAP SERPL CALCULATED.3IONS-SCNC: 4.8 MMOL/L (ref 5–15)
ANION GAP SERPL CALCULATED.3IONS-SCNC: 6.2 MMOL/L (ref 5–15)
ANION GAP SERPL CALCULATED.3IONS-SCNC: 6.3 MMOL/L (ref 5–15)
ANION GAP SERPL CALCULATED.3IONS-SCNC: 6.3 MMOL/L (ref 5–15)
ANION GAP SERPL CALCULATED.3IONS-SCNC: 6.8 MMOL/L (ref 5–15)
ANION GAP SERPL CALCULATED.3IONS-SCNC: 6.8 MMOL/L (ref 5–15)
ANION GAP SERPL CALCULATED.3IONS-SCNC: 6.9 MMOL/L (ref 5–15)
ANION GAP SERPL CALCULATED.3IONS-SCNC: 7 MMOL/L (ref 5–15)
ANION GAP SERPL CALCULATED.3IONS-SCNC: 7.3 MMOL/L (ref 5–15)
ANION GAP SERPL CALCULATED.3IONS-SCNC: 7.6 MMOL/L (ref 5–15)
ANION GAP SERPL CALCULATED.3IONS-SCNC: 7.6 MMOL/L (ref 5–15)
ANION GAP SERPL CALCULATED.3IONS-SCNC: 7.7 MMOL/L (ref 5–15)
ANION GAP SERPL CALCULATED.3IONS-SCNC: 7.8 MMOL/L (ref 5–15)
ANION GAP SERPL CALCULATED.3IONS-SCNC: 7.8 MMOL/L (ref 5–15)
ANION GAP SERPL CALCULATED.3IONS-SCNC: 8.1 MMOL/L (ref 5–15)
ANION GAP SERPL CALCULATED.3IONS-SCNC: 8.2 MMOL/L (ref 5–15)
ANION GAP SERPL CALCULATED.3IONS-SCNC: 8.4 MMOL/L (ref 5–15)
ANION GAP SERPL CALCULATED.3IONS-SCNC: 9.4 MMOL/L (ref 5–15)
ANION GAP SERPL CALCULATED.3IONS-SCNC: 9.5 MMOL/L (ref 5–15)
ANION GAP SERPL CALCULATED.3IONS-SCNC: 9.6 MMOL/L (ref 5–15)
ARTERIAL PATENCY WRIST A: ABNORMAL
ARTERIAL PATENCY WRIST A: POSITIVE
AST SERPL-CCNC: 11 U/L (ref 1–32)
AST SERPL-CCNC: 137 U/L (ref 1–32)
AST SERPL-CCNC: 15 U/L (ref 1–32)
AST SERPL-CCNC: 22 U/L (ref 1–32)
AST SERPL-CCNC: 23 U/L (ref 1–32)
AST SERPL-CCNC: 31 U/L (ref 1–32)
AST SERPL-CCNC: 79 U/L (ref 1–32)
ATMOSPHERIC PRESS: 735 MMHG
ATMOSPHERIC PRESS: 736 MMHG
ATMOSPHERIC PRESS: 742 MMHG
ATMOSPHERIC PRESS: 743 MMHG
BACTERIA SPEC AEROBE CULT: NORMAL
BACTERIA SPEC RESP CULT: NORMAL
BACTERIA UR QL AUTO: ABNORMAL /HPF
BASE EXCESS BLDA CALC-SCNC: 10.4 MMOL/L (ref 0–2)
BASE EXCESS BLDA CALC-SCNC: 11.4 MMOL/L (ref 0–2)
BASE EXCESS BLDA CALC-SCNC: 4 MMOL/L (ref 0–2)
BASE EXCESS BLDA CALC-SCNC: 5.3 MMOL/L (ref 0–2)
BASE EXCESS BLDA CALC-SCNC: 5.6 MMOL/L (ref 0–2)
BASE EXCESS BLDA CALC-SCNC: 7.4 MMOL/L (ref 0–2)
BASOPHILS # BLD AUTO: 0.01 10*3/MM3 (ref 0–0.2)
BASOPHILS # BLD AUTO: 0.01 10*3/MM3 (ref 0–0.2)
BASOPHILS # BLD AUTO: 0.02 10*3/MM3 (ref 0–0.2)
BASOPHILS # BLD AUTO: 0.02 10*3/MM3 (ref 0–0.2)
BASOPHILS # BLD AUTO: 0.03 10*3/MM3 (ref 0–0.2)
BASOPHILS # BLD AUTO: 0.04 10*3/MM3 (ref 0–0.2)
BASOPHILS # BLD AUTO: 0.05 10*3/MM3 (ref 0–0.2)
BASOPHILS # BLD AUTO: 0.07 10*3/MM3 (ref 0–0.2)
BASOPHILS # BLD AUTO: 0.08 10*3/MM3 (ref 0–0.2)
BASOPHILS # BLD AUTO: 0.1 10*3/MM3 (ref 0–0.2)
BASOPHILS NFR BLD AUTO: 0.1 % (ref 0–1.5)
BASOPHILS NFR BLD AUTO: 0.2 % (ref 0–1.5)
BASOPHILS NFR BLD AUTO: 0.3 % (ref 0–1.5)
BASOPHILS NFR BLD AUTO: 0.4 % (ref 0–1.5)
BASOPHILS NFR BLD AUTO: 0.4 % (ref 0–1.5)
BASOPHILS NFR BLD AUTO: 0.5 % (ref 0–1.5)
BASOPHILS NFR BLD AUTO: 0.5 % (ref 0–1.5)
BDY SITE: ABNORMAL
BILIRUB SERPL-MCNC: 0.2 MG/DL (ref 0–1.2)
BILIRUB SERPL-MCNC: 0.2 MG/DL (ref 0–1.2)
BILIRUB SERPL-MCNC: 0.3 MG/DL (ref 0–1.2)
BILIRUB SERPL-MCNC: 0.3 MG/DL (ref 0–1.2)
BILIRUB SERPL-MCNC: 0.5 MG/DL (ref 0–1.2)
BILIRUB SERPL-MCNC: 0.6 MG/DL (ref 0–1.2)
BILIRUB SERPL-MCNC: <0.2 MG/DL (ref 0–1.2)
BILIRUB UR QL STRIP: NEGATIVE
BILIRUB UR QL STRIP: NEGATIVE
BUN SERPL-MCNC: 17 MG/DL (ref 8–23)
BUN SERPL-MCNC: 18 MG/DL (ref 8–23)
BUN SERPL-MCNC: 18 MG/DL (ref 8–23)
BUN SERPL-MCNC: 20 MG/DL (ref 8–23)
BUN SERPL-MCNC: 22 MG/DL (ref 8–23)
BUN SERPL-MCNC: 23 MG/DL (ref 8–23)
BUN SERPL-MCNC: 26 MG/DL (ref 8–23)
BUN SERPL-MCNC: 27 MG/DL (ref 8–23)
BUN SERPL-MCNC: 28 MG/DL (ref 8–23)
BUN SERPL-MCNC: 29 MG/DL (ref 8–23)
BUN SERPL-MCNC: 31 MG/DL (ref 8–23)
BUN SERPL-MCNC: 31 MG/DL (ref 8–23)
BUN SERPL-MCNC: 33 MG/DL (ref 8–23)
BUN SERPL-MCNC: 34 MG/DL (ref 8–23)
BUN SERPL-MCNC: 35 MG/DL (ref 8–23)
BUN SERPL-MCNC: 36 MG/DL (ref 8–23)
BUN SERPL-MCNC: 37 MG/DL (ref 8–23)
BUN/CREAT SERPL: 16.7 (ref 7–25)
BUN/CREAT SERPL: 19.4 (ref 7–25)
BUN/CREAT SERPL: 19.5 (ref 7–25)
BUN/CREAT SERPL: 20.2 (ref 7–25)
BUN/CREAT SERPL: 20.8 (ref 7–25)
BUN/CREAT SERPL: 21 (ref 7–25)
BUN/CREAT SERPL: 21 (ref 7–25)
BUN/CREAT SERPL: 22.1 (ref 7–25)
BUN/CREAT SERPL: 22.7 (ref 7–25)
BUN/CREAT SERPL: 24.3 (ref 7–25)
BUN/CREAT SERPL: 25.6 (ref 7–25)
BUN/CREAT SERPL: 25.8 (ref 7–25)
BUN/CREAT SERPL: 27.4 (ref 7–25)
BUN/CREAT SERPL: 28.7 (ref 7–25)
BUN/CREAT SERPL: 31.1 (ref 7–25)
BUN/CREAT SERPL: 33.7 (ref 7–25)
BUN/CREAT SERPL: 35.4 (ref 7–25)
BUN/CREAT SERPL: 35.7 (ref 7–25)
BUN/CREAT SERPL: 36.5 (ref 7–25)
BUN/CREAT SERPL: 37.2 (ref 7–25)
BUN/CREAT SERPL: 38.2 (ref 7–25)
BUN/CREAT SERPL: 38.9 (ref 7–25)
BUN/CREAT SERPL: 39.6 (ref 7–25)
BUN/CREAT SERPL: 40.2 (ref 7–25)
BUN/CREAT SERPL: 41.3 (ref 7–25)
BUN/CREAT SERPL: 44.9 (ref 7–25)
CALCIUM SPEC-SCNC: 8.5 MG/DL (ref 8.6–10.5)
CALCIUM SPEC-SCNC: 8.5 MG/DL (ref 8.6–10.5)
CALCIUM SPEC-SCNC: 8.6 MG/DL (ref 8.6–10.5)
CALCIUM SPEC-SCNC: 8.6 MG/DL (ref 8.6–10.5)
CALCIUM SPEC-SCNC: 8.7 MG/DL (ref 8.6–10.5)
CALCIUM SPEC-SCNC: 8.8 MG/DL (ref 8.6–10.5)
CALCIUM SPEC-SCNC: 8.8 MG/DL (ref 8.6–10.5)
CALCIUM SPEC-SCNC: 8.9 MG/DL (ref 8.6–10.5)
CALCIUM SPEC-SCNC: 9 MG/DL (ref 8.6–10.5)
CALCIUM SPEC-SCNC: 9.1 MG/DL (ref 8.6–10.5)
CALCIUM SPEC-SCNC: 9.2 MG/DL (ref 8.6–10.5)
CALCIUM SPEC-SCNC: 9.3 MG/DL (ref 8.6–10.5)
CALCIUM SPEC-SCNC: 9.5 MG/DL (ref 8.6–10.5)
CALCIUM SPEC-SCNC: 9.6 MG/DL (ref 8.6–10.5)
CALCIUM SPEC-SCNC: 9.6 MG/DL (ref 8.6–10.5)
CALCIUM SPEC-SCNC: 9.7 MG/DL (ref 8.6–10.5)
CHLORIDE SERPL-SCNC: 100 MMOL/L (ref 98–107)
CHLORIDE SERPL-SCNC: 101 MMOL/L (ref 98–107)
CHLORIDE SERPL-SCNC: 102 MMOL/L (ref 98–107)
CHLORIDE SERPL-SCNC: 103 MMOL/L (ref 98–107)
CHLORIDE SERPL-SCNC: 104 MMOL/L (ref 98–107)
CHLORIDE SERPL-SCNC: 105 MMOL/L (ref 98–107)
CHLORIDE SERPL-SCNC: 93 MMOL/L (ref 98–107)
CHLORIDE SERPL-SCNC: 95 MMOL/L (ref 98–107)
CHLORIDE SERPL-SCNC: 96 MMOL/L (ref 98–107)
CHLORIDE SERPL-SCNC: 97 MMOL/L (ref 98–107)
CHLORIDE SERPL-SCNC: 98 MMOL/L (ref 98–107)
CHLORIDE SERPL-SCNC: 99 MMOL/L (ref 98–107)
CHLORIDE SERPL-SCNC: 99 MMOL/L (ref 98–107)
CK SERPL-CCNC: 34 U/L (ref 20–180)
CLARITY UR: CLEAR
CLARITY UR: CLEAR
CLUMPED PLATELETS: PRESENT
CO2 SERPL-SCNC: 23.5 MMOL/L (ref 22–29)
CO2 SERPL-SCNC: 26.7 MMOL/L (ref 22–29)
CO2 SERPL-SCNC: 28.2 MMOL/L (ref 22–29)
CO2 SERPL-SCNC: 28.4 MMOL/L (ref 22–29)
CO2 SERPL-SCNC: 29 MMOL/L (ref 22–29)
CO2 SERPL-SCNC: 29.2 MMOL/L (ref 22–29)
CO2 SERPL-SCNC: 29.4 MMOL/L (ref 22–29)
CO2 SERPL-SCNC: 29.6 MMOL/L (ref 22–29)
CO2 SERPL-SCNC: 29.8 MMOL/L (ref 22–29)
CO2 SERPL-SCNC: 30.5 MMOL/L (ref 22–29)
CO2 SERPL-SCNC: 30.6 MMOL/L (ref 22–29)
CO2 SERPL-SCNC: 31.4 MMOL/L (ref 22–29)
CO2 SERPL-SCNC: 31.5 MMOL/L (ref 22–29)
CO2 SERPL-SCNC: 31.6 MMOL/L (ref 22–29)
CO2 SERPL-SCNC: 32.2 MMOL/L (ref 22–29)
CO2 SERPL-SCNC: 32.3 MMOL/L (ref 22–29)
CO2 SERPL-SCNC: 32.7 MMOL/L (ref 22–29)
CO2 SERPL-SCNC: 32.9 MMOL/L (ref 22–29)
CO2 SERPL-SCNC: 33.4 MMOL/L (ref 22–29)
CO2 SERPL-SCNC: 33.7 MMOL/L (ref 22–29)
CO2 SERPL-SCNC: 34 MMOL/L (ref 22–29)
CO2 SERPL-SCNC: 34.8 MMOL/L (ref 22–29)
CO2 SERPL-SCNC: 35.1 MMOL/L (ref 22–29)
CO2 SERPL-SCNC: 35.2 MMOL/L (ref 22–29)
CO2 SERPL-SCNC: 35.2 MMOL/L (ref 22–29)
CO2 SERPL-SCNC: 35.4 MMOL/L (ref 22–29)
COHGB MFR BLD: 0.8 % (ref 0–2)
COHGB MFR BLD: 0.9 % (ref 0–2)
COHGB MFR BLD: 1 % (ref 0–2)
COHGB MFR BLD: 1.2 % (ref 0–2)
COHGB MFR BLD: 1.4 % (ref 0–2)
COHGB MFR BLD: <0.7 % (ref 0–2)
COLOR UR: YELLOW
COLOR UR: YELLOW
CREAT SERPL-MCNC: 0.78 MG/DL (ref 0.57–1)
CREAT SERPL-MCNC: 0.8 MG/DL (ref 0.57–1)
CREAT SERPL-MCNC: 0.84 MG/DL (ref 0.57–1)
CREAT SERPL-MCNC: 0.88 MG/DL (ref 0.57–1)
CREAT SERPL-MCNC: 0.89 MG/DL (ref 0.57–1)
CREAT SERPL-MCNC: 0.89 MG/DL (ref 0.57–1)
CREAT SERPL-MCNC: 0.9 MG/DL (ref 0.57–1)
CREAT SERPL-MCNC: 0.91 MG/DL (ref 0.57–1)
CREAT SERPL-MCNC: 0.92 MG/DL (ref 0.57–1)
CREAT SERPL-MCNC: 0.92 MG/DL (ref 0.57–1)
CREAT SERPL-MCNC: 0.93 MG/DL (ref 0.57–1)
CREAT SERPL-MCNC: 0.94 MG/DL (ref 0.57–1)
CREAT SERPL-MCNC: 0.96 MG/DL (ref 0.57–1)
CREAT SERPL-MCNC: 0.98 MG/DL (ref 0.57–1)
CREAT SERPL-MCNC: 0.99 MG/DL (ref 0.57–1)
CREAT SERPL-MCNC: 1.05 MG/DL (ref 0.57–1)
CREAT SERPL-MCNC: 1.05 MG/DL (ref 0.57–1)
CREAT SERPL-MCNC: 1.06 MG/DL (ref 0.57–1)
CREAT SERPL-MCNC: 1.06 MG/DL (ref 0.57–1)
CREAT SERPL-MCNC: 1.07 MG/DL (ref 0.57–1)
CREAT SERPL-MCNC: 1.08 MG/DL (ref 0.57–1)
CREAT SERPL-MCNC: 1.08 MG/DL (ref 0.57–1)
CREAT SERPL-MCNC: 1.18 MG/DL (ref 0.57–1)
CREAT SERPL-MCNC: 1.22 MG/DL (ref 0.57–1)
CRP SERPL-MCNC: 0.74 MG/DL (ref 0–0.5)
CRP SERPL-MCNC: 1.83 MG/DL (ref 0–0.5)
D DIMER PPP FEU-MCNC: 1.28 MCGFEU/ML (ref 0–0.57)
D-LACTATE SERPL-SCNC: 0.7 MMOL/L (ref 0.5–2)
D-LACTATE SERPL-SCNC: 0.9 MMOL/L (ref 0.5–2)
D-LACTATE SERPL-SCNC: 1.2 MMOL/L (ref 0.5–2)
D-LACTATE SERPL-SCNC: 1.8 MMOL/L (ref 0.5–2)
D-LACTATE SERPL-SCNC: 3.6 MMOL/L (ref 0.5–2)
D-LACTATE SERPL-SCNC: 3.9 MMOL/L (ref 0.5–2)
DEPRECATED RDW RBC AUTO: 39.2 FL (ref 37–54)
DEPRECATED RDW RBC AUTO: 40 FL (ref 37–54)
DEPRECATED RDW RBC AUTO: 40.2 FL (ref 37–54)
DEPRECATED RDW RBC AUTO: 40.2 FL (ref 37–54)
DEPRECATED RDW RBC AUTO: 40.5 FL (ref 37–54)
DEPRECATED RDW RBC AUTO: 40.5 FL (ref 37–54)
DEPRECATED RDW RBC AUTO: 41.1 FL (ref 37–54)
DEPRECATED RDW RBC AUTO: 41.3 FL (ref 37–54)
DEPRECATED RDW RBC AUTO: 41.8 FL (ref 37–54)
DEPRECATED RDW RBC AUTO: 42.3 FL (ref 37–54)
DEPRECATED RDW RBC AUTO: 42.9 FL (ref 37–54)
DEPRECATED RDW RBC AUTO: 43.8 FL (ref 37–54)
DEPRECATED RDW RBC AUTO: 44.1 FL (ref 37–54)
DEPRECATED RDW RBC AUTO: 45.1 FL (ref 37–54)
DEPRECATED RDW RBC AUTO: 45.1 FL (ref 37–54)
DEPRECATED RDW RBC AUTO: 45.4 FL (ref 37–54)
DEPRECATED RDW RBC AUTO: 45.5 FL (ref 37–54)
DEPRECATED RDW RBC AUTO: 45.7 FL (ref 37–54)
DEPRECATED RDW RBC AUTO: 46.1 FL (ref 37–54)
DEPRECATED RDW RBC AUTO: 46.2 FL (ref 37–54)
DEPRECATED RDW RBC AUTO: 46.5 FL (ref 37–54)
DEPRECATED RDW RBC AUTO: 46.8 FL (ref 37–54)
DEPRECATED RDW RBC AUTO: 46.9 FL (ref 37–54)
DEPRECATED RDW RBC AUTO: 47.2 FL (ref 37–54)
DEPRECATED RDW RBC AUTO: 47.5 FL (ref 37–54)
EOSINOPHIL # BLD AUTO: 0 10*3/MM3 (ref 0–0.4)
EOSINOPHIL # BLD AUTO: 0.01 10*3/MM3 (ref 0–0.4)
EOSINOPHIL # BLD AUTO: 0.02 10*3/MM3 (ref 0–0.4)
EOSINOPHIL # BLD AUTO: 0.03 10*3/MM3 (ref 0–0.4)
EOSINOPHIL # BLD AUTO: 0.04 10*3/MM3 (ref 0–0.4)
EOSINOPHIL # BLD AUTO: 0.04 10*3/MM3 (ref 0–0.4)
EOSINOPHIL # BLD AUTO: 0.06 10*3/MM3 (ref 0–0.4)
EOSINOPHIL # BLD AUTO: 0.07 10*3/MM3 (ref 0–0.4)
EOSINOPHIL # BLD AUTO: 0.08 10*3/MM3 (ref 0–0.4)
EOSINOPHIL # BLD AUTO: 0.09 10*3/MM3 (ref 0–0.4)
EOSINOPHIL # BLD AUTO: 0.22 10*3/MM3 (ref 0–0.4)
EOSINOPHIL # BLD AUTO: 0.29 10*3/MM3 (ref 0–0.4)
EOSINOPHIL NFR BLD AUTO: 0 % (ref 0.3–6.2)
EOSINOPHIL NFR BLD AUTO: 0.1 % (ref 0.3–6.2)
EOSINOPHIL NFR BLD AUTO: 0.2 % (ref 0.3–6.2)
EOSINOPHIL NFR BLD AUTO: 0.2 % (ref 0.3–6.2)
EOSINOPHIL NFR BLD AUTO: 0.3 % (ref 0.3–6.2)
EOSINOPHIL NFR BLD AUTO: 0.3 % (ref 0.3–6.2)
EOSINOPHIL NFR BLD AUTO: 0.4 % (ref 0.3–6.2)
EOSINOPHIL NFR BLD AUTO: 0.5 % (ref 0.3–6.2)
EOSINOPHIL NFR BLD AUTO: 1.1 % (ref 0.3–6.2)
EOSINOPHIL NFR BLD AUTO: 1.9 % (ref 0.3–6.2)
EOSINOPHIL NFR BLD AUTO: 2.4 % (ref 0.3–6.2)
ERYTHROCYTE [DISTWIDTH] IN BLOOD BY AUTOMATED COUNT: 12.2 % (ref 12.3–15.4)
ERYTHROCYTE [DISTWIDTH] IN BLOOD BY AUTOMATED COUNT: 12.3 % (ref 12.3–15.4)
ERYTHROCYTE [DISTWIDTH] IN BLOOD BY AUTOMATED COUNT: 12.4 % (ref 12.3–15.4)
ERYTHROCYTE [DISTWIDTH] IN BLOOD BY AUTOMATED COUNT: 12.4 % (ref 12.3–15.4)
ERYTHROCYTE [DISTWIDTH] IN BLOOD BY AUTOMATED COUNT: 12.5 % (ref 12.3–15.4)
ERYTHROCYTE [DISTWIDTH] IN BLOOD BY AUTOMATED COUNT: 12.5 % (ref 12.3–15.4)
ERYTHROCYTE [DISTWIDTH] IN BLOOD BY AUTOMATED COUNT: 12.7 % (ref 12.3–15.4)
ERYTHROCYTE [DISTWIDTH] IN BLOOD BY AUTOMATED COUNT: 13 % (ref 12.3–15.4)
ERYTHROCYTE [DISTWIDTH] IN BLOOD BY AUTOMATED COUNT: 13.2 % (ref 12.3–15.4)
ERYTHROCYTE [DISTWIDTH] IN BLOOD BY AUTOMATED COUNT: 13.3 % (ref 12.3–15.4)
ERYTHROCYTE [DISTWIDTH] IN BLOOD BY AUTOMATED COUNT: 13.4 % (ref 12.3–15.4)
ERYTHROCYTE [DISTWIDTH] IN BLOOD BY AUTOMATED COUNT: 13.5 % (ref 12.3–15.4)
ERYTHROCYTE [DISTWIDTH] IN BLOOD BY AUTOMATED COUNT: 13.5 % (ref 12.3–15.4)
ERYTHROCYTE [DISTWIDTH] IN BLOOD BY AUTOMATED COUNT: 13.6 % (ref 12.3–15.4)
ERYTHROCYTE [DISTWIDTH] IN BLOOD BY AUTOMATED COUNT: 13.7 % (ref 12.3–15.4)
ERYTHROCYTE [DISTWIDTH] IN BLOOD BY AUTOMATED COUNT: 13.8 % (ref 12.3–15.4)
ERYTHROCYTE [DISTWIDTH] IN BLOOD BY AUTOMATED COUNT: 13.9 % (ref 12.3–15.4)
FLUAV AG NPH QL: NEGATIVE
FLUBV AG NPH QL IA: NEGATIVE
GAS FLOW AIRWAY: 2 LPM
GAS FLOW AIRWAY: 2.5 LPM
GAS FLOW AIRWAY: 3 LPM
GFR SERPL CREATININE-BSD FRML MDRD: 42 ML/MIN/1.73
GFR SERPL CREATININE-BSD FRML MDRD: 44 ML/MIN/1.73
GFR SERPL CREATININE-BSD FRML MDRD: 49 ML/MIN/1.73
GFR SERPL CREATININE-BSD FRML MDRD: 50 ML/MIN/1.73
GFR SERPL CREATININE-BSD FRML MDRD: 54 ML/MIN/1.73
GFR SERPL CREATININE-BSD FRML MDRD: 54 ML/MIN/1.73
GFR SERPL CREATININE-BSD FRML MDRD: 56 ML/MIN/1.73
GFR SERPL CREATININE-BSD FRML MDRD: 57 ML/MIN/1.73
GFR SERPL CREATININE-BSD FRML MDRD: 58 ML/MIN/1.73
GFR SERPL CREATININE-BSD FRML MDRD: 59 ML/MIN/1.73
GFR SERPL CREATININE-BSD FRML MDRD: 60 ML/MIN/1.73
GFR SERPL CREATININE-BSD FRML MDRD: 61 ML/MIN/1.73
GFR SERPL CREATININE-BSD FRML MDRD: 61 ML/MIN/1.73
GFR SERPL CREATININE-BSD FRML MDRD: 62 ML/MIN/1.73
GFR SERPL CREATININE-BSD FRML MDRD: 65 ML/MIN/1.73
GFR SERPL CREATININE-BSD FRML MDRD: 69 ML/MIN/1.73
GFR SERPL CREATININE-BSD FRML MDRD: 71 ML/MIN/1.73
GLOBULIN UR ELPH-MCNC: 2.3 GM/DL
GLOBULIN UR ELPH-MCNC: 2.4 GM/DL
GLOBULIN UR ELPH-MCNC: 2.6 GM/DL
GLOBULIN UR ELPH-MCNC: 2.7 GM/DL
GLOBULIN UR ELPH-MCNC: 3.3 GM/DL
GLUCOSE SERPL-MCNC: 100 MG/DL (ref 65–99)
GLUCOSE SERPL-MCNC: 102 MG/DL (ref 65–99)
GLUCOSE SERPL-MCNC: 106 MG/DL (ref 65–99)
GLUCOSE SERPL-MCNC: 108 MG/DL (ref 65–99)
GLUCOSE SERPL-MCNC: 109 MG/DL (ref 65–99)
GLUCOSE SERPL-MCNC: 111 MG/DL (ref 65–99)
GLUCOSE SERPL-MCNC: 112 MG/DL (ref 65–99)
GLUCOSE SERPL-MCNC: 116 MG/DL (ref 65–99)
GLUCOSE SERPL-MCNC: 127 MG/DL (ref 65–99)
GLUCOSE SERPL-MCNC: 129 MG/DL (ref 65–99)
GLUCOSE SERPL-MCNC: 131 MG/DL (ref 65–99)
GLUCOSE SERPL-MCNC: 139 MG/DL (ref 65–99)
GLUCOSE SERPL-MCNC: 144 MG/DL (ref 65–99)
GLUCOSE SERPL-MCNC: 147 MG/DL (ref 65–99)
GLUCOSE SERPL-MCNC: 152 MG/DL (ref 65–99)
GLUCOSE SERPL-MCNC: 170 MG/DL (ref 65–99)
GLUCOSE SERPL-MCNC: 81 MG/DL (ref 65–99)
GLUCOSE SERPL-MCNC: 88 MG/DL (ref 65–99)
GLUCOSE SERPL-MCNC: 90 MG/DL (ref 65–99)
GLUCOSE SERPL-MCNC: 91 MG/DL (ref 65–99)
GLUCOSE SERPL-MCNC: 93 MG/DL (ref 65–99)
GLUCOSE SERPL-MCNC: 94 MG/DL (ref 65–99)
GLUCOSE SERPL-MCNC: 95 MG/DL (ref 65–99)
GLUCOSE SERPL-MCNC: 98 MG/DL (ref 65–99)
GLUCOSE UR STRIP-MCNC: NEGATIVE MG/DL
GLUCOSE UR STRIP-MCNC: NEGATIVE MG/DL
GRAM STN SPEC: NORMAL
HCO3 BLDA-SCNC: 28.5 MMOL/L (ref 22–28)
HCO3 BLDA-SCNC: 30.8 MMOL/L (ref 22–28)
HCO3 BLDA-SCNC: 32.3 MMOL/L (ref 22–28)
HCO3 BLDA-SCNC: 34.3 MMOL/L (ref 22–28)
HCO3 BLDA-SCNC: 38.2 MMOL/L (ref 22–28)
HCO3 BLDA-SCNC: 38.3 MMOL/L (ref 22–28)
HCT VFR BLD AUTO: 30.4 % (ref 34–46.6)
HCT VFR BLD AUTO: 30.6 % (ref 34–46.6)
HCT VFR BLD AUTO: 30.8 % (ref 34–46.6)
HCT VFR BLD AUTO: 30.8 % (ref 34–46.6)
HCT VFR BLD AUTO: 31 % (ref 34–46.6)
HCT VFR BLD AUTO: 31.2 % (ref 34–46.6)
HCT VFR BLD AUTO: 32.6 % (ref 34–46.6)
HCT VFR BLD AUTO: 33.6 % (ref 34–46.6)
HCT VFR BLD AUTO: 34.4 % (ref 34–46.6)
HCT VFR BLD AUTO: 34.6 % (ref 34–46.6)
HCT VFR BLD AUTO: 35 % (ref 34–46.6)
HCT VFR BLD AUTO: 35.7 % (ref 34–46.6)
HCT VFR BLD AUTO: 35.9 % (ref 34–46.6)
HCT VFR BLD AUTO: 36.3 % (ref 34–46.6)
HCT VFR BLD AUTO: 36.7 % (ref 34–46.6)
HCT VFR BLD AUTO: 36.8 % (ref 34–46.6)
HCT VFR BLD AUTO: 37.4 % (ref 34–46.6)
HCT VFR BLD AUTO: 37.5 % (ref 34–46.6)
HCT VFR BLD AUTO: 37.6 % (ref 34–46.6)
HCT VFR BLD AUTO: 38.5 % (ref 34–46.6)
HCT VFR BLD AUTO: 38.8 % (ref 34–46.6)
HCT VFR BLD AUTO: 38.9 % (ref 34–46.6)
HCT VFR BLD AUTO: 39.2 % (ref 34–46.6)
HCT VFR BLD AUTO: 39.5 % (ref 34–46.6)
HCT VFR BLD CALC: 33.3 %
HCT VFR BLD CALC: 33.3 %
HCT VFR BLD CALC: 36 %
HCT VFR BLD CALC: 37.4 %
HCT VFR BLD CALC: 37.7 %
HCT VFR BLD CALC: 38.4 %
HGB BLD-MCNC: 10.2 G/DL (ref 12–15.9)
HGB BLD-MCNC: 10.6 G/DL (ref 12–15.9)
HGB BLD-MCNC: 10.8 G/DL (ref 12–15.9)
HGB BLD-MCNC: 11.2 G/DL (ref 12–15.9)
HGB BLD-MCNC: 11.3 G/DL (ref 12–15.9)
HGB BLD-MCNC: 11.3 G/DL (ref 12–15.9)
HGB BLD-MCNC: 11.6 G/DL (ref 12–15.9)
HGB BLD-MCNC: 11.7 G/DL (ref 12–15.9)
HGB BLD-MCNC: 11.9 G/DL (ref 12–15.9)
HGB BLD-MCNC: 12 G/DL (ref 12–15.9)
HGB BLD-MCNC: 12.2 G/DL (ref 12–15.9)
HGB BLD-MCNC: 12.6 G/DL (ref 12–15.9)
HGB BLD-MCNC: 12.7 G/DL (ref 12–15.9)
HGB BLD-MCNC: 13 G/DL (ref 12–15.9)
HGB BLD-MCNC: 9.4 G/DL (ref 12–15.9)
HGB BLD-MCNC: 9.6 G/DL (ref 12–15.9)
HGB BLD-MCNC: 9.7 G/DL (ref 12–15.9)
HGB BLD-MCNC: 9.9 G/DL (ref 12–15.9)
HGB BLD-MCNC: 9.9 G/DL (ref 12–15.9)
HGB UR QL STRIP.AUTO: ABNORMAL
HGB UR QL STRIP.AUTO: NEGATIVE
HOLD SPECIMEN: NORMAL
HYALINE CASTS UR QL AUTO: ABNORMAL /LPF
IMM GRANULOCYTES # BLD AUTO: 0.04 10*3/MM3 (ref 0–0.05)
IMM GRANULOCYTES # BLD AUTO: 0.05 10*3/MM3 (ref 0–0.05)
IMM GRANULOCYTES # BLD AUTO: 0.06 10*3/MM3 (ref 0–0.05)
IMM GRANULOCYTES # BLD AUTO: 0.07 10*3/MM3 (ref 0–0.05)
IMM GRANULOCYTES # BLD AUTO: 0.08 10*3/MM3 (ref 0–0.05)
IMM GRANULOCYTES # BLD AUTO: 0.09 10*3/MM3 (ref 0–0.05)
IMM GRANULOCYTES # BLD AUTO: 0.11 10*3/MM3 (ref 0–0.05)
IMM GRANULOCYTES # BLD AUTO: 0.13 10*3/MM3 (ref 0–0.05)
IMM GRANULOCYTES # BLD AUTO: 0.14 10*3/MM3 (ref 0–0.05)
IMM GRANULOCYTES # BLD AUTO: 0.18 10*3/MM3 (ref 0–0.05)
IMM GRANULOCYTES # BLD AUTO: 0.18 10*3/MM3 (ref 0–0.05)
IMM GRANULOCYTES # BLD AUTO: 0.2 10*3/MM3 (ref 0–0.05)
IMM GRANULOCYTES # BLD AUTO: 0.22 10*3/MM3 (ref 0–0.05)
IMM GRANULOCYTES # BLD AUTO: 0.24 10*3/MM3 (ref 0–0.05)
IMM GRANULOCYTES # BLD AUTO: 0.26 10*3/MM3 (ref 0–0.05)
IMM GRANULOCYTES # BLD AUTO: 0.29 10*3/MM3 (ref 0–0.05)
IMM GRANULOCYTES # BLD AUTO: 0.34 10*3/MM3 (ref 0–0.05)
IMM GRANULOCYTES NFR BLD AUTO: 0.3 % (ref 0–0.5)
IMM GRANULOCYTES NFR BLD AUTO: 0.4 % (ref 0–0.5)
IMM GRANULOCYTES NFR BLD AUTO: 0.5 % (ref 0–0.5)
IMM GRANULOCYTES NFR BLD AUTO: 0.7 % (ref 0–0.5)
IMM GRANULOCYTES NFR BLD AUTO: 0.8 % (ref 0–0.5)
IMM GRANULOCYTES NFR BLD AUTO: 0.9 % (ref 0–0.5)
IMM GRANULOCYTES NFR BLD AUTO: 1.1 % (ref 0–0.5)
IMM GRANULOCYTES NFR BLD AUTO: 1.3 % (ref 0–0.5)
IMM GRANULOCYTES NFR BLD AUTO: 1.3 % (ref 0–0.5)
IMM GRANULOCYTES NFR BLD AUTO: 1.5 % (ref 0–0.5)
IMM GRANULOCYTES NFR BLD AUTO: 1.6 % (ref 0–0.5)
IMM GRANULOCYTES NFR BLD AUTO: 2.4 % (ref 0–0.5)
INHALED O2 CONCENTRATION: 28 %
KETONES UR QL STRIP: NEGATIVE
KETONES UR QL STRIP: NEGATIVE
LACTATE HOLD SPECIMEN: NORMAL
LEUKOCYTE ESTERASE UR QL STRIP.AUTO: NEGATIVE
LEUKOCYTE ESTERASE UR QL STRIP.AUTO: NEGATIVE
LIPASE SERPL-CCNC: 14 U/L (ref 13–60)
LIPASE SERPL-CCNC: 19 U/L (ref 13–60)
LYMPHOCYTES # BLD AUTO: 0.14 10*3/MM3 (ref 0.7–3.1)
LYMPHOCYTES # BLD AUTO: 0.15 10*3/MM3 (ref 0.7–3.1)
LYMPHOCYTES # BLD AUTO: 0.18 10*3/MM3 (ref 0.7–3.1)
LYMPHOCYTES # BLD AUTO: 0.41 10*3/MM3 (ref 0.7–3.1)
LYMPHOCYTES # BLD AUTO: 0.5 10*3/MM3 (ref 0.7–3.1)
LYMPHOCYTES # BLD AUTO: 0.94 10*3/MM3 (ref 0.7–3.1)
LYMPHOCYTES # BLD AUTO: 0.99 10*3/MM3 (ref 0.7–3.1)
LYMPHOCYTES # BLD AUTO: 1.09 10*3/MM3 (ref 0.7–3.1)
LYMPHOCYTES # BLD AUTO: 1.1 10*3/MM3 (ref 0.7–3.1)
LYMPHOCYTES # BLD AUTO: 1.2 10*3/MM3 (ref 0.7–3.1)
LYMPHOCYTES # BLD AUTO: 1.37 10*3/MM3 (ref 0.7–3.1)
LYMPHOCYTES # BLD AUTO: 1.49 10*3/MM3 (ref 0.7–3.1)
LYMPHOCYTES # BLD AUTO: 1.54 10*3/MM3 (ref 0.7–3.1)
LYMPHOCYTES # BLD AUTO: 1.67 10*3/MM3 (ref 0.7–3.1)
LYMPHOCYTES # BLD AUTO: 1.69 10*3/MM3 (ref 0.7–3.1)
LYMPHOCYTES # BLD AUTO: 1.83 10*3/MM3 (ref 0.7–3.1)
LYMPHOCYTES # BLD AUTO: 1.85 10*3/MM3 (ref 0.7–3.1)
LYMPHOCYTES # BLD AUTO: 1.93 10*3/MM3 (ref 0.7–3.1)
LYMPHOCYTES # BLD AUTO: 1.99 10*3/MM3 (ref 0.7–3.1)
LYMPHOCYTES # BLD AUTO: 2.84 10*3/MM3 (ref 0.7–3.1)
LYMPHOCYTES # BLD AUTO: 3.06 10*3/MM3 (ref 0.7–3.1)
LYMPHOCYTES NFR BLD AUTO: 0.8 % (ref 19.6–45.3)
LYMPHOCYTES NFR BLD AUTO: 0.9 % (ref 19.6–45.3)
LYMPHOCYTES NFR BLD AUTO: 1.1 % (ref 19.6–45.3)
LYMPHOCYTES NFR BLD AUTO: 10.7 % (ref 19.6–45.3)
LYMPHOCYTES NFR BLD AUTO: 11 % (ref 19.6–45.3)
LYMPHOCYTES NFR BLD AUTO: 11.3 % (ref 19.6–45.3)
LYMPHOCYTES NFR BLD AUTO: 14.4 % (ref 19.6–45.3)
LYMPHOCYTES NFR BLD AUTO: 15.3 % (ref 19.6–45.3)
LYMPHOCYTES NFR BLD AUTO: 2.5 % (ref 19.6–45.3)
LYMPHOCYTES NFR BLD AUTO: 23.3 % (ref 19.6–45.3)
LYMPHOCYTES NFR BLD AUTO: 3.5 % (ref 19.6–45.3)
LYMPHOCYTES NFR BLD AUTO: 33 % (ref 19.6–45.3)
LYMPHOCYTES NFR BLD AUTO: 4.8 % (ref 19.6–45.3)
LYMPHOCYTES NFR BLD AUTO: 4.9 % (ref 19.6–45.3)
LYMPHOCYTES NFR BLD AUTO: 5.4 % (ref 19.6–45.3)
LYMPHOCYTES NFR BLD AUTO: 5.5 % (ref 19.6–45.3)
LYMPHOCYTES NFR BLD AUTO: 6.6 % (ref 19.6–45.3)
LYMPHOCYTES NFR BLD AUTO: 7.2 % (ref 19.6–45.3)
LYMPHOCYTES NFR BLD AUTO: 9.1 % (ref 19.6–45.3)
LYMPHOCYTES NFR BLD AUTO: 9.5 % (ref 19.6–45.3)
LYMPHOCYTES NFR BLD AUTO: 9.8 % (ref 19.6–45.3)
MAGNESIUM SERPL-MCNC: 1.7 MG/DL (ref 1.6–2.4)
MCH RBC QN AUTO: 28.3 PG (ref 26.6–33)
MCH RBC QN AUTO: 28.5 PG (ref 26.6–33)
MCH RBC QN AUTO: 28.7 PG (ref 26.6–33)
MCH RBC QN AUTO: 28.8 PG (ref 26.6–33)
MCH RBC QN AUTO: 28.9 PG (ref 26.6–33)
MCH RBC QN AUTO: 29 PG (ref 26.6–33)
MCH RBC QN AUTO: 29.1 PG (ref 26.6–33)
MCH RBC QN AUTO: 29.1 PG (ref 26.6–33)
MCH RBC QN AUTO: 29.2 PG (ref 26.6–33)
MCH RBC QN AUTO: 29.2 PG (ref 26.6–33)
MCH RBC QN AUTO: 29.3 PG (ref 26.6–33)
MCH RBC QN AUTO: 29.4 PG (ref 26.6–33)
MCH RBC QN AUTO: 29.6 PG (ref 26.6–33)
MCH RBC QN AUTO: 30 PG (ref 26.6–33)
MCHC RBC AUTO-ENTMCNC: 30.5 G/DL (ref 31.5–35.7)
MCHC RBC AUTO-ENTMCNC: 30.8 G/DL (ref 31.5–35.7)
MCHC RBC AUTO-ENTMCNC: 31 G/DL (ref 31.5–35.7)
MCHC RBC AUTO-ENTMCNC: 31 G/DL (ref 31.5–35.7)
MCHC RBC AUTO-ENTMCNC: 31.1 G/DL (ref 31.5–35.7)
MCHC RBC AUTO-ENTMCNC: 31.1 G/DL (ref 31.5–35.7)
MCHC RBC AUTO-ENTMCNC: 31.2 G/DL (ref 31.5–35.7)
MCHC RBC AUTO-ENTMCNC: 31.3 G/DL (ref 31.5–35.7)
MCHC RBC AUTO-ENTMCNC: 31.4 G/DL (ref 31.5–35.7)
MCHC RBC AUTO-ENTMCNC: 31.5 G/DL (ref 31.5–35.7)
MCHC RBC AUTO-ENTMCNC: 31.6 G/DL (ref 31.5–35.7)
MCHC RBC AUTO-ENTMCNC: 31.7 G/DL (ref 31.5–35.7)
MCHC RBC AUTO-ENTMCNC: 31.8 G/DL (ref 31.5–35.7)
MCHC RBC AUTO-ENTMCNC: 31.9 G/DL (ref 31.5–35.7)
MCHC RBC AUTO-ENTMCNC: 31.9 G/DL (ref 31.5–35.7)
MCHC RBC AUTO-ENTMCNC: 32.2 G/DL (ref 31.5–35.7)
MCHC RBC AUTO-ENTMCNC: 32.3 G/DL (ref 31.5–35.7)
MCHC RBC AUTO-ENTMCNC: 32.3 G/DL (ref 31.5–35.7)
MCHC RBC AUTO-ENTMCNC: 32.4 G/DL (ref 31.5–35.7)
MCHC RBC AUTO-ENTMCNC: 32.5 G/DL (ref 31.5–35.7)
MCHC RBC AUTO-ENTMCNC: 32.6 G/DL (ref 31.5–35.7)
MCHC RBC AUTO-ENTMCNC: 32.6 G/DL (ref 31.5–35.7)
MCHC RBC AUTO-ENTMCNC: 32.7 G/DL (ref 31.5–35.7)
MCHC RBC AUTO-ENTMCNC: 32.9 G/DL (ref 31.5–35.7)
MCV RBC AUTO: 87.2 FL (ref 79–97)
MCV RBC AUTO: 89.2 FL (ref 79–97)
MCV RBC AUTO: 89.5 FL (ref 79–97)
MCV RBC AUTO: 89.6 FL (ref 79–97)
MCV RBC AUTO: 89.9 FL (ref 79–97)
MCV RBC AUTO: 89.9 FL (ref 79–97)
MCV RBC AUTO: 90.4 FL (ref 79–97)
MCV RBC AUTO: 90.6 FL (ref 79–97)
MCV RBC AUTO: 91.1 FL (ref 79–97)
MCV RBC AUTO: 91.3 FL (ref 79–97)
MCV RBC AUTO: 91.6 FL (ref 79–97)
MCV RBC AUTO: 91.7 FL (ref 79–97)
MCV RBC AUTO: 92 FL (ref 79–97)
MCV RBC AUTO: 92 FL (ref 79–97)
MCV RBC AUTO: 92.1 FL (ref 79–97)
MCV RBC AUTO: 92.2 FL (ref 79–97)
MCV RBC AUTO: 92.5 FL (ref 79–97)
MCV RBC AUTO: 92.5 FL (ref 79–97)
MCV RBC AUTO: 92.7 FL (ref 79–97)
MCV RBC AUTO: 93.3 FL (ref 79–97)
MCV RBC AUTO: 93.3 FL (ref 79–97)
MCV RBC AUTO: 93.4 FL (ref 79–97)
MCV RBC AUTO: 93.6 FL (ref 79–97)
MCV RBC AUTO: 94 FL (ref 79–97)
MCV RBC AUTO: 94.5 FL (ref 79–97)
METHGB BLD QL: -0.2 % (ref 0–1.5)
METHGB BLD QL: 0.9 % (ref 0–1.5)
METHGB BLD QL: 1 % (ref 0–1.5)
METHGB BLD QL: 1.1 % (ref 0–1.5)
METHGB BLD QL: 1.2 % (ref 0–1.5)
METHGB BLD QL: 1.5 % (ref 0–1.5)
MODALITY: ABNORMAL
MONOCYTES # BLD AUTO: 0.09 10*3/MM3 (ref 0.1–0.9)
MONOCYTES # BLD AUTO: 0.1 10*3/MM3 (ref 0.1–0.9)
MONOCYTES # BLD AUTO: 0.12 10*3/MM3 (ref 0.1–0.9)
MONOCYTES # BLD AUTO: 0.17 10*3/MM3 (ref 0.1–0.9)
MONOCYTES # BLD AUTO: 0.19 10*3/MM3 (ref 0.1–0.9)
MONOCYTES # BLD AUTO: 0.42 10*3/MM3 (ref 0.1–0.9)
MONOCYTES # BLD AUTO: 0.49 10*3/MM3 (ref 0.1–0.9)
MONOCYTES # BLD AUTO: 0.54 10*3/MM3 (ref 0.1–0.9)
MONOCYTES # BLD AUTO: 0.59 10*3/MM3 (ref 0.1–0.9)
MONOCYTES # BLD AUTO: 0.68 10*3/MM3 (ref 0.1–0.9)
MONOCYTES # BLD AUTO: 0.73 10*3/MM3 (ref 0.1–0.9)
MONOCYTES # BLD AUTO: 0.75 10*3/MM3 (ref 0.1–0.9)
MONOCYTES # BLD AUTO: 0.77 10*3/MM3 (ref 0.1–0.9)
MONOCYTES # BLD AUTO: 0.79 10*3/MM3 (ref 0.1–0.9)
MONOCYTES # BLD AUTO: 0.83 10*3/MM3 (ref 0.1–0.9)
MONOCYTES # BLD AUTO: 0.86 10*3/MM3 (ref 0.1–0.9)
MONOCYTES # BLD AUTO: 0.9 10*3/MM3 (ref 0.1–0.9)
MONOCYTES # BLD AUTO: 1.02 10*3/MM3 (ref 0.1–0.9)
MONOCYTES # BLD AUTO: 1.07 10*3/MM3 (ref 0.1–0.9)
MONOCYTES # BLD AUTO: 1.3 10*3/MM3 (ref 0.1–0.9)
MONOCYTES # BLD AUTO: 1.44 10*3/MM3 (ref 0.1–0.9)
MONOCYTES NFR BLD AUTO: 0.5 % (ref 5–12)
MONOCYTES NFR BLD AUTO: 0.7 % (ref 5–12)
MONOCYTES NFR BLD AUTO: 1.3 % (ref 5–12)
MONOCYTES NFR BLD AUTO: 2.1 % (ref 5–12)
MONOCYTES NFR BLD AUTO: 2.4 % (ref 5–12)
MONOCYTES NFR BLD AUTO: 2.4 % (ref 5–12)
MONOCYTES NFR BLD AUTO: 3.1 % (ref 5–12)
MONOCYTES NFR BLD AUTO: 3.4 % (ref 5–12)
MONOCYTES NFR BLD AUTO: 5.1 % (ref 5–12)
MONOCYTES NFR BLD AUTO: 5.1 % (ref 5–12)
MONOCYTES NFR BLD AUTO: 5.2 % (ref 5–12)
MONOCYTES NFR BLD AUTO: 5.2 % (ref 5–12)
MONOCYTES NFR BLD AUTO: 6 % (ref 5–12)
MONOCYTES NFR BLD AUTO: 6.6 % (ref 5–12)
MONOCYTES NFR BLD AUTO: 6.6 % (ref 5–12)
MONOCYTES NFR BLD AUTO: 6.7 % (ref 5–12)
MONOCYTES NFR BLD AUTO: 6.9 % (ref 5–12)
MONOCYTES NFR BLD AUTO: 9.1 % (ref 5–12)
MONOCYTES NFR BLD AUTO: 9.7 % (ref 5–12)
MRSA DNA SPEC QL NAA+PROBE: ABNORMAL
MRSA SPEC QL CULT: NORMAL
MUCOUS THREADS URNS QL MICRO: ABNORMAL /HPF
NEUTROPHILS NFR BLD AUTO: 12.34 10*3/MM3 (ref 1.7–7)
NEUTROPHILS NFR BLD AUTO: 12.47 10*3/MM3 (ref 1.7–7)
NEUTROPHILS NFR BLD AUTO: 13 10*3/MM3 (ref 1.7–7)
NEUTROPHILS NFR BLD AUTO: 13.14 10*3/MM3 (ref 1.7–7)
NEUTROPHILS NFR BLD AUTO: 13.15 10*3/MM3 (ref 1.7–7)
NEUTROPHILS NFR BLD AUTO: 13.38 10*3/MM3 (ref 1.7–7)
NEUTROPHILS NFR BLD AUTO: 13.64 10*3/MM3 (ref 1.7–7)
NEUTROPHILS NFR BLD AUTO: 13.68 10*3/MM3 (ref 1.7–7)
NEUTROPHILS NFR BLD AUTO: 14.3 10*3/MM3 (ref 1.7–7)
NEUTROPHILS NFR BLD AUTO: 15.35 10*3/MM3 (ref 1.7–7)
NEUTROPHILS NFR BLD AUTO: 15.83 10*3/MM3 (ref 1.7–7)
NEUTROPHILS NFR BLD AUTO: 15.95 10*3/MM3 (ref 1.7–7)
NEUTROPHILS NFR BLD AUTO: 16.03 10*3/MM3 (ref 1.7–7)
NEUTROPHILS NFR BLD AUTO: 17.17 10*3/MM3 (ref 1.7–7)
NEUTROPHILS NFR BLD AUTO: 20 10*3/MM3 (ref 1.7–7)
NEUTROPHILS NFR BLD AUTO: 22.63 10*3/MM3 (ref 1.7–7)
NEUTROPHILS NFR BLD AUTO: 22.77 10*3/MM3 (ref 1.7–7)
NEUTROPHILS NFR BLD AUTO: 23.2 10*3/MM3 (ref 1.7–7)
NEUTROPHILS NFR BLD AUTO: 4.83 10*3/MM3 (ref 1.7–7)
NEUTROPHILS NFR BLD AUTO: 5.46 10*3/MM3 (ref 1.7–7)
NEUTROPHILS NFR BLD AUTO: 52.1 % (ref 42.7–76)
NEUTROPHILS NFR BLD AUTO: 65.9 % (ref 42.7–76)
NEUTROPHILS NFR BLD AUTO: 77.3 % (ref 42.7–76)
NEUTROPHILS NFR BLD AUTO: 77.9 % (ref 42.7–76)
NEUTROPHILS NFR BLD AUTO: 80.1 % (ref 42.7–76)
NEUTROPHILS NFR BLD AUTO: 81.1 % (ref 42.7–76)
NEUTROPHILS NFR BLD AUTO: 81.8 % (ref 42.7–76)
NEUTROPHILS NFR BLD AUTO: 81.9 % (ref 42.7–76)
NEUTROPHILS NFR BLD AUTO: 83.2 % (ref 42.7–76)
NEUTROPHILS NFR BLD AUTO: 84.8 % (ref 42.7–76)
NEUTROPHILS NFR BLD AUTO: 86.5 % (ref 42.7–76)
NEUTROPHILS NFR BLD AUTO: 9.36 10*3/MM3 (ref 1.7–7)
NEUTROPHILS NFR BLD AUTO: 90.5 % (ref 42.7–76)
NEUTROPHILS NFR BLD AUTO: 91.2 % (ref 42.7–76)
NEUTROPHILS NFR BLD AUTO: 91.3 % (ref 42.7–76)
NEUTROPHILS NFR BLD AUTO: 91.6 % (ref 42.7–76)
NEUTROPHILS NFR BLD AUTO: 92.2 % (ref 42.7–76)
NEUTROPHILS NFR BLD AUTO: 92.4 % (ref 42.7–76)
NEUTROPHILS NFR BLD AUTO: 96 % (ref 42.7–76)
NEUTROPHILS NFR BLD AUTO: 96.7 % (ref 42.7–76)
NEUTROPHILS NFR BLD AUTO: 97.2 % (ref 42.7–76)
NEUTROPHILS NFR BLD AUTO: 97.5 % (ref 42.7–76)
NITRITE UR QL STRIP: NEGATIVE
NITRITE UR QL STRIP: NEGATIVE
NOTE: ABNORMAL
NRBC BLD AUTO-RTO: 0 /100 WBC (ref 0–0.2)
NT-PROBNP SERPL-MCNC: 124.1 PG/ML (ref 0–1800)
NT-PROBNP SERPL-MCNC: 153.7 PG/ML (ref 0–1800)
NT-PROBNP SERPL-MCNC: 273.3 PG/ML (ref 0–1800)
NT-PROBNP SERPL-MCNC: 62.3 PG/ML (ref 0–1800)
OXYHGB MFR BLDV: 60.6 % (ref 94–99)
OXYHGB MFR BLDV: 93.7 % (ref 94–99)
OXYHGB MFR BLDV: 95.2 % (ref 94–99)
OXYHGB MFR BLDV: 96.3 % (ref 94–99)
OXYHGB MFR BLDV: 97.4 % (ref 94–99)
OXYHGB MFR BLDV: 98 % (ref 94–99)
PCO2 BLDA: 41.7 MM HG (ref 35–45)
PCO2 BLDA: 48.2 MM HG (ref 35–45)
PCO2 BLDA: 56.9 MM HG (ref 35–45)
PCO2 BLDA: 59.5 MM HG (ref 35–45)
PCO2 BLDA: 59.7 MM HG (ref 35–45)
PCO2 BLDA: 67.3 MM HG (ref 35–45)
PCO2 TEMP ADJ BLD: ABNORMAL MM[HG]
PH BLDA: 7.36 PH UNITS (ref 7.3–7.5)
PH BLDA: 7.36 PH UNITS (ref 7.3–7.5)
PH BLDA: 7.37 PH UNITS (ref 7.3–7.5)
PH BLDA: 7.41 PH UNITS (ref 7.3–7.5)
PH BLDA: 7.41 PH UNITS (ref 7.3–7.5)
PH BLDA: 7.44 PH UNITS (ref 7.3–7.5)
PH UR STRIP.AUTO: 5.5 [PH] (ref 5–8)
PH UR STRIP.AUTO: <=5 [PH] (ref 5–8)
PH, TEMP CORRECTED: ABNORMAL
PHOSPHATE SERPL-MCNC: 2.3 MG/DL (ref 2.5–4.5)
PHOSPHATE SERPL-MCNC: 2.4 MG/DL (ref 2.5–4.5)
PHOSPHATE SERPL-MCNC: 3.3 MG/DL (ref 2.5–4.5)
PHOSPHATE SERPL-MCNC: 3.5 MG/DL (ref 2.5–4.5)
PHOSPHATE SERPL-MCNC: 5 MG/DL (ref 2.5–4.5)
PLATELET # BLD AUTO: 153 10*3/MM3 (ref 140–450)
PLATELET # BLD AUTO: 184 10*3/MM3 (ref 140–450)
PLATELET # BLD AUTO: 185 10*3/MM3 (ref 140–450)
PLATELET # BLD AUTO: 186 10*3/MM3 (ref 140–450)
PLATELET # BLD AUTO: 187 10*3/MM3 (ref 140–450)
PLATELET # BLD AUTO: 192 10*3/MM3 (ref 140–450)
PLATELET # BLD AUTO: 194 10*3/MM3 (ref 140–450)
PLATELET # BLD AUTO: 201 10*3/MM3 (ref 140–450)
PLATELET # BLD AUTO: 204 10*3/MM3 (ref 140–450)
PLATELET # BLD AUTO: 208 10*3/MM3 (ref 140–450)
PLATELET # BLD AUTO: 210 10*3/MM3 (ref 140–450)
PLATELET # BLD AUTO: 211 10*3/MM3 (ref 140–450)
PLATELET # BLD AUTO: 214 10*3/MM3 (ref 140–450)
PLATELET # BLD AUTO: 214 10*3/MM3 (ref 140–450)
PLATELET # BLD AUTO: 218 10*3/MM3 (ref 140–450)
PLATELET # BLD AUTO: 219 10*3/MM3 (ref 140–450)
PLATELET # BLD AUTO: 227 10*3/MM3 (ref 140–450)
PLATELET # BLD AUTO: 230 10*3/MM3 (ref 140–450)
PLATELET # BLD AUTO: 232 10*3/MM3 (ref 140–450)
PLATELET # BLD AUTO: 232 10*3/MM3 (ref 140–450)
PLATELET # BLD AUTO: 234 10*3/MM3 (ref 140–450)
PLATELET # BLD AUTO: 234 10*3/MM3 (ref 140–450)
PLATELET # BLD AUTO: 237 10*3/MM3 (ref 140–450)
PLATELET # BLD AUTO: 238 10*3/MM3 (ref 140–450)
PLATELET # BLD AUTO: 242 10*3/MM3 (ref 140–450)
PLATELET # BLD AUTO: 253 10*3/MM3 (ref 140–450)
PLATELET # BLD AUTO: 259 10*3/MM3 (ref 140–450)
PMV BLD AUTO: 10 FL (ref 6–12)
PMV BLD AUTO: 10 FL (ref 6–12)
PMV BLD AUTO: 10.1 FL (ref 6–12)
PMV BLD AUTO: 10.1 FL (ref 6–12)
PMV BLD AUTO: 10.2 FL (ref 6–12)
PMV BLD AUTO: 10.2 FL (ref 6–12)
PMV BLD AUTO: 10.3 FL (ref 6–12)
PMV BLD AUTO: 10.4 FL (ref 6–12)
PMV BLD AUTO: 10.6 FL (ref 6–12)
PMV BLD AUTO: 10.6 FL (ref 6–12)
PMV BLD AUTO: 10.7 FL (ref 6–12)
PMV BLD AUTO: 10.7 FL (ref 6–12)
PMV BLD AUTO: 9.2 FL (ref 6–12)
PMV BLD AUTO: 9.4 FL (ref 6–12)
PMV BLD AUTO: 9.6 FL (ref 6–12)
PMV BLD AUTO: 9.7 FL (ref 6–12)
PMV BLD AUTO: 9.7 FL (ref 6–12)
PMV BLD AUTO: 9.8 FL (ref 6–12)
PMV BLD AUTO: 9.9 FL (ref 6–12)
PMV BLD AUTO: 9.9 FL (ref 6–12)
PO2 BLDA: 111 MM HG (ref 75–100)
PO2 BLDA: 132 MM HG (ref 75–100)
PO2 BLDA: 32.9 MM HG (ref 75–100)
PO2 BLDA: 74.3 MM HG (ref 75–100)
PO2 BLDA: 80.2 MM HG (ref 75–100)
PO2 BLDA: 93.5 MM HG (ref 75–100)
PO2 TEMP ADJ BLD: ABNORMAL MM[HG]
POTASSIUM SERPL-SCNC: 3.5 MMOL/L (ref 3.5–5.2)
POTASSIUM SERPL-SCNC: 3.7 MMOL/L (ref 3.5–5.2)
POTASSIUM SERPL-SCNC: 3.9 MMOL/L (ref 3.5–5.2)
POTASSIUM SERPL-SCNC: 4 MMOL/L (ref 3.5–5.2)
POTASSIUM SERPL-SCNC: 4 MMOL/L (ref 3.5–5.2)
POTASSIUM SERPL-SCNC: 4.1 MMOL/L (ref 3.5–5.2)
POTASSIUM SERPL-SCNC: 4.2 MMOL/L (ref 3.5–5.2)
POTASSIUM SERPL-SCNC: 4.2 MMOL/L (ref 3.5–5.2)
POTASSIUM SERPL-SCNC: 4.3 MMOL/L (ref 3.5–5.2)
POTASSIUM SERPL-SCNC: 4.4 MMOL/L (ref 3.5–5.2)
POTASSIUM SERPL-SCNC: 4.5 MMOL/L (ref 3.5–5.2)
POTASSIUM SERPL-SCNC: 4.6 MMOL/L (ref 3.5–5.2)
POTASSIUM SERPL-SCNC: 4.7 MMOL/L (ref 3.5–5.2)
POTASSIUM SERPL-SCNC: 4.8 MMOL/L (ref 3.5–5.2)
PROCALCITONIN SERPL-MCNC: 0.03 NG/ML (ref 0–0.25)
PROCALCITONIN SERPL-MCNC: 0.04 NG/ML (ref 0–0.25)
PROCALCITONIN SERPL-MCNC: 0.24 NG/ML (ref 0–0.25)
PROCALCITONIN SERPL-MCNC: 0.58 NG/ML (ref 0–0.25)
PROCALCITONIN SERPL-MCNC: 1.15 NG/ML (ref 0–0.25)
PROCALCITONIN SERPL-MCNC: 2.05 NG/ML (ref 0–0.25)
PROCALCITONIN SERPL-MCNC: 2.17 NG/ML (ref 0–0.25)
PROCALCITONIN SERPL-MCNC: 2.39 NG/ML (ref 0–0.25)
PROCALCITONIN SERPL-MCNC: 2.86 NG/ML (ref 0–0.25)
PROCALCITONIN SERPL-MCNC: 4.01 NG/ML (ref 0–0.25)
PROT SERPL-MCNC: 5.2 G/DL (ref 6–8.5)
PROT SERPL-MCNC: 5.6 G/DL (ref 6–8.5)
PROT SERPL-MCNC: 5.9 G/DL (ref 6–8.5)
PROT SERPL-MCNC: 6.3 G/DL (ref 6–8.5)
PROT SERPL-MCNC: 6.5 G/DL (ref 6–8.5)
PROT SERPL-MCNC: 6.6 G/DL (ref 6–8.5)
PROT SERPL-MCNC: 7.2 G/DL (ref 6–8.5)
PROT UR QL STRIP: ABNORMAL
PROT UR QL STRIP: NEGATIVE
RBC # BLD AUTO: 3.26 10*6/MM3 (ref 3.77–5.28)
RBC # BLD AUTO: 3.28 10*6/MM3 (ref 3.77–5.28)
RBC # BLD AUTO: 3.29 10*6/MM3 (ref 3.77–5.28)
RBC # BLD AUTO: 3.32 10*6/MM3 (ref 3.77–5.28)
RBC # BLD AUTO: 3.32 10*6/MM3 (ref 3.77–5.28)
RBC # BLD AUTO: 3.34 10*6/MM3 (ref 3.77–5.28)
RBC # BLD AUTO: 3.35 10*6/MM3 (ref 3.77–5.28)
RBC # BLD AUTO: 3.38 10*6/MM3 (ref 3.77–5.28)
RBC # BLD AUTO: 3.43 10*6/MM3 (ref 3.77–5.28)
RBC # BLD AUTO: 3.6 10*6/MM3 (ref 3.77–5.28)
RBC # BLD AUTO: 3.69 10*6/MM3 (ref 3.77–5.28)
RBC # BLD AUTO: 3.73 10*6/MM3 (ref 3.77–5.28)
RBC # BLD AUTO: 3.79 10*6/MM3 (ref 3.77–5.28)
RBC # BLD AUTO: 3.82 10*6/MM3 (ref 3.77–5.28)
RBC # BLD AUTO: 3.88 10*6/MM3 (ref 3.77–5.28)
RBC # BLD AUTO: 3.92 10*6/MM3 (ref 3.77–5.28)
RBC # BLD AUTO: 3.98 10*6/MM3 (ref 3.77–5.28)
RBC # BLD AUTO: 4.03 10*6/MM3 (ref 3.77–5.28)
RBC # BLD AUTO: 4.07 10*6/MM3 (ref 3.77–5.28)
RBC # BLD AUTO: 4.1 10*6/MM3 (ref 3.77–5.28)
RBC # BLD AUTO: 4.16 10*6/MM3 (ref 3.77–5.28)
RBC # BLD AUTO: 4.17 10*6/MM3 (ref 3.77–5.28)
RBC # BLD AUTO: 4.18 10*6/MM3 (ref 3.77–5.28)
RBC # BLD AUTO: 4.2 10*6/MM3 (ref 3.77–5.28)
RBC # BLD AUTO: 4.23 10*6/MM3 (ref 3.77–5.28)
RBC # BLD AUTO: 4.33 10*6/MM3 (ref 3.77–5.28)
RBC # BLD AUTO: 4.53 10*6/MM3 (ref 3.77–5.28)
RBC # UR: ABNORMAL /HPF
RBC MORPH BLD: NORMAL
REF LAB TEST METHOD: ABNORMAL
SAO2 % BLDCOA: 62.1 % (ref 94–100)
SAO2 % BLDCOA: 95.9 % (ref 94–100)
SAO2 % BLDCOA: 97.1 % (ref 94–100)
SAO2 % BLDCOA: 98.4 % (ref 94–100)
SAO2 % BLDCOA: 98.7 % (ref 94–100)
SAO2 % BLDCOA: 99.3 % (ref 94–100)
SARS-COV-2 RNA PNL SPEC NAA+PROBE: NOT DETECTED
SODIUM SERPL-SCNC: 135 MMOL/L (ref 136–145)
SODIUM SERPL-SCNC: 136 MMOL/L (ref 136–145)
SODIUM SERPL-SCNC: 137 MMOL/L (ref 136–145)
SODIUM SERPL-SCNC: 137 MMOL/L (ref 136–145)
SODIUM SERPL-SCNC: 138 MMOL/L (ref 136–145)
SODIUM SERPL-SCNC: 139 MMOL/L (ref 136–145)
SODIUM SERPL-SCNC: 140 MMOL/L (ref 136–145)
SODIUM SERPL-SCNC: 141 MMOL/L (ref 136–145)
SODIUM SERPL-SCNC: 143 MMOL/L (ref 136–145)
SP GR UR STRIP: 1.01 (ref 1–1.03)
SP GR UR STRIP: 1.02 (ref 1–1.03)
SQUAMOUS #/AREA URNS HPF: ABNORMAL /HPF
TROPONIN T SERPL-MCNC: <0.01 NG/ML (ref 0–0.03)
TSH SERPL DL<=0.05 MIU/L-ACNC: 0.42 UIU/ML (ref 0.27–4.2)
UROBILINOGEN UR QL STRIP: ABNORMAL
UROBILINOGEN UR QL STRIP: ABNORMAL
VANCOMYCIN TROUGH SERPL-MCNC: 13.8 MCG/ML (ref 5–20)
VENTILATOR MODE: ABNORMAL
WBC # BLD AUTO: 10.86 10*3/MM3 (ref 3.4–10.8)
WBC # BLD AUTO: 12.11 10*3/MM3 (ref 3.4–10.8)
WBC # BLD AUTO: 13.55 10*3/MM3 (ref 3.4–10.8)
WBC # BLD AUTO: 13.74 10*3/MM3 (ref 3.4–10.8)
WBC # BLD AUTO: 13.99 10*3/MM3 (ref 3.4–10.8)
WBC # BLD AUTO: 14.47 10*3/MM3 (ref 3.4–10.8)
WBC # BLD AUTO: 14.94 10*3/MM3 (ref 3.4–10.8)
WBC # BLD AUTO: 15.19 10*3/MM3 (ref 3.4–10.8)
WBC # BLD AUTO: 15.2 10*3/MM3 (ref 3.4–10.8)
WBC # BLD AUTO: 15.22 10*3/MM3 (ref 3.4–10.8)
WBC # BLD AUTO: 15.82 10*3/MM3 (ref 3.4–10.8)
WBC # BLD AUTO: 16.23 10*3/MM3 (ref 3.4–10.8)
WBC # BLD AUTO: 16.31 10*3/MM3 (ref 3.4–10.8)
WBC # BLD AUTO: 16.4 10*3/MM3 (ref 3.4–10.8)
WBC # BLD AUTO: 16.49 10*3/MM3 (ref 3.4–10.8)
WBC # BLD AUTO: 16.85 10*3/MM3 (ref 3.4–10.8)
WBC # BLD AUTO: 17.56 10*3/MM3 (ref 3.4–10.8)
WBC # BLD AUTO: 19.68 10*3/MM3 (ref 3.4–10.8)
WBC # BLD AUTO: 20.99 10*3/MM3 (ref 3.4–10.8)
WBC # BLD AUTO: 22.11 10*3/MM3 (ref 3.4–10.8)
WBC # BLD AUTO: 23.55 10*3/MM3 (ref 3.4–10.8)
WBC # BLD AUTO: 24.8 10*3/MM3 (ref 3.4–10.8)
WBC # BLD AUTO: 25.17 10*3/MM3 (ref 3.4–10.8)
WBC # BLD AUTO: 8 10*3/MM3 (ref 3.4–10.8)
WBC # BLD AUTO: 8.28 10*3/MM3 (ref 3.4–10.8)
WBC # BLD AUTO: 9.02 10*3/MM3 (ref 3.4–10.8)
WBC # BLD AUTO: 9.27 10*3/MM3 (ref 3.4–10.8)
WBC MORPH BLD: NORMAL
WBC UR QL AUTO: ABNORMAL /HPF
WHOLE BLOOD HOLD SPECIMEN: NORMAL

## 2020-01-01 PROCEDURE — 82375 ASSAY CARBOXYHB QUANT: CPT

## 2020-01-01 PROCEDURE — G0378 HOSPITAL OBSERVATION PER HR: HCPCS

## 2020-01-01 PROCEDURE — 83880 ASSAY OF NATRIURETIC PEPTIDE: CPT | Performed by: EMERGENCY MEDICINE

## 2020-01-01 PROCEDURE — 99214 OFFICE O/P EST MOD 30 MIN: CPT | Performed by: INTERNAL MEDICINE

## 2020-01-01 PROCEDURE — 85027 COMPLETE CBC AUTOMATED: CPT | Performed by: INTERNAL MEDICINE

## 2020-01-01 PROCEDURE — 97530 THERAPEUTIC ACTIVITIES: CPT

## 2020-01-01 PROCEDURE — 94799 UNLISTED PULMONARY SVC/PX: CPT

## 2020-01-01 PROCEDURE — 36600 WITHDRAWAL OF ARTERIAL BLOOD: CPT

## 2020-01-01 PROCEDURE — 94640 AIRWAY INHALATION TREATMENT: CPT

## 2020-01-01 PROCEDURE — 25010000002 METHYLPREDNISOLONE PER 40 MG: Performed by: HOSPITALIST

## 2020-01-01 PROCEDURE — 63710000001 PREDNISONE PER 5 MG: Performed by: EMERGENCY MEDICINE

## 2020-01-01 PROCEDURE — 97161 PT EVAL LOW COMPLEX 20 MIN: CPT

## 2020-01-01 PROCEDURE — 99232 SBSQ HOSP IP/OBS MODERATE 35: CPT | Performed by: INTERNAL MEDICINE

## 2020-01-01 PROCEDURE — 25010000002 METHYLPREDNISOLONE PER 40 MG: Performed by: EMERGENCY MEDICINE

## 2020-01-01 PROCEDURE — 97110 THERAPEUTIC EXERCISES: CPT

## 2020-01-01 PROCEDURE — 84145 PROCALCITONIN (PCT): CPT | Performed by: EMERGENCY MEDICINE

## 2020-01-01 PROCEDURE — 99214 OFFICE O/P EST MOD 30 MIN: CPT | Performed by: NURSE PRACTITIONER

## 2020-01-01 PROCEDURE — P9612 CATHETERIZE FOR URINE SPEC: HCPCS

## 2020-01-01 PROCEDURE — 99232 SBSQ HOSP IP/OBS MODERATE 35: CPT | Performed by: FAMILY MEDICINE

## 2020-01-01 PROCEDURE — 83690 ASSAY OF LIPASE: CPT | Performed by: NURSE PRACTITIONER

## 2020-01-01 PROCEDURE — 99232 SBSQ HOSP IP/OBS MODERATE 35: CPT | Performed by: HOSPITALIST

## 2020-01-01 PROCEDURE — 96365 THER/PROPH/DIAG IV INF INIT: CPT

## 2020-01-01 PROCEDURE — 99233 SBSQ HOSP IP/OBS HIGH 50: CPT | Performed by: INTERNAL MEDICINE

## 2020-01-01 PROCEDURE — 99226 PR SBSQ OBSERVATION CARE/DAY 35 MINUTES: CPT | Performed by: NURSE PRACTITIONER

## 2020-01-01 PROCEDURE — 25010000002 PIPERACILLIN SOD-TAZOBACTAM PER 1 G: Performed by: EMERGENCY MEDICINE

## 2020-01-01 PROCEDURE — 85025 COMPLETE CBC W/AUTO DIFF WBC: CPT | Performed by: INTERNAL MEDICINE

## 2020-01-01 PROCEDURE — 25010000002 ENOXAPARIN PER 10 MG: Performed by: INTERNAL MEDICINE

## 2020-01-01 PROCEDURE — 99224 PR SBSQ OBSERVATION CARE/DAY 15 MINUTES: CPT | Performed by: INTERNAL MEDICINE

## 2020-01-01 PROCEDURE — 87635 SARS-COV-2 COVID-19 AMP PRB: CPT | Performed by: FAMILY MEDICINE

## 2020-01-01 PROCEDURE — 94660 CPAP INITIATION&MGMT: CPT

## 2020-01-01 PROCEDURE — 85025 COMPLETE CBC W/AUTO DIFF WBC: CPT | Performed by: HOSPITALIST

## 2020-01-01 PROCEDURE — 25010000002 IOPAMIDOL 61 % SOLUTION: Performed by: INTERNAL MEDICINE

## 2020-01-01 PROCEDURE — 74230 X-RAY XM SWLNG FUNCJ C+: CPT

## 2020-01-01 PROCEDURE — 80069 RENAL FUNCTION PANEL: CPT | Performed by: EMERGENCY MEDICINE

## 2020-01-01 PROCEDURE — 84145 PROCALCITONIN (PCT): CPT | Performed by: PHYSICIAN ASSISTANT

## 2020-01-01 PROCEDURE — 99223 1ST HOSP IP/OBS HIGH 75: CPT | Performed by: INTERNAL MEDICINE

## 2020-01-01 PROCEDURE — 87040 BLOOD CULTURE FOR BACTERIA: CPT | Performed by: PHYSICIAN ASSISTANT

## 2020-01-01 PROCEDURE — 85025 COMPLETE CBC W/AUTO DIFF WBC: CPT

## 2020-01-01 PROCEDURE — 25010000002 CEFTRIAXONE SODIUM-DEXTROSE 1-3.74 GM-%(50ML) RECONSTITUTED SOLUTION: Performed by: INTERNAL MEDICINE

## 2020-01-01 PROCEDURE — 82805 BLOOD GASES W/O2 SATURATION: CPT

## 2020-01-01 PROCEDURE — 80048 BASIC METABOLIC PNL TOTAL CA: CPT | Performed by: INTERNAL MEDICINE

## 2020-01-01 PROCEDURE — 25010000002 INFLUENZA VAC SPLIT QUAD 0.5 ML SUSPENSION PREFILLED SYRINGE: Performed by: FAMILY MEDICINE

## 2020-01-01 PROCEDURE — 63710000001 PREDNISONE PER 1 MG: Performed by: INTERNAL MEDICINE

## 2020-01-01 PROCEDURE — 87635 SARS-COV-2 COVID-19 AMP PRB: CPT | Performed by: PHYSICIAN ASSISTANT

## 2020-01-01 PROCEDURE — 80069 RENAL FUNCTION PANEL: CPT | Performed by: INTERNAL MEDICINE

## 2020-01-01 PROCEDURE — 63710000001 PREDNISONE PER 5 MG: Performed by: INTERNAL MEDICINE

## 2020-01-01 PROCEDURE — 85025 COMPLETE CBC W/AUTO DIFF WBC: CPT | Performed by: PHYSICIAN ASSISTANT

## 2020-01-01 PROCEDURE — 93005 ELECTROCARDIOGRAM TRACING: CPT | Performed by: PHYSICIAN ASSISTANT

## 2020-01-01 PROCEDURE — 25010000002 ENOXAPARIN PER 10 MG: Performed by: EMERGENCY MEDICINE

## 2020-01-01 PROCEDURE — 99220 PR INITIAL OBSERVATION CARE/DAY 70 MINUTES: CPT | Performed by: EMERGENCY MEDICINE

## 2020-01-01 PROCEDURE — 83050 HGB METHEMOGLOBIN QUAN: CPT

## 2020-01-01 PROCEDURE — 92610 EVALUATE SWALLOWING FUNCTION: CPT

## 2020-01-01 PROCEDURE — 25010000002 METHYLPREDNISOLONE PER 40 MG: Performed by: INTERNAL MEDICINE

## 2020-01-01 PROCEDURE — 99222 1ST HOSP IP/OBS MODERATE 55: CPT | Performed by: INTERNAL MEDICINE

## 2020-01-01 PROCEDURE — 25010000002 LEVOFLOXACIN PER 250 MG: Performed by: INTERNAL MEDICINE

## 2020-01-01 PROCEDURE — 80053 COMPREHEN METABOLIC PANEL: CPT | Performed by: HOSPITALIST

## 2020-01-01 PROCEDURE — 87040 BLOOD CULTURE FOR BACTERIA: CPT | Performed by: NURSE PRACTITIONER

## 2020-01-01 PROCEDURE — 84484 ASSAY OF TROPONIN QUANT: CPT

## 2020-01-01 PROCEDURE — 85025 COMPLETE CBC W/AUTO DIFF WBC: CPT | Performed by: EMERGENCY MEDICINE

## 2020-01-01 PROCEDURE — 80048 BASIC METABOLIC PNL TOTAL CA: CPT | Performed by: FAMILY MEDICINE

## 2020-01-01 PROCEDURE — 83690 ASSAY OF LIPASE: CPT | Performed by: INTERNAL MEDICINE

## 2020-01-01 PROCEDURE — 97165 OT EVAL LOW COMPLEX 30 MIN: CPT

## 2020-01-01 PROCEDURE — 99232 SBSQ HOSP IP/OBS MODERATE 35: CPT | Performed by: EMERGENCY MEDICINE

## 2020-01-01 PROCEDURE — 71045 X-RAY EXAM CHEST 1 VIEW: CPT

## 2020-01-01 PROCEDURE — 93971 EXTREMITY STUDY: CPT

## 2020-01-01 PROCEDURE — 81001 URINALYSIS AUTO W/SCOPE: CPT | Performed by: PHYSICIAN ASSISTANT

## 2020-01-01 PROCEDURE — 85025 COMPLETE CBC W/AUTO DIFF WBC: CPT | Performed by: NURSE PRACTITIONER

## 2020-01-01 PROCEDURE — 97535 SELF CARE MNGMENT TRAINING: CPT

## 2020-01-01 PROCEDURE — 93005 ELECTROCARDIOGRAM TRACING: CPT

## 2020-01-01 PROCEDURE — 92611 MOTION FLUOROSCOPY/SWALLOW: CPT

## 2020-01-01 PROCEDURE — 97116 GAIT TRAINING THERAPY: CPT

## 2020-01-01 PROCEDURE — 87040 BLOOD CULTURE FOR BACTERIA: CPT | Performed by: EMERGENCY MEDICINE

## 2020-01-01 PROCEDURE — 74176 CT ABD & PELVIS W/O CONTRAST: CPT

## 2020-01-01 PROCEDURE — 80053 COMPREHEN METABOLIC PANEL: CPT

## 2020-01-01 PROCEDURE — 25010000002 PIPERACILLIN SOD-TAZOBACTAM PER 1 G: Performed by: INTERNAL MEDICINE

## 2020-01-01 PROCEDURE — 71275 CT ANGIOGRAPHY CHEST: CPT

## 2020-01-01 PROCEDURE — 99233 SBSQ HOSP IP/OBS HIGH 50: CPT | Performed by: FAMILY MEDICINE

## 2020-01-01 PROCEDURE — 96367 TX/PROPH/DG ADDL SEQ IV INF: CPT

## 2020-01-01 PROCEDURE — 83605 ASSAY OF LACTIC ACID: CPT | Performed by: EMERGENCY MEDICINE

## 2020-01-01 PROCEDURE — G0180 MD CERTIFICATION HHA PATIENT: HCPCS | Performed by: INTERNAL MEDICINE

## 2020-01-01 PROCEDURE — 25010000002 LEVOFLOXACIN PER 250 MG: Performed by: EMERGENCY MEDICINE

## 2020-01-01 PROCEDURE — 80202 ASSAY OF VANCOMYCIN: CPT | Performed by: EMERGENCY MEDICINE

## 2020-01-01 PROCEDURE — G0008 ADMIN INFLUENZA VIRUS VAC: HCPCS | Performed by: FAMILY MEDICINE

## 2020-01-01 PROCEDURE — 99284 EMERGENCY DEPT VISIT MOD MDM: CPT

## 2020-01-01 PROCEDURE — 84443 ASSAY THYROID STIM HORMONE: CPT | Performed by: INTERNAL MEDICINE

## 2020-01-01 PROCEDURE — 84484 ASSAY OF TROPONIN QUANT: CPT | Performed by: PHYSICIAN ASSISTANT

## 2020-01-01 PROCEDURE — 25010000002 AZITHROMYCIN 500 MG/250 ML: Performed by: PHYSICIAN ASSISTANT

## 2020-01-01 PROCEDURE — 63710000001 PREDNISONE PER 1 MG: Performed by: EMERGENCY MEDICINE

## 2020-01-01 PROCEDURE — 84484 ASSAY OF TROPONIN QUANT: CPT | Performed by: NURSE PRACTITIONER

## 2020-01-01 PROCEDURE — 87804 INFLUENZA ASSAY W/OPTIC: CPT | Performed by: PHYSICIAN ASSISTANT

## 2020-01-01 PROCEDURE — 87641 MR-STAPH DNA AMP PROBE: CPT | Performed by: EMERGENCY MEDICINE

## 2020-01-01 PROCEDURE — 84145 PROCALCITONIN (PCT): CPT | Performed by: INTERNAL MEDICINE

## 2020-01-01 PROCEDURE — 83880 ASSAY OF NATRIURETIC PEPTIDE: CPT | Performed by: NURSE PRACTITIONER

## 2020-01-01 PROCEDURE — 71250 CT THORAX DX C-: CPT

## 2020-01-01 PROCEDURE — 83880 ASSAY OF NATRIURETIC PEPTIDE: CPT

## 2020-01-01 PROCEDURE — 25010000002 ONDANSETRON PER 1 MG: Performed by: PHYSICIAN ASSISTANT

## 2020-01-01 PROCEDURE — 85007 BL SMEAR W/DIFF WBC COUNT: CPT | Performed by: NURSE PRACTITIONER

## 2020-01-01 PROCEDURE — 83605 ASSAY OF LACTIC ACID: CPT | Performed by: NURSE PRACTITIONER

## 2020-01-01 PROCEDURE — 81003 URINALYSIS AUTO W/O SCOPE: CPT | Performed by: NURSE PRACTITIONER

## 2020-01-01 PROCEDURE — 96372 THER/PROPH/DIAG INJ SC/IM: CPT

## 2020-01-01 PROCEDURE — 87081 CULTURE SCREEN ONLY: CPT | Performed by: EMERGENCY MEDICINE

## 2020-01-01 PROCEDURE — 25010000002 PIPERACILLIN SOD-TAZOBACTAM PER 1 G: Performed by: PHYSICIAN ASSISTANT

## 2020-01-01 PROCEDURE — 25010000002 METHYLPREDNISOLONE PER 125 MG: Performed by: PHYSICIAN ASSISTANT

## 2020-01-01 PROCEDURE — 87635 SARS-COV-2 COVID-19 AMP PRB: CPT | Performed by: NURSE PRACTITIONER

## 2020-01-01 PROCEDURE — 80048 BASIC METABOLIC PNL TOTAL CA: CPT | Performed by: HOSPITALIST

## 2020-01-01 PROCEDURE — 84484 ASSAY OF TROPONIN QUANT: CPT | Performed by: EMERGENCY MEDICINE

## 2020-01-01 PROCEDURE — 84484 ASSAY OF TROPONIN QUANT: CPT | Performed by: INTERNAL MEDICINE

## 2020-01-01 PROCEDURE — 85379 FIBRIN DEGRADATION QUANT: CPT | Performed by: EMERGENCY MEDICINE

## 2020-01-01 PROCEDURE — 25010000002 VANCOMYCIN 1 G RECONSTITUTED SOLUTION 1 EACH VIAL: Performed by: EMERGENCY MEDICINE

## 2020-01-01 PROCEDURE — 96376 TX/PRO/DX INJ SAME DRUG ADON: CPT

## 2020-01-01 PROCEDURE — 86140 C-REACTIVE PROTEIN: CPT | Performed by: EMERGENCY MEDICINE

## 2020-01-01 PROCEDURE — 99217 PR OBSERVATION CARE DISCHARGE MANAGEMENT: CPT | Performed by: NURSE PRACTITIONER

## 2020-01-01 PROCEDURE — 25010000002 CEFTRIAXONE SODIUM-DEXTROSE 1-3.74 GM-%(50ML) RECONSTITUTED SOLUTION: Performed by: PHYSICIAN ASSISTANT

## 2020-01-01 PROCEDURE — 25010000002 MAGNESIUM SULFATE 2 GM/50ML SOLUTION: Performed by: PHYSICIAN ASSISTANT

## 2020-01-01 PROCEDURE — 83735 ASSAY OF MAGNESIUM: CPT | Performed by: NURSE PRACTITIONER

## 2020-01-01 PROCEDURE — 93005 ELECTROCARDIOGRAM TRACING: CPT | Performed by: EMERGENCY MEDICINE

## 2020-01-01 PROCEDURE — 25010000002 FUROSEMIDE PER 20 MG: Performed by: EMERGENCY MEDICINE

## 2020-01-01 PROCEDURE — 25010000002 METHYLPREDNISOLONE PER 80 MG: Performed by: INTERNAL MEDICINE

## 2020-01-01 PROCEDURE — 99285 EMERGENCY DEPT VISIT HI MDM: CPT

## 2020-01-01 PROCEDURE — 85027 COMPLETE CBC AUTOMATED: CPT | Performed by: FAMILY MEDICINE

## 2020-01-01 PROCEDURE — 63710000001 PREDNISONE PER 5 MG: Performed by: FAMILY MEDICINE

## 2020-01-01 PROCEDURE — 96375 TX/PRO/DX INJ NEW DRUG ADDON: CPT

## 2020-01-01 PROCEDURE — 25010000002 PIPERACILLIN SOD-TAZOBACTAM PER 1 G: Performed by: NURSE PRACTITIONER

## 2020-01-01 PROCEDURE — 99233 SBSQ HOSP IP/OBS HIGH 50: CPT | Performed by: EMERGENCY MEDICINE

## 2020-01-01 PROCEDURE — 94618 PULMONARY STRESS TESTING: CPT | Performed by: NURSE PRACTITIONER

## 2020-01-01 PROCEDURE — 25010000002 PIPERACILLIN SOD-TAZOBACTAM PER 1 G: Performed by: FAMILY MEDICINE

## 2020-01-01 PROCEDURE — 99225 PR SBSQ OBSERVATION CARE/DAY 25 MINUTES: CPT | Performed by: NURSE PRACTITIONER

## 2020-01-01 PROCEDURE — 25010000002 ONDANSETRON PER 1 MG: Performed by: EMERGENCY MEDICINE

## 2020-01-01 PROCEDURE — 85025 COMPLETE CBC W/AUTO DIFF WBC: CPT | Performed by: FAMILY MEDICINE

## 2020-01-01 PROCEDURE — 99239 HOSP IP/OBS DSCHRG MGMT >30: CPT | Performed by: INTERNAL MEDICINE

## 2020-01-01 PROCEDURE — 83605 ASSAY OF LACTIC ACID: CPT | Performed by: INTERNAL MEDICINE

## 2020-01-01 PROCEDURE — C9803 HOPD COVID-19 SPEC COLLECT: HCPCS

## 2020-01-01 PROCEDURE — 87205 SMEAR GRAM STAIN: CPT | Performed by: EMERGENCY MEDICINE

## 2020-01-01 PROCEDURE — 80048 BASIC METABOLIC PNL TOTAL CA: CPT | Performed by: EMERGENCY MEDICINE

## 2020-01-01 PROCEDURE — 90686 IIV4 VACC NO PRSV 0.5 ML IM: CPT | Performed by: FAMILY MEDICINE

## 2020-01-01 PROCEDURE — 84145 PROCALCITONIN (PCT): CPT | Performed by: NURSE PRACTITIONER

## 2020-01-01 PROCEDURE — 80053 COMPREHEN METABOLIC PANEL: CPT | Performed by: INTERNAL MEDICINE

## 2020-01-01 PROCEDURE — 87635 SARS-COV-2 COVID-19 AMP PRB: CPT | Performed by: EMERGENCY MEDICINE

## 2020-01-01 PROCEDURE — 80053 COMPREHEN METABOLIC PANEL: CPT | Performed by: EMERGENCY MEDICINE

## 2020-01-01 PROCEDURE — 99223 1ST HOSP IP/OBS HIGH 75: CPT | Performed by: EMERGENCY MEDICINE

## 2020-01-01 PROCEDURE — 99496 TRANSJ CARE MGMT HIGH F2F 7D: CPT | Performed by: INTERNAL MEDICINE

## 2020-01-01 PROCEDURE — 25010000002 METHYLPREDNISOLONE PER 125 MG: Performed by: EMERGENCY MEDICINE

## 2020-01-01 PROCEDURE — 83880 ASSAY OF NATRIURETIC PEPTIDE: CPT | Performed by: PHYSICIAN ASSISTANT

## 2020-01-01 PROCEDURE — 25010000002 VANCOMYCIN 5 G RECONSTITUTED SOLUTION 5,000 MG VIAL: Performed by: EMERGENCY MEDICINE

## 2020-01-01 PROCEDURE — 99239 HOSP IP/OBS DSCHRG MGMT >30: CPT | Performed by: FAMILY MEDICINE

## 2020-01-01 PROCEDURE — 72131 CT LUMBAR SPINE W/O DYE: CPT

## 2020-01-01 PROCEDURE — 80053 COMPREHEN METABOLIC PANEL: CPT | Performed by: NURSE PRACTITIONER

## 2020-01-01 PROCEDURE — 80053 COMPREHEN METABOLIC PANEL: CPT | Performed by: PHYSICIAN ASSISTANT

## 2020-01-01 PROCEDURE — 83605 ASSAY OF LACTIC ACID: CPT | Performed by: PHYSICIAN ASSISTANT

## 2020-01-01 PROCEDURE — 82550 ASSAY OF CK (CPK): CPT | Performed by: NURSE PRACTITIONER

## 2020-01-01 PROCEDURE — 96366 THER/PROPH/DIAG IV INF ADDON: CPT

## 2020-01-01 RX ORDER — SODIUM CHLORIDE 9 MG/ML
100 INJECTION, SOLUTION INTRAVENOUS CONTINUOUS
Status: DISCONTINUED | OUTPATIENT
Start: 2020-01-01 | End: 2020-01-01

## 2020-01-01 RX ORDER — CEFTRIAXONE 1 G/50ML
1 INJECTION, SOLUTION INTRAVENOUS ONCE
Status: COMPLETED | OUTPATIENT
Start: 2020-01-01 | End: 2020-01-01

## 2020-01-01 RX ORDER — MAGNESIUM SULFATE HEPTAHYDRATE 40 MG/ML
2 INJECTION, SOLUTION INTRAVENOUS ONCE
Status: COMPLETED | OUTPATIENT
Start: 2020-01-01 | End: 2020-01-01

## 2020-01-01 RX ORDER — TRAMADOL HYDROCHLORIDE 50 MG/1
50 TABLET ORAL EVERY 8 HOURS PRN
Status: DISCONTINUED | OUTPATIENT
Start: 2020-01-01 | End: 2020-01-01 | Stop reason: HOSPADM

## 2020-01-01 RX ORDER — AMINO ACIDS/PROTEIN HYDROLYS 15G-100/30
30 LIQUID (ML) ORAL 2 TIMES DAILY
Status: DISCONTINUED | OUTPATIENT
Start: 2020-01-01 | End: 2020-01-01 | Stop reason: HOSPADM

## 2020-01-01 RX ORDER — ACETAMINOPHEN 650 MG/1
650 SUPPOSITORY RECTAL EVERY 4 HOURS PRN
Status: DISCONTINUED | OUTPATIENT
Start: 2020-01-01 | End: 2020-01-01 | Stop reason: HOSPADM

## 2020-01-01 RX ORDER — FUROSEMIDE 20 MG/1
20 TABLET ORAL ONCE
Status: COMPLETED | OUTPATIENT
Start: 2020-01-01 | End: 2020-01-01

## 2020-01-01 RX ORDER — GUAIFENESIN 600 MG/1
1200 TABLET, EXTENDED RELEASE ORAL EVERY 12 HOURS SCHEDULED
Status: DISCONTINUED | OUTPATIENT
Start: 2020-01-01 | End: 2020-01-01 | Stop reason: HOSPADM

## 2020-01-01 RX ORDER — PRAVASTATIN SODIUM 20 MG
20 TABLET ORAL EVERY EVENING
Qty: 90 TABLET | Refills: 1 | Status: SHIPPED | OUTPATIENT
Start: 2020-01-01 | End: 2021-01-01

## 2020-01-01 RX ORDER — ASPIRIN 81 MG/1
81 TABLET ORAL DAILY
Status: DISCONTINUED | OUTPATIENT
Start: 2020-01-01 | End: 2020-01-01 | Stop reason: HOSPADM

## 2020-01-01 RX ORDER — METHYLPREDNISOLONE SODIUM SUCCINATE 40 MG/ML
40 INJECTION, POWDER, LYOPHILIZED, FOR SOLUTION INTRAMUSCULAR; INTRAVENOUS DAILY
Status: DISCONTINUED | OUTPATIENT
Start: 2020-01-01 | End: 2020-01-01

## 2020-01-01 RX ORDER — BISACODYL 10 MG
10 SUPPOSITORY, RECTAL RECTAL DAILY PRN
Status: DISCONTINUED | OUTPATIENT
Start: 2020-01-01 | End: 2020-01-01 | Stop reason: HOSPADM

## 2020-01-01 RX ORDER — GUAIFENESIN/DEXTROMETHORPHAN 100-10MG/5
5 SYRUP ORAL EVERY 4 HOURS PRN
Status: DISCONTINUED | OUTPATIENT
Start: 2020-01-01 | End: 2020-01-01

## 2020-01-01 RX ORDER — CLONAZEPAM 0.5 MG/1
0.5 TABLET ORAL EVERY 12 HOURS SCHEDULED
Status: DISCONTINUED | OUTPATIENT
Start: 2020-01-01 | End: 2020-01-01 | Stop reason: HOSPADM

## 2020-01-01 RX ORDER — GUAIFENESIN 600 MG/1
1200 TABLET, EXTENDED RELEASE ORAL DAILY
COMMUNITY

## 2020-01-01 RX ORDER — SODIUM CHLORIDE 0.9 % (FLUSH) 0.9 %
10 SYRINGE (ML) INJECTION AS NEEDED
Status: DISCONTINUED | OUTPATIENT
Start: 2020-01-01 | End: 2020-01-01 | Stop reason: HOSPADM

## 2020-01-01 RX ORDER — GLYCOPYRROLATE AND FORMOTEROL FUMARATE 9; 4.8 UG/1; UG/1
AEROSOL, METERED RESPIRATORY (INHALATION)
Qty: 10.7 G | Refills: 5 | Status: SHIPPED | OUTPATIENT
Start: 2020-01-01

## 2020-01-01 RX ORDER — METHYLPREDNISOLONE SODIUM SUCCINATE 125 MG/2ML
80 INJECTION, POWDER, LYOPHILIZED, FOR SOLUTION INTRAMUSCULAR; INTRAVENOUS ONCE
Status: COMPLETED | OUTPATIENT
Start: 2020-01-01 | End: 2020-01-01

## 2020-01-01 RX ORDER — PANTOPRAZOLE SODIUM 40 MG/1
40 TABLET, DELAYED RELEASE ORAL EVERY MORNING
Status: DISCONTINUED | OUTPATIENT
Start: 2020-01-01 | End: 2020-01-01 | Stop reason: HOSPADM

## 2020-01-01 RX ORDER — PREDNISONE 20 MG/1
TABLET ORAL
Qty: 4 TABLET | Refills: 0 | Status: ON HOLD | OUTPATIENT
Start: 2020-01-01 | End: 2021-01-01

## 2020-01-01 RX ORDER — CEFDINIR 300 MG/1
300 CAPSULE ORAL 2 TIMES DAILY
Qty: 6 CAPSULE | Refills: 0
Start: 2020-01-01 | End: 2020-01-01

## 2020-01-01 RX ORDER — ONDANSETRON 2 MG/ML
4 INJECTION INTRAMUSCULAR; INTRAVENOUS EVERY 6 HOURS PRN
Status: DISCONTINUED | OUTPATIENT
Start: 2020-01-01 | End: 2020-01-01 | Stop reason: HOSPADM

## 2020-01-01 RX ORDER — FUROSEMIDE 20 MG/1
20 TABLET ORAL DAILY
Status: DISCONTINUED | OUTPATIENT
Start: 2020-01-01 | End: 2020-01-01 | Stop reason: HOSPADM

## 2020-01-01 RX ORDER — METHYLPREDNISOLONE SODIUM SUCCINATE 125 MG/2ML
125 INJECTION, POWDER, LYOPHILIZED, FOR SOLUTION INTRAMUSCULAR; INTRAVENOUS ONCE
Status: COMPLETED | OUTPATIENT
Start: 2020-01-01 | End: 2020-01-01

## 2020-01-01 RX ORDER — IPRATROPIUM BROMIDE AND ALBUTEROL SULFATE 2.5; .5 MG/3ML; MG/3ML
3 SOLUTION RESPIRATORY (INHALATION)
Status: DISCONTINUED | OUTPATIENT
Start: 2020-01-01 | End: 2020-01-01 | Stop reason: HOSPADM

## 2020-01-01 RX ORDER — CLONAZEPAM 0.5 MG/1
0.5 TABLET ORAL NIGHTLY PRN
Status: DISCONTINUED | OUTPATIENT
Start: 2020-01-01 | End: 2020-01-01

## 2020-01-01 RX ORDER — BENZONATATE 100 MG/1
100 CAPSULE ORAL 3 TIMES DAILY PRN
Status: DISCONTINUED | OUTPATIENT
Start: 2020-01-01 | End: 2020-01-01 | Stop reason: HOSPADM

## 2020-01-01 RX ORDER — CLONAZEPAM 0.5 MG/1
0.5 TABLET ORAL NIGHTLY PRN
Qty: 10 TABLET | Refills: 0 | Status: SHIPPED | OUTPATIENT
Start: 2020-01-01 | End: 2020-01-01 | Stop reason: HOSPADM

## 2020-01-01 RX ORDER — AZITHROMYCIN 250 MG/1
250 TABLET, FILM COATED ORAL
Status: COMPLETED | OUTPATIENT
Start: 2020-01-01 | End: 2020-01-01

## 2020-01-01 RX ORDER — CLONAZEPAM 0.5 MG/1
0.5 TABLET ORAL NIGHTLY PRN
Qty: 30 TABLET | Refills: 2 | Status: SHIPPED | OUTPATIENT
Start: 2020-01-01 | End: 2020-01-01 | Stop reason: HOSPADM

## 2020-01-01 RX ORDER — DIPHENHYDRAMINE HYDROCHLORIDE AND LIDOCAINE HYDROCHLORIDE AND ALUMINUM HYDROXIDE AND MAGNESIUM HYDRO
5 KIT EVERY 6 HOURS
Status: DISCONTINUED | OUTPATIENT
Start: 2020-01-01 | End: 2020-01-01 | Stop reason: HOSPADM

## 2020-01-01 RX ORDER — BISOPROLOL FUMARATE 5 MG/1
5 TABLET, FILM COATED ORAL
Status: DISCONTINUED | OUTPATIENT
Start: 2020-01-01 | End: 2020-01-01

## 2020-01-01 RX ORDER — SODIUM CHLORIDE 0.9 % (FLUSH) 0.9 %
10 SYRINGE (ML) INJECTION EVERY 12 HOURS SCHEDULED
Status: DISCONTINUED | OUTPATIENT
Start: 2020-01-01 | End: 2020-01-01 | Stop reason: HOSPADM

## 2020-01-01 RX ORDER — PREDNISONE 10 MG/1
40 TABLET ORAL
Status: DISCONTINUED | OUTPATIENT
Start: 2020-01-01 | End: 2020-01-01 | Stop reason: HOSPADM

## 2020-01-01 RX ORDER — PREDNISONE 20 MG/1
20 TABLET ORAL DAILY
Status: COMPLETED | OUTPATIENT
Start: 2020-01-01 | End: 2020-01-01

## 2020-01-01 RX ORDER — CEFTRIAXONE 1 G/50ML
1 INJECTION, SOLUTION INTRAVENOUS EVERY 24 HOURS
Status: COMPLETED | OUTPATIENT
Start: 2020-01-01 | End: 2020-01-01

## 2020-01-01 RX ORDER — LEVOFLOXACIN 5 MG/ML
500 INJECTION, SOLUTION INTRAVENOUS ONCE
Status: COMPLETED | OUTPATIENT
Start: 2020-01-01 | End: 2020-01-01

## 2020-01-01 RX ORDER — PREDNISONE 1 MG/1
5 TABLET ORAL DAILY
Qty: 20 TABLET | Refills: 0 | Status: SHIPPED | OUTPATIENT
Start: 2020-01-01 | End: 2020-01-01 | Stop reason: HOSPADM

## 2020-01-01 RX ORDER — AZITHROMYCIN 250 MG/1
250 TABLET, FILM COATED ORAL DAILY
Status: COMPLETED | OUTPATIENT
Start: 2020-01-01 | End: 2020-01-01

## 2020-01-01 RX ORDER — ONDANSETRON 2 MG/ML
4 INJECTION INTRAMUSCULAR; INTRAVENOUS EVERY 6 HOURS PRN
Status: DISCONTINUED | OUTPATIENT
Start: 2020-01-01 | End: 2020-01-01

## 2020-01-01 RX ORDER — ACETAMINOPHEN 160 MG/5ML
650 SOLUTION ORAL EVERY 4 HOURS PRN
Status: DISCONTINUED | OUTPATIENT
Start: 2020-01-01 | End: 2020-01-01 | Stop reason: HOSPADM

## 2020-01-01 RX ORDER — PREDNISONE 1 MG/1
5 TABLET ORAL DAILY
Status: DISCONTINUED | OUTPATIENT
Start: 2020-01-01 | End: 2020-01-01 | Stop reason: HOSPADM

## 2020-01-01 RX ORDER — PRIMIDONE 50 MG/1
50 TABLET ORAL EVERY 12 HOURS SCHEDULED
Status: DISCONTINUED | OUTPATIENT
Start: 2020-01-01 | End: 2020-01-01 | Stop reason: HOSPADM

## 2020-01-01 RX ORDER — BUDESONIDE AND FORMOTEROL FUMARATE DIHYDRATE 160; 4.5 UG/1; UG/1
2 AEROSOL RESPIRATORY (INHALATION)
Status: DISCONTINUED | OUTPATIENT
Start: 2020-01-01 | End: 2020-01-01 | Stop reason: HOSPADM

## 2020-01-01 RX ORDER — L.ACID,PARA/B.BIFIDUM/S.THERM 8B CELL
1 CAPSULE ORAL 2 TIMES DAILY
Start: 2020-01-01

## 2020-01-01 RX ORDER — FLUTICASONE PROPIONATE 50 MCG
2 SPRAY, SUSPENSION (ML) NASAL DAILY
Status: DISCONTINUED | OUTPATIENT
Start: 2020-01-01 | End: 2020-01-01 | Stop reason: HOSPADM

## 2020-01-01 RX ORDER — PREDNISONE 1 MG/1
5 TABLET ORAL DAILY
Qty: 20 TABLET | Refills: 0 | OUTPATIENT
Start: 2020-01-01

## 2020-01-01 RX ORDER — SODIUM CHLORIDE 0.9 % (FLUSH) 0.9 %
10 SYRINGE (ML) INJECTION EVERY 12 HOURS SCHEDULED
Status: DISCONTINUED | OUTPATIENT
Start: 2020-01-01 | End: 2020-01-01

## 2020-01-01 RX ORDER — ALBUTEROL SULFATE 90 UG/1
6 AEROSOL, METERED RESPIRATORY (INHALATION) ONCE
Status: DISCONTINUED | OUTPATIENT
Start: 2020-01-01 | End: 2020-01-01

## 2020-01-01 RX ORDER — PREDNISONE 10 MG/1
TABLET ORAL
Qty: 50 TABLET | Refills: 0 | Status: SHIPPED | OUTPATIENT
Start: 2020-01-01 | End: 2020-01-01

## 2020-01-01 RX ORDER — IPRATROPIUM BROMIDE AND ALBUTEROL SULFATE 2.5; .5 MG/3ML; MG/3ML
3 SOLUTION RESPIRATORY (INHALATION) ONCE
Status: COMPLETED | OUTPATIENT
Start: 2020-01-01 | End: 2020-01-01

## 2020-01-01 RX ORDER — IPRATROPIUM BROMIDE AND ALBUTEROL SULFATE 2.5; .5 MG/3ML; MG/3ML
3 SOLUTION RESPIRATORY (INHALATION) EVERY 4 HOURS PRN
Status: DISCONTINUED | OUTPATIENT
Start: 2020-01-01 | End: 2020-01-01 | Stop reason: HOSPADM

## 2020-01-01 RX ORDER — CLOTRIMAZOLE AND BETAMETHASONE DIPROPIONATE 10; .64 MG/G; MG/G
CREAM TOPICAL 2 TIMES DAILY
Qty: 45 G | Refills: 0 | Status: ON HOLD | OUTPATIENT
Start: 2020-01-01 | End: 2021-01-01

## 2020-01-01 RX ORDER — PROPRANOLOL HYDROCHLORIDE 20 MG/1
60 TABLET ORAL 2 TIMES DAILY
Status: DISCONTINUED | OUTPATIENT
Start: 2020-01-01 | End: 2020-01-01 | Stop reason: HOSPADM

## 2020-01-01 RX ORDER — SODIUM CHLORIDE 0.9 % (FLUSH) 0.9 %
10 SYRINGE (ML) INJECTION AS NEEDED
Status: DISCONTINUED | OUTPATIENT
Start: 2020-01-01 | End: 2020-01-01

## 2020-01-01 RX ORDER — ONDANSETRON 2 MG/ML
4 INJECTION INTRAMUSCULAR; INTRAVENOUS ONCE
Status: COMPLETED | OUTPATIENT
Start: 2020-01-01 | End: 2020-01-01

## 2020-01-01 RX ORDER — ALBUTEROL SULFATE 90 UG/1
2 AEROSOL, METERED RESPIRATORY (INHALATION) EVERY 4 HOURS PRN
COMMUNITY
End: 2020-01-01

## 2020-01-01 RX ORDER — IPRATROPIUM BROMIDE AND ALBUTEROL SULFATE 2.5; .5 MG/3ML; MG/3ML
SOLUTION RESPIRATORY (INHALATION)
Qty: 120 VIAL | Refills: 11 | Status: SHIPPED | OUTPATIENT
Start: 2020-01-01 | End: 2020-01-01

## 2020-01-01 RX ORDER — PROPRANOLOL HYDROCHLORIDE 60 MG/1
60 TABLET ORAL 2 TIMES DAILY
Qty: 180 TABLET | Refills: 1 | Status: ON HOLD | OUTPATIENT
Start: 2020-01-01 | End: 2020-01-01

## 2020-01-01 RX ORDER — FUROSEMIDE 20 MG/1
20 TABLET ORAL DAILY
Qty: 90 TABLET | OUTPATIENT
Start: 2020-01-01

## 2020-01-01 RX ORDER — SUMATRIPTAN 50 MG/1
TABLET, FILM COATED ORAL
Qty: 12 TABLET | Refills: 3 | Status: ON HOLD | OUTPATIENT
Start: 2020-01-01 | End: 2021-01-01

## 2020-01-01 RX ORDER — PROPRANOLOL HYDROCHLORIDE 60 MG/1
TABLET ORAL
Qty: 180 TABLET | Refills: 3 | Status: SHIPPED | OUTPATIENT
Start: 2020-01-01

## 2020-01-01 RX ORDER — PREDNISONE 20 MG/1
40 TABLET ORAL
Status: DISCONTINUED | OUTPATIENT
Start: 2020-01-01 | End: 2020-01-01 | Stop reason: HOSPADM

## 2020-01-01 RX ORDER — SENNOSIDES 8.6 MG
650 CAPSULE ORAL EVERY 8 HOURS PRN
Status: ON HOLD | COMMUNITY
End: 2021-01-01

## 2020-01-01 RX ORDER — PREDNISONE 10 MG/1
10 TABLET ORAL DAILY
Status: DISCONTINUED | OUTPATIENT
Start: 2020-01-01 | End: 2020-01-01 | Stop reason: HOSPADM

## 2020-01-01 RX ORDER — ACETAMINOPHEN 325 MG/1
650 TABLET ORAL EVERY 4 HOURS PRN
Status: DISCONTINUED | OUTPATIENT
Start: 2020-01-01 | End: 2020-01-01 | Stop reason: HOSPADM

## 2020-01-01 RX ORDER — PREDNISONE 1 MG/1
5 TABLET ORAL DAILY
Status: DISCONTINUED | OUTPATIENT
Start: 2020-01-01 | End: 2020-01-01

## 2020-01-01 RX ORDER — FUROSEMIDE 10 MG/ML
40 INJECTION INTRAMUSCULAR; INTRAVENOUS ONCE
Status: COMPLETED | OUTPATIENT
Start: 2020-01-01 | End: 2020-01-01

## 2020-01-01 RX ORDER — DOXYCYCLINE 100 MG/1
100 CAPSULE ORAL EVERY 12 HOURS SCHEDULED
Status: COMPLETED | OUTPATIENT
Start: 2020-01-01 | End: 2020-01-01

## 2020-01-01 RX ORDER — AZITHROMYCIN 250 MG/1
500 TABLET, FILM COATED ORAL DAILY
Status: COMPLETED | OUTPATIENT
Start: 2020-01-01 | End: 2020-01-01

## 2020-01-01 RX ORDER — LEVOFLOXACIN 5 MG/ML
500 INJECTION, SOLUTION INTRAVENOUS EVERY 24 HOURS
Status: DISCONTINUED | OUTPATIENT
Start: 2020-01-01 | End: 2020-01-01

## 2020-01-01 RX ORDER — ALBUTEROL SULFATE 2.5 MG/3ML
2.5 SOLUTION RESPIRATORY (INHALATION) EVERY 6 HOURS PRN
Status: DISCONTINUED | OUTPATIENT
Start: 2020-01-01 | End: 2020-01-01 | Stop reason: HOSPADM

## 2020-01-01 RX ORDER — CEFDINIR 300 MG/1
300 CAPSULE ORAL EVERY 12 HOURS SCHEDULED
Status: COMPLETED | OUTPATIENT
Start: 2020-01-01 | End: 2020-01-01

## 2020-01-01 RX ORDER — PRAVASTATIN SODIUM 20 MG
20 TABLET ORAL EVERY EVENING
Status: DISCONTINUED | OUTPATIENT
Start: 2020-01-01 | End: 2020-01-01 | Stop reason: HOSPADM

## 2020-01-01 RX ORDER — CLONAZEPAM 0.5 MG/1
0.5 TABLET ORAL NIGHTLY PRN
Status: DISCONTINUED | OUTPATIENT
Start: 2020-01-01 | End: 2020-01-01 | Stop reason: HOSPADM

## 2020-01-01 RX ORDER — TRAMADOL HYDROCHLORIDE 50 MG/1
50 TABLET ORAL EVERY 6 HOURS PRN
Qty: 12 TABLET | Refills: 0 | Status: ON HOLD | OUTPATIENT
Start: 2020-01-01 | End: 2021-01-01

## 2020-01-01 RX ORDER — FUROSEMIDE 20 MG/1
20 TABLET ORAL DAILY
Qty: 30 TABLET | Refills: 5 | Status: SHIPPED | OUTPATIENT
Start: 2020-01-01 | End: 2021-01-01

## 2020-01-01 RX ORDER — METHYLPREDNISOLONE SODIUM SUCCINATE 40 MG/ML
40 INJECTION, POWDER, LYOPHILIZED, FOR SOLUTION INTRAMUSCULAR; INTRAVENOUS EVERY 12 HOURS
Status: DISCONTINUED | OUTPATIENT
Start: 2020-01-01 | End: 2020-01-01

## 2020-01-01 RX ORDER — CLONAZEPAM 0.5 MG/1
0.5 TABLET ORAL 3 TIMES DAILY PRN
Status: DISCONTINUED | OUTPATIENT
Start: 2020-01-01 | End: 2020-01-01 | Stop reason: HOSPADM

## 2020-01-01 RX ORDER — PREDNISONE 10 MG/1
10 TABLET ORAL DAILY
Status: DISCONTINUED | OUTPATIENT
Start: 2020-01-01 | End: 2020-01-01

## 2020-01-01 RX ORDER — BUDESONIDE 0.5 MG/2ML
0.5 INHALANT ORAL
Status: DISCONTINUED | OUTPATIENT
Start: 2020-01-01 | End: 2020-01-01 | Stop reason: HOSPADM

## 2020-01-01 RX ORDER — IPRATROPIUM BROMIDE AND ALBUTEROL SULFATE 2.5; .5 MG/3ML; MG/3ML
3 SOLUTION RESPIRATORY (INHALATION)
Status: DISCONTINUED | OUTPATIENT
Start: 2020-01-01 | End: 2020-01-01

## 2020-01-01 RX ORDER — METHYLPREDNISOLONE ACETATE 80 MG/ML
80 INJECTION, SUSPENSION INTRA-ARTICULAR; INTRALESIONAL; INTRAMUSCULAR; SOFT TISSUE ONCE
Status: COMPLETED | OUTPATIENT
Start: 2020-01-01 | End: 2020-01-01

## 2020-01-01 RX ORDER — DEXAMETHASONE 4 MG/1
1 TABLET ORAL
Qty: 12 G | Refills: 5 | Status: SHIPPED | OUTPATIENT
Start: 2020-01-01 | End: 2020-01-01

## 2020-01-01 RX ORDER — FUROSEMIDE 20 MG/1
20 TABLET ORAL DAILY
Qty: 30 TABLET | Refills: 1 | Status: SHIPPED | OUTPATIENT
Start: 2020-01-01 | End: 2020-01-01

## 2020-01-01 RX ORDER — CALCIUM CARBONATE 200(500)MG
2 TABLET,CHEWABLE ORAL 2 TIMES DAILY PRN
Status: DISCONTINUED | OUTPATIENT
Start: 2020-01-01 | End: 2020-01-01 | Stop reason: HOSPADM

## 2020-01-01 RX ORDER — PREDNISONE 20 MG/1
20 TABLET ORAL DAILY
Status: DISCONTINUED | OUTPATIENT
Start: 2020-01-01 | End: 2020-01-01

## 2020-01-01 RX ORDER — L.ACID,PARA/B.BIFIDUM/S.THERM 8B CELL
1 CAPSULE ORAL 2 TIMES DAILY
Status: DISCONTINUED | OUTPATIENT
Start: 2020-01-01 | End: 2020-01-01 | Stop reason: HOSPADM

## 2020-01-01 RX ORDER — CHOLECALCIFEROL (VITAMIN D3) 125 MCG
5 CAPSULE ORAL NIGHTLY PRN
Status: DISCONTINUED | OUTPATIENT
Start: 2020-01-01 | End: 2020-01-01 | Stop reason: HOSPADM

## 2020-01-01 RX ORDER — GUAIFENESIN/DEXTROMETHORPHAN 100-10MG/5
5 SYRUP ORAL EVERY 6 HOURS SCHEDULED
Status: DISCONTINUED | OUTPATIENT
Start: 2020-01-01 | End: 2020-01-01 | Stop reason: HOSPADM

## 2020-01-01 RX ORDER — TRAMADOL HYDROCHLORIDE 50 MG/1
50 TABLET ORAL EVERY 6 HOURS PRN
Status: ON HOLD | COMMUNITY
End: 2020-01-01 | Stop reason: SDUPTHER

## 2020-01-01 RX ORDER — ARFORMOTEROL TARTRATE 15 UG/2ML
15 SOLUTION RESPIRATORY (INHALATION)
Status: DISCONTINUED | OUTPATIENT
Start: 2020-01-01 | End: 2020-01-01 | Stop reason: HOSPADM

## 2020-01-01 RX ORDER — BUDESONIDE 0.5 MG/2ML
1 INHALANT ORAL
Status: DISCONTINUED | OUTPATIENT
Start: 2020-01-01 | End: 2020-01-01

## 2020-01-01 RX ORDER — TRIAMCINOLONE ACETONIDE 1 MG/G
CREAM TOPICAL EVERY 12 HOURS SCHEDULED
Status: DISCONTINUED | OUTPATIENT
Start: 2020-01-01 | End: 2020-01-01 | Stop reason: HOSPADM

## 2020-01-01 RX ORDER — AMLODIPINE BESYLATE 5 MG/1
5 TABLET ORAL DAILY
Status: DISCONTINUED | OUTPATIENT
Start: 2020-01-01 | End: 2020-01-01 | Stop reason: HOSPADM

## 2020-01-01 RX ORDER — SERTRALINE HYDROCHLORIDE 100 MG/1
TABLET, FILM COATED ORAL
Qty: 90 TABLET | Refills: 3 | OUTPATIENT
Start: 2020-01-01 | End: 2020-01-01 | Stop reason: HOSPADM

## 2020-01-01 RX ORDER — SUMATRIPTAN 50 MG/1
TABLET, FILM COATED ORAL
Qty: 12 TABLET | Refills: 0 | OUTPATIENT
Start: 2020-01-01 | End: 2020-01-01 | Stop reason: HOSPADM

## 2020-01-01 RX ORDER — AMLODIPINE BESYLATE 5 MG/1
5 TABLET ORAL DAILY
Qty: 30 TABLET | Refills: 1 | Status: SHIPPED | OUTPATIENT
Start: 2020-01-01 | End: 2020-01-01 | Stop reason: HOSPADM

## 2020-01-01 RX ORDER — ALBUTEROL SULFATE 90 UG/1
2 AEROSOL, METERED RESPIRATORY (INHALATION) EVERY 4 HOURS PRN
Qty: 1 INHALER | Refills: 3 | Status: ON HOLD | OUTPATIENT
Start: 2020-01-01 | End: 2020-01-01

## 2020-01-01 RX ORDER — CETIRIZINE HYDROCHLORIDE 10 MG/1
10 TABLET ORAL DAILY
Qty: 30 TABLET | Refills: 0 | Status: SHIPPED | OUTPATIENT
Start: 2020-01-01

## 2020-01-01 RX ORDER — METHYLPREDNISOLONE SODIUM SUCCINATE 40 MG/ML
40 INJECTION, POWDER, LYOPHILIZED, FOR SOLUTION INTRAMUSCULAR; INTRAVENOUS EVERY 8 HOURS
Status: COMPLETED | OUTPATIENT
Start: 2020-01-01 | End: 2020-01-01

## 2020-01-01 RX ORDER — ACETAMINOPHEN 500 MG
1000 TABLET ORAL ONCE
Status: COMPLETED | OUTPATIENT
Start: 2020-01-01 | End: 2020-01-01

## 2020-01-01 RX ORDER — BETAMETHASONE DIPROPIONATE 0.5 MG/G
CREAM TOPICAL
Qty: 45 G | Refills: 0 | Status: ON HOLD | OUTPATIENT
Start: 2020-01-01 | End: 2021-01-01

## 2020-01-01 RX ORDER — CLONAZEPAM 0.5 MG/1
0.5 TABLET ORAL 3 TIMES DAILY PRN
Qty: 9 TABLET | Refills: 0 | Status: ON HOLD | OUTPATIENT
Start: 2020-01-01 | End: 2021-01-01

## 2020-01-01 RX ORDER — PROPRANOLOL HYDROCHLORIDE 20 MG/1
10 TABLET ORAL EVERY 6 HOURS PRN
Status: DISCONTINUED | OUTPATIENT
Start: 2020-01-01 | End: 2020-01-01 | Stop reason: HOSPADM

## 2020-01-01 RX ORDER — ARFORMOTEROL TARTRATE 15 UG/2ML
15 SOLUTION RESPIRATORY (INHALATION)
Qty: 120 ML | Status: ON HOLD
Start: 2020-01-01 | End: 2021-01-01

## 2020-01-01 RX ORDER — NICOTINE 21 MG/24HR
1 PATCH, TRANSDERMAL 24 HOURS TRANSDERMAL
Status: DISCONTINUED | OUTPATIENT
Start: 2020-01-01 | End: 2020-01-01 | Stop reason: HOSPADM

## 2020-01-01 RX ORDER — IPRATROPIUM BROMIDE 42 UG/1
2 SPRAY, METERED NASAL 4 TIMES DAILY
Status: ON HOLD | COMMUNITY
End: 2021-01-01

## 2020-01-01 RX ORDER — BENZONATATE 100 MG/1
100 CAPSULE ORAL 3 TIMES DAILY PRN
Qty: 10 CAPSULE | Refills: 0
Start: 2020-01-01

## 2020-01-01 RX ORDER — ALBUTEROL SULFATE 1.25 MG/3ML
1 SOLUTION RESPIRATORY (INHALATION) EVERY 6 HOURS PRN
Qty: 150 ML | Refills: 6 | Status: SHIPPED | OUTPATIENT
Start: 2020-01-01 | End: 2020-01-01

## 2020-01-01 RX ORDER — PROPRANOLOL HYDROCHLORIDE 20 MG/1
60 TABLET ORAL 2 TIMES DAILY
Status: DISCONTINUED | OUTPATIENT
Start: 2020-01-01 | End: 2020-01-01

## 2020-01-01 RX ORDER — BUDESONIDE 0.5 MG/2ML
0.5 INHALANT ORAL
Status: ON HOLD
Start: 2020-01-01 | End: 2021-01-01

## 2020-01-01 RX ORDER — DIAPER,BRIEF,INFANT-TODD,DISP
EACH MISCELLANEOUS EVERY 12 HOURS SCHEDULED
Status: ON HOLD
Start: 2020-01-01 | End: 2021-01-01

## 2020-01-01 RX ORDER — BUTALBITAL, ACETAMINOPHEN AND CAFFEINE 50; 325; 40 MG/1; MG/1; MG/1
1 TABLET ORAL EVERY 4 HOURS PRN
Status: DISCONTINUED | OUTPATIENT
Start: 2020-01-01 | End: 2020-01-01 | Stop reason: HOSPADM

## 2020-01-01 RX ORDER — ACETAMINOPHEN 325 MG/1
650 TABLET ORAL ONCE
Status: COMPLETED | OUTPATIENT
Start: 2020-01-01 | End: 2020-01-01

## 2020-01-01 RX ORDER — POTASSIUM CHLORIDE 750 MG/1
10 TABLET, EXTENDED RELEASE ORAL DAILY
Qty: 60 TABLET | Refills: 0 | Status: SHIPPED | OUTPATIENT
Start: 2020-01-01 | End: 2020-01-01

## 2020-01-01 RX ORDER — SODIUM CHLORIDE FOR INHALATION 3 %
4 VIAL, NEBULIZER (ML) INHALATION ONCE
Status: COMPLETED | OUTPATIENT
Start: 2020-01-01 | End: 2020-01-01

## 2020-01-01 RX ORDER — PREDNISONE 10 MG/1
10 TABLET ORAL DAILY
Qty: 30 TABLET | Refills: 0 | Status: SHIPPED | OUTPATIENT
Start: 2020-01-01 | End: 2020-01-01 | Stop reason: SDUPTHER

## 2020-01-01 RX ORDER — AMLODIPINE BESYLATE 5 MG/1
5 TABLET ORAL DAILY
Qty: 90 TABLET | Refills: 3 | Status: ON HOLD | OUTPATIENT
Start: 2020-01-01 | End: 2021-01-01

## 2020-01-01 RX ORDER — AMLODIPINE BESYLATE 5 MG/1
5 TABLET ORAL DAILY
Qty: 90 TABLET | OUTPATIENT
Start: 2020-01-01

## 2020-01-01 RX ORDER — DIAPER,BRIEF,INFANT-TODD,DISP
EACH MISCELLANEOUS EVERY 12 HOURS SCHEDULED
Status: DISCONTINUED | OUTPATIENT
Start: 2020-01-01 | End: 2020-01-01 | Stop reason: HOSPADM

## 2020-01-01 RX ORDER — CETIRIZINE HYDROCHLORIDE 10 MG/1
TABLET ORAL
Qty: 30 TABLET | Refills: 0 | OUTPATIENT
Start: 2020-01-01

## 2020-01-01 RX ORDER — ALBUTEROL SULFATE 2.5 MG/3ML
2.5 SOLUTION RESPIRATORY (INHALATION) EVERY 6 HOURS PRN
Refills: 12 | Status: ON HOLD
Start: 2020-01-01 | End: 2021-01-01

## 2020-01-01 RX ORDER — POTASSIUM CHLORIDE 750 MG/1
10 TABLET, EXTENDED RELEASE ORAL DAILY
Qty: 90 TABLET | OUTPATIENT
Start: 2020-01-01

## 2020-01-01 RX ORDER — TRAMADOL HYDROCHLORIDE 50 MG/1
50 TABLET ORAL EVERY 6 HOURS PRN
Status: DISCONTINUED | OUTPATIENT
Start: 2020-01-01 | End: 2020-01-01 | Stop reason: HOSPADM

## 2020-01-01 RX ORDER — CETIRIZINE HYDROCHLORIDE 10 MG/1
5 TABLET ORAL DAILY
Status: DISCONTINUED | OUTPATIENT
Start: 2020-01-01 | End: 2020-01-01 | Stop reason: HOSPADM

## 2020-01-01 RX ORDER — BUDESONIDE 0.5 MG/2ML
0.5 INHALANT ORAL
Status: DISCONTINUED | OUTPATIENT
Start: 2020-01-01 | End: 2020-01-01

## 2020-01-01 RX ORDER — POTASSIUM CHLORIDE 750 MG/1
10 TABLET, EXTENDED RELEASE ORAL DAILY
Qty: 60 TABLET | Refills: 5 | Status: SHIPPED | OUTPATIENT
Start: 2020-01-01

## 2020-01-01 RX ORDER — GUAIFENESIN/DEXTROMETHORPHAN 100-10MG/5
5 SYRUP ORAL EVERY 4 HOURS PRN
Status: DISCONTINUED | OUTPATIENT
Start: 2020-01-01 | End: 2020-01-01 | Stop reason: HOSPADM

## 2020-01-01 RX ADMIN — GLYCOPYRROLATE AND FORMOTEROL FUMARATE 2 SPRAY: 9; 4.8 AEROSOL, METERED RESPIRATORY (INHALATION) at 21:05

## 2020-01-01 RX ADMIN — TAZOBACTAM SODIUM AND PIPERACILLIN SODIUM 3.38 G: 375; 3 INJECTION, SOLUTION INTRAVENOUS at 05:50

## 2020-01-01 RX ADMIN — GUAIFENESIN AND DEXTROMETHORPHAN 5 ML: 100; 10 SYRUP ORAL at 06:04

## 2020-01-01 RX ADMIN — Medication 30 ML: at 21:28

## 2020-01-01 RX ADMIN — ASPIRIN 81 MG: 81 TABLET, COATED ORAL at 09:00

## 2020-01-01 RX ADMIN — IPRATROPIUM BROMIDE AND ALBUTEROL SULFATE 3 ML: .5; 3 SOLUTION RESPIRATORY (INHALATION) at 06:39

## 2020-01-01 RX ADMIN — ENOXAPARIN SODIUM 40 MG: 40 INJECTION SUBCUTANEOUS at 22:36

## 2020-01-01 RX ADMIN — AZITHROMYCIN MONOHYDRATE 250 MG: 250 TABLET ORAL at 09:16

## 2020-01-01 RX ADMIN — ASPIRIN 81 MG: 81 TABLET, COATED ORAL at 09:49

## 2020-01-01 RX ADMIN — FLUTICASONE PROPIONATE 2 SPRAY: 50 SPRAY, METERED NASAL at 10:25

## 2020-01-01 RX ADMIN — PRIMIDONE 50 MG: 50 TABLET ORAL at 00:30

## 2020-01-01 RX ADMIN — PROPRANOLOL HYDROCHLORIDE 60 MG: 20 TABLET ORAL at 09:11

## 2020-01-01 RX ADMIN — DOXYCYCLINE 100 MG: 100 CAPSULE ORAL at 20:48

## 2020-01-01 RX ADMIN — PREDNISONE 5 MG: 5 TABLET ORAL at 08:16

## 2020-01-01 RX ADMIN — SODIUM CHLORIDE, PRESERVATIVE FREE 10 ML: 5 INJECTION INTRAVENOUS at 08:56

## 2020-01-01 RX ADMIN — PROPRANOLOL HYDROCHLORIDE 60 MG: 20 TABLET ORAL at 21:52

## 2020-01-01 RX ADMIN — METHYLPREDNISOLONE SODIUM SUCCINATE 40 MG: 40 INJECTION, POWDER, FOR SOLUTION INTRAMUSCULAR; INTRAVENOUS at 06:25

## 2020-01-01 RX ADMIN — IPRATROPIUM BROMIDE AND ALBUTEROL SULFATE 3 ML: .5; 3 SOLUTION RESPIRATORY (INHALATION) at 13:02

## 2020-01-01 RX ADMIN — PROPRANOLOL HYDROCHLORIDE 60 MG: 20 TABLET ORAL at 14:35

## 2020-01-01 RX ADMIN — PANTOPRAZOLE SODIUM 40 MG: 40 TABLET, DELAYED RELEASE ORAL at 06:07

## 2020-01-01 RX ADMIN — METHYLPREDNISOLONE SODIUM SUCCINATE 40 MG: 40 INJECTION, POWDER, FOR SOLUTION INTRAMUSCULAR; INTRAVENOUS at 09:45

## 2020-01-01 RX ADMIN — AZITHROMYCIN MONOHYDRATE 250 MG: 250 TABLET ORAL at 16:21

## 2020-01-01 RX ADMIN — PRAVASTATIN SODIUM 20 MG: 20 TABLET ORAL at 17:29

## 2020-01-01 RX ADMIN — AMLODIPINE BESYLATE 5 MG: 5 TABLET ORAL at 08:13

## 2020-01-01 RX ADMIN — ASPIRIN 81 MG: 81 TABLET, COATED ORAL at 08:37

## 2020-01-01 RX ADMIN — TRIAMCINOLONE ACETONIDE 1 APPLICATION: 1 CREAM TOPICAL at 20:53

## 2020-01-01 RX ADMIN — CLONAZEPAM 0.5 MG: 0.5 TABLET ORAL at 22:20

## 2020-01-01 RX ADMIN — ACETAMINOPHEN 650 MG: 325 TABLET, FILM COATED ORAL at 22:26

## 2020-01-01 RX ADMIN — ENOXAPARIN SODIUM 40 MG: 40 INJECTION SUBCUTANEOUS at 05:00

## 2020-01-01 RX ADMIN — FLUTICASONE PROPIONATE 2 SPRAY: 50 SPRAY, METERED NASAL at 08:47

## 2020-01-01 RX ADMIN — BISOPROLOL FUMARATE 5 MG: 5 TABLET ORAL at 09:37

## 2020-01-01 RX ADMIN — ENOXAPARIN SODIUM 40 MG: 40 INJECTION SUBCUTANEOUS at 22:00

## 2020-01-01 RX ADMIN — CEFDINIR 300 MG: 300 CAPSULE ORAL at 09:48

## 2020-01-01 RX ADMIN — GUAIFENESIN 1200 MG: 600 TABLET, EXTENDED RELEASE ORAL at 08:39

## 2020-01-01 RX ADMIN — PANTOPRAZOLE SODIUM 40 MG: 40 TABLET, DELAYED RELEASE ORAL at 06:04

## 2020-01-01 RX ADMIN — PROPRANOLOL HYDROCHLORIDE 60 MG: 20 TABLET ORAL at 09:23

## 2020-01-01 RX ADMIN — TRIAMCINOLONE ACETONIDE 1 APPLICATION: 1 CREAM TOPICAL at 20:19

## 2020-01-01 RX ADMIN — Medication 30 ML: at 09:58

## 2020-01-01 RX ADMIN — TRIAMCINOLONE ACETONIDE: 1 CREAM TOPICAL at 10:00

## 2020-01-01 RX ADMIN — SODIUM CHLORIDE, PRESERVATIVE FREE 10 ML: 5 INJECTION INTRAVENOUS at 21:16

## 2020-01-01 RX ADMIN — SERTRALINE HYDROCHLORIDE 50 MG: 50 TABLET ORAL at 09:00

## 2020-01-01 RX ADMIN — VANCOMYCIN HYDROCHLORIDE 1250 MG: 500 INJECTION, POWDER, LYOPHILIZED, FOR SOLUTION INTRAVENOUS at 03:56

## 2020-01-01 RX ADMIN — TAZOBACTAM SODIUM AND PIPERACILLIN SODIUM 3.38 G: 375; 3 INJECTION, SOLUTION INTRAVENOUS at 20:54

## 2020-01-01 RX ADMIN — ARFORMOTEROL TARTRATE 15 MCG: 15 SOLUTION RESPIRATORY (INHALATION) at 06:47

## 2020-01-01 RX ADMIN — ESTROGENS, CONJUGATED 0.62 MG: 0.62 TABLET, FILM COATED ORAL at 08:36

## 2020-01-01 RX ADMIN — GLYCOPYRROLATE AND FORMOTEROL FUMARATE 2 SPRAY: 9; 4.8 AEROSOL, METERED RESPIRATORY (INHALATION) at 09:00

## 2020-01-01 RX ADMIN — LEVOFLOXACIN 500 MG: 5 INJECTION, SOLUTION INTRAVENOUS at 03:04

## 2020-01-01 RX ADMIN — CLONAZEPAM 0.5 MG: 0.5 TABLET ORAL at 06:10

## 2020-01-01 RX ADMIN — PANTOPRAZOLE SODIUM 40 MG: 40 TABLET, DELAYED RELEASE ORAL at 06:19

## 2020-01-01 RX ADMIN — PANTOPRAZOLE SODIUM 40 MG: 40 TABLET, DELAYED RELEASE ORAL at 06:41

## 2020-01-01 RX ADMIN — IPRATROPIUM BROMIDE 0.5 MG: 0.5 SOLUTION RESPIRATORY (INHALATION) at 06:41

## 2020-01-01 RX ADMIN — ARFORMOTEROL TARTRATE 15 MCG: 15 SOLUTION RESPIRATORY (INHALATION) at 07:12

## 2020-01-01 RX ADMIN — Medication 5 MG: at 20:17

## 2020-01-01 RX ADMIN — CLONAZEPAM 0.5 MG: 0.5 TABLET ORAL at 09:17

## 2020-01-01 RX ADMIN — CLONAZEPAM 0.5 MG: 0.5 TABLET ORAL at 01:05

## 2020-01-01 RX ADMIN — PROPRANOLOL HYDROCHLORIDE 60 MG: 20 TABLET ORAL at 08:46

## 2020-01-01 RX ADMIN — SERTRALINE HYDROCHLORIDE 100 MG: 50 TABLET ORAL at 08:55

## 2020-01-01 RX ADMIN — GUAIFENESIN AND DEXTROMETHORPHAN 5 ML: 100; 10 SYRUP ORAL at 23:29

## 2020-01-01 RX ADMIN — IPRATROPIUM BROMIDE AND ALBUTEROL SULFATE 3 ML: .5; 3 SOLUTION RESPIRATORY (INHALATION) at 13:12

## 2020-01-01 RX ADMIN — ENOXAPARIN SODIUM 40 MG: 40 INJECTION SUBCUTANEOUS at 23:50

## 2020-01-01 RX ADMIN — IPRATROPIUM BROMIDE AND ALBUTEROL SULFATE 3 ML: .5; 3 SOLUTION RESPIRATORY (INHALATION) at 21:06

## 2020-01-01 RX ADMIN — PRAVASTATIN SODIUM 20 MG: 20 TABLET ORAL at 16:47

## 2020-01-01 RX ADMIN — FUROSEMIDE 20 MG: 20 TABLET ORAL at 09:01

## 2020-01-01 RX ADMIN — GUAIFENESIN AND DEXTROMETHORPHAN 5 ML: 100; 10 SYRUP ORAL at 23:36

## 2020-01-01 RX ADMIN — FLUTICASONE PROPIONATE 2 SPRAY: 50 SPRAY, METERED NASAL at 09:02

## 2020-01-01 RX ADMIN — GUAIFENESIN 1200 MG: 600 TABLET, EXTENDED RELEASE ORAL at 09:12

## 2020-01-01 RX ADMIN — ENOXAPARIN SODIUM 40 MG: 40 INJECTION SUBCUTANEOUS at 22:45

## 2020-01-01 RX ADMIN — DIPHENHYDRAMINE HYDROCHLORIDE AND LIDOCAINE HYDROCHLORIDE AND ALUMINUM HYDROXIDE AND MAGNESIUM HYDRO 5 ML: KIT at 09:16

## 2020-01-01 RX ADMIN — DIPHENHYDRAMINE HYDROCHLORIDE AND LIDOCAINE HYDROCHLORIDE AND ALUMINUM HYDROXIDE AND MAGNESIUM HYDRO 5 ML: KIT at 09:46

## 2020-01-01 RX ADMIN — GUAIFENESIN 1200 MG: 600 TABLET, EXTENDED RELEASE ORAL at 08:55

## 2020-01-01 RX ADMIN — DIPHENHYDRAMINE HYDROCHLORIDE AND LIDOCAINE HYDROCHLORIDE AND ALUMINUM HYDROXIDE AND MAGNESIUM HYDRO 5 ML: KIT at 21:53

## 2020-01-01 RX ADMIN — PRIMIDONE 50 MG: 50 TABLET ORAL at 20:52

## 2020-01-01 RX ADMIN — PROPRANOLOL HYDROCHLORIDE 60 MG: 20 TABLET ORAL at 21:35

## 2020-01-01 RX ADMIN — PRAVASTATIN SODIUM 20 MG: 20 TABLET ORAL at 16:45

## 2020-01-01 RX ADMIN — ASPIRIN 81 MG: 81 TABLET, COATED ORAL at 08:46

## 2020-01-01 RX ADMIN — SODIUM CHLORIDE, PRESERVATIVE FREE 10 ML: 5 INJECTION INTRAVENOUS at 21:38

## 2020-01-01 RX ADMIN — Medication 1 CAPSULE: at 20:12

## 2020-01-01 RX ADMIN — Medication 5 MG: at 21:17

## 2020-01-01 RX ADMIN — GLYCOPYRROLATE AND FORMOTEROL FUMARATE 2 SPRAY: 9; 4.8 AEROSOL, METERED RESPIRATORY (INHALATION) at 09:28

## 2020-01-01 RX ADMIN — GUAIFENESIN 1200 MG: 600 TABLET, EXTENDED RELEASE ORAL at 09:33

## 2020-01-01 RX ADMIN — SERTRALINE HYDROCHLORIDE 100 MG: 50 TABLET ORAL at 08:56

## 2020-01-01 RX ADMIN — PROPRANOLOL HYDROCHLORIDE 60 MG: 20 TABLET ORAL at 08:15

## 2020-01-01 RX ADMIN — GLYCOPYRROLATE AND FORMOTEROL FUMARATE 2 SPRAY: 9; 4.8 AEROSOL, METERED RESPIRATORY (INHALATION) at 20:50

## 2020-01-01 RX ADMIN — IPRATROPIUM BROMIDE 0.5 MG: 0.5 SOLUTION RESPIRATORY (INHALATION) at 07:10

## 2020-01-01 RX ADMIN — PRAVASTATIN SODIUM 20 MG: 20 TABLET ORAL at 16:28

## 2020-01-01 RX ADMIN — SODIUM CHLORIDE, PRESERVATIVE FREE 10 ML: 5 INJECTION INTRAVENOUS at 09:49

## 2020-01-01 RX ADMIN — TRIAMCINOLONE ACETONIDE: 1 CREAM TOPICAL at 20:13

## 2020-01-01 RX ADMIN — ASPIRIN 81 MG: 81 TABLET, COATED ORAL at 08:55

## 2020-01-01 RX ADMIN — PREDNISONE 40 MG: 10 TABLET ORAL at 09:34

## 2020-01-01 RX ADMIN — BUTALBITAL, ACETAMINOPHEN AND CAFFEINE 1 TABLET: 50; 325; 40 TABLET ORAL at 21:51

## 2020-01-01 RX ADMIN — ONDANSETRON 4 MG: 2 INJECTION, SOLUTION INTRAMUSCULAR; INTRAVENOUS at 18:17

## 2020-01-01 RX ADMIN — BUDESONIDE 0.5 MG: 0.5 INHALANT RESPIRATORY (INHALATION) at 19:33

## 2020-01-01 RX ADMIN — AZITHROMYCIN MONOHYDRATE 250 MG: 250 TABLET ORAL at 15:27

## 2020-01-01 RX ADMIN — GUAIFENESIN AND DEXTROMETHORPHAN 5 ML: 100; 10 SYRUP ORAL at 23:39

## 2020-01-01 RX ADMIN — GUAIFENESIN AND DEXTROMETHORPHAN 5 ML: 100; 10 SYRUP ORAL at 11:48

## 2020-01-01 RX ADMIN — PROPRANOLOL HYDROCHLORIDE 10 MG: 20 TABLET ORAL at 16:47

## 2020-01-01 RX ADMIN — GUAIFENESIN AND DEXTROMETHORPHAN 5 ML: 100; 10 SYRUP ORAL at 20:56

## 2020-01-01 RX ADMIN — MUPIROCIN: 20 OINTMENT TOPICAL at 06:27

## 2020-01-01 RX ADMIN — SODIUM CHLORIDE, PRESERVATIVE FREE 10 ML: 5 INJECTION INTRAVENOUS at 21:31

## 2020-01-01 RX ADMIN — GUAIFENESIN AND DEXTROMETHORPHAN 5 ML: 100; 10 SYRUP ORAL at 11:57

## 2020-01-01 RX ADMIN — Medication 1 CAPSULE: at 08:38

## 2020-01-01 RX ADMIN — METHYLPREDNISOLONE SODIUM SUCCINATE 40 MG: 40 INJECTION, POWDER, FOR SOLUTION INTRAMUSCULAR; INTRAVENOUS at 17:49

## 2020-01-01 RX ADMIN — CLONAZEPAM 0.5 MG: 0.5 TABLET ORAL at 06:19

## 2020-01-01 RX ADMIN — ENOXAPARIN SODIUM 40 MG: 40 INJECTION SUBCUTANEOUS at 01:05

## 2020-01-01 RX ADMIN — PREDNISONE 5 MG: 5 TABLET ORAL at 09:17

## 2020-01-01 RX ADMIN — PRIMIDONE 50 MG: 50 TABLET ORAL at 21:19

## 2020-01-01 RX ADMIN — IPRATROPIUM BROMIDE 0.5 MG: 0.5 SOLUTION RESPIRATORY (INHALATION) at 18:47

## 2020-01-01 RX ADMIN — IPRATROPIUM BROMIDE AND ALBUTEROL SULFATE 3 ML: .5; 3 SOLUTION RESPIRATORY (INHALATION) at 00:09

## 2020-01-01 RX ADMIN — DIPHENHYDRAMINE HYDROCHLORIDE AND LIDOCAINE HYDROCHLORIDE AND ALUMINUM HYDROXIDE AND MAGNESIUM HYDRO 5 ML: KIT at 21:45

## 2020-01-01 RX ADMIN — FLUTICASONE PROPIONATE 2 SPRAY: 50 SPRAY, METERED NASAL at 09:17

## 2020-01-01 RX ADMIN — DOXYCYCLINE 100 MG: 100 CAPSULE ORAL at 08:39

## 2020-01-01 RX ADMIN — FLUTICASONE PROPIONATE 2 SPRAY: 50 SPRAY, METERED NASAL at 08:37

## 2020-01-01 RX ADMIN — AZITHROMYCIN MONOHYDRATE 250 MG: 250 TABLET ORAL at 09:28

## 2020-01-01 RX ADMIN — SODIUM CHLORIDE, PRESERVATIVE FREE 10 ML: 5 INJECTION INTRAVENOUS at 20:49

## 2020-01-01 RX ADMIN — IPRATROPIUM BROMIDE AND ALBUTEROL SULFATE 3 ML: .5; 3 SOLUTION RESPIRATORY (INHALATION) at 00:32

## 2020-01-01 RX ADMIN — FUROSEMIDE 20 MG: 20 TABLET ORAL at 08:55

## 2020-01-01 RX ADMIN — SERTRALINE HYDROCHLORIDE 50 MG: 50 TABLET ORAL at 09:16

## 2020-01-01 RX ADMIN — SERTRALINE HYDROCHLORIDE 100 MG: 50 TABLET ORAL at 08:16

## 2020-01-01 RX ADMIN — PROPRANOLOL HYDROCHLORIDE 10 MG: 20 TABLET ORAL at 20:19

## 2020-01-01 RX ADMIN — AMLODIPINE BESYLATE 5 MG: 5 TABLET ORAL at 08:36

## 2020-01-01 RX ADMIN — PRAVASTATIN SODIUM 20 MG: 20 TABLET ORAL at 17:30

## 2020-01-01 RX ADMIN — NICOTINE 1 PATCH: 14 PATCH TRANSDERMAL at 08:16

## 2020-01-01 RX ADMIN — PROPRANOLOL HYDROCHLORIDE 60 MG: 20 TABLET ORAL at 20:43

## 2020-01-01 RX ADMIN — Medication 1 CAPSULE: at 08:46

## 2020-01-01 RX ADMIN — DOXYCYCLINE 100 MG: 100 CAPSULE ORAL at 09:00

## 2020-01-01 RX ADMIN — PANTOPRAZOLE SODIUM 40 MG: 40 TABLET, DELAYED RELEASE ORAL at 05:52

## 2020-01-01 RX ADMIN — DOXYCYCLINE 100 MG: 100 INJECTION, POWDER, LYOPHILIZED, FOR SOLUTION INTRAVENOUS at 01:03

## 2020-01-01 RX ADMIN — ENOXAPARIN SODIUM 40 MG: 40 INJECTION SUBCUTANEOUS at 00:05

## 2020-01-01 RX ADMIN — BISOPROLOL FUMARATE 5 MG: 5 TABLET ORAL at 21:13

## 2020-01-01 RX ADMIN — ACETAMINOPHEN 650 MG: 325 TABLET, FILM COATED ORAL at 05:10

## 2020-01-01 RX ADMIN — Medication 1 CAPSULE: at 09:23

## 2020-01-01 RX ADMIN — BENZONATATE 100 MG: 100 CAPSULE ORAL at 22:31

## 2020-01-01 RX ADMIN — SODIUM CHLORIDE, PRESERVATIVE FREE 10 ML: 5 INJECTION INTRAVENOUS at 20:20

## 2020-01-01 RX ADMIN — CLONAZEPAM 0.5 MG: 0.5 TABLET ORAL at 20:17

## 2020-01-01 RX ADMIN — IPRATROPIUM BROMIDE 0.5 MG: 0.5 SOLUTION RESPIRATORY (INHALATION) at 19:11

## 2020-01-01 RX ADMIN — DIPHENHYDRAMINE HYDROCHLORIDE AND LIDOCAINE HYDROCHLORIDE AND ALUMINUM HYDROXIDE AND MAGNESIUM HYDRO 5 ML: KIT at 08:55

## 2020-01-01 RX ADMIN — Medication 30 ML: at 20:07

## 2020-01-01 RX ADMIN — TAZOBACTAM SODIUM AND PIPERACILLIN SODIUM 3.38 G: 375; 3 INJECTION, SOLUTION INTRAVENOUS at 23:55

## 2020-01-01 RX ADMIN — IPRATROPIUM BROMIDE AND ALBUTEROL SULFATE 3 ML: .5; 3 SOLUTION RESPIRATORY (INHALATION) at 00:13

## 2020-01-01 RX ADMIN — PRAVASTATIN SODIUM 20 MG: 20 TABLET ORAL at 17:09

## 2020-01-01 RX ADMIN — FLUTICASONE PROPIONATE 2 SPRAY: 50 SPRAY, METERED NASAL at 09:36

## 2020-01-01 RX ADMIN — FUROSEMIDE 20 MG: 20 TABLET ORAL at 09:00

## 2020-01-01 RX ADMIN — ENOXAPARIN SODIUM 40 MG: 40 INJECTION SUBCUTANEOUS at 00:51

## 2020-01-01 RX ADMIN — CLONAZEPAM 0.5 MG: 0.5 TABLET ORAL at 21:27

## 2020-01-01 RX ADMIN — PRIMIDONE 50 MG: 50 TABLET ORAL at 08:27

## 2020-01-01 RX ADMIN — CLONAZEPAM 0.5 MG: 0.5 TABLET ORAL at 23:24

## 2020-01-01 RX ADMIN — CEFTRIAXONE 1 G: 1 INJECTION, SOLUTION INTRAVENOUS at 21:37

## 2020-01-01 RX ADMIN — IPRATROPIUM BROMIDE AND ALBUTEROL SULFATE 3 ML: .5; 3 SOLUTION RESPIRATORY (INHALATION) at 19:31

## 2020-01-01 RX ADMIN — GUAIFENESIN AND DEXTROMETHORPHAN 5 ML: 100; 10 SYRUP ORAL at 09:32

## 2020-01-01 RX ADMIN — PRAVASTATIN SODIUM 20 MG: 20 TABLET ORAL at 16:59

## 2020-01-01 RX ADMIN — SODIUM CHLORIDE, PRESERVATIVE FREE 10 ML: 5 INJECTION INTRAVENOUS at 09:18

## 2020-01-01 RX ADMIN — Medication 1 CAPSULE: at 08:27

## 2020-01-01 RX ADMIN — IPRATROPIUM BROMIDE AND ALBUTEROL SULFATE 3 ML: .5; 3 SOLUTION RESPIRATORY (INHALATION) at 07:06

## 2020-01-01 RX ADMIN — BUDESONIDE 1 MG: 0.5 INHALANT RESPIRATORY (INHALATION) at 06:29

## 2020-01-01 RX ADMIN — IPRATROPIUM BROMIDE 0.5 MG: 0.5 SOLUTION RESPIRATORY (INHALATION) at 19:34

## 2020-01-01 RX ADMIN — BENZONATATE 100 MG: 100 CAPSULE ORAL at 09:17

## 2020-01-01 RX ADMIN — PRAVASTATIN SODIUM 20 MG: 20 TABLET ORAL at 18:04

## 2020-01-01 RX ADMIN — ACETAMINOPHEN 1000 MG: 500 TABLET, FILM COATED ORAL at 22:49

## 2020-01-01 RX ADMIN — GLYCOPYRROLATE AND FORMOTEROL FUMARATE 2 SPRAY: 9; 4.8 AEROSOL, METERED RESPIRATORY (INHALATION) at 08:44

## 2020-01-01 RX ADMIN — PREDNISONE 5 MG: 5 TABLET ORAL at 08:38

## 2020-01-01 RX ADMIN — PRAVASTATIN SODIUM 20 MG: 20 TABLET ORAL at 17:26

## 2020-01-01 RX ADMIN — BUDESONIDE AND FORMOTEROL FUMARATE DIHYDRATE 2 PUFF: 160; 4.5 AEROSOL RESPIRATORY (INHALATION) at 07:11

## 2020-01-01 RX ADMIN — MUPIROCIN: 20 OINTMENT TOPICAL at 17:13

## 2020-01-01 RX ADMIN — Medication 30 ML: at 10:41

## 2020-01-01 RX ADMIN — SODIUM CHLORIDE 1000 ML: 9 INJECTION, SOLUTION INTRAVENOUS at 22:52

## 2020-01-01 RX ADMIN — IPRATROPIUM BROMIDE 0.5 MG: 0.5 SOLUTION RESPIRATORY (INHALATION) at 07:08

## 2020-01-01 RX ADMIN — MAGNESIUM GLUCONATE 500 MG ORAL TABLET 800 MG: 500 TABLET ORAL at 21:47

## 2020-01-01 RX ADMIN — GUAIFENESIN 1200 MG: 600 TABLET, EXTENDED RELEASE ORAL at 06:09

## 2020-01-01 RX ADMIN — Medication 1 CAPSULE: at 09:28

## 2020-01-01 RX ADMIN — ARFORMOTEROL TARTRATE 15 MCG: 15 SOLUTION RESPIRATORY (INHALATION) at 19:39

## 2020-01-01 RX ADMIN — TRAMADOL HYDROCHLORIDE 50 MG: 50 TABLET, FILM COATED ORAL at 16:49

## 2020-01-01 RX ADMIN — AMLODIPINE BESYLATE 5 MG: 5 TABLET ORAL at 09:10

## 2020-01-01 RX ADMIN — ASPIRIN 81 MG: 81 TABLET, COATED ORAL at 08:13

## 2020-01-01 RX ADMIN — FLUTICASONE PROPIONATE 2 SPRAY: 50 SPRAY, METERED NASAL at 08:55

## 2020-01-01 RX ADMIN — HYDROCORTISONE: 10 CREAM TOPICAL at 09:34

## 2020-01-01 RX ADMIN — METHYLPREDNISOLONE SODIUM SUCCINATE 125 MG: 125 INJECTION, POWDER, FOR SOLUTION INTRAMUSCULAR; INTRAVENOUS at 22:50

## 2020-01-01 RX ADMIN — TAZOBACTAM SODIUM AND PIPERACILLIN SODIUM 3.38 G: 375; 3 INJECTION, SOLUTION INTRAVENOUS at 22:28

## 2020-01-01 RX ADMIN — PANTOPRAZOLE SODIUM 40 MG: 40 TABLET, DELAYED RELEASE ORAL at 06:27

## 2020-01-01 RX ADMIN — BUDESONIDE 0.5 MG: 0.5 INHALANT RESPIRATORY (INHALATION) at 07:12

## 2020-01-01 RX ADMIN — AMLODIPINE BESYLATE 5 MG: 5 TABLET ORAL at 09:32

## 2020-01-01 RX ADMIN — HYDROCORTISONE: 10 CREAM TOPICAL at 08:35

## 2020-01-01 RX ADMIN — Medication 1 CAPSULE: at 20:18

## 2020-01-01 RX ADMIN — CLONAZEPAM 0.5 MG: 0.5 TABLET ORAL at 20:19

## 2020-01-01 RX ADMIN — TRIAMCINOLONE ACETONIDE: 1 CREAM TOPICAL at 21:27

## 2020-01-01 RX ADMIN — Medication 30 ML: at 22:07

## 2020-01-01 RX ADMIN — ASPIRIN 81 MG: 81 TABLET, COATED ORAL at 09:34

## 2020-01-01 RX ADMIN — TRAMADOL HYDROCHLORIDE 50 MG: 50 TABLET, FILM COATED ORAL at 08:48

## 2020-01-01 RX ADMIN — IPRATROPIUM BROMIDE AND ALBUTEROL SULFATE 3 ML: .5; 3 SOLUTION RESPIRATORY (INHALATION) at 00:44

## 2020-01-01 RX ADMIN — IPRATROPIUM BROMIDE AND ALBUTEROL SULFATE 3 ML: .5; 3 SOLUTION RESPIRATORY (INHALATION) at 01:18

## 2020-01-01 RX ADMIN — MUPIROCIN: 20 OINTMENT TOPICAL at 15:22

## 2020-01-01 RX ADMIN — SERTRALINE HYDROCHLORIDE 50 MG: 50 TABLET ORAL at 08:38

## 2020-01-01 RX ADMIN — Medication 30 ML: at 08:45

## 2020-01-01 RX ADMIN — PROPRANOLOL HYDROCHLORIDE 60 MG: 20 TABLET ORAL at 09:45

## 2020-01-01 RX ADMIN — PRAVASTATIN SODIUM 20 MG: 20 TABLET ORAL at 18:38

## 2020-01-01 RX ADMIN — IPRATROPIUM BROMIDE AND ALBUTEROL SULFATE 3 ML: .5; 3 SOLUTION RESPIRATORY (INHALATION) at 19:38

## 2020-01-01 RX ADMIN — DOXYCYCLINE 100 MG: 100 CAPSULE ORAL at 20:12

## 2020-01-01 RX ADMIN — TAZOBACTAM SODIUM AND PIPERACILLIN SODIUM 3.38 G: 375; 3 INJECTION, SOLUTION INTRAVENOUS at 13:25

## 2020-01-01 RX ADMIN — FUROSEMIDE 20 MG: 20 TABLET ORAL at 08:30

## 2020-01-01 RX ADMIN — GLYCOPYRROLATE AND FORMOTEROL FUMARATE 2 SPRAY: 9; 4.8 AEROSOL, METERED RESPIRATORY (INHALATION) at 08:30

## 2020-01-01 RX ADMIN — BUDESONIDE 1 MG: 0.5 INHALANT RESPIRATORY (INHALATION) at 21:36

## 2020-01-01 RX ADMIN — IPRATROPIUM BROMIDE AND ALBUTEROL SULFATE 3 ML: .5; 3 SOLUTION RESPIRATORY (INHALATION) at 07:19

## 2020-01-01 RX ADMIN — GLYCOPYRROLATE AND FORMOTEROL FUMARATE 2 SPRAY: 9; 4.8 AEROSOL, METERED RESPIRATORY (INHALATION) at 21:31

## 2020-01-01 RX ADMIN — TAZOBACTAM SODIUM AND PIPERACILLIN SODIUM 3.38 G: 375; 3 INJECTION, SOLUTION INTRAVENOUS at 15:43

## 2020-01-01 RX ADMIN — FLUTICASONE PROPIONATE 2 SPRAY: 50 SPRAY, METERED NASAL at 08:30

## 2020-01-01 RX ADMIN — MUPIROCIN 1 APPLICATION: 20 OINTMENT TOPICAL at 18:05

## 2020-01-01 RX ADMIN — IPRATROPIUM BROMIDE 0.5 MG: 0.5 SOLUTION RESPIRATORY (INHALATION) at 06:47

## 2020-01-01 RX ADMIN — TRIAMCINOLONE ACETONIDE: 1 CREAM TOPICAL at 09:36

## 2020-01-01 RX ADMIN — NICOTINE 1 PATCH: 14 PATCH TRANSDERMAL at 08:59

## 2020-01-01 RX ADMIN — NICOTINE 1 PATCH: 14 PATCH TRANSDERMAL at 08:15

## 2020-01-01 RX ADMIN — TRAMADOL HYDROCHLORIDE 50 MG: 50 TABLET, FILM COATED ORAL at 08:44

## 2020-01-01 RX ADMIN — PROPRANOLOL HYDROCHLORIDE 60 MG: 20 TABLET ORAL at 08:54

## 2020-01-01 RX ADMIN — PROPRANOLOL HYDROCHLORIDE 60 MG: 20 TABLET ORAL at 20:17

## 2020-01-01 RX ADMIN — IPRATROPIUM BROMIDE AND ALBUTEROL SULFATE 3 ML: .5; 3 SOLUTION RESPIRATORY (INHALATION) at 13:13

## 2020-01-01 RX ADMIN — IPRATROPIUM BROMIDE AND ALBUTEROL SULFATE 3 ML: .5; 3 SOLUTION RESPIRATORY (INHALATION) at 12:06

## 2020-01-01 RX ADMIN — GLYCOPYRROLATE AND FORMOTEROL FUMARATE 2 SPRAY: 9; 4.8 AEROSOL, METERED RESPIRATORY (INHALATION) at 20:54

## 2020-01-01 RX ADMIN — TAZOBACTAM SODIUM AND PIPERACILLIN SODIUM 3.38 G: 375; 3 INJECTION, SOLUTION INTRAVENOUS at 19:00

## 2020-01-01 RX ADMIN — BUDESONIDE AND FORMOTEROL FUMARATE DIHYDRATE 2 PUFF: 160; 4.5 AEROSOL RESPIRATORY (INHALATION) at 06:49

## 2020-01-01 RX ADMIN — METHYLPREDNISOLONE SODIUM SUCCINATE 40 MG: 40 INJECTION, POWDER, FOR SOLUTION INTRAMUSCULAR; INTRAVENOUS at 07:00

## 2020-01-01 RX ADMIN — SODIUM CHLORIDE 100 ML/HR: 9 INJECTION, SOLUTION INTRAVENOUS at 23:46

## 2020-01-01 RX ADMIN — GLYCOPYRROLATE AND FORMOTEROL FUMARATE 2 SPRAY: 9; 4.8 AEROSOL, METERED RESPIRATORY (INHALATION) at 20:44

## 2020-01-01 RX ADMIN — IPRATROPIUM BROMIDE AND ALBUTEROL SULFATE 3 ML: .5; 3 SOLUTION RESPIRATORY (INHALATION) at 12:19

## 2020-01-01 RX ADMIN — ENOXAPARIN SODIUM 40 MG: 40 INJECTION SUBCUTANEOUS at 23:39

## 2020-01-01 RX ADMIN — TAZOBACTAM SODIUM AND PIPERACILLIN SODIUM 3.38 G: 375; 3 INJECTION, SOLUTION INTRAVENOUS at 21:12

## 2020-01-01 RX ADMIN — GUAIFENESIN AND DEXTROMETHORPHAN 5 ML: 100; 10 SYRUP ORAL at 17:15

## 2020-01-01 RX ADMIN — PREDNISONE 40 MG: 10 TABLET ORAL at 08:34

## 2020-01-01 RX ADMIN — GUAIFENESIN 800 MG: 600 TABLET, EXTENDED RELEASE ORAL at 09:01

## 2020-01-01 RX ADMIN — GUAIFENESIN AND DEXTROMETHORPHAN 5 ML: 100; 10 SYRUP ORAL at 20:15

## 2020-01-01 RX ADMIN — PRAVASTATIN SODIUM 20 MG: 20 TABLET ORAL at 17:21

## 2020-01-01 RX ADMIN — GUAIFENESIN 600 MG: 600 TABLET, EXTENDED RELEASE ORAL at 22:16

## 2020-01-01 RX ADMIN — PROPRANOLOL HYDROCHLORIDE 60 MG: 20 TABLET ORAL at 20:06

## 2020-01-01 RX ADMIN — GUAIFENESIN AND DEXTROMETHORPHAN 5 ML: 100; 10 SYRUP ORAL at 23:12

## 2020-01-01 RX ADMIN — BENZONATATE 100 MG: 100 CAPSULE ORAL at 09:37

## 2020-01-01 RX ADMIN — CETIRIZINE HYDROCHLORIDE 5 MG: 10 TABLET, FILM COATED ORAL at 08:37

## 2020-01-01 RX ADMIN — ENOXAPARIN SODIUM 40 MG: 40 INJECTION SUBCUTANEOUS at 00:15

## 2020-01-01 RX ADMIN — SODIUM CHLORIDE, PRESERVATIVE FREE 10 ML: 5 INJECTION INTRAVENOUS at 10:17

## 2020-01-01 RX ADMIN — TAZOBACTAM SODIUM AND PIPERACILLIN SODIUM 3.38 G: 375; 3 INJECTION, SOLUTION INTRAVENOUS at 06:19

## 2020-01-01 RX ADMIN — FLUTICASONE PROPIONATE 2 SPRAY: 50 SPRAY, METERED NASAL at 09:09

## 2020-01-01 RX ADMIN — TAZOBACTAM SODIUM AND PIPERACILLIN SODIUM 3.38 G: 375; 3 INJECTION, SOLUTION INTRAVENOUS at 21:32

## 2020-01-01 RX ADMIN — CEFDINIR 300 MG: 300 CAPSULE ORAL at 09:16

## 2020-01-01 RX ADMIN — SERTRALINE HYDROCHLORIDE 50 MG: 50 TABLET ORAL at 09:23

## 2020-01-01 RX ADMIN — CEFTRIAXONE 1 G: 1 INJECTION, SOLUTION INTRAVENOUS at 21:44

## 2020-01-01 RX ADMIN — SERTRALINE HYDROCHLORIDE 100 MG: 50 TABLET ORAL at 08:59

## 2020-01-01 RX ADMIN — MUPIROCIN: 20 OINTMENT TOPICAL at 06:04

## 2020-01-01 RX ADMIN — TRIAMCINOLONE ACETONIDE: 1 CREAM TOPICAL at 22:21

## 2020-01-01 RX ADMIN — SODIUM CHLORIDE, PRESERVATIVE FREE 10 ML: 5 INJECTION INTRAVENOUS at 21:15

## 2020-01-01 RX ADMIN — Medication 1 CAPSULE: at 21:19

## 2020-01-01 RX ADMIN — Medication 1 CAPSULE: at 09:00

## 2020-01-01 RX ADMIN — ACETAMINOPHEN 650 MG: 325 TABLET, FILM COATED ORAL at 09:45

## 2020-01-01 RX ADMIN — GUAIFENESIN AND DEXTROMETHORPHAN 5 ML: 100; 10 SYRUP ORAL at 11:37

## 2020-01-01 RX ADMIN — CEFDINIR 300 MG: 300 CAPSULE ORAL at 08:34

## 2020-01-01 RX ADMIN — SERTRALINE HYDROCHLORIDE 50 MG: 50 TABLET ORAL at 08:45

## 2020-01-01 RX ADMIN — ESTROGENS, CONJUGATED 0.62 MG: 0.62 TABLET, FILM COATED ORAL at 13:45

## 2020-01-01 RX ADMIN — ESTROGENS, CONJUGATED 0.62 MG: 0.62 TABLET, FILM COATED ORAL at 09:48

## 2020-01-01 RX ADMIN — BISOPROLOL FUMARATE 5 MG: 5 TABLET ORAL at 09:00

## 2020-01-01 RX ADMIN — CLONAZEPAM 0.5 MG: 0.5 TABLET ORAL at 00:27

## 2020-01-01 RX ADMIN — METHYLPREDNISOLONE SODIUM SUCCINATE 40 MG: 40 INJECTION, POWDER, FOR SOLUTION INTRAMUSCULAR; INTRAVENOUS at 12:10

## 2020-01-01 RX ADMIN — TRAMADOL HYDROCHLORIDE 50 MG: 50 TABLET, FILM COATED ORAL at 01:08

## 2020-01-01 RX ADMIN — DIPHENHYDRAMINE HYDROCHLORIDE AND LIDOCAINE HYDROCHLORIDE AND ALUMINUM HYDROXIDE AND MAGNESIUM HYDRO 5 ML: KIT at 10:18

## 2020-01-01 RX ADMIN — CEFDINIR 300 MG: 300 CAPSULE ORAL at 08:43

## 2020-01-01 RX ADMIN — TAZOBACTAM SODIUM AND PIPERACILLIN SODIUM 3.38 G: 375; 3 INJECTION, SOLUTION INTRAVENOUS at 06:34

## 2020-01-01 RX ADMIN — TAZOBACTAM SODIUM AND PIPERACILLIN SODIUM 3.38 G: 375; 3 INJECTION, SOLUTION INTRAVENOUS at 06:15

## 2020-01-01 RX ADMIN — SERTRALINE HYDROCHLORIDE 100 MG: 50 TABLET ORAL at 08:37

## 2020-01-01 RX ADMIN — IPRATROPIUM BROMIDE AND ALBUTEROL SULFATE 3 ML: .5; 3 SOLUTION RESPIRATORY (INHALATION) at 06:59

## 2020-01-01 RX ADMIN — Medication 30 ML: at 22:18

## 2020-01-01 RX ADMIN — GUAIFENESIN 1200 MG: 600 TABLET, EXTENDED RELEASE ORAL at 09:10

## 2020-01-01 RX ADMIN — METHYLPREDNISOLONE SODIUM SUCCINATE 40 MG: 40 INJECTION, POWDER, FOR SOLUTION INTRAMUSCULAR; INTRAVENOUS at 02:25

## 2020-01-01 RX ADMIN — CLONAZEPAM 0.5 MG: 0.5 TABLET ORAL at 21:35

## 2020-01-01 RX ADMIN — Medication 1 CAPSULE: at 09:37

## 2020-01-01 RX ADMIN — NICOTINE 1 PATCH: 14 PATCH TRANSDERMAL at 09:21

## 2020-01-01 RX ADMIN — DIPHENHYDRAMINE HYDROCHLORIDE AND LIDOCAINE HYDROCHLORIDE AND ALUMINUM HYDROXIDE AND MAGNESIUM HYDRO 5 ML: KIT at 09:03

## 2020-01-01 RX ADMIN — IPRATROPIUM BROMIDE AND ALBUTEROL SULFATE 3 ML: .5; 3 SOLUTION RESPIRATORY (INHALATION) at 13:11

## 2020-01-01 RX ADMIN — ESTROGENS, CONJUGATED 0.62 MG: 0.62 TABLET, FILM COATED ORAL at 09:32

## 2020-01-01 RX ADMIN — PREDNISONE 40 MG: 20 TABLET ORAL at 08:30

## 2020-01-01 RX ADMIN — BUDESONIDE AND FORMOTEROL FUMARATE DIHYDRATE 2 PUFF: 160; 4.5 AEROSOL RESPIRATORY (INHALATION) at 07:06

## 2020-01-01 RX ADMIN — SODIUM CHLORIDE, PRESERVATIVE FREE 10 ML: 5 INJECTION INTRAVENOUS at 08:59

## 2020-01-01 RX ADMIN — GUAIFENESIN AND DEXTROMETHORPHAN 5 ML: 100; 10 SYRUP ORAL at 11:46

## 2020-01-01 RX ADMIN — TAZOBACTAM SODIUM AND PIPERACILLIN SODIUM 3.38 G: 375; 3 INJECTION, SOLUTION INTRAVENOUS at 15:35

## 2020-01-01 RX ADMIN — INFLUENZA VIRUS VACCINE 0.5 ML: 15; 15; 15; 15 SUSPENSION INTRAMUSCULAR at 14:30

## 2020-01-01 RX ADMIN — PREDNISONE 5 MG: 5 TABLET ORAL at 10:25

## 2020-01-01 RX ADMIN — IPRATROPIUM BROMIDE 0.5 MG: 0.5 SOLUTION RESPIRATORY (INHALATION) at 07:03

## 2020-01-01 RX ADMIN — PREDNISONE 5 MG: 5 TABLET ORAL at 08:46

## 2020-01-01 RX ADMIN — ESTROGENS, CONJUGATED 0.62 MG: 0.62 TABLET, FILM COATED ORAL at 08:47

## 2020-01-01 RX ADMIN — SERTRALINE HYDROCHLORIDE 50 MG: 50 TABLET ORAL at 09:48

## 2020-01-01 RX ADMIN — GUAIFENESIN AND DEXTROMETHORPHAN 5 ML: 100; 10 SYRUP ORAL at 00:00

## 2020-01-01 RX ADMIN — NICOTINE 1 PATCH: 14 PATCH TRANSDERMAL at 08:45

## 2020-01-01 RX ADMIN — GUAIFENESIN 1200 MG: 600 TABLET, EXTENDED RELEASE ORAL at 20:43

## 2020-01-01 RX ADMIN — ASPIRIN 81 MG: 81 TABLET, COATED ORAL at 09:17

## 2020-01-01 RX ADMIN — CLONAZEPAM 0.5 MG: 0.5 TABLET ORAL at 00:02

## 2020-01-01 RX ADMIN — FUROSEMIDE 20 MG: 20 TABLET ORAL at 09:10

## 2020-01-01 RX ADMIN — DIPHENHYDRAMINE HYDROCHLORIDE AND LIDOCAINE HYDROCHLORIDE AND ALUMINUM HYDROXIDE AND MAGNESIUM HYDRO 5 ML: KIT at 06:04

## 2020-01-01 RX ADMIN — ASPIRIN 81 MG: 81 TABLET, COATED ORAL at 08:39

## 2020-01-01 RX ADMIN — ENOXAPARIN SODIUM 40 MG: 40 INJECTION SUBCUTANEOUS at 22:30

## 2020-01-01 RX ADMIN — PANTOPRAZOLE SODIUM 40 MG: 40 TABLET, DELAYED RELEASE ORAL at 06:39

## 2020-01-01 RX ADMIN — TRIAMCINOLONE ACETONIDE: 1 CREAM TOPICAL at 09:00

## 2020-01-01 RX ADMIN — GUAIFENESIN 1200 MG: 600 TABLET, EXTENDED RELEASE ORAL at 08:36

## 2020-01-01 RX ADMIN — CLONAZEPAM 0.5 MG: 0.5 TABLET ORAL at 22:36

## 2020-01-01 RX ADMIN — ESTROGENS, CONJUGATED 0.62 MG: 0.62 TABLET, FILM COATED ORAL at 09:23

## 2020-01-01 RX ADMIN — GLYCOPYRROLATE AND FORMOTEROL FUMARATE 2 SPRAY: 9; 4.8 AEROSOL, METERED RESPIRATORY (INHALATION) at 00:26

## 2020-01-01 RX ADMIN — TAZOBACTAM SODIUM AND PIPERACILLIN SODIUM 3.38 G: 375; 3 INJECTION, SOLUTION INTRAVENOUS at 22:51

## 2020-01-01 RX ADMIN — Medication 30 ML: at 08:48

## 2020-01-01 RX ADMIN — GLYCOPYRROLATE AND FORMOTEROL FUMARATE 2 SPRAY: 9; 4.8 AEROSOL, METERED RESPIRATORY (INHALATION) at 10:08

## 2020-01-01 RX ADMIN — IPRATROPIUM BROMIDE 0.5 MG: 0.5 SOLUTION RESPIRATORY (INHALATION) at 07:07

## 2020-01-01 RX ADMIN — IPRATROPIUM BROMIDE 0.5 MG: 0.5 SOLUTION RESPIRATORY (INHALATION) at 07:29

## 2020-01-01 RX ADMIN — GLYCOPYRROLATE AND FORMOTEROL FUMARATE 2 SPRAY: 9; 4.8 AEROSOL, METERED RESPIRATORY (INHALATION) at 21:27

## 2020-01-01 RX ADMIN — BUDESONIDE AND FORMOTEROL FUMARATE DIHYDRATE 2 PUFF: 160; 4.5 AEROSOL RESPIRATORY (INHALATION) at 19:22

## 2020-01-01 RX ADMIN — ASPIRIN 81 MG: 81 TABLET, COATED ORAL at 09:04

## 2020-01-01 RX ADMIN — SODIUM CHLORIDE, PRESERVATIVE FREE 10 ML: 5 INJECTION INTRAVENOUS at 09:28

## 2020-01-01 RX ADMIN — GUAIFENESIN 1200 MG: 600 TABLET, EXTENDED RELEASE ORAL at 21:17

## 2020-01-01 RX ADMIN — Medication 1 CAPSULE: at 09:16

## 2020-01-01 RX ADMIN — ASPIRIN 81 MG: 81 TABLET, COATED ORAL at 08:44

## 2020-01-01 RX ADMIN — IPRATROPIUM BROMIDE 0.5 MG: 0.5 SOLUTION RESPIRATORY (INHALATION) at 12:11

## 2020-01-01 RX ADMIN — Medication 1 CAPSULE: at 20:19

## 2020-01-01 RX ADMIN — GLYCOPYRROLATE AND FORMOTEROL FUMARATE 2 SPRAY: 9; 4.8 AEROSOL, METERED RESPIRATORY (INHALATION) at 09:17

## 2020-01-01 RX ADMIN — PANTOPRAZOLE SODIUM 40 MG: 40 TABLET, DELAYED RELEASE ORAL at 06:06

## 2020-01-01 RX ADMIN — PREDNISONE 5 MG: 5 TABLET ORAL at 17:49

## 2020-01-01 RX ADMIN — ASPIRIN 81 MG: 81 TABLET, COATED ORAL at 09:33

## 2020-01-01 RX ADMIN — IPRATROPIUM BROMIDE AND ALBUTEROL SULFATE 3 ML: .5; 3 SOLUTION RESPIRATORY (INHALATION) at 06:38

## 2020-01-01 RX ADMIN — TRAMADOL HYDROCHLORIDE 50 MG: 50 TABLET, FILM COATED ORAL at 09:33

## 2020-01-01 RX ADMIN — SODIUM CHLORIDE, PRESERVATIVE FREE 10 ML: 5 INJECTION INTRAVENOUS at 21:20

## 2020-01-01 RX ADMIN — ASPIRIN 81 MG: 81 TABLET, COATED ORAL at 08:30

## 2020-01-01 RX ADMIN — METHYLPREDNISOLONE SODIUM SUCCINATE 40 MG: 40 INJECTION, POWDER, FOR SOLUTION INTRAMUSCULAR; INTRAVENOUS at 09:28

## 2020-01-01 RX ADMIN — NICOTINE 1 PATCH: 14 PATCH TRANSDERMAL at 08:55

## 2020-01-01 RX ADMIN — ARFORMOTEROL TARTRATE 15 MCG: 15 SOLUTION RESPIRATORY (INHALATION) at 07:08

## 2020-01-01 RX ADMIN — Medication 30 ML: at 11:44

## 2020-01-01 RX ADMIN — BUTALBITAL, ACETAMINOPHEN AND CAFFEINE 1 TABLET: 50; 325; 40 TABLET ORAL at 08:37

## 2020-01-01 RX ADMIN — SODIUM CHLORIDE, PRESERVATIVE FREE 10 ML: 5 INJECTION INTRAVENOUS at 09:55

## 2020-01-01 RX ADMIN — PROPRANOLOL HYDROCHLORIDE 60 MG: 20 TABLET ORAL at 08:36

## 2020-01-01 RX ADMIN — SODIUM CHLORIDE, PRESERVATIVE FREE 10 ML: 5 INJECTION INTRAVENOUS at 08:14

## 2020-01-01 RX ADMIN — SODIUM CHLORIDE, PRESERVATIVE FREE 10 ML: 5 INJECTION INTRAVENOUS at 09:34

## 2020-01-01 RX ADMIN — SERTRALINE HYDROCHLORIDE 100 MG: 50 TABLET ORAL at 09:10

## 2020-01-01 RX ADMIN — CLONAZEPAM 0.5 MG: 0.5 TABLET ORAL at 17:30

## 2020-01-01 RX ADMIN — GLYCOPYRROLATE AND FORMOTEROL FUMARATE 2 SPRAY: 9; 4.8 AEROSOL, METERED RESPIRATORY (INHALATION) at 08:16

## 2020-01-01 RX ADMIN — GLYCOPYRROLATE AND FORMOTEROL FUMARATE 2 SPRAY: 9; 4.8 AEROSOL, METERED RESPIRATORY (INHALATION) at 21:16

## 2020-01-01 RX ADMIN — GLYCOPYRROLATE AND FORMOTEROL FUMARATE 2 SPRAY: 9; 4.8 AEROSOL, METERED RESPIRATORY (INHALATION) at 08:46

## 2020-01-01 RX ADMIN — Medication 5 MG: at 20:56

## 2020-01-01 RX ADMIN — IPRATROPIUM BROMIDE AND ALBUTEROL SULFATE 3 ML: .5; 3 SOLUTION RESPIRATORY (INHALATION) at 01:26

## 2020-01-01 RX ADMIN — SODIUM CHLORIDE, PRESERVATIVE FREE 10 ML: 5 INJECTION INTRAVENOUS at 09:46

## 2020-01-01 RX ADMIN — SODIUM CHLORIDE, PRESERVATIVE FREE 10 ML: 5 INJECTION INTRAVENOUS at 09:37

## 2020-01-01 RX ADMIN — GLYCOPYRROLATE AND FORMOTEROL FUMARATE 2 SPRAY: 9; 4.8 AEROSOL, METERED RESPIRATORY (INHALATION) at 11:14

## 2020-01-01 RX ADMIN — PREDNISONE 40 MG: 10 TABLET ORAL at 08:51

## 2020-01-01 RX ADMIN — TAZOBACTAM SODIUM AND PIPERACILLIN SODIUM 3.38 G: 375; 3 INJECTION, SOLUTION INTRAVENOUS at 15:42

## 2020-01-01 RX ADMIN — SERTRALINE HYDROCHLORIDE 50 MG: 50 TABLET ORAL at 08:43

## 2020-01-01 RX ADMIN — PRIMIDONE 50 MG: 50 TABLET ORAL at 14:41

## 2020-01-01 RX ADMIN — METHYLPREDNISOLONE SODIUM SUCCINATE 40 MG: 40 INJECTION, POWDER, FOR SOLUTION INTRAMUSCULAR; INTRAVENOUS at 05:10

## 2020-01-01 RX ADMIN — NICOTINE 1 PATCH: 14 PATCH TRANSDERMAL at 08:56

## 2020-01-01 RX ADMIN — CLONAZEPAM 0.5 MG: 0.5 TABLET ORAL at 06:17

## 2020-01-01 RX ADMIN — SERTRALINE HYDROCHLORIDE 50 MG: 50 TABLET ORAL at 08:39

## 2020-01-01 RX ADMIN — GUAIFENESIN 1200 MG: 600 TABLET, EXTENDED RELEASE ORAL at 08:16

## 2020-01-01 RX ADMIN — TAZOBACTAM SODIUM AND PIPERACILLIN SODIUM 3.38 G: 375; 3 INJECTION, SOLUTION INTRAVENOUS at 03:52

## 2020-01-01 RX ADMIN — MICONAZOLE NITRATE 1 APPLICATION: 2 OINTMENT TOPICAL at 20:43

## 2020-01-01 RX ADMIN — GUAIFENESIN AND DEXTROMETHORPHAN 5 ML: 100; 10 SYRUP ORAL at 11:12

## 2020-01-01 RX ADMIN — IPRATROPIUM BROMIDE AND ALBUTEROL SULFATE 3 ML: .5; 3 SOLUTION RESPIRATORY (INHALATION) at 06:49

## 2020-01-01 RX ADMIN — IPRATROPIUM BROMIDE AND ALBUTEROL SULFATE 3 ML: .5; 3 SOLUTION RESPIRATORY (INHALATION) at 01:31

## 2020-01-01 RX ADMIN — Medication 1 CAPSULE: at 20:48

## 2020-01-01 RX ADMIN — PREDNISONE 15 MG: 5 TABLET ORAL at 10:16

## 2020-01-01 RX ADMIN — IPRATROPIUM BROMIDE AND ALBUTEROL SULFATE 3 ML: .5; 3 SOLUTION RESPIRATORY (INHALATION) at 20:18

## 2020-01-01 RX ADMIN — IPRATROPIUM BROMIDE AND ALBUTEROL SULFATE 3 ML: .5; 3 SOLUTION RESPIRATORY (INHALATION) at 00:26

## 2020-01-01 RX ADMIN — PREDNISONE 40 MG: 10 TABLET ORAL at 08:43

## 2020-01-01 RX ADMIN — GUAIFENESIN AND DEXTROMETHORPHAN 5 ML: 100; 10 SYRUP ORAL at 17:59

## 2020-01-01 RX ADMIN — ENOXAPARIN SODIUM 40 MG: 40 INJECTION SUBCUTANEOUS at 00:27

## 2020-01-01 RX ADMIN — FLUTICASONE PROPIONATE 2 SPRAY: 50 SPRAY, METERED NASAL at 08:17

## 2020-01-01 RX ADMIN — AZITHROMYCIN 500 MG: 500 INJECTION, POWDER, LYOPHILIZED, FOR SOLUTION INTRAVENOUS at 21:38

## 2020-01-01 RX ADMIN — FLUTICASONE PROPIONATE 2 SPRAY: 50 SPRAY, METERED NASAL at 09:28

## 2020-01-01 RX ADMIN — IPRATROPIUM BROMIDE AND ALBUTEROL SULFATE 3 ML: .5; 3 SOLUTION RESPIRATORY (INHALATION) at 19:21

## 2020-01-01 RX ADMIN — PROPRANOLOL HYDROCHLORIDE 60 MG: 20 TABLET ORAL at 20:51

## 2020-01-01 RX ADMIN — ENOXAPARIN SODIUM 40 MG: 40 INJECTION SUBCUTANEOUS at 06:04

## 2020-01-01 RX ADMIN — MAGNESIUM GLUCONATE 500 MG ORAL TABLET 800 MG: 500 TABLET ORAL at 09:48

## 2020-01-01 RX ADMIN — IPRATROPIUM BROMIDE 0.5 MG: 0.5 SOLUTION RESPIRATORY (INHALATION) at 07:06

## 2020-01-01 RX ADMIN — PROPRANOLOL HYDROCHLORIDE 60 MG: 20 TABLET ORAL at 08:37

## 2020-01-01 RX ADMIN — AMLODIPINE BESYLATE 5 MG: 5 TABLET ORAL at 09:11

## 2020-01-01 RX ADMIN — BUDESONIDE 0.5 MG: 0.5 INHALANT RESPIRATORY (INHALATION) at 19:39

## 2020-01-01 RX ADMIN — GUAIFENESIN AND DEXTROMETHORPHAN 5 ML: 100; 10 SYRUP ORAL at 23:00

## 2020-01-01 RX ADMIN — CETIRIZINE HYDROCHLORIDE 5 MG: 10 TABLET, FILM COATED ORAL at 10:25

## 2020-01-01 RX ADMIN — IPRATROPIUM BROMIDE 0.5 MG: 0.5 SOLUTION RESPIRATORY (INHALATION) at 07:12

## 2020-01-01 RX ADMIN — SODIUM CHLORIDE, PRESERVATIVE FREE 10 ML: 5 INJECTION INTRAVENOUS at 08:30

## 2020-01-01 RX ADMIN — PROPRANOLOL HYDROCHLORIDE 60 MG: 20 TABLET ORAL at 21:17

## 2020-01-01 RX ADMIN — SODIUM CHLORIDE, PRESERVATIVE FREE 10 ML: 5 INJECTION INTRAVENOUS at 09:13

## 2020-01-01 RX ADMIN — FLUTICASONE PROPIONATE 2 SPRAY: 50 SPRAY, METERED NASAL at 08:44

## 2020-01-01 RX ADMIN — PANTOPRAZOLE SODIUM 40 MG: 40 TABLET, DELAYED RELEASE ORAL at 07:00

## 2020-01-01 RX ADMIN — CEFDINIR 300 MG: 300 CAPSULE ORAL at 21:35

## 2020-01-01 RX ADMIN — SODIUM CHLORIDE, PRESERVATIVE FREE 10 ML: 5 INJECTION INTRAVENOUS at 08:48

## 2020-01-01 RX ADMIN — METHYLPREDNISOLONE SODIUM SUCCINATE 40 MG: 40 INJECTION, POWDER, FOR SOLUTION INTRAMUSCULAR; INTRAVENOUS at 17:01

## 2020-01-01 RX ADMIN — CLONAZEPAM 0.5 MG: 0.5 TABLET ORAL at 17:26

## 2020-01-01 RX ADMIN — PRIMIDONE 50 MG: 50 TABLET ORAL at 09:05

## 2020-01-01 RX ADMIN — GUAIFENESIN 1200 MG: 600 TABLET, EXTENDED RELEASE ORAL at 21:51

## 2020-01-01 RX ADMIN — AZITHROMYCIN MONOHYDRATE 250 MG: 250 TABLET ORAL at 08:37

## 2020-01-01 RX ADMIN — ESTROGENS, CONJUGATED 0.62 MG: 0.62 TABLET, FILM COATED ORAL at 08:15

## 2020-01-01 RX ADMIN — SODIUM CHLORIDE, PRESERVATIVE FREE 10 ML: 5 INJECTION INTRAVENOUS at 10:26

## 2020-01-01 RX ADMIN — IPRATROPIUM BROMIDE AND ALBUTEROL SULFATE 3 ML: .5; 3 SOLUTION RESPIRATORY (INHALATION) at 13:48

## 2020-01-01 RX ADMIN — CEFDINIR 300 MG: 300 CAPSULE ORAL at 20:34

## 2020-01-01 RX ADMIN — MAGNESIUM SULFATE IN WATER 2 G: 40 INJECTION, SOLUTION INTRAVENOUS at 17:18

## 2020-01-01 RX ADMIN — FLUTICASONE PROPIONATE 2 SPRAY: 50 SPRAY, METERED NASAL at 09:49

## 2020-01-01 RX ADMIN — ENOXAPARIN SODIUM 40 MG: 40 INJECTION SUBCUTANEOUS at 00:21

## 2020-01-01 RX ADMIN — BUDESONIDE AND FORMOTEROL FUMARATE DIHYDRATE 2 PUFF: 160; 4.5 AEROSOL RESPIRATORY (INHALATION) at 19:36

## 2020-01-01 RX ADMIN — IPRATROPIUM BROMIDE AND ALBUTEROL SULFATE 3 ML: .5; 3 SOLUTION RESPIRATORY (INHALATION) at 20:32

## 2020-01-01 RX ADMIN — AZITHROMYCIN MONOHYDRATE 500 MG: 250 TABLET ORAL at 09:11

## 2020-01-01 RX ADMIN — CLONAZEPAM 0.5 MG: 0.5 TABLET ORAL at 17:47

## 2020-01-01 RX ADMIN — PANTOPRAZOLE SODIUM 40 MG: 40 TABLET, DELAYED RELEASE ORAL at 06:11

## 2020-01-01 RX ADMIN — ACETAMINOPHEN 650 MG: 325 TABLET ORAL at 04:33

## 2020-01-01 RX ADMIN — Medication 1 CAPSULE: at 22:08

## 2020-01-01 RX ADMIN — GUAIFENESIN 1200 MG: 600 TABLET, EXTENDED RELEASE ORAL at 20:47

## 2020-01-01 RX ADMIN — METHYLPREDNISOLONE SODIUM SUCCINATE 40 MG: 40 INJECTION, POWDER, FOR SOLUTION INTRAMUSCULAR; INTRAVENOUS at 10:18

## 2020-01-01 RX ADMIN — ACETAMINOPHEN 650 MG: 325 TABLET ORAL at 23:55

## 2020-01-01 RX ADMIN — PROPRANOLOL HYDROCHLORIDE 10 MG: 20 TABLET ORAL at 20:52

## 2020-01-01 RX ADMIN — PANTOPRAZOLE SODIUM 40 MG: 40 TABLET, DELAYED RELEASE ORAL at 06:37

## 2020-01-01 RX ADMIN — PANTOPRAZOLE SODIUM 40 MG: 40 TABLET, DELAYED RELEASE ORAL at 06:38

## 2020-01-01 RX ADMIN — ASPIRIN 81 MG: 81 TABLET, COATED ORAL at 09:28

## 2020-01-01 RX ADMIN — FLUTICASONE PROPIONATE 2 SPRAY: 50 SPRAY, METERED NASAL at 08:14

## 2020-01-01 RX ADMIN — IPRATROPIUM BROMIDE 0.5 MG: 0.5 SOLUTION RESPIRATORY (INHALATION) at 06:28

## 2020-01-01 RX ADMIN — DIPHENHYDRAMINE HYDROCHLORIDE AND LIDOCAINE HYDROCHLORIDE AND ALUMINUM HYDROXIDE AND MAGNESIUM HYDRO 5 ML: KIT at 03:25

## 2020-01-01 RX ADMIN — DIPHENHYDRAMINE HYDROCHLORIDE AND LIDOCAINE HYDROCHLORIDE AND ALUMINUM HYDROXIDE AND MAGNESIUM HYDRO 5 ML: KIT at 21:15

## 2020-01-01 RX ADMIN — ESTROGENS, CONJUGATED 0.62 MG: 0.62 TABLET, FILM COATED ORAL at 08:55

## 2020-01-01 RX ADMIN — METHYLPREDNISOLONE SODIUM SUCCINATE 80 MG: 125 INJECTION, POWDER, FOR SOLUTION INTRAMUSCULAR; INTRAVENOUS at 18:18

## 2020-01-01 RX ADMIN — PRAVASTATIN SODIUM 20 MG: 20 TABLET ORAL at 17:15

## 2020-01-01 RX ADMIN — GLYCOPYRROLATE AND FORMOTEROL FUMARATE 2 SPRAY: 9; 4.8 AEROSOL, METERED RESPIRATORY (INHALATION) at 21:24

## 2020-01-01 RX ADMIN — SODIUM CHLORIDE, PRESERVATIVE FREE 10 ML: 5 INJECTION INTRAVENOUS at 08:45

## 2020-01-01 RX ADMIN — ACETAMINOPHEN 650 MG: 325 TABLET, FILM COATED ORAL at 09:01

## 2020-01-01 RX ADMIN — NICOTINE 1 PATCH: 14 PATCH TRANSDERMAL at 09:46

## 2020-01-01 RX ADMIN — IPRATROPIUM BROMIDE 0.5 MG: 0.5 SOLUTION RESPIRATORY (INHALATION) at 13:01

## 2020-01-01 RX ADMIN — TAZOBACTAM SODIUM AND PIPERACILLIN SODIUM 3.38 G: 375; 3 INJECTION, SOLUTION INTRAVENOUS at 13:55

## 2020-01-01 RX ADMIN — IPRATROPIUM BROMIDE AND ALBUTEROL SULFATE 3 ML: .5; 3 SOLUTION RESPIRATORY (INHALATION) at 04:10

## 2020-01-01 RX ADMIN — SERTRALINE HYDROCHLORIDE 50 MG: 50 TABLET ORAL at 08:59

## 2020-01-01 RX ADMIN — PROPRANOLOL HYDROCHLORIDE 60 MG: 20 TABLET ORAL at 09:16

## 2020-01-01 RX ADMIN — PROPRANOLOL HYDROCHLORIDE 60 MG: 20 TABLET ORAL at 08:55

## 2020-01-01 RX ADMIN — PRAVASTATIN SODIUM 20 MG: 20 TABLET ORAL at 17:01

## 2020-01-01 RX ADMIN — ASPIRIN 81 MG: 81 TABLET, COATED ORAL at 09:10

## 2020-01-01 RX ADMIN — Medication 1 CAPSULE: at 08:45

## 2020-01-01 RX ADMIN — ESTROGENS, CONJUGATED 0.62 MG: 0.62 TABLET, FILM COATED ORAL at 09:16

## 2020-01-01 RX ADMIN — SODIUM CHLORIDE, PRESERVATIVE FREE 10 ML: 5 INJECTION INTRAVENOUS at 08:37

## 2020-01-01 RX ADMIN — Medication 1 CAPSULE: at 21:28

## 2020-01-01 RX ADMIN — PRAVASTATIN SODIUM 20 MG: 20 TABLET ORAL at 17:40

## 2020-01-01 RX ADMIN — ASPIRIN 81 MG: 81 TABLET, COATED ORAL at 08:36

## 2020-01-01 RX ADMIN — ENOXAPARIN SODIUM 40 MG: 40 INJECTION SUBCUTANEOUS at 01:16

## 2020-01-01 RX ADMIN — PROPRANOLOL HYDROCHLORIDE 60 MG: 20 TABLET ORAL at 21:27

## 2020-01-01 RX ADMIN — IPRATROPIUM BROMIDE 0.5 MG: 0.5 SOLUTION RESPIRATORY (INHALATION) at 20:17

## 2020-01-01 RX ADMIN — Medication 1 CAPSULE: at 03:50

## 2020-01-01 RX ADMIN — PANTOPRAZOLE SODIUM 40 MG: 40 TABLET, DELAYED RELEASE ORAL at 06:20

## 2020-01-01 RX ADMIN — FLUTICASONE PROPIONATE 2 SPRAY: 50 SPRAY, METERED NASAL at 09:25

## 2020-01-01 RX ADMIN — BUDESONIDE 0.5 MG: 0.5 INHALANT RESPIRATORY (INHALATION) at 07:03

## 2020-01-01 RX ADMIN — BUDESONIDE 0.5 MG: 0.5 INHALANT RESPIRATORY (INHALATION) at 18:47

## 2020-01-01 RX ADMIN — PROPRANOLOL HYDROCHLORIDE 60 MG: 20 TABLET ORAL at 20:47

## 2020-01-01 RX ADMIN — GLYCOPYRROLATE AND FORMOTEROL FUMARATE 2 SPRAY: 9; 4.8 AEROSOL, METERED RESPIRATORY (INHALATION) at 09:46

## 2020-01-01 RX ADMIN — PANTOPRAZOLE SODIUM 40 MG: 40 TABLET, DELAYED RELEASE ORAL at 06:15

## 2020-01-01 RX ADMIN — Medication 30 ML: at 21:38

## 2020-01-01 RX ADMIN — IPRATROPIUM BROMIDE AND ALBUTEROL SULFATE 3 ML: .5; 3 SOLUTION RESPIRATORY (INHALATION) at 06:45

## 2020-01-01 RX ADMIN — BUDESONIDE 1 MG: 0.5 INHALANT RESPIRATORY (INHALATION) at 06:52

## 2020-01-01 RX ADMIN — SERTRALINE HYDROCHLORIDE 100 MG: 50 TABLET ORAL at 08:15

## 2020-01-01 RX ADMIN — IPRATROPIUM BROMIDE AND ALBUTEROL SULFATE 3 ML: .5; 3 SOLUTION RESPIRATORY (INHALATION) at 15:38

## 2020-01-01 RX ADMIN — TRIAMCINOLONE ACETONIDE: 1 CREAM TOPICAL at 08:44

## 2020-01-01 RX ADMIN — FUROSEMIDE 20 MG: 20 TABLET ORAL at 08:38

## 2020-01-01 RX ADMIN — PANTOPRAZOLE SODIUM 40 MG: 40 TABLET, DELAYED RELEASE ORAL at 06:25

## 2020-01-01 RX ADMIN — PRAVASTATIN SODIUM 20 MG: 20 TABLET ORAL at 17:36

## 2020-01-01 RX ADMIN — IPRATROPIUM BROMIDE AND ALBUTEROL SULFATE 3 ML: .5; 3 SOLUTION RESPIRATORY (INHALATION) at 20:03

## 2020-01-01 RX ADMIN — ENOXAPARIN SODIUM 40 MG: 40 INJECTION SUBCUTANEOUS at 05:52

## 2020-01-01 RX ADMIN — SODIUM CHLORIDE, PRESERVATIVE FREE 10 ML: 5 INJECTION INTRAVENOUS at 09:05

## 2020-01-01 RX ADMIN — Medication 30 ML: at 11:09

## 2020-01-01 RX ADMIN — ENOXAPARIN SODIUM 40 MG: 40 INJECTION SUBCUTANEOUS at 22:17

## 2020-01-01 RX ADMIN — TRAMADOL HYDROCHLORIDE 50 MG: 50 TABLET, FILM COATED ORAL at 09:16

## 2020-01-01 RX ADMIN — BENZONATATE 100 MG: 100 CAPSULE ORAL at 18:10

## 2020-01-01 RX ADMIN — TRIAMCINOLONE ACETONIDE: 1 CREAM TOPICAL at 09:17

## 2020-01-01 RX ADMIN — NICOTINE 1 PATCH: 14 PATCH TRANSDERMAL at 09:11

## 2020-01-01 RX ADMIN — IPRATROPIUM BROMIDE AND ALBUTEROL SULFATE 3 ML: .5; 3 SOLUTION RESPIRATORY (INHALATION) at 07:23

## 2020-01-01 RX ADMIN — IPRATROPIUM BROMIDE AND ALBUTEROL SULFATE 3 ML: .5; 3 SOLUTION RESPIRATORY (INHALATION) at 12:44

## 2020-01-01 RX ADMIN — NICOTINE 1 PATCH: 14 PATCH TRANSDERMAL at 09:34

## 2020-01-01 RX ADMIN — AZITHROMYCIN MONOHYDRATE 250 MG: 250 TABLET ORAL at 08:46

## 2020-01-01 RX ADMIN — FLUTICASONE PROPIONATE 2 SPRAY: 50 SPRAY, METERED NASAL at 09:15

## 2020-01-01 RX ADMIN — BUDESONIDE 1 MG: 0.5 INHALANT RESPIRATORY (INHALATION) at 20:36

## 2020-01-01 RX ADMIN — CEFTRIAXONE 1 G: 1 INJECTION, SOLUTION INTRAVENOUS at 22:48

## 2020-01-01 RX ADMIN — DIPHENHYDRAMINE HYDROCHLORIDE AND LIDOCAINE HYDROCHLORIDE AND ALUMINUM HYDROXIDE AND MAGNESIUM HYDRO 5 ML: KIT at 16:00

## 2020-01-01 RX ADMIN — TAZOBACTAM SODIUM AND PIPERACILLIN SODIUM 3.38 G: 375; 3 INJECTION, SOLUTION INTRAVENOUS at 23:53

## 2020-01-01 RX ADMIN — PROPRANOLOL HYDROCHLORIDE 60 MG: 20 TABLET ORAL at 09:32

## 2020-01-01 RX ADMIN — FUROSEMIDE 20 MG: 20 TABLET ORAL at 09:32

## 2020-01-01 RX ADMIN — ENOXAPARIN SODIUM 40 MG: 40 INJECTION SUBCUTANEOUS at 01:08

## 2020-01-01 RX ADMIN — PRAVASTATIN SODIUM 20 MG: 20 TABLET ORAL at 18:11

## 2020-01-01 RX ADMIN — GUAIFENESIN 1200 MG: 600 TABLET, EXTENDED RELEASE ORAL at 20:55

## 2020-01-01 RX ADMIN — IPRATROPIUM BROMIDE 0.5 MG: 0.5 SOLUTION RESPIRATORY (INHALATION) at 13:23

## 2020-01-01 RX ADMIN — GUAIFENESIN 1200 MG: 600 TABLET, EXTENDED RELEASE ORAL at 20:12

## 2020-01-01 RX ADMIN — SERTRALINE HYDROCHLORIDE 50 MG: 50 TABLET ORAL at 09:34

## 2020-01-01 RX ADMIN — CLONAZEPAM 0.5 MG: 0.5 TABLET ORAL at 01:56

## 2020-01-01 RX ADMIN — FLUTICASONE PROPIONATE 2 SPRAY: 50 SPRAY, METERED NASAL at 08:16

## 2020-01-01 RX ADMIN — MUPIROCIN: 20 OINTMENT TOPICAL at 18:10

## 2020-01-01 RX ADMIN — ENOXAPARIN SODIUM 40 MG: 40 INJECTION SUBCUTANEOUS at 04:33

## 2020-01-01 RX ADMIN — SERTRALINE HYDROCHLORIDE 50 MG: 50 TABLET ORAL at 09:28

## 2020-01-01 RX ADMIN — ENOXAPARIN SODIUM 40 MG: 40 INJECTION SUBCUTANEOUS at 06:15

## 2020-01-01 RX ADMIN — IPRATROPIUM BROMIDE AND ALBUTEROL SULFATE 3 ML: .5; 3 SOLUTION RESPIRATORY (INHALATION) at 19:14

## 2020-01-01 RX ADMIN — GLYCOPYRROLATE AND FORMOTEROL FUMARATE 2 SPRAY: 9; 4.8 AEROSOL, METERED RESPIRATORY (INHALATION) at 20:34

## 2020-01-01 RX ADMIN — GUAIFENESIN 1200 MG: 600 TABLET, EXTENDED RELEASE ORAL at 08:59

## 2020-01-01 RX ADMIN — TRIAMCINOLONE ACETONIDE: 1 CREAM TOPICAL at 11:42

## 2020-01-01 RX ADMIN — PRAVASTATIN SODIUM 20 MG: 20 TABLET ORAL at 17:53

## 2020-01-01 RX ADMIN — PROPRANOLOL HYDROCHLORIDE 60 MG: 20 TABLET ORAL at 06:09

## 2020-01-01 RX ADMIN — ARFORMOTEROL TARTRATE 15 MCG: 15 SOLUTION RESPIRATORY (INHALATION) at 20:16

## 2020-01-01 RX ADMIN — CLONAZEPAM 0.5 MG: 0.5 TABLET ORAL at 20:55

## 2020-01-01 RX ADMIN — FUROSEMIDE 40 MG: 10 INJECTION, SOLUTION INTRAMUSCULAR; INTRAVENOUS at 00:52

## 2020-01-01 RX ADMIN — FUROSEMIDE 20 MG: 20 TABLET ORAL at 09:07

## 2020-01-01 RX ADMIN — TRAMADOL HYDROCHLORIDE 50 MG: 50 TABLET, FILM COATED ORAL at 09:45

## 2020-01-01 RX ADMIN — GUAIFENESIN AND DEXTROMETHORPHAN 5 ML: 100; 10 SYRUP ORAL at 00:22

## 2020-01-01 RX ADMIN — PROPRANOLOL HYDROCHLORIDE 60 MG: 20 TABLET ORAL at 20:15

## 2020-01-01 RX ADMIN — IPRATROPIUM BROMIDE AND ALBUTEROL SULFATE 3 ML: .5; 3 SOLUTION RESPIRATORY (INHALATION) at 05:12

## 2020-01-01 RX ADMIN — GUAIFENESIN 1200 MG: 600 TABLET, EXTENDED RELEASE ORAL at 08:27

## 2020-01-01 RX ADMIN — ENOXAPARIN SODIUM 40 MG: 40 INJECTION SUBCUTANEOUS at 23:37

## 2020-01-01 RX ADMIN — DOXYCYCLINE 100 MG: 100 CAPSULE ORAL at 21:13

## 2020-01-01 RX ADMIN — PROPRANOLOL HYDROCHLORIDE 60 MG: 20 TABLET ORAL at 08:13

## 2020-01-01 RX ADMIN — GLYCOPYRROLATE AND FORMOTEROL FUMARATE 2 SPRAY: 9; 4.8 AEROSOL, METERED RESPIRATORY (INHALATION) at 20:51

## 2020-01-01 RX ADMIN — CEFTRIAXONE 1 G: 1 INJECTION, SOLUTION INTRAVENOUS at 22:30

## 2020-01-01 RX ADMIN — DIPHENHYDRAMINE HYDROCHLORIDE AND LIDOCAINE HYDROCHLORIDE AND ALUMINUM HYDROXIDE AND MAGNESIUM HYDRO 5 ML: KIT at 16:14

## 2020-01-01 RX ADMIN — IPRATROPIUM BROMIDE 0.5 MG: 0.5 SOLUTION RESPIRATORY (INHALATION) at 14:32

## 2020-01-01 RX ADMIN — BUDESONIDE AND FORMOTEROL FUMARATE DIHYDRATE 2 PUFF: 160; 4.5 AEROSOL RESPIRATORY (INHALATION) at 06:37

## 2020-01-01 RX ADMIN — SODIUM CHLORIDE, PRESERVATIVE FREE 10 ML: 5 INJECTION INTRAVENOUS at 08:17

## 2020-01-01 RX ADMIN — TAZOBACTAM SODIUM AND PIPERACILLIN SODIUM 3.38 G: 375; 3 INJECTION, SOLUTION INTRAVENOUS at 14:36

## 2020-01-01 RX ADMIN — GUAIFENESIN 1200 MG: 600 TABLET, EXTENDED RELEASE ORAL at 09:38

## 2020-01-01 RX ADMIN — TAZOBACTAM SODIUM AND PIPERACILLIN SODIUM 3.38 G: 375; 3 INJECTION, SOLUTION INTRAVENOUS at 06:27

## 2020-01-01 RX ADMIN — BUDESONIDE 0.5 MG: 0.5 INHALANT RESPIRATORY (INHALATION) at 20:16

## 2020-01-01 RX ADMIN — TAZOBACTAM SODIUM AND PIPERACILLIN SODIUM 3.38 G: 375; 3 INJECTION, SOLUTION INTRAVENOUS at 15:39

## 2020-01-01 RX ADMIN — SERTRALINE HYDROCHLORIDE 100 MG: 50 TABLET ORAL at 08:30

## 2020-01-01 RX ADMIN — PROPRANOLOL HYDROCHLORIDE 60 MG: 20 TABLET ORAL at 21:15

## 2020-01-01 RX ADMIN — DOXYCYCLINE 100 MG: 100 CAPSULE ORAL at 21:28

## 2020-01-01 RX ADMIN — AZITHROMYCIN MONOHYDRATE 250 MG: 250 TABLET ORAL at 08:16

## 2020-01-01 RX ADMIN — PREDNISONE 40 MG: 20 TABLET ORAL at 08:59

## 2020-01-01 RX ADMIN — PIPERACILLIN SODIUM AND TAZOBACTAM SODIUM 3.38 G: 3; .375 INJECTION, POWDER, FOR SOLUTION INTRAVENOUS at 22:25

## 2020-01-01 RX ADMIN — BUDESONIDE AND FORMOTEROL FUMARATE DIHYDRATE 2 PUFF: 160; 4.5 AEROSOL RESPIRATORY (INHALATION) at 20:02

## 2020-01-01 RX ADMIN — FLUTICASONE PROPIONATE 2 SPRAY: 50 SPRAY, METERED NASAL at 09:34

## 2020-01-01 RX ADMIN — TRIAMCINOLONE ACETONIDE 1 APPLICATION: 1 CREAM TOPICAL at 21:25

## 2020-01-01 RX ADMIN — IPRATROPIUM BROMIDE AND ALBUTEROL SULFATE 3 ML: .5; 3 SOLUTION RESPIRATORY (INHALATION) at 07:31

## 2020-01-01 RX ADMIN — DOXYCYCLINE 100 MG: 100 CAPSULE ORAL at 09:37

## 2020-01-01 RX ADMIN — CLONAZEPAM 0.5 MG: 0.5 TABLET ORAL at 23:41

## 2020-01-01 RX ADMIN — CEFDINIR 300 MG: 300 CAPSULE ORAL at 21:47

## 2020-01-01 RX ADMIN — Medication 1 CAPSULE: at 20:07

## 2020-01-01 RX ADMIN — TAZOBACTAM SODIUM AND PIPERACILLIN SODIUM 3.38 G: 375; 3 INJECTION, SOLUTION INTRAVENOUS at 06:04

## 2020-01-01 RX ADMIN — IPRATROPIUM BROMIDE AND ALBUTEROL SULFATE 3 ML: .5; 3 SOLUTION RESPIRATORY (INHALATION) at 19:44

## 2020-01-01 RX ADMIN — GUAIFENESIN AND DEXTROMETHORPHAN 5 ML: 100; 10 SYRUP ORAL at 17:45

## 2020-01-01 RX ADMIN — TAZOBACTAM SODIUM AND PIPERACILLIN SODIUM 3.38 G: 375; 3 INJECTION, SOLUTION INTRAVENOUS at 06:02

## 2020-01-01 RX ADMIN — CETIRIZINE HYDROCHLORIDE 5 MG: 10 TABLET, FILM COATED ORAL at 09:23

## 2020-01-01 RX ADMIN — ESTROGENS, CONJUGATED 0.62 MG: 0.62 TABLET, FILM COATED ORAL at 08:37

## 2020-01-01 RX ADMIN — IPRATROPIUM BROMIDE AND ALBUTEROL SULFATE 3 ML: .5; 3 SOLUTION RESPIRATORY (INHALATION) at 18:27

## 2020-01-01 RX ADMIN — TAZOBACTAM SODIUM AND PIPERACILLIN SODIUM 3.38 G: 375; 3 INJECTION, SOLUTION INTRAVENOUS at 15:50

## 2020-01-01 RX ADMIN — GUAIFENESIN AND DEXTROMETHORPHAN 5 ML: 100; 10 SYRUP ORAL at 06:26

## 2020-01-01 RX ADMIN — GUAIFENESIN 1200 MG: 600 TABLET, EXTENDED RELEASE ORAL at 21:35

## 2020-01-01 RX ADMIN — CLONAZEPAM 0.5 MG: 0.5 TABLET ORAL at 17:40

## 2020-01-01 RX ADMIN — GLYCOPYRROLATE AND FORMOTEROL FUMARATE 2 SPRAY: 9; 4.8 AEROSOL, METERED RESPIRATORY (INHALATION) at 20:35

## 2020-01-01 RX ADMIN — DIPHENHYDRAMINE HYDROCHLORIDE AND LIDOCAINE HYDROCHLORIDE AND ALUMINUM HYDROXIDE AND MAGNESIUM HYDRO 5 ML: KIT at 15:30

## 2020-01-01 RX ADMIN — DIPHENHYDRAMINE HYDROCHLORIDE AND LIDOCAINE HYDROCHLORIDE AND ALUMINUM HYDROXIDE AND MAGNESIUM HYDRO 5 ML: KIT at 08:29

## 2020-01-01 RX ADMIN — GUAIFENESIN AND DEXTROMETHORPHAN 5 ML: 100; 10 SYRUP ORAL at 17:07

## 2020-01-01 RX ADMIN — ARFORMOTEROL TARTRATE 15 MCG: 15 SOLUTION RESPIRATORY (INHALATION) at 19:33

## 2020-01-01 RX ADMIN — CLONAZEPAM 0.5 MG: 0.5 TABLET ORAL at 21:51

## 2020-01-01 RX ADMIN — ASPIRIN 81 MG: 81 TABLET, COATED ORAL at 08:27

## 2020-01-01 RX ADMIN — ENOXAPARIN SODIUM 40 MG: 40 INJECTION SUBCUTANEOUS at 23:24

## 2020-01-01 RX ADMIN — FUROSEMIDE 20 MG: 20 TABLET ORAL at 08:36

## 2020-01-01 RX ADMIN — GUAIFENESIN 1200 MG: 600 TABLET, EXTENDED RELEASE ORAL at 09:45

## 2020-01-01 RX ADMIN — IPRATROPIUM BROMIDE 0.5 MG: 0.5 SOLUTION RESPIRATORY (INHALATION) at 06:58

## 2020-01-01 RX ADMIN — Medication 5 MG: at 21:27

## 2020-01-01 RX ADMIN — FUROSEMIDE 20 MG: 20 TABLET ORAL at 09:37

## 2020-01-01 RX ADMIN — CLONAZEPAM 0.5 MG: 0.5 TABLET ORAL at 20:47

## 2020-01-01 RX ADMIN — GUAIFENESIN 1200 MG: 600 TABLET, EXTENDED RELEASE ORAL at 09:05

## 2020-01-01 RX ADMIN — ACETAMINOPHEN 650 MG: 325 TABLET, FILM COATED ORAL at 06:19

## 2020-01-01 RX ADMIN — GUAIFENESIN 1200 MG: 600 TABLET, EXTENDED RELEASE ORAL at 21:13

## 2020-01-01 RX ADMIN — ENOXAPARIN SODIUM 40 MG: 40 INJECTION SUBCUTANEOUS at 23:30

## 2020-01-01 RX ADMIN — BUDESONIDE 0.5 MG: 0.5 INHALANT RESPIRATORY (INHALATION) at 18:42

## 2020-01-01 RX ADMIN — SERTRALINE HYDROCHLORIDE 50 MG: 50 TABLET ORAL at 09:17

## 2020-01-01 RX ADMIN — GUAIFENESIN AND DEXTROMETHORPHAN 5 ML: 100; 10 SYRUP ORAL at 12:34

## 2020-01-01 RX ADMIN — HYDROCORTISONE: 10 CREAM TOPICAL at 21:53

## 2020-01-01 RX ADMIN — Medication 30 ML: at 20:15

## 2020-01-01 RX ADMIN — BUDESONIDE 1 MG: 0.5 INHALANT RESPIRATORY (INHALATION) at 19:12

## 2020-01-01 RX ADMIN — ASPIRIN 81 MG: 81 TABLET, COATED ORAL at 09:23

## 2020-01-01 RX ADMIN — MUPIROCIN: 20 OINTMENT TOPICAL at 06:02

## 2020-01-01 RX ADMIN — PANTOPRAZOLE SODIUM 40 MG: 40 TABLET, DELAYED RELEASE ORAL at 06:16

## 2020-01-01 RX ADMIN — PREDNISONE 10 MG: 10 TABLET ORAL at 09:28

## 2020-01-01 RX ADMIN — ASPIRIN 81 MG: 81 TABLET, COATED ORAL at 08:43

## 2020-01-01 RX ADMIN — FUROSEMIDE 20 MG: 20 TABLET ORAL at 15:10

## 2020-01-01 RX ADMIN — HYDROCORTISONE: 10 CREAM TOPICAL at 10:00

## 2020-01-01 RX ADMIN — ENOXAPARIN SODIUM 40 MG: 40 INJECTION SUBCUTANEOUS at 22:53

## 2020-01-01 RX ADMIN — GUAIFENESIN AND DEXTROMETHORPHAN 5 ML: 100; 10 SYRUP ORAL at 05:58

## 2020-01-01 RX ADMIN — HYDROCORTISONE 1 APPLICATION: 10 CREAM TOPICAL at 09:15

## 2020-01-01 RX ADMIN — CLONAZEPAM 0.5 MG: 0.5 TABLET ORAL at 21:17

## 2020-01-01 RX ADMIN — TRIAMCINOLONE ACETONIDE 1 APPLICATION: 1 CREAM TOPICAL at 09:01

## 2020-01-01 RX ADMIN — MICONAZOLE NITRATE 1 APPLICATION: 2 OINTMENT TOPICAL at 21:39

## 2020-01-01 RX ADMIN — ASPIRIN 81 MG: 81 TABLET, COATED ORAL at 09:45

## 2020-01-01 RX ADMIN — DIPHENHYDRAMINE HYDROCHLORIDE AND LIDOCAINE HYDROCHLORIDE AND ALUMINUM HYDROXIDE AND MAGNESIUM HYDRO 5 ML: KIT at 15:17

## 2020-01-01 RX ADMIN — BUDESONIDE AND FORMOTEROL FUMARATE DIHYDRATE 2 PUFF: 160; 4.5 AEROSOL RESPIRATORY (INHALATION) at 07:00

## 2020-01-01 RX ADMIN — ONDANSETRON 4 MG: 2 INJECTION INTRAMUSCULAR; INTRAVENOUS at 08:36

## 2020-01-01 RX ADMIN — BENZONATATE 100 MG: 100 CAPSULE ORAL at 23:42

## 2020-01-01 RX ADMIN — TRAMADOL HYDROCHLORIDE 50 MG: 50 TABLET, FILM COATED ORAL at 20:35

## 2020-01-01 RX ADMIN — GLYCOPYRROLATE AND FORMOTEROL FUMARATE 2 SPRAY: 9; 4.8 AEROSOL, METERED RESPIRATORY (INHALATION) at 08:17

## 2020-01-01 RX ADMIN — ACETAMINOPHEN 650 MG: 325 TABLET, FILM COATED ORAL at 09:34

## 2020-01-01 RX ADMIN — FLUTICASONE PROPIONATE 2 SPRAY: 50 SPRAY, METERED NASAL at 08:39

## 2020-01-01 RX ADMIN — CLONAZEPAM 0.5 MG: 0.5 TABLET ORAL at 01:16

## 2020-01-01 RX ADMIN — BENZONATATE 100 MG: 100 CAPSULE ORAL at 14:07

## 2020-01-01 RX ADMIN — IPRATROPIUM BROMIDE 0.5 MG: 0.5 SOLUTION RESPIRATORY (INHALATION) at 19:25

## 2020-01-01 RX ADMIN — Medication 1 CAPSULE: at 22:16

## 2020-01-01 RX ADMIN — METHYLPREDNISOLONE SODIUM SUCCINATE 40 MG: 40 INJECTION, POWDER, FOR SOLUTION INTRAMUSCULAR; INTRAVENOUS at 18:04

## 2020-01-01 RX ADMIN — CEFDINIR 300 MG: 300 CAPSULE ORAL at 21:52

## 2020-01-01 RX ADMIN — DIPHENHYDRAMINE HYDROCHLORIDE AND LIDOCAINE HYDROCHLORIDE AND ALUMINUM HYDROXIDE AND MAGNESIUM HYDRO 5 ML: KIT at 08:31

## 2020-01-01 RX ADMIN — TRIAMCINOLONE ACETONIDE 1 APPLICATION: 1 CREAM TOPICAL at 08:30

## 2020-01-01 RX ADMIN — GUAIFENESIN 1200 MG: 600 TABLET, EXTENDED RELEASE ORAL at 20:17

## 2020-01-01 RX ADMIN — DIPHENHYDRAMINE HYDROCHLORIDE AND LIDOCAINE HYDROCHLORIDE AND ALUMINUM HYDROXIDE AND MAGNESIUM HYDRO 5 ML: KIT at 20:48

## 2020-01-01 RX ADMIN — Medication 1 CAPSULE: at 10:24

## 2020-01-01 RX ADMIN — PANTOPRAZOLE SODIUM 40 MG: 40 TABLET, DELAYED RELEASE ORAL at 06:17

## 2020-01-01 RX ADMIN — GLYCOPYRROLATE AND FORMOTEROL FUMARATE 2 SPRAY: 9; 4.8 AEROSOL, METERED RESPIRATORY (INHALATION) at 10:20

## 2020-01-01 RX ADMIN — PRAVASTATIN SODIUM 20 MG: 20 TABLET ORAL at 16:21

## 2020-01-01 RX ADMIN — GUAIFENESIN AND DEXTROMETHORPHAN 5 ML: 100; 10 SYRUP ORAL at 13:56

## 2020-01-01 RX ADMIN — PANTOPRAZOLE SODIUM 40 MG: 40 TABLET, DELAYED RELEASE ORAL at 06:13

## 2020-01-01 RX ADMIN — FUROSEMIDE 20 MG: 20 TABLET ORAL at 08:13

## 2020-01-01 RX ADMIN — AZITHROMYCIN MONOHYDRATE 500 MG: 250 TABLET ORAL at 15:04

## 2020-01-01 RX ADMIN — IPRATROPIUM BROMIDE 0.5 MG: 0.5 SOLUTION RESPIRATORY (INHALATION) at 18:41

## 2020-01-01 RX ADMIN — IPRATROPIUM BROMIDE AND ALBUTEROL SULFATE 3 ML: .5; 3 SOLUTION RESPIRATORY (INHALATION) at 07:20

## 2020-01-01 RX ADMIN — PRAVASTATIN SODIUM 20 MG: 20 TABLET ORAL at 17:59

## 2020-01-01 RX ADMIN — PRAVASTATIN SODIUM 20 MG: 20 TABLET ORAL at 16:52

## 2020-01-01 RX ADMIN — GLYCOPYRROLATE AND FORMOTEROL FUMARATE 2 SPRAY: 9; 4.8 AEROSOL, METERED RESPIRATORY (INHALATION) at 09:02

## 2020-01-01 RX ADMIN — ESTROGENS, CONJUGATED 0.62 MG: 0.62 TABLET, FILM COATED ORAL at 08:16

## 2020-01-01 RX ADMIN — DIPHENHYDRAMINE HYDROCHLORIDE AND LIDOCAINE HYDROCHLORIDE AND ALUMINUM HYDROXIDE AND MAGNESIUM HYDRO 5 ML: KIT at 13:00

## 2020-01-01 RX ADMIN — HYDROCORTISONE: 10 CREAM TOPICAL at 20:35

## 2020-01-01 RX ADMIN — IPRATROPIUM BROMIDE 0.5 MG: 0.5 SOLUTION RESPIRATORY (INHALATION) at 06:33

## 2020-01-01 RX ADMIN — BUDESONIDE 0.5 MG: 0.5 INHALANT RESPIRATORY (INHALATION) at 06:33

## 2020-01-01 RX ADMIN — CLONAZEPAM 0.5 MG: 0.5 TABLET ORAL at 18:01

## 2020-01-01 RX ADMIN — METHYLPREDNISOLONE SODIUM SUCCINATE 40 MG: 40 INJECTION, POWDER, FOR SOLUTION INTRAMUSCULAR; INTRAVENOUS at 17:59

## 2020-01-01 RX ADMIN — SODIUM CHLORIDE 1000 ML: 9 INJECTION, SOLUTION INTRAVENOUS at 18:38

## 2020-01-01 RX ADMIN — METHYLPREDNISOLONE SODIUM SUCCINATE 40 MG: 40 INJECTION, POWDER, FOR SOLUTION INTRAMUSCULAR; INTRAVENOUS at 06:05

## 2020-01-01 RX ADMIN — Medication 1 CAPSULE: at 21:31

## 2020-01-01 RX ADMIN — PREDNISONE 20 MG: 20 TABLET ORAL at 09:00

## 2020-01-01 RX ADMIN — GLYCOPYRROLATE AND FORMOTEROL FUMARATE 2 SPRAY: 9; 4.8 AEROSOL, METERED RESPIRATORY (INHALATION) at 21:35

## 2020-01-01 RX ADMIN — IPRATROPIUM BROMIDE AND ALBUTEROL SULFATE 3 ML: .5; 3 SOLUTION RESPIRATORY (INHALATION) at 21:03

## 2020-01-01 RX ADMIN — METHYLPREDNISOLONE SODIUM SUCCINATE 125 MG: 125 INJECTION, POWDER, FOR SOLUTION INTRAMUSCULAR; INTRAVENOUS at 17:16

## 2020-01-01 RX ADMIN — IPRATROPIUM BROMIDE AND ALBUTEROL SULFATE 3 ML: .5; 3 SOLUTION RESPIRATORY (INHALATION) at 07:11

## 2020-01-01 RX ADMIN — ARFORMOTEROL TARTRATE 15 MCG: 15 SOLUTION RESPIRATORY (INHALATION) at 07:03

## 2020-01-01 RX ADMIN — FUROSEMIDE 20 MG: 20 TABLET ORAL at 09:28

## 2020-01-01 RX ADMIN — BUDESONIDE AND FORMOTEROL FUMARATE DIHYDRATE 2 PUFF: 160; 4.5 AEROSOL RESPIRATORY (INHALATION) at 21:03

## 2020-01-01 RX ADMIN — FLUTICASONE PROPIONATE 2 SPRAY: 50 SPRAY, METERED NASAL at 10:00

## 2020-01-01 RX ADMIN — BUDESONIDE 0.5 MG: 0.5 INHALANT RESPIRATORY (INHALATION) at 19:09

## 2020-01-01 RX ADMIN — Medication 1 CAPSULE: at 20:52

## 2020-01-01 RX ADMIN — ESTROGENS, CONJUGATED 0.62 MG: 0.62 TABLET, FILM COATED ORAL at 08:30

## 2020-01-01 RX ADMIN — IPRATROPIUM BROMIDE 0.5 MG: 0.5 SOLUTION RESPIRATORY (INHALATION) at 11:52

## 2020-01-01 RX ADMIN — TAZOBACTAM SODIUM AND PIPERACILLIN SODIUM 3.38 G: 375; 3 INJECTION, SOLUTION INTRAVENOUS at 06:07

## 2020-01-01 RX ADMIN — PRIMIDONE 50 MG: 50 TABLET ORAL at 08:38

## 2020-01-01 RX ADMIN — SERTRALINE HYDROCHLORIDE 100 MG: 50 TABLET ORAL at 09:12

## 2020-01-01 RX ADMIN — ASPIRIN 81 MG: 81 TABLET, COATED ORAL at 10:25

## 2020-01-01 RX ADMIN — ESTROGENS, CONJUGATED 0.62 MG: 0.62 TABLET, FILM COATED ORAL at 08:58

## 2020-01-01 RX ADMIN — PRAVASTATIN SODIUM 20 MG: 20 TABLET ORAL at 17:00

## 2020-01-01 RX ADMIN — DIPHENHYDRAMINE HYDROCHLORIDE AND LIDOCAINE HYDROCHLORIDE AND ALUMINUM HYDROXIDE AND MAGNESIUM HYDRO 5 ML: KIT at 18:10

## 2020-01-01 RX ADMIN — IPRATROPIUM BROMIDE AND ALBUTEROL SULFATE 3 ML: .5; 3 SOLUTION RESPIRATORY (INHALATION) at 06:46

## 2020-01-01 RX ADMIN — CLONAZEPAM 0.5 MG: 0.5 TABLET ORAL at 21:15

## 2020-01-01 RX ADMIN — PRAVASTATIN SODIUM 20 MG: 20 TABLET ORAL at 17:13

## 2020-01-01 RX ADMIN — FLUTICASONE PROPIONATE 2 SPRAY: 50 SPRAY, METERED NASAL at 09:55

## 2020-01-01 RX ADMIN — IPRATROPIUM BROMIDE 0.5 MG: 0.5 SOLUTION RESPIRATORY (INHALATION) at 19:09

## 2020-01-01 RX ADMIN — IPRATROPIUM BROMIDE 0.5 MG: 0.5 SOLUTION RESPIRATORY (INHALATION) at 17:47

## 2020-01-01 RX ADMIN — PREDNISONE 10 MG: 10 TABLET ORAL at 08:28

## 2020-01-01 RX ADMIN — CEFDINIR 300 MG: 300 CAPSULE ORAL at 09:34

## 2020-01-01 RX ADMIN — GUAIFENESIN 1200 MG: 600 TABLET, EXTENDED RELEASE ORAL at 21:15

## 2020-01-01 RX ADMIN — CLONAZEPAM 0.5 MG: 0.5 TABLET ORAL at 17:29

## 2020-01-01 RX ADMIN — BUDESONIDE 1 MG: 0.5 INHALANT RESPIRATORY (INHALATION) at 06:38

## 2020-01-01 RX ADMIN — SODIUM CHLORIDE, PRESERVATIVE FREE 10 ML: 5 INJECTION INTRAVENOUS at 21:12

## 2020-01-01 RX ADMIN — ENOXAPARIN SODIUM 40 MG: 40 INJECTION SUBCUTANEOUS at 06:02

## 2020-01-01 RX ADMIN — METHYLPREDNISOLONE SODIUM SUCCINATE 40 MG: 40 INJECTION, POWDER, FOR SOLUTION INTRAMUSCULAR; INTRAVENOUS at 00:51

## 2020-01-01 RX ADMIN — IPRATROPIUM BROMIDE 0.5 MG: 0.5 SOLUTION RESPIRATORY (INHALATION) at 19:19

## 2020-01-01 RX ADMIN — Medication 5 MG: at 21:35

## 2020-01-01 RX ADMIN — ENOXAPARIN SODIUM 40 MG: 40 INJECTION SUBCUTANEOUS at 22:59

## 2020-01-01 RX ADMIN — FUROSEMIDE 20 MG: 20 TABLET ORAL at 09:13

## 2020-01-01 RX ADMIN — PRAVASTATIN SODIUM 20 MG: 20 TABLET ORAL at 17:44

## 2020-01-01 RX ADMIN — ACETAMINOPHEN 650 MG: 325 TABLET ORAL at 18:22

## 2020-01-01 RX ADMIN — PROPRANOLOL HYDROCHLORIDE 60 MG: 20 TABLET ORAL at 21:14

## 2020-01-01 RX ADMIN — IPRATROPIUM BROMIDE AND ALBUTEROL SULFATE 3 ML: .5; 3 SOLUTION RESPIRATORY (INHALATION) at 00:56

## 2020-01-01 RX ADMIN — GUAIFENESIN AND DEXTROMETHORPHAN 5 ML: 100; 10 SYRUP ORAL at 06:44

## 2020-01-01 RX ADMIN — TAZOBACTAM SODIUM AND PIPERACILLIN SODIUM 3.38 G: 375; 3 INJECTION, SOLUTION INTRAVENOUS at 23:34

## 2020-01-01 RX ADMIN — FLUTICASONE PROPIONATE 2 SPRAY: 50 SPRAY, METERED NASAL at 09:46

## 2020-01-01 RX ADMIN — METHYLPREDNISOLONE SODIUM SUCCINATE 40 MG: 40 INJECTION, POWDER, FOR SOLUTION INTRAMUSCULAR; INTRAVENOUS at 05:36

## 2020-01-01 RX ADMIN — DIPHENHYDRAMINE HYDROCHLORIDE AND LIDOCAINE HYDROCHLORIDE AND ALUMINUM HYDROXIDE AND MAGNESIUM HYDRO 5 ML: KIT at 09:32

## 2020-01-01 RX ADMIN — SODIUM CHLORIDE, PRESERVATIVE FREE 10 ML: 5 INJECTION INTRAVENOUS at 08:16

## 2020-01-01 RX ADMIN — TRAMADOL HYDROCHLORIDE 50 MG: 50 TABLET, FILM COATED ORAL at 01:03

## 2020-01-01 RX ADMIN — PROPRANOLOL HYDROCHLORIDE 10 MG: 20 TABLET ORAL at 00:20

## 2020-01-01 RX ADMIN — PRIMIDONE 50 MG: 50 TABLET ORAL at 20:18

## 2020-01-01 RX ADMIN — GUAIFENESIN AND DEXTROMETHORPHAN 5 ML: 100; 10 SYRUP ORAL at 06:40

## 2020-01-01 RX ADMIN — Medication 1 CAPSULE: at 21:32

## 2020-01-01 RX ADMIN — BUDESONIDE 1 MG: 0.5 INHALANT RESPIRATORY (INHALATION) at 19:00

## 2020-01-01 RX ADMIN — SERTRALINE HYDROCHLORIDE 50 MG: 50 TABLET ORAL at 10:25

## 2020-01-01 RX ADMIN — IPRATROPIUM BROMIDE AND ALBUTEROL SULFATE 3 ML: .5; 3 SOLUTION RESPIRATORY (INHALATION) at 13:45

## 2020-01-01 RX ADMIN — ACETAMINOPHEN 650 MG: 325 TABLET ORAL at 22:08

## 2020-01-01 RX ADMIN — CLONAZEPAM 0.5 MG: 0.5 TABLET ORAL at 20:51

## 2020-01-01 RX ADMIN — IPRATROPIUM BROMIDE AND ALBUTEROL SULFATE 3 ML: .5; 3 SOLUTION RESPIRATORY (INHALATION) at 19:36

## 2020-01-01 RX ADMIN — ASPIRIN 81 MG: 81 TABLET, COATED ORAL at 10:15

## 2020-01-01 RX ADMIN — Medication 1 CAPSULE: at 09:48

## 2020-01-01 RX ADMIN — Medication 5 MG: at 20:43

## 2020-01-01 RX ADMIN — GUAIFENESIN 1200 MG: 600 TABLET, EXTENDED RELEASE ORAL at 21:28

## 2020-01-01 RX ADMIN — CEFDINIR 300 MG: 300 CAPSULE ORAL at 20:46

## 2020-01-01 RX ADMIN — AMLODIPINE BESYLATE 5 MG: 5 TABLET ORAL at 08:55

## 2020-01-01 RX ADMIN — TRIAMCINOLONE ACETONIDE: 1 CREAM TOPICAL at 08:48

## 2020-01-01 RX ADMIN — FLUTICASONE PROPIONATE 2 SPRAY: 50 SPRAY, METERED NASAL at 09:14

## 2020-01-01 RX ADMIN — TRAMADOL HYDROCHLORIDE 50 MG: 50 TABLET, FILM COATED ORAL at 17:59

## 2020-01-01 RX ADMIN — IPRATROPIUM BROMIDE AND ALBUTEROL SULFATE 3 ML: .5; 3 SOLUTION RESPIRATORY (INHALATION) at 23:44

## 2020-01-01 RX ADMIN — BUDESONIDE 1 MG: 0.5 INHALANT RESPIRATORY (INHALATION) at 19:57

## 2020-01-01 RX ADMIN — PANTOPRAZOLE SODIUM 40 MG: 40 TABLET, DELAYED RELEASE ORAL at 06:09

## 2020-01-01 RX ADMIN — CLONAZEPAM 0.5 MG: 0.5 TABLET ORAL at 16:52

## 2020-01-01 RX ADMIN — IPRATROPIUM BROMIDE AND ALBUTEROL SULFATE 3 ML: .5; 3 SOLUTION RESPIRATORY (INHALATION) at 06:37

## 2020-01-01 RX ADMIN — GLYCOPYRROLATE AND FORMOTEROL FUMARATE 2 SPRAY: 9; 4.8 AEROSOL, METERED RESPIRATORY (INHALATION) at 00:21

## 2020-01-01 RX ADMIN — HYDROCORTISONE: 10 CREAM TOPICAL at 08:44

## 2020-01-01 RX ADMIN — TRAMADOL HYDROCHLORIDE 50 MG: 50 TABLET, FILM COATED ORAL at 09:01

## 2020-01-01 RX ADMIN — IPRATROPIUM BROMIDE AND ALBUTEROL SULFATE 3 ML: .5; 3 SOLUTION RESPIRATORY (INHALATION) at 06:44

## 2020-01-01 RX ADMIN — MAGNESIUM GLUCONATE 500 MG ORAL TABLET 800 MG: 500 TABLET ORAL at 20:47

## 2020-01-01 RX ADMIN — ARFORMOTEROL TARTRATE 15 MCG: 15 SOLUTION RESPIRATORY (INHALATION) at 07:10

## 2020-01-01 RX ADMIN — DIPHENHYDRAMINE HYDROCHLORIDE AND LIDOCAINE HYDROCHLORIDE AND ALUMINUM HYDROXIDE AND MAGNESIUM HYDRO 5 ML: KIT at 21:31

## 2020-01-01 RX ADMIN — BUDESONIDE 0.5 MG: 0.5 INHALANT RESPIRATORY (INHALATION) at 19:34

## 2020-01-01 RX ADMIN — GUAIFENESIN AND DEXTROMETHORPHAN 5 ML: 100; 10 SYRUP ORAL at 17:31

## 2020-01-01 RX ADMIN — BUDESONIDE 0.5 MG: 0.5 INHALANT RESPIRATORY (INHALATION) at 06:47

## 2020-01-01 RX ADMIN — PRAVASTATIN SODIUM 20 MG: 20 TABLET ORAL at 17:14

## 2020-01-01 RX ADMIN — HYDROCORTISONE 1 APPLICATION: 10 CREAM TOPICAL at 21:35

## 2020-01-01 RX ADMIN — Medication 1 CAPSULE: at 21:13

## 2020-01-01 RX ADMIN — TRAMADOL HYDROCHLORIDE 50 MG: 50 TABLET, FILM COATED ORAL at 20:46

## 2020-01-01 RX ADMIN — GUAIFENESIN AND DEXTROMETHORPHAN 5 ML: 100; 10 SYRUP ORAL at 06:24

## 2020-01-01 RX ADMIN — HYDROCORTISONE: 10 CREAM TOPICAL at 21:10

## 2020-01-01 RX ADMIN — SERTRALINE HYDROCHLORIDE 50 MG: 50 TABLET ORAL at 10:17

## 2020-01-01 RX ADMIN — GUAIFENESIN AND DEXTROMETHORPHAN 5 ML: 100; 10 SYRUP ORAL at 17:01

## 2020-01-01 RX ADMIN — IPRATROPIUM BROMIDE AND ALBUTEROL SULFATE 3 ML: .5; 3 SOLUTION RESPIRATORY (INHALATION) at 12:33

## 2020-01-01 RX ADMIN — GLYCOPYRROLATE AND FORMOTEROL FUMARATE 2 SPRAY: 9; 4.8 AEROSOL, METERED RESPIRATORY (INHALATION) at 09:36

## 2020-01-01 RX ADMIN — GUAIFENESIN AND DEXTROMETHORPHAN 5 ML: 100; 10 SYRUP ORAL at 06:49

## 2020-01-01 RX ADMIN — AMLODIPINE BESYLATE 5 MG: 5 TABLET ORAL at 08:30

## 2020-01-01 RX ADMIN — IPRATROPIUM BROMIDE 0.5 MG: 0.5 SOLUTION RESPIRATORY (INHALATION) at 19:33

## 2020-01-01 RX ADMIN — GUAIFENESIN 1200 MG: 600 TABLET, EXTENDED RELEASE ORAL at 08:44

## 2020-01-01 RX ADMIN — AZITHROMYCIN MONOHYDRATE 250 MG: 250 TABLET ORAL at 15:00

## 2020-01-01 RX ADMIN — ENOXAPARIN SODIUM 40 MG: 40 INJECTION SUBCUTANEOUS at 05:41

## 2020-01-01 RX ADMIN — TAZOBACTAM SODIUM AND PIPERACILLIN SODIUM 3.38 G: 375; 3 INJECTION, SOLUTION INTRAVENOUS at 15:22

## 2020-01-01 RX ADMIN — GUAIFENESIN AND DEXTROMETHORPHAN 5 ML: 100; 10 SYRUP ORAL at 06:13

## 2020-01-01 RX ADMIN — Medication 1 CAPSULE: at 21:38

## 2020-01-01 RX ADMIN — PREDNISONE 40 MG: 20 TABLET ORAL at 09:04

## 2020-01-01 RX ADMIN — PRAVASTATIN SODIUM 20 MG: 20 TABLET ORAL at 22:36

## 2020-01-01 RX ADMIN — ARFORMOTEROL TARTRATE 15 MCG: 15 SOLUTION RESPIRATORY (INHALATION) at 19:09

## 2020-01-01 RX ADMIN — TAZOBACTAM SODIUM AND PIPERACILLIN SODIUM 3.38 G: 375; 3 INJECTION, SOLUTION INTRAVENOUS at 23:24

## 2020-01-01 RX ADMIN — ESTROGENS, CONJUGATED 0.62 MG: 0.62 TABLET, FILM COATED ORAL at 08:13

## 2020-01-01 RX ADMIN — BUDESONIDE 1 MG: 0.5 INHALANT RESPIRATORY (INHALATION) at 10:45

## 2020-01-01 RX ADMIN — PANTOPRAZOLE SODIUM 40 MG: 40 TABLET, DELAYED RELEASE ORAL at 08:34

## 2020-01-01 RX ADMIN — FUROSEMIDE 20 MG: 20 TABLET ORAL at 08:15

## 2020-01-01 RX ADMIN — ARFORMOTEROL TARTRATE 15 MCG: 15 SOLUTION RESPIRATORY (INHALATION) at 06:30

## 2020-01-01 RX ADMIN — IOPAMIDOL 100 ML: 612 INJECTION, SOLUTION INTRAVENOUS at 10:30

## 2020-01-01 RX ADMIN — PREDNISONE 40 MG: 10 TABLET ORAL at 09:16

## 2020-01-01 RX ADMIN — SODIUM CHLORIDE, PRESERVATIVE FREE 10 ML: 5 INJECTION INTRAVENOUS at 22:21

## 2020-01-01 RX ADMIN — SERTRALINE HYDROCHLORIDE 50 MG: 50 TABLET ORAL at 08:36

## 2020-01-01 RX ADMIN — PREDNISONE 40 MG: 10 TABLET ORAL at 09:48

## 2020-01-01 RX ADMIN — FUROSEMIDE 20 MG: 20 TABLET ORAL at 18:10

## 2020-01-01 RX ADMIN — PANTOPRAZOLE SODIUM 40 MG: 40 TABLET, DELAYED RELEASE ORAL at 05:53

## 2020-01-01 RX ADMIN — SODIUM CHLORIDE, PRESERVATIVE FREE 10 ML: 5 INJECTION INTRAVENOUS at 08:55

## 2020-01-01 RX ADMIN — IPRATROPIUM BROMIDE AND ALBUTEROL SULFATE 3 ML: .5; 3 SOLUTION RESPIRATORY (INHALATION) at 14:43

## 2020-01-01 RX ADMIN — SERTRALINE HYDROCHLORIDE 50 MG: 50 TABLET ORAL at 08:33

## 2020-01-01 RX ADMIN — TAZOBACTAM SODIUM AND PIPERACILLIN SODIUM 4.5 G: 500; 4 INJECTION, SOLUTION INTRAVENOUS at 06:09

## 2020-01-01 RX ADMIN — ENOXAPARIN SODIUM 40 MG: 40 INJECTION SUBCUTANEOUS at 00:00

## 2020-01-01 RX ADMIN — Medication 30 ML: at 09:17

## 2020-01-01 RX ADMIN — IPRATROPIUM BROMIDE AND ALBUTEROL SULFATE 3 ML: .5; 3 SOLUTION RESPIRATORY (INHALATION) at 19:27

## 2020-01-01 RX ADMIN — TRIAMCINOLONE ACETONIDE 1 APPLICATION: 1 CREAM TOPICAL at 20:47

## 2020-01-01 RX ADMIN — IPRATROPIUM BROMIDE 0.5 MG: 0.5 SOLUTION RESPIRATORY (INHALATION) at 15:48

## 2020-01-01 RX ADMIN — PROPRANOLOL HYDROCHLORIDE 60 MG: 20 TABLET ORAL at 20:19

## 2020-01-01 RX ADMIN — SERTRALINE HYDROCHLORIDE 50 MG: 50 TABLET ORAL at 09:09

## 2020-01-01 RX ADMIN — IPRATROPIUM BROMIDE AND ALBUTEROL SULFATE 3 ML: .5; 3 SOLUTION RESPIRATORY (INHALATION) at 12:57

## 2020-01-01 RX ADMIN — TRIAMCINOLONE ACETONIDE 1 APPLICATION: 1 CREAM TOPICAL at 20:35

## 2020-01-01 RX ADMIN — SODIUM CHLORIDE, PRESERVATIVE FREE 10 ML: 5 INJECTION INTRAVENOUS at 21:14

## 2020-01-01 RX ADMIN — SERTRALINE HYDROCHLORIDE 50 MG: 50 TABLET ORAL at 09:04

## 2020-01-01 RX ADMIN — TAZOBACTAM SODIUM AND PIPERACILLIN SODIUM 3.38 G: 375; 3 INJECTION, SOLUTION INTRAVENOUS at 05:49

## 2020-01-01 RX ADMIN — ASPIRIN 81 MG: 81 TABLET, COATED ORAL at 09:11

## 2020-01-01 RX ADMIN — ASPIRIN 81 MG: 81 TABLET, COATED ORAL at 08:16

## 2020-01-01 RX ADMIN — GLYCOPYRROLATE AND FORMOTEROL FUMARATE 2 SPRAY: 9; 4.8 AEROSOL, METERED RESPIRATORY (INHALATION) at 20:52

## 2020-01-01 RX ADMIN — TAZOBACTAM SODIUM AND PIPERACILLIN SODIUM 3.38 G: 375; 3 INJECTION, SOLUTION INTRAVENOUS at 12:03

## 2020-01-01 RX ADMIN — ASPIRIN 81 MG: 81 TABLET, COATED ORAL at 09:16

## 2020-01-01 RX ADMIN — SODIUM CHLORIDE, PRESERVATIVE FREE 10 ML: 5 INJECTION INTRAVENOUS at 20:44

## 2020-01-01 RX ADMIN — PANTOPRAZOLE SODIUM 40 MG: 40 TABLET, DELAYED RELEASE ORAL at 06:05

## 2020-01-01 RX ADMIN — DIPHENHYDRAMINE HYDROCHLORIDE AND LIDOCAINE HYDROCHLORIDE AND ALUMINUM HYDROXIDE AND MAGNESIUM HYDRO 5 ML: KIT at 15:22

## 2020-01-01 RX ADMIN — ENOXAPARIN SODIUM 40 MG: 40 INJECTION SUBCUTANEOUS at 06:19

## 2020-01-01 RX ADMIN — IPRATROPIUM BROMIDE 0.5 MG: 0.5 SOLUTION RESPIRATORY (INHALATION) at 06:30

## 2020-01-01 RX ADMIN — DIPHENHYDRAMINE HYDROCHLORIDE AND LIDOCAINE HYDROCHLORIDE AND ALUMINUM HYDROXIDE AND MAGNESIUM HYDRO 5 ML: KIT at 09:00

## 2020-01-01 RX ADMIN — NICOTINE 1 PATCH: 14 PATCH TRANSDERMAL at 08:31

## 2020-01-01 RX ADMIN — IPRATROPIUM BROMIDE AND ALBUTEROL SULFATE 3 ML: .5; 3 SOLUTION RESPIRATORY (INHALATION) at 06:55

## 2020-01-01 RX ADMIN — DIPHENHYDRAMINE HYDROCHLORIDE AND LIDOCAINE HYDROCHLORIDE AND ALUMINUM HYDROXIDE AND MAGNESIUM HYDRO 5 ML: KIT at 20:44

## 2020-01-01 RX ADMIN — IPRATROPIUM BROMIDE AND ALBUTEROL SULFATE 3 ML: .5; 3 SOLUTION RESPIRATORY (INHALATION) at 13:14

## 2020-01-01 RX ADMIN — PRAVASTATIN SODIUM 20 MG: 20 TABLET ORAL at 17:34

## 2020-01-01 RX ADMIN — LEVOFLOXACIN 500 MG: 5 INJECTION, SOLUTION INTRAVENOUS at 23:48

## 2020-01-01 RX ADMIN — ESTROGENS, CONJUGATED 0.62 MG: 0.62 TABLET, FILM COATED ORAL at 17:49

## 2020-01-01 RX ADMIN — SERTRALINE HYDROCHLORIDE 100 MG: 50 TABLET ORAL at 09:28

## 2020-01-01 RX ADMIN — GLYCOPYRROLATE AND FORMOTEROL FUMARATE 2 SPRAY: 9; 4.8 AEROSOL, METERED RESPIRATORY (INHALATION) at 08:55

## 2020-01-01 RX ADMIN — IPRATROPIUM BROMIDE 0.5 MG: 0.5 SOLUTION RESPIRATORY (INHALATION) at 19:39

## 2020-01-01 RX ADMIN — SERTRALINE HYDROCHLORIDE 50 MG: 50 TABLET ORAL at 09:37

## 2020-01-01 RX ADMIN — IPRATROPIUM BROMIDE AND ALBUTEROL SULFATE 3 ML: .5; 3 SOLUTION RESPIRATORY (INHALATION) at 06:57

## 2020-01-01 RX ADMIN — DOXYCYCLINE 100 MG: 100 CAPSULE ORAL at 08:45

## 2020-01-01 RX ADMIN — FUROSEMIDE 20 MG: 20 TABLET ORAL at 09:45

## 2020-01-01 RX ADMIN — TRAMADOL HYDROCHLORIDE 50 MG: 50 TABLET, FILM COATED ORAL at 21:52

## 2020-01-01 RX ADMIN — Medication 1 CAPSULE: at 08:37

## 2020-01-01 RX ADMIN — ASPIRIN 81 MG: 81 TABLET, COATED ORAL at 09:47

## 2020-01-01 RX ADMIN — ENOXAPARIN SODIUM 40 MG: 40 INJECTION SUBCUTANEOUS at 23:08

## 2020-01-01 RX ADMIN — Medication 30 ML: at 09:00

## 2020-01-01 RX ADMIN — TAZOBACTAM SODIUM AND PIPERACILLIN SODIUM 3.38 G: 375; 3 INJECTION, SOLUTION INTRAVENOUS at 22:07

## 2020-01-01 RX ADMIN — DIPHENHYDRAMINE HYDROCHLORIDE AND LIDOCAINE HYDROCHLORIDE AND ALUMINUM HYDROXIDE AND MAGNESIUM HYDRO 5 ML: KIT at 08:19

## 2020-01-01 RX ADMIN — IPRATROPIUM BROMIDE AND ALBUTEROL SULFATE 3 ML: .5; 3 SOLUTION RESPIRATORY (INHALATION) at 01:23

## 2020-01-01 RX ADMIN — DIPHENHYDRAMINE HYDROCHLORIDE AND LIDOCAINE HYDROCHLORIDE AND ALUMINUM HYDROXIDE AND MAGNESIUM HYDRO 5 ML: KIT at 21:16

## 2020-01-01 RX ADMIN — PREDNISONE 20 MG: 20 TABLET ORAL at 09:38

## 2020-01-01 RX ADMIN — IPRATROPIUM BROMIDE AND ALBUTEROL SULFATE 3 ML: .5; 3 SOLUTION RESPIRATORY (INHALATION) at 06:50

## 2020-01-01 RX ADMIN — SODIUM CHLORIDE, PRESERVATIVE FREE 10 ML: 5 INJECTION INTRAVENOUS at 09:10

## 2020-01-01 RX ADMIN — BUDESONIDE 0.5 MG: 0.5 INHALANT RESPIRATORY (INHALATION) at 07:08

## 2020-01-01 RX ADMIN — BUDESONIDE AND FORMOTEROL FUMARATE DIHYDRATE 2 PUFF: 160; 4.5 AEROSOL RESPIRATORY (INHALATION) at 01:31

## 2020-01-01 RX ADMIN — PREDNISONE 40 MG: 20 TABLET ORAL at 08:16

## 2020-01-01 RX ADMIN — FUROSEMIDE 20 MG: 20 TABLET ORAL at 08:16

## 2020-01-01 RX ADMIN — TRAMADOL HYDROCHLORIDE 50 MG: 50 TABLET, FILM COATED ORAL at 15:15

## 2020-01-01 RX ADMIN — IPRATROPIUM BROMIDE AND ALBUTEROL SULFATE 3 ML: .5; 3 SOLUTION RESPIRATORY (INHALATION) at 13:42

## 2020-01-01 RX ADMIN — METHYLPREDNISOLONE SODIUM SUCCINATE 40 MG: 40 INJECTION, POWDER, FOR SOLUTION INTRAMUSCULAR; INTRAVENOUS at 08:54

## 2020-01-01 RX ADMIN — ASPIRIN 81 MG: 81 TABLET, COATED ORAL at 08:33

## 2020-01-01 RX ADMIN — METHYLPREDNISOLONE SODIUM SUCCINATE 40 MG: 40 INJECTION, POWDER, FOR SOLUTION INTRAMUSCULAR; INTRAVENOUS at 09:13

## 2020-01-01 RX ADMIN — PANTOPRAZOLE SODIUM 40 MG: 40 TABLET, DELAYED RELEASE ORAL at 06:24

## 2020-01-01 RX ADMIN — DOXYCYCLINE 100 MG: 100 INJECTION, POWDER, LYOPHILIZED, FOR SOLUTION INTRAVENOUS at 11:59

## 2020-01-01 RX ADMIN — METHYLPREDNISOLONE SODIUM SUCCINATE 40 MG: 40 INJECTION, POWDER, FOR SOLUTION INTRAMUSCULAR; INTRAVENOUS at 17:15

## 2020-01-01 RX ADMIN — MUPIROCIN: 20 OINTMENT TOPICAL at 06:15

## 2020-01-01 RX ADMIN — PRAVASTATIN SODIUM 20 MG: 20 TABLET ORAL at 18:00

## 2020-01-01 RX ADMIN — PRAVASTATIN SODIUM 20 MG: 20 TABLET ORAL at 18:49

## 2020-01-01 RX ADMIN — PRAVASTATIN SODIUM 20 MG: 20 TABLET ORAL at 17:47

## 2020-01-01 RX ADMIN — FUROSEMIDE 20 MG: 20 TABLET ORAL at 10:24

## 2020-01-01 RX ADMIN — CLONAZEPAM 0.5 MG: 0.5 TABLET ORAL at 22:44

## 2020-01-01 RX ADMIN — BUTALBITAL, ACETAMINOPHEN AND CAFFEINE 1 TABLET: 50; 325; 40 TABLET ORAL at 06:11

## 2020-01-01 RX ADMIN — AZITHROMYCIN MONOHYDRATE 250 MG: 250 TABLET ORAL at 10:25

## 2020-01-01 RX ADMIN — ESTROGENS, CONJUGATED 0.62 MG: 0.62 TABLET, FILM COATED ORAL at 09:11

## 2020-01-01 RX ADMIN — DIPHENHYDRAMINE HYDROCHLORIDE AND LIDOCAINE HYDROCHLORIDE AND ALUMINUM HYDROXIDE AND MAGNESIUM HYDRO 5 ML: KIT at 04:00

## 2020-01-01 RX ADMIN — PANTOPRAZOLE SODIUM 40 MG: 40 TABLET, DELAYED RELEASE ORAL at 07:37

## 2020-01-01 RX ADMIN — PANTOPRAZOLE SODIUM 40 MG: 40 TABLET, DELAYED RELEASE ORAL at 06:26

## 2020-01-01 RX ADMIN — IPRATROPIUM BROMIDE AND ALBUTEROL SULFATE 3 ML: .5; 3 SOLUTION RESPIRATORY (INHALATION) at 19:53

## 2020-01-01 RX ADMIN — HYDROCORTISONE: 10 CREAM TOPICAL at 21:46

## 2020-01-01 RX ADMIN — FLUTICASONE PROPIONATE 2 SPRAY: 50 SPRAY, METERED NASAL at 09:01

## 2020-01-01 RX ADMIN — PRIMIDONE 50 MG: 50 TABLET ORAL at 20:33

## 2020-01-01 RX ADMIN — Medication 5 MG: at 21:14

## 2020-01-01 RX ADMIN — PRAVASTATIN SODIUM 20 MG: 20 TABLET ORAL at 17:23

## 2020-01-01 RX ADMIN — Medication 30 ML: at 21:32

## 2020-01-01 RX ADMIN — DIPHENHYDRAMINE HYDROCHLORIDE AND LIDOCAINE HYDROCHLORIDE AND ALUMINUM HYDROXIDE AND MAGNESIUM HYDRO 5 ML: KIT at 16:05

## 2020-01-01 RX ADMIN — GUAIFENESIN AND DEXTROMETHORPHAN 5 ML: 100; 10 SYRUP ORAL at 06:11

## 2020-01-01 RX ADMIN — BUDESONIDE 1 MG: 0.5 INHALANT RESPIRATORY (INHALATION) at 07:10

## 2020-01-01 RX ADMIN — PREDNISONE 20 MG: 20 TABLET ORAL at 08:45

## 2020-01-01 RX ADMIN — BUDESONIDE AND FORMOTEROL FUMARATE DIHYDRATE 2 PUFF: 160; 4.5 AEROSOL RESPIRATORY (INHALATION) at 07:23

## 2020-01-01 RX ADMIN — GUAIFENESIN AND DEXTROMETHORPHAN 5 ML: 100; 10 SYRUP ORAL at 12:45

## 2020-01-01 RX ADMIN — BUDESONIDE 0.5 MG: 0.5 INHALANT RESPIRATORY (INHALATION) at 06:30

## 2020-01-01 RX ADMIN — CLONAZEPAM 0.5 MG: 0.5 TABLET ORAL at 01:03

## 2020-01-01 RX ADMIN — METHYLPREDNISOLONE SODIUM SUCCINATE 40 MG: 40 INJECTION, POWDER, FOR SOLUTION INTRAMUSCULAR; INTRAVENOUS at 02:50

## 2020-01-01 RX ADMIN — ENOXAPARIN SODIUM 40 MG: 40 INJECTION SUBCUTANEOUS at 22:35

## 2020-01-01 RX ADMIN — CETIRIZINE HYDROCHLORIDE 5 MG: 10 TABLET, FILM COATED ORAL at 08:47

## 2020-01-01 RX ADMIN — CLONAZEPAM 0.5 MG: 0.5 TABLET ORAL at 20:15

## 2020-01-01 RX ADMIN — CLONAZEPAM 0.5 MG: 0.5 TABLET ORAL at 00:15

## 2020-01-01 RX ADMIN — GLYCOPYRROLATE AND FORMOTEROL FUMARATE 2 SPRAY: 9; 4.8 AEROSOL, METERED RESPIRATORY (INHALATION) at 20:19

## 2020-01-01 RX ADMIN — CETIRIZINE HYDROCHLORIDE 5 MG: 10 TABLET, FILM COATED ORAL at 09:17

## 2020-01-01 RX ADMIN — METHYLPREDNISOLONE SODIUM SUCCINATE 40 MG: 40 INJECTION, POWDER, FOR SOLUTION INTRAMUSCULAR; INTRAVENOUS at 06:41

## 2020-01-01 RX ADMIN — ASPIRIN 81 MG: 81 TABLET, COATED ORAL at 08:51

## 2020-01-01 RX ADMIN — IPRATROPIUM BROMIDE 0.5 MG: 0.5 SOLUTION RESPIRATORY (INHALATION) at 17:50

## 2020-01-01 RX ADMIN — GUAIFENESIN AND DEXTROMETHORPHAN 5 ML: 100; 10 SYRUP ORAL at 17:29

## 2020-01-01 RX ADMIN — PROPRANOLOL HYDROCHLORIDE 60 MG: 20 TABLET ORAL at 08:58

## 2020-01-01 RX ADMIN — PANTOPRAZOLE SODIUM 40 MG: 40 TABLET, DELAYED RELEASE ORAL at 08:00

## 2020-01-01 RX ADMIN — AZITHROMYCIN MONOHYDRATE 250 MG: 250 TABLET ORAL at 14:53

## 2020-01-01 RX ADMIN — BUTALBITAL, ACETAMINOPHEN AND CAFFEINE 1 TABLET: 50; 325; 40 TABLET ORAL at 18:05

## 2020-01-01 RX ADMIN — DIPHENHYDRAMINE HYDROCHLORIDE AND LIDOCAINE HYDROCHLORIDE AND ALUMINUM HYDROXIDE AND MAGNESIUM HYDRO 5 ML: KIT at 02:50

## 2020-01-01 RX ADMIN — ASPIRIN 81 MG: 81 TABLET, COATED ORAL at 09:37

## 2020-01-01 RX ADMIN — ACETAMINOPHEN 650 MG: 325 TABLET ORAL at 06:43

## 2020-01-01 RX ADMIN — Medication 1 CAPSULE: at 09:05

## 2020-01-01 RX ADMIN — SODIUM CHLORIDE, PRESERVATIVE FREE 10 ML: 5 INJECTION INTRAVENOUS at 21:05

## 2020-01-01 RX ADMIN — CLONAZEPAM 0.5 MG: 0.5 TABLET ORAL at 21:53

## 2020-01-01 RX ADMIN — BUDESONIDE AND FORMOTEROL FUMARATE DIHYDRATE 2 PUFF: 160; 4.5 AEROSOL RESPIRATORY (INHALATION) at 19:53

## 2020-01-01 RX ADMIN — TAZOBACTAM SODIUM AND PIPERACILLIN SODIUM 3.38 G: 375; 3 INJECTION, SOLUTION INTRAVENOUS at 07:28

## 2020-01-01 RX ADMIN — BENZONATATE 100 MG: 100 CAPSULE ORAL at 13:56

## 2020-01-01 RX ADMIN — PRIMIDONE 50 MG: 50 TABLET ORAL at 10:15

## 2020-01-01 RX ADMIN — ENOXAPARIN SODIUM 40 MG: 40 INJECTION SUBCUTANEOUS at 23:12

## 2020-01-01 RX ADMIN — DIPHENHYDRAMINE HYDROCHLORIDE AND LIDOCAINE HYDROCHLORIDE AND ALUMINUM HYDROXIDE AND MAGNESIUM HYDRO 5 ML: KIT at 17:15

## 2020-01-01 RX ADMIN — GUAIFENESIN 1200 MG: 600 TABLET, EXTENDED RELEASE ORAL at 09:03

## 2020-01-01 RX ADMIN — PROPRANOLOL HYDROCHLORIDE 60 MG: 20 TABLET ORAL at 20:55

## 2020-01-01 RX ADMIN — ACETAMINOPHEN 650 MG: 325 TABLET, FILM COATED ORAL at 04:23

## 2020-01-01 RX ADMIN — TAZOBACTAM SODIUM AND PIPERACILLIN SODIUM 3.38 G: 375; 3 INJECTION, SOLUTION INTRAVENOUS at 22:44

## 2020-01-01 RX ADMIN — PROPRANOLOL HYDROCHLORIDE 60 MG: 20 TABLET ORAL at 08:29

## 2020-01-01 RX ADMIN — PROPRANOLOL HYDROCHLORIDE 60 MG: 20 TABLET ORAL at 20:46

## 2020-01-01 RX ADMIN — TAZOBACTAM SODIUM AND PIPERACILLIN SODIUM 3.38 G: 375; 3 INJECTION, SOLUTION INTRAVENOUS at 16:00

## 2020-01-01 RX ADMIN — PROPRANOLOL HYDROCHLORIDE 60 MG: 20 TABLET ORAL at 09:48

## 2020-01-01 RX ADMIN — IPRATROPIUM BROMIDE AND ALBUTEROL SULFATE 3 ML: .5; 3 SOLUTION RESPIRATORY (INHALATION) at 13:03

## 2020-01-01 RX ADMIN — GUAIFENESIN 1200 MG: 600 TABLET, EXTENDED RELEASE ORAL at 21:27

## 2020-01-01 RX ADMIN — CEFTRIAXONE 1 G: 1 INJECTION, SOLUTION INTRAVENOUS at 21:22

## 2020-01-01 RX ADMIN — ARFORMOTEROL TARTRATE 15 MCG: 15 SOLUTION RESPIRATORY (INHALATION) at 19:34

## 2020-01-01 RX ADMIN — AMLODIPINE BESYLATE 5 MG: 5 TABLET ORAL at 08:16

## 2020-01-01 RX ADMIN — ASPIRIN 81 MG: 81 TABLET, COATED ORAL at 09:09

## 2020-01-01 RX ADMIN — IPRATROPIUM BROMIDE AND ALBUTEROL SULFATE 3 ML: .5; 3 SOLUTION RESPIRATORY (INHALATION) at 20:02

## 2020-01-01 RX ADMIN — GUAIFENESIN 1200 MG: 600 TABLET, EXTENDED RELEASE ORAL at 08:30

## 2020-01-01 RX ADMIN — GUAIFENESIN AND DEXTROMETHORPHAN 5 ML: 100; 10 SYRUP ORAL at 17:09

## 2020-01-01 RX ADMIN — AMLODIPINE BESYLATE 5 MG: 5 TABLET ORAL at 08:15

## 2020-01-01 RX ADMIN — IPRATROPIUM BROMIDE 0.5 MG: 0.5 SOLUTION RESPIRATORY (INHALATION) at 16:18

## 2020-01-01 RX ADMIN — PANTOPRAZOLE SODIUM 40 MG: 40 TABLET, DELAYED RELEASE ORAL at 06:02

## 2020-01-01 RX ADMIN — PREDNISONE 5 MG: 5 TABLET ORAL at 09:58

## 2020-01-01 RX ADMIN — IPRATROPIUM BROMIDE AND ALBUTEROL SULFATE 3 ML: .5; 3 SOLUTION RESPIRATORY (INHALATION) at 12:27

## 2020-01-01 RX ADMIN — FLUTICASONE PROPIONATE 2 SPRAY: 50 SPRAY, METERED NASAL at 09:00

## 2020-01-01 RX ADMIN — DIPHENHYDRAMINE HYDROCHLORIDE AND LIDOCAINE HYDROCHLORIDE AND ALUMINUM HYDROXIDE AND MAGNESIUM HYDRO 5 ML: KIT at 22:28

## 2020-01-01 RX ADMIN — GLYCOPYRROLATE AND FORMOTEROL FUMARATE 2 SPRAY: 9; 4.8 AEROSOL, METERED RESPIRATORY (INHALATION) at 22:22

## 2020-01-01 RX ADMIN — Medication 1 CAPSULE: at 20:15

## 2020-01-01 RX ADMIN — SERTRALINE HYDROCHLORIDE 100 MG: 50 TABLET ORAL at 09:45

## 2020-01-01 RX ADMIN — CLONAZEPAM 0.5 MG: 0.5 TABLET ORAL at 09:38

## 2020-01-01 RX ADMIN — PROPRANOLOL HYDROCHLORIDE 60 MG: 20 TABLET ORAL at 22:08

## 2020-01-01 RX ADMIN — BUDESONIDE 0.5 MG: 0.5 INHALANT RESPIRATORY (INHALATION) at 07:10

## 2020-01-01 RX ADMIN — FUROSEMIDE 20 MG: 20 TABLET ORAL at 08:59

## 2020-01-01 RX ADMIN — IPRATROPIUM BROMIDE 0.5 MG: 0.5 SOLUTION RESPIRATORY (INHALATION) at 19:00

## 2020-01-01 RX ADMIN — DOXYCYCLINE 100 MG: 100 CAPSULE ORAL at 21:32

## 2020-01-01 RX ADMIN — SERTRALINE HYDROCHLORIDE 50 MG: 50 TABLET ORAL at 08:30

## 2020-01-01 RX ADMIN — TAZOBACTAM SODIUM AND PIPERACILLIN SODIUM 3.38 G: 375; 3 INJECTION, SOLUTION INTRAVENOUS at 05:36

## 2020-01-01 RX ADMIN — SODIUM CHLORIDE, PRESERVATIVE FREE 10 ML: 5 INJECTION INTRAVENOUS at 22:00

## 2020-01-01 RX ADMIN — ENOXAPARIN SODIUM 40 MG: 40 INJECTION SUBCUTANEOUS at 23:33

## 2020-01-01 RX ADMIN — IPRATROPIUM BROMIDE AND ALBUTEROL SULFATE 3 ML: .5; 3 SOLUTION RESPIRATORY (INHALATION) at 20:10

## 2020-01-01 RX ADMIN — ACETAMINOPHEN 650 MG: 325 TABLET, FILM COATED ORAL at 17:59

## 2020-01-01 RX ADMIN — ASPIRIN 81 MG: 81 TABLET, COATED ORAL at 08:38

## 2020-01-01 RX ADMIN — VANCOMYCIN HYDROCHLORIDE 1000 MG: 1 INJECTION, POWDER, LYOPHILIZED, FOR SOLUTION INTRAVENOUS at 12:35

## 2020-01-01 RX ADMIN — FUROSEMIDE 20 MG: 20 TABLET ORAL at 08:28

## 2020-01-01 RX ADMIN — SERTRALINE HYDROCHLORIDE 50 MG: 50 TABLET ORAL at 08:28

## 2020-01-01 RX ADMIN — ENOXAPARIN SODIUM 40 MG: 40 INJECTION SUBCUTANEOUS at 00:50

## 2020-01-01 RX ADMIN — SERTRALINE HYDROCHLORIDE 50 MG: 50 TABLET ORAL at 08:46

## 2020-01-01 RX ADMIN — Medication 30 ML: at 21:31

## 2020-01-01 RX ADMIN — PREDNISONE 5 MG: 5 TABLET ORAL at 09:47

## 2020-01-01 RX ADMIN — METHYLPREDNISOLONE SODIUM SUCCINATE 40 MG: 40 INJECTION, POWDER, FOR SOLUTION INTRAMUSCULAR; INTRAVENOUS at 17:36

## 2020-01-01 RX ADMIN — IPRATROPIUM BROMIDE AND ALBUTEROL SULFATE 3 ML: .5; 3 SOLUTION RESPIRATORY (INHALATION) at 06:30

## 2020-01-01 RX ADMIN — PRAVASTATIN SODIUM 20 MG: 20 TABLET ORAL at 17:07

## 2020-01-01 RX ADMIN — FLUTICASONE PROPIONATE 2 SPRAY: 50 SPRAY, METERED NASAL at 08:42

## 2020-01-01 RX ADMIN — GLYCOPYRROLATE AND FORMOTEROL FUMARATE 2 SPRAY: 9; 4.8 AEROSOL, METERED RESPIRATORY (INHALATION) at 08:14

## 2020-01-01 RX ADMIN — ASPIRIN 81 MG: 81 TABLET, COATED ORAL at 08:15

## 2020-01-01 RX ADMIN — AMLODIPINE BESYLATE 5 MG: 5 TABLET ORAL at 09:45

## 2020-01-01 RX ADMIN — GUAIFENESIN 1200 MG: 600 TABLET, EXTENDED RELEASE ORAL at 08:14

## 2020-01-01 RX ADMIN — IPRATROPIUM BROMIDE 0.5 MG: 0.5 SOLUTION RESPIRATORY (INHALATION) at 15:45

## 2020-01-01 RX ADMIN — ASPIRIN 81 MG: 81 TABLET, COATED ORAL at 08:58

## 2020-01-01 RX ADMIN — CLONAZEPAM 0.5 MG: 0.5 TABLET ORAL at 00:00

## 2020-01-01 RX ADMIN — FUROSEMIDE 20 MG: 20 TABLET ORAL at 08:45

## 2020-01-01 RX ADMIN — CEFTRIAXONE 1 G: 1 INJECTION, SOLUTION INTRAVENOUS at 22:44

## 2020-01-01 RX ADMIN — SODIUM CHLORIDE SOLN NEBU 3% 4 ML: 3 NEBU SOLN at 19:24

## 2020-01-01 RX ADMIN — ESTROGENS, CONJUGATED 0.62 MG: 0.62 TABLET, FILM COATED ORAL at 09:45

## 2020-01-01 RX ADMIN — GUAIFENESIN 1200 MG: 600 TABLET, EXTENDED RELEASE ORAL at 21:14

## 2020-01-01 RX ADMIN — SODIUM CHLORIDE, PRESERVATIVE FREE 10 ML: 5 INJECTION INTRAVENOUS at 21:52

## 2020-01-01 RX ADMIN — AZITHROMYCIN MONOHYDRATE 250 MG: 250 TABLET ORAL at 08:54

## 2020-01-01 RX ADMIN — GUAIFENESIN 1200 MG: 600 TABLET, EXTENDED RELEASE ORAL at 09:28

## 2020-01-01 RX ADMIN — SODIUM CHLORIDE, PRESERVATIVE FREE 10 ML: 5 INJECTION INTRAVENOUS at 21:32

## 2020-01-01 RX ADMIN — PREDNISONE 15 MG: 5 TABLET ORAL at 09:05

## 2020-01-01 RX ADMIN — SODIUM CHLORIDE, PRESERVATIVE FREE 10 ML: 5 INJECTION INTRAVENOUS at 09:17

## 2020-01-01 RX ADMIN — Medication 30 ML: at 20:32

## 2020-01-01 RX ADMIN — CLONAZEPAM 0.5 MG: 0.5 TABLET ORAL at 06:07

## 2020-01-01 RX ADMIN — SERTRALINE HYDROCHLORIDE 100 MG: 50 TABLET ORAL at 08:14

## 2020-01-01 RX ADMIN — GUAIFENESIN 1200 MG: 600 TABLET, EXTENDED RELEASE ORAL at 21:32

## 2020-01-01 RX ADMIN — SODIUM CHLORIDE, PRESERVATIVE FREE 10 ML: 5 INJECTION INTRAVENOUS at 08:44

## 2020-01-01 RX ADMIN — AMLODIPINE BESYLATE 5 MG: 5 TABLET ORAL at 08:59

## 2020-01-01 RX ADMIN — GUAIFENESIN AND DEXTROMETHORPHAN 5 ML: 100; 10 SYRUP ORAL at 13:51

## 2020-01-01 RX ADMIN — ENOXAPARIN SODIUM 40 MG: 40 INJECTION SUBCUTANEOUS at 23:38

## 2020-01-01 RX ADMIN — GUAIFENESIN 1200 MG: 600 TABLET, EXTENDED RELEASE ORAL at 08:38

## 2020-01-01 RX ADMIN — PANTOPRAZOLE SODIUM 40 MG: 40 TABLET, DELAYED RELEASE ORAL at 06:10

## 2020-01-01 RX ADMIN — TRIAMCINOLONE ACETONIDE: 1 CREAM TOPICAL at 21:39

## 2020-01-01 RX ADMIN — SODIUM CHLORIDE, PRESERVATIVE FREE 10 ML: 5 INJECTION INTRAVENOUS at 09:01

## 2020-01-01 RX ADMIN — SODIUM CHLORIDE, PRESERVATIVE FREE 10 ML: 5 INJECTION INTRAVENOUS at 09:24

## 2020-01-01 RX ADMIN — PANTOPRAZOLE SODIUM 40 MG: 40 TABLET, DELAYED RELEASE ORAL at 06:08

## 2020-01-01 RX ADMIN — GUAIFENESIN AND DEXTROMETHORPHAN 5 ML: 100; 10 SYRUP ORAL at 17:40

## 2020-01-01 RX ADMIN — PREDNISONE 15 MG: 5 TABLET ORAL at 08:38

## 2020-01-01 RX ADMIN — ARFORMOTEROL TARTRATE 15 MCG: 15 SOLUTION RESPIRATORY (INHALATION) at 18:45

## 2020-01-01 RX ADMIN — TRIAMCINOLONE ACETONIDE: 1 CREAM TOPICAL at 10:19

## 2020-01-01 RX ADMIN — PROPRANOLOL HYDROCHLORIDE 10 MG: 20 TABLET ORAL at 21:19

## 2020-01-01 RX ADMIN — MAGNESIUM GLUCONATE 500 MG ORAL TABLET 800 MG: 500 TABLET ORAL at 09:09

## 2020-01-01 RX ADMIN — PRIMIDONE 50 MG: 50 TABLET ORAL at 09:28

## 2020-01-01 RX ADMIN — IPRATROPIUM BROMIDE AND ALBUTEROL SULFATE 3 ML: .5; 3 SOLUTION RESPIRATORY (INHALATION) at 13:29

## 2020-01-01 RX ADMIN — GLYCOPYRROLATE AND FORMOTEROL FUMARATE 2 SPRAY: 9; 4.8 AEROSOL, METERED RESPIRATORY (INHALATION) at 08:31

## 2020-01-01 RX ADMIN — CETIRIZINE HYDROCHLORIDE 5 MG: 10 TABLET, FILM COATED ORAL at 09:48

## 2020-01-01 RX ADMIN — ENOXAPARIN SODIUM 40 MG: 40 INJECTION SUBCUTANEOUS at 23:28

## 2020-01-01 RX ADMIN — PROPRANOLOL HYDROCHLORIDE 60 MG: 20 TABLET ORAL at 21:38

## 2020-01-01 RX ADMIN — METHYLPREDNISOLONE ACETATE 80 MG: 80 INJECTION, SUSPENSION INTRA-ARTICULAR; INTRALESIONAL; INTRAMUSCULAR; SOFT TISSUE at 20:47

## 2020-01-01 RX ADMIN — ACETAMINOPHEN 650 MG: 325 TABLET, FILM COATED ORAL at 09:37

## 2020-01-01 RX ADMIN — IPRATROPIUM BROMIDE 0.5 MG: 0.5 SOLUTION RESPIRATORY (INHALATION) at 12:49

## 2020-01-01 RX ADMIN — Medication 1 CAPSULE: at 20:33

## 2020-01-01 RX ADMIN — PRAVASTATIN SODIUM 20 MG: 20 TABLET ORAL at 17:37

## 2020-01-03 DIAGNOSIS — F41.9 ANXIETY: Primary | ICD-10-CM

## 2020-01-03 RX ORDER — CLONAZEPAM 0.5 MG/1
0.5 TABLET ORAL NIGHTLY PRN
Qty: 30 TABLET | Refills: 2 | Status: SHIPPED | OUTPATIENT
Start: 2020-01-03 | End: 2020-03-02

## 2020-01-03 RX ORDER — OMEPRAZOLE 40 MG/1
40 CAPSULE, DELAYED RELEASE ORAL DAILY
Qty: 90 CAPSULE | Refills: 3 | Status: SHIPPED | OUTPATIENT
Start: 2020-01-03 | End: 2020-03-30 | Stop reason: SDUPTHER

## 2020-01-06 DIAGNOSIS — J44.9 CHRONIC OBSTRUCTIVE PULMONARY DISEASE, UNSPECIFIED COPD TYPE (HCC): Primary | ICD-10-CM

## 2020-01-06 DIAGNOSIS — J96.12 CHRONIC RESPIRATORY FAILURE WITH HYPOXIA AND HYPERCAPNIA (HCC): ICD-10-CM

## 2020-01-06 DIAGNOSIS — J96.11 CHRONIC RESPIRATORY FAILURE WITH HYPOXIA AND HYPERCAPNIA (HCC): ICD-10-CM

## 2020-01-06 DIAGNOSIS — G47.34 NOCTURNAL HYPOXEMIA: ICD-10-CM

## 2020-01-07 ENCOUNTER — OFFICE VISIT (OUTPATIENT)
Dept: INTERNAL MEDICINE | Facility: CLINIC | Age: 80
End: 2020-01-07

## 2020-01-07 VITALS
WEIGHT: 123 LBS | RESPIRATION RATE: 16 BRPM | DIASTOLIC BLOOD PRESSURE: 72 MMHG | SYSTOLIC BLOOD PRESSURE: 124 MMHG | BODY MASS INDEX: 22.63 KG/M2 | TEMPERATURE: 97.8 F | OXYGEN SATURATION: 98 % | HEIGHT: 62 IN | HEART RATE: 73 BPM

## 2020-01-07 DIAGNOSIS — F41.8 MIXED ANXIETY DEPRESSIVE DISORDER: ICD-10-CM

## 2020-01-07 DIAGNOSIS — G62.9 NEUROPATHY: ICD-10-CM

## 2020-01-07 DIAGNOSIS — Z72.0 TOBACCO ABUSE DISORDER: Chronic | ICD-10-CM

## 2020-01-07 DIAGNOSIS — R26.89 BALANCE PROBLEM: ICD-10-CM

## 2020-01-07 DIAGNOSIS — R31.9 HEMATURIA, UNSPECIFIED TYPE: ICD-10-CM

## 2020-01-07 DIAGNOSIS — G25.0 ESSENTIAL TREMOR: ICD-10-CM

## 2020-01-07 DIAGNOSIS — H90.3 BILATERAL SENSORINEURAL HEARING LOSS: ICD-10-CM

## 2020-01-07 DIAGNOSIS — H53.9 TRANSIENT VISION DISTURBANCE, BILATERAL: ICD-10-CM

## 2020-01-07 DIAGNOSIS — I87.2 VENOUS INSUFFICIENCY OF BOTH LOWER EXTREMITIES: Chronic | ICD-10-CM

## 2020-01-07 DIAGNOSIS — E55.9 VITAMIN D DEFICIENCY: ICD-10-CM

## 2020-01-07 DIAGNOSIS — R63.4 WEIGHT LOSS: ICD-10-CM

## 2020-01-07 DIAGNOSIS — R26.9 ABNORMAL GAIT: ICD-10-CM

## 2020-01-07 DIAGNOSIS — R42 DIZZINESS: ICD-10-CM

## 2020-01-07 DIAGNOSIS — H81.10 BENIGN PAROXYSMAL POSITIONAL VERTIGO, UNSPECIFIED LATERALITY: ICD-10-CM

## 2020-01-07 DIAGNOSIS — I10 ESSENTIAL HYPERTENSION: Primary | Chronic | ICD-10-CM

## 2020-01-07 DIAGNOSIS — R53.83 OTHER FATIGUE: ICD-10-CM

## 2020-01-07 DIAGNOSIS — R25.2 CALF CRAMP: ICD-10-CM

## 2020-01-07 DIAGNOSIS — R42 DIZZINESS AND GIDDINESS: ICD-10-CM

## 2020-01-07 DIAGNOSIS — H74.09 TYMPANOSCLEROSIS, UNSPECIFIED LATERALITY: ICD-10-CM

## 2020-01-07 DIAGNOSIS — J41.0 SIMPLE CHRONIC BRONCHITIS (HCC): ICD-10-CM

## 2020-01-07 DIAGNOSIS — M54.50 LOW BACK PAIN WITHOUT SCIATICA, UNSPECIFIED BACK PAIN LATERALITY, UNSPECIFIED CHRONICITY: ICD-10-CM

## 2020-01-07 DIAGNOSIS — G44.209 TENSION HEADACHE: ICD-10-CM

## 2020-01-07 DIAGNOSIS — I50.32 CHRONIC DIASTOLIC HEART FAILURE (HCC): Chronic | ICD-10-CM

## 2020-01-07 DIAGNOSIS — K21.9 GASTROESOPHAGEAL REFLUX DISEASE WITHOUT ESOPHAGITIS: ICD-10-CM

## 2020-01-07 DIAGNOSIS — I95.1 ORTHOSTATIC HYPOTENSION: ICD-10-CM

## 2020-01-07 DIAGNOSIS — E78.5 HYPERLIPIDEMIA, UNSPECIFIED HYPERLIPIDEMIA TYPE: ICD-10-CM

## 2020-01-07 DIAGNOSIS — G25.81 RESTLESS LEGS SYNDROME: ICD-10-CM

## 2020-01-07 DIAGNOSIS — H83.90 DISORDER OF INNER EAR, UNSPECIFIED LATERALITY: ICD-10-CM

## 2020-01-07 PROCEDURE — G0439 PPPS, SUBSEQ VISIT: HCPCS | Performed by: INTERNAL MEDICINE

## 2020-01-07 PROCEDURE — 99397 PER PM REEVAL EST PAT 65+ YR: CPT | Performed by: INTERNAL MEDICINE

## 2020-01-07 RX ORDER — IPRATROPIUM BROMIDE AND ALBUTEROL SULFATE 2.5; .5 MG/3ML; MG/3ML
3 SOLUTION RESPIRATORY (INHALATION) EVERY 6 HOURS PRN
Qty: 360 ML
Start: 2020-01-07 | End: 2020-03-12

## 2020-01-07 RX ORDER — SUMATRIPTAN 50 MG/1
TABLET, FILM COATED ORAL
Qty: 12 TABLET | Refills: 1 | Status: ON HOLD | OUTPATIENT
Start: 2020-01-07 | End: 2020-01-01

## 2020-01-07 RX ORDER — PRAVASTATIN SODIUM 20 MG
20 TABLET ORAL EVERY EVENING
Qty: 30 TABLET | Refills: 5 | Status: SHIPPED | OUTPATIENT
Start: 2020-01-07 | End: 2020-01-01

## 2020-01-07 NOTE — PROGRESS NOTES
The ABCs of the Annual Wellness Visit  Subsequent Medicare Wellness Visit    Chief Complaint   Patient presents with   • Medicare Wellness-subsequent       Subjective   History of Present Illness:  Sadie Tijerina is a 79 y.o. female who presents for a Subsequent Medicare Wellness Visit.  Patient here for follow-up.  Hypertension stable on medication.  Hyperlipidemia stable on medication need the blood tests.  Vitamin D low need the blood tests.  Tobacco use patient is using nicotine patch.  Patient also complains transient vision loss both sides usually lasts about 10 minutes.  Patient states it is being about 3 times a week.  Restless leg is stable.  Weight is stable now.  HEALTH RISK ASSESSMENT    Recent Hospitalizations:  No hospitalization(s) within the last year.    Current Medical Providers:  Patient Care Team:  Kristian Wolff MD as PCP - General  Kristian Wolff MD as PCP - Family Medicine  Bayron Grimaldo MD (Inactive) as Consulting Physician (General Surgery)    Smoking Status:  Social History     Tobacco Use   Smoking Status Current Every Day Smoker   • Packs/day: 0.25   • Types: Cigarettes   Smokeless Tobacco Never Used       Alcohol Consumption:  Social History     Substance and Sexual Activity   Alcohol Use No       Depression Screen:   PHQ-2/PHQ-9 Depression Screening 1/7/2020   Little interest or pleasure in doing things 0   Feeling down, depressed, or hopeless 0   Trouble falling or staying asleep, or sleeping too much -   Feeling tired or having little energy -   Poor appetite or overeating -   Feeling bad about yourself - or that you are a failure or have let yourself or your family down -   Trouble concentrating on things, such as reading the newspaper or watching television -   Moving or speaking so slowly that other people could have noticed. Or the opposite - being so fidgety or restless that you have been moving around a lot more than usual -   Thoughts that you would be better off dead,  or of hurting yourself in some way -   Total Score 0       Fall Risk Screen:  LUCIANA Fall Risk Assessment has not been completed.    Health Habits and Functional and Cognitive Screening:  Functional & Cognitive Status 1/7/2020   Do you have difficulty preparing food and eating? No   Do you have difficulty bathing yourself, getting dressed or grooming yourself? No   Do you have difficulty using the toilet? No   Do you have difficulty moving around from place to place? Yes   Do you have trouble with steps or getting out of a bed or a chair? Yes   Current Diet Well Balanced Diet   Dental Exam Up to date   Eye Exam Up to date   Exercise (times per week) 0 times per week   Current Exercise Activities Include None   Do you need help using the phone?  No   Are you deaf or do you have serious difficulty hearing?  No   Do you need help with transportation? No   Do you need help shopping? No   Do you need help preparing meals?  No   Do you need help with housework?  No   Do you need help with laundry? No   Do you need help taking your medications? No   Do you need help managing money? No   Do you ever drive or ride in a car without wearing a seat belt? No   Have you felt unusual stress, anger or loneliness in the last month? No   Who do you live with? Alone   If you need help, do you have trouble finding someone available to you? No   Have you been bothered in the last four weeks by sexual problems? No   Do you have difficulty concentrating, remembering or making decisions? No         Does the patient have evidence of cognitive impairment? No    Asprin use counseling:Start ASA 81 mg daily     Age-appropriate Screening Schedule:  Refer to the list below for future screening recommendations based on patient's age, sex and/or medical conditions. Orders for these recommended tests are listed in the plan section. The patient has been provided with a written plan.    Health Maintenance   Topic Date Due   • ZOSTER VACCINE (2 of 2)  04/03/2012   • LIPID PANEL  07/24/2019   • TDAP/TD VACCINES (3 - Td) 01/10/2028   • INFLUENZA VACCINE  Completed   • MAMMOGRAM  Discontinued          The following portions of the patient's history were reviewed and updated as appropriate: allergies, current medications, past family history, past medical history, past social history, past surgical history and problem list.    Outpatient Medications Prior to Visit   Medication Sig Dispense Refill   • albuterol sulfate  (90 Base) MCG/ACT inhaler Inhale 1 puff Every 4 (Four) Hours As Needed for Shortness of Air. 18 g 3   • budesonide-formoterol (SYMBICORT) 160-4.5 MCG/ACT inhaler Inhale 2 puffs 2 (Two) Times a Day. 10.2 g 4   • clonazePAM (KlonoPIN) 0.5 MG tablet Take 1 tablet by mouth At Night As Needed for Seizures. 30 tablet 2   • fluticasone (FLONASE) 50 MCG/ACT nasal spray 2 sprays by Each Nare route Daily.     • guaiFENesin (MUCINEX) 600 MG 12 hr tablet Take 1 tablet by mouth 2 (Two) Times a Day. 20 tablet 0   • ipratropium (ATROVENT) 0.02 % nebulizer solution INHALE THE CONTENTS OF 1 VIAL VIA NEBULIZER FOUR TIMES A DAY AS NEEDED FOR WHEEZING OR SHORTNESS OF AIR  5   • omeprazole (priLOSEC) 40 MG capsule Take 1 capsule by mouth Daily. 90 capsule 3   • predniSONE (DELTASONE) 5 MG tablet Take 1 tablet by mouth Daily for 180 days. 180 tablet 1   • promethazine (PHENERGAN) 12.5 MG tablet Take 1 tablet by mouth Every 8 (Eight) Hours As Needed for Nausea or Vomiting. 10 tablet 0   • propranolol (INDERAL) 60 MG tablet Take 1 tablet by mouth 2 (Two) Times a Day. 60 tablet 1   • saline (AYR) gel nasal gel Apply topically to the appropriate area as directed Every 1 (One) Hour As Needed for nasal irritation 22 g 0   • sertraline (ZOLOFT) 100 MG tablet Take 1 tablet by mouth every night at bedtime. 90 tablet 3   • SPIRIVA HANDIHALER 18 MCG per inhalation capsule inhale contents of 1 capsule by mouth once daily 30 capsule 0   • traMADol (ULTRAM) 50 MG tablet Take 1  tablet by mouth Every 8 (Eight) Hours As Needed (pain). 20 tablet 0   • estrogens, conjugated, (PREMARIN) 0.625 MG tablet Take 1 tablet by mouth Daily. 200001 90 tablet 3   • ipratropium-albuterol (DUO-NEB) 0.5-2.5 mg/3 ml nebulizer Take 3 mL by nebulization Every 6 (Six) Hours As Needed for Wheezing or Shortness of Air. 360 mL    • pravastatin (PRAVACHOL) 20 MG tablet Take 1 tablet by mouth Every Evening. 30 tablet 5     No facility-administered medications prior to visit.        Patient Active Problem List   Diagnosis   • Calf cramp   • Mixed anxiety depressive disorder   • Dizziness   • Fatigue   • Gastroesophageal reflux disease without esophagitis   • Hematuria   • Hyperlipidemia   • Essential hypertension   • Low back pain   • Restless legs syndrome   • Essential tremor   • Vitamin D deficiency   • Balance problem   • Tension headache   • Tobacco abuse disorder   • Venous insufficiency of both lower extremities   • Chronic diastolic heart failure (CMS/HCC)   • Weight loss   • Neuropathy   • COPD (chronic obstructive pulmonary disease) (CMS/HCC)   • Abnormal gait   • Bilateral sensorineural hearing loss   • Disorder of inner ear   • Tympanosclerosis   • Benign paroxysmal positional vertigo   • Dizziness and giddiness   • Orthostatic hypotension       Advanced Care Planning:  Patient does not have an advance directive - not interested in additional information    Review of Systems   Constitutional: Negative.    HENT: Negative.    Eyes: Negative.    Respiratory: Negative.    Cardiovascular: Negative.    Gastrointestinal: Negative.    Endocrine: Negative.    Genitourinary: Negative.    Musculoskeletal: Positive for arthralgias.   Skin: Negative.    Allergic/Immunologic: Negative.    Neurological: Negative.    Hematological: Negative.    Psychiatric/Behavioral: Negative.        Compared to one year ago, the patient feels her physical health is the same.  Compared to one year ago, the patient feels her mental health  "is the same.    Reviewed chart for potential of high risk medication in the elderly: yes  Reviewed chart for potential of harmful drug interactions in the elderly:no    Objective         Vitals:    01/07/20 1404   BP: 124/72   Pulse: 73   Resp: 16   Temp: 97.8 °F (36.6 °C)   TempSrc: Temporal   SpO2: 98%   Weight: 55.8 kg (123 lb)   Height: 157.5 cm (62.01\")   PainSc: 0-No pain       Body mass index is 22.49 kg/m².  Discussed the patient's BMI with her. The BMI is in the acceptable range.    Physical Exam   Constitutional: She is oriented to person, place, and time. She appears well-developed and well-nourished.   HENT:   Head: Normocephalic and atraumatic.   Right Ear: External ear normal.   Left Ear: External ear normal.   Nose: Nose normal.   Mouth/Throat: Oropharynx is clear and moist.   Eyes: Pupils are equal, round, and reactive to light. Conjunctivae and EOM are normal.   Neck: Normal range of motion. Neck supple.   Cardiovascular: Normal rate, regular rhythm, normal heart sounds and intact distal pulses.   Pulmonary/Chest: Effort normal and breath sounds normal.   Abdominal: Soft. Bowel sounds are normal.   Musculoskeletal: Normal range of motion. She exhibits tenderness.   Neurological: She is alert and oriented to person, place, and time. She has normal reflexes.   Skin: Skin is warm and dry.   Psychiatric: She has a normal mood and affect. Her behavior is normal. Judgment and thought content normal.             Assessment/Plan   Medicare Risks and Personalized Health Plan  CMS Preventative Services Quick Reference  Advance Directive Discussion    The above risks/problems have been discussed with the patient.  Pertinent information has been shared with the patient in the After Visit Summary.  Follow up plans and orders are seen below in the Assessment/Plan Section.    Diagnoses and all orders for this visit:    1. Essential hypertension (Primary) cont med  -     CBC & Differential  -     Comprehensive " Metabolic Panel  -     Lipid Panel  -     TSH  -     C-reactive Protein  -     Sedimentation Rate    2. Venous insufficiency of both lower extremities    3. Chronic diastolic heart failure (CMS/HCC)    4. Hyperlipidemia, cont med    5. Orthostatic hypotension    7. Gastroesophageal reflux disease without esophagitis cont med    8. Vitamin D deficiency  -     Vitamin D 25 Hydroxy    9. Low back pain without sciatica, unspecified back pain laterality, unspecified chronicity    10. Essential tremor cont med    11. Tension headache cont med    12. Neuropathy    13. Bilateral sensorineural hearing loss    14. Disorder of inner ear, unspecified laterality    15. Tympanosclerosis, unspecified laterality    16. Benign paroxysmal positional vertigo, unspecified laterality    17. Calf cramp    18. Tobacco abuse disorder on nicoderm    19. Mixed anxiety depressive disorder     20. Dizziness    21. Other fatigue  -     CBC & Differential  -     Comprehensive Metabolic Panel  -     Lipid Panel  -     TSH  -     C-reactive Protein  -     Sedimentation Rate    22. Hematuria, unspecified type    23. Restless legs syndrome    24. Balance problem    25. Weight loss    26. Abnormal gait    27. Dizziness and giddiness    28. Transient vision disturbance, bilateral, ?migraine start imitrex.   -     Duplex Carotid - Left Ultrasound CAR  -     Duplex Carotid - Right Ultrasound CAR    Do labs     2 weeks after labs          Follow Up:  No follow-ups on file.     An After Visit Summary and PPPS were given to the patient.       ---- Below is the historical record for reference only:  1. CHF, stable now  2. Chronic tobacco use encourage patient to quit  3. Thrush with pharyngitis, medicine helps  4. Leukocytosis,  repeat  5. Weakness, need HH for PT OT  6. Arthritis pain refill tramadol    left-sided low back pain without sciatica XR Spine Lumbar 4+ View; DJD--   Benign paroxysmal positional vertigo,   Dizzy loss of balance, when standing  up probably due to lightheaded  D/c propranolol no help, resumed. carotid, echo, holter unremarkble    MRI head micro ischemic changes continue ASA 81mg daily    TMJ, continue aleve or tylenol for now   allergy, d/c medicine   Vitamin D deficiency cont Vit D3 1000iu  Low back pain improved after Flexeril and Tylenol,refill tramadol, xray:mild DJD refill tramadol  HRT patient still wants it.wean very slow, --  RLS, continue clonazepam   Tremor cont propranolol d/c sinemet for not helping  Knee OA xr OA, refused cortisone shot, or glucosamine, tramadol  Refused disrobing for PE

## 2020-01-08 ENCOUNTER — HOSPITAL ENCOUNTER (OUTPATIENT)
Dept: CARDIOLOGY | Facility: HOSPITAL | Age: 80
Discharge: HOME | End: 2020-01-08
Admitting: INTERNAL MEDICINE

## 2020-01-08 LAB
BH CV ECHO MEAS - BSA(HAYCOCK): 1.6 M^2
BH CV ECHO MEAS - BSA: 1.6 M^2
BH CV ECHO MEAS - BZI_BMI: 22.5 KILOGRAMS/M^2
BH CV ECHO MEAS - BZI_METRIC_HEIGHT: 157.5 CM
BH CV ECHO MEAS - BZI_METRIC_WEIGHT: 55.8 KG
BH CV XLRA MEAS LEFT DIST CCA EDV: 16.2 CM/SEC
BH CV XLRA MEAS LEFT DIST CCA PSV: 67.7 CM/SEC
BH CV XLRA MEAS LEFT DIST ICA EDV: 27.5 CM/SEC
BH CV XLRA MEAS LEFT DIST ICA PSV: 91.3 CM/SEC
BH CV XLRA MEAS LEFT ICA/CCA RATIO: 1.6
BH CV XLRA MEAS LEFT MID CCA EDV: 14.8 CM/SEC
BH CV XLRA MEAS LEFT MID CCA PSV: 69.4 CM/SEC
BH CV XLRA MEAS LEFT MID ICA EDV: 29.5 CM/SEC
BH CV XLRA MEAS LEFT MID ICA PSV: 112 CM/SEC
BH CV XLRA MEAS LEFT PROX CCA EDV: 19.6 CM/SEC
BH CV XLRA MEAS LEFT PROX CCA PSV: 81.6 CM/SEC
BH CV XLRA MEAS LEFT PROX ECA EDV: 4.4 CM/SEC
BH CV XLRA MEAS LEFT PROX ECA PSV: 71.2 CM/SEC
BH CV XLRA MEAS LEFT PROX ICA EDV: 32.4 CM/SEC
BH CV XLRA MEAS LEFT PROX ICA PSV: 109 CM/SEC
BH CV XLRA MEAS LEFT PROX SCLA EDV: 0 CM/SEC
BH CV XLRA MEAS LEFT PROX SCLA PSV: 62.2 CM/SEC
BH CV XLRA MEAS LEFT VERTEBRAL A EDV: 8.3 CM/SEC
BH CV XLRA MEAS LEFT VERTEBRAL A PSV: 27.5 CM/SEC
BH CV XLRA MEAS RIGHT DIST CCA EDV: 15.7 CM/SEC
BH CV XLRA MEAS RIGHT DIST CCA PSV: 78.1 CM/SEC
BH CV XLRA MEAS RIGHT DIST ICA EDV: 24.5 CM/SEC
BH CV XLRA MEAS RIGHT DIST ICA PSV: 85.5 CM/SEC
BH CV XLRA MEAS RIGHT ICA/CCA RATIO: 1.2
BH CV XLRA MEAS RIGHT MID CCA EDV: 16.2 CM/SEC
BH CV XLRA MEAS RIGHT MID CCA PSV: 86.9 CM/SEC
BH CV XLRA MEAS RIGHT MID ICA EDV: 20.1 CM/SEC
BH CV XLRA MEAS RIGHT MID ICA PSV: 96.2 CM/SEC
BH CV XLRA MEAS RIGHT PROX CCA EDV: 15 CM/SEC
BH CV XLRA MEAS RIGHT PROX CCA PSV: 86 CM/SEC
BH CV XLRA MEAS RIGHT PROX ECA EDV: 0 CM/SEC
BH CV XLRA MEAS RIGHT PROX ECA PSV: 58.9 CM/SEC
BH CV XLRA MEAS RIGHT PROX ICA EDV: 11.4 CM/SEC
BH CV XLRA MEAS RIGHT PROX ICA PSV: 70.3 CM/SEC
BH CV XLRA MEAS RIGHT PROX SCLA EDV: 0 CM/SEC
BH CV XLRA MEAS RIGHT PROX SCLA PSV: 56.3 CM/SEC
BH CV XLRA MEAS RIGHT VERTEBRAL A EDV: 0 CM/SEC
BH CV XLRA MEAS RIGHT VERTEBRAL A PSV: 31.8 CM/SEC

## 2020-01-08 PROCEDURE — 93880 EXTRACRANIAL BILAT STUDY: CPT

## 2020-01-08 PROCEDURE — 93880 EXTRACRANIAL BILAT STUDY: CPT | Performed by: INTERNAL MEDICINE

## 2020-01-09 LAB
25(OH)D3+25(OH)D2 SERPL-MCNC: 24.1 NG/ML (ref 30–100)
ALBUMIN SERPL-MCNC: 4 G/DL (ref 3.5–5.2)
ALBUMIN/GLOB SERPL: 1.7 G/DL
ALP SERPL-CCNC: 61 U/L (ref 39–117)
ALT SERPL-CCNC: 14 U/L (ref 1–33)
AST SERPL-CCNC: 15 U/L (ref 1–32)
BASOPHILS # BLD AUTO: 0.06 10*3/MM3 (ref 0–0.2)
BASOPHILS NFR BLD AUTO: 0.6 % (ref 0–1.5)
BILIRUB SERPL-MCNC: 0.2 MG/DL (ref 0.2–1.2)
BUN SERPL-MCNC: 18 MG/DL (ref 8–23)
BUN/CREAT SERPL: 17.3 (ref 7–25)
CALCIUM SERPL-MCNC: 9.1 MG/DL (ref 8.6–10.5)
CHLORIDE SERPL-SCNC: 100 MMOL/L (ref 98–107)
CHOLEST SERPL-MCNC: 208 MG/DL (ref 0–200)
CK SERPL-CCNC: 23 U/L (ref 20–180)
CO2 SERPL-SCNC: 33.7 MMOL/L (ref 22–29)
CREAT SERPL-MCNC: 1.04 MG/DL (ref 0.57–1)
CRP SERPL-MCNC: 0.21 MG/DL (ref 0–0.5)
EOSINOPHIL # BLD AUTO: 0.25 10*3/MM3 (ref 0–0.4)
EOSINOPHIL NFR BLD AUTO: 2.4 % (ref 0.3–6.2)
ERYTHROCYTE [DISTWIDTH] IN BLOOD BY AUTOMATED COUNT: 13 % (ref 12.3–15.4)
ERYTHROCYTE [SEDIMENTATION RATE] IN BLOOD BY WESTERGREN METHOD: 6 MM/HR (ref 0–30)
GLOBULIN SER CALC-MCNC: 2.4 GM/DL
GLUCOSE SERPL-MCNC: 91 MG/DL (ref 65–99)
HCT VFR BLD AUTO: 38.5 % (ref 34–46.6)
HDLC SERPL-MCNC: 82 MG/DL (ref 40–60)
HGB BLD-MCNC: 12.4 G/DL (ref 12–15.9)
IMM GRANULOCYTES # BLD AUTO: 0.05 10*3/MM3 (ref 0–0.05)
IMM GRANULOCYTES NFR BLD AUTO: 0.5 % (ref 0–0.5)
LDLC SERPL CALC-MCNC: 98 MG/DL (ref 0–100)
LYMPHOCYTES # BLD AUTO: 2.39 10*3/MM3 (ref 0.7–3.1)
LYMPHOCYTES NFR BLD AUTO: 23 % (ref 19.6–45.3)
MCH RBC QN AUTO: 28 PG (ref 26.6–33)
MCHC RBC AUTO-ENTMCNC: 32.2 G/DL (ref 31.5–35.7)
MCV RBC AUTO: 86.9 FL (ref 79–97)
MONOCYTES # BLD AUTO: 0.82 10*3/MM3 (ref 0.1–0.9)
MONOCYTES NFR BLD AUTO: 7.9 % (ref 5–12)
NEUTROPHILS # BLD AUTO: 6.81 10*3/MM3 (ref 1.7–7)
NEUTROPHILS NFR BLD AUTO: 65.6 % (ref 42.7–76)
NRBC BLD AUTO-RTO: 0 /100 WBC (ref 0–0.2)
PLATELET # BLD AUTO: 215 10*3/MM3 (ref 140–450)
POTASSIUM SERPL-SCNC: 4.2 MMOL/L (ref 3.5–5.2)
PROT SERPL-MCNC: 6.4 G/DL (ref 6–8.5)
RBC # BLD AUTO: 4.43 10*6/MM3 (ref 3.77–5.28)
SODIUM SERPL-SCNC: 143 MMOL/L (ref 136–145)
TRIGL SERPL-MCNC: 142 MG/DL (ref 0–150)
TSH SERPL DL<=0.005 MIU/L-ACNC: 2 UIU/ML (ref 0.27–4.2)
VLDLC SERPL CALC-MCNC: 28.4 MG/DL
WBC # BLD AUTO: 10.38 10*3/MM3 (ref 3.4–10.8)

## 2020-01-10 ENCOUNTER — OFFICE VISIT (OUTPATIENT)
Dept: INTERNAL MEDICINE | Facility: CLINIC | Age: 80
End: 2020-01-10

## 2020-01-10 VITALS
SYSTOLIC BLOOD PRESSURE: 122 MMHG | TEMPERATURE: 97.1 F | BODY MASS INDEX: 22.63 KG/M2 | OXYGEN SATURATION: 100 % | WEIGHT: 123 LBS | HEIGHT: 62 IN | HEART RATE: 72 BPM | DIASTOLIC BLOOD PRESSURE: 70 MMHG

## 2020-01-10 DIAGNOSIS — E55.9 VITAMIN D DEFICIENCY: ICD-10-CM

## 2020-01-10 DIAGNOSIS — K21.9 GASTROESOPHAGEAL REFLUX DISEASE WITHOUT ESOPHAGITIS: ICD-10-CM

## 2020-01-10 DIAGNOSIS — H53.9 TRANSIENT VISION DISTURBANCE: ICD-10-CM

## 2020-01-10 DIAGNOSIS — J41.0 SIMPLE CHRONIC BRONCHITIS (HCC): ICD-10-CM

## 2020-01-10 DIAGNOSIS — I10 ESSENTIAL HYPERTENSION: Primary | Chronic | ICD-10-CM

## 2020-01-10 DIAGNOSIS — E78.5 HYPERLIPIDEMIA, UNSPECIFIED HYPERLIPIDEMIA TYPE: ICD-10-CM

## 2020-01-10 DIAGNOSIS — G25.0 ESSENTIAL TREMOR: ICD-10-CM

## 2020-01-10 PROCEDURE — 99214 OFFICE O/P EST MOD 30 MIN: CPT | Performed by: INTERNAL MEDICINE

## 2020-01-10 NOTE — PROGRESS NOTES
Subjective   Sadie Tijerina is a 79 y.o. female.     Chief Complaint   Patient presents with   • Follow-up     r/t test results       History of Present Illness   Patient here for follow-up of.  Hypertension stable on medication.  Hyperlipidemia stable on medication.  The GERD stable on medication.  Vitamin D decreased.  Essential tremor stable now.  Transient visual loss no more.  Patient states Imitrex helped some.  Ophthalmology at examination showed a no significant eye disease and a carotid duplex showed a no apparent blockages    Current Outpatient Medications:   •  albuterol sulfate  (90 Base) MCG/ACT inhaler, Inhale 1 puff Every 4 (Four) Hours As Needed for Shortness of Air., Disp: 18 g, Rfl: 3  •  budesonide-formoterol (SYMBICORT) 160-4.5 MCG/ACT inhaler, Inhale 2 puffs 2 (Two) Times a Day., Disp: 10.2 g, Rfl: 4  •  clonazePAM (KlonoPIN) 0.5 MG tablet, Take 1 tablet by mouth At Night As Needed for Seizures., Disp: 30 tablet, Rfl: 2  •  estrogens, conjugated, (PREMARIN) 0.625 MG tablet, Take 1 tablet by mouth Daily. 200001, Disp: 90 tablet, Rfl: 3  •  fluticasone (FLONASE) 50 MCG/ACT nasal spray, 2 sprays by Each Nare route Daily., Disp: , Rfl:   •  guaiFENesin (MUCINEX) 600 MG 12 hr tablet, Take 1 tablet by mouth 2 (Two) Times a Day., Disp: 20 tablet, Rfl: 0  •  ipratropium (ATROVENT) 0.02 % nebulizer solution, INHALE THE CONTENTS OF 1 VIAL VIA NEBULIZER FOUR TIMES A DAY AS NEEDED FOR WHEEZING OR SHORTNESS OF AIR, Disp: , Rfl: 5  •  ipratropium-albuterol (DUO-NEB) 0.5-2.5 mg/3 ml nebulizer, Take 3 mL by nebulization Every 6 (Six) Hours As Needed for Wheezing or Shortness of Air., Disp: 360 mL, Rfl:   •  omeprazole (priLOSEC) 40 MG capsule, Take 1 capsule by mouth Daily., Disp: 90 capsule, Rfl: 3  •  pravastatin (PRAVACHOL) 20 MG tablet, Take 1 tablet by mouth Every Evening., Disp: 30 tablet, Rfl: 5  •  predniSONE (DELTASONE) 5 MG tablet, Take 1 tablet by mouth Daily for 180 days., Disp: 180  tablet, Rfl: 1  •  promethazine (PHENERGAN) 12.5 MG tablet, Take 1 tablet by mouth Every 8 (Eight) Hours As Needed for Nausea or Vomiting., Disp: 10 tablet, Rfl: 0  •  propranolol (INDERAL) 60 MG tablet, Take 1 tablet by mouth 2 (Two) Times a Day., Disp: 60 tablet, Rfl: 1  •  saline (AYR) gel nasal gel, Apply topically to the appropriate area as directed Every 1 (One) Hour As Needed for nasal irritation, Disp: 22 g, Rfl: 0  •  sertraline (ZOLOFT) 100 MG tablet, Take 1 tablet by mouth every night at bedtime., Disp: 90 tablet, Rfl: 3  •  SPIRIVA HANDIHALER 18 MCG per inhalation capsule, inhale contents of 1 capsule by mouth once daily, Disp: 30 capsule, Rfl: 0  •  SUMAtriptan (IMITREX) 50 MG tablet, Take one tablet at onset of headache. May repeat dose one time in 2 hours if headache not relieved., Disp: 12 tablet, Rfl: 1  •  traMADol (ULTRAM) 50 MG tablet, Take 1 tablet by mouth Every 8 (Eight) Hours As Needed (pain)., Disp: 20 tablet, Rfl: 0    The following portions of the patient's history were reviewed and updated as appropriate: allergies, current medications, past family history, past medical history, past social history, past surgical history and problem list.    Review of Systems   Constitutional: Negative.    Respiratory: Negative.    Cardiovascular: Negative.    Gastrointestinal: Negative.    Musculoskeletal: Negative.    Skin: Negative.    Neurological: Negative.    Psychiatric/Behavioral: Negative.        Objective   Physical Exam   Constitutional: She is oriented to person, place, and time. She appears well-nourished.   Neck: Neck supple.   Cardiovascular: Normal rate, regular rhythm and normal heart sounds.   Pulmonary/Chest: Effort normal and breath sounds normal.   Abdominal: Bowel sounds are normal.   Neurological: She is alert and oriented to person, place, and time.   Skin: Skin is warm.   Psychiatric: She has a normal mood and affect.       All tests have been reviewed.    Assessment/Plan    Diagnoses and all orders for this visit:    Essential hypertension cont med    Hyperlipidemia, cont med    Gastroesophageal reflux disease without esophagitis cont med    Vitamin D deficiency start vit D3 100iu daily    Essential tremor cont med    Transient vision disturbance, seen by eye doc,NSD, carotid mild and ?due to migraine     Migraine, cont med prn    3 mo              ---- Below is the historical record for reference only:  1. CHF, stable now  2. Chronic tobacco use encourage patient to quit  3. Thrush with pharyngitis, medicine helps  4. Leukocytosis,  repeat  5. Weakness, need HH for PT OT  6. Arthritis pain refill tramadol    left-sided low back pain without sciatica XR Spine Lumbar 4+ View; DJD--   Benign paroxysmal positional vertigo,   Dizzy loss of balance, when standing up probably due to lightheaded  D/c propranolol no help, resumed. carotid, echo, holter unremarkble    MRI head micro ischemic changes continue ASA 81mg daily    TMJ, continue aleve or tylenol for now   allergy, d/c medicine   Vitamin D deficiency cont Vit D3 1000iu  Low back pain improved after Flexeril and Tylenol,refill tramadol, xray:mild DJD refill tramadol  HRT patient still wants it.wean very slow, --  RLS, continue clonazepam   Tremor cont propranolol d/c sinemet for not helping  Knee OA xr OA, refused cortisone shot, or glucosamine, tramadol  Refused disrobing for PE

## 2020-01-23 ENCOUNTER — PRIOR AUTHORIZATION (OUTPATIENT)
Dept: INTERNAL MEDICINE | Facility: CLINIC | Age: 80
End: 2020-01-23

## 2020-01-27 ENCOUNTER — TELEPHONE (OUTPATIENT)
Dept: INTERNAL MEDICINE | Facility: CLINIC | Age: 80
End: 2020-01-27

## 2020-01-27 RX ORDER — BUDESONIDE AND FORMOTEROL FUMARATE DIHYDRATE 160; 4.5 UG/1; UG/1
AEROSOL RESPIRATORY (INHALATION)
Qty: 10.2 G | Refills: 4 | Status: SHIPPED | OUTPATIENT
Start: 2020-01-27 | End: 2020-03-12

## 2020-01-27 NOTE — TELEPHONE ENCOUNTER
Daughter of Patient called requesting  A medication refill for patient SPIRIVA HANDIHALER 18 MCG per inhalation capsule. Patient only has one capsule left. [facility] pharmacy confirmed. Please advise [phone]

## 2020-01-30 ENCOUNTER — APPOINTMENT (OUTPATIENT)
Dept: GENERAL RADIOLOGY | Facility: HOSPITAL | Age: 80
End: 2020-01-30

## 2020-01-30 ENCOUNTER — HOSPITAL ENCOUNTER (EMERGENCY)
Facility: HOSPITAL | Age: 80
Discharge: HOME | End: 2020-01-30
Attending: EMERGENCY MEDICINE | Admitting: EMERGENCY MEDICINE

## 2020-01-30 VITALS
RESPIRATION RATE: 20 BRPM | SYSTOLIC BLOOD PRESSURE: 147 MMHG | WEIGHT: 121 LBS | OXYGEN SATURATION: 99 % | DIASTOLIC BLOOD PRESSURE: 75 MMHG | HEIGHT: 62 IN | TEMPERATURE: 97.5 F | BODY MASS INDEX: 22.26 KG/M2 | HEART RATE: 75 BPM

## 2020-01-30 DIAGNOSIS — S80.12XA MULTIPLE LEG CONTUSIONS, LEFT, INITIAL ENCOUNTER: ICD-10-CM

## 2020-01-30 DIAGNOSIS — S81.802A WOUND OF LEFT LOWER EXTREMITY, INITIAL ENCOUNTER: Primary | ICD-10-CM

## 2020-01-30 PROCEDURE — 99283 EMERGENCY DEPT VISIT LOW MDM: CPT

## 2020-01-30 PROCEDURE — 73590 X-RAY EXAM OF LOWER LEG: CPT

## 2020-01-30 RX ADMIN — MUPIROCIN 1 APPLICATION: 20 OINTMENT TOPICAL at 19:07

## 2020-02-03 ENCOUNTER — TELEPHONE (OUTPATIENT)
Dept: INTERNAL MEDICINE | Facility: CLINIC | Age: 80
End: 2020-02-03

## 2020-02-03 ENCOUNTER — PRIOR AUTHORIZATION (OUTPATIENT)
Dept: INTERNAL MEDICINE | Facility: CLINIC | Age: 80
End: 2020-02-03

## 2020-02-03 NOTE — TELEPHONE ENCOUNTER
Daughter of patient calling - unclear on the new medication that has been called in for her mother.    umeclidinium-vilanterol (ANORO ELLIPTA) 62.5-25 MCG/INH aerosol powder  inhaler      Does this replace another medication?    Please call to advise [phone]

## 2020-02-04 NOTE — TELEPHONE ENCOUNTER
Left detailed vm regarding pt's medications, notified her we sent anoro instead due to her spiriva not being covered by insurance

## 2020-02-10 ENCOUNTER — OFFICE VISIT (OUTPATIENT)
Dept: INTERNAL MEDICINE | Facility: CLINIC | Age: 80
End: 2020-02-10

## 2020-02-10 VITALS
DIASTOLIC BLOOD PRESSURE: 66 MMHG | HEIGHT: 62 IN | TEMPERATURE: 96.8 F | HEART RATE: 80 BPM | SYSTOLIC BLOOD PRESSURE: 187 MMHG | BODY MASS INDEX: 23 KG/M2 | WEIGHT: 125 LBS | RESPIRATION RATE: 16 BRPM | OXYGEN SATURATION: 99 %

## 2020-02-10 DIAGNOSIS — S81.802D WOUND OF LEFT LOWER EXTREMITY, SUBSEQUENT ENCOUNTER: Primary | ICD-10-CM

## 2020-02-10 DIAGNOSIS — I10 ESSENTIAL HYPERTENSION: ICD-10-CM

## 2020-02-10 DIAGNOSIS — R05.9 COUGH: ICD-10-CM

## 2020-02-10 PROCEDURE — 99214 OFFICE O/P EST MOD 30 MIN: CPT | Performed by: NURSE PRACTITIONER

## 2020-02-10 RX ORDER — AMLODIPINE BESYLATE 5 MG/1
5 TABLET ORAL DAILY
Qty: 30 TABLET | Refills: 1 | Status: SHIPPED | OUTPATIENT
Start: 2020-02-10 | End: 2020-01-01 | Stop reason: SDUPTHER

## 2020-02-10 NOTE — PROGRESS NOTES
Chief Complaint / Reason:      Chief Complaint   Patient presents with   • Leg Injury     left leg . hospital fup   • Shortness of Breath     x 2 days       Subjective     HPI  Patient presents today for ER follow-up regarding leg wound to left lower leg.  Patient was seen at [facility] emergency department on 1/32,020 with complaints of leg wound that occurred 2 weeks prior to visit when she had a flowerpot fall striking her leg she denied any numbness or tingling and was able to bear weight.  She had been dressing it daily with peroxide and it started progressively worsening and getting more red and swollen.  And was prescribed Bactroban and was given Tdap in ER. she denied fever or chills.  Currently she is accompanied by her daughter who states that she has been short of breath and has been forgetting a lot of stuff and is reluctant to allow anyone to help her so they are unsure of what medication she is taking or what she is doing at home.  Patient does report shortness of breath for about 2 days and does report productive cough but denies fever or chills.  She is current O2 dependent and states that she gets worse short of breath with minimal exertion.  Vital signs are stable with exception of elevated blood pressure.  History taken from: patient    PMH/FH/Social History were reviewed and updated appropriately in the electronic medical record.   Past Medical History:   Diagnosis Date   • Arthritis    • Cancer (CMS/HCC)     uterine cancer (1981)   • COPD (chronic obstructive pulmonary disease) (CMS/[facility])    • Depression    • Elevated cholesterol    • GERD (gastroesophageal reflux disease)    • History of emphysema    • History of esophageal reflux    • History of recurrent UTI (urinary tract infection)    • History of renal calculi    • History of uterine cancer    • Hyperlipidemia    • Hypertension    • Pneumonia 02/2019     Past Surgical History:   Procedure Laterality Date   • FOOT SURGERY      • HAND SURGERY     • HYSTERECTOMY       Social History     Socioeconomic History   • Marital status:      Spouse name: Not on file   • Number of children: Not on file   • Years of education: Not on file   • Highest education level: Not on file   Tobacco Use   • Smoking status: Former Smoker     Packs/day: 0.25     Types: Cigarettes     Last attempt to quit: 2019     Years since quittin.2   • Smokeless tobacco: Never Used   Substance and Sexual Activity   • Alcohol use: No   • Drug use: No   • Sexual activity: Defer     Comment:      Family History   Problem Relation Age of Onset   • Cancer Mother    • Heart attack Father    • Arthritis Father    • Heart disease Father    • Diabetes Daughter    • Hyperlipidemia Daughter    • Migraines Daughter    • Heart disease Sister    • Diabetes Son    • Glaucoma Son        Review of Systems:   Review of Systems   Constitutional: Positive for fatigue. Negative for chills and fever.   HENT: Positive for congestion.    Respiratory: Positive for cough, chest tightness, shortness of breath and wheezing.    Cardiovascular: Negative.  Negative for chest pain.   Gastrointestinal: Negative.  Negative for nausea.   Musculoskeletal: Positive for arthralgias and myalgias.   Skin: Positive for dry skin and skin lesions.   Allergic/Immunologic: Positive for environmental allergies.   Neurological: Positive for weakness.   Psychiatric/Behavioral: Positive for sleep disturbance. The patient is nervous/anxious.          All other systems were reviewed and are negative.  Exceptions are noted in the subjective or above.      Objective     Vital Signs  Vitals:    02/10/20 1451   BP: (!) 187/66   Pulse: 80   Resp: 16   Temp: 96.8 °F (36 °C)   SpO2: 99%       Body mass index is 22.86 kg/m².    Physical Exam   Constitutional: She is oriented to person, place, and time. She appears well-developed and well-nourished. No distress.   HENT:   Head: Normocephalic and atraumatic.    Nose: Nose normal.   Mouth/Throat: Oropharynx is clear and moist.   Neck: Neck supple. No JVD present.   Cardiovascular: Normal rate, regular rhythm, normal heart sounds and intact distal pulses.   No murmur heard.  Pulmonary/Chest: Effort normal. No stridor. No respiratory distress. She has wheezes. She exhibits tenderness.   Abdominal: Soft. Bowel sounds are normal.   Musculoskeletal: She exhibits no edema.   Lymphadenopathy:     She has no cervical adenopathy.   Neurological: She is alert and oriented to person, place, and time. A sensory deficit is present. She exhibits abnormal muscle tone.   Skin: Skin is warm and dry. Capillary refill takes less than 2 seconds. She is not diaphoretic. There is erythema.        Psychiatric: She has a normal mood and affect. Her behavior is normal. Judgment and thought content normal.   Nursing note and vitals reviewed.           Results Review:    I reviewed the patient's previous clinical results.       Medication Review:     Current Outpatient Medications:   •  albuterol sulfate  (90 Base) MCG/ACT inhaler, Inhale 1 puff Every 4 (Four) Hours As Needed for Shortness of Air., Disp: 18 g, Rfl: 3  •  clonazePAM (KlonoPIN) 0.5 MG tablet, Take 1 tablet by mouth At Night As Needed for Seizures., Disp: 30 tablet, Rfl: 2  •  estrogens, conjugated, (PREMARIN) 0.625 MG tablet, Take 1 tablet by mouth Daily. 200001, Disp: 90 tablet, Rfl: 3  •  fluticasone (FLONASE) 50 MCG/ACT nasal spray, 2 sprays by Each Nare route Daily., Disp: , Rfl:   •  guaiFENesin (MUCINEX) 600 MG 12 hr tablet, Take 1 tablet by mouth 2 (Two) Times a Day., Disp: 20 tablet, Rfl: 0  •  ipratropium-albuterol (DUO-NEB) 0.5-2.5 mg/3 ml nebulizer, Take 3 mL by nebulization Every 6 (Six) Hours As Needed for Wheezing or Shortness of Air., Disp: 360 mL, Rfl:   •  mupirocin (BACTROBAN) 2 % ointment, Apply  topically to the appropriate area as directed 3 (Three) Times a Day., Disp: 30 g, Rfl: 0  •  omeprazole  (priLOSEC) 40 MG capsule, Take 1 capsule by mouth Daily., Disp: 90 capsule, Rfl: 3  •  pravastatin (PRAVACHOL) 20 MG tablet, Take 1 tablet by mouth Every Evening., Disp: 30 tablet, Rfl: 5  •  predniSONE (DELTASONE) 5 MG tablet, Take 1 tablet by mouth Daily for 180 days., Disp: 180 tablet, Rfl: 1  •  promethazine (PHENERGAN) 12.5 MG tablet, Take 1 tablet by mouth Every 8 (Eight) Hours As Needed for Nausea or Vomiting., Disp: 10 tablet, Rfl: 0  •  propranolol (INDERAL) 60 MG tablet, Take 1 tablet by mouth 2 (Two) Times a Day., Disp: 60 tablet, Rfl: 1  •  saline (AYR) gel nasal gel, Apply topically to the appropriate area as directed Every 1 (One) Hour As Needed for nasal irritation, Disp: 22 g, Rfl: 0  •  sertraline (ZOLOFT) 100 MG tablet, Take 1 tablet by mouth every night at bedtime., Disp: 90 tablet, Rfl: 3  •  SUMAtriptan (IMITREX) 50 MG tablet, Take one tablet at onset of headache. May repeat dose one time in 2 hours if headache not relieved., Disp: 12 tablet, Rfl: 1  •  SYMBICORT 160-4.5 MCG/ACT inhaler, INHALE 2 PUFFS BY MOUTH TWICE DAILY, Disp: 10.2 g, Rfl: 4  •  traMADol (ULTRAM) 50 MG tablet, Take 1 tablet by mouth Every 8 (Eight) Hours As Needed (pain)., Disp: 20 tablet, Rfl: 0  •  amLODIPine (NORVASC) 5 MG tablet, Take 1 tablet by mouth Daily., Disp: 30 tablet, Rfl: 1  •  doxycycline (DORYX) 100 MG enteric coated tablet, Take 1 tablet by mouth 2 (Two) Times a Day for 10 days., Disp: 20 tablet, Rfl: 0  •  ipratropium (ATROVENT) 0.02 % nebulizer solution, Take 2.5 mL by nebulization 4 (Four) Times a Day As Needed for Wheezing or Shortness of Air., Disp: 12.5 mL, Rfl: 5    Assessment/Plan   Sadie was seen today for leg injury and shortness of breath.    Diagnoses and all orders for this visit:    Wound of left lower extremity, subsequent encounter  -     Culture, Routine - Swab, Leg, Left  -     Dressing Telfa Pad Nonadherent Straight 1S 3X4IN  Discussed home care instructions and recommend using  antibacterial soap  Essential hypertension  -     amLODIPine (NORVASC) 5 MG tablet; Take 1 tablet by mouth Daily.  Initiate lifestyle modifications.   DASH Diet and exercise.   Compliance with medication regimen and discussed ways to prevent of long-term complications from high blood pressure.  Discussed when to seek medical attention.  Encouraged patient to take blood pressure daily and keep a log.      Cough  Discussed worsening signs and symptoms with patient and family recommend increasing water intake and coughing and deep breathing to prevent worsening signs and symptoms advised patient to use inhalers and steroids as prescribed.  Recommend follow-up with pulmonary if symptoms worsen may need antibiotic for bronchitis     Return in about 4 weeks (around 3/9/2020), or if symptoms worsen or fail to improve.    Jasmin Nayak, APRN  02/10/2020

## 2020-02-12 ENCOUNTER — APPOINTMENT (OUTPATIENT)
Dept: GENERAL RADIOLOGY | Facility: HOSPITAL | Age: 80
End: 2020-02-12

## 2020-02-12 ENCOUNTER — HOSPITAL ENCOUNTER (EMERGENCY)
Facility: HOSPITAL | Age: 80
Discharge: HOME | End: 2020-02-12
Attending: EMERGENCY MEDICINE | Admitting: EMERGENCY MEDICINE

## 2020-02-12 VITALS
BODY MASS INDEX: 23 KG/M2 | HEIGHT: 62 IN | DIASTOLIC BLOOD PRESSURE: 64 MMHG | RESPIRATION RATE: 20 BRPM | HEART RATE: 74 BPM | OXYGEN SATURATION: 97 % | TEMPERATURE: 97.3 F | WEIGHT: 125 LBS | SYSTOLIC BLOOD PRESSURE: 125 MMHG

## 2020-02-12 DIAGNOSIS — J44.1 COPD EXACERBATION (HCC): Primary | ICD-10-CM

## 2020-02-12 LAB
ALBUMIN SERPL-MCNC: 3.5 G/DL (ref 3.5–5.2)
ALBUMIN/GLOB SERPL: 1.1 G/DL
ALP SERPL-CCNC: 61 U/L (ref 39–117)
ALT SERPL W P-5'-P-CCNC: 12 U/L (ref 1–33)
ANION GAP SERPL CALCULATED.3IONS-SCNC: 8.2 MMOL/L (ref 5–15)
AST SERPL-CCNC: 15 U/L (ref 1–32)
BASOPHILS # BLD AUTO: 0.06 10*3/MM3 (ref 0–0.2)
BASOPHILS NFR BLD AUTO: 0.5 % (ref 0–1.5)
BILIRUB SERPL-MCNC: 0.4 MG/DL (ref 0.2–1.2)
BUN BLD-MCNC: 23 MG/DL (ref 8–23)
BUN/CREAT SERPL: 18.7 (ref 7–25)
CALCIUM SPEC-SCNC: 9.3 MG/DL (ref 8.6–10.5)
CHLORIDE SERPL-SCNC: 97 MMOL/L (ref 98–107)
CO2 SERPL-SCNC: 32.8 MMOL/L (ref 22–29)
CREAT BLD-MCNC: 1.23 MG/DL (ref 0.57–1)
DEPRECATED RDW RBC AUTO: 44.8 FL (ref 37–54)
EOSINOPHIL # BLD AUTO: 0.21 10*3/MM3 (ref 0–0.4)
EOSINOPHIL NFR BLD AUTO: 1.9 % (ref 0.3–6.2)
ERYTHROCYTE [DISTWIDTH] IN BLOOD BY AUTOMATED COUNT: 13.2 % (ref 12.3–15.4)
FLUAV AG NPH QL: NEGATIVE
FLUBV AG NPH QL IA: NEGATIVE
GFR SERPL CREATININE-BSD FRML MDRD: 42 ML/MIN/1.73
GLOBULIN UR ELPH-MCNC: 3.3 GM/DL
GLUCOSE BLD-MCNC: 114 MG/DL (ref 65–99)
HCT VFR BLD AUTO: 39 % (ref 34–46.6)
HGB BLD-MCNC: 12.3 G/DL (ref 12–15.9)
HOLD SPECIMEN: NORMAL
HOLD SPECIMEN: NORMAL
IMM GRANULOCYTES # BLD AUTO: 0.04 10*3/MM3 (ref 0–0.05)
IMM GRANULOCYTES NFR BLD AUTO: 0.4 % (ref 0–0.5)
LYMPHOCYTES # BLD AUTO: 1.93 10*3/MM3 (ref 0.7–3.1)
LYMPHOCYTES NFR BLD AUTO: 17.2 % (ref 19.6–45.3)
MCH RBC QN AUTO: 29.1 PG (ref 26.6–33)
MCHC RBC AUTO-ENTMCNC: 31.5 G/DL (ref 31.5–35.7)
MCV RBC AUTO: 92.2 FL (ref 79–97)
MONOCYTES # BLD AUTO: 0.79 10*3/MM3 (ref 0.1–0.9)
MONOCYTES NFR BLD AUTO: 7 % (ref 5–12)
NEUTROPHILS # BLD AUTO: 8.18 10*3/MM3 (ref 1.7–7)
NEUTROPHILS NFR BLD AUTO: 73 % (ref 42.7–76)
NRBC BLD AUTO-RTO: 0 /100 WBC (ref 0–0.2)
NT-PROBNP SERPL-MCNC: 145 PG/ML (ref 5–1800)
PLATELET # BLD AUTO: 179 10*3/MM3 (ref 140–450)
PMV BLD AUTO: 9.4 FL (ref 6–12)
POTASSIUM BLD-SCNC: 4.2 MMOL/L (ref 3.5–5.2)
PROT SERPL-MCNC: 6.8 G/DL (ref 6–8.5)
RBC # BLD AUTO: 4.23 10*6/MM3 (ref 3.77–5.28)
SODIUM BLD-SCNC: 138 MMOL/L (ref 136–145)
TROPONIN T SERPL-MCNC: <0.01 NG/ML (ref 0–0.03)
WBC NRBC COR # BLD: 11.21 10*3/MM3 (ref 3.4–10.8)
WHOLE BLOOD HOLD SPECIMEN: NORMAL
WHOLE BLOOD HOLD SPECIMEN: NORMAL

## 2020-02-12 PROCEDURE — 99284 EMERGENCY DEPT VISIT MOD MDM: CPT

## 2020-02-12 PROCEDURE — 25010000002 MAGNESIUM SULFATE 2 GM/50ML SOLUTION: Performed by: PHYSICIAN ASSISTANT

## 2020-02-12 PROCEDURE — 96375 TX/PRO/DX INJ NEW DRUG ADDON: CPT

## 2020-02-12 PROCEDURE — 93005 ELECTROCARDIOGRAM TRACING: CPT | Performed by: EMERGENCY MEDICINE

## 2020-02-12 PROCEDURE — 87804 INFLUENZA ASSAY W/OPTIC: CPT | Performed by: PHYSICIAN ASSISTANT

## 2020-02-12 PROCEDURE — 94799 UNLISTED PULMONARY SVC/PX: CPT

## 2020-02-12 PROCEDURE — 85025 COMPLETE CBC W/AUTO DIFF WBC: CPT | Performed by: PHYSICIAN ASSISTANT

## 2020-02-12 PROCEDURE — 96365 THER/PROPH/DIAG IV INF INIT: CPT

## 2020-02-12 PROCEDURE — 83880 ASSAY OF NATRIURETIC PEPTIDE: CPT | Performed by: PHYSICIAN ASSISTANT

## 2020-02-12 PROCEDURE — 84484 ASSAY OF TROPONIN QUANT: CPT | Performed by: PHYSICIAN ASSISTANT

## 2020-02-12 PROCEDURE — 71046 X-RAY EXAM CHEST 2 VIEWS: CPT

## 2020-02-12 PROCEDURE — 25010000002 METHYLPREDNISOLONE PER 125 MG: Performed by: PHYSICIAN ASSISTANT

## 2020-02-12 PROCEDURE — 80053 COMPREHEN METABOLIC PANEL: CPT | Performed by: PHYSICIAN ASSISTANT

## 2020-02-12 PROCEDURE — 94640 AIRWAY INHALATION TREATMENT: CPT

## 2020-02-12 RX ORDER — DOXYCYCLINE 100 MG/1
100 CAPSULE ORAL ONCE
Status: COMPLETED | OUTPATIENT
Start: 2020-02-12 | End: 2020-02-12

## 2020-02-12 RX ORDER — IPRATROPIUM BROMIDE AND ALBUTEROL SULFATE 2.5; .5 MG/3ML; MG/3ML
6 SOLUTION RESPIRATORY (INHALATION) ONCE
Status: COMPLETED | OUTPATIENT
Start: 2020-02-12 | End: 2020-02-12

## 2020-02-12 RX ORDER — DOXYCYCLINE HYCLATE 100 MG/1
100 TABLET, DELAYED RELEASE ORAL 2 TIMES DAILY
Qty: 20 TABLET | Refills: 0 | Status: SHIPPED | OUTPATIENT
Start: 2020-02-12 | End: 2020-02-22

## 2020-02-12 RX ORDER — MAGNESIUM SULFATE HEPTAHYDRATE 40 MG/ML
2 INJECTION, SOLUTION INTRAVENOUS ONCE
Status: COMPLETED | OUTPATIENT
Start: 2020-02-12 | End: 2020-02-12

## 2020-02-12 RX ORDER — METHYLPREDNISOLONE SODIUM SUCCINATE 125 MG/2ML
125 INJECTION, POWDER, LYOPHILIZED, FOR SOLUTION INTRAMUSCULAR; INTRAVENOUS ONCE
Status: COMPLETED | OUTPATIENT
Start: 2020-02-12 | End: 2020-02-12

## 2020-02-12 RX ORDER — SODIUM CHLORIDE 0.9 % (FLUSH) 0.9 %
10 SYRINGE (ML) INJECTION AS NEEDED
Status: DISCONTINUED | OUTPATIENT
Start: 2020-02-12 | End: 2020-02-12 | Stop reason: HOSPADM

## 2020-02-12 RX ADMIN — DOXYCYCLINE 100 MG: 100 CAPSULE ORAL at 20:51

## 2020-02-12 RX ADMIN — SODIUM CHLORIDE 500 ML: 9 INJECTION, SOLUTION INTRAVENOUS at 20:45

## 2020-02-12 RX ADMIN — METHYLPREDNISOLONE SODIUM SUCCINATE 125 MG: 125 INJECTION, POWDER, FOR SOLUTION INTRAMUSCULAR; INTRAVENOUS at 19:56

## 2020-02-12 RX ADMIN — IPRATROPIUM BROMIDE AND ALBUTEROL SULFATE 6 ML: .5; 3 SOLUTION RESPIRATORY (INHALATION) at 20:19

## 2020-02-12 RX ADMIN — MAGNESIUM SULFATE HEPTAHYDRATE 2 G: 40 INJECTION, SOLUTION INTRAVENOUS at 20:53

## 2020-02-13 NOTE — ED PROVIDER NOTES
"Subjective   Patient is an 79-year-old female with a history of arthritis, uterine cancer, COPD, GERD, hyperlipidemia and hypertension presenting to ER for evaluation of shortness of breath.  Patient's daughter is at bedside helps with HPI.  They state the patient has been feeling short of breath for the past 5 days.  She saw her PCP on Monday and was told she had a \"touch of bronchitis\" she has supposed to have been taking prednisone and breathing treatments but her daughter is unsure if she is taking these correctly.  They did not place her on an antibiotic.  Patient states that she is had a very mild productive cough, denies hemoptysis.  She denies any chest pain.  She states she does have some pain in her right ribs with coughing.  She states she has had some mild nausea, no emesis or diarrhea. She denies any fever, chills, headache, dizziness, abdominal pain, leg pain or swelling, or any other symptoms.  Patient wears 2 L nasal cannula at home.          Review of Systems   Constitutional: Negative for chills and fever.   HENT: Negative.    Eyes: Negative.    Respiratory: Positive for cough, chest tightness and shortness of breath.    Cardiovascular: Negative for chest pain and leg swelling.   Gastrointestinal: Positive for nausea. Negative for abdominal pain, diarrhea and vomiting.   Genitourinary: Negative.    Musculoskeletal: Negative.    Skin: Negative.    Allergic/Immunologic: Negative for immunocompromised state.   Neurological: Negative.    Psychiatric/Behavioral: Negative.        Past Medical History:   Diagnosis Date   • Arthritis    • Cancer (CMS/[facility])     uterine cancer (1981)   • COPD (chronic obstructive pulmonary disease) (CMS/[facility])    • Depression    • Elevated cholesterol    • GERD (gastroesophageal reflux disease)    • History of emphysema    • History of esophageal reflux    • History of recurrent UTI (urinary tract infection)    • History of renal calculi    • History of uterine cancer    • " "Hyperlipidemia    • Hypertension    • Pneumonia 2019       Allergies   Allergen Reactions   • Morphine And Related Irritability       Past Surgical History:   Procedure Laterality Date   • FOOT SURGERY     • HAND SURGERY     • HYSTERECTOMY         Family History   Problem Relation Age of Onset   • Cancer Mother    • Heart attack Father    • Arthritis Father    • Heart disease Father    • Diabetes Daughter    • Hyperlipidemia Daughter    • Migraines Daughter    • Heart disease Sister    • Diabetes Son    • Glaucoma Son        Social History     Socioeconomic History   • Marital status:      Spouse name: Not on file   • Number of children: Not on file   • Years of education: Not on file   • Highest education level: Not on file   Tobacco Use   • Smoking status: Former Smoker     Packs/day: 0.25     Types: Cigarettes     Last attempt to quit: 2019     Years since quittin.1   • Smokeless tobacco: Never Used   Substance and Sexual Activity   • Alcohol use: No   • Drug use: No   • Sexual activity: Defer     Comment:            Objective   Physical Exam   Nursing note and vitals reviewed.  /64   Pulse 74   Temp 97.3 °F (36.3 °C) (Oral)   Resp 20   Ht 157.5 cm (62\")   Wt 56.7 kg (125 lb)   SpO2 97%   BMI 22.86 kg/m²     GEN: No acute distress, sitting upright in stretcher.  Awake and alert.  Does not appear toxic  Head: Normocephalic, atraumatic  Eyes: EOM intact  ENT: Posterior pharynx normal in appearance, oral mucosa is moist, tongue midline, dry.  Chest: Nontender to palpation  Cardiovascular: Regular rate and rhythm   Lungs: Breathing is even, mildly tachypneic.  There is very poor air movement with diminished lung sounds throughout, some mild wheezes  Abdomen: Soft, nontender, nondistended, no peritoneal signs or guarding  Extremities: No edema, normal appearance, full ROM.  Radial and posterior tibialis pulses are 2+ and equal  Neuro: GCS 15  Psych: Mood and affect are " appropriate    Procedures           ED Course  ED Course as of Feb 12 2233   Wed Feb 12, 2020   1935 EKG interpreted by me: Sinus rhythm, normal rate, left axis, no acute ST/T changes, this is a borderline EKG    [MP]   2000 Glucose(!): 114 [LA]   2000 Close to baseline   Creatinine(!): 1.23 [LA]   2000 Sodium: 138 [LA]   2000 Potassium: 4.2 [LA]   2000 Chloride(!): 97 [LA]   2000 CO2(!): 32.8 [LA]   2000 ALT (SGPT): 12 [LA]   2000 AST (SGOT): 15 [LA]   2000 Alkaline Phosphatase: 61 [LA]   2000 Total Bilirubin: 0.4 [LA]   2000 WBC(!): 11.21 [LA]   2000 Hemoglobin: 12.3 [LA]   2000 Platelets: 179 [LA]   2014 Influenza A Ag, EIA: Negative [LA]   2014 Reviewed x-ray with Dr. Louis.  Appears similar to previous.   Influenza B Ag, EIA: Negative [LA]   2024 Patient getting neb treatment now.    [LA]   2046 States she feels much better.  She has better air movement with some diffuse wheezing.  I will give her magnesium.  I did discuss starting an antibiotic.  Discussed the case with Dr. Louis and he is in agreement.    [LA]   2128 Patient resting comfortably in stretcher.  Vital signs are stable.  Patient was given first dose of doxycycline here.  She feels that she wants to go home at this time and I believe this is appropriate.  We will have her follow-up with her pulmonologist and PCP.  Discussed strict return precautions.    [LA]      ED Course User Index  [LA] Maria E Rodriguez PA-C  [MP] Gil Louis MD                                           MDM  Number of Diagnoses or Management Options  COPD exacerbation (CMS/[facility]):   Diagnosis management comments: On arrival, patient is afebrile, normotensive, no tachycardia or hypoxia.  She is wearing her usual 2 L nasal cannula.  Differential includes bronchitis, COPD exacerbation, CHF, ACS, pneumonia, and other concerns.  Low concern for any kind of pulmonary embolism.  It sounds very much like a COPD exacerbation.  Will obtain basic labs including a BNP, troponin,  EKG and chest x-ray.  We will give DuoNeb's and IV steroids.  Will also obtain influenza swab.    Patient has a mildly elevated leukocytosis of 11.21 which could be related to recent steroid use.  Her creatinine is 1.23 which appears to be similar to baseline, mild hypochloremia.  proBNP was not elevated.  Troponin was not elevated.  EKG was interpreted by the physician.  Influenza negative.  Chest x-ray was reviewed with physician and appears to be similar to previous.  Patient feels much better after medications, better air movement on auscultation..  Did give a 500 cc bolus and 2 g of magnesium through the IV to help. Discussed case with Dr. Louis.  She states she already has inhalers and steroids to use at home.  I did add doxy and gave her first dose here.  Her vital signs have been stable.  Discussed follow-up and strict return precautions.  They verbalized understanding and were in agreement with this plan of care       Amount and/or Complexity of Data Reviewed  Clinical lab tests: reviewed and ordered  Discussion of test results with the performing providers: yes  Decide to obtain previous medical records or to obtain history from someone other than the patient: yes  Review and summarize past medical records: yes  Discuss the patient with other providers: yes    Risk of Complications, Morbidity, and/or Mortality  Presenting problems: moderate  Diagnostic procedures: moderate  Management options: low    Patient Progress  Patient progress: improved      Final diagnoses:   COPD exacerbation (CMS/[facility])            Maria E Rodriguez PA-C  02/12/20 1018

## 2020-02-13 NOTE — DISCHARGE INSTRUCTIONS
Likely viral illness that is worsening your COPD.  Take your inhalers and steroids as directed by your provider.  May continue Mucinex.  Drink plenty of water to thin mucus.  Take doxycycline to help as directed.  You need follow-up with your PCP and your pulmonologist as early as possible to reevaluate your symptoms.  Return to the ER for any change, worsening symptoms, or any additional concerns including but not limited to worsening shortness of breath, chest pain,dizziness, fever >100.4.

## 2020-02-14 ENCOUNTER — TELEPHONE (OUTPATIENT)
Dept: INTERNAL MEDICINE | Facility: CLINIC | Age: 80
End: 2020-02-14

## 2020-02-14 LAB
BACTERIA SPEC AEROBE CULT: NORMAL
BACTERIA SPEC CULT: NORMAL

## 2020-03-02 DIAGNOSIS — F41.9 ANXIETY: ICD-10-CM

## 2020-03-02 RX ORDER — CLONAZEPAM 0.5 MG/1
TABLET ORAL
Qty: 30 TABLET | Refills: 1 | Status: SHIPPED | OUTPATIENT
Start: 2020-03-02 | End: 2020-03-30 | Stop reason: SDUPTHER

## 2020-03-03 RX ORDER — CLONAZEPAM 0.5 MG/1
TABLET ORAL
Qty: 90 TABLET | OUTPATIENT
Start: 2020-03-03

## 2020-03-09 ENCOUNTER — APPOINTMENT (OUTPATIENT)
Dept: GENERAL RADIOLOGY | Facility: HOSPITAL | Age: 80
End: 2020-03-09

## 2020-03-09 ENCOUNTER — HOSPITAL ENCOUNTER (EMERGENCY)
Facility: HOSPITAL | Age: 80
Discharge: HOME | End: 2020-03-09
Attending: EMERGENCY MEDICINE | Admitting: EMERGENCY MEDICINE

## 2020-03-09 ENCOUNTER — TELEPHONE (OUTPATIENT)
Dept: INTERNAL MEDICINE | Facility: CLINIC | Age: 80
End: 2020-03-09

## 2020-03-09 VITALS
OXYGEN SATURATION: 98 % | RESPIRATION RATE: 20 BRPM | SYSTOLIC BLOOD PRESSURE: 142 MMHG | WEIGHT: 121 LBS | DIASTOLIC BLOOD PRESSURE: 61 MMHG | HEIGHT: 62 IN | HEART RATE: 86 BPM | BODY MASS INDEX: 22.26 KG/M2 | TEMPERATURE: 98.1 F

## 2020-03-09 DIAGNOSIS — J44.1 COPD EXACERBATION (HCC): Primary | ICD-10-CM

## 2020-03-09 LAB
ALBUMIN SERPL-MCNC: 4.1 G/DL (ref 3.5–5.2)
ALBUMIN/GLOB SERPL: 1.4 G/DL
ALP SERPL-CCNC: 71 U/L (ref 39–117)
ALT SERPL W P-5'-P-CCNC: 16 U/L (ref 1–33)
ANION GAP SERPL CALCULATED.3IONS-SCNC: 8.6 MMOL/L (ref 5–15)
AST SERPL-CCNC: 18 U/L (ref 1–32)
BASOPHILS # BLD AUTO: 0.07 10*3/MM3 (ref 0–0.2)
BASOPHILS NFR BLD AUTO: 0.5 % (ref 0–1.5)
BILIRUB SERPL-MCNC: 0.2 MG/DL (ref 0.2–1.2)
BUN BLD-MCNC: 20 MG/DL (ref 8–23)
BUN/CREAT SERPL: 23.8 (ref 7–25)
CALCIUM SPEC-SCNC: 9.4 MG/DL (ref 8.6–10.5)
CHLORIDE SERPL-SCNC: 97 MMOL/L (ref 98–107)
CO2 SERPL-SCNC: 34.4 MMOL/L (ref 22–29)
CREAT BLD-MCNC: 0.84 MG/DL (ref 0.57–1)
DEPRECATED RDW RBC AUTO: 44.9 FL (ref 37–54)
EOSINOPHIL # BLD AUTO: 0.33 10*3/MM3 (ref 0–0.4)
EOSINOPHIL NFR BLD AUTO: 2.4 % (ref 0.3–6.2)
ERYTHROCYTE [DISTWIDTH] IN BLOOD BY AUTOMATED COUNT: 13.1 % (ref 12.3–15.4)
GFR SERPL CREATININE-BSD FRML MDRD: 65 ML/MIN/1.73
GLOBULIN UR ELPH-MCNC: 3 GM/DL
GLUCOSE BLD-MCNC: 128 MG/DL (ref 65–99)
HCT VFR BLD AUTO: 39.8 % (ref 34–46.6)
HGB BLD-MCNC: 12.4 G/DL (ref 12–15.9)
HOLD SPECIMEN: NORMAL
HOLD SPECIMEN: NORMAL
IMM GRANULOCYTES # BLD AUTO: 0.06 10*3/MM3 (ref 0–0.05)
IMM GRANULOCYTES NFR BLD AUTO: 0.4 % (ref 0–0.5)
LYMPHOCYTES # BLD AUTO: 2.09 10*3/MM3 (ref 0.7–3.1)
LYMPHOCYTES NFR BLD AUTO: 15.1 % (ref 19.6–45.3)
MCH RBC QN AUTO: 29 PG (ref 26.6–33)
MCHC RBC AUTO-ENTMCNC: 31.2 G/DL (ref 31.5–35.7)
MCV RBC AUTO: 93.2 FL (ref 79–97)
MONOCYTES # BLD AUTO: 1.1 10*3/MM3 (ref 0.1–0.9)
MONOCYTES NFR BLD AUTO: 8 % (ref 5–12)
NEUTROPHILS # BLD AUTO: 10.18 10*3/MM3 (ref 1.7–7)
NEUTROPHILS NFR BLD AUTO: 73.6 % (ref 42.7–76)
NRBC BLD AUTO-RTO: 0 /100 WBC (ref 0–0.2)
NT-PROBNP SERPL-MCNC: 191 PG/ML (ref 5–1800)
PLATELET # BLD AUTO: 233 10*3/MM3 (ref 140–450)
PMV BLD AUTO: 9.5 FL (ref 6–12)
POTASSIUM BLD-SCNC: 4.3 MMOL/L (ref 3.5–5.2)
PROT SERPL-MCNC: 7.1 G/DL (ref 6–8.5)
RBC # BLD AUTO: 4.27 10*6/MM3 (ref 3.77–5.28)
SODIUM BLD-SCNC: 140 MMOL/L (ref 136–145)
TROPONIN T SERPL-MCNC: <0.01 NG/ML (ref 0–0.03)
WBC NRBC COR # BLD: 13.83 10*3/MM3 (ref 3.4–10.8)
WHOLE BLOOD HOLD SPECIMEN: NORMAL
WHOLE BLOOD HOLD SPECIMEN: NORMAL

## 2020-03-09 PROCEDURE — 71046 X-RAY EXAM CHEST 2 VIEWS: CPT

## 2020-03-09 PROCEDURE — 93005 ELECTROCARDIOGRAM TRACING: CPT | Performed by: EMERGENCY MEDICINE

## 2020-03-09 PROCEDURE — 25010000002 MAGNESIUM SULFATE 2 GM/50ML SOLUTION: Performed by: EMERGENCY MEDICINE

## 2020-03-09 PROCEDURE — 80053 COMPREHEN METABOLIC PANEL: CPT | Performed by: EMERGENCY MEDICINE

## 2020-03-09 PROCEDURE — 94640 AIRWAY INHALATION TREATMENT: CPT

## 2020-03-09 PROCEDURE — 96365 THER/PROPH/DIAG IV INF INIT: CPT

## 2020-03-09 PROCEDURE — 85025 COMPLETE CBC W/AUTO DIFF WBC: CPT | Performed by: EMERGENCY MEDICINE

## 2020-03-09 PROCEDURE — 25010000002 METHYLPREDNISOLONE PER 125 MG: Performed by: EMERGENCY MEDICINE

## 2020-03-09 PROCEDURE — 84484 ASSAY OF TROPONIN QUANT: CPT | Performed by: EMERGENCY MEDICINE

## 2020-03-09 PROCEDURE — 99284 EMERGENCY DEPT VISIT MOD MDM: CPT

## 2020-03-09 PROCEDURE — 96375 TX/PRO/DX INJ NEW DRUG ADDON: CPT

## 2020-03-09 PROCEDURE — 83880 ASSAY OF NATRIURETIC PEPTIDE: CPT | Performed by: EMERGENCY MEDICINE

## 2020-03-09 RX ORDER — MAGNESIUM SULFATE HEPTAHYDRATE 40 MG/ML
2 INJECTION, SOLUTION INTRAVENOUS ONCE
Status: COMPLETED | OUTPATIENT
Start: 2020-03-09 | End: 2020-03-09

## 2020-03-09 RX ORDER — METHYLPREDNISOLONE SODIUM SUCCINATE 125 MG/2ML
125 INJECTION, POWDER, LYOPHILIZED, FOR SOLUTION INTRAMUSCULAR; INTRAVENOUS ONCE
Status: COMPLETED | OUTPATIENT
Start: 2020-03-09 | End: 2020-03-09

## 2020-03-09 RX ORDER — IPRATROPIUM BROMIDE AND ALBUTEROL SULFATE 2.5; .5 MG/3ML; MG/3ML
9 SOLUTION RESPIRATORY (INHALATION) ONCE
Status: COMPLETED | OUTPATIENT
Start: 2020-03-09 | End: 2020-03-09

## 2020-03-09 RX ORDER — AMOXICILLIN AND CLAVULANATE POTASSIUM 875; 125 MG/1; MG/1
1 TABLET, FILM COATED ORAL 2 TIMES DAILY
Qty: 14 TABLET | Refills: 0 | Status: SHIPPED | OUTPATIENT
Start: 2020-03-09 | End: 2020-03-16

## 2020-03-09 RX ORDER — PREDNISONE 20 MG/1
60 TABLET ORAL DAILY
Qty: 15 TABLET | Refills: 0 | Status: SHIPPED | OUTPATIENT
Start: 2020-03-09 | End: 2020-03-14

## 2020-03-09 RX ORDER — SODIUM CHLORIDE 0.9 % (FLUSH) 0.9 %
10 SYRINGE (ML) INJECTION AS NEEDED
Status: DISCONTINUED | OUTPATIENT
Start: 2020-03-09 | End: 2020-03-10 | Stop reason: HOSPADM

## 2020-03-09 RX ADMIN — METHYLPREDNISOLONE SODIUM SUCCINATE 125 MG: 125 INJECTION, POWDER, FOR SOLUTION INTRAMUSCULAR; INTRAVENOUS at 20:42

## 2020-03-09 RX ADMIN — MAGNESIUM SULFATE HEPTAHYDRATE 2 G: 40 INJECTION, SOLUTION INTRAVENOUS at 20:42

## 2020-03-09 RX ADMIN — IPRATROPIUM BROMIDE AND ALBUTEROL SULFATE 9 ML: .5; 3 SOLUTION RESPIRATORY (INHALATION) at 20:31

## 2020-03-09 NOTE — TELEPHONE ENCOUNTER
TJ with [facility] called and stated that early refills are needed for the following prescription(s):    clonazePAM (KlonoPIN) 0.5 MG tablet    Pharmacy: [facility] -confirmed  PH: [phone]  FX: [phone]    Please advise.

## 2020-03-10 NOTE — TELEPHONE ENCOUNTER
Called pharmacy back in regards to this, spoke with RIGOBERTO he states they filled the prescription 1/6/20 for pt and per the pt's AKUA she should still have pills left as she would be a month early as she would be due for another refill 4/6/20. RIGOBERTO stated he would have the pt call us to discuss this matter. Just AICHA.

## 2020-03-10 NOTE — ED PROVIDER NOTES
Subjective   79-year-old female present with shortness of breath.  She states over the last few days she has had increased cough, increased shortness of breath.  No fevers, chills, nausea, vomiting, abdominal pain, chest pain or other complaints.          Review of Systems   Constitutional: Negative.    HENT: Negative.    Eyes: Negative.    Respiratory: Positive for cough and shortness of breath.    Cardiovascular: Negative.    Gastrointestinal: Negative.    Genitourinary: Negative.    Musculoskeletal: Negative.    Skin: Negative.    Neurological: Negative.    Psychiatric/Behavioral: Negative.        Past Medical History:   Diagnosis Date   • Arthritis    • Cancer (CMS/[facility])     uterine cancer ()   • COPD (chronic obstructive pulmonary disease) (CMS/[facility])    • Depression    • Elevated cholesterol    • GERD (gastroesophageal reflux disease)    • History of emphysema    • History of esophageal reflux    • History of recurrent UTI (urinary tract infection)    • History of renal calculi    • History of uterine cancer    • Hyperlipidemia    • Hypertension    • Pneumonia 2019       Allergies   Allergen Reactions   • Morphine And Related Irritability       Past Surgical History:   Procedure Laterality Date   • FOOT SURGERY     • HAND SURGERY     • HYSTERECTOMY         Family History   Problem Relation Age of Onset   • Cancer Mother    • Heart attack Father    • Arthritis Father    • Heart disease Father    • Diabetes Daughter    • Hyperlipidemia Daughter    • Migraines Daughter    • Heart disease Sister    • Diabetes Son    • Glaucoma Son        Social History     Socioeconomic History   • Marital status:      Spouse name: Not on file   • Number of children: Not on file   • Years of education: Not on file   • Highest education level: Not on file   Tobacco Use   • Smoking status: Former Smoker     Packs/day: 0.25     Types: Cigarettes     Last attempt to quit: 2019     Years since quittin.2   •  Smokeless tobacco: Never Used   Substance and Sexual Activity   • Alcohol use: No   • Drug use: No   • Sexual activity: Defer     Comment:            Objective   Physical Exam   Constitutional: She is oriented to person, place, and time. No distress.   Elderly, frail   HENT:   Head: Normocephalic and atraumatic.   Right Ear: External ear normal.   Left Ear: External ear normal.   Nose: Nose normal.   Mouth/Throat: Oropharynx is clear and moist.   Eyes: Pupils are equal, round, and reactive to light. Conjunctivae and EOM are normal.   Neck: Normal range of motion. Neck supple.   Cardiovascular: Normal rate, regular rhythm, normal heart sounds and intact distal pulses.   Pulmonary/Chest: No respiratory distress.   Mild tachypnea, prolonged expiration, wheezes throughout   Abdominal: Soft. Bowel sounds are normal. She exhibits no distension. There is no tenderness. There is no rebound and no guarding.   Musculoskeletal: Normal range of motion. She exhibits no edema, tenderness or deformity.   Neurological: She is alert and oriented to person, place, and time.   Skin: Skin is warm and dry. No rash noted.   Psychiatric: She has a normal mood and affect. Her behavior is normal.   Nursing note and vitals reviewed.      Procedures           ED Course                                           MDM  Number of Diagnoses or Management Options  COPD exacerbation (CMS/[facility]):   Diagnosis management comments: 79-year-old female with acute on chronic shortness of breath.  Elderly, frail lady in no distress with exam as above.  She does have some wheezing.  Her vital signs are fairly reassuring otherwise.  Will obtain labs, EKG and chest x-ray.  Will treat symptomatically.  Disposition pending work-up response therapy.    DDX: COPD, ACS, pneumonia, CHF    EKG interpreted by me: Sinus rhythm, normal rate, no acute ST/T changes, low voltage QRS complexes, this is an atypical EKG    Lab work notable for mild leukocytosis, otherwise  no significant abnormalities.  Chest x-ray per radiology reveals no acute abnormalities.  On reevaluation she is sleeping soundly, her oxygen saturation is normal.  Lung sounds have improved.  I do feel she is safe for discharge home with outpatient therapy.  We will have her follow-up with her primary doctor for further evaluation.  Will treat COPD exacerbation with steroids and antibiotics.  Return precautions discussed.  Patient and daughter are comfortable with and understanding of the plan.       Amount and/or Complexity of Data Reviewed  Clinical lab tests: reviewed  Tests in the radiology section of CPT®: reviewed  Decide to obtain previous medical records or to obtain history from someone other than the patient: yes        Final diagnoses:   COPD exacerbation (CMS/[facility])            Gil Louis MD  03/09/20 2769

## 2020-03-12 ENCOUNTER — OFFICE VISIT (OUTPATIENT)
Dept: INTERNAL MEDICINE | Facility: CLINIC | Age: 80
End: 2020-03-12

## 2020-03-12 VITALS
SYSTOLIC BLOOD PRESSURE: 158 MMHG | OXYGEN SATURATION: 99 % | BODY MASS INDEX: 22.26 KG/M2 | HEART RATE: 79 BPM | WEIGHT: 121 LBS | HEIGHT: 62 IN | RESPIRATION RATE: 16 BRPM | DIASTOLIC BLOOD PRESSURE: 79 MMHG

## 2020-03-12 DIAGNOSIS — J44.1 COPD WITH EXACERBATION (HCC): Primary | ICD-10-CM

## 2020-03-12 DIAGNOSIS — I10 ESSENTIAL HYPERTENSION: ICD-10-CM

## 2020-03-12 DIAGNOSIS — F41.9 ANXIETY: ICD-10-CM

## 2020-03-12 DIAGNOSIS — I87.2 VENOUS INSUFFICIENCY OF BOTH LOWER EXTREMITIES: ICD-10-CM

## 2020-03-12 PROCEDURE — 99214 OFFICE O/P EST MOD 30 MIN: CPT | Performed by: NURSE PRACTITIONER

## 2020-03-17 ENCOUNTER — TELEPHONE (OUTPATIENT)
Dept: INTERNAL MEDICINE | Facility: CLINIC | Age: 80
End: 2020-03-17

## 2020-03-17 NOTE — TELEPHONE ENCOUNTER
PT'S DAUGHTER ANTWON CALLED STATES PT WAS IN OFFICE ON 3/12/20 WITH MONY AND STATES SHE WAS GIVEN A SAMPLE INHALER AND WAS TOLD TO CALL BACK IF IT HELPED AND MONY WOULD WILL BE ABLE TO GET A PRESCRIPTION FOR THE INHALER.     ANTWON IS REQUESTING A CALL BACK TO DISCUSS PHARMACY THAT MIGHT NEED A PRIOR AUTHORIZATION.     BEVESPI AEROSPHERE 9 MG - 4.8 ML    CONTACT: [phone]  CELL: [phone]    CONFIRMED PHARMACY:  [facility] - [address] [phone] [facility] [phone]

## 2020-03-18 ENCOUNTER — TELEPHONE (OUTPATIENT)
Dept: PULMONOLOGY | Facility: CLINIC | Age: 80
End: 2020-03-18

## 2020-03-18 ENCOUNTER — TELEPHONE (OUTPATIENT)
Dept: INTERNAL MEDICINE | Facility: CLINIC | Age: 80
End: 2020-03-18

## 2020-03-18 NOTE — TELEPHONE ENCOUNTER
PATIENT WANTS MEDICINE SENT TO INHALER SENT IN TO [facility] AND WANTS A CALL BACK WITHIN AN HOUR . AND PT DAUGHTER UPSET THAT ITS A NEW MEDICINE? BEING SENT IN AND STILL WONDERING ABOUT COST AND PA PLEASE ADVISE AND GIVE PATIENT DAUGHTER A CALL. THANKS

## 2020-03-19 ENCOUNTER — TELEPHONE (OUTPATIENT)
Dept: INTERNAL MEDICINE | Facility: CLINIC | Age: 80
End: 2020-03-19

## 2020-03-19 NOTE — TELEPHONE ENCOUNTER
Hub may take message. I need Medicare D information including Medicare D number and kind of Medicare D.

## 2020-03-23 ENCOUNTER — TELEPHONE (OUTPATIENT)
Dept: INTERNAL MEDICINE | Facility: CLINIC | Age: 80
End: 2020-03-23

## 2020-03-23 NOTE — TELEPHONE ENCOUNTER
PT'S DAUGHTER CALLED TO REQUEST A REFILL FOR BACITRACIN/NYSTATIN/SILVER SULFADIAZINE TOPICAL      APPLIED TO APPROPRIATE AREA TWICE A DAY  QTY 45      SCOTT [phone]     PLEASE ADVISE

## 2020-03-23 NOTE — TELEPHONE ENCOUNTER
Please contact patient and let her know that I sent in Rx for Bactroban but the other that she is requesting a refill on his compound and she did not receive it from me previously

## 2020-03-30 ENCOUNTER — OFFICE VISIT (OUTPATIENT)
Dept: INTERNAL MEDICINE | Facility: CLINIC | Age: 80
End: 2020-03-30

## 2020-03-30 ENCOUNTER — HOSPITAL ENCOUNTER (OUTPATIENT)
Dept: ULTRASOUND IMAGING | Facility: HOSPITAL | Age: 80
Discharge: HOME | End: 2020-03-30
Admitting: INTERNAL MEDICINE

## 2020-03-30 VITALS
WEIGHT: 127 LBS | TEMPERATURE: 97.4 F | HEART RATE: 69 BPM | SYSTOLIC BLOOD PRESSURE: 122 MMHG | BODY MASS INDEX: 23.37 KG/M2 | DIASTOLIC BLOOD PRESSURE: 74 MMHG | OXYGEN SATURATION: 98 % | RESPIRATION RATE: 16 BRPM | HEIGHT: 62 IN

## 2020-03-30 DIAGNOSIS — F41.9 ANXIETY: ICD-10-CM

## 2020-03-30 DIAGNOSIS — K21.9 GASTROESOPHAGEAL REFLUX DISEASE WITHOUT ESOPHAGITIS: ICD-10-CM

## 2020-03-30 DIAGNOSIS — J41.0 SIMPLE CHRONIC BRONCHITIS (HCC): ICD-10-CM

## 2020-03-30 DIAGNOSIS — M79.89 LEG SWELLING: ICD-10-CM

## 2020-03-30 DIAGNOSIS — I10 ESSENTIAL HYPERTENSION: Primary | Chronic | ICD-10-CM

## 2020-03-30 PROCEDURE — 93970 EXTREMITY STUDY: CPT

## 2020-03-30 PROCEDURE — 99214 OFFICE O/P EST MOD 30 MIN: CPT | Performed by: INTERNAL MEDICINE

## 2020-03-30 RX ORDER — GLYCOPYRROLATE AND FORMOTEROL FUMARATE 9; 4.8 UG/1; UG/1
AEROSOL, METERED RESPIRATORY (INHALATION)
COMMUNITY
Start: 2020-03-19 | End: 2020-04-13

## 2020-03-30 RX ORDER — FUROSEMIDE 20 MG/1
20 TABLET ORAL DAILY
Qty: 30 TABLET | Refills: 1 | Status: SHIPPED | OUTPATIENT
Start: 2020-03-30 | End: 2020-01-01

## 2020-03-30 RX ORDER — OMEPRAZOLE 40 MG/1
40 CAPSULE, DELAYED RELEASE ORAL DAILY
Qty: 90 CAPSULE | Refills: 3 | Status: SHIPPED | OUTPATIENT
Start: 2020-03-30

## 2020-03-30 RX ORDER — CLONAZEPAM 0.5 MG/1
0.5 TABLET ORAL NIGHTLY PRN
Qty: 30 TABLET | Refills: 2 | Status: SHIPPED | OUTPATIENT
Start: 2020-03-30 | End: 2020-01-01 | Stop reason: SDUPTHER

## 2020-03-30 RX ORDER — POTASSIUM CHLORIDE 750 MG/1
10 TABLET, EXTENDED RELEASE ORAL DAILY
Qty: 30 TABLET | Refills: 1 | Status: SHIPPED | OUTPATIENT
Start: 2020-03-30 | End: 2020-01-01

## 2020-03-30 NOTE — PROGRESS NOTES
Subjective   Sadie Tijerina is a 79 y.o. female.     Chief Complaint   Patient presents with   • Foot Swelling     Pt states that after she was been put on Amlodipine both of her feet has been swelling        History of Present Illness   Patient complains of more than 2 weeks for both lower extremities swelling gradually getting worse right side worse denies any significant pain in the calves denies any fever chills.  Patient also complains of mild eyelid swelling puffy.  Patient denies any new short of breath.  Patient does have emphysema.  Patient is also on amlodipine for blood pressure.  Patient denies any short of breath or chest pain.  Patient is on 2 L oxygen oxygen saturation is 98%.  Patient denies any nocturnal dyspnea.  No palpitation.  Patient also requests restless leg medicine medicine to be refilled    Current Outpatient Medications:   •  albuterol sulfate  (90 Base) MCG/ACT inhaler, Inhale 1 puff Every 4 (Four) Hours As Needed for Shortness of Air., Disp: 18 g, Rfl: 3  •  amLODIPine (NORVASC) 5 MG tablet, Take 1 tablet by mouth Daily., Disp: 30 tablet, Rfl: 1  •  BEVESPI AEROSPHERE 9-4.8 MCG/ACT aerosol, INL 2 PFS PO BID, Disp: , Rfl:   •  clonazePAM (KlonoPIN) 0.5 MG tablet, Take 1 tablet by mouth At Night As Needed for Seizures., Disp: 30 tablet, Rfl: 2  •  estrogens, conjugated, (PREMARIN) 0.625 MG tablet, Take 1 tablet by mouth Daily. 200001, Disp: 90 tablet, Rfl: 3  •  fluticasone (FLONASE) 50 MCG/ACT nasal spray, 2 sprays by Each Nare route Daily., Disp: , Rfl:   •  guaiFENesin (MUCINEX) 600 MG 12 hr tablet, Take 1 tablet by mouth 2 (Two) Times a Day., Disp: 20 tablet, Rfl: 0  •  ipratropium (ATROVENT) 0.02 % nebulizer solution, TAKE 2.5 ML BY NEBULIZATION 4 TIMES A DAY AS NEEDED FOR WHEEZING OR SHORTNESS OF AIR, Disp: 2.5 mL, Rfl: 2  •  mupirocin (Bactroban) 2 % ointment, Apply  topically to the appropriate area as directed 2 (Two) Times a Day., Disp: 30 g, Rfl: 0  •  omeprazole  today (priLOSEC) 40 MG capsule, Take 1 capsule by mouth Daily., Disp: 90 capsule, Rfl: 3  •  pravastatin (PRAVACHOL) 20 MG tablet, Take 1 tablet by mouth Every Evening., Disp: 30 tablet, Rfl: 5  •  promethazine (PHENERGAN) 12.5 MG tablet, Take 1 tablet by mouth Every 8 (Eight) Hours As Needed for Nausea or Vomiting., Disp: 10 tablet, Rfl: 0  •  propranolol (INDERAL) 60 MG tablet, Take 1 tablet by mouth 2 (Two) Times a Day., Disp: 60 tablet, Rfl: 1  •  saline (AYR) gel nasal gel, Apply topically to the appropriate area as directed Every 1 (One) Hour As Needed for nasal irritation, Disp: 22 g, Rfl: 0  •  sertraline (ZOLOFT) 100 MG tablet, Take 1 tablet by mouth every night at bedtime., Disp: 90 tablet, Rfl: 3  •  SUMAtriptan (IMITREX) 50 MG tablet, Take one tablet at onset of headache. May repeat dose one time in 2 hours if headache not relieved., Disp: 12 tablet, Rfl: 1  •  traMADol (ULTRAM) 50 MG tablet, Take 1 tablet by mouth Every 8 (Eight) Hours As Needed (pain)., Disp: 20 tablet, Rfl: 0  •  furosemide (Lasix) 20 MG tablet, Take 1 tablet by mouth Daily., Disp: 30 tablet, Rfl: 1  •  potassium chloride (K-DUR,KLOR-CON) 10 MEQ CR tablet, Take 1 tablet by mouth Daily., Disp: 30 tablet, Rfl: 1    The following portions of the patient's history were reviewed and updated as appropriate: allergies, current medications, past family history, past medical history, past social history, past surgical history and problem list.    Review of Systems   Constitutional: Negative.    Respiratory: Negative.  Negative for shortness of breath and wheezing.    Cardiovascular: Positive for leg swelling. Negative for chest pain.   Gastrointestinal: Negative.    Musculoskeletal: Negative.    Skin: Negative.    Neurological: Negative.    Psychiatric/Behavioral: Negative.        Objective   Physical Exam   Constitutional: She is oriented to person, place, and time. She appears well-nourished.   Neck: Neck supple.   Cardiovascular: Normal rate,  regular rhythm and normal heart sounds.   Pulmonary/Chest: Effort normal and breath sounds normal.   Abdominal: Bowel sounds are normal.   Musculoskeletal: She exhibits edema ( Right side lower extremity 3++ edema and left side 2+ edema mild puffy eyes).   Neurological: She is alert and oriented to person, place, and time.   Skin: Skin is warm.   Psychiatric: She has a normal mood and affect.       All tests have been reviewed.    Assessment/Plan   Diagnoses and all orders for this visit:    Essential hypertension cut down amlodipine to half tablet 2.5 mg daily    Anxiety restless leg syndrome refill medication  -     clonazePAM (KlonoPIN) 0.5 MG tablet; Take 1 tablet by mouth At Night As Needed for Seizures.    Simple chronic bronchitis (CMS/HCC) stable now encouraged patient to quit tobacco    Leg swelling stat ultrasound to blood tests  -     US Venous Doppler Lower Extremity Bilateral (duplex)  -     furosemide (Lasix) 20 MG tablet; Take 1 tablet by mouth Daily.  -     potassium chloride (K-DUR,KLOR-CON) 10 MEQ CR tablet; Take 1 tablet by mouth Daily.  -     Basic Metabolic Panel  -     CBC & Differential  -     proBNP    Gastroesophageal reflux disease without esophagitis refill omeprazole    Other orders  -     BEVESPI AEROSPHERE 9-4.8 MCG/ACT aerosol; INL 2 PFS PO BID  -     omeprazole (priLOSEC) 40 MG capsule; Take 1 capsule by mouth Daily.      2 weeks follow-up: Hopefully we can do video visit

## 2020-03-31 LAB
BASOPHILS # BLD MANUAL: 0.19 10*3/MM3 (ref 0–0.2)
BASOPHILS NFR BLD MANUAL: 3 % (ref 0–1.5)
BUN SERPL-MCNC: 11 MG/DL (ref 8–23)
BUN/CREAT SERPL: 12.2 (ref 7–25)
CALCIUM SERPL-MCNC: 10.3 MG/DL (ref 8.6–10.5)
CHLORIDE SERPL-SCNC: 100 MMOL/L (ref 98–107)
CO2 SERPL-SCNC: 26.3 MMOL/L (ref 22–29)
CREAT SERPL-MCNC: 0.9 MG/DL (ref 0.57–1)
DIFFERENTIAL COMMENT: ABNORMAL
EOSINOPHIL # BLD MANUAL: 0.06 10*3/MM3 (ref 0–0.4)
EOSINOPHIL NFR BLD MANUAL: 1 % (ref 0.3–6.2)
ERYTHROCYTE [DISTWIDTH] IN BLOOD BY AUTOMATED COUNT: 23 % (ref 12.3–15.4)
GLUCOSE SERPL-MCNC: 107 MG/DL (ref 65–99)
HCT VFR BLD AUTO: 45.1 % (ref 34–46.6)
HGB BLD-MCNC: 13.5 G/DL (ref 12–15.9)
LYMPHOCYTES # BLD MANUAL: 0.65 10*3/MM3 (ref 0.7–3.1)
LYMPHOCYTES NFR BLD MANUAL: 10 % (ref 19.6–45.3)
MCH RBC QN AUTO: 22.4 PG (ref 26.6–33)
MCHC RBC AUTO-ENTMCNC: 29.9 G/DL (ref 31.5–35.7)
MCV RBC AUTO: 74.7 FL (ref 79–97)
MONOCYTES # BLD MANUAL: 0.39 10*3/MM3 (ref 0.1–0.9)
MONOCYTES NFR BLD MANUAL: 6 % (ref 5–12)
NEUTROPHILS # BLD MANUAL: 5.16 10*3/MM3 (ref 1.7–7)
NEUTROPHILS NFR BLD MANUAL: 80 % (ref 42.7–76)
NT-PROBNP SERPL-MCNC: 889 PG/ML (ref 0–738)
PLATELET # BLD AUTO: 219 10*3/MM3 (ref 140–450)
PLATELET BLD QL SMEAR: ABNORMAL
POTASSIUM SERPL-SCNC: 4.7 MMOL/L (ref 3.5–5.2)
RBC # BLD AUTO: 6.04 10*6/MM3 (ref 3.77–5.28)
RBC MORPH BLD: ABNORMAL
SODIUM SERPL-SCNC: 138 MMOL/L (ref 136–145)
WBC # BLD AUTO: 6.45 10*3/MM3 (ref 3.4–10.8)

## 2020-04-01 DIAGNOSIS — R71.8 LOW MEAN CORPUSCULAR VOLUME (MCV): ICD-10-CM

## 2020-04-01 DIAGNOSIS — R60.1 GENERALIZED EDEMA: ICD-10-CM

## 2020-04-01 DIAGNOSIS — R79.89 ELEVATED BRAIN NATRIURETIC PEPTIDE (BNP) LEVEL: Primary | ICD-10-CM

## 2020-04-01 RX ORDER — ASPIRIN 81 MG/1
81 TABLET ORAL DAILY
COMMUNITY

## 2020-04-02 ENCOUNTER — TELEPHONE (OUTPATIENT)
Dept: INTERNAL MEDICINE | Facility: CLINIC | Age: 80
End: 2020-04-02

## 2020-04-02 NOTE — TELEPHONE ENCOUNTER
PT DAUGHTER SCOTT CALLED TO GET RESULTS FOR BLOOD WORK AND ULTRASOUND OF PT LEGS.    PLEASE ADVISE.  CALL BACK: [phone]

## 2020-04-07 LAB
BASOPHILS # BLD AUTO: 0.05 10*3/MM3 (ref 0–0.2)
BASOPHILS NFR BLD AUTO: 0.4 % (ref 0–1.5)
BH CV ECHO MEAS - % IVS THICK: 16.7 %
BH CV ECHO MEAS - % LVPW THICK: 26.3 %
BH CV ECHO MEAS - AO MAX PG (FULL): 1.7 MMHG
BH CV ECHO MEAS - AO MAX PG: 5 MMHG
BH CV ECHO MEAS - AO MEAN PG (FULL): 2 MMHG
BH CV ECHO MEAS - AO MEAN PG: 3 MMHG
BH CV ECHO MEAS - AO ROOT AREA: 3.1 CM^2
BH CV ECHO MEAS - AO ROOT DIAM: 2 CM
BH CV ECHO MEAS - AO V2 MAX: 113.3 CM/SEC
BH CV ECHO MEAS - AO V2 MEAN: 82.8 CM/SEC
BH CV ECHO MEAS - AO V2 VTI: 25.4 CM
BH CV ECHO MEAS - AVA(I,A): 2.4 CM^2
BH CV ECHO MEAS - AVA(I,D): 2.4 CM^2
BH CV ECHO MEAS - AVA(V,A): 2.5 CM^2
BH CV ECHO MEAS - AVA(V,D): 2.5 CM^2
BH CV ECHO MEAS - EDV(CUBED): 64 ML
BH CV ECHO MEAS - EDV(MOD-SP4): 57 ML
BH CV ECHO MEAS - EDV(TEICH): 70 ML
BH CV ECHO MEAS - EF(CUBED): 77 %
BH CV ECHO MEAS - EF(MOD-SP4): 68.4 %
BH CV ECHO MEAS - EF(TEICH): 69.7 %
BH CV ECHO MEAS - ESV(CUBED): 14.7 ML
BH CV ECHO MEAS - ESV(MOD-SP4): 18 ML
BH CV ECHO MEAS - ESV(TEICH): 21.2 ML
BH CV ECHO MEAS - FS: 38.8 %
BH CV ECHO MEAS - IVS/LVPW: 0.95
BH CV ECHO MEAS - IVSD: 0.9 CM
BH CV ECHO MEAS - IVSS: 1.1 CM
BH CV ECHO MEAS - LA DIMENSION: 3 CM
BH CV ECHO MEAS - LA/AO: 1.5
BH CV ECHO MEAS - LAT PEAK E' VEL: 8.2 CM/SEC
BH CV ECHO MEAS - LATERAL E/E' RATIO: 11.1
BH CV ECHO MEAS - LV MASS(C)D: 113.9 GRAMS
BH CV ECHO MEAS - LV MASS(C)S: 74.7 GRAMS
BH CV ECHO MEAS - LV MAX PG: 3.3 MMHG
BH CV ECHO MEAS - LV MEAN PG: 1 MMHG
BH CV ECHO MEAS - LV V1 MAX: 90.8 CM/SEC
BH CV ECHO MEAS - LV V1 MEAN: 55.8 CM/SEC
BH CV ECHO MEAS - LV V1 VTI: 19 CM
BH CV ECHO MEAS - LVIDD: 4 CM
BH CV ECHO MEAS - LVIDS: 2.5 CM
BH CV ECHO MEAS - LVLD AP4: 6.4 CM
BH CV ECHO MEAS - LVLS AP4: 5.1 CM
BH CV ECHO MEAS - LVOT AREA (M): 3.1 CM^2
BH CV ECHO MEAS - LVOT AREA: 3.1 CM^2
BH CV ECHO MEAS - LVOT DIAM: 2 CM
BH CV ECHO MEAS - LVPWD: 0.95 CM
BH CV ECHO MEAS - LVPWS: 1.2 CM
BH CV ECHO MEAS - MED PEAK E' VEL: 7.4 CM/SEC
BH CV ECHO MEAS - MEDIAL E/E' RATIO: 12.3
BH CV ECHO MEAS - MV A MAX VEL: 109 CM/SEC
BH CV ECHO MEAS - MV DEC SLOPE: 403.5 CM/SEC^2
BH CV ECHO MEAS - MV DEC TIME: 0.19 SEC
BH CV ECHO MEAS - MV E MAX VEL: 90.8 CM/SEC
BH CV ECHO MEAS - MV E/A: 0.83
BH CV ECHO MEAS - MV MAX PG: 5 MMHG
BH CV ECHO MEAS - MV MEAN PG: 2 MMHG
BH CV ECHO MEAS - MV P1/2T MAX VEL: 88 CM/SEC
BH CV ECHO MEAS - MV P1/2T: 63.9 MSEC
BH CV ECHO MEAS - MV V2 MAX: 112 CM/SEC
BH CV ECHO MEAS - MV V2 MEAN: 68.5 CM/SEC
BH CV ECHO MEAS - MV V2 VTI: 31.3 CM
BH CV ECHO MEAS - MVA P1/2T LCG: 2.5 CM^2
BH CV ECHO MEAS - MVA(P1/2T): 3.4 CM^2
BH CV ECHO MEAS - MVA(VTI): 1.9 CM^2
BH CV ECHO MEAS - PA MAX PG: 2.1 MMHG
BH CV ECHO MEAS - PA V2 MAX: 72.9 CM/SEC
BH CV ECHO MEAS - RAP SYSTOLE: 5 MMHG
BH CV ECHO MEAS - RVSP: 40 MMHG
BH CV ECHO MEAS - SV(AO): 79.6 ML
BH CV ECHO MEAS - SV(CUBED): 49.3 ML
BH CV ECHO MEAS - SV(LVOT): 59.7 ML
BH CV ECHO MEAS - SV(MOD-SP4): 39 ML
BH CV ECHO MEAS - SV(TEICH): 48.8 ML
BH CV ECHO MEAS - TR MAX PG: 35 MMHG
BH CV ECHO MEAS - TR MAX VEL: 297 CM/SEC
BH CV ECHO MEAS - TV MAX PG: 0.53 MMHG
BH CV ECHO MEAS - TV V2 MAX: 36.4 CM/SEC
BH CV ECHO MEASUREMENTS AVERAGE E/E' RATIO: 11.64
EOSINOPHIL # BLD AUTO: 0.3 10*3/MM3 (ref 0–0.4)
EOSINOPHIL NFR BLD AUTO: 2.7 % (ref 0.3–6.2)
ERYTHROCYTE [DISTWIDTH] IN BLOOD BY AUTOMATED COUNT: 12 % (ref 12.3–15.4)
FERRITIN SERPL-MCNC: 109 NG/ML (ref 13–150)
FOLATE SERPL-MCNC: 12.5 NG/ML (ref 4.78–24.2)
HCT VFR BLD AUTO: 35.8 % (ref 34–46.6)
HGB BLD-MCNC: 11.8 G/DL (ref 12–15.9)
IMM GRANULOCYTES # BLD AUTO: 0.06 10*3/MM3 (ref 0–0.05)
IMM GRANULOCYTES NFR BLD AUTO: 0.5 % (ref 0–0.5)
IRON SATN MFR SERPL: 15 % (ref 20–50)
IRON SERPL-MCNC: 70 MCG/DL (ref 37–145)
LV EF 2D ECHO EST: 66 %
LYMPHOCYTES # BLD AUTO: 3.13 10*3/MM3 (ref 0.7–3.1)
LYMPHOCYTES NFR BLD AUTO: 27.7 % (ref 19.6–45.3)
MAXIMAL PREDICTED HEART RATE: 139 BPM
MCH RBC QN AUTO: 29.1 PG (ref 26.6–33)
MCHC RBC AUTO-ENTMCNC: 33 G/DL (ref 31.5–35.7)
MCV RBC AUTO: 88.2 FL (ref 79–97)
MONOCYTES # BLD AUTO: 0.94 10*3/MM3 (ref 0.1–0.9)
MONOCYTES NFR BLD AUTO: 8.3 % (ref 5–12)
NEUTROPHILS # BLD AUTO: 6.83 10*3/MM3 (ref 1.7–7)
NEUTROPHILS NFR BLD AUTO: 60.4 % (ref 42.7–76)
NRBC BLD AUTO-RTO: 0 /100 WBC (ref 0–0.2)
PLATELET # BLD AUTO: 260 10*3/MM3 (ref 140–450)
RBC # BLD AUTO: 4.06 10*6/MM3 (ref 3.77–5.28)
STRESS TARGET HR: 118 BPM
TIBC SERPL-MCNC: 474 MCG/DL
UIBC SERPL-MCNC: 404 MCG/DL (ref 112–346)
VIT B12 SERPL-MCNC: 326 PG/ML (ref 211–946)
WBC # BLD AUTO: 11.31 10*3/MM3 (ref 3.4–10.8)

## 2020-04-09 ENCOUNTER — TELEPHONE (OUTPATIENT)
Dept: INTERNAL MEDICINE | Facility: CLINIC | Age: 80
End: 2020-04-09

## 2020-04-09 NOTE — TELEPHONE ENCOUNTER
Pt called to get a update on labs done Monday.. Please advise    Pt can be reached at [phone]

## 2020-04-11 ENCOUNTER — PATIENT MESSAGE (OUTPATIENT)
Dept: INTERNAL MEDICINE | Facility: CLINIC | Age: 80
End: 2020-04-11

## 2020-04-13 ENCOUNTER — PATIENT MESSAGE (OUTPATIENT)
Dept: INTERNAL MEDICINE | Facility: CLINIC | Age: 80
End: 2020-04-13

## 2020-04-13 RX ORDER — GLYCOPYRROLATE AND FORMOTEROL FUMARATE 9; 4.8 UG/1; UG/1
AEROSOL, METERED RESPIRATORY (INHALATION)
Qty: 10.7 G | Refills: 5 | Status: SHIPPED | OUTPATIENT
Start: 2020-04-13 | End: 2020-01-01

## 2020-04-13 NOTE — TELEPHONE ENCOUNTER
From: Sadie Tijerina  To: Kristian Wolff MD  Sent: 4/11/2020 3:39 PM EDT  Subject: Prescription Question    [employer] which is Sadie Tijerina prescription company is asking for pre-authorization again on the inhaler medicine BEVESPI 9-4.8MCG ORAL INH 120INH. We went through this with them in March the medicine was going to cost her $400 and it was authorized and only cost her 895 and now for a refill they're telling her again it needs to be pre-authorized again please pre-authorized it so it will be 895 for her it seems to be helping her real well. Thanks

## 2020-04-13 NOTE — TELEPHONE ENCOUNTER
From: Sadie Tijerina  To: Kristian Wolff MD  Sent: 4/13/2020 11:33 AM EDT  Subject: Prescription Question    Sadie De Jesusrix needs refill faxes to [facility] for her inhaler BEVESPI and pre authorize it again this month. [facility] requested that fri.

## 2020-04-15 ENCOUNTER — TELEPHONE (OUTPATIENT)
Dept: PULMONOLOGY | Facility: CLINIC | Age: 80
End: 2020-04-15

## 2020-04-15 NOTE — TELEPHONE ENCOUNTER
Called and spoke to patients daughter Lo she stated that the Ipratropium is not covered on her mothers insurance due to the fact that she does not have Medicare B coverage, I called the pharmacy and they told me to send in a new script and try to PA again and if we still get a denial that we can maybe try to go through [facility] to see if will be covered under her part D or we could try a different medication if its still not covered that way. Daughter was understanding I told her to call if she had anymore issues.

## 2020-04-16 ENCOUNTER — TELEMEDICINE (OUTPATIENT)
Dept: INTERNAL MEDICINE | Facility: CLINIC | Age: 80
End: 2020-04-16

## 2020-04-16 DIAGNOSIS — M54.50 LOW BACK PAIN WITHOUT SCIATICA, UNSPECIFIED BACK PAIN LATERALITY, UNSPECIFIED CHRONICITY: Primary | ICD-10-CM

## 2020-04-24 DIAGNOSIS — G47.34 NOCTURNAL HYPOXEMIA: ICD-10-CM

## 2020-04-24 DIAGNOSIS — J44.9 CHRONIC OBSTRUCTIVE PULMONARY DISEASE, UNSPECIFIED COPD TYPE (HCC): Primary | ICD-10-CM

## 2020-04-29 ENCOUNTER — TELEPHONE (OUTPATIENT)
Dept: INTERNAL MEDICINE | Facility: CLINIC | Age: 80
End: 2020-04-29

## 2020-04-29 NOTE — TELEPHONE ENCOUNTER
Denice from [facility] needs records sent for this pt.  She needs most recent visit notes.  Should be pulse testing at visit.  Pt said they walked and then retook.

## 2020-05-07 ENCOUNTER — OFFICE VISIT (OUTPATIENT)
Dept: INTERNAL MEDICINE | Facility: CLINIC | Age: 80
End: 2020-05-07

## 2020-05-07 VITALS
OXYGEN SATURATION: 86 % | BODY MASS INDEX: 24 KG/M2 | WEIGHT: 130.4 LBS | TEMPERATURE: 97.8 F | DIASTOLIC BLOOD PRESSURE: 72 MMHG | SYSTOLIC BLOOD PRESSURE: 120 MMHG | HEIGHT: 62 IN | HEART RATE: 82 BPM

## 2020-05-07 DIAGNOSIS — M79.89 LEG SWELLING: ICD-10-CM

## 2020-05-07 DIAGNOSIS — R26.9 ABNORMAL GAIT: ICD-10-CM

## 2020-05-07 DIAGNOSIS — F41.8 MIXED ANXIETY DEPRESSIVE DISORDER: ICD-10-CM

## 2020-05-07 DIAGNOSIS — I10 ESSENTIAL HYPERTENSION: Primary | Chronic | ICD-10-CM

## 2020-05-07 DIAGNOSIS — I50.32 CHRONIC DIASTOLIC HEART FAILURE (HCC): Chronic | ICD-10-CM

## 2020-05-07 DIAGNOSIS — E55.9 VITAMIN D DEFICIENCY: ICD-10-CM

## 2020-05-07 DIAGNOSIS — R42 DIZZINESS: ICD-10-CM

## 2020-05-07 DIAGNOSIS — R42 DIZZINESS AND GIDDINESS: ICD-10-CM

## 2020-05-07 DIAGNOSIS — E78.5 HYPERLIPIDEMIA, UNSPECIFIED HYPERLIPIDEMIA TYPE: ICD-10-CM

## 2020-05-07 DIAGNOSIS — J41.0 SIMPLE CHRONIC BRONCHITIS (HCC): ICD-10-CM

## 2020-05-07 DIAGNOSIS — F41.9 ANXIETY: ICD-10-CM

## 2020-05-07 DIAGNOSIS — Z72.0 TOBACCO ABUSE DISORDER: Chronic | ICD-10-CM

## 2020-05-07 DIAGNOSIS — K21.9 GASTROESOPHAGEAL REFLUX DISEASE WITHOUT ESOPHAGITIS: ICD-10-CM

## 2020-05-07 PROCEDURE — 99214 OFFICE O/P EST MOD 30 MIN: CPT | Performed by: INTERNAL MEDICINE

## 2020-05-07 RX ORDER — PREDNISONE 1 MG/1
5 TABLET ORAL DAILY
COMMUNITY
End: 2020-01-01 | Stop reason: SDUPTHER

## 2020-05-07 NOTE — PROGRESS NOTES
Subjective   Sadie Tijerina is a 79 y.o. female.     Chief Complaint   Patient presents with   • COPD     discuss oxygen - wants Frank to take over oxygen and CPAP       History of Present Illness   Patient here for follow-up leg swelling improved after discontinuation of amlodipine.  Blood pressures normal.  COPD stable oxygen 2 L.  Patient requests portable oxygen.  Hyperlipidemia stable on medication.  The GERD stable on medication.  Anxiety depression stable on medication.  Dizziness is improved patient still smokes.    Current Outpatient Medications:   •  albuterol sulfate  (90 Base) MCG/ACT inhaler, Inhale 1 puff Every 4 (Four) Hours As Needed for Shortness of Air., Disp: 18 g, Rfl: 3  •  aspirin 81 MG EC tablet, Take 81 mg by mouth Daily., Disp: , Rfl:   •  BEVESPI AEROSPHERE 9-4.8 MCG/ACT aerosol, INHALE 2 PUFFS BY MOUTH TWICE DAILY, Disp: 10.7 g, Rfl: 5  •  clonazePAM (KlonoPIN) 0.5 MG tablet, Take 1 tablet by mouth At Night As Needed for Seizures., Disp: 30 tablet, Rfl: 2  •  estrogens, conjugated, (PREMARIN) 0.625 MG tablet, Take 1 tablet by mouth Daily. 200001, Disp: 90 tablet, Rfl: 3  •  fluticasone (FLONASE) 50 MCG/ACT nasal spray, 2 sprays by Each Nare route Daily., Disp: , Rfl:   •  furosemide (Lasix) 20 MG tablet, Take 1 tablet by mouth Daily., Disp: 30 tablet, Rfl: 1  •  ipratropium (ATROVENT) 0.02 % nebulizer solution, Take 2.5 mL by nebulization 2 (Two) Times a Day., Disp: 120 mL, Rfl: 2  •  mupirocin (Bactroban) 2 % ointment, Apply  topically to the appropriate area as directed 2 (Two) Times a Day., Disp: 30 g, Rfl: 0  •  omeprazole (priLOSEC) 40 MG capsule, Take 1 capsule by mouth Daily., Disp: 90 capsule, Rfl: 3  •  potassium chloride (K-DUR,KLOR-CON) 10 MEQ CR tablet, Take 1 tablet by mouth Daily., Disp: 30 tablet, Rfl: 1  •  pravastatin (PRAVACHOL) 20 MG tablet, Take 1 tablet by mouth Every Evening., Disp: 30 tablet, Rfl: 5  •  predniSONE (DELTASONE) 5 MG tablet, Take 5 mg by  mouth Daily., Disp: , Rfl:   •  propranolol (INDERAL) 60 MG tablet, Take 1 tablet by mouth 2 (Two) Times a Day., Disp: 60 tablet, Rfl: 1  •  saline (AYR) gel nasal gel, Apply topically to the appropriate area as directed Every 1 (One) Hour As Needed for nasal irritation, Disp: 22 g, Rfl: 0  •  sertraline (ZOLOFT) 100 MG tablet, Take 1 tablet by mouth every night at bedtime., Disp: 90 tablet, Rfl: 3  •  SUMAtriptan (IMITREX) 50 MG tablet, Take one tablet at onset of headache. May repeat dose one time in 2 hours if headache not relieved., Disp: 12 tablet, Rfl: 1  •  traMADol (ULTRAM) 50 MG tablet, Take 1 tablet by mouth Every 8 (Eight) Hours As Needed (pain)., Disp: 20 tablet, Rfl: 0  •  VITAMIN D PO, Take 1,000 Units by mouth Daily., Disp: , Rfl:   •  amLODIPine (NORVASC) 5 MG tablet, Take 1 tablet by mouth Daily., Disp: 30 tablet, Rfl: 1    The following portions of the patient's history were reviewed and updated as appropriate: allergies, current medications, past family history, past medical history, past social history, past surgical history and problem list.    Review of Systems   Constitutional: Negative.    Respiratory: Positive for cough, shortness of breath and wheezing.    Cardiovascular: Negative.    Gastrointestinal: Negative.    Musculoskeletal: Negative.    Skin: Negative.    Neurological: Negative.    Psychiatric/Behavioral: Negative.        Objective   Physical Exam   Constitutional: She is oriented to person, place, and time. She appears well-nourished.   Neck: Neck supple.   Cardiovascular: Normal rate, regular rhythm and normal heart sounds.   Pulmonary/Chest: Effort normal. No respiratory distress. She has wheezes.   Abdominal: Bowel sounds are normal.   Neurological: She is alert and oriented to person, place, and time.   Skin: Skin is warm.   Psychiatric: She has a normal mood and affect.       All tests have been reviewed.    Assessment/Plan   Diagnoses and all orders for this  visit:    Essential hypertension continue medication    Chronic diastolic heart failure (CMS/HCC) continue medication  Currently  Hyperlipidemia, unspecified hyperlipidemia type continue medication    Simple chronic bronchitis (CMS/HCC) stable    Gastroesophageal reflux disease without esophagitis continue medication    Vitamin D deficiency continue supplement    Dizziness and giddiness resolved    Abnormal gait stable    Dizziness resolved    Mixed anxiety depressive disorder continue medication    Tobacco abuse disorder  encourag him to quite    Leg swelling much improved after stopping amlodipine    COPD end-stage on oxygen 2 L and a steroid.  Arrival to the office oxygenation saturation 97%.  6-minute walk without oxygen O2 sat dropped to 86%.  Portable oxygen will be beneficial to this patient.  After 6-minute walk patient is oxygen saturation came back to 97% with oxygen    Other orders  -     VITAMIN D PO; Take 1,000 Units by mouth Daily.  -     predniSONE (DELTASONE) 5 MG tablet; Take 5 mg by mouth Daily.    3 months follow-up          ---- Below is the historical record for reference only:  1. CHF, stable now  2. Chronic tobacco use encourage patient to quit  3. Thrush with pharyngitis, medicine helps  4. Leukocytosis,  repeat  5. Weakness, need HH for PT OT  6. Arthritis pain refill tramadol    left-sided low back pain without sciatica XR Spine Lumbar 4+ View; DJD--   Benign paroxysmal positional vertigo,   Dizzy loss of balance, when standing up probably due to lightheaded  D/c propranolol no help, resumed. carotid, echo, holter unremarkble    MRI head micro ischemic changes continue ASA 81mg daily    TMJ, continue aleve or tylenol for now   allergy, d/c medicine   Vitamin D deficiency cont Vit D3 1000iu  Low back pain improved after Flexeril and Tylenol,refill tramadol, xray:mild DJD refill tramadol  HRT patient still wants it.wean very slow, --  RLS, continue clonazepam   Tremor cont  propranolol d/c sinemet for not helping  Knee OA xr OA, refused cortisone shot, or glucosamine, tramadol  Refused disrobing for PE

## 2020-07-02 NOTE — TELEPHONE ENCOUNTER
Pt's daughter called to request a refill of     clonazePAM (KlonoPIN) 0.5 MG tablet    [facility]   PH:[phone]  FAX: [phone]

## 2020-08-06 PROBLEM — R63.4 WEIGHT LOSS: Status: RESOLVED | Noted: 2019-05-01 | Resolved: 2020-01-01

## 2020-08-06 NOTE — PROGRESS NOTES
Subjective   Sadie Tijerina is a 80 y.o. female.     Chief Complaint   Patient presents with   • Congestive Heart Failure     3 mo f/u    • COPD   • Cough     Onset for the last couple of days, pt c/o coughing       History of Present Illness   Cough 2 days dry cough, soa improved, chf stable now still smokes , HTN stable on med.  tremer  On medicine stable. , tob still,     Current Outpatient Medications:   •  amLODIPine (NORVASC) 5 MG tablet, Take 1 tablet by mouth Daily., Disp: 30 tablet, Rfl: 1  •  aspirin 81 MG EC tablet, Take 81 mg by mouth Daily., Disp: , Rfl:   •  BEVESPI AEROSPHERE 9-4.8 MCG/ACT aerosol, INHALE 2 PUFFS BY MOUTH TWICE DAILY, Disp: 10.7 g, Rfl: 5  •  clonazePAM (KlonoPIN) 0.5 MG tablet, Take 1 tablet by mouth At Night As Needed for Seizures., Disp: 30 tablet, Rfl: 2  •  estrogens, conjugated, (PREMARIN) 0.625 MG tablet, Take 1 tablet by mouth Daily. 200001, Disp: 90 tablet, Rfl: 3  •  fluticasone (FLONASE) 50 MCG/ACT nasal spray, 2 sprays by Each Nare route Daily., Disp: , Rfl:   •  furosemide (LASIX) 20 MG tablet, TAKE 1 TABLET BY MOUTH DAILY, Disp: 30 tablet, Rfl: 5  •  guaiFENesin (MUCINEX) 600 MG 12 hr tablet, Take 1,200 mg by mouth 2 (Two) Times a Day., Disp: , Rfl:   •  ipratropium (ATROVENT) 0.02 % nebulizer solution, Take 2.5 mL by nebulization 2 (Two) Times a Day., Disp: 120 mL, Rfl: 2  •  mupirocin (Bactroban) 2 % ointment, Apply  topically to the appropriate area as directed 2 (Two) Times a Day., Disp: 30 g, Rfl: 0  •  omeprazole (priLOSEC) 40 MG capsule, Take 1 capsule by mouth Daily., Disp: 90 capsule, Rfl: 3  •  potassium chloride (K-DUR,KLOR-CON) 10 MEQ CR tablet, TAKE 1 TABLET BY MOUTH DAILY, Disp: 60 tablet, Rfl: 5  •  pravastatin (PRAVACHOL) 20 MG tablet, TAKE 1 TABLET BY MOUTH EVERY EVENING, Disp: 90 tablet, Rfl: 1  •  predniSONE (DELTASONE) 5 MG tablet, Take 5 mg by mouth Daily., Disp: , Rfl:   •  propranolol (INDERAL) 60 MG tablet, Take 1 tablet by mouth 2 (Two)  Times a Day., Disp: 180 tablet, Rfl: 1  •  saline (AYR) gel nasal gel, Apply topically to the appropriate area as directed Every 1 (One) Hour As Needed for nasal irritation, Disp: 22 g, Rfl: 0  •  sertraline (ZOLOFT) 100 MG tablet, Take 1 tablet by mouth every night at bedtime., Disp: 90 tablet, Rfl: 3  •  SUMAtriptan (IMITREX) 50 MG tablet, Take one tablet at onset of headache. May repeat dose one time in 2 hours if headache not relieved., Disp: 12 tablet, Rfl: 1  •  traMADol (ULTRAM) 50 MG tablet, Take 1 tablet by mouth Every 8 (Eight) Hours As Needed (pain)., Disp: 20 tablet, Rfl: 0  •  VITAMIN D PO, Take 1,000 Units by mouth Daily., Disp: , Rfl:   •  albuterol sulfate  (90 Base) MCG/ACT inhaler, Inhale 2 puffs Every 4 (Four) Hours As Needed (emergency use only)., Disp: 1 inhaler, Rfl: 3    The following portions of the patient's history were reviewed and updated as appropriate: allergies, current medications, past family history, past medical history, past social history, past surgical history and problem list.    Review of Systems   Constitutional: Negative.    Respiratory: Positive for cough.    Cardiovascular: Negative.    Gastrointestinal: Negative.    Musculoskeletal: Negative.    Skin: Negative.    Neurological: Negative.    Psychiatric/Behavioral: Negative.        Objective   Physical Exam   Constitutional: She is oriented to person, place, and time. She appears well-developed and well-nourished.   Neck: Neck supple.   Cardiovascular: Normal rate, regular rhythm and normal heart sounds.   Pulmonary/Chest: Effort normal and breath sounds normal.   Abdominal: Bowel sounds are normal.   Neurological: She is alert and oriented to person, place, and time.   Skin: Skin is warm.   Psychiatric: She has a normal mood and affect.       All tests have been reviewed.    Assessment/Plan   Diagnoses and all orders for this visit:    Essential hypertension    Chronic diastolic heart failure (CMS/HCC)    Simple  chronic bronchitis (CMS/HCC)    Gastroesophageal reflux disease without esophagitis    Tobacco abuse disorder    Other orders  -     albuterol sulfate  (90 Base) MCG/ACT inhaler; Inhale 2 puffs Every 4 (Four) Hours As Needed (emergency use only).    copd compliance with inhaler.            ---- Below is the historical record for reference only:  1. CHF, stable now  2. Chronic tobacco use encourage patient to quit  3. Thrush with pharyngitis, medicine helps  4. Leukocytosis,  repeat  5. Weakness, need HH for PT OT  6. Arthritis pain refill tramadol    left-sided low back pain without sciatica XR Spine Lumbar 4+ View; DJD--   Benign paroxysmal positional vertigo,   Dizzy loss of balance, when standing up probably due to lightheaded  D/c propranolol no help, resumed. carotid, echo, holter unremarkble    MRI head micro ischemic changes continue ASA 81mg daily    TMJ, continue aleve or tylenol for now   allergy, d/c medicine   Vitamin D deficiency cont Vit D3 1000iu  Low back pain improved after Flexeril and Tylenol,refill tramadol, xray:mild DJD refill tramadol  HRT patient still wants it.wean very slow, --  RLS, continue clonazepam   Tremor cont propranolol d/c sinemet for not helping  Knee OA xr OA, refused cortisone shot, or glucosamine, tramadol  Refused disrobing for PE

## 2020-09-16 PROBLEM — J18.0 BILATERAL BRONCHOPNEUMONIA: Status: ACTIVE | Noted: 2020-01-01

## 2020-09-28 NOTE — OUTREACH NOTE
Prep Survey      Responses   [facility]?  [facility]   Is LACE score < 7 ?  No   Eligibility  [facility]   Date of Admission  09/16/20   Date of Discharge  09/28/20   Discharge Disposition  Home-Health Care [facility]   Discharge diagnosis  bronchopneumonia Acute COPD exacerbation  Pre renal Azotemia   COVID-19 Test Status  Negative   Does the patient have one of the following disease processes/diagnoses(primary or secondary)?  COPD/Pneumonia   Does the patient have Home health ordered?  Yes   What is the Home health agency?        Is there a DME ordered?  Yes   What DME was ordered?  [facility] for bipap and 02 continuous of 2l   Prep survey completed?  Yes          April Neves RN

## 2020-09-29 NOTE — OUTREACH NOTE
Call Center TCM Note      Responses   [facility] patient discharged from?  [facility]   COVID-19 Test Status  Negative   Does the patient have one of the following disease processes/diagnoses(primary or secondary)?  COPD/Pneumonia   Was the primary reason for admission:  COPD exacerbation   TCM attempt successful?  Yes   Call start time  1233   Call end time  1236   Discharge diagnosis  bronchopneumonia Acute COPD exacerbation  Pre renal Azotemia   Person spoke with today (if not patient) and relationship  Neris-daughter    Meds reviewed with patient/caregiver?  Yes   Is the patient having any side effects they believe may be caused by any medication additions or changes?  No   Does the patient have all medications ordered at discharge?  Yes   Is the patient taking all medications as directed (includes completed medication regime)?  Yes   Does the patient have a primary care provider?   Yes   Does the patient have an appointment with their PCP or pulmonologist within 7 days of discharge?  Greater than 7 days   Comments regarding PCP  Hospital d/c f/u appt is on 10/6/20 at 2:26 pm    What is preventing the patient from scheduling follow up appointments within 7 days of discharge?  Earlier appointment not available   Nursing Interventions  Verified appointment date/time/provider   Has the patient kept scheduled appointments due by today?  N/A   Did the patient receive a copy of their discharge instructions?  Yes   Nursing interventions  Reviewed instructions with patient   What is the patient's perception of their health status since discharge?  Improving   Nursing Interventions  Nurse provided patient education   Are the patient's immunizations up to date?   Yes [Daughter reports patient received her flu vaccine while in the hospital]   If the patient is a current smoker, are they able to teach back resources for cessation?  -- [Daughter reports patient is not smoking]   Is the patient/caregiver able to teach back  the hierarchy of who to call/visit for symptoms/problems? PCP, Specialist, Home health nurse, Urgent Care, ED, 911  Yes   Is the patient able to teach back COPD zones?  Yes   Nursing interventions  Education provided on various zones   Patient reports what zone on this call?  Green Zone   Green Zone  Reports doing well   Green Zone interventions:  Take daily medications, Do not smoke, Avoid indoor/outdoor triggers   TCM call completed?  Yes          Grace Mcgowan RN    9/29/2020, 12:36 EDT

## 2020-10-06 NOTE — OUTREACH NOTE
COPD/PN Week 2 Survey      Responses   [facility] patient discharged from?  [facility]   Does the patient have one of the following disease processes/diagnoses(primary or secondary)?  COPD/Pneumonia   Was the primary reason for admission:  COPD exacerbation   Week 2 attempt successful?  Yes   Call start time  1215   Call end time  1219   Discharge diagnosis  bronchopneumonia Acute COPD exacerbation  Pre renal Azotemia   Meds reviewed with patient/caregiver?  Yes   Is the patient having any side effects they believe may be caused by any medication additions or changes?  No   Does the patient have all medications ordered at discharge?  Yes   Is the patient taking all medications as directed (includes completed medication regime)?  Yes   Does the patient have a primary care provider?   Yes   Does the patient have an appointment with their PCP or specialist within 7 days of discharge?  Yes   Comments regarding PCP  Hospital d/c f/u appt is on 10/6/20 at 2:26 pm    What is the Home health agency?    [facility]    Has home health visited the patient within 72 hours of discharge?  Yes   What DME was ordered?  [employer] for bipap and 02 continuous of 2l   Has all DME been delivered?  Yes   Psychosocial issues?  No   Did the patient receive a copy of their discharge instructions?  Yes   Nursing interventions  Reviewed instructions with patient   What is the patient's perception of their health status since discharge?  Improving   Nursing Interventions  Nurse provided patient education   Are the patient's immunizations up to date?   Yes   Is the patient/caregiver able to teach back the hierarchy of who to call/visit for symptoms/problems? PCP, Specialist, Home health nurse, Urgent Care, ED, 911  Yes   Is the patient/caregiver able to teach back signs and symptoms of worsening condition:  Fever/chills, Shortness of breath, Chest pain   Is the patient/caregiver able to teach back importance of completing antibiotic course of  treatment?  Yes   Week 2 call completed?  Yes          Dante Montoya RN

## 2020-10-06 NOTE — PROGRESS NOTES
Transitional Care Follow Up Visit  Subjective     Sdaie Jo Tijerina is a 80 y.o. female who presents for a transitional care management visit.    Within 48 business hours after discharge our office contacted her via telephone to coordinate her care and needs.      I reviewed and discussed the details of that call along with the discharge summary, hospital problems, inpatient lab results, inpatient diagnostic studies, and consultation reports with Sadie.     Current outpatient and discharge medications have been reconciled for the patient.  Reviewed by: Kristian Wolff MD      Date of TCM Phone Call 9/28/2020   [facility]   Date of Admission 9/16/2020   Date of Discharge 9/28/2020   Discharge Disposition [facility]     Risk for Readmission (LACE) Score: 12 (9/28/2020  6:01 AM)      History of Present Illness   Course During Hospital Stay: Patient was discharged a week ago for pneumonia.  Patient denies any cough anymore.  Patient does have mildly short of breath sometimes.  Patient is on 2 L nasal cannula oxygen O2 sat today 98%.  Swallow test that showed mild penetration of the thin liquid.  White blood cell was elevated.  Patient still on steroid taper.  Also has rash on both side corner of the mouth itchy sometimes patient requests medication.     The following portions of the patient's history were reviewed and updated as appropriate: allergies, current medications, past family history, past medical history, past social history, past surgical history and problem list.    Review of Systems   Constitutional: Negative.    Respiratory: Positive for shortness of breath. Negative for cough.    Cardiovascular: Negative.    Gastrointestinal: Negative.    Musculoskeletal: Negative.    Skin: Positive for rash.   Neurological: Negative.    Psychiatric/Behavioral: Negative.        Objective   Physical Exam  Neck:      Musculoskeletal: Neck supple.   Cardiovascular:      Rate and Rhythm: Normal rate and  regular rhythm.      Heart sounds: Normal heart sounds.   Pulmonary:      Effort: Pulmonary effort is normal.      Breath sounds: Normal breath sounds.   Abdominal:      General: Bowel sounds are normal.   Skin:     General: Skin is warm.      Findings: Rash ( Corner of the mouth) present.   Neurological:      Mental Status: She is alert and oriented to person, place, and time.         Assessment/Plan   Sadie was seen today for hospital follow up visit.    Diagnoses and all orders for this visit:    Pneumonitis continue medication inhaler steroid follow-up with pulmonology for CT chest follow-up  -     CBC & Differential  -     Comprehensive Metabolic Panel    Dysphagia, mild encourage patient to drink when leaning forward.    Erythrasma start clotrimazole  1 mo with others

## 2020-10-08 NOTE — TELEPHONE ENCOUNTER
Patient called back stating that she thought that Dr. Wolff was supposed to have called in another prescription besides the Lotrisone, but she was unsure. She also wanted to change from Omeprazole to something because she is having abdominal pain.

## 2020-10-09 NOTE — TELEPHONE ENCOUNTER
Patient called back, patient is wanting a mouthwash called in for the blisters in her mouth as well, and is needing something called in for her stomach pain.   Can you please send her something in for these, she states she picked up the lotrisone from the pharmacy already.

## 2020-10-10 NOTE — TELEPHONE ENCOUNTER
Patient was seen on 10/06/2020, states she had discussed this medication change in her office visit with you.

## 2020-10-13 NOTE — TELEPHONE ENCOUNTER
farida,Called the patient.  Patient states the Prilosec causes some stomach discomfort and change to tums much better.  Also states sertraline causes drowsiness and I told patient to cut into half.  Patient also wants to refill prednisone 5 mg daily.  I gave patient 20 tablets enough for patient to discuss with pulmonologist to see if  she needs to continue on not.

## 2020-10-14 NOTE — OUTREACH NOTE
COPD/PN Week 3 Survey      Responses   [facility] patient discharged from?  [facility]   Does the patient have one of the following disease processes/diagnoses(primary or secondary)?  COPD/Pneumonia   Was the primary reason for admission:  COPD exacerbation   Week 3 attempt successful?  Yes   Call start time  1223   Call end time  1226   Discharge diagnosis  bronchopneumonia Acute COPD exacerbation  Pre renal Azotemia   Person spoke with today (if not patient) and relationship  Neris-daughter    Meds reviewed with patient/caregiver?  Yes   Is the patient taking all medications as directed (includes completed medication regime)?  Yes   Has the patient kept scheduled appointments due by today?  Yes   Home health comments   visited on 10/13/20   Pulse Ox monitoring  None   What is the patient's perception of their health status since discharge?  Improving   Is the patient able to teach back COPD zones?  Yes   Nursing interventions  Education provided on various zones   Patient reports what zone on this call?  Green Zone   Green Zone  Reports doing well, Breathing without shortness of breath, Usual activity and exercise level   Green Zone interventions:  Take daily medications, Do not smoke, Avoid indoor/outdoor triggers   Week 3 call completed?  Yes          rGace Mcgowan RN

## 2020-10-16 NOTE — TELEPHONE ENCOUNTER
PATIENT CALLED REQUESTING AN ORDER FOR [facility] TO DO BLOOD WORK.    BLOOD WORK NEEDS TO BE DRAWN BEFORE HER APPOINTMENT ON 11/12/20    [facility] ALSO RECOMMENDED PATIENT TO TAKE [facility] FOR ALLERGIES    PATIENT IS REQUESTING A PRESCRIPTION FOR [facility]    PATIENT USES [facility]    CALL BACK NUMBER [phone]

## 2020-10-20 NOTE — TELEPHONE ENCOUNTER
Monse from [facility] called regarding a fax they received. She states that the order was for labs, CBC and CMP. She states that there is no reason listed or a date the labs need to be completed by on the order. Monse is requesting more information on this fax. Please advise.     Monse call back [phone]

## 2020-10-20 NOTE — TELEPHONE ENCOUNTER
Left message for Monse explaining the reason for the labowork order is due to patients request to have [facility] draw labs before her appointment with Dr Wolff on 11/12/2020. I also relayed the diagnosis for the labs that is on the lab order.

## 2020-10-26 NOTE — OUTREACH NOTE
COPD/PN Week 4 Survey      Responses   [facility] patient discharged from?  [facility]   Does the patient have one of the following disease processes/diagnoses(primary or secondary)?  COPD/Pneumonia   Week 4 attempt successful?  Yes   Call start time  1049   Call end time  1052   Meds reviewed with patient/caregiver?  Yes   Is the patient taking all medications as directed (includes completed medication regime)?  Yes   Has the patient kept scheduled appointments due by today?  Yes   Is the patient still receiving Home Health Services?  Yes   Pulse Ox monitoring  None   What is the patient's perception of their health status since discharge?  Improving   Is the patient able to teach back COPD zones?  Yes   Patient reports what zone on this call?  Green Zone   Green Zone  Reports doing well, Breathing without shortness of breath, Usual activity and exercise level, Usual amount of phlegm/mucus without difficulty coughing up, Sleeping well, Appetite is good   Green Zone interventions:  Take daily medications, Continue regular exercise/diet plan, Do not smoke, Use oxygen as prescribed, Avoid indoor/outdoor triggers   Week 4 call completed?  Yes   Would the patient like one additional call?  No   Graduated  Yes   Did the patient feel the follow up calls were helpful during their recovery period?  Yes   Was the number of calls appropriate?  Yes   Wrap up additional comments  She says is doing well, no new issues, will call us if needed, she is havin gtrouble with HH coming like they say they will, she has a number to call to check why they are not keeping their scheduled appts. with her          Jeanette Becker RN

## 2020-10-27 NOTE — PROGRESS NOTES
"Chief Complaint   Patient presents with   • Shortness of Breath         Subjective   Sadie Tijerina is a 80 y.o. female.     History of Present Illness   Patient comes in today for shortness of breath and for follow-up after hospital discharge.    She was discharged from the hospital on BiPAP at a pressure of 12/4.  He is using the machine nightly.    She has now taking Bevespi twice a day and using albuterol as needed.  She states she does not need it often.  Her PCP discontinued all of the nebulized medication.  He also kept her on prednisone 5 mg daily per the patient.    Exercise tolerance has also remained stable.     Quit smoking 2019.  Her sister who is with her today states that keeping her from smoking is definitely a big issue.    The following portions of the patient's history were reviewed and updated as appropriate: allergies, current medications, past family history, past medical history, past social history and past surgical history.      Review of Systems   Constitutional: Negative for chills, fatigue and fever.   HENT: Positive for sneezing. Negative for sinus pressure, sinus pain and sore throat.    Respiratory: Positive for wheezing. Negative for cough and shortness of breath.    Cardiovascular: Positive for leg swelling. Negative for chest pain.   Psychiatric/Behavioral: Negative for sleep disturbance.       Objective   Visit Vitals  /72   Pulse 75   Temp 97.1 °F (36.2 °C)   Resp 18   Ht 157.5 cm (62.01\")   Wt 60.8 kg (134 lb) Comment: last weight at hospital   SpO2 98%   BMI 24.50 kg/m²   98% with oxygen at 2 LPM    Physical Exam  Vitals signs reviewed.   HENT:      Head: Atraumatic.      Mouth/Throat:      Mouth: Mucous membranes are moist.      Pharynx: Oropharynx is clear.      Comments: Crowded oropharynx. Dentures present.   Eyes:      Extraocular Movements: Extraocular movements intact.   Neck:      Musculoskeletal: Neck supple.   Cardiovascular:      Rate and Rhythm: Normal " rate and regular rhythm.   Pulmonary:      Effort: Pulmonary effort is normal. No respiratory distress.      Comments: Somewhat decreased A/E without wheezing.   Musculoskeletal:      Comments: In wheelchair.   Skin:     General: Skin is warm.   Neurological:      Mental Status: She is alert.           Assessment/Plan   Diagnoses and all orders for this visit:    1. Chronic obstructive pulmonary disease, unspecified COPD type (CMS/HCC) (Primary)    2. Chronic respiratory failure with hypoxia and hypercapnia (CMS/HCC)    3. Hypoxia    4. Other allergic rhinitis           Return for keep appt in December.    DISCUSSION (if any):  I reviewed her discharge summary she was treated for pneumonia and acute hypercarbic respiratory failure.    No change to the current medications has been made.  I have asked her to continue using Bevespi and the rescue inhaler as directed.    She should continue using Flonase on a daily basis.    I have asked her to continue using oxygen.    Compliance with medications stressed.     Side effects of prescribed medications discussed with the patient.    Continue BiPAP 12/4 with full facemask. I will ask the staff to get a compliance from [facility].    She was given the flu vaccination before she was discharged from the hospital.     Dictated utilizing Dragon dictation.    This document was electronically signed by RUBY Torres October 27, 2020  13:45 EDT

## 2020-11-05 PROBLEM — A41.9 SEVERE SEPSIS (HCC): Status: ACTIVE | Noted: 2020-01-01

## 2020-11-05 PROBLEM — J18.9 PNEUMONIA OF BOTH LUNGS DUE TO INFECTIOUS ORGANISM: Status: ACTIVE | Noted: 2020-01-01

## 2020-11-05 PROBLEM — S81.801A LEG WOUND, RIGHT: Status: ACTIVE | Noted: 2020-01-01

## 2020-11-05 PROBLEM — R65.20 SEVERE SEPSIS (HCC): Status: ACTIVE | Noted: 2020-01-01

## 2020-11-05 NOTE — PROGRESS NOTES
Discharge Planning Assessment   Phuc     Patient Name: Sadie Tijerina  MRN: [id number]  Today's Date: 11/5/2020    Admit Date: 11/4/2020    Discharge Needs Assessment     Row Name 11/05/20 1328       Living Environment    Lives With  alone    Current Living Arrangements  home/apartment/condo    Primary Care Provided by  self    Provides Primary Care For  no one    Family Caregiver if Needed  child(michelle), adult    Able to Return to Prior Arrangements  yes       Resource/Environmental Concerns    Resource/Environmental Concerns  none       Transition Planning    Patient/Family Anticipates Transition to  home with help/services [facility] already coming    Patient/Family Anticipated Services at Transition  home health care;durable medical equipment to continue services    Transportation Anticipated  family or friend will provide       Discharge Needs Assessment    Current Outpatient/Agency/Support Group  homecare agency    Equipment Currently Used at Home  walker, rolling;wheelchair;oxygen    Concerns to be Addressed  discharge planning        Discharge Plan     Row Name 11/05/20 5894       Plan    Plan  per pt plans to go home and continue HH with [facility] and oxygen 2l continuous with [facility], does not want to go to rehab facility; , has walker and wheelchair at home; stated no other needs at this time    Provided Post Acute Provider List?  N/A Home Health [facility] wants to continue    Delivered To  Patient;Support Person    Method of Delivery  In person    Patient/Family in Agreement with Plan  yes    Plan Comments  sister in room with patient and pt agreed to discuss this information in front of sister        Continued Care and Services - Admitted Since 11/4/2020    Coordination has not been started for this encounter.     Selected Continued Care - Prior Encounters Includes selections from prior encounters from 8/6/2020 to 11/5/2020    Discharged on 9/28/2020 Admission date: 9/16/2020 - Discharge  disposition: Home-Health Care Svc    Durable Medical Equipment     Service Provider [facility] Services [facility]ax    [facility]  D[facility] Equipment [address] [facility] [address] [id number] [phone]       Internal Comment last updated by Deisi Espinosa, RN 9/28/2020 1038    Current patient                     Home Medical Care     Service Provider Selected Services Address Phone Fax    [facility]  Home Health Services [address] [facility] [address] [phone]                      Demographic Summary     Row Name 11/05/20 1324       General Information    Admission Type  inpatient    Arrived From  emergency department    Referral Source  admission list    Reason for Consult  discharge planning    Preferred Language  English     Used During This Interaction  no        Functional Status     Row Name 11/05/20 1325       Functional Status    Usual Activity Tolerance  fair    Current Activity Tolerance  fair    Functional Status Comments  pt lives at home by herself       Functional Status, IADL    Medications  independent    Meal Preparation  independent    Housekeeping  independent    Laundry  independent    Shopping  independent       Mental Status    General Appearance WDL  WDL       Mental Status Summary    Recent Changes in Mental Status/Cognitive Functioning  no changes                   Deisi Espinosa, RN

## 2020-11-05 NOTE — H&P
"    [facility]      Name:  Sadie Tijerina   Age:  80 y.o.  Sex:  female  :  1940  MRN:  [id number]   Visit Number:  [id number]  Admission Date:  2020  Date Of Service:  20  Primary Care Physician:  Kristian Wolff MD    Chief Complaint:     \" My leg wound\"    History Of Presenting Illness:      80 F history of chronic hypoxic respiratory failure 2 LNC who presented to the ED complaining of worsening shortness of breath.  She seen in urgent care yesterday for her right leg wound and was given antibiotics as well as wound care.  Soon after, she felt nauseated, vomited, progressively dyspneic and presented to the ED for the same.  She does not believe she aspirated.  She has a cough, wheezing, fever, and increasing dyspnea.  She uses a CPAP at night and 2 L throughout the day.  In the ED, she was found to have a lactate of 3.9, WBC 16 and was given IV fluid bolus, steroids, vancomycin, Zosyn, Levaquin.  CT chest was obtained to further evaluate the parenchyma official read is pending.  She is admitted for pneumonia and right leg wound.    Upon review of previous admit data, pt was admitted to this facility - for pneumonia, COPD exacerbation; treated with Rocephin, azithromycin, prednisone taper. She had refused rehab, but was ok with .    Review Of Systems:     A full 10 point review of systems was performed and all pertinent positives and negatives are noted in the HPI.     Past Medical History:    Past Medical History:   Diagnosis Date   • Arthritis    • Cancer (CMS/HCC)     uterine cancer ()   • COPD (chronic obstructive pulmonary disease) (CMS/[facility])    • Depression    • Elevated cholesterol    • GERD (gastroesophageal reflux disease)    • History of emphysema    • History of esophageal reflux    • History of recurrent UTI (urinary tract infection)    • History of renal calculi    • History of uterine cancer    • Hyperlipidemia    • " Hypertension    • Impaired functional mobility, balance, gait, and endurance    • Pneumonia 2019       Past Surgical history:    Past Surgical History:   Procedure Laterality Date   • FOOT SURGERY     • HAND SURGERY     • HYSTERECTOMY         Social History:    Social History     Socioeconomic History   • Marital status:      Spouse name: Not on file   • Number of children: Not on file   • Years of education: Not on file   • Highest education level: Not on file   Tobacco Use   • Smoking status: Current Every Day Smoker     Packs/day: 0.25     Types: Cigarettes     Last attempt to quit: 2019     Years since quittin.9   • Smokeless tobacco: Never Used   • Tobacco comment: HALF OF A CIGARETTE   Substance and Sexual Activity   • Alcohol use: No   • Drug use: No   • Sexual activity: Defer     Comment:        Family History:    Family History   Problem Relation Age of Onset   • Cancer Mother    • Heart attack Father    • Arthritis Father    • Heart disease Father    • Diabetes Daughter    • Hyperlipidemia Daughter    • Migraines Daughter    • Heart disease Sister    • Diabetes Son    • Glaucoma Son        Allergies:      Morphine and related    Home Medications:    Prior to Admission Medications     Prescriptions Last Dose Informant Patient Reported? Taking?    amLODIPine (Norvasc) 5 MG tablet 2020  No Yes    Take 1 tablet by mouth Daily.    aspirin 81 MG EC tablet 2020  Yes Yes    Take 81 mg by mouth Daily.    betamethasone dipropionate 0.05 % cream 2020  No Yes    Apply topically to the appropriate area 2 (Two) times a day    Bevespi Aerosphere 9-4.8 MCG/ACT aerosol 2020  No Yes    INHALE 2 PUFFS BY MOUTH TWICE DAILY    cetirizine (zyrTEC) 10 MG tablet 2020  No Yes    Take 1 tablet by mouth Daily.    clonazePAM (KlonoPIN) 0.5 MG tablet 2020  No Yes    Take 1 tablet by mouth At Night As Needed for Seizures.    clotrimazole-betamethasone (Lotrisone) 1-0.05 % cream  11/4/2020  No Yes    Apply  topically to the appropriate area as directed 2 (Two) Times a Day.    estrogens, conjugated, (PREMARIN) 0.625 MG tablet 11/4/2020  No Yes    Take 1 tablet by mouth Daily. 200001    fluticasone (FLONASE) 50 MCG/ACT nasal spray 11/4/2020  No Yes    2 sprays by Each Nare route Daily.    furosemide (LASIX) 20 MG tablet 11/4/2020  No Yes    TAKE 1 TABLET BY MOUTH DAILY    guaiFENesin (MUCINEX) 600 MG 12 hr tablet 11/4/2020  Yes Yes    Take 1,200 mg by mouth 2 (Two) Times a Day.    ipratropium (ATROVENT) 0.06 % nasal spray 11/4/2020  Yes Yes    2 sprays into the nostril(s) as directed by provider 4 (Four) Times a Day.    omeprazole (priLOSEC) 40 MG capsule 11/4/2020  No Yes    Take 1 capsule by mouth Daily.    potassium chloride (K-DUR,KLOR-CON) 10 MEQ CR tablet 11/4/2020  No Yes    TAKE 1 TABLET BY MOUTH DAILY    pravastatin (PRAVACHOL) 20 MG tablet 11/4/2020  No Yes    TAKE 1 TABLET BY MOUTH EVERY EVENING    predniSONE (DELTASONE) 5 MG tablet 11/4/2020  No Yes    Take 1 tablet by mouth Daily.    propranolol (INDERAL) 60 MG tablet 11/4/2020  No Yes    Take 1 tablet by mouth 2 (Two) Times a Day.    sertraline (ZOLOFT) 50 MG tablet 11/4/2020  No Yes    Take 1 tablet by mouth every night at bedtime.    sulfamethoxazole-trimethoprim (BACTRIM DS,SEPTRA DS) 800-160 MG per tablet 11/4/2020  No Yes    Take 1 tablet by mouth 2 (Two) Times a Day for 10 days.    VITAMIN D PO 11/4/2020  Yes Yes    Take 1,000 Units by mouth Daily.             ED Medications:    Medications   sodium chloride 0.9 % flush 10 mL (has no administration in time range)   methylPREDNISolone sodium succinate (SOLU-Medrol) injection 125 mg (125 mg Intravenous Given 11/4/20 2000)   acetaminophen (TYLENOL) tablet 1,000 mg (1,000 mg Oral Given 11/4/20 9307)   sodium chloride 0.9 % bolus 1,000 mL ( Intravenous Currently Infusing 11/5/20 6432)   piperacillin-tazobactam (ZOSYN) 3.375 g in iso-osmotic dextrose 50 ml (premix) (0 g  Intravenous Stopped 11/5/20 0046)   ipratropium-albuterol (DUO-NEB) nebulizer solution 3 mL (3 mL Nebulization Given 11/4/20 9194)   vancomycin 1250 mg in sodium chloride 0.9% 250 mL IVPB (1,250 mg Intravenous New Bag 11/5/20 0356)   levoFLOXacin (LEVAQUIN) 500 mg/100 mL D5W (premix) (LEVAQUIN) 500 mg (500 mg Intravenous New Bag 11/5/20 0304)       Vital Signs:    Temp:  [97.3 °F (36.3 °C)-103 °F (39.4 °C)] 97.9 °F (36.6 °C)  Heart Rate:  [106-176] 110  Resp:  [16-24] 16  BP: (103-150)/() 105/44        11/04/20 2202 11/05/20  0359   Weight: 60.8 kg (134 lb) 61.7 kg (136 lb 0.4 oz)       Body mass index is 24.88 kg/m².    Physical Exam:  General: Labored breathing , resting in bed  HEENT: EOMI, NC/AT, 2 LNC  Heart: RRR  Lungs: Coarse breath sounds bilaterally with wheezes  Abdomen: Soft, nondistended, nontender  Extremities: Right lower extremity with abrasion and laceration lower shin, warmer compared to left  Neurological: A&O x3, moves all extremities  Psychological: Mood and affect appropriate, speech is coherent  Skin: warm, dry    Laboratory data:    I have reviewed the labs done in the emergency room.    Results from last 7 days   Lab Units 11/04/20  2242   SODIUM mmol/L 141   POTASSIUM mmol/L 4.0   CHLORIDE mmol/L 98   CO2 mmol/L 28.4   BUN mg/dL 22   CREATININE mg/dL 1.05*   CALCIUM mg/dL 9.6   BILIRUBIN mg/dL 0.3   ALK PHOS U/L 81   ALT (SGPT) U/L 19   AST (SGOT) U/L 22   GLUCOSE mg/dL 147*     Results from last 7 days   Lab Units 11/04/20  2242   WBC 10*3/mm3 16.49*   HEMOGLOBIN g/dL 11.6*   HEMATOCRIT % 35.9   PLATELETS 10*3/mm3 237         Results from last 7 days   Lab Units 11/04/20  2242   TROPONIN T ng/mL <0.010     Results from last 7 days   Lab Units 11/04/20  2242   PROBNP pg/mL 124.1             Results from last 7 days   Lab Units 11/04/20  2236   PH, ARTERIAL pH units 7.443   PO2 ART mm Hg 80.2   PCO2, ARTERIAL mm Hg 41.7   HCO3 ART mmol/L 28.5*           Invalid input(s): HARPERES,   BLOODU, NITRITITE, BACT, EP  Pain Management Panel     There is no flowsheet data to display.              EKG:      Sinus rhythm, poor quality study, abnormal EKG    Radiology:    Imaging Results (Last 72 Hours)     Procedure Component Value Units Date/Time    CT Chest Without Contrast [[id number]] Resulted: 11/05/20 0002     Updated: 11/05/20 0004    XR Chest 1 View [[id number]] Resulted: 11/04/20 2249     Updated: 11/04/20 2250            Severe sepsis (CMS/HCC)    Mixed anxiety depressive disorder    Gastroesophageal reflux disease without esophagitis    Hyperlipidemia    Essential hypertension    Tobacco abuse disorder    Acute and chronic respiratory failure with hypoxia (CMS/HCC)    COPD (chronic obstructive pulmonary disease) (CMS/[facility])    Pneumonia of both lungs due to infectious organism    Leg wound, right      Assessment:    Sepsis, suspect multifactorial secondary to skin and soft tissue infection of the RLE, and possible pneumonia  Acute on chronic hypoxemic respiratory failure  Concern for acute COPD exacerbation  Lactic acidosis  Nonhealing wound of RLE  Chronic corticosteroid usage      Plan:    -Given the abrupt onset of her symptoms, and relatively clear appearing CT chest per my read, we will obtain a dimer to  rule out PTE  -obtain CRP to eval how bad RLE wound is, and consider CT imaging if markedly elevated  -Continue IV vanco/Zosyn/azithro, follow cultures, given IVFin the ED  -NIPPV at bedtime and as needed, bronchodilators, hold off on systemic steroids given acute infection  -Hold home antihypertensives and systemic steroids as this will delay her wound healing      Darling Felix DO  11/05/20  04:39 EST    Dictated utilizing Dragon dictation.

## 2020-11-05 NOTE — ED PROVIDER NOTES
Subjective   80-year-old female presenting with 2 complaints.  First complaint is shortness of breath.  States that all day she has been short of breath.  Second complaint is wound to her right lower extremity.  Couple weeks ago she struck the right leg and has a wound there.  Has had increasing swelling and redness.  Today started running a fever and having body aches.  Was seen in urgent care earlier and given antibiotics.  No chest pain, nausea, vomiting.          Review of Systems   Constitutional: Positive for fever.   HENT: Negative.    Eyes: Negative.    Respiratory: Positive for cough and shortness of breath.    Cardiovascular: Negative.    Gastrointestinal: Negative.    Genitourinary: Negative.    Musculoskeletal: Positive for arthralgias and myalgias.   Skin: Positive for wound.   Neurological: Negative.    Psychiatric/Behavioral: Negative.        Past Medical History:   Diagnosis Date   • Arthritis    • Cancer (CMS/[facility])     uterine cancer (1981)   • COPD (chronic obstructive pulmonary disease) (CMS/[facility])    • Depression    • Elevated cholesterol    • GERD (gastroesophageal reflux disease)    • History of emphysema    • History of esophageal reflux    • History of recurrent UTI (urinary tract infection)    • History of renal calculi    • History of uterine cancer    • Hyperlipidemia    • Hypertension    • Impaired functional mobility, balance, gait, and endurance    • Pneumonia 02/2019       Allergies   Allergen Reactions   • Morphine And Related Irritability       Past Surgical History:   Procedure Laterality Date   • FOOT SURGERY     • HAND SURGERY     • HYSTERECTOMY         Family History   Problem Relation Age of Onset   • Cancer Mother    • Heart attack Father    • Arthritis Father    • Heart disease Father    • Diabetes Daughter    • Hyperlipidemia Daughter    • Migraines Daughter    • Heart disease Sister    • Diabetes Son    • Glaucoma Son        Social History     Socioeconomic History   • Marital  status:      Spouse name: Not on file   • Number of children: Not on file   • Years of education: Not on file   • Highest education level: Not on file   Tobacco Use   • Smoking status: Current Every Day Smoker     Packs/day: 0.25     Types: Cigarettes     Last attempt to quit: 2019     Years since quittin.9   • Smokeless tobacco: Never Used   • Tobacco comment: HALF OF A CIGARETTE   Substance and Sexual Activity   • Alcohol use: No   • Drug use: No   • Sexual activity: Defer     Comment:            Objective   Physical Exam  Constitutional:       General: She is not in acute distress.     Appearance: Normal appearance. She is not ill-appearing, toxic-appearing or diaphoretic.   HENT:      Head: Normocephalic and atraumatic.      Right Ear: External ear normal.      Left Ear: External ear normal.      Nose: Nose normal.      Mouth/Throat:      Mouth: Mucous membranes are moist.      Pharynx: Oropharynx is clear.   Eyes:      Extraocular Movements: Extraocular movements intact.      Conjunctiva/sclera: Conjunctivae normal.      Pupils: Pupils are equal, round, and reactive to light.   Neck:      Musculoskeletal: Normal range of motion.   Cardiovascular:      Rate and Rhythm: Regular rhythm.      Pulses: Normal pulses.      Heart sounds: Normal heart sounds.      Comments: Tachycardia  Pulmonary:      Comments: Mild tachypnea, coarse breath sounds throughout  Abdominal:      General: Bowel sounds are normal. There is no distension.      Tenderness: There is no abdominal tenderness.   Musculoskeletal: Normal range of motion.         General: No tenderness or deformity.      Comments: Mild swelling of the right lower extremity surrounding the wound as mentioned below   Skin:     General: Skin is warm and dry.      Capillary Refill: Capillary refill takes less than 2 seconds.      Findings: No rash.      Comments: Small wound to the right lower leg, mild surrounding contusion and erythema    Neurological:      General: No focal deficit present.      Mental Status: She is alert and oriented to person, place, and time.   Psychiatric:         Mood and Affect: Mood normal.         Behavior: Behavior normal.         Procedures           ED Course                                           MDM  Number of Diagnoses or Management Options  Cellulitis of right lower extremity:   Lactic acidosis:   Non-healing wound of right lower extremity:   Pneumonia of both lungs due to infectious organism, unspecified part of lung:   Tachycardia:   Diagnosis management comments: 80-year-old female with fever and shortness of breath.  Chronically ill-appearing elderly lady with exam as above.  She is notably febrile, tachycardic and tachypneic.  She is maintaining her saturations on her home O2.  Her leg does appear to be cellulitic.  Will obtain labs, imaging, COVID-19 swab.  Will provide symptomatic treatment.  Disposition pending anticipate admission.    DDx: Pneumonia, COVID-19, CHF, ACS, cellulitis    EKG interpreted by me: Sinus tachycardia, some nonspecific T wave changes, this is an abnormal EKG    Work-up notable for leukocytosis, lactic acidosis.  Imaging reveals multifocal pneumonia.  She has received broad-spectrum antibiotics.  COVID-19 is negative.  She is resting comfortably on her home oxygen.  Given lab abnormalities and her age discussed with Dr. Felix who graciously accepts for admission.       Amount and/or Complexity of Data Reviewed  Decide to obtain previous medical records or to obtain history from someone other than the patient: yes        Final diagnoses:   Pneumonia of both lungs due to infectious organism, unspecified part of lung   Non-healing wound of right lower extremity   Cellulitis of right lower extremity   Lactic acidosis   Tachycardia            Gil Louis MD  11/05/20 6738

## 2020-11-05 NOTE — PLAN OF CARE
Goal Outcome Evaluation:  Plan of Care Reviewed With: patient  Progress: improving  Outcome Summary: VSS. Eating well. Medications adjusted RT tremors. Continuing IV ABX therapy

## 2020-11-05 NOTE — PROGRESS NOTES
[facility]    Name:  Sadie Tijerina   Age:  80 y.o.  Sex:  female  :  1940  MRN:  [id number]   Visit Number:  [id number]  Admission Date:  2020  Date Of Service:  20  Primary Care Physician:  Kristian Wolff MD     LOS: 0 days :  Patient Care Team:  Kristian Wolff MD as PCP - General  Kristian Wolff MD as PCP - Family Medicine  Bayron Grimaldo MD (Inactive) as Consulting Physician (General Surgery):    Chief Complaint:      Shortness of breath and hand tremors.    Subjective / Interval History:     Ms. Forrest was seen and examined.  She is currently sitting up on the bed and is complaining of shortness of breath and hand tremors.  She is able to speak in full sentences and does not seem to be in any distress.  Patient states that she has quit smoking.  She also claims that she is using her BiPAP machine every night.  She does have COPD on home oxygen.  She was admitted with multiple complaints including shortness of breath and ulcer in the right leg.  She states that she recently had an injury due to bumping on the table.  Denies any fevers.  She was noted to have bilateral pneumonia on her CT of the chest and was started on broad-spectrum IV antibiotics therapy with Zosyn, Levaquin and vancomycin.  She was also placed on steroids for COPD exacerbation.  Due to her elevated D-dimer levels, a CT of the chest with PE protocol was done and it was negative for pulmonary embolism.  Previous physician documentation, laboratory and imaging data have been reviewed.    Review of Systems:     General ROS: Patient denies any fevers, chills or loss of consciousness.  Generalized weakness.  Respiratory ROS: Shortness of breath, chest congestion and wheezing.  Cardiovascular ROS: Denies chest pain or palpitations. No history of exertional chest pain.  Gastrointestinal ROS: Denies nausea and vomiting. Denies any abdominal pain. No diarrhea.  Neurological  ROS: Denies any focal weakness. No loss of consciousness.  Bilateral hand tremors.  Dermatological ROS: Ulceration of the right leg.    Vital Signs:    Temp:  [97.6 °F (36.4 °C)-103 °F (39.4 °C)] 97.8 °F (36.6 °C)  Heart Rate:  [] 95  Resp:  [16-24] 16  BP: (103-147)/() 113/41    Intake and output:    I/O last 3 completed shifts:  In: 50 [IV Piggyback:50]  Out: 0   I/O this shift:  In: 620 [P.O.:320; IV Piggyback:300]  Out: 600 [Urine:600]    Physical Examination:    General Appearance:  Alert and cooperative, not in any acute distress.   Head:  Atraumatic and normocephalic, without obvious abnormality.   Eyes:          PERRLA, conjunctivae and sclerae normal, no Icterus. No pallor. Extraocular movements are within normal limits.   Neck: Supple, trachea midline, no thyromegaly, no carotid bruit.   Lungs:   Chest shape is normal. Breath sounds heard bilaterally equally.  Bilateral scattered wheezing and crackles heard. No pleural rub or bronchial breathing.   Heart:  Normal S1 and S2, no murmur, no gallop, no rub. No JVD   Abdomen:   Normal bowel sounds, no masses, no organomegaly. Soft, nontender, nondistended, no guarding, no rebound tenderness.   Extremities: Moves all extremities, 1+ pitting ankle edema, no cyanosis, no clubbing.  Healing ulcers noted in the right lower leg without any erythema.   Skin: No bleeding or rash.  Dry skin noted.   Neurologic: Awake, alert and oriented times 3. Moves all 4 extremities equally.  Bilateral resting hand tremors noted.     Laboratory results:    Results from last 7 days   Lab Units 11/04/20  2242   SODIUM mmol/L 141   POTASSIUM mmol/L 4.0   CHLORIDE mmol/L 98   CO2 mmol/L 28.4   BUN mg/dL 22   CREATININE mg/dL 1.05*   CALCIUM mg/dL 9.6   BILIRUBIN mg/dL 0.3   ALK PHOS U/L 81   ALT (SGPT) U/L 19   AST (SGOT) U/L 22   GLUCOSE mg/dL 147*     Results from last 7 days   Lab Units 11/04/20  2242   WBC 10*3/mm3 16.49*   HEMOGLOBIN g/dL 11.6*   HEMATOCRIT % 35.9    PLATELETS 10*3/mm3 237         Results from last 7 days   Lab Units 11/04/20  2242   TROPONIN T ng/mL <0.010     Results from last 7 days   Lab Units 11/04/20  2325 11/04/20  2317   BLOODCX  No growth at less than 24 hours No growth at less than 24 hours     Results from last 7 days   Lab Units 11/04/20  2236   PH, ARTERIAL pH units 7.443   PO2 ART mm Hg 80.2   PCO2, ARTERIAL mm Hg 41.7   HCO3 ART mmol/L 28.5*     I have reviewed the patient's laboratory results.    Radiology results:    Imaging Results (Last 24 Hours)       Procedure Component Value Units Date/Time    XR Chest 1 View [[id number]] Collected: 11/05/20 1123     Updated: 11/05/20 1157    Narrative:      PROCEDURE: XR CHEST 1 VW-        HISTORY: Simple Sepsis Triage Protocol     COMPARISON: 09/22/2020.     FINDINGS: The heart is normal in size. The mediastinum is unremarkable.  There is worsening left greater than right basilar airspace disease  consistent with pneumonia. There is no pneumothorax. There are no acute  osseous abnormalities.       Impression:      Worsening left greater than right basilar airspace disease  is consistent with pneumonia.           Images were reviewed, interpreted, and dictated by Dr. Sylvain Dent M.D.  Transcribed by Marilee Hernandez PA-C.     This report was finalized on 11/5/2020 11:55 AM by Sylvain Dent M.D..    CT Chest Pulmonary Embolism [[id number]] Collected: 11/05/20 1026     Updated: 11/05/20 1157    Narrative:      PROCEDURE: CT CHEST PULMONARY EMBOLISM-     HISTORY: PE suspected, low/intermediate prob, positive D-dimer;  J18.9-Pneumonia, unspecified organism; S81.801A-Unspecified open wound,  right lower leg, initial encounter; L03.115-Cellulitis of right lower  limb; E87.2-Acidosis; R00.0-Tachycardia, unspecified     COMPARISON: None .     TECHNIQUE: Multiple axial CT images were obtained from the thoracic  inlet through the upper abdomen following the administration of Isovue  300 per the CT PE  protocol. Coronal and oblique 3D MIP images were  reconstructed from the original axial data set.      FINDINGS: There are no filling defects within the visualized pulmonary  arteries to suggest an embolus. The thoracic aorta is normal in caliber  with no evidence of dissection. The heart is normal in size. There are  no pleural or pericardial effusions. There is no adenopathy. Patchy  airspace disease and bronchiectasis in the right middle lobe and  lingula. The visualized upper abdomen is unremarkable. Bone windows  reveal no acute osseous abnormalities.       Impression:      1. No evidence of pulmonary embolus or dissection.  2. Patchy airspace disease and bronchiectasis in the right middle lobe  and lingula.           612.71 mGy.cm        This study was performed with techniques to keep radiation doses as low  as reasonably achievable (ALARA). Individualized dose reduction  techniques using automated exposure control or adjustment of mA and/or  kV according to the patient size were employed.         Images were reviewed, interpreted, and dictated by Dr. Sylvain Dent M.D.  Transcribed by Marilee Hernandez PA-C.     This report was finalized on 11/5/2020 11:55 AM by Sylvain Dent M.D..    CT Chest Without Contrast [[id number]] Collected: 11/05/20 0748     Updated: 11/05/20 0753    Narrative:      PROCEDURE: CT CHEST WO CONTRAST-     HISTORY: sob, fever, abnormal cxr     COMPARISON: 09/16/2020.     PROCEDURE:  Multiple axial CT images were obtained from the thoracic  inlet through the upper abdomen without the use of contrast.      FINDINGS:   Soft tissue windows reveal no axillary, hilar or mediastinal adenopathy.  Heart size is normal. The aorta is normal in caliber. There are no  pleural or pericardial effusions. Lung windows reveal scarring in the  right lung apex which is unchanged. There are nodular opacities in the  right middle lobe and lingula which has worsened compared prior exam.  The  visualized upper abdomen is unremarkable. Bone windows reveal no  acute osseous abnormalities.       Impression:      Worsening nodular opacities in the right middle lobe and  lingula likely infectious or inflammatory in nature.     340.18 mGy.cm        This study was performed with techniques to keep radiation doses as low  as reasonably achievable (ALARA). Individualized dose reduction  techniques using automated exposure control or adjustment of mA and/or  kV according to the patient size were employed.      This report was finalized on 11/5/2020 7:51 AM by Sylvain Dent M.D..          I have reviewed the patient's radiology reports.    Medication Review:     I have reviewed the patient's active and prn medications.       Severe sepsis (CMS/HCC)    Acute and chronic respiratory failure with hypoxia (CMS/HCC)    Essential hypertension    Gastroesophageal reflux disease without esophagitis    Hyperlipidemia    Mixed anxiety depressive disorder    Tobacco abuse disorder    COPD (chronic obstructive pulmonary disease) (CMS/HCC)    Pneumonia of both lungs due to infectious organism    Leg wound, right    Assessment:    1.  Acute hypoxic respiratory failure ruled out, chronic hypoxic respiratory failure only.  2.  Bilateral healthcare associated pneumonia, POA, unable to classify further.  3.  Acute COPD exacerbation, present on admission.  4.  Chronic diastolic heart failure, no evidence of exacerbation.  5.  Traumatic right leg ulcers, healing, present on admission.  6.  Essential hypertension.  7.  Chronic essential tremors on propranolol.  8.  Restless leg syndrome.    Plan:    Ms. Forrest will be continued on bronchodilators and budesonide.  She is currently on IV antibiotics therapy with Zosyn, azithromycin and vancomycin.  Blood cultures are currently pending.  Nasal swab was positive for MRSA.  She will be placed on lactobacillus supplements.    Her home medications will be continued.  She is on Lovenox  for DVT prophylaxis.  We will consult physical and Occupational Therapy.  Further recommendations depend upon her clinical course.    Nico Enriquez MD  11/05/20  15:39 EST    Dictated utilizing Dragon dictation.

## 2020-11-05 NOTE — PROGRESS NOTES
"Adult Nutrition  Assessment/PES    Patient Name:  Sadie Tijerina  YOB: 1940  MRN: [id number]  Admit Date:  11/4/2020    Assessment Date:  11/5/2020    Comments:    Recommend:  1. Consider adding cardiac modifications to current diet order as medically appropriate and tolerated.  2. Establish and encourage PO intake.  3. RD ordered Prostat BID.  4. Consider a multivitamin with minerals daily.     RD to follow pt and available PRN.      Reason for Assessment     Row Name 11/05/20 0945          Reason for Assessment    Reason For Assessment  per organizational policy;identified at risk by screening criteria     Diagnosis  cardiac disease;infection/sepsis;pulmonary disease;other (see comments) COPD, GERD, HTN, HLD, severe sepsis, PNA, acute on chronic resp failure, lactic acidosis     Identified At Risk by Screening Criteria  large or nonhealing wound, burn or pressure injury           Anthropometrics     Row Name 11/05/20 0948 11/05/20 0444       Anthropometrics    Height  157.5 cm (62\")  --    Weight  --  61.7 kg (136 lb 0.4 oz)       Ideal Body Weight (IBW)    Ideal Body Weight (IBW) (kg)  50.43  --    Row Name 11/05/20 0359          Anthropometrics    Weight  61.7 kg (136 lb 0.4 oz)         Labs/Tests/Procedures/Meds     Row Name 11/05/20 0946          Labs/Procedures/Meds    Lab Results Reviewed  reviewed, pertinent     Lab Results Comments  High: CRP, Cr, Gluc        Medications    Pertinent Medications Reviewed  reviewed, pertinent     Pertinent Medications Comments  Protonix, Prednisone         Physical Findings     Row Name 11/05/20 0947          Physical Findings    Skin  non-healing wound(s) right lower leg wound         Estimated/Assessed Needs     Row Name 11/05/20 0948          Calculation Measurements    Weight Used For Calculations  61.7 kg (136 lb 0.4 oz) Actual BW     Height  157.5 cm (62\")        Estimated/Assessed Needs    Additional Documentation  Calorie Requirements " (Group);Protein Requirements (Group);[place]-St[place] Jeor Equation (Group);Fluid Requirements (Group)        Calorie Requirements    Weight Used For Calorie Calculations  61.7 kg (136 lb 0.4 oz) Actual BW     Estimated Calorie Need Method  [place]-St Jeor     Estimated Calorie Requirement Comment  [id number]        [place]-St. Jeor Equation    RMR ([place]-St. Jeor Equation)  1030.25     [place]-St. Jeor Activity Factors  -- 1.3 AF        Protein Requirements    Weight Used For Protein Calculations  61.7 kg (136 lb 0.4 oz) Actual BW     Est Protein Requirement Amount (gms/kg)  1.5 gm protein  grams     Estimated Protein Requirements (gms/day)  92.55        Fluid Requirements    Estimated Fluid Requirement Method  [place]-Segar Formula     [place]-Segar Method (over 20 kg)  2734         Nutrition Prescription Ordered     Row Name 11/05/20 0949          Nutrition Prescription PO    Current PO Diet  Regular     Common Modifiers  Consistent Carbohydrate         Evaluation of Received Nutrient/Fluid Intake     Row Name 11/05/20 1949          PO Evaluation    Number of Days PO Intake Evaluated  Insufficient Data Pt newly admitted               Problem/Interventions:  Problem 1     Row Name 11/05/20 6945          Nutrition Diagnoses Problem 1    Problem 1  Increased Nutrient Needs     Macronutrient  Kcal;Fluid;Protein     Etiology (related to)  Medical Diagnosis     Infectious Disease  Sepsis     Pulmonary/Critical Care  Pneumonia;COPD     Skin  Non healing wound right lower leg     Signs/Symptoms (evidenced by)  Report/Observation     Reported/Observed By  MD               Intervention Goal     Row Name 11/05/20 8210          Intervention Goal    General  Improved nutrition related lab(s);Meet nutritional needs for age/condition     PO  Meet estimated needs;Establish PO;Tolerate PO     Weight  Maintain weight         Nutrition Intervention     Row Name 11/05/20 5169          Nutrition Intervention    RD/Tech Action   Follow Tx progress;Encourage intake;Recommend/ordered     Recommended/Ordered  Diet;Supplement         Nutrition Prescription     Row Name 11/05/20 0952          Nutrition Prescription PO    PO Prescription  Begin/change diet;Begin/change supplement     Begin/Change Diet to  Regular     Supplement  PRO liquid     Supplement Frequency  2 times a day     Common Modifiers  Cardiac     New PO Prescription Ordered?  No, recommended        Other Orders    Obtain Weight  Daily     Obtain Weight Ordered?  No, recommended     Supplement  Vitamin mineral supplement     Supplement Ordered?  No, recommended         Education/Evaluation     Row Name 11/05/20 6538          Education    Education  Will Instruct as appropriate        Monitor/Evaluation    Monitor  Per protocol;I&O;PO intake;Supplement intake;Pertinent labs;Weight;Skin status           Electronically signed by:  Ester Thompson RD  11/05/20 09:53 EST

## 2020-11-06 NOTE — PLAN OF CARE
Goal Outcome Evaluation:  Plan of Care Reviewed With: patient  Progress: no change  Outcome Summary: Pt is feeling better, eating well. Sisiter states she is eating more here than at home happily. Pt can ambulate w/standby assist. does not want to go to rehab, prefers home w/HH

## 2020-11-06 NOTE — PROGRESS NOTES
[facility]    Name:  Sadie Tijerina   Age:  80 y.o.  Sex:  female  :  1940  MRN:  [id number]   Visit Number:  [id number]  Admission Date:  2020  Date Of Service:  20  Primary Care Physician:  Kristian Wolff MD     LOS: 1 day :  Patient Care Team:  Kristian Wolff MD as PCP - General  Kristian Wolff MD as PCP - Family Medicine  Bayron Grimaldo MD (Inactive) as Consulting Physician (General Surgery):    Chief Complaint:      Cough and generalized weakness.    Subjective / Interval History:     Ms. Forrest was seen and examined this morning.  She is currently sitting up on the chair.  She states that she walked a few feet around the bed with the help of physical therapy this morning.  She states that her shortness of breath is slightly better.  Her hand tremors have improved.  She is asking me to restart her back on her Premarin.  She states that she has taken this for several decades and is unable to discontinue.    She was admitted with multiple complaints including shortness of breath and ulcer in the right leg.  She states that she recently had an injury due to bumping on the table.  She does have COPD and on home oxygen.  She was noted to have bilateral pneumonia on her CT of the chest and was started on broad-spectrum IV antibiotics therapy with Zosyn, Levaquin and vancomycin.  She was also placed on steroids for COPD exacerbation.  Due to her elevated D-dimer levels, a CT of the chest with PE protocol was done and it was negative for pulmonary embolism.  Her nasal swab for MRSA was positive.  However, since she significantly improved clinically, her vancomycin was discontinued the next day.  She did not have any fevers.    Review of Systems:     General ROS: Patient denies any fevers, chills or loss of consciousness.  Generalized weakness.  Respiratory ROS: Shortness of breath, chest congestion and wheezing.  Cardiovascular ROS: Denies  chest pain or palpitations. No history of exertional chest pain.  Gastrointestinal ROS: Denies nausea and vomiting. Denies any abdominal pain. No diarrhea.  Neurological ROS: Denies any focal weakness. No loss of consciousness.  Bilateral hand tremors.  Dermatological ROS: Ulceration of the right leg.    Vital Signs:    Temp:  [97.3 °F (36.3 °C)-98.3 °F (36.8 °C)] 97.8 °F (36.6 °C)  Heart Rate:  [73-87] 75  Resp:  [16-22] 18  BP: (105-115)/(41-80) 108/49    Intake and output:    I/O last 3 completed shifts:  In: 670 [P.O.:320; IV Piggyback:350]  Out: 700 [Urine:700]  I/O this shift:  In: 960 [P.O.:960]  Out: -     Physical Examination:    General Appearance:  Alert and cooperative, not in any acute distress.   Head:  Atraumatic and normocephalic, without obvious abnormality.   Eyes:          PERRLA, conjunctivae and sclerae normal, no Icterus. No pallor. Extraocular movements are within normal limits.   Neck: Supple, trachea midline, no thyromegaly, no carotid bruit.   Lungs:   Chest shape is normal. Breath sounds heard bilaterally equally.  Bilateral scattered wheezing and crackles heard. No pleural rub or bronchial breathing.   Heart:  Normal S1 and S2, no murmur, no gallop, no rub. No JVD   Abdomen:   Normal bowel sounds, no masses, no organomegaly. Soft, nontender, nondistended, no guarding, no rebound tenderness.   Extremities: Moves all extremities, 1+ pitting ankle edema, no cyanosis, no clubbing.  Healing ulcers noted in the right lower leg without any erythema.   Skin: No bleeding or rash.  Dry skin noted.   Neurologic: Awake, alert and oriented times 3. Moves all 4 extremities equally.  Bilateral resting hand tremors noted but improved from yesterday.     Laboratory results:    Results from last 7 days   Lab Units 11/06/20  0605 11/04/20  2242   SODIUM mmol/L 137 141   POTASSIUM mmol/L 4.6 4.0   CHLORIDE mmol/L 104 98   CO2 mmol/L 26.7 28.4   BUN mg/dL 26* 22   CREATININE mg/dL 1.07* 1.05*   CALCIUM mg/dL  8.6 9.6   BILIRUBIN mg/dL  --  0.3   ALK PHOS U/L  --  81   ALT (SGPT) U/L  --  19   AST (SGOT) U/L  --  22   GLUCOSE mg/dL 109* 147*     Results from last 7 days   Lab Units 11/06/20  0605 11/04/20  2242   WBC 10*3/mm3 16.31* 16.49*   HEMOGLOBIN g/dL 9.6* 11.6*   HEMATOCRIT % 30.4* 35.9   PLATELETS 10*3/mm3 194 237         Results from last 7 days   Lab Units 11/04/20  2242   TROPONIN T ng/mL <0.010     Results from last 7 days   Lab Units 11/04/20  2325 11/04/20  2317   BLOODCX  No growth at 24 hours No growth at 24 hours     Results from last 7 days   Lab Units 11/04/20  2236   PH, ARTERIAL pH units 7.443   PO2 ART mm Hg 80.2   PCO2, ARTERIAL mm Hg 41.7   HCO3 ART mmol/L 28.5*     I have reviewed the patient's laboratory results.    Radiology results:    Imaging Results (Last 24 Hours)     ** No results found for the last 24 hours. **        I have reviewed the patient's radiology reports.    Medication Review:     I have reviewed the patient's active and prn medications.       Severe sepsis (CMS/HCC)    Acute and chronic respiratory failure with hypoxia (CMS/HCC)    Essential hypertension    Gastroesophageal reflux disease without esophagitis    Hyperlipidemia    Mixed anxiety depressive disorder    Tobacco abuse disorder    COPD (chronic obstructive pulmonary disease) (CMS/HCC)    Pneumonia of both lungs due to infectious organism    Leg wound, right    Assessment:    1.  Acute hypoxic respiratory failure ruled out, chronic hypoxic respiratory failure only.  2.  Bilateral healthcare associated pneumonia, POA, unable to classify further.  3.  Acute COPD exacerbation, present on admission.  4.  Chronic diastolic heart failure, no evidence of exacerbation.  5.  Traumatic right leg ulcers, healing, present on admission.  6.  Essential hypertension.  7.  Chronic essential tremors on propranolol.  8.  Restless leg syndrome.    Plan:    Ms. Forrest will be continued on bronchodilators and budesonide.  She is  currently on IV antibiotics therapy with Zosyn and azithromycin.  Blood cultures are currently pending.  Nasal swab was positive for MRSA.  She will be placed on lactobacillus supplements.  Since she has significantly improved clinically, I will discontinue vancomycin and observe her with Zosyn and azithromycin.  Unfortunately the sputum for culture was rejected.    Her home medications will be continued.  She is on Lovenox for DVT prophylaxis.  We will consult physical and Occupational Therapy.  Further recommendations depend upon her clinical course.  She is again refusing to go to short-term rehabilitation.  She wants to go home when she feels better.    Nico Enriquez MD  11/06/20  16:40 EST    Dictated utilizing Dragon dictation.

## 2020-11-06 NOTE — THERAPY EVALUATION
Patient Name: Sadie Tijerina  : 1940    MRN: [id number]                              Today's Date: 2020       Admit Date: 2020    Visit Dx:     ICD-10-CM ICD-9-CM   1. Pneumonia of both lungs due to infectious organism, unspecified part of lung  J18.9 483.8   2. Non-healing wound of right lower extremity  S81.801A 894.1   3. Cellulitis of right lower extremity  L03.115 682.6   4. Lactic acidosis  E87.2 276.2   5. Tachycardia  R00.0 785.0     Patient Active Problem List   Diagnosis   • Calf cramp   • Mixed anxiety depressive disorder   • Dizziness   • Fatigue   • Gastroesophageal reflux disease without esophagitis   • Hematuria   • Hyperlipidemia   • Essential hypertension   • Low back pain   • Restless legs syndrome   • Essential tremor   • Vitamin D deficiency   • Balance problem   • Tension headache   • Tobacco abuse disorder   • Leg swelling   • Acute and chronic respiratory failure with hypoxia (CMS/[facility])   • Venous insufficiency of both lower extremities   • Chronic diastolic heart failure (CMS/[facility])   • Neuropathy   • COPD (chronic obstructive pulmonary disease) (CMS/[facility])   • Abnormal gait   • Bilateral sensorineural hearing loss   • Disorder of inner ear   • Tympanosclerosis   • Transient vision disturbance   • Bilateral bronchopneumonia   • Pneumonia of both lungs due to infectious organism   • Leg wound, right   • Severe sepsis (CMS/[facility])     Past Medical History:   Diagnosis Date   • Arthritis    • Cancer (CMS/HCC)     uterine cancer ()   • COPD (chronic obstructive pulmonary disease) (CMS/[facility])    • Depression    • Elevated cholesterol    • GERD (gastroesophageal reflux disease)    • History of emphysema    • History of esophageal reflux    • History of recurrent UTI (urinary tract infection)    • History of renal calculi    • History of uterine cancer    • Hyperlipidemia    • Hypertension    • Impaired functional mobility, balance, gait, and endurance    • Pneumonia 2019     Past  Surgical History:   Procedure Laterality Date   • FOOT SURGERY     • HAND SURGERY     • HYSTERECTOMY       General Information     Row Name 11/06/20 0955          Physical Therapy Time and Intention    Mode of Treatment  physical therapy  -TW     Row Name 11/06/20 0955          General Information    Patient Profile Reviewed  yes  -TW     Prior Level of Function  independent:;all household mobility Per pt she is independent with mobility within the home, using rolling walker sometimes. She is a sponge bather who has difficulty getting in/out tub/shower. Home O2 2 L during day 3 L at night.  -TW     Barriers to Rehab  medically complex;previous functional deficit  -TW     Row Name 11/06/20 0955          Living Environment    Lives With  alone  -TW     Row Name 11/06/20 0955          Home Main Entrance    Number of Stairs, Main Entrance  one  -TW     Stair Railings, Main Entrance  none  -TW     Row Name 11/06/20 0955          Stairs Within Home, Primary    Stairs, Within Home, Primary  Pt says she does have a second story but does not utilize  -TW     Row Name 11/06/20 0955          Cognition    Orientation Status (Cognition)  oriented x 4  -TW     Row Name 11/06/20 0955          Safety Issues, Functional Mobility    Safety Issues Affecting Function (Mobility)  safety precaution awareness;safety precautions follow-through/compliance  -TW     Impairments Affecting Function (Mobility)  balance;endurance/activity tolerance;pain;strength;shortness of breath  -TW       User Key  (r) = Recorded By, (t) = Taken By, (c) = Cosigned By    Initials Name Provider Type    TW Lilliam Holm, YANNICK Physical Therapist        Mobility     Row Name 11/06/20 0955          Bed Mobility    Bed Mobility  supine-sit  -TW     Supine-Sit [place] (Bed Mobility)  modified independence  -TW     Assistive Device (Bed Mobility)  head of bed elevated  -     Row Name 11/06/20 0955          Transfers    Comment (Transfers)  Pt needed verbal cues  for sequencing of transfers with walker.  -TW     Row Name 11/06/20 0955          Bed-Chair Transfer    Bed-Chair [place] (Transfers)  contact guard;verbal cues  -TW     Assistive Device (Bed-Chair Transfers)  walker, front-wheeled  -TW     Row Name 11/06/20 0955          Sit-Stand Transfer    Sit-Stand [place] (Transfers)  standby assist  -TW     Assistive Device (Sit-Stand Transfers)  walker, front-wheeled  -TW     Row Name 11/06/20 0955          Gait/Stairs (Locomotion)    [place] Level (Gait)  contact guard;verbal cues  -TW     Assistive Device (Gait)  walker, front-wheeled  -TW     Distance in Feet (Gait)  20 ft  -TW     Deviations/Abnormal Patterns (Gait)  stride length decreased;base of support, wide  -TW     Bilateral Gait Deviations  forward flexed posture;heel strike decreased  -TW       User Key  (r) = Recorded By, (t) = Taken By, (c) = Cosigned By    Initials Name Provider Type    TW Alta, Lilliam, PT Physical Therapist        Obj/Interventions     Row Name 11/06/20 1223          Range of Motion Comprehensive    Comment, General Range of Motion  WFLs BLEs  -TW     Row Name 11/06/20 1223          Strength Comprehensive (MMT)    Comment, General Manual Muscle Testing (MMT) Assessment  BLE's grossly 3+/5 to 4-/5  -TW     Row Name 11/06/20 1223          Balance    Balance Assessment  sitting static balance;sitting dynamic balance;standing static balance;standing dynamic balance  -TW     Static Sitting Balance  WFL;unsupported;sitting, edge of bed  -TW     Dynamic Sitting Balance  WFL;unsupported;sitting, edge of bed  -TW     Static Standing Balance  supported  -TW     Dynamic Standing Balance  mild impairment;supported  -TW     Balance Interventions  sit to stand  -TW       User Key  (r) = Recorded By, (t) = Taken By, (c) = Cosigned By    Initials Name Provider Type    TW Alta, Lilliam, PT Physical Therapist        Goals/Plan     Row Name 11/06/20 0955          Bed Mobility Goal 1 (PT)     Activity/Assistive Device (Bed Mobility Goal 1, PT)  bed mobility activities, all  -TW     [place] Level/Cues Needed (Bed Mobility Goal 1, PT)  modified independence  -TW     Time Frame (Bed Mobility Goal 1, PT)  4 days;short term goal (STG)  -TW     Progress/Outcomes (Bed Mobility Goal 1, PT)  goal ongoing  -TW     Row Name 11/06/20 0955          Transfer Goal 1 (PT)    Activity/Assistive Device (Transfer Goal 1, PT)  sit-to-stand/stand-to-sit;bed-to-chair/chair-to-bed;toilet  -TW     [place] Level/Cues Needed (Transfer Goal 1, PT)  standby assist  -TW     Time Frame (Transfer Goal 1, PT)  10 days  -TW     Progress/Outcome (Transfer Goal 1, PT)  goal ongoing  -TW     Row Name 11/06/20 0955          Gait Training Goal 1 (PT)    Activity/Assistive Device (Gait Training Goal 1, PT)  gait (walking locomotion);assistive device use;decrease fall risk;improve balance and speed;increase endurance/gait distance;walker, rolling  -TW     [place] Level (Gait Training Goal 1, PT)  standby assist  -TW     Distance (Gait Training Goal 1, PT)  100 ft  -TW     Time Frame (Gait Training Goal 1, PT)  10 days  -TW     Progress/Outcome (Gait Training Goal 1, PT)  goal ongoing  -TW     Row Name 11/06/20 0955          Patient Education Goal (PT)    Activity (Patient Education Goal, PT)  BLE HEP 1 x 15  -TW     [place]/Cues/Accuracy (Memory Goal 2, PT)  verbalizes understanding;demonstrates adequately  -TW     Time Frame (Patient Education Goal, PT)  10 days  -TW     Progress/Outcome (Patient Education Goal, PT)  goal ongoing  -TW       User Key  (r) = Recorded By, (t) = Taken By, (c) = Cosigned By    Initials Name Provider Type    TW Mazon, Lilliam, PT Physical Therapist        Clinical Impression     Row Name 11/06/20 0955          Pain    Additional Documentation  Pain Scale: Numbers Pre/Post-Treatment (Group)  -TW     Row Name 11/06/20 0955          Pain Scale: Numbers Pre/Post-Treatment    Pretreatment Pain Rating   9/10  -TW     Posttreatment Pain Rating  7/10  -TW     Pain Location - Side  Right  -TW     Pain Location - Orientation  lower  -TW     Pain Location  extremity  -TW     Pain Intervention(s)  Ambulation/increased activity;Repositioned  -TW     Row Name 11/06/20 0955          Plan of Care Review    Plan of Care Reviewed With  patient  -TW     Progress  improving  -TW     Outcome Summary  PT evaluation completed this am with pt being cooperative throughout. Pt presents with pain in RLE as well as deficits in strength, balance, and endurance. She is expected to improve her functional mobility with continued skilled PT services prior to d/c.  -TW     Row Name 11/06/20 0955          Therapy Assessment/Plan (PT)    Patient/Family Therapy Goals Statement (PT)  To return home with home health services.  -TW     Criteria for Skilled Interventions Met (PT)  yes;meets criteria;skilled treatment is necessary  -TW     Predicted Duration of Therapy Intervention (PT)  10 days  -TW     Row Name 11/06/20 0955          Vital Signs    Pretreatment Heart Rate (beats/min)  72  -TW     Posttreatment Heart Rate (beats/min)  80  -TW     Pre SpO2 (%)  99  -TW     O2 Delivery Pre Treatment  supplemental O2 2 L  -TW     Post SpO2 (%)  98  -TW     O2 Delivery Post Treatment  supplemental O2 2 L  -TW     Pre Patient Position  Supine  -TW     Intra Patient Position  Standing  -TW     Post Patient Position  Sitting  -TW     Row Name 11/06/20 0955          Positioning and Restraints    Pre-Treatment Position  in bed  -TW     Post Treatment Position  chair  -TW     In Chair  reclined;call light within reach;encouraged to call for assist  -TW       User Key  (r) = Recorded By, (t) = Taken By, (c) = Cosigned By    Initials Name Provider Type    TW Lilliam Holm, PT Physical Therapist        Outcome Measures     Row Name 11/06/20 0955          How much help from another person do you currently need...    Turning from your back to your side while in  flat bed without using bedrails?  4  -TW     Moving from lying on back to sitting on the side of a flat bed without bedrails?  3  -TW     Moving to and from a bed to a chair (including a wheelchair)?  3  -TW     Standing up from a chair using your arms (e.g., wheelchair, bedside chair)?  3  -TW     Climbing 3-5 steps with a railing?  2  -TW     To walk in hospital room?  3  -TW     AM-PAC 6 Clicks Score (PT)  18  -TW     Row Name 11/06/20 0955          Functional Assessment    Outcome Measure Options  AM-PAC 6 Clicks Basic Mobility (PT)  -TW       User Key  (r) = Recorded By, (t) = Taken By, (c) = Cosigned By    Initials Name Provider Type    TW Lilliam Holm PT Physical Therapist        Physical Therapy Education                 Title: PT OT SLP Therapies (In Progress)     Topic: Physical Therapy (In Progress)     Point: Mobility training (Done)     Learning Progress Summary           Patient Acceptance, E,D, VU,DU by TW at 11/6/2020 0955    Comment: Pt education on the use of rolling walker to take wt off RLE to assist with pain control during gait.                   Point: Home exercise program (Not Started)     Learner Progress:  Not documented in this visit.          Point: Body mechanics (Not Started)     Learner Progress:  Not documented in this visit.                      User Key     Initials Effective Dates Name Provider Type Discipline    TW 03/26/19 -  Lilliam Holm PT Physical Therapist PT              PT Recommendation and Plan  Planned Therapy Interventions (PT): balance training, bed mobility training, gait training, home exercise program, patient/family education, strengthening, transfer training  Plan of Care Reviewed With: patient  Progress: improving  Outcome Summary: PT evaluation completed this am with pt being cooperative throughout. Pt presents with pain in RLE as well as deficits in strength, balance, and endurance. She is expected to improve her functional mobility with continued skilled  PT services prior to d/c.     Time Calculation:   PT Charges     Row Name 11/06/20 0955             Time Calculation    Stop Time  0955  -TW      PT Received On  11/06/20 -TW      PT Goal Re-Cert Due Date  11/16/20  -TW        User Key  (r) = Recorded By, (t) = Taken By, (c) = Cosigned By    Initials Name Provider Type    Lilliam Fleming, PT Physical Therapist        Therapy Charges for Today     Code Description Service Date Service Provider Modifiers Qty    [id number] HC PT EVAL LOW COMPLEXITY 2 11/6/2020 Lilliam Holm PT GP 1          PT G-Codes  Outcome Measure Options: AM-PAC 6 Clicks Basic Mobility (PT)  AM-PAC 6 Clicks Score (PT): 18    Lilliam Holm PT  11/6/2020

## 2020-11-06 NOTE — THERAPY EVALUATION
Patient Name: Sadie Tijerina  : 1940    MRN: [id number]                              Today's Date: 2020       Admit Date: 2020    Visit Dx:     ICD-10-CM ICD-9-CM   1. Pneumonia of both lungs due to infectious organism, unspecified part of lung  J18.9 483.8   2. Non-healing wound of right lower extremity  S81.801A 894.1   3. Cellulitis of right lower extremity  L03.115 682.6   4. Lactic acidosis  E87.2 276.2   5. Tachycardia  R00.0 785.0     Patient Active Problem List   Diagnosis   • Calf cramp   • Mixed anxiety depressive disorder   • Dizziness   • Fatigue   • Gastroesophageal reflux disease without esophagitis   • Hematuria   • Hyperlipidemia   • Essential hypertension   • Low back pain   • Restless legs syndrome   • Essential tremor   • Vitamin D deficiency   • Balance problem   • Tension headache   • Tobacco abuse disorder   • Leg swelling   • Acute and chronic respiratory failure with hypoxia (CMS/formerly [facility])   • Venous insufficiency of both lower extremities   • Chronic diastolic heart failure (CMS/formerly [facility])   • Neuropathy   • COPD (chronic obstructive pulmonary disease) (CMS/formerly [facility])   • Abnormal gait   • Bilateral sensorineural hearing loss   • Disorder of inner ear   • Tympanosclerosis   • Transient vision disturbance   • Bilateral bronchopneumonia   • Pneumonia of both lungs due to infectious organism   • Leg wound, right   • Severe sepsis (CMS/formerly [facility])     Past Medical History:   Diagnosis Date   • Arthritis    • Cancer (CMS/HCC)     uterine cancer ()   • COPD (chronic obstructive pulmonary disease) (CMS/formerly [facility])    • Depression    • Elevated cholesterol    • GERD (gastroesophageal reflux disease)    • History of emphysema    • History of esophageal reflux    • History of recurrent UTI (urinary tract infection)    • History of renal calculi    • History of uterine cancer    • Hyperlipidemia    • Hypertension    • Impaired functional mobility, balance, gait, and endurance    • Pneumonia 2019     Past  Surgical History:   Procedure Laterality Date   • FOOT SURGERY     • HAND SURGERY     • HYSTERECTOMY       General Information     Row Name 11/06/20 1340          OT Time and Intention    Document Type  evaluation  -SD     Mode of Treatment  occupational therapy  -SD     Row Name 11/06/20 1340          General Information    Patient Profile Reviewed  yes  -SD     Prior Level of Function  independent:;all household mobility  -SD     Existing Precautions/Restrictions  fall;oxygen therapy device and L/min  -SD     Barriers to Rehab  previous functional deficit  -SD     Row Name 11/06/20 1340          Living Environment    Lives With  alone  -SD     Row Name 11/06/20 1340          Home Main Entrance    Number of Stairs, Main Entrance  one  -SD     Stair Railings, Main Entrance  none  -SD     Row Name 11/06/20 1340          Stairs Within Home, Primary    Stairs, Within Home, Primary  2nd level but doesn't access  -SD     Row Name 11/06/20 1340          Cognition    Orientation Status (Cognition)  oriented x 4  -SD     Row Name 11/06/20 1340          Safety Issues, Functional Mobility    Safety Issues Affecting Function (Mobility)  safety precautions follow-through/compliance;awareness of need for assistance  -SD     Impairments Affecting Function (Mobility)  balance;endurance/activity tolerance;shortness of breath;strength;pain  -SD       User Key  (r) = Recorded By, (t) = Taken By, (c) = Cosigned By    Initials Name Provider Type    SD Mariia Arevalo OT Occupational Therapist        Mobility/ADL's     Row Name 11/06/20 1341          Bed Mobility    Bed Mobility  supine-sit  -SD     Supine-Sit [place] (Bed Mobility)  modified independence  -SD     Assistive Device (Bed Mobility)  head of bed elevated  -SD     Row Name 11/06/20 1341          Transfers    Transfers  sit-stand transfer  -SD     Bed-Chair [place] (Transfers)  contact guard  -SD     Assistive Device (Bed-Chair Transfers)  walker, front-wheeled   -SD     Sit-Stand [place] (Transfers)  contact guard  -SD     Row Name 11/06/20 1341          Sit-Stand Transfer    Assistive Device (Sit-Stand Transfers)  walker, front-wheeled  -SD     Row Name 11/06/20 1341          Functional Mobility    Functional Mobility- Ind. Level  contact guard assist  -SD     Functional Mobility- Device  rolling walker  -SD     Functional Mobility-Distance (Feet)  12  -SD     Row Name 11/06/20 1341          Activities of Daily Living    BADL Assessment/Intervention  bathing;upper body dressing;lower body dressing;grooming;feeding;toileting  -SD     Row Name 11/06/20 1341          Bathing Assessment/Intervention    [place] Level (Bathing)  minimum assist (75% patient effort)  -SD     Row Name 11/06/20 1341          Upper Body Dressing Assessment/Training    [place] Level (Upper Body Dressing)  set up  -SD     Row Name 11/06/20 1341          Lower Body Dressing Assessment/Training    [place] Level (Lower Body Dressing)  minimum assist (75% patient effort)  -SD     Row Name 11/06/20 1341          Grooming Assessment/Training    [place] Level (Grooming)  set up  -SD     Row Name 11/06/20 1341          Self-Feeding Assessment/Training    [place] Level (Feeding)  set up  -SD     Row Name 11/06/20 1341          Toileting Assessment/Training    [place] Level (Toileting)  minimum assist (75% patient effort)  -SD       User Key  (r) = Recorded By, (t) = Taken By, (c) = Cosigned By    Initials Name Provider Type    SD Mariia Arevalo OT Occupational Therapist        Obj/Interventions     Row Name 11/06/20 1342          Range of Motion Comprehensive    General Range of Motion  bilateral upper extremity ROM WFL  -SD     Row Name 11/06/20 1342          Strength Comprehensive (MMT)    General Manual Muscle Testing (MMT) Assessment  upper extremity strength deficits identified  -SD     Comment, General Manual Muscle Testing (MMT) Assessment  UB 4-/5  -SD       User  Key  (r) = Recorded By, (t) = Taken By, (c) = Cosigned By    Initials Name Provider Type    Mariia Armenta OT Occupational Therapist        Goals/Plan     Row Name 11/06/20 1345          Transfer Goal 1 (OT)    Activity/Assistive Device (Transfer Goal 1, OT)  sit-to-stand/stand-to-sit;walker, rolling  -SD     [place] Level/Cues Needed (Transfer Goal 1, OT)  standby assist  -SD     Time Frame (Transfer Goal 1, OT)  long term goal (LTG)  -SD     Progress/Outcome (Transfer Goal 1, OT)  goal ongoing  -SD     Row Name 11/06/20 1345          Dressing Goal 1 (OT)    Activity/Device (Dressing Goal 1, OT)  lower body dressing  -SD     Ray/Cues Needed (Dressing Goal 1, OT)  contact guard assist  -SD     Time Frame (Dressing Goal 1, OT)  2 weeks  -SD     Progress/Outcome (Dressing Goal 1, OT)  goal ongoing  -SD     Row Name 11/06/20 1345          Toileting Goal 1 (OT)    Activity/Device (Toileting Goal 1, OT)  toileting skills, all;commode, bedside without drop arms  -SD     [place] Level/Cues Needed (Toileting Goal 1, OT)  contact guard assist  -SD     Time Frame (Toileting Goal 1, OT)  2 weeks  -SD     Progress/Outcome (Toileting Goal 1, OT)  goal ongoing  -SD     Row Name 11/06/20 1345          Strength Goal 1 (OT)    Strength Goal 1 (OT)  Patient to perform UB ther ex as tolerated  -SD     Time Frame (Strength Goal 1, OT)  long term goal (LTG)  -SD     Progress/Outcome (Strength Goal 1, OT)  goal ongoing  -SD     Row Name 11/06/20 1345          Therapy Assessment/Plan (OT)    Planned Therapy Interventions (OT)  activity tolerance training;adaptive equipment training;BADL retraining;patient/caregiver education/training;strengthening exercise;transfer/mobility retraining  -SD       User Key  (r) = Recorded By, (t) = Taken By, (c) = Cosigned By    Initials Name Provider Type    Mariia Armenta OT Occupational Therapist        Clinical Impression     Row Name 11/06/20 1342          Pain Assessment     Additional Documentation  Pain Scale: Numbers Pre/Post-Treatment (Group)  -SD     Row Name 11/06/20 1342          Pain Scale: Numbers Pre/Post-Treatment    Pretreatment Pain Rating  9/10  -SD     Posttreatment Pain Rating  7/10  -SD     Pain Location - Side  Right  -SD     Pain Location - Orientation  lower  -SD     Pain Location  extremity  -SD     Pain Intervention(s)  Repositioned;Ambulation/increased activity  -SD     Row Name 11/06/20 1342          Plan of Care Review    Plan of Care Reviewed With  patient  -SD     Progress  no change  -SD     Outcome Summary  OT eval completed. Patient presents deficits in strength, endurance, balance, mobility and ADL performance. Patient is expected to benefit from continued OT services prior to DC.  -SD     Row Name 11/06/20 1342          Therapy Assessment/Plan (OT)    Patient/Family Therapy Goal Statement (OT)  return home  -SD     Rehab Potential (OT)  good, to achieve stated therapy goals  -SD     Criteria for Skilled Therapeutic Interventions Met (OT)  skilled treatment is necessary  -SD     Therapy Frequency (OT)  3 times/wk 5 if indicated  -SD     Row Name 11/06/20 1342          Therapy Plan Review/Discharge Plan (OT)    Anticipated Discharge Disposition (OT)  home with home health;home with assist  -SD     Row Name 11/06/20 1342          Vital Signs    Pre SpO2 (%)  99  -SD     O2 Delivery Pre Treatment  supplemental O2  -SD     O2 Delivery Intra Treatment  supplemental O2  -SD     Post SpO2 (%)  98  -SD     O2 Delivery Post Treatment  supplemental O2  -SD     Row Name 11/06/20 1342          Positioning and Restraints    Pre-Treatment Position  in bed  -SD     Post Treatment Position  chair  -SD     In Chair  reclined;call light within reach;encouraged to call for assist;exit alarm on  -SD       User Key  (r) = Recorded By, (t) = Taken By, (c) = Cosigned By    Initials Name Provider Type    Mariia Armenta, OT Occupational Therapist        Outcome Measures      Row Name 11/06/20 1347          How much help from another is currently needed...    Putting on and taking off regular lower body clothing?  3  -SD     Bathing (including washing, rinsing, and drying)  3  -SD     Toileting (which includes using toilet bed pan or urinal)  3  -SD     Putting on and taking off regular upper body clothing  3  -SD     Taking care of personal grooming (such as brushing teeth)  3  -SD     Eating meals  4  -SD     AM-PAC 6 Clicks Score (OT)  19  -SD     Row Name 11/06/20 1347          Functional Assessment    Outcome Measure Options  AM-PAC 6 Clicks Daily Activity (OT)  -SD       User Key  (r) = Recorded By, (t) = Taken By, (c) = Cosigned By    Initials Name Provider Type    Mariia Armenta OT Occupational Therapist        Occupational Therapy Education                 Title: PT OT SLP Therapies (In Progress)     Topic: Occupational Therapy (In Progress)     Point: ADL training (Done)     Description:   Instruct learner(s) on proper safety adaptation and remediation techniques during self care or transfers.   Instruct in proper use of assistive devices.              Learning Progress Summary           Patient Acceptance, E,TB, VU by SD at 11/6/2020 1348    Comment: Benefit of OT; OT POC                   Point: Home exercise program (Not Started)     Description:   Instruct learner(s) on appropriate technique for monitoring, assisting and/or progressing therapeutic exercises/activities.              Learner Progress:  Not documented in this visit.          Point: Precautions (Not Started)     Description:   Instruct learner(s) on prescribed precautions during self-care and functional transfers.              Learner Progress:  Not documented in this visit.          Point: Body mechanics (Not Started)     Description:   Instruct learner(s) on proper positioning and spine alignment during self-care, functional mobility activities and/or exercises.              Learner Progress:  Not  documented in this visit.                      User Key     Initials Effective Dates Name Provider Type Discipline    SD 03/07/18 -  Mariia Arevalo OT Occupational Therapist OT              OT Recommendation and Plan  Planned Therapy Interventions (OT): activity tolerance training, adaptive equipment training, BADL retraining, patient/caregiver education/training, strengthening exercise, transfer/mobility retraining  Therapy Frequency (OT): 3 times/wk(5 if indicated)  Plan of Care Review  Plan of Care Reviewed With: patient  Progress: no change  Outcome Summary: OT eval completed. Patient presents deficits in strength, endurance, balance, mobility and ADL performance. Patient is expected to benefit from continued OT services prior to DC.     Time Calculation:   Time Calculation- OT     Row Name 11/06/20 1349             Time Calculation- OT    OT Start Time  0943  -SD      OT Received On  11/06/20  -SD      OT Goal Re-Cert Due Date  11/16/20  -SD        User Key  (r) = Recorded By, (t) = Taken By, (c) = Cosigned By    Initials Name Provider Type    SD Mariia Arevalo OT Occupational Therapist        Therapy Charges for Today     Code Description Service Date Service Provider Modifiers Qty    [id number]  OT EVAL LOW COMPLEXITY 2 11/6/2020 Mariia Arevalo OT GO 1               Mariia Arevalo OT  11/6/2020

## 2020-11-06 NOTE — PROGRESS NOTES
Adult Nutrition  Assessment/PES    Patient Name:  Sadie Tijerina  YOB: 1940  MRN: [id number]  Admit Date:  11/4/2020    Assessment Date:  11/6/2020    Comments:    Recommend:  1. Consider adding cardiac modifications to current diet order as medically appropriate and tolerated.  2. Encourage PO intake. Average intake ~31% x 4 meals.   3. RD ordered Boost Plus BID. Continue Prostat BID.  4. Consider a multivitamin with minerals daily.     RD to follow pt and available PRN.      Reason for Assessment     Row Name 11/06/20 1457          Reason for Assessment    Reason For Assessment  follow-up protocol     Diagnosis  cardiac disease;infection/sepsis;pulmonary disease;other (see comments) COPD, GERD, HTN, HLD, severe sepsis, PNA, acute on chronic resp failure, lactic acidosis     Identified At Risk by Screening Criteria  large or nonhealing wound, burn or pressure injury             Labs/Tests/Procedures/Meds     Row Name 11/06/20 1457          Labs/Procedures/Meds    Lab Results Reviewed  reviewed, pertinent     Lab Results Comments  High: BUN, Gluc, Cr, CRP        Medications    Pertinent Medications Reviewed  reviewed, pertinent     Pertinent Medications Comments  Protonix, Prednisone, Prostat             Nutrition Prescription Ordered     Row Name 11/06/20 1458          Nutrition Prescription PO    Current PO Diet  Regular     Supplement  ProStat     Supplement Frequency  2 times a day     Common Modifiers  Consistent Carbohydrate         Evaluation of Received Nutrient/Fluid Intake     Row Name 11/06/20 1459          PO Evaluation    Number of Days PO Intake Evaluated  2 days     Number of Meals  4     % PO Intake  31               Problem/Interventions:  Problem 1     Row Name 11/06/20 1459          Nutrition Diagnoses Problem 1    Problem 1  Increased Nutrient Needs     Macronutrient  Kcal;Fluid;Protein     Etiology (related to)  Medical Diagnosis     Infectious Disease  Sepsis      Pulmonary/Critical Care  Pneumonia;COPD     Skin  Non healing wound right lower leg     Signs/Symptoms (evidenced by)  Report/Observation     Reported/Observed By  MD               Intervention Goal     Row Name 11/06/20 1500          Intervention Goal    General  Improved nutrition related lab(s);Meet nutritional needs for age/condition     PO  Meet estimated needs;Increase intake;PO intake (%)     PO Intake %  50 %     Weight  Maintain weight         Nutrition Intervention     Row Name 11/06/20 1500          Nutrition Intervention    RD/Tech Action  Follow Tx progress;Encourage intake;Recommend/ordered     Recommended/Ordered  Supplement;Diet         Nutrition Prescription     Row Name 11/06/20 1500          Nutrition Prescription PO    PO Prescription  Begin/change diet     Begin/Change Diet to  Regular     Supplement  Boost Plus     Supplement Frequency  2 times a day     Common Modifiers  Cardiac;Consistent Carbohydrate     New PO Prescription Ordered?  No, recommended        Other Orders    Obtain Weight  Daily     Obtain Weight Ordered?  No, recommended     Supplement  Vitamin mineral supplement     Supplement Ordered?  No, recommended         Education/Evaluation     Row Name 11/06/20 1503          Education    Education  Will Instruct as appropriate        Monitor/Evaluation    Monitor  Skin status;Weight;Pertinent labs;Supplement intake;Per protocol;PO intake;I&O           Electronically signed by:  Ester Thompson RD  11/06/20 15:03 EST

## 2020-11-06 NOTE — PLAN OF CARE
Problem: Adult Inpatient Plan of Care  Goal: Plan of Care Review  Recent Flowsheet Documentation  Taken 11/6/2020 1342 by Mariia Arevalo OT  Progress: no change  Plan of Care Reviewed With: patient  Outcome Summary: OT eval completed. Patient presents deficits in strength, endurance, balance, mobility and ADL performance. Patient is expected to benefit from continued OT services prior to DC.

## 2020-11-06 NOTE — PLAN OF CARE
Problem: Adult Inpatient Plan of Care  Goal: Plan of Care Review  Recent Flowsheet Documentation  Taken 11/6/2020 0955 by Lilliam Holm, YANNICK  Progress: improving  Plan of Care Reviewed With: patient  Outcome Summary: PT evaluation completed this am with pt being cooperative throughout. Pt presents with pain in RLE as well as deficits in strength, balance, and endurance. She is expected to improve her functional mobility with continued skilled PT services prior to d/c.

## 2020-11-07 NOTE — THERAPY TREATMENT NOTE
Patient states she doesn't feel well and wants to rest today.  Declines PT services.  Will follow up tomorrow.

## 2020-11-07 NOTE — PLAN OF CARE
Goal Outcome Evaluation:  Plan of Care Reviewed With: patient  Progress: improving  Outcome Summary: VSS.  No c/o pain.  Patient resting.  She is coughing more today, thick, white sputum.  RLE has two sores.  Will monitor.

## 2020-11-07 NOTE — PLAN OF CARE
Goal Outcome Evaluation:  Plan of Care Reviewed With: patient  Progress: no change     VSS, no complaints noted throughout the night. Patient still having some SOA with exertion. On baseline o2 at 2LNC. Used cpap during the night without issues.

## 2020-11-07 NOTE — PROGRESS NOTES
[facility]    Name:  Sadie Tijerina   Age:  80 y.o.  Sex:  female  :  1940  MRN:  [id number]   Visit Number:  [id number]  Admission Date:  2020  Date Of Service:  20  Primary Care Physician:  Kristian Wolff MD     LOS: 2 days :  Patient Care Team:  Kristian Wolff MD as PCP - General  Kristian Wolff MD as PCP - Family Medicine  Bayron Grimaldo MD (Inactive) as Consulting Physician (General Surgery):    Chief Complaint:      Nausea and generalized weakness.    Subjective / Interval History:     Ms. Forrest was seen and examined this morning.  She states that she is feeling tired today.  She states that she was nauseous all night but denies any vomiting.  Complains of right leg pain which is chronic.  Complains of continued cough and is asking for cough syrup.  She also refused to walk with physical therapy today.  No fevers.  No significant overnight events.    She was admitted with multiple complaints including shortness of breath and ulcer in the right leg.  She states that she recently had an injury due to bumping on the table.  She does have COPD and on home oxygen.  She was noted to have bilateral pneumonia on her CT of the chest and was started on broad-spectrum IV antibiotics therapy with Zosyn, Levaquin and vancomycin.  She was also placed on steroids for COPD exacerbation.  Due to her elevated D-dimer levels, a CT of the chest with PE protocol was done and it was negative for pulmonary embolism.  Her nasal swab for MRSA was positive.  However, since she significantly improved clinically, her vancomycin was discontinued the next day.  She did not have any fevers.    Review of Systems:     General ROS: Patient denies any fevers, chills or loss of consciousness.  Generalized weakness.  Respiratory ROS: Shortness of breath, chest congestion and wheezing.  Cardiovascular ROS: Denies chest pain or palpitations. No history of exertional chest  pain.  Gastrointestinal ROS: Denies nausea and vomiting. Denies any abdominal pain. No diarrhea.  Neurological ROS: Denies any focal weakness. No loss of consciousness.  Bilateral hand tremors.  Dermatological ROS: Ulceration of the right leg.    Vital Signs:    Temp:  [97.6 °F (36.4 °C)-97.8 °F (36.6 °C)] 97.6 °F (36.4 °C)  Heart Rate:  [67-94] 71  Resp:  [18] 18  BP: (108-145)/(47-55) 132/55    Intake and output:    I/O last 3 completed shifts:  In: 1010 [P.O.:960; IV Piggyback:50]  Out: 100 [Urine:100]  I/O this shift:  In: 240 [P.O.:240]  Out: -     Physical Examination:    General Appearance:  Alert and cooperative, not in any acute distress.   Head:  Atraumatic and normocephalic, without obvious abnormality.   Eyes:          PERRLA, conjunctivae and sclerae normal, no Icterus. No pallor. Extraocular movements are within normal limits.   Neck: Supple, trachea midline, no thyromegaly, no carotid bruit.   Lungs:   Chest shape is normal. Breath sounds heard bilaterally equally.  Bilateral scattered wheezing and crackles heard. No pleural rub or bronchial breathing.   Heart:  Normal S1 and S2, no murmur, no gallop, no rub. No JVD   Abdomen:   Normal bowel sounds, no masses, no organomegaly. Soft, nontender, nondistended, no guarding, no rebound tenderness.   Extremities: Moves all extremities, 1+ pitting ankle edema, no cyanosis, no clubbing.  Healing ulcers noted in the right lower leg without any erythema.   Skin: No bleeding or rash.  Dry skin noted.   Neurologic: Awake, alert and oriented times 3. Moves all 4 extremities equally.  Bilateral resting hand tremors noted but improved from yesterday.     Laboratory results:    Results from last 7 days   Lab Units 11/06/20  0605 11/04/20  2242   SODIUM mmol/L 137 141   POTASSIUM mmol/L 4.6 4.0   CHLORIDE mmol/L 104 98   CO2 mmol/L 26.7 28.4   BUN mg/dL 26* 22   CREATININE mg/dL 1.07* 1.05*   CALCIUM mg/dL 8.6 9.6   BILIRUBIN mg/dL  --  0.3   ALK PHOS U/L  --  81    ALT (SGPT) U/L  --  19   AST (SGOT) U/L  --  22   GLUCOSE mg/dL 109* 147*     Results from last 7 days   Lab Units 11/06/20  0605 11/04/20  2242   WBC 10*3/mm3 16.31* 16.49*   HEMOGLOBIN g/dL 9.6* 11.6*   HEMATOCRIT % 30.4* 35.9   PLATELETS 10*3/mm3 194 237         Results from last 7 days   Lab Units 11/04/20  2242   TROPONIN T ng/mL <0.010     Results from last 7 days   Lab Units 11/04/20  2325 11/04/20  2317   BLOODCX  No growth at 2 days No growth at 2 days     Results from last 7 days   Lab Units 11/04/20  2236   PH, ARTERIAL pH units 7.443   PO2 ART mm Hg 80.2   PCO2, ARTERIAL mm Hg 41.7   HCO3 ART mmol/L 28.5*     I have reviewed the patient's laboratory results.    Radiology results:    Imaging Results (Last 24 Hours)     ** No results found for the last 24 hours. **        I have reviewed the patient's radiology reports.    Medication Review:     I have reviewed the patient's active and prn medications.       Severe sepsis (CMS/HCC)    Acute and chronic respiratory failure with hypoxia (CMS/HCC)    Essential hypertension    Gastroesophageal reflux disease without esophagitis    Hyperlipidemia    Mixed anxiety depressive disorder    Tobacco abuse disorder    COPD (chronic obstructive pulmonary disease) (CMS/HCC)    Pneumonia of both lungs due to infectious organism    Leg wound, right    Assessment:    1.  Acute hypoxic respiratory failure ruled out, chronic hypoxic respiratory failure only.  2.  Bilateral healthcare associated pneumonia, POA, unable to classify further.  3.  Acute COPD exacerbation, present on admission.  4.  Chronic diastolic heart failure, no evidence of exacerbation.  5.  Traumatic right leg ulcers, healing, present on admission.  6.  Essential hypertension.  7.  Chronic essential tremors on propranolol.  8.  Restless leg syndrome.    Plan:    Ms. Forrest is currently hemodynamically stable.  She did not have any blood work today.  Complains of nausea and will be treated  conservatively.  Abdomen is benign.  We will add lipase levels for tomorrow.  She has been afebrile.    She is currently on bronchodilators, budesonide.  I will add mucolytic agents as well as Tessalon Perles.  She will be continued on IV antibiotics therapy with Zosyn and oral azithromycin.  Blood cultures are currently pending.  Nasal swab was positive for MRSA and she will be placed on Bactroban locally.  She will be continued on lactobacillus supplements.  Unfortunately the sputum for culture was rejected.    Her home medications have been continued.  She is on Lovenox for DVT prophylaxis.  She will be continued on physical and Occupational Therapy.  Further recommendations depend upon her clinical course.  She is again refusing to go to short-term rehabilitation.  She wants to go home when she feels better.  Discussed with nursing staff.    Nico Enriquez MD  11/07/20  14:34 EST    Dictated utilizing Dragon dictation.

## 2020-11-08 NOTE — PLAN OF CARE
Goal Outcome Evaluation:  Plan of Care Reviewed With: patient  Progress: improving     Vss, no complaints noted through the night- rested well.

## 2020-11-08 NOTE — THERAPY TREATMENT NOTE
Patient Name: Sadie Tijerina  : 1940    MRN: [id number]                              Today's Date: 2020       Admit Date: 2020    Visit Dx:     ICD-10-CM ICD-9-CM   1. Pneumonia of both lungs due to infectious organism, unspecified part of lung  J18.9 483.8   2. Non-healing wound of right lower extremity  S81.801A 894.1   3. Cellulitis of right lower extremity  L03.115 682.6   4. Lactic acidosis  E87.2 276.2   5. Tachycardia  R00.0 785.0     Patient Active Problem List   Diagnosis   • Calf cramp   • Mixed anxiety depressive disorder   • Dizziness   • Fatigue   • Gastroesophageal reflux disease without esophagitis   • Hematuria   • Hyperlipidemia   • Essential hypertension   • Low back pain   • Restless legs syndrome   • Essential tremor   • Vitamin D deficiency   • Balance problem   • Tension headache   • Tobacco abuse disorder   • Leg swelling   • Acute and chronic respiratory failure with hypoxia (CMS/[facility])   • Venous insufficiency of both lower extremities   • Chronic diastolic heart failure (CMS/[facility])   • Neuropathy   • COPD (chronic obstructive pulmonary disease) (CMS/[facility])   • Abnormal gait   • Bilateral sensorineural hearing loss   • Disorder of inner ear   • Tympanosclerosis   • Transient vision disturbance   • Bilateral bronchopneumonia   • Pneumonia of both lungs due to infectious organism   • Leg wound, right   • Severe sepsis (CMS/[facility])     Past Medical History:   Diagnosis Date   • Arthritis    • Cancer (CMS/HCC)     uterine cancer ()   • COPD (chronic obstructive pulmonary disease) (CMS/[facility])    • Depression    • Elevated cholesterol    • GERD (gastroesophageal reflux disease)    • History of emphysema    • History of esophageal reflux    • History of recurrent UTI (urinary tract infection)    • History of renal calculi    • History of uterine cancer    • Hyperlipidemia    • Hypertension    • Impaired functional mobility, balance, gait, and endurance    • Pneumonia 2019     Past  Surgical History:   Procedure Laterality Date   • FOOT SURGERY     • HAND SURGERY     • HYSTERECTOMY       General Information     [facility] Name 11/08/20 [facility]          Physical Therapy Time and Intention    Mode of Treatment  physical therapy  -[facility] Name 11/08/20 [facility]          General Information    Patient Profile Reviewed  yes  -CC     Existing Precautions/Restrictions  fall;oxygen therapy device and L/min  -[facility] Name 11/08/20 [facility]          Safety Issues, Functional Mobility    Safety Issues Affecting Function (Mobility)  awareness of need for assistance;safety precautions follow-through/compliance  -     Impairments Affecting Function (Mobility)  balance;endurance/activity tolerance;shortness of breath;strength;pain  -       User Key  (r) = Recorded By, (t) = Taken By, (c) = Cosigned By    Initials Name Provider Type    CC Nika Chun, DILEEP Physical Therapy Assistant        Mobility    No documentation.       Obj/Interventions     [facility] Name 11/08/20 [facility]          Strength Comprehensive (MMT)    General Manual Muscle Testing (MMT) Assessment  lower extremity strength deficits identified  -CC     Row Name 11/08/20 [facility]          Motor Skills    Motor Skills  therapeutic exercise  -     Therapeutic Exercise  hip;knee;ankle Pt performed B LE ex 8 reps each AP, QS, hip abd, SAQ, SLR  -       User Key  (r) = Recorded By, (t) = Taken By, (c) = Cosigned By    Initials Name Provider Type    CC Nika Chun PTA Physical Therapy Assistant        Goals/Plan    No documentation.       Clinical Impression     [facility] Name 11/08/20 [facility]          Pain    Additional Documentation  Pain Scale: Numbers Pre/Post-Treatment (Group)  -CC     Row Name 11/08/20 [facility]          Pain Scale: Numbers Pre/Post-Treatment    Pretreatment Pain Rating  0/10 - no pain  -     Posttreatment Pain Rating  0/10 - no pain  -[facility] Name 11/08/20 [facility]          Plan of Care Review    Plan of Care Reviewed With  patient  -CC      Progress  no change  -     Outcome Summary  Pt agreeable to ex on 2nd attempt to see pt. Pt with c-pap donned and stated having hard time breathing without it today . PTA educated pt on importance of increasing actvity daily and time OOB . Con't with PT POC and progress as tolerated  -CC     Row Name 11/08/20 1350          Positioning and Restraints    Pre-Treatment Position  in bed  -CC     Post Treatment Position  bed  -CC     In Bed  supine;encouraged to call for assist;call light within reach  -CC       User Key  (r) = Recorded By, (t) = Taken By, (c) = Cosigned By    Initials Name Provider Type    CC Nika Chun PTA Physical Therapy Assistant        Outcome Measures     Row Name 11/08/20 1350          How much help from another person do you currently need...    Turning from your back to your side while in flat bed without using bedrails?  4  -CC     Moving from lying on back to sitting on the side of a flat bed without bedrails?  3  -CC     Moving to and from a bed to a chair (including a wheelchair)?  3  -CC     Standing up from a chair using your arms (e.g., wheelchair, bedside chair)?  3  -CC     Climbing 3-5 steps with a railing?  2  -CC     To walk in hospital room?  3  -CC     AM-PAC 6 Clicks Score (PT)  18  -CC     Row Name 11/08/20 1350          Functional Assessment    Outcome Measure Options  AM-PAC 6 Clicks Basic Mobility (PT)  -CC       User Key  (r) = Recorded By, (t) = Taken By, (c) = Cosigned By    Initials Name Provider Type    Nika Johnson PTA Physical Therapy Assistant        Physical Therapy Education                 Title: PT OT SLP Therapies (Done)     Topic: Physical Therapy (Done)     Point: Mobility training (Done)     Learning Progress Summary           Patient Acceptance, NAZIA TRINIDAD, ARIANNA by MD at 11/6/2020 1831    Acceptance, E,ARIANNA CHU DU by CINTIA at 11/6/2020 0970    Comment: Pt education on the use of rolling walker to take wt off RLE to assist with pain control during gait.                    Point: Home exercise program (Done)     Learning Progress Summary           Patient Acceptance, E,TB, VU by MD at 11/6/2020 1831                   Point: Body mechanics (Done)     Learning Progress Summary           Patient Acceptance, E,TB, VU by MD at 11/6/2020 1831                               User Key     Initials Effective Dates Name Provider Type Discipline    TW 03/26/19 -  Lilliam Holm, PT Physical Therapist PT    MD 11/11/19 -  Gil Cordova RN Registered Nurse Nurse              PT Recommendation and Plan     Plan of Care Reviewed With: patient  Progress: no change  Outcome Summary: Pt agreeable to ex on 2nd attempt to see pt. Pt with c-pap donned and stated having hard time breathing without it today . PTA educated pt on importance of increasing actvity daily and time OOB . Con't with PT POC and progress as tolerated     Time Calculation:   PT Charges     Row Name 11/08/20 1350             Time Calculation    Start Time  1350  -CC      Stop Time  1405  -CC      Time Calculation (min)  15 min  -CC      PT Received On  11/08/20  -CC      PT Goal Re-Cert Due Date  11/16/20  -CC         Timed Charges    21399 - PT Therapeutic Exercise Minutes  15  -CC        User Key  (r) = Recorded By, (t) = Taken By, (c) = Cosigned By    Initials Name Provider Type    CC Nika Chun PTA Physical Therapy Assistant        Therapy Charges for Today     Code Description Service Date Service Provider Modifiers Qty    14350898788 HC PT THER PROC EA 15 MIN 11/8/2020 Nika Chun PTA GP 1          PT G-Codes  Outcome Measure Options: AM-PAC 6 Clicks Basic Mobility (PT)  AM-PAC 6 Clicks Score (PT): 18  AM-PAC 6 Clicks Score (OT): 19    Nika Chun PTA  11/8/2020

## 2020-11-08 NOTE — PLAN OF CARE
Problem: Fluid Imbalance (Pneumonia)  Goal: Fluid Balance  Outcome: Ongoing, Progressing     Problem: Infection (Pneumonia)  Goal: Resolution of Infection Signs and Symptoms  Outcome: Ongoing, Progressing  Intervention: Prevent Infection Progression  Recent Flowsheet Documentation  Taken 11/8/2020 1400 by Gisel Gutierrez RN  Infection Management: aseptic technique maintained  Taken 11/8/2020 1200 by Gisel Gutierrez RN  Infection Management: aseptic technique maintained  Taken 11/8/2020 1000 by Gisel Gutierrez RN  Infection Management: aseptic technique maintained  Taken 11/8/2020 0800 by Gisel Gutierrez RN  Infection Management: aseptic technique maintained     Problem: Respiratory Compromise (Pneumonia)  Goal: Effective Oxygenation and Ventilation  Outcome: Ongoing, Progressing  Intervention: Promote Airway Secretion Clearance  Recent Flowsheet Documentation  Taken 11/8/2020 1600 by Gisel Gutierrez RN  Cough And Deep Breathing: done independently per patient  Taken 11/8/2020 1200 by Gisel Gutierrez RN  Cough And Deep Breathing: done independently per patient  Taken 11/8/2020 0800 by Gisel Gutierrez RN  Cough And Deep Breathing: done independently per patient  Intervention: Optimize Oxygenation and Ventilation  Recent Flowsheet Documentation  Taken 11/8/2020 1600 by Gisel Gutierrez RN  Head of Bed (HOB): HOB at 30 degrees  Taken 11/8/2020 1400 by Gisel Gutierrez RN  Head of Bed (HOB): HOB at 30 degrees  Taken 11/8/2020 1200 by Gisel Gutierrez RN  Head of Bed (HOB): HOB at 30-45 degrees  Taken 11/8/2020 1000 by Gisel Gutierrez RN  Head of Bed (HOB): HOB at 60 degrees  Taken 11/8/2020 0800 by Gisel Gutierrez RN  Head of Bed (HOB): HOB at 20-30 degrees     Problem: Fall Injury Risk  Goal: Absence of Fall and Fall-Related Injury  Outcome: Ongoing, Progressing  Intervention: Identify and Manage Contributors to Fall Injury Risk  Recent Flowsheet Documentation  Taken 11/8/2020 1600 by Gisel Gutierrez RN  Medication Review/Management: medications  reviewed  Taken 11/8/2020 1200 by Gisel Gutierrez RN  Medication Review/Management: medications reviewed  Intervention: Promote Injury-Free Environment  Recent Flowsheet Documentation  Taken 11/8/2020 1600 by Gisel Gutierrez RN  Safety Promotion/Fall Prevention: safety round/check completed  Taken 11/8/2020 1400 by Gisel Gutierrez RN  Safety Promotion/Fall Prevention: safety round/check completed  Taken 11/8/2020 1200 by Gisel Gutierrez RN  Safety Promotion/Fall Prevention: safety round/check completed  Taken 11/8/2020 1000 by Gisel Gutierrez RN  Safety Promotion/Fall Prevention: safety round/check completed  Taken 11/8/2020 0800 by Gisel Gutierrez RN  Safety Promotion/Fall Prevention: safety round/check completed     Problem: Pain Acute  Goal: Optimal Pain Control  Outcome: Ongoing, Progressing  Intervention: Optimize Psychosocial Wellbeing  Recent Flowsheet Documentation  Taken 11/8/2020 0800 by Gisel Gutierrez RN  Diversional Activities: television     Problem: Adult Inpatient Plan of Care  Goal: Plan of Care Review  Outcome: Ongoing, Progressing  Flowsheets (Taken 11/8/2020 1733)  Plan of Care Reviewed With: patient  Outcome Summary: VSS. Patient worn cpap most of shift today. VSS. No complaints this shift. Will continue to monitor.  Goal: Patient-Specific Goal (Individualized)  Outcome: Ongoing, Progressing  Goal: Absence of Hospital-Acquired Illness or Injury  Outcome: Ongoing, Progressing  Intervention: Identify and Manage Fall Risk  Recent Flowsheet Documentation  Taken 11/8/2020 1600 by Gisel Gutierrez RN  Safety Promotion/Fall Prevention: safety round/check completed  Taken 11/8/2020 1400 by Gisel Gutierrez RN  Safety Promotion/Fall Prevention: safety round/check completed  Taken 11/8/2020 1200 by Gisel Gutierrez RN  Safety Promotion/Fall Prevention: safety round/check completed  Taken 11/8/2020 1000 by Gisel Gutierrez RN  Safety Promotion/Fall Prevention: safety round/check completed  Taken 11/8/2020 0800 by Gisel Gutierrez  RN  Safety Promotion/Fall Prevention: safety round/check completed  Intervention: Prevent Skin Injury  Recent Flowsheet Documentation  Taken 11/8/2020 1600 by Gisel Gutierrez RN  Body Position: sitting up in bed  Taken 11/8/2020 1400 by Gisel Gutierrez RN  Body Position: sitting up in bed  Taken 11/8/2020 1200 by Gisel Gutierrez RN  Body Position: sitting up in bed  Taken 11/8/2020 1000 by Gisel Gutierrez RN  Body Position: sitting up in bed  Taken 11/8/2020 0800 by Gisel Gutierrez RN  Body Position: sitting up in bed  Intervention: Prevent and Manage VTE (venous thromboembolism) Risk  Recent Flowsheet Documentation  Taken 11/8/2020 0800 by Gisel Gutierrez RN  VTE Prevention/Management:   bilateral   sequential compression devices on   dorsiflexion/plantar flexion performed  Goal: Optimal Comfort and Wellbeing  Outcome: Ongoing, Progressing  Intervention: Provide Person-Centered Care  Recent Flowsheet Documentation  Taken 11/8/2020 0800 by Gisel Gutierrez RN  Trust Relationship/Rapport: care explained  Goal: Readiness for Transition of Care  Outcome: Ongoing, Progressing     Problem: Noninvasive Ventilation Acute  Goal: Effective Unassisted Ventilation and Oxygenation  Outcome: Ongoing, Progressing   Goal Outcome Evaluation:  Plan of Care Reviewed With: patient  Progress: no change  Outcome Summary: VSS. Patient worn cpap most of shift today. VSS. No complaints this shift. Will continue to monitor.

## 2020-11-08 NOTE — PLAN OF CARE
Problem: Adult Inpatient Plan of Care  Goal: Plan of Care Review  Recent Flowsheet Documentation  Taken 11/8/2020 1350 by Nika Chun, PTA  Progress: no change  Plan of Care Reviewed With: patient  Outcome Summary: Pt agreeable to ex on 2nd attempt to see pt. Pt with c-pap donned and stated having hard time breathing without it today . PTA educated pt on importance of increasing actvity daily and time OOB . Con't with PT POC and progress as tolerated

## 2020-11-08 NOTE — PROGRESS NOTES
[facility]    Name:  Sadie Tijerina   Age:  80 y.o.  Sex:  female  :  1940  MRN:  [id number]   Visit Number:  [id number]  Admission Date:  2020  Date Of Service:  20  Primary Care Physician:  Kristian Wolff MD     LOS: 3 days :  Patient Care Team:  Kristian Wolff MD as PCP - General  Kristian Wolff MD as PCP - Family Medicine  Bayron Grimaldo MD (Inactive) as Consulting Physician (General Surgery):    Chief Complaint:      Shortness of breath and generalized weakness.    Subjective / Interval History:     Ms. Forrest was seen and examined this morning.  She states that she is more short of breath and wheezy today.  She had diarrhea yesterday but states that the diarrhea has improved this morning.  Denies any nausea or vomiting today.  She states that she did not use the CPAP yesterday.  Denies any chest pain.  She refused to walk with physical therapy yesterday.  No significant overnight events.    She was admitted with multiple complaints including shortness of breath and ulcer in the right leg.  She states that she recently had an injury due to bumping on the table.  She does have COPD and on home oxygen.  She was noted to have bilateral pneumonia on her CT of the chest and was started on broad-spectrum IV antibiotics therapy with Zosyn, Levaquin and vancomycin.  She was also placed on steroids for COPD exacerbation.  Due to her elevated D-dimer levels, a CT of the chest with PE protocol was done and it was negative for pulmonary embolism.  Her nasal swab for MRSA was positive.  However, since she significantly improved clinically, her vancomycin was discontinued the next day.  She did not have any fevers.    Review of Systems:     General ROS: Patient denies any fevers, chills or loss of consciousness.  Generalized weakness.  Respiratory ROS: Shortness of breath, chest congestion and wheezing.  Cardiovascular ROS: Denies chest pain or  palpitations. No history of exertional chest pain.  Gastrointestinal ROS: History of nausea. Denies any abdominal pain.  Neurological ROS: Denies any focal weakness. No loss of consciousness.  Bilateral hand tremors.  Dermatological ROS: Ulceration of the right leg.    Vital Signs:    Temp:  [97.6 °F (36.4 °C)-98.2 °F (36.8 °C)] 97.6 °F (36.4 °C)  Heart Rate:  [63-80] 77  Resp:  [16-20] 18  BP: (113-123)/(42-53) 113/42    Intake and output:    I/O last 3 completed shifts:  In: 973.2 [P.O.:720; I.V.:253.2]  Out: 200 [Urine:200]  I/O this shift:  In: 540 [P.O.:540]  Out: -     Physical Examination:    General Appearance:  Alert and cooperative, not in any acute distress.   Head:  Atraumatic and normocephalic, without obvious abnormality.   Eyes:          PERRLA, conjunctivae and sclerae normal, no Icterus. No pallor. Extraocular movements are within normal limits.   Neck: Supple, trachea midline, no thyromegaly, no carotid bruit.   Lungs:   Chest shape is normal. Breath sounds heard bilaterally equally.  Bilateral scattered wheezing and crackles heard. No pleural rub or bronchial breathing.   Heart:  Normal S1 and S2, no murmur, no gallop, no rub. No JVD   Abdomen:   Normal bowel sounds, no masses, no organomegaly. Soft, nontender, nondistended, no guarding, no rebound tenderness.   Extremities: Moves all extremities, 1+ pitting ankle edema, no cyanosis, no clubbing.  Healing ulcers noted in the right lower leg without any erythema.   Skin: No bleeding or rash.  Dry skin noted.   Neurologic: Awake, alert and oriented times 3. Moves all 4 extremities equally.  Bilateral resting hand tremors noted but improved since admission.     Laboratory results:    Results from last 7 days   Lab Units 11/08/20  0600 11/06/20  0605 11/04/20  2242   SODIUM mmol/L 139 137 141   POTASSIUM mmol/L 4.4 4.6 4.0   CHLORIDE mmol/L 105 104 98   CO2 mmol/L 29.2* 26.7 28.4   BUN mg/dL 22 26* 22   CREATININE mg/dL 1.06* 1.07* 1.05*   CALCIUM  mg/dL 9.0 8.6 9.6   BILIRUBIN mg/dL 0.2  --  0.3   ALK PHOS U/L 68  --  81   ALT (SGPT) U/L 65*  --  19   AST (SGOT) U/L 23  --  22   GLUCOSE mg/dL 106* 109* 147*     Results from last 7 days   Lab Units 11/08/20  0600 11/06/20  0605 11/04/20  2242   WBC 10*3/mm3 9.02 16.31* 16.49*   HEMOGLOBIN g/dL 9.6* 9.6* 11.6*   HEMATOCRIT % 31.0* 30.4* 35.9   PLATELETS 10*3/mm3 184 194 237         Results from last 7 days   Lab Units 11/04/20  2242   TROPONIN T ng/mL <0.010     Results from last 7 days   Lab Units 11/04/20  2325 11/04/20  2317   BLOODCX  No growth at 3 days No growth at 3 days     Results from last 7 days   Lab Units 11/04/20  2236   PH, ARTERIAL pH units 7.443   PO2 ART mm Hg 80.2   PCO2, ARTERIAL mm Hg 41.7   HCO3 ART mmol/L 28.5*     I have reviewed the patient's laboratory results.    Radiology results:    Imaging Results (Last 24 Hours)     ** No results found for the last 24 hours. **        Medication Review:     I have reviewed the patient's active and prn medications.       Severe sepsis (CMS/HCC)    Acute and chronic respiratory failure with hypoxia (CMS/HCC)    Essential hypertension    Gastroesophageal reflux disease without esophagitis    Hyperlipidemia    Mixed anxiety depressive disorder    Tobacco abuse disorder    COPD (chronic obstructive pulmonary disease) (CMS/HCC)    Pneumonia of both lungs due to infectious organism    Leg wound, right    Assessment:    1.  Acute hypoxic respiratory failure ruled out, chronic hypoxic respiratory failure only.  2.  Bilateral healthcare associated pneumonia, POA, unable to classify further.  3.  Acute COPD exacerbation, present on admission.  4.  Chronic diastolic heart failure, no evidence of exacerbation.  5.  Traumatic right leg ulcers, healing, present on admission.  6.  Essential hypertension.  7.  Chronic essential tremors on propranolol.  8.  Restless leg syndrome.    Plan:    Ms. Forrest is about the same compared to yesterday but may be slightly  more short of breath today.  She has been afebrile and her blood work has remained stable except for procalcitonin which is elevated.  We will continue IV antibiotics therapy with Zosyn and oral azithromycin.  Nasal swab was positive for MRSA but blood cultures have been negative so far.    Her nausea and abdominal cramps have improved.  Abdomen is benign and lipase levels were within normal limits.    She is currently on bronchodilators, budesonide and mucolytic agents.  Her home medications have been continued.  She is on Lovenox for DVT prophylaxis.  She will be continued on physical and occupational therapy.  Further recommendations depend upon her clinical course.  She is again refusing to go to short-term rehabilitation.  She wants to go home when she feels better.  Discussed with respiratory therapist.    Nico Enriquez MD  11/08/20  14:34 EST    Dictated utilizing Dragon dictation.

## 2020-11-09 NOTE — PLAN OF CARE
Problem: Fluid Imbalance (Pneumonia)  Goal: Fluid Balance  Outcome: Ongoing, Progressing   Goal Outcome Evaluation:  Plan of Care Reviewed With: patient  Progress: no change  Outcome Summary: TOLERATED BIPAP.NO C/O OF PAIN.

## 2020-11-09 NOTE — PLAN OF CARE
Goal Outcome Evaluation:  Plan of Care Reviewed With: patient, daughter  Progress: improving Interventions implemented as appropriate.

## 2020-11-09 NOTE — THERAPY TREATMENT NOTE
Patient Name: Sadie Tijerina  : 1940    MRN: [id number]                              Today's Date: 2020       Admit Date: 2020    Visit Dx:     ICD-10-CM ICD-9-CM   1. Pneumonia of both lungs due to infectious organism, unspecified part of lung  J18.9 483.8   2. Non-healing wound of right lower extremity  S81.801A 894.1   3. Cellulitis of right lower extremity  L03.115 682.6   4. Lactic acidosis  E87.2 276.2   5. Tachycardia  R00.0 785.0     Patient Active Problem List   Diagnosis   • Calf cramp   • Mixed anxiety depressive disorder   • Dizziness   • Fatigue   • Gastroesophageal reflux disease without esophagitis   • Hematuria   • Hyperlipidemia   • Essential hypertension   • Low back pain   • Restless legs syndrome   • Essential tremor   • Vitamin D deficiency   • Balance problem   • Tension headache   • Tobacco abuse disorder   • Leg swelling   • Acute and chronic respiratory failure with hypoxia (CMS/[facility])   • Venous insufficiency of both lower extremities   • Chronic diastolic heart failure (CMS/[facility])   • Neuropathy   • COPD (chronic obstructive pulmonary disease) (CMS/[facility])   • Abnormal gait   • Bilateral sensorineural hearing loss   • Disorder of inner ear   • Tympanosclerosis   • Transient vision disturbance   • Bilateral bronchopneumonia   • Pneumonia of both lungs due to infectious organism   • Leg wound, right   • Severe sepsis (CMS/[facility])     Past Medical History:   Diagnosis Date   • Arthritis    • Cancer (CMS/HCC)     uterine cancer ()   • COPD (chronic obstructive pulmonary disease) (CMS/[facility])    • Depression    • Elevated cholesterol    • GERD (gastroesophageal reflux disease)    • History of emphysema    • History of esophageal reflux    • History of recurrent UTI (urinary tract infection)    • History of renal calculi    • History of uterine cancer    • Hyperlipidemia    • Hypertension    • Impaired functional mobility, balance, gait, and endurance    • Pneumonia 2019     Past  Surgical History:   Procedure Laterality Date   • FOOT SURGERY     • HAND SURGERY     • HYSTERECTOMY       General Information     Row Name 11/09/20 1339          OT Time and Intention    Document Type  therapy note (daily note)  -SD     Mode of Treatment  occupational therapy  -SD       User Key  (r) = Recorded By, (t) = Taken By, (c) = Cosigned By    Initials Name Provider Type    Mariia Armenta OT Occupational Therapist        Mobility/ADL's     Row Name 11/09/20 1339          Bed Mobility    Bed Mobility  supine-sit  -SD     Supine-Sit [place] (Bed Mobility)  modified independence  -SD     Assistive Device (Bed Mobility)  head of bed elevated  -SD     Row Name 11/09/20 1339          Transfers    Transfers  sit-stand transfer;toilet transfer  -SD     Sit-Stand [place] (Transfers)  contact guard  -SD     [place] Level (Toilet Transfer)  contact guard  -SD     Assistive Device (Toilet Transfer)  commode;grab bars/safety frame  -SD     Row Name 11/09/20 1339          Sit-Stand Transfer    Assistive Device (Sit-Stand Transfers)  walker, front-wheeled  -SD     Row Name 11/09/20 1339          Toilet Transfer    Type (Toilet Transfer)  stand pivot/stand step  -SD     Row Name 11/09/20 1339          Functional Mobility    Functional Mobility- Ind. Level  contact guard assist  -SD     Functional Mobility- Device  rolling walker  -SD     Functional Mobility-Distance (Feet)  18 25  -SD     Row Name 11/09/20 1339          Toileting Assessment/Training    [place] Level (Toileting)  contact guard assist  -SD       User Key  (r) = Recorded By, (t) = Taken By, (c) = Cosigned By    Initials Name Provider Type    Mariia Armenta OT Occupational Therapist        Obj/Interventions     Row Name 11/09/20 1340          Therapeutic Exercise    Therapeutic Exercise  shoulder;elbow/forearm B UE ther ex using resistance band x 10  -SD       User Key  (r) = Recorded By, (t) = Taken By, (c) = Cosigned By     Initials Name Provider Type    Mariia Armenta OT Occupational Therapist        Goals/Plan    No documentation.       Clinical Impression     Row Name 11/09/20 1340          Pain Scale: Numbers Pre/Post-Treatment    Pretreatment Pain Rating  0/10 - no pain  -SD     Posttreatment Pain Rating  0/10 - no pain  -SD     Row Name 11/09/20 1340          Plan of Care Review    Plan of Care Reviewed With  patient;daughter  -SD     Progress  improving  -SD     Outcome Summary  OT tx completed. Patient performed bed mobility ind, transfer and functional mobility with CGA, completed toileting task with CGA, followed by UB resistance exercises. Continue OT POC  -SD     Row Name 11/09/20 1340          Vital Signs    Pre SpO2 (%)  97  -SD     O2 Delivery Pre Treatment  supplemental O2  -SD     O2 Delivery Intra Treatment  supplemental O2  -SD     Post SpO2 (%)  97  -SD     O2 Delivery Post Treatment  supplemental O2  -SD     Row Name 11/09/20 1340          Positioning and Restraints    Pre-Treatment Position  in bed  -SD     Post Treatment Position  chair  -SD     In Chair  reclined;call light within reach;encouraged to call for assist;exit alarm on;with family/caregiver  -SD       User Key  (r) = Recorded By, (t) = Taken By, (c) = Cosigned By    Initials Name Provider Type    Mariia Armenta OT Occupational Therapist        Outcome Measures     Row Name 11/09/20 1342          How much help from another is currently needed...    Putting on and taking off regular lower body clothing?  3  -SD     Bathing (including washing, rinsing, and drying)  3  -SD     Toileting (which includes using toilet bed pan or urinal)  3  -SD     Putting on and taking off regular upper body clothing  3  -SD     Taking care of personal grooming (such as brushing teeth)  3  -SD     Eating meals  4  -SD     AM-PAC 6 Clicks Score (OT)  19  -SD     Row Name 11/09/20 1342          Functional Assessment    Outcome Measure Options  AM-PAC 6 Clicks  Daily Activity (OT)  -SD       User Key  (r) = Recorded By, (t) = Taken By, (c) = Cosigned By    Initials Name Provider Type    SD Mariia Arevalo OT Occupational Therapist        Occupational Therapy Education                 Title: PT OT SLP Therapies (Done)     Topic: Occupational Therapy (Done)     Point: ADL training (Done)     Description:   Instruct learner(s) on proper safety adaptation and remediation techniques during self care or transfers.   Instruct in proper use of assistive devices.              Learning Progress Summary           Patient Acceptance, E,TB, VU by SD at 11/9/2020 1342    Comment: Safety and sequencing during functional tf and mobility    Acceptance, E,TB, VU by MD at 11/6/2020 1831    Acceptance, E,TB, VU by SD at 11/6/2020 1348    Comment: Benefit of OT; OT POC                   Point: Home exercise program (Done)     Description:   Instruct learner(s) on appropriate technique for monitoring, assisting and/or progressing therapeutic exercises/activities.              Learning Progress Summary           Patient Acceptance, E,TB, VU by MD at 11/6/2020 1831                   Point: Precautions (Done)     Description:   Instruct learner(s) on prescribed precautions during self-care and functional transfers.              Learning Progress Summary           Patient Acceptance, E,TB, VU by MD at 11/6/2020 1831                   Point: Body mechanics (Done)     Description:   Instruct learner(s) on proper positioning and spine alignment during self-care, functional mobility activities and/or exercises.              Learning Progress Summary           Patient Acceptance, E,TB, VU by MD at 11/6/2020 1831                               User Key     Initials Effective Dates Name Provider Type Discipline    SD 03/07/18 -  Mariia Arevalo OT Occupational Therapist STIVEN THOMPSON 11/11/19 -  Gil Cordova RN Registered Nurse Nurse              OT Recommendation and Plan  Planned Therapy  Interventions (OT): activity tolerance training, adaptive equipment training, BADL retraining, patient/caregiver education/training, strengthening exercise, transfer/mobility retraining  Therapy Frequency (OT): 3 times/wk(5 if indicated)  Plan of Care Review  Plan of Care Reviewed With: patient, daughter  Progress: improving  Outcome Summary: OT tx completed. Patient performed bed mobility ind, transfer and functional mobility with CGA, completed toileting task with CGA, followed by UB resistance exercises. Continue OT POC     Time Calculation:   Time Calculation- OT     Row Name 11/09/20 1343             Time Calculation- OT    OT Start Time  1135  -SD      OT Stop Time  1200  -SD      OT Time Calculation (min)  25 min  -SD      OT Received On  11/09/20  -SD      OT Goal Re-Cert Due Date  11/16/20  -SD         Timed Charges    51244 - OT Therapeutic Exercise Minutes  10  -SD      84300 - OT Self Care/Mgmt Minutes  15  -SD        User Key  (r) = Recorded By, (t) = Taken By, (c) = Cosigned By    Initials Name Provider Type    SD Mariia Arevalo OT Occupational Therapist        Therapy Charges for Today     Code Description Service Date Service Provider Modifiers Qty    [id number]  OT THER PROC EA 15 MIN 11/9/2020 Mariia Arevalo OT GO 1    [id number]  OT SELF CARE/MGMT/TRAIN EA 15 MIN 11/9/2020 Mariia Arevalo OT GO 1               Mariia Arevalo OT  11/9/2020

## 2020-11-09 NOTE — DISCHARGE PLACEMENT REQUEST
"  Swing Bed Referral  Deisi Espinosa [phone]        Sadie Tijerina (80 y.o. Female)     Date of Birth Social Security Number Address Home Phone MRN    1940  [id number] [facility] [address] [phone] [id number]    Shinto Marital Status          Anabaptism        Admission Date Admission Type Admitting Provider Attending Provider Department, Room/Bed    11/4/20 Emergency Ute Paris DO Kar[place], Shandy Marcus, DO [facility] MED SURG  4, 429/1    Discharge Date Discharge Disposition Discharge Destination                       Attending Provider: Ute Paris DO    Allergies: Morphine And Related    Isolation: None   Infection: MRSA/History Only (09/17/20)   Code Status: No CPR    Ht: 157.5 cm (62\")   Wt: 64.1 kg (141 lb 5 oz)    Admission Cmt: None   Principal Problem: Severe sepsis (CMS/[facility]) [A41.9,R65.20]                 Active Insurance as of 11/4/2020     Primary Coverage     Payor Plan Insurance Group Employer/Plan Group    MEDICARE MEDICARE A ONLY      Payor Plan Address Payor Plan Phone Number Payor Plan Fax Number Effective Dates    [address] [phone]  6/1/2003 - None Entered    [facility] [id number]       Subscriber Name Subscriber Birth Date Member ID       SADIE TIJERINA 1940 [id number]           Secondary Coverage     Payor Plan Insurance Group Employer/Plan Group    HUMANA HUMANA [id number]     Payor Plan Address Payor Plan Phone Number Payor Plan Fax Number Effective Dates    [address] [address] [phone]  1/1/2013 - None Entered    [facility] [id number]       Subscriber Name Subscriber Birth Date Member ID       SADIE TIJERINA 1940 [id number]                 Emergency Contacts      (Rel.) Home Phone Work Phone Mobile Phone    Neris Giles (Daughter) [phone] -- [phone]    Tijerina, Jeff (Son) [phone] -- [phone]               History & Physical      Darling Felix DO at " "20 0434              [facility]   HISTORY AND PHYSICAL      Name:  Sadie Tijerina   Age:  80 y.o.  Sex:  female  :  1940  MRN:  [id number]   Visit Number:  [id number]  Admission Date:  2020  Date Of Service:  20  Primary Care Physician:  Kristian Wolff MD    Chief Complaint:     \" My leg wound\"    History Of Presenting Illness:      80 F history of chronic hypoxic respiratory failure 2 LNC who presented to the ED complaining of worsening shortness of breath.  She seen in urgent care yesterday for her right leg wound and was given antibiotics as well as wound care.  Soon after, she felt nauseated, vomited, progressively dyspneic and presented to the ED for the same.  She does not believe she aspirated.  She has a cough, wheezing, fever, and increasing dyspnea.  She uses a CPAP at night and 2 L throughout the day.  In the ED, she was found to have a lactate of 3.9, WBC 16 and was given IV fluid bolus, steroids, vancomycin, Zosyn, Levaquin.  CT chest was obtained to further evaluate the parenchyma official read is pending.  She is admitted for pneumonia and right leg wound.    Upon review of previous admit data, pt was admitted to this facility - for pneumonia, COPD exacerbation; treated with Rocephin, azithromycin, prednisone taper. She had refused rehab, but was ok with .    Review Of Systems:     A full 10 point review of systems was performed and all pertinent positives and negatives are noted in the HPI.     Past Medical History:    Past Medical History:   Diagnosis Date   • Arthritis    • Cancer (CMS/HCC)     uterine cancer ()   • COPD (chronic obstructive pulmonary disease) (CMS/[facility])    • Depression    • Elevated cholesterol    • GERD (gastroesophageal reflux disease)    • History of emphysema    • History of esophageal reflux    • History of recurrent UTI (urinary tract infection)    • History of renal calculi    • History of uterine cancer    • " Hyperlipidemia    • Hypertension    • Impaired functional mobility, balance, gait, and endurance    • Pneumonia 2019       Past Surgical history:    Past Surgical History:   Procedure Laterality Date   • FOOT SURGERY     • HAND SURGERY     • HYSTERECTOMY         Social History:    Social History     Socioeconomic History   • Marital status:      Spouse name: Not on file   • Number of children: Not on file   • Years of education: Not on file   • Highest education level: Not on file   Tobacco Use   • Smoking status: Current Every Day Smoker     Packs/day: 0.25     Types: Cigarettes     Last attempt to quit: 2019     Years since quittin.9   • Smokeless tobacco: Never Used   • Tobacco comment: HALF OF A CIGARETTE   Substance and Sexual Activity   • Alcohol use: No   • Drug use: No   • Sexual activity: Defer     Comment:        Family History:    Family History   Problem Relation Age of Onset   • Cancer Mother    • Heart attack Father    • Arthritis Father    • Heart disease Father    • Diabetes Daughter    • Hyperlipidemia Daughter    • Migraines Daughter    • Heart disease Sister    • Diabetes Son    • Glaucoma Son        Allergies:      Morphine and related    Home Medications:    Prior to Admission Medications     Prescriptions Last Dose Informant Patient Reported? Taking?    amLODIPine (Norvasc) 5 MG tablet 2020  No Yes    Take 1 tablet by mouth Daily.    aspirin 81 MG EC tablet 2020  Yes Yes    Take 81 mg by mouth Daily.    betamethasone dipropionate 0.05 % cream 2020  No Yes    Apply topically to the appropriate area 2 (Two) times a day    Bevespi Aerosphere 9-4.8 MCG/ACT aerosol 2020  No Yes    INHALE 2 PUFFS BY MOUTH TWICE DAILY    cetirizine (zyrTEC) 10 MG tablet 2020  No Yes    Take 1 tablet by mouth Daily.    clonazePAM (KlonoPIN) 0.5 MG tablet 2020  No Yes    Take 1 tablet by mouth At Night As Needed for Seizures.    clotrimazole-betamethasone  (Lotrisone) 1-0.05 % cream 11/4/2020  No Yes    Apply  topically to the appropriate area as directed 2 (Two) Times a Day.    estrogens, conjugated, (PREMARIN) 0.625 MG tablet 11/4/2020  No Yes    Take 1 tablet by mouth Daily. 200001    fluticasone (FLONASE) 50 MCG/ACT nasal spray 11/4/2020  No Yes    2 sprays by Each Nare route Daily.    furosemide (LASIX) 20 MG tablet 11/4/2020  No Yes    TAKE 1 TABLET BY MOUTH DAILY    guaiFENesin (MUCINEX) 600 MG 12 hr tablet 11/4/2020  Yes Yes    Take 1,200 mg by mouth 2 (Two) Times a Day.    ipratropium (ATROVENT) 0.06 % nasal spray 11/4/2020  Yes Yes    2 sprays into the nostril(s) as directed by provider 4 (Four) Times a Day.    omeprazole (priLOSEC) 40 MG capsule 11/4/2020  No Yes    Take 1 capsule by mouth Daily.    potassium chloride (K-DUR,KLOR-CON) 10 MEQ CR tablet 11/4/2020  No Yes    TAKE 1 TABLET BY MOUTH DAILY    pravastatin (PRAVACHOL) 20 MG tablet 11/4/2020  No Yes    TAKE 1 TABLET BY MOUTH EVERY EVENING    predniSONE (DELTASONE) 5 MG tablet 11/4/2020  No Yes    Take 1 tablet by mouth Daily.    propranolol (INDERAL) 60 MG tablet 11/4/2020  No Yes    Take 1 tablet by mouth 2 (Two) Times a Day.    sertraline (ZOLOFT) 50 MG tablet 11/4/2020  No Yes    Take 1 tablet by mouth every night at bedtime.    sulfamethoxazole-trimethoprim (BACTRIM DS,SEPTRA DS) 800-160 MG per tablet 11/4/2020  No Yes    Take 1 tablet by mouth 2 (Two) Times a Day for 10 days.    VITAMIN D PO 11/4/2020  Yes Yes    Take 1,000 Units by mouth Daily.             ED Medications:    Medications   sodium chloride 0.9 % flush 10 mL (has no administration in time range)   methylPREDNISolone sodium succinate (SOLU-Medrol) injection 125 mg (125 mg Intravenous Given 11/4/20 2250)   acetaminophen (TYLENOL) tablet 1,000 mg (1,000 mg Oral Given 11/4/20 1659)   sodium chloride 0.9 % bolus 1,000 mL ( Intravenous Currently Infusing 11/5/20 0356)   piperacillin-tazobactam (ZOSYN) 3.375 g in iso-osmotic dextrose 50  ml (premix) (0 g Intravenous Stopped 11/5/20 0046)   ipratropium-albuterol (DUO-NEB) nebulizer solution 3 mL (3 mL Nebulization Given 11/4/20 5414)   vancomycin 1250 mg in sodium chloride 0.9% 250 mL IVPB (1,250 mg Intravenous New Bag 11/5/20 0356)   levoFLOXacin (LEVAQUIN) 500 mg/100 mL D5W (premix) (LEVAQUIN) 500 mg (500 mg Intravenous New Bag 11/5/20 0304)       Vital Signs:    Temp:  [97.3 °F (36.3 °C)-103 °F (39.4 °C)] 97.9 °F (36.6 °C)  Heart Rate:  [106-176] 110  Resp:  [16-24] 16  BP: (103-150)/() 105/44        11/04/20  2202 11/05/20  0359   Weight: 60.8 kg (134 lb) 61.7 kg (136 lb 0.4 oz)       Body mass index is 24.88 kg/m².    Physical Exam:  General: Labored breathing , resting in bed  HEENT: EOMI, NC/AT, 2 LNC  Heart: RRR  Lungs: Coarse breath sounds bilaterally with wheezes  Abdomen: Soft, nondistended, nontender  Extremities: Right lower extremity with abrasion and laceration lower shin, warmer compared to left  Neurological: A&O x3, moves all extremities  Psychological: Mood and affect appropriate, speech is coherent  Skin: warm, dry    Laboratory data:    I have reviewed the labs done in the emergency room.    Results from last 7 days   Lab Units 11/04/20  2242   SODIUM mmol/L 141   POTASSIUM mmol/L 4.0   CHLORIDE mmol/L 98   CO2 mmol/L 28.4   BUN mg/dL 22   CREATININE mg/dL 1.05*   CALCIUM mg/dL 9.6   BILIRUBIN mg/dL 0.3   ALK PHOS U/L 81   ALT (SGPT) U/L 19   AST (SGOT) U/L 22   GLUCOSE mg/dL 147*     Results from last 7 days   Lab Units 11/04/20  2242   WBC 10*3/mm3 16.49*   HEMOGLOBIN g/dL 11.6*   HEMATOCRIT % 35.9   PLATELETS 10*3/mm3 237         Results from last 7 days   Lab Units 11/04/20  2242   TROPONIN T ng/mL <0.010     Results from last 7 days   Lab Units 11/04/20  2242   PROBNP pg/mL 124.1             Results from last 7 days   Lab Units 11/04/20  2236   PH, ARTERIAL pH units 7.443   PO2 ART mm Hg 80.2   PCO2, ARTERIAL mm Hg 41.7   HCO3 ART mmol/L 28.5*           Invalid  input(s): USDES,  BLOODU, NITRITITE, BACT, EP  Pain Management Panel     There is no flowsheet data to display.              EKG:      Sinus rhythm, poor quality study, abnormal EKG    Radiology:    Imaging Results (Last 72 Hours)     Procedure Component Value Units Date/Time    CT Chest Without Contrast [[id number]] Resulted: 11/05/20 0002     Updated: 11/05/20 0004    XR Chest 1 View [[id number]] Resulted: 11/04/20 2249     Updated: 11/04/20 2250            Severe sepsis (CMS/HCC)    Mixed anxiety depressive disorder    Gastroesophageal reflux disease without esophagitis    Hyperlipidemia    Essential hypertension    Tobacco abuse disorder    Acute and chronic respiratory failure with hypoxia (CMS/HCC)    COPD (chronic obstructive pulmonary disease) (CMS/HCC)    Pneumonia of both lungs due to infectious organism    Leg wound, right      Assessment:    Sepsis, suspect multifactorial secondary to skin and soft tissue infection of the RLE, and possible pneumonia  Acute on chronic hypoxemic respiratory failure  Concern for acute COPD exacerbation  Lactic acidosis  Nonhealing wound of RLE  Chronic corticosteroid usage      Plan:    -Given the abrupt onset of her symptoms, and relatively clear appearing CT chest per my read, we will obtain a dimer to  rule out PTE  -obtain CRP to eval how bad RLE wound is, and consider CT imaging if markedly elevated  -Continue IV vanco/Zosyn/azithro, follow cultures, given IVFin the ED  -NIPPV at bedtime and as needed, bronchodilators, hold off on systemic steroids given acute infection  -Hold home antihypertensives and systemic steroids as this will delay her wound healing      Darling Felix DO  11/05/20  04:39 EST    Dictated utilizing Dragon dictation.      Electronically signed by Darling Felix DO at 11/05/20 0502          Emergency Department Notes      Gil Louis MD at 11/04/20 2339          Subjective   80-year-old female presenting with 2 complaints.  First complaint  is shortness of breath.  States that all day she has been short of breath.  Second complaint is wound to her right lower extremity.  Couple weeks ago she struck the right leg and has a wound there.  Has had increasing swelling and redness.  Today started running a fever and having body aches.  Was seen in urgent care earlier and given antibiotics.  No chest pain, nausea, vomiting.          Review of Systems   Constitutional: Positive for fever.   HENT: Negative.    Eyes: Negative.    Respiratory: Positive for cough and shortness of breath.    Cardiovascular: Negative.    Gastrointestinal: Negative.    Genitourinary: Negative.    Musculoskeletal: Positive for arthralgias and myalgias.   Skin: Positive for wound.   Neurological: Negative.    Psychiatric/Behavioral: Negative.        Past Medical History:   Diagnosis Date   • Arthritis    • Cancer (CMS/[facility])     uterine cancer (1981)   • COPD (chronic obstructive pulmonary disease) (CMS/[facility])    • Depression    • Elevated cholesterol    • GERD (gastroesophageal reflux disease)    • History of emphysema    • History of esophageal reflux    • History of recurrent UTI (urinary tract infection)    • History of renal calculi    • History of uterine cancer    • Hyperlipidemia    • Hypertension    • Impaired functional mobility, balance, gait, and endurance    • Pneumonia 02/2019       Allergies   Allergen Reactions   • Morphine And Related Irritability       Past Surgical History:   Procedure Laterality Date   • FOOT SURGERY     • HAND SURGERY     • HYSTERECTOMY         Family History   Problem Relation Age of Onset   • Cancer Mother    • Heart attack Father    • Arthritis Father    • Heart disease Father    • Diabetes Daughter    • Hyperlipidemia Daughter    • Migraines Daughter    • Heart disease Sister    • Diabetes Son    • Glaucoma Son        Social History     Socioeconomic History   • Marital status:      Spouse name: Not on file   • Number of children: Not on  file   • Years of education: Not on file   • Highest education level: Not on file   Tobacco Use   • Smoking status: Current Every Day Smoker     Packs/day: 0.25     Types: Cigarettes     Last attempt to quit: 2019     Years since quittin.9   • Smokeless tobacco: Never Used   • Tobacco comment: HALF OF A CIGARETTE   Substance and Sexual Activity   • Alcohol use: No   • Drug use: No   • Sexual activity: Defer     Comment:            Objective   Physical Exam  Constitutional:       General: She is not in acute distress.     Appearance: Normal appearance. She is not ill-appearing, toxic-appearing or diaphoretic.   HENT:      Head: Normocephalic and atraumatic.      Right Ear: External ear normal.      Left Ear: External ear normal.      Nose: Nose normal.      Mouth/Throat:      Mouth: Mucous membranes are moist.      Pharynx: Oropharynx is clear.   Eyes:      Extraocular Movements: Extraocular movements intact.      Conjunctiva/sclera: Conjunctivae normal.      Pupils: Pupils are equal, round, and reactive to light.   Neck:      Musculoskeletal: Normal range of motion.   Cardiovascular:      Rate and Rhythm: Regular rhythm.      Pulses: Normal pulses.      Heart sounds: Normal heart sounds.      Comments: Tachycardia  Pulmonary:      Comments: Mild tachypnea, coarse breath sounds throughout  Abdominal:      General: Bowel sounds are normal. There is no distension.      Tenderness: There is no abdominal tenderness.   Musculoskeletal: Normal range of motion.         General: No tenderness or deformity.      Comments: Mild swelling of the right lower extremity surrounding the wound as mentioned below   Skin:     General: Skin is warm and dry.      Capillary Refill: Capillary refill takes less than 2 seconds.      Findings: No rash.      Comments: Small wound to the right lower leg, mild surrounding contusion and erythema   Neurological:      General: No focal deficit present.      Mental Status: She is  alert and oriented to person, place, and time.   Psychiatric:         Mood and Affect: Mood normal.         Behavior: Behavior normal.         Procedures          ED Course                                           MDM  Number of Diagnoses or Management Options  Cellulitis of right lower extremity:   Lactic acidosis:   Non-healing wound of right lower extremity:   Pneumonia of both lungs due to infectious organism, unspecified part of lung:   Tachycardia:   Diagnosis management comments: 80-year-old female with fever and shortness of breath.  Chronically ill-appearing elderly lady with exam as above.  She is notably febrile, tachycardic and tachypneic.  She is maintaining her saturations on her home O2.  Her leg does appear to be cellulitic.  Will obtain labs, imaging, COVID-19 swab.  Will provide symptomatic treatment.  Disposition pending anticipate admission.    DDx: Pneumonia, COVID-19, CHF, ACS, cellulitis    EKG interpreted by me: Sinus tachycardia, some nonspecific T wave changes, this is an abnormal EKG    Work-up notable for leukocytosis, lactic acidosis.  Imaging reveals multifocal pneumonia.  She has received broad-spectrum antibiotics.  COVID-19 is negative.  She is resting comfortably on her home oxygen.  Given lab abnormalities and her age discussed with Dr. Felix who graciously accepts for admission.       Amount and/or Complexity of Data Reviewed  Decide to obtain previous medical records or to obtain history from someone other than the patient: yes        Final diagnoses:   Pneumonia of both lungs due to infectious organism, unspecified part of lung   Non-healing wound of right lower extremity   Cellulitis of right lower extremity   Lactic acidosis   Tachycardia            Gil Louis MD  11/05/20 0236      Electronically signed by Gil Louis MD at 11/05/20 0236     Maliha Agudelo RN at 11/05/20 0235        Call from house supervisor, pt going to room 429     [place],  Maliha BURNETT RN  11/05/20 0235      Electronically signed by Maliha Agudelo RN at 11/05/20 0235     Dilip Sharif at 11/05/20 0244        4th floor called for report, call transferred.     Dilip Sharif  11/05/20 0245      Electronically signed by Dilip Sharif at 11/05/20 0245         Current Facility-Administered Medications   Medication Dose Route Frequency Provider Last Rate Last Dose   • acetaminophen (TYLENOL) tablet 650 mg  650 mg Oral Q4H PRN Darling Felix DO   650 mg at 11/07/20 0433    Or   • acetaminophen (TYLENOL) 160 MG/5ML solution 650 mg  650 mg Oral Q4H PRN Darling Felix, DO        Or   • acetaminophen (TYLENOL) suppository 650 mg  650 mg Rectal Q4H PRN Darling Felix, DO       • aspirin EC tablet 81 mg  81 mg Oral Daily Darling Felix DO   81 mg at 11/09/20 0923   • benzonatate (TESSALON) capsule 100 mg  100 mg Oral TID PRN Nico Enriquez MD   100 mg at 11/07/20 2231   • budesonide-formoterol (SYMBICORT) 160-4.5 MCG/ACT inhaler 2 puff  2 puff Inhalation BID - RT Darling Felix DO   2 puff at 11/09/20 0649   • cetirizine (zyrTEC) tablet 5 mg  5 mg Oral Daily Darling Felix, DO   5 mg at 11/09/20 0923   • clonazePAM (KlonoPIN) tablet 0.5 mg  0.5 mg Oral Nightly PRN Darling Felix, DO   0.5 mg at 11/08/20 2019   • enoxaparin (LOVENOX) syringe 40 mg  40 mg Subcutaneous Q24H Darling Felix, DO   40 mg at 11/09/20 0552   • estrogens (conjugated) (PREMARIN) tablet 0.625 mg  0.625 mg Oral Daily Nico Enriquez MD   0.625 mg at 11/09/20 0923   • fluticasone (FLONASE) 50 MCG/ACT nasal spray 2 spray  2 spray Each Nare Daily Darling Felix DO   2 spray at 11/09/20 0925   • guaiFENesin-dextromethorphan (ROBITUSSIN DM) 100-10 MG/5ML syrup 5 mL  5 mL Oral Q4H PRN Nico Enriquez MD   5 mL at 11/07/20 2015   • ipratropium-albuterol (DUO-NEB) nebulizer solution 3 mL  3 mL Nebulization Q4H PRN Darling Felix DO   3 mL at 11/07/20 0512   • ipratropium-albuterol (DUO-NEB) nebulizer solution 3 mL  3 mL Nebulization Q6H - RT  Darling Felix,    3 mL at 11/09/20 1303   • lactobacillus acidophilus (RISAQUAD) capsule 1 capsule  1 capsule Oral BID Nico Enriquez MD   1 capsule at 11/09/20 0923   • mupirocin (BACTROBAN) 2 % ointment   Topical Q12H Nico Enriquez MD       • ondansetron (ZOFRAN) injection 4 mg  4 mg Intravenous Q6H PRN Darling Felix DO   4 mg at 11/07/20 0836   • pantoprazole (PROTONIX) EC tablet 40 mg  40 mg Oral QAM Darling Felix DO   40 mg at 11/09/20 0627   • piperacillin-tazobactam (ZOSYN) 3.375 g in iso-osmotic dextrose 50 ml (premix)  3.375 g Intravenous Q8H Darling Felix,    3.375 g at 11/09/20 0627   • pravastatin (PRAVACHOL) tablet 20 mg  20 mg Oral Q PM Darling Felix DO   20 mg at 11/08/20 1713   • predniSONE (DELTASONE) tablet 5 mg  5 mg Oral Daily Darling Felix DO   5 mg at 11/09/20 0958   • PRO-STAT oral liquid 30 mL  30 mL Oral BID Nico Enriquez MD   30 mL at 11/09/20 0958   • propranolol (INDERAL) tablet 60 mg  60 mg Oral BID Nico Enriquez MD   60 mg at 11/09/20 0923   • sertraline (ZOLOFT) tablet 50 mg  50 mg Oral Daily Darling Felix DO   50 mg at 11/09/20 0923   • sodium chloride 0.9 % flush 10 mL  10 mL Intravenous PRN Darling Felix DO       • sodium chloride 0.9 % flush 10 mL  10 mL Intravenous Q12H Darling Felix DO   10 mL at 11/09/20 0924   • sodium chloride 0.9 % flush 10 mL  10 mL Intravenous PRN Darling Felix DO         Activity Orders (active) (From admission, onward)     Start     Ordered    Unscheduled  Up With Assistance  As Needed      11/05/20 8154

## 2020-11-09 NOTE — PROGRESS NOTES
"Continued Stay Note  [facility]     Patient Name: Sadie Tijerina  MRN: [id number]  Today's Date: 11/9/2020    Admit Date: 11/4/2020    Discharge Plan     Row Name 11/09/20 1252       Plan    Plan completed IMM, dr. manjarrez in room with pt; stated pt considering swing bed but not nursing home; pt and daughter agreeable to swing bed, would like in [place] because near daughter lives there; confirmed no swing beds in [place]  13:22 EST  [facility] and stated [facility] beds right now but unsure on Wednesday for possible discharge; I told her that I would send referral if pt agreed; spoke with pt refused [facility], stated \"to far\", refused [place] or [place]; stated would talk to daughter about [facility] and let me know; awaiting call  14:18 EST  Daughter returned call and approved checking with [facility], stated can always reach her at cell [phone]; Spoke with Anita at [facility], stated they only have swing beds for patients in their hospital and short of beds anyway, gave possible [facility] Swing Bed, #[phone], LM# with José Manuel Roberson ; also LM# with [facility] Swing Bed/Transitional Care department, [phone]; lm with daughter Neris about referrals  15:20 TIAGO Georges called back from [facility] and stated they do have beds available; informed may be a couple of days until discharge; stated to please fax referral and will look over and let us know, can fax to number [phone] but  faxed referral electronically        Discharge Codes    No documentation.       Expected Discharge Date and Time     Expected Discharge Date Expected Discharge Time    Nov 10, 2020             Deisi Espinosa RN    "

## 2020-11-09 NOTE — PROGRESS NOTES
[facility]    PROGRESS NOTE    Name:  Sadie Tijerina   Age:  80 y.o.  Sex:  female  :  1940  MRN:  [id number]   Visit Number:  [id number]  Admission Date:  2020  Date Of Service:  20  Primary Care Physician:  Kristian Wolff MD     LOS: 4 days :  Patient Care Team:  Kristian Wolff MD as PCP - General  Kritsian Wolff MD as PCP - Family Medicine  Bayron Grimaldo MD (Inactive) as Consulting Physician (General Surgery):    Chief Complaint:      Follow-up on shortness of breath    Subjective / Interval History:     Patient was seen and examined at bedside.  Her daughter Neris was at bedside.  She notes her breathing is stable.  No significant shortness of breath.  No fevers or chills.  After discussion with her and her daughter, patient is now agreeable for short-term rehab at a swing bed if available.  Still is not willing to go to a nursing home facility at this time.    Review of Systems:     General ROS: Patient denies any fevers, chills or loss of consciousness.  Respiratory ROS: Denies cough or shortness of breath.  Cardiovascular ROS: Denies chest pain or palpitations. No history of exertional chest pain.  Gastrointestinal ROS: Denies nausea and vomiting. Denies any abdominal pain. No diarrhea.  Neurological ROS: Denies any focal weakness. No loss of consciousness. Denies any numbness.  Dermatological ROS: Denies any redness or pruritis.  Right leg wound unchanged    Vital Signs:    Temp:  [97.4 °F (36.3 °C)-98.1 °F (36.7 °C)] 97.7 °F (36.5 °C)  Heart Rate:  [64-74] 72  Resp:  [16-20] 18  BP: (119-141)/(43-54) 141/54    Intake and output:    I/O last 3 completed shifts:  In: 1412 [P.O.:1000; I.V.:412]  Out: 400 [Urine:400]  I/O this shift:  In: 240 [P.O.:240]  Out: 400 [Urine:400]    Physical Examination:    General Appearance:  Alert and cooperative, not in any acute distress.   Head:  Atraumatic and normocephalic, without obvious abnormality.   Eyes:           PERRLA, conjunctivae and sclerae normal, no Icterus. No pallor. Extraocular movements are within normal limits.   Neck: Supple, trachea midline, no thyromegaly.   Lungs:   Breath sounds heard bilaterally equally.  No crackles or wheezing. No Pleural rub or bronchial breathing.  Nontachypneic   Heart:  Normal S1 and S2, no murmur, no gallop, no rub. No JVD   Abdomen:   Normal bowel sounds, no masses, no organomegaly. Soft, non-tender, non-distended, no guarding, no rebound tenderness.   Extremities: Moves all extremities well, trace edema, no cyanosis, no clubbing.   Skin:  2 small ulcerated lesions of the right leg and ankle are healing well.  Scab formation noted.   Neurologic: Awake, alert and oriented times 3. Moves all 4 extremities equally.     Laboratory results:    Results from last 7 days   Lab Units 11/08/20  0600 11/06/20  0605 11/04/20  2242   SODIUM mmol/L 139 137 141   POTASSIUM mmol/L 4.4 4.6 4.0   CHLORIDE mmol/L 105 104 98   CO2 mmol/L 29.2* 26.7 28.4   BUN mg/dL 22 26* 22   CREATININE mg/dL 1.06* 1.07* 1.05*   CALCIUM mg/dL 9.0 8.6 9.6   BILIRUBIN mg/dL 0.2  --  0.3   ALK PHOS U/L 68  --  81   ALT (SGPT) U/L 65*  --  19   AST (SGOT) U/L 23  --  22   GLUCOSE mg/dL 106* 109* 147*     Results from last 7 days   Lab Units 11/09/20  0538 11/08/20  0600 11/06/20  0605   WBC 10*3/mm3 8.00 9.02 16.31*   HEMOGLOBIN g/dL 9.4* 9.6* 9.6*   HEMATOCRIT % 30.8* 31.0* 30.4*   PLATELETS 10*3/mm3 185 184 194         Results from last 7 days   Lab Units 11/09/20  0538 11/04/20  2242   TROPONIN T ng/mL <0.010 <0.010     Results from last 7 days   Lab Units 11/04/20  2325 11/04/20  2317   BLOODCX  No growth at 4 days No growth at 4 days     Results from last 7 days   Lab Units 11/04/20  2236   PH, ARTERIAL pH units 7.443   PO2 ART mm Hg 80.2   PCO2, ARTERIAL mm Hg 41.7   HCO3 ART mmol/L 28.5*       I have reviewed the patient's laboratory results.    Radiology results:    Imaging Results (Last 24 Hours)     Procedure  Component Value Units Date/Time    XR Chest 1 View [[id number]] Collected: 11/09/20 1440     Updated: 11/09/20 1452    Narrative:      PORTABLE CHEST     INDICATION: Follow-up pneumonia.     FINDINGS: Single frontal portable chest. Comparison with 11/04/2020 and  CT dated 11/05/2020. EKG leads overlie the chest. Heart size is normal.  No pneumothorax. There has been some improvement in aeration of the lung  bases with persistence of opacification greater towards the right base.  No other interval change.       Impression:      Improving aeration. Continued follow-up recommended.     This report was finalized on 11/9/2020 2:41 PM by Dominic Gates MD.          I have reviewed the patient's radiology reports.    Medication Review:     I have reviewed the patients active and prn medications.       Severe sepsis (CMS/HCC)    Mixed anxiety depressive disorder    Gastroesophageal reflux disease without esophagitis    Hyperlipidemia    Essential hypertension    Tobacco abuse disorder    Acute and chronic respiratory failure with hypoxia (CMS/HCC)    COPD (chronic obstructive pulmonary disease) (CMS/HCC)    Pneumonia of both lungs due to infectious organism    Leg wound, right      Assessment:    1.  Acute hypoxic respiratory failure ruled out, chronic hypoxic respiratory failure only.  2.  Bilateral healthcare associated pneumonia, POA, unable to classify further.  3.  Acute COPD exacerbation, present on admission.  4.  Chronic diastolic heart failure, no evidence of exacerbation.  5.  Traumatic right leg ulcers, healing, present on admission.  6.  Essential hypertension.  7.  Chronic essential tremors on propranolol.  8.  Restless leg syndrome.    Plan:    Patient overall is hemodynamically stable.  Procalcitonin is trending down.  Stable on 2 to 3 L of oxygen.  Tolerating CPAP/BiPAP well.  Has this at home.  Continue current IV antibiotics with Zosyn and azithromycin to complete treatment for pneumonia.  Continue on  bronchodilators and budesonide.  We will hold on any additional steroids other than her chronic prednisone.  Continue with Lovenox for DVT prophylaxis.    Patient is now agreeable to a short-term rehab stent.  Case management was notified.  Anticipate discharge in the next 48 hours or so.    Ute Paris DO  11/09/20  16:25 EST    Dictated utilizing Dragon dictation.

## 2020-11-09 NOTE — PLAN OF CARE
Problem: Adult Inpatient Plan of Care  Goal: Plan of Care Review  Recent Flowsheet Documentation  Taken 11/9/2020 1340 by Mariia Arevalo OT  Progress: improving  Plan of Care Reviewed With:   patient   daughter  Outcome Summary: OT tx completed. Patient performed bed mobility ind, transfer and functional mobility with CGA, completed toileting task with CGA, followed by UB resistance exercises. Continue OT POC

## 2020-11-09 NOTE — THERAPY TREATMENT NOTE
Patient Name: Sadie Tijerina  : 1940    MRN: [id number]                              Today's Date: 2020       Admit Date: 2020    Visit Dx:     ICD-10-CM ICD-9-CM   1. Pneumonia of both lungs due to infectious organism, unspecified part of lung  J18.9 483.8   2. Non-healing wound of right lower extremity  S81.801A 894.1   3. Cellulitis of right lower extremity  L03.115 682.6   4. Lactic acidosis  E87.2 276.2   5. Tachycardia  R00.0 785.0     Patient Active Problem List   Diagnosis   • Calf cramp   • Mixed anxiety depressive disorder   • Dizziness   • Fatigue   • Gastroesophageal reflux disease without esophagitis   • Hematuria   • Hyperlipidemia   • Essential hypertension   • Low back pain   • Restless legs syndrome   • Essential tremor   • Vitamin D deficiency   • Balance problem   • Tension headache   • Tobacco abuse disorder   • Leg swelling   • Acute and chronic respiratory failure with hypoxia (CMS/[facility])   • Venous insufficiency of both lower extremities   • Chronic diastolic heart failure (CMS/[facility])   • Neuropathy   • COPD (chronic obstructive pulmonary disease) (CMS/[facility])   • Abnormal gait   • Bilateral sensorineural hearing loss   • Disorder of inner ear   • Tympanosclerosis   • Transient vision disturbance   • Bilateral bronchopneumonia   • Pneumonia of both lungs due to infectious organism   • Leg wound, right   • Severe sepsis (CMS/[facility])     Past Medical History:   Diagnosis Date   • Arthritis    • Cancer (CMS/HCC)     uterine cancer ()   • COPD (chronic obstructive pulmonary disease) (CMS/[facility])    • Depression    • Elevated cholesterol    • GERD (gastroesophageal reflux disease)    • History of emphysema    • History of esophageal reflux    • History of recurrent UTI (urinary tract infection)    • History of renal calculi    • History of uterine cancer    • Hyperlipidemia    • Hypertension    • Impaired functional mobility, balance, gait, and endurance    • Pneumonia 2019     Past  Surgical History:   Procedure Laterality Date   • FOOT SURGERY     • HAND SURGERY     • HYSTERECTOMY       General Information     Row Name 11/09/20 1515          Physical Therapy Time and Intention    Document Type  therapy note (daily note)  -     Mode of Treatment  physical therapy  -RM     Row Name 11/09/20 1515          General Information    Patient Profile Reviewed  yes  -RM     Existing Precautions/Restrictions  fall;oxygen therapy device and L/min 3 lpm pnc  -RM     Row Name 11/09/20 1515          Cognition    Orientation Status (Cognition)  oriented x 4  -RM     Row Name 11/09/20 1515          Safety Issues, Functional Mobility    Impairments Affecting Function (Mobility)  balance;endurance/activity tolerance;shortness of breath;strength  -RM       User Key  (r) = Recorded By, (t) = Taken By, (c) = Cosigned By    Initials Name Provider Type    Anibal Francois PTA Physical Therapy Assistant        Mobility     Row Name 11/09/20 1516          Bed Mobility    Supine-Sit [place] (Bed Mobility)  modified independence;verbal cues;nonverbal cues (demo/gesture)  -     Row Name 11/09/20 1516          Sit-Stand Transfer    Sit-Stand [place] (Transfers)  contact guard  -RM     Assistive Device (Sit-Stand Transfers)  walker, front-wheeled  -RM     Row Name 11/09/20 1516          Gait/Stairs (Locomotion)    [place] Level (Gait)  contact guard;verbal cues  -RM     Assistive Device (Gait)  walker, front-wheeled  -RM     Distance in Feet (Gait)  40  -RM     Deviations/Abnormal Patterns (Gait)  stride length decreased;base of support, wide  -RM     Bilateral Gait Deviations  forward flexed posture;heel strike decreased  -RM       User Key  (r) = Recorded By, (t) = Taken By, (c) = Cosigned By    Initials Name Provider Type    Anibal Francois PTA Physical Therapy Assistant        Obj/Interventions    No documentation.       Goals/Plan    No documentation.       Clinical Impression     Row Name  11/09/20 1518          Pain    Additional Documentation  Pain Scale: Numbers Pre/Post-Treatment (Group)  -RM     Row Name 11/09/20 1518          Pain Scale: Numbers Pre/Post-Treatment    Pretreatment Pain Rating  0/10 - no pain  -RM     Posttreatment Pain Rating  0/10 - no pain  -RM     Row Name 11/09/20 1518          Plan of Care Review    Progress  improving  -     Outcome Summary  Pt presented with improved activity tolerance. Pt was able to advance gait distance to 40' cga with rw.  See flowsheet for details  -       User Key  (r) = Recorded By, (t) = Taken By, (c) = Cosigned By    Initials Name Provider Type    Anibal Francois, DILEEP Physical Therapy Assistant        Outcome Measures     Row Name 11/09/20 1520          How much help from another person do you currently need...    Turning from your back to your side while in flat bed without using bedrails?  4  -RM     Moving from lying on back to sitting on the side of a flat bed without bedrails?  3  -RM     Moving to and from a bed to a chair (including a wheelchair)?  3  -RM     Standing up from a chair using your arms (e.g., wheelchair, bedside chair)?  3  -RM     Climbing 3-5 steps with a railing?  2  -RM     To walk in hospital room?  3  -RM     AM-PAC 6 Clicks Score (PT)  18  -RM     Row Name 11/09/20 1520          Functional Assessment    Outcome Measure Options  AM-PAC 6 Clicks Basic Mobility (PT)  -       User Key  (r) = Recorded By, (t) = Taken By, (c) = Cosigned By    Initials Name Provider Type    Anibal Francois, DILEEP Physical Therapy Assistant        Physical Therapy Education                 Title: PT OT SLP Therapies (Done)     Topic: Physical Therapy (Done)     Point: Mobility training (Done)     Learning Progress Summary           Patient Acceptance, E,KIMBERLEY MANDUJANO, ARIANNA,DU by  at 11/9/2020 1521    Comment: safety with mobility    Acceptance, AMOSTB, VU by MD at 11/6/2020 1831    Acceptance, E,KIMBERLEY, VU,DU by CINTIA at 11/6/2020 0955    Comment:  Pt education on the use of rolling walker to take wt off RLE to assist with pain control during gait.                   Point: Home exercise program (Done)     Learning Progress Summary           Patient Acceptance, E,TB, VU by MD at 11/6/2020 1831                   Point: Body mechanics (Done)     Learning Progress Summary           Patient Acceptance, E,TB, VU by MD at 11/6/2020 1831                               User Key     Initials Effective Dates Name Provider Type Discipline     03/07/18 -  Anibal Michelle PTA Physical Therapy Assistant PT    TW 03/26/19 -  Lilliam Holm, PT Physical Therapist PT    MD 11/11/19 -  Gil Cordova RN Registered Nurse Nurse              PT Recommendation and Plan     Progress: improving  Outcome Summary: Pt presented with improved activity tolerance. Pt was able to advance gait distance to 40' cga with rw.  See flowsheet for details     Time Calculation:   PT Charges     Row Name 11/09/20 1521             Time Calculation    Start Time  1438  -RM      Stop Time  1453  -RM      Time Calculation (min)  15 min  -RM      PT Received On  11/09/20  -RM      PT Goal Re-Cert Due Date  11/16/20  -RM         Time Calculation- PT    Total Timed Code Minutes- PT  15 minute(s)  -RM         Timed Charges    11582 - Gait Training Minutes   15  -RM        User Key  (r) = Recorded By, (t) = Taken By, (c) = Cosigned By    Initials Name Provider Type     Anibal Michelle, DILEEP Physical Therapy Assistant        Therapy Charges for Today     Code Description Service Date Service Provider Modifiers Qty    65769838220 HC GAIT TRAINING EA 15 MIN 11/9/2020 Anibal Michelle, DILEEP GP 1          PT G-Codes  Outcome Measure Options: AM-PAC 6 Clicks Basic Mobility (PT)  AM-PAC 6 Clicks Score (PT): 18  AM-PAC 6 Clicks Score (OT): 19    Anibal Michelle PTA  11/9/2020

## 2020-11-09 NOTE — PLAN OF CARE
Problem: Adult Inpatient Plan of Care  Goal: Plan of Care Review  Recent Flowsheet Documentation  Taken 11/9/2020 1518 by Anibal Michelle, PTA  Progress: improving  Outcome Summary: Pt presented with improved activity tolerance. Pt was able to advance gait distance to 40' cga with rw.  See flowsheet for details

## 2020-11-09 NOTE — THERAPY TREATMENT NOTE
Pt declined treatment. Pt reports will attempt to work with therapy after getting a bath.  Will f/u with pt at that time.

## 2020-11-10 NOTE — THERAPY TREATMENT NOTE
Patient refused OT services this date, states she didn't sleep well last night because she didn't wear her BiPap; will follow up with patient tomorrow.

## 2020-11-10 NOTE — DISCHARGE PLACEMENT REQUEST
"  PT/OT notes for placement at [facility] bed      Sadie Tijerina (80 y.o. Female)     Date of Birth Social Security Number Address Home Phone MRN    1940  [id number] [address] [phone] [id number]    [employer] Mar[employer] Status          Episcopalian        Admission Date Admission Type Admitting Provider Attending Provider Department, Room/Bed    11/4/20 Emergency Ute Paris DO Karrick, Shandy Marcus, DO [facility] MED SURG  4, 429/1    Discharge Date Discharge Disposition Discharge Destination                       Attending Provider: Ute Paris DO    Allergies: Morphine And Related    Isolation: None   Infection: MRSA/History Only (09/17/20)   Code Status: No CPR    Ht: 157.5 cm (62\")   Wt: 64.1 kg (141 lb 5 oz)    Admission Cmt: None   Principal Problem: Severe sepsis (CMS/[facility]) [A41.9,R65.20]                 Active Insurance as of 11/4/2020     Primary Coverage     Payor Plan Insurance Group Employer/Plan Group    MEDICARE MEDICARE A ONLY      Payor Plan Address Payor Plan Phone Number Payor Plan Fax Number Effective Dates    [address] [phone]  6/1/2003 - None Entered    [facility] [address]       Subscriber Name Subscriber Birth Date Member ID       SADIE TIJERINA 1940 [id number]           Secondary Coverage     Payor Plan Insurance Group Employer/Plan Group    HUMANA HUMANA [id number]     Payor Plan Address Payor Plan Phone Number Payor Plan Fax Number Effective Dates    [address] [address] [phone]  1/1/2013 - None Entered    [facility] [id number]       Subscriber Name Subscriber Birth Date Member ID       SADIE TIJERINA 1940 [id number]                 Emergency Contacts      (Rel.) Home Phone Work Phone Mobile Phone    Neris Giles (Daughter) [phone] -- [phone]    TijerinaAlfonzo woods (Son) [phone] -- [phone]               Physical Therapy Notes (last 48 hours) " (Notes from 20 0952 through 11/10/20 0952)      Nika Chun PTA at 20 1350  Version 1 of          Problem: Adult Inpatient Plan of Care  Goal: Plan of Care Review  Recent Flowsheet Documentation  Taken 2020 1350 by Nika Chun PTA  Progress: no change  Plan of Care Reviewed With: patient  Outcome Summary: Pt agreeable to ex on 2nd attempt to see pt. Pt with c-pap donned and stated having hard time breathing without it today . PTA educated pt on importance of increasing actvity daily and time OOB . Con't with PT POC and progress as tolerated       Electronically signed by Nika Chun PTA at 20 1504     Nika Chun PTA at 20 1350  Version 1 of          Patient Name: Sadie Tijerina  : 1940    MRN: [id number]                              Today's Date: 2020       Admit Date: 2020    Visit Dx:     ICD-10-CM ICD-9-CM   1. Pneumonia of both lungs due to infectious organism, unspecified part of lung  J18.9 483.8   2. Non-healing wound of right lower extremity  S81.801A 894.1   3. Cellulitis of right lower extremity  L03.115 682.6   4. Lactic acidosis  E87.2 276.2   5. Tachycardia  R00.0 785.0     Patient Active Problem List   Diagnosis   • Calf cramp   • Mixed anxiety depressive disorder   • Dizziness   • Fatigue   • Gastroesophageal reflux disease without esophagitis   • Hematuria   • Hyperlipidemia   • Essential hypertension   • Low back pain   • Restless legs syndrome   • Essential tremor   • Vitamin D deficiency   • Balance problem   • Tension headache   • Tobacco abuse disorder   • Leg swelling   • Acute and chronic respiratory failure with hypoxia (CMS/HCC)   • Venous insufficiency of both lower extremities   • Chronic diastolic heart failure (CMS/HCC)   • Neuropathy   • COPD (chronic obstructive pulmonary disease) (CMS/HCC)   • Abnormal gait   • Bilateral sensorineural hearing loss   • Disorder of inner ear   • Tympanosclerosis   •  Transient vision disturbance   • Bilateral bronchopneumonia   • Pneumonia of both lungs due to infectious organism   • Leg wound, right   • Severe sepsis (CMS/[facility])     Past Medical History:   Diagnosis Date   • Arthritis    • Cancer (CMS/[facility])     uterine cancer (1981)   • COPD (chronic obstructive pulmonary disease) (CMS/[facility])    • Depression    • Elevated cholesterol    • GERD (gastroesophageal reflux disease)    • History of emphysema    • History of esophageal reflux    • History of recurrent UTI (urinary tract infection)    • History of renal calculi    • History of uterine cancer    • Hyperlipidemia    • Hypertension    • Impaired functional mobility, balance, gait, and endurance    • Pneumonia 02/2019     Past Surgical History:   Procedure Laterality Date   • FOOT SURGERY     • HAND SURGERY     • HYSTERECTOMY       General Information     Row Name 11/08/20 1350          Physical Therapy Time and Intention    Mode of Treatment  physical therapy  -CC     Row Name 11/08/20 [facility]0          General Information    Patient Profile Reviewed  yes  -CC     Existing Precautions/Restrictions  fall;oxygen therapy device and L/min  -CC     Row Name 11/08/20 1350          Safety Issues, Functional Mobility    Safety Issues Affecting Function (Mobility)  awareness of need for assistance;safety precautions follow-through/compliance  -CC     Impairments Affecting Function (Mobility)  balance;endurance/activity tolerance;shortness of breath;strength;pain  -CC       User Key  (r) = Recorded By, (t) = Taken By, (c) = Cosigned By    Initials Name Provider Type    CC Nika Chun PTA Physical Therapy Assistant        Mobility    No documentation.       Obj/Interventions     Row Name 11/08/20 1350          Strength Comprehensive (MMT)    General Manual Muscle Testing (MMT) Assessment  lower extremity strength deficits identified  -     Row Name 11/08/20 1350          Motor Skills    Motor Skills  therapeutic exercise  -      Therapeutic Exercise  hip;knee;ankle Pt performed B LE ex 8 reps each AP, QS, hip abd, SAQ, SLR  -CC       User Key  (r) = Recorded By, (t) = Taken By, (c) = Cosigned By    Initials Name Provider Type    Nika Johnson PTA Physical Therapy Assistant        Goals/Plan    No documentation.       Clinical Impression     Row Name 11/08/20 1350          Pain    Additional Documentation  Pain Scale: Numbers Pre/Post-Treatment (Group)  -     Row Name 11/08/20 1350          Pain Scale: Numbers Pre/Post-Treatment    Pretreatment Pain Rating  0/10 - no pain  -CC     Posttreatment Pain Rating  0/10 - no pain  -CC     Row Name 11/08/20 1350          Plan of Care Review    Plan of Care Reviewed With  patient  -CC     Progress  no change  -     Outcome Summary  Pt agreeable to ex on 2nd attempt to see pt. Pt with c-pap donned and stated having hard time breathing without it today . PTA educated pt on importance of increasing actvity daily and time OOB . Con't with PT POC and progress as tolerated  -     Row Name 11/08/20 [facility]0          Positioning and Restraints    Pre-Treatment Position  in bed  -CC     Post Treatment Position  bed  -CC     In Bed  supine;encouraged to call for assist;call light within reach  -CC       User Key  (r) = Recorded By, (t) = Taken By, (c) = Cosigned By    Initials Name Provider Type    Nika Johnson PTA Physical Therapy Assistant        Outcome Measures     Row Name 11/08/20 [facility]0          How much help from another person do you currently need...    Turning from your back to your side while in flat bed without using bedrails?  4  -CC     Moving from lying on back to sitting on the side of a flat bed without bedrails?  3  -CC     Moving to and from a bed to a chair (including a wheelchair)?  3  -CC     Standing up from a chair using your arms (e.g., wheelchair, bedside chair)?  3  -CC     Climbing 3-5 steps with a railing?  2  -CC     To walk in hospital room?  3  -CC     AM-PAC 6  Clicks Score (PT)  18  -CC     Row Name 11/08/20 1350          Functional Assessment    Outcome Measure Options  AM-PAC 6 Clicks Basic Mobility (PT)  -CC       User Key  (r) = Recorded By, (t) = Taken By, (c) = Cosigned By    Initials Name Provider Type    CC Nika Chun PTA Physical Therapy Assistant        Physical Therapy Education                 Title: PT OT SLP Therapies (Done)     Topic: Physical Therapy (Done)     Point: Mobility training (Done)     Learning Progress Summary           Patient Acceptance, E,TB, VU by MD at 11/6/2020 1831    Acceptance, E,D, VU,DU by  at 11/6/2020 0955    Comment: Pt education on the use of rolling walker to take wt off RLE to assist with pain control during gait.                   Point: Home exercise program (Done)     Learning Progress Summary           Patient Acceptance, E,TB, VU by MD at 11/6/2020 1831                   Point: Body mechanics (Done)     Learning Progress Summary           Patient Acceptance, E,TB, VU by MD at 11/6/2020 1831                               User Key     Initials Effective Dates Name Provider Type Discipline    TW 03/26/19 -  Lilliam Holm, PT Physical Therapist PT    MD 11/11/19 -  Gil Cordova, RN Registered Nurse Nurse              PT Recommendation and Plan     Plan of Care Reviewed With: patient  Progress: no change  Outcome Summary: Pt agreeable to ex on 2nd attempt to see pt. Pt with c-pap donned and stated having hard time breathing without it today . PTA educated pt on importance of increasing actvity daily and time OOB . Con't with PT POC and progress as tolerated     Time Calculation:   PT Charges     Row Name 11/08/20 1350             Time Calculation    Start Time  1350  -CC      Stop Time  1405  -CC      Time Calculation (min)  15 min  -CC      PT Received On  11/08/20  -CC      PT Goal Re-Cert Due Date  11/16/20  -CC         Timed Charges    68436 - PT Therapeutic Exercise Minutes  15  -CC        User Key  (r) =  Recorded By, (t) = Taken By, (c) = Cosigned By    Initials Name Provider Type    CC Nika Chun PTA Physical Therapy Assistant        Therapy Charges for Today     Code Description Service Date Service Provider Modifiers Qty    96853485991 HC PT THER PROC EA 15 MIN 2020 Nika Chun PTA GP 1          PT G-Codes  Outcome Measure Options: AM-PAC 6 Clicks Basic Mobility (PT)  AM-PAC 6 Clicks Score (PT): 18  AM-PAC 6 Clicks Score (OT): 19    Nika Chun PTA  2020      Electronically signed by Nika Chun PTA at 20 1505     Anibal Michelle PTA at 20 1035  Version 1 of        Pt declined treatment. Pt reports will attempt to work with therapy after getting a bath.  Will f/u with pt at that time.        Electronically signed by Anibal Michelle PTA at 20 1310     Anibal Michelle PTA at 20 1521  Version 1 of          Problem: Adult Inpatient Plan of Care  Goal: Plan of Care Review  Recent Flowsheet Documentation  Taken 2020 1518 by Anibal Michelle PTA  Progress: improving  Outcome Summary: Pt presented with improved activity tolerance. Pt was able to advance gait distance to 40' cga with rw.  See flowsheet for details       Electronically signed by Anibal Michelle PTA at 20 1521     Anibal Michelle PTA at 20 1522  Version 1 of          Patient Name: Sadie Tijerina  : 1940    MRN: [id number]                              Today's Date: 2020       Admit Date: 2020    Visit Dx:     ICD-10-CM ICD-9-CM   1. Pneumonia of both lungs due to infectious organism, unspecified part of lung  J18.9 483.8   2. Non-healing wound of right lower extremity  S81.801A 894.1   3. Cellulitis of right lower extremity  L03.115 682.6   4. Lactic acidosis  E87.2 276.2   5. Tachycardia  R00.0 785.0     Patient Active Problem List   Diagnosis   • Calf cramp   • Mixed anxiety depressive disorder   • Dizziness   • Fatigue   •  Gastroesophageal reflux disease without esophagitis   • Hematuria   • Hyperlipidemia   • Essential hypertension   • Low back pain   • Restless legs syndrome   • Essential tremor   • Vitamin D deficiency   • Balance problem   • Tension headache   • Tobacco abuse disorder   • Leg swelling   • Acute and chronic respiratory failure with hypoxia (CMS/[facility])   • Venous insufficiency of both lower extremities   • Chronic diastolic heart failure (CMS/[facility])   • Neuropathy   • COPD (chronic obstructive pulmonary disease) (CMS/[facility])   • Abnormal gait   • Bilateral sensorineural hearing loss   • Disorder of inner ear   • Tympanosclerosis   • Transient vision disturbance   • Bilateral bronchopneumonia   • Pneumonia of both lungs due to infectious organism   • Leg wound, right   • Severe sepsis (CMS/[facility])     Past Medical History:   Diagnosis Date   • Arthritis    • Cancer (CMS/[facility])     uterine cancer (1981)   • COPD (chronic obstructive pulmonary disease) (CMS/[facility])    • Depression    • Elevated cholesterol    • GERD (gastroesophageal reflux disease)    • History of emphysema    • History of esophageal reflux    • History of recurrent UTI (urinary tract infection)    • History of renal calculi    • History of uterine cancer    • Hyperlipidemia    • Hypertension    • Impaired functional mobility, balance, gait, and endurance    • Pneumonia 02/2019     Past Surgical History:   Procedure Laterality Date   • FOOT SURGERY     • HAND SURGERY     • HYSTERECTOMY       General Information     Row Name 11/09/20 1515          Physical Therapy Time and Intention    Document Type  therapy note (daily note)  -RM     Mode of Treatment  physical therapy  -RM     Row Name 11/09/20 1515          General Information    Patient Profile Reviewed  yes  -RM     Existing Precautions/Restrictions  fall;oxygen therapy device and L/min 3 lpm pnc  -RM     Row Name 11/09/20 1515          Cognition    Orientation Status (Cognition)  oriented x 4  -RM     Row Name  11/09/20 1515          Safety Issues, Functional Mobility    Impairments Affecting Function (Mobility)  balance;endurance/activity tolerance;shortness of breath;strength  -RM       User Key  (r) = Recorded By, (t) = Taken By, (c) = Cosigned By    Initials Name Provider Type    Anibal Francois PTA Physical Therapy Assistant        Mobility     Row Name 11/09/20 1516          Bed Mobility    Supine-Sit [place] (Bed Mobility)  modified independence;verbal cues;nonverbal cues (demo/gesture)  -RM     Row Name 11/09/20 1516          Sit-Stand Transfer    Sit-Stand [place] (Transfers)  contact guard  -RM     Assistive Device (Sit-Stand Transfers)  walker, front-wheeled  -RM     Row Name 11/09/20 1516          Gait/Stairs (Locomotion)    [place] Level (Gait)  contact guard;verbal cues  -RM     Assistive Device (Gait)  walker, front-wheeled  -RM     Distance in Feet (Gait)  40  -RM     Deviations/Abnormal Patterns (Gait)  stride length decreased;base of support, wide  -RM     Bilateral Gait Deviations  forward flexed posture;heel strike decreased  -RM       User Key  (r) = Recorded By, (t) = Taken By, (c) = Cosigned By    Initials Name Provider Type    Anibal Francois, DILEEP Physical Therapy Assistant        Obj/Interventions    No documentation.       Goals/Plan    No documentation.       Clinical Impression     Row Name 11/09/20 1518          Pain    Additional Documentation  Pain Scale: Numbers Pre/Post-Treatment (Group)  -RM     Row Name 11/09/20 1518          Pain Scale: Numbers Pre/Post-Treatment    Pretreatment Pain Rating  0/10 - no pain  -RM     Posttreatment Pain Rating  0/10 - no pain  -RM     Row Name 11/09/20 1518          Plan of Care Review    Progress  improving  -RM     Outcome Summary  Pt presented with improved activity tolerance. Pt was able to advance gait distance to 40' cga with rw.  See flowsheet for details  -RM       User Key  (r) = Recorded By, (t) = Taken By, (c) = Cosigned  By    Initials Name Provider Type     Anibal Michelle, DILEEP Physical Therapy Assistant        Outcome Measures     Row Name 11/09/20 1520          How much help from another person do you currently need...    Turning from your back to your side while in flat bed without using bedrails?  4  -RM     Moving from lying on back to sitting on the side of a flat bed without bedrails?  3  -RM     Moving to and from a bed to a chair (including a wheelchair)?  3  -RM     Standing up from a chair using your arms (e.g., wheelchair, bedside chair)?  3  -RM     Climbing 3-5 steps with a railing?  2  -RM     To walk in hospital room?  3  -RM     AM-PAC 6 Clicks Score (PT)  18  -RM     Row Name 11/09/20 1520          Functional Assessment    Outcome Measure Options  AM-PAC 6 Clicks Basic Mobility (PT)  -RM       User Key  (r) = Recorded By, (t) = Taken By, (c) = Cosigned By    Initials Name Provider Type     Anibal Michelle, DILEEP Physical Therapy Assistant        Physical Therapy Education                 Title: PT OT SLP Therapies (Done)     Topic: Physical Therapy (Done)     Point: Mobility training (Done)     Learning Progress Summary           Patient Acceptance, E,TB,D, VU,DU by  at 11/9/2020 1521    Comment: safety with mobility    Acceptance, E,TB, VU by MD at 11/6/2020 1831    Acceptance, E,D, VU,DU by  at 11/6/2020 0955    Comment: Pt education on the use of rolling walker to take wt off RLE to assist with pain control during gait.                   Point: Home exercise program (Done)     Learning Progress Summary           Patient Acceptance, E,TB, VU by MD at 11/6/2020 1831                   Point: Body mechanics (Done)     Learning Progress Summary           Patient Acceptance, E,TB, VU by MD at 11/6/2020 1831                               User Key     Initials Effective Dates Name Provider Type Discipline     03/07/18 -  Anibal Michelle, DILEEP Physical Therapy Assistant PT    TW 03/26/19 -  Lilliam Holm  PT Physical Therapist PT    MD 19 -  Gil Cordova, RN Registered Nurse Nurse              PT Recommendation and Plan     Progress: improving  Outcome Summary: Pt presented with improved activity tolerance. Pt was able to advance gait distance to 40' cga with rw.  See flowsheet for details     Time Calculation:   PT Charges     Row Name 20 1521             Time Calculation    Start Time  1438  -RM      Stop Time  1453  -RM      Time Calculation (min)  15 min  -RM      PT Received On  20  -RM      PT Goal Re-Cert Due Date  20  -RM         Time Calculation- PT    Total Timed Code Minutes- PT  15 minute(s)  -RM         Timed Charges    32400 - Gait Training Minutes   15  -RM        User Key  (r) = Recorded By, (t) = Taken By, (c) = Cosigned By    Initials Name Provider Type    Anibal Francois PTA Physical Therapy Assistant        Therapy Charges for Today     Code Description Service Date Service Provider Modifiers Qty    25904206631 HC GAIT TRAINING EA 15 MIN 2020 Anibal Michelle, DILEEP GP 1          PT G-Codes  Outcome Measure Options: AM-PAC 6 Clicks Basic Mobility (PT)  AM-PAC 6 Clicks Score (PT): 18  AM-PAC 6 Clicks Score (OT): 19    Anibal Michelle PTA  2020      Electronically signed by Anibal Michelle PTA at 20 1522          Occupational Therapy Notes (last 48 hours) (Notes from 20 0952 through 11/10/20 0952)      Mariia Arevalo, OT at 20 1343          Patient Name: Sadie Tijerina  : 1940    MRN: [id number]                              Today's Date: 2020       Admit Date: 2020    Visit Dx:     ICD-10-CM ICD-9-CM   1. Pneumonia of both lungs due to infectious organism, unspecified part of lung  J18.9 483.8   2. Non-healing wound of right lower extremity  S81.801A 894.1   3. Cellulitis of right lower extremity  L03.115 682.6   4. Lactic acidosis  E87.2 276.2   5. Tachycardia  R00.0 785.0     Patient Active Problem List    Diagnosis   • Calf cramp   • Mixed anxiety depressive disorder   • Dizziness   • Fatigue   • Gastroesophageal reflux disease without esophagitis   • Hematuria   • Hyperlipidemia   • Essential hypertension   • Low back pain   • Restless legs syndrome   • Essential tremor   • Vitamin D deficiency   • Balance problem   • Tension headache   • Tobacco abuse disorder   • Leg swelling   • Acute and chronic respiratory failure with hypoxia (CMS/[facility])   • Venous insufficiency of both lower extremities   • Chronic diastolic heart failure (CMS/[facility])   • Neuropathy   • COPD (chronic obstructive pulmonary disease) (CMS/[facility])   • Abnormal gait   • Bilateral sensorineural hearing loss   • Disorder of inner ear   • Tympanosclerosis   • Transient vision disturbance   • Bilateral bronchopneumonia   • Pneumonia of both lungs due to infectious organism   • Leg wound, right   • Severe sepsis (CMS/[facility])     Past Medical History:   Diagnosis Date   • Arthritis    • Cancer (CMS/[facility])     uterine cancer (1981)   • COPD (chronic obstructive pulmonary disease) (CMS/[facility])    • Depression    • Elevated cholesterol    • GERD (gastroesophageal reflux disease)    • History of emphysema    • History of esophageal reflux    • History of recurrent UTI (urinary tract infection)    • History of renal calculi    • History of uterine cancer    • Hyperlipidemia    • Hypertension    • Impaired functional mobility, balance, gait, and endurance    • Pneumonia 02/2019     Past Surgical History:   Procedure Laterality Date   • FOOT SURGERY     • HAND SURGERY     • HYSTERECTOMY       General Information     Row Name 11/09/20 1339          OT Time and Intention    Document Type  therapy note (daily note)  -SD     Mode of Treatment  occupational therapy  -SD       User Key  (r) = Recorded By, (t) = Taken By, (c) = Cosigned By    Initials Name Provider Type    Mariia Armenta OT Occupational Therapist        Mobility/ADL's     Row Name 11/09/20 1332          Bed  Mobility    Bed Mobility  supine-sit  -SD     Supine-Sit [place] (Bed Mobility)  modified independence  -SD     Assistive Device (Bed Mobility)  head of bed elevated  -SD     Row Name 11/09/20 1339          Transfers    Transfers  sit-stand transfer;toilet transfer  -SD     Sit-Stand [place] (Transfers)  contact guard  -SD     [place] Level (Toilet Transfer)  contact guard  -SD     Assistive Device (Toilet Transfer)  commode;grab bars/safety frame  -SD     Row Name 11/09/20 1339          Sit-Stand Transfer    Assistive Device (Sit-Stand Transfers)  walker, front-wheeled  -SD     Row Name 11/09/20 1339          Toilet Transfer    Type (Toilet Transfer)  stand pivot/stand step  -SD     Row Name 11/09/20 1339          Functional Mobility    Functional Mobility- Ind. Level  contact guard assist  -SD     Functional Mobility- Device  rolling walker  -SD     Functional Mobility-Distance (Feet)  18 25  -SD     Row Name 11/09/20 1339          Toileting Assessment/Training    [place] Level (Toileting)  contact guard assist  -SD       User Key  (r) = Recorded By, (t) = Taken By, (c) = Cosigned By    Initials Name Provider Type    Mariia Armenta OT Occupational Therapist        Obj/Interventions     Row Name 11/09/20 1340          Therapeutic Exercise    Therapeutic Exercise  shoulder;elbow/forearm B UE ther ex using resistance band x 10  -SD       User Key  (r) = Recorded By, (t) = Taken By, (c) = Cosigned By    Initials Name Provider Type    Mariia Armenta OT Occupational Therapist        Goals/Plan    No documentation.       Clinical Impression     Row Name 11/09/20 1340          Pain Scale: Numbers Pre/Post-Treatment    Pretreatment Pain Rating  0/10 - no pain  -SD     Posttreatment Pain Rating  0/10 - no pain  -SD     Row Name 11/09/20 1340          Plan of Care Review    Plan of Care Reviewed With  patient;daughter  -SD     Progress  improving  -SD     Outcome Summary  OT tx completed.  Patient performed bed mobility ind, transfer and functional mobility with CGA, completed toileting task with CGA, followed by UB resistance exercises. Continue OT POC  -SD     Row Name 11/09/20 1340          Vital Signs    Pre SpO2 (%)  97  -SD     O2 Delivery Pre Treatment  supplemental O2  -SD     O2 Delivery Intra Treatment  supplemental O2  -SD     Post SpO2 (%)  97  -SD     O2 Delivery Post Treatment  supplemental O2  -SD     Row Name 11/09/20 1340          Positioning and Restraints    Pre-Treatment Position  in bed  -SD     Post Treatment Position  chair  -SD     In Chair  reclined;call light within reach;encouraged to call for assist;exit alarm on;with family/caregiver  -SD       User Key  (r) = Recorded By, (t) = Taken By, (c) = Cosigned By    Initials Name Provider Type    Mariia Armenta OT Occupational Therapist        Outcome Measures     Row Name 11/09/20 1342          How much help from another is currently needed...    Putting on and taking off regular lower body clothing?  3  -SD     Bathing (including washing, rinsing, and drying)  3  -SD     Toileting (which includes using toilet bed pan or urinal)  3  -SD     Putting on and taking off regular upper body clothing  3  -SD     Taking care of personal grooming (such as brushing teeth)  3  -SD     Eating meals  4  -SD     AM-PAC 6 Clicks Score (OT)  19  -SD     Row Name 11/09/20 1342          Functional Assessment    Outcome Measure Options  AM-PAC 6 Clicks Daily Activity (OT)  -SD       User Key  (r) = Recorded By, (t) = Taken By, (c) = Cosigned By    Initials Name Provider Type    Mariia Armenta OT Occupational Therapist        Occupational Therapy Education                 Title: PT OT SLP Therapies (Done)     Topic: Occupational Therapy (Done)     Point: ADL training (Done)     Description:   Instruct learner(s) on proper safety adaptation and remediation techniques during self care or transfers.   Instruct in proper use of assistive  devices.              Learning Progress Summary           Patient Acceptance, E,TB, VU by SD at 11/9/2020 1342    Comment: Safety and sequencing during functional tf and mobility    Acceptance, E,TB, VU by MD at 11/6/2020 1831    Acceptance, E,TB, VU by SD at 11/6/2020 1348    Comment: Benefit of OT; OT POC                   Point: Home exercise program (Done)     Description:   Instruct learner(s) on appropriate technique for monitoring, assisting and/or progressing therapeutic exercises/activities.              Learning Progress Summary           Patient Acceptance, E,TB, VU by MD at 11/6/2020 1831                   Point: Precautions (Done)     Description:   Instruct learner(s) on prescribed precautions during self-care and functional transfers.              Learning Progress Summary           Patient Acceptance, E,TB, VU by MD at 11/6/2020 1831                   Point: Body mechanics (Done)     Description:   Instruct learner(s) on proper positioning and spine alignment during self-care, functional mobility activities and/or exercises.              Learning Progress Summary           Patient Acceptance, E,TB, VU by MD at 11/6/2020 1831                               User Key     Initials Effective Dates Name Provider Type Discipline    SD 03/07/18 -  Mariia Arevalo OT Occupational Therapist OT    MD 11/11/19 -  Gil Cordova, RN Registered Nurse Nurse              OT Recommendation and Plan  Planned Therapy Interventions (OT): activity tolerance training, adaptive equipment training, BADL retraining, patient/caregiver education/training, strengthening exercise, transfer/mobility retraining  Therapy Frequency (OT): 3 times/wk(5 if indicated)  Plan of Care Review  Plan of Care Reviewed With: patient, daughter  Progress: improving  Outcome Summary: OT tx completed. Patient performed bed mobility ind, transfer and functional mobility with CGA, completed toileting task with CGA, followed by UB resistance  exercises. Continue OT POC     Time Calculation:   Time Calculation- OT     Row Name 11/09/20 1343             Time Calculation- OT    OT Start Time  1135  -SD      OT Stop Time  1200  -SD      OT Time Calculation (min)  25 min  -SD      OT Received On  11/09/20  -SD      OT Goal Re-Cert Due Date  11/16/20  -SD         Timed Charges    72796 - OT Therapeutic Exercise Minutes  10  -SD      32781 - OT Self Care/Mgmt Minutes  15  -SD        User Key  (r) = Recorded By, (t) = Taken By, (c) = Cosigned By    Initials Name Provider Type    SD Mariia Arevalo OT Occupational Therapist        Therapy Charges for Today     Code Description Service Date Service Provider Modifiers Qty    10174013287 HC OT THER PROC EA 15 MIN 11/9/2020 Mariia Arevalo OT GO 1    [id number]  OT SELF CARE/MGMT/TRAIN EA 15 MIN 11/9/2020 Mariia Arevalo OT GO 1               Mariia Arevalo OT  11/9/2020    Electronically signed by Mariia Arevalo OT at 11/09/20 1343     Mariia Arevalo OT at 11/09/20 1343          Problem: Adult Inpatient Plan of Care  Goal: Plan of Care Review  Recent Flowsheet Documentation  Taken 11/9/2020 1340 by Mariia Arevalo OT  Progress: improving  Plan of Care Reviewed With:  • patient  • daughter  Outcome Summary: OT tx completed. Patient performed bed mobility ind, transfer and functional mobility with CGA, completed toileting task with CGA, followed by UB resistance exercises. Continue OT POC       Electronically signed by Mariia Arevalo OT at 11/09/20 1343

## 2020-11-10 NOTE — PLAN OF CARE
Goal Outcome Evaluation:  Plan of Care Reviewed With: patient  Progress: improving   VSS. Pt on 3L NC to maintain sats >90. Pt had productive cough through night. No other complaints. Will continue to monitor.

## 2020-11-10 NOTE — THERAPY TREATMENT NOTE
Pt declined treatment reporting having a very sleepless night and not feeling well. Will f/u with pt at a later time.

## 2020-11-10 NOTE — PROGRESS NOTES
[facility]    Name:  Sadie Tijerina   Age:  80 y.o.  Sex:  female  :  1940  MRN:  [id number]   Visit Number:  [id number]  Admission Date:  2020  Date Of Service:  11/10/20  Primary Care Physician:  Kristian Wolff MD     LOS: 5 days :  Patient Care Team:  Kristian Wolff MD as PCP - General  Kristian Wolff MD as PCP - Family Medicine  Bayron Grimaldo MD (Inactive) as Consulting Physician (General Surgery):    Chief Complaint:      Follow-up on pneumonia, COPD    Subjective / Interval History:     Patient feels well today overall.  She states she did not do well last night as she did not have her BiPAP on after requesting this multiple times.  I discussed I will talk with nursing staff and respiratory about this.  She denies any fevers or chills.  She is agreeable with short-term rehab.    Review of Systems:     General ROS: Patient denies any fevers, chills or loss of consciousness.  Respiratory ROS: Mild cough  Cardiovascular ROS: Denies chest pain or palpitations. No history of exertional chest pain.  Gastrointestinal ROS: Denies nausea and vomiting. Denies any abdominal pain. No diarrhea.  Neurological ROS: Denies any focal weakness. No loss of consciousness. Denies any numbness.  Dermatological ROS: Denies any redness or pruritis.    Vital Signs:    Temp:  [97.7 °F (36.5 °C)-98.3 °F (36.8 °C)] 97.7 °F (36.5 °C)  Heart Rate:  [67-76] 71  Resp:  [16-18] 16  BP: (118-137)/(40-70) 128/44    Intake and output:    I/O last 3 completed shifts:  In: 992 [P.O.:580; I.V.:412]  Out: 600 [Urine:600]  I/O this shift:  In: 480 [P.O.:480]  Out: -     Physical Examination:    General Appearance:  Alert and cooperative, not in any acute distress.   Head:  Atraumatic and normocephalic, without obvious abnormality.   Eyes:          PERRLA, conjunctivae and sclerae normal, no Icterus. No pallor. Extraocular movements are within normal limits.   Neck: Supple, trachea  midline, no thyromegaly.   Lungs:   Breath sounds heard bilaterally equally.  Some faint wheezes.  Otherwise clear.  Nontachypneic.   Heart:  Normal S1 and S2, no murmur, no gallop, no rub. No JVD   Abdomen:   Normal bowel sounds, no masses, no organomegaly. Soft, non-tender, non-distended, no guarding, no rebound tenderness.   Extremities: Moves all extremities well, no edema, no cyanosis, no clubbing.   Skin: N skin lesions/ulcerations of the legs are unchanged.  Scab formation.   Neurologic: Awake, alert and oriented times 3. Moves all 4 extremities equally.     Laboratory results:    Results from last 7 days   Lab Units 11/10/20  0611 11/08/20  0600 11/06/20  0605 11/04/20  2242   SODIUM mmol/L 138 139 137 141   POTASSIUM mmol/L 4.2 4.4 4.6 4.0   CHLORIDE mmol/L 102 105 104 98   CO2 mmol/L 33.4* 29.2* 26.7 28.4   BUN mg/dL 17 22 26* 22   CREATININE mg/dL 0.84 1.06* 1.07* 1.05*   CALCIUM mg/dL 9.1 9.0 8.6 9.6   BILIRUBIN mg/dL  --  0.2  --  0.3   ALK PHOS U/L  --  68  --  81   ALT (SGPT) U/L  --  65*  --  19   AST (SGOT) U/L  --  23  --  22   GLUCOSE mg/dL 91 106* 109* 147*     Results from last 7 days   Lab Units 11/10/20  0611 11/09/20  0538 11/08/20  0600   WBC 10*3/mm3 10.86* 8.00 9.02   HEMOGLOBIN g/dL 9.9* 9.4* 9.6*   HEMATOCRIT % 31.0* 30.8* 31.0*   PLATELETS 10*3/mm3 208 185 184         Results from last 7 days   Lab Units 11/09/20  0538 11/04/20  2242   TROPONIN T ng/mL <0.010 <0.010     Results from last 7 days   Lab Units 11/04/20  2325 11/04/20  2317   BLOODCX  No growth at 5 days No growth at 5 days     Results from last 7 days   Lab Units 11/04/20  2236   PH, ARTERIAL pH units 7.443   PO2 ART mm Hg 80.2   PCO2, ARTERIAL mm Hg 41.7   HCO3 ART mmol/L 28.5*       I have reviewed the patient's laboratory results.    Radiology results:    Imaging Results (Last 24 Hours)     ** No results found for the last 24 hours. **          I have reviewed the patient's radiology reports.    Medication Review:     I  have reviewed the patients active and prn medications.       Severe sepsis (CMS/HCC)    Mixed anxiety depressive disorder    Gastroesophageal reflux disease without esophagitis    Hyperlipidemia    Essential hypertension    Tobacco abuse disorder    Acute and chronic respiratory failure with hypoxia (CMS/[facility])    COPD (chronic obstructive pulmonary disease) (CMS/[facility])    Pneumonia of both lungs due to infectious organism    Leg wound, right      Assessment:    1.  Acute hypoxic respiratory failure ruled out, chronic hypoxic respiratory failure only.  2.  Bilateral healthcare associated pneumonia, POA, unable to classify further.  3.  Acute COPD exacerbation, present on admission.  4.  Chronic diastolic heart failure, no evidence of exacerbation.  5.  Traumatic right leg ulcers, healing, present on admission.  6.  Essential hypertension.  7.  Chronic essential tremors on propranolol.  8.  Restless leg syndrome.    Plan:    Patient overall hemodynamically stable.  Labs overall stable.  Kidney function is stable.  Action saturations stable.  Discussed with nursing staff to ensure patient will be on her CPAP/BiPAP at night as she is at home.  Continue the Zosyn today, discontinue after as she has completed over 8 days of therapy.  Continue with bronchodilators and budesonide.  Continue work her chronic prednisone dosing.  Continue with Lovenox for DVT prophylaxis.    Plan is for patient to be discharged tomorrow for short-term rehab after discussion with case management.  Patient will need EMS transport secondary to her dyspnea, hypoxia, generalized weakness and this will be arranged.    Ute [place],   11/10/20  15:02 EST    Dictated utilizing Dragon dictation.

## 2020-11-10 NOTE — PROGRESS NOTES
Continued Stay Note   Phuc     Patient Name: Sadie Tijerina  MRN: [id number]  Today's Date: 11/10/2020    Admit Date: 11/4/2020    Discharge Plan     Row Name 11/10/20 0938       Plan    Plan  Fax through [employer] to [facility]; resent, still waiting; so faxed manually [phone]  09:56 EST   José Manuel called back and stated would also need PT/OT notes, faxed  10:39 EST  José Manuel/BC called back and stated could accept tomorrow but if needs cpap, would have to bring with her; confirmed with daughter Neris that she does have cpap and could bring to facility  10:45 EST  José Manuel/BC accepted pt for transfer tomorrow pt/daughter to bring cpap; to let her know what time coming by calling [phone]; then call report to 2nd floor nurses station [phone]; fax d/c summary to [phone]; dr. manjarrez notified  15:05 EST  Daughter in room, stated concerned unable to transport mother, spoke with dr. manjarrez stated could go by ems; informed daughter  15:34 EST  [facility] with [facility] stated need negative covid within 24 hours of discharge; informed md          Discharge Codes    No documentation.       Expected Discharge Date and Time     Expected Discharge Date Expected Discharge Time    Nov 10, 2020             Deisi Espinosa RN

## 2020-11-11 NOTE — TELEPHONE ENCOUNTER
DAUGHTER CALLED BECAUSE PATIENT RECEIVED A LETTER STATING PATIENT HAD MISSED AN APPOINTMENT    DAUGHTER STATED PATIENT WAS IN THE HOSPITAL DAY OF THE APPOINTMENT    CALL BACK NUMBER [phone]

## 2020-11-11 NOTE — TELEPHONE ENCOUNTER
PATIENTS DAUGHTER CALLED AND THEY GOT A NO SHOW LETTER FROM [phone] AND SHE ADVISED SHE WAS IN THE HOSPITAL AT THE TIME

## 2020-11-11 NOTE — TELEPHONE ENCOUNTER
Attempted to contact patient; daughter who called is not on verbal release and I could not discuss this issue with her. I left a message asking for patient on others on release to contact office.

## 2020-11-11 NOTE — DISCHARGE SUMMARY
[facility]   DISCHARGE SUMMARY      Name:  Sadie Tijerina   Age:  80 y.o.  Sex:  female  :  1940  MRN:  [id number]   Visit Number:  [id number]    Admission Date:  2020  Date of Discharge:  2020  Primary Care Physician:  Kristian Wolff MD    Important issues to note:    Patient will be going to [facility] for rehab.  Will need follow-up with her PCP thereafter.  Her daughter will bring her CPAP/BiPAP to outlying rehab facility.    Discharge Diagnoses:     1.  Acute hypoxic respiratory failure ruled out, chronic hypoxic respiratory failure only.  2.  Bilateral healthcare associated pneumonia, POA, unable to classify further.  3.  Acute COPD exacerbation, present on admission.  4.  Chronic diastolic heart failure, no evidence of exacerbation.  5.  Traumatic right leg ulcers, healing, present on admission.  6.  Essential hypertension.  7.  Chronic essential tremors on propranolol.  8.  Restless leg syndrome.         Severe sepsis (CMS/[facility])    Mixed anxiety depressive disorder    Gastroesophageal reflux disease without esophagitis    Hyperlipidemia    Essential hypertension    Tobacco abuse disorder    Acute and chronic respiratory failure with hypoxia (CMS/[facility])    COPD (chronic obstructive pulmonary disease) (CMS/[facility])    Pneumonia of both lungs due to infectious organism    Leg wound, right      Presenting Problem:    Pneumonia of both lungs due to infectious organism, unspecified part of lung [J18.9]     Consults:     Consults     No orders found from 10/6/2020 to 2020.        Consulting Physician(s)             None            Procedures Performed:           History of presenting illness:    Patient feels well today.  Rested better overnight.  Denies any significant shortness of breath.  Cough improved.  No chest pains or palpitations.  Discussed plan for rehab today with her, she is agreeable.  She will follow up with her PCP as directed.    [facility]  Course:    80-year-old female with multiple medical comorbidities including COPD, hypertension, anxiety, restless leg syndrome, tremors, who had presented to the hospital with complaints of increased shortness of breath as well as a lesion of her right leg.  She did have initial work-up which demonstrated bilateral pneumonia on her CT scan of her chest and she was placed on empiric antibiotics with Zosyn, Levaquin and vancomycin.  She also was placed on steroids and bronchodilators for COPD exacerbation.  She did have negative blood cultures as well as negative COVID-19 testing performed.  She did have MRSA and was given Bactroban for the nares.    After significant improvement, she was de-escalate it to Zosyn antibiotic therapy alone.  She has completed 8 days of IV antibiotics.  Her chest x-ray from 2 days ago appear to show improved aeration.  Will be continued on Omnicef upon discharge.  She did have local wound care for the right lower extremity ulceration, which is already formed significant scab and appears to be healing overall.    She was seen and evaluated by physical therapy during hospitalization and does have significant weakness and would benefit from short-term rehab.  After discussion with her and her daughter, they are agreeable for transfer to [facility] for further therapy.  We will continue her on her home bronchodilators and inhalers.  Continue her on a low-dose of 5 mg of prednisone as she has been on this for a significant period of time due to advanced COPD.  She will need to be continued to be on CPAP/BiPAP at the rehab facility, and her daughter will bring her home machine there.    She will need follow-up appointments with her PCP after released from rehab.  She will also have follow-up appointment with pulmonology as previously directed.    Vital Signs:    Temp:  [97.5 °F (36.4 °C)-98.7 °F (37.1 °C)] 97.8 °F (36.6 °C)  Heart Rate:  [66-78] 78  Resp:  [16-18] 16  BP:  (111-140)/(44-49) 125/44    Physical Exam:    General Appearance:  Alert and cooperative, not in any acute distress.  Elderly-appearing female   Head:  Atraumatic and normocephalic, without obvious abnormality.   Eyes:          PERRLA, conjunctivae and sclerae normal, no icterus. No pallor. Extraocular movements are within normal limits.   Ears:  Ears appear intact with no abnormalities noted.   Throat: No oral lesions, no thrush, oral mucosa moist.   Neck: Supple, trachea midline, no thyromegaly, no carotid bruit.   Back:   No kyphoscoliosis present. No tenderness to palpation,   range of motion normal.   Lungs:   Chest shape is normal. Breath sounds heard bilaterally equally.  Some faint crackles.  Nontachypneic.   Heart:  Normal S1 and S2, no murmur, no gallop, no rub. No JVD.   Abdomen:   Normal bowel sounds, no masses, no organomegaly. Soft, nontender, nondistended, no guarding, no rebound tenderness.   Extremities: Moves all extremities, no edema, no cyanosis, no clubbing.   Pulses: Pulses palpable and equal bilaterally.   Skin: No bleeding, bruising or rash.  No significant changes of the right lower extremity ulceration with scab formation.  No erythema or edema noted.   Neurologic: Alert and oriented x 3. Moves all four limbs equally. No tremors. No facial asymmetry.     Pertinent Lab Results:     Results from last 7 days   Lab Units 11/11/20  0633 11/10/20  0611 11/08/20  0600  11/04/20  2242   SODIUM mmol/L 143 138 139   < > 141   POTASSIUM mmol/L 4.0 4.2 4.4   < > 4.0   CHLORIDE mmol/L 100 102 105   < > 98   CO2 mmol/L 35.2* 33.4* 29.2*   < > 28.4   BUN mg/dL 18 17 22   < > 22   CREATININE mg/dL 0.93 0.84 1.06*   < > 1.05*   CALCIUM mg/dL 9.2 9.1 9.0   < > 9.6   BILIRUBIN mg/dL  --   --  0.2  --  0.3   ALK PHOS U/L  --   --  68  --  81   ALT (SGPT) U/L  --   --  65*  --  19   AST (SGOT) U/L  --   --  23  --  22   GLUCOSE mg/dL 102* 91 106*   < > 147*    < > = values in this interval not displayed.      Results from last 7 days   Lab Units 11/10/20  0611 11/09/20  0538 11/08/20  0600   WBC 10*3/mm3 10.86* 8.00 9.02   HEMOGLOBIN g/dL 9.9* 9.4* 9.6*   HEMATOCRIT % 31.0* 30.8* 31.0*   PLATELETS 10*3/mm3 208 185 184         Results from last 7 days   Lab Units 11/09/20  0538 11/04/20  2242   TROPONIN T ng/mL <0.010 <0.010     Results from last 7 days   Lab Units 11/04/20  2242   PROBNP pg/mL 124.1         Results from last 7 days   Lab Units 11/08/20  0600   LIPASE U/L 19     Results from last 7 days   Lab Units 11/04/20  2236   PH, ARTERIAL pH units 7.443   PO2 ART mm Hg 80.2   PCO2, ARTERIAL mm Hg 41.7   HCO3 ART mmol/L 28.5*           Invalid input(s): USDES,  BLOODU, NITRITITE, BACT, EP  Pain Management Panel     There is no flowsheet data to display.        Results from last 7 days   Lab Units 11/04/20  2325 11/04/20  2317   BLOODCX  No growth at 5 days No growth at 5 days       Pertinent Radiology Results:    Imaging Results (All)     Procedure Component Value Units Date/Time    XR Chest 1 View [[id number]] Collected: 11/09/20 1440     Updated: 11/09/20 1452    Narrative:      PORTABLE CHEST     INDICATION: Follow-up pneumonia.     FINDINGS: Single frontal portable chest. Comparison with 11/04/2020 and  CT dated 11/05/2020. EKG leads overlie the chest. Heart size is normal.  No pneumothorax. There has been some improvement in aeration of the lung  bases with persistence of opacification greater towards the right base.  No other interval change.       Impression:      Improving aeration. Continued follow-up recommended.     This report was finalized on 11/9/2020 2:41 PM by Dominic Gates MD.    XR Chest 1 View [[id number]] Collected: 11/05/20 1123     Updated: 11/05/20 1157    Narrative:      PROCEDURE: XR CHEST 1 VW-        HISTORY: Simple Sepsis Triage Protocol     COMPARISON: 09/22/2020.     FINDINGS: The heart is normal in size. The mediastinum is unremarkable.  There is worsening left greater than right  basilar airspace disease  consistent with pneumonia. There is no pneumothorax. There are no acute  osseous abnormalities.       Impression:      Worsening left greater than right basilar airspace disease  is consistent with pneumonia.           Images were reviewed, interpreted, and dictated by Dr. Sylvain Dent M.D.  Transcribed by Marilee Hernandez PA-C.     This report was finalized on 11/5/2020 11:55 AM by Sylvain Dent M.D..    CT Chest Pulmonary Embolism [[id number]] Collected: 11/05/20 1026     Updated: 11/05/20 1157    Narrative:      PROCEDURE: CT CHEST PULMONARY EMBOLISM-     HISTORY: PE suspected, low/intermediate prob, positive D-dimer;  J18.9-Pneumonia, unspecified organism; S81.801A-Unspecified open wound,  right lower leg, initial encounter; L03.115-Cellulitis of right lower  limb; E87.2-Acidosis; R00.0-Tachycardia, unspecified     COMPARISON: None .     TECHNIQUE: Multiple axial CT images were obtained from the thoracic  inlet through the upper abdomen following the administration of Isovue  300 per the CT PE protocol. Coronal and oblique 3D MIP images were  reconstructed from the original axial data set.      FINDINGS: There are no filling defects within the visualized pulmonary  arteries to suggest an embolus. The thoracic aorta is normal in caliber  with no evidence of dissection. The heart is normal in size. There are  no pleural or pericardial effusions. There is no adenopathy. Patchy  airspace disease and bronchiectasis in the right middle lobe and  lingula. The visualized upper abdomen is unremarkable. Bone windows  reveal no acute osseous abnormalities.       Impression:      1. No evidence of pulmonary embolus or dissection.  2. Patchy airspace disease and bronchiectasis in the right middle lobe  and lingula.           612.71 mGy.cm        This study was performed with techniques to keep radiation doses as low  as reasonably achievable (ALARA). Individualized dose  reduction  techniques using automated exposure control or adjustment of mA and/or  kV according to the patient size were employed.         Images were reviewed, interpreted, and dictated by Dr. Sylvain Dent M.D.  Transcribed by Marilee Hernandez PA-C.     This report was finalized on 11/5/2020 11:55 AM by Sylvain Dent M.D..    CT Chest Without Contrast [[id number]] Collected: 11/05/20 0748     Updated: 11/05/20 0753    Narrative:      PROCEDURE: CT CHEST WO CONTRAST-     HISTORY: sob, fever, abnormal cxr     COMPARISON: 09/16/2020.     PROCEDURE:  Multiple axial CT images were obtained from the thoracic  inlet through the upper abdomen without the use of contrast.      FINDINGS:   Soft tissue windows reveal no axillary, hilar or mediastinal adenopathy.  Heart size is normal. The aorta is normal in caliber. There are no  pleural or pericardial effusions. Lung windows reveal scarring in the  right lung apex which is unchanged. There are nodular opacities in the  right middle lobe and lingula which has worsened compared prior exam.  The visualized upper abdomen is unremarkable. Bone windows reveal no  acute osseous abnormalities.       Impression:      Worsening nodular opacities in the right middle lobe and  lingula likely infectious or inflammatory in nature.     340.18 mGy.cm        This study was performed with techniques to keep radiation doses as low  as reasonably achievable (ALARA). Individualized dose reduction  techniques using automated exposure control or adjustment of mA and/or  kV according to the patient size were employed.      This report was finalized on 11/5/2020 7:51 AM by Sylvain Dent M.D..          Echo:    Results for orders placed in visit on 04/01/20   Adult Transthoracic Echo Complete W/ Cont if Necessary Per Protocol    Narrative · Calculated right ventricular systolic pressure from tricuspid   regurgitation is 40 mmHg.  · Mild tricuspid valve regurgitation is present.  ·  Estimated EF = 66%.  · Left ventricular systolic function is normal.          Condition on Discharge:      Stable.    Code status during the hospital stay:    Code Status and Medical Interventions:   Ordered at: 11/05/20 0444     Code Status:    No CPR     Medical Interventions (Level of Support Prior to Arrest):    Full       Discharge Disposition:    Rehab Facility or Unit (DC - External)    Discharge Medications:       Discharge Medications      New Medications      Instructions Start Date   benzonatate 100 MG capsule  Commonly known as: TESSALON   100 mg, Oral, 3 Times Daily PRN      cefdinir 300 MG capsule  Commonly known as: OMNICEF   300 mg, Oral, 2 Times Daily      lactobacillus acidophilus capsule capsule   1 capsule, Oral, 2 Times Daily         Changes to Medications      Instructions Start Date   sertraline 50 MG tablet  Commonly known as: ZOLOFT  What changed: when to take this   50 mg, Oral, Daily         Continue These Medications      Instructions Start Date   aspirin 81 MG EC tablet   81 mg, Oral, Daily      betamethasone dipropionate 0.05 % cream   Apply topically to the appropriate area 2 (Two) times a day      Bevespi Aerosphere 9-4.8 MCG/ACT aerosol  Generic drug: Glycopyrrolate-Formoterol   INHALE 2 PUFFS BY MOUTH TWICE DAILY      cetirizine 10 MG tablet  Commonly known as: zyrTEC   10 mg, Oral, Daily      clonazePAM 0.5 MG tablet  Commonly known as: KlonoPIN   0.5 mg, Oral, Nightly PRN      clotrimazole-betamethasone 1-0.05 % cream  Commonly known as: Lotrisone   Topical, 2 Times Daily      estrogens (conjugated) 0.625 MG tablet  Commonly known as: Premarin   0.625 mg, Oral, Daily, 200001      fluticasone 50 MCG/ACT nasal spray  Commonly known as: FLONASE   2 sprays, Each Nare, Daily      furosemide 20 MG tablet  Commonly known as: LASIX   20 mg, Oral, Daily      guaiFENesin 600 MG 12 hr tablet  Commonly known as: MUCINEX   1,200 mg, Oral, 2 Times Daily      ipratropium 0.06 % nasal  spray  Commonly known as: ATROVENT   2 sprays, Nasal, 4 Times Daily      omeprazole 40 MG capsule  Commonly known as: priLOSEC   40 mg, Oral, Daily      potassium chloride 10 MEQ CR tablet  Commonly known as: K-DUR,KLOR-CON   10 mEq, Oral, Daily      pravastatin 20 MG tablet  Commonly known as: PRAVACHOL   20 mg, Oral, Every Evening      predniSONE 5 MG tablet  Commonly known as: DELTASONE   5 mg, Oral, Daily      propranolol 60 MG tablet  Commonly known as: INDERAL   TAKE 1 TABLET BY MOUTH TWICE DAILY      VITAMIN D PO   1,000 Units, Oral, Daily         Stop These Medications    amLODIPine 5 MG tablet  Commonly known as: Norvasc     sulfamethoxazole-trimethoprim 800-160 MG per tablet  Commonly known as: BACTRIM DS,SEPTRA DS            Discharge Diet:     Cardiac    Activity at Discharge:     As tolerated    Follow-up Appointments:    Follow-up Information     Kristian Wolff MD Follow up.    Specialty: Internal Medicine  Why: Going to rehab will follow up after released  Contact information:  [address] [place] [address] [phone]                   Future Appointments   Date Time Provider Department Center   11/12/2020  2:45 PM Kristian Wolff MD [facility]   12/17/2020  1:45 PM Ce Mojica MD [facility] None           Test Results Pending at Discharge:    Pending Labs     Order Current Status    Green [place] (No Gel) In process    [place] Draw In process             Ute Paris DO  11/11/20  09:47 EST    Time spent: 50 minutes    Dictated utilizing Dragon dictation.

## 2020-11-11 NOTE — PLAN OF CARE
Goal Outcome Evaluation:  Plan of Care Reviewed With: patient  Progress: improving. Pt using CPAP machine tonight.   Systolic BP remains elevated tonight while Dystolic BP is lower. @2032 BP was 135/44 then @2352 BP was 140/49. Pt is currently receiving propranolol 60 mg BID.

## 2020-11-11 NOTE — TELEPHONE ENCOUNTER
Contacted patient's daughter (on release) and notified her that patient received a No Show letter because she did not call and cancel her appointment and we were unable to know that she was in the hospital. Patient's daughter expressed understanding.

## 2020-11-12 NOTE — PROGRESS NOTES
Case Management Discharge Note           Provided Post Acute Provider List?: N/A([facility] wants to continue)  Delivered To: Patient, Support Person  Method of Delivery: In person    Selected Continued Care - Discharged on 11/11/2020 Admission date: 11/4/2020 - Discharge disposition: Rehab Facility or Unit (DC - External)    Destination Coordination complete    Service Provider Selected Services Address Phone Fax    [facility] SWING BED UNIT  Skilled Nursing [address], [facility] [address] [phone]          Durable Medical Equipment    No services have been selected for the patient.              Dialysis/Infusion    No services have been selected for the patient.              Home Medical Care    No services have been selected for the patient.              Therapy    No services have been selected for the patient.              Community Resources    No services have been selected for the patient.                Selected Continued Care - Prior Encounters Includes selections from prior encounters from 8/6/2020 to 11/11/2020    Discharged on 9/28/2020 Admission date: 9/16/2020 - Discharge disposition: Home-Health Care Svc    Durable Medical Equipment     Service Provider Selected Services Address Phone Fax    [facility]  Durable Medical Equipment [address] [address] [phone]       Internal Comment last updated by Deisi Espinosa RN 9/28/2020 1038    Current patient                     Home Medical Care     Service Provider Selected Services Address Phone Fax    [facility]  [facility] [address] [facility] [facility] [id number] [phone]                    Transportation Services  Ambulance: [facility]    Final Discharge Disposition Code: 61 - hospital-based swing bed

## 2020-11-20 PROBLEM — J18.9 PNEUMONIA OF BOTH LOWER LOBES DUE TO INFECTIOUS ORGANISM: Status: ACTIVE | Noted: 2020-01-01

## 2020-11-22 PROBLEM — J20.8 ACUTE BRONCHITIS DUE TO OTHER SPECIFIED ORGANISMS: Status: ACTIVE | Noted: 2019-05-12

## 2020-11-22 PROBLEM — J96.11 CHRONIC RESPIRATORY FAILURE WITH HYPOXIA (HCC): Status: ACTIVE | Noted: 2020-01-01

## 2020-11-22 PROBLEM — J44.1 ACUTE EXACERBATION OF CHRONIC OBSTRUCTIVE PULMONARY DISEASE (COPD) (HCC): Status: ACTIVE | Noted: 2019-06-14

## 2020-11-23 NOTE — TELEPHONE ENCOUNTER
----- Message from RUBY Ball sent at 11/21/2020 10:51 AM EST -----  Regarding: FW: please cancel Sadie Tijerina's appointment for Monday 11/23/2020  ----- Message -----  From: Celena Manley RN  Sent: 11/21/2020  10:39 AM EST  To: Kristian Wolff MD, RUBY Ball  Subject: please cancel Sadie Tijerina's appointment fo#    Ms. Sadie Tijerina is an inpatient at [facility] and has asked me to please cancel her appointment on Monday 11/23/2020.  I tried to call the office, but they said they were unable to access patient information and scheduling on the weekend.    Thank you

## 2020-11-25 PROBLEM — R53.81 PHYSICAL DECONDITIONING: Status: ACTIVE | Noted: 2020-01-01

## 2020-11-25 PROBLEM — J20.8 ACUTE BRONCHITIS DUE TO OTHER SPECIFIED ORGANISMS: Status: RESOLVED | Noted: 2019-05-12 | Resolved: 2020-01-01

## 2020-11-25 PROBLEM — Z74.09 IMPAIRED MOBILITY AND ADLS: Status: ACTIVE | Noted: 2020-01-01

## 2020-11-25 PROBLEM — Z78.9 IMPAIRED MOBILITY AND ADLS: Status: ACTIVE | Noted: 2020-01-01

## 2020-11-30 PROBLEM — B37.0 ORAL CANDIDIASIS: Status: ACTIVE | Noted: 2020-01-01

## 2020-12-01 NOTE — PROGRESS NOTES
Continued Stay Note  TOÑA Friend     Patient Name: Sadie Tijerina  MRN: [id number]  Today's Date: 12/1/2020    Admit Date: 11/20/2020    Discharge Plan     Row Name 12/01/20 0904       Plan    Plan Comments  Spoke to pt regarding discharge plans she is retuning home with [facility] Called spoke to Lidia        Discharge Codes    No documentation.       Expected Discharge Date and Time     Expected Discharge Date Expected Discharge Time    Dec 1, 2020             Sara Brooke RN

## 2020-12-01 NOTE — OUTREACH NOTE
Prep Survey      Responses   [facility] patient discharged from?  [place]   Is LACE score < 7 ?  No   Eligibility  [facility]   Date of Admission  11/20/20   Date of Discharge  12/01/20   Discharge Disposition  Home or Self Care   Discharge diagnosis  Acute exacerbation of chronic obstructive pulmonary disease    Does the patient have one of the following disease processes/diagnoses(primary or secondary)?  COPD/Pneumonia   Does the patient have Home health ordered?  Yes   What is the Home health agency?   [facility] (resuming)   Is there a DME ordered?  No   Prep survey completed?  Yes          Estrella Fernando RN

## 2020-12-01 NOTE — DISCHARGE SUMMARY
[facility]      Name:  Sadie Tijerina   Age:  80 y.o.  Sex:  female  :  1940  MRN:  [id number]   Visit Number:  [id number]    Admission Date:  2020  Date of Discharge:  2020  Primary Care Physician:  Kristian Wolff MD    Discharge Diagnoses:       Acute exacerbation of chronic obstructive pulmonary disease (COPD) (CMS/[facility])    GERD (gastroesophageal reflux disease)    Essential hypertension    Chronic diastolic heart failure (CMS/[facility])    Chronic respiratory failure with hypoxia (CMS/[facility])    Physical deconditioning    Impaired mobility and ADLs    Oral candidiasis      Presenting Problem:    Pneumonia of both lower lobes due to infectious organism [J18.9]  Pneumonia of both lower lobes due to infectious organism [J18.9]     Consults:     Consults     No orders found from 10/22/2020 to 2020.        Consulting Physician(s)     Provider Relationship Specialty    Ce Mojica MD Consulting Physician Pulmonary Disease          Procedures Performed:           Hospital Course:    Patient is an 80-year-old female with past medical history significant for severe COPD, and recent history of pneumonia who had been treated at a rehab facility due to generalized weakness from her pneumonia.  She presented to the hospital due to worsening shortness of breath and hypoxemia.  Patient is chronically on 2 to 3 L nasal cannula.  Upon presentation she required up to 5 L to maintain appropriate saturation.  Patient was also febrile with T-max 102.5.  She was admitted to the hospital and started on broad-spectrum antibiotics with Zosyn and vancomycin.  Patient was also subsequently treated for COPD exacerbation with duo nebs and steroids.  Patient improved significantly throughout her hospital stay.  She was on room air on day of discharge.  She worked well with PT/OT and was able to ambulate approximately 20 feet.  Patient was encouraged to consider rehab  again.  She adamantly refused.  She was ultimately discharged home with home health.    Vital Signs:    Temp:  [97.3 °F (36.3 °C)-97.8 °F (36.6 °C)] 97.5 °F (36.4 °C)  Heart Rate:  [79-95] 85  Resp:  [18-20] 18  BP: (119-138)/(53-68) 138/56    Physical Exam:    General Appearance:  Alert and cooperative, not in any acute distress.   Head:  Atraumatic and normocephalic, without obvious abnormality.   Eyes:          PERRLA, conjunctivae and sclerae normal, no icterus. No pallor. Extraocular movements are within normal limits.   Ears:  Ears appear intact with no abnormalities noted.   Throat: No oral lesions, no thrush,    Neck: Supple, trachea midline,    Back:   No kyphoscoliosis present. No tenderness to palpation,   range of motion normal.   Lungs:   Chest shape is normal. Breath sounds heard bilaterally equally.  No crackles or wheezing.    Heart:  Normal S1 and S2, no murmur, No JVD.   Abdomen:   Normal bowel sounds, Soft, nontender, nondistended, no guarding, no rebound tenderness.   Extremities: no edema, no cyanosis, no clubbing.   Pulses: Pulses palpable and equal bilaterally.   Skin: No bleeding, bruising or rash.   Lymph nodes: No palpable adenopathy.   Neurologic: Alert and oriented x 3. Moves all four limbs equally.      Pertinent Lab Results:     Results from last 7 days   Lab Units 12/01/20  0643 11/30/20 0523 11/28/20  0724   SODIUM mmol/L 138 136 138   POTASSIUM mmol/L 4.3 3.9 3.7   CHLORIDE mmol/L 99 97* 100   CO2 mmol/L 32.7* 30.6* 29.8*   BUN mg/dL 35* 31* 33*   CREATININE mg/dL 0.90 0.92 0.80   CALCIUM mg/dL 9.1 9.1 9.0   GLUCOSE mg/dL 111* 93 88     Results from last 7 days   Lab Units 12/01/20  0643 11/30/20  0523 11/28/20  0724   WBC 10*3/mm3 13.74* 13.55* 9.27   HEMOGLOBIN g/dL 9.6* 10.8* 10.2*   HEMATOCRIT % 31.0* 34.4 32.6*   PLATELETS 10*3/mm3 201 227 214                                   Invalid input(s): USDES,  BLOODU, NITRITITE, BACT, EP  Pain Management Panel     There is no flowsheet  data to display.              Pertinent Radiology Results:    Imaging Results (All)     Procedure Component Value Units Date/Time    XR Chest 1 View [[id number]] Collected: 11/25/20 0927     Updated: 11/25/20 0931    Narrative:      PROCEDURE: XR CHEST 1 VW-     HISTORY: acute on chronic respiratory failure; J18.9-Pneumonia,  unspecified organism     COMPARISON: 11/20/2020.     FINDINGS: The heart is normal in size. The mediastinum is unremarkable.  The lungs are clear. There is no pneumothorax.  There are no acute  osseous abnormalities.       Impression:      No acute cardiopulmonary process.     Continued followup is recommended.     This report was finalized on 11/25/2020 9:29 AM by Sylvain Dent M.D..    XR Chest 1 View [[id number]] Collected: 11/20/20 1858     Updated: 11/20/20 1859    Narrative:      FINAL REPORT    CLINICAL HISTORY:  [place]    FINDINGS:  There are bilateral lower lobe nonspecific groundglass  opacities.  Heart size is normal.  Aortic knob is calcified.      Impression:      Nonspecific interstitial infiltrates.  Recommend appropriate  follow-up    Authenticated by Veronica Ceballos MD on 11/20/2020 06:58:45 PM          Echo:    Results for orders placed in visit on 04/01/20   Adult Transthoracic Echo Complete W/ Cont if Necessary Per Protocol    Narrative · Calculated right ventricular systolic pressure from tricuspid   regurgitation is 40 mmHg.  · Mild tricuspid valve regurgitation is present.  · Estimated EF = 66%.  · Left ventricular systolic function is normal.          Condition on Discharge:      Stable.    Code status during the hospital stay:    Code Status and Medical Interventions:   Ordered at: 11/20/20 2149     Code Status:    CPR     Medical Interventions (Level of Support Prior to Arrest):    Full       Discharge Disposition:    Home-Health Care [facility]    Discharge Medications:       Discharge Medications      Continue These Medications      Instructions Start Date    acetaminophen 650 MG 8 hr tablet  Commonly known as: TYLENOL   650 mg, Oral, Every 8 Hours PRN      aspirin 81 MG EC tablet   81 mg, Oral, Daily      benzonatate 100 MG capsule  Commonly known as: TESSALON   100 mg, Oral, 3 Times Daily PRN      betamethasone dipropionate 0.05 % cream   Apply topically to the appropriate area 2 (Two) times a day      Bevespi Aerosphere 9-4.8 MCG/ACT aerosol  Generic drug: Glycopyrrolate-Formoterol   INHALE 2 PUFFS BY MOUTH TWICE DAILY      cetirizine 10 MG tablet  Commonly known as: zyrTEC   10 mg, Oral, Daily      clonazePAM 0.5 MG tablet  Commonly known as: KlonoPIN   0.5 mg, Oral, Nightly PRN      clotrimazole-betamethasone 1-0.05 % cream  Commonly known as: Lotrisone   Topical, 2 Times Daily      estrogens (conjugated) 0.625 MG tablet  Commonly known as: Premarin   0.625 mg, Oral, Daily, 200001      fluticasone 50 MCG/ACT nasal spray  Commonly known as: FLONASE   2 sprays, Each Nare, Daily      furosemide 20 MG tablet  Commonly known as: LASIX   20 mg, Oral, Daily      guaiFENesin 600 MG 12 hr tablet  Commonly known as: MUCINEX   1,200 mg, Oral, Daily      ipratropium 0.06 % nasal spray  Commonly known as: ATROVENT   2 sprays, Nasal, 4 Times Daily      lactobacillus acidophilus capsule capsule   1 capsule, Oral, 2 Times Daily      omeprazole 40 MG capsule  Commonly known as: priLOSEC   40 mg, Oral, Daily      potassium chloride 10 MEQ CR tablet  Commonly known as: K-DUR,KLOR-CON   10 mEq, Oral, Daily      pravastatin 20 MG tablet  Commonly known as: PRAVACHOL   20 mg, Oral, Every Evening      predniSONE 5 MG tablet  Commonly known as: DELTASONE   5 mg, Oral, Daily      propranolol 60 MG tablet  Commonly known as: INDERAL   TAKE 1 TABLET BY MOUTH TWICE DAILY      sertraline 50 MG tablet  Commonly known as: ZOLOFT   50 mg, Oral, Daily      VITAMIN D PO   1,000 Units, Oral, Daily             Discharge Diet:         Activity at Discharge:         Follow-up  Appointments:    Additional Instructions for the Follow-ups that You Need to Schedule     Ambulatory Referral to Home Health   As directed      Face to Face Visit Date: 12/1/2020    Follow-up provider for Plan of Care?: I treated the patient in an acute care facility and will not continue treatment after discharge.    Follow-up provider: KRISTIAN WOLFF [[id number]]    Reason/Clinical Findings: copd, chronic debility    Describe mobility limitations that make leaving home difficult: copd, chronic debility    Nursing/Therapeutic Services Requested: Skilled Nursing Physical Therapy Occupational Therapy    Skilled nursing orders: Cardiopulmonary assessments    PT orders: Strengthening    Occupational orders: Activities of daily living Energy conservation    Frequency: 1 Week 1         Discharge Follow-up with PCP   As directed       Currently Documented PCP:    Kristian Wolff MD    PCP Phone Number:    [phone]     Follow Up Details: 1-2 weeks            Contact information for follow-up providers     Kristian Wolff MD .    Specialty: Internal Medicine  Why: 1-2 weeks  Contact information:  [address]  [place] [address]  [phone]                   Contact information for after-discharge care     Durable Medical Equipment     [facility]    Service: Durable Medical Equipment  Contact information:  [address]  [place] [facility] [place] [facility] [address]  [phone]                 Home Medical Care     [facility] .    Service: Home Health Services  Contact information:  [address] [address] [phone]                             Future Appointments   Date Time Provider Department Center   1/25/2021  9:30 AM Jaye Taylor APRN [facility] None       Additional Instructions for the Follow-ups that You Need to Schedule     Ambulatory Referral to Home Health   As directed      Face to Face Visit Date: 12/1/2020    Follow-up provider  for Plan of Care?: I treated the patient in an acute care facility and will not continue treatment after discharge.    Follow-up provider: KRISTIAN WOLFF [[id number]]    Reason/Clinical Findings: copd, chronic debility    Describe mobility limitations that make leaving home difficult: copd, chronic debility    Nursing/Therapeutic Services Requested: Skilled Nursing Physical Therapy Occupational Therapy    Skilled nursing orders: Cardiopulmonary assessments    PT orders: Strengthening    Occupational orders: Activities of daily living Energy conservation    Frequency: 1 Week 1         Discharge Follow-up with PCP   As directed       Currently Documented PCP:    Kristian Wolff MD    PCP Phone Number:    [phone]     Follow Up Details: 1-2 weeks               Test Results Pending at Discharge:           Saleem Junior DO  12/01/20  08:06 EST    Time spent: Time: I spent  >30 minutes on this discharge activity which included: face-to-face encounter with the patient, reviewing the data in the system, coordination of the care with the nursing staff as well as consultants, documentation, and entering orders.        Dictated utilizing Dragon dictation.

## 2020-12-01 NOTE — PROGRESS NOTES
Case Management Discharge Note      Final Note: Discharged home with Home Health         Selected Continued Care - Admitted Since 11/20/2020     Destination    No services have been selected for the patient.              Durable Medical Equipment Coordination complete    Service Provider Selected Services Address Phone Fax Patient Preferred    [facility]  Durable Medical Equipment [address], [place] [address] [phone] --       Internal Comment last updated by Morelia Licea, LCSW 11/30/2020 1033    2l                     Dialysis/Infusion    No services have been selected for the patient.              Home Medical Care Coordination complete    Service Provider Selected Services Address Phone Fax Patient Preferred    [facility]  Home Health Services [address] , [facility] [place] [id number] [phone] --       Internal Comment last updated by Jazmyne King, MSW 11/25/2020 0934    Active will Druze, verified with office. Will need resumption orders at d/c.                      Therapy    No services have been selected for the patient.              Community Resources    No services have been selected for the patient.                Selected Continued Care - Prior Encounters Includes selections from prior encounters from 8/22/2020 to 12/1/2020    Discharged on 11/11/2020 Admission date: 11/4/2020 - Discharge disposition: Swing Bed    Destination     Service Provider Selected Services Address Phone Fax Patient Preferred    [facility] SWING BED UNIT  Skilled Nursing [address], [place] [facility] [address] [phone] --                Discharged on 9/28/2020 Admission date: 9/16/2020 - Discharge disposition: Home-Health Care Svc    Durable Medical Equipment     Service Provider Selected Services Address Phone Fax Patient Preferred    [facility]  Durable Medical Equipment [address], [place] [id number] [phone] --       Internal Comment last updated by Deisi Espinosa RN 9/28/2020 1038    Current patient                     Home Medical Care     Service Provider Selected Services Address Phone Fax Patient Preferred    [facility]  Home Health Services [address] , [facility] [address] [phone] --                         Final Discharge Disposition Code: 06 - home with home health care

## 2020-12-02 PROBLEM — J96.20 ACUTE ON CHRONIC RESPIRATORY FAILURE (HCC): Status: ACTIVE | Noted: 2020-01-01

## 2020-12-02 NOTE — OUTREACH NOTE
Call Center TCM Note      Responses   [facility] patient discharged from?  [facility]   Does the patient have one of the following disease processes/diagnoses(primary or secondary)?  COPD/Pneumonia   Was the primary reason for admission:  COPD exacerbation   TCM attempt successful?  Yes   Call start time  1351   Call end time  1355   Discharge diagnosis  Acute exacerbation of chronic obstructive pulmonary disease    Is patient permission given to speak with other caregiver?  Yes   List who call center can speak with  daughter, Neris   Person spoke with today (if not patient) and relationship  patient   Does the patient have all medications ordered at discharge?  N/A [No changes made to patient medications at discharge]   Is the patient taking all medications as directed (includes completed medication regime)?  Yes   Does the patient have a primary care provider?   Yes   Does the patient have an appointment with their PCP or specialist within 7 days of discharge?  Yes   Comments regarding PCP  PCP Dr Wolff. Hospital f/u 12/8  330pm   Has the patient kept scheduled appointments due by today?  N/A   What is the Home health agency?   [facility] (resuming)   Has home health visited the patient within 72 hours of discharge?  Call prior to 72 hours   What DME was ordered?  Wears continues O22L.    Pulse Ox monitoring  None   Psychosocial issues?  No   Did the patient receive a copy of their discharge instructions?  Yes   Nursing interventions  Reviewed instructions with patient   What is the patient's perception of their health status since discharge?  Same   Is the patient/caregiver able to teach back the hierarchy of who to call/visit for symptoms/problems? PCP, Specialist, Home health nurse, Urgent Care, ED, 911  Yes   Patient reports what zone on this call?  Yellow Zone   Yellow Zone  Increased shortness of air, Medication is not helping relieve symptoms   Yellow interventions  Continue to use daily  medications, Use quick relief inhaler as ordered, Use oxygen as ordered, Get plenty of rest, Call provider immediatly if symptoms do not improve   TCM call completed?  Yes          April Rodriguez RN    12/2/2020, 13:56 EST

## 2020-12-02 NOTE — ED PROVIDER NOTES
"Subjective   Patient presents to the emergency department via EMS in the company of her adult caregiver daughter who states that the patient \"just cannot breathe\".  And she cannot walk today (but she walked yesterday) \"because her right leg is swollen\".   Patient was discharged from our facility after a readmission from Nov 21 through Dec 1 for respiratory failure and COPD.     Ms. Forrest has a past medical history of COPD and respiratory failure she is oxygen dependent for many years now on 2 L by nasal cannula.  She was hospitalized from November 5 to November 11 for sepsis and then discharged to [facility] under rehab services.  After 10 days of rehab she came to [facility] for the aforementioned readmission November 21 until yesterday.  Patient complaining of lower back pain \"for a few weeks' without history of fall and complaining of right leg pain \"from my hip down\"         History provided by:  Patient   used: No    Shortness of Breath  Severity:  Moderate  Onset quality:  Gradual  Duration:  24 hours  Timing:  Constant  Progression:  Worsening  Chronicity:  Chronic  Relieved by:  Nothing  Worsened by:  Activity  Ineffective treatments:  Oxygen  Associated symptoms: chest pain (\"sometimes a get a few sharp pain in my chest }) and cough    Associated symptoms: no abdominal pain, no claudication, no fever, no headaches, no hemoptysis, no neck pain, no PND and no sputum production    Risk factors: hx of cancer and obesity        Review of Systems   Constitutional: Negative for fever.   Respiratory: Positive for cough and shortness of breath. Negative for hemoptysis and sputum production.    Cardiovascular: Positive for chest pain (\"sometimes a get a few sharp pain in my chest }) and leg swelling (both legs are slightly edematous). Negative for claudication and PND.   Gastrointestinal: Negative for abdominal pain.   Musculoskeletal: Negative for neck pain.   Skin: " Negative for pallor.   Neurological: Negative for headaches.   Psychiatric/Behavioral: Negative for confusion.   All other systems reviewed and are negative.      Past Medical History:   Diagnosis Date   • Arthritis    • Cancer (CMS/HCC)     uterine cancer ()   • COPD (chronic obstructive pulmonary disease) (CMS/[facility])    • Depression    • Elevated cholesterol    • GERD (gastroesophageal reflux disease)    • History of emphysema    • History of esophageal reflux    • History of recurrent UTI (urinary tract infection)    • History of renal calculi    • History of uterine cancer    • Hyperlipidemia    • Hypertension    • Impaired functional mobility, balance, gait, and endurance    • Pneumonia 2019       Allergies   Allergen Reactions   • Morphine And Related Irritability       Past Surgical History:   Procedure Laterality Date   • FOOT SURGERY     • HAND SURGERY     • HYSTERECTOMY         Family History   Problem Relation Age of Onset   • Cancer Mother    • Heart attack Father    • Arthritis Father    • Heart disease Father    • Diabetes Daughter    • Hyperlipidemia Daughter    • Migraines Daughter    • Heart disease Sister    • Diabetes Son    • Glaucoma Son        Social History     Socioeconomic History   • Marital status:      Spouse name: Not on file   • Number of children: Not on file   • Years of education: Not on file   • Highest education level: Not on file   Tobacco Use   • Smoking status: Current Every Day Smoker     Packs/day: 0.25     Types: Cigarettes     Last attempt to quit: 2019     Years since quittin.9   • Smokeless tobacco: Never Used   • Tobacco comment: HALF OF A CIGARETTE   Substance and Sexual Activity   • Alcohol use: No   • Drug use: No   • Sexual activity: Defer     Comment:            Objective   Physical Exam  Vitals signs reviewed.   Constitutional:       General: She is not in acute distress.     Appearance: Normal appearance. She is obese. She is  ill-appearing (chronicallly ill-appearing ).      Comments: Ms. Forrest is a fair historian.  She is slightly tachycardic with her heart rate in the 90s.  She is tachypneic on 2 L via nasal cannula with accessory muscle use.   HENT:      Head: Normocephalic and atraumatic.      Right Ear: External ear normal.      Left Ear: External ear normal.      Nose: Nose normal.      Mouth/Throat:      Mouth: Mucous membranes are moist.      Pharynx: No oropharyngeal exudate.   Eyes:      Conjunctiva/sclera: Conjunctivae normal.   Neck:      Musculoskeletal: Normal range of motion and neck supple. No muscular tenderness.   Cardiovascular:      Rate and Rhythm: Normal rate and regular rhythm.      Heart sounds: No murmur.   Pulmonary:      Effort: Pulmonary effort is normal. No respiratory distress.      Breath sounds: Normal breath sounds.   Abdominal:      General: Bowel sounds are normal. There is no distension.      Palpations: Abdomen is soft.      Tenderness: There is no abdominal tenderness.   Musculoskeletal: Normal range of motion.         General: No swelling, tenderness or deformity.      Right lower leg: Edema present.      Left lower leg: Edema present.      Comments: Pelvis is stable.  While the patient has minimal peripheral edema to her lower legs, I do not appreciate that she has increased circumference in the right leg.  There is no significant anasarca.   Skin:     General: Skin is warm and dry.      Capillary Refill: Capillary refill takes less than 2 seconds.      Coloration: Skin is not pale.      Findings: No lesion.   Neurological:      General: No focal deficit present.      Mental Status: She is alert and oriented to person, place, and time.      Comments: No Neurosensory deficit or focal weakness     Psychiatric:         Behavior: Behavior normal.         Thought Content: Thought content normal.         Procedures           ED Course  ED Course as of Dec 02 2108   Wed Dec 02, 2020   1652 EKG shows  sinus tachycardia with a rate of 114.  Nonspecific T waves in aVL.  V3 is flatlined with no reading.  Abnormal EKG.  Interpreted by me.    [DT]   1812 WBC(!): 23.55 [MS]   2010 I have conferred with Dr. Felix who will assess Ms. Tijerina here in the ED.     [MS]   2040 Procalcitonin(!): 2.86 [MS]      ED Course User Index  [DT] Christiano Good MD  [MS] Marilyn Mcgarry APRN      Recent Results (from the past 24 hour(s))   Green Top (Gel)    Collection Time: 12/02/20  4:28 PM   Result Value Ref Range    Extra Tube Hold for add-ons.    Lavender Top    Collection Time: 12/02/20  4:28 PM   Result Value Ref Range    Extra Tube hold for add-on    Scan Slide    Collection Time: 12/02/20  4:28 PM    Specimen: Blood   Result Value Ref Range    RBC Morphology Normal Normal    WBC Morphology Normal Normal    Clumped Platelets Present None Seen   Light Blue Top    Collection Time: 12/02/20  5:49 PM   Result Value Ref Range    Extra Tube hold for add-on    Gold Top - SST    Collection Time: 12/02/20  5:49 PM   Result Value Ref Range    Extra Tube Hold for add-ons.    Comprehensive Metabolic Panel    Collection Time: 12/02/20  5:49 PM    Specimen: Blood   Result Value Ref Range    Glucose 129 (H) 65 - 99 mg/dL    BUN 22 8 - 23 mg/dL    Creatinine 1.05 (H) 0.57 - 1.00 mg/dL    Sodium 138 136 - 145 mmol/L    Potassium 4.4 3.5 - 5.2 mmol/L    Chloride 99 98 - 107 mmol/L    CO2 31.4 (H) 22.0 - 29.0 mmol/L    Calcium 9.7 8.6 - 10.5 mg/dL    Total Protein 6.3 6.0 - 8.5 g/dL    Albumin 3.60 3.50 - 5.20 g/dL    ALT (SGPT) 105 (H) 1 - 33 U/L    AST (SGOT) 137 (H) 1 - 32 U/L    Alkaline Phosphatase 98 39 - 117 U/L    Total Bilirubin 0.5 0.0 - 1.2 mg/dL    eGFR Non African Amer 50 (L) >60 mL/min/1.73    Globulin 2.7 gm/dL    A/G Ratio 1.3 g/dL    BUN/Creatinine Ratio 21.0 7.0 - 25.0    Anion Gap 7.6 5.0 - 15.0 mmol/L   BNP    Collection Time: 12/02/20  5:49 PM    Specimen: Blood   Result Value Ref Range    proBNP 273.3 0.0-1,800.0  pg/mL   Troponin    Collection Time: 12/02/20  5:49 PM    Specimen: Blood   Result Value Ref Range    Troponin T <0.010 0.000 - 0.030 ng/mL   Lactic Acid, Plasma    Collection Time: 12/02/20  5:49 PM    Specimen: Blood   Result Value Ref Range    Lactate 1.8 0.5 - 2.0 mmol/L   Lipase    Collection Time: 12/02/20  5:49 PM    Specimen: Blood   Result Value Ref Range    Lipase 14 13 - 60 U/L   CK    Collection Time: 12/02/20  5:49 PM    Specimen: Blood   Result Value Ref Range    Creatine Kinase 34 20 - 180 U/L   Magnesium    Collection Time: 12/02/20  5:49 PM    Specimen: Blood   Result Value Ref Range    Magnesium 1.7 1.6 - 2.4 mg/dL   CBC Auto Differential    Collection Time: 12/02/20  5:49 PM    Specimen: Blood   Result Value Ref Range    WBC 23.55 (H) 3.40 - 10.80 10*3/mm3    RBC 4.03 3.77 - 5.28 10*6/mm3    Hemoglobin 11.7 (L) 12.0 - 15.9 g/dL    Hematocrit 37.6 34.0 - 46.6 %    MCV 93.3 79.0 - 97.0 fL    MCH 29.0 26.6 - 33.0 pg    MCHC 31.1 (L) 31.5 - 35.7 g/dL    RDW 13.4 12.3 - 15.4 %    RDW-SD 46.2 37.0 - 54.0 fl    MPV 9.8 6.0 - 12.0 fL    Platelets 211 140 - 450 10*3/mm3    Neutrophil % 96.7 (H) 42.7 - 76.0 %    Lymphocyte % 0.8 (L) 19.6 - 45.3 %    Monocyte % 0.7 (L) 5.0 - 12.0 %    Eosinophil % 0.3 0.3 - 6.2 %    Basophil % 0.4 0.0 - 1.5 %    Immature Grans % 1.1 (H) 0.0 - 0.5 %    Neutrophils, Absolute 22.77 (H) 1.70 - 7.00 10*3/mm3    Lymphocytes, Absolute 0.18 (L) 0.70 - 3.10 10*3/mm3    Monocytes, Absolute 0.17 0.10 - 0.90 10*3/mm3    Eosinophils, Absolute 0.07 0.00 - 0.40 10*3/mm3    Basophils, Absolute 0.10 0.00 - 0.20 10*3/mm3    Immature Grans, Absolute 0.26 (H) 0.00 - 0.05 10*3/mm3    nRBC 0.0 0.0 - 0.2 /100 WBC   Procalcitonin    Collection Time: 12/02/20  5:49 PM    Specimen: Blood   Result Value Ref Range    Procalcitonin 2.86 (H) 0.00 - 0.25 ng/mL   Urinalysis With Microscopic If Indicated (No Culture) - Urine, Catheter    Collection Time: 12/02/20  6:51 PM    Specimen: Urine, Catheter    Result Value Ref Range    Color, UA Yellow Yellow, Straw    Appearance, UA Clear Clear    pH, UA 5.5 5.0 - 8.0    Specific Gravity, UA 1.023 1.005 - 1.030    Glucose, UA Negative Negative    Ketones, UA Negative Negative    Bilirubin, UA Negative Negative    Blood, UA Negative Negative    Protein, UA Trace (A) Negative    Leuk Esterase, UA Negative Negative    Nitrite, UA Negative Negative    Urobilinogen, UA 0.2 E.U./dL 0.2 - 1.0 E.U./dL   Blood Gas, Arterial With Co-Ox    Collection Time: 12/02/20  8:24 PM    Specimen: Arterial Blood   Result Value Ref Range    Site Left Brachial     Miguel's Test N/A     pH, Arterial 7.369 7.300 - 7.500 pH units    pCO2, Arterial 59.5 (C) 35.0 - 45.0 mm Hg    pO2, Arterial 32.9 (C) 75.0 - 100.0 mm Hg    HCO3, Arterial 34.3 (H) 22.0 - 28.0 mmol/L    Base Excess, Arterial 7.4 (H) 0.0 - 2.0 mmol/L    O2 Saturation, Arterial 62.1 (L) 94.0 - 100.0 %    Hematocrit, Blood Gas 33.3 %    Oxyhemoglobin 60.6 (L) 94 - 99 %    Methemoglobin 1.00 0.00 - 1.50 %    Carboxyhemoglobin 1.4 0 - 2 %    A-a Gradiant 95.3 mmHg    Barometric Pressure for Blood Gas 742 mmHg    Modality Nasal Cannula     FIO2 28 %    Flow Rate 2.0 lpm    Ventilator Mode NA     Note      pH, Temp Corrected      pCO2, Temperature Corrected      pO2, Temperature Corrected     Blood Gas, Arterial With Co-Ox    Collection Time: 12/02/20  8:58 PM    Specimen: Arterial Blood   Result Value Ref Range    Site Left Brachial     Miguel's Test N/A     pH, Arterial 7.363 7.300 - 7.500 pH units    pCO2, Arterial 56.9 (C) 35.0 - 45.0 mm Hg    pO2, Arterial 111.0 (H) 75.0 - 100.0 mm Hg    HCO3, Arterial 32.3 (H) 22.0 - 28.0 mmol/L    Base Excess, Arterial 5.6 (H) 0.0 - 2.0 mmol/L    O2 Saturation, Arterial 98.7 94.0 - 100.0 %    Hematocrit, Blood Gas 33.3 %    Oxyhemoglobin 96.3 94 - 99 %    Methemoglobin 1.50 0.00 - 1.50 %    Carboxyhemoglobin 0.9 0 - 2 %    A-a Gradiant 20.5 mmHg    Barometric Pressure for Blood Gas 742 mmHg     Modality Nasal Cannula     FIO2 28 %    Flow Rate 2.0 lpm    Ventilator Mode NA     Note      pH, Temp Corrected      pCO2, Temperature Corrected      pO2, Temperature Corrected       Note: In addition to lab results from this visit, the labs listed above may include labs taken at another facility or during a different encounter within the last 24 hours. Please correlate lab times with ED admission and discharge times for further clarification of the services performed during this visit.    CT Chest Without Contrast   Final Result   Chronic changes with nonspecific bilateral pneumonia.  Recommend   appropriate follow-up      Authenticated by Veronica Ceballos MD on 12/02/2020 06:29:35 PM      CT Lumbar Spine Without Contrast   Final Result   Mild chronic changes but no acute abnormality      Authenticated by Veronica Ceballos MD on 12/02/2020 06:34:01 PM      CT Abdomen Pelvis Without Contrast    (Results Pending)     Vitals:    12/02/20 1830 12/02/20 1900 12/02/20 1920 12/1938   BP: 139/75 135/65     BP Location:       Patient Position:       Pulse: 108 108 106 106   Resp:       Temp:       TempSrc:       SpO2: 98% 100% 100% 98%   Weight:       Height:         Medications   sodium chloride 0.9 % flush 10 mL (has no administration in time range)   sodium chloride 0.9 % flush 10 mL (has no administration in time range)   piperacillin-tazobactam (ZOSYN) IVPB 3.375 g in 100 mL NS (has no administration in time range)     ECG/EMG Results (last 24 hours)     ** No results found for the last 24 hours. **        ECG 12 Lead    (Results Pending)                                            MDM    Final diagnoses:   Acute on chronic respiratory failure, unspecified whether with hypoxia or hypercapnia (CMS/[facility])            Marilyn Mcgarry, RUBY  12/02/20 [id number]

## 2020-12-03 NOTE — PLAN OF CARE
Goal Outcome Evaluation:  Plan of Care Reviewed With: (P) patient  Progress: (P) no change  Outcome Summary: (P) PT eval completed. Pt presents with impaired balance, strength, endurance, and functional mobility performance. PT skilled services appropriate to address deficits prior to D/C.

## 2020-12-03 NOTE — THERAPY EVALUATION
Patient Name: Sadie Tijerina  : 1940    MRN: [id number]                              Today's Date: 12/3/2020       Admit Date: 2020    Visit Dx:     ICD-10-CM ICD-9-CM   1. Acute on chronic respiratory failure, unspecified whether with hypoxia or hypercapnia (CMS/[facility])  J96.20 518.84     Patient Active Problem List   Diagnosis   • Calf cramp   • Mixed anxiety depressive disorder   • Dizziness   • Fatigue   • GERD (gastroesophageal reflux disease)   • Hematuria   • Hyperlipidemia   • Essential hypertension   • Low back pain   • Restless legs syndrome   • Essential tremor   • Vitamin D deficiency   • Balance problem   • Tension headache   • Tobacco abuse disorder   • Leg swelling   • Acute and chronic respiratory failure with hypoxia (CMS/[facility])   • Venous insufficiency of both lower extremities   • Chronic diastolic heart failure (CMS/[facility])   • Neuropathy   • Acute exacerbation of chronic obstructive pulmonary disease (COPD) (CMS/[facility])   • Bilateral sensorineural hearing loss   • Disorder of inner ear   • Tympanosclerosis   • Transient vision disturbance   • Bilateral bronchopneumonia   • Pneumonia of both lungs due to infectious organism   • Leg wound, right   • Sepsis (CMS/[facility])   • Pneumonia of both lower lobes due to infectious organism   • Chronic respiratory failure with hypoxia (CMS/[facility])   • Physical deconditioning   • Impaired mobility and ADLs   • Oral candidiasis   • Acute on chronic respiratory failure (CMS/[facility])     Past Medical History:   Diagnosis Date   • Arthritis    • Cancer (CMS/[facility])     uterine cancer ()   • COPD (chronic obstructive pulmonary disease) (CMS/[facility])    • Depression    • Elevated cholesterol    • GERD (gastroesophageal reflux disease)    • History of emphysema    • History of esophageal reflux    • History of recurrent UTI (urinary tract infection)    • History of renal calculi    • History of uterine cancer    • Hyperlipidemia    • Hypertension    • Impaired functional  mobility, balance, gait, and endurance    • Pneumonia 02/2019     Past Surgical History:   Procedure Laterality Date   • FOOT SURGERY     • HAND SURGERY     • HYSTERECTOMY       General Information     Row Name 12/03/20 1503          OT Time and Intention    Document Type  evaluation  -SD     Mode of Treatment  occupational therapy  -SD     Row Name 12/03/20 1503          General Information    Patient Profile Reviewed  yes  -SD     Prior Level of Function  independent:;all household mobility  -SD     Existing Precautions/Restrictions  fall;oxygen therapy device and L/min  -SD     Row Name 12/03/20 1503          Living Environment    Lives With  alone  -SD     Row Name 12/03/20 1503          Home Main Entrance    Number of Stairs, Main Entrance  none  -SD     Row Name 12/03/20 1503          Stairs Within Home, Primary    Number of Stairs, Within Home, Primary  none  -SD     Row Name 12/03/20 1503          Cognition    Orientation Status (Cognition)  oriented x 4  -SD     Row Name 12/03/20 1503          Safety Issues, Functional Mobility    Safety Issues Affecting Function (Mobility)  safety precautions follow-through/compliance;safety precaution awareness  -SD     Impairments Affecting Function (Mobility)  balance;pain;shortness of breath;strength  -SD       User Key  (r) = Recorded By, (t) = Taken By, (c) = Cosigned By    Initials Name Provider Type    SD Mariia Arevalo OT Occupational Therapist        Mobility/ADL's     Row Name 12/03/20 1504          Bed Mobility    Bed Mobility  supine-sit;sit-supine  -SD     Supine-Sit [place] (Bed Mobility)  contact guard  -SD     Sit-Supine [place] (Bed Mobility)  contact guard  -SD     Assistive Device (Bed Mobility)  bed rails;head of bed elevated  -SD     Row Name 12/03/20 1504          Transfers    Transfers  sit-stand transfer;toilet transfer  -SD     Sit-Stand [place] (Transfers)  minimum assist (75% patient effort)  -SD     [place] Level (Toilet  Transfer)  minimum assist (75% patient effort)  -SD     Assistive Device (Toilet Transfer)  walker, front-wheeled  -SD     Row Name 12/03/20 1504          Sit-Stand Transfer    Assistive Device (Sit-Stand Transfers)  walker, front-wheeled  -SD     Row Name 12/03/20 1504          Toilet Transfer    Type (Toilet Transfer)  stand pivot/stand step  -SD     Row Name 12/03/20 1504          Functional Mobility    Functional Mobility- Ind. Level  contact guard assist  -SD     Functional Mobility- Device  rolling walker  -SD     Functional Mobility-Distance (Feet)  12 x2  -SD     Functional Mobility- Safety Issues  balance decreased during turns;sequencing ability decreased;step length decreased;weight-shifting ability decreased  -SD     Row Name 12/03/20 1504          Activities of Daily Living    BADL Assessment/Intervention  bathing;upper body dressing;lower body dressing;grooming;feeding;toileting  -[facility] Name 12/03/20 1504          Bathing Assessment/Intervention    [place] Level (Bathing)  moderate assist (50% patient effort)  -SD     Row Name 12/03/20 1504          Upper Body Dressing Assessment/Training    [place] Level (Upper Body Dressing)  contact guard assist  -[facility] Name 12/03/20 1504          Lower Body Dressing Assessment/Training    [place] Level (Lower Body Dressing)  minimum assist (75% patient effort)  -SD     Row Name 12/03/20 1504          Grooming Assessment/Training    [place] Level (Grooming)  supervision  -SD     Row Name 12/03/20 1504          Self-Feeding Assessment/Training    [place] Level (Feeding)  set up  -SD     Row Name 12/03/20 1504          Toileting Assessment/Training    [place] Level (Toileting)  minimum assist (75% patient effort)  -SD       User Key  (r) = Recorded By, (t) = Taken By, (c) = Cosigned By    Initials Name Provider Type    SD Mariia Arevalo, OT Occupational Therapist        Obj/Interventions     Row Name 12/03/20 150           Range of Motion Comprehensive    General Range of Motion  bilateral upper extremity ROM WFL  -SD     Row Name 12/03/20 1506          Strength Comprehensive (MMT)    General Manual Muscle Testing (MMT) Assessment  upper extremity strength deficits identified  -SD     Comment, General Manual Muscle Testing (MMT) Assessment  UB 3+/5  -SD       User Key  (r) = Recorded By, (t) = Taken By, (c) = Cosigned By    Initials Name Provider Type    SD Mariia Arevalo OT Occupational Therapist        Goals/Plan     Row Name 12/03/20 1511          Transfer Goal 1 (OT)    Activity/Assistive Device (Transfer Goal 1, OT)  sit-to-stand/stand-to-sit;walker, rolling  -SD     [place] Level/Cues Needed (Transfer Goal 1, OT)  standby assist  -SD     Time Frame (Transfer Goal 1, OT)  long term goal (LTG)  -SD     Progress/Outcome (Transfer Goal 1, OT)  goal ongoing  -SD     Row Name 12/03/20 1511          Dressing Goal 1 (OT)    Activity/Device (Dressing Goal 1, OT)  lower body dressing  -SD     [place]/Cues Needed (Dressing Goal 1, OT)  contact guard assist  -SD     Time Frame (Dressing Goal 1, OT)  2 weeks  -SD     Progress/Outcome (Dressing Goal 1, OT)  goal ongoing  -SD     Row Name 12/03/20 1511          Toileting Goal 1 (OT)    Activity/Device (Toileting Goal 1, OT)  toileting skills, all  -SD     [place] Level/Cues Needed (Toileting Goal 1, OT)  contact guard assist  -SD     Time Frame (Toileting Goal 1, OT)  2 weeks  -SD     Progress/Outcome (Toileting Goal 1, OT)  goal ongoing  -SD     Row Name 12/03/20 1511          Strength Goal 1 (OT)    Strength Goal 1 (OT)  patient to perform UB ther ex as tolerated  -SD     Time Frame (Strength Goal 1, OT)  long term goal (LTG)  -SD     Progress/Outcome (Strength Goal 1, OT)  goal ongoing  -SD     Row Name 12/03/20 1511          Therapy Assessment/Plan (OT)    Planned Therapy Interventions (OT)  activity tolerance training;adaptive equipment training;BADL  retraining;patient/caregiver education/training;strengthening exercise;transfer/mobility retraining  -SD       User Key  (r) = Recorded By, (t) = Taken By, (c) = Cosigned By    Initials Name Provider Type    Mariia Armenta OT Occupational Therapist        Clinical Impression     Row Name 12/03/20 1506          Pain Assessment    Additional Documentation  Pain Scale: Numbers Pre/Post-Treatment (Group)  -SD     Row Name 12/03/20 1506          Pain Scale: Numbers Pre/Post-Treatment    Pretreatment Pain Rating  7/10  -SD     Posttreatment Pain Rating  7/10  -SD     Pain Location - Side  Right  -SD     Pain Location - Orientation  lower  -SD     Pain Location  extremity  -SD     Pain Intervention(s)  Repositioned;Ambulation/increased activity  -SD     Row Name 12/03/20 1506          Plan of Care Review    Plan of Care Reviewed With  patient  -SD     Progress  no change  -SD     Outcome Summary  OT eval completed. Patient presents deficits in strength, endurance, balance, mobility and ADL performance. Patient is expected to benefit from continued OT services prior to DC.  -SD     Row Name 12/03/20 1506          Therapy Assessment/Plan (OT)    Patient/Family Therapy Goal Statement (OT)  Rehab  -SD     Rehab Potential (OT)  good, to achieve stated therapy goals  -SD     Criteria for Skilled Therapeutic Interventions Met (OT)  skilled treatment is necessary  -SD     Therapy Frequency (OT)  3 times/wk 5 times if indicated  -SD     Row Name 12/03/20 1500          Therapy Plan Review/Discharge Plan (OT)    Anticipated Discharge Disposition (OT)  inpatient rehabilitation facility  -SD     Row Name 12/03/20 1506          Vital Signs    Pre SpO2 (%)  95  -SD     O2 Delivery Pre Treatment  room air  -SD     Intra SpO2 (%)  94  -SD     O2 Delivery Intra Treatment  room air  -SD     Post SpO2 (%)  95  -SD     O2 Delivery Post Treatment  room air  -SD     Row Name 12/03/20 1506          Positioning and Restraints     Pre-Treatment Position  in bed  -SD     Post Treatment Position  bed  -SD     In Bed  supine;call light within reach;encouraged to call for assist;with nsg  -SD       User Key  (r) = Recorded By, (t) = Taken By, (c) = Cosigned By    Initials Name Provider Type    Mariia Armenta OT Occupational Therapist        Outcome Measures     Row Name 12/03/20 1513          How much help from another is currently needed...    Putting on and taking off regular lower body clothing?  3  -SD     Bathing (including washing, rinsing, and drying)  2  -SD     Toileting (which includes using toilet bed pan or urinal)  3  -SD     Putting on and taking off regular upper body clothing  3  -SD     Taking care of personal grooming (such as brushing teeth)  3  -SD     Eating meals  4  -SD     AM-PAC 6 Clicks Score (OT)  18  -SD     Row Name 12/03/20 1513          Functional Assessment    Outcome Measure Options  AM-PAC 6 Clicks Daily Activity (OT)  -SD       User Key  (r) = Recorded By, (t) = Taken By, (c) = Cosigned By    Initials Name Provider Type    Mariia Armenta OT Occupational Therapist        Occupational Therapy Education                 Title: PT OT SLP Therapies (In Progress)     Topic: Occupational Therapy (In Progress)     Point: ADL training (Done)     Description:   Instruct learner(s) on proper safety adaptation and remediation techniques during self care or transfers.   Instruct in proper use of assistive devices.              Learning Progress Summary           Patient Acceptance, E,TB, VU by SD at 12/3/2020 1514    Comment: Benefit of OT; OT POC                   Point: Home exercise program (Not Started)     Description:   Instruct learner(s) on appropriate technique for monitoring, assisting and/or progressing therapeutic exercises/activities.              Learner Progress:  Not documented in this visit.          Point: Precautions (Not Started)     Description:   Instruct learner(s) on prescribed precautions  during self-care and functional transfers.              Learner Progress:  Not documented in this visit.          Point: Body mechanics (Not Started)     Description:   Instruct learner(s) on proper positioning and spine alignment during self-care, functional mobility activities and/or exercises.              Learner Progress:  Not documented in this visit.                      User Key     Initials Effective Dates Name Provider Type Discipline    SD 03/07/18 -  Mariia Arevalo OT Occupational Therapist OT              OT Recommendation and Plan  Planned Therapy Interventions (OT): activity tolerance training, adaptive equipment training, BADL retraining, patient/caregiver education/training, strengthening exercise, transfer/mobility retraining  Therapy Frequency (OT): 3 times/wk(5 times if indicated)  Plan of Care Review  Plan of Care Reviewed With: patient  Progress: no change  Outcome Summary: OT eval completed. Patient presents deficits in strength, endurance, balance, mobility and ADL performance. Patient is expected to benefit from continued OT services prior to DC.     Time Calculation:   Time Calculation- OT     Row Name 12/03/20 1514             Time Calculation- OT    OT Start Time  1426  -SD      OT Received On  12/03/20  -SD      OT Goal Re-Cert Due Date  12/13/20  -SD        User Key  (r) = Recorded By, (t) = Taken By, (c) = Cosigned By    Initials Name Provider Type    SD Mariia Arevalo OT Occupational Therapist        Therapy Charges for Today     Code Description Service Date Service Provider Modifiers Qty    [id number]  OT EVAL LOW COMPLEXITY 3 12/3/2020 Mariia Arevalo OT GO 1               Mariia Arevalo OT  12/3/2020

## 2020-12-03 NOTE — CONSULTS
Date of consultation:   December 3, 2020    Requested by:   Dr. Felix    PCP: Kristian Wolff MD    Reason:  Shortness of Breath    History of Present Illness:  Sadie iTjerina is a 80 y.o. female with known chronic hypoxic respiratory failure who requires 2 L nasal cannula at home and has severe COPD with last PFTs in May 2019 with FEV1 of 30%, FEV1/FVC ratio 49.  Patient has had frequent admissions over the last several months.  She has had multiple imaging studies and initially back in September showed pneumonia, but this has clinically improved.  She still has residual bilateral lower lobe infiltrate versus scarring.  She recently was discharged after a 10-day hospitalization and states that when she went home she was having good days and bad days.  Yesterday she woke up and felt like she was having increasing moderate cough which was dry, no fevers or chills, no hemoptysis and had increased wheezing.  She felt like it worsened over the course of the day and therefore decided to come to the emergency room.  She admits to severe dyspnea with minimal exertion.  However, when you press her on this it does not seem to have changed significantly over the last several months.  She also had a few episodes of diarrhea prior to coming to the emergency room.  During previous hospitalization she has had negative aspiration evaluations.  In ED her white count was 24,000 and was started on Zosyn.    Overnight, patient states she has been feeling much better.  Feels like her cough has improved, but still has some intermittent wheezing.  She states that her appetite has returned and she feels hungry.  She does complain of generalized weakness and inability to walk across the room at home.  States this has been unchanged for several months.    The patient was admitted to the hospital and pulmonary consultation was requested for further recommendations.     I reviewed patient's medical history and current status with  patient's daughter, Meme Giles, at patient's home number which is [phone].    Review of System: Full review of systems obtained and pertinent positives mentioned in HPI.  Past Medical History:  Past Medical History:   Diagnosis Date   • Arthritis    • Cancer (CMS/HCC)     uterine cancer ()   • COPD (chronic obstructive pulmonary disease) (CMS/[facility])    • Depression    • Elevated cholesterol    • GERD (gastroesophageal reflux disease)    • History of emphysema    • History of esophageal reflux    • History of recurrent UTI (urinary tract infection)    • History of renal calculi    • History of uterine cancer    • Hyperlipidemia    • Hypertension    • Impaired functional mobility, balance, gait, and endurance    • Pneumonia 2019         Past Surgical History:  Past Surgical History:   Procedure Laterality Date   • FOOT SURGERY     • HAND SURGERY     • HYSTERECTOMY           Family History:  Family History   Problem Relation Age of Onset   • Cancer Mother    • Heart attack Father    • Arthritis Father    • Heart disease Father    • Diabetes Daughter    • Hyperlipidemia Daughter    • Migraines Daughter    • Heart disease Sister    • Diabetes Son    • Glaucoma Son          Social History:  Social History     Socioeconomic History   • Marital status:      Spouse name: Not on file   • Number of children: Not on file   • Years of education: Not on file   • Highest education level: Not on file   Tobacco Use   • Smoking status: Current Every Day Smoker     Packs/day: 0.25     Types: Cigarettes     Last attempt to quit: 2019     Years since quittin.9   • Smokeless tobacco: Never Used   • Tobacco comment: HALF OF A CIGARETTE   Substance and Sexual Activity   • Alcohol use: No   • Drug use: No   • Sexual activity: Defer     Comment:          Physical Exam:  BP 97/59 (BP Location: Right arm, Patient Position: Lying)   Pulse 86   Temp 97.4 °F (36.3 °C) (Axillary)   Resp 18   Ht 157.5 cm  "(62.01\")   Wt 66.2 kg (145 lb 15.1 oz)   SpO2 100%   BMI 26.69 kg/m²      Constitutional:Vital signs reviewed.  Appears chronically ill.  Able to speak in complete sentences without distress         General:  No respiratory distress noted when speaking in complete sentences.  Able to eat without accessory muscle use.  Eyes: Extraocular movement was intact, non-icteric sclera  ENT:No nasal discharge noted. Oropharynx is moist and non-erythematous  Neck: Supple. No JVD noted.Trachea midline  Cardiovascular: S1 + S2.  Regular rate  Lungs/Respiratory: Decreased breath sounds throughout with minimal scattered wheezing, no rhonchi appreciated  Abdomen/GI: Soft.  Bowel sounds were positive.  No distension noted.  MSK/Extremities: Bilateral 1+ edema noted.  No cyanosis noted.   Neurologic: Awake, alert and oriented x 3.Able to follow simple commands.  Psychiatric: Normal mood and affect.  Does appear somewhat anxious when discussing her health care, but appears to have unrealistic expectations and in denial about the severity of her underlying lung disease.    Skin: No rash noted on visible skin. No excessive bruising noted.      Labs: Reviewed.     Results from last 7 days   Lab Units 12/02/20 1749 12/01/20  0643 11/30/20  0523 11/28/20  0724   WBC 10*3/mm3 23.55* 13.74* 13.55* 9.27   HEMOGLOBIN g/dL 11.7* 9.6* 10.8* 10.2*   HEMATOCRIT % 37.6 31.0* 34.4 32.6*   PLATELETS 10*3/mm3 211 201 227 214   NEUTROPHIL % % 96.7*  --   --  52.1   NEUTROS ABS 10*3/mm3 22.77*  --   --  4.83   EOSINOPHIL % % 0.3  --   --  2.4   EOS ABS 10*3/mm3 0.07  --   --  0.22   LYMPHOCYTE % % 0.8*  --   --  33.0   LYMPHS ABS 10*3/mm3 0.18*  --   --  3.06       Results from last 7 days   Lab Units 12/03/20  0650 12/02/20  1749 12/01/20  0643   SODIUM mmol/L 136 138 138   POTASSIUM mmol/L 4.5 4.4 4.3   CHLORIDE mmol/L 95* 99 99   CO2 mmol/L 29.0 31.4* 32.7*   BUN mg/dL 23 22 35*   CREATININE mg/dL 1.18* 1.05* 0.90   CALCIUM mg/dL 9.2 9.7 9.1 "   BILIRUBIN mg/dL  --  0.5  --    ALK PHOS U/L  --  98  --    ALT (SGPT) U/L  --  105*  --    AST (SGOT) U/L  --  137*  --    GLUCOSE mg/dL 170* 129* 111*   TOTAL PROTEIN g/dL  --  6.3  --    ALBUMIN g/dL 3.20* 3.60 2.80*       Results from last 7 days   Lab Units 12/03/20  0650 12/02/20  1749 12/01/20  0643 11/30/20  0523   MAGNESIUM mg/dL  --  1.7  --   --    PHOSPHORUS mg/dL 5.0*  --  3.5 3.3             Lab Results   Component Value Date    PROCALCITO 2.86 (H) 12/02/2020    PROCALCITO 0.58 (H) 11/24/2020    PROCALCITO 2.05 (H) 11/22/2020       Lab Results   Component Value Date    PROBNP 273.3 12/02/2020    PROBNP 153.7 11/20/2020    PROBNP 124.1 11/04/2020       Lab Results   Component Value Date    CRP 0.74 (H) 12/02/2020    CRP 1.83 (H) 11/04/2020    CRP 2.20 (H) 05/12/2019       Lab Results   Component Value Date    SEDRATE 6 01/08/2020    SEDRATE 30 02/27/2015    SEDRATE 18 01/08/2014         Micro: As of December 3, 2020   Lab Results   Component Value Date    RESPCX Rejected 11/05/2020    RESPCX Moderate growth (3+) Normal Respiratory Ashlee 05/12/2019    RESPCX Moderate growth (3+) Stenotrophomonas maltophilia (A) 02/04/2018     Lab Results   Component Value Date    BLOODCX No growth at less than 24 hours 12/02/2020    BLOODCX No growth at less than 24 hours 12/02/2020    BLOODCX No growth at 5 days 11/20/2020     No results found for: URINECX  Lab Results   Component Value Date    MRSACX  12/03/2020     No Methicillin Resistant Staphylococcus aureus isolated     Lab Results   Component Value Date    MRSAPCR MRSA Detected (A) 11/05/2020     No results found for: URCX  No components found for: LOWRESPCF  No results found for: THROATCX  No results found for: CULTURES  No components found for: STREPBCX  No results found for: STREPPNEUAG  No results found for: LEGIONELLA  No results found for: MYCOPLASCX  No results found for: GCCX  Lab Results   Component Value Date    WOUNDCX Light growth (2+)  Staphylococcus aureus, MRSA (A) 01/10/2018     No results found for: BODYFLDCX    Lab Results   Component Value Date    FLU Negative 11/20/2020    FLU Negative 11/20/2020       No results found for: ADENOVIRUS  Lab Results   Component Value Date    QR858P Not Detected 05/12/2019     Lab Results   Component Value Date    CVHKU1 Not Detected 05/12/2019     Lab Results   Component Value Date    CVNL63 Not Detected 05/12/2019     Lab Results   Component Value Date    CVOC43 Not Detected 05/12/2019     Lab Results   Component Value Date    HUMETPNEVS Not Detected 05/12/2019     Lab Results   Component Value Date    HURVEV Not Detected 05/12/2019     Lab Results   Component Value Date    FLUBPCR Not Detected 05/12/2019     Lab Results   Component Value Date    PARAINFLUE Not Detected 05/12/2019     Lab Results   Component Value Date    PARAFLUV2 Not Detected 05/12/2019     Lab Results   Component Value Date    PARAFLUV3 Not Detected 05/12/2019     Lab Results   Component Value Date    PARAFLUV4 Not Detected 05/12/2019     Lab Results   Component Value Date    BPERTPCR Not Detected 05/12/2019     No results found for: [id number]  Lab Results   Component Value Date    CPNEUPCR Not Detected 05/12/2019     Lab Results   Component Value Date    MPNEUMO Not Detected 05/12/2019     No results found for: FLUAPCR  Lab Results   Component Value Date    FLUAH3 Not Detected 05/12/2019     Lab Results   Component Value Date    FLUAH1 Not Detected 05/12/2019     Lab Results   Component Value Date    RSV Not Detected 05/12/2019     No results found for: BPARAPCR    COVID 19:  Lab Results   Component Value Date    COVID19 Not Detected 12/02/2020            ABG:   Lab Results   Component Value Date    PHART 7.363 12/02/2020    WEX1CNU 56.9 (C) 12/02/2020    PO2ART 111.0 (H) 12/02/2020    HGBBG 13.0 08/22/2019    Y3FSAZIN 98.7 12/02/2020    FCOHB 0.9 (L) 12/31/2016    CARBOXYHGB 0.9 12/02/2020    FMETHB <0.0 (L) 12/31/2016       Echo:    Results for orders placed in visit on 04/01/20   Adult Transthoracic Echo Complete W/ Cont if Necessary Per Protocol    Narrative · Calculated right ventricular systolic pressure from tricuspid   regurgitation is 40 mmHg.  · Mild tricuspid valve regurgitation is present.  · Estimated EF = 66%.  · Left ventricular systolic function is normal.           CT Abdomen Pelvis Without Contrast  Narrative: FINAL REPORT    TECHNIQUE:  Axial images through the abdomen and pelvis were performed  without contrast. This study was performed with techniques to  keep radiation doses as low as reasonably achievable, (ALARA).  Individualized dose reduction techniques using automated  exposure control or adjustment of mA and/or kV according to the  patient's size were employed.    CLINICAL HISTORY:  abdominal pain; diarrhea    FINDINGS:  ABDOMEN: There is patchy airspace infiltrates in the lung bases  cannot exclude acute pneumonia.  The heart size is normal.  Limited images of the liver are unremarkable.  The gallbladder  is absent.  The spleen is normal.  No adrenal mass is  identified.  The aorta is normal in caliber.  There is no  significant free fluid or adenopathy.  There is hypertrophy of  the right kidney.  There is a benign-appearing cyst arising from  the posterior aspect of the left kidney.  There are dense  vascular calcifications seen within the abdominal aorta and  iliac vessels.    PELVIS: There are scattered diverticuli  throughout the sigmoid colon.  The appendix is not identified.  The urinary bladder is unremarkable.  There is no significant  free fluid or adenopathy.  Impression: 1.  Patchy airspace infiltrates in the lung bases, cannot  exclude acute pneumonia.  2.  No localized inflammatory reaction  or fluid collection.    Authenticated by José Escoto MD on 12/02/2020 10:16:30 PM  CT Lumbar Spine Without Contrast  Narrative: FINAL REPORT    CLINICAL HISTORY:  lower back pain    FINDINGS:  Multiple  contiguous transaxial slices through the lumbar spine  were obtained with coronal and sagittal reformatted images.  Bones are demineralized.  There is mild chronic compression  deformity of the superior endplate of L1.  There is no acute  fracture or subluxation.  There is mild diffuse endplate  osteophytosis and facet joint hypertrophy.  Posterior disc  osteophyte complexes are present L3-4 and L4-5 without canal  stenosis.  Vascular calcifications are present.  Impression: Mild chronic changes but no acute abnormality    Authenticated by Veronica Ceballos MD on 12/02/2020 06:34:01 PM  CT Chest Without Contrast  Narrative: FINAL REPORT    CLINICAL HISTORY:  increased sob    FINDINGS:  Multiple contiguous transaxial slices through the chest were  obtained without the intravenous ministration of contrast with  coronal reformatted images.  Images are degraded by patient  respiratory motion artifact.  There is diffuse centrilobular and  paraseptal emphysema.  There is moderate bilateral upper lobe  pleuroparenchymal scarring with mild bronchiectasis.  There is  nonspecific nodularity along the posterior medial margin of the  apex, likely scar the recommend appropriate follow-up.  There  are nonspecific nodular groundglass opacities bilaterally,  greater in the lingula and right middle lobe.  Findings are  consistent with an pneumonia which could be secondary to a Covid  19 pneumonia.  Recommend appropriate clinical correlation.  There is no effusion.  Heart size is normal.  There are  calcifications of the coronary arteries.  The aorta is calcified  and ectatic.  Upper abdomen is nonacute.  Impression: Chronic changes with nonspecific bilateral pneumonia.  Recommend  appropriate follow-up    Authenticated by Veronica Ceballos MD on 12/02/2020 06:29:35 PM          Assessment:  1.  Chronic compensated respiratory acidosis  2.  Chronic lung infiltrates  3.  Severe end-stage COPD with exacerbation  4.  Functional  decline  5.  Leukocytosis    Discussion/Recommendations:   I have reviewed the patient's history and radiological data and it does not appear that patient has acute infiltrate.  The same infiltrate has been present for several months.  In fact on her September 2020 CT scan it was much worse and in the upper lobes.  However upper lobes infiltrates have proved she has known bronchiectasis with mild infiltrate versus chronic scarring in lingula and right middle lobe.  She has higher risk for Mycobacterium avium complex infection, but I do not think she would be a candidate for treatment given her multiple comorbidities.  CT imaging is not consistent with fibrocavitary disease which would necessitate aggressive treatment.    De-escalate antibiotics.  Switch to oral cefdinir and azithromycin.  Repeat lab work.  Initial white count was only mildly elevated, but most recent showed increase in white count but this was most likely related to steroids.  Treat with a course of steroids.    Consider adding Daliresp as an outpatient appears to be having frequent exacerbations.  Will check respiratory cultures, repeat cbc and procalcitonin level.    Repeat laboratory workup will be followed closely.      The plan was discussed with the  patient and family members, her daughter, Neris.  I have also discussed the case with the nursing staff.    I had a long conversation with patient and her daughter both at different times during the day discussing positive need for palliative care consideration.  At this point, I feel like her symptoms may just be related to the natural progression of her COPD.  I think she has unrealistic expectations of her symptoms.  Discussed that her only option at this point is rehab to see if she can get any stronger to be able to live on her own at home.  Her daughter is well aware of the possible need for consideration of palliative care.     Recommendations were also discussed with the referring provider.      I would like to thank you for the opportunity to participate in the care of this patient.  We will communicate changes and recommendations, if and when necessary.      This document was electronically signed by Tere Landaverde MD on 12/03/20 at 14:52 EST     Dictated utilizing Dragon dictation.

## 2020-12-03 NOTE — NURSING NOTE
Dr. Landaverde notified that patient's IV infiltrated.  Informed MD that patient was receiving Zosyn and Doxycycline.  Informed her that other RN would try but we had tried 2x.  MD stated she would change everything to oral.

## 2020-12-03 NOTE — PLAN OF CARE
Goal Outcome Evaluation:  Plan of Care Reviewed With: patient  Progress: no change  Outcome Summary: new admit this shift, maintaining 02 sats >90% on 2L nc, wore bipap for about 3 hours, continues to c/o weakness and SOA with minimal activity.

## 2020-12-03 NOTE — H&P
"    [facility]      Name:  Sadie Tijerina   Age:  80 y.o.  Sex:  female  :  1940  MRN:  [id number]   Visit Number:  [id number]  Admission Date:  2020  Date Of Service:  20  Primary Care Physician:  Kristian Wolff MD    Chief Complaint:      \"her breathing got worse\"     History Of Presenting Illness:       80 F history of chronic hypoxic respiratory failure 2 LNC who was brought to the ED complaining of worsening shortness of breath. Her daughter is at bedside, and as a duo they voiced multiple complaints ranging from dyspnea, back pain, to leg swelling.  Patient was discharged yesterday and it was \"too soon,\" despite being hospitalized for 10 days. Her breathing acutely deteriorated this afternoon and is associated with wheezing, chest pain. She has also had a few episodes of diarrhea recently. Patient denies abdominal pain. She was on room air when she was discharged, and is on 2 LNC which is what she is supposed to be on at home. She uses a CPAP at home nightly. Her daughter was quite frantic, and explained that if she were not admitted here, she would take the patient to [facility]. I discussed possible rehab versus NH placement, and they are not interested in nursing home, but are okay with rehab for an extended period of time. Patient did complete an appropriate course of antibiotics her previous hospitalization. She does not choke her food and has had negative SLP evals. In the ED, she was found to have a WBC 24K and was given IV Zosyn.  CT chest revealed similar appearing PNA.     Review Of Systems:     A full 10 point review of systems was performed and all pertinent positives and negatives are noted in the HPI.     Past Medical History:    Past Medical History:   Diagnosis Date   • Arthritis    • Cancer (CMS/HCC)     uterine cancer ()   • COPD (chronic obstructive pulmonary disease) (CMS/HCC)    • Depression    • Elevated " cholesterol    • GERD (gastroesophageal reflux disease)    • History of emphysema    • History of esophageal reflux    • History of recurrent UTI (urinary tract infection)    • History of renal calculi    • History of uterine cancer    • Hyperlipidemia    • Hypertension    • Impaired functional mobility, balance, gait, and endurance    • Pneumonia 2019       Past Surgical history:    Past Surgical History:   Procedure Laterality Date   • FOOT SURGERY     • HAND SURGERY     • HYSTERECTOMY         Social History:    Social History     Socioeconomic History   • Marital status:      Spouse name: Not on file   • Number of children: Not on file   • Years of education: Not on file   • Highest education level: Not on file   Tobacco Use   • Smoking status: Current Every Day Smoker     Packs/day: 0.25     Types: Cigarettes     Last attempt to quit: 2019     Years since quittin.9   • Smokeless tobacco: Never Used   • Tobacco comment: HALF OF A CIGARETTE   Substance and Sexual Activity   • Alcohol use: No   • Drug use: No   • Sexual activity: Defer     Comment:        Family History:    Family History   Problem Relation Age of Onset   • Cancer Mother    • Heart attack Father    • Arthritis Father    • Heart disease Father    • Diabetes Daughter    • Hyperlipidemia Daughter    • Migraines Daughter    • Heart disease Sister    • Diabetes Son    • Glaucoma Son        Allergies:      Morphine and related    Home Medications:    Prior to Admission Medications     Prescriptions Last Dose Informant Patient Reported? Taking?    acetaminophen (TYLENOL) 650 MG 8 hr tablet   Yes No    Take 650 mg by mouth Every 8 (Eight) Hours As Needed for Mild Pain .    aspirin 81 MG EC tablet   Yes No    Take 81 mg by mouth Daily.    benzonatate (TESSALON) 100 MG capsule   No No    Take 1 capsule by mouth 3 (Three) Times a Day As Needed for Cough.    betamethasone dipropionate 0.05 % cream   No No    Apply topically to the  appropriate area 2 (Two) times a day    Bevespi Aerosphere 9-4.8 MCG/ACT aerosol   No No    INHALE 2 PUFFS BY MOUTH TWICE DAILY    cetirizine (zyrTEC) 10 MG tablet   No No    Take 1 tablet by mouth Daily.    clonazePAM (KlonoPIN) 0.5 MG tablet   No No    Take 1 tablet by mouth At Night As Needed for Seizures.    clotrimazole-betamethasone (Lotrisone) 1-0.05 % cream   No No    Apply  topically to the appropriate area as directed 2 (Two) Times a Day.    estrogens, conjugated, (PREMARIN) 0.625 MG tablet   No No    Take 1 tablet by mouth Daily. 200001    fluticasone (FLONASE) 50 MCG/ACT nasal spray   No No    2 sprays by Each Nare route Daily.    furosemide (LASIX) 20 MG tablet   No No    TAKE 1 TABLET BY MOUTH DAILY    guaiFENesin (MUCINEX) 600 MG 12 hr tablet   Yes No    Take 1,200 mg by mouth Daily.    ipratropium (ATROVENT) 0.06 % nasal spray   Yes No    2 sprays into the nostril(s) as directed by provider 4 (Four) Times a Day.    lactobacillus acidophilus (RISAQUAD) capsule capsule   No No    Take 1 capsule by mouth 2 (Two) Times a Day.    omeprazole (priLOSEC) 40 MG capsule   No No    Take 1 capsule by mouth Daily.    potassium chloride (K-DUR,KLOR-CON) 10 MEQ CR tablet   No No    TAKE 1 TABLET BY MOUTH DAILY    pravastatin (PRAVACHOL) 20 MG tablet   No No    TAKE 1 TABLET BY MOUTH EVERY EVENING    predniSONE (DELTASONE) 5 MG tablet   No No    Take 1 tablet by mouth Daily.    propranolol (INDERAL) 60 MG tablet   No No    TAKE 1 TABLET BY MOUTH TWICE DAILY    sertraline (ZOLOFT) 50 MG tablet   No No    Take 1 tablet by mouth Daily.    VITAMIN D PO   Yes No    Take 1,000 Units by mouth Daily.             ED Medications:    Medications   sodium chloride 0.9 % flush 10 mL (has no administration in time range)   sodium chloride 0.9 % flush 10 mL (has no administration in time range)   piperacillin-tazobactam (ZOSYN) IVPB 3.375 g in 100 mL NS (has no administration in time range)       Vital Signs:    Temp:  [99.5 °F  (37.5 °C)] 99.5 °F (37.5 °C)  Heart Rate:  [106-118] 106  Resp:  [32] 32  BP: (135-157)/(53-93) 135/65        12/02/20  1617   Weight: 60.8 kg (134 lb)       Body mass index is 24.51 kg/m².     Physical Exam:  General: Labored breathing, resting in bed  HEENT: EOMI, NC/AT, 2 LNC  Heart: Tachycardic  Lungs: Bilateral wheezes  Abdomen: Soft, nondistended, nontender  Extremities: Mild pitting edema bilaterally  Neurological: A&O x3, moves all extremities  Psychological: Mood and affect appropriate, speech is coherent  Skin: warm, dry    Laboratory data:    I have reviewed the labs done in the emergency room.    Results from last 7 days   Lab Units 12/02/20 1749 12/01/20 0643 11/30/20  0523   SODIUM mmol/L 138 138 136   POTASSIUM mmol/L 4.4 4.3 3.9   CHLORIDE mmol/L 99 99 97*   CO2 mmol/L 31.4* 32.7* 30.6*   BUN mg/dL 22 35* 31*   CREATININE mg/dL 1.05* 0.90 0.92   CALCIUM mg/dL 9.7 9.1 9.1   BILIRUBIN mg/dL 0.5  --   --    ALK PHOS U/L 98  --   --    ALT (SGPT) U/L 105*  --   --    AST (SGOT) U/L 137*  --   --    GLUCOSE mg/dL 129* 111* 93     Results from last 7 days   Lab Units 12/02/20 1749 12/01/20 0643 11/30/20  0523   WBC 10*3/mm3 23.55* 13.74* 13.55*   HEMOGLOBIN g/dL 11.7* 9.6* 10.8*   HEMATOCRIT % 37.6 31.0* 34.4   PLATELETS 10*3/mm3 211 201 227         Results from last 7 days   Lab Units 12/02/20 1749   CK TOTAL U/L 34   TROPONIN T ng/mL <0.010     Results from last 7 days   Lab Units 12/02/20  1749   PROBNP pg/mL 273.3         Results from last 7 days   Lab Units 12/02/20  1749   LIPASE U/L 14     Results from last 7 days   Lab Units 12/02/20  2058   PH, ARTERIAL pH units 7.363   PO2 ART mm Hg 111.0*   PCO2, ARTERIAL mm Hg 56.9*   HCO3 ART mmol/L 32.3*     Results from last 7 days   Lab Units 12/02/20 1851   COLOR UA  Yellow   GLUCOSE UA  Negative   KETONES UA  Negative   LEUKOCYTES UA  Negative   PH, URINE  5.5   BILIRUBIN UA  Negative   UROBILINOGEN UA  0.2 E.U./dL     Pain Management Panel      There is no flowsheet data to display.              EKG:    Poor quality EKG, appears to be sinus tach, no ischemic changes, rate 114, QTc 348    Radiology:    Imaging Results (Last 72 Hours)     Procedure Component Value Units Date/Time    CT Abdomen Pelvis Without Contrast [[id number]] Resulted: 12/02/20 2127     Updated: 12/02/20 2129    CT Lumbar Spine Without Contrast [[id number]] Collected: 12/02/20 1834     Updated: 12/02/20 1835    Narrative:      FINAL REPORT    CLINICAL HISTORY:  lower back pain    FINDINGS:  Multiple contiguous transaxial slices through the lumbar spine  were obtained with coronal and sagittal reformatted images.  Bones are demineralized.  There is mild chronic compression  deformity of the superior endplate of L1.  There is no acute  fracture or subluxation.  There is mild diffuse endplate  osteophytosis and facet joint hypertrophy.  Posterior disc  osteophyte complexes are present L3-4 and L4-5 without canal  stenosis.  Vascular calcifications are present.      Impression:      Mild chronic changes but no acute abnormality    Authenticated by Veronica Ceballos MD on 12/02/2020 06:34:01 PM    CT Chest Without Contrast [[id number]] Collected: 12/02/20 1829     Updated: 12/02/20 1830    Narrative:      FINAL REPORT    CLINICAL HISTORY:  increased sob    FINDINGS:  Multiple contiguous transaxial slices through the chest were  obtained without the intravenous ministration of contrast with  coronal reformatted images.  Images are degraded by patient  respiratory motion artifact.  There is diffuse centrilobular and  paraseptal emphysema.  There is moderate bilateral upper lobe  pleuroparenchymal scarring with mild bronchiectasis.  There is  nonspecific nodularity along the posterior medial margin of the  apex, likely scar the recommend appropriate follow-up.  There  are nonspecific nodular groundglass opacities bilaterally,  greater in the lingula and right middle lobe.  Findings are  consistent  with an pneumonia which could be secondary to a Covid  19 pneumonia.  Recommend appropriate clinical correlation.  There is no effusion.  Heart size is normal.  There are  calcifications of the coronary arteries.  The aorta is calcified  and ectatic.  Upper abdomen is nonacute.      Impression:      Chronic changes with nonspecific bilateral pneumonia.  Recommend  appropriate follow-up    Authenticated by Veronica Ceballos MD on 12/02/2020 06:29:35 PM            Acute on chronic respiratory failure (CMS/[facility])    Assessment:     Acute on chronic hypoxemic respiratory failure  Recurrent community-acquired pneumonia with associated COPD exacerbation   Chronic corticosteroid usage  Acute debility  Chronic: HTN, HLD, GERD, depression    Plan:     -She presented similarly last time around with abrupt onset of symptoms, and CT PE rule out PE. Procalcitonin is elevated, subjective and radiological findings consistent with recurrent PNA, start Zosyn and doxycycline  -Obtain MRSA screen, CRP, sputum culture, pulm consult  -she has passed SLP evals in the past x2  -Medrol, Lasix, nebulizers for COPD aspiration  -NIPPV at bedtime and as needed  -high risk, full code, obs for now, flip to inpt if not much better in AM    Darling Felix, DO  12/02/20  21:30 EST    Dictated utilizing Dragon dictation.

## 2020-12-03 NOTE — DISCHARGE PLACEMENT REQUEST
"  Sara Brooke -[phone] fax [phone]  Sadie Tijerina (80 y.o. Female)     Date of Birth Social Security Number Address Home Phone MRN    1940  [id number] [facility] [address] [phone] [id number]    [employer]ism Marital Status          Taoism        Admission Date Admission Type Admitting Provider Attending Provider Department, Room/Bed    12/2/20 Emergency Darling Felix DO Bhatti, Umar, DO [facility] MED SURG  4, 426/1    Discharge Date Discharge Disposition Discharge Destination                       Attending Provider: Darling Felix DO    Allergies: Morphine And Related    Isolation: None   Infection: MRSA/History Only (09/17/20)   Code Status: CPR    Ht: 157.5 cm (62.01\")   Wt: 66.2 kg (145 lb 15.1 oz)    Admission Cmt: None   Principal Problem: Acute on chronic respiratory failure (CMS/HCC) [J96.20]                 Active Insurance as of 12/2/2020     Primary Coverage     Payor Plan Insurance Group Employer/Plan Group    MEDICARE MEDICARE A ONLY      Payor Plan Address Payor Plan Phone Number Payor Plan Fax Number Effective Dates    [address] [phone]  6/1/2003 - None Entered    [facility] [address]       Subscriber Name Subscriber Birth Date Member ID       SADIE TIJERINA 1940 [id number]           Secondary Coverage     Payor Plan Insurance Group Employer/Plan Group    HUMANA HUMANA [id number]     Payor Plan Address Payor Plan Phone Number Payor Plan Fax Number Effective Dates    [address] [address] [phone]  1/1/2013 - None Entered    formerly [facility] [id number]       Subscriber Name Subscriber Birth Date Member ID       SADIE TIJERINA 1940 [id number]                 Emergency Contacts      (Rel.) Home Phone Work Phone Mobile Phone    Neris Giles (Daughter) [phone] -- [phone]    LillianaAlfonzo (Son) [phone] -- [phone]               Physical Therapy Notes (last 24 hours) (Notes from 12/02/20 1217 " through 20 1217)      Tammie Thorne, PT Student at 20  Version 1 of        Goal Outcome Evaluation:  Plan of Care Reviewed With: (P) patient  Progress: (P) no change  Outcome Summary: (P) PT eval completed. Pt presents with impaired balance, strength, endurance, and functional mobility performance. PT skilled services appropriate to address deficits prior to D/C.    Electronically signed by Tammie Thorne, PT Student at 20     Tammie Thorne, PT Student at 20  Version 1          Patient Name: Sadie Tijerina  : 1940    MRN: [id number]                              Today's Date: 12/3/2020       Admit Date: 2020    Visit Dx:     ICD-10-CM ICD-9-CM   1. Acute on chronic respiratory failure, unspecified whether with hypoxia or hypercapnia (CMS/HCC)  J96.20 518.84     Patient Active Problem List   Diagnosis   • Calf cramp   • Mixed anxiety depressive disorder   • Dizziness   • Fatigue   • GERD (gastroesophageal reflux disease)   • Hematuria   • Hyperlipidemia   • Essential hypertension   • Low back pain   • Restless legs syndrome   • Essential tremor   • Vitamin D deficiency   • Balance problem   • Tension headache   • Tobacco abuse disorder   • Leg swelling   • Acute and chronic respiratory failure with hypoxia (CMS/HCC)   • Venous insufficiency of both lower extremities   • Chronic diastolic heart failure (CMS/HCC)   • Neuropathy   • Acute exacerbation of chronic obstructive pulmonary disease (COPD) (CMS/HCC)   • Bilateral sensorineural hearing loss   • Disorder of inner ear   • Tympanosclerosis   • Transient vision disturbance   • Bilateral bronchopneumonia   • Pneumonia of both lungs due to infectious organism   • Leg wound, right   • Sepsis (CMS/HCC)   • Pneumonia of both lower lobes due to infectious organism   • Chronic respiratory failure with hypoxia (CMS/HCC)   • Physical deconditioning   • Impaired mobility and ADLs   • Oral candidiasis   • Acute on chronic  respiratory failure (CMS/[facility])     Past Medical History:   Diagnosis Date   • Arthritis    • Cancer (CMS/[facility])     uterine cancer (1981)   • COPD (chronic obstructive pulmonary disease) (CMS/[facility])    • Depression    • Elevated cholesterol    • GERD (gastroesophageal reflux disease)    • History of emphysema    • History of esophageal reflux    • History of recurrent UTI (urinary tract infection)    • History of renal calculi    • History of uterine cancer    • Hyperlipidemia    • Hypertension    • Impaired functional mobility, balance, gait, and endurance    • Pneumonia 02/2019     Past Surgical History:   Procedure Laterality Date   • FOOT SURGERY     • HAND SURGERY     • HYSTERECTOMY       General Information     Row Name 12/03/20 1149          Physical Therapy Time and Intention    Document Type  evaluation  (Pended)   -KG     Mode of Treatment  physical therapy  (Pended)   -KG     Row Name 12/03/20 1149          General Information    Prior Level of Function  independent:;all household mobility  (Pended)   -KG     Existing Precautions/Restrictions  fall;oxygen therapy device and L/min  (Pended)   -KG     Barriers to Rehab  none identified  (Pended)   -KG     Row Name 12/03/20 1149          Living Environment    Lives With  alone  (Pended)   -KG     Row Name 12/03/20 1149          Home Main Entrance    Number of Stairs, Main Entrance  none  (Pended)   -KG     Row Name 12/03/20 1149          Stairs Within Home, Primary    Number of Stairs, Within Home, Primary  none  (Pended)   -KG     Row Name 12/03/20 1149          Cognition    Orientation Status (Cognition)  oriented x 4  (Pended)   -KG     Row Name 12/03/20 1149          Safety Issues, Functional Mobility    Safety Issues Affecting Function (Mobility)  safety precaution awareness;safety precautions follow-through/compliance;insight into deficits/self-awareness  (Pended)   -KG     Impairments Affecting Function (Mobility)  shortness of breath;endurance/activity  tolerance;strength  (Pended)   -KG       User Key  (r) = Recorded By, (t) = Taken By, (c) = Cosigned By    Initials Name Provider Type    SUGAR Tammie Thorne, PT Student PT Student        Mobility     Row Name 12/03/20 1152          Bed Mobility    Bed Mobility  supine-sit  (Pended)   -KG     Supine-Sit [place] (Bed Mobility)  contact guard  (Pended)   -KG     Assistive Device (Bed Mobility)  bed rails;head of bed elevated  (Pended)   -KG     Row Name 12/03/20 1152          Sit-Stand Transfer    Sit-Stand [place] (Transfers)  contact guard;minimum assist (75% patient effort)  (Pended)   -KG     Assistive Device (Sit-Stand Transfers)  walker, front-wheeled  (Pended)   -KG     Row Name 12/03/20 1152          Gait/Stairs (Locomotion)    [place] Level (Gait)  minimum assist (75% patient effort)  (Pended)   -KG     Assistive Device (Gait)  walker, front-wheeled  (Pended)   -KG     Distance in Feet (Gait)  25'x2  (Pended)   -KG     Deviations/Abnormal Patterns (Gait)  gait speed decreased;alia decreased;base of support, wide  (Pended)   -KG     Bilateral Gait Deviations  forward flexed posture;heel strike decreased  (Pended)   -KG     [place] Level (Stairs)  --  (Pended)   -KG     Assistive Device (Stairs)  --  (Pended)   -KG       User Key  (r) = Recorded By, (t) = Taken By, (c) = Cosigned By    Initials Name Provider Type    SUGAR Tammie Thorne, PT Student PT Student        Obj/Interventions     Row Name 12/03/20 1208          Range of Motion Comprehensive    General Range of Motion  bilateral lower extremity ROM WFL  (Pended)   -KG     Row Name 12/03/20 1208          Strength Comprehensive (MMT)    General Manual Muscle Testing (MMT) Assessment  lower extremity strength deficits identified  (Pended)   -KG     Comment, General Manual Muscle Testing (MMT) Assessment  Gross LE 3+/5  (Pended)   -KG     Row Name 12/03/20 1208          Balance    Balance Assessment  sitting static balance;standing static  balance;standing dynamic balance;sitting dynamic balance  (Pended)   -KG     Static Sitting Balance  WFL  (Pended)   -KG     Dynamic Sitting Balance  WFL  (Pended)   -KG     Static Standing Balance  mild impairment  (Pended)   -KG     Dynamic Standing Balance  mild impairment  (Pended)   -KG     Balance Interventions  sitting;standing;sit to stand  (Pended)   -KG       User Key  (r) = Recorded By, (t) = Taken By, (c) = Cosigned By    Initials Name Provider Type    Tammie Roblero, PT Student PT Student        Goals/Plan     Row Name 12/03/20 1212          Transfer Goal 1 (PT)    Activity/Assistive Device (Transfer Goal 1, PT)  sit-to-stand/stand-to-sit;bed-to-chair/chair-to-bed;toilet  (Pended)   -KG     [place] Level/Cues Needed (Transfer Goal 1, PT)  modified independence  (Pended)   -KG     Time Frame (Transfer Goal 1, PT)  short term goal (STG);10 days  (Pended)   -KG     Progress/Outcome (Transfer Goal 1, PT)  goal ongoing  (Pended)   -KG     Row Name 12/03/20 1212          Gait Training Goal 1 (PT)    Activity/Assistive Device (Gait Training Goal 1, PT)  gait (walking locomotion);assistive device use;walker, rolling  (Pended)   -KG     [place] Level (Gait Training Goal 1, PT)  modified independence  (Pended)   -KG     Time Frame (Gait Training Goal 1, PT)  short term goal (STG);10 days  (Pended)   -KG     Progress/Outcome (Gait Training Goal 1, PT)  goal ongoing  (Pended)   -KG     Row Name 12/03/20 1212          Patient Education Goal (PT)    Activity (Patient Education Goal, PT)  HEP x15 reps  (Pended)   -KG     [place]/Cues/Accuracy (Memory Goal 2, PT)  demonstrates adequately;independent  (Pended)   -KG     Time Frame (Patient Education Goal, PT)  short term goal (STG);10 days  (Pended)   -KG     Progress/Outcome (Patient Education Goal, PT)  goal ongoing  (Pended)   -KG       User Key  (r) = Recorded By, (t) = Taken By, (c) = Cosigned By    Initials Name Provider Type    Tammie Roblero, PT  Student PT Student        Clinical Impression     Row Name 12/03/20 1209          Pain    Additional Documentation  Pain Scale: Numbers Pre/Post-Treatment (Group)  (Pended)   -KG     Row Name 12/03/20 1209          Pain Scale: Numbers Pre/Post-Treatment    Pretreatment Pain Rating  0/10 - no pain  (Pended)   -KG     Posttreatment Pain Rating  0/10 - no pain  (Pended)   -KG     Row Name 12/03/20 1209          Plan of Care Review    Plan of Care Reviewed With  patient  (Pended)   -KG     Progress  no change  (Pended)   -KG     Outcome Summary  PT eval completed. Pt presents with impaired balance, strength, endurance, and functional mobility performance. PT skilled services appropriate to address deficits prior to D/C.  (Pended)   -KG     Row Name 12/03/20 1209          Therapy Assessment/Plan (PT)    Rehab Potential (PT)  good, to achieve stated therapy goals  (Pended)   -KG     Criteria for Skilled Interventions Met (PT)  yes;skilled treatment is necessary  (Pended)   -KG     Row Name 12/03/20 1209          Vital Signs    Pre SpO2 (%)  96  (Pended)   -KG     O2 Delivery Pre Treatment  supplemental O2  (Pended)   -KG     Intra SpO2 (%)  93  (Pended)   -KG     O2 Delivery Intra Treatment  room air  (Pended)   -KG     Post SpO2 (%)  94  (Pended)   -KG     O2 Delivery Post Treatment  room air  (Pended)   -KG     Row Name 12/03/20 1209          Positioning and Restraints    Pre-Treatment Position  in bed  (Pended)   -KG     Post Treatment Position  chair  (Pended)   -KG     In Chair  reclined;exit alarm on;encouraged to call for assist;call light within reach;with family/caregiver  (Pended)   -KG       User Key  (r) = Recorded By, (t) = Taken By, (c) = Cosigned By    Initials Name Provider Type    KG Tammie Thorne, PT Student PT Student        Outcome Measures     Row Name 12/03/20 1213          How much help from another person do you currently need...    Turning from your back to your side while in flat bed without using  bedrails?  4  (Pended)   -KG     Moving from lying on back to sitting on the side of a flat bed without bedrails?  4  (Pended)   -KG     Moving to and from a bed to a chair (including a wheelchair)?  3  (Pended)   -KG     Standing up from a chair using your arms (e.g., wheelchair, bedside chair)?  3  (Pended)   -KG     Climbing 3-5 steps with a railing?  2  (Pended)   -KG     To walk in hospital room?  3  (Pended)   -KG     AM-PAC 6 Clicks Score (PT)  19  (Pended)   -KG     Row Name 12/03/20 1213          Functional Assessment    Outcome Measure Options  AM-PAC 6 Clicks Basic Mobility (PT)  (Pended)   -KG       User Key  (r) = Recorded By, (t) = Taken By, (c) = Cosigned By    Initials Name Provider Type    KG Tammie Thorne, PT Student PT Student        Physical Therapy Education                 Title: PT OT SLP Therapies (In Progress)     Topic: Physical Therapy (In Progress)     Point: Mobility training (Done)     Learning Progress Summary           Patient Acceptance, E,TB, VU by KG at 12/3/2020 1213    Comment: Instructed on role of PT                   Point: Home exercise program (Not Started)     Learner Progress:  Not documented in this visit.          Point: Body mechanics (Not Started)     Learner Progress:  Not documented in this visit.          Point: Precautions (Not Started)     Learner Progress:  Not documented in this visit.                      User Key     Initials Effective Dates Name Provider Type Discipline    KG 10/19/20 -  Tammie Thorne, PT Student PT Student PT              PT Recommendation and Plan  Planned Therapy Interventions (PT): (P) balance training, home exercise program, patient/family education, strengthening, transfer training, gait training  Plan of Care Reviewed With: (P) patient  Progress: (P) no change  Outcome Summary: (P) PT eval completed. Pt presents with impaired balance, strength, endurance, and functional mobility performance. PT skilled services appropriate to address  deficits prior to D/C.     Time Calculation:   PT Charges     Row Name 12/03/20 1215             Time Calculation    Start Time  1140  (Pended)   -KG      PT Received On  12/03/20  (Pended)   -KG      PT Goal Re-Cert Due Date  12/13/20  (Pended)   -KG        User Key  (r) = Recorded By, (t) = Taken By, (c) = Cosigned By    Initials Name Provider Type    KG Tammie Thorne, PT Student PT Student        Therapy Charges for Today     Code Description Service Date Service Provider Modifiers Qty    [id number] HC PT EVAL LOW COMPLEXITY 3 12/3/2020 Tammie Thorne, PT Student GP 1          PT G-Codes  Outcome Measure Options: (P) AM-PAC 6 Clicks Basic Mobility (PT)  AM-PAC 6 Clicks Score (PT): (P) 19    Tammie Thorne PT Student  12/3/2020      Electronically signed by Tammie Thorne PT Student at 12/03/20 1216       Occupational Therapy Notes (last 24 hours) (Notes from 12/02/20 1217 through 12/03/20 1217)    No notes exist for this encounter.

## 2020-12-03 NOTE — PROGRESS NOTES
Discharge Planning Assessment  [facility]     Patient Name: Sadie Tijerina  MRN: [id number]  Today's Date: 12/3/2020    Admit Date: 12/2/2020    Discharge Needs Assessment     Row Name 12/03/20 1116       Living Environment    Lives With  alone    Primary Care Provided by  self    Provides Primary Care For  no one, unable/limited ability to care for self    Family Caregiver if Needed  none    Able to Return to Prior Arrangements  no       Resource/Environmental Concerns    Transportation Concerns  car, none       Transition Planning    Patient/Family Anticipates Transition to  inpatient rehabilitation facility    Patient/Family Anticipated Services at Transition      Transportation Anticipated  family or friend will provide       Discharge Needs Assessment    Readmission Within the Last 30 Days  previous discharge plan unsuccessful    Equipment Currently Used at Home  cpap;oxygen;walker, rolling;walker, standard    Concerns to be Addressed  discharge planning    Anticipated Changes Related to Illness  inability to care for self    Discharge Facility/Level of Care Needs  rehabilitation facility    Provided Post Acute Provider List?  Refused    Provided Post Acute Provider Quality & Resource List?  Refused        Discharge Plan     Row Name 12/03/20 1120       Plan    Plan  Case Management spoke to pt regarding discharge plans She reports that she was sent home to early she had SOA wheezing She reports  that she lives alone daughter lives in [place] and works .She is anting rehab but is limited to only wanting [facility] transitional care in [place] has  has been there before  Called spoke  to  emiliano she reports they do have beds faxed referral to them at given number [phone] She has Oxygen 2 LNC continues with Frank  She reports she went home yesterday morning and had to come back same day She also has a CPAP      [facility] Swing Bed called pt has there in  November will need PT/OT notes prior to decision  of being to meet pt needs     Spoke to pt daughter she is requesting Rehab at [facility] Faxed referral call spoke to admissions coordinator they have a bed     Called left Voice Message [phone] for Admissions coordinator to see they can accept pt         Continued Care and Services - Admitted Since 12/2/2020     Destination     Service Provider Request Status Selected Services Address Phone Fax Patient Preferred    [facility] SWING BED UNIT  Pending - Request Sent N/A [address], [facility] [address] [phone] --    [facility] SUBACUTE  Declined  by patient N/A [address] [facility] [address] [phone] --            Selected Continued Care - Prior Encounters Includes selections from prior encounters from 9/3/2020 to 12/3/2020    Discharged on 12/1/2020 Admission date: 11/20/2020 - Discharge disposition: Home-Health Care [facility]    Durable Medical Equipment     Service Provider Selected Services Address Phone Fax Patient Preferred    [facility]  Durable Medical Equipment [address], [facility] [address] [phone] --       Internal Comment last updated by Morelia Licea, LCSW 11/30/2020 1033                         Home Medical Care     Service Provider Selected Services Address Phone Fax Patient Preferred    [facility]  Home Health Services [address] [facility] [address] [phone] --       Internal Comment last updated by Jazmyne King, MSW 11/25/2020 0934    Active will Kulwinder, verified with office. Will need resumption orders at d/c.                            Discharged on 11/11/2020 Admission date: 11/4/2020 - Discharge disposition: Swing Bed    Destination     Service Provider Selected Services Address Phone Fax Patient Preferred    [facility] SWING BED UNIT   Skilled Nursing [address], [facility] [address] [phone] --                Discharged on 9/28/2020 Admission date: 9/16/2020 - Discharge disposition: Home-Health Care Svc    Durable Medical Equipment     Service Provider Selected Services Address Phone Fax Patient Preferred    [facility]  Durable Medical Equipment [address] [address] [phone] --       Internal Comment last updated by Deisi Espinosa RN 9/28/2020 1038    Current patient                     Home Medical Care     Service Provider Selected Services Address Phone Fax Patient Preferred    [facility]  Home Health Services [address] [facility] [facility] [id number] [phone] --                      Demographic Summary     Row Name 12/03/20 1113       General Information    Admission Type  observation    Required Notices Provided  Observation Status Notice    Referral Source  admission list        Functional Status     Row Name 12/03/20 1114       Functional Status    Usual Activity Tolerance  poor       Functional Status, IADL    Medications  independent    Meal Preparation  independent    Housekeeping  independent    Laundry  independent    Shopping  assistive person       Mental Status    General Appearance WDL  WDL        Psychosocial    No documentation.       Abuse/Neglect    No documentation.       Legal    No documentation.       Substance Abuse    No documentation.       Patient Forms    No documentation.           Sara Brooke, RN

## 2020-12-03 NOTE — DISCHARGE PLACEMENT REQUEST
"    Sara Brooke [phone] fax [phone]  Sadie Tijerina (80 y.o. Female)     Date of Birth Social Security Number Address Home Phone MRN    1940  [id number] [facility] [address] [phone] [id number]    Hoahaoism Marital Status          Protestant        Admission Date Admission Type Admitting Provider Attending Provider Department, Room/Bed    12/2/20 Emergency Darling Felix DO Bhatti, Umar, DO [facility] MED SURG  4, 426/1    Discharge Date Discharge Disposition Discharge Destination                       Attending Provider: Darling Felix DO    Allergies: Morphine And Related    Isolation: None   Infection: MRSA/History Only (09/17/20), COVID (rule out) (12/02/20)   Code Status: CPR    Ht: 157.5 cm (62.01\")   Wt: 66.2 kg (145 lb 15.1 oz)    Admission Cmt: None   Principal Problem: Acute on chronic respiratory failure (CMS/HCC) [J96.20]                 Active Insurance as of 12/2/2020     Primary Coverage     Payor Plan Insurance Group Employer/Plan Group    MEDICARE MEDICARE A ONLY      Payor Plan Address Payor Plan Phone Number Payor Plan Fax Number Effective Dates    [address] [phone]  6/1/2003 - None Entered    [facility] [id number]       Subscriber Name Subscriber Birth Date Member ID       SADIE TIJERINA 1940 [id number]           Secondary Coverage     Payor Plan Insurance Group Employer/Plan Group    HUMANA HUMANA [id number]     Payor Plan Address Payor Plan Phone Number Payor Plan Fax Number Effective Dates    [address] [address] [phone]  1/1/2013 - None Entered    [facility] [address]       Subscriber Name Subscriber Birth Date Member ID       SADIE TIJERINA 1940 [id number]                 Emergency Contacts      (Rel.) Home Phone Work Phone Mobile Phone    Neris Giles (Daughter) [phone] -- [phone]    LillianaAlfonzo (Son) [phone] -- [phone]               History & Physical      Darling Felix " "DO at 18              [facility]   HISTORY AND PHYSICAL      Name:  Sadie Tijerina   Age:  80 y.o.  Sex:  female  :  1940  MRN:  [id number]   Visit Number:  [id number]  Admission Date:  2020  Date Of Service:  20  Primary Care Physician:  Kristian Wolff MD    Chief Complaint:      \"her breathing got worse\"     History Of Presenting Illness:       80 F history of chronic hypoxic respiratory failure 2 LNC who was brought to the ED complaining of worsening shortness of breath. Her daughter is at bedside, and as a duo they voiced multiple complaints ranging from dyspnea, back pain, to leg swelling.  Patient was discharged yesterday and it was \"too soon,\" despite being hospitalized for 10 days. Her breathing acutely deteriorated this afternoon and is associated with wheezing, chest pain. She has also had a few episodes of diarrhea recently. Patient denies abdominal pain. She was on room air when she was discharged, and is on 2 LNC which is what she is supposed to be on at home. She uses a CPAP at home nightly. Her daughter was quite frantic, and explained that if she were not admitted here, she would take the patient to [facility]. I discussed possible rehab versus NH placement, and they are not interested in nursing home, but are okay with rehab for an extended period of time. Patient did complete an appropriate course of antibiotics her previous hospitalization. She does not choke her food and has had negative SLP evals. In the ED, she was found to have a WBC 24K and was given IV Zosyn.  CT chest revealed similar appearing PNA.     Review Of Systems:     A full 10 point review of systems was performed and all pertinent positives and negatives are noted in the HPI.     Past Medical History:    Past Medical History:   Diagnosis Date   • Arthritis    • Cancer (CMS/HCC)     uterine cancer ()   • COPD (chronic obstructive pulmonary disease) (CMS/HCC)    • " Depression    • Elevated cholesterol    • GERD (gastroesophageal reflux disease)    • History of emphysema    • History of esophageal reflux    • History of recurrent UTI (urinary tract infection)    • History of renal calculi    • History of uterine cancer    • Hyperlipidemia    • Hypertension    • Impaired functional mobility, balance, gait, and endurance    • Pneumonia 2019       Past Surgical history:    Past Surgical History:   Procedure Laterality Date   • FOOT SURGERY     • HAND SURGERY     • HYSTERECTOMY         Social History:    Social History     Socioeconomic History   • Marital status:      Spouse name: Not on file   • Number of children: Not on file   • Years of education: Not on file   • Highest education level: Not on file   Tobacco Use   • Smoking status: Current Every Day Smoker     Packs/day: 0.25     Types: Cigarettes     Last attempt to quit: 2019     Years since quittin.9   • Smokeless tobacco: Never Used   • Tobacco comment: HALF OF A CIGARETTE   Substance and Sexual Activity   • Alcohol use: No   • Drug use: No   • Sexual activity: Defer     Comment:        Family History:    Family History   Problem Relation Age of Onset   • Cancer Mother    • Heart attack Father    • Arthritis Father    • Heart disease Father    • Diabetes Daughter    • Hyperlipidemia Daughter    • Migraines Daughter    • Heart disease Sister    • Diabetes Son    • Glaucoma Son        Allergies:      Morphine and related    Home Medications:    Prior to Admission Medications     Prescriptions Last Dose Informant Patient Reported? Taking?    acetaminophen (TYLENOL) 650 MG 8 hr tablet   Yes No    Take 650 mg by mouth Every 8 (Eight) Hours As Needed for Mild Pain .    aspirin 81 MG EC tablet   Yes No    Take 81 mg by mouth Daily.    benzonatate (TESSALON) 100 MG capsule   No No    Take 1 capsule by mouth 3 (Three) Times a Day As Needed for Cough.    betamethasone dipropionate 0.05 % cream   No No     Apply topically to the appropriate area 2 (Two) times a day    Bevespi Aerosphere 9-4.8 MCG/ACT aerosol   No No    INHALE 2 PUFFS BY MOUTH TWICE DAILY    cetirizine (zyrTEC) 10 MG tablet   No No    Take 1 tablet by mouth Daily.    clonazePAM (KlonoPIN) 0.5 MG tablet   No No    Take 1 tablet by mouth At Night As Needed for Seizures.    clotrimazole-betamethasone (Lotrisone) 1-0.05 % cream   No No    Apply  topically to the appropriate area as directed 2 (Two) Times a Day.    estrogens, conjugated, (PREMARIN) 0.625 MG tablet   No No    Take 1 tablet by mouth Daily. 200001    fluticasone (FLONASE) 50 MCG/ACT nasal spray   No No    2 sprays by Each Nare route Daily.    furosemide (LASIX) 20 MG tablet   No No    TAKE 1 TABLET BY MOUTH DAILY    guaiFENesin (MUCINEX) 600 MG 12 hr tablet   Yes No    Take 1,200 mg by mouth Daily.    ipratropium (ATROVENT) 0.06 % nasal spray   Yes No    2 sprays into the nostril(s) as directed by provider 4 (Four) Times a Day.    lactobacillus acidophilus (RISAQUAD) capsule capsule   No No    Take 1 capsule by mouth 2 (Two) Times a Day.    omeprazole (priLOSEC) 40 MG capsule   No No    Take 1 capsule by mouth Daily.    potassium chloride (K-DUR,KLOR-CON) 10 MEQ CR tablet   No No    TAKE 1 TABLET BY MOUTH DAILY    pravastatin (PRAVACHOL) 20 MG tablet   No No    TAKE 1 TABLET BY MOUTH EVERY EVENING    predniSONE (DELTASONE) 5 MG tablet   No No    Take 1 tablet by mouth Daily.    propranolol (INDERAL) 60 MG tablet   No No    TAKE 1 TABLET BY MOUTH TWICE DAILY    sertraline (ZOLOFT) 50 MG tablet   No No    Take 1 tablet by mouth Daily.    VITAMIN D PO   Yes No    Take 1,000 Units by mouth Daily.             ED Medications:    Medications   sodium chloride 0.9 % flush 10 mL (has no administration in time range)   sodium chloride 0.9 % flush 10 mL (has no administration in time range)   piperacillin-tazobactam (ZOSYN) IVPB 3.375 g in 100 mL NS (has no administration in time range)       Vital  Signs:    Temp:  [99.5 °F (37.5 °C)] 99.5 °F (37.5 °C)  Heart Rate:  [106-118] 106  Resp:  [32] 32  BP: (135-157)/(53-93) 135/65        12/02/20  1617   Weight: 60.8 kg (134 lb)       Body mass index is 24.51 kg/m².     Physical Exam:  General: Labored breathing, resting in bed  HEENT: EOMI, NC/AT, 2 LNC  Heart: Tachycardic  Lungs: Bilateral wheezes  Abdomen: Soft, nondistended, nontender  Extremities: Mild pitting edema bilaterally  Neurological: A&O x3, moves all extremities  Psychological: Mood and affect appropriate, speech is coherent  Skin: warm, dry    Laboratory data:    I have reviewed the labs done in the emergency room.    Results from last 7 days   Lab Units 12/02/20 1749 12/01/20 0643 11/30/20  0523   SODIUM mmol/L 138 138 136   POTASSIUM mmol/L 4.4 4.3 3.9   CHLORIDE mmol/L 99 99 97*   CO2 mmol/L 31.4* 32.7* 30.6*   BUN mg/dL 22 35* 31*   CREATININE mg/dL 1.05* 0.90 0.92   CALCIUM mg/dL 9.7 9.1 9.1   BILIRUBIN mg/dL 0.5  --   --    ALK PHOS U/L 98  --   --    ALT (SGPT) U/L 105*  --   --    AST (SGOT) U/L 137*  --   --    GLUCOSE mg/dL 129* 111* 93     Results from last 7 days   Lab Units 12/02/20 1749 12/01/20 0643 11/30/20  0523   WBC 10*3/mm3 23.55* 13.74* 13.55*   HEMOGLOBIN g/dL 11.7* 9.6* 10.8*   HEMATOCRIT % 37.6 31.0* 34.4   PLATELETS 10*3/mm3 211 201 227         Results from last 7 days   Lab Units 12/02/20 1749   CK TOTAL U/L 34   TROPONIN T ng/mL <0.010     Results from last 7 days   Lab Units 12/02/20 1749   PROBNP pg/mL 273.3         Results from last 7 days   Lab Units 12/02/20 1749   LIPASE U/L 14     Results from last 7 days   Lab Units 12/02/20 2058   PH, ARTERIAL pH units 7.363   PO2 ART mm Hg 111.0*   PCO2, ARTERIAL mm Hg 56.9*   HCO3 ART mmol/L 32.3*     Results from last 7 days   Lab Units 12/02/20  1851   COLOR UA  Yellow   GLUCOSE UA  Negative   KETONES UA  Negative   LEUKOCYTES UA  Negative   PH, URINE  5.5   BILIRUBIN UA  Negative   UROBILINOGEN UA  0.2 E.U./dL     Pain  Management Panel     There is no flowsheet data to display.              EKG:    Poor quality EKG, appears to be sinus tach, no ischemic changes, rate 114, QTc 348    Radiology:    Imaging Results (Last 72 Hours)     Procedure Component Value Units Date/Time    CT Abdomen Pelvis Without Contrast [[id number]] Resulted: 12/02/20 2127     Updated: 12/02/20 2129    CT Lumbar Spine Without Contrast [[id number]] Collected: 12/02/20 1834     Updated: 12/02/20 1835    Narrative:      FINAL REPORT    CLINICAL HISTORY:  lower back pain    FINDINGS:  Multiple contiguous transaxial slices through the lumbar spine  were obtained with coronal and sagittal reformatted images.  Bones are demineralized.  There is mild chronic compression  deformity of the superior endplate of L1.  There is no acute  fracture or subluxation.  There is mild diffuse endplate  osteophytosis and facet joint hypertrophy.  Posterior disc  osteophyte complexes are present L3-4 and L4-5 without canal  stenosis.  Vascular calcifications are present.      Impression:      Mild chronic changes but no acute abnormality    Authenticated by Veronica Ceballos MD on 12/02/2020 06:34:01 PM    CT Chest Without Contrast [[id number]] Collected: 12/02/20 1829     Updated: 12/02/20 1830    Narrative:      FINAL REPORT    CLINICAL HISTORY:  increased sob    FINDINGS:  Multiple contiguous transaxial slices through the chest were  obtained without the intravenous ministration of contrast with  coronal reformatted images.  Images are degraded by patient  respiratory motion artifact.  There is diffuse centrilobular and  paraseptal emphysema.  There is moderate bilateral upper lobe  pleuroparenchymal scarring with mild bronchiectasis.  There is  nonspecific nodularity along the posterior medial margin of the  apex, likely scar the recommend appropriate follow-up.  There  are nonspecific nodular groundglass opacities bilaterally,  greater in the lingula and right middle lobe.   Findings are  consistent with an pneumonia which could be secondary to a Covid  19 pneumonia.  Recommend appropriate clinical correlation.  There is no effusion.  Heart size is normal.  There are  calcifications of the coronary arteries.  The aorta is calcified  and ectatic.  Upper abdomen is nonacute.      Impression:      Chronic changes with nonspecific bilateral pneumonia.  Recommend  appropriate follow-up    Authenticated by Veronica Ceballos MD on 12/02/2020 06:29:35 PM            Acute on chronic respiratory failure (CMS/[facility])    Assessment:     Acute on chronic hypoxemic respiratory failure  Recurrent community-acquired pneumonia with associated COPD exacerbation   Chronic corticosteroid usage  Acute debility  Chronic: HTN, HLD, GERD, depression    Plan:     -She presented similarly last time around with abrupt onset of symptoms, and CT PE rule out PE. Procalcitonin is elevated, subjective and radiological findings consistent with recurrent PNA, start Zosyn and doxycycline  -Obtain MRSA screen, CRP, sputum culture, pulm consult  -she has passed SLP evals in the past x2  -Medrol, Lasix, nebulizers for COPD aspiration  -NIPPV at bedtime and as needed  -high risk, full code, obs for now, flip to inpt if not much better in AM    Darling Felix DO  12/02/20  21:30 EST    Dictated utilizing Dragon dictation.      Electronically signed by Darling Felix DO at 12/02/20 8779

## 2020-12-03 NOTE — ED NOTES
Two unsuccessful IV attempts by Shireen BROCK. At this time, I will wait to attempt access as patient does not have any medications or infusions ordered.      Maureen Layne RN  12/02/20 2010

## 2020-12-03 NOTE — THERAPY EVALUATION
Patient Name: Sadie Tijerina  : 1940    MRN: [id number]                              Today's Date: 12/3/2020       Admit Date: 2020    Visit Dx:     ICD-10-CM ICD-9-CM   1. Acute on chronic respiratory failure, unspecified whether with hypoxia or hypercapnia (CMS/[facility])  J96.20 518.84     Patient Active Problem List   Diagnosis   • Calf cramp   • Mixed anxiety depressive disorder   • Dizziness   • Fatigue   • GERD (gastroesophageal reflux disease)   • Hematuria   • Hyperlipidemia   • Essential hypertension   • Low back pain   • Restless legs syndrome   • Essential tremor   • Vitamin D deficiency   • Balance problem   • Tension headache   • Tobacco abuse disorder   • Leg swelling   • Acute and chronic respiratory failure with hypoxia (CMS/[facility])   • Venous insufficiency of both lower extremities   • Chronic diastolic heart failure (CMS/[facility])   • Neuropathy   • Acute exacerbation of chronic obstructive pulmonary disease (COPD) (CMS/[facility])   • Bilateral sensorineural hearing loss   • Disorder of inner ear   • Tympanosclerosis   • Transient vision disturbance   • Bilateral bronchopneumonia   • Pneumonia of both lungs due to infectious organism   • Leg wound, right   • Sepsis (CMS/[facility])   • Pneumonia of both lower lobes due to infectious organism   • Chronic respiratory failure with hypoxia (CMS/[facility])   • Physical deconditioning   • Impaired mobility and ADLs   • Oral candidiasis   • Acute on chronic respiratory failure (CMS/[facility])     Past Medical History:   Diagnosis Date   • Arthritis    • Cancer (CMS/[facility])     uterine cancer ()   • COPD (chronic obstructive pulmonary disease) (CMS/[facility])    • Depression    • Elevated cholesterol    • GERD (gastroesophageal reflux disease)    • History of emphysema    • History of esophageal reflux    • History of recurrent UTI (urinary tract infection)    • History of renal calculi    • History of uterine cancer    • Hyperlipidemia    • Hypertension    • Impaired functional  mobility, balance, gait, and endurance    • Pneumonia 02/2019     Past Surgical History:   Procedure Laterality Date   • FOOT SURGERY     • HAND SURGERY     • HYSTERECTOMY       General Information     [facility] Name 12/03/20 1149          Physical Therapy Time and Intention    Document Type  evaluation  (Pended)   -KG     Mode of Treatment  physical therapy  (Pended)   -KG     Row Name 12/03/20 1149          General Information    Prior Level of Function  independent:;all household mobility  (Pended)   -KG     Existing Precautions/Restrictions  fall;oxygen therapy device and L/min  (Pended)   -KG     Barriers to Rehab  none identified  (Pended)   -KG     Row Name 12/03/20 1149          Living Environment    Lives With  alone  (Pended)   -KG     [facility] Name 12/03/20 1149          Home Main Entrance    Number of Stairs, Main Entrance  none  (Pended)   -KG     Row Name 12/03/20 1149          Stairs Within Home, Primary    Number of Stairs, Within Home, Primary  none  (Pended)   -KG     [facility] Name 12/03/20 1149          Cognition    Orientation Status (Cognition)  oriented x 4  (Pended)   -KG     Row Name 12/03/20 1149          Safety Issues, Functional Mobility    Safety Issues Affecting Function (Mobility)  safety precaution awareness;safety precautions follow-through/compliance;insight into deficits/self-awareness  (Pended)   -KG     Impairments Affecting Function (Mobility)  shortness of breath;endurance/activity tolerance;strength  (Pended)   -KG       User Key  (r) = Recorded By, (t) = Taken By, (c) = Cosigned By    Initials Name Provider Type    KG Tammie Thorne, PT Student PT Student        Mobility     Row Name 12/03/20 1152          Bed Mobility    Bed Mobility  supine-sit  (Pended)   -KG     Supine-Sit [place] (Bed Mobility)  contact guard  (Pended)   -KG     Assistive Device (Bed Mobility)  bed rails;head of bed elevated  (Pended)   -KG     Row Name 12/03/20 1152          Sit-Stand Transfer    Sit-Stand  [place] (Transfers)  contact guard;minimum assist (75% patient effort)  (Pended)   -KG     Assistive Device (Sit-Stand Transfers)  walker, front-wheeled  (Pended)   -KG     Row Name 12/03/20 1152          Gait/Stairs (Locomotion)    [place] Level (Gait)  minimum assist (75% patient effort)  (Pended)   -KG     Assistive Device (Gait)  walker, front-wheeled  (Pended)   -KG     Distance in Feet (Gait)  25'x2  (Pended)   -KG     Deviations/Abnormal Patterns (Gait)  gait speed decreased;alia decreased;base of support, wide  (Pended)   -KG     Bilateral Gait Deviations  forward flexed posture;heel strike decreased  (Pended)   -KG     [place] Level (Stairs)  --  (Pended)   -KG     Assistive Device (Stairs)  --  (Pended)   -KG       User Key  (r) = Recorded By, (t) = Taken By, (c) = Cosigned By    Initials Name Provider Type    Tammie Roblero, PT Student PT Student        Obj/Interventions     Row Name 12/03/20 1208          Range of Motion Comprehensive    General Range of Motion  bilateral lower extremity ROM WFL  (Pended)   -KG     Row Name 12/03/20 1208          Strength Comprehensive (MMT)    General Manual Muscle Testing (MMT) Assessment  lower extremity strength deficits identified  (Pended)   -KG     Comment, General Manual Muscle Testing (MMT) Assessment  Gross LE 3+/5  (Pended)   -KG     Row Name 12/03/20 1208          Balance    Balance Assessment  sitting static balance;standing static balance;standing dynamic balance;sitting dynamic balance  (Pended)   -KG     Static Sitting Balance  WFL  (Pended)   -KG     Dynamic Sitting Balance  WFL  (Pended)   -KG     Static Standing Balance  mild impairment  (Pended)   -KG     Dynamic Standing Balance  mild impairment  (Pended)   -KG     Balance Interventions  sitting;standing;sit to stand  (Pended)   -KG       User Key  (r) = Recorded By, (t) = Taken By, (c) = Cosigned By    Initials Name Provider Type    Tammie Roblero, PT Student PT Student         Goals/Plan     Row Name 12/03/20 1212          Transfer Goal 1 (PT)    Activity/Assistive Device (Transfer Goal 1, PT)  sit-to-stand/stand-to-sit;bed-to-chair/chair-to-bed;toilet  (Pended)   -KG     [place] Level/Cues Needed (Transfer Goal 1, PT)  modified independence  (Pended)   -KG     Time Frame (Transfer Goal 1, PT)  short term goal (STG);10 days  (Pended)   -KG     Progress/Outcome (Transfer Goal 1, PT)  goal ongoing  (Pended)   -KG     Row Name 12/03/20 1212          Gait Training Goal 1 (PT)    Activity/Assistive Device (Gait Training Goal 1, PT)  gait (walking locomotion);assistive device use;walker, rolling  (Pended)   -KG     [place] Level (Gait Training Goal 1, PT)  modified independence  (Pended)   -KG     Time Frame (Gait Training Goal 1, PT)  short term goal (STG);10 days  (Pended)   -KG     Progress/Outcome (Gait Training Goal 1, PT)  goal ongoing  (Pended)   -KG     Row Name 12/03/20 1212          Patient Education Goal (PT)    Activity (Patient Education Goal, PT)  HEP x15 reps  (Pended)   -KG     [place]/Cues/Accuracy (Memory Goal 2, PT)  demonstrates adequately;independent  (Pended)   -KG     Time Frame (Patient Education Goal, PT)  short term goal (STG);10 days  (Pended)   -KG     Progress/Outcome (Patient Education Goal, PT)  goal ongoing  (Pended)   -KG       User Key  (r) = Recorded By, (t) = Taken By, (c) = Cosigned By    Initials Name Provider Type    Tammie Roblero, PT Student PT Student        Clinical Impression     Row Name 12/03/20 1209          Pain    Additional Documentation  Pain Scale: Numbers Pre/Post-Treatment (Group)  (Pended)   -KG     Row Name 12/03/20 1209          Pain Scale: Numbers Pre/Post-Treatment    Pretreatment Pain Rating  0/10 - no pain  (Pended)   -KG     Posttreatment Pain Rating  0/10 - no pain  (Pended)   -KG     Row Name 12/03/20 1209          Plan of Care Review    Plan of Care Reviewed With  patient  (Pended)   -KG     Progress  no change   (Pended)   -KG     Outcome Summary  PT eval completed. Pt presents with impaired balance, strength, endurance, and functional mobility performance. PT skilled services appropriate to address deficits prior to D/C.  (Pended)   -KG     Row Name 12/03/20 1209          Therapy Assessment/Plan (PT)    Rehab Potential (PT)  good, to achieve stated therapy goals  (Pended)   -KG     Criteria for Skilled Interventions Met (PT)  yes;skilled treatment is necessary  (Pended)   -KG     Row Name 12/03/20 1209          Vital Signs    Pre SpO2 (%)  96  (Pended)   -KG     O2 Delivery Pre Treatment  supplemental O2  (Pended)   -KG     Intra SpO2 (%)  93  (Pended)   -KG     O2 Delivery Intra Treatment  room air  (Pended)   -KG     Post SpO2 (%)  94  (Pended)   -KG     O2 Delivery Post Treatment  room air  (Pended)   -KG     Row Name 12/03/20 1209          Positioning and Restraints    Pre-Treatment Position  in bed  (Pended)   -KG     Post Treatment Position  chair  (Pended)   -KG     In Chair  reclined;exit alarm on;encouraged to call for assist;call light within reach;with family/caregiver  (Pended)   -KG       User Key  (r) = Recorded By, (t) = Taken By, (c) = Cosigned By    Initials Name Provider Type    KG Tammie Thorne, PT Student PT Student        Outcome Measures     Row Name 12/03/20 1213          How much help from another person do you currently need...    Turning from your back to your side while in flat bed without using bedrails?  4  (Pended)   -KG     Moving from lying on back to sitting on the side of a flat bed without bedrails?  4  (Pended)   -KG     Moving to and from a bed to a chair (including a wheelchair)?  3  (Pended)   -KG     Standing up from a chair using your arms (e.g., wheelchair, bedside chair)?  3  (Pended)   -KG     Climbing 3-5 steps with a railing?  2  (Pended)   -KG     To walk in hospital room?  3  (Pended)   -KG     AM-PAC 6 Clicks Score (PT)  19  (Pended)   -KG     Row Name 12/03/20 1213           Functional Assessment    Outcome Measure Options  AM-PAC 6 Clicks Basic Mobility (PT)  (Pended)   -KG       User Key  (r) = Recorded By, (t) = Taken By, (c) = Cosigned By    Initials Name Provider Type    SUGAR Tammie Thorne, PT Student PT Student        Physical Therapy Education                 Title: PT OT SLP Therapies (In Progress)     Topic: Physical Therapy (In Progress)     Point: Mobility training (Done)     Learning Progress Summary           Patient Acceptance, E,TB, VU by KG at 12/3/2020 1213    Comment: Instructed on role of PT                   Point: Home exercise program (Not Started)     Learner Progress:  Not documented in this visit.          Point: Body mechanics (Not Started)     Learner Progress:  Not documented in this visit.          Point: Precautions (Not Started)     Learner Progress:  Not documented in this visit.                      User Key     Initials Effective Dates Name Provider Type Discipline    KG 10/19/20 -  Tammie Thorne PT Student PT Student PT              PT Recommendation and Plan  Planned Therapy Interventions (PT): (P) balance training, home exercise program, patient/family education, strengthening, transfer training, gait training  Plan of Care Reviewed With: (P) patient  Progress: (P) no change  Outcome Summary: (P) PT eval completed. Pt presents with impaired balance, strength, endurance, and functional mobility performance. PT skilled services appropriate to address deficits prior to D/C.     Time Calculation:   PT Charges     Row Name 12/03/20 1215             Time Calculation    Start Time  1140  (Pended)   -KG      PT Received On  12/03/20  (Pended)   -KG      PT Goal Re-Cert Due Date  12/13/20  (Pended)   -KG        User Key  (r) = Recorded By, (t) = Taken By, (c) = Cosigned By    Initials Name Provider Type    SUGAR Tammie Thorne, PT Student PT Student        Therapy Charges for Today     Code Description Service Date Service Provider Modifiers Qty    [id number]  PT  EVAL LOW COMPLEXITY 3 12/3/2020 Tammie Thorne, PT Student GP 1          PT G-Codes  Outcome Measure Options: (P) AM-PAC 6 Clicks Basic Mobility (PT)  AM-PAC 6 Clicks Score (PT): (P) 19    Tammie Thorne, PT Student  12/3/2020

## 2020-12-03 NOTE — PROGRESS NOTES
Case Management Discharge Note      Final Note: code changed pt was readmitted same day         Selected Continued Care - Discharged on 12/1/2020 Admission date: 11/20/2020 - Discharge disposition: Home-Health Care Svc    Destination    No services have been selected for the patient.              Durable Medical Equipment Coordination complete    Service Provider Selected Services Address Phone Fax Patient Preferred    [facility]  Durable Medical Equipment [address], [place] [facility] [address] [phone] --       Internal Comment last updated by Morelia Licea, [facility] 11/30/2020 1033    2l                     Dialysis/Infusion    No services have been selected for the patient.              Home Medical Care Coordination complete    Service Provider Selected Services Address Phone Fax Patient Preferred    [facility]  [facility] [address] [id number] [phone] --       Internal Comment last updated by Jazmyne King, MSW 11/25/2020 0943    Active will Mandaeism, verified with office. Will need resumption orders at d/c.                      Therapy    No services have been selected for the patient.              Community Resources    No services have been selected for the patient.                Selected Continued Care - Prior Encounters Includes selections from prior encounters from 8/22/2020 to 12/1/2020    Discharged on 11/11/2020 Admission date: 11/4/2020 - Discharge disposition: Swing Bed    Destination     Service Provider Selected Services Address Phone Fax Patient Preferred    [facility] SWING BED UNIT  Skilled Nursing [address], [place] [facility] [address] [phone] --                Discharged on 9/28/2020 Admission date: 9/16/2020 - Discharge disposition: Home-Health Care Svc    Durable Medical Equipment     Service Provider Selected Services Address Phone Fax Patient  Preferred    [facility]  Durable Medical Equipment [address] [id number] [phone] --       Internal Comment last updated by Deisi Espinosa RN 9/28/2020 1038    Current patient                     Home Medical Care     Service Provider Selected Services Address Phone Fax Patient Preferred    [facility]  Home Health Services [address] [facility] [address] [phone] --                         Final Discharge Disposition Code: 01 - home or self-care

## 2020-12-03 NOTE — ED NOTES
At this time, hospitalist Dr. Felix was called for Marilyn. Call transferred.     Dalila Arriaza  12/02/20 1901

## 2020-12-03 NOTE — OUTREACH NOTE
COVID-19 Week 2 Survey      Responses   [facility] patient discharged from?  [facility]   Does the patient have one of the following disease processes/diagnoses(primary or secondary)?  COPD/Pneumonia   Revoke  Readmitted          Estrella Fernando RN

## 2020-12-03 NOTE — PLAN OF CARE
Goal Outcome Evaluation:  Plan of Care Reviewed With: patient, daughter  Progress: no change  Outcome Summary: VSS.  NO c/o pain.  UP in chair today.  Will monitor.

## 2020-12-03 NOTE — ED NOTES
Called house Supervisor for bed assignment. Patient will be going to . RNCristy notified.      Starla Velez  12/02/20 5638

## 2020-12-03 NOTE — PLAN OF CARE
Goal Outcome Evaluation:  Plan of Care Reviewed With: patient  Progress: no change  Outcome Summary: OT eval completed. Patient presents deficits in strength, endurance, balance, mobility and ADL performance. Patient is expected to benefit from continued OT services prior to DC.

## 2020-12-04 NOTE — PROGRESS NOTES
"      [facility]    Name:  Sadie Tijerina   Age:  80 y.o.  Sex:  female  :  1940  MRN:  [id number]   Visit Number:  [id number]  Admission Date:  2020  Date Of Service:  20  Primary Care Physician:  Kristian Wolff MD     LOS: 0 days :  Patient Care Team:  Kristian Wolff MD as PCP - General  Kristian Wolff MD as PCP - Family Medicine  Bayron Grimaldo MD (Inactive) as Consulting Physician (General Surgery):    Chief Complaint:      Shortness of breath    Subjective / Interval History:     80 year old female who was recently discharged home after a 10-day hospitalization here. Patient is longtime sufferer of advancing COPD.  Patient has history of chronic hypoxic respiratory failure and requires 2L of oxygen via nasal cannula at home and uses a CPAP nightly.  Patient went home at discharge and returned the following day stating that her breathing got worse with wheezing and chest pain at time of admission.  Patient was started on IV Zosyn and Doxycycline on admission for WBC 24,000 with CT of chest revealing chronic changes with bilateral pneumonia that does not appear acute per Pulmonology consultation.  Patient consulted by pulmonology yesterday and transitioned to Omnicef and Zithromax PO.   Repeat WBC today is 15.19 down from 24 on admission.  Procalcitonin down to 2.39 from 2.86 on .  Respiratory culture is still pending.      Patient resting in bed today with daughter at bedside.  Patient with no respiratory complaints today resting on room air with oxygen saturations >90%.  Patient does believe that she \"might have a blood clot\" in her right leg as she states it is very swollen and painful.  Patient and daughter updated on nursinig home/ rehabilitation placement which case management is working on.  Patient is agreeable to this plan.        Review of Systems:     General ROS: Patient denies any fevers, chills or loss of " consciousness.  Respiratory ROS:+ reported shortness of breath.  Cardiovascular ROS: Denies chest pain or palpitations. No history of exertional chest pain.  Gastrointestinal ROS: Denies nausea and vomiting. Denies any abdominal pain. No diarrhea.  Neurological ROS: Denies any focal weakness. No loss of consciousness. Denies any numbness.  Dermatological ROS: Denies any redness or pruritis.    Vital Signs:    Temp:  [96.5 °F (35.8 °C)-97.5 °F (36.4 °C)] 97.4 °F (36.3 °C)  Heart Rate:  [74-91] 86  Resp:  [18-22] 18  BP: (105-127)/(50-58) 124/53    Intake and output:    I/O last 3 completed shifts:  In: 1183 [P.O.:780; I.V.:303; IV Piggyback:100]  Out: 1000 [Urine:1000]  I/O this shift:  In: 480 [P.O.:480]  Out: -     Physical Examination:    General Appearance:  Alert and cooperative, not in any acute distress.  Chronically ill, conversing appropriately in conversation, very capable of making needs known.   Head:  Atraumatic and normocephalic, without obvious abnormality.   Eyes:          Conjunctivae and sclerae normal, no Icterus. No pallor. Extraocular movements are within normal limits.   Neck: Supple, trachea midline   Lungs:   Breath sounds heard bilaterally equally but diminished througholut.  No crackles or wheezing.   Heart:  RRR, no murmur, no gallop, no rub. No JVD   Abdomen:   Normal bowel sounds, no masses, no organomegaly. Soft, nontender, nondistended, no guarding, no rebound tenderness.   Extremities: Moves all extremities, mild edema bilaterally, lower extremities equal in size with no erythema.  Patient complaining of right leg pain and believes she may have a blood clot.  Pulses palpated and present in all 4 extremities   Skin: No bleeding, bruising or rash.   Neurologic: Awake, alert and oriented times 3. Moves all 4 extremities equally.     Laboratory results:    Results from last 7 days   Lab Units 12/03/20  0650 12/02/20  1749 12/01/20  0643   SODIUM mmol/L 136 138 138   POTASSIUM mmol/L 4.5  4.4 4.3   CHLORIDE mmol/L 95* 99 99   CO2 mmol/L 29.0 31.4* 32.7*   BUN mg/dL 23 22 35*   CREATININE mg/dL 1.18* 1.05* 0.90   CALCIUM mg/dL 9.2 9.7 9.1   BILIRUBIN mg/dL  --  0.5  --    ALK PHOS U/L  --  98  --    ALT (SGPT) U/L  --  105*  --    AST (SGOT) U/L  --  137*  --    GLUCOSE mg/dL 170* 129* 111*     Results from last 7 days   Lab Units 12/04/20  0746 12/02/20  1749 12/01/20  0643   WBC 10*3/mm3 15.19* 23.55* 13.74*   HEMOGLOBIN g/dL 9.9* 11.7* 9.6*   HEMATOCRIT % 31.0* 37.6 31.0*   PLATELETS 10*3/mm3 153 211 201         Results from last 7 days   Lab Units 12/02/20  1749   CK TOTAL U/L 34   TROPONIN T ng/mL <0.010     Results from last 7 days   Lab Units 12/03/20  0052 12/02/20  1747 12/02/20  1740   BLOODCX   --  No growth at 24 hours No growth at 24 hours   MRSACX  No Methicillin Resistant Staphylococcus aureus isolated  --   --      Results from last 7 days   Lab Units 12/02/20 2058   PH, ARTERIAL pH units 7.363   PO2 ART mm Hg 111.0*   PCO2, ARTERIAL mm Hg 56.9*   HCO3 ART mmol/L 32.3*       I have reviewed the patient's laboratory results.    Radiology results:    Imaging Results (Last 24 Hours)     Procedure Component Value Units Date/Time    US Venous Doppler Lower Extremity Right (duplex) [[id number]] Collected: 12/04/20 1241     Updated: 12/04/20 1243    Narrative:      PROCEDURE: US VENOUS DOPPLER LOWER EXTREMITY RIGHT (DUPLEX)-     HISTORY: right leg pain; J96.20-Acute and chronic respiratory failure,  unspecified whether with hypoxia or hypercapnia     PROCEDURE: Multiple transverse and longitudinal scans were performed of  the femoropopliteal deep venous system, with augmentation and  compression maneuvers.     FINDINGS: Normal phasic flow was noted in the visualized deep venous  system. No intraluminal increased echogenicity is noted to suggest  thrombus. There is normal compression and augmentation of the venous  structures.  No abnormal venous collaterals are seen.       Impression:      No  evidence of deep venous thrombosis.     This report was finalized on 12/4/2020 12:41 PM by Sylvain Dent M.D..          I have reviewed the patient's radiology reports.    Medication Review:     I have reviewed the patient's active and prn medications.     Problem List:      Acute on chronic respiratory failure (CMS/HCC)    Chronic diastolic heart failure (CMS/[facility])    Acute exacerbation of chronic obstructive pulmonary disease (COPD) (CMS/[facility])    Bilateral bronchopneumonia      Assessment:  1.  Acute on chronic hypoxemic respiratory failure  2.  Chronic lung infiltrates  3.  Severe end-stage COPD with exacerbation  4.  Functional decline--Impaired mobility and ADLs  5.  Leukocytosis  6.  Chronic steroid use    Plan:    Continue oral antibiotic therapy and Prednisone therapy.      Lab work improving today with WBC dropping to 15 and Procalcitonin mildly dropping.  Respiratory cultures still pending.  Will continue to monitor as needed.    Plan to check labs every other day or as needed.  Patient appears clinically stable at this time.  Venous duplex negative for DVT.  Will update patient.  Patient awaiting Rehab placement at this time and is agreeable to this plan.      Continue PT/OT and DVT prophylaxis.    Continue oxygen therapy as needed as well as incentive spirometry, nebs, inhalers, and CPAP.    Discharge to Rehab facility/ nursing home when case management has finished setting up transfer.  Sara with Case Management working diligently to place patient in Rehab facility.  The patient initially was only consenting to go to [facility].  Patient's daughter states that she would stay with her but end up back at her house prior to facility placement.  After a discussion with myself, the patient's daughter, and Sara, patient has agreed to seek placement elsewhere in [place].  Patient upset that there are currently no visitors at NH/Rehab facilities.  I assured patient that she needed to go to  "Rehab facility to gain strength.  Patient upset and tearful because \"of the snacks\" that weren't good at the Rehab facilities.  I told her that her daughter could drop off packages for her.  Potential placement Monday per case management.    Albina Saeed, APRN  12/04/20  14:32 EST    Dictated utilizing Dragon dictation.    "

## 2020-12-04 NOTE — PLAN OF CARE
Goal Outcome Evaluation:  Plan of Care Reviewed With: patient  Progress: no change  Outcome Summary: VSS, no acute events this shift.  Patient had pain that was treated with PRN medications as ordered.  Able to walk with walker to bathroom with assistance.  Able to tolerate room air with no assistance of O2 this shift.  Continue to monitor and provide care as appropriate and ordered.

## 2020-12-04 NOTE — DISCHARGE PLACEMENT REQUEST
"    Sara Brooke [phone] fax [phone]  Short term rehab   Sadie Tijerina (80 y.o. Female)     Date of Birth Social Security Number Address Home Phone MRN    1940  [address] [address] [phone] [id number]    Jehovah's witness Marital Status          Buddhism        Admission Date Admission Type Admitting Provider Attending Provider Department, Room/Bed    20 Emergency Darling Felix DO Bhatti, Umar, DO [facility] MED SURG  4, 426/    Discharge Date Discharge Disposition Discharge Destination                       Attending Provider: Darling Felix DO    Allergies: Morphine And Related    Isolation: None   Infection: MRSA/History Only (20)   Code Status: CPR    Ht: 157.5 cm (62.01\")   Wt: 66.2 kg (145 lb 15.1 oz)    Admission Cmt: None   Principal Problem: Acute on chronic respiratory failure (CMS/[facility]) [J96.20]                 Active Insurance as of 2020     Primary Coverage     Payor Plan Insurance Group Employer/Plan Group    HUMANA HUMANA [id number]     Payor Plan Address Payor Plan Phone Number Payor Plan Fax Number Effective Dates    [address] [address] [phone]  2013 - None Entered    [facility] [address]       Subscriber Name Subscriber Birth Date Member ID       SADIE TIJERINA 1940 [id number]                 Emergency Contacts      (Rel.) Home Phone Work Phone Mobile Phone    Neris Giles (Daughter) [phone] -- [phone]    Alfonzo Tijerina (Son) [phone] -- [phone]               History & Physical      Darling Felix DO at 20 2128              [facility] HOSPITALIST   HISTORY AND PHYSICAL      Name:  Sadie Tijerina   Age:  80 y.o.  Sex:  female  :  1940  MRN:  [id number]   Visit Number:  [id number]  Admission Date:  2020  Date Of Service:  20  Primary Care Physician:  Kristian Wolff MD    Chief Complaint:      \"her breathing got worse\"     History " "Of Presenting Illness:       80 F history of chronic hypoxic respiratory failure 2 LNC who was brought to the ED complaining of worsening shortness of breath. Her daughter is at bedside, and as a duo they voiced multiple complaints ranging from dyspnea, back pain, to leg swelling.  Patient was discharged yesterday and it was \"too soon,\" despite being hospitalized for 10 days. Her breathing acutely deteriorated this afternoon and is associated with wheezing, chest pain. She has also had a few episodes of diarrhea recently. Patient denies abdominal pain. She was on room air when she was discharged, and is on 2 LNC which is what she is supposed to be on at home. She uses a CPAP at home nightly. Her daughter was quite frantic, and explained that if she were not admitted here, she would take the patient to [facility]. I discussed possible rehab versus NH placement, and they are not interested in nursing home, but are okay with rehab for an extended period of time. Patient did complete an appropriate course of antibiotics her previous hospitalization. She does not choke her food and has had negative SLP evals. In the ED, she was found to have a WBC 24K and was given IV Zosyn.  CT chest revealed similar appearing PNA.     Review Of Systems:     A full 10 point review of systems was performed and all pertinent positives and negatives are noted in the HPI.     Past Medical History:    Past Medical History:   Diagnosis Date   • Arthritis    • Cancer (CMS/HCC)     uterine cancer (1981)   • COPD (chronic obstructive pulmonary disease) (CMS/[facility])    • Depression    • Elevated cholesterol    • GERD (gastroesophageal reflux disease)    • History of emphysema    • History of esophageal reflux    • History of recurrent UTI (urinary tract infection)    • History of renal calculi    • History of uterine cancer    • Hyperlipidemia    • Hypertension    • Impaired functional mobility, balance, gait, and endurance    • Pneumonia " 2019       Past Surgical history:    Past Surgical History:   Procedure Laterality Date   • FOOT SURGERY     • HAND SURGERY     • HYSTERECTOMY         Social History:    Social History     Socioeconomic History   • Marital status:      Spouse name: Not on file   • Number of children: Not on file   • Years of education: Not on file   • Highest education level: Not on file   Tobacco Use   • Smoking status: Current Every Day Smoker     Packs/day: 0.25     Types: Cigarettes     Last attempt to quit: 2019     Years since quittin.9   • Smokeless tobacco: Never Used   • Tobacco comment: HALF OF A CIGARETTE   Substance and Sexual Activity   • Alcohol use: No   • Drug use: No   • Sexual activity: Defer     Comment:        Family History:    Family History   Problem Relation Age of Onset   • Cancer Mother    • Heart attack Father    • Arthritis Father    • Heart disease Father    • Diabetes Daughter    • Hyperlipidemia Daughter    • Migraines Daughter    • Heart disease Sister    • Diabetes Son    • Glaucoma Son        Allergies:      Morphine and related    Home Medications:    Prior to Admission Medications     Prescriptions Last Dose Informant Patient Reported? Taking?    acetaminophen (TYLENOL) 650 MG 8 hr tablet   Yes No    Take 650 mg by mouth Every 8 (Eight) Hours As Needed for Mild Pain .    aspirin 81 MG EC tablet   Yes No    Take 81 mg by mouth Daily.    benzonatate (TESSALON) 100 MG capsule   No No    Take 1 capsule by mouth 3 (Three) Times a Day As Needed for Cough.    betamethasone dipropionate 0.05 % cream   No No    Apply topically to the appropriate area 2 (Two) times a day    Bevespi Aerosphere 9-4.8 MCG/ACT aerosol   No No    INHALE 2 PUFFS BY MOUTH TWICE DAILY    cetirizine (zyrTEC) 10 MG tablet   No No    Take 1 tablet by mouth Daily.    clonazePAM (KlonoPIN) 0.5 MG tablet   No No    Take 1 tablet by mouth At Night As Needed for Seizures.    clotrimazole-betamethasone (Lotrisone)  1-0.05 % cream   No No    Apply  topically to the appropriate area as directed 2 (Two) Times a Day.    estrogens, conjugated, (PREMARIN) 0.625 MG tablet   No No    Take 1 tablet by mouth Daily. 200001    fluticasone (FLONASE) 50 MCG/ACT nasal spray   No No    2 sprays by Each Nare route Daily.    furosemide (LASIX) 20 MG tablet   No No    TAKE 1 TABLET BY MOUTH DAILY    guaiFENesin (MUCINEX) 600 MG 12 hr tablet   Yes No    Take 1,200 mg by mouth Daily.    ipratropium (ATROVENT) 0.06 % nasal spray   Yes No    2 sprays into the nostril(s) as directed by provider 4 (Four) Times a Day.    lactobacillus acidophilus (RISAQUAD) capsule capsule   No No    Take 1 capsule by mouth 2 (Two) Times a Day.    omeprazole (priLOSEC) 40 MG capsule   No No    Take 1 capsule by mouth Daily.    potassium chloride (K-DUR,KLOR-CON) 10 MEQ CR tablet   No No    TAKE 1 TABLET BY MOUTH DAILY    pravastatin (PRAVACHOL) 20 MG tablet   No No    TAKE 1 TABLET BY MOUTH EVERY EVENING    predniSONE (DELTASONE) 5 MG tablet   No No    Take 1 tablet by mouth Daily.    propranolol (INDERAL) 60 MG tablet   No No    TAKE 1 TABLET BY MOUTH TWICE DAILY    sertraline (ZOLOFT) 50 MG tablet   No No    Take 1 tablet by mouth Daily.    VITAMIN D PO   Yes No    Take 1,000 Units by mouth Daily.             ED Medications:    Medications   sodium chloride 0.9 % flush 10 mL (has no administration in time range)   sodium chloride 0.9 % flush 10 mL (has no administration in time range)   piperacillin-tazobactam (ZOSYN) IVPB 3.375 g in 100 mL NS (has no administration in time range)       Vital Signs:    Temp:  [99.5 °F (37.5 °C)] 99.5 °F (37.5 °C)  Heart Rate:  [106-118] 106  Resp:  [32] 32  BP: (135-157)/(53-93) 135/65        12/02/20  1617   Weight: 60.8 kg (134 lb)       Body mass index is 24.51 kg/m².     Physical Exam:  General: Labored breathing, resting in bed  HEENT: EOMI, NC/AT, 2 LNC  Heart: Tachycardic  Lungs: Bilateral wheezes  Abdomen: Soft, nondistended,  nontender  Extremities: Mild pitting edema bilaterally  Neurological: A&O x3, moves all extremities  Psychological: Mood and affect appropriate, speech is coherent  Skin: warm, dry    Laboratory data:    I have reviewed the labs done in the emergency room.    Results from last 7 days   Lab Units 12/02/20 1749 12/01/20 0643 11/30/20  0523   SODIUM mmol/L 138 138 136   POTASSIUM mmol/L 4.4 4.3 3.9   CHLORIDE mmol/L 99 99 97*   CO2 mmol/L 31.4* 32.7* 30.6*   BUN mg/dL 22 35* 31*   CREATININE mg/dL 1.05* 0.90 0.92   CALCIUM mg/dL 9.7 9.1 9.1   BILIRUBIN mg/dL 0.5  --   --    ALK PHOS U/L 98  --   --    ALT (SGPT) U/L 105*  --   --    AST (SGOT) U/L 137*  --   --    GLUCOSE mg/dL 129* 111* 93     Results from last 7 days   Lab Units 12/02/20 1749 12/01/20 0643 11/30/20  0523   WBC 10*3/mm3 23.55* 13.74* 13.55*   HEMOGLOBIN g/dL 11.7* 9.6* 10.8*   HEMATOCRIT % 37.6 31.0* 34.4   PLATELETS 10*3/mm3 211 201 227         Results from last 7 days   Lab Units 12/02/20  1749   CK TOTAL U/L 34   TROPONIN T ng/mL <0.010     Results from last 7 days   Lab Units 12/02/20  1749   PROBNP pg/mL 273.3         Results from last 7 days   Lab Units 12/02/20  1749   LIPASE U/L 14     Results from last 7 days   Lab Units 12/02/20  2058   PH, ARTERIAL pH units 7.363   PO2 ART mm Hg 111.0*   PCO2, ARTERIAL mm Hg 56.9*   HCO3 ART mmol/L 32.3*     Results from last 7 days   Lab Units 12/02/20  1851   COLOR UA  Yellow   GLUCOSE UA  Negative   KETONES UA  Negative   LEUKOCYTES UA  Negative   PH, URINE  5.5   BILIRUBIN UA  Negative   UROBILINOGEN UA  0.2 E.U./dL     Pain Management Panel     There is no flowsheet data to display.              EKG:    Poor quality EKG, appears to be sinus tach, no ischemic changes, rate 114, QTc 348    Radiology:    Imaging Results (Last 72 Hours)     Procedure Component Value Units Date/Time    CT Abdomen Pelvis Without Contrast [[id number]] Resulted: 12/02/20 2127     Updated: 12/02/20 2129    CT Lumbar Spine  Without Contrast [[id number]] Collected: 12/02/20 1834     Updated: 12/02/20 1835    Narrative:      FINAL REPORT    CLINICAL HISTORY:  lower back pain    FINDINGS:  Multiple contiguous transaxial slices through the lumbar spine  were obtained with coronal and sagittal reformatted images.  Bones are demineralized.  There is mild chronic compression  deformity of the superior endplate of L1.  There is no acute  fracture or subluxation.  There is mild diffuse endplate  osteophytosis and facet joint hypertrophy.  Posterior disc  osteophyte complexes are present L3-4 and L4-5 without canal  stenosis.  Vascular calcifications are present.      Impression:      Mild chronic changes but no acute abnormality    Authenticated by Veronica Ceballos MD on 12/02/2020 06:34:01 PM    CT Chest Without Contrast [[id number]] Collected: 12/02/20 1829     Updated: 12/02/20 1830    Narrative:      FINAL REPORT    CLINICAL HISTORY:  increased sob    FINDINGS:  Multiple contiguous transaxial slices through the chest were  obtained without the intravenous ministration of contrast with  coronal reformatted images.  Images are degraded by patient  respiratory motion artifact.  There is diffuse centrilobular and  paraseptal emphysema.  There is moderate bilateral upper lobe  pleuroparenchymal scarring with mild bronchiectasis.  There is  nonspecific nodularity along the posterior medial margin of the  apex, likely scar the recommend appropriate follow-up.  There  are nonspecific nodular groundglass opacities bilaterally,  greater in the lingula and right middle lobe.  Findings are  consistent with an pneumonia which could be secondary to a Covid  19 pneumonia.  Recommend appropriate clinical correlation.  There is no effusion.  Heart size is normal.  There are  calcifications of the coronary arteries.  The aorta is calcified  and ectatic.  Upper abdomen is nonacute.      Impression:      Chronic changes with nonspecific bilateral pneumonia.   Recommend  appropriate follow-up    Authenticated by Veronica Ceballos MD on 12/02/2020 06:29:35 PM            Acute on chronic respiratory failure (CMS/[facility])    Assessment:     Acute on chronic hypoxemic respiratory failure  Recurrent community-acquired pneumonia with associated COPD exacerbation   Chronic corticosteroid usage  Acute debility  Chronic: HTN, HLD, GERD, depression    Plan:     -She presented similarly last time around with abrupt onset of symptoms, and CT PE rule out PE. Procalcitonin is elevated, subjective and radiological findings consistent with recurrent PNA, start Zosyn and doxycycline  -Obtain MRSA screen, CRP, sputum culture, pulm consult  -she has passed SLP evals in the past x2  -Medrol, Lasix, nebulizers for COPD aspiration  -NIPPV at bedtime and as needed  -high risk, full code, obs for now, flip to inpt if not much better in AM    Darling Felix DO  12/02/20  21:30 EST    Dictated utilizing Dragon dictation.      Electronically signed by Darling Felix DO at 12/02/20 2250         Physician Progress Notes (last 48 hours) (Notes from 12/02/20 1602 through 12/04/20 1602)    No notes of this type exist for this encounter.          Physical Therapy Notes (last 24 hours) (Notes from 12/03/20 1602 through 12/04/20 1602)      Tammie Thorne, PT Student at 12/04/20 1202  Version 1 of 1    Attestation signed by Estrella Ferraro PT at 12/04/20 1452    I attest to the accuracy and completeness of this note.                 Goal Outcome Evaluation:  Plan of Care Reviewed With: (P) patient, family(sister)  Progress: (P) no change  Outcome Summary: (P) PT tx completed. Pt ambulated 25'x2 from bed-commode then commode-chair with CGA-Min A using vcs for activity pacing and breathing technique as she reported feeling SOA. O2 sats maintained above 90% on room air during tx.Continue PT POC.    Electronically signed by Estrella Ferraro PT at 12/04/20 1452     Tammie Thorne, PT Student at 12/04/20 1203  Version 1 of 1     Attestation signed by Estrella Ferraro, PT at 20 6786    I attest to the accuracy and completeness of this note.                   Patient Name: Sadie Tijerina  : 1940    MRN: [id number]                              Today's Date: 2020       Admit Date: 2020    Visit Dx:     ICD-10-CM ICD-9-CM   1. Acute on chronic respiratory failure, unspecified whether with hypoxia or hypercapnia (CMS/[facility])  J96.20 518.84     Patient Active Problem List   Diagnosis   • Calf cramp   • Mixed anxiety depressive disorder   • Dizziness   • Fatigue   • GERD (gastroesophageal reflux disease)   • Hematuria   • Hyperlipidemia   • Essential hypertension   • Low back pain   • Restless legs syndrome   • Essential tremor   • Vitamin D deficiency   • Balance problem   • Tension headache   • Tobacco abuse disorder   • Leg swelling   • Acute and chronic respiratory failure with hypoxia (CMS/[facility])   • Venous insufficiency of both lower extremities   • Chronic diastolic heart failure (CMS/[facility])   • Neuropathy   • Acute exacerbation of chronic obstructive pulmonary disease (COPD) (CMS/[facility])   • Bilateral sensorineural hearing loss   • Disorder of inner ear   • Tympanosclerosis   • Transient vision disturbance   • Bilateral bronchopneumonia   • Pneumonia of both lungs due to infectious organism   • Leg wound, right   • Sepsis (CMS/[facility])   • Pneumonia of both lower lobes due to infectious organism   • Chronic respiratory failure with hypoxia (CMS/[facility])   • Physical deconditioning   • Impaired mobility and ADLs   • Oral candidiasis   • Acute on chronic respiratory failure (CMS/[facility])     Past Medical History:   Diagnosis Date   • Arthritis    • Cancer (CMS/[facility])     uterine cancer ()   • COPD (chronic obstructive pulmonary disease) (CMS/[facility])    • Depression    • Elevated cholesterol    • GERD (gastroesophageal reflux disease)    • History of emphysema    • History of esophageal reflux    • History of recurrent UTI (urinary tract  infection)    • History of renal calculi    • History of uterine cancer    • Hyperlipidemia    • Hypertension    • Impaired functional mobility, balance, gait, and endurance    • Pneumonia 02/2019     Past Surgical History:   Procedure Laterality Date   • FOOT SURGERY     • HAND SURGERY     • HYSTERECTOMY       General Information     Row Name 12/04/20 1153          Physical Therapy Time and Intention    Document Type  therapy note (daily note)  (Pended)   -KG     Mode of Treatment  physical therapy  (Pended)   -KG     Row Name 12/04/20 1153          General Information    Patient Profile Reviewed  yes  (Pended)   -KG     Existing Precautions/Restrictions  fall  (Pended)   -KG       User Key  (r) = Recorded By, (t) = Taken By, (c) = Cosigned By    Initials Name Provider Type    KG Tammie Thorne, PT Student PT Student        Mobility     Row Name 12/04/20 1153          Bed Mobility    Bed Mobility  supine-sit  (Pended)   -KG     Supine-Sit [place] (Bed Mobility)  contact guard;minimum assist (75% patient effort)  (Pended)   -KG     Assistive Device (Bed Mobility)  bed rails;head of bed elevated  (Pended)   -KG     Row Name 12/04/20 1153          Sit-Stand Transfer    Sit-Stand [place] (Transfers)  contact guard;minimum assist (75% patient effort)  (Pended)   -KG     Assistive Device (Sit-Stand Transfers)  walker, front-wheeled  (Pended)   -KG     Row Name 12/04/20 1153          Gait/Stairs (Locomotion)    [place] Level (Gait)  contact guard;minimum assist (75% patient effort)  (Pended)   -KG     Assistive Device (Gait)  walker, front-wheeled  (Pended)   -KG     Distance in Feet (Gait)  25'x2  (Pended)   -KG     Deviations/Abnormal Patterns (Gait)  gait speed decreased;alia decreased;base of support, wide  (Pended)   -KG     Bilateral Gait Deviations  forward flexed posture;heel strike decreased  (Pended)   -KG       User Key  (r) = Recorded By, (t) = Taken By, (c) = Cosigned By    Initials Name  Provider Type    SUGAR Tammie Thorne, PT Student PT Student        Obj/Interventions     Row Name 12/04/20 1154          Balance    Comment, Balance  Pt declined Le exercises due to R LE pain  (Pended)   -KG       User Key  (r) = Recorded By, (t) = Taken By, (c) = Cosigned By    Initials Name Provider Type    SUGAR Tammie Thorne, PT Student PT Student        Goals/Plan    No documentation.       Clinical Impression     Row Name 12/04/20 1155          Pain    Additional Documentation  Pain Scale: FACES Pre/Post-Treatment (Group)  (Pended)   -KG     Row Name 12/04/20 1155          Pain Scale: Numbers Pre/Post-Treatment    Pretreatment Pain Rating  7/10  (Pended)   -KG     Posttreatment Pain Rating  7/10  (Pended)   -KG     Pain Location - Side  Left  (Pended)   -KG     Pain Location - Orientation  lower  (Pended)   -KG     Pain Location  extremity  (Pended)   -KG     Pain Intervention(s)  Repositioned;Ambulation/increased activity  (Pended)   -KG     Row Name 12/04/20 1156          Plan of Care Review    Plan of Care Reviewed With  patient;family  (Pended)  sister  -KG     Progress  no change  (Pended)   -KG     Outcome Summary  PT tx completed. Pt ambulated 25'x2 from bed-commode then commode-chair with CGA-Min A using vcs for activity pacing and breathing technique as she reported feeling SOA. O2 sats maintained above 90% on room air during tx.Continue PT POC.  (Pended)   -KG     Row Name 12/04/20 1155          Vital Signs    Pre SpO2 (%)  94  (Pended)   -KG     O2 Delivery Pre Treatment  room air  (Pended)   -KG     Intra SpO2 (%)  92  (Pended)   -KG     O2 Delivery Intra Treatment  room air  (Pended)   -KG     Post SpO2 (%)  95  (Pended)   -KG     O2 Delivery Post Treatment  room air  (Pended)   -KG     Row Name 12/04/20 1158          Positioning and Restraints    Pre-Treatment Position  in bed  (Pended)   -KG     Post Treatment Position  chair  (Pended)   -KG     In Chair  call light within reach;encouraged to call for  assist;exit alarm on;with family/caregiver;sitting  (Pended)   -KG       User Key  (r) = Recorded By, (t) = Taken By, (c) = Cosigned By    Initials Name Provider Type    Tammie Roblero, PT Student PT Student        Outcome Measures     Row Name 12/04/20 1159          How much help from another person do you currently need...    Turning from your back to your side while in flat bed without using bedrails?  4  (Pended)   -KG     Moving from lying on back to sitting on the side of a flat bed without bedrails?  3  (Pended)   -KG     Moving to and from a bed to a chair (including a wheelchair)?  3  (Pended)   -KG     Standing up from a chair using your arms (e.g., wheelchair, bedside chair)?  3  (Pended)   -KG     Climbing 3-5 steps with a railing?  2  (Pended)   -KG     To walk in hospital room?  3  (Pended)   -KG     AM-PAC 6 Clicks Score (PT)  18  (Pended)   -KG     Row Name 12/04/20 1159          Functional Assessment    Outcome Measure Options  AM-PAC 6 Clicks Basic Mobility (PT)  (Pended)   -KG       User Key  (r) = Recorded By, (t) = Taken By, (c) = Cosigned By    Initials Name Provider Type    Tammie Roblero, PT Student PT Student        Physical Therapy Education                 Title: PT OT SLP Therapies (In Progress)     Topic: Physical Therapy (In Progress)     Point: Mobility training (Done)     Learning Progress Summary           Patient Acceptance, E,TB, VU by KG at 12/4/2020 1159    Comment: Instructed on breathing technique and activity pacing    Acceptance, E,TB, VU by KG at 12/3/2020 1213    Comment: Instructed on role of PT                   Point: Home exercise program (Not Started)     Learner Progress:  Not documented in this visit.          Point: Body mechanics (Not Started)     Learner Progress:  Not documented in this visit.          Point: Precautions (Not Started)     Learner Progress:  Not documented in this visit.                      User Key     Initials Effective Dates Name Provider Type  Discipline    KG 10/19/20 -  Tammie Thorne, PT Student PT Student PT              PT Recommendation and Plan  Planned Therapy Interventions (PT): balance training, home exercise program, patient/family education, strengthening, transfer training, gait training  Plan of Care Reviewed With: (P) patient, family(sister)  Progress: (P) no change  Outcome Summary: (P) PT tx completed. Pt ambulated 25'x2 from bed-commode then commode-chair with CGA-Min A using vcs for activity pacing and breathing technique as she reported feeling SOA. O2 sats maintained above 90% on room air during tx.Continue PT POC.     Time Calculation:   PT Charges     Row Name 20 1202             Time Calculation    Start Time  1130  (Pended)   -KG      Stop Time  1146  (Pended)   -KG      Time Calculation (min)  16 min  (Pended)   -KG         Timed Charges    36434 - Gait Training Minutes   16  (Pended)   -KG        User Key  (r) = Recorded By, (t) = Taken By, (c) = Cosigned By    Initials Name Provider Type    Tammie Roblero, PT Student PT Student        Therapy Charges for Today     Code Description Service Date Service Provider Modifiers Qty    30405528268 HC PT EVAL LOW COMPLEXITY 3 12/3/2020 Tammie Thorne, PT Student GP 1    [id number] HC GAIT TRAINING EA 15 MIN 2020 Tammie Thorne, PT Student GP 1          PT G-Codes  Outcome Measure Options: (P) AM-PAC 6 Clicks Basic Mobility (PT)  AM-PAC 6 Clicks Score (PT): (P) 18  AM-PAC 6 Clicks Score (OT): 18    Tammie Thorne PT Student  2020      Electronically signed by Estrella Ferraro, PT at 20 1454          Occupational Therapy Notes (last 24 hours) (Notes from 20 1602 through 20 1602)      Mariia Arevalo, OT at 20 1431          Patient Name: Sadie Tijerina  : 1940    MRN: [id number]                              Today's Date: 2020       Admit Date: 2020    Visit Dx:     ICD-10-CM ICD-9-CM   1. Acute on chronic respiratory failure, unspecified  whether with hypoxia or hypercapnia (CMS/[facility])  J96.20 518.84     Patient Active Problem List   Diagnosis   • Calf cramp   • Mixed anxiety depressive disorder   • Dizziness   • Fatigue   • GERD (gastroesophageal reflux disease)   • Hematuria   • Hyperlipidemia   • Essential hypertension   • Low back pain   • Restless legs syndrome   • Essential tremor   • Vitamin D deficiency   • Balance problem   • Tension headache   • Tobacco abuse disorder   • Leg swelling   • Acute and chronic respiratory failure with hypoxia (CMS/[facility])   • Venous insufficiency of both lower extremities   • Chronic diastolic heart failure (CMS/[facility])   • Neuropathy   • Acute exacerbation of chronic obstructive pulmonary disease (COPD) (CMS/[facility])   • Bilateral sensorineural hearing loss   • Disorder of inner ear   • Tympanosclerosis   • Transient vision disturbance   • Bilateral bronchopneumonia   • Pneumonia of both lungs due to infectious organism   • Leg wound, right   • Sepsis (CMS/[facility])   • Pneumonia of both lower lobes due to infectious organism   • Chronic respiratory failure with hypoxia (CMS/[facility])   • Physical deconditioning   • Impaired mobility and ADLs   • Oral candidiasis   • Acute on chronic respiratory failure (CMS/[facility])     Past Medical History:   Diagnosis Date   • Arthritis    • Cancer (CMS/[facility])     uterine cancer (1981)   • COPD (chronic obstructive pulmonary disease) (CMS/[facility])    • Depression    • Elevated cholesterol    • GERD (gastroesophageal reflux disease)    • History of emphysema    • History of esophageal reflux    • History of recurrent UTI (urinary tract infection)    • History of renal calculi    • History of uterine cancer    • Hyperlipidemia    • Hypertension    • Impaired functional mobility, balance, gait, and endurance    • Pneumonia 02/2019     Past Surgical History:   Procedure Laterality Date   • FOOT SURGERY     • HAND SURGERY     • HYSTERECTOMY       General Information     Row Name 12/04/20 1417          OT Time  and Intention    Document Type  therapy note (daily note)  -SD     Mode of Treatment  occupational therapy  -SD       User Key  (r) = Recorded By, (t) = Taken By, (c) = Cosigned By    Initials Name Provider Type    Mariia Armenta OT Occupational Therapist        Mobility/ADL's     Row Name 12/04/20 1417          Transfers    Transfers  sit-stand transfer;toilet transfer  -SD     Sit-Stand [place] (Transfers)  minimum assist (75% patient effort)  -SD     [place] Level (Toilet Transfer)  minimum assist (75% patient effort)  -SD     Assistive Device (Toilet Transfer)  commode  -SD     Row Name 12/04/20 1417          Sit-Stand Transfer    Assistive Device (Sit-Stand Transfers)  walker, front-wheeled  -SD     Row Name 12/04/20 1417          Toilet Transfer    Type (Toilet Transfer)  stand pivot/stand step  -SD     Row Name 12/04/20 1417          Functional Mobility    Functional Mobility- Ind. Level  contact guard assist  -SD     Functional Mobility- Device  rolling walker  -SD     Functional Mobility-Distance (Feet)  23 x2  -SD     Row Name 12/04/20 1417          Toileting Assessment/Training    [place] Level (Toileting)  minimum assist (75% patient effort)  -SD     Assistive Devices (Toileting)  commode;grab bar/safety frame  -SD       User Key  (r) = Recorded By, (t) = Taken By, (c) = Cosigned By    Initials Name Provider Type    Mariia Armenta OT Occupational Therapist        Obj/Interventions     Row Name 12/04/20 1419          Shoulder (Therapeutic Exercise)    Shoulder (Therapeutic Exercise)  strengthening exercise  -SD     Shoulder Strengthening (Therapeutic Exercise)  bilateral;flexion;extension;aBduction;aDduction;resistance band;yellow;10 repetitions  -SD     Row Name 12/04/20 1419          Elbow/Forearm (Therapeutic Exercise)    Elbow/Forearm (Therapeutic Exercise)  strengthening exercise  -SD     Elbow/Forearm Strengthening (Therapeutic Exercise)   bilateral;flexion;extension;resistance band;yellow;10 repetitions  -SD     Row Name 12/04/20 1419          Therapeutic Exercise    Therapeutic Exercise  shoulder;elbow/forearm  -SD       User Key  (r) = Recorded By, (t) = Taken By, (c) = Cosigned By    Initials Name Provider Type    Mariia Armenta OT Occupational Therapist        Goals/Plan    No documentation.       Clinical Impression     Row Name 12/04/20 1422          Pain Scale: Numbers Pre/Post-Treatment    Pretreatment Pain Rating  8/10  -SD     Posttreatment Pain Rating  8/10  -SD     Pain Location - Side  Bilateral  -SD     Pain Location - Orientation  lower  -SD     Pain Location  extremity  -SD     Pain Intervention(s)  Repositioned;Ambulation/increased activity  -SD     Row Name 12/04/20 1422          Plan of Care Review    Plan of Care Reviewed With  patient;daughter  -SD     Progress  improving  -SD     Outcome Summary  OT tx completed. Patient completed transfer and functional mobility with CGA-Min A 23' x 2. Required min A for toileting task, followed by UB resistance exercises. Continue OT POC  -SD     Row Name 12/04/20 1422          Vital Signs    O2 Delivery Pre Treatment  room air  -SD     O2 Delivery Intra Treatment  room air  -SD     O2 Delivery Post Treatment  room air  -SD     Row Name 12/04/20 1422          Positioning and Restraints    Pre-Treatment Position  sitting in chair/recliner  -SD     Post Treatment Position  chair  -SD     In Chair  reclined;call light within reach;encouraged to call for assist;exit alarm on;with family/caregiver  -SD       User Key  (r) = Recorded By, (t) = Taken By, (c) = Cosigned By    Initials Name Provider Type    Mariia Armenta OT Occupational Therapist        Outcome Measures     Row Name 12/04/20 1428          How much help from another is currently needed...    Putting on and taking off regular lower body clothing?  3  -SD     Bathing (including washing, rinsing, and drying)  2  -SD      Toileting (which includes using toilet bed pan or urinal)  3  -SD     Putting on and taking off regular upper body clothing  3  -SD     Taking care of personal grooming (such as brushing teeth)  3  -SD     Eating meals  4  -SD     AM-PAC 6 Clicks Score (OT)  18  -SD     Row Name 12/04/20 1428          Functional Assessment    Outcome Measure Options  AM-PAC 6 Clicks Daily Activity (OT)  -SD       User Key  (r) = Recorded By, (t) = Taken By, (c) = Cosigned By    Initials Name Provider Type    Mariia Armenta OT Occupational Therapist        Occupational Therapy Education                 Title: PT OT SLP Therapies (In Progress)     Topic: Occupational Therapy (In Progress)     Point: ADL training (Done)     Description:   Instruct learner(s) on proper safety adaptation and remediation techniques during self care or transfers.   Instruct in proper use of assistive devices.              Learning Progress Summary           Patient Acceptance, E,TB, VU by SD at 12/4/2020 1428    Comment: Safety and sequencing during functional transfers and mobility.    Acceptance, E,TB, VU by SD at 12/3/2020 1514    Comment: Benefit of OT; OT POC   Family Acceptance, E,TB, VU by SD at 12/4/2020 1428    Comment: Safety and sequencing during functional transfers and mobility.                   Point: Home exercise program (Not Started)     Description:   Instruct learner(s) on appropriate technique for monitoring, assisting and/or progressing therapeutic exercises/activities.              Learner Progress:  Not documented in this visit.          Point: Precautions (Not Started)     Description:   Instruct learner(s) on prescribed precautions during self-care and functional transfers.              Learner Progress:  Not documented in this visit.          Point: Body mechanics (Not Started)     Description:   Instruct learner(s) on proper positioning and spine alignment during self-care, functional mobility activities and/or exercises.               Learner Progress:  Not documented in this visit.                      User Key     Initials Effective Dates Name Provider Type Discipline    SD 03/07/18 -  Mariia Arevalo OT Occupational Therapist OT              OT Recommendation and Plan  Planned Therapy Interventions (OT): activity tolerance training, adaptive equipment training, BADL retraining, patient/caregiver education/training, strengthening exercise, transfer/mobility retraining  Therapy Frequency (OT): 3 times/wk(5 times if indicated)  Plan of Care Review  Plan of Care Reviewed With: patient, daughter  Progress: improving  Outcome Summary: OT tx completed. Patient completed transfer and functional mobility with CGA-Min A 23' x 2. Required min A for toileting task, followed by UB resistance exercises. Continue OT POC     Time Calculation:   Time Calculation- OT     Row Name 12/04/20 1430 12/04/20 1202          Time Calculation- OT    OT Start Time  1328  -SD  --     OT Stop Time  1358  -SD  --     OT Time Calculation (min)  30 min  -SD  --     OT Received On  12/04/20  -SD  --     OT Goal Re-Cert Due Date  12/13/20  -SD  --        Timed Charges    27226 - OT Therapeutic Exercise Minutes  15  -SD  --     29417 - Gait Training Minutes   --  16  (Pended)   -KG     26028 - OT Self Care/Mgmt Minutes  15  -SD  --       User Key  (r) = Recorded By, (t) = Taken By, (c) = Cosigned By    Initials Name Provider Type    SD Mariia Arevalo OT Occupational Therapist    Tammie Roblero, PT Student PT Student        Therapy Charges for Today     Code Description Service Date Service Provider Modifiers Qty    80567516834 HC OT EVAL LOW COMPLEXITY 3 12/3/2020 Mariia Arevalo OT GO 1    80898621097 HC OT THER PROC EA 15 MIN 12/4/2020 Mariia Arevalo OT GO 1    88019466149 HC OT SELF CARE/MGMT/TRAIN EA 15 MIN 12/4/2020 Mariia Arevalo OT GO 1               Mariia Arevalo OT  12/4/2020    Electronically signed by Mariia Arevalo OT at 12/04/20 1431      Mariia Arevalo, OT at 12/04/20 7515        Goal Outcome Evaluation:  Plan of Care Reviewed With: patient, daughter  Progress: improving  Outcome Summary: OT tx completed. Patient completed transfer and functional mobility with CGA-Min A 23' x 2. Required min A for toileting task, followed by UB resistance exercises. Continue OT POC    Electronically signed by Mariia Arevalo OT at 12/04/20 4322

## 2020-12-04 NOTE — PLAN OF CARE
Goal Outcome Evaluation:  Plan of Care Reviewed With: (P) patient, family(sister)  Progress: (P) no change  Outcome Summary: (P) PT tx completed. Pt ambulated 25'x2 from bed-commode then commode-chair with CGA-Min A using vcs for activity pacing and breathing technique as she reported feeling SOA. O2 sats maintained above 90% on room air during tx.Continue PT POC.

## 2020-12-04 NOTE — THERAPY TREATMENT NOTE
Patient Name: Sadie Tijerina  : 1940    MRN: [id number]                              Today's Date: 2020       Admit Date: 2020    Visit Dx:     ICD-10-CM ICD-9-CM   1. Acute on chronic respiratory failure, unspecified whether with hypoxia or hypercapnia (CMS/[facility])  J96.20 518.84     Patient Active Problem List   Diagnosis   • Calf cramp   • Mixed anxiety depressive disorder   • Dizziness   • Fatigue   • GERD (gastroesophageal reflux disease)   • Hematuria   • Hyperlipidemia   • Essential hypertension   • Low back pain   • Restless legs syndrome   • Essential tremor   • Vitamin D deficiency   • Balance problem   • Tension headache   • Tobacco abuse disorder   • Leg swelling   • Acute and chronic respiratory failure with hypoxia (CMS/[facility])   • Venous insufficiency of both lower extremities   • Chronic diastolic heart failure (CMS/[facility])   • Neuropathy   • Acute exacerbation of chronic obstructive pulmonary disease (COPD) (CMS/[facility])   • Bilateral sensorineural hearing loss   • Disorder of inner ear   • Tympanosclerosis   • Transient vision disturbance   • Bilateral bronchopneumonia   • Pneumonia of both lungs due to infectious organism   • Leg wound, right   • Sepsis (CMS/[facility])   • Pneumonia of both lower lobes due to infectious organism   • Chronic respiratory failure with hypoxia (CMS/[facility])   • Physical deconditioning   • Impaired mobility and ADLs   • Oral candidiasis   • Acute on chronic respiratory failure (CMS/[facility])     Past Medical History:   Diagnosis Date   • Arthritis    • Cancer (CMS/[facility])     uterine cancer ()   • COPD (chronic obstructive pulmonary disease) (CMS/[facility])    • Depression    • Elevated cholesterol    • GERD (gastroesophageal reflux disease)    • History of emphysema    • History of esophageal reflux    • History of recurrent UTI (urinary tract infection)    • History of renal calculi    • History of uterine cancer    • Hyperlipidemia    • Hypertension    • Impaired functional  mobility, balance, gait, and endurance    • Pneumonia 02/2019     Past Surgical History:   Procedure Laterality Date   • FOOT SURGERY     • HAND SURGERY     • HYSTERECTOMY       General Information     Row Name 12/04/20 1153          Physical Therapy Time and Intention    Document Type  therapy note (daily note)  (Pended)   -KG     Mode of Treatment  physical therapy  (Pended)   -KG     Row Name 12/04/20 1153          General Information    Patient Profile Reviewed  yes  (Pended)   -KG     Existing Precautions/Restrictions  fall  (Pended)   -KG       User Key  (r) = Recorded By, (t) = Taken By, (c) = Cosigned By    Initials Name Provider Type    Tammie Roblero, PT Student PT Student        Mobility     Row Name 12/04/20 1153          Bed Mobility    Bed Mobility  supine-sit  (Pended)   -KG     Supine-Sit [place] (Bed Mobility)  contact guard;minimum assist (75% patient effort)  (Pended)   -KG     Assistive Device (Bed Mobility)  bed rails;head of bed elevated  (Pended)   -KG     Row Name 12/04/20 1153          Sit-Stand Transfer    Sit-Stand [place] (Transfers)  contact guard;minimum assist (75% patient effort)  (Pended)   -KG     Assistive Device (Sit-Stand Transfers)  walker, front-wheeled  (Pended)   -KG     Row Name 12/04/20 1153          Gait/Stairs (Locomotion)    [place] Level (Gait)  contact guard;minimum assist (75% patient effort)  (Pended)   -KG     Assistive Device (Gait)  walker, front-wheeled  (Pended)   -KG     Distance in Feet (Gait)  25'x2  (Pended)   -KG     Deviations/Abnormal Patterns (Gait)  gait speed decreased;alia decreased;base of support, wide  (Pended)   -KG     Bilateral Gait Deviations  forward flexed posture;heel strike decreased  (Pended)   -KG       User Key  (r) = Recorded By, (t) = Taken By, (c) = Cosigned By    Initials Name Provider Type    Tammie Roblero, PT Student PT Student        Obj/Interventions     Row Name 12/04/20 1154          Balance    Comment,  Balance  Pt declined Le exercises due to R LE pain  (Pended)   -KG       User Key  (r) = Recorded By, (t) = Taken By, (c) = Cosigned By    Initials Name Provider Type    Tammie Roblero, PT Student PT Student        Goals/Plan    No documentation.       Clinical Impression     Row Name 12/04/20 2657          Pain    Additional Documentation  Pain Scale: FACES Pre/Post-Treatment (Group)  (Pended)   -KG     Row Name 12/04/20 7939          Pain Scale: Numbers Pre/Post-Treatment    Pretreatment Pain Rating  7/10  (Pended)   -KG     Posttreatment Pain Rating  7/10  (Pended)   -KG     Pain Location - Side  Left  (Pended)   -KG     Pain Location - Orientation  lower  (Pended)   -KG     Pain Location  extremity  (Pended)   -KG     Pain Intervention(s)  Repositioned;Ambulation/increased activity  (Pended)   -KG     Row Name 12/04/20 7945          Plan of Care Review    Plan of Care Reviewed With  patient;family  (Pended)  sister  -KG     Progress  no change  (Pended)   -KG     Outcome Summary  PT tx completed. Pt ambulated 25'x2 from bed-commode then commode-chair with CGA-Min A using vcs for activity pacing and breathing technique as she reported feeling SOA. O2 sats maintained above 90% on room air during tx.Continue PT POC.  (Pended)   -KG     Row Name 12/04/20 0110          Vital Signs    Pre SpO2 (%)  94  (Pended)   -KG     O2 Delivery Pre Treatment  room air  (Pended)   -KG     Intra SpO2 (%)  92  (Pended)   -KG     O2 Delivery Intra Treatment  room air  (Pended)   -KG     Post SpO2 (%)  95  (Pended)   -KG     O2 Delivery Post Treatment  room air  (Pended)   -KG     Row Name 12/04/20 4801          Positioning and Restraints    Pre-Treatment Position  in bed  (Pended)   -KG     Post Treatment Position  chair  (Pended)   -KG     In Chair  call light within reach;encouraged to call for assist;exit alarm on;with family/caregiver;sitting  (Pended)   -KG       User Key  (r) = Recorded By, (t) = Taken By, (c) = Cosigned By     Initials Name Provider Type    KG Tammie Thorne, PT Student PT Student        Outcome Measures     Row Name 12/04/20 1159          How much help from another person do you currently need...    Turning from your back to your side while in flat bed without using bedrails?  4  (Pended)   -KG     Moving from lying on back to sitting on the side of a flat bed without bedrails?  3  (Pended)   -KG     Moving to and from a bed to a chair (including a wheelchair)?  3  (Pended)   -KG     Standing up from a chair using your arms (e.g., wheelchair, bedside chair)?  3  (Pended)   -KG     Climbing 3-5 steps with a railing?  2  (Pended)   -KG     To walk in hospital room?  3  (Pended)   -KG     AM-PAC 6 Clicks Score (PT)  18  (Pended)   -KG     Row Name 12/04/20 1159          Functional Assessment    Outcome Measure Options  AM-PAC 6 Clicks Basic Mobility (PT)  (Pended)   -KG       User Key  (r) = Recorded By, (t) = Taken By, (c) = Cosigned By    Initials Name Provider Type    KG Tammie Thorne, PT Student PT Student        Physical Therapy Education                 Title: PT OT SLP Therapies (In Progress)     Topic: Physical Therapy (In Progress)     Point: Mobility training (Done)     Learning Progress Summary           Patient Acceptance, E,TB, VU by KG at 12/4/2020 1159    Comment: Instructed on breathing technique and activity pacing    Acceptance, E,TB, VU by KG at 12/3/2020 1213    Comment: Instructed on role of PT                   Point: Home exercise program (Not Started)     Learner Progress:  Not documented in this visit.          Point: Body mechanics (Not Started)     Learner Progress:  Not documented in this visit.          Point: Precautions (Not Started)     Learner Progress:  Not documented in this visit.                      User Key     Initials Effective Dates Name Provider Type Discipline    KG 10/19/20 -  Tammie Thorne, PT Student PT Student PT              PT Recommendation and Plan  Planned Therapy Interventions  (PT): balance training, home exercise program, patient/family education, strengthening, transfer training, gait training  Plan of Care Reviewed With: (P) patient, family(sister)  Progress: (P) no change  Outcome Summary: (P) PT tx completed. Pt ambulated 25'x2 from bed-commode then commode-chair with CGA-Min A using vcs for activity pacing and breathing technique as she reported feeling SOA. O2 sats maintained above 90% on room air during tx.Continue PT POC.     Time Calculation:   PT Charges     Row Name 12/04/20 1202             Time Calculation    Start Time  1130  (Pended)   -KG      Stop Time  1146  (Pended)   -KG      Time Calculation (min)  16 min  (Pended)   -KG         Timed Charges    84055 - Gait Training Minutes   16  (Pended)   -KG        User Key  (r) = Recorded By, (t) = Taken By, (c) = Cosigned By    Initials Name Provider Type    Tammie Roblero, PT Student PT Student        Therapy Charges for Today     Code Description Service Date Service Provider Modifiers Qty    87453757256 HC PT EVAL LOW COMPLEXITY 3 12/3/2020 Tammie Thorne, PT Student GP 1    [id number]  GAIT TRAINING EA 15 MIN 12/4/2020 Tammie Thorne, PT Student GP 1          PT G-Codes  Outcome Measure Options: (P) AM-PAC 6 Clicks Basic Mobility (PT)  AM-PAC 6 Clicks Score (PT): (P) 18  AM-PAC 6 Clicks Score (OT): 18    Tammie Thorne PT Student  12/4/2020

## 2020-12-04 NOTE — PLAN OF CARE
Goal Outcome Evaluation:  Plan of Care Reviewed With: patient, daughter  Progress: improving  Outcome Summary: No acute events noted during shift. VSS. DC plans for Rehab early next week.

## 2020-12-04 NOTE — PROGRESS NOTES
Continued Stay Note   [facility]     Patient Name: Sadie Tijerina  MRN: [id number]  Today's Date: 12/4/2020    Admit Date: 12/2/2020    Discharge Plan     Row Name 12/04/20 1422       Plan    Plan  Case Management has called [facility]  in admissions left multiple voice messages also called Amrita Singer   admissions left voice message on her cell phone [phone]  Updated pt and daughter of this She will contact the personal friend that she knows that works there Pt lives alone and  he daughter can not stay with her she works    [facility] called back [phone] they will not have a bed until next week and they would need to run the insurance  Spoke to pt and daughter they are agreement to to seek rehab in  [place] faxed to [facility]         Discharge Codes    No documentation.       Expected Discharge Date and Time     Expected Discharge Date Expected Discharge Time    Dec 4, 2020             Sara Brooke RN

## 2020-12-04 NOTE — THERAPY TREATMENT NOTE
Patient Name: Sadie Tijerina  : 1940    MRN: [id number]                              Today's Date: 2020       Admit Date: 2020    Visit Dx:     ICD-10-CM ICD-9-CM   1. Acute on chronic respiratory failure, unspecified whether with hypoxia or hypercapnia (CMS/[facility])  J96.20 518.84     Patient Active Problem List   Diagnosis   • Calf cramp   • Mixed anxiety depressive disorder   • Dizziness   • Fatigue   • GERD (gastroesophageal reflux disease)   • Hematuria   • Hyperlipidemia   • Essential hypertension   • Low back pain   • Restless legs syndrome   • Essential tremor   • Vitamin D deficiency   • Balance problem   • Tension headache   • Tobacco abuse disorder   • Leg swelling   • Acute and chronic respiratory failure with hypoxia (CMS/[facility])   • Venous insufficiency of both lower extremities   • Chronic diastolic heart failure (CMS/[facility])   • Neuropathy   • Acute exacerbation of chronic obstructive pulmonary disease (COPD) (CMS/[facility])   • Bilateral sensorineural hearing loss   • Disorder of inner ear   • Tympanosclerosis   • Transient vision disturbance   • Bilateral bronchopneumonia   • Pneumonia of both lungs due to infectious organism   • Leg wound, right   • Sepsis (CMS/[facility])   • Pneumonia of both lower lobes due to infectious organism   • Chronic respiratory failure with hypoxia (CMS/[facility])   • Physical deconditioning   • Impaired mobility and ADLs   • Oral candidiasis   • Acute on chronic respiratory failure (CMS/[facility])     Past Medical History:   Diagnosis Date   • Arthritis    • Cancer (CMS/[facility])     uterine cancer ()   • COPD (chronic obstructive pulmonary disease) (CMS/[facility])    • Depression    • Elevated cholesterol    • GERD (gastroesophageal reflux disease)    • History of emphysema    • History of esophageal reflux    • History of recurrent UTI (urinary tract infection)    • History of renal calculi    • History of uterine cancer    • Hyperlipidemia    • Hypertension    • Impaired functional  mobility, balance, gait, and endurance    • Pneumonia 02/2019     Past Surgical History:   Procedure Laterality Date   • FOOT SURGERY     • HAND SURGERY     • HYSTERECTOMY       General Information     Row Name 12/04/20 1417          OT Time and Intention    Document Type  therapy note (daily note)  -SD     Mode of Treatment  occupational therapy  -SD       User Key  (r) = Recorded By, (t) = Taken By, (c) = Cosigned By    Initials Name Provider Type    Mariia Armenta OT Occupational Therapist        Mobility/ADL's     Row Name 12/04/20 1417          Transfers    Transfers  sit-stand transfer;toilet transfer  -SD     Sit-Stand Imperial (Transfers)  minimum assist (75% patient effort)  -SD     Imperial Level (Toilet Transfer)  minimum assist (75% patient effort)  -SD     Assistive Device (Toilet Transfer)  commode  -SD     Row Name 12/04/20 1417          Sit-Stand Transfer    Assistive Device (Sit-Stand Transfers)  walker, front-wheeled  -SD     Row Name 12/04/20 1417          Toilet Transfer    Type (Toilet Transfer)  stand pivot/stand step  -SD     Row Name 12/04/20 1417          Functional Mobility    Functional Mobility- Ind. Level  contact guard assist  -SD     Functional Mobility- Device  rolling walker  -SD     Functional Mobility-Distance (Feet)  23 x2  -SD     Row Name 12/04/20 1417          Toileting Assessment/Training    Imperial Level (Toileting)  minimum assist (75% patient effort)  -SD     Assistive Devices (Toileting)  commode;grab bar/safety frame  -SD       User Key  (r) = Recorded By, (t) = Taken By, (c) = Cosigned By    Initials Name Provider Type    Mariia Armenta OT Occupational Therapist        Obj/Interventions     Row Name 12/04/20 1419          Shoulder (Therapeutic Exercise)    Shoulder (Therapeutic Exercise)  strengthening exercise  -SD     Shoulder Strengthening (Therapeutic Exercise)  bilateral;flexion;extension;aBduction;aDduction;resistance band;yellow;10  repetitions  -SD     Row Name 12/04/20 1419          Elbow/Forearm (Therapeutic Exercise)    Elbow/Forearm (Therapeutic Exercise)  strengthening exercise  -SD     Elbow/Forearm Strengthening (Therapeutic Exercise)  bilateral;flexion;extension;resistance band;yellow;10 repetitions  -SD     Row Name 12/04/20 1419          Therapeutic Exercise    Therapeutic Exercise  shoulder;elbow/forearm  -SD       User Key  (r) = Recorded By, (t) = Taken By, (c) = Cosigned By    Initials Name Provider Type    Mariia Armenta OT Occupational Therapist        Goals/Plan    No documentation.       Clinical Impression     Row Name 12/04/20 1422          Pain Scale: Numbers Pre/Post-Treatment    Pretreatment Pain Rating  8/10  -SD     Posttreatment Pain Rating  8/10  -SD     Pain Location - Side  Bilateral  -SD     Pain Location - Orientation  lower  -SD     Pain Location  extremity  -SD     Pain Intervention(s)  Repositioned;Ambulation/increased activity  -SD     Row Name 12/04/20 1422          Plan of Care Review    Plan of Care Reviewed With  patient;daughter  -SD     Progress  improving  -SD     Outcome Summary  OT tx completed. Patient completed transfer and functional mobility with CGA-Min A 23' x 2. Required min A for toileting task, followed by UB resistance exercises. Continue OT POC  -SD     Row Name 12/04/20 1422          Vital Signs    O2 Delivery Pre Treatment  room air  -SD     O2 Delivery Intra Treatment  room air  -SD     O2 Delivery Post Treatment  room air  -SD     Row Name 12/04/20 1422          Positioning and Restraints    Pre-Treatment Position  sitting in chair/recliner  -SD     Post Treatment Position  chair  -SD     In Chair  reclined;call light within reach;encouraged to call for assist;exit alarm on;with family/caregiver  -SD       User Key  (r) = Recorded By, (t) = Taken By, (c) = Cosigned By    Initials Name Provider Type    Mariia Armenta OT Occupational Therapist        Outcome Measures     Row  Name 12/04/20 1428          How much help from another is currently needed...    Putting on and taking off regular lower body clothing?  3  -SD     Bathing (including washing, rinsing, and drying)  2  -SD     Toileting (which includes using toilet bed pan or urinal)  3  -SD     Putting on and taking off regular upper body clothing  3  -SD     Taking care of personal grooming (such as brushing teeth)  3  -SD     Eating meals  4  -SD     AM-PAC 6 Clicks Score (OT)  18  -SD     Row Name 12/04/20 1428          Functional Assessment    Outcome Measure Options  AM-PAC 6 Clicks Daily Activity (OT)  -SD       User Key  (r) = Recorded By, (t) = Taken By, (c) = Cosigned By    Initials Name Provider Type    Mariia Armenta OT Occupational Therapist        Occupational Therapy Education                 Title: PT OT SLP Therapies (In Progress)     Topic: Occupational Therapy (In Progress)     Point: ADL training (Done)     Description:   Instruct learner(s) on proper safety adaptation and remediation techniques during self care or transfers.   Instruct in proper use of assistive devices.              Learning Progress Summary           Patient Acceptance, E,TB, VU by SD at 12/4/2020 1428    Comment: Safety and sequencing during functional transfers and mobility.    Acceptance, E,TB, VU by SD at 12/3/2020 1514    Comment: Benefit of OT; OT POC   Family Acceptance, E,TB, VU by SD at 12/4/2020 1428    Comment: Safety and sequencing during functional transfers and mobility.                   Point: Home exercise program (Not Started)     Description:   Instruct learner(s) on appropriate technique for monitoring, assisting and/or progressing therapeutic exercises/activities.              Learner Progress:  Not documented in this visit.          Point: Precautions (Not Started)     Description:   Instruct learner(s) on prescribed precautions during self-care and functional transfers.              Learner Progress:  Not documented  in this visit.          Point: Body mechanics (Not Started)     Description:   Instruct learner(s) on proper positioning and spine alignment during self-care, functional mobility activities and/or exercises.              Learner Progress:  Not documented in this visit.                      User Key     Initials Effective Dates Name Provider Type Discipline    SD 03/07/18 -  Mariia Arevalo OT Occupational Therapist OT              OT Recommendation and Plan  Planned Therapy Interventions (OT): activity tolerance training, adaptive equipment training, BADL retraining, patient/caregiver education/training, strengthening exercise, transfer/mobility retraining  Therapy Frequency (OT): 3 times/wk(5 times if indicated)  Plan of Care Review  Plan of Care Reviewed With: patient, daughter  Progress: improving  Outcome Summary: OT tx completed. Patient completed transfer and functional mobility with CGA-Min A 23' x 2. Required min A for toileting task, followed by UB resistance exercises. Continue OT POC     Time Calculation:   Time Calculation- OT     Row Name 12/04/20 1430 12/04/20 1202          Time Calculation- OT    OT Start Time  1328  -SD  --     OT Stop Time  1358  -SD  --     OT Time Calculation (min)  30 min  -SD  --     OT Received On  12/04/20  -SD  --     OT Goal Re-Cert Due Date  12/13/20  -SD  --        Timed Charges    44572 - OT Therapeutic Exercise Minutes  15  -SD  --     43488 - Gait Training Minutes   --  16  (Pended)   -KG     77973 - OT Self Care/Mgmt Minutes  15  -SD  --       User Key  (r) = Recorded By, (t) = Taken By, (c) = Cosigned By    Initials Name Provider Type    SD Mariia Arevalo OT Occupational Therapist    KG Tammie Thorne, PT Student PT Student        Therapy Charges for Today     Code Description Service Date Service Provider Modifiers Qty    24527708563  OT EVAL LOW COMPLEXITY 3 12/3/2020 Mariia Arevalo OT GO 1    [id number]  OT THER PROC EA 15 MIN 12/4/2020 Mariia Arevalo  OT GO 1    [id number] HC OT SELF CARE/MGMT/TRAIN EA 15 MIN 12/4/2020 Mariia Arevalo OT GO 1               Mariia Arevalo OT  12/4/2020

## 2020-12-04 NOTE — PLAN OF CARE
Goal Outcome Evaluation:  Plan of Care Reviewed With: patient, daughter  Progress: improving  Outcome Summary: OT tx completed. Patient completed transfer and functional mobility with CGA-Min A 23' x 2. Required min A for toileting task, followed by UB resistance exercises. Continue OT POC

## 2020-12-05 NOTE — PROGRESS NOTES
[facility]    Name:  Sadie Tijerina   Age:  80 y.o.  Sex:  female  :  1940  MRN:  [id number]   Visit Number:  [id number]  Admission Date:  2020  Date Of Service:  20  Primary Care Physician:  Kristian Wolff MD     LOS: 0 days :  Patient Care Team:  Kristian Wolff MD as PCP - General  Kristian Wolff MD as PCP - Family Medicine  Bayron Grimaldo MD (Inactive) as Consulting Physician (General Surgery):      Subjective / Interval History:     Patient's chart has been reviewed and events noted since admission.  She has been hypoxic respiratory failure with COPD exacerbation and functional decline.  Patient initially was started on IV antibiotic for pneumonia has been switched over to oral antibiotic.    .    At present patient is stable denies any pain or shortness of breath she is worried about blood clot but ultrasound has been done which is negative      Vital Signs:    Temp:  [97.5 °F (36.4 °C)-97.8 °F (36.6 °C)] 97.6 °F (36.4 °C)  Heart Rate:  [80-92] 82  Resp:  [16-20] 20  BP: (121-145)/(49-67) 145/59    Intake and output:    I/O last 3 completed shifts:  In: 840 [P.O.:840]  Out: -   I/O this shift:  In: 360 [P.O.:360]  Out: -     Physical Examination:    General Appearance:    Alert and cooperative, not in any acute distress.   Head:    Atraumatic and normocephalic, without obvious abnormality.   Eyes:            PERRLA,  No pallor. Extraocular movements are within normal limits.   Neck:   Supple,  No lymph glands, no bruit   Lungs:     Chest shape is normal. Breath sounds heard bilaterally equally.  No crackles or wheezing.     Heart:    Normal S1 and S2, no murmur,  No JVD   Abdomen:     Normal bowel sounds, no masses, no organomegaly. Soft     nontender, no guarding, no rebound tenderness   Extremities:   Moves all extremities well, edema of the right leg edema, no cyanosis,    Skin:   No  bruising or rash.   Neurologic:   Grossly nonfocal  and moves all extremities.      Laboratory results:  Results from last 7 days   Lab Units 12/03/20  0650 12/02/20  1749 12/01/20  0643   SODIUM mmol/L 136 138 138   POTASSIUM mmol/L 4.5 4.4 4.3   CHLORIDE mmol/L 95* 99 99   CO2 mmol/L 29.0 31.4* 32.7*   BUN mg/dL 23 22 35*   CREATININE mg/dL 1.18* 1.05* 0.90   CALCIUM mg/dL 9.2 9.7 9.1   BILIRUBIN mg/dL  --  0.5  --    ALK PHOS U/L  --  98  --    ALT (SGPT) U/L  --  105*  --    AST (SGOT) U/L  --  137*  --    GLUCOSE mg/dL 170* 129* 111*     Results from last 7 days   Lab Units 12/04/20  0746 12/02/20  1749 12/01/20  0643   WBC 10*3/mm3 15.19* 23.55* 13.74*   HEMOGLOBIN g/dL 9.9* 11.7* 9.6*   HEMATOCRIT % 31.0* 37.6 31.0*   PLATELETS 10*3/mm3 153 211 201         Results from last 7 days   Lab Units 12/02/20  1749   CK TOTAL U/L 34   TROPONIN T ng/mL <0.010     Results from last 7 days   Lab Units 12/03/20  0052 12/02/20  1747 12/02/20  1740   BLOODCX   --  No growth at 2 days No growth at 2 days   MRSACX  No Methicillin Resistant Staphylococcus aureus isolated  --   --        Radiology results:    Imaging Results (Last 24 Hours)     ** No results found for the last 24 hours. **          I have reviewed the patient's radiology reports.    Medication Review:     I have reviewed the patients active and prn medications.     Assessment:      Acute on chronic respiratory failure (CMS/[facility])    Chronic diastolic heart failure (CMS/[facility])    Acute exacerbation of chronic obstructive pulmonary disease (COPD) (CMS/[facility])    Bilateral bronchopneumonia  COPD  Generalized weakness        Plan:    Patient with respiratory failure, COPD exacerbation is improving and stable on current management  She does have generalized weakness and needs to go and placement is being worked up on by the .  Facility referral has been placed    Continue current management as per orders and plan of care has been discussed with the patient    Jani Meléndez MD  12/05/20  13:17  EST      Please note that portions of this note were completed with a voice recognition program.

## 2020-12-05 NOTE — PLAN OF CARE
Goal Outcome Evaluation:  Plan of Care Reviewed With: patient  Progress: improving  Outcome Summary: VSS.  Pt rested comfortably during shift.  No acute changes in pt condition to report.  Will continue to monitor.

## 2020-12-05 NOTE — PLAN OF CARE
Goal Outcome Evaluation:  Plan of Care Reviewed With: patient  Progress: no change   Sadie's VSS. Pain and anxiety controlled per MAR. Daughter at bedside. Will continue to monitor. Awaiting bed at rehab.

## 2020-12-05 NOTE — PLAN OF CARE
Goal Outcome Evaluation:  Plan of Care Reviewed With: patient  Progress: no change  Outcome Summary: Pt with slight decreased A for transfers and amb and pt amb 27 feet x2. Pt required A to perform heelslides and SLR for R LE d/t pain and required frequent rest breaks d/t fatigue. Con't with PT POC and progress as pt tolerates

## 2020-12-05 NOTE — THERAPY TREATMENT NOTE
Patient Name: Sadie Tijerina  : 1940    MRN: [id number]                              Today's Date: 2020       Admit Date: 2020    Visit Dx:     ICD-10-CM ICD-9-CM   1. Acute on chronic respiratory failure, unspecified whether with hypoxia or hypercapnia (CMS/[facility])  J96.20 518.84     Patient Active Problem List   Diagnosis   • Calf cramp   • Mixed anxiety depressive disorder   • Dizziness   • Fatigue   • GERD (gastroesophageal reflux disease)   • Hematuria   • Hyperlipidemia   • Essential hypertension   • Low back pain   • Restless legs syndrome   • Essential tremor   • Vitamin D deficiency   • Balance problem   • Tension headache   • Tobacco abuse disorder   • Leg swelling   • Acute and chronic respiratory failure with hypoxia (CMS/[facility])   • Venous insufficiency of both lower extremities   • Chronic diastolic heart failure (CMS/[facility])   • Neuropathy   • Acute exacerbation of chronic obstructive pulmonary disease (COPD) (CMS/[facility])   • Bilateral sensorineural hearing loss   • Disorder of inner ear   • Tympanosclerosis   • Transient vision disturbance   • Bilateral bronchopneumonia   • Pneumonia of both lungs due to infectious organism   • Leg wound, right   • Sepsis (CMS/[facility])   • Pneumonia of both lower lobes due to infectious organism   • Chronic respiratory failure with hypoxia (CMS/[facility])   • Physical deconditioning   • Impaired mobility and ADLs   • Oral candidiasis   • Acute on chronic respiratory failure (CMS/[facility])     Past Medical History:   Diagnosis Date   • Arthritis    • Cancer (CMS/[facility])     uterine cancer ()   • COPD (chronic obstructive pulmonary disease) (CMS/[facility])    • Depression    • Elevated cholesterol    • GERD (gastroesophageal reflux disease)    • History of emphysema    • History of esophageal reflux    • History of recurrent UTI (urinary tract infection)    • History of renal calculi    • History of uterine cancer    • Hyperlipidemia    • Hypertension    • Impaired functional  mobility, balance, gait, and endurance    • Pneumonia 02/2019     Past Surgical History:   Procedure Laterality Date   • FOOT SURGERY     • HAND SURGERY     • HYSTERECTOMY       General Information     Row Name 12/05/20 1623          Physical Therapy Time and Intention    Document Type  therapy note (daily note)  -     Mode of Treatment  physical therapy  -     Row Name 12/05/20 1623          General Information    Patient Profile Reviewed  yes  -     Existing Precautions/Restrictions  fall  -     Row Name 12/05/20 1623          Safety Issues, Functional Mobility    Safety Issues Affecting Function (Mobility)  safety precaution awareness;safety precautions follow-through/compliance  -     Impairments Affecting Function (Mobility)  balance;pain;shortness of breath;strength  -       User Key  (r) = Recorded By, (t) = Taken By, (c) = Cosigned By    Initials Name Provider Type     Nika Chun PTA Physical Therapy Assistant        Mobility     Row Name 12/05/20 1623          Transfers    Comment (Transfers)  sit <->stand from recliner chair to AD <->commode.  Pt with occ retro lean with initial stand  -     Row Name 12/05/20 1623          Sit-Stand Transfer    Sit-Stand [place] (Transfers)  contact guard  -     Assistive Device (Sit-Stand Transfers)  walker, front-wheeled  -     Row Name 12/05/20 1623          Gait/Stairs (Locomotion)    [place] Level (Gait)  contact guard;minimum assist (75% patient effort)  -     Assistive Device (Gait)  walker, front-wheeled  -     Distance in Feet (Gait)  27x2  -     Deviations/Abnormal Patterns (Gait)  gait speed decreased;alia decreased;base of support, wide  -     Bilateral Gait Deviations  forward flexed posture;heel strike decreased  -       User Key  (r) = Recorded By, (t) = Taken By, (c) = Cosigned By    Initials Name Provider Type    Nika Johnson PTA Physical Therapy Assistant        Obj/Interventions     Row Name  12/05/20 1623          Range of Motion Comprehensive    Comment, General Range of Motion  Pt performed B LE ex in sitting reclined AP QS glut sets hip abd and L LE heeslides, SLR AROM and L LE heelslides and SLR AAROM  -CC     Row Name 12/05/20 1623          Strength Comprehensive (MMT)    General Manual Muscle Testing (MMT) Assessment  lower extremity strength deficits identified  -CC       User Key  (r) = Recorded By, (t) = Taken By, (c) = Cosigned By    Initials Name Provider Type    CC Nika Chun, DILEEP Physical Therapy Assistant        Goals/Plan    No documentation.       Clinical Impression     Row Name 12/05/20 1623          Pain Scale: Numbers Pre/Post-Treatment    Pretreatment Pain Rating  6/10  -CC     Posttreatment Pain Rating  6/10  -CC     Pain Location - Side  Right  -CC     Pain Location - Orientation  lower  -CC     Pain Location  extremity  -CC     Pain Intervention(s)  Ambulation/increased activity;Repositioned  -CC     Row Name 12/05/20 1623          Plan of Care Review    Plan of Care Reviewed With  patient  -CC     Progress  improving  -CC     Outcome Summary  Pt with slight decreased A for transfers and amb and pt amb 27 feet x2. Pt required A to perform heelslides and SLR for R LE d/t pain and required frequent rest breaks d/t fatigue. Con't with PT POC and progress as pt tolerates  -CC     Row Name 12/05/20 1623          Positioning and Restraints    Pre-Treatment Position  sitting in chair/recliner  -CC     Post Treatment Position  chair  -CC     In Chair  reclined;call light within reach;encouraged to call for assist  -CC       User Key  (r) = Recorded By, (t) = Taken By, (c) = Cosigned By    Initials Name Provider Type    CC Nika Chun PTA Physical Therapy Assistant        Outcome Measures     Row Name 12/05/20 1623          How much help from another person do you currently need...    Turning from your back to your side while in flat bed without using bedrails?  4  -CC      Moving from lying on back to sitting on the side of a flat bed without bedrails?  3  -CC     Moving to and from a bed to a chair (including a wheelchair)?  3  -CC     Standing up from a chair using your arms (e.g., wheelchair, bedside chair)?  3  -CC     Climbing 3-5 steps with a railing?  2  -CC     To walk in hospital room?  3  -CC     AM-PAC 6 Clicks Score (PT)  18  -CC     Row Name 12/05/20 1623          Functional Assessment    Outcome Measure Options  AM-PAC 6 Clicks Basic Mobility (PT)  -CC       User Key  (r) = Recorded By, (t) = Taken By, (c) = Cosigned By    Initials Name Provider Type    CC Nika Chun PTA Physical Therapy Assistant        Physical Therapy Education                 Title: PT OT SLP Therapies (In Progress)     Topic: Physical Therapy (In Progress)     Point: Mobility training (Done)     Learning Progress Summary           Patient Acceptance, E,TB, VU by KG at 12/4/2020 1159    Comment: Instructed on breathing technique and activity pacing    Acceptance, E,TB, VU by KG at 12/3/2020 1213    Comment: Instructed on role of PT                   Point: Home exercise program (Done)     Learning Progress Summary           Patient Acceptance, E,TB, VU by CC at 12/5/2020 1840    Comment: importance of performing ex to increase strength and activity tolerance                   Point: Body mechanics (Not Started)     Learner Progress:  Not documented in this visit.          Point: Precautions (Not Started)     Learner Progress:  Not documented in this visit.                      User Key     Initials Effective Dates Name Provider Type Discipline    CC 03/07/18 -  Nika Chun PTA Physical Therapy Assistant PT    KG 10/19/20 -  Tammie Thorne, YANNICK Student PT Student PT              PT Recommendation and Plan     Plan of Care Reviewed With: patient  Progress: improving  Outcome Summary: Pt with slight decreased A for transfers and amb and pt amb 27 feet x2. Pt required A to perform heelslides  and SLR for R LE d/t pain and required frequent rest breaks d/t fatigue. Con't with PT POC and progress as pt tolerates     Time Calculation:   PT Charges     Row Name 12/05/20 1623             Time Calculation    Start Time  1623  -CC      Stop Time  1705  -CC      Time Calculation (min)  42 min  -CC      PT Received On  12/05/20  -      PT Goal Re-Cert Due Date  12/13/20  -CC         Timed Charges    37430 - PT Therapeutic Exercise Minutes  15  -CC      77274 - Gait Training Minutes   17  -CC      35527 - PT Therapeutic Activity Minutes  10  -CC        User Key  (r) = Recorded By, (t) = Taken By, (c) = Cosigned By    Initials Name Provider Type    CC Nika Chun PTA Physical Therapy Assistant        Therapy Charges for Today     Code Description Service Date Service Provider Modifiers Qty    14814074108 HC PT THER PROC EA 15 MIN 12/5/2020 Nika Chun, PTA GP 1    65714850570 HC GAIT TRAINING EA 15 MIN 12/5/2020 Nika Chun, DILEEP GP 1    31303620195 HC PT THERAPEUTIC ACT EA 15 MIN 12/5/2020 Nika Chun, DILEEP GP 1          PT G-Codes  Outcome Measure Options: AM-PAC 6 Clicks Basic Mobility (PT)  AM-PAC 6 Clicks Score (PT): 18  AM-PAC 6 Clicks Score (OT): 18    Nika Chun PTA  12/5/2020

## 2020-12-06 NOTE — THERAPY TREATMENT NOTE
Patient Name: Sadie Tijerina  : 1940    MRN: [id number]                              Today's Date: 2020       Admit Date: 2020    Visit Dx:     ICD-10-CM ICD-9-CM   1. Acute on chronic respiratory failure, unspecified whether with hypoxia or hypercapnia (CMS/[facility])  J96.20 518.84     Patient Active Problem List   Diagnosis   • Calf cramp   • Mixed anxiety depressive disorder   • Dizziness   • Fatigue   • GERD (gastroesophageal reflux disease)   • Hematuria   • Hyperlipidemia   • Essential hypertension   • Low back pain   • Restless legs syndrome   • Essential tremor   • Vitamin D deficiency   • Balance problem   • Tension headache   • Tobacco abuse disorder   • Leg swelling   • Acute and chronic respiratory failure with hypoxia (CMS/[facility])   • Venous insufficiency of both lower extremities   • Chronic diastolic heart failure (CMS/[facility])   • Neuropathy   • Acute exacerbation of chronic obstructive pulmonary disease (COPD) (CMS/[facility])   • Bilateral sensorineural hearing loss   • Disorder of inner ear   • Tympanosclerosis   • Transient vision disturbance   • Bilateral bronchopneumonia   • Pneumonia of both lungs due to infectious organism   • Leg wound, right   • Sepsis (CMS/[facility])   • Pneumonia of both lower lobes due to infectious organism   • Chronic respiratory failure with hypoxia (CMS/[facility])   • Physical deconditioning   • Impaired mobility and ADLs   • Oral candidiasis   • Acute on chronic respiratory failure (CMS/[facility])     Past Medical History:   Diagnosis Date   • Arthritis    • Cancer (CMS/[facility])     uterine cancer ()   • COPD (chronic obstructive pulmonary disease) (CMS/[facility])    • Depression    • Elevated cholesterol    • GERD (gastroesophageal reflux disease)    • History of emphysema    • History of esophageal reflux    • History of recurrent UTI (urinary tract infection)    • History of renal calculi    • History of uterine cancer    • Hyperlipidemia    • Hypertension    • Impaired functional  mobility, balance, gait, and endurance    • Pneumonia 02/2019     Past Surgical History:   Procedure Laterality Date   • FOOT SURGERY     • HAND SURGERY     • HYSTERECTOMY       General Information     Row Name 12/06/20 1121          Physical Therapy Time and Intention    Document Type  therapy note (daily note)  -     Mode of Treatment  physical therapy  -     Row Name 12/06/20 1121          General Information    Patient Profile Reviewed  yes  -     Existing Precautions/Restrictions  fall  -     Row Name 12/06/20 1121          Safety Issues, Functional Mobility    Safety Issues Affecting Function (Mobility)  safety precaution awareness;safety precautions follow-through/compliance  -     Impairments Affecting Function (Mobility)  balance;pain;shortness of breath;strength  -       User Key  (r) = Recorded By, (t) = Taken By, (c) = Cosigned By    Initials Name Provider Type     Nika Chun PTA Physical Therapy Assistant        Mobility     Row Name 12/06/20 1121          Bed Mobility    Bed Mobility  supine-sit  -     Supine-Sit [place] (Bed Mobility)  contact guard;standby assist  -     Assistive Device (Bed Mobility)  bed rails;head of bed elevated  -     Row Name 12/06/20 1121          Sit-Stand Transfer    Sit-Stand [place] (Transfers)  contact guard  -     Assistive Device (Sit-Stand Transfers)  walker, front-wheeled  -CC     Row Name 12/06/20 1121          Gait/Stairs (Locomotion)    [place] Level (Gait)  contact guard  -     Assistive Device (Gait)  walker, front-wheeled  -     Distance in Feet (Gait)  35 x1  -CC     Deviations/Abnormal Patterns (Gait)  gait speed decreased;alia decreased;base of support, wide  -CC     Bilateral Gait Deviations  forward flexed posture;heel strike decreased  -       User Key  (r) = Recorded By, (t) = Taken By, (c) = Cosigned By    Initials Name Provider Type    CC Nika Chun PTA Physical Therapy Assistant         Obj/Interventions     Row Name 12/06/20 1121          Range of Motion Comprehensive    Comment, General Range of Motion  Performed B LE ex in reclined position AP, QS, glut sets, hip abd, SLR and heelslides all AROM except for R LE SLR and heelslides  -CC     Row Name 12/06/20 1121          Strength Comprehensive (MMT)    General Manual Muscle Testing (MMT) Assessment  lower extremity strength deficits identified  -CC       User Key  (r) = Recorded By, (t) = Taken By, (c) = Cosigned By    Initials Name Provider Type    CC Nika Chun, DILEEP Physical Therapy Assistant        Goals/Plan    No documentation.       Clinical Impression     Row Name 12/06/20 1121          Pain Scale: Numbers Pre/Post-Treatment    Pretreatment Pain Rating  4/10  -CC     Posttreatment Pain Rating  6/10  -CC     Pain Location - Side  Right  -CC     Pain Location - Orientation  lower  -CC     Pain Location  extremity  -CC     Pain Intervention(s)  Ambulation/increased activity;Repositioned  -CC     Row Name 12/06/20 1121          Plan of Care Review    Plan of Care Reviewed With  patient  -CC     Progress  improving  -CC     Outcome Summary  Pt participated with therapy and increased distance amb to 35 feet with decreased speed, alia and occ antalgic gait pattern noted. Pt to bedside chair reclined. Pt con't to require frequent rest breaks d/t fatigue. Con't with PT POC and progress as pt tolerates  -CC     Row Name 12/06/20 1121          Positioning and Restraints    Pre-Treatment Position  in bed  -CC     Post Treatment Position  chair  -CC     In Chair  reclined;call light within reach;encouraged to call for assist  -CC       User Key  (r) = Recorded By, (t) = Taken By, (c) = Cosigned By    Initials Name Provider Type    CC Nika Chun PTA Physical Therapy Assistant        Outcome Measures     Row Name 12/06/20 1121          How much help from another person do you currently need...    Turning from your back to your side  while in flat bed without using bedrails?  4  -CC     Moving from lying on back to sitting on the side of a flat bed without bedrails?  3  -CC     Moving to and from a bed to a chair (including a wheelchair)?  3  -CC     Standing up from a chair using your arms (e.g., wheelchair, bedside chair)?  3  -CC     Climbing 3-5 steps with a railing?  2  -CC     To walk in hospital room?  3  -CC     AM-PAC 6 Clicks Score (PT)  18  -CC     Row Name 12/06/20 1121          Functional Assessment    Outcome Measure Options  AM-PAC 6 Clicks Basic Mobility (PT)  -CC       User Key  (r) = Recorded By, (t) = Taken By, (c) = Cosigned By    Initials Name Provider Type    CC Nika Chun PTA Physical Therapy Assistant        Physical Therapy Education                 Title: PT OT SLP Therapies (In Progress)     Topic: Physical Therapy (In Progress)     Point: Mobility training (Done)     Learning Progress Summary           Patient Acceptance, E,TB, VU by KG at 12/4/2020 1159    Comment: Instructed on breathing technique and activity pacing    Acceptance, E,TB, VU by KG at 12/3/2020 1213    Comment: Instructed on role of PT                   Point: Home exercise program (Done)     Learning Progress Summary           Patient Acceptance, E,TB, VU by  at 12/5/2020 1840    Comment: importance of performing ex to increase strength and activity tolerance                   Point: Body mechanics (Done)     Learning Progress Summary           Patient Acceptance, E,TB, VU by  at 12/6/2020 1246    Comment: increase posture during standing and amb                   Point: Precautions (Not Started)     Learner Progress:  Not documented in this visit.                      User Key     Initials Effective Dates Name Provider Type Discipline     03/07/18 -  Nika Chun PTA Physical Therapy Assistant PT    KG 10/19/20 -  Tammie Thorne, PT Student PT Student PT              PT Recommendation and Plan     Plan of Care Reviewed With:  patient  Progress: improving  Outcome Summary: Pt participated with therapy and increased distance amb to 35 feet with decreased speed, alia and occ antalgic gait pattern noted. Pt to bedside chair reclined. Pt con't to require frequent rest breaks d/t fatigue. Con't with PT POC and progress as pt tolerates     Time Calculation:   PT Charges     Row Name 12/06/20 1121             Time Calculation    Start Time  1121  -CC      Stop Time  1201  -CC      Time Calculation (min)  40 min  -CC      PT Received On  12/06/20  -CC      PT Goal Re-Cert Due Date  12/13/20  -CC         Timed Charges    59834 - PT Therapeutic Exercise Minutes  15  -CC      25888 - Gait Training Minutes   15  -CC      38821 - PT Therapeutic Activity Minutes  10  -CC        User Key  (r) = Recorded By, (t) = Taken By, (c) = Cosigned By    Initials Name Provider Type    CC Nika Chun PTA Physical Therapy Assistant        Therapy Charges for Today     Code Description Service Date Service Provider Modifiers Qty    51167314798 HC PT THER PROC EA 15 MIN 12/5/2020 Nika Chun, DILEEP GP 1    27169365147 HC GAIT TRAINING EA 15 MIN 12/5/2020 Nika Chun, DILEEP GP 1    56776138288 HC PT THERAPEUTIC ACT EA 15 MIN 12/5/2020 Nika Chun, DILEEP GP 1    91524819165 HC PT THER PROC EA 15 MIN 12/6/2020 Nika Chun, DILEEP GP 1    12740248613 HC GAIT TRAINING EA 15 MIN 12/6/2020 Nika Chun, DILEEP GP 1    67207361403 HC PT THERAPEUTIC ACT EA 15 MIN 12/6/2020 Nika Chun, DILEEP GP 1          PT G-Codes  Outcome Measure Options: AM-PAC 6 Clicks Basic Mobility (PT)  AM-PAC 6 Clicks Score (PT): 18  AM-PAC 6 Clicks Score (OT): 18    Nika Chun PTA  12/6/2020

## 2020-12-06 NOTE — PLAN OF CARE
Goal Outcome Evaluation:  Plan of Care Reviewed With: patient  Progress: improving   Patient has been up with PT today and up in chair, stable at this time, IV removed per MD, possible discharge tomorrow to rehab, will continue to monitor and notify MD for any issues or concerns

## 2020-12-06 NOTE — PROGRESS NOTES
[facility]    Name:  Sadie Tijerina   Age:  80 y.o.  Sex:  female  :  1940  MRN:  [id number]   Visit Number:  [id number]  Admission Date:  2020  Date Of Service:  20  Primary Care Physician:  Kristian Wolff MD     LOS: 0 days :  Patient Care Team:  Kristian Wolff MD as PCP - General  Kristian Wolff MD as PCP - Family Medicine  Bayron Grimaldo MD (Inactive) as Consulting Physician (General Surgery):      Subjective / Interval History:     Patient's chart has been reviewed and events noted since admission.  She has been hypoxic respiratory failure with COPD exacerbation and functional decline.  Patient initially was started on IV antibiotic for pneumonia has been switched over to oral antibiotic.  She states that her shortness of breath has improved    Today on  patient has been seen.  Patient is feeling better.  Her right leg swelling has improved.  She is planning to go for rehab and  are working on it.      Vital Signs:    Temp:  [97.3 °F (36.3 °C)-97.9 °F (36.6 °C)] 97.9 °F (36.6 °C)  Heart Rate:  [] 95  Resp:  [15-18] 18  BP: (119-139)/(50-81) 119/50    Intake and output:    I/O last 3 completed shifts:  In: 1080 [P.O.:1080]  Out: 0   I/O this shift:  In: 480 [P.O.:480]  Out: -     Physical Examination:    General Appearance:    Alert and cooperative, not in any acute distress.   Head:    Atraumatic and normocephalic, without obvious abnormality.   Eyes:            PERRLA,  No pallor. Extraocular movements are within normal limits.   Neck:   Supple,  No lymph glands, no bruit   Lungs:     Chest shape is normal. Breath sounds heard bilaterally equally.  No crackles or wheezing.     Heart:    Normal S1 and S2, no murmur,  No JVD   Abdomen:     Normal bowel sounds, no masses, no organomegaly. Soft     nontender, no guarding, no rebound tenderness   Extremities:   Moves all extremities well, edema of the right leg  edema, no cyanosis,    Skin:   No  bruising or rash.   Neurologic:   Grossly nonfocal and moves all extremities.      Laboratory results:  Results from last 7 days   Lab Units 12/03/20  0650 12/02/20  1749 12/01/20  0643   SODIUM mmol/L 136 138 138   POTASSIUM mmol/L 4.5 4.4 4.3   CHLORIDE mmol/L 95* 99 99   CO2 mmol/L 29.0 31.4* 32.7*   BUN mg/dL 23 22 35*   CREATININE mg/dL 1.18* 1.05* 0.90   CALCIUM mg/dL 9.2 9.7 9.1   BILIRUBIN mg/dL  --  0.5  --    ALK PHOS U/L  --  98  --    ALT (SGPT) U/L  --  105*  --    AST (SGOT) U/L  --  137*  --    GLUCOSE mg/dL 170* 129* 111*     Results from last 7 days   Lab Units 12/04/20  0746 12/02/20  1749 12/01/20  0643   WBC 10*3/mm3 15.19* 23.55* 13.74*   HEMOGLOBIN g/dL 9.9* 11.7* 9.6*   HEMATOCRIT % 31.0* 37.6 31.0*   PLATELETS 10*3/mm3 153 211 201         Results from last 7 days   Lab Units 12/02/20  1749   CK TOTAL U/L 34   TROPONIN T ng/mL <0.010     Results from last 7 days   Lab Units 12/03/20  0052 12/02/20  1747 12/02/20  1740   BLOODCX   --  No growth at 3 days No growth at 3 days   MRSACX  No Methicillin Resistant Staphylococcus aureus isolated  --   --        Radiology results:    Imaging Results (Last 24 Hours)     ** No results found for the last 24 hours. **          I have reviewed the patient's radiology reports.    Medication Review:     I have reviewed the patients active and prn medications.     Assessment:      Acute on chronic respiratory failure (CMS/HCC)    Chronic diastolic heart failure (CMS/HCC)    Acute exacerbation of chronic obstructive pulmonary disease (COPD) (CMS/[facility])    Bilateral bronchopneumonia  COPD  Generalized weakness  Leukocytosis is possibly steroid related      Plan:    Patient with respiratory failure, COPD exacerbation is improving and stable on current management.  We will try to follow-up on the labs again leukocytosis is improving this is possibly steroid related    Generalized weakness.  Patient will need inpatient rehab and as  per the family's request [facility] are being looked at.   are working on it.  Based on the bed availability patient is stable for discharge and can be discharged on Monday whenever bed is available    Continue current management as per orders and plan of care has been discussed with the patient    Jani Meléndez MD  12/06/20  13:48 EST      Please note that portions of this note were completed with a voice recognition program.

## 2020-12-06 NOTE — PLAN OF CARE
Goal Outcome Evaluation:  Plan of Care Reviewed With: patient  Progress: improving  Outcome Summary: Pt participated with therapy and increased distance amb to 35 feet with decreased speed, alia and occ antalgic gait pattern noted. Pt to bedside chair reclined. Pt con't to require frequent rest breaks d/t fatigue. Con't with PT POC and progress as pt tolerates

## 2020-12-06 NOTE — PLAN OF CARE
Goal Outcome Evaluation:  Plan of Care Reviewed With: patient  Progress: no change     No issues overnight. Awaiting placement

## 2020-12-07 NOTE — PLAN OF CARE
Goal Outcome Evaluation:  Plan of Care Reviewed With: patient  Progress: improving     Awaiting rehab placement. No issues overnight

## 2020-12-07 NOTE — PLAN OF CARE
Goal Outcome Evaluation:  Plan of Care Reviewed With: patient  Progress: improving   Spoke with pt at her bedside. Pt was awake and conversational. I answered questions pt had about Living Will documentation and POA.  I provided support as pt talked about her life, experiences she has had, and her current physical condition.  Pt wants to complete a Living Will and designate  her daughter as her healthcare surrogate.  I will continue to follow to assist with completing the forms.

## 2020-12-07 NOTE — PROGRESS NOTES
Discharge Planning Assessment  [facility]     Patient Name: Sadie Tijerina  MRN: [id number]  Today's Date: 12/7/2020    Admit Date: 12/2/2020    Discharge Needs Assessment    No documentation.       Discharge Plan     Row Name 12/07/20 1139       Plan    Plan Comments  Jolene feliz Colin parikh intereseted.  [phone]        Continued Care and Services - Admitted Since 12/2/2020     Destination     Service Provider Request Status Selected Services Address Phone Fax Patient Preferred    [facility] SWING BED UNIT  Pending - Request Sent N/A [address], [place] [id number] [phone] --       Internal Comment last updated by Sara Brooke RN 12/3/2020 1143    Sent to fax [phone]               [facility]  Pending - Request Sent N/A [address] [place] [id number] [phone] --    [facility] - SIGNATURE  Pending - Request Sent N/A [address] [place] [address] [phone] --    [facility]  Pending - Request Sent N/A [address] [place] [address] [phone] --    [facility]  Pending - Request Sent N/A [address] [place] [address] [phone] --    [facility]  Pending - Request Sent N/A [address] [facility] [address] [phone] --    [facility] - SIGNATURE  Pending - Request Sent N/A [address] [facility] [address] [phone] --    [facility]  Pending - Request Sent N/A [address] [place] [address] [phone] --    [facility]  Pending - Request Sent N/A [address] [place] [address] [phone] --    [facility]  Pending - Request Sent N/A [address], [facility] [address] [phone] --    [facility]  CTR-SIGNATURE  Pending - Request Sent N/A [address], [facility] [address] [phone] --    [facility]  Pending - Request Sent N/A [address], [facility] [address] [phone] --    [facility]  Pending - Request Sent N/A [address], [place] [address] [phone] --    [facility] [place]  Pending - Request Sent N/A [address], [facility] [address] [phone] --    [facility] SUBACUTE  Declined  by patient N/A [address] , [facility] [address] [phone] --            Selected Continued Care - Prior Encounters Includes selections from prior encounters from 9/3/2020 to 12/7/2020    Discharged on 12/1/2020 Admission date: 11/20/2020 - Discharge disposition: Home or Self Care    Durable Medical Equipment     Service Provider Selected Services Address Phone Fax Patient Preferred    [facility]  Durable Medical Equipment [address], [facility] [address] [phone] --       Internal Comment last updated by Morelia Licea, LCSW 11/30/2020 1033    2l                     Home Medical Care     Service Provider Selected Services Address Phone Fax Patient Preferred    [facility] CARE  Home Health Services [address], [facility] [address] [phone] --       Internal Comment last updated by Jazmyne King, MSW 11/25/2020 0934    Active will [facility], verified with office. Will need resumption orders at d/c.                            Discharged on 11/11/2020 Admission date: 11/4/2020 - Discharge disposition: Swing Bed    Destination     Service Provider Selected Services Address Phone Fax Patient Preferred    [facility] SWING BED UNIT  Skilled Nursing [address], [facility] [address] [phone] --                Discharged on 9/28/2020 Admission date:  9/16/2020 - Discharge disposition: Home-Health Care Svc    Durable Medical Equipment     Service Provider Selected Services Address Phone Fax Patient Preferred    [facility]  Durable Medical Equipment [address] [facility] [address] [address] [phone] --       Internal Comment last updated by Deisi Espinosa RN 9/28/2020 1038    Current patient                     Home Medical Care     Service Provider Selected Services Address Phone Fax Patient Preferred    [facility] CARE  Home Health Services [address] [address] [phone] --                    Expected Discharge Date and Time     Expected Discharge Date Expected Discharge Time    Dec 4, 2020         Demographic Summary    No documentation.       Functional Status    No documentation.       Psychosocial    No documentation.       Abuse/Neglect    No documentation.       Legal    No documentation.       Substance Abuse    No documentation.       Patient Forms    No documentation.           Sara Dumont RN

## 2020-12-07 NOTE — THERAPY TREATMENT NOTE
Patient Name: Sadie Tijerina  : 1940    MRN: [id number]                              Today's Date: 2020       Admit Date: 2020    Visit Dx:     ICD-10-CM ICD-9-CM   1. Acute on chronic respiratory failure, unspecified whether with hypoxia or hypercapnia (CMS/Formerly [facility])  J96.20 518.84     Patient Active Problem List   Diagnosis   • Calf cramp   • Mixed anxiety depressive disorder   • Dizziness   • Fatigue   • GERD (gastroesophageal reflux disease)   • Hematuria   • Hyperlipidemia   • Essential hypertension   • Low back pain   • Restless legs syndrome   • Essential tremor   • Vitamin D deficiency   • Balance problem   • Tension headache   • Tobacco abuse disorder   • Leg swelling   • Acute and chronic respiratory failure with hypoxia (CMS/[facility])   • Venous insufficiency of both lower extremities   • Chronic diastolic heart failure (CMS/[facility]ly [facility])   • Neuropathy   • Acute exacerbation of chronic obstructive pulmonary disease (COPD) (CMS/Formerly [facility])   • Bilateral sensorineural hearing loss   • Disorder of inner ear   • Tympanosclerosis   • Transient vision disturbance   • Bilateral bronchopneumonia   • Pneumonia of both lungs due to infectious organism   • Leg wound, right   • Sepsis (CMS/[facility])   • Pneumonia of both lower lobes due to infectious organism   • Chronic respiratory failure with hypoxia (CMS/[facility])   • Physical deconditioning   • Impaired mobility and ADLs   • Oral candidiasis   • Acute on chronic respiratory failure (CMS/[facility])     Past Medical History:   Diagnosis Date   • Arthritis    • Cancer (CMS/[facility])     uterine cancer ()   • COPD (chronic obstructive pulmonary disease) (CMS/[facility])    • Depression    • Elevated cholesterol    • GERD (gastroesophageal reflux disease)    • History of emphysema    • History of esophageal reflux    • History of recurrent UTI (urinary tract infection)    • History of renal calculi    • History of uterine cancer    • Hyperlipidemia    • Hypertension    • Impaired functional  mobility, balance, gait, and endurance    • Pneumonia 02/2019     Past Surgical History:   Procedure Laterality Date   • FOOT SURGERY     • HAND SURGERY     • HYSTERECTOMY       General Information     [facility] Name 12/07/20 1501          OT Time and Intention    Document Type  therapy note (daily note)  -     Mode of Treatment  occupational therapy  -       User Key  (r) = Recorded By, (t) = Taken By, (c) = Cosigned By    Initials Name Provider Type     Radha Escalona Occupational Therapist        Mobility/ADL's     Row Name 12/07/20 1502          Bed Mobility    Bed Mobility  sit-supine  -     Sit-Supine [place] (Bed Mobility)  contact guard  -[facility] Name 12/07/20 1502          Transfers    Transfers  sit-stand transfer;toilet transfer  -     Sit-Stand [place] (Transfers)  minimum assist (75% patient effort)  -     [place] Level (Toilet Transfer)  minimum assist (75% patient effort)  -     Assistive Device (Toilet Transfer)  commode;walker, front-wheeled  -[facility] Name 12/07/20 1502          Toilet Transfer    Type (Toilet Transfer)  sit-stand  -[facility] Name 12/07/20 1502          Functional Mobility    Functional Mobility- Ind. Level  verbal cues required;contact guard assist  -     Functional Mobility- Device  rolling walker  -     Functional Mobility-Distance (Feet)  20  -     Functional Mobility- Comment  Pt attempting to grab walker in front, verbal cues for safety needed  -[facility] Name 12/07/20 1502          Activities of Daily Living    BADL Assessment/Intervention  toileting  -[facility] Name 12/07/20 1502          Toileting Assessment/Training    [place] Level (Toileting)  adjust/manage clothing;perform perineal hygiene;set up  -       User Key  (r) = Recorded By, (t) = Taken By, (c) = Cosigned By    Initials Name Provider Type     Radha Escalona Occupational Therapist        Obj/Interventions     Row Name 12/07/20 1505          Shoulder (Therapeutic  "Exercise)    Shoulder Strengthening (Therapeutic Exercise)  bilateral;flexion;extension;horizontal aBduction/aDduction;supine;resistance band;yellow;10 repetitions  -[facility] Name 12/07/20 1505          Elbow/Forearm (Therapeutic Exercise)    Elbow/Forearm Strengthening (Therapeutic Exercise)  bilateral;flexion;extension;resistance band;yellow;10 repetitions  -       User Key  (r) = Recorded By, (t) = Taken By, (c) = Cosigned By    Initials Name Provider Type    Radha Aguilar Occupational Therapist        Goals/Plan    No documentation.       Clinical Impression     [facility] Name 12/07/20 1515          Pain Scale: Numbers Pre/Post-Treatment    Pretreatment Pain Rating  0/10 - no pain  -     Posttreatment Pain Rating  0/10 - no pain  -     Pre/Posttreatment Pain Comment  Pt reports pain in her leg when walking, but at rest has no pain  -     Pain Intervention(s)  Repositioned  -[facility] Name 12/07/20 1518          Plan of Care Review    Plan of Care Reviewed With  patient  -     Outcome Summary  Pt reports feeling \"wheezy\" at this time, RT was called to get a breathing tx.  Pt cga/min assist for transfers from commode and needed verbal cues for safety when walking.  Pt completed ther ex as per flow sheet.  -[facility] Name 12/07/20 1515          Vital Signs    O2 Delivery Pre Treatment  room air  -     O2 Delivery Intra Treatment  room air  -     O2 Delivery Post Treatment  room air  -[facility] Name 12/07/20 1515          Positioning and Restraints    Pre-Treatment Position  bathroom  -     Post Treatment Position  bed  -     In Bed  supine;call light within reach;encouraged to call for assist  -       User Key  (r) = Recorded By, (t) = Taken By, (c) = Cosigned By    Initials Name Provider Type    Radha Aguilar Occupational Therapist        Outcome Measures     [facility] Name 12/07/20 1521          How much help from another is currently needed...    Putting on and taking off regular lower body " clothing?  3  -AH     Bathing (including washing, rinsing, and drying)  3  -AH     Toileting (which includes using toilet bed pan or urinal)  3  -AH     Putting on and taking off regular upper body clothing  3  -AH     Taking care of personal grooming (such as brushing teeth)  3  -AH     Eating meals  4  -AH     AM-PAC 6 Clicks Score (OT)  19  -AH       User Key  (r) = Recorded By, (t) = Taken By, (c) = Cosigned By    Initials Name Provider Type    Radha Aguilar Occupational Therapist        Occupational Therapy Education                 Title: PT OT SLP Therapies (Done)     Topic: Occupational Therapy (Done)     Point: ADL training (Done)     Description:   Instruct learner(s) on proper safety adaptation and remediation techniques during self care or transfers.   Instruct in proper use of assistive devices.              Learning Progress Summary           Patient Acceptance, E,TB, VU by  at 12/7/2020 1521    Comment: benefit of therapy  safety with transfers and functional mobility    Acceptance, E,TB, VU by CC at 12/7/2020 1141    Acceptance, E,TB, VU by SD at 12/4/2020 1428    Comment: Safety and sequencing during functional transfers and mobility.    Acceptance, E,TB, VU by SD at 12/3/2020 1514    Comment: Benefit of OT; OT POC   Family Acceptance, E,TB, VU by SD at 12/4/2020 1428    Comment: Safety and sequencing during functional transfers and mobility.                   Point: Home exercise program (Done)     Description:   Instruct learner(s) on appropriate technique for monitoring, assisting and/or progressing therapeutic exercises/activities.              Learning Progress Summary           Patient Acceptance, E,TB, VU by  at 12/7/2020 1521    Comment: benefit of therapy  safety with transfers and functional mobility    Acceptance, E,TB, VU by CC at 12/7/2020 1141                   Point: Precautions (Done)     Description:   Instruct learner(s) on prescribed precautions during self-care and  "functional transfers.              Learning Progress Summary           Patient Acceptance, E,TB, VU by  at 12/7/2020 1521    Comment: benefit of therapy  safety with transfers and functional mobility    Acceptance, E,TB, VU by  at 12/7/2020 1141                   Point: Body mechanics (Done)     Description:   Instruct learner(s) on proper positioning and spine alignment during self-care, functional mobility activities and/or exercises.              Learning Progress Summary           Patient Acceptance, E,TB, VU by  at 12/7/2020 1141                               User Key     Initials Effective Dates Name Provider Type [facility] 03/07/18 -  Radha Escalona Occupational Therapist OT     10/26/16 -  Aurora Rhodes RN Registered Nurse Nurse    SD 03/07/18 -  Mariia Arevalo OT Occupational Therapist OT              OT Recommendation and Plan     Plan of Care Review  Plan of Care Reviewed With: patient  Outcome Summary: Pt reports feeling \"wheezy\" at this time, RT was called to get a breathing tx.  Pt cga/min assist for transfers from commode and needed verbal cues for safety when walking.  Pt completed ther ex as per flow sheet.     Time Calculation:   Time Calculation- OT     Row Name 12/07/20 1522             Time Calculation-     OT Start Time  1419  -      OT Stop Time  1449  -      OT Time Calculation (min)  30 min  -      OT Received On  12/07/20  -      OT Goal Re-Cert Due Date  12/13/20  -         Timed Charges    00888 - OT Therapeutic Exercise Minutes  12  -      90265 - OT Therapeutic Activity Minutes  8  -      41260 - OT Self Care/Mgmt Minutes  10  -        User Key  (r) = Recorded By, (t) = Taken By, (c) = Cosigned By    Initials Name Provider Type     Radha Escalona Occupational Therapist        Therapy Charges for Today     Code Description Service Date Service Provider Modifiers Qty    62181746364 HC OT THER PROC EA 15 MIN 12/7/2020 Radha Escalona GO 1    " 13702442712 HC OT SELF CARE/MGMT/TRAIN EA 15 MIN 12/7/2020 Radha Escalona GO 1               Radha Escalona  12/7/2020

## 2020-12-07 NOTE — PLAN OF CARE
"Goal Outcome Evaluation:  Plan of Care Reviewed With: patient  Progress: improving  Outcome Summary: Pt reports feeling \"wheezy\" at this time, RT was called to get a breathing tx.  Pt cga/min assist for transfers from commode and needed verbal cues for safety when walking.  Pt completed ther ex as per flow sheet.  "

## 2020-12-07 NOTE — PLAN OF CARE
Goal Outcome Evaluation:  Plan of Care Reviewed With: patient  Progress: improving  Outcome Summary: Pt with decreased episodes of retro lean during turns and with initial sit ->stand.with pt performing x4 reps with increased time to perform task. Pt required occ rest  break d/t SOA and fatigue. O2 SATS remained >94% throughout therpy session on room air. Con't with PT POC and progress as tolerated

## 2020-12-07 NOTE — PROGRESS NOTES
Discharge Planning Assessment  [facility]     Patient Name: Sadie Tijerina  MRN: [id number]  Today's Date: 12/7/2020    Admit Date: 12/2/2020    Discharge Needs Assessment    No documentation.       Discharge Plan     Row Name 12/07/20 1540       Plan    Plan Comments  Chris and patient states [facility] has contacted them today  and states they have a bed  and is running her insurance they have not returned call to         Continued Care and Services - Admitted Since 12/2/2020     Destination     Service Provider Request Status Selected Services Address Phone Fax Patient Preferred    [facility] SWING BED UNIT  Pending - Request Sent N/A [address], [facility] [address] [phone] --       Internal Comment last updated by Sara Brooke RN 12/3/2020 1143    Sent to fax [phone]               [facility]  Pending - Request Sent N/A [address] [facility] [facility] [id number] [phone] --    [facility] [place] - SIGNATURE  Pending - Request Sent N/A [address] [place] [address] [phone] --    [facility]  Pending - Request Sent N/A [address] [facility] [address] [phone] --    [facility]  Pending - Request Sent N/A [address] [address] [phone] --    [facility]  Pending - Request Sent N/A [address] [address] [phone] --    [facility]IGNATURE  Pending - Request Sent N/A [address] [address] [phone] --    [facility]  Pending - Request Sent N/A [address] [facility] [address] [phone] --    [place] [facility]  Pending - Request Sent N/A [address], [facility] [address] [phone] --    [facility]  Pending -  Request Sent N/A [address], [place] [address] [phone] --    [facility] CTR-SIGNATURE  Pending - Request Sent N/A [address], [place] [address] [phone] --    [facility]  Pending - Request Sent N/A [address], [facility] [id number] [phone] --    [facility]  Pending - Request Sent N/A [address], [place] [address] [phone] --    [facility] [place]  Pending - Request Sent N/A [address], [place] [address] [phone] --    [facility] SUBACUTE  Declined  by patient N/A [address] [address] [phone] --            Selected Continued Care - Prior Encounters Includes selections from prior encounters from 9/3/2020 to 12/7/2020    Discharged on 12/1/2020 Admission date: 11/20/2020 - Discharge disposition: Home or Self Care    Durable Medical Equipment     Service Provider Selected Services Address Phone Fax Patient Preferred    [facility]  Durable Medical Equipment [address] [facility] [address] [address] [phone] --       Internal Comment last updated by Morelia Licea, LCSW 11/30/2020 1033                         Home Medical Care     Service Provider Selected Services Address Phone Fax Patient Preferred    [facility] CARE  Home Health Services [address] [facility] [address] [phone] --       Internal Comment last updated by Jazmyne King, MSW 11/25/2020 0934    Active will [facility], verified with office. Will need resumption orders at d/c.                            Discharged on 11/11/2020 Admission date: 11/4/2020 - Discharge disposition: Swing Bed    Destination     Service Provider Selected Services Address Phone Fax Patient Preferred    [facility] SWING BED UNIT  Skilled Nursing [address],  [facility] [address] [phone] --                Discharged on 9/28/2020 Admission date: 9/16/2020 - Discharge disposition: Home-Health Care Svc    Durable Medical Equipment     Service Provider Selected Services Address Phone Fax Patient Preferred    [facility]  Durable Medical Equipment [address] [facility] [address] [address] [phone] --       Internal Comment last updated by Deisi Espinosa RN 9/28/2020 1038    Current patient                     Home Medical Care     Service Provider Selected Services Address Phone Fax Patient Preferred    [facility] CARE  Home Health Services [address] [facility] [address] [phone] --                    Expected Discharge Date and Time     Expected Discharge Date Expected Discharge Time    Dec 8, 2020         Demographic Summary    No documentation.       Functional Status    No documentation.       Psychosocial    No documentation.       Abuse/Neglect    No documentation.       Legal    No documentation.       Substance Abuse    No documentation.       Patient Forms    No documentation.           Sara Dumont RN

## 2020-12-07 NOTE — THERAPY TREATMENT NOTE
Patient Name: Sadie Tijerina  : 1940    MRN: [id number]                              Today's Date: 2020       Admit Date: 2020    Visit Dx:     ICD-10-CM ICD-9-CM   1. Acute on chronic respiratory failure, unspecified whether with hypoxia or hypercapnia (CMS/[facility])  J96.20 518.84     Patient Active Problem List   Diagnosis   • Calf cramp   • Mixed anxiety depressive disorder   • Dizziness   • Fatigue   • GERD (gastroesophageal reflux disease)   • Hematuria   • Hyperlipidemia   • Essential hypertension   • Low back pain   • Restless legs syndrome   • Essential tremor   • Vitamin D deficiency   • Balance problem   • Tension headache   • Tobacco abuse disorder   • Leg swelling   • Acute and chronic respiratory failure with hypoxia (CMS/[facility])   • Venous insufficiency of both lower extremities   • Chronic diastolic heart failure (CMS/[facility])   • Neuropathy   • Acute exacerbation of chronic obstructive pulmonary disease (COPD) (CMS/[facility])   • Bilateral sensorineural hearing loss   • Disorder of inner ear   • Tympanosclerosis   • Transient vision disturbance   • Bilateral bronchopneumonia   • Pneumonia of both lungs due to infectious organism   • Leg wound, right   • Sepsis (CMS/[facility])   • Pneumonia of both lower lobes due to infectious organism   • Chronic respiratory failure with hypoxia (CMS/[facility])   • Physical deconditioning   • Impaired mobility and ADLs   • Oral candidiasis   • Acute on chronic respiratory failure (CMS/[facility])     Past Medical History:   Diagnosis Date   • Arthritis    • Cancer (CMS/[facility])     uterine cancer ()   • COPD (chronic obstructive pulmonary disease) (CMS/[facility])    • Depression    • Elevated cholesterol    • GERD (gastroesophageal reflux disease)    • History of emphysema    • History of esophageal reflux    • History of recurrent UTI (urinary tract infection)    • History of renal calculi    • History of uterine cancer    • Hyperlipidemia    • Hypertension    • Impaired functional  mobility, balance, gait, and endurance    • Pneumonia 02/2019     Past Surgical History:   Procedure Laterality Date   • FOOT SURGERY     • HAND SURGERY     • HYSTERECTOMY       General Information     Row Name 12/07/20 1708          Physical Therapy Time and Intention    Document Type  therapy note (daily note)  -     Mode of Treatment  physical therapy  -     Row Name 12/07/20 1708          General Information    Patient Profile Reviewed  yes  -     Existing Precautions/Restrictions  fall  -     Row Name 12/07/20 1708          Safety Issues, Functional Mobility    Safety Issues Affecting Function (Mobility)  safety precaution awareness;safety precautions follow-through/compliance  -     Impairments Affecting Function (Mobility)  balance;pain;shortness of breath;strength  -       User Key  (r) = Recorded By, (t) = Taken By, (c) = Cosigned By    Initials Name Provider Type     Nika Chun PTA Physical Therapy Assistant        Mobility     Row Name 12/07/20 1708          Bed Mobility    Bed Mobility  supine-sit  -     Supine-Sit [place] (Bed Mobility)  standby assist;verbal cues  -     Sit-Supine [place] (Bed Mobility)  contact guard  -     Assistive Device (Bed Mobility)  bed rails;head of bed elevated  -     Row Name 12/07/20 1708          Sit-Stand Transfer    Sit-Stand [place] (Transfers)  minimum assist (75% patient effort);contact guard  -     Assistive Device (Sit-Stand Transfers)  walker, front-wheeled  -     Row Name 12/07/20 1708          Gait/Stairs (Locomotion)    [place] Level (Gait)  contact guard  -CC     Assistive Device (Gait)  walker, front-wheeled  -     Distance in Feet (Gait)  35x1  -     Deviations/Abnormal Patterns (Gait)  alia decreased;gait speed decreased;base of support, wide;stride length decreased  -     Bilateral Gait Deviations  forward flexed posture;heel strike decreased  -       User Key  (r) = Recorded By, (t) = Taken  By, (c) = Cosigned By    Initials Name Provider Type    Nika Johnson PTA Physical Therapy Assistant        Obj/Interventions    No documentation.       Goals/Plan    No documentation.       Clinical Impression     Row Name 12/07/20 1708          Pain Scale: Numbers Pre/Post-Treatment    Pretreatment Pain Rating  0/10 - no pain  -CC     Posttreatment Pain Rating  0/10 - no pain  -CC     Row Name 12/07/20 1708          Plan of Care Review    Plan of Care Reviewed With  patient  -CC     Progress  improving  -CC     Outcome Summary  Pt with decreased episodes of retro lean during turns and with initial sit ->stand.with pt performing x4 reps with increased time to perform task. Pt required occ rest  break d/t SOA and fatigue. O2 SATS remained >94% throughout therpy session on room air. Con't with PT POC and progress as tolerated  -CC     Row Name 12/07/20 1708          Positioning and Restraints    Pre-Treatment Position  in bed  -CC     Post Treatment Position  chair  -CC     In Chair  reclined;call light within reach;encouraged to call for assist  -CC       User Key  (r) = Recorded By, (t) = Taken By, (c) = Cosigned By    Initials Name Provider Type    Nika Johnson PTA Physical Therapy Assistant        Outcome Measures     Row Name 12/07/20 1708          How much help from another person do you currently need...    Turning from your back to your side while in flat bed without using bedrails?  4  -CC     Moving from lying on back to sitting on the side of a flat bed without bedrails?  3  -CC     Moving to and from a bed to a chair (including a wheelchair)?  3  -CC     Standing up from a chair using your arms (e.g., wheelchair, bedside chair)?  3  -CC     Climbing 3-5 steps with a railing?  2  -CC     To walk in hospital room?  3  -CC     AM-PAC 6 Clicks Score (PT)  18  -CC     Row Name 12/07/20 1708          Functional Assessment    Outcome Measure Options  AM-PAC 6 Clicks Basic Mobility (PT)  -CC        User Key  (r) = Recorded By, (t) = Taken By, (c) = Cosigned By    Initials Name Provider Type     Nika Chun, DILEEP Physical Therapy Assistant        Physical Therapy Education                 Title: PT OT SLP Therapies (Done)     Topic: Physical Therapy (Done)     Point: Mobility training (Done)     Learning Progress Summary           Patient Acceptance, E,TB, VU by  at 12/7/2020 1141    Acceptance, E,TB, VU by KG at 12/4/2020 1159    Comment: Instructed on breathing technique and activity pacing    Acceptance, E,TB, VU by KG at 12/3/2020 1213    Comment: Instructed on role of PT                   Point: Home exercise program (Done)     Learning Progress Summary           Patient Acceptance, E,TB, VU by [facility] at 12/7/2020 1816    Comment: importance of performing ex with correct tech to maximize target mm strength    Acceptance, E,TB, VU by  at 12/7/2020 1141    Acceptance, E,TB, VU by [facility] at 12/5/2020 1840    Comment: importance of performing ex to increase strength and activity tolerance                   Point: Body mechanics (Done)     Learning Progress Summary           Patient Acceptance, E,TB, VU by  at 12/7/2020 1141    Acceptance, E,TB, VU by CC at 12/6/2020 1246    Comment: increase posture during standing and amb                   Point: Precautions (Done)     Learning Progress Summary           Patient Acceptance, E,TB, VU by  at 12/7/2020 1141                               User Key     Initials Effective Dates Name Provider Type Discipline     10/26/16 -  Aurora Rhodes RN Registered Nurse Nurse    [facility] 03/07/18 -  Nika Chun PTA Physical Therapy Assistant PT    KG 10/19/20 -  Tammie Thorne, YANNICK Student PT Student PT              PT Recommendation and Plan     Plan of Care Reviewed With: patient  Progress: improving  Outcome Summary: Pt with decreased episodes of retro lean during turns and with initial sit ->stand.with pt performing x4 reps with increased time to perform  task. Pt required occ rest  break d/t SOA and fatigue. O2 SATS remained >94% throughout therpy session on room air. Con't with PT POC and progress as tolerated     Time Calculation:   PT Charges     Row Name 12/07/20 1821             Time Calculation    Start Time  1708  -CC      Stop Time  1739  -CC      Time Calculation (min)  31 min  -CC      PT Received On  12/07/20  -CC      PT Goal Re-Cert Due Date  12/13/20  -CC         Timed Charges    85720 - Gait Training Minutes   16  -CC      18088 - PT Therapeutic Activity Minutes  15  -CC        User Key  (r) = Recorded By, (t) = Taken By, (c) = Cosigned By    Initials Name Provider Type    CC Nika Chun PTA Physical Therapy Assistant        Therapy Charges for Today     Code Description Service Date Service Provider Modifiers Qty    75343648500 HC PT THER PROC EA 15 MIN 12/6/2020 Nika Chun, DILEEP GP 1    72437665590 HC GAIT TRAINING EA 15 MIN 12/6/2020 Nika Chun, PTA GP 1    62504239835 HC PT THERAPEUTIC ACT EA 15 MIN 12/6/2020 Nika Chun, PTA GP 1    19443126705 HC GAIT TRAINING EA 15 MIN 12/7/2020 Nika Chun, PTA GP 1    13907884315 HC PT THERAPEUTIC ACT EA 15 MIN 12/7/2020 Nika Chun, DILEEP GP 1          PT G-Codes  Outcome Measure Options: AM-PAC 6 Clicks Basic Mobility (PT)  AM-PAC 6 Clicks Score (PT): 18  AM-PAC 6 Clicks Score (OT): 19    Nika Chun PTA  12/7/2020

## 2020-12-07 NOTE — PROGRESS NOTES
[facility]    Name:  Sadie Tijerina   Age:  80 y.o.  Sex:  female  :  1940  MRN:  [id number]   Visit Number:  [id number]  Admission Date:  2020  Date Of Service:  20  Primary Care Physician:  Kristian Wolff MD     LOS: 0 days :  Patient Care Team:  Kristian Wolff MD as PCP - General  Kristian Wolff MD as PCP - Family Medicine  Bayron Grimaldo MD (Inactive) as Consulting Physician (General Surgery):    Chief Complaint:    Shortness of breath    Subjective / Interval History:     Chart has been reviewed today.  Patient admitted for hypoxic respiratory failure with COPD exacerbation and functional decline.  I had a long conversation with this patient on Friday regarding Rehabilitation placement.  Patient was in agreement at that time that placement would be the best option at this time as she needs to gain strength before she can go home on her own.  Patient has transitioned from IV antibiotics to oral antibiotics.  Patient states she did have some wheezing this morning, but has no complaints to speak of.      Review of Systems:     General ROS: Patient denies any fevers, chills or loss of consciousness.  Respiratory ROS: + cough or shortness of breath.  Cardiovascular ROS: Denies chest pain or palpitations. No history of exertional chest pain.  Gastrointestinal ROS: Denies nausea and vomiting. Denies any abdominal pain. No diarrhea.  Neurological ROS: Denies any focal weakness. No loss of consciousness. Denies any numbness.  Dermatological ROS: Denies any redness or pruritis.    Vital Signs:    Temp:  [97.4 °F (36.3 °C)-98.4 °F (36.9 °C)] 97.4 °F (36.3 °C)  Heart Rate:  [] 87  Resp:  [16-18] 18  BP: (118-157)/(49-66) 127/60    Intake and output:    I/O last 3 completed shifts:  In: 820 [P.O.:820]  Out: 0   I/O this shift:  In: 360 [P.O.:360]  Out: -     Physical Examination:    General Appearance:  Alert and cooperative, not in any acute  distress.   Head:  Atraumatic and normocephalic, without obvious abnormality.   Eyes:          Conjunctivae and sclerae normal, no Icterus. No pallor. Extraocular movements are within normal limits.   Neck: Supple, trachea midline, no thyromegaly, no carotid bruit.   Lungs:   Chest shape is normal. Breath sounds heard bilaterally equally.  Course scattered wheezes heard anteriorly. No pleural rub or bronchial breathing.   Heart:  RRR, no murmur, no gallop, no rub. No JVD   Abdomen:   Normal bowel sounds, no masses, no organomegaly. Soft, nontender, nondistended, no guarding, no rebound tenderness.   Extremities: Moves all extremities.   Skin: No bleeding, bruising or rash.   Neurologic: Awake, alert and oriented times 3. Moves all 4 extremities equally.     Laboratory results:    Results from last 7 days   Lab Units 12/07/20  0617 12/03/20  0650 12/02/20  1749   SODIUM mmol/L 138 136 138   POTASSIUM mmol/L 4.5 4.5 4.4   CHLORIDE mmol/L 103 95* 99   CO2 mmol/L 28.2 29.0 31.4*   BUN mg/dL 23 23 22   CREATININE mg/dL 0.90 1.18* 1.05*   CALCIUM mg/dL 9.3 9.2 9.7   BILIRUBIN mg/dL 0.3  --  0.5   ALK PHOS U/L 77  --  98   ALT (SGPT) U/L 113*  --  105*   AST (SGOT) U/L 31  --  137*   GLUCOSE mg/dL 108* 170* 129*     Results from last 7 days   Lab Units 12/07/20  0617 12/04/20  0746 12/02/20  1749   WBC 10*3/mm3 12.11* 15.19* 23.55*   HEMOGLOBIN g/dL 9.6* 9.9* 11.7*   HEMATOCRIT % 30.8* 31.0* 37.6   PLATELETS 10*3/mm3 192 153 211         Results from last 7 days   Lab Units 12/02/20  1749   CK TOTAL U/L 34   TROPONIN T ng/mL <0.010     Results from last 7 days   Lab Units 12/03/20  0052 12/02/20  1747 12/02/20  1740   BLOODCX   --  No growth at 4 days No growth at 4 days   MRSACX  No Methicillin Resistant Staphylococcus aureus isolated  --   --      Results from last 7 days   Lab Units 12/02/20 2058   PH, ARTERIAL pH units 7.363   PO2 ART mm Hg 111.0*   PCO2, ARTERIAL mm Hg 56.9*   HCO3 ART mmol/L 32.3*       I have  reviewed the patient's laboratory results.    Radiology results:    Imaging Results (Last 24 Hours)     ** No results found for the last 24 hours. **          I have reviewed the patient's radiology reports.    Medication Review:     I have reviewed the patient's active and prn medications.     Problem List:      Acute on chronic respiratory failure (CMS/HCC)    Chronic diastolic heart failure (CMS/[facility])    Acute exacerbation of chronic obstructive pulmonary disease (COPD) (CMS/[facility])    Bilateral bronchopneumonia      Assessment:   Acute on chronic respiratory failure (CMS/HCC)   Chronic diastolic heart failure (CMS/[facility])   Acute exacerbation of chronic obstructive pulmonary disease (COPD) (CMS/[facility])   Bilateral bronchopneumonia   COPD exacerbation   Generalized weakness--functional decline      Plan:    Patient remains on room air today in no acute distress.  Patient currently stable on current inhalers and respiratory management.  Patient continues to work with PT/OT and case management currently working on Rehabilitation placement.  Patient is quite agreeable to placement in any facility in [place] today.  She prefers [facility] and has been in contact with a friend that works there trying to find placement.  Case management has contacted multiple facilities, but is still awaiting bed placement at this time.  Continue supportive care.  Monitor vital signs.  Hopeful patient can be placed in facility tomorrow.    Albina Saeed, APRN  12/07/20  16:21 EST    Dictated utilizing Dragon dictation.

## 2020-12-08 NOTE — PLAN OF CARE
Goal Outcome Evaluation:  Plan of Care Reviewed With: patient  Progress: improving   Pt has rested comfortably throughout the shift. VSS. No acute changes to report overnight. Tolerating PO medications in applesauce. Sating well on room air, with home CPAP when sleeping. Will continue to monitor.

## 2020-12-08 NOTE — PROGRESS NOTES
Continued Stay Note  TOÑA Friend     Patient Name: Sadie Tijerina  MRN: [id number]  Today's Date: 12/8/2020    Admit Date: 12/2/2020    Discharge Plan     Row Name 12/08/20 0822       Plan    Plan  received call form Amrita [phone] at [facility] She is looking at the insurance   Have called Amrita and Admissions coordinator numerous times at [facility]  Called Rachel  Signature will send referral to places in [place]     Pt may  Transfer  to [facility] will need another COVID test   Nursing to call report to [phone] rehab to home unit   Fax DC summary to [phone]     Daughter reports if needed and appropriate  She can transport             Discharge Codes    No documentation.       Expected Discharge Date and Time     Expected Discharge Date Expected Discharge Time    Dec 8, 2020             Sara Brooke RN

## 2020-12-08 NOTE — PROGRESS NOTES
[facility]    Name:  Sadie Tijerina   Age:  80 y.o.  Sex:  female  :  1940  MRN:  [id number]   Visit Number:  [id number]  Admission Date:  2020  Date Of Service:  20  Primary Care Physician:  Kristian Wolff MD     LOS: 0 days :  Patient Care Team:  Kristian Wolff MD as PCP - General  Kristian Wolff MD as PCP - Family Medicine  Bayron Grimaldo MD (Inactive) as Consulting Physician (General Surgery):    Chief Complaint:    Shortness of breath    Subjective / Interval History:     Chart has been reviewed today.  Patient admitted for hypoxic respiratory failure with COPD exacerbation and functional decline.  I had a long conversation with this patient on Friday regarding Rehabilitation placement.  Patient was in agreement at that time that placement would be the best option at this time as she needs to gain strength before she can go home on her own.  Patient updated that case management continues to work diligently to try and find placement.   Case management currently working to find placement at [facility].  Patient resting today on room air with no complaints.  Has been wearing CPAP at night and working with respiratory and PT routinely.  Patient ready for discharge to facility pending bed availability.     Review of Systems:     General ROS: Patient denies any fevers, chills or loss of consciousness.  Respiratory ROS: Denies cough or shortness of breath.  Cardiovascular ROS: Denies chest pain or palpitations. No history of exertional chest pain.  Gastrointestinal ROS: Denies nausea and vomiting. Denies any abdominal pain. No diarrhea.  Neurological ROS: Denies any focal weakness. No loss of consciousness. Denies any numbness.  Dermatological ROS: Denies any redness or pruritis.    Vital Signs:    Temp:  [97.4 °F (36.3 °C)-98.5 °F (36.9 °C)] 97.8 °F (36.6 °C)  Heart Rate:  [] 97  Resp:  [18-22] 20  BP: (127-164)/(56-82)  144/68    Intake and output:    I/O last 3 completed shifts:  In: 760 [P.O.:760]  Out: 0   I/O this shift:  In: 240 [P.O.:240]  Out: -     Physical Examination:    General Appearance:  Alert and cooperative, not in any acute distress.   Head:  Atraumatic and normocephalic, without obvious abnormality.   Eyes:          Conjunctivae and sclerae normal, no Icterus. No pallor. Extraocular movements are within normal limits.   Neck: Supple, trachea midline   Lungs:   Chest shape is normal. Breath sounds heard bilaterally equally.  No crackles or wheezing. No pleural rub or bronchial breathing.   Heart:  RRR, no murmur, no gallop, no rub. No JVD   Abdomen:   Normal bowel sounds, no masses, no organomegaly. Soft, nontender, nondistended, no guarding, no rebound tenderness.   Extremities: Moves all extremities, no edema.   Skin: No bleeding, bruising or rash.   Neurologic: Awake, alert and oriented times 3. Moves all 4 extremities equally.     Laboratory results:    Results from last 7 days   Lab Units 12/07/20  0617 12/03/20  0650 12/02/20  1749   SODIUM mmol/L 138 136 138   POTASSIUM mmol/L 4.5 4.5 4.4   CHLORIDE mmol/L 103 95* 99   CO2 mmol/L 28.2 29.0 31.4*   BUN mg/dL 23 23 22   CREATININE mg/dL 0.90 1.18* 1.05*   CALCIUM mg/dL 9.3 9.2 9.7   BILIRUBIN mg/dL 0.3  --  0.5   ALK PHOS U/L 77  --  98   ALT (SGPT) U/L 113*  --  105*   AST (SGOT) U/L 31  --  137*   GLUCOSE mg/dL 108* 170* 129*     Results from last 7 days   Lab Units 12/07/20  0617 12/04/20  0746 12/02/20  1749   WBC 10*3/mm3 12.11* 15.19* 23.55*   HEMOGLOBIN g/dL 9.6* 9.9* 11.7*   HEMATOCRIT % 30.8* 31.0* 37.6   PLATELETS 10*3/mm3 192 153 211         Results from last 7 days   Lab Units 12/02/20  1749   CK TOTAL U/L 34   TROPONIN T ng/mL <0.010     Results from last 7 days   Lab Units 12/03/20  0052 12/02/20  1747 12/02/20  1740   BLOODCX   --  No growth at 5 days No growth at 5 days   MRSACX  No Methicillin Resistant Staphylococcus aureus isolated  --   --       Results from last 7 days   Lab Units 12/02/20 2058   PH, ARTERIAL pH units 7.363   PO2 ART mm Hg 111.0*   PCO2, ARTERIAL mm Hg 56.9*   HCO3 ART mmol/L 32.3*       I have reviewed the patient's laboratory results.    Radiology results:    Imaging Results (Last 24 Hours)     ** No results found for the last 24 hours. **          I have reviewed the patient's radiology reports.    Medication Review:     I have reviewed the patient's active and prn medications.     Problem List:      Acute on chronic respiratory failure (CMS/HCC)    Chronic diastolic heart failure (CMS/HCC)    Acute exacerbation of chronic obstructive pulmonary disease (COPD) (CMS/[facility])    Bilateral bronchopneumonia      Assessment:   Acute on chronic respiratory failure (CMS/HCC)   Chronic diastolic heart failure (CMS/HCC)   Acute exacerbation of chronic obstructive pulmonary disease (COPD) (CMS/[facility])   Bilateral bronchopneumonia   COPD exacerbation   Generalized weakness--functional decline      Plan:    Patient remains on room air today in no acute distress.  Patient stable and talkative today with acute complaints.  Patient updated on the insurance process for Rehab placement.  Patient currently stable on current inhalers and respiratory management.  Patient continues to work with PT/OT and case management currently working on Rehabilitation placement.  Patient currently awaiting to see if  [facility] would be an option for placement.  She has been in contact with a friend that works there trying to find placement.  Case management has contacted multiple facilities, but is still awaiting bed placement at this time.  Continue supportive care.  Monitor vital signs.  Hopeful patient can be placed in facility tomorrow.    Albina Saeed, RUBY  12/08/20  12:30 EST    Dictated utilizing Dragon dictation.

## 2020-12-08 NOTE — ACP (ADVANCE CARE PLANNING)
I spoke with pt and her daughter to complete and notarize Living Will and Financial POA forms.  Completed forms were copied and placed in pt's medical file by the Unit [place].

## 2020-12-08 NOTE — THERAPY TREATMENT NOTE
Patient Name: Sadie Tijerina  : 1940    MRN: [id number]                              Today's Date: 2020       Admit Date: 2020    Visit Dx:     ICD-10-CM ICD-9-CM   1. Acute on chronic respiratory failure, unspecified whether with hypoxia or hypercapnia (CMS/[facility])  J96.20 518.84     Patient Active Problem List   Diagnosis   • Calf cramp   • Mixed anxiety depressive disorder   • Dizziness   • Fatigue   • GERD (gastroesophageal reflux disease)   • Hematuria   • Hyperlipidemia   • Essential hypertension   • Low back pain   • Restless legs syndrome   • Essential tremor   • Vitamin D deficiency   • Balance problem   • Tension headache   • Tobacco abuse disorder   • Leg swelling   • Acute and chronic respiratory failure with hypoxia (CMS/[facility])   • Venous insufficiency of both lower extremities   • Chronic diastolic heart failure (CMS/[facility])   • Neuropathy   • Acute exacerbation of chronic obstructive pulmonary disease (COPD) (CMS/[facility])   • Bilateral sensorineural hearing loss   • Disorder of inner ear   • Tympanosclerosis   • Transient vision disturbance   • Bilateral bronchopneumonia   • Pneumonia of both lungs due to infectious organism   • Leg wound, right   • Sepsis (CMS/[facility])   • Pneumonia of both lower lobes due to infectious organism   • Chronic respiratory failure with hypoxia (CMS/[facility])   • Physical deconditioning   • Impaired mobility and ADLs   • Oral candidiasis   • Acute on chronic respiratory failure (CMS/[facility])     Past Medical History:   Diagnosis Date   • Arthritis    • Cancer (CMS/[facility])     uterine cancer ()   • COPD (chronic obstructive pulmonary disease) (CMS/[facility])    • Depression    • Elevated cholesterol    • GERD (gastroesophageal reflux disease)    • History of emphysema    • History of esophageal reflux    • History of recurrent UTI (urinary tract infection)    • History of renal calculi    • History of uterine cancer    • Hyperlipidemia    • Hypertension    • Impaired functional  mobility, balance, gait, and endurance    • Pneumonia 02/2019     Past Surgical History:   Procedure Laterality Date   • FOOT SURGERY     • HAND SURGERY     • HYSTERECTOMY       General Information     Row Name 12/08/20 1353          Physical Therapy Time and Intention    Document Type  evaluation  (Pended)   -KG     Mode of Treatment  physical therapy  (Pended)   -KG       User Key  (r) = Recorded By, (t) = Taken By, (c) = Cosigned By    Initials Name Provider Type    Tammie Roblero, PT Student PT Student        Mobility     Row Name 12/08/20 [facility]3          Bed Mobility    Bed Mobility  supine-sit  (Pended)   -KG     Supine-Sit Alexander (Bed Mobility)  standby assist;verbal cues  (Pended)   -KG     Assistive Device (Bed Mobility)  head of bed elevated  (Pended)   -KG     Row Name 12/08/20 1353          Transfers    Comment (Transfers)  Performed x5 sit-stand with vcs to come to full stand  (Pended)   -KG     Row Name 12/08/20 [facility]          Sit-Stand Transfer    Sit-Stand Alexander (Transfers)  contact guard  (Pended)   -KG     Assistive Device (Sit-Stand Transfers)  walker, front-wheeled  (Pended)   -KG     Row Name 12/08/20 [facility]          Gait/Stairs (Locomotion)    Alexander Level (Gait)  contact guard;verbal cues;minimum assist (75% patient effort)  (Pended)   -KG     Assistive Device (Gait)  walker, front-wheeled  (Pended)   -KG     Distance in Feet (Gait)  Pt ambulated 15' from bed-chair with gait distance limited by LLE weakness  (Pended)   -KG     Deviations/Abnormal Patterns (Gait)  alia decreased;gait speed decreased;base of support, wide;stride length decreased  (Pended)   -KG     Bilateral Gait Deviations  forward flexed posture;heel strike decreased  (Pended)   -KG     Left Sided Gait Deviations  knee buckling, left side  (Pended)   -KG       User Key  (r) = Recorded By, (t) = Taken By, (c) = Cosigned By    Initials Name Provider Type    Tammie Roblero, PT Student PT Student         Obj/Interventions     [facility] Name 12/08/20 [facility]0          Motor Skills    Therapeutic Exercise  hip;knee;ankle  (Pended)   -KG     Row Name 12/08/20 [facility]          Hip (Therapeutic Exercise)    Hip (Therapeutic Exercise)  isometric exercises  (Pended)   -KG     Hip Isometrics (Therapeutic Exercise)  gluteal sets;external rotation;internal rotation;10 repetitions;3 second hold  (Pended)   -KG     Hip Strengthening (Therapeutic Exercise)  marching while seated;10 repetitions  (Pended)   -KG     [facility] Name 12/08/20 [facility]          Knee (Therapeutic Exercise)    Knee (Therapeutic Exercise)  strengthening exercise  (Pended)   -KG     Knee Strengthening (Therapeutic Exercise)  LAQ (long arc quad);bilateral;10 repetitions  (Pended)   -KG     [facility] Name 12/08/20 [facility]          Ankle (Therapeutic Exercise)    Ankle (Therapeutic Exercise)  AROM (active range of motion)  (Pended)   -KG     Ankle AROM (Therapeutic Exercise)  bilateral;dorsiflexion;plantarflexion;10 repetitions  (Pended)   -KG       User Key  (r) = Recorded By, (t) = Taken By, (c) = Cosigned By    Initials Name Provider Type    KG Tammie Thorne, PT Student PT Student        Goals/Plan    No documentation.       Clinical Impression     [facility] Name 12/08/20 1536          Pain    Additional Documentation  Pain Scale: Numbers Pre/Post-Treatment (Group)  (Pended)   -KG     Row Name 12/08/20 6567          Pain Scale: Numbers Pre/Post-Treatment    Pretreatment Pain Rating  0/10 - no pain  (Pended)   -KG     Posttreatment Pain Rating  0/10 - no pain  (Pended)   -KG     Pain Location - Side  Right  (Pended)   -KG     Pain Location - Orientation  lower  (Pended)   -KG     Pain Location  extremity  (Pended)   -KG     Pre/Posttreatment Pain Comment  Pt reports shooting pain in RLE during WBing; TTP along lateral quad/It band  (Pended)   -KG     Pain Intervention(s)  Repositioned;Ambulation/increased activity  (Pended)   -KG     [facility] Name 12/08/20 3398          Plan of Care Review    Plan  of Care Reviewed With  patient;daughter  (Pended)   -KG     Progress  no change  (Pended)   -KG     Outcome Summary  Pt gait distance limited this tx secondary to muscle shaking and pain with RLE WBing. She performed x5 sit-stands and seated LE exercises x10 reps with decreased coordination and increased muscle shaking with increased reps. O2 sats remained above 94% during PT tx on room air. Continue PT POC.  (Pended)   -KG     Row Name 12/08/20 1357          Vital Signs    Pre SpO2 (%)  96  (Pended)   -KG     O2 Delivery Pre Treatment  room air  (Pended)   -KG     Intra SpO2 (%)  94  (Pended)   -KG     O2 Delivery Intra Treatment  room air  (Pended)   -KG     Post SpO2 (%)  98  (Pended)   -KG     O2 Delivery Post Treatment  room air  (Pended)   -KG     Row Name 12/08/20 1357          Positioning and Restraints    Pre-Treatment Position  in bed  (Pended)   -KG     Post Treatment Position  chair  (Pended)   -KG     In Chair  call light within reach;encouraged to call for assist;with family/caregiver;sitting  (Pended)   -KG       User Key  (r) = Recorded By, (t) = Taken By, (c) = Cosigned By    Initials Name Provider Type    KG Tammie Thorne, PT Student PT Student        Outcome Measures     Row Name 12/08/20 1412          How much help from another person do you currently need...    Turning from your back to your side while in flat bed without using bedrails?  4  (Pended)   -KG     Moving from lying on back to sitting on the side of a flat bed without bedrails?  4  (Pended)   -KG     Moving to and from a bed to a chair (including a wheelchair)?  3  (Pended)   -KG     Standing up from a chair using your arms (e.g., wheelchair, bedside chair)?  3  (Pended)   -KG     Climbing 3-5 steps with a railing?  2  (Pended)   -KG     To walk in hospital room?  3  (Pended)   -KG     AM-PAC 6 Clicks Score (PT)  19  (Pended)   -KG     Row Name 12/08/20 1412          Functional Assessment    Outcome Measure Options  AM-PAC 6 Clicks  Basic Mobility (PT)  (Pended)   -KG       User Key  (r) = Recorded By, (t) = Taken By, (c) = Cosigned By    Initials Name Provider Type    KG Tammie Thorne, PT Student PT Student        Physical Therapy Education                 Title: PT OT SLP Therapies (Done)     Topic: Physical Therapy (Done)     Point: Mobility training (Done)     Learning Progress Summary           Patient Acceptance, E,TB, VU by KG at 12/8/2020 1413    Comment: Educated on use of heat/ice for RLE pain    Acceptance, E,TB, VU by CC at 12/7/2020 1141    Acceptance, E,TB, VU by KG at 12/4/2020 1159    Comment: Instructed on breathing technique and activity pacing    Acceptance, E,TB, VU by KG at 12/3/2020 1213    Comment: Instructed on role of PT                   Point: Home exercise program (Done)     Learning Progress Summary           Patient Acceptance, E,TB, VU by CC1 at 12/7/2020 1816    Comment: importance of performing ex with correct tech to maximize target mm strength    Acceptance, E,TB, VU by CC at 12/7/2020 1141    Acceptance, E,TB, VU by CC1 at 12/5/2020 1840    Comment: importance of performing ex to increase strength and activity tolerance                   Point: Body mechanics (Done)     Learning Progress Summary           Patient Acceptance, E,TB, VU by  at 12/7/2020 1141    Acceptance, E,TB, VU by CC1 at 12/6/2020 1246    Comment: increase posture during standing and amb                   Point: Precautions (Done)     Learning Progress Summary           Patient Acceptance, E,TB, VU by  at 12/7/2020 1141                               User Key     Initials Effective Dates Name Provider Type Discipline     10/26/16 -  Aurora Rhodes RN Registered Nurse Nurse    CC 03/07/18 -  Nika Chun PTA Physical Therapy Assistant PT    KG 10/19/20 -  Tammie Thorne, PT Student PT Student PT              PT Recommendation and Plan  Planned Therapy Interventions (PT): balance training, home exercise program, patient/family  education, strengthening, transfer training, gait training  Plan of Care Reviewed With: (P) patient, daughter  Progress: (P) no change  Outcome Summary: (P) Pt gait distance limited this tx secondary to muscle shaking and pain with RLE WBing. She performed x5 sit-stands and seated LE exercises x10 reps with decreased coordination and increased muscle shaking with increased reps. O2 sats remained above 94% during PT tx on room air. Continue PT POC.     Time Calculation:   PT Charges     Row Name 12/08/20 1414             Time Calculation    Start Time  1140  (Pended)   -KG      Stop Time  1205  (Pended)   -KG      Time Calculation (min)  25 min  (Pended)   -KG      PT Received On  12/08/20  (Pended)   -KG        User Key  (r) = Recorded By, (t) = Taken By, (c) = Cosigned By    Initials Name Provider Type    Tammie Roblero, PT Student PT Student        Therapy Charges for Today     Code Description Service Date Service Provider Modifiers Qty    98519905520 HC GAIT TRAINING EA 15 MIN 12/8/2020 Tammie Thorne, PT Student GP 1    [id number]  PT THER PROC EA 15 MIN 12/8/2020 Tammie Thorne, PT Student GP 1          PT G-Codes  Outcome Measure Options: (P) AM-PAC 6 Clicks Basic Mobility (PT)  AM-PAC 6 Clicks Score (PT): (P) 19  AM-PAC 6 Clicks Score (OT): 19    Tammie Thorne PT Student  12/8/2020

## 2020-12-08 NOTE — PLAN OF CARE
Goal Outcome Evaluation:  Plan of Care Reviewed With: (P) patient, daughter  Progress: (P) no change  Outcome Summary: (P) Pt gait distance limited this tx secondary to muscle shaking and pain with RLE WBing. She performed x5 sit-stands and seated LE exercises x10 reps with decreased coordination and increased muscle shaking with increased reps. O2 sats remained above 94% during PT tx on room air. Continue PT POC.

## 2020-12-08 NOTE — PLAN OF CARE
Goal Outcome Evaluation:  Plan of Care Reviewed With: patient, daughter  Progress: no change   Spoke with pt and her daughter and completed and notarized Living Will and POA forms.  Patient was alert and conversational as we completed the questions on the forms.  Forms were then copied and added to pt's medical record.  Pt stated she is still waiting for a room at a new location to receive rehabilitation services.  I will continue to follow.

## 2020-12-09 NOTE — PLAN OF CARE
Goal Outcome Evaluation:  Plan of Care Reviewed With: patient, daughter  Progress: improving  Outcome Summary: VSS. Ambulates with walker.  On RA except home bipap at night.  C/o headache.  Ready to transfer care to rehab

## 2020-12-09 NOTE — DISCHARGE SUMMARY
[facility]      Name:  Sadie Tijerina   Age:  80 y.o.  Sex:  female  :  1940  MRN:  [id number]   Visit Number:  [id number]    Admission Date:  2020  Date of Discharge:  2020  Primary Care Physician:  Kristian Wolff MD     Discharge Diagnoses:       Acute on chronic respiratory failure (CMS/Formerly [facility])    Chronic diastolic heart failure (CMS/Formerly [facility])    Acute exacerbation of chronic obstructive pulmonary disease (COPD) (CMS/Formerly [facility])    Bilateral bronchopneumonia      Presenting Problem:    Acute on chronic respiratory failure, unspecified whether with hypoxia or hypercapnia (CMS/Formerly [facility]) [J96.20]     Consults:     Consults     Date and Time Order Name Status Description    12/3/2020 0033 Inpatient Pulmonology Consult Completed         Consulting Physician(s)     Provider Relationship Specialty    Tere Landaverde MD Consulting Physician Pulmonary Disease          Procedures Performed:  None         History of presenting illness/ Hospital Course:    80 year old female who was recently discharged home after a 10-day hospitalization here. Patient is longtime sufferer of advancing COPD.  Patient has history of chronic hypoxic respiratory failure and requires 2L of oxygen via nasal cannula at home and uses a CPAP nightly.  Patient went home at discharge and returned the following day stating that her breathing got worse with wheezing and chest pain at time of admission.  Patient was started on IV Zosyn and Doxycycline on admission for WBC 24,000 with CT of chest revealing chronic changes with bilateral pneumonia that does not appear acute per Pulmonology consultation. Patient consulted by pulmonology and transitioned to Omnicef and Zithromax PO. WBC trended down during stay.  Procalcitonin down to 2.39 from 2.86 on .  MRSA swab was negative.  Blood cultures were with negative growth after 5 days.  Patient had a long conversation with consulting Pulmonology  during this admission who advised that this was most likely progression of her chronic COPD.  Patient seeking placement at Rehabilitation facility to gain strength and hopefully return home.  Patient has been resting on room air in the hospital for several days awaiting placement at a facility with oxygen saturations >90% and no respiratory problems noted.  Patient felt significantly better the day after admission.  Patient has had some complaints of right leg pain, a doppler US revealed there was no blood clot.  I believe this is muscular or sciatic pain as she describes it starting in her lower back and going down her right leg.  She complains of swelling, however, her legs are approximately the same size on evaluation.  Patient's family has been involved with planning as well as case management throughout the course of the patient's stay.  Patient is agreeable to go to rehab and keep working with PT/OT with her end goal being able to go back home.  Patient is grateful for her placement at [facility] and is ready for discharge today.     Vital Signs:    Temp:  [97.5 °F (36.4 °C)-98.8 °F (37.1 °C)] 97.5 °F (36.4 °C)  Heart Rate:  [] 117  Resp:  [16-20] 20  BP: (130-187)/(57-94) 153/94    Physical Exam:    General Appearance:  Alert and cooperative, not in any acute distress.   Head:  Atraumatic and normocephalic, without obvious abnormality.   Eyes:          PERRLA, conjunctivae and sclerae normal, no icterus. No pallor. Extraocular movements are within normal limits.   Ears:  Ears appear intact with no abnormalities noted.   Throat: No oral lesions, no thrush, oral mucosa moist.   Neck: Supple, trachea midline   Back:   No kyphoscoliosis present. No tenderness to palpation,   range of motion normal.   Lungs:   Chest shape is normal. Breath sounds heard bilaterally equally.  No crackles or wheezing. No Pleural rub or bronchial breathing.   Heart:  RRR, no murmur, no gallop, no rub. No JVD.    Abdomen:   Normal bowel sounds, no masses, no organomegaly. Soft, nontender, nondistended, no guarding, no rebound tenderness.   Extremities: Moves all extremities, no edema   Pulses: Pulses palpable and equal bilaterally.   Skin: No bleeding, bruising or rash.   Neurologic: Alert and oriented x 3. Moves all four limbs equally. No tremors. No facial asymmetry.     Pertinent Lab Results:     Results from last 7 days   Lab Units 12/07/20  0617 12/03/20  0650 12/02/20  1749   SODIUM mmol/L 138 136 138   POTASSIUM mmol/L 4.5 4.5 4.4   CHLORIDE mmol/L 103 95* 99   CO2 mmol/L 28.2 29.0 31.4*   BUN mg/dL 23 23 22   CREATININE mg/dL 0.90 1.18* 1.05*   CALCIUM mg/dL 9.3 9.2 9.7   BILIRUBIN mg/dL 0.3  --  0.5   ALK PHOS U/L 77  --  98   ALT (SGPT) U/L 113*  --  105*   AST (SGOT) U/L 31  --  137*   GLUCOSE mg/dL 108* 170* 129*     Results from last 7 days   Lab Units 12/07/20  0617 12/04/20  0746 12/02/20  1749   WBC 10*3/mm3 12.11* 15.19* 23.55*   HEMOGLOBIN g/dL 9.6* 9.9* 11.7*   HEMATOCRIT % 30.8* 31.0* 37.6   PLATELETS 10*3/mm3 192 153 211         Results from last 7 days   Lab Units 12/02/20  1749   CK TOTAL U/L 34   TROPONIN T ng/mL <0.010     Results from last 7 days   Lab Units 12/02/20  1749   PROBNP pg/mL 273.3         Results from last 7 days   Lab Units 12/02/20  1749   LIPASE U/L 14     Results from last 7 days   Lab Units 12/02/20  2058   PH, ARTERIAL pH units 7.363   PO2 ART mm Hg 111.0*   PCO2, ARTERIAL mm Hg 56.9*   HCO3 ART mmol/L 32.3*     Results from last 7 days   Lab Units 12/02/20  1851   COLOR UA  Yellow   GLUCOSE UA  Negative   KETONES UA  Negative   LEUKOCYTES UA  Negative   PH, URINE  5.5   BILIRUBIN UA  Negative   UROBILINOGEN UA  0.2 E.U./dL     Pain Management Panel     There is no flowsheet data to display.        Results from last 7 days   Lab Units 12/03/20  0052 12/02/20  1747 12/02/20  1740   BLOODCX   --  No growth at 5 days No growth at 5 days   MRSACX  No Methicillin Resistant  Staphylococcus aureus isolated  --   --        Pertinent Radiology Results:    Imaging Results (All)     Procedure Component Value Units Date/Time    US Venous Doppler Lower Extremity Right (duplex) [[id number]] Collected: 12/04/20 1241     Updated: 12/04/20 1243    Narrative:      PROCEDURE: US VENOUS DOPPLER LOWER EXTREMITY RIGHT (DUPLEX)-     HISTORY: right leg pain; J96.20-Acute and chronic respiratory failure,  unspecified whether with hypoxia or hypercapnia     PROCEDURE: Multiple transverse and longitudinal scans were performed of  the femoropopliteal deep venous system, with augmentation and  compression maneuvers.     FINDINGS: Normal phasic flow was noted in the visualized deep venous  system. No intraluminal increased echogenicity is noted to suggest  thrombus. There is normal compression and augmentation of the venous  structures.  No abnormal venous collaterals are seen.       Impression:      No evidence of deep venous thrombosis.     This report was finalized on 12/4/2020 12:41 PM by Sylvain Dent M.D..    CT Abdomen Pelvis Without Contrast [[id number]] Collected: 12/02/20 2216     Updated: 12/02/20 2217    Narrative:      FINAL REPORT    TECHNIQUE:  Axial images through the abdomen and pelvis were performed  without contrast. This study was performed with techniques to  keep radiation doses as low as reasonably achievable, (ALARA).  Individualized dose reduction techniques using automated  exposure control or adjustment of mA and/or kV according to the  patient's size were employed.    CLINICAL HISTORY:  abdominal pain; diarrhea    FINDINGS:  ABDOMEN: There is patchy airspace infiltrates in the lung bases  cannot exclude acute pneumonia.  The heart size is normal.  Limited images of the liver are unremarkable.  The gallbladder  is absent.  The spleen is normal.  No adrenal mass is  identified.  The aorta is normal in caliber.  There is no  significant free fluid or adenopathy.  There is  hypertrophy of  the right kidney.  There is a benign-appearing cyst arising from  the posterior aspect of the left kidney.  There are dense  vascular calcifications seen within the abdominal aorta and  iliac vessels.    PELVIS: There are scattered diverticuli  throughout the sigmoid colon.  The appendix is not identified.  The urinary bladder is unremarkable.  There is no significant  free fluid or adenopathy.      Impression:      1.  Patchy airspace infiltrates in the lung bases, cannot  exclude acute pneumonia.  2.  No localized inflammatory reaction  or fluid collection.    Authenticated by José Escoto MD on 12/02/2020 10:16:30 PM    CT Lumbar Spine Without Contrast [[id number]] Collected: 12/02/20 1834     Updated: 12/02/20 1835    Narrative:      FINAL REPORT    CLINICAL HISTORY:  lower back pain    FINDINGS:  Multiple contiguous transaxial slices through the lumbar spine  were obtained with coronal and sagittal reformatted images.  Bones are demineralized.  There is mild chronic compression  deformity of the superior endplate of L1.  There is no acute  fracture or subluxation.  There is mild diffuse endplate  osteophytosis and facet joint hypertrophy.  Posterior disc  osteophyte complexes are present L3-4 and L4-5 without canal  stenosis.  Vascular calcifications are present.      Impression:      Mild chronic changes but no acute abnormality    Authenticated by Veronica Ceballos MD on 12/02/2020 06:34:01 PM    CT Chest Without Contrast [[id number]] Collected: 12/02/20 1829     Updated: 12/02/20 1830    Narrative:      FINAL REPORT    CLINICAL HISTORY:  increased sob    FINDINGS:  Multiple contiguous transaxial slices through the chest were  obtained without the intravenous ministration of contrast with  coronal reformatted images.  Images are degraded by patient  respiratory motion artifact.  There is diffuse centrilobular and  paraseptal emphysema.  There is moderate bilateral upper  lobe  pleuroparenchymal scarring with mild bronchiectasis.  There is  nonspecific nodularity along the posterior medial margin of the  apex, likely scar the recommend appropriate follow-up.  There  are nonspecific nodular groundglass opacities bilaterally,  greater in the lingula and right middle lobe.  Findings are  consistent with an pneumonia which could be secondary to a Covid  19 pneumonia.  Recommend appropriate clinical correlation.  There is no effusion.  Heart size is normal.  There are  calcifications of the coronary arteries.  The aorta is calcified  and ectatic.  Upper abdomen is nonacute.      Impression:      Chronic changes with nonspecific bilateral pneumonia.  Recommend  appropriate follow-up    Authenticated by Veronica Ceballos MD on 12/02/2020 06:29:35 PM          Echo:    Results for orders placed in visit on 04/01/20   Adult Transthoracic Echo Complete W/ Cont if Necessary Per Protocol    Narrative · Calculated right ventricular systolic pressure from tricuspid   regurgitation is 40 mmHg.  · Mild tricuspid valve regurgitation is present.  · Estimated EF = 66%.  · Left ventricular systolic function is normal.          Condition on Discharge:      Stable.    Code status during the hospital stay:    Code Status and Medical Interventions:   Ordered at: 12/02/20 2128     Code Status:    CPR     Medical Interventions (Level of Support Prior to Arrest):    Full       Discharge Disposition:    Skilled Nursing Facility (DC - External)    Discharge Medications:       Discharge Medications      New Medications      Instructions Start Date   albuterol (2.5 MG/3ML) 0.083% nebulizer solution  Commonly known as: PROVENTIL   2.5 mg, Nebulization, Every 6 Hours PRN      arformoterol 15 MCG/2ML nebulizer solution  Commonly known as: BROVANA   15 mcg, Nebulization, 2 Times Daily - RT      budesonide 0.5 MG/2ML nebulizer solution  Commonly known as: PULMICORT   0.5 mg, Nebulization, 2 Times Daily - RT       hydrocortisone 1 % cream   Topical, Every 12 Hours Scheduled      ipratropium 0.02 % nebulizer solution  Commonly known as: ATROVENT   0.5 mg, Nebulization, 4 Times Daily - RT         Changes to Medications      Instructions Start Date   predniSONE 20 MG tablet  Commonly known as: DELTASONE  What changed:   · medication strength  · how much to take  · how to take this  · when to take this  · additional instructions   Take 1 tabs x2 day, then 1/2 tab for 2 days, then resume home dose of 5mg daily.         Continue These Medications      Instructions Start Date   acetaminophen 650 MG 8 hr tablet  Commonly known as: TYLENOL   650 mg, Oral, Every 8 Hours PRN      aspirin 81 MG EC tablet   81 mg, Oral, Daily      benzonatate 100 MG capsule  Commonly known as: TESSALON   100 mg, Oral, 3 Times Daily PRN      betamethasone dipropionate 0.05 % cream   Apply topically to the appropriate area 2 (Two) times a day      Bevespi Aerosphere 9-4.8 MCG/ACT aerosol  Generic drug: Glycopyrrolate-Formoterol   INHALE 2 PUFFS BY MOUTH TWICE DAILY      cetirizine 10 MG tablet  Commonly known as: zyrTEC   10 mg, Oral, Daily      clotrimazole-betamethasone 1-0.05 % cream  Commonly known as: Lotrisone   Topical, 2 Times Daily      estrogens (conjugated) 0.625 MG tablet  Commonly known as: Premarin   0.625 mg, Oral, Daily, 200001      fluticasone 50 MCG/ACT nasal spray  Commonly known as: FLONASE   2 sprays, Each Nare, Daily      furosemide 20 MG tablet  Commonly known as: LASIX   20 mg, Oral, Daily      guaiFENesin 600 MG 12 hr tablet  Commonly known as: MUCINEX   1,200 mg, Oral, Daily      ipratropium 0.06 % nasal spray  Commonly known as: ATROVENT   2 sprays, Nasal, 4 Times Daily      lactobacillus acidophilus capsule capsule   1 capsule, Oral, 2 Times Daily      omeprazole 40 MG capsule  Commonly known as: priLOSEC   40 mg, Oral, Daily      potassium chloride 10 MEQ CR tablet  Commonly known as: K-DUR,KLOR-CON   10 mEq, Oral, Daily       pravastatin 20 MG tablet  Commonly known as: PRAVACHOL   20 mg, Oral, Every Evening      propranolol 60 MG tablet  Commonly known as: INDERAL   TAKE 1 TABLET BY MOUTH TWICE DAILY      sertraline 50 MG tablet  Commonly known as: ZOLOFT   50 mg, Oral, Daily      traMADol 50 MG tablet  Commonly known as: ULTRAM   50 mg, Oral, Every 6 Hours PRN      VITAMIN D PO   1,000 Units, Oral, Daily         Stop These Medications    clonazePAM 0.5 MG tablet  Commonly known as: KlonoPIN            Discharge Diet:     Diet Instructions     Diet: Regular      Discharge Diet: Regular          Activity at Discharge:     Activity Instructions     Activity as Tolerated            Follow-up Appointments:    Additional Instructions for the Follow-ups that You Need to Schedule     Discharge Follow-up with Specified Provider: Dr. Landaverde or Dr. Mojica; 1 Month   As directed      To: Dr. Landaverde or Dr. Mojica    Follow Up: 1 Month    Follow Up Details: Pulmonology           Follow-up Information     Kristian Wolff MD .    Specialty: Internal Medicine  Contact information:  [address] [place] [address] [phone]                   Future Appointments   Date Time Provider Department Center   1/25/2021  9:30 AM Jaye Taylor APRN [facility] None       Additional Instructions for the Follow-ups that You Need to Schedule     Discharge Follow-up with Specified Provider: Dr. Landaverde or Dr. Mojica; 1 Month   As directed      To: Dr. Landaverde or Dr. Mojica    Follow Up: 1 Month    Follow Up Details: Pulmonology               Test Results Pending at Discharge:    Pending Labs     Order Current Status    Green Top (No Gel) In process    [place] Draw In process             RUBY Marrero  12/09/20  11:46 EST    Time: I spent 29 minutes on this discharge activity which included: face-to-face encounter with the patient, reviewing the data in the system, coordination of the care with the nursing staff as well as  consultants, documentation, and entering orders.     Dictated utilizing Dragon dictation.

## 2020-12-09 NOTE — TELEPHONE ENCOUNTER
PATIENT DAUGHTER WILL LIKE FOR DR. RASHEED OR THE NURSE TO CALL HER BACK IN REGARDS TO THE RECENT HEADACHE MEDICATION THAT WAS PRESCRIBED FOR HER MOTHER.     DAUGHTER DOES NOT KNOW THE NAME OF THE MEDICATION AS IT WAS AN AS NEEDED MEDICATION FOR THE HEADACHES AND NOT IN HER DAILY REGAMIN.     SCOTT IS REQUESTING A CALL BACK AS SOON AS POSSIBLE. PATIENT IS CURRENTLY IN THE HOSPITAL AND WILL BE RELEASED TO REHAB POSSIBLY TODAY. THE MEDICATION IN QUESTION IS NOT LISTED IN PATIENT RECORD THAT THE [facility] CAN SEE.  THE HOSPITAL PERSONNEL  WILL LIKE TO HAVE THE PRESCRIPTION FILLED AT THE HOSPITAL PHARMACY. IN ORDER TO SEND WITH THE PATIENT FOR ADMINISTRATION DURING HER REHAB STAY.     PATIENT DAUGHTER CONTACT - SCOTT  [phone]    THANKS

## 2020-12-09 NOTE — PROGRESS NOTES
Case Management Discharge Note      Final Note: Transferred to [facility]    Provided Post Acute Provider List?: Refused  Provided Post Acute Provider Quality & Resource List?: Refused    Selected Continued Care - Admitted Since 12/2/2020     Destination Coordination complete    Service Provider Selected Services Address Phone Fax Patient Preferred    [facility]  [facility] [address] [facility] [address] [phone] --          Durable Medical Equipment    No services have been selected for the patient.              Dialysis/Infusion    No services have been selected for the patient.              Home Medical Care    No services have been selected for the patient.              Therapy    No services have been selected for the patient.              Community Resources    No services have been selected for the patient.                Selected Continued Care - Prior Encounters Includes selections from prior encounters from 9/3/2020 to 12/9/2020    Discharged on 12/1/2020 Admission date: 11/20/2020 - Discharge disposition: Home or Self Care    Durable Medical Equipment     Service Provider Selected Services Address Phone Fax Patient Preferred    [facility]  Durable Medical Equipment [address] [facility] [address] [address] [phone] --       Internal Comment last updated by Morelia Licea, LCSW 11/30/2020 1033                         Home Medical Care     Service Provider Selected Services Address Phone Fax Patient Preferred    [facility]  Home Health Services [address] [facility] [address] [phone] --       Internal Comment last updated by Jazmyne King, MSW 11/25/2020 0934    Active will Kulwinder, verified with office. Will need resumption orders at d/c.                            Discharged on 11/11/2020 Admission date: 11/4/2020 - Discharge disposition: Swing Bed     Destination     Service Provider Selected Services Address Phone Fax Patient Preferred    [facility] SWING BED UNIT  Skilled Nursing [address], [facility] [address] [phone] --                Discharged on 9/28/2020 Admission date: 9/16/2020 - Discharge disposition: Home-Health Care Svc    Durable Medical Equipment     Service Provider Selected Services Address Phone Fax Patient Preferred    [facility]  Durable Medical Equipment [address] [id number] [phone] --       Internal Comment last updated by Deisi Espinosa RN 9/28/2020 1038    Current patient                     Home Medical Care     Service Provider Selected Services Address Phone Fax Patient Preferred    [facility]  [facility] Health [facility] [address] [facility] [address] [phone] --                    Transportation Services  Ambulance: [facility]    Final Discharge Disposition Code: 03 - skilled nursing facility (SNF)

## 2021-01-01 ENCOUNTER — NURSING HOME (OUTPATIENT)
Dept: INTERNAL MEDICINE | Facility: CLINIC | Age: 81
End: 2021-01-01

## 2021-01-01 ENCOUNTER — READMISSION MANAGEMENT (OUTPATIENT)
Dept: CALL CENTER | Facility: HOSPITAL | Age: 81
End: 2021-01-01

## 2021-01-01 ENCOUNTER — HOSPITAL ENCOUNTER (EMERGENCY)
Facility: HOSPITAL | Age: 81
Discharge: NURSING, REHAB OR LONG-TERM CARE | End: 2021-04-17
Attending: EMERGENCY MEDICINE | Admitting: EMERGENCY MEDICINE

## 2021-01-01 ENCOUNTER — APPOINTMENT (OUTPATIENT)
Dept: GENERAL RADIOLOGY | Facility: HOSPITAL | Age: 81
End: 2021-01-01

## 2021-01-01 ENCOUNTER — HOSPITAL ENCOUNTER (INPATIENT)
Facility: HOSPITAL | Age: 81
LOS: 8 days | Discharge: NURSING, REHAB OR LONG-TERM CARE | End: 2021-03-22
Attending: EMERGENCY MEDICINE | Admitting: INTERNAL MEDICINE

## 2021-01-01 ENCOUNTER — TRANSITIONAL CARE MANAGEMENT TELEPHONE ENCOUNTER (OUTPATIENT)
Dept: CALL CENTER | Facility: HOSPITAL | Age: 81
End: 2021-01-01

## 2021-01-01 ENCOUNTER — TELEPHONE (OUTPATIENT)
Dept: INTERNAL MEDICINE | Facility: CLINIC | Age: 81
End: 2021-01-01

## 2021-01-01 ENCOUNTER — APPOINTMENT (OUTPATIENT)
Dept: CT IMAGING | Facility: HOSPITAL | Age: 81
End: 2021-01-01

## 2021-01-01 VITALS
WEIGHT: 150 LBS | RESPIRATION RATE: 18 BRPM | BODY MASS INDEX: 29.45 KG/M2 | HEIGHT: 60 IN | SYSTOLIC BLOOD PRESSURE: 125 MMHG | OXYGEN SATURATION: 95 % | TEMPERATURE: 97.9 F | HEART RATE: 86 BPM | DIASTOLIC BLOOD PRESSURE: 66 MMHG

## 2021-01-01 VITALS
RESPIRATION RATE: 18 BRPM | TEMPERATURE: 97.9 F | BODY MASS INDEX: 27.25 KG/M2 | HEART RATE: 77 BPM | DIASTOLIC BLOOD PRESSURE: 63 MMHG | WEIGHT: 149 LBS | SYSTOLIC BLOOD PRESSURE: 137 MMHG | OXYGEN SATURATION: 98 %

## 2021-01-01 VITALS
SYSTOLIC BLOOD PRESSURE: 140 MMHG | RESPIRATION RATE: 22 BRPM | DIASTOLIC BLOOD PRESSURE: 60 MMHG | OXYGEN SATURATION: 94 % | HEART RATE: 124 BPM

## 2021-01-01 VITALS
DIASTOLIC BLOOD PRESSURE: 60 MMHG | SYSTOLIC BLOOD PRESSURE: 118 MMHG | TEMPERATURE: 97.4 F | BODY MASS INDEX: 27.48 KG/M2 | WEIGHT: 150.25 LBS | OXYGEN SATURATION: 98 % | RESPIRATION RATE: 20 BRPM

## 2021-01-01 VITALS
HEART RATE: 75 BPM | SYSTOLIC BLOOD PRESSURE: 141 MMHG | OXYGEN SATURATION: 98 % | RESPIRATION RATE: 18 BRPM | DIASTOLIC BLOOD PRESSURE: 72 MMHG | TEMPERATURE: 98.3 F

## 2021-01-01 VITALS
SYSTOLIC BLOOD PRESSURE: 134 MMHG | HEART RATE: 76 BPM | RESPIRATION RATE: 20 BRPM | TEMPERATURE: 98 F | DIASTOLIC BLOOD PRESSURE: 80 MMHG | OXYGEN SATURATION: 98 %

## 2021-01-01 VITALS
TEMPERATURE: 97.6 F | OXYGEN SATURATION: 98 % | DIASTOLIC BLOOD PRESSURE: 53 MMHG | HEART RATE: 84 BPM | SYSTOLIC BLOOD PRESSURE: 124 MMHG | RESPIRATION RATE: 18 BRPM

## 2021-01-01 VITALS
SYSTOLIC BLOOD PRESSURE: 131 MMHG | DIASTOLIC BLOOD PRESSURE: 76 MMHG | TEMPERATURE: 97.3 F | RESPIRATION RATE: 20 BRPM | HEART RATE: 87 BPM | OXYGEN SATURATION: 94 %

## 2021-01-01 VITALS
RESPIRATION RATE: 18 BRPM | SYSTOLIC BLOOD PRESSURE: 141 MMHG | WEIGHT: 148.4 LBS | HEIGHT: 62 IN | DIASTOLIC BLOOD PRESSURE: 63 MMHG | TEMPERATURE: 97.9 F | HEART RATE: 76 BPM | OXYGEN SATURATION: 97 % | BODY MASS INDEX: 27.31 KG/M2

## 2021-01-01 VITALS
TEMPERATURE: 97.3 F | RESPIRATION RATE: 22 BRPM | SYSTOLIC BLOOD PRESSURE: 153 MMHG | HEART RATE: 93 BPM | DIASTOLIC BLOOD PRESSURE: 66 MMHG | OXYGEN SATURATION: 97 %

## 2021-01-01 VITALS
TEMPERATURE: 97.4 F | DIASTOLIC BLOOD PRESSURE: 60 MMHG | SYSTOLIC BLOOD PRESSURE: 118 MMHG | OXYGEN SATURATION: 98 % | WEIGHT: 150.25 LBS | BODY MASS INDEX: 27.48 KG/M2 | RESPIRATION RATE: 20 BRPM

## 2021-01-01 DIAGNOSIS — Z51.5 PALLIATIVE CARE PATIENT: ICD-10-CM

## 2021-01-01 DIAGNOSIS — I50.32 CHRONIC DIASTOLIC HEART FAILURE (HCC): ICD-10-CM

## 2021-01-01 DIAGNOSIS — F41.8 MIXED ANXIETY DEPRESSIVE DISORDER: ICD-10-CM

## 2021-01-01 DIAGNOSIS — G62.9 NEUROPATHY: ICD-10-CM

## 2021-01-01 DIAGNOSIS — I50.32 CHRONIC DIASTOLIC HEART FAILURE (HCC): Chronic | ICD-10-CM

## 2021-01-01 DIAGNOSIS — J96.11 CHRONIC RESPIRATORY FAILURE WITH HYPOXIA (HCC): Primary | ICD-10-CM

## 2021-01-01 DIAGNOSIS — Z51.5 HOSPICE CARE PATIENT: ICD-10-CM

## 2021-01-01 DIAGNOSIS — J44.1 COPD WITH ACUTE EXACERBATION (HCC): Primary | ICD-10-CM

## 2021-01-01 DIAGNOSIS — J96.22 ACUTE ON CHRONIC RESPIRATORY FAILURE WITH HYPERCAPNIA (HCC): ICD-10-CM

## 2021-01-01 DIAGNOSIS — G47.9 SLEEP DISTURBANCE: ICD-10-CM

## 2021-01-01 DIAGNOSIS — I10 ESSENTIAL HYPERTENSION: ICD-10-CM

## 2021-01-01 DIAGNOSIS — J30.2 SEASONAL ALLERGIC RHINITIS, UNSPECIFIED TRIGGER: ICD-10-CM

## 2021-01-01 DIAGNOSIS — J44.1 COPD WITH ACUTE EXACERBATION (HCC): ICD-10-CM

## 2021-01-01 DIAGNOSIS — M79.672 LEFT FOOT PAIN: ICD-10-CM

## 2021-01-01 DIAGNOSIS — J41.8 MIXED SIMPLE AND MUCOPURULENT CHRONIC BRONCHITIS (HCC): ICD-10-CM

## 2021-01-01 DIAGNOSIS — J96.11 CHRONIC RESPIRATORY FAILURE WITH HYPOXIA (HCC): ICD-10-CM

## 2021-01-01 DIAGNOSIS — K21.9 GASTROESOPHAGEAL REFLUX DISEASE WITHOUT ESOPHAGITIS: ICD-10-CM

## 2021-01-01 DIAGNOSIS — Z51.5 HOSPICE CARE PATIENT: Primary | ICD-10-CM

## 2021-01-01 DIAGNOSIS — R44.1 VISUAL HALLUCINATIONS: ICD-10-CM

## 2021-01-01 DIAGNOSIS — F41.9 ANXIETY: ICD-10-CM

## 2021-01-01 DIAGNOSIS — J96.11 CHRONIC RESPIRATORY FAILURE WITH HYPOXIA AND HYPERCAPNIA (HCC): Primary | ICD-10-CM

## 2021-01-01 DIAGNOSIS — M79.89 LEG SWELLING: ICD-10-CM

## 2021-01-01 DIAGNOSIS — J96.12 CHRONIC RESPIRATORY FAILURE WITH HYPOXIA AND HYPERCAPNIA (HCC): Primary | ICD-10-CM

## 2021-01-01 LAB
A-A DO2: 95.2 MMHG
A-A DO2: ABNORMAL
ALBUMIN SERPL-MCNC: 3.1 G/DL (ref 3.5–5.2)
ALBUMIN SERPL-MCNC: 3.7 G/DL (ref 3.5–5.2)
ALBUMIN/GLOB SERPL: 1 G/DL
ALBUMIN/GLOB SERPL: 1.2 G/DL
ALP SERPL-CCNC: 59 U/L (ref 39–117)
ALP SERPL-CCNC: 63 U/L (ref 39–117)
ALT SERPL W P-5'-P-CCNC: 15 U/L (ref 1–33)
ALT SERPL W P-5'-P-CCNC: 16 U/L (ref 1–33)
ANION GAP SERPL CALCULATED.3IONS-SCNC: 3 MMOL/L (ref 5–15)
ANION GAP SERPL CALCULATED.3IONS-SCNC: 3 MMOL/L (ref 5–15)
ANION GAP SERPL CALCULATED.3IONS-SCNC: 4 MMOL/L (ref 5–15)
ANION GAP SERPL CALCULATED.3IONS-SCNC: 4.6 MMOL/L (ref 5–15)
ANION GAP SERPL CALCULATED.3IONS-SCNC: 6 MMOL/L (ref 5–15)
ANION GAP SERPL CALCULATED.3IONS-SCNC: 8 MMOL/L (ref 5–15)
ARTERIAL PATENCY WRIST A: ABNORMAL
ARTERIAL PATENCY WRIST A: POSITIVE
ARTERIAL PATENCY WRIST A: POSITIVE
AST SERPL-CCNC: 11 U/L (ref 1–32)
AST SERPL-CCNC: 26 U/L (ref 1–32)
ATMOSPHERIC PRESS: 731 MMHG
ATMOSPHERIC PRESS: 731 MMHG
ATMOSPHERIC PRESS: ABNORMAL MM[HG]
BACTERIA SPEC AEROBE CULT: NORMAL
BACTERIA SPEC AEROBE CULT: NORMAL
BASE EXCESS BLDA CALC-SCNC: 13 MMOL/L (ref 0–2)
BASE EXCESS BLDA CALC-SCNC: 13.4 MMOL/L (ref 0–2)
BASE EXCESS BLDA CALC-SCNC: 15.1 MMOL/L (ref 0–2)
BASOPHILS # BLD AUTO: 0.01 10*3/MM3 (ref 0–0.2)
BASOPHILS # BLD AUTO: 0.01 10*3/MM3 (ref 0–0.2)
BASOPHILS # BLD AUTO: 0.02 10*3/MM3 (ref 0–0.2)
BASOPHILS # BLD AUTO: 0.02 10*3/MM3 (ref 0–0.2)
BASOPHILS # BLD AUTO: 0.03 10*3/MM3 (ref 0–0.2)
BASOPHILS NFR BLD AUTO: 0.1 % (ref 0–1.5)
BASOPHILS NFR BLD AUTO: 0.2 % (ref 0–1.5)
BASOPHILS NFR BLD AUTO: 0.3 % (ref 0–1.5)
BDY SITE: ABNORMAL
BDY SITE: ABNORMAL
BILIRUB SERPL-MCNC: 0.2 MG/DL (ref 0–1.2)
BILIRUB SERPL-MCNC: 0.3 MG/DL (ref 0–1.2)
BILIRUB UR QL STRIP: NEGATIVE
BODY TEMPERATURE: 37 C
BUN SERPL-MCNC: 16 MG/DL (ref 8–23)
BUN SERPL-MCNC: 18 MG/DL (ref 8–23)
BUN SERPL-MCNC: 18 MG/DL (ref 8–23)
BUN SERPL-MCNC: 22 MG/DL (ref 8–23)
BUN SERPL-MCNC: 25 MG/DL (ref 8–23)
BUN SERPL-MCNC: 30 MG/DL (ref 8–23)
BUN/CREAT SERPL: 17.1 (ref 7–25)
BUN/CREAT SERPL: 20 (ref 7–25)
BUN/CREAT SERPL: 21.9 (ref 7–25)
BUN/CREAT SERPL: 23.7 (ref 7–25)
BUN/CREAT SERPL: 32.9 (ref 7–25)
BUN/CREAT SERPL: 36.1 (ref 7–25)
CALCIUM SPEC-SCNC: 8.2 MG/DL (ref 8.6–10.5)
CALCIUM SPEC-SCNC: 8.4 MG/DL (ref 8.6–10.5)
CALCIUM SPEC-SCNC: 8.4 MG/DL (ref 8.6–10.5)
CALCIUM SPEC-SCNC: 8.7 MG/DL (ref 8.6–10.5)
CALCIUM SPEC-SCNC: 9.2 MG/DL (ref 8.6–10.5)
CALCIUM SPEC-SCNC: 9.7 MG/DL (ref 8.6–10.5)
CHLORIDE SERPL-SCNC: 101 MMOL/L (ref 98–107)
CHLORIDE SERPL-SCNC: 103 MMOL/L (ref 98–107)
CHLORIDE SERPL-SCNC: 93 MMOL/L (ref 98–107)
CHLORIDE SERPL-SCNC: 98 MMOL/L (ref 98–107)
CHLORIDE SERPL-SCNC: 99 MMOL/L (ref 98–107)
CHLORIDE SERPL-SCNC: 99 MMOL/L (ref 98–107)
CLARITY UR: CLEAR
CO2 BLDA-SCNC: 42.7 MMOL/L (ref 22–33)
CO2 SERPL-SCNC: 32 MMOL/L (ref 22–29)
CO2 SERPL-SCNC: 32 MMOL/L (ref 22–29)
CO2 SERPL-SCNC: 35 MMOL/L (ref 22–29)
CO2 SERPL-SCNC: 37 MMOL/L (ref 22–29)
CO2 SERPL-SCNC: 37.4 MMOL/L (ref 22–29)
CO2 SERPL-SCNC: 41 MMOL/L (ref 22–29)
COHGB MFR BLD: 1 % (ref 0–2)
COHGB MFR BLD: 1.2 % (ref 0–2)
COHGB MFR BLD: 1.3 % (ref 0–2)
COLOR UR: YELLOW
CREAT SERPL-MCNC: 0.73 MG/DL (ref 0.57–1)
CREAT SERPL-MCNC: 0.76 MG/DL (ref 0.57–1)
CREAT SERPL-MCNC: 0.83 MG/DL (ref 0.57–1)
CREAT SERPL-MCNC: 0.9 MG/DL (ref 0.57–1)
CREAT SERPL-MCNC: 0.93 MG/DL (ref 0.57–1)
CREAT SERPL-MCNC: 1.05 MG/DL (ref 0.57–1)
D-LACTATE SERPL-SCNC: 0.7 MMOL/L (ref 0.5–2)
D-LACTATE SERPL-SCNC: 1.1 MMOL/L (ref 0.5–2)
D-LACTATE SERPL-SCNC: 2.1 MMOL/L (ref 0.5–2)
DEPRECATED RDW RBC AUTO: 46.2 FL (ref 37–54)
DEPRECATED RDW RBC AUTO: 48.1 FL (ref 37–54)
DEPRECATED RDW RBC AUTO: 48.7 FL (ref 37–54)
DEPRECATED RDW RBC AUTO: 49.4 FL (ref 37–54)
DEPRECATED RDW RBC AUTO: 49.8 FL (ref 37–54)
DEPRECATED RDW RBC AUTO: 50.3 FL (ref 37–54)
EOSINOPHIL # BLD AUTO: 0 10*3/MM3 (ref 0–0.4)
EOSINOPHIL # BLD AUTO: 0.07 10*3/MM3 (ref 0–0.4)
EOSINOPHIL # BLD AUTO: 0.23 10*3/MM3 (ref 0–0.4)
EOSINOPHIL NFR BLD AUTO: 0 % (ref 0.3–6.2)
EOSINOPHIL NFR BLD AUTO: 0.5 % (ref 0.3–6.2)
EOSINOPHIL NFR BLD AUTO: 2.6 % (ref 0.3–6.2)
EPAP: 0
ERYTHROCYTE [DISTWIDTH] IN BLOOD BY AUTOMATED COUNT: 13.4 % (ref 12.3–15.4)
ERYTHROCYTE [DISTWIDTH] IN BLOOD BY AUTOMATED COUNT: 13.6 % (ref 12.3–15.4)
ERYTHROCYTE [DISTWIDTH] IN BLOOD BY AUTOMATED COUNT: 13.6 % (ref 12.3–15.4)
ERYTHROCYTE [DISTWIDTH] IN BLOOD BY AUTOMATED COUNT: 13.7 % (ref 12.3–15.4)
ERYTHROCYTE [DISTWIDTH] IN BLOOD BY AUTOMATED COUNT: 13.8 % (ref 12.3–15.4)
ERYTHROCYTE [DISTWIDTH] IN BLOOD BY AUTOMATED COUNT: 13.8 % (ref 12.3–15.4)
FLUAV RNA RESP QL NAA+PROBE: NOT DETECTED
FLUBV RNA RESP QL NAA+PROBE: NOT DETECTED
GAS FLOW AIRWAY: 4 LPM
GFR SERPL CREATININE-BSD FRML MDRD: 50 ML/MIN/1.73
GFR SERPL CREATININE-BSD FRML MDRD: 58 ML/MIN/1.73
GFR SERPL CREATININE-BSD FRML MDRD: 60 ML/MIN/1.73
GFR SERPL CREATININE-BSD FRML MDRD: 66 ML/MIN/1.73
GFR SERPL CREATININE-BSD FRML MDRD: 73 ML/MIN/1.73
GFR SERPL CREATININE-BSD FRML MDRD: 76 ML/MIN/1.73
GLOBULIN UR ELPH-MCNC: 3.1 GM/DL
GLOBULIN UR ELPH-MCNC: 3.2 GM/DL
GLUCOSE SERPL-MCNC: 115 MG/DL (ref 65–99)
GLUCOSE SERPL-MCNC: 127 MG/DL (ref 65–99)
GLUCOSE SERPL-MCNC: 130 MG/DL (ref 65–99)
GLUCOSE SERPL-MCNC: 135 MG/DL (ref 65–99)
GLUCOSE SERPL-MCNC: 148 MG/DL (ref 65–99)
GLUCOSE SERPL-MCNC: 161 MG/DL (ref 65–99)
GLUCOSE UR STRIP-MCNC: NEGATIVE MG/DL
HBA1C MFR BLD: 4.9 % (ref 4.8–5.6)
HCO3 BLDA-SCNC: 40.7 MMOL/L (ref 20–26)
HCO3 BLDA-SCNC: 42.1 MMOL/L (ref 22–28)
HCO3 BLDA-SCNC: 43.1 MMOL/L (ref 22–28)
HCT VFR BLD AUTO: 28.9 % (ref 34–46.6)
HCT VFR BLD AUTO: 32.1 % (ref 34–46.6)
HCT VFR BLD AUTO: 32.5 % (ref 34–46.6)
HCT VFR BLD AUTO: 33.2 % (ref 34–46.6)
HCT VFR BLD AUTO: 37.3 % (ref 34–46.6)
HCT VFR BLD AUTO: 37.3 % (ref 34–46.6)
HCT VFR BLD CALC: 30.1 %
HCT VFR BLD CALC: 30.5 %
HCT VFR BLD CALC: 33.9 %
HGB BLD-MCNC: 10.7 G/DL (ref 12–15.9)
HGB BLD-MCNC: 10.9 G/DL (ref 12–15.9)
HGB BLD-MCNC: 8.7 G/DL (ref 12–15.9)
HGB BLD-MCNC: 9.5 G/DL (ref 12–15.9)
HGB BLD-MCNC: 9.6 G/DL (ref 12–15.9)
HGB BLD-MCNC: 9.9 G/DL (ref 12–15.9)
HGB BLDA-MCNC: 11.1 G/DL (ref 14–18)
HGB UR QL STRIP.AUTO: NEGATIVE
HOLD SPECIMEN: NORMAL
IMM GRANULOCYTES # BLD AUTO: 0.04 10*3/MM3 (ref 0–0.05)
IMM GRANULOCYTES # BLD AUTO: 0.05 10*3/MM3 (ref 0–0.05)
IMM GRANULOCYTES # BLD AUTO: 0.09 10*3/MM3 (ref 0–0.05)
IMM GRANULOCYTES # BLD AUTO: 0.09 10*3/MM3 (ref 0–0.05)
IMM GRANULOCYTES # BLD AUTO: 0.11 10*3/MM3 (ref 0–0.05)
IMM GRANULOCYTES NFR BLD AUTO: 0.4 % (ref 0–0.5)
IMM GRANULOCYTES NFR BLD AUTO: 0.6 % (ref 0–0.5)
IMM GRANULOCYTES NFR BLD AUTO: 0.7 % (ref 0–0.5)
IMM GRANULOCYTES NFR BLD AUTO: 0.7 % (ref 0–0.5)
IMM GRANULOCYTES NFR BLD AUTO: 0.8 % (ref 0–0.5)
INHALED O2 CONCENTRATION: 28 %
INHALED O2 CONCENTRATION: 35 %
IPAP: 0
KETONES UR QL STRIP: NEGATIVE
LEUKOCYTE ESTERASE UR QL STRIP.AUTO: NEGATIVE
LYMPHOCYTES # BLD AUTO: 0.22 10*3/MM3 (ref 0.7–3.1)
LYMPHOCYTES # BLD AUTO: 0.24 10*3/MM3 (ref 0.7–3.1)
LYMPHOCYTES # BLD AUTO: 0.49 10*3/MM3 (ref 0.7–3.1)
LYMPHOCYTES # BLD AUTO: 0.9 10*3/MM3 (ref 0.7–3.1)
LYMPHOCYTES # BLD AUTO: 1.32 10*3/MM3 (ref 0.7–3.1)
LYMPHOCYTES NFR BLD AUTO: 1.6 % (ref 19.6–45.3)
LYMPHOCYTES NFR BLD AUTO: 14.8 % (ref 19.6–45.3)
LYMPHOCYTES NFR BLD AUTO: 2.7 % (ref 19.6–45.3)
LYMPHOCYTES NFR BLD AUTO: 3.7 % (ref 19.6–45.3)
LYMPHOCYTES NFR BLD AUTO: 6.9 % (ref 19.6–45.3)
Lab: ABNORMAL
Lab: ABNORMAL
MAGNESIUM SERPL-MCNC: 1.4 MG/DL (ref 1.6–2.4)
MAGNESIUM SERPL-MCNC: 2.9 MG/DL (ref 1.6–2.4)
MCH RBC QN AUTO: 28.1 PG (ref 26.6–33)
MCH RBC QN AUTO: 28.5 PG (ref 26.6–33)
MCH RBC QN AUTO: 28.8 PG (ref 26.6–33)
MCH RBC QN AUTO: 28.9 PG (ref 26.6–33)
MCH RBC QN AUTO: 29 PG (ref 26.6–33)
MCH RBC QN AUTO: 29.1 PG (ref 26.6–33)
MCHC RBC AUTO-ENTMCNC: 28.7 G/DL (ref 31.5–35.7)
MCHC RBC AUTO-ENTMCNC: 29.2 G/DL (ref 31.5–35.7)
MCHC RBC AUTO-ENTMCNC: 29.5 G/DL (ref 31.5–35.7)
MCHC RBC AUTO-ENTMCNC: 29.6 G/DL (ref 31.5–35.7)
MCHC RBC AUTO-ENTMCNC: 29.8 G/DL (ref 31.5–35.7)
MCHC RBC AUTO-ENTMCNC: 30.1 G/DL (ref 31.5–35.7)
MCV RBC AUTO: 100.5 FL (ref 79–97)
MCV RBC AUTO: 94.3 FL (ref 79–97)
MCV RBC AUTO: 96 FL (ref 79–97)
MCV RBC AUTO: 97.4 FL (ref 79–97)
MCV RBC AUTO: 98.2 FL (ref 79–97)
MCV RBC AUTO: 98.2 FL (ref 79–97)
METHGB BLD QL: 0.7 % (ref 0–1.5)
METHGB BLD QL: 1 % (ref 0–1.5)
METHGB BLD QL: 1 % (ref 0–1.5)
MODALITY: ABNORMAL
MONOCYTES # BLD AUTO: 0.14 10*3/MM3 (ref 0.1–0.9)
MONOCYTES # BLD AUTO: 0.16 10*3/MM3 (ref 0.1–0.9)
MONOCYTES # BLD AUTO: 0.45 10*3/MM3 (ref 0.1–0.9)
MONOCYTES # BLD AUTO: 0.66 10*3/MM3 (ref 0.1–0.9)
MONOCYTES # BLD AUTO: 0.74 10*3/MM3 (ref 0.1–0.9)
MONOCYTES NFR BLD AUTO: 1.2 % (ref 5–12)
MONOCYTES NFR BLD AUTO: 1.6 % (ref 5–12)
MONOCYTES NFR BLD AUTO: 3.4 % (ref 5–12)
MONOCYTES NFR BLD AUTO: 5 % (ref 5–12)
MONOCYTES NFR BLD AUTO: 8.3 % (ref 5–12)
NEUTROPHILS NFR BLD AUTO: 11.47 10*3/MM3 (ref 1.7–7)
NEUTROPHILS NFR BLD AUTO: 12.2 10*3/MM3 (ref 1.7–7)
NEUTROPHILS NFR BLD AUTO: 13.06 10*3/MM3 (ref 1.7–7)
NEUTROPHILS NFR BLD AUTO: 6.56 10*3/MM3 (ref 1.7–7)
NEUTROPHILS NFR BLD AUTO: 73.4 % (ref 42.7–76)
NEUTROPHILS NFR BLD AUTO: 8.56 10*3/MM3 (ref 1.7–7)
NEUTROPHILS NFR BLD AUTO: 87.3 % (ref 42.7–76)
NEUTROPHILS NFR BLD AUTO: 92 % (ref 42.7–76)
NEUTROPHILS NFR BLD AUTO: 95.2 % (ref 42.7–76)
NEUTROPHILS NFR BLD AUTO: 95.8 % (ref 42.7–76)
NITRITE UR QL STRIP: NEGATIVE
NOTE: ABNORMAL
NRBC BLD AUTO-RTO: 0 /100 WBC (ref 0–0.2)
NT-PROBNP SERPL-MCNC: 213 PG/ML (ref 0–1800)
NT-PROBNP SERPL-MCNC: 275.2 PG/ML (ref 0–1800)
OXYHGB MFR BLDV: 91 % (ref 94–99)
OXYHGB MFR BLDV: 95.5 % (ref 94–99)
OXYHGB MFR BLDV: 96.3 % (ref 94–99)
PAW @ PEAK INSP FLOW SETTING VENT: 0 CMH2O
PCO2 BLDA: 65.9 MM HG (ref 35–45)
PCO2 BLDA: 74.9 MM HG (ref 35–45)
PCO2 BLDA: 86.8 MM HG (ref 35–45)
PCO2 TEMP ADJ BLD: 65.9 MM HG (ref 35–45)
PCO2 TEMP ADJ BLD: ABNORMAL MM[HG]
PCO2 TEMP ADJ BLD: ABNORMAL MM[HG]
PH BLDA: 7.29 PH UNITS (ref 7.35–7.45)
PH BLDA: 7.37 PH UNITS (ref 7.35–7.45)
PH BLDA: 7.4 PH UNITS (ref 7.35–7.45)
PH UR STRIP.AUTO: 5.5 [PH] (ref 5–8)
PH, TEMP CORRECTED: 7.4 PH UNITS
PH, TEMP CORRECTED: ABNORMAL
PH, TEMP CORRECTED: ABNORMAL
PLAT MORPH BLD: NORMAL
PLATELET # BLD AUTO: 160 10*3/MM3 (ref 140–450)
PLATELET # BLD AUTO: 179 10*3/MM3 (ref 140–450)
PLATELET # BLD AUTO: 203 10*3/MM3 (ref 140–450)
PLATELET # BLD AUTO: 209 10*3/MM3 (ref 140–450)
PLATELET # BLD AUTO: 221 10*3/MM3 (ref 140–450)
PLATELET # BLD AUTO: 233 10*3/MM3 (ref 140–450)
PMV BLD AUTO: 10.1 FL (ref 6–12)
PMV BLD AUTO: 10.2 FL (ref 6–12)
PMV BLD AUTO: 10.2 FL (ref 6–12)
PMV BLD AUTO: 9.3 FL (ref 6–12)
PMV BLD AUTO: 9.7 FL (ref 6–12)
PMV BLD AUTO: 9.8 FL (ref 6–12)
PO2 BLDA: 61.4 MM HG (ref 75–100)
PO2 BLDA: 91.8 MM HG (ref 75–100)
PO2 BLDA: 95.1 MM HG (ref 83–108)
PO2 TEMP ADJ BLD: 95.1 MM HG (ref 83–108)
PO2 TEMP ADJ BLD: ABNORMAL MM[HG]
PO2 TEMP ADJ BLD: ABNORMAL MM[HG]
POTASSIUM SERPL-SCNC: 4.1 MMOL/L (ref 3.5–5.2)
POTASSIUM SERPL-SCNC: 4.2 MMOL/L (ref 3.5–5.2)
POTASSIUM SERPL-SCNC: 4.9 MMOL/L (ref 3.5–5.2)
POTASSIUM SERPL-SCNC: 5 MMOL/L (ref 3.5–5.2)
POTASSIUM SERPL-SCNC: 5.3 MMOL/L (ref 3.5–5.2)
POTASSIUM SERPL-SCNC: 5.3 MMOL/L (ref 3.5–5.2)
PROCALCITONIN SERPL-MCNC: 0.07 NG/ML (ref 0–0.25)
PROT SERPL-MCNC: 6.2 G/DL (ref 6–8.5)
PROT SERPL-MCNC: 6.9 G/DL (ref 6–8.5)
PROT UR QL STRIP: NEGATIVE
QT INTERVAL: 294 MS
QTC INTERVAL: 401 MS
RBC # BLD AUTO: 3.01 10*6/MM3 (ref 3.77–5.28)
RBC # BLD AUTO: 3.27 10*6/MM3 (ref 3.77–5.28)
RBC # BLD AUTO: 3.31 10*6/MM3 (ref 3.77–5.28)
RBC # BLD AUTO: 3.52 10*6/MM3 (ref 3.77–5.28)
RBC # BLD AUTO: 3.71 10*6/MM3 (ref 3.77–5.28)
RBC # BLD AUTO: 3.83 10*6/MM3 (ref 3.77–5.28)
RBC MORPH BLD: NORMAL
RBC MORPH BLD: NORMAL
SAO2 % BLDCOA: 93.1 % (ref 94–100)
SAO2 % BLDCOA: 97.5 % (ref 94–100)
SARS-COV-2 RNA RESP QL NAA+PROBE: NOT DETECTED
SARS-COV-2 RNA RESP QL NAA+PROBE: NOT DETECTED
SMALL PLATELETS BLD QL SMEAR: ADEQUATE
SODIUM SERPL-SCNC: 138 MMOL/L (ref 136–145)
SODIUM SERPL-SCNC: 139 MMOL/L (ref 136–145)
SODIUM SERPL-SCNC: 139 MMOL/L (ref 136–145)
SODIUM SERPL-SCNC: 140 MMOL/L (ref 136–145)
SP GR UR STRIP: 1.05 (ref 1–1.03)
TOTAL RATE: 0 BREATHS/MINUTE
TROPONIN T SERPL-MCNC: <0.01 NG/ML (ref 0–0.03)
TROPONIN T SERPL-MCNC: <0.01 NG/ML (ref 0–0.03)
UROBILINOGEN UR QL STRIP: ABNORMAL
VENTILATOR MODE: ABNORMAL
VENTILATOR MODE: ABNORMAL
WBC # BLD AUTO: 12.79 10*3/MM3 (ref 3.4–10.8)
WBC # BLD AUTO: 13.13 10*3/MM3 (ref 3.4–10.8)
WBC # BLD AUTO: 13.25 10*3/MM3 (ref 3.4–10.8)
WBC # BLD AUTO: 13.64 10*3/MM3 (ref 3.4–10.8)
WBC # BLD AUTO: 8.93 10*3/MM3 (ref 3.4–10.8)
WBC # BLD AUTO: 8.99 10*3/MM3 (ref 3.4–10.8)
WBC MORPH BLD: NORMAL
WBC MORPH BLD: NORMAL
WHOLE BLOOD HOLD SPECIMEN: NORMAL

## 2021-01-01 PROCEDURE — 85007 BL SMEAR W/DIFF WBC COUNT: CPT

## 2021-01-01 PROCEDURE — 99232 SBSQ HOSP IP/OBS MODERATE 35: CPT | Performed by: FAMILY MEDICINE

## 2021-01-01 PROCEDURE — 99233 SBSQ HOSP IP/OBS HIGH 50: CPT | Performed by: FAMILY MEDICINE

## 2021-01-01 PROCEDURE — 94799 UNLISTED PULMONARY SVC/PX: CPT

## 2021-01-01 PROCEDURE — 82375 ASSAY CARBOXYHB QUANT: CPT

## 2021-01-01 PROCEDURE — 99498 ADVNCD CARE PLAN ADDL 30 MIN: CPT | Performed by: PHYSICIAN ASSISTANT

## 2021-01-01 PROCEDURE — 25010000002 CEFTRIAXONE PER 250 MG: Performed by: NURSE PRACTITIONER

## 2021-01-01 PROCEDURE — 83880 ASSAY OF NATRIURETIC PEPTIDE: CPT

## 2021-01-01 PROCEDURE — 83605 ASSAY OF LACTIC ACID: CPT | Performed by: EMERGENCY MEDICINE

## 2021-01-01 PROCEDURE — 25010000002 HEPARIN (PORCINE) PER 1000 UNITS: Performed by: NURSE PRACTITIONER

## 2021-01-01 PROCEDURE — 97530 THERAPEUTIC ACTIVITIES: CPT

## 2021-01-01 PROCEDURE — 97535 SELF CARE MNGMENT TRAINING: CPT

## 2021-01-01 PROCEDURE — 94660 CPAP INITIATION&MGMT: CPT

## 2021-01-01 PROCEDURE — 71045 X-RAY EXAM CHEST 1 VIEW: CPT

## 2021-01-01 PROCEDURE — U0005 INFEC AGEN DETEC AMPLI PROBE: HCPCS | Performed by: NURSE PRACTITIONER

## 2021-01-01 PROCEDURE — 99306 1ST NF CARE HIGH MDM 50: CPT | Performed by: INTERNAL MEDICINE

## 2021-01-01 PROCEDURE — 25010000002 METHYLPREDNISOLONE PER 125 MG: Performed by: EMERGENCY MEDICINE

## 2021-01-01 PROCEDURE — 80048 BASIC METABOLIC PNL TOTAL CA: CPT | Performed by: NURSE PRACTITIONER

## 2021-01-01 PROCEDURE — 99285 EMERGENCY DEPT VISIT HI MDM: CPT

## 2021-01-01 PROCEDURE — 25010000002 METHYLPREDNISOLONE PER 40 MG: Performed by: NURSE PRACTITIONER

## 2021-01-01 PROCEDURE — 85007 BL SMEAR W/DIFF WBC COUNT: CPT | Performed by: NURSE PRACTITIONER

## 2021-01-01 PROCEDURE — 97110 THERAPEUTIC EXERCISES: CPT

## 2021-01-01 PROCEDURE — 85025 COMPLETE CBC W/AUTO DIFF WBC: CPT | Performed by: FAMILY MEDICINE

## 2021-01-01 PROCEDURE — 99233 SBSQ HOSP IP/OBS HIGH 50: CPT | Performed by: NURSE PRACTITIONER

## 2021-01-01 PROCEDURE — 96374 THER/PROPH/DIAG INJ IV PUSH: CPT

## 2021-01-01 PROCEDURE — 83036 HEMOGLOBIN GLYCOSYLATED A1C: CPT | Performed by: NURSE PRACTITIONER

## 2021-01-01 PROCEDURE — 80053 COMPREHEN METABOLIC PANEL: CPT | Performed by: EMERGENCY MEDICINE

## 2021-01-01 PROCEDURE — 36600 WITHDRAWAL OF ARTERIAL BLOOD: CPT

## 2021-01-01 PROCEDURE — 99222 1ST HOSP IP/OBS MODERATE 55: CPT | Performed by: FAMILY MEDICINE

## 2021-01-01 PROCEDURE — 81003 URINALYSIS AUTO W/O SCOPE: CPT | Performed by: PHYSICIAN ASSISTANT

## 2021-01-01 PROCEDURE — 25810000003 SODIUM CHLORIDE 0.9 % WITH KCL 20 MEQ 20-0.9 MEQ/L-% SOLUTION: Performed by: NURSE PRACTITIONER

## 2021-01-01 PROCEDURE — 85025 COMPLETE CBC W/AUTO DIFF WBC: CPT | Performed by: EMERGENCY MEDICINE

## 2021-01-01 PROCEDURE — 85025 COMPLETE CBC W/AUTO DIFF WBC: CPT

## 2021-01-01 PROCEDURE — 99308 SBSQ NF CARE LOW MDM 20: CPT | Performed by: PHYSICIAN ASSISTANT

## 2021-01-01 PROCEDURE — 84484 ASSAY OF TROPONIN QUANT: CPT

## 2021-01-01 PROCEDURE — 94640 AIRWAY INHALATION TREATMENT: CPT

## 2021-01-01 PROCEDURE — 93005 ELECTROCARDIOGRAM TRACING: CPT | Performed by: EMERGENCY MEDICINE

## 2021-01-01 PROCEDURE — 99309 SBSQ NF CARE MODERATE MDM 30: CPT | Performed by: INTERNAL MEDICINE

## 2021-01-01 PROCEDURE — 97116 GAIT TRAINING THERAPY: CPT

## 2021-01-01 PROCEDURE — 83735 ASSAY OF MAGNESIUM: CPT | Performed by: NURSE PRACTITIONER

## 2021-01-01 PROCEDURE — 85027 COMPLETE CBC AUTOMATED: CPT | Performed by: FAMILY MEDICINE

## 2021-01-01 PROCEDURE — 84484 ASSAY OF TROPONIN QUANT: CPT | Performed by: EMERGENCY MEDICINE

## 2021-01-01 PROCEDURE — 83735 ASSAY OF MAGNESIUM: CPT | Performed by: FAMILY MEDICINE

## 2021-01-01 PROCEDURE — U0003 INFECTIOUS AGENT DETECTION BY NUCLEIC ACID (DNA OR RNA); SEVERE ACUTE RESPIRATORY SYNDROME CORONAVIRUS 2 (SARS-COV-2) (CORONAVIRUS DISEASE [COVID-19]), AMPLIFIED PROBE TECHNIQUE, MAKING USE OF HIGH THROUGHPUT TECHNOLOGIES AS DESCRIBED BY CMS-2020-01-R: HCPCS | Performed by: NURSE PRACTITIONER

## 2021-01-01 PROCEDURE — 99239 HOSP IP/OBS DSCHRG MGMT >30: CPT | Performed by: NURSE PRACTITIONER

## 2021-01-01 PROCEDURE — 63710000001 PREDNISONE PER 1 MG: Performed by: NURSE PRACTITIONER

## 2021-01-01 PROCEDURE — 83880 ASSAY OF NATRIURETIC PEPTIDE: CPT | Performed by: EMERGENCY MEDICINE

## 2021-01-01 PROCEDURE — 99497 ADVNCD CARE PLAN 30 MIN: CPT | Performed by: PHYSICIAN ASSISTANT

## 2021-01-01 PROCEDURE — 36415 COLL VENOUS BLD VENIPUNCTURE: CPT

## 2021-01-01 PROCEDURE — 99309 SBSQ NF CARE MODERATE MDM 30: CPT | Performed by: PHYSICIAN ASSISTANT

## 2021-01-01 PROCEDURE — 87040 BLOOD CULTURE FOR BACTERIA: CPT | Performed by: EMERGENCY MEDICINE

## 2021-01-01 PROCEDURE — 83050 HGB METHEMOGLOBIN QUAN: CPT

## 2021-01-01 PROCEDURE — 87636 SARSCOV2 & INF A&B AMP PRB: CPT | Performed by: EMERGENCY MEDICINE

## 2021-01-01 PROCEDURE — 83605 ASSAY OF LACTIC ACID: CPT | Performed by: NURSE PRACTITIONER

## 2021-01-01 PROCEDURE — C1894 INTRO/SHEATH, NON-LASER: HCPCS

## 2021-01-01 PROCEDURE — C1751 CATH, INF, PER/CENT/MIDLINE: HCPCS

## 2021-01-01 PROCEDURE — 99310 SBSQ NF CARE HIGH MDM 45: CPT | Performed by: PHYSICIAN ASSISTANT

## 2021-01-01 PROCEDURE — 82805 BLOOD GASES W/O2 SATURATION: CPT

## 2021-01-01 PROCEDURE — 93005 ELECTROCARDIOGRAM TRACING: CPT

## 2021-01-01 PROCEDURE — 25010000002 MAGNESIUM SULFATE 2 GM/50ML SOLUTION: Performed by: FAMILY MEDICINE

## 2021-01-01 PROCEDURE — 80053 COMPREHEN METABOLIC PANEL: CPT

## 2021-01-01 PROCEDURE — 97165 OT EVAL LOW COMPLEX 30 MIN: CPT

## 2021-01-01 PROCEDURE — 99222 1ST HOSP IP/OBS MODERATE 55: CPT | Performed by: INTERNAL MEDICINE

## 2021-01-01 PROCEDURE — 80048 BASIC METABOLIC PNL TOTAL CA: CPT | Performed by: FAMILY MEDICINE

## 2021-01-01 PROCEDURE — 99284 EMERGENCY DEPT VISIT MOD MDM: CPT

## 2021-01-01 PROCEDURE — 85025 COMPLETE CBC W/AUTO DIFF WBC: CPT | Performed by: NURSE PRACTITIONER

## 2021-01-01 PROCEDURE — 02HV33Z INSERTION OF INFUSION DEVICE INTO SUPERIOR VENA CAVA, PERCUTANEOUS APPROACH: ICD-10-PCS | Performed by: FAMILY MEDICINE

## 2021-01-01 PROCEDURE — 71275 CT ANGIOGRAPHY CHEST: CPT

## 2021-01-01 PROCEDURE — 84145 PROCALCITONIN (PCT): CPT | Performed by: EMERGENCY MEDICINE

## 2021-01-01 PROCEDURE — 97161 PT EVAL LOW COMPLEX 20 MIN: CPT

## 2021-01-01 PROCEDURE — 0 IOPAMIDOL PER 1 ML: Performed by: EMERGENCY MEDICINE

## 2021-01-01 RX ORDER — BISACODYL 10 MG
10 SUPPOSITORY, RECTAL RECTAL DAILY PRN
Start: 2021-01-01

## 2021-01-01 RX ORDER — PREDNISONE 1 MG/1
5 TABLET ORAL DAILY
COMMUNITY
End: 2021-01-01 | Stop reason: HOSPADM

## 2021-01-01 RX ORDER — ONDANSETRON 2 MG/ML
4 INJECTION INTRAMUSCULAR; INTRAVENOUS EVERY 6 HOURS PRN
Status: DISCONTINUED | OUTPATIENT
Start: 2021-01-01 | End: 2021-01-01 | Stop reason: HOSPADM

## 2021-01-01 RX ORDER — PREDNISONE 20 MG/1
20 TABLET ORAL 2 TIMES DAILY WITH MEALS
Status: DISCONTINUED | OUTPATIENT
Start: 2021-01-01 | End: 2021-01-01 | Stop reason: HOSPADM

## 2021-01-01 RX ORDER — MELATONIN
1000 DAILY
COMMUNITY

## 2021-01-01 RX ORDER — CLONAZEPAM 0.5 MG/1
0.25 TABLET ORAL NIGHTLY
Qty: 15 TABLET | Refills: 0 | Status: SHIPPED | OUTPATIENT
Start: 2021-01-01

## 2021-01-01 RX ORDER — ACETAMINOPHEN 500 MG
1000 TABLET ORAL EVERY 8 HOURS PRN
Status: ON HOLD | COMMUNITY
End: 2021-01-01 | Stop reason: SDUPTHER

## 2021-01-01 RX ORDER — METHYLPREDNISOLONE SODIUM SUCCINATE 125 MG/2ML
125 INJECTION, POWDER, LYOPHILIZED, FOR SOLUTION INTRAMUSCULAR; INTRAVENOUS ONCE
Status: COMPLETED | OUTPATIENT
Start: 2021-01-01 | End: 2021-01-01

## 2021-01-01 RX ORDER — FLUORIDE TOOTHPASTE
10 TOOTHPASTE DENTAL 2 TIMES DAILY
COMMUNITY

## 2021-01-01 RX ORDER — IPRATROPIUM BROMIDE AND ALBUTEROL SULFATE 2.5; .5 MG/3ML; MG/3ML
3 SOLUTION RESPIRATORY (INHALATION) EVERY 4 HOURS PRN
Status: DISCONTINUED | OUTPATIENT
Start: 2021-01-01 | End: 2021-01-01 | Stop reason: HOSPADM

## 2021-01-01 RX ORDER — BENZONATATE 100 MG/1
100 CAPSULE ORAL 3 TIMES DAILY PRN
Status: DISCONTINUED | OUTPATIENT
Start: 2021-01-01 | End: 2021-01-01 | Stop reason: HOSPADM

## 2021-01-01 RX ORDER — HYDROCODONE BITARTRATE AND ACETAMINOPHEN 5; 325 MG/1; MG/1
1 TABLET ORAL EVERY 6 HOURS
Qty: 120 TABLET | Refills: 0 | Status: SHIPPED | OUTPATIENT
Start: 2021-01-01

## 2021-01-01 RX ORDER — ALBUTEROL SULFATE 90 UG/1
2 AEROSOL, METERED RESPIRATORY (INHALATION)
Status: DISCONTINUED | OUTPATIENT
Start: 2021-01-01 | End: 2021-01-01

## 2021-01-01 RX ORDER — CLONAZEPAM 0.5 MG/1
0.25 TABLET ORAL NIGHTLY
Status: DISCONTINUED | OUTPATIENT
Start: 2021-01-01 | End: 2021-01-01 | Stop reason: HOSPADM

## 2021-01-01 RX ORDER — CHOLECALCIFEROL (VITAMIN D3) 125 MCG
5 CAPSULE ORAL NIGHTLY
Start: 2021-01-01

## 2021-01-01 RX ORDER — BUDESONIDE 0.5 MG/2ML
0.5 INHALANT ORAL
Status: DISCONTINUED | OUTPATIENT
Start: 2021-01-01 | End: 2021-01-01

## 2021-01-01 RX ORDER — LANOLIN ALCOHOL/MO/W.PET/CERES
3 CREAM (GRAM) TOPICAL NIGHTLY
Status: ON HOLD | COMMUNITY
End: 2021-01-01 | Stop reason: SDUPTHER

## 2021-01-01 RX ORDER — CLONAZEPAM 0.5 MG/1
0.25 TABLET ORAL 3 TIMES DAILY PRN
Qty: 45 TABLET | Refills: 0 | Status: SHIPPED | OUTPATIENT
Start: 2021-01-01

## 2021-01-01 RX ORDER — L.ACID,PARA/B.BIFIDUM/S.THERM 8B CELL
1 CAPSULE ORAL 2 TIMES DAILY
Status: DISCONTINUED | OUTPATIENT
Start: 2021-01-01 | End: 2021-01-01 | Stop reason: HOSPADM

## 2021-01-01 RX ORDER — TRIAMCINOLONE ACETONIDE 1 MG/G
CREAM TOPICAL EVERY 12 HOURS SCHEDULED
Start: 2021-01-01

## 2021-01-01 RX ORDER — PANTOPRAZOLE SODIUM 40 MG/1
40 TABLET, DELAYED RELEASE ORAL DAILY
Status: DISCONTINUED | OUTPATIENT
Start: 2021-01-01 | End: 2021-01-01 | Stop reason: HOSPADM

## 2021-01-01 RX ORDER — MAGNESIUM SULFATE HEPTAHYDRATE 40 MG/ML
4 INJECTION, SOLUTION INTRAVENOUS AS NEEDED
Status: DISCONTINUED | OUTPATIENT
Start: 2021-01-01 | End: 2021-01-01 | Stop reason: HOSPADM

## 2021-01-01 RX ORDER — TEMAZEPAM 15 MG/1
15 CAPSULE ORAL NIGHTLY
Qty: 30 CAPSULE | Refills: 0 | Status: SHIPPED | OUTPATIENT
Start: 2021-01-01

## 2021-01-01 RX ORDER — ACETAMINOPHEN 325 MG/1
650 TABLET ORAL EVERY 6 HOURS PRN
Status: DISCONTINUED | OUTPATIENT
Start: 2021-01-01 | End: 2021-01-01 | Stop reason: HOSPADM

## 2021-01-01 RX ORDER — LORAZEPAM 2 MG/ML
0.5 CONCENTRATE ORAL
Qty: 30 ML | Refills: 2 | Status: SHIPPED | OUTPATIENT
Start: 2021-01-01

## 2021-01-01 RX ORDER — ASPIRIN 81 MG/1
81 TABLET ORAL DAILY
Status: DISCONTINUED | OUTPATIENT
Start: 2021-01-01 | End: 2021-01-01 | Stop reason: HOSPADM

## 2021-01-01 RX ORDER — ALBUTEROL SULFATE 1.25 MG/3ML
1 SOLUTION RESPIRATORY (INHALATION) EVERY 6 HOURS PRN
COMMUNITY

## 2021-01-01 RX ORDER — LABETALOL HYDROCHLORIDE 5 MG/ML
10 INJECTION, SOLUTION INTRAVENOUS EVERY 6 HOURS PRN
Status: DISCONTINUED | OUTPATIENT
Start: 2021-01-01 | End: 2021-01-01 | Stop reason: HOSPADM

## 2021-01-01 RX ORDER — FUROSEMIDE 20 MG/1
20 TABLET ORAL DAILY
Qty: 90 TABLET | Refills: 3 | Status: SHIPPED | OUTPATIENT
Start: 2021-01-01

## 2021-01-01 RX ORDER — CLONAZEPAM 0.5 MG/1
0.25 TABLET ORAL 3 TIMES DAILY PRN
Qty: 45 TABLET | Refills: 0 | Status: SHIPPED | OUTPATIENT
Start: 2021-01-01 | End: 2021-01-01 | Stop reason: SDUPTHER

## 2021-01-01 RX ORDER — TEMAZEPAM 15 MG/1
15 CAPSULE ORAL NIGHTLY
Qty: 3 CAPSULE | Refills: 0 | Status: SHIPPED | OUTPATIENT
Start: 2021-01-01 | End: 2021-01-01 | Stop reason: SDUPTHER

## 2021-01-01 RX ORDER — MAGNESIUM SULFATE HEPTAHYDRATE 40 MG/ML
2 INJECTION, SOLUTION INTRAVENOUS AS NEEDED
Status: DISCONTINUED | OUTPATIENT
Start: 2021-01-01 | End: 2021-01-01 | Stop reason: HOSPADM

## 2021-01-01 RX ORDER — PROPRANOLOL HYDROCHLORIDE 20 MG/1
60 TABLET ORAL 2 TIMES DAILY
Status: DISCONTINUED | OUTPATIENT
Start: 2021-01-01 | End: 2021-01-01 | Stop reason: HOSPADM

## 2021-01-01 RX ORDER — CHOLECALCIFEROL (VITAMIN D3) 125 MCG
5 CAPSULE ORAL NIGHTLY
Status: DISCONTINUED | OUTPATIENT
Start: 2021-01-01 | End: 2021-01-01

## 2021-01-01 RX ORDER — PRAVASTATIN SODIUM 20 MG
20 TABLET ORAL EVERY EVENING
Qty: 90 TABLET | Refills: 1 | Status: SHIPPED | OUTPATIENT
Start: 2021-01-01

## 2021-01-01 RX ORDER — SODIUM CHLORIDE 0.9 % (FLUSH) 0.9 %
10 SYRINGE (ML) INJECTION EVERY 12 HOURS SCHEDULED
Status: DISCONTINUED | OUTPATIENT
Start: 2021-01-01 | End: 2021-01-01 | Stop reason: HOSPADM

## 2021-01-01 RX ORDER — MELATONIN
1000 DAILY
Status: DISCONTINUED | OUTPATIENT
Start: 2021-01-01 | End: 2021-01-01 | Stop reason: HOSPADM

## 2021-01-01 RX ORDER — ARFORMOTEROL TARTRATE 15 UG/2ML
15 SOLUTION RESPIRATORY (INHALATION)
Qty: 120 ML
Start: 2021-01-01

## 2021-01-01 RX ORDER — SODIUM CHLORIDE 0.9 % (FLUSH) 0.9 %
10 SYRINGE (ML) INJECTION AS NEEDED
Status: DISCONTINUED | OUTPATIENT
Start: 2021-01-01 | End: 2021-01-01 | Stop reason: HOSPADM

## 2021-01-01 RX ORDER — SODIUM CHLORIDE 0.9 % (FLUSH) 0.9 %
10 SYRINGE (ML) INJECTION AS NEEDED
Status: DISCONTINUED | OUTPATIENT
Start: 2021-01-01 | End: 2021-01-01

## 2021-01-01 RX ORDER — TEMAZEPAM 15 MG/1
15 CAPSULE ORAL NIGHTLY
Qty: 30 CAPSULE | Refills: 0 | Status: SHIPPED | OUTPATIENT
Start: 2021-01-01 | End: 2021-01-01 | Stop reason: SDUPTHER

## 2021-01-01 RX ORDER — BISACODYL 5 MG/1
5 TABLET, DELAYED RELEASE ORAL DAILY PRN
Start: 2021-01-01

## 2021-01-01 RX ORDER — MORPHINE SULFATE 100 MG/5ML
5 SOLUTION ORAL
Qty: 30 ML | Refills: 0 | Status: SHIPPED | OUTPATIENT
Start: 2021-01-01

## 2021-01-01 RX ORDER — IPRATROPIUM BROMIDE AND ALBUTEROL SULFATE 2.5; .5 MG/3ML; MG/3ML
3 SOLUTION RESPIRATORY (INHALATION) ONCE
Status: COMPLETED | OUTPATIENT
Start: 2021-01-01 | End: 2021-01-01

## 2021-01-01 RX ORDER — CHOLECALCIFEROL (VITAMIN D3) 125 MCG
5 CAPSULE ORAL NIGHTLY PRN
Status: DISCONTINUED | OUTPATIENT
Start: 2021-01-01 | End: 2021-01-01

## 2021-01-01 RX ORDER — SODIUM CHLORIDE AND POTASSIUM CHLORIDE 150; 900 MG/100ML; MG/100ML
50 INJECTION, SOLUTION INTRAVENOUS CONTINUOUS
Status: ACTIVE | OUTPATIENT
Start: 2021-01-01 | End: 2021-01-01

## 2021-01-01 RX ORDER — HYDROCODONE BITARTRATE AND ACETAMINOPHEN 5; 325 MG/1; MG/1
0.5 TABLET ORAL EVERY 6 HOURS
Status: DISCONTINUED | OUTPATIENT
Start: 2021-01-01 | End: 2021-01-01 | Stop reason: HOSPADM

## 2021-01-01 RX ORDER — ALBUTEROL SULFATE 90 UG/1
2 AEROSOL, METERED RESPIRATORY (INHALATION) 4 TIMES DAILY PRN
Status: DISCONTINUED | OUTPATIENT
Start: 2021-01-01 | End: 2021-01-01

## 2021-01-01 RX ORDER — HYDROCODONE BITARTRATE AND ACETAMINOPHEN 5; 325 MG/1; MG/1
0.5 TABLET ORAL EVERY 6 HOURS
Qty: 6 TABLET | Refills: 0 | Status: SHIPPED | OUTPATIENT
Start: 2021-01-01 | End: 2021-01-01 | Stop reason: SDUPTHER

## 2021-01-01 RX ORDER — HYDROCODONE BITARTRATE AND ACETAMINOPHEN 5; 325 MG/1; MG/1
0.5 TABLET ORAL EVERY 6 HOURS
Qty: 60 TABLET | Refills: 0 | Status: SHIPPED | OUTPATIENT
Start: 2021-01-01 | End: 2021-01-01 | Stop reason: SDUPTHER

## 2021-01-01 RX ORDER — ALBUTEROL SULFATE 2.5 MG/3ML
2.5 SOLUTION RESPIRATORY (INHALATION) EVERY 6 HOURS PRN
Status: DISCONTINUED | OUTPATIENT
Start: 2021-01-01 | End: 2021-01-01

## 2021-01-01 RX ORDER — CLONAZEPAM 0.5 MG/1
0.25 TABLET ORAL 3 TIMES DAILY PRN
Status: DISCONTINUED | OUTPATIENT
Start: 2021-01-01 | End: 2021-01-01 | Stop reason: HOSPADM

## 2021-01-01 RX ORDER — CETIRIZINE HYDROCHLORIDE 10 MG/1
10 TABLET ORAL DAILY
Status: DISCONTINUED | OUTPATIENT
Start: 2021-01-01 | End: 2021-01-01 | Stop reason: HOSPADM

## 2021-01-01 RX ORDER — PREDNISONE 20 MG/1
TABLET ORAL
Start: 2021-01-01

## 2021-01-01 RX ORDER — TEMAZEPAM 7.5 MG/1
7.5 CAPSULE ORAL NIGHTLY
Status: DISCONTINUED | OUTPATIENT
Start: 2021-01-01 | End: 2021-01-01 | Stop reason: HOSPADM

## 2021-01-01 RX ORDER — PREDNISONE 20 MG/1
20 TABLET ORAL 2 TIMES DAILY
Qty: 10 TABLET | Refills: 0 | Status: SHIPPED | OUTPATIENT
Start: 2021-01-01

## 2021-01-01 RX ORDER — IPRATROPIUM BROMIDE 42 UG/1
2 SPRAY, METERED NASAL 4 TIMES DAILY
Refills: 12
Start: 2021-01-01

## 2021-01-01 RX ORDER — METHYLPREDNISOLONE SODIUM SUCCINATE 40 MG/ML
40 INJECTION, POWDER, LYOPHILIZED, FOR SOLUTION INTRAMUSCULAR; INTRAVENOUS EVERY 12 HOURS
Status: DISCONTINUED | OUTPATIENT
Start: 2021-01-01 | End: 2021-01-01

## 2021-01-01 RX ORDER — PRAVASTATIN SODIUM 20 MG
20 TABLET ORAL NIGHTLY
Status: DISCONTINUED | OUTPATIENT
Start: 2021-01-01 | End: 2021-01-01 | Stop reason: HOSPADM

## 2021-01-01 RX ORDER — ALBUTEROL SULFATE 90 UG/1
2 AEROSOL, METERED RESPIRATORY (INHALATION) 4 TIMES DAILY
Status: DISCONTINUED | OUTPATIENT
Start: 2021-01-01 | End: 2021-01-01

## 2021-01-01 RX ORDER — CONJUGATED ESTROGENS 0.62 MG/1
TABLET, FILM COATED ORAL
Qty: 90 TABLET | Refills: 3 | Status: ON HOLD | OUTPATIENT
Start: 2021-01-01 | End: 2021-01-01 | Stop reason: SINTOL

## 2021-01-01 RX ORDER — CHOLECALCIFEROL (VITAMIN D3) 125 MCG
5 CAPSULE ORAL NIGHTLY PRN
Status: DISCONTINUED | OUTPATIENT
Start: 2021-01-01 | End: 2021-01-01 | Stop reason: HOSPADM

## 2021-01-01 RX ORDER — FLUTICASONE PROPIONATE 50 MCG
2 SPRAY, SUSPENSION (ML) NASAL DAILY
Status: DISCONTINUED | OUTPATIENT
Start: 2021-01-01 | End: 2021-01-01 | Stop reason: HOSPADM

## 2021-01-01 RX ORDER — ARFORMOTEROL TARTRATE 15 UG/2ML
15 SOLUTION RESPIRATORY (INHALATION)
Status: DISCONTINUED | OUTPATIENT
Start: 2021-01-01 | End: 2021-01-01 | Stop reason: HOSPADM

## 2021-01-01 RX ORDER — AMLODIPINE BESYLATE 5 MG/1
5 TABLET ORAL DAILY
Qty: 90 TABLET | Refills: 3
Start: 2021-01-01

## 2021-01-01 RX ORDER — IPRATROPIUM BROMIDE AND ALBUTEROL SULFATE 2.5; .5 MG/3ML; MG/3ML
3 SOLUTION RESPIRATORY (INHALATION) EVERY 4 HOURS PRN
Qty: 360 ML
Start: 2021-01-01

## 2021-01-01 RX ORDER — MECLIZINE HYDROCHLORIDE 25 MG/1
12.5 TABLET ORAL 3 TIMES DAILY PRN
COMMUNITY

## 2021-01-01 RX ORDER — PSEUDOEPHEDRINE HCL 30 MG
100 TABLET ORAL 2 TIMES DAILY
Start: 2021-01-01

## 2021-01-01 RX ORDER — BISACODYL 10 MG
10 SUPPOSITORY, RECTAL RECTAL DAILY PRN
Status: DISCONTINUED | OUTPATIENT
Start: 2021-01-01 | End: 2021-01-01 | Stop reason: HOSPADM

## 2021-01-01 RX ORDER — CLONAZEPAM 0.5 MG/1
0.5 TABLET ORAL 3 TIMES DAILY PRN
Qty: 9 TABLET | Refills: 0 | Status: SHIPPED | OUTPATIENT
Start: 2021-01-01 | End: 2021-01-01

## 2021-01-01 RX ORDER — MIRTAZAPINE 15 MG/1
15 TABLET, FILM COATED ORAL NIGHTLY
COMMUNITY
End: 2021-01-01 | Stop reason: HOSPADM

## 2021-01-01 RX ORDER — SODIUM CHLORIDE 0.9 % (FLUSH) 0.9 %
10 SYRINGE (ML) INJECTION EVERY 12 HOURS SCHEDULED
Status: DISCONTINUED | OUTPATIENT
Start: 2021-01-01 | End: 2021-01-01

## 2021-01-01 RX ORDER — CLONAZEPAM 0.5 MG/1
0.25 TABLET ORAL 3 TIMES DAILY PRN
Qty: 9 TABLET | Refills: 0
Start: 2021-01-01 | End: 2021-01-01 | Stop reason: SDUPTHER

## 2021-01-01 RX ORDER — DOCUSATE SODIUM 100 MG/1
100 CAPSULE, LIQUID FILLED ORAL 2 TIMES DAILY
Status: DISCONTINUED | OUTPATIENT
Start: 2021-01-01 | End: 2021-01-01 | Stop reason: HOSPADM

## 2021-01-01 RX ORDER — ACETAMINOPHEN 325 MG/1
650 TABLET ORAL EVERY 6 HOURS PRN
Start: 2021-01-01

## 2021-01-01 RX ORDER — CLONAZEPAM 0.5 MG/1
0.25 TABLET ORAL NIGHTLY
Qty: 2 TABLET | Refills: 0
Start: 2021-01-01 | End: 2021-01-01 | Stop reason: SDUPTHER

## 2021-01-01 RX ORDER — IPRATROPIUM BROMIDE 42 UG/1
2 SPRAY, METERED NASAL 4 TIMES DAILY
Status: DISCONTINUED | OUTPATIENT
Start: 2021-01-01 | End: 2021-01-01 | Stop reason: HOSPADM

## 2021-01-01 RX ORDER — HEPARIN SODIUM 5000 [USP'U]/ML
5000 INJECTION, SOLUTION INTRAVENOUS; SUBCUTANEOUS EVERY 12 HOURS SCHEDULED
Status: DISCONTINUED | OUTPATIENT
Start: 2021-01-01 | End: 2021-01-01 | Stop reason: HOSPADM

## 2021-01-01 RX ORDER — TRIAMCINOLONE ACETONIDE 1 MG/G
CREAM TOPICAL EVERY 12 HOURS SCHEDULED
Status: DISCONTINUED | OUTPATIENT
Start: 2021-01-01 | End: 2021-01-01 | Stop reason: HOSPADM

## 2021-01-01 RX ORDER — ALBUTEROL SULFATE 2.5 MG/3ML
2.5 SOLUTION RESPIRATORY (INHALATION)
Status: DISCONTINUED | OUTPATIENT
Start: 2021-01-01 | End: 2021-01-01 | Stop reason: HOSPADM

## 2021-01-01 RX ORDER — CLONAZEPAM 0.5 MG/1
0.25 TABLET ORAL 3 TIMES DAILY PRN
Status: DISCONTINUED | OUTPATIENT
Start: 2021-01-01 | End: 2021-01-01

## 2021-01-01 RX ORDER — CLONAZEPAM 0.5 MG/1
0.5 TABLET ORAL 3 TIMES DAILY PRN
Status: DISCONTINUED | OUTPATIENT
Start: 2021-01-01 | End: 2021-01-01

## 2021-01-01 RX ORDER — TRAMADOL HYDROCHLORIDE 50 MG/1
50 TABLET ORAL EVERY 6 HOURS PRN
Status: DISPENSED | OUTPATIENT
Start: 2021-01-01 | End: 2021-01-01

## 2021-01-01 RX ORDER — GUAIFENESIN 600 MG/1
1200 TABLET, EXTENDED RELEASE ORAL DAILY
Status: DISCONTINUED | OUTPATIENT
Start: 2021-01-01 | End: 2021-01-01 | Stop reason: HOSPADM

## 2021-01-01 RX ORDER — BISACODYL 5 MG/1
5 TABLET, DELAYED RELEASE ORAL DAILY PRN
Status: DISCONTINUED | OUTPATIENT
Start: 2021-01-01 | End: 2021-01-01 | Stop reason: HOSPADM

## 2021-01-01 RX ORDER — AMLODIPINE BESYLATE 5 MG/1
5 TABLET ORAL DAILY
Status: DISCONTINUED | OUTPATIENT
Start: 2021-01-01 | End: 2021-01-01 | Stop reason: HOSPADM

## 2021-01-01 RX ORDER — ACETAMINOPHEN 325 MG/1
650 TABLET ORAL EVERY 6 HOURS PRN
Status: DISCONTINUED | OUTPATIENT
Start: 2021-01-01 | End: 2021-01-01

## 2021-01-01 RX ADMIN — ALBUTEROL SULFATE 2 PUFF: 90 AEROSOL, METERED RESPIRATORY (INHALATION) at 17:17

## 2021-01-01 RX ADMIN — METHYLPREDNISOLONE SODIUM SUCCINATE 125 MG: 125 INJECTION, POWDER, FOR SOLUTION INTRAMUSCULAR; INTRAVENOUS at 14:58

## 2021-01-01 RX ADMIN — MAGNESIUM SULFATE HEPTAHYDRATE 2 G: 2 INJECTION, SOLUTION INTRAVENOUS at 11:47

## 2021-01-01 RX ADMIN — TRIAMCINOLONE ACETONIDE: 1 CREAM TOPICAL at 20:49

## 2021-01-01 RX ADMIN — Medication: at 15:36

## 2021-01-01 RX ADMIN — SODIUM CHLORIDE, PRESERVATIVE FREE 10 ML: 5 INJECTION INTRAVENOUS at 09:30

## 2021-01-01 RX ADMIN — Medication: at 22:56

## 2021-01-01 RX ADMIN — Medication 5 MG: at 21:35

## 2021-01-01 RX ADMIN — BUDESONIDE 0.5 MG: 0.5 INHALANT RESPIRATORY (INHALATION) at 19:29

## 2021-01-01 RX ADMIN — PREDNISONE 20 MG: 20 TABLET ORAL at 08:53

## 2021-01-01 RX ADMIN — HYDROCODONE BITARTRATE AND ACETAMINOPHEN 0.5 TABLET: 5; 325 TABLET ORAL at 12:27

## 2021-01-01 RX ADMIN — BUDESONIDE 0.5 MG: 0.5 INHALANT RESPIRATORY (INHALATION) at 19:15

## 2021-01-01 RX ADMIN — IPRATROPIUM BROMIDE AND ALBUTEROL SULFATE 3 ML: 2.5; .5 SOLUTION RESPIRATORY (INHALATION) at 11:35

## 2021-01-01 RX ADMIN — HYDROCODONE BITARTRATE AND ACETAMINOPHEN 0.5 TABLET: 5; 325 TABLET ORAL at 00:46

## 2021-01-01 RX ADMIN — Medication 5 MG: at 21:48

## 2021-01-01 RX ADMIN — SODIUM CHLORIDE 1 G: 900 INJECTION INTRAVENOUS at 21:47

## 2021-01-01 RX ADMIN — DOXYCYCLINE 100 MG: 100 INJECTION, POWDER, LYOPHILIZED, FOR SOLUTION INTRAVENOUS at 18:04

## 2021-01-01 RX ADMIN — POTASSIUM CHLORIDE AND SODIUM CHLORIDE 50 ML/HR: 900; 150 INJECTION, SOLUTION INTRAVENOUS at 19:33

## 2021-01-01 RX ADMIN — ALBUTEROL SULFATE 2.5 MG: 2.5 SOLUTION RESPIRATORY (INHALATION) at 07:35

## 2021-01-01 RX ADMIN — IPRATROPIUM BROMIDE 2 SPRAY: 42 SPRAY NASAL at 11:49

## 2021-01-01 RX ADMIN — IPRATROPIUM BROMIDE AND ALBUTEROL SULFATE 3 ML: 2.5; .5 SOLUTION RESPIRATORY (INHALATION) at 04:48

## 2021-01-01 RX ADMIN — BUDESONIDE 0.5 MG: 0.5 INHALANT RESPIRATORY (INHALATION) at 07:33

## 2021-01-01 RX ADMIN — ARFORMOTEROL TARTRATE 15 MCG: 15 SOLUTION RESPIRATORY (INHALATION) at 07:29

## 2021-01-01 RX ADMIN — METHYLPREDNISOLONE SODIUM SUCCINATE 40 MG: 40 INJECTION, POWDER, FOR SOLUTION INTRAMUSCULAR; INTRAVENOUS at 21:48

## 2021-01-01 RX ADMIN — ASPIRIN 81 MG: 81 TABLET, COATED ORAL at 08:51

## 2021-01-01 RX ADMIN — CETIRIZINE HYDROCHLORIDE 10 MG: 10 TABLET, FILM COATED ORAL at 08:53

## 2021-01-01 RX ADMIN — SODIUM CHLORIDE, PRESERVATIVE FREE 10 ML: 5 INJECTION INTRAVENOUS at 09:38

## 2021-01-01 RX ADMIN — DOCUSATE SODIUM 100 MG: 100 CAPSULE, LIQUID FILLED ORAL at 21:41

## 2021-01-01 RX ADMIN — DOXYCYCLINE 100 MG: 100 INJECTION, POWDER, LYOPHILIZED, FOR SOLUTION INTRAVENOUS at 06:53

## 2021-01-01 RX ADMIN — METHYLPREDNISOLONE SODIUM SUCCINATE 40 MG: 40 INJECTION, POWDER, FOR SOLUTION INTRAMUSCULAR; INTRAVENOUS at 08:30

## 2021-01-01 RX ADMIN — DOXYCYCLINE 100 MG: 100 INJECTION, POWDER, LYOPHILIZED, FOR SOLUTION INTRAVENOUS at 17:41

## 2021-01-01 RX ADMIN — CLONAZEPAM 0.25 MG: 0.5 TABLET ORAL at 21:46

## 2021-01-01 RX ADMIN — MINERAL OIL: 1000 LIQUID ORAL at 12:41

## 2021-01-01 RX ADMIN — MINERAL OIL: 1000 LIQUID ORAL at 21:32

## 2021-01-01 RX ADMIN — TRIAMCINOLONE ACETONIDE: 1 CREAM TOPICAL at 08:58

## 2021-01-01 RX ADMIN — HYDROCODONE BITARTRATE AND ACETAMINOPHEN 0.5 TABLET: 5; 325 TABLET ORAL at 23:19

## 2021-01-01 RX ADMIN — PROPRANOLOL HYDROCHLORIDE 60 MG: 20 TABLET ORAL at 08:24

## 2021-01-01 RX ADMIN — FLUTICASONE PROPIONATE 2 SPRAY: 50 SPRAY, METERED NASAL at 08:07

## 2021-01-01 RX ADMIN — SODIUM CHLORIDE, PRESERVATIVE FREE 10 ML: 5 INJECTION INTRAVENOUS at 21:00

## 2021-01-01 RX ADMIN — IBUPROFEN 200 MG: 100 SUSPENSION ORAL at 04:41

## 2021-01-01 RX ADMIN — SERTRALINE HYDROCHLORIDE 50 MG: 50 TABLET ORAL at 08:06

## 2021-01-01 RX ADMIN — BENZONATATE 100 MG: 100 CAPSULE ORAL at 21:41

## 2021-01-01 RX ADMIN — ALBUTEROL SULFATE 2.5 MG: 2.5 SOLUTION RESPIRATORY (INHALATION) at 13:00

## 2021-01-01 RX ADMIN — IPRATROPIUM BROMIDE 2 SPRAY: 42 SPRAY NASAL at 18:04

## 2021-01-01 RX ADMIN — PRAVASTATIN SODIUM 20 MG: 20 TABLET ORAL at 21:35

## 2021-01-01 RX ADMIN — ALBUTEROL SULFATE 2 PUFF: 90 AEROSOL, METERED RESPIRATORY (INHALATION) at 12:43

## 2021-01-01 RX ADMIN — SODIUM CHLORIDE, PRESERVATIVE FREE 10 ML: 5 INJECTION INTRAVENOUS at 21:41

## 2021-01-01 RX ADMIN — AMLODIPINE BESYLATE 5 MG: 5 TABLET ORAL at 09:29

## 2021-01-01 RX ADMIN — IPRATROPIUM BROMIDE 2 SPRAY: 42 SPRAY NASAL at 17:40

## 2021-01-01 RX ADMIN — IPRATROPIUM BROMIDE 2 SPRAY: 42 SPRAY NASAL at 21:33

## 2021-01-01 RX ADMIN — TRIAMCINOLONE ACETONIDE: 1 CREAM TOPICAL at 08:09

## 2021-01-01 RX ADMIN — ALBUTEROL SULFATE 2.5 MG: 2.5 SOLUTION RESPIRATORY (INHALATION) at 17:05

## 2021-01-01 RX ADMIN — IPRATROPIUM BROMIDE 2 SPRAY: 42 SPRAY NASAL at 08:08

## 2021-01-01 RX ADMIN — TRIAMCINOLONE ACETONIDE: 1 CREAM TOPICAL at 21:47

## 2021-01-01 RX ADMIN — METHYLPREDNISOLONE SODIUM SUCCINATE 40 MG: 40 INJECTION, POWDER, FOR SOLUTION INTRAMUSCULAR; INTRAVENOUS at 16:29

## 2021-01-01 RX ADMIN — GUAIFENESIN 1200 MG: 600 TABLET, EXTENDED RELEASE ORAL at 08:27

## 2021-01-01 RX ADMIN — Medication 1 CAPSULE: at 21:48

## 2021-01-01 RX ADMIN — DOCUSATE SODIUM 100 MG: 100 CAPSULE, LIQUID FILLED ORAL at 08:53

## 2021-01-01 RX ADMIN — HEPARIN SODIUM 5000 UNITS: 5000 INJECTION INTRAVENOUS; SUBCUTANEOUS at 20:38

## 2021-01-01 RX ADMIN — MAGNESIUM SULFATE HEPTAHYDRATE 2 G: 2 INJECTION, SOLUTION INTRAVENOUS at 14:33

## 2021-01-01 RX ADMIN — ASPIRIN 81 MG: 81 TABLET, COATED ORAL at 09:39

## 2021-01-01 RX ADMIN — PANTOPRAZOLE SODIUM 40 MG: 40 TABLET, DELAYED RELEASE ORAL at 08:52

## 2021-01-01 RX ADMIN — DOXYCYCLINE 100 MG: 100 INJECTION, POWDER, LYOPHILIZED, FOR SOLUTION INTRAVENOUS at 05:35

## 2021-01-01 RX ADMIN — MINERAL OIL: 1000 LIQUID ORAL at 11:42

## 2021-01-01 RX ADMIN — MINERAL OIL: 1000 LIQUID ORAL at 22:59

## 2021-01-01 RX ADMIN — CETIRIZINE HYDROCHLORIDE 10 MG: 10 TABLET, FILM COATED ORAL at 09:39

## 2021-01-01 RX ADMIN — DOCUSATE SODIUM 100 MG: 100 CAPSULE, LIQUID FILLED ORAL at 09:29

## 2021-01-01 RX ADMIN — MINERAL OIL: 1000 LIQUID ORAL at 18:04

## 2021-01-01 RX ADMIN — ALBUTEROL SULFATE 2.5 MG: 2.5 SOLUTION RESPIRATORY (INHALATION) at 19:35

## 2021-01-01 RX ADMIN — CLONAZEPAM 0.25 MG: 0.5 TABLET ORAL at 23:21

## 2021-01-01 RX ADMIN — IPRATROPIUM BROMIDE 2 SPRAY: 42 SPRAY NASAL at 08:41

## 2021-01-01 RX ADMIN — TRAMADOL HYDROCHLORIDE 50 MG: 50 TABLET, FILM COATED ORAL at 17:47

## 2021-01-01 RX ADMIN — ALBUTEROL SULFATE 2 PUFF: 90 AEROSOL, METERED RESPIRATORY (INHALATION) at 04:39

## 2021-01-01 RX ADMIN — Medication 1000 UNITS: at 08:27

## 2021-01-01 RX ADMIN — PROPRANOLOL HYDROCHLORIDE 60 MG: 20 TABLET ORAL at 08:04

## 2021-01-01 RX ADMIN — SERTRALINE HYDROCHLORIDE 50 MG: 50 TABLET ORAL at 08:24

## 2021-01-01 RX ADMIN — PANTOPRAZOLE SODIUM 40 MG: 40 TABLET, DELAYED RELEASE ORAL at 08:53

## 2021-01-01 RX ADMIN — ALBUTEROL SULFATE 2.5 MG: 2.5 SOLUTION RESPIRATORY (INHALATION) at 19:29

## 2021-01-01 RX ADMIN — HEPARIN SODIUM 5000 UNITS: 5000 INJECTION INTRAVENOUS; SUBCUTANEOUS at 21:41

## 2021-01-01 RX ADMIN — METHYLPREDNISOLONE SODIUM SUCCINATE 40 MG: 40 INJECTION, POWDER, FOR SOLUTION INTRAMUSCULAR; INTRAVENOUS at 22:58

## 2021-01-01 RX ADMIN — MINERAL OIL: 1000 LIQUID ORAL at 12:23

## 2021-01-01 RX ADMIN — DOCUSATE SODIUM 100 MG: 100 CAPSULE, LIQUID FILLED ORAL at 21:00

## 2021-01-01 RX ADMIN — Medication 1000 UNITS: at 08:26

## 2021-01-01 RX ADMIN — DOXYCYCLINE 100 MG: 100 INJECTION, POWDER, LYOPHILIZED, FOR SOLUTION INTRAVENOUS at 05:44

## 2021-01-01 RX ADMIN — DOXYCYCLINE 100 MG: 100 INJECTION, POWDER, LYOPHILIZED, FOR SOLUTION INTRAVENOUS at 19:03

## 2021-01-01 RX ADMIN — Medication 1 CAPSULE: at 21:42

## 2021-01-01 RX ADMIN — PANTOPRAZOLE SODIUM 40 MG: 40 TABLET, DELAYED RELEASE ORAL at 08:06

## 2021-01-01 RX ADMIN — WHITE PETROLATUM: 1.75 OINTMENT TOPICAL at 12:44

## 2021-01-01 RX ADMIN — TRIAMCINOLONE ACETONIDE: 1 CREAM TOPICAL at 08:53

## 2021-01-01 RX ADMIN — IPRATROPIUM BROMIDE 2 SPRAY: 42 SPRAY NASAL at 23:00

## 2021-01-01 RX ADMIN — Medication 1 CAPSULE: at 20:59

## 2021-01-01 RX ADMIN — ALBUTEROL SULFATE 2.5 MG: 2.5 SOLUTION RESPIRATORY (INHALATION) at 07:33

## 2021-01-01 RX ADMIN — HYDROCODONE BITARTRATE AND ACETAMINOPHEN 0.5 TABLET: 5; 325 TABLET ORAL at 23:21

## 2021-01-01 RX ADMIN — ALBUTEROL SULFATE 2.5 MG: 2.5 SOLUTION RESPIRATORY (INHALATION) at 20:17

## 2021-01-01 RX ADMIN — TRIAMCINOLONE ACETONIDE: 1 CREAM TOPICAL at 22:56

## 2021-01-01 RX ADMIN — GUAIFENESIN 1200 MG: 600 TABLET, EXTENDED RELEASE ORAL at 08:54

## 2021-01-01 RX ADMIN — METHYLPREDNISOLONE SODIUM SUCCINATE 40 MG: 40 INJECTION, POWDER, FOR SOLUTION INTRAMUSCULAR; INTRAVENOUS at 21:00

## 2021-01-01 RX ADMIN — SERTRALINE HYDROCHLORIDE 50 MG: 50 TABLET ORAL at 20:35

## 2021-01-01 RX ADMIN — Medication: at 08:41

## 2021-01-01 RX ADMIN — IPRATROPIUM BROMIDE 2 SPRAY: 42 SPRAY NASAL at 12:00

## 2021-01-01 RX ADMIN — ALBUTEROL SULFATE 2.5 MG: 2.5 SOLUTION RESPIRATORY (INHALATION) at 08:15

## 2021-01-01 RX ADMIN — ASPIRIN 81 MG: 81 TABLET, COATED ORAL at 08:53

## 2021-01-01 RX ADMIN — PANTOPRAZOLE SODIUM 40 MG: 40 TABLET, DELAYED RELEASE ORAL at 09:39

## 2021-01-01 RX ADMIN — GUAIFENESIN 1200 MG: 600 TABLET, EXTENDED RELEASE ORAL at 08:51

## 2021-01-01 RX ADMIN — Medication: at 21:50

## 2021-01-01 RX ADMIN — MINERAL OIL: 1000 LIQUID ORAL at 21:38

## 2021-01-01 RX ADMIN — ASPIRIN 81 MG: 81 TABLET, COATED ORAL at 09:29

## 2021-01-01 RX ADMIN — HYDROCODONE BITARTRATE AND ACETAMINOPHEN 0.5 TABLET: 5; 325 TABLET ORAL at 23:38

## 2021-01-01 RX ADMIN — NYSTATIN 500000 UNITS: 100000 SUSPENSION ORAL at 09:00

## 2021-01-01 RX ADMIN — HEPARIN SODIUM 5000 UNITS: 5000 INJECTION INTRAVENOUS; SUBCUTANEOUS at 22:57

## 2021-01-01 RX ADMIN — SERTRALINE HYDROCHLORIDE 50 MG: 50 TABLET ORAL at 09:39

## 2021-01-01 RX ADMIN — ALBUTEROL SULFATE 2 PUFF: 90 AEROSOL, METERED RESPIRATORY (INHALATION) at 23:47

## 2021-01-01 RX ADMIN — DOXYCYCLINE 100 MG: 100 INJECTION, POWDER, LYOPHILIZED, FOR SOLUTION INTRAVENOUS at 06:14

## 2021-01-01 RX ADMIN — METHYLPREDNISOLONE SODIUM SUCCINATE 40 MG: 40 INJECTION, POWDER, FOR SOLUTION INTRAMUSCULAR; INTRAVENOUS at 21:41

## 2021-01-01 RX ADMIN — CETIRIZINE HYDROCHLORIDE 10 MG: 10 TABLET, FILM COATED ORAL at 08:25

## 2021-01-01 RX ADMIN — Medication: at 08:58

## 2021-01-01 RX ADMIN — PROPRANOLOL HYDROCHLORIDE 60 MG: 20 TABLET ORAL at 21:41

## 2021-01-01 RX ADMIN — ARFORMOTEROL TARTRATE 15 MCG: 15 SOLUTION RESPIRATORY (INHALATION) at 07:35

## 2021-01-01 RX ADMIN — GUAIFENESIN 1200 MG: 600 TABLET, EXTENDED RELEASE ORAL at 20:35

## 2021-01-01 RX ADMIN — BUDESONIDE 0.5 MG: 0.5 INHALANT RESPIRATORY (INHALATION) at 12:03

## 2021-01-01 RX ADMIN — DOXYCYCLINE 100 MG: 100 INJECTION, POWDER, LYOPHILIZED, FOR SOLUTION INTRAVENOUS at 05:09

## 2021-01-01 RX ADMIN — BUDESONIDE 0.5 MG: 0.5 INHALANT RESPIRATORY (INHALATION) at 19:33

## 2021-01-01 RX ADMIN — TRIAMCINOLONE ACETONIDE: 1 CREAM TOPICAL at 08:41

## 2021-01-01 RX ADMIN — AMLODIPINE BESYLATE 5 MG: 5 TABLET ORAL at 09:39

## 2021-01-01 RX ADMIN — Medication 5 MG: at 22:54

## 2021-01-01 RX ADMIN — METHYLPREDNISOLONE SODIUM SUCCINATE 40 MG: 40 INJECTION, POWDER, FOR SOLUTION INTRAMUSCULAR; INTRAVENOUS at 08:14

## 2021-01-01 RX ADMIN — Medication: at 21:47

## 2021-01-01 RX ADMIN — HYDROCODONE BITARTRATE AND ACETAMINOPHEN 0.5 TABLET: 5; 325 TABLET ORAL at 12:43

## 2021-01-01 RX ADMIN — ALBUTEROL SULFATE 2.5 MG: 2.5 SOLUTION RESPIRATORY (INHALATION) at 20:23

## 2021-01-01 RX ADMIN — ALBUTEROL SULFATE 2.5 MG: 2.5 SOLUTION RESPIRATORY (INHALATION) at 15:49

## 2021-01-01 RX ADMIN — Medication 1 CAPSULE: at 22:54

## 2021-01-01 RX ADMIN — IPRATROPIUM BROMIDE 2 SPRAY: 42 SPRAY NASAL at 19:03

## 2021-01-01 RX ADMIN — PRAVASTATIN SODIUM 20 MG: 20 TABLET ORAL at 21:00

## 2021-01-01 RX ADMIN — SODIUM CHLORIDE 500 ML: 9 INJECTION, SOLUTION INTRAVENOUS at 20:57

## 2021-01-01 RX ADMIN — PROPRANOLOL HYDROCHLORIDE 60 MG: 20 TABLET ORAL at 08:40

## 2021-01-01 RX ADMIN — PANTOPRAZOLE SODIUM 40 MG: 40 TABLET, DELAYED RELEASE ORAL at 08:27

## 2021-01-01 RX ADMIN — BUDESONIDE 0.5 MG: 0.5 INHALANT RESPIRATORY (INHALATION) at 20:17

## 2021-01-01 RX ADMIN — FLUTICASONE PROPIONATE 2 SPRAY: 50 SPRAY, METERED NASAL at 08:53

## 2021-01-01 RX ADMIN — NYSTATIN 500000 UNITS: 100000 SUSPENSION ORAL at 21:42

## 2021-01-01 RX ADMIN — PRAVASTATIN SODIUM 20 MG: 20 TABLET ORAL at 20:35

## 2021-01-01 RX ADMIN — ALBUTEROL SULFATE 2.5 MG: 2.5 SOLUTION RESPIRATORY (INHALATION) at 19:04

## 2021-01-01 RX ADMIN — PANTOPRAZOLE SODIUM 40 MG: 40 TABLET, DELAYED RELEASE ORAL at 08:25

## 2021-01-01 RX ADMIN — BENZONATATE 100 MG: 100 CAPSULE ORAL at 21:47

## 2021-01-01 RX ADMIN — SODIUM CHLORIDE, PRESERVATIVE FREE 10 ML: 5 INJECTION INTRAVENOUS at 21:51

## 2021-01-01 RX ADMIN — PROPRANOLOL HYDROCHLORIDE 60 MG: 20 TABLET ORAL at 21:49

## 2021-01-01 RX ADMIN — ARFORMOTEROL TARTRATE 15 MCG: 15 SOLUTION RESPIRATORY (INHALATION) at 09:27

## 2021-01-01 RX ADMIN — PROPRANOLOL HYDROCHLORIDE 60 MG: 20 TABLET ORAL at 20:59

## 2021-01-01 RX ADMIN — IPRATROPIUM BROMIDE AND ALBUTEROL SULFATE 3 ML: 2.5; .5 SOLUTION RESPIRATORY (INHALATION) at 14:43

## 2021-01-01 RX ADMIN — TRIAMCINOLONE ACETONIDE 1 APPLICATION: 1 CREAM TOPICAL at 21:43

## 2021-01-01 RX ADMIN — Medication 1 CAPSULE: at 08:14

## 2021-01-01 RX ADMIN — Medication 1000 UNITS: at 08:53

## 2021-01-01 RX ADMIN — Medication 1000 UNITS: at 08:51

## 2021-01-01 RX ADMIN — IPRATROPIUM BROMIDE 2 SPRAY: 42 SPRAY NASAL at 08:00

## 2021-01-01 RX ADMIN — Medication 1 CAPSULE: at 09:39

## 2021-01-01 RX ADMIN — MINERAL OIL: 1000 LIQUID ORAL at 21:42

## 2021-01-01 RX ADMIN — Medication: at 21:33

## 2021-01-01 RX ADMIN — CLONAZEPAM 0.5 MG: 0.5 TABLET ORAL at 04:17

## 2021-01-01 RX ADMIN — HEPARIN SODIUM 5000 UNITS: 5000 INJECTION INTRAVENOUS; SUBCUTANEOUS at 08:26

## 2021-01-01 RX ADMIN — MINERAL OIL: 1000 LIQUID ORAL at 08:53

## 2021-01-01 RX ADMIN — METHYLPREDNISOLONE SODIUM SUCCINATE 40 MG: 40 INJECTION, POWDER, FOR SOLUTION INTRAMUSCULAR; INTRAVENOUS at 09:40

## 2021-01-01 RX ADMIN — Medication 1 CAPSULE: at 21:47

## 2021-01-01 RX ADMIN — ASPIRIN 81 MG: 81 TABLET, COATED ORAL at 08:27

## 2021-01-01 RX ADMIN — ARFORMOTEROL TARTRATE 15 MCG: 15 SOLUTION RESPIRATORY (INHALATION) at 21:06

## 2021-01-01 RX ADMIN — CLONAZEPAM 0.5 MG: 0.5 TABLET ORAL at 00:46

## 2021-01-01 RX ADMIN — IBUPROFEN 200 MG: 100 SUSPENSION ORAL at 06:55

## 2021-01-01 RX ADMIN — CLONAZEPAM 0.25 MG: 0.5 TABLET ORAL at 21:41

## 2021-01-01 RX ADMIN — HYDROCODONE BITARTRATE AND ACETAMINOPHEN 0.5 TABLET: 5; 325 TABLET ORAL at 11:49

## 2021-01-01 RX ADMIN — AMLODIPINE BESYLATE 5 MG: 5 TABLET ORAL at 08:06

## 2021-01-01 RX ADMIN — TRIAMCINOLONE ACETONIDE: 1 CREAM TOPICAL at 09:38

## 2021-01-01 RX ADMIN — FLUTICASONE PROPIONATE 2 SPRAY: 50 SPRAY, METERED NASAL at 08:52

## 2021-01-01 RX ADMIN — METHYLPREDNISOLONE SODIUM SUCCINATE 40 MG: 40 INJECTION, POWDER, FOR SOLUTION INTRAMUSCULAR; INTRAVENOUS at 08:52

## 2021-01-01 RX ADMIN — Medication 1000 UNITS: at 09:29

## 2021-01-01 RX ADMIN — ALBUTEROL SULFATE 2.5 MG: 2.5 SOLUTION RESPIRATORY (INHALATION) at 15:30

## 2021-01-01 RX ADMIN — HEPARIN SODIUM 5000 UNITS: 5000 INJECTION INTRAVENOUS; SUBCUTANEOUS at 09:29

## 2021-01-01 RX ADMIN — GUAIFENESIN 1200 MG: 600 TABLET, EXTENDED RELEASE ORAL at 09:29

## 2021-01-01 RX ADMIN — HYDROCODONE BITARTRATE AND ACETAMINOPHEN 0.5 TABLET: 5; 325 TABLET ORAL at 05:19

## 2021-01-01 RX ADMIN — DOCUSATE SODIUM 100 MG: 100 CAPSULE, LIQUID FILLED ORAL at 22:55

## 2021-01-01 RX ADMIN — ARFORMOTEROL TARTRATE 15 MCG: 15 SOLUTION RESPIRATORY (INHALATION) at 08:16

## 2021-01-01 RX ADMIN — FLUTICASONE PROPIONATE 2 SPRAY: 50 SPRAY, METERED NASAL at 09:38

## 2021-01-01 RX ADMIN — DOCUSATE SODIUM 100 MG: 100 CAPSULE, LIQUID FILLED ORAL at 20:36

## 2021-01-01 RX ADMIN — SODIUM CHLORIDE, PRESERVATIVE FREE 10 ML: 5 INJECTION INTRAVENOUS at 21:35

## 2021-01-01 RX ADMIN — MINERAL OIL: 1000 LIQUID ORAL at 09:00

## 2021-01-01 RX ADMIN — SERTRALINE HYDROCHLORIDE 50 MG: 50 TABLET ORAL at 08:51

## 2021-01-01 RX ADMIN — MINERAL OIL: 1000 LIQUID ORAL at 17:42

## 2021-01-01 RX ADMIN — ARFORMOTEROL TARTRATE 15 MCG: 15 SOLUTION RESPIRATORY (INHALATION) at 20:36

## 2021-01-01 RX ADMIN — IPRATROPIUM BROMIDE AND ALBUTEROL SULFATE 3 ML: 2.5; .5 SOLUTION RESPIRATORY (INHALATION) at 00:28

## 2021-01-01 RX ADMIN — Medication 1000 UNITS: at 08:05

## 2021-01-01 RX ADMIN — Medication: at 21:38

## 2021-01-01 RX ADMIN — DOCUSATE SODIUM 100 MG: 100 CAPSULE, LIQUID FILLED ORAL at 08:27

## 2021-01-01 RX ADMIN — NYSTATIN 500000 UNITS: 100000 SUSPENSION ORAL at 17:55

## 2021-01-01 RX ADMIN — HYDROCODONE BITARTRATE AND ACETAMINOPHEN 0.5 TABLET: 5; 325 TABLET ORAL at 05:23

## 2021-01-01 RX ADMIN — BENZONATATE 100 MG: 100 CAPSULE ORAL at 13:41

## 2021-01-01 RX ADMIN — METHYLPREDNISOLONE SODIUM SUCCINATE 40 MG: 40 INJECTION, POWDER, FOR SOLUTION INTRAMUSCULAR; INTRAVENOUS at 20:35

## 2021-01-01 RX ADMIN — NYSTATIN 500000 UNITS: 100000 SUSPENSION ORAL at 12:41

## 2021-01-01 RX ADMIN — ACETAMINOPHEN 650 MG: 325 TABLET ORAL at 04:09

## 2021-01-01 RX ADMIN — IPRATROPIUM BROMIDE 2 SPRAY: 42 SPRAY NASAL at 08:52

## 2021-01-01 RX ADMIN — BUDESONIDE 0.5 MG: 0.5 INHALANT RESPIRATORY (INHALATION) at 09:27

## 2021-01-01 RX ADMIN — ARFORMOTEROL TARTRATE 15 MCG: 15 SOLUTION RESPIRATORY (INHALATION) at 19:37

## 2021-01-01 RX ADMIN — PRAVASTATIN SODIUM 20 MG: 20 TABLET ORAL at 21:41

## 2021-01-01 RX ADMIN — SODIUM CHLORIDE, PRESERVATIVE FREE 10 ML: 5 INJECTION INTRAVENOUS at 22:57

## 2021-01-01 RX ADMIN — HYDROCODONE BITARTRATE AND ACETAMINOPHEN 0.5 TABLET: 5; 325 TABLET ORAL at 17:42

## 2021-01-01 RX ADMIN — HYDROCODONE BITARTRATE AND ACETAMINOPHEN 0.5 TABLET: 5; 325 TABLET ORAL at 19:02

## 2021-01-01 RX ADMIN — Medication 1 CAPSULE: at 09:29

## 2021-01-01 RX ADMIN — HEPARIN SODIUM 5000 UNITS: 5000 INJECTION INTRAVENOUS; SUBCUTANEOUS at 08:54

## 2021-01-01 RX ADMIN — HYDROCODONE BITARTRATE AND ACETAMINOPHEN 0.5 TABLET: 5; 325 TABLET ORAL at 17:48

## 2021-01-01 RX ADMIN — ALBUTEROL SULFATE 2.5 MG: 2.5 SOLUTION RESPIRATORY (INHALATION) at 17:00

## 2021-01-01 RX ADMIN — Medication 1 CAPSULE: at 20:36

## 2021-01-01 RX ADMIN — AMLODIPINE BESYLATE 5 MG: 5 TABLET ORAL at 08:27

## 2021-01-01 RX ADMIN — CLONAZEPAM 0.5 MG: 0.5 TABLET ORAL at 13:41

## 2021-01-01 RX ADMIN — MINERAL OIL: 1000 LIQUID ORAL at 21:00

## 2021-01-01 RX ADMIN — IPRATROPIUM BROMIDE 2 SPRAY: 42 SPRAY NASAL at 08:53

## 2021-01-01 RX ADMIN — ESTROGENS, CONJUGATED 0.62 MG: 0.62 TABLET, FILM COATED ORAL at 09:28

## 2021-01-01 RX ADMIN — Medication 1 CAPSULE: at 08:53

## 2021-01-01 RX ADMIN — BUDESONIDE 0.5 MG: 0.5 INHALANT RESPIRATORY (INHALATION) at 20:23

## 2021-01-01 RX ADMIN — CLONAZEPAM 0.25 MG: 0.5 TABLET ORAL at 23:38

## 2021-01-01 RX ADMIN — PROPRANOLOL HYDROCHLORIDE 60 MG: 20 TABLET ORAL at 08:53

## 2021-01-01 RX ADMIN — GUAIFENESIN 1200 MG: 600 TABLET, EXTENDED RELEASE ORAL at 09:39

## 2021-01-01 RX ADMIN — FLUTICASONE PROPIONATE 2 SPRAY: 50 SPRAY, METERED NASAL at 08:41

## 2021-01-01 RX ADMIN — TRIAMCINOLONE ACETONIDE: 1 CREAM TOPICAL at 21:33

## 2021-01-01 RX ADMIN — DOCUSATE SODIUM 100 MG: 100 CAPSULE, LIQUID FILLED ORAL at 09:39

## 2021-01-01 RX ADMIN — SERTRALINE HYDROCHLORIDE 50 MG: 50 TABLET ORAL at 08:27

## 2021-01-01 RX ADMIN — MINERAL OIL: 1000 LIQUID ORAL at 17:40

## 2021-01-01 RX ADMIN — Medication 5 MG: at 21:47

## 2021-01-01 RX ADMIN — PANTOPRAZOLE SODIUM 40 MG: 40 TABLET, DELAYED RELEASE ORAL at 09:29

## 2021-01-01 RX ADMIN — DOXYCYCLINE 100 MG: 100 INJECTION, POWDER, LYOPHILIZED, FOR SOLUTION INTRAVENOUS at 17:42

## 2021-01-01 RX ADMIN — HEPARIN SODIUM 5000 UNITS: 5000 INJECTION INTRAVENOUS; SUBCUTANEOUS at 21:33

## 2021-01-01 RX ADMIN — DOXYCYCLINE 100 MG: 100 INJECTION, POWDER, LYOPHILIZED, FOR SOLUTION INTRAVENOUS at 16:27

## 2021-01-01 RX ADMIN — GUAIFENESIN 1200 MG: 600 TABLET, EXTENDED RELEASE ORAL at 08:14

## 2021-01-01 RX ADMIN — ASPIRIN 81 MG: 81 TABLET, COATED ORAL at 08:25

## 2021-01-01 RX ADMIN — HYDROCODONE BITARTRATE AND ACETAMINOPHEN 0.5 TABLET: 5; 325 TABLET ORAL at 12:52

## 2021-01-01 RX ADMIN — PRAVASTATIN SODIUM 20 MG: 20 TABLET ORAL at 22:55

## 2021-01-01 RX ADMIN — PREDNISONE 20 MG: 20 TABLET ORAL at 17:55

## 2021-01-01 RX ADMIN — HYDROCODONE BITARTRATE AND ACETAMINOPHEN 0.5 TABLET: 5; 325 TABLET ORAL at 12:23

## 2021-01-01 RX ADMIN — ALBUTEROL SULFATE 2 PUFF: 90 AEROSOL, METERED RESPIRATORY (INHALATION) at 10:21

## 2021-01-01 RX ADMIN — HEPARIN SODIUM 5000 UNITS: 5000 INJECTION INTRAVENOUS; SUBCUTANEOUS at 08:14

## 2021-01-01 RX ADMIN — SODIUM CHLORIDE, PRESERVATIVE FREE 10 ML: 5 INJECTION INTRAVENOUS at 08:42

## 2021-01-01 RX ADMIN — Medication: at 09:29

## 2021-01-01 RX ADMIN — DOCUSATE SODIUM 100 MG: 100 CAPSULE, LIQUID FILLED ORAL at 21:47

## 2021-01-01 RX ADMIN — ALBUTEROL SULFATE 2 PUFF: 90 AEROSOL, METERED RESPIRATORY (INHALATION) at 19:33

## 2021-01-01 RX ADMIN — HYDROCODONE BITARTRATE AND ACETAMINOPHEN 0.5 TABLET: 5; 325 TABLET ORAL at 05:34

## 2021-01-01 RX ADMIN — MINERAL OIL: 1000 LIQUID ORAL at 21:47

## 2021-01-01 RX ADMIN — FLUTICASONE PROPIONATE 2 SPRAY: 50 SPRAY, METERED NASAL at 09:29

## 2021-01-01 RX ADMIN — SERTRALINE HYDROCHLORIDE 50 MG: 50 TABLET ORAL at 08:53

## 2021-01-01 RX ADMIN — HYDROCODONE BITARTRATE AND ACETAMINOPHEN 0.5 TABLET: 5; 325 TABLET ORAL at 18:04

## 2021-01-01 RX ADMIN — IPRATROPIUM BROMIDE 2 SPRAY: 42 SPRAY NASAL at 21:46

## 2021-01-01 RX ADMIN — DOCUSATE SODIUM 100 MG: 100 CAPSULE, LIQUID FILLED ORAL at 08:06

## 2021-01-01 RX ADMIN — PROPRANOLOL HYDROCHLORIDE 60 MG: 20 TABLET ORAL at 08:59

## 2021-01-01 RX ADMIN — ALBUTEROL SULFATE 2.5 MG: 2.5 SOLUTION RESPIRATORY (INHALATION) at 12:02

## 2021-01-01 RX ADMIN — ARFORMOTEROL TARTRATE 15 MCG: 15 SOLUTION RESPIRATORY (INHALATION) at 19:04

## 2021-01-01 RX ADMIN — Medication: at 21:04

## 2021-01-01 RX ADMIN — SODIUM CHLORIDE 1 G: 900 INJECTION INTRAVENOUS at 18:38

## 2021-01-01 RX ADMIN — CETIRIZINE HYDROCHLORIDE 10 MG: 10 TABLET, FILM COATED ORAL at 20:35

## 2021-01-01 RX ADMIN — MAGNESIUM SULFATE HEPTAHYDRATE 2 G: 2 INJECTION, SOLUTION INTRAVENOUS at 08:27

## 2021-01-01 RX ADMIN — TRIAMCINOLONE ACETONIDE: 1 CREAM TOPICAL at 21:38

## 2021-01-01 RX ADMIN — Medication: at 08:53

## 2021-01-01 RX ADMIN — IPRATROPIUM BROMIDE 2 SPRAY: 42 SPRAY NASAL at 12:24

## 2021-01-01 RX ADMIN — SODIUM CHLORIDE, PRESERVATIVE FREE 10 ML: 5 INJECTION INTRAVENOUS at 21:46

## 2021-01-01 RX ADMIN — IBUPROFEN 200 MG: 100 SUSPENSION ORAL at 21:15

## 2021-01-01 RX ADMIN — AMLODIPINE BESYLATE 5 MG: 5 TABLET ORAL at 08:53

## 2021-01-01 RX ADMIN — MINERAL OIL: 1000 LIQUID ORAL at 14:00

## 2021-01-01 RX ADMIN — ARFORMOTEROL TARTRATE 15 MCG: 15 SOLUTION RESPIRATORY (INHALATION) at 07:33

## 2021-01-01 RX ADMIN — PROPRANOLOL HYDROCHLORIDE 60 MG: 20 TABLET ORAL at 21:42

## 2021-01-01 RX ADMIN — GUAIFENESIN 1200 MG: 600 TABLET, EXTENDED RELEASE ORAL at 08:53

## 2021-01-01 RX ADMIN — HEPARIN SODIUM 5000 UNITS: 5000 INJECTION INTRAVENOUS; SUBCUTANEOUS at 21:48

## 2021-01-01 RX ADMIN — Medication: at 09:38

## 2021-01-01 RX ADMIN — TRAMADOL HYDROCHLORIDE 50 MG: 50 TABLET, FILM COATED ORAL at 18:37

## 2021-01-01 RX ADMIN — ASPIRIN 81 MG: 81 TABLET, COATED ORAL at 20:35

## 2021-01-01 RX ADMIN — Medication 1 CAPSULE: at 21:41

## 2021-01-01 RX ADMIN — SODIUM CHLORIDE 1 G: 900 INJECTION INTRAVENOUS at 22:55

## 2021-01-01 RX ADMIN — SODIUM CHLORIDE, PRESERVATIVE FREE 10 ML: 5 INJECTION INTRAVENOUS at 08:52

## 2021-01-01 RX ADMIN — Medication 1 CAPSULE: at 08:51

## 2021-01-01 RX ADMIN — PREDNISONE 20 MG: 20 TABLET ORAL at 09:29

## 2021-01-01 RX ADMIN — HYDROCODONE BITARTRATE AND ACETAMINOPHEN 0.5 TABLET: 5; 325 TABLET ORAL at 06:10

## 2021-01-01 RX ADMIN — ALBUTEROL SULFATE 2.5 MG: 2.5 SOLUTION RESPIRATORY (INHALATION) at 16:29

## 2021-01-01 RX ADMIN — CETIRIZINE HYDROCHLORIDE 10 MG: 10 TABLET, FILM COATED ORAL at 08:51

## 2021-01-01 RX ADMIN — MINERAL OIL: 1000 LIQUID ORAL at 08:07

## 2021-01-01 RX ADMIN — TRAMADOL HYDROCHLORIDE 50 MG: 50 TABLET, FILM COATED ORAL at 04:25

## 2021-01-01 RX ADMIN — Medication 1 CAPSULE: at 21:33

## 2021-01-01 RX ADMIN — Medication: at 16:28

## 2021-01-01 RX ADMIN — HEPARIN SODIUM 5000 UNITS: 5000 INJECTION INTRAVENOUS; SUBCUTANEOUS at 08:24

## 2021-01-01 RX ADMIN — PRAVASTATIN SODIUM 20 MG: 20 TABLET ORAL at 21:48

## 2021-01-01 RX ADMIN — IPRATROPIUM BROMIDE AND ALBUTEROL SULFATE 3 ML: 2.5; .5 SOLUTION RESPIRATORY (INHALATION) at 07:26

## 2021-01-01 RX ADMIN — ESTROGENS, CONJUGATED 0.62 MG: 0.62 TABLET, FILM COATED ORAL at 08:53

## 2021-01-01 RX ADMIN — ALBUTEROL SULFATE 2.5 MG: 2.5 SOLUTION RESPIRATORY (INHALATION) at 12:40

## 2021-01-01 RX ADMIN — ALBUTEROL SULFATE 2 PUFF: 90 AEROSOL, METERED RESPIRATORY (INHALATION) at 22:23

## 2021-01-01 RX ADMIN — ARFORMOTEROL TARTRATE 15 MCG: 15 SOLUTION RESPIRATORY (INHALATION) at 08:09

## 2021-01-01 RX ADMIN — TEMAZEPAM 7.5 MG: 7.5 CAPSULE ORAL at 21:42

## 2021-01-01 RX ADMIN — ESTROGENS, CONJUGATED 0.62 MG: 0.62 TABLET, FILM COATED ORAL at 16:14

## 2021-01-01 RX ADMIN — IPRATROPIUM BROMIDE AND ALBUTEROL SULFATE 3 ML: 2.5; .5 SOLUTION RESPIRATORY (INHALATION) at 15:23

## 2021-01-01 RX ADMIN — ASPIRIN 81 MG: 81 TABLET, COATED ORAL at 08:05

## 2021-01-01 RX ADMIN — IOPAMIDOL 75 ML: 755 INJECTION, SOLUTION INTRAVENOUS at 16:17

## 2021-01-01 RX ADMIN — SODIUM CHLORIDE, PRESERVATIVE FREE 10 ML: 5 INJECTION INTRAVENOUS at 21:48

## 2021-01-01 RX ADMIN — PANTOPRAZOLE SODIUM 40 MG: 40 TABLET, DELAYED RELEASE ORAL at 08:54

## 2021-01-01 RX ADMIN — FLUTICASONE PROPIONATE 2 SPRAY: 50 SPRAY, METERED NASAL at 20:50

## 2021-01-01 RX ADMIN — ALBUTEROL SULFATE 2 PUFF: 90 AEROSOL, METERED RESPIRATORY (INHALATION) at 18:04

## 2021-01-01 RX ADMIN — DOCUSATE SODIUM 100 MG: 100 CAPSULE, LIQUID FILLED ORAL at 08:52

## 2021-01-01 RX ADMIN — BUDESONIDE 0.5 MG: 0.5 INHALANT RESPIRATORY (INHALATION) at 19:04

## 2021-01-01 RX ADMIN — HEPARIN SODIUM 5000 UNITS: 5000 INJECTION INTRAVENOUS; SUBCUTANEOUS at 08:52

## 2021-01-01 RX ADMIN — PRAVASTATIN SODIUM 20 MG: 20 TABLET ORAL at 21:47

## 2021-01-01 RX ADMIN — PROPRANOLOL HYDROCHLORIDE 60 MG: 20 TABLET ORAL at 21:33

## 2021-01-01 RX ADMIN — PROPRANOLOL HYDROCHLORIDE 60 MG: 20 TABLET ORAL at 10:50

## 2021-01-01 RX ADMIN — SODIUM CHLORIDE, PRESERVATIVE FREE 10 ML: 5 INJECTION INTRAVENOUS at 21:52

## 2021-01-01 RX ADMIN — ALBUTEROL SULFATE 2.5 MG: 2.5 SOLUTION RESPIRATORY (INHALATION) at 07:24

## 2021-01-01 RX ADMIN — SERTRALINE HYDROCHLORIDE 50 MG: 50 TABLET ORAL at 09:30

## 2021-01-01 RX ADMIN — PROPRANOLOL HYDROCHLORIDE 60 MG: 20 TABLET ORAL at 21:50

## 2021-01-01 RX ADMIN — Medication 1 CAPSULE: at 08:26

## 2021-01-01 RX ADMIN — CETIRIZINE HYDROCHLORIDE 10 MG: 10 TABLET, FILM COATED ORAL at 08:06

## 2021-01-01 RX ADMIN — HYDROCODONE BITARTRATE AND ACETAMINOPHEN 0.5 TABLET: 5; 325 TABLET ORAL at 12:41

## 2021-01-01 RX ADMIN — MINERAL OIL: 1000 LIQUID ORAL at 12:44

## 2021-01-01 RX ADMIN — MINERAL OIL: 1000 LIQUID ORAL at 09:05

## 2021-01-01 RX ADMIN — Medication 1 CAPSULE: at 08:52

## 2021-01-01 RX ADMIN — BUDESONIDE 0.5 MG: 0.5 INHALANT RESPIRATORY (INHALATION) at 07:26

## 2021-01-01 RX ADMIN — MINERAL OIL: 1000 LIQUID ORAL at 12:27

## 2021-01-01 RX ADMIN — CETIRIZINE HYDROCHLORIDE 10 MG: 10 TABLET, FILM COATED ORAL at 08:26

## 2021-01-01 RX ADMIN — ARFORMOTEROL TARTRATE 15 MCG: 15 SOLUTION RESPIRATORY (INHALATION) at 09:31

## 2021-01-01 RX ADMIN — METHYLPREDNISOLONE SODIUM SUCCINATE 40 MG: 40 INJECTION, POWDER, FOR SOLUTION INTRAMUSCULAR; INTRAVENOUS at 21:33

## 2021-01-01 RX ADMIN — TRIAMCINOLONE ACETONIDE: 1 CREAM TOPICAL at 08:52

## 2021-01-01 RX ADMIN — SODIUM CHLORIDE 1 G: 900 INJECTION INTRAVENOUS at 21:33

## 2021-01-01 RX ADMIN — CLONAZEPAM 0.25 MG: 0.5 TABLET ORAL at 00:46

## 2021-01-01 RX ADMIN — HEPARIN SODIUM 5000 UNITS: 5000 INJECTION INTRAVENOUS; SUBCUTANEOUS at 09:39

## 2021-01-01 RX ADMIN — TRIAMCINOLONE ACETONIDE: 1 CREAM TOPICAL at 08:35

## 2021-01-01 RX ADMIN — PRAVASTATIN SODIUM 20 MG: 20 TABLET ORAL at 21:42

## 2021-01-01 RX ADMIN — Medication 1 APPLICATION: at 21:40

## 2021-01-01 RX ADMIN — Medication 1000 UNITS: at 09:39

## 2021-01-01 RX ADMIN — PROPRANOLOL HYDROCHLORIDE 60 MG: 20 TABLET ORAL at 09:28

## 2021-01-01 RX ADMIN — HEPARIN SODIUM 5000 UNITS: 5000 INJECTION INTRAVENOUS; SUBCUTANEOUS at 21:47

## 2021-01-01 RX ADMIN — CLONAZEPAM 0.25 MG: 0.5 TABLET ORAL at 08:52

## 2021-01-01 RX ADMIN — CETIRIZINE HYDROCHLORIDE 10 MG: 10 TABLET, FILM COATED ORAL at 09:29

## 2021-01-01 RX ADMIN — MINERAL OIL: 1000 LIQUID ORAL at 11:49

## 2021-01-01 RX ADMIN — BUDESONIDE 0.5 MG: 0.5 INHALANT RESPIRATORY (INHALATION) at 08:15

## 2021-01-01 RX ADMIN — Medication 1000 UNITS: at 20:35

## 2021-01-01 RX ADMIN — HYDROCODONE BITARTRATE AND ACETAMINOPHEN 0.5 TABLET: 5; 325 TABLET ORAL at 05:04

## 2021-01-01 RX ADMIN — Medication 1 CAPSULE: at 08:25

## 2021-01-01 RX ADMIN — AMLODIPINE BESYLATE 5 MG: 5 TABLET ORAL at 08:54

## 2021-01-01 RX ADMIN — HYDROCODONE BITARTRATE AND ACETAMINOPHEN 0.5 TABLET: 5; 325 TABLET ORAL at 11:41

## 2021-01-01 RX ADMIN — AMLODIPINE BESYLATE 5 MG: 5 TABLET ORAL at 08:51

## 2021-01-01 RX ADMIN — BUDESONIDE 0.5 MG: 0.5 INHALANT RESPIRATORY (INHALATION) at 07:30

## 2021-01-01 RX ADMIN — TRIAMCINOLONE ACETONIDE: 1 CREAM TOPICAL at 09:29

## 2021-01-01 RX ADMIN — ARFORMOTEROL TARTRATE 15 MCG: 15 SOLUTION RESPIRATORY (INHALATION) at 19:33

## 2021-01-01 RX ADMIN — METHYLPREDNISOLONE SODIUM SUCCINATE 40 MG: 40 INJECTION, POWDER, FOR SOLUTION INTRAMUSCULAR; INTRAVENOUS at 08:25

## 2021-01-01 RX ADMIN — METHYLPREDNISOLONE SODIUM SUCCINATE 125 MG: 125 INJECTION, POWDER, FOR SOLUTION INTRAMUSCULAR; INTRAVENOUS at 12:24

## 2021-01-01 RX ADMIN — Medication: at 08:52

## 2021-01-01 RX ADMIN — HEPARIN SODIUM 5000 UNITS: 5000 INJECTION INTRAVENOUS; SUBCUTANEOUS at 20:59

## 2021-01-01 RX ADMIN — CLONAZEPAM 0.5 MG: 0.5 TABLET ORAL at 19:02

## 2021-01-01 RX ADMIN — Medication 5 MG: at 21:41

## 2021-01-01 RX ADMIN — HYDROCODONE BITARTRATE AND ACETAMINOPHEN 0.5 TABLET: 5; 325 TABLET ORAL at 06:19

## 2021-01-01 RX ADMIN — SODIUM CHLORIDE, PRESERVATIVE FREE 10 ML: 5 INJECTION INTRAVENOUS at 08:05

## 2021-01-01 RX ADMIN — DOCUSATE SODIUM 100 MG: 100 CAPSULE, LIQUID FILLED ORAL at 21:48

## 2021-01-01 RX ADMIN — FLUTICASONE PROPIONATE 2 SPRAY: 50 SPRAY, METERED NASAL at 09:00

## 2021-01-01 RX ADMIN — ALBUTEROL SULFATE 2.5 MG: 2.5 SOLUTION RESPIRATORY (INHALATION) at 12:29

## 2021-01-01 RX ADMIN — HYDROCODONE BITARTRATE AND ACETAMINOPHEN 0.5 TABLET: 5; 325 TABLET ORAL at 23:46

## 2021-01-01 RX ADMIN — PROPRANOLOL HYDROCHLORIDE 60 MG: 20 TABLET ORAL at 22:55

## 2021-01-01 RX ADMIN — MINERAL OIL: 1000 LIQUID ORAL at 22:02

## 2021-01-01 RX ADMIN — SODIUM CHLORIDE, PRESERVATIVE FREE 10 ML: 5 INJECTION INTRAVENOUS at 08:55

## 2021-01-01 RX ADMIN — HYDROCODONE BITARTRATE AND ACETAMINOPHEN 0.5 TABLET: 5; 325 TABLET ORAL at 17:55

## 2021-01-01 RX ADMIN — ARFORMOTEROL TARTRATE 15 MCG: 15 SOLUTION RESPIRATORY (INHALATION) at 19:29

## 2021-01-01 RX ADMIN — TRIAMCINOLONE ACETONIDE: 1 CREAM TOPICAL at 21:50

## 2021-01-01 RX ADMIN — DOCUSATE SODIUM 100 MG: 100 CAPSULE, LIQUID FILLED ORAL at 21:35

## 2021-01-01 RX ADMIN — ALBUTEROL SULFATE 2 PUFF: 90 AEROSOL, METERED RESPIRATORY (INHALATION) at 04:13

## 2021-01-01 RX ADMIN — IPRATROPIUM BROMIDE AND ALBUTEROL SULFATE 3 ML: 2.5; .5 SOLUTION RESPIRATORY (INHALATION) at 00:00

## 2021-01-01 RX ADMIN — GUAIFENESIN 1200 MG: 600 TABLET, EXTENDED RELEASE ORAL at 08:26

## 2021-01-01 RX ADMIN — MINERAL OIL: 1000 LIQUID ORAL at 17:48

## 2021-01-01 RX ADMIN — TRAMADOL HYDROCHLORIDE 50 MG: 50 TABLET, FILM COATED ORAL at 08:53

## 2021-01-01 RX ADMIN — BUDESONIDE 0.5 MG: 0.5 INHALANT RESPIRATORY (INHALATION) at 19:37

## 2021-01-01 RX ADMIN — Medication: at 08:07

## 2021-01-01 RX ADMIN — ARFORMOTEROL TARTRATE 15 MCG: 15 SOLUTION RESPIRATORY (INHALATION) at 12:03

## 2021-01-01 RX ADMIN — IPRATROPIUM BROMIDE 2 SPRAY: 42 SPRAY NASAL at 20:48

## 2021-01-01 RX ADMIN — DOCUSATE SODIUM 100 MG: 100 CAPSULE, LIQUID FILLED ORAL at 08:54

## 2021-01-01 RX ADMIN — AMLODIPINE BESYLATE 5 MG: 5 TABLET ORAL at 08:26

## 2021-03-09 NOTE — TELEPHONE ENCOUNTER
Caller: Neris Giles    Relationship to patient: Emergency Contact    Best call back number: [phone]    New or established patient?  [] New  [x] Established    Date of discharge: 03/12/2021    Facility discharged from: [facility]    Diagnosis/Symptoms: REHAB FROM HOSPITAL  Length of stay (If applicable): SINCE 12/09/2020    Specialty Only: Did you see a [facility]astaf[facility] health provider?    [] Yes  [x] No  If so, who?

## 2021-03-10 NOTE — OUTREACH NOTE
Prep Survey      Responses   Orthodoxy facility patient discharged from?  Non-BH   Is LACE score < 7 ?  Non-BH Discharge   Emergency Room discharge w/ pulse ox?  No   [place]  [facility]   Date of Admission  12/09/20   Date of Discharge  03/12/21   Discharge diagnosis  bilateral pneumonia, Chronic diastolic heart failure    Does the patient have one of the following disease processes/diagnoses(primary or secondary)?  CHF   Prep survey completed?  Yes          Aurora Dimas RN

## 2021-03-14 PROBLEM — J96.22 ACUTE ON CHRONIC RESPIRATORY FAILURE WITH HYPERCAPNIA (HCC): Status: ACTIVE | Noted: 2021-01-01

## 2021-03-14 PROBLEM — J18.9 SEPSIS DUE TO PNEUMONIA (HCC): Status: ACTIVE | Noted: 2020-01-01

## 2021-03-14 PROBLEM — J44.1 COPD WITH ACUTE EXACERBATION (HCC): Status: ACTIVE | Noted: 2021-01-01

## 2021-03-14 NOTE — ED PROVIDER NOTES
Subjective   Sadie Tijerina is a 80 y.o. female who presents to the ED via EMS with worsening shortness of breath onset 2 day ago. The patient was diagnosed with Covid-19 in 12/2020. She complains of nausea, diarrhea, chills, anorexia, subjective fever, loss of taste and loss of smell in addition to vomit secondary to cough. The patient confirms that her cough is at baseline, consistent with a history of chronic obstructive pulmonary disease. She has been in a nursing home since 12/2020 until she returned home 2 days ago, and she states that her symptoms have worsened since departure from the nursing home.  Her sister states that the patient has experienced labored breathing  There are no other acute symptoms present at this time.      History provided by:  Patient  Shortness of Breath  Severity:  Moderate  Onset quality:  Sudden  Duration:  2 days  Timing:  Constant  Progression:  Worsening  Chronicity:  Chronic  Relieved by: DuoNeb.  Worsened by:  Nothing  Ineffective treatments:  Oxygen (2 L of oxygen.)  Associated symptoms: cough, fever and vomiting    Risk factors: hx of cancer and tobacco use    Risk factors: no recent alcohol use, no obesity and no oral contraceptive use    Risk factors comment:  Current everyday smoker. Uterine cancer.      Review of Systems   Constitutional: Positive for appetite change, chills and fever.        Decreased appetite. Subjective fever.   HENT:        Loss of taste and smell.   Respiratory: Positive for cough and shortness of breath.         Worsening shortness of breath onset 2 days ago. Cough is at baseline, consistent with a history of chronic obstructive pulmonary disease.   Gastrointestinal: Positive for diarrhea, nausea and vomiting.        Vomiting secondary to cough.   All other systems reviewed and are negative.      Past Medical History:   Diagnosis Date   • Arthritis    • Cancer (CMS/HCC)     uterine cancer (1981)   • COPD (chronic obstructive pulmonary disease)  (CMS/[facility])    • Depression    • Elevated cholesterol    • GERD (gastroesophageal reflux disease)    • History of emphysema    • History of esophageal reflux    • History of recurrent UTI (urinary tract infection)    • History of renal calculi    • History of uterine cancer    • Hyperlipidemia    • Hypertension    • Impaired functional mobility, balance, gait, and endurance    • Pneumonia 2019       Allergies   Allergen Reactions   • Morphine And Related Irritability       Past Surgical History:   Procedure Laterality Date   • FOOT SURGERY     • HAND SURGERY     • HYSTERECTOMY         Family History   Problem Relation Age of Onset   • Cancer Mother    • Heart attack Father    • Arthritis Father    • Heart disease Father    • Diabetes Daughter    • Hyperlipidemia Daughter    • Migraines Daughter    • Heart disease Sister    • Diabetes Son    • Glaucoma Son        Social History     Socioeconomic History   • Marital status:      Spouse name: Not on file   • Number of children: Not on file   • Years of education: Not on file   • Highest education level: Not on file   Tobacco Use   • Smoking status: Current Every Day Smoker     Packs/day: 0.25     Types: Cigarettes     Last attempt to quit: 2019     Years since quittin.2   • Smokeless tobacco: Never Used   • Tobacco comment: HALF OF A CIGARETTE   Vaping Use   • Vaping Use: Never assessed   Substance and Sexual Activity   • Alcohol use: No   • Drug use: No   • Sexual activity: Defer     Comment:          Objective   Physical Exam  Vitals and nursing note reviewed.   Constitutional:       General: She is not in acute distress.     Appearance: She is well-developed.   HENT:      Head: Normocephalic and atraumatic.      Nose: Nose normal.   Eyes:      General: No scleral icterus.     Conjunctiva/sclera: Conjunctivae normal.   Cardiovascular:      Rate and Rhythm: Normal rate and regular rhythm.      Heart sounds: Normal heart sounds. No murmur.       Comments: Regular rate and rhythm. Heart sounds normal. No murmurs, rubs, or gallops.  Pulmonary:      Effort: Respiratory distress present.      Breath sounds: Wheezing present.      Comments: Increased work of breathing with moderate respiratory distress. Significant wheezing consistent with chronic obstructive pulmonary disease.  Abdominal:      Palpations: Abdomen is soft.      Tenderness: There is no abdominal tenderness.      Comments: Abdomen is soft and non-tender.   Musculoskeletal:         General: Normal range of motion.      Cervical back: Normal range of motion and neck supple.   Skin:     General: Skin is warm and dry.   Neurological:      Mental Status: She is alert and oriented to person, place, and time.      Comments: No neurosensory deficits or focal weaknesses.   Psychiatric:         Behavior: Behavior normal.         Procedures         ED Course  ED Course as of Mar 15 0324   Sun Mar 14, 2021   0685 Patient's presentation was consistent with COPD exacerbation.  She had significant wheezing on initial exam which only mildly improved following the DuoNeb and steroids.  Her ABG was very reassuring with a pH 7.54 and a PCO2 of 21.  Her PO2 was 74.  Patient has been maintained O2 saturations, on 2 L nasal cannula, and the mid upper 90s.  Despite the reassuring laboratory work-up her wheezing is pronounced and her work of breathing is also still increased.  I do not felt the patient is appropriate for outpatient treatment at this time and have discussed the case with Dr. Holcomb, internal medicine.  She is excepted the patient for further evaluation and management.    [CP]      ED Course User Index  [CP] Girma Ventura DO      Recent Results (from the past 24 hour(s))   Comprehensive Metabolic Panel    Collection Time: 03/14/21  2:39 PM    Specimen: Blood   Result Value Ref Range    Glucose 161 (H) 65 - 99 mg/dL    BUN 18 8 - 23 mg/dL    Creatinine 1.05 (H) 0.57 - 1.00 mg/dL    Sodium 140 136 - 145 mmol/L     Potassium 4.9 3.5 - 5.2 mmol/L    Chloride 93 (L) 98 - 107 mmol/L    CO2 41.0 (H) 22.0 - 29.0 mmol/L    Calcium 9.7 8.6 - 10.5 mg/dL    Total Protein 6.9 6.0 - 8.5 g/dL    Albumin 3.70 3.50 - 5.20 g/dL    ALT (SGPT) 16 1 - 33 U/L    AST (SGOT) 26 1 - 32 U/L    Alkaline Phosphatase 59 39 - 117 U/L    Total Bilirubin 0.3 0.0 - 1.2 mg/dL    eGFR Non African Amer 50 (L) >60 mL/min/1.73    Globulin 3.2 gm/dL    A/G Ratio 1.2 g/dL    BUN/Creatinine Ratio 17.1 7.0 - 25.0    Anion Gap 6.0 5.0 - 15.0 mmol/L   BNP    Collection Time: 03/14/21  2:39 PM    Specimen: Blood   Result Value Ref Range    proBNP 275.2 0.0-1,800.0 pg/mL   Troponin    Collection Time: 03/14/21  2:39 PM    Specimen: Blood   Result Value Ref Range    Troponin T <0.010 0.000 - 0.030 ng/mL   Procalcitonin    Collection Time: 03/14/21  2:39 PM    Specimen: Blood   Result Value Ref Range    Procalcitonin 0.07 0.00 - 0.25 ng/mL   COVID-19 and FLU A/B PCR - Swab, Nasopharynx    Collection Time: 03/14/21  2:43 PM    Specimen: Nasopharynx; Swab   Result Value Ref Range    COVID19 Not Detected Not Detected - Ref. Range    Influenza A PCR Not Detected Not Detected    Influenza B PCR Not Detected Not Detected     Note: In addition to lab results from this visit, the labs listed above may include labs taken at another facility or during a different encounter within the last 24 hours. Please correlate lab times with ED admission and discharge times for further clarification of the services performed during this visit.    XR Chest 1 View   Final Result   Prominent interstitial markings with basilar opacities.       This report was finalized on 3/14/2021 3:00 PM by Tony Duran.            Vitals:    03/14/21 1410 03/14/21 1411 03/14/21 1412 03/14/21 1523   BP:  158/91     BP Location:  Right arm     Patient Position:  Lying     Pulse: 105  98 97   Resp: 24      Temp: 98.1 °F (36.7 °C)      TempSrc: Oral      SpO2: 95%  93% 99%   Weight: 63.5 kg (140 lb)      Height:  "157.5 cm (62\")        Medications   sodium chloride 0.9 % flush 10 mL (has no administration in time range)   methylPREDNISolone sodium succinate (SOLU-Medrol) injection 125 mg (125 mg Intravenous Given 3/14/21 1458)   ipratropium-albuterol (DUO-NEB) nebulizer solution 3 mL (3 mL Nebulization Given 3/14/21 1523)     ECG/EMG Results (last 24 hours)     Procedure Component Value Units Date/Time    ECG 12 Lead [[id number]] Collected: 03/14/21 1310     Updated: 03/14/21 1411        ECG 12 Lead                       Pt only experienced minimal improvement with Duoneb and solumedrol in the ED.  Admitted for further evaluation and management.    MDM    Final diagnoses:   COPD with acute exacerbation (CMS/[facility])   Anxiety       Documentation assistance provided by scribe Lauren K Collett.  Information recorded by the scribe was done at my direction and has been verified and validated by me.     Collett, Lauren K  03/14/21 1528       Girma Ventura DO  03/15/21 1308    "

## 2021-03-14 NOTE — H&P
[facility]  HISTORY AND PHYSICAL    Patient Name: Sadie Tijerina  : 1940  MRN: [id number]  Primary Care Physician: Kristian Wolff MD  Date of admission: 3/14/2021    Subjective   Subjective     Chief Complaint:  SOA/ PRINCE    HPI:  Sadie Tijerina is a 80 y.o. female with pmh significant for HTN, HLP, arthritis, remote uterine cancer, COPD on 2L NC and BiPAP, frequent pneumonia that presents after being home one day from rehab with SOA, wheezing and cough. Patient reports several hospitalizations since 2020 with treatment for pneumonia, bronchitis. Patient reports being hospitalized 3 times last / winter with a/c respiratory failure, sent to rehab in December and diagnosed treated for pneumonia again in January. Since this time she has had worsening/ declining respiratory status and function. Additionally, the patient was diagnosed with COVID during her rehab stay at [facility] in late 2020.  Patient was endorses increased SOA/ PRINCE, cough, wheezing, chills, sweats, dizziness, fatigue starting 2-3 days ago. Patient have post tussive vomiting and decreased appetite as well. She was discharged from rehab yesterday and presents for inability to take deep breath of move in home without significant dyspnea. She has been increasing O2 from 2L to 3L and utilizing nebs more in last 2 days.    ED work up reveals WBC 13.64, LA 2.1, pH 7.399 with CO2 65, creatinine 1.05/ GFR 50 and CXR with bibasilar opacities. COVID negative. CTA chest no PE or acute findings, + bronchiectasis and wall thickening and patchy areas of consolidation consistent with chronic long standing changes. + RML progressed/ RLLsmiliar ground glass opacities from previous study.     COVID Details: [] No Symptoms  Symptoms: [x] Fever [x]  Cough [] Shortness of breath [] Change in taste or smell  Risks:   [] Direct Exposure [x] High risk facility   Date of Symptoms:    __  Date of first positive COVID test: __      Review of Systems   Constitutional: Positive for activity change, appetite change, chills, diaphoresis, fatigue and fever.   HENT: Negative.    Eyes: Negative.    Respiratory: Positive for cough, shortness of breath and wheezing.    Cardiovascular: Negative.    Gastrointestinal: Positive for nausea and vomiting.   Genitourinary: Negative.    Musculoskeletal: Negative.    Neurological: Positive for dizziness, weakness and light-headedness.   Psychiatric/Behavioral: Negative.         All other systems reviewed and are negative.     Personal History     Past Medical History:   Diagnosis Date   • Arthritis    • Cancer (CMS/[facility])     uterine cancer (1981)   • COPD (chronic obstructive pulmonary disease) (CMS/[facility])    • Depression    • Elevated cholesterol    • GERD (gastroesophageal reflux disease)    • History of emphysema    • History of esophageal reflux    • History of recurrent UTI (urinary tract infection)    • History of renal calculi    • History of uterine cancer    • Hyperlipidemia    • Hypertension    • Impaired functional mobility, balance, gait, and endurance    • Pneumonia 02/2019       Past Surgical History:   Procedure Laterality Date   • FOOT SURGERY     • HAND SURGERY     • HYSTERECTOMY         Family History: family history includes Arthritis in her father; Cancer in her mother; Diabetes in her daughter and son; Glaucoma in her son; Heart attack in her father; Heart disease in her father and sister; Hyperlipidemia in her daughter; Migraines in her daughter. Otherwise pertinent FHx was reviewed and unremarkable.     Social History:  reports that she has been smoking cigarettes. She has been smoking about 0.25 packs per day. She has never used smokeless tobacco. She reports that she does not drink alcohol and does not use drugs.  Social History     Social History Narrative   • Not on file       Medications:  Glycopyrrolate-Formoterol, SUMAtriptan, Vitamin D,  acetaminophen, albuterol, amLODIPine, arformoterol, aspirin, benzonatate, betamethasone dipropionate, budesonide, cetirizine, clonazePAM, clotrimazole-betamethasone, estrogens (conjugated), fluticasone, furosemide, guaiFENesin, hydrocortisone, ipratropium, lactobacillus acidophilus, omeprazole, potassium chloride, pravastatin, predniSONE, propranolol, sertraline, and traMADol    Allergies   Allergen Reactions   • Morphine And Related Irritability       Objective   Objective     Vital Signs:   Temp:  [98.1 °F (36.7 °C)] 98.1 °F (36.7 °C)  Heart Rate:  [] 97  Resp:  [24] 24  BP: (150-175)/(65-91) 150/65  Flow (L/min):  [2] 2    Physical Exam   Constitutional: Awake, alert, sitting up in bed  Eyes: PERRLA, sclerae anicteric, no conjunctival injection  HENT: NCAT, mucous membranes moist  Neck: Supple, no thyromegaly, no lymphadenopathy, trachea midline  Respiratory: diffuse exp wheezes and crackles bilaterally, mildly to mod labored, pursed lip breathing, audible wheezes and dyspneic with conversation  Cardiovascular: RRR, no murmurs, rubs, or gallops, palpable pedal pulses bilaterally  Gastrointestinal: Positive bowel sounds, soft, nontender, nondistended  Musculoskeletal: No bilateral ankle edema, no clubbing or cyanosis to extremities  Psychiatric: Appropriate affect, cooperative  Neurologic: Oriented x 3, strength symmetric in all extremities, Cranial Nerves grossly intact to confrontation, speech clear  Skin: No rashes      Results Reviewed:  I have personally reviewed most recent indicated data and agree with findings including:  [x]  Laboratory  [x]  Radiology  [x]  EKG/Telemetry  []  Pathology  []  Cardiac/Vascular Studies  [x]  Old records  []  Other:  Most pertinent findings include:      LAB RESULTS:      Lab 03/14/21  1439 03/11/21  1730   WBC 13.64* 8.48   HEMOGLOBIN 10.9* 10.4*   HEMATOCRIT 37.3 34.5   PLATELETS 203 209   NEUTROS ABS 13.06* 6.32   IMMATURE GRANS (ABS) 0.11* 0.02   LYMPHS ABS 0.22*  1.44   MONOS ABS 0.16 0.49   EOS ABS 0.07 0.18   MCV 97.4* 98.0*   PROCALCITONIN 0.07  --    LACTATE 2.1*  --          Lab 03/14/21  1439 03/11/21  1730   SODIUM 140 144   POTASSIUM 4.9 4.5   CHLORIDE 93* 101   CO2 41.0* 39.0*   ANION GAP 6.0 4.0*   BUN 18 19   CREATININE 1.05* 0.94   GLUCOSE 161* 158*   CALCIUM 9.7 8.8   MAGNESIUM  --  1.5*         Lab 03/14/21  1439   TOTAL PROTEIN 6.9   ALBUMIN 3.70   GLOBULIN 3.2   ALT (SGPT) 16   AST (SGOT) 26   BILIRUBIN 0.3   ALK PHOS 59         Lab 03/14/21  1439   PROBNP 275.2   TROPONIN T <0.010                 Lab 03/14/21  1529   PH, ARTERIAL 7.399   PCO2, ARTERIAL 65.9*   PO2 ART 95.1   FIO2 28   HCO3 ART 40.7*   BASE EXCESS ART 13.4*   CARBOXYHEMOGLOBIN 1.2     Brief Urine Lab Results  (Last result in the past 365 days)      Color   Clarity   Blood   Leuk Est   Nitrite   Protein   CREAT   Urine HCG        01/16/21 1533 Yellow Clear Negative Small (1+) Negative Negative             Microbiology Results (last 10 days)     Procedure Component Value - Date/Time    COVID PRE-OP / PRE-PROCEDURE SCREENING ORDER (NO ISOLATION) - Swab, Nasopharynx [[id number]]  (Normal) Collected: 03/14/21 1443    Lab Status: Final result Specimen: Swab from Nasopharynx Updated: 03/14/21 1521    Narrative:      The following orders were created for panel order COVID PRE-OP / PRE-PROCEDURE SCREENING ORDER (NO ISOLATION) - Swab, Nasopharynx.  Procedure                               Abnormality         Status                     ---------                               -----------         ------                     COVID-19 and FLU A/B PCR...[[id number]]  Normal              Final result                 Please view results for these tests on the individual orders.    COVID-19 and FLU A/B PCR - Swab, Nasopharynx [[id number]]  (Normal) Collected: 03/14/21 1443    Lab Status: Final result Specimen: Swab from Nasopharynx Updated: 03/14/21 1521     COVID19 Not Detected     Influenza A PCR Not Detected      Influenza B PCR Not Detected    Narrative:      Fact sheet for providers: https://www.fda.gov/media/462630/download    Fact sheet for patients: https://www.fda.gov/media/810571/download    Test performed by PCR.          CT Angiogram Chest With & Without Contrast    Result Date: 3/14/2021  EXAMINATION: CT ANGIOGRAM CHEST W WO CONTRAST-  INDICATION: Respiratory failure  TECHNIQUE: Multiplanar CT imaging of the chest was performed with and without administration of intravenous contrast. Maximum intensity projection 3-D images were reconstructed to facilitate diagnostic accuracy  The radiation dose reduction device was turned on for each scan per the ALARA (As Low as Reasonably Achievable) protocol.  COMPARISON: CT chest without contrast performed 3/12/2019  FINDINGS: Thyroid is homogenous in attenuation. Likely mild degree of fatty deposition in the liver.  Only cystectomy. Pancreas spleen and adrenal glands are normal. Exophytic left renal cyst. Stomach is normal. Aortic atherosclerosis. Coronary artery calcifications. Heart is normal in size. No pericardial effusion. Main pulmonary artery is normal in caliber. No large main stem or lobar pulmonary embolism. No suspicious mediastinal lymph nodes. Emphysema with multiple areas of bronchiectasis predominantly in the lung apices with adjacent ill-defined patchy consolidation. Scattered peribronchial nodularity for example in the posterior aspect of the left upper lobe and ill-defined groundglass opacities in the right lower lobe. No pleural effusion. No pneumothorax.        Impression: 1. No acute intrathoracic findings. No pulmonary embolism.  2. Scattered areas of bronchiectasis with bronchial wall thickening and patchy areas of consolidation. This appears progressed compared to prior exam and is consistent with chronic long-standing changes. More focal nodularity in the left upper lobe may suggest acute infection or inflammation. Groundglass opacities in the right  lower lobe appear similar to prior. Opacities in the right middle lobe appear mildly progressed.   This report was finalized on 3/14/2021 4:36 PM by Tony Duran.      XR Chest 1 View    Result Date: 3/14/2021  EXAMINATION: XR CHEST 1 VW-  INDICATION: SOA triage protocol  COMPARISON: NONE  FINDINGS: Degenerative osseous changes. Soft tissue anchors in the left humeral head. Normal cardiomediastinal silhouette. Aortic atherosclerosis. No pleural effusion or pneumothorax. Prominent interstitial markings with basilar opacities.      Impression: Prominent interstitial markings with basilar opacities.  This report was finalized on 3/14/2021 3:00 PM by Tony Duran.        Results for orders placed in visit on 04/01/20    Adult Transthoracic Echo Complete W/ Cont if Necessary Per Protocol    Interpretation Summary  · Calculated right ventricular systolic pressure from tricuspid regurgitation is 40 mmHg.  · Mild tricuspid valve regurgitation is present.  · Estimated EF = 66%.  · Left ventricular systolic function is normal.      Assessment/Plan   Assessment & Plan       Sepsis due to pneumonia (CMS/HCC)    Mixed anxiety depressive disorder    GERD (gastroesophageal reflux disease)    Hyperlipidemia    Essential hypertension    Acute and chronic respiratory failure with hypoxia (CMS/HCC)    Chronic diastolic heart failure (CMS/HCC)    Physical deconditioning    COPD with acute exacerbation (CMS/HCC)      A/C Hypoxic/hypercapneic respiratory failure, in the setting of AECOPD -    -- CTA chest reviewed. For the most part, these findings seem to represent progressive chronic disease.  Continue oxygen and wean to baseline 2LNC as able  -- continue doxycycline, add Rocephin in the setting of severe COPD disease.   -- solumedrol bid  -- respiratory support: nebs scheduled, prn, pulmincort/ brovana. Mucinex, tessalon, COPD navigator  -- hold lasix. Give gentle IVF overnight due to CTA/dye administration  -- will ask palliative  "medicine to see for progression of disease symptom management. Consider +/- pulmonary consult during hospitalization ( followed by Kulw[facility] Retana, but has not been due to frequent hospitalization and rehab)  -- continue home meds- increase norvasc to bid, add labetalol prn IV  -- am labs    DVT prophylaxis:  ELIECER      CODE STATUS:  Lengthy discussion with patient and health care surrogate (daughter at bedside).  The patient wants to \"live as long as possible\" despite thorough discussion regarding unlikelihood of hospital stay survival in the event of a code event.  The patient's daughter disagrees with the patient's perspective and agrees to continue discussion.  Both patient and daughter agree to palliative consultation.  Code Status and Medical Interventions:   Ordered at: 03/14/21 3418     Code Status:    CPR     Medical Interventions (Level of Support Prior to Arrest):    Full         This note has been completed as part of a split-shared workflow.     Electronically signed by RUBY Bardales, 03/14/21, 4:42 PM EDT.      Attending   Admission Attestation       I have seen and examined the patient, performing an independent face-to-face diagnostic evaluation with plan of care reviewed and developed with the advanced practice clinician (APC).      Brief Summary Statement:   Sadie Tijerina is a 80 y.o. female with advanced O2 dependent COPD, frequent pnaumonia/bronchitis admission since 9/2020, HTN, HLD, remote uterine cancer.  She wears BiPap at night.  She was ajust discharged from rehab yesterday.  However, for the last several days, she reports increasing SOA and PRINCE with decreased activity tolerance.  She has increased cough and wheeze.  She complains of feeling feverish with no recorded febrile temps.  She has had some post tussive vomiting.  She has increased home utilization of O2 from 2 to 3 L and has increased use of nebs without relief.      In the ER, WBC 13.6, lactic 2.1, ABG " 7.399/65/95.1.  COVID negative.  CTA - no PE, mostly worsened chronic findings.    Remainder of detailed HPI is as noted by APC and has been reviewed and/or edited by me for completeness.    Attending Physical Exam:  Constitutional: Awake, alert, pleasant, mildly dyspneic  Eyes: PERRLA, sclerae anicteric, no conjunctival injection  HENT: NCAT, mucous membranes moist  Neck: Supple, no thyromegaly, no lymphadenopathy, trachea midline  Respiratory: tight and wheezy to auscultation bilaterally  Cardiovascular: RRR, palpable pedal pulses bilaterally  Gastrointestinal: Positive bowel sounds, soft, nontender, nondistended  Musculoskeletal: No bilateral ankle edema, no clubbing or cyanosis to extremities  Psychiatric: Appropriate affect, cooperative  Neurologic: Oriented x 3, strength symmetric in all extremities, Cranial Nerves grossly intact to confrontation, speech clear  Skin: No rashes      Brief Assessment/Plan :  See detailed assessment and plan developed with APC which I have reviewed and/or edited for completeness.        Admission Status: I believe that this patient meets inpatient criteria due to acute on chronic hypoxic/hypercapneic respiratory failure.      Denice Ramesh MD  03/14/21

## 2021-03-15 NOTE — PROGRESS NOTES
[facility] Medicine Services  PROGRESS NOTE    Patient Name: Sadie Tijerina  : 1940  MRN: [id number]    Date of Admission: 3/14/2021  Primary Care Physician: Kristian Wolff MD    Subjective   Subjective     CC:  F/u SOA    HPI:  Patient states that she feels like her breathing is a little better but she still feels short of air.     ROS:  Gen- No fevers, chills  CV- No chest pain, palpitations  Resp- No cough,+ dyspnea  GI- No N/V/D, abd pain        Objective   Objective     Vital Signs:   Temp:  [98.1 °F (36.7 °C)-99.4 °F (37.4 °C)] 98.2 °F (36.8 °C)  Heart Rate:  [] 83  Resp:  [17-28] 20  BP: ()/(41-72) 128/66        Physical Exam:  Constitutional: No acute distress, awake, alert, elderly female sitting up in bed   HENT: NCAT, mucous membranes moist  Respiratory: expiratory wheezing bilaterlly , respiratory effort normal on 2 L   Cardiovascular: RRR, no murmurs, rubs, or gallops  Gastrointestinal: Positive bowel sounds, soft, nontender, nondistended  Musculoskeletal: trace  bilateral ankle edema  Psychiatric: Appropriate affect, cooperative  Neurologic: Oriented x 3,  speech clear  Skin: No rashes      Results Reviewed:  Results from last 7 days   Lab Units 03/15/21  0545 21  1439 21  1730   WBC 10*3/mm3 8.99 13.64* 8.48   HEMOGLOBIN g/dL 8.7* 10.9* 10.4*   HEMATOCRIT % 28.9* 37.3 34.5   PLATELETS 10*3/mm3 160 203 209   PROCALCITONIN ng/mL  --  0.07  --      Results from last 7 days   Lab Units 03/15/21  0545 21  1439 21  1730   SODIUM mmol/L 140 140 144   POTASSIUM mmol/L 4.2 4.9 4.5   CHLORIDE mmol/L 99 93* 101   CO2 mmol/L 37.0* 41.0* 39.0*   BUN mg/dL 18 18 19   CREATININE mg/dL 0.90 1.05* 0.94   GLUCOSE mg/dL 135* 161* 158*   CALCIUM mg/dL 8.4* 9.7 8.8   ALT (SGPT) U/L  --  16  --    AST (SGOT) U/L  --  26  --    TROPONIN T ng/mL  --  <0.010  --    PROBNP pg/mL  --  275.2  --      Estimated Creatinine Clearance: 43.4 mL/min (by C-G  formula based on SCr of 0.9 mg/dL).    Microbiology Results Abnormal     Procedure Component Value - Date/Time    COVID PRE-OP / PRE-PROCEDURE SCREENING ORDER (NO ISOLATION) - Swab, Nasopharynx [[id number]]  (Normal) Collected: 03/14/21 1443    Lab Status: Final result Specimen: Swab from Nasopharynx Updated: 03/14/21 1521    Narrative:      The following orders were created for panel order COVID PRE-OP / PRE-PROCEDURE SCREENING ORDER (NO ISOLATION) - Swab, Nasopharynx.  Procedure                               Abnormality         Status                     ---------                               -----------         ------                     COVID-19 and FLU A/B PCR...[[id number]]  Normal              Final result                 Please view results for these tests on the individual orders.    COVID-19 and FLU A/B PCR - Swab, Nasopharynx [[id number]]  (Normal) Collected: 03/14/21 1443    Lab Status: Final result Specimen: Swab from Nasopharynx Updated: 03/14/21 1521     COVID19 Not Detected     Influenza A PCR Not Detected     Influenza B PCR Not Detected    Narrative:      Fact sheet for providers: https://www.fda.gov/media/949026/download    Fact sheet for patients: https://www.fda.gov/media/968874/download    Test performed by PCR.          Imaging Results (Last 24 Hours)     ** No results found for the last 24 hours. **          Results for orders placed in visit on 04/01/20    Adult Transthoracic Echo Complete W/ Cont if Necessary Per Protocol    Interpretation Summary  · Calculated right ventricular systolic pressure from tricuspid regurgitation is 40 mmHg.  · Mild tricuspid valve regurgitation is present.  · Estimated EF = 66%.  · Left ventricular systolic function is normal.      I have reviewed the medications:  Scheduled Meds:amLODIPine, 5 mg, Oral, Daily  arformoterol, 15 mcg, Nebulization, BID - RT  aspirin, 81 mg, Oral, Daily  benzocaine, , Mouth/Throat, TID  budesonide, 0.5 mg, Nebulization, BID -  RT  cefTRIAXone, 1 g, Intravenous, Nightly  cetirizine, 10 mg, Oral, Daily  cholecalciferol, 1,000 Units, Oral, Daily  docusate sodium, 100 mg, Oral, BID  doxycycline, 100 mg, Intravenous, Q12H  fluticasone, 2 spray, Each Nare, Daily  guaiFENesin, 1,200 mg, Oral, Daily  heparin (porcine), 5,000 Units, Subcutaneous, Q12H  ipratropium, 2 spray, Each Nare, 4x Daily  lactobacillus acidophilus, 1 capsule, Oral, BID  melatonin, 5 mg, Oral, Nightly  methylPREDNISolone sodium succinate, 40 mg, Intravenous, Q12H  palliative care oral rinse, , Mouth/Throat, 4x Daily  pantoprazole, 40 mg, Oral, Daily  pharmacy consult - MTM, , Does not apply, Daily  pravastatin, 20 mg, Oral, Nightly  propranolol, 60 mg, Oral, BID  sertraline, 50 mg, Oral, Daily  sodium chloride, 10 mL, Intravenous, Q12H  triamcinolone, , Topical, Q12H      Continuous Infusions:sodium chloride 0.9 % with KCl 20 mEq, 50 mL/hr, Last Rate: 50 mL/hr (03/14/21 1933)      PRN Meds:.•  albuterol  •  benzonatate  •  bisacodyl  •  bisacodyl  •  clonazePAM  •  ibuprofen  •  ipratropium-albuterol  •  labetalol  •  magnesium sulfate **OR** magnesium sulfate **OR** magnesium sulfate  •  ondansetron  •  sodium chloride  •  sodium chloride  •  traMADol    Assessment/Plan   Assessment & Plan     Active Hospital Problems    Diagnosis  POA   • **Acute and chronic respiratory failure with hypoxia (CMS/HCC) [J96.21]  Yes   • COPD with acute exacerbation (CMS/HCC) [J44.1]  Yes   • Acute on chronic respiratory failure with hypercapnia (CMS/HCC) [J96.22]  Yes   • Physical deconditioning [R53.81]  Yes   • Chronic diastolic heart failure (CMS/HCC) [I50.32]  Yes   • Mixed anxiety depressive disorder [F41.8]  Yes   • Essential hypertension [I10]  Yes   • GERD (gastroesophageal reflux disease) [K21.9]  Yes   • Hyperlipidemia [E78.5]  Yes      Resolved Hospital Problems   No resolved problems to display.        Brief Hospital Course to date:  Sadie Tijerina is a 80 y.o. female with  PMH of HTN, HLP, arthritis, remote uterine cancer, COPD on 2L NC and BiPAP, frequent pneumonia that presents after being home one day from rehab with SOA, wheezing and cough.    Acute on Chronic Respiratory Failure  COPD  Recent COVID Pneumonia  -home o2 2LNC  -Solumedrol bid  - doxy and rocephin   Chronic Diastolic Heart Failure  -EF 66%, tricuspid regurgitation   HTN  Anxiety/Depression  HLD  GERD  GOC/Family Update  Patient's daughter is bedside. She is very concerned about her returning home. Palliative medicine is following.     DVT Prophylaxis:  ELIECER      Disposition: I expect the patient to be discharged TBD    CODE STATUS:   Code Status and Medical Interventions:   Ordered at: 03/14/21 1639     Code Status:    CPR     Medical Interventions (Level of Support Prior to Arrest):    Full       Sharon Jacobs, DO  03/15/21

## 2021-03-15 NOTE — CONSULTS
Kristian Wolff MD  Consulting physician: Lorri Swartz  Reason for referral: goc discussion, ACP, symptom management  Chief Complaint   Patient presents with   • Shortness of Breath     HPI:   80 y.o. female with advanced O2 dependent COPD, frequent pneumonia/bronchitis admission since 9/2020, HTN, HLD, remote uterine cancer presents to ED on 3/14 with report of over last several days increasing SOA and PRINCE and decrease activity tolerance.  She wears BiPap at night.  She was discharged from SNF, [facility], on 3/13.  She has increased cough and wheeze.  She complains of feeling feverish with no recorded febrile temps.  She has had some post tussive vomiting.  She has increased home utilization of O2 from 2 to 3 L and has increased use of nebs without relief.    Initial CTA imaging shows progression of worsening chronic disease.  Symptoms:   Patient reports no dyspnea, pain, anxiety or nausea currently.   Reports her proAir MDI, flutter valve and spirometer provide her with relief, as well as oxygen supplement.   + mouth dryness and has a sore area on her gum anterior, mid line under her lower denture.  Per therapy notes patient was able to ambulate with rolling walker, 220 feet.  Advance care planning discussed:   Code Status:   Current Code Status     Date Active Code Status Order ID Comments User Context       3/14/2021 1639 CPR [id number]  Lorri Swartz APRN ED     Advance Care Planning Activity      Questions for Current Code Status     Question Answer Comment    Code Status CPR     Medical Interventions (Level of Support Prior to Arrest) Full         Advance Directive: reviewed on medical record  Surrogate decision maker: two daughters: Neris Giles or Ava Polk  Past Medical History:   Diagnosis Date   • Arthritis    • Cancer (CMS/HCC)     uterine cancer (1981)   • COPD (chronic obstructive pulmonary disease) (CMS/[facility])    • Depression    • Elevated cholesterol    • GERD  (gastroesophageal reflux disease)    • History of emphysema    • History of esophageal reflux    • History of recurrent UTI (urinary tract infection)    • History of renal calculi    • History of uterine cancer    • Hyperlipidemia    • Hypertension    • Impaired functional mobility, balance, gait, and endurance    • Pneumonia 02/2019     Past Surgical History:   Procedure Laterality Date   • FOOT SURGERY     • HAND SURGERY     • HYSTERECTOMY         Reviewed current scheduled and prn medications for route, type, dose and frequency.    Current Facility-Administered Medications   Medication Dose Route Frequency Provider Last Rate Last Admin   • acetaminophen (TYLENOL) tablet 650 mg  650 mg Oral Q6H PRN Lorri Swartz APRN       • albuterol (PROVENTIL) nebulizer solution 0.083% 2.5 mg/3mL  2.5 mg Nebulization Q6H PRN Lorri Swartz APRN       • amLODIPine (NORVASC) tablet 5 mg  5 mg Oral Daily Denice Ramesh MD   5 mg at 03/15/21 0826   • arformoterol (BROVANA) nebulizer solution 15 mcg  15 mcg Nebulization BID - RT Lorri Swartz APRN   15 mcg at 03/15/21 0927   • aspirin EC tablet 81 mg  81 mg Oral Daily Lorri Swartz APRN   81 mg at 03/15/21 0825   • benzonatate (TESSALON) capsule 100 mg  100 mg Oral TID PRN Lorri Swartz APRN       • bisacodyl (DULCOLAX) EC tablet 5 mg  5 mg Oral Daily PRN Lorri Swartz APRKATHARINA       • bisacodyl (DULCOLAX) suppository 10 mg  10 mg Rectal Daily PRN Lorri Swartz APRN       • budesonide (PULMICORT) nebulizer solution 0.5 mg  0.5 mg Nebulization BID - RT Lorri Swartz APRN   0.5 mg at 03/15/21 0927   • cefTRIAXone (ROCEPHIN) 1 g/100 mL 0.9% NS (MBP)  1 g Intravenous Nightly Lorri Swartz APRN   1 g at 03/14/21 1838   • cetirizine (zyrTEC) tablet 10 mg  10 mg Oral Daily Lorri Swartz APRN   10 mg at 03/15/21 0825   • cholecalciferol (VITAMIN D3) tablet 1,000 Units  1,000 Units Oral Daily Lorri Swartz, APRN   1,000 Units at 03/15/21  0827   • clonazePAM (KlonoPIN) tablet 0.5 mg  0.5 mg Oral TID PRN Lorri Swartz APRN       • docusate sodium (COLACE) capsule 100 mg  100 mg Oral BID Lorri Swartz APRN   100 mg at 03/15/21 0827   • doxycycline (VIBRAMYCIN) 100 mg/100 mL 0.9% NS MBP  100 mg Intravenous Q12H Lorri Swatrz APRN   100 mg at 03/15/21 0653   • fluticasone (FLONASE) 50 MCG/ACT nasal spray 2 spray  2 spray Each Nare Daily Lorri Swartz APRN   2 spray at 03/14/21 2050   • guaiFENesin (MUCINEX) 12 hr tablet 1,200 mg  1,200 mg Oral Daily Lorri Swartz APRN   1,200 mg at 03/15/21 0826   • heparin (porcine) 5000 UNIT/ML injection 5,000 Units  5,000 Units Subcutaneous Q12H Lorri Swartz APRN   5,000 Units at 03/15/21 0824   • ipratropium (ATROVENT) nasal spray 0.06%  2 spray Each Nare 4x Daily Lorri Swartz APRN   2 spray at 03/14/21 2048   • ipratropium-albuterol (DUO-NEB) nebulizer solution 3 mL  3 mL Nebulization Q4H PRN Lorri Swartz APRN       • labetalol (NORMODYNE,TRANDATE) injection 10 mg  10 mg Intravenous Q6H PRN Lorri Swartz APRN       • lactobacillus acidophilus (RISAQUAD) capsule 1 capsule  1 capsule Oral BID Lorri Swartz APRN   1 capsule at 03/15/21 0825   • Magnesium Sulfate 2 gram Bolus, followed by 8 gram infusion (total Mg dose 10 grams)- Mg less than or equal to 1mg/dL  2 g Intravenous PRN Sharon Jacobs DO        Or   • Magnesium Sulfate 2 gram / 50mL Infusion (GIVE X 3 BAGS TO EQUAL 6GM TOTAL DOSE) - Mg 1.1 - 1.5 mg/dl  2 g Intravenous PRN Sharon Jacobs DO 25 mL/hr at 03/15/21 0827 2 g at 03/15/21 0827    Or   • Magnesium Sulfate 4 gram infusion- Mg 1.6-1.9 mg/dL  4 g Intravenous PRN Sharon Jacobs DO       • melatonin tablet 5 mg  5 mg Oral Nightly PRN Lorri Swartz APRN       • methylPREDNISolone sodium succinate (SOLU-Medrol) injection 40 mg  40 mg Intravenous Q12H Lorri Swartz APRN   40 mg at 03/15/21 0830   •  ondansetron (ZOFRAN) injection 4 mg  4 mg Intravenous Q6H PRN Lorrie Swartzca R, APRN       • pantoprazole (PROTONIX) EC tablet 40 mg  40 mg Oral Daily Lorri Swartz, APRN   40 mg at 03/15/21 0825   • Pharmacy Consult - MTM   Does not apply Daily Shaniqua Dunn, PharmD       • pravastatin (PRAVACHOL) tablet 20 mg  20 mg Oral Nightly Lorri Swartz, APRN   20 mg at 03/14/21 2035   • propranolol (INDERAL) tablet 60 mg  60 mg Oral BID Denice Ramesh MD   60 mg at 03/15/21 0824   • sertraline (ZOLOFT) tablet 50 mg  50 mg Oral Daily Lorri Swartz, APRN   50 mg at 03/15/21 0824   • sodium chloride 0.9 % flush 10 mL  10 mL Intravenous PRN Girma Ventura, DO       • sodium chloride 0.9 % flush 10 mL  10 mL Intravenous Q12H Lorrie Swartzca R, APRN       • sodium chloride 0.9 % flush 10 mL  10 mL Intravenous PRN Lorrie Swartzca R, APRN       • sodium chloride 0.9 % with KCl 20 mEq/L infusion  50 mL/hr Intravenous Continuous Lorri Swartz R, APRN 50 mL/hr at 03/14/21 1933 50 mL/hr at 03/14/21 1933   • traMADol (ULTRAM) tablet 50 mg  50 mg Oral Q6H PRN Lorrie Swartzca R, APRN   50 mg at 03/14/21 1837   • triamcinolone (KENALOG) 0.1 % cream   Topical Q12H Lorri Swartz R, APRN   Given at 03/15/21 0835     sodium chloride 0.9 % with KCl 20 mEq, 50 mL/hr, Last Rate: 50 mL/hr (03/14/21 1933)      •  acetaminophen  •  albuterol  •  benzonatate  •  bisacodyl  •  bisacodyl  •  clonazePAM  •  ipratropium-albuterol  •  labetalol  •  magnesium sulfate **OR** magnesium sulfate **OR** magnesium sulfate  •  melatonin  •  ondansetron  •  sodium chloride  •  sodium chloride  •  traMADol  Allergies   Allergen Reactions   • Morphine And Related Irritability     Family History   Problem Relation Age of Onset   • Cancer Mother    • Heart attack Father    • Arthritis Father    • Heart disease Father    • Diabetes Daughter    • Hyperlipidemia Daughter    • Migraines Daughter    • Heart disease Sister    • Diabetes Son    • Glaucoma  "Son      Social History     Socioeconomic History   • Marital status:      Spouse name: Not on file   • Number of children: Not on file   • Years of education: Not on file   • Highest education level: Not on file   Tobacco Use   • Smoking status: Current Every Day Smoker     Packs/day: 0.25     Types: Cigarettes     Last attempt to quit: 2019     Years since quittin.2   • Smokeless tobacco: Never Used   • Tobacco comment: HALF OF A CIGARETTE   Vaping Use   • Vaping Use: Never assessed   Substance and Sexual Activity   • Alcohol use: No   • Drug use: No   • Sexual activity: Defer     Comment:        Review of Systems - +/- per HPI and symptom review.    PPS: 50%  BP (!) 188/72 Comment: nurse notified  Pulse 77   Temp 98.1 °F (36.7 °C) (Oral)   Resp 18   Ht 157.5 cm (62\")   Wt 67.3 kg (148 lb 6.4 oz)   SpO2 98%   BMI 27.14 kg/m²   67.3 kg (148 lb 6.4 oz) Body mass index is 27.14 kg/m².  Intake & Output (last day)        0701 - 03/15 0700 03/15 0701 -  0700    P.O. 160 200    I.V. (mL/kg) 500 (7.4) 50 (0.7)    IV Piggyback 100     Total Intake(mL/kg) 760 (11.3) 250 (3.7)    Net +760 +250          Urine Unmeasured Occurrence 2 x           Physical Exam:  General Appearance: No acute distress, up in chair  Head: Normocephalic without obvious abnormality, atraumatic  Eyes: Conjunctivae and sclerae normal, no icterus, ISAAC  Throat: reddened area noted on lower gum, midline, no thrush, oral mucosa moist  Lungs: Clear to auscultation, respirations regular, even and non-labored  Heart: Regular rhythm and normal rate, normal S1  Abdomen: Normal bowel sounds, soft, non-distended, non-tender  Extremities: Moves all extremities, no redness, no cyanosis, no edema, no                    bilateral feet sores or wounds  Pulses: Distal pulses palpable and equal bilaterally  Skin: Warm and dry, no bleeding, bruising or rash  Neurological: Alert, interactive, appropriate conversation,no " myoclonus, equal hand  and strength, able to lift lower extremities off bed    Reviewed labs and diagnostic results.  Lab Results   Component Value Date    HGBA1C 4.90 03/15/2021     Results from last 7 days   Lab Units 03/15/21  0545   WBC 10*3/mm3 8.99   HEMOGLOBIN g/dL 8.7*   HEMATOCRIT % 28.9*   PLATELETS 10*3/mm3 160     Results from last 7 days   Lab Units 03/15/21  0545 03/14/21  1439   SODIUM mmol/L 140 140   POTASSIUM mmol/L 4.2 4.9   CHLORIDE mmol/L 99 93*   CO2 mmol/L 37.0* 41.0*   BUN mg/dL 18 18   CREATININE mg/dL 0.90 1.05*   CALCIUM mg/dL 8.4* 9.7   BILIRUBIN mg/dL  --  0.3   ALK PHOS U/L  --  59   ALT (SGPT) U/L  --  16   AST (SGOT) U/L  --  26   GLUCOSE mg/dL 135* 161*     Results from last 7 days   Lab Units 03/15/21  0545   SODIUM mmol/L 140   POTASSIUM mmol/L 4.2   CHLORIDE mmol/L 99   CO2 mmol/L 37.0*   BUN mg/dL 18   CREATININE mg/dL 0.90   GLUCOSE mg/dL 135*   CALCIUM mg/dL 8.4*     Imaging Results (Last 72 Hours)     Procedure Component Value Units Date/Time    CT Angiogram Chest With & Without Contrast [[id number]] Collected: 03/14/21 1629     Updated: 03/14/21 1640    Narrative:      EXAMINATION: CT ANGIOGRAM CHEST W WO CONTRAST-      INDICATION: Respiratory failure     TECHNIQUE: Multiplanar CT imaging of the chest was performed with and  without administration of intravenous contrast. Maximum intensity  projection 3-D images were reconstructed to facilitate diagnostic  accuracy     The radiation dose reduction device was turned on for each scan per the  ALARA (As Low as Reasonably Achievable) protocol.     COMPARISON: CT chest without contrast performed 3/12/2019     FINDINGS: Thyroid is homogenous in attenuation. Likely mild degree of  fatty deposition in the liver.     Only cystectomy. Pancreas spleen and adrenal glands are normal.  Exophytic left renal cyst. Stomach is normal. Aortic atherosclerosis.  Coronary artery calcifications. Heart is normal in size. No  pericardial  effusion. Main pulmonary artery is normal in caliber. No large main stem  or lobar pulmonary embolism. No suspicious mediastinal lymph nodes.  Emphysema with multiple areas of bronchiectasis predominantly in the  lung apices with adjacent ill-defined patchy consolidation. Scattered  peribronchial nodularity for example in the posterior aspect of the left  upper lobe and ill-defined groundglass opacities in the right lower  lobe. No pleural effusion. No pneumothorax.             Impression:      1. No acute intrathoracic findings. No pulmonary embolism.     2. Scattered areas of bronchiectasis with bronchial wall thickening and  patchy areas of consolidation. This appears progressed compared to prior  exam and is consistent with chronic long-standing changes. More focal  nodularity in the left upper lobe may suggest acute infection or  inflammation. Groundglass opacities in the right lower lobe appear  similar to prior. Opacities in the right middle lobe appear mildly  progressed.        This report was finalized on 3/14/2021 4:36 PM by Tony Duran.       XR Chest 1 View [[id number]] Collected: 03/14/21 1458     Updated: 03/14/21 1503    Narrative:      EXAMINATION: XR CHEST 1 VW-      INDICATION: SOA triage protocol      COMPARISON: NONE     FINDINGS: Degenerative osseous changes. Soft tissue anchors in the left  humeral head. Normal cardiomediastinal silhouette. Aortic  atherosclerosis. No pleural effusion or pneumothorax. Prominent  interstitial markings with basilar opacities.       Impression:      Prominent interstitial markings with basilar opacities.     This report was finalized on 3/14/2021 3:00 PM by Tony Duran.         Active problem list:  Sepsis due to pneumonia (CMS/HCC)    Mixed anxiety depressive disorder    GERD (gastroesophageal reflux disease)    Hyperlipidemia    Essential hypertension    Acute and chronic respiratory failure with hypoxia (CMS/HCC)    Chronic diastolic heart failure  (CMS/[facility])    Physical deconditioning    COPD with acute exacerbation (CMS/[facility])    Impression: 80 y.o. female with acute on chronic hypoxia respiratory failure and underlying acute exacerbation COPD  Plan:   Mouth dryness - palliative oral rinse.  Lower denture, gum irritation soreness - apply Orajel tid    Dyspnea - continue to monitor    Goals of care discussion - patient shared that what is most important to her is returning to her home and not going to any facility for rehab.   She has had home health therapy services previously and is not sure how beneficial.  She shared some frustrations about them making their home visits when scheduled and not following up as planned.   Started discussion about choices with home support services and mentioned about home hospice care and differences from home health.   Home hospice referral requested for the patient to have additional information about services.     Palliative medicine team provide support to patient/family       RUBY Coleman  [phone]  03/15/21  11:46 EDT      Time: 60 minutes spent reviewing medical and medication records, assessing and examining patient, discussing with patient, answering questions, formulating a plan and documentation of care. > 50% time spent face to face

## 2021-03-15 NOTE — OUTREACH NOTE
Call Center TCM Note      Responses   [facility] patient discharged from?  Non-BH   Does the patient have one of the following disease processes/diagnoses(primary or secondary)?  CHF   TCM attempt successful?  No   Unsuccessful attempts  Attempt 1   Change in Health Status  Readmitted          Grace Mcgowan RN    3/15/2021, 08:47 EDT

## 2021-03-15 NOTE — PLAN OF CARE
Goal Outcome Evaluation:  Plan of Care Reviewed With: patient     Outcome Summary: OT eduin complete.  Pt presents with weakness and decreased activity tolerance limiting independence with self care.  Pt required CGA with bed mobility,  CGA to ambulate in hallway with RW.  Pt min A to don socks, and pt reports she normally uses AE to complete LBD.  Pt's O2 sat % on 2 LO2, but noted SOA with activity, and required 3 restbreaks.  OT will follow to advance pt toward increased independence with self care.  Recommend home with assist and HHOT.

## 2021-03-15 NOTE — PLAN OF CARE
A&Ox4. Pt. Hypotensive at start of shift, DOROTHY Petersen notified; 500mL bolus ordered and given. Pt. Reports burning during urination, urine specimen collected.  Tachypneic during activity, however respirations are WDL during rest. Patient has no complaints of pain or discomfort. Heels offloaded and all skin interventions in place.

## 2021-03-15 NOTE — CONSULTS
"  Referring Provider: RUBY Freeman  Reason for Consultation: AECOPD    Subjective .   Education:  NN spoke with pt and daughter at BS.  Pt alert and able to answer questions appropriately.  Pt O2 sat 99% on  2 L currently, home O2 use 2 L.  She wears bipap +3L HS at home.  Pt states use of rescue inhaler ~every three hours, relief of SOB within 5 mins.  Rescue use education provided.  Pt reports the ability to ambulate only a few feet at baseline before becoming SOB, and that is done with walker plus 1 assist for fall safety.  Up to date on flu and PNA vaccines.  Pt reports no issues at this time with medications or transportation for appointments.  There is interest expressed by daughter about palliative services and patient returning to LTC.  Patient is an active smoker who does not express interest in cessation, resources provided.  Pt reports no previous hx of formal COPD teaching, no understanding of action plan, or VA.  \"A Patient's Guide to COPD\" booklet left at BS.  Stop light report, NN contact information, instructions for accessing iT[facility] and list of educational videos given to pt.  No new concerns or questions voiced at this time.  NN will continue to follow as needed.    Age: 80 y.o.  Sex: female  Smoker Status: current, ~15 pack years  Pulmonologist: MD Galina  FEV1 (PFT): 32% (4/23/2019)  Home O2: 2L , BiPAP +3L HS    Objective     SpO2 SpO2: 98 % (03/15/21 0927)  Device Device (Oxygen Therapy): nasal cannula (03/15/21 0927)  Flow Flow (L/min): 2 (03/15/21 0927)  Incentive Spirometer    IS Predicted Level (mL)     Number of Repetitions     Level Incentive Spirometer (mL)    Patient Tolerance     Inhaler Treatment Status    Treatment Route        Home Medications:  Medications Prior to Admission   Medication Sig Dispense Refill Last Dose   • acetaminophen (TYLENOL) 500 MG tablet Take 1,000 mg by mouth Every 8 (Eight) Hours As Needed for Mild Pain .   Past Week at Unknown time   • albuterol (ACCUNEB) " 1.25 MG/3ML nebulizer solution Take 1 ampule by nebulization Every 6 (Six) Hours As Needed for Wheezing or Shortness of Air.   Past Week at Unknown time   • aspirin 81 MG EC tablet Take 81 mg by mouth Daily.   Past Week at Unknown time   • benzonatate (TESSALON) 100 MG capsule Take 1 capsule by mouth 3 (Three) Times a Day As Needed for Cough. 10 capsule 0 Past Month at Unknown time   • Bevespi Aerosphere 9-4.8 MCG/ACT aerosol INHALE 2 PUFFS BY MOUTH TWICE DAILY 10.7 g 5 Past Week at Unknown time   • cetirizine (zyrTEC) 10 MG tablet Take 1 tablet by mouth Daily. 30 tablet 0 Past Week at Unknown time   • cholecalciferol (Cholecalciferol) 25 MCG (1000 UT) tablet Take 1,000 Units by mouth Daily.   Past Week at Unknown time   • fluticasone (FLONASE) 50 MCG/ACT nasal spray 2 sprays by Each Nare route Daily.   Past Week at Unknown time   • furosemide (LASIX) 20 MG tablet TAKE 1 TABLET BY MOUTH DAILY 90 tablet 3 Past Week at Unknown time   • guaiFENesin (MUCINEX) 600 MG 12 hr tablet Take 1,200 mg by mouth Daily.   Past Week at Unknown time   • lactobacillus acidophilus (RISAQUAD) capsule capsule Take 1 capsule by mouth 2 (Two) Times a Day.   Past Week at Unknown time   • meclizine (ANTIVERT) 25 MG tablet Take 12.5 mg by mouth 3 (Three) Times a Day As Needed for Dizziness or Nausea.   Past Week at Unknown time   • melatonin 3 MG tablet Take 3 mg by mouth Every Night.   Past Week at Unknown time   • mirtazapine (REMERON) 7.5 MG half tablet Take 15 mg by mouth Every Night.   Past Week at Unknown time   • Mouthwashes (Biotene dry mouth) liquid liquid Take 10 mL by mouth 2 (two) times a day. Every morning and at bedtime   Past Week at Unknown time   • mupirocin (BACTROBAN) 2 % ointment 1 application into the nostril(s) as directed by provider 2 (Two) Times a Day. Right nostril   Past Week at Unknown time   • omeprazole (priLOSEC) 40 MG capsule Take 1 capsule by mouth Daily. 90 capsule 3 Past Week at Unknown time   • potassium  chloride (K-DUR,KLOR-CON) 10 MEQ CR tablet TAKE 1 TABLET BY MOUTH DAILY 60 tablet 5 Past Week at Unknown time   • pravastatin (PRAVACHOL) 20 MG tablet TAKE 1 TABLET BY MOUTH EVERY EVENING 90 tablet 1 Past Week at Unknown time   • predniSONE (DELTASONE) 5 MG tablet Take 5 mg by mouth Daily. Long term dose   Past Week at Unknown time   • Premarin 0.625 MG tablet TAKE 1 TABLET BY MOUTH ONCE DAILY 90 tablet 3 Past Week at Unknown time   • propranolol (INDERAL) 60 MG tablet TAKE 1 TABLET BY MOUTH TWICE DAILY 180 tablet 3 Past Week at Unknown time   • sertraline (ZOLOFT) 50 MG tablet Take 1 tablet by mouth Daily. 10 tablet 0 Past Week at Unknown time       Discussion: Per current GOLD Standards, please consider: No ICS in place, Outpatient PFT, Pulmonary rehab as appropriate, Palliative Care consult      Discussed with primary RN, CAPRI Mora RN

## 2021-03-15 NOTE — THERAPY EVALUATION
Patient Name: Sadie Tijerina  : 1940    MRN: [id number]                              Today's Date: 3/15/2021       Admit Date: 3/14/2021    Visit Dx:     ICD-10-CM ICD-9-CM   1. COPD with acute exacerbation (CMS/[facility])  J44.1 491.21   2. Anxiety  F41.9 300.00     Patient Active Problem List   Diagnosis   • Calf cramp   • Mixed anxiety depressive disorder   • Dizziness   • Fatigue   • GERD (gastroesophageal reflux disease)   • Hematuria   • Hyperlipidemia   • Essential hypertension   • Low back pain   • Restless legs syndrome   • Essential tremor   • Vitamin D deficiency   • Balance problem   • Tension headache   • Tobacco abuse disorder   • Leg swelling   • Acute and chronic respiratory failure with hypoxia (CMS/[facility])   • Venous insufficiency of both lower extremities   • Chronic diastolic heart failure (CMS/[facility])   • Neuropathy   • Acute exacerbation of chronic obstructive pulmonary disease (COPD) (CMS/[facility])   • Bilateral sensorineural hearing loss   • Disorder of inner ear   • Tympanosclerosis   • Transient vision disturbance   • Bilateral bronchopneumonia   • Pneumonia of both lungs due to infectious organism   • Leg wound, right   • Sepsis due to pneumonia (CMS/[facility])   • Pneumonia of both lower lobes due to infectious organism   • Chronic respiratory failure with hypoxia (CMS/[facility])   • Physical deconditioning   • Impaired mobility and ADLs   • Oral candidiasis   • Acute on chronic respiratory failure (CMS/[facility])   • COPD with acute exacerbation (CMS/[facility])   • Acute on chronic respiratory failure with hypercapnia (CMS/[facility])     Past Medical History:   Diagnosis Date   • Arthritis    • Cancer (CMS/[facility])     uterine cancer ()   • COPD (chronic obstructive pulmonary disease) (CMS/[facility])    • Depression    • Elevated cholesterol    • GERD (gastroesophageal reflux disease)    • History of emphysema    • History of esophageal reflux    • History of recurrent UTI (urinary tract infection)    • History of renal calculi     • History of uterine cancer    • Hyperlipidemia    • Hypertension    • Impaired functional mobility, balance, gait, and endurance    • Pneumonia 02/2019     Past Surgical History:   Procedure Laterality Date   • FOOT SURGERY     • HAND SURGERY     • HYSTERECTOMY       General Information     [facility] Name 03/15/21 1033          Physical Therapy Time and Intention    Document Type  evaluation  -     Mode of Treatment  physical therapy  -[facility] Name 03/15/21 1033          General Information    Patient Profile Reviewed  yes  -     Prior Level of Function  independent:;all household mobility;gait;transfer;bed mobility;ADL's  -     Existing Precautions/Restrictions  fall  -[facility] Name 03/15/21 1033          Living Environment    Lives With  alone;other (see comments) Pt reports she has 4 children (adult) that live within 10 miles of her.  -[facility] Name 03/15/21 1033          Home Main Entrance    Number of Stairs, Main Entrance  none  -[facility] Name 03/15/21 1033          Stairs Within Home, Primary    Stairs, Within Home, Primary  1 step between utility room or dining room  -     Number of Stairs, Within Home, Primary  one  -[facility] Name 03/15/21 1033          Cognition    Orientation Status (Cognition)  oriented x 3  -[facility] Name 03/15/21 1033          Safety Issues, Functional Mobility    Safety Issues Affecting Function (Mobility)  insight into deficits/self-awareness;safety precaution awareness;safety precautions follow-through/compliance;sequencing abilities  -     Impairments Affecting Function (Mobility)  balance;endurance/activity tolerance;shortness of breath;strength;sensation/sensory awareness  -       User Key  (r) = Recorded By, (t) = Taken By, (c) = Cosigned By    Initials Name Provider Type     Rohan Mcclellan, PT Physical Therapist        Mobility     Row Name 03/15/21 1034          Bed Mobility    Bed Mobility  supine-sit;scooting/bridging  -     Scooting/Bridging  [place] (Bed Mobility)  standby assist;verbal cues  -     Supine-Sit [place] (Bed Mobility)  contact guard;1 person assist;verbal cues  -     Assistive Device (Bed Mobility)  bed rails;head of bed elevated;draw sheet  -     Comment (Bed Mobility)  Verbal cues for sequencing. No assist needed.  -     Row Name 03/15/21 1034          Transfers    Comment (Transfers)  Pt impulsively stood from EOB independently without AD. No LOB. Provided BUE support for safety bed > BSC. Pt then able to transfer back to bed with CGA.  -     Row Name 03/15/21 1034          Bed-Chair Transfer    Bed-Chair [place] (Transfers)  contact guard;2 person assist;verbal cues  -     Assistive Device (Bed-Chair Transfers)  other (see comments) BUE support  -     Row Name 03/15/21 1034          Sit-Stand Transfer    Sit-Stand [place] (Transfers)  independent  -     Row Name 03/15/21 1034          Gait/Stairs (Locomotion)    [place] Level (Gait)  verbal cues;contact guard;1 person assist  -     Assistive Device (Gait)  walker, front-wheeled  -     Distance in Feet (Gait)  220  -     Deviations/Abnormal Patterns (Gait)  bilateral deviations;alia decreased;gait speed decreased;stride length decreased;weight shifting decreased  -     Left Sided Gait Deviations  forward flexed posture;heel strike decreased  -     [place] Level (Stairs)  not tested  -     Comment (Gait/Stairs)  Pt ambulated with step through pattern with RW and CGA for safety. Provided verbal cues for positioning of RW for improved safety awareness as she lets it get far out in front of her.  -       User Key  (r) = Recorded By, (t) = Taken By, (c) = Cosigned By    Initials Name Provider Type     Rohan Mcclellan PT Physical Therapist        Obj/Interventions     Row Name 03/15/21 1037          Range of Motion Comprehensive    General Range of Motion  bilateral lower extremity ROM WFL  -     Comment, General Range of  Motion  BLE WFL  -[facility] Name 03/15/21 1037          Strength Comprehensive (MMT)    General Manual Muscle Testing (MMT) Assessment  lower extremity strength deficits identified  -     Comment, General Manual Muscle Testing (MMT) Assessment  LLE grossly 4/5, RLE grossly 4/5 except knee extension as she has increased pain when assessing  -[facility] Name 03/15/21 1037          Balance    Balance Assessment  sitting static balance;sitting dynamic balance;standing static balance;standing dynamic balance  -     Static Sitting Balance  WFL;unsupported;sitting in chair;sitting, edge of bed  -     Dynamic Sitting Balance  WFL;unsupported;sitting in chair;sitting, edge of bed  -     Static Standing Balance  WFL;supported;standing  -     Dynamic Standing Balance  mild impairment;supported;standing  -     Comment, Balance  no LOB noted, up with RW and CGA for safety  -[facility] Name 03/15/21 1037          Sensory Assessment (Somatosensory)    Sensory Assessment (Somatosensory)  other (see comments) Pt reports decreased sensation to light touch at RLE around knee  -       User Key  (r) = Recorded By, (t) = Taken By, (c) = Cosigned By    Initials Name Provider Type     Rohan Mcclellan, PT Physical Therapist        Goals/Plan     [facility] Name 03/15/21 1043          Bed Mobility Goal 1 (PT)    Activity/Assistive Device (Bed Mobility Goal 1, PT)  bed mobility activities, all  -     [place] Level/Cues Needed (Bed Mobility Goal 1, PT)  independent  -     Time Frame (Bed Mobility Goal 1, PT)  short term goal (STG);1 week  -[facility] Name 03/15/21 1043          Transfer Goal 1 (PT)    Activity/Assistive Device (Transfer Goal 1, PT)  sit-to-stand/stand-to-sit;bed-to-chair/chair-to-bed  -     [place] Level/Cues Needed (Transfer Goal 1, PT)  independent  -     Time Frame (Transfer Goal 1, PT)  short term goal (STG);1 week  -[facility] Name 03/15/21 1043          Gait Training Goal 1 (PT)     Activity/Assistive Device (Gait Training Goal 1, PT)  gait (walking locomotion);assistive device use  -     [place] Level (Gait Training Goal 1, PT)  standby assist  -     Distance (Gait Training Goal 1, PT)  400 feet  -     Time Frame (Gait Training Goal 1, PT)  long term goal (LTG);2 weeks  -[facility] Name 03/15/21 1043          Stairs Goal 1 (PT)    Activity/Assistive Device (Stairs Goal 1, PT)  stairs, all skills  -     [place] Level/Cues Needed (Stairs Goal 1, PT)  standby assist;1 person assist  -     Number of Stairs (Stairs Goal 1, PT)  1  -     Time Frame (Stairs Goal 1, PT)  long term goal (LTG);2 weeks  -       User Key  (r) = Recorded By, (t) = Taken By, (c) = Cosigned By    Initials Name Provider Type     Rohan Mcclellan, PT Physical Therapist        Clinical Impression     [facility] Name 03/15/21 1038          Pain    Additional Documentation  Pain Scale: Numbers Pre/Post-Treatment (Group)  -JH     Row Name 03/15/21 1038          Pain Scale: Numbers Pre/Post-Treatment    Pretreatment Pain Rating  0/10 - no pain  -     Posttreatment Pain Rating  0/10 - no pain  -     Pre/Posttreatment Pain Comment  denied pain in general but increasd pain with right knee extension as pt reports arthritis  -     Pain Intervention(s)  Repositioned;Ambulation/increased activity  -[facility] Name 03/15/21 1038          Plan of Care Review    Plan of Care Reviewed With  patient  -     Progress  no change PT IT  -     Outcome Summary  PT eval complete. Pt presents with deficits in strength, endurance, and tolerance to activity limiting functional mobility warranting IPPT to promote return to PLOF. Pt stood impulsively independently and ambulated 220 feet with RW and CGA for safety. She reported SOA but O2 sats at 98%. Pt reports she lives alone but has four kids close to help. Recommend home with assist and HHPT at d/c.  -[facility] Name 03/15/21 1038          Therapy Assessment/Plan (PT)     Patient/Family Therapy Goals Statement (PT)  return home  -     Rehab Potential (PT)  good, to achieve stated therapy goals  -     Criteria for Skilled Interventions Met (PT)  yes;meets criteria;skilled treatment is necessary  -     Predicted Duration of Therapy Intervention (PT)  2 weeks  -     Row Name 03/15/21 1038          Vital Signs    Post Systolic BP Rehab  122  -JH     Post Treatment Diastolic BP  50  -JH     Pretreatment Heart Rate (beats/min)  75  -JH     Intratreatment Heart Rate (beats/min)  73  -JH     Posttreatment Heart Rate (beats/min)  72  -JH     Pre SpO2 (%)  94  -JH     O2 Delivery Pre Treatment  supplemental O2  -     Intra SpO2 (%)  98  -JH     O2 Delivery Intra Treatment  supplemental O2  -     Post SpO2 (%)  98  -JH     O2 Delivery Post Treatment  supplemental O2  -     Pre Patient Position  Supine  -     Intra Patient Position  Standing  -     Post Patient Position  Sitting  -     Row Name 03/15/21 1038          Positioning and Restraints    Pre-Treatment Position  in bed  -     Post Treatment Position  chair  -     In Chair  notified nsg;reclined;call light within reach;encouraged to call for assist;exit alarm on;legs elevated  -       User Key  (r) = Recorded By, (t) = Taken By, (c) = Cosigned By    Initials Name Provider Type     Rohan Mcclellan, PT Physical Therapist        Outcome Measures     Row Name 03/15/21 1044          How much help from another person do you currently need...    Turning from your back to your side while in flat bed without using bedrails?  3  -JH     Moving from lying on back to sitting on the side of a flat bed without bedrails?  3  -JH     Moving to and from a bed to a chair (including a wheelchair)?  3  -JH     Standing up from a chair using your arms (e.g., wheelchair, bedside chair)?  4  -JH     Climbing 3-5 steps with a railing?  3  -JH     To walk in hospital room?  4  -     AM-PAC 6 Clicks Score (PT)  20  -     Row Name  03/15/21 1044          Functional Assessment    Outcome Measure Options  AM-PAC 6 Clicks Basic Mobility (PT)  -       User Key  (r) = Recorded By, (t) = Taken By, (c) = Cosigned By    Initials Name Provider Type     Rohan Mcclellan, PT Physical Therapist        Physical Therapy Education                 Title: PT OT SLP Therapies (Not Started)     Topic: Physical Therapy (Done)     Point: Mobility training (Done)     Learning Progress Summary           Patient Acceptance, E,TB, VU by  at 3/15/2021 1044    Comment: edu on PT services, POC, HEP, safe sequencing with gait, and d/c plans                   Point: Home exercise program (Done)     Learning Progress Summary           Patient Acceptance, E,TB, VU by  at 3/15/2021 1044    Comment: edu on PT services, POC, HEP, safe sequencing with gait, and d/c plans                   Point: Body mechanics (Done)     Learning Progress Summary           Patient Acceptance, E,TB, VU by  at 3/15/2021 1044    Comment: edu on PT services, POC, HEP, safe sequencing with gait, and d/c plans                   Point: Precautions (Done)     Learning Progress Summary           Patient Acceptance, E,TB, VU by  at 3/15/2021 1044    Comment: edu on PT services, POC, HEP, safe sequencing with gait, and d/c plans                               User Key     Initials Effective Dates Name Provider Type [facility] 09/22/20 -  Rohan Mcclellan PT Physical Therapist PT              PT Recommendation and Plan  Planned Therapy Interventions (PT): balance training, bed mobility training, gait training, home exercise program, patient/family education, stretching, strengthening, stair training, postural re-education, transfer training, ROM (range of motion)  Plan of Care Reviewed With: patient  Progress: no change (PT IT)  Outcome Summary: PT eval complete. Pt presents with deficits in strength, endurance, and tolerance to activity limiting functional mobility warranting IPPT to promote  return to [facility]. Pt stood impulsively independently and ambulated 220 feet with RW and CGA for safety. She reported SOA but O2 sats at 98%. Pt reports she lives alone but has four kids close to help. Recommend home with assist and HHPT at d/c.     Time Calculation:   PT Charges     Row Name 03/15/21 1046             Time Calculation    Start Time  0955  -      PT Received On  03/15/21  -      PT Goal Re-Cert Due Date  03/25/21  -         Time Calculation- PT    Total Timed Code Minutes- PT  10 minute(s)  -         Timed Charges    96383 - PT Therapeutic Activity Minutes  10  -        User Key  (r) = Recorded By, (t) = Taken By, (c) = Cosigned By    Initials Name Provider Type     Rohan Mcclellan, PT Physical Therapist        Therapy Charges for Today     Code Description Service Date Service Provider Modifiers Qty    07673579758 HC PT THERAPEUTIC ACT EA 15 MIN 3/15/2021 Rohan Mcclellan, PT GP 1    05696370684 HC PT THER SUPP EA 15 MIN 3/15/2021 Rohan Mcclellan, PT GP 2    65080557870 HC PT EVAL LOW COMPLEXITY 4 3/15/2021 Rohan Mcclellan, PT GP 1          PT G-Codes  Outcome Measure Options: AM-PAC 6 Clicks Basic Mobility (PT)  AM-PAC 6 Clicks Score (PT): 20    Rohan Mcclellan PT  3/15/2021

## 2021-03-15 NOTE — PLAN OF CARE
Goal Outcome Evaluation:  Plan of Care Reviewed With: patient  Progress: no change (PT IT)  Outcome Summary: PT eval complete. Pt presents with deficits in strength, endurance, and tolerance to activity limiting functional mobility warranting IPPT to promote return to PLOF. Pt stood impulsively independently and ambulated 220 feet with RW and CGA for safety. She reported SOA but O2 sats at 98%. Pt reports she lives alone but has four kids close to help. Recommend home with assist and HHPT at d/c.

## 2021-03-15 NOTE — PROGRESS NOTES
"Continued Stay Note  [facility]     Patient Name: Sadie Tijerina  MRN: [id number]  Today's Date: 3/15/2021    Admit Date: 3/14/2021    Discharge Plan     Row Name 03/15/21 1821       Plan    Plan  To be determined    Plan Comments  Hospice referral received, chart reviewed. Visit made to pt, pt stated is aware of the hospice referral, though is worn out as had a variety of people \"in and out\" of the room today and asked for a visit tomorrow to discuss hospice. Hospice liaison will follow up with pt tomorrow. Please call [phone] if can be of further assistance.        Discharge Codes    No documentation.             Nicole Adkins RN    "

## 2021-03-15 NOTE — PROGRESS NOTES
"Patient welcomed the visit from a .  She discusses her need for autonomy in making her decisions, and worried her daughter will not allow for it. She did not indicate where she gains strength, other than saying \" I'm a tough old broad.\"   "

## 2021-03-15 NOTE — THERAPY EVALUATION
Patient Name: Sadie Tijerina  : 1940    MRN: [id number]                              Today's Date: 3/15/2021       Admit Date: 3/14/2021    Visit Dx:     ICD-10-CM ICD-9-CM   1. COPD with acute exacerbation (CMS/[facility])  J44.1 491.21   2. Anxiety  F41.9 300.00     Patient Active Problem List   Diagnosis   • Calf cramp   • Mixed anxiety depressive disorder   • Dizziness   • Fatigue   • GERD (gastroesophageal reflux disease)   • Hematuria   • Hyperlipidemia   • Essential hypertension   • Low back pain   • Restless legs syndrome   • Essential tremor   • Vitamin D deficiency   • Balance problem   • Tension headache   • Tobacco abuse disorder   • Leg swelling   • Acute and chronic respiratory failure with hypoxia (CMS/[facility])   • Venous insufficiency of both lower extremities   • Chronic diastolic heart failure (CMS/[facility])   • Neuropathy   • Acute exacerbation of chronic obstructive pulmonary disease (COPD) (CMS/[facility])   • Bilateral sensorineural hearing loss   • Disorder of inner ear   • Tympanosclerosis   • Transient vision disturbance   • Bilateral bronchopneumonia   • Pneumonia of both lungs due to infectious organism   • Leg wound, right   • Sepsis due to pneumonia (CMS/[facility])   • Pneumonia of both lower lobes due to infectious organism   • Chronic respiratory failure with hypoxia (CMS/[facility])   • Physical deconditioning   • Impaired mobility and ADLs   • Oral candidiasis   • Acute on chronic respiratory failure (CMS/[facility])   • COPD with acute exacerbation (CMS/[facility])   • Acute on chronic respiratory failure with hypercapnia (CMS/[facility])     Past Medical History:   Diagnosis Date   • Arthritis    • Cancer (CMS/[facility])     uterine cancer ()   • COPD (chronic obstructive pulmonary disease) (CMS/[facility])    • Depression    • Elevated cholesterol    • GERD (gastroesophageal reflux disease)    • History of emphysema    • History of esophageal reflux    • History of recurrent UTI (urinary tract infection)    • History of renal calculi     • History of uterine cancer    • Hyperlipidemia    • Hypertension    • Impaired functional mobility, balance, gait, and endurance    • Pneumonia 02/2019     Past Surgical History:   Procedure Laterality Date   • FOOT SURGERY     • HAND SURGERY     • HYSTERECTOMY       General Information     Row Name 03/15/21 [facility]0          OT Time and Intention    Document Type  evaluation  -     Mode of Treatment  occupational therapy  -     Row Name 03/15/21 [facility]0          General Information    Patient Profile Reviewed  yes  -AC     Prior Level of Function  independent:;all household mobility;ADL's  -     Existing Precautions/Restrictions  fall;oxygen therapy device and L/min  -     Barriers to Rehab  medically complex  -     Row Name 03/15/21 1420          Living Environment    Lives With  alone has 4 children that live close  -     Row Name 03/15/21 1420          Home Main Entrance    Number of Stairs, Main Entrance  none  -     Row Name 03/15/21 1420          Stairs Within Home, Primary    Stairs, Within Home, Primary  one step from dining room to utility room  -     Number of Stairs, Within Home, Primary  one  -     Row Name 03/15/21 1420          Cognition    Orientation Status (Cognition)  oriented x 3  -     Row Name 03/15/21 1420          Safety Issues, Functional Mobility    Safety Issues Affecting Function (Mobility)  insight into deficits/self-awareness;safety precaution awareness;safety precautions follow-through/compliance  -     Impairments Affecting Function (Mobility)  balance;endurance/activity tolerance;shortness of breath;strength  -       User Key  (r) = Recorded By, (t) = Taken By, (c) = Cosigned By    Initials Name Provider Type    AC Radha Bo OT Occupational Therapist          Mobility/ADL's     Row Name 03/15/21 1420          Bed Mobility    Bed Mobility  supine-sit;sit-supine  -     Scooting/Bridging [place] (Bed Mobility)  standby assist;verbal cues  -      Supine-Sit [place] (Bed Mobility)  contact guard;verbal cues  -AC     Sit-Supine [place] (Bed Mobility)  contact guard;verbal cues  -AC     Assistive Device (Bed Mobility)  bed rails;head of bed elevated  -     Row Name 03/15/21 1420          Transfers    Transfers  sit-stand transfer  -AC     Sit-Stand [place] (Transfers)  contact guard  -     Row Name 03/15/21 1420          Sit-Stand Transfer    Assistive Device (Sit-Stand Transfers)  walker, front-wheeled  -     Row Name 03/15/21 1420          Functional Mobility    Functional Mobility- Ind. Level  contact guard assist  -     Functional Mobility- Device  rolling walker  -     Functional Mobility-Distance (Feet)  200  -     Functional Mobility- Comment  SOA with ambulation  -     Row Name 03/15/21 1420          Activities of Daily Living    BADL Assessment/Intervention  lower body dressing  -     Row Name 03/15/21 1420          Lower Body Dressing Assessment/Training    [place] Level (Lower Body Dressing)  doff;don;minimum assist (75% patient effort)  -     Position (Lower Body Dressing)  unsupported sitting  -     Comment (Lower Body Dressing)  pt reports she normally uses AE for LBD  -       User Key  (r) = Recorded By, (t) = Taken By, (c) = Cosigned By    Initials Name Provider Type     Radha Bo, OT Occupational Therapist        Obj/Interventions     Row Name 03/15/21 1420          Sensory Assessment (Somatosensory)    Sensory Assessment (Somatosensory)  UE sensation intact;bilateral UE  -AC     [facility] Name 03/15/21 1420          Vision Assessment/Intervention    Visual Impairment/Limitations  corrective lenses full-time  -     Row Name 03/15/21 1420          Range of Motion Comprehensive    General Range of Motion  bilateral upper extremity ROM WNL  -     Row Name 03/15/21 1420          Strength Comprehensive (MMT)    Comment, General Manual Muscle Testing (MMT) Assessment  BUE grossly 4/5  -       User Key   (r) = Recorded By, (t) = Taken By, (c) = Cosigned By    Initials Name Provider Type    Radha Gipson, OT Occupational Therapist        Goals/Plan     Row Name 03/15/21 1420          Transfer Goal 1 (OT)    Activity/Assistive Device (Transfer Goal 1, OT)  bed-to-chair/chair-to-bed;toilet;commode;walker, rolling  -AC     [place] Level/Cues Needed (Transfer Goal 1, OT)  supervision required  -AC     Time Frame (Transfer Goal 1, OT)  by discharge  -AC     Progress/Outcome (Transfer Goal 1, OT)  goal ongoing  -     Row Name 03/15/21 1420          Toileting Goal 1 (OT)    Activity/Device (Toileting Goal 1, OT)  adjust/manage clothing;perform perineal hygiene  -AC     [place] Level/Cues Needed (Toileting Goal 1, OT)  standby assist  -AC     Time Frame (Toileting Goal 1, OT)  by discharge  -AC     Progress/Outcome (Toileting Goal 1, OT)  goal ongoing  -     Row Name 03/15/21 1420          Strength Goal 1 (OT)    Strength Goal 1 (OT)  Pt will complete 2 sets x 10 BUE TE daily as needed to increase endurance to support ADLs  -AC     Time Frame (Strength Goal 1, OT)  by discharge  -AC     Progress/Outcome (Strength Goal 1, OT)  goal ongoing  -     Row Name 03/15/21 1420          Therapy Assessment/Plan (OT)    Planned Therapy Interventions (OT)  activity tolerance training;BADL retraining;adaptive equipment training;functional balance retraining;patient/caregiver education/training;strengthening exercise;transfer/mobility retraining  -       User Key  (r) = Recorded By, (t) = Taken By, (c) = Cosigned By    Initials Name Provider Type    Radha Gipson, OT Occupational Therapist        Clinical Impression     Row Name 03/15/21 1420          Pain Scale: Numbers Pre/Post-Treatment    Pretreatment Pain Rating  0/10 - no pain  -AC     Posttreatment Pain Rating  0/10 - no pain  -AC     Pain Intervention(s)  Repositioned  -     Row Name 03/15/21 1420          Plan of Care Review    Plan of Care Reviewed  With  patient  -AC     Outcome Summary  OT eval complete.  Pt presents with weakness and decreased activity tolerance limiting independence with self care.  Pt required CGA with bed mobility,  CGA to ambulate in hallway with RW.  Pt min A to don socks, and pt reports she normally uses AE to complete LBD.  Pt's O2 sat % on 2 LO2, but noted SOA with activity, and required 3 restbreaks.  OT will follow to advance pt toward increased independence with self care.  Recommend home with assist and HHOT.  -AC     Row Name 03/15/21 1420          Therapy Assessment/Plan (OT)    Rehab Potential (OT)  good, to achieve stated therapy goals  -AC     Criteria for Skilled Therapeutic Interventions Met (OT)  yes;skilled treatment is necessary  -AC     Therapy Frequency (OT)  daily  -AC     Row Name 03/15/21 1420          Therapy Plan Review/Discharge Plan (OT)    Anticipated Discharge Disposition (OT)  home with assist;home with home health  -     Row Name 03/15/21 1420          Vital Signs    Pre Systolic BP Rehab  128  -AC     Pre Treatment Diastolic BP  66  -AC     Post Systolic BP Rehab  129  -AC     Post Treatment Diastolic BP  60  -AC     Pretreatment Heart Rate (beats/min)  74  -AC     Posttreatment Heart Rate (beats/min)  73  -AC     Pre SpO2 (%)  100  -AC     O2 Delivery Pre Treatment  supplemental O2  -AC     Intra SpO2 (%)  98  -AC     O2 Delivery Intra Treatment  supplemental O2  -AC     Post SpO2 (%)  98  -AC     O2 Delivery Post Treatment  supplemental O2  -AC     Pre Patient Position  Supine  -AC     Intra Patient Position  Standing  -AC     Post Patient Position  Supine  -AC     Rest Breaks   3  -AC     Row Name 03/15/21 1420          Positioning and Restraints    Pre-Treatment Position  in bed  -AC     Post Treatment Position  bed  -AC     In Bed  supine;call light within reach;encouraged to call for assist;exit alarm on;with other staff;legs elevated;RUE elevated;LUE elevated  -AC       User Key  (r) =  Recorded By, (t) = Taken By, (c) = Cosigned By    Initials Name Provider Type    Radha Gipson, OT Occupational Therapist        Outcome Measures     Row Name 03/15/21 1420          How much help from another is currently needed...    Putting on and taking off regular lower body clothing?  3  -AC     Bathing (including washing, rinsing, and drying)  3  -AC     Toileting (which includes using toilet bed pan or urinal)  3  -AC     Putting on and taking off regular upper body clothing  3  -AC     Taking care of personal grooming (such as brushing teeth)  4  -AC     Eating meals  4  -AC     AM-PAC 6 Clicks Score (OT)  20  -AC     Row Name 03/15/21 1420          Functional Assessment    Outcome Measure Options  AM-PAC 6 Clicks Daily Activity (OT)  -       User Key  (r) = Recorded By, (t) = Taken By, (c) = Cosigned By    Initials Name Provider Type    Radha Gipson, OT Occupational Therapist        Occupational Therapy Education                 Title: PT OT SLP Therapies (Not Started)     Topic: Occupational Therapy (In Progress)     Point: ADL training (Done)     Description:   Instruct learner(s) on proper safety adaptation and remediation techniques during self care or transfers.   Instruct in proper use of assistive devices.              Learning Progress Summary           Patient Acceptance, E, VU by  at 3/15/2021 1420    Comment: Role of OT, benefits of activity, adaptive breathing                   Point: Home exercise program (Not Started)     Description:   Instruct learner(s) on appropriate technique for monitoring, assisting and/or progressing therapeutic exercises/activities.              Learner Progress:  Not documented in this visit.          Point: Precautions (Not Started)     Description:   Instruct learner(s) on prescribed precautions during self-care and functional transfers.              Learner Progress:  Not documented in this visit.          Point: Body mechanics (Not Started)      Description:   Instruct learner(s) on proper positioning and spine alignment during self-care, functional mobility activities and/or exercises.              Learner Progress:  Not documented in this visit.                      User Key     Initials Effective Dates Name Provider Type Discipline     06/23/15 -  Radha Bo OT Occupational Therapist OT              OT Recommendation and Plan  Planned Therapy Interventions (OT): activity tolerance training, BADL retraining, adaptive equipment training, functional balance retraining, patient/caregiver education/training, strengthening exercise, transfer/mobility retraining  Therapy Frequency (OT): daily  Plan of Care Review  Plan of Care Reviewed With: patient  Outcome Summary: OT eval complete.  Pt presents with weakness and decreased activity tolerance limiting independence with self care.  Pt required CGA with bed mobility,  CGA to ambulate in hallway with RW.  Pt min A to don socks, and pt reports she normally uses AE to complete LBD.  Pt's O2 sat % on 2 LO2, but noted SOA with activity, and required 3 restbreaks.  OT will follow to advance pt toward increased independence with self care.  Recommend home with assist and HHOT.     Time Calculation:   Time Calculation- OT     Row Name 03/15/21 1420             Time Calculation- OT    OT Start Time  1420  -      OT Received On  03/15/21  -      OT Goal Re-Cert Due Date  03/25/21  -        User Key  (r) = Recorded By, (t) = Taken By, (c) = Cosigned By    Initials Name Provider Type    AC Radha Bo OT Occupational Therapist        Therapy Charges for Today     Code Description Service Date Service Provider Modifiers Qty    [id number] HC OT EVAL LOW COMPLEXITY 4 3/15/2021 Radha Bo OT GO 1               Radha Bo OT  3/15/2021

## 2021-03-15 NOTE — PLAN OF CARE
"Goal Outcome Evaluation:  Plan of Care Reviewed With: patient  Progress: no change  Outcome Summary: Palliative consult for GOC/ACP, symptom management. Pt reported improvement of dyspnea overall; increased WOB observed by Palliative APRN, but improved with adjustment of nc placement. Long discussion about support of Hospice services and ability to remain at home rather than frequent return to hospital; pt verbalize agreement to meet with Hospice Liaison regarding services, but said she just doesn't like the word \"hospice.\" Remains full code after discussion; will continue to follow. Hospice consult placed.    1300 Palliative Team Conference: LYUBOV Monge RN, CHPN; OK Mitchell, APRN; DELFINA Jean Baptiste, [facility], [facility]; CHERELLE Baltazar, DO; ROGER Haynes RN, CHPN; AGUSTÍN Adkins RN, CHPN    Problem: Palliative Care  Goal: Enhanced Quality of Life  Outcome: Ongoing, Progressing  Intervention: Maximize Comfort  Flowsheets (Taken 3/15/2021 1447)  Pain Management Interventions: pain management plan reviewed with patient/caregiver  Oral Care: (orajel for lower gum sore)   oral rinse provided   other (see comments)  Intervention: Optimize Function  Flowsheets (Taken 3/15/2021 1447)  Fatigue Management:   paced activity encouraged   frequent rest breaks encouraged  Sleep/Rest Enhancement: natural light exposure provided  Intervention: Promote Advance Care Planning  Flowsheets (Taken 3/15/2021 1447)  Life Transition/Adjustment: (Hospice discussed)   palliative care discussed   palliative care initiated   other (see comments)  Intervention: Optimize Psychosocial Wellbeing  Flowsheets (Taken 3/15/2021 1447)  Spiritual Activities Assistance: (Spiritual Care consult) other (see comments)     "

## 2021-03-15 NOTE — PROGRESS NOTES
Discharge Planning Assessment  [facility]     Patient Name: Sadie Tijerina  MRN: [id number]  Today's Date: 3/15/2021    Admit Date: 3/14/2021    Discharge Needs Assessment     Row Name 03/15/21 1356       Living Environment    Lives With  alone    Current Living Arrangements  home/apartment/condo    Primary Care Provided by  self       Transition Planning    Patient/Family Anticipates Transition to  home    Transportation Anticipated  family or friend will provide       Discharge Needs Assessment    Readmission Within the Last 30 Days  previous discharge plan unsuccessful    Equipment Currently Used at Home  bipap;oxygen;rollator;walker, rolling;nebulizer;commode;shower chair    Concerns to be Addressed  discharge planning    Anticipated Changes Related to Illness  inability to care for self    Outpatient/Agency/Support Group Needs  home hospice    Current Discharge Risk  chronically ill;lives alone        Discharge Plan     Row Name 03/15/21 4120       Plan    Plan Comments  I met with Mrs. Tijerina at the bedside. She lives in [facility] alone. She ambulates with a rolling walker. She uses continuous oxygen and a bipap at home. This is provided by [facility]. She reports a recent hospitalization at [facility]. After that admission she was transferred to [facility]. She reports that she contacted COVID 19 while there for rehab. She cannot tell me the exact amount of days she was there, however she says that it was a long time. After discharge from there, she moved in with her daughter. She was only there one day before being readmitted to our facility. She states that she cannot return to her daughter's house as her daughter is overwhelmed caring for Mrs. Tijerina's son as well as her own health issues. Mrs. Tijerina wishes to return home after this admission. Hospice has been consulted and she would like to see what services they can offer. Case management will follow the  plan of care and facilitate discharge planning.    Final Discharge Disposition Code  30 - still a patient        Continued Care and Services - Admitted Since 3/14/2021    Coordination has not been started for this encounter.         Demographic Summary     Row Name 03/15/21 9918       General Information    General Information Comments  I confirmed that Kristian Wolff is Mrs. Tijerina' PCP. Medicare is her primary insurer. She does have secondary and RX coverage as well.        Functional Status    No documentation.       Psychosocial    No documentation.       Abuse/Neglect    No documentation.       Legal    No documentation.       Substance Abuse    No documentation.       Patient Forms    No documentation.           Michael Roberson RN

## 2021-03-16 NOTE — PROGRESS NOTES
"    [facility] Medicine Services  PROGRESS NOTE    Patient Name: Sadie Tijerina  : 1940  MRN: [id number]    Date of Admission: 3/14/2021  Primary Care Physician: Kristian Wolff MD    Subjective   Subjective     CC:  F/u SOA    HPI:  Patient states that she feels short of breath. She is coughing more today. She requests pro air inhaler because the neb treatment \"does not work as well\".     ROS:  Gen- No fevers, chills  CV- No chest pain, palpitations  Resp- No cough,+ dyspnea  GI- No N/V/D, abd pain        Objective   Objective     Vital Signs:   Temp:  [97.6 °F (36.4 °C)-98.2 °F (36.8 °C)] 98.2 °F (36.8 °C)  Heart Rate:  [65-81] 65  Resp:  [16-18] 18  BP: (104-145)/(33-63) 118/54        Physical Exam:  Constitutional: No acute distress, awake, alert, elderly female sitting up in bed   HENT: NCAT, mucous membranes moist  Respiratory: expiratory wheezing bilaterlly , respiratory effort mildly labored on 2 L   Cardiovascular: RRR, no murmurs, rubs, or gallops  Gastrointestinal: Positive bowel sounds, soft, nontender, nondistended  Musculoskeletal: trace  bilateral ankle edema  Psychiatric: Appropriate affect, cooperative  Neurologic: Oriented x 3,  speech clear  Skin: No rashes      Results Reviewed:  Results from last 7 days   Lab Units 21  0523 03/15/21  0545 21  1439   WBC 10*3/mm3 13.25* 8.99 13.64*   HEMOGLOBIN g/dL 9.5* 8.7* 10.9*   HEMATOCRIT % 32.1* 28.9* 37.3   PLATELETS 10*3/mm3 179 160 203   PROCALCITONIN ng/mL  --   --  0.07     Results from last 7 days   Lab Units 21  0523 03/15/21  0545 21  1439   SODIUM mmol/L 139 140 140   POTASSIUM mmol/L 5.0 4.2 4.9   CHLORIDE mmol/L 101 99 93*   CO2 mmol/L 35.0* 37.0* 41.0*   BUN mg/dL 22 18 18   CREATININE mg/dL 0.93 0.90 1.05*   GLUCOSE mg/dL 148* 135* 161*   CALCIUM mg/dL 8.2* 8.4* 9.7   ALT (SGPT) U/L  --   --  16   AST (SGOT) U/L  --   --  26   TROPONIN T ng/mL  --   --  <0.010   PROBNP pg/mL  --   --  " 275.2     Estimated Creatinine Clearance: 42 mL/min (by C-G formula based on SCr of 0.93 mg/dL).    Microbiology Results Abnormal     Procedure Component Value - Date/Time    Blood Culture - Blood, Arm, Right [[id number]] Collected: 03/14/21 1540    Lab Status: Preliminary result Specimen: Blood from Arm, Right Updated: 03/15/21 1645     Blood Culture No growth at 24 hours    Blood Culture - Blood, Hand, Left [[id number]] Collected: 03/14/21 1545    Lab Status: Preliminary result Specimen: Blood from Hand, Left Updated: 03/15/21 1645     Blood Culture No growth at 24 hours    COVID PRE-OP / PRE-PROCEDURE SCREENING ORDER (NO ISOLATION) - Swab, Nasopharynx [[id number]]  (Normal) Collected: 03/14/21 1443    Lab Status: Final result Specimen: Swab from Nasopharynx Updated: 03/14/21 1521    Narrative:      The following orders were created for panel order COVID PRE-OP / PRE-PROCEDURE SCREENING ORDER (NO ISOLATION) - Swab, Nasopharynx.  Procedure                               Abnormality         Status                     ---------                               -----------         ------                     COVID-19 and FLU A/B PCR...[[id number]]  Normal              Final result                 Please view results for these tests on the individual orders.    COVID-19 and FLU A/B PCR - Swab, Nasopharynx [[id number]]  (Normal) Collected: 03/14/21 1443    Lab Status: Final result Specimen: Swab from Nasopharynx Updated: 03/14/21 1521     COVID19 Not Detected     Influenza A PCR Not Detected     Influenza B PCR Not Detected    Narrative:      Fact sheet for providers: https://www.fda.gov/media/479691/download    Fact sheet for patients: https://www.fda.gov/media/731672/download    Test performed by PCR.          Imaging Results (Last 24 Hours)     ** No results found for the last 24 hours. **          Results for orders placed in visit on 04/01/20    Adult Transthoracic Echo Complete W/ Cont if Necessary Per  Protocol    Interpretation Summary  · Calculated right ventricular systolic pressure from tricuspid regurgitation is 40 mmHg.  · Mild tricuspid valve regurgitation is present.  · Estimated EF = 66%.  · Left ventricular systolic function is normal.      I have reviewed the medications:  Scheduled Meds:albuterol sulfate HFA, 2 puff, Inhalation, 4x Daily - RT  amLODIPine, 5 mg, Oral, Daily  arformoterol, 15 mcg, Nebulization, BID - RT  aspirin, 81 mg, Oral, Daily  benzocaine, , Mouth/Throat, TID  budesonide, 0.5 mg, Nebulization, BID - RT  cefTRIAXone, 1 g, Intravenous, Nightly  cetirizine, 10 mg, Oral, Daily  cholecalciferol, 1,000 Units, Oral, Daily  docusate sodium, 100 mg, Oral, BID  doxycycline, 100 mg, Intravenous, Q12H  fluticasone, 2 spray, Each Nare, Daily  guaiFENesin, 1,200 mg, Oral, Daily  heparin (porcine), 5,000 Units, Subcutaneous, Q12H  HYDROcodone-acetaminophen, 0.5 tablet, Oral, Q6H  ipratropium, 2 spray, Each Nare, 4x Daily  lactobacillus acidophilus, 1 capsule, Oral, BID  melatonin, 5 mg, Oral, Nightly  methylPREDNISolone sodium succinate, 40 mg, Intravenous, Q12H  palliative care oral rinse, , Mouth/Throat, 4x Daily  pantoprazole, 40 mg, Oral, Daily  pharmacy consult - MTM, , Does not apply, Daily  pravastatin, 20 mg, Oral, Nightly  propranolol, 60 mg, Oral, BID  sertraline, 50 mg, Oral, Daily  sodium chloride, 10 mL, Intravenous, Q12H  sodium chloride, 10 mL, Intravenous, Q12H  triamcinolone, , Topical, Q12H      Continuous Infusions:   PRN Meds:.benzonatate  •  bisacodyl  •  bisacodyl  •  clonazePAM  •  ibuprofen  •  ipratropium-albuterol  •  labetalol  •  magnesium sulfate **OR** magnesium sulfate **OR** magnesium sulfate  •  ondansetron  •  sodium chloride  •  sodium chloride  •  sodium chloride  •  traMADol    Assessment/Plan   Assessment & Plan     Active Hospital Problems    Diagnosis  POA   • **Acute and chronic respiratory failure with hypoxia (CMS/HCC) [J96.21]  Yes   • COPD with acute  exacerbation (CMS/[facility]) [J44.1]  Yes   • Acute on chronic respiratory failure with hypercapnia (CMS/HCC) [J96.22]  Yes   • Physical deconditioning [R53.81]  Yes   • Chronic diastolic heart failure (CMS/HCC) [I50.32]  Yes   • Mixed anxiety depressive disorder [F41.8]  Yes   • Essential hypertension [I10]  Yes   • GERD (gastroesophageal reflux disease) [K21.9]  Yes   • Hyperlipidemia [E78.5]  Yes      Resolved Hospital Problems   No resolved problems to display.        Brief Hospital Course to date:  Sadie Tijerina is a 80 y.o. female with PMH of HTN, HLP, arthritis, remote uterine cancer, COPD on 2L NC and BiPAP, frequent pneumonia that presents after being home one day from rehab with SOA, wheezing and cough.    Acute on Chronic Respiratory Failure  COPD  Recent COVID Pneumonia  -CTA shows no PE, bronchial wall thickening, chronic lung changes  -home o2 2LNC  -Solumedrol bid  - doxy and rocephin   -changed neb albuterol to inhaler per patient request  -ask Pulm to see in am for second opinion/help with GOC   Chronic Diastolic Heart Failure  -EF 66%, tricuspid regurgitation   HTN  Anxiety/Depression  HLD  GERD  GOC/Family Update  Today I discussed code status with the patient. She states she would not want to be intubated but does want chest compressions. Explained what chest compressions are and the result, she said she would like to discuss with her daughter. Code status changed in computer to reflect wishes.   I called and updated the daughter who said that she would talk to her mother. Also, daughter reports that there is no one to take care of her if the patient were to go home.  DVT Prophylaxis:  ELIECER      Disposition: I expect the patient to be discharged TBD    CODE STATUS:   Code Status and Medical Interventions:   Ordered at: 03/16/21 1217     Limited Support to NOT Include:    Intubation     Level Of Support Discussed With:    Patient     Code Status:    CPR     Medical Interventions (Level of Support  Prior to Arrest):    Messi Jacobs,   03/16/21

## 2021-03-16 NOTE — PROGRESS NOTES
NN spoke with patient at BS.  Patient alert and able tyo answer questions appropriately.  O2 sat 99% on 3L, home baseline is 2L with bipap HS.  Deep breathing exercises encouraged.  COPD action plan reviewed.  Discharge plan undetermined at this time.  No new questins or concerns voiced at this time.  NN continues to follow.        Per current GOLD Standards, please consider: No ICS in place, Outpatient PFT, Pulmonary rehab as appropriate, Palliative Care consult

## 2021-03-16 NOTE — PROGRESS NOTES
Continued Stay Note  [facility]     Patient Name: Sadie Tijerina  MRN: [id number]  Today's Date: 3/16/2021    Admit Date: 3/14/2021    Discharge Plan     Row Name 03/16/21 1700       Plan    Plan  TBD    Plan Comments  Visit completed. Pt. states that she does understand home hospice but states that she is ready to make a decision about hospice. Pt. now states that she would like to have her daughter, Neris, @  for the decision. Pt. did give permission for hospice to call her daughter to arrange a time for tomorrow. Pt was not agreeable to having the referral sent to [facility] until her daughter has been able to speak with hospice. Message was left for the pt.’s daughter, Neris [phone], asking her to return the phone. Hospice will f/u tomorrow. [facility] was updated. Please call [phone] if we can be of further assistance.        Discharge Codes    No documentation.             Miladys Shaikh

## 2021-03-16 NOTE — PLAN OF CARE
Goal Outcome Evaluation:  Plan of Care Reviewed With: patient  Progress: no change  Outcome Summary: Pt c/o increased feeling of air hunger this morning as compared to yesterday; accessory muscle use noted at rest, Sats >95% on O2/nc. Agreeable to try low dose opioid for management of air hunger; monitor response. Continue support to pt and ongoing GOC discussion.    1300 Palliative Team Conference: LYUBOV Monge RN, PN; OK Mitchell, APRN; DELFINA Jean Baptiste, [facility], [facility]; CHERELLE Baltazar, ; ROGER Haynes, RN, PN; EUFEMIA Shaikh, CSW    Problem: Palliative Care  Goal: Enhanced Quality of Life  Outcome: Ongoing, Progressing  Intervention: Maximize Comfort  Flowsheets (Taken 3/16/2021 1201)  Pain Management Interventions: (trial hydrocodone/APAP for dyspnea management)  • pain management plan reviewed with patient/caregiver  • other (see comments)  • around-the-clock dosing utilized  Intervention: Optimize Function  Flowsheets (Taken 3/16/2021 1201)  Fatigue Management:  • frequent rest breaks encouraged  • paced activity encouraged  Sleep/Rest Enhancement:  • natural light exposure provided  • consistent schedule promoted  Intervention: Optimize Psychosocial Wellbeing  Flowsheets (Taken 3/16/2021 1202)  Supportive Measures:  • positive reinforcement provided  • self-reflection promoted  • self-responsibility promoted

## 2021-03-17 NOTE — PLAN OF CARE
Problem: Adult Inpatient Plan of Care  Goal: Plan of Care Review  Recent Flowsheet Documentation  Taken 3/17/2021 1523 by Margarita Cabello OT  Progress: improving  Plan of Care Reviewed With: patient  Outcome Summary:   Pt reports improved perceived performance this date with reported improved breathing and less exertion with effort. Pt completed bed mobility with SBA with HOB elevated and bed rails used, cues for ECWS as pt reported SOB upon sitting, SPO2 stable of which would be consistent though session. Pt while seated at EOB completed UBD d/d gown with CGA for posterior tying, gross sponge bathing with SBA w/ exception of most distal feet lower LEs d/t exertion, SOB   pt uses LE AE at home. Pt able to apply lotion to UEs after set up, dep for LB application and g/h with SUA. Reiterated the ECWS, PLB applicable to ADLs. PT impulsively stood and initiated SPT to recliner prior to readiness with AD or gait belt, requiring CGA. Educated on safety awareness. Pt and family to have mtg tomorrow to discuss further d/c plans.

## 2021-03-17 NOTE — PLAN OF CARE
Goal Outcome Evaluation:  Plan of Care Reviewed With: patient, other (see comments) (grandson)  Progress: improving  Outcome Summary: Pt reported breathing much improved since yesterday, with only mild increase work of breathing on exertion, none with talking or eating; continue scheduled Norco for management of air hunger. Pt and family discussing options for discharge; meeting with hospice tomorrow.    1300 Palliative Team Conference: LYUBOV Monge RN, CHPN; OK Mitchell, APRN; DELFINA Jean Baptiste, [facility], [facility]; CHERELLE Baltazar, ; ROGER Haynes, RN, CHPN; AGUSTÍN Adkins, RN, CHPN    Problem: Palliative Care  Goal: Enhanced Quality of Life  Outcome: Ongoing, Progressing  Intervention: Maximize Comfort  Flowsheets (Taken 3/17/2021 1514)  Pain Management Interventions: (breathing improved with scheduled Norco) other (see comments)  Intervention: Optimize Function  Flowsheets (Taken 3/17/2021 1514)  Fatigue Management: paced activity encouraged  Sleep/Rest Enhancement:   natural light exposure provided   consistent schedule promoted  Intervention: Optimize Psychosocial Wellbeing  Flowsheets (Taken 3/17/2021 1514)  Supportive Measures: positive reinforcement provided  Family/Support System Care: support provided

## 2021-03-17 NOTE — PROGRESS NOTES
[facility] Medicine Services  PROGRESS NOTE    Patient Name: Sadie Tijerina  : 1940  MRN: [id number]    Date of Admission: 3/14/2021  Primary Care Physician: Kristian Wolff MD    Subjective   Subjective     CC:  F/u SOA    HPI:  Patient states that she feels better today. She said proair helped    ROS:  Gen- No fevers, chills  CV- No chest pain, palpitations  Resp- No cough,+ dyspnea  GI- No N/V/D, abd pain        Objective   Objective     Vital Signs:   Temp:  [97.5 °F (36.4 °C)-98 °F (36.7 °C)] 98 °F (36.7 °C)  Heart Rate:  [60-81] 73  Resp:  [16-22] 18  BP: (103-145)/(55-89) 143/87        Physical Exam:  Constitutional: No acute distress, awake, alert, elderly female sitting up in bed   HENT: NCAT, mucous membranes moist  Respiratory: expiratory wheezing bilaterlly , respiratory effort mildly labored on 2 L   Cardiovascular: RRR, no murmurs, rubs, or gallops  Gastrointestinal: Positive bowel sounds, soft, nontender, nondistended  Musculoskeletal: trace  bilateral ankle edema  Psychiatric: Appropriate affect, cooperative  Neurologic: Oriented x 3,  speech clear  Skin: No rashes      Results Reviewed:  Results from last 7 days   Lab Units 21  0523 03/15/21  0545 21  1439   WBC 10*3/mm3 13.25* 8.99 13.64*   HEMOGLOBIN g/dL 9.5* 8.7* 10.9*   HEMATOCRIT % 32.1* 28.9* 37.3   PLATELETS 10*3/mm3 179 160 203   PROCALCITONIN ng/mL  --   --  0.07     Results from last 7 days   Lab Units 21  0523 03/15/21  0545 21  1439   SODIUM mmol/L 139 140 140   POTASSIUM mmol/L 5.0 4.2 4.9   CHLORIDE mmol/L 101 99 93*   CO2 mmol/L 35.0* 37.0* 41.0*   BUN mg/dL 22 18 18   CREATININE mg/dL 0.93 0.90 1.05*   GLUCOSE mg/dL 148* 135* 161*   CALCIUM mg/dL 8.2* 8.4* 9.7   ALT (SGPT) U/L  --   --  16   AST (SGOT) U/L  --   --  26   TROPONIN T ng/mL  --   --  <0.010   PROBNP pg/mL  --   --  275.2     Estimated Creatinine Clearance: 42 mL/min (by C-G formula based on SCr of 0.93  mg/dL).    Microbiology Results Abnormal     Procedure Component Value - Date/Time    Blood Culture - Blood, Arm, Right [[id number]] Collected: 03/14/21 1540    Lab Status: Preliminary result Specimen: Blood from Arm, Right Updated: 03/17/21 1645     Blood Culture No growth at 3 days    Blood Culture - Blood, Hand, Left [[id number]] Collected: 03/14/21 1545    Lab Status: Preliminary result Specimen: Blood from Hand, Left Updated: 03/17/21 1645     Blood Culture No growth at 3 days    COVID PRE-OP / PRE-PROCEDURE SCREENING ORDER (NO ISOLATION) - Swab, Nasopharynx [[id number]]  (Normal) Collected: 03/14/21 1443    Lab Status: Final result Specimen: Swab from Nasopharynx Updated: 03/14/21 1521    Narrative:      The following orders were created for panel order COVID PRE-OP / PRE-PROCEDURE SCREENING ORDER (NO ISOLATION) - Swab, Nasopharynx.  Procedure                               Abnormality         Status                     ---------                               -----------         ------                     COVID-19 and FLU A/B PCR...[[id number]]  Normal              Final result                 Please view results for these tests on the individual orders.    COVID-19 and FLU A/B PCR - Swab, Nasopharynx [[id number]]  (Normal) Collected: 03/14/21 1443    Lab Status: Final result Specimen: Swab from Nasopharynx Updated: 03/14/21 1521     COVID19 Not Detected     Influenza A PCR Not Detected     Influenza B PCR Not Detected    Narrative:      Fact sheet for providers: https://www.fda.gov/media/323903/download    Fact sheet for patients: https://www.fda.gov/media/955074/download    Test performed by PCR.          Imaging Results (Last 24 Hours)     ** No results found for the last 24 hours. **          Results for orders placed in visit on 04/01/20    Adult Transthoracic Echo Complete W/ Cont if Necessary Per Protocol    Interpretation Summary  · Calculated right ventricular systolic pressure from tricuspid regurgitation  is 40 mmHg.  · Mild tricuspid valve regurgitation is present.  · Estimated EF = 66%.  · Left ventricular systolic function is normal.      I have reviewed the medications:  Scheduled Meds:Albuterol Sulfate NEB Orderable, 2.5 mg, Nebulization, 4x Daily - RT  amLODIPine, 5 mg, Oral, Daily  arformoterol, 15 mcg, Nebulization, BID - RT  aspirin, 81 mg, Oral, Daily  benzocaine, , Mouth/Throat, TID  budesonide, 0.5 mg, Nebulization, BID - RT  cefTRIAXone, 1 g, Intravenous, Nightly  cetirizine, 10 mg, Oral, Daily  cholecalciferol, 1,000 Units, Oral, Daily  docusate sodium, 100 mg, Oral, BID  doxycycline, 100 mg, Intravenous, Q12H  fluticasone, 2 spray, Each Nare, Daily  guaiFENesin, 1,200 mg, Oral, Daily  heparin (porcine), 5,000 Units, Subcutaneous, Q12H  HYDROcodone-acetaminophen, 0.5 tablet, Oral, Q6H  ipratropium, 2 spray, Each Nare, 4x Daily  lactobacillus acidophilus, 1 capsule, Oral, BID  melatonin, 5 mg, Oral, Nightly  methylPREDNISolone sodium succinate, 40 mg, Intravenous, Q12H  palliative care oral rinse, , Mouth/Throat, 4x Daily  pantoprazole, 40 mg, Oral, Daily  pharmacy consult - MTM, , Does not apply, Daily  pravastatin, 20 mg, Oral, Nightly  propranolol, 60 mg, Oral, BID  sertraline, 50 mg, Oral, Daily  sodium chloride, 10 mL, Intravenous, Q12H  sodium chloride, 10 mL, Intravenous, Q12H  triamcinolone, , Topical, Q12H      Continuous Infusions:   PRN Meds:.benzonatate  •  bisacodyl  •  bisacodyl  •  clonazePAM  •  ibuprofen  •  ipratropium-albuterol  •  labetalol  •  magnesium sulfate **OR** magnesium sulfate **OR** magnesium sulfate  •  ondansetron  •  PATIENT SUPPLIED MEDICATION  •  sodium chloride  •  sodium chloride  •  sodium chloride  •  traMADol    Assessment/Plan   Assessment & Plan     Active Hospital Problems    Diagnosis  POA   • **Acute and chronic respiratory failure with hypoxia (CMS/HCC) [J96.21]  Yes   • COPD with acute exacerbation (CMS/HCC) [J44.1]  Yes   • Acute on chronic respiratory  failure with hypercapnia (CMS/HCC) [J96.22]  Yes   • Physical deconditioning [R53.81]  Yes   • Chronic diastolic heart failure (CMS/HCC) [I50.32]  Yes   • Mixed anxiety depressive disorder [F41.8]  Yes   • Essential hypertension [I10]  Yes   • GERD (gastroesophageal reflux disease) [K21.9]  Yes   • Hyperlipidemia [E78.5]  Yes      Resolved Hospital Problems   No resolved problems to display.        Brief Hospital Course to date:  Sadie Tijerina is a 80 y.o. female with PMH of HTN, HLP, arthritis, remote uterine cancer, COPD on 2L NC and BiPAP, frequent pneumonia that presents after being home one day from rehab with SOA, wheezing and cough.    Acute on Chronic Respiratory Failure  COPD  Recent COVID Pneumonia  -CTA shows no PE, bronchial wall thickening, chronic lung changes  -home o2 2LNC  -Solumedrol bid  - doxy and rocephin   -changed neb albuterol to inhaler per patient request  -ask Pulm to see in am for second opinion/help with GOC   Chronic Diastolic Heart Failure  -EF 66%, tricuspid regurgitation   HTN  Anxiety/Depression  HLD  GERD  GOC/Family Update  Today I discussed code status with the patient. She states she would not want to be intubated but does want chest compressions. Explained what chest compressions are and the result, she said she would like to discuss with her daughter. Code status changed in computer to reflect wishes.   I called and updated the daughter who said that she would talk to her mother. Also, daughter reports that there is no one to take care of her if the patient were to go home.  DVT Prophylaxis:  ELIECER      Disposition: I expect the patient to be discharged TBD    CODE STATUS:   Code Status and Medical Interventions:   Ordered at: 03/16/21 1217     Limited Support to NOT Include:    Intubation     Level Of Support Discussed With:    Patient     Code Status:    CPR     Medical Interventions (Level of Support Prior to Arrest):    Limited       Sharon Jacobs  "DO  21                [facility] Medicine Services  PROGRESS NOTE    Patient Name: Sadie Tijerina  : 1940  MRN: [id number]    Date of Admission: 3/14/2021  Primary Care Physician: Kristian Wolff MD    Subjective   Subjective     CC:  F/u SOA    HPI:  Patient states that she feels short of breath. She is coughing more today. She requests pro air inhaler because the neb treatment \"does not work as well\".     ROS:  Gen- No fevers, chills  CV- No chest pain, palpitations  Resp- No cough,+ dyspnea  GI- No N/V/D, abd pain        Objective   Objective     Vital Signs:   Temp:  [97.5 °F (36.4 °C)-98 °F (36.7 °C)] 98 °F (36.7 °C)  Heart Rate:  [60-81] 73  Resp:  [16-22] 18  BP: (103-145)/(55-89) 143/87        Physical Exam:  Constitutional: No acute distress, awake, alert, elderly female resting in bed   HENT: NCAT, mucous membranes moist  Respiratory: expiratory wheezing bilaterlly , respiratory effort non labored on 2 L   Cardiovascular: RRR, no murmurs, rubs, or gallops  Gastrointestinal: Positive bowel sounds, soft, nontender, nondistended  Musculoskeletal: trace  bilateral ankle edema  Psychiatric: Appropriate affect, cooperative  Neurologic: Oriented x 3,  speech clear  Skin: No rashes      Results Reviewed:  Results from last 7 days   Lab Units 21  0523 03/15/21  0545 21  1439   WBC 10*3/mm3 13.25* 8.99 13.64*   HEMOGLOBIN g/dL 9.5* 8.7* 10.9*   HEMATOCRIT % 32.1* 28.9* 37.3   PLATELETS 10*3/mm3 179 160 203   PROCALCITONIN ng/mL  --   --  0.07     Results from last 7 days   Lab Units 21  0523 03/15/21  0545 21  1439   SODIUM mmol/L 139 140 140   POTASSIUM mmol/L 5.0 4.2 4.9   CHLORIDE mmol/L 101 99 93*   CO2 mmol/L 35.0* 37.0* 41.0*   BUN mg/dL 22 18 18   CREATININE mg/dL 0.93 0.90 1.05*   GLUCOSE mg/dL 148* 135* 161*   CALCIUM mg/dL 8.2* 8.4* 9.7   ALT (SGPT) U/L  --   --  16   AST (SGOT) U/L  --   --  26   TROPONIN T ng/mL  --   --  <0.010   PROBNP " pg/mL  --   --  275.2     Estimated Creatinine Clearance: 42 mL/min (by C-G formula based on SCr of 0.93 mg/dL).    Microbiology Results Abnormal     Procedure Component Value - Date/Time    Blood Culture - Blood, Arm, Right [[id number]] Collected: 03/14/21 1540    Lab Status: Preliminary result Specimen: Blood from Arm, Right Updated: 03/17/21 1645     Blood Culture No growth at 3 days    Blood Culture - Blood, Hand, Left [[id number]] Collected: 03/14/21 1545    Lab Status: Preliminary result Specimen: Blood from Hand, Left Updated: 03/17/21 1645     Blood Culture No growth at 3 days    COVID PRE-OP / PRE-PROCEDURE SCREENING ORDER (NO ISOLATION) - Swab, Nasopharynx [[id number]]  (Normal) Collected: 03/14/21 1443    Lab Status: Final result Specimen: Swab from Nasopharynx Updated: 03/14/21 1521    Narrative:      The following orders were created for panel order COVID PRE-OP / PRE-PROCEDURE SCREENING ORDER (NO ISOLATION) - Swab, Nasopharynx.  Procedure                               Abnormality         Status                     ---------                               -----------         ------                     COVID-19 and FLU A/B PCR...[[id number]]  Normal              Final result                 Please view results for these tests on the individual orders.    COVID-19 and FLU A/B PCR - Swab, Nasopharynx [[id number]]  (Normal) Collected: 03/14/21 1443    Lab Status: Final result Specimen: Swab from Nasopharynx Updated: 03/14/21 1521     COVID19 Not Detected     Influenza A PCR Not Detected     Influenza B PCR Not Detected    Narrative:      Fact sheet for providers: https://www.fda.gov/media/623363/download    Fact sheet for patients: https://www.fda.gov/media/233450/download    Test performed by PCR.          Imaging Results (Last 24 Hours)     ** No results found for the last 24 hours. **          Results for orders placed in visit on 04/01/20    Adult Transthoracic Echo Complete W/ Cont if Necessary Per  Protocol    Interpretation Summary  · Calculated right ventricular systolic pressure from tricuspid regurgitation is 40 mmHg.  · Mild tricuspid valve regurgitation is present.  · Estimated EF = 66%.  · Left ventricular systolic function is normal.      I have reviewed the medications:  Scheduled Meds:Albuterol Sulfate NEB Orderable, 2.5 mg, Nebulization, 4x Daily - RT  amLODIPine, 5 mg, Oral, Daily  arformoterol, 15 mcg, Nebulization, BID - RT  aspirin, 81 mg, Oral, Daily  benzocaine, , Mouth/Throat, TID  budesonide, 0.5 mg, Nebulization, BID - RT  cefTRIAXone, 1 g, Intravenous, Nightly  cetirizine, 10 mg, Oral, Daily  cholecalciferol, 1,000 Units, Oral, Daily  docusate sodium, 100 mg, Oral, BID  doxycycline, 100 mg, Intravenous, Q12H  fluticasone, 2 spray, Each Nare, Daily  guaiFENesin, 1,200 mg, Oral, Daily  heparin (porcine), 5,000 Units, Subcutaneous, Q12H  HYDROcodone-acetaminophen, 0.5 tablet, Oral, Q6H  ipratropium, 2 spray, Each Nare, 4x Daily  lactobacillus acidophilus, 1 capsule, Oral, BID  melatonin, 5 mg, Oral, Nightly  methylPREDNISolone sodium succinate, 40 mg, Intravenous, Q12H  palliative care oral rinse, , Mouth/Throat, 4x Daily  pantoprazole, 40 mg, Oral, Daily  pharmacy consult - MTM, , Does not apply, Daily  pravastatin, 20 mg, Oral, Nightly  propranolol, 60 mg, Oral, BID  sertraline, 50 mg, Oral, Daily  sodium chloride, 10 mL, Intravenous, Q12H  sodium chloride, 10 mL, Intravenous, Q12H  triamcinolone, , Topical, Q12H      Continuous Infusions:   PRN Meds:.benzonatate  •  bisacodyl  •  bisacodyl  •  clonazePAM  •  ibuprofen  •  ipratropium-albuterol  •  labetalol  •  magnesium sulfate **OR** magnesium sulfate **OR** magnesium sulfate  •  ondansetron  •  PATIENT SUPPLIED MEDICATION  •  sodium chloride  •  sodium chloride  •  sodium chloride  •  traMADol    Assessment/Plan   Assessment & Plan     Active Hospital Problems    Diagnosis  POA   • **Acute and chronic respiratory failure with hypoxia  (CMS/[facility]) [J96.21]  Yes   • COPD with acute exacerbation (CMS/[facility]) [J44.1]  Yes   • Acute on chronic respiratory failure with hypercapnia (CMS/[facility]) [J96.22]  Yes   • Physical deconditioning [R53.81]  Yes   • Chronic diastolic heart failure (CMS/[facility]) [I50.32]  Yes   • Mixed anxiety depressive disorder [F41.8]  Yes   • Essential hypertension [I10]  Yes   • GERD (gastroesophageal reflux disease) [K21.9]  Yes   • Hyperlipidemia [E78.5]  Yes      Resolved Hospital Problems   No resolved problems to display.        Brief Hospital Course to date:  Sadie Tijerina is a 80 y.o. female with PMH of HTN, HLP, arthritis, remote uterine cancer, COPD on 2L NC and BiPAP, frequent pneumonia that presents after being home one day from rehab with SOA, wheezing and cough.    Acute on Chronic Respiratory Failure  COPD  Recent COVID Pneumonia  -CTA shows no PE, bronchial wall thickening, chronic lung changes  -home o2 2LNC  -Solumedrol bid  - doxy and rocephin   -changed neb albuterol to inhaler per patient request  -pulm following   Chronic Diastolic Heart Failure  -EF 66%, tricuspid regurgitation   HTN  Anxiety/Depression  HLD  GERD  GOC/Family Update  Patient deciding on home with hospice, versus rehab (if she has days left) or an alternative. She is speaking to her family about her options.   DVT Prophylaxis:  ELIECER      Disposition: I expect the patient to be discharged TBD    CODE STATUS:   Code Status and Medical Interventions:   Ordered at: 03/16/21 1217     Limited Support to NOT Include:    Intubation     Level Of Support Discussed With:    Patient     Code Status:    CPR     Medical Interventions (Level of Support Prior to Arrest):    Messi Jacobs,   03/17/21

## 2021-03-17 NOTE — PROGRESS NOTES
Continued Stay Note  [facility]     Patient Name: Sadie Tijerina  MRN: [id number]  Today's Date: 3/17/2021    Admit Date: 3/14/2021    Discharge Plan     Row Name 03/17/21 1052       Plan    Plan  home with hospice    Plan Comments  I met with Mrs. Tijerina and her grandson at the bedside to discuss her discharge disposition. Mrs. Tijerina is not agreeable to going to any type of facility after this hospitalization. She wishes to go home. She verbalizes that her daughter Miguelito does not want her to go home because she is unavailable to help her due to personal issues. She desires to return home anyway and is interested in hospice services. I provided Mrs. Tijerina and her grandson a list of sitter services. She is going to speak with her family about their availibility to help her at home and they are going to look into hiring extra help for her at home. Hospice is following and will discuss DME needs and services offered at home as well.    11:19 EDT I spoke with Neris, Mrs. Tijerina's daughter on the phone. She is planning to meet with hospice tomorrow at 14:00 at the bedside with her mother. Case management will be present at the meeting as well to discuss discharge options.    Final Discharge Disposition Code  50 - home with hospice        Discharge Codes    No documentation.             Michael Roberson RN

## 2021-03-17 NOTE — THERAPY TREATMENT NOTE
Patient Name: Sadie Tijerina  : 1940    MRN: [id number]                              Today's Date: 3/17/2021       Admit Date: 3/14/2021    Visit Dx:     ICD-10-CM ICD-9-CM   1. COPD with acute exacerbation (CMS/[facility])  J44.1 491.21   2. Anxiety  F41.9 300.00     Patient Active Problem List   Diagnosis   • Calf cramp   • Mixed anxiety depressive disorder   • Dizziness   • Fatigue   • GERD (gastroesophageal reflux disease)   • Hematuria   • Hyperlipidemia   • Essential hypertension   • Low back pain   • Restless legs syndrome   • Essential tremor   • Vitamin D deficiency   • Balance problem   • Tension headache   • Tobacco abuse disorder   • Leg swelling   • Acute and chronic respiratory failure with hypoxia (CMS/[facility])   • Venous insufficiency of both lower extremities   • Chronic diastolic heart failure (CMS/[facility])   • Neuropathy   • Acute exacerbation of chronic obstructive pulmonary disease (COPD) (CMS/[facility])   • Bilateral sensorineural hearing loss   • Disorder of inner ear   • Tympanosclerosis   • Transient vision disturbance   • Bilateral bronchopneumonia   • Pneumonia of both lungs due to infectious organism   • Leg wound, right   • Sepsis due to pneumonia (CMS/[facility])   • Pneumonia of both lower lobes due to infectious organism   • Chronic respiratory failure with hypoxia (CMS/[facility])   • Physical deconditioning   • Impaired mobility and ADLs   • Oral candidiasis   • Acute on chronic respiratory failure (CMS/[facility])   • COPD with acute exacerbation (CMS/[facility])   • Acute on chronic respiratory failure with hypercapnia (CMS/[facility])     Past Medical History:   Diagnosis Date   • Arthritis    • Cancer (CMS/[facility])     uterine cancer ()   • COPD (chronic obstructive pulmonary disease) (CMS/[facility])    • Depression    • Elevated cholesterol    • GERD (gastroesophageal reflux disease)    • History of emphysema    • History of esophageal reflux    • History of recurrent UTI (urinary tract infection)    • History of renal calculi     • History of uterine cancer    • Hyperlipidemia    • Hypertension    • Impaired functional mobility, balance, gait, and endurance    • Pneumonia 02/2019     Past Surgical History:   Procedure Laterality Date   • FOOT SURGERY     • HAND SURGERY     • HYSTERECTOMY       General Information     Row Name 03/17/21 1512          OT Time and Intention    Document Type  therapy note (daily note)  -DEE     Mode of Treatment  occupational therapy;individual therapy  -JY     Row Name 03/17/21 1512          General Information    Patient Profile Reviewed  yes  -JNELL     Existing Precautions/Restrictions  fall;oxygen therapy device and L/min;other (see comments) [place] without hearing aids (unavailable at this time)  -JY     Row Name 03/17/21 1512          Cognition    Orientation Status (Cognition)  oriented x 3  -JY     Row Name 03/17/21 1512          Safety Issues, Functional Mobility    Safety Issues Affecting Function (Mobility)  insight into deficits/self-awareness;safety precaution awareness;safety precautions follow-through/compliance  -JNELL     Impairments Affecting Function (Mobility)  balance;endurance/activity tolerance;shortness of breath;strength  -JNELL     Comment, Safety Issues/Impairments (Mobility)  pt alert, follows commands; reiterated ECWS strategies and safety cues for most safe mobility w/ greater ease, SOB  -JY       User Key  (r) = Recorded By, (t) = Taken By, (c) = Cosigned By    Initials Name Provider Type    Margarita Esquivel OT Occupational Therapist          Mobility/ADL's     Row Name 03/17/21 1514          Bed Mobility    Bed Mobility  supine-sit;scooting/bridging  -JNELL     Scooting/Bridging [place] (Bed Mobility)  standby assist;verbal cues  -DEE     Supine-Sit [place] (Bed Mobility)  standby assist;verbal cues  -DEE     Sit-Supine [place] (Bed Mobility)  not tested  -JNELL     Assistive Device (Bed Mobility)  head of bed elevated;bed rails  -JNELL     Comment (Bed Mobility)  skilled cues for  optimal hand placement for assist in uprighting trunk with decreased energy expenditure, weight shifting tech with PLB to advance to EOB, reported SOB upon sitting/completion and initiated seated rest break, stable O2  -TempeestNELL     Row Name 03/17/21 1514          Transfers    Transfers  sit-stand transfer;bed-chair transfer  -JY     Comment (Transfers)  pt impulsively stood from EOB and initiated pivot prior to readiness with gait belt, AD, req'd gross CGA for pivot and controlled descend  -JY     Bed-Chair [place] (Transfers)  contact guard;verbal cues  -JY     Assistive Device (Bed-Chair Transfers)  other (see comments) UE support, CGA pt stood and t/f'd prior to readiness with AD and gait belt  -JY     Sit-Stand [place] (Transfers)  contact guard;verbal cues  -JY     Row Name 03/17/21 1514          Sit-Stand Transfer    Assistive Device (Sit-Stand Transfers)  other (see comments) UE support, CGA pt stood and t/f'd prior to readiness with AD and gait belt  -Eco Cuizine     [facility] Name 03/17/21 1514          Functional Mobility    Functional Mobility- Comment  defer to PT for specifics regarding fxl mobility; pt deferred mobility to bathroom for toileting and indicated fatigue, SOB with other OT activity  -Eco Cuizine     Row Name 03/17/21 1514          Activities of Daily Living    BADL Assessment/Intervention  upper body dressing;bathing;grooming  -[facility] Name 03/17/21 1514          Upper Body Dressing Assessment/Training    [place] Level (Upper Body Dressing)  doff;don;pajama/robe;contact guard assist;verbal cues  -JY     Position (Upper Body Dressing)  edge of bed sitting  -JY     Comment (Upper Body Dressing)  pt able to d/d gown with no physical A except posterior tying, yet provision of cues for line mgmt around garments, vice versa  -Eco Cuizine     Row Name 03/17/21 1514          Bathing Assessment/Intervention    [place] Level (Bathing)  upper body;upper extremities;proximal lower extremities;standby assist;verbal  cues  -JY     Position (Bathing)  edge of bed sitting;supported sitting  -JY     Comment (Bathing)  pt able to sponge bathe all body areas in close reach, deferred most distal aspects d/t SOB w/ forward flexion; pt uses AE at home for LBB, cued pt to demo PLB with ADLs and reviewed ECWS to reduce exertion; pt deferred applying lotion to LB d/t perceived exertion and completed UB lotion application with set up A  -JY     Row Name 03/17/21 1514          Grooming Assessment/Training    [place] Level (Grooming)  hair care, combing/brushing;set up  -JY     Position (Grooming)  edge of bed sitting  -JY       User Key  (r) = Recorded By, (t) = Taken By, (c) = Cosigned By    Initials Name Provider Type    Margarita Esquivel OT Occupational Therapist        Obj/Interventions     Row Name 03/17/21 1522          Balance    Balance Assessment  sitting static balance;sitting dynamic balance;standing static balance;standing dynamic balance  -JY     Static Sitting Balance  WFL;unsupported;sitting, edge of bed  -JY     Dynamic Sitting Balance  WFL;unsupported;sitting, edge of bed;supported;sitting in chair;other (see comments) ADLs with distal reach  -JY     Static Standing Balance  WFL;supported;standing  -JY     Dynamic Standing Balance  mild impairment;supported;standing;other (see comments) fxl transfer  -JY     Balance Interventions  sitting;standing;static;dynamic;sit to stand;supported;occupation based/functional task  -JY     Comment, Balance  no LOB noted, impulsive upon standing and t/f prior to readiness (safety concerns)  -JY       User Key  (r) = Recorded By, (t) = Taken By, (c) = Cosigned By    Initials Name Provider Type    Margarita Esquivel OT Occupational Therapist        Goals/Plan    No documentation.       Clinical Impression     Row Name 03/17/21 3686          Pain Assessment    Additional Documentation  Pain Scale: Numbers Pre/Post-Treatment (Group)  -DEE     Row Name 03/17/21 1527          Pain Scale:  Numbers Pre/Post-Treatment    Pretreatment Pain Rating  0/10 - no pain  -JY     Posttreatment Pain Rating  0/10 - no pain  -JY     Pre/Posttreatment Pain Comment  pt denied any pain and tolerated all OT intervention; mentioned edema at R knee when extended in bed  -JY     Pain Intervention(s)  Repositioned;Ambulation/increased activity;Rest  -JY     Row Name 03/17/21 1523          Plan of Care Review    Plan of Care Reviewed With  patient  -JY     Progress  improving  -JY     Outcome Summary  Pt reports improved perceived performance this date with reported improved breathing and less exertion with effort. Pt completed bed mobility with SBA with HOB elevated and bed rails used, cues for ECWS as pt reported SOB upon sitting, SPO2 stable of which would be consistent though session. Pt while seated at EOB completed UBD d/d gown with CGA for posterior tying, gross sponge bathing with SBA w/ exception of most distal feet lower LEs d/t exertion, SOB; pt uses LE AE at home. Pt able to apply lotion to UEs after set up, dep for LB application and g/h with SUA. Reiterated the ECWS, PLB applicable to ADLs. PT impulsively stood and initiated SPT to recliner prior to readiness with AD or gait belt, requiring CGA. Educated on safety awareness. Pt and family to have mtg tomorrow to discuss further d/c plans.  -JY     Row Name 03/17/21 1523          Vital Signs    Pre Systolic BP Rehab  135  -JY     Pre Treatment Diastolic BP  80  -JY     Pretreatment Heart Rate (beats/min)  76  -JY     Posttreatment Heart Rate (beats/min)  77  -JY     Pre SpO2 (%)  95  -JY     O2 Delivery Pre Treatment  supplemental O2  -JY     Intra SpO2 (%)  94  -JY     O2 Delivery Intra Treatment  supplemental O2  -JY     Post SpO2 (%)  94  -JY     O2 Delivery Post Treatment  supplemental O2  -JY     Pre Patient Position  Supine  -JY     Intra Patient Position  Standing  -JY     Post Patient Position  Sitting  -JY     Row Name 03/17/21 1523          Positioning  and Restraints    Pre-Treatment Position  in bed  -JY     Post Treatment Position  chair  -JY     In Chair  notified nsg;reclined;call light within reach;encouraged to call for assist;exit alarm on;legs elevated  -JNELL       User Key  (r) = Recorded By, (t) = Taken By, (c) = Cosigned By    Initials Name Provider Type    Margarita Esquivel OT Occupational Therapist        Outcome Measures     Row Name 03/17/21 1530          How much help from another is currently needed...    Putting on and taking off regular lower body clothing?  3  -JY     Bathing (including washing, rinsing, and drying)  3  -JY     Toileting (which includes using toilet bed pan or urinal)  3  -JY     Putting on and taking off regular upper body clothing  3  -JY     Taking care of personal grooming (such as brushing teeth)  4  -JY     Eating meals  4  -JY     AM-PAC 6 Clicks Score (OT)  20  -JY     Row Name 03/17/21 1530          Functional Assessment    Outcome Measure Options  AM-PAC 6 Clicks Daily Activity (OT)  -DEE       User Key  (r) = Recorded By, (t) = Taken By, (c) = Cosigned By    Initials Name Provider Type    Margarita Esquivel OT Occupational Therapist        Occupational Therapy Education                 Title: PT OT SLP Therapies (Not Started)     Topic: Occupational Therapy (In Progress)     Point: ADL training (In Progress)     Description:   Instruct learner(s) on proper safety adaptation and remediation techniques during self care or transfers.   Instruct in proper use of assistive devices.              Learning Progress Summary           Patient Acceptance, E,D, NR by DEE at 3/17/2021 1409    Acceptance, E, VU by AC at 3/15/2021 1420    Comment: Role of OT, benefits of activity, adaptive breathing                   Point: Home exercise program (Not Started)     Description:   Instruct learner(s) on appropriate technique for monitoring, assisting and/or progressing therapeutic exercises/activities.              Learner Progress:  Not  documented in this visit.          Point: Precautions (In Progress)     Description:   Instruct learner(s) on prescribed precautions during self-care and functional transfers.              Learning Progress Summary           Patient Acceptance, E,D, NR by DEE at 3/17/2021 1409                   Point: Body mechanics (In Progress)     Description:   Instruct learner(s) on proper positioning and spine alignment during self-care, functional mobility activities and/or exercises.              Learning Progress Summary           Patient Acceptance, E,D, NR by DEE at 3/17/2021 1409                               User Key     Initials Effective Dates Name Provider Type Discipline    AC 06/23/15 -  Radha Bo, OT Occupational Therapist OT    JNELL 01/29/20 -  Margarita Cabello OT Occupational Therapist OT              OT Recommendation and Plan     Plan of Care Review  Plan of Care Reviewed With: patient  Progress: improving  Outcome Summary: Pt reports improved perceived performance this date with reported improved breathing and less exertion with effort. Pt completed bed mobility with SBA with HOB elevated and bed rails used, cues for ECWS as pt reported SOB upon sitting, SPO2 stable of which would be consistent though session. Pt while seated at EOB completed UBD d/d gown with CGA for posterior tying, gross sponge bathing with SBA w/ exception of most distal feet lower LEs d/t exertion, SOB; pt uses LE AE at home. Pt able to apply lotion to UEs after set up, dep for LB application and g/h with SUA. Reiterated the ECWS, PLB applicable to ADLs. PT impulsively stood and initiated SPT to recliner prior to readiness with AD or gait belt, requiring CGA. Educated on safety awareness. Pt and family to have mtg tomorrow to discuss further d/c plans.     Time Calculation:   Time Calculation- OT     Row Name 03/17/21 1531             Time Calculation- OT    OT Start Time  1409  -JY      OT Received On  03/17/21  -JY      OT Goal Re-Cert  Due Date  03/25/21  -JY         Timed Charges    69123 - OT Therapeutic Activity Minutes  15  -JY      78281 - OT Self Care/Mgmt Minutes  15  -JY        User Key  (r) = Recorded By, (t) = Taken By, (c) = Cosigned By    Initials Name Provider Type    Margarita Esquivel OT Occupational Therapist        Therapy Charges for Today     Code Description Service Date Service Provider Modifiers Qty    [id number] HC OT THERAPEUTIC ACT EA 15 MIN 3/17/2021 Margarita Cabello OT GO 1    [id number] HC OT SELF CARE/MGMT/TRAIN EA 15 MIN 3/17/2021 Margarita Cabello OT GO 1               Margarita Cabello OT  3/17/2021

## 2021-03-17 NOTE — CONSULTS
Inpatient Pulmonology Consult  Consult performed by: Fredrick Rendon MD  Consult ordered by: Sharon Jacobs DO        Pulmonary Medicine Consultation     Patient Care Team:  Kristian Wolff MD as PCP - General  Kristian Wolff MD as PCP - Family Medicine  Bayron Grimaldo MD (Inactive) as Consulting Physician (General Surgery)    Reason for Consultation: Acute on chronic respiratory failure with frequent exacerbations of COPD    Subjective   History of Present Illness:  This is a very nice 80-year-old patient with a history of severe COPD on chronic oxygen and bronchodilator therapy who is followed by Dr. Mojica in [place].  I was called for further evaluation of her COPD and to suggest possible changes to her medication regimen.  She is currently receiving Brovana nebulized as well as Pulmicort nebulized, both twice daily.  She states that she is feeling a little bit better now.  She does state that she has been having frequent exacerbations and she is trying to make up her mind as to whether or not she will go home with hospice or to attempt rehabilitation.  They have asked me to see her for a second opinion.    At home it appears that she is taking Bevespi and Accu nebs.  She also states that she gets a very good effect and is able to clear secretions much better when she uses an HFA ProAir.    She is very limited with activity and states that this makes her very short of breath.  She has denied any recent fevers, chills, or night sweats.  She denies any chest pain.    Review of Systems:  Pertinent items are noted in HPI, all other systems reviewed and negative    Past Medical History:  Past Medical History:   Diagnosis Date   • Arthritis    • Cancer (CMS/[facility])     uterine cancer (1981)   • COPD (chronic obstructive pulmonary disease) (CMS/[facility])    • Depression    • Elevated cholesterol    • GERD (gastroesophageal reflux disease)    • History of emphysema    • History of esophageal reflux    •  History of recurrent UTI (urinary tract infection)    • History of renal calculi    • History of uterine cancer    • Hyperlipidemia    • Hypertension    • Impaired functional mobility, balance, gait, and endurance    • Pneumonia 02/2019     Past Surgical History:   Procedure Laterality Date   • FOOT SURGERY     • HAND SURGERY     • HYSTERECTOMY         Allergies:  Allergies   Allergen Reactions   • Morphine And Related Irritability       Medications:  No current facility-administered medications on file prior to encounter.     Current Outpatient Medications on File Prior to Encounter   Medication Sig Dispense Refill   • acetaminophen (TYLENOL) 500 MG tablet Take 1,000 mg by mouth Every 8 (Eight) Hours As Needed for Mild Pain .     • albuterol (ACCUNEB) 1.25 MG/3ML nebulizer solution Take 1 ampule by nebulization Every 6 (Six) Hours As Needed for Wheezing or Shortness of Air.     • aspirin 81 MG EC tablet Take 81 mg by mouth Daily.     • benzonatate (TESSALON) 100 MG capsule Take 1 capsule by mouth 3 (Three) Times a Day As Needed for Cough. 10 capsule 0   • Bevespi Aerosphere 9-4.8 MCG/ACT aerosol INHALE 2 PUFFS BY MOUTH TWICE DAILY 10.7 g 5   • cetirizine (zyrTEC) 10 MG tablet Take 1 tablet by mouth Daily. 30 tablet 0   • cholecalciferol (Cholecalciferol) 25 MCG (1000 UT) tablet Take 1,000 Units by mouth Daily.     • estrogens, conjugated, (PREMARIN) 0.625 MG tablet Take 0.625 mg by mouth Daily.     • fluticasone (FLONASE) 50 MCG/ACT nasal spray 2 sprays by Each Nare route Daily.     • furosemide (LASIX) 20 MG tablet TAKE 1 TABLET BY MOUTH DAILY 90 tablet 3   • guaiFENesin (MUCINEX) 600 MG 12 hr tablet Take 1,200 mg by mouth Daily.     • lactobacillus acidophilus (RISAQUAD) capsule capsule Take 1 capsule by mouth 2 (Two) Times a Day.     • meclizine (ANTIVERT) 25 MG tablet Take 12.5 mg by mouth 3 (Three) Times a Day As Needed for Dizziness or Nausea.     • melatonin 3 MG tablet Take 3 mg by mouth Every Night.     •  mirtazapine (REMERON) 7.5 MG half tablet Take 15 mg by mouth Every Night.     • Mouthwashes (Biotene dry mouth) liquid liquid Take 10 mL by mouth 2 (two) times a day. Every morning and at bedtime     • mupirocin (BACTROBAN) 2 % ointment 1 application into the nostril(s) as directed by provider 2 (Two) Times a Day. Right nostril     • omeprazole (priLOSEC) 40 MG capsule Take 1 capsule by mouth Daily. 90 capsule 3   • potassium chloride (K-DUR,KLOR-CON) 10 MEQ CR tablet TAKE 1 TABLET BY MOUTH DAILY 60 tablet 5   • pravastatin (PRAVACHOL) 20 MG tablet TAKE 1 TABLET BY MOUTH EVERY EVENING 90 tablet 1   • predniSONE (DELTASONE) 5 MG tablet Take 5 mg by mouth Daily. Long term dose     • propranolol (INDERAL) 60 MG tablet TAKE 1 TABLET BY MOUTH TWICE DAILY 180 tablet 3   • sertraline (ZOLOFT) 50 MG tablet Take 1 tablet by mouth Daily. 10 tablet 0        Albuterol Sulfate NEB Orderable, 2.5 mg, Nebulization, 4x Daily - RT  amLODIPine, 5 mg, Oral, Daily  arformoterol, 15 mcg, Nebulization, BID - RT  aspirin, 81 mg, Oral, Daily  benzocaine, , Mouth/Throat, TID  budesonide, 0.5 mg, Nebulization, BID - RT  cefTRIAXone, 1 g, Intravenous, Nightly  cetirizine, 10 mg, Oral, Daily  cholecalciferol, 1,000 Units, Oral, Daily  docusate sodium, 100 mg, Oral, BID  doxycycline, 100 mg, Intravenous, Q12H  fluticasone, 2 spray, Each Nare, Daily  guaiFENesin, 1,200 mg, Oral, Daily  heparin (porcine), 5,000 Units, Subcutaneous, Q12H  HYDROcodone-acetaminophen, 0.5 tablet, Oral, Q6H  ipratropium, 2 spray, Each Nare, 4x Daily  lactobacillus acidophilus, 1 capsule, Oral, BID  melatonin, 5 mg, Oral, Nightly  methylPREDNISolone sodium succinate, 40 mg, Intravenous, Q12H  palliative care oral rinse, , Mouth/Throat, 4x Daily  pantoprazole, 40 mg, Oral, Daily  pharmacy consult - MTM, , Does not apply, Daily  pravastatin, 20 mg, Oral, Nightly  propranolol, 60 mg, Oral, BID  sertraline, 50 mg, Oral, Daily  sodium chloride, 10 mL, Intravenous,  Q12H  sodium chloride, 10 mL, Intravenous, Q12H  triamcinolone, , Topical, Q12H        Social History:  Social History     Socioeconomic History   • Marital status:      Spouse name: Not on file   • Number of children: Not on file   • Years of education: Not on file   • Highest education level: Not on file   Tobacco Use   • Smoking status: Current Every Day Smoker     Packs/day: 0.25     Types: Cigarettes     Last attempt to quit: 2019     Years since quittin.2   • Smokeless tobacco: Never Used   • Tobacco comment: HALF OF A CIGARETTE   Vaping Use   • Vaping Use: Never assessed   Substance and Sexual Activity   • Alcohol use: No   • Drug use: No   • Sexual activity: Defer     Comment:        Family History:  Family History   Problem Relation Age of Onset   • Cancer Mother    • Heart attack Father    • Arthritis Father    • Heart disease Father    • Diabetes Daughter    • Hyperlipidemia Daughter    • Migraines Daughter    • Heart disease Sister    • Diabetes Son    • Glaucoma Son      Family history is reviewed and is not contributory to the case.    Objective   Objective     Physical Exam:  Vitals:    21 1315   BP: 135/80   Pulse:    Resp: 18   Temp: 97.8 °F (36.6 °C)   SpO2:      Physical Exam  Vitals reviewed.   Constitutional:       General: She is not in acute distress.     Appearance: Normal appearance. She is normal weight.   HENT:      Nose: Nose normal. No congestion.   Eyes:      Extraocular Movements: Extraocular movements intact.      Pupils: Pupils are equal, round, and reactive to light.   Cardiovascular:      Pulses: Normal pulses.      Heart sounds: Normal heart sounds. No murmur heard.     Pulmonary:      Effort: Pulmonary effort is normal.      Breath sounds: No wheezing.      Comments: Poor air movement bilaterally.  No wheezes.  Abdominal:      General: Abdomen is flat. Bowel sounds are normal.   Musculoskeletal:         General: Normal range of motion.      Cervical  back: Normal range of motion. No rigidity.      Right lower leg: No edema.      Left lower leg: No edema.   Skin:     General: Skin is dry.      Capillary Refill: Capillary refill takes less than 2 seconds.      Coloration: Skin is not jaundiced.   Neurological:      General: No focal deficit present.      Mental Status: She is alert and oriented to person, place, and time.   Psychiatric:         Mood and Affect: Mood normal.         Thought Content: Thought content normal.         Lab Results/Imaging/Diagnostics:  Results from last 7 days   Lab Units 03/16/21  0523 03/15/21  0545 03/14/21  1439   WBC 10*3/mm3 13.25* 8.99 13.64*   HEMOGLOBIN g/dL 9.5* 8.7* 10.9*   PLATELETS 10*3/mm3 179 160 203     Results from last 7 days   Lab Units 03/16/21  0523 03/15/21  0545 03/14/21  1439 03/11/21  1730   SODIUM mmol/L 139 140 140 144   POTASSIUM mmol/L 5.0 4.2 4.9 4.5   CO2 mmol/L 35.0* 37.0* 41.0* 39.0*   BUN mg/dL 22 18 18 19   CREATININE mg/dL 0.93 0.90 1.05* 0.94   MAGNESIUM mg/dL 2.9* 1.4*  --  1.5*   GLUCOSE mg/dL 148* 135* 161* 158*     Estimated Creatinine Clearance: 42 mL/min (by C-G formula based on SCr of 0.93 mg/dL).    Results from last 7 days   Lab Units 03/15/21  0545   HEMOGLOBIN A1C % 4.90     Results from last 7 days   Lab Units 03/14/21  1529   PH, ARTERIAL pH units 7.399   PCO2, ARTERIAL mm Hg 65.9*   PO2 ART mm Hg 95.1         Assessment/Plan   Impression & Plan     Impression:    Acute and chronic respiratory failure with hypoxia (CMS/HCC)    Mixed anxiety depressive disorder    GERD (gastroesophageal reflux disease)    Hyperlipidemia    Essential hypertension    Chronic diastolic heart failure (CMS/HCC)    Physical deconditioning    COPD with acute exacerbation (CMS/HCC)    Acute on chronic respiratory failure with hypercapnia (CMS/HCC)      Plan:    Mrs. Forrest has severe COPD with multiple frequent admissions to the hospital and exacerbations.  She does state that she is feeling a bit better today  and I discussed with her some options with her ranging from nursing home placement with rehabilitation, inpatient hospice, or potentially home with hospice.  She states that she is still not sure what she would like to do and she is going to meet with the hospice folks tomorrow.  She does state that she may be more interested now that she is feeling a bit better and proceeding with rehabilitation and then going from there.    I would continue on with her current inhaler regimen that she is on here.  Especially I think that she will benefit from inhaled corticosteroid.  She is currently getting Pulmicort here.  She is on Bevespi at home as her long-acting bronchodilator.  It may be reasonable to continue on with both Pulmicort and Brovana twice daily as her main regimen through nebulized therapy as these may be easier for her to get an effective dose of.    In addition, she is very fixated on ProAir HFA and feels that this works much better for her than nebulized AccuNeb's and I will try to get 1 of these for her.    Continue on with steroids and she would benefit from a prolonged taper over the next 2 weeks.  I would not schedule her on daily or every other day long-term prednisone at this time though this may be of benefit if she continues to have exacerbations after the addition of an inhaled corticosteroid.    I will plan to see her on an as-needed basis.  Please let me know if you have any questions or concerns.    Thank you for asking me to help in the care of Mrs. Tijerina.    I discussed these findings and my recommendations with patient       Fredrick Rendon MD, [facility]  Pulmonary and Critical Care Medicine  03/17/21 15:36 EDT

## 2021-03-17 NOTE — PROGRESS NOTES
"Continued Stay Note  [facility]     Patient Name: Sadie Tijerina  MRN: [id number]  Today's Date: 3/17/2021    Admit Date: 3/14/2021    Discharge Plan     Row Name 03/17/21 1825       Plan    Plan  To be determined    Plan Comments  Spoke with pt's daughter multiple times today regarding a discharge for pt. Neris stated pt is not able to go home and live by self and pt nor family can afford paid caregivers. Neris stated wants pt to go to [facility] in [facility]. Neris asked this writer to call [facility], informed Neris that the  follows up on long term care placement, this writer will arrange for hospice services. Spoke with  Michael who stated has met with the pt and pt wants to go home, does not want to go to any facilities. Michael stated a meeting has been planned for Michael and Hospice to meet with pt and Neris to discuss a discharge plan. Visit made to pt at 1530, pt sitting up in the chair, pt stated \"I am now thinking I should go to [facility] so they can take care of me.\" Informed pt will discuss a plan at the meeting tomorrow. Pt agreeable to the meeting. Please call [phone] if can be of further assistance.         Discharg e Codes    No documentation.             Nicole Adkins RN    "

## 2021-03-17 NOTE — PLAN OF CARE
Problem: Adult Inpatient Plan of Care  Goal: Plan of Care Review  Recent Flowsheet Documentation  Taken 3/17/2021 1526 by Norma Hart, PT  Progress: improving  Plan of Care Reviewed With: patient  Outcome Summary: Patient ambulated 210 feet with RW and CGA, limited by fatigue and SOA. O2 sats within 90's throughout mobility on supplemental O2. Encouraged frequent ambulation. Will continue to proress as able.   Goal Outcome Evaluation:  Plan of Care Reviewed With: patient  Progress: improving  Outcome Summary: Patient ambulated 210 feet with RW and CGA, limited by fatigue and SOA. O2 sats within 90's throughout mobility on supplemental O2. Encouraged frequent ambulation. Will continue to proress as able.

## 2021-03-17 NOTE — PROGRESS NOTES
NN spoke with pt and grandson at [facility].  Pt alert and able to answer questions appropriately.  Pt O2 sat  96  % on 2  L currently, home O2 use 2 L.  She reports that she did wear the bipap last night.  Deep breathing exercises encouraged.  Patient reports that she is still experiencing no taste or smell as a result of previous COVID infection.  No new concerns or questions voiced at this time.  NN will continue to follow as needed.    Per current G[employer] Standards, please consider: No ICS in place, Outpatient PFT, Pulmonary rehab as appropriate, Palliative Care consult

## 2021-03-17 NOTE — THERAPY TREATMENT NOTE
Patient Name: Sadie Tijerina  : 1940    MRN: [id number]                              Today's Date: 3/17/2021       Admit Date: 3/14/2021    Visit Dx:     ICD-10-CM ICD-9-CM   1. COPD with acute exacerbation (CMS/[facility])  J44.1 491.21   2. Anxiety  F41.9 300.00     Patient Active Problem List   Diagnosis   • Calf cramp   • Mixed anxiety depressive disorder   • Dizziness   • Fatigue   • GERD (gastroesophageal reflux disease)   • Hematuria   • Hyperlipidemia   • Essential hypertension   • Low back pain   • Restless legs syndrome   • Essential tremor   • Vitamin D deficiency   • Balance problem   • Tension headache   • Tobacco abuse disorder   • Leg swelling   • Acute and chronic respiratory failure with hypoxia (CMS/[facility])   • Venous insufficiency of both lower extremities   • Chronic diastolic heart failure (CMS/[facility])   • Neuropathy   • Acute exacerbation of chronic obstructive pulmonary disease (COPD) (CMS/[facility])   • Bilateral sensorineural hearing loss   • Disorder of inner ear   • Tympanosclerosis   • Transient vision disturbance   • Bilateral bronchopneumonia   • Pneumonia of both lungs due to infectious organism   • Leg wound, right   • Sepsis due to pneumonia (CMS/[facility])   • Pneumonia of both lower lobes due to infectious organism   • Chronic respiratory failure with hypoxia (CMS/[facility])   • Physical deconditioning   • Impaired mobility and ADLs   • Oral candidiasis   • Acute on chronic respiratory failure (CMS/[facility])   • COPD with acute exacerbation (CMS/[facility])   • Acute on chronic respiratory failure with hypercapnia (CMS/[facility])     Past Medical History:   Diagnosis Date   • Arthritis    • Cancer (CMS/[facility])     uterine cancer ()   • COPD (chronic obstructive pulmonary disease) (CMS/[facility])    • Depression    • Elevated cholesterol    • GERD (gastroesophageal reflux disease)    • History of emphysema    • History of esophageal reflux    • History of recurrent UTI (urinary tract infection)    • History of renal calculi     • History of uterine cancer    • Hyperlipidemia    • Hypertension    • Impaired functional mobility, balance, gait, and endurance    • Pneumonia 02/2019     Past Surgical History:   Procedure Laterality Date   • FOOT SURGERY     • HAND SURGERY     • HYSTERECTOMY       General Information     Row Name 03/17/21 1526          Physical Therapy Time and Intention    Document Type  therapy note (daily note)  -LR     Mode of Treatment  physical therapy;individual therapy  -LR     Row Name 03/17/21 1526          General Information    Patient Profile Reviewed  yes  -LR     Existing Precautions/Restrictions  fall;other (see comments);oxygen therapy device and L/min Kiana, monitor O2 sats  -LR     Barriers to Rehab  medically complex  -LR     Row Name 03/17/21 1526          Cognition    Orientation Status (Cognition)  oriented x 4  -LR     Row Name 03/17/21 1526          Safety Issues, Functional Mobility    Safety Issues Affecting Function (Mobility)  insight into deficits/self-awareness;awareness of need for assistance;positioning of assistive device  -LR     Impairments Affecting Function (Mobility)  balance;strength;endurance/activity tolerance;pain;shortness of breath  -LR       User Key  (r) = Recorded By, (t) = Taken By, (c) = Cosigned By    Initials Name Provider Type    LR Norma Hart, PT Physical Therapist        Mobility     Row Name 03/17/21 1526          Bed Mobility    Supine-Sit [place] (Bed Mobility)  not tested  -LR     Sit-Supine [place] (Bed Mobility)  not tested  -LR     Comment (Bed Mobility)  [facility] on arrival and at end of treatment.  -     Row Name 03/17/21 1526          Transfers    Comment (Transfers)  Verbal cues to push up from chair to stand and to reach back for chair to lower into sitting. Performed x2. Patient stood from chair before being cued to do so and before RW in place.  -LR     Row Name 03/17/21 1526          Bed-Chair Transfer    Bed-Chair [place] (Transfers)   not tested  -LR     Row Name 03/17/21 1526          Sit-Stand Transfer    Sit-Stand [place] (Transfers)  verbal cues;standby assist  -LR     Assistive Device (Sit-Stand Transfers)  walker, front-wheeled  -LR     Row Name 03/17/21 1526          Gait/Stairs (Locomotion)    [place] Level (Gait)  verbal cues;contact guard  -LR     Assistive Device (Gait)  walker, front-wheeled  -LR     Distance in Feet (Gait)  210  -LR     Deviations/Abnormal Patterns (Gait)  bilateral deviations;alia decreased;gait speed decreased;stride length decreased  -LR     Bilateral Gait Deviations  forward flexed posture;heel strike decreased  -LR     Left Sided Gait Deviations  forward flexed posture;heel strike decreased  -LR     [place] Level (Stairs)  not tested  -LR     Comment (Gait/Stairs)  Patient ambulated with step through gait pattern at slow pace with forward flexed posture and advancing RW too far out in front. Improved slightly with cues for correction. Required one brief standing rest break d/t fatigue and SOA.  -LR       User Key  (r) = Recorded By, (t) = Taken By, (c) = Cosigned By    Initials Name Provider Type    LR Norma Hart, PT Physical Therapist        Obj/Interventions     Row Name 03/17/21 1526          Motor Skills    Therapeutic Exercise  ankle;knee;hip;other (see comments) cues for technique; reports pain and difficulty with marches on R  -LR     Row Name 03/17/21 1526          Hip (Therapeutic Exercise)    Hip (Therapeutic Exercise)  strengthening exercise  -LR     Hip Strengthening (Therapeutic Exercise)  bilateral;marching while seated;sitting;10 repetitions  -LR     Row Name 03/17/21 1526          Knee (Therapeutic Exercise)    Knee (Therapeutic Exercise)  strengthening exercise  -LR     Knee Strengthening (Therapeutic Exercise)  bilateral;LAQ (long arc quad);sitting;10 repetitions  -LR     Row Name 03/17/21 1526          Ankle (Therapeutic Exercise)    Ankle (Therapeutic Exercise)   AROM (active range of motion)  -LR     Ankle AROM (Therapeutic Exercise)  bilateral;dorsiflexion;plantarflexion;sitting;10 repetitions;5 repetitions  -LR     Row Name 03/17/21 1526          Balance    Balance Assessment  sitting static balance;sitting dynamic balance;standing static balance;standing dynamic balance  -LR     Static Sitting Balance  WFL;unsupported;sitting in chair  -LR     Dynamic Sitting Balance  WFL;unsupported;sitting in chair  -LR     Static Standing Balance  WFL;supported;standing  -LR     Dynamic Standing Balance  mild impairment;supported;standing  -LR     Comment, Balance  sat for PLB after gait  -LR       User Key  (r) = Recorded By, (t) = Taken By, (c) = Cosigned By    Initials Name Provider Type    LR Norma Hart, PT Physical Therapist        Goals/Plan     Row Name 03/17/21 1526          Bed Mobility Goal 1 (PT)    Activity/Assistive Device (Bed Mobility Goal 1, PT)  bed mobility activities, all  -LR     [place] Level/Cues Needed (Bed Mobility Goal 1, PT)  independent  -LR     Time Frame (Bed Mobility Goal 1, PT)  1 week;long term goal (LTG)  -LR     Progress/Outcomes (Bed Mobility Goal 1, PT)  goal ongoing  -     Row Name 03/17/21 1526          Transfer Goal 1 (PT)    Activity/Assistive Device (Transfer Goal 1, PT)  sit-to-stand/stand-to-sit;bed-to-chair/chair-to-bed;walker, rolling  -LR     [place] Level/Cues Needed (Transfer Goal 1, PT)  modified independence  -LR     Time Frame (Transfer Goal 1, PT)  1 week;long term goal (LTG)  -LR     Progress/Outcome (Transfer Goal 1, PT)  good progress toward goal;goal revised this date  -     Row Name 03/17/21 1526          Gait Training Goal 1 (PT)    Activity/Assistive Device (Gait Training Goal 1, PT)  gait (walking locomotion);assistive device use  -LR     Distance (Gait Training Goal 1, PT)  400 feet  -LR     Time Frame (Gait Training Goal 1, PT)  long term goal (LTG);2 weeks  -LR     Progress/Outcome (Gait Training  Goal 1, PT)  continuing progress toward goal;goal ongoing  -LR     Row Name 03/17/21 1526          Stairs Goal 1 (PT)    Activity/Assistive Device (Stairs Goal 1, PT)  stairs, all skills  -LR     [place] Level/Cues Needed (Stairs Goal 1, PT)  standby assist;1 person assist  -LR     Number of Stairs (Stairs Goal 1, PT)  1  -LR     Time Frame (Stairs Goal 1, PT)  long term goal (LTG);2 weeks  -LR     Progress/Outcome (Stairs Goal 1, PT)  goal ongoing  -LR       User Key  (r) = Recorded By, (t) = Taken By, (c) = Cosigned By    Initials Name Provider Type    LR Norma Hart, PT Physical Therapist        Clinical Impression     Row Name 03/17/21 1526          Pain    Additional Documentation  Pain Scale: Numbers Pre/Post-Treatment (Group)  -LR     Row Name 03/17/21 1526          Pain Scale: Numbers Pre/Post-Treatment    Pretreatment Pain Rating  0/10 - no pain  -LR     Posttreatment Pain Rating  0/10 - no pain  -LR     Pain Intervention(s)  Ambulation/increased activity;Repositioned  -LR     Row Name 03/17/21 1526          Plan of Care Review    Plan of Care Reviewed With  patient  -LR     Progress  improving  -LR     Outcome Summary  Patient ambulated 210 feet with RW and CGA, limited by fatigue and SOA. O2 sats within 90's throughout mobility on supplemental O2. Encouraged frequent ambulation. Will continue to proress as able.  -LR     Row Name 03/17/21 1526          Therapy Assessment/Plan (PT)    Rehab Potential (PT)  good, to achieve stated therapy goals  -LR     Criteria for Skilled Interventions Met (PT)  yes;meets criteria;skilled treatment is necessary  -LR     Row Name 03/17/21 1526          Vital Signs    Pre SpO2 (%)  98  -LR     O2 Delivery Pre Treatment  supplemental O2  -LR     Intra SpO2 (%)  94  -LR     O2 Delivery Intra Treatment  supplemental O2  -LR     Post SpO2 (%)  94  -LR     O2 Delivery Post Treatment  supplemental O2  -LR     Pre Patient Position  Sitting  -LR     Intra Patient  Position  Sitting  -LR     Post Patient Position  Sitting  -LR     Row Name 03/17/21 1526          Positioning and Restraints    Pre-Treatment Position  sitting in chair/recliner  -LR     Post Treatment Position  chair  -LR     In Chair  notified nsg;reclined;sitting;call light within reach;encouraged to call for assist;exit alarm on;with family/caregiver;legs elevated;waffle cushion  -LR       User Key  (r) = Recorded By, (t) = Taken By, (c) = Cosigned By    Initials Name Provider Type    Norma Escobedo, PT Physical Therapist        Outcome Measures     Row Name 03/17/21 1526          How much help from another person do you currently need...    Turning from your back to your side while in flat bed without using bedrails?  3  -LR     Moving from lying on back to sitting on the side of a flat bed without bedrails?  3  -LR     Moving to and from a bed to a chair (including a wheelchair)?  3  -LR     Standing up from a chair using your arms (e.g., wheelchair, bedside chair)?  3  -LR     To walk in hospital room?  3  -LR     Row Name 03/17/21 1526          Functional Assessment    Outcome Measure Options  AM-PAC 6 Clicks Basic Mobility (PT)  -LR       User Key  (r) = Recorded By, (t) = Taken By, (c) = Cosigned By    Initials Name Provider Type    Norma Escobedo, PT Physical Therapist        Physical Therapy Education                 Title: PT OT SLP Therapies (Not Started)     Topic: Physical Therapy (Done)     Point: Mobility training (Done)     Learning Progress Summary           Patient Acceptance, E,D, VU,NR by LR at 3/17/2021 1526    Comment: Educated on safety with mobility, correct sit<->stand t/f technique, correct gait mechanics, LE HEP, PLB, and progression of POC.    Acceptance, E,TB, VU by  at 3/15/2021 1044    Comment: edu on PT services, POC, HEP, safe sequencing with gait, and d/c plans                   Point: Home exercise program (Done)     Learning Progress Summary            Patient Acceptance, E,D, VU,NR by LR at 3/17/2021 1526    Comment: Educated on safety with mobility, correct sit<->stand t/f technique, correct gait mechanics, LE HEP, PLB, and progression of POC.    Acceptance, E,TB, VU by  at 3/15/2021 1044    Comment: edu on PT services, POC, HEP, safe sequencing with gait, and d/c plans                   Point: Body mechanics (Done)     Learning Progress Summary           Patient Acceptance, E,D, VU,NR by LR at 3/17/2021 1526    Comment: Educated on safety with mobility, correct sit<->stand t/f technique, correct gait mechanics, LE HEP, PLB, and progression of POC.    Acceptance, E,TB, VU by  at 3/15/2021 1044    Comment: edu on PT services, POC, HEP, safe sequencing with gait, and d/c plans                   Point: Precautions (Done)     Learning Progress Summary           Patient Acceptance, E,D, VU,NR by LR at 3/17/2021 1526    Comment: Educated on safety with mobility, correct sit<->stand t/f technique, correct gait mechanics, LE HEP, PLB, and progression of POC.    Acceptance, E,TB, VU by  at 3/15/2021 1044    Comment: edu on PT services, POC, HEP, safe sequencing with gait, and d/c plans                               User Key     Initials Effective Dates Name Provider Type Discipline     06/19/15 -  Norma Hart, PT Physical Therapist PT     09/22/20 -  Rohan Mcclellan, PT Physical Therapist PT              PT Recommendation and Plan     Plan of Care Reviewed With: patient  Progress: improving  Outcome Summary: Patient ambulated 210 feet with RW and CGA, limited by fatigue and SOA. O2 sats within 90's throughout mobility on supplemental O2. Encouraged frequent ambulation. Will continue to proress as able.     Time Calculation:   PT Charges     Row Name 03/17/21 1526 03/17/21 1459          Time Calculation    Start Time  1526  -LR  --  -LR     PT Received On  03/17/21  -LR  --  -LR     PT Goal Re-Cert Due Date  03/25/21  -LR  --  -LR        Time  Calculation- PT    Total Timed Code Minutes- PT  24 minute(s)  -LR  --  -LR        Timed Charges    34774 - PT Therapeutic Exercise Minutes  8  -LR  --     78311 - Gait Training Minutes   12  -LR  --  -LR     22337 - PT Therapeutic Activity Minutes  4  -LR  --       User Key  (r) = Recorded By, (t) = Taken By, (c) = Cosigned By    Initials Name Provider Type    LR Norma Hart, PT Physical Therapist        Therapy Charges for Today     Code Description Service Date Service Provider Modifiers Qty    [id number] HC GAIT TRAINING EA 15 MIN 3/17/2021 Norma Hart, PT GP 1    [id number]  PT THER PROC EA 15 MIN 3/17/2021 Norma Hart, PT GP 1          PT G-Codes  Outcome Measure Options: AM-PAC 6 Clicks Daily Activity (OT)  AM-PAC 6 Clicks Score (PT): 20  AM-PAC 6 Clicks Score (OT): 20    Norma Hart PT  3/17/2021

## 2021-03-17 NOTE — PROGRESS NOTES
"Palliative Care Progress Note    Date of Admission: 3/14/2021    Code Status:   Current Code Status     Date Active Code Status Order ID Comments User Context       3/16/2021 1217 CPR [id number]  Sharon Jacobs,  Inpatient     Advance Care Planning Activity      Questions for Current Code Status     Question Answer Comment    Code Status CPR     Medical Interventions (Level of Support Prior to Arrest) Limited     Limited Support to NOT Include Intubation     Level Of Support Discussed With Patient         Subjective:  Patient reports she is less dyspneic.  No pain, nausea.  + anxiety when tries to think about disposition  Reviewed current scheduled and prn medications for route, type, dose, and frequency.  Reviewed medical record     benzonatate  •  bisacodyl  •  bisacodyl  •  clonazePAM  •  ibuprofen  •  ipratropium-albuterol  •  labetalol  •  magnesium sulfate **OR** magnesium sulfate **OR** magnesium sulfate  •  ondansetron  •  PATIENT SUPPLIED MEDICATION  •  sodium chloride  •  sodium chloride  •  sodium chloride  •  traMADol    Objective:  PPS 50%  /87 (BP Location: Right arm, Patient Position: Sitting)   Pulse 73   Temp 98 °F (36.7 °C) (Oral)   Resp 18   Ht 157.5 cm (62\")   Wt 67.3 kg (148 lb 6.4 oz)   SpO2 94%   BMI 27.14 kg/m²    Intake & Output (last day)       03/16 0701 - 03/17 0700 03/17 0701 - 03/18 0700    P.O.  360    I.V. (mL/kg)      IV Piggyback      Total Intake(mL/kg)  360 (5.3)    Urine (mL/kg/hr)  800 (1.2)    Total Output  800    Net  -440              Lab Results (last 24 hours)     ** No results found for the last 24 hours. **        Imaging Results (Last 24 Hours)     ** No results found for the last 24 hours. **        Physical Exam:  Gen: NAD, up in chair  Skin: warm, dry   Eyes: ISAAC, conjunctivae clear and moist   Resp/thorax: even effort, nonlabored, CTA   CV: RRR   ABD: soft, bowel sounds +, non tender  Ext: no edema, no redness   Neuro: alert, interactive, " no myoclonus     Reviewed labs and diagnostic results.   Lab Results   Component Value Date    HGBA1C 4.90 03/15/2021     Results from last 7 days   Lab Units 03/16/21  0523   WBC 10*3/mm3 13.25*   HEMOGLOBIN g/dL 9.5*   HEMATOCRIT % 32.1*   PLATELETS 10*3/mm3 179     Results from last 7 days   Lab Units 03/16/21  0523 03/14/21  1439   SODIUM mmol/L 139 140   POTASSIUM mmol/L 5.0 4.9   CHLORIDE mmol/L 101 93*   CO2 mmol/L 35.0* 41.0*   BUN mg/dL 22 18   CREATININE mg/dL 0.93 1.05*   CALCIUM mg/dL 8.2* 9.7   BILIRUBIN mg/dL  --  0.3   ALK PHOS U/L  --  59   ALT (SGPT) U/L  --  16   AST (SGOT) U/L  --  26   GLUCOSE mg/dL 148* 161*       Impression: 80 y.o. female with acute on chronic hypoxia respiratory failure and underlying acute exacerbation COPD    Plan:   Dyspnea - patient seems to tolerate hydrocodone/APAP 2.5mg scheduled. Reports improvement in her symptom.     Goals of care - patient is starting to consider SNF placement, however she is describing being able to have day visits away from the facility on a regular basis. Her  was at the same facility and she would visit often and take his  on outings regularly.  Time: 40 minutes   > 50% time spent in counseling and education concerning current orders for symptom management with patient    Keena Mitchell, RUBY  [phone]  03/17/21  16:42 EDT

## 2021-03-18 NOTE — PROGRESS NOTES
Continued Stay Note  [facility]     Patient Name: Sadie Tijerina  MRN: [id number]  Today's Date: 3/18/2021    Admit Date: 3/14/2021    Discharge Plan     Row Name 03/18/21 1536       Plan    Plan  [facility]    Patient/Family in Agreement with Plan  yes    Plan Comments  I met with Mrs. Tijerina and her daughter Neris at the bedside to discuss discharge disposition. Mrs. Tijerina is hesitant, but agreeable to transferring to [facility] and rehab after this hospitalization. She has used all off her skilled days through Medicare, so she will be transferring into a longterm Medicaid pending bed. I have confirmed this with Isatu in admissions at [place]. They can accept her on Monday. I have emailed [facility] ambulance service to request EMS transport for Monday 3-22. Mrs. Tijerina and Neris both verbalize agreement to this plan.    Final Discharge Disposition Code  04 - intermediate care facility        Discharge Codes    No documentation.       Expected Discharge Date and Time     Expected Discharge Date Expected Discharge Time    Mar 22, 2021             Michael Roberson RN

## 2021-03-18 NOTE — PROGRESS NOTES
"    [facility] Medicine Services  PROGRESS NOTE    Patient Name: Sadie Tijerina  : 1940  MRN: [id number]    Date of Admission: 3/14/2021  Primary Care Physician: Kristian Wolff MD    Subjective   Subjective     CC:  F/u SOA    HPI:  Patient states that she is sleepy because she got some medicine to help because her bipap was making her \"smother\". She is more short of breath and wheezing more     ROS:  Gen- No fevers, chills  CV- No chest pain, palpitations  Resp- No cough,+ dyspnea  GI- No N/V/D, abd pain        Objective   Objective     Vital Signs:   Temp:  [96.7 °F (35.9 °C)-98 °F (36.7 °C)] 97.8 °F (36.6 °C)  Heart Rate:  [69-79] 73  Resp:  [16-20] 18  BP: (123-167)/(54-88) 167/79        Physical Exam:  Constitutional: No acute distress, awake, alert, elderly female sitting up in bed   HENT: NCAT, mucous membranes moist  Respiratory: worsening of expiratory wheezing bilaterlly , respiratory effort mildly labored on 2 L   Cardiovascular: RRR, no murmurs, rubs, or gallops  Gastrointestinal: Positive bowel sounds, soft, nontender, nondistended  Musculoskeletal: trace  bilateral ankle edema  Psychiatric: Appropriate affect, cooperative  Neurologic: Oriented x 3,  speech clear  Skin: No rashes      Results Reviewed:  Results from last 7 days   Lab Units 21  0900 21  0523 03/15/21  0545 21  1439   WBC 10*3/mm3 13.13* 13.25* 8.99 13.64*   HEMOGLOBIN g/dL 9.6* 9.5* 8.7* 10.9*   HEMATOCRIT % 32.5* 32.1* 28.9* 37.3   PLATELETS 10*3/mm3 209 179 160 203   PROCALCITONIN ng/mL  --   --   --  0.07     Results from last 7 days   Lab Units 21  0900 21  0523 03/15/21  0545 21  1439   SODIUM mmol/L 138 139 140 140   POTASSIUM mmol/L 5.3* 5.0 4.2 4.9   CHLORIDE mmol/L 103 101 99 93*   CO2 mmol/L 32.0* 35.0* 37.0* 41.0*   BUN mg/dL 25* 22 18 18   CREATININE mg/dL 0.76 0.93 0.90 1.05*   GLUCOSE mg/dL 115* 148* 135* 161*   CALCIUM mg/dL 8.7 8.2* 8.4* 9.7   ALT " (SGPT) U/L  --   --   --  16   AST (SGOT) U/L  --   --   --  26   TROPONIN T ng/mL  --   --   --  <0.010   PROBNP pg/mL  --   --   --  275.2     Estimated Creatinine Clearance: 48.8 mL/min (by C-G formula based on SCr of 0.76 mg/dL).    Microbiology Results Abnormal     Procedure Component Value - Date/Time    Blood Culture - Blood, Arm, Right [[id number]] Collected: 03/14/21 1540    Lab Status: Preliminary result Specimen: Blood from Arm, Right Updated: 03/17/21 1645     Blood Culture No growth at 3 days    Blood Culture - Blood, Hand, Left [[id number]] Collected: 03/14/21 1545    Lab Status: Preliminary result Specimen: Blood from Hand, Left Updated: 03/17/21 1645     Blood Culture No growth at 3 days    COVID PRE-OP / PRE-PROCEDURE SCREENING ORDER (NO ISOLATION) - Swab, Nasopharynx [[id number]]  (Normal) Collected: 03/14/21 1443    Lab Status: Final result Specimen: Swab from Nasopharynx Updated: 03/14/21 1521    Narrative:      The following orders were created for panel order COVID PRE-OP / PRE-PROCEDURE SCREENING ORDER (NO ISOLATION) - Swab, Nasopharynx.  Procedure                               Abnormality         Status                     ---------                               -----------         ------                     COVID-19 and FLU A/B PCR...[[id number]]  Normal              Final result                 Please view results for these tests on the individual orders.    COVID-19 and FLU A/B PCR - Swab, Nasopharynx [[id number]]  (Normal) Collected: 03/14/21 1443    Lab Status: Final result Specimen: Swab from Nasopharynx Updated: 03/14/21 1521     COVID19 Not Detected     Influenza A PCR Not Detected     Influenza B PCR Not Detected    Narrative:      Fact sheet for providers: https://www.fda.gov/media/373552/download    Fact sheet for patients: https://www.fda.gov/media/329327/download    Test performed by PCR.          Imaging Results (Last 24 Hours)     ** No results found for the last 24 hours. **               Results for orders placed in visit on 04/01/20    Adult Transthoracic Echo Complete W/ Cont if Necessary Per Protocol    Interpretation Summary  · Calculated right ventricular systolic pressure from tricuspid regurgitation is 40 mmHg.  · Mild tricuspid valve regurgitation is present.  · Estimated EF = 66%.  · Left ventricular systolic function is normal.      I have reviewed the medications:  Scheduled Meds:Albuterol Sulfate NEB Orderable, 2.5 mg, Nebulization, 4x Daily - RT  amLODIPine, 5 mg, Oral, Daily  arformoterol, 15 mcg, Nebulization, BID - RT  aspirin, 81 mg, Oral, Daily  benzocaine, , Mouth/Throat, TID  budesonide, 0.5 mg, Nebulization, BID - RT  cefTRIAXone, 1 g, Intravenous, Nightly  cetirizine, 10 mg, Oral, Daily  cholecalciferol, 1,000 Units, Oral, Daily  docusate sodium, 100 mg, Oral, BID  doxycycline, 100 mg, Intravenous, Q12H  fluticasone, 2 spray, Each Nare, Daily  guaiFENesin, 1,200 mg, Oral, Daily  heparin (porcine), 5,000 Units, Subcutaneous, Q12H  HYDROcodone-acetaminophen, 0.5 tablet, Oral, Q6H  ipratropium, 2 spray, Each Nare, 4x Daily  lactobacillus acidophilus, 1 capsule, Oral, BID  melatonin, 5 mg, Oral, Nightly  methylPREDNISolone sodium succinate, 40 mg, Intravenous, Q12H  palliative care oral rinse, , Mouth/Throat, 4x Daily  pantoprazole, 40 mg, Oral, Daily  pharmacy consult - MTM, , Does not apply, Daily  pravastatin, 20 mg, Oral, Nightly  propranolol, 60 mg, Oral, BID  sertraline, 50 mg, Oral, Daily  sodium chloride, 10 mL, Intravenous, Q12H  sodium chloride, 10 mL, Intravenous, Q12H  triamcinolone, , Topical, Q12H      Continuous Infusions:   PRN Meds:.albuterol sulfate HFA  •  benzonatate  •  bisacodyl  •  bisacodyl  •  clonazePAM  •  ibuprofen  •  ipratropium-albuterol  •  labetalol  •  magnesium sulfate **OR** magnesium sulfate **OR** magnesium sulfate  •  ondansetron  •  sodium chloride  •  sodium chloride  •  sodium chloride    Assessment/Plan   Assessment & Plan     Active  Hospital Problems    Diagnosis  POA   • **Acute and chronic respiratory failure with hypoxia (CMS/HCC) [J96.21]  Yes   • COPD with acute exacerbation (CMS/HCC) [J44.1]  Yes   • Acute on chronic respiratory failure with hypercapnia (CMS/HCC) [J96.22]  Yes   • Physical deconditioning [R53.81]  Yes   • Chronic diastolic heart failure (CMS/HCC) [I50.32]  Yes   • Mixed anxiety depressive disorder [F41.8]  Yes   • Essential hypertension [I10]  Yes   • GERD (gastroesophageal reflux disease) [K21.9]  Yes   • Hyperlipidemia [E78.5]  Yes      Resolved Hospital Problems   No resolved problems to display.        Brief Hospital Course to date:  Sadie Tijerina is a 80 y.o. female with PMH of HTN, HLP, arthritis, remote uterine cancer, COPD on 2L NC and BiPAP, frequent pneumonia that presents after being home one day from rehab with SOA, wheezing and cough.    Acute on Chronic Respiratory Failure  COPD  Recent COVID Pneumonia  -CTA shows no PE, bronchial wall thickening, chronic lung changes  -home o2 2LNC  -Solumedrol bid  - doxy and rocephin   -changed neb albuterol to inhaler per patient request  -ask Pulm to see in am for second opinion/help with GOC   Chronic Diastolic Heart Failure  -EF 66%, tricuspid regurgitation   HTN  Anxiety/Depression  HLD  GERD  GOC/Family Update  Today I discussed code status with the patient. She states she would not want to be intubated but does want chest compressions. Explained what chest compressions are and the result, she said she would like to discuss with her daughter. Code status changed in computer to reflect wishes.   I called and updated the daughter who said that she would talk to her mother. Also, daughter reports that there is no one to take care of her if the patient were to go home.  DVT Prophylaxis:  ELIECER      Disposition: I expect the patient to be discharged TBD    CODE STATUS:   Code Status and Medical Interventions:   Ordered at: 03/16/21 1217     Limited Support to NOT  "Include:    Intubation     Level Of Support Discussed With:    Patient     Code Status:    CPR     Medical Interventions (Level of Support Prior to Arrest):    Messi Jacobs DO  21                [facility] Medicine Services  PROGRESS NOTE    Patient Name: aSdie Tijerina  : 1940  MRN: [id number]    Date of Admission: 3/14/2021  Primary Care Physician: Kristian Wolff MD    Subjective   Subjective     CC:  F/u SOA    HPI:  Patient states that she feels short of breath. She is coughing more today. She requests pro air inhaler because the neb treatment \"does not work as well\".     ROS:  Gen- No fevers, chills  CV- No chest pain, palpitations  Resp- No cough,+ dyspnea  GI- No N/V/D, abd pain        Objective   Objective     Vital Signs:   Temp:  [96.7 °F (35.9 °C)-98 °F (36.7 °C)] 97.8 °F (36.6 °C)  Heart Rate:  [69-79] 73  Resp:  [16-20] 18  BP: (123-167)/(54-88) 167/79        Physical Exam:  Constitutional: No acute distress, awake, alert, elderly female sitting up in bed   HENT: NCAT, mucous membranes moist  Respiratory: expiratory wheezing bilaterlly , respiratory effort non labored on 2 L   Cardiovascular: RRR, no murmurs, rubs, or gallops  Gastrointestinal: Positive bowel sounds, soft, nontender, nondistended  Musculoskeletal: trace  bilateral ankle edema  Psychiatric: Appropriate affect, cooperative  Neurologic: Oriented x 3,  speech clear  Skin: No rashes      Results Reviewed:  Results from last 7 days   Lab Units 21  0921  0523 03/15/21  0545 21  1439   WBC 10*3/mm3 13.13* 13.25* 8.99 13.64*   HEMOGLOBIN g/dL 9.6* 9.5* 8.7* 10.9*   HEMATOCRIT % 32.5* 32.1* 28.9* 37.3   PLATELETS 10*3/mm3 209 179 160 203   PROCALCITONIN ng/mL  --   --   --  0.07     Results from last 7 days   Lab Units 21  0921  0523 03/15/21  0545 21  1439   SODIUM mmol/L 138 139 140 140   POTASSIUM mmol/L 5.3* 5.0 4.2 4.9   CHLORIDE " mmol/L 103 101 99 93*   CO2 mmol/L 32.0* 35.0* 37.0* 41.0*   BUN mg/dL 25* 22 18 18   CREATININE mg/dL 0.76 0.93 0.90 1.05*   GLUCOSE mg/dL 115* 148* 135* 161*   CALCIUM mg/dL 8.7 8.2* 8.4* 9.7   ALT (SGPT) U/L  --   --   --  16   AST (SGOT) U/L  --   --   --  26   TROPONIN T ng/mL  --   --   --  <0.010   PROBNP pg/mL  --   --   --  275.2     Estimated Creatinine Clearance: 48.8 mL/min (by C-G formula based on SCr of 0.76 mg/dL).    Microbiology Results Abnormal     Procedure Component Value - Date/Time    Blood Culture - Blood, Arm, Right [[id number]] Collected: 03/14/21 1540    Lab Status: Preliminary result Specimen: Blood from Arm, Right Updated: 03/17/21 1645     Blood Culture No growth at 3 days    Blood Culture - Blood, Hand, Left [[id number]] Collected: 03/14/21 1545    Lab Status: Preliminary result Specimen: Blood from Hand, Left Updated: 03/17/21 1645     Blood Culture No growth at 3 days    COVID PRE-OP / PRE-PROCEDURE SCREENING ORDER (NO ISOLATION) - Swab, Nasopharynx [[id number]]  (Normal) Collected: 03/14/21 1443    Lab Status: Final result Specimen: Swab from Nasopharynx Updated: 03/14/21 1521    Narrative:      The following orders were created for panel order COVID PRE-OP / PRE-PROCEDURE SCREENING ORDER (NO ISOLATION) - Swab, Nasopharynx.  Procedure                               Abnormality         Status                     ---------                               -----------         ------                     COVID-19 and FLU A/B PCR...[[id number]]  Normal              Final result                 Please view results for these tests on the individual orders.    COVID-19 and FLU A/B PCR - Swab, Nasopharynx [[id number]]  (Normal) Collected: 03/14/21 1443    Lab Status: Final result Specimen: Swab from Nasopharynx Updated: 03/14/21 1521     COVID19 Not Detected     Influenza A PCR Not Detected     Influenza B PCR Not Detected    Narrative:      Fact sheet for providers:  https://www.fda.gov/media/615501/download    Fact sheet for patients: https://www.fda.gov/media/977107/download    Test performed by PCR.          Imaging Results (Last 24 Hours)     ** No results found for the last 24 hours. **          Results for orders placed in visit on 04/01/20    Adult Transthoracic Echo Complete W/ Cont if Necessary Per Protocol    Interpretation Summary  · Calculated right ventricular systolic pressure from tricuspid regurgitation is 40 mmHg.  · Mild tricuspid valve regurgitation is present.  · Estimated EF = 66%.  · Left ventricular systolic function is normal.      I have reviewed the medications:  Scheduled Meds:Albuterol Sulfate NEB Orderable, 2.5 mg, Nebulization, 4x Daily - RT  amLODIPine, 5 mg, Oral, Daily  arformoterol, 15 mcg, Nebulization, BID - RT  aspirin, 81 mg, Oral, Daily  benzocaine, , Mouth/Throat, TID  budesonide, 0.5 mg, Nebulization, BID - RT  cefTRIAXone, 1 g, Intravenous, Nightly  cetirizine, 10 mg, Oral, Daily  cholecalciferol, 1,000 Units, Oral, Daily  docusate sodium, 100 mg, Oral, BID  doxycycline, 100 mg, Intravenous, Q12H  fluticasone, 2 spray, Each Nare, Daily  guaiFENesin, 1,200 mg, Oral, Daily  heparin (porcine), 5,000 Units, Subcutaneous, Q12H  HYDROcodone-acetaminophen, 0.5 tablet, Oral, Q6H  ipratropium, 2 spray, Each Nare, 4x Daily  lactobacillus acidophilus, 1 capsule, Oral, BID  melatonin, 5 mg, Oral, Nightly  methylPREDNISolone sodium succinate, 40 mg, Intravenous, Q12H  palliative care oral rinse, , Mouth/Throat, 4x Daily  pantoprazole, 40 mg, Oral, Daily  pharmacy consult - MTM, , Does not apply, Daily  pravastatin, 20 mg, Oral, Nightly  propranolol, 60 mg, Oral, BID  sertraline, 50 mg, Oral, Daily  sodium chloride, 10 mL, Intravenous, Q12H  sodium chloride, 10 mL, Intravenous, Q12H  triamcinolone, , Topical, Q12H      Continuous Infusions:   PRN Meds:.albuterol sulfate HFA  •  benzonatate  •  bisacodyl  •  bisacodyl  •  clonazePAM  •  ibuprofen  •   ipratropium-albuterol  •  labetalol  •  magnesium sulfate **OR** magnesium sulfate **OR** magnesium sulfate  •  ondansetron  •  sodium chloride  •  sodium chloride  •  sodium chloride    Assessment/Plan   Assessment & Plan     Active Hospital Problems    Diagnosis  POA   • **Acute and chronic respiratory failure with hypoxia (CMS/HCC) [J96.21]  Yes   • COPD with acute exacerbation (CMS/HCC) [J44.1]  Yes   • Acute on chronic respiratory failure with hypercapnia (CMS/[facility]) [J96.22]  Yes   • Physical deconditioning [R53.81]  Yes   • Chronic diastolic heart failure (CMS/HCC) [I50.32]  Yes   • Mixed anxiety depressive disorder [F41.8]  Yes   • Essential hypertension [I10]  Yes   • GERD (gastroesophageal reflux disease) [K21.9]  Yes   • Hyperlipidemia [E78.5]  Yes      Resolved Hospital Problems   No resolved problems to display.        Brief Hospital Course to date:  Sadie Tijerina is a 80 y.o. female with PMH of HTN, HLP, arthritis, remote uterine cancer, COPD on 2L NC and BiPAP, frequent pneumonia that presents after being home one day from rehab with SOA, wheezing and cough.    Acute on Chronic Respiratory Failure  COPD  Recent COVID Pneumonia  -CTA shows no PE, bronchial wall thickening, chronic lung changes  -home o2 2LNC  -Solumedrol bid  - doxy and rocephin   -changed neb albuterol to inhaler per patient request  -pulm following   -brovana/pulmicort   Chronic Diastolic Heart Failure  -EF 66%, tricuspid regurgitation    HTN  Anxiety/Depression  HLD  GERD  GOC/Family Update  Patient deciding on home with hospice, versus rehab (if she has days left) or an alternative. Meeting today with hospice,  and family   DVT Prophylaxis:  ELIECER      Disposition: I expect the patient to be discharged TBD    CODE STATUS:   Code Status and Medical Interventions:   Ordered at: 03/16/21 1217     Limited Support to NOT Include:    Intubation     Level Of Support Discussed With:    Patient     Code Status:    CPR      Medical Interventions (Level of Support Prior to Arrest):    Limited       Sharon Jacobs,   03/18/21

## 2021-03-18 NOTE — PLAN OF CARE
Problem: Palliative Care  Goal: Enhanced Quality of Life  Intervention: Optimize Psychosocial Wellbeing  Flowsheets (Taken 3/18/2021 1108)  Supportive Measures:   active listening utilized   verbalization of feelings encouraged   Goal Outcome Evaluation:  Plan of Care Reviewed With: patient  Progress: no change  Outcome Summary: Pt was very drowsy this am. Pt states she had a coughing spell and panic attack earlier this morning. She received klonipin and her inhaler with relief. Dose of klonipin to be reduced due to drowsiness 8 hours later. Pt able to arouse but falls asleep easily. meeting with hospice today. support to pt.Palliative Team meeting 1300: Seth Baltazar DO, Keena Mitchell APRN, Afia Gaston RN, CHPN, Luci ESTEVEZW, Afia Gaston RN, CHPN, Luma Haynes RN CHPN

## 2021-03-18 NOTE — DISCHARGE PLACEMENT REQUEST
"Case management [phone]  Sadie Tijerina (80 y.o. Female)     Date of Birth Social Security Number Address Home Phone MRN    1940  [id number] [facility] [address] [phone] [id number]    Yarsani Marital Status          Samaritan        Admission Date Admission Type Admitting Provider Attending Provider Department, Room/Bed    3/14/21 Emergency Sharon Jacobs DO [place], Sarah Kathleen, DO [facility] 3E, S336/1    Discharge Date Discharge Disposition Discharge Destination                       Attending Provider: Sharon Jacobs DO    Allergies: Morphine And Related    Isolation: None   Infection: MRSA/History Only (09/17/20)   Code Status: CPR    Ht: 157.5 cm (62\")   Wt: 67.3 kg (148 lb 6.4 oz)    Admission Cmt: None   Principal Problem: Acute and chronic respiratory failure with hypoxia (CMS/[facility]) [J96.21]                 Active Insurance as of 3/14/2021     Primary Coverage     Payor Plan Insurance Group Employer/Plan Group    MEDICARE MEDICARE A ONLY      Payor Plan Address Payor Plan Phone Number Payor Plan Fax Number Effective Dates    [address] [phone]  6/1/2003 - None Entered    [facility] [id number]       Subscriber Name Subscriber Birth Date Member ID       SADIE TIJERINA 1940 [id number]           Secondary Coverage     Payor Plan Insurance Group Employer/Plan Group    HUMANA HUMANA [id number]     Payor Plan Address Payor Plan Phone Number Payor Plan Fax Number Effective Dates    [address] [address] [phone]  1/1/2013 - None Entered    [facility] [address]       Subscriber Name Subscriber Birth Date Member ID       SADIE TIJERINA 1940 [id number]                 Emergency Contacts      (Rel.) Home Phone Work Phone Mobile Phone    Neris Giles (Daughter) [phone] -- [phone]    TijerinaAlfonzo woods (Son) [phone] -- [phone]               History & Physical      Denice Ramesh MD " at 21 1642              [facility] Medicine Services  HISTORY AND PHYSICAL    Patient Name: Sadie Tijerina  : 1940  MRN: [id number]  Primary Care Physician: Kristian Wolff MD  Date of admission: 3/14/2021    Subjective   Subjective     Chief Complaint:  SOA/ PRINCE    HPI:  Sadie Tijerina is a 80 y.o. female with pmh significant for HTN, HLP, arthritis, remote uterine cancer, COPD on 2L NC and BiPAP, frequent pneumonia that presents after being home one day from rehab with SOA, wheezing and cough. Patient reports several hospitalizations since 2020 with treatment for pneumonia, bronchitis. Patient reports being hospitalized 3 times last / winter with a/c respiratory failure, sent to rehab in December and diagnosed treated for pneumonia again in January. Since this time she has had worsening/ declining respiratory status and function. Additionally, the patient was diagnosed with COVID during her rehab stay at [facility] in late 2020.  Patient was endorses increased SOA/ PRINCE, cough, wheezing, chills, sweats, dizziness, fatigue starting 2-3 days ago. Patient have post tussive vomiting and decreased appetite as well. She was discharged from rehab yesterday and presents for inability to take deep breath of move in home without significant dyspnea. She has been increasing O2 from 2L to 3L and utilizing nebs more in last 2 days.    ED work up reveals WBC 13.64, LA 2.1, pH 7.399 with CO2 65, creatinine 1.05/ GFR 50 and CXR with bibasilar opacities. COVID negative. CTA chest no PE or acute findings, + bronchiectasis and wall thickening and patchy areas of consolidation consistent with chronic long standing changes. + RML progressed/ RLLsmiliar ground glass opacities from previous study.     COVID Details: [] No Symptoms  Symptoms: [x] Fever [x]  Cough [] Shortness of breath [] Change in taste or smell  Risks:   [] Direct Exposure [x] High risk  facility   Date of Symptoms:   __  Date of first positive COVID test: __      Review of Systems   Constitutional: Positive for activity change, appetite change, chills, diaphoresis, fatigue and fever.   HENT: Negative.    Eyes: Negative.    Respiratory: Positive for cough, shortness of breath and wheezing.    Cardiovascular: Negative.    Gastrointestinal: Positive for nausea and vomiting.   Genitourinary: Negative.    Musculoskeletal: Negative.    Neurological: Positive for dizziness, weakness and light-headedness.   Psychiatric/Behavioral: Negative.         All other systems reviewed and are negative.     Personal History     Past Medical History:   Diagnosis Date   • Arthritis    • Cancer (CMS/[facility])     uterine cancer (1981)   • COPD (chronic obstructive pulmonary disease) (CMS/[facility])    • Depression    • Elevated cholesterol    • GERD (gastroesophageal reflux disease)    • History of emphysema    • History of esophageal reflux    • History of recurrent UTI (urinary tract infection)    • History of renal calculi    • History of uterine cancer    • Hyperlipidemia    • Hypertension    • Impaired functional mobility, balance, gait, and endurance    • Pneumonia 02/2019       Past Surgical History:   Procedure Laterality Date   • FOOT SURGERY     • HAND SURGERY     • HYSTERECTOMY         Family History: family history includes Arthritis in her father; Cancer in her mother; Diabetes in her daughter and son; Glaucoma in her son; Heart attack in her father; Heart disease in her father and sister; Hyperlipidemia in her daughter; Migraines in her daughter. Otherwise pertinent FHx was reviewed and unremarkable.     Social History:  reports that she has been smoking cigarettes. She has been smoking about 0.25 packs per day. She has never used smokeless tobacco. She reports that she does not drink alcohol and does not use drugs.  Social History     Social History Narrative   • Not on file        Medications:  Glycopyrrolate-Formoterol, SUMAtriptan, Vitamin D, acetaminophen, albuterol, amLODIPine, arformoterol, aspirin, benzonatate, betamethasone dipropionate, budesonide, cetirizine, clonazePAM, clotrimazole-betamethasone, estrogens (conjugated), fluticasone, furosemide, guaiFENesin, hydrocortisone, ipratropium, lactobacillus acidophilus, omeprazole, potassium chloride, pravastatin, predniSONE, propranolol, sertraline, and traMADol    Allergies   Allergen Reactions   • Morphine And Related Irritability       Objective   Objective     Vital Signs:   Temp:  [98.1 °F (36.7 °C)] 98.1 °F (36.7 °C)  Heart Rate:  [] 97  Resp:  [24] 24  BP: (150-175)/(65-91) 150/65  Flow (L/min):  [2] 2    Physical Exam   Constitutional: Awake, alert, sitting up in bed  Eyes: PERRLA, sclerae anicteric, no conjunctival injection  HENT: NCAT, mucous membranes moist  Neck: Supple, no thyromegaly, no lymphadenopathy, trachea midline  Respiratory: diffuse exp wheezes and crackles bilaterally, mildly to mod labored, pursed lip breathing, audible wheezes and dyspneic with conversation  Cardiovascular: RRR, no murmurs, rubs, or gallops, palpable pedal pulses bilaterally  Gastrointestinal: Positive bowel sounds, soft, nontender, nondistended  Musculoskeletal: No bilateral ankle edema, no clubbing or cyanosis to extremities  Psychiatric: Appropriate affect, cooperative  Neurologic: Oriented x 3, strength symmetric in all extremities, Cranial Nerves grossly intact to confrontation, speech clear  Skin: No rashes      Results Reviewed:  I have personally reviewed most recent indicated data and agree with findings including:  [x]  Laboratory  [x]  Radiology  [x]  EKG/Telemetry  []  Pathology  []  Cardiac/Vascular Studies  [x]  Old records  []  Other:  Most pertinent findings include:      LAB RESULTS:      Lab 03/14/21  1439 03/11/21  1730   WBC 13.64* 8.48   HEMOGLOBIN 10.9* 10.4*   HEMATOCRIT 37.3 34.5   PLATELETS 203 209   NEUTROS  ABS 13.06* 6.32   IMMATURE GRANS (ABS) 0.11* 0.02   LYMPHS ABS 0.22* 1.44   MONOS ABS 0.16 0.49   EOS ABS 0.07 0.18   MCV 97.4* 98.0*   PROCALCITONIN 0.07  --    LACTATE 2.1*  --          Lab 03/14/21  1439 03/11/21  1730   SODIUM 140 144   POTASSIUM 4.9 4.5   CHLORIDE 93* 101   CO2 41.0* 39.0*   ANION GAP 6.0 4.0*   BUN 18 19   CREATININE 1.05* 0.94   GLUCOSE 161* 158*   CALCIUM 9.7 8.8   MAGNESIUM  --  1.5*         Lab 03/14/21  1439   TOTAL PROTEIN 6.9   ALBUMIN 3.70   GLOBULIN 3.2   ALT (SGPT) 16   AST (SGOT) 26   BILIRUBIN 0.3   ALK PHOS 59         Lab 03/14/21  1439   PROBNP 275.2   TROPONIN T <0.010                 Lab 03/14/21  1529   PH, ARTERIAL 7.399   PCO2, ARTERIAL 65.9*   PO2 ART 95.1   FIO2 28   HCO3 ART 40.7*   BASE EXCESS ART 13.4*   CARBOXYHEMOGLOBIN 1.2     Brief Urine Lab Results  (Last result in the past 365 days)      Color   Clarity   Blood   Leuk Est   Nitrite   Protein   CREAT   Urine HCG        01/16/21 1533 Yellow Clear Negative Small (1+) Negative Negative             Microbiology Results (last 10 days)     Procedure Component Value - Date/Time    COVID PRE-OP / PRE-PROCEDURE SCREENING ORDER (NO ISOLATION) - Swab, Nasopharynx [[id number]]  (Normal) Collected: 03/14/21 1443    Lab Status: Final result Specimen: Swab from Nasopharynx Updated: 03/14/21 1521    Narrative:      The following orders were created for panel order COVID PRE-OP / PRE-PROCEDURE SCREENING ORDER (NO ISOLATION) - Swab, Nasopharynx.  Procedure                               Abnormality         Status                     ---------                               -----------         ------                     COVID-19 and FLU A/B PCR...[[id number]]  Normal              Final result                 Please view results for these tests on the individual orders.    COVID-19 and FLU A/B PCR - Swab, Nasopharynx [[id number]]  (Normal) Collected: 03/14/21 1443    Lab Status: Final result Specimen: Swab from Nasopharynx Updated:  03/14/21 1521     COVID19 Not Detected     Influenza A PCR Not Detected     Influenza B PCR Not Detected    Narrative:      Fact sheet for providers: https://www.fda.gov/media/343439/download    Fact sheet for patients: https://www.fda.gov/media/131499/download    Test performed by PCR.          CT Angiogram Chest With & Without Contrast    Result Date: 3/14/2021  EXAMINATION: CT ANGIOGRAM CHEST W WO CONTRAST-  INDICATION: Respiratory failure  TECHNIQUE: Multiplanar CT imaging of the chest was performed with and without administration of intravenous contrast. Maximum intensity projection 3-D images were reconstructed to facilitate diagnostic accuracy  The radiation dose reduction device was turned on for each scan per the ALARA (As Low as Reasonably Achievable) protocol.  COMPARISON: CT chest without contrast performed 3/12/2019  FINDINGS: Thyroid is homogenous in attenuation. Likely mild degree of fatty deposition in the liver.  Only cystectomy. Pancreas spleen and adrenal glands are normal. Exophytic left renal cyst. Stomach is normal. Aortic atherosclerosis. Coronary artery calcifications. Heart is normal in size. No pericardial effusion. Main pulmonary artery is normal in caliber. No large main stem or lobar pulmonary embolism. No suspicious mediastinal lymph nodes. Emphysema with multiple areas of bronchiectasis predominantly in the lung apices with adjacent ill-defined patchy consolidation. Scattered peribronchial nodularity for example in the posterior aspect of the left upper lobe and ill-defined groundglass opacities in the right lower lobe. No pleural effusion. No pneumothorax.        Impression: 1. No acute intrathoracic findings. No pulmonary embolism.  2. Scattered areas of bronchiectasis with bronchial wall thickening and patchy areas of consolidation. This appears progressed compared to prior exam and is consistent with chronic long-standing changes. More focal nodularity in the left upper lobe may  suggest acute infection or inflammation. Groundglass opacities in the right lower lobe appear similar to prior. Opacities in the right middle lobe appear mildly progressed.   This report was finalized on 3/14/2021 4:36 PM by Tony Duran.      XR Chest 1 View    Result Date: 3/14/2021  EXAMINATION: XR CHEST 1 VW-  INDICATION: SOA triage protocol  COMPARISON: NONE  FINDINGS: Degenerative osseous changes. Soft tissue anchors in the left humeral head. Normal cardiomediastinal silhouette. Aortic atherosclerosis. No pleural effusion or pneumothorax. Prominent interstitial markings with basilar opacities.      Impression: Prominent interstitial markings with basilar opacities.  This report was finalized on 3/14/2021 3:00 PM by Tony Duran.        Results for orders placed in visit on 04/01/20    Adult Transthoracic Echo Complete W/ Cont if Necessary Per Protocol    Interpretation Summary  · Calculated right ventricular systolic pressure from tricuspid regurgitation is 40 mmHg.  · Mild tricuspid valve regurgitation is present.  · Estimated EF = 66%.  · Left ventricular systolic function is normal.      Assessment/Plan   Assessment & Plan       Sepsis due to pneumonia (CMS/HCC)    Mixed anxiety depressive disorder    GERD (gastroesophageal reflux disease)    Hyperlipidemia    Essential hypertension    Acute and chronic respiratory failure with hypoxia (CMS/HCC)    Chronic diastolic heart failure (CMS/HCC)    Physical deconditioning    COPD with acute exacerbation (CMS/HCC)      A/C Hypoxic/hypercapneic respiratory failure, in the setting of AECOPD -    -- CTA chest reviewed. For the most part, these findings seem to represent progressive chronic disease.  Continue oxygen and wean to baseline 2LNC as able  -- continue doxycycline, add Rocephin in the setting of severe COPD disease.   -- solumedrol bid  -- respiratory support: nebs scheduled, prn, pulmincort/ brovana. Mucinex, tessalon, COPD navigator  -- hold lasix. Give  "gentle IVF overnight due to CTA/dye administration  -- will ask palliative medicine to see for progression of disease symptom management. Consider +/- pulmonary consult during hospitalization ( followed by Kulwinder Retana, but has not been due to frequent hospitalization and rehab)  -- continue home meds- increase norvasc to bid, add labetalol prn IV  -- am labs    DVT prophylaxis:  ELIECER      CODE STATUS:  Lengthy discussion with patient and health care surrogate (daughter at bedside).  The patient wants to \"live as long as possible\" despite thorough discussion regarding unlikelihood of hospital stay survival in the event of a code event.  The patient's daughter disagrees with the patient's perspective and agrees to continue discussion.  Both patient and daughter agree to palliative consultation.  Code Status and Medical Interventions:   Ordered at: 03/14/21 1165     Code Status:    CPR     Medical Interventions (Level of Support Prior to Arrest):    Full         This note has been completed as part of a split-shared workflow.     Electronically signed by RUBY Bardales, 03/14/21, 4:42 PM EDT.      Attending   Admission Attestation       I have seen and examined the patient, performing an independent face-to-face diagnostic evaluation with plan of care reviewed and developed with the advanced practice clinician (APC).      Brief Summary Statement:   Sadie Tijerina is a 80 y.o. female with advanced O2 dependent COPD, frequent pnaumonia/bronchitis admission since 9/2020, HTN, HLD, remote uterine cancer.  She wears BiPap at night.  She was ajust discharged from rehab yesterday.  However, for the last several days, she reports increasing SOA and PRINCE with decreased activity tolerance.  She has increased cough and wheeze.  She complains of feeling feverish with no recorded febrile temps.  She has had some post tussive vomiting.  She has increased home utilization of O2 from 2 to 3 L and has increased " use of nebs without relief.      In the ER, WBC 13.6, lactic 2.1, ABG 7.399/65/95.1.  COVID negative.  CTA - no PE, mostly worsened chronic findings.    Remainder of detailed HPI is as noted by APC and has been reviewed and/or edited by me for completeness.    Attending Physical Exam:  Constitutional: Awake, alert, pleasant, mildly dyspneic  Eyes: PERRLA, sclerae anicteric, no conjunctival injection  HENT: NCAT, mucous membranes moist  Neck: Supple, no thyromegaly, no lymphadenopathy, trachea midline  Respiratory: tight and wheezy to auscultation bilaterally  Cardiovascular: RRR, palpable pedal pulses bilaterally  Gastrointestinal: Positive bowel sounds, soft, nontender, nondistended  Musculoskeletal: No bilateral ankle edema, no clubbing or cyanosis to extremities  Psychiatric: Appropriate affect, cooperative  Neurologic: Oriented x 3, strength symmetric in all extremities, Cranial Nerves grossly intact to confrontation, speech clear  Skin: No rashes      Brief Assessment/Plan :  See detailed assessment and plan developed with APC which I have reviewed and/or edited for completeness.        Admission Status: I believe that this patient meets inpatient criteria due to acute on chronic hypoxic/hypercapneic respiratory failure.      Denice Ramesh MD  03/14/21                            Electronically signed by Denice Ramesh MD at 03/14/21 9107          Physician Progress Notes (most recent note)      Keena Mitchell APRN at 03/17/21 1500          Palliative Care Progress Note    Date of Admission: 3/14/2021    Code Status:   Current Code Status     Date Active Code Status Order ID Comments User Context       3/16/2021 1217 CPR [id number]  Sharon Jacobs, DO Inpatient     Advance Care Planning Activity      Questions for Current Code Status     Question Answer Comment    Code Status CPR     Medical Interventions (Level of Support Prior to Arrest) Limited     Limited Support to NOT Include  "Intubation     Level Of Support Discussed With Patient         Subjective:  Patient reports she is less dyspneic.  No pain, nausea.  + anxiety when tries to think about disposition  Reviewed current scheduled and prn medications for route, type, dose, and frequency.  Reviewed medical record     benzonatate  •  bisacodyl  •  bisacodyl  •  clonazePAM  •  ibuprofen  •  ipratropium-albuterol  •  labetalol  •  magnesium sulfate **OR** magnesium sulfate **OR** magnesium sulfate  •  ondansetron  •  PATIENT SUPPLIED MEDICATION  •  sodium chloride  •  sodium chloride  •  sodium chloride  •  traMADol    Objective:  PPS 50%  /87 (BP Location: Right arm, Patient Position: Sitting)   Pulse 73   Temp 98 °F (36.7 °C) (Oral)   Resp 18   Ht 157.5 cm (62\")   Wt 67.3 kg (148 lb 6.4 oz)   SpO2 94%   BMI 27.14 kg/m²    Intake & Output (last day)       03/16 0701 - 03/17 0700 03/17 0701 - 03/18 0700    P.O.  360    I.V. (mL/kg)      IV Piggyback      Total Intake(mL/kg)  360 (5.3)    Urine (mL/kg/hr)  800 (1.2)    Total Output  800    Net  -440              Lab Results (last 24 hours)     ** No results found for the last 24 hours. **        Imaging Results (Last 24 Hours)     ** No results found for the last 24 hours. **        Physical Exam:  Gen: NAD, up in chair  Skin: warm, dry   Eyes: ISAAC, conjunctivae clear and moist   Resp/thorax: even effort, nonlabored, CTA   CV: RRR   ABD: soft, bowel sounds +, non tender  Ext: no edema, no redness   Neuro: alert, interactive, no myoclonus     Reviewed labs and diagnostic results.   Lab Results   Component Value Date    HGBA1C 4.90 03/15/2021     Results from last 7 days   Lab Units 03/16/21  0523   WBC 10*3/mm3 13.25*   HEMOGLOBIN g/dL 9.5*   HEMATOCRIT % 32.1*   PLATELETS 10*3/mm3 179     Results from last 7 days   Lab Units 03/16/21  0523 03/14/21  1439   SODIUM mmol/L 139 140   POTASSIUM mmol/L 5.0 4.9   CHLORIDE mmol/L 101 93*   CO2 mmol/L 35.0* 41.0*   BUN mg/dL 22 18 "   CREATININE mg/dL 0.93 1.05*   CALCIUM mg/dL 8.2* 9.7   BILIRUBIN mg/dL  --  0.3   ALK PHOS U/L  --  59   ALT (SGPT) U/L  --  16   AST (SGOT) U/L  --  26   GLUCOSE mg/dL 148* 161*       Impression: 80 y.o. female with acute on chronic hypoxia respiratory failure and underlying acute exacerbation COPD    Plan:   Dyspnea - patient seems to tolerate hydrocodone/APAP 2.5mg scheduled. Reports improvement in her symptom.     Goals of care - patient is starting to consider SNF placement, however she is describing being able to have day visits away from the facility on a regular basis. Her  was at the same facility and she would visit often and take his  on outings regularly.  Time: 40 minutes   > 50% time spent in counseling and education concerning current orders for symptom management with patient    RUBY Coleman  [phone]  21  16:42 EDT        Electronically signed by Keena Mitchell APRN at 21 1650          Physical Therapy Notes (most recent note)      Norma Hart, PT at 21 1526  Version 1 of 1         Patient Name: Sadie Tijerina  : 1940    MRN: [id number]                              Today's Date: 3/17/2021       Admit Date: 3/14/2021    Visit Dx:     ICD-10-CM ICD-9-CM   1. COPD with acute exacerbation (CMS/[facility])  J44.1 491.21   2. Anxiety  F41.9 300.00     Patient Active Problem List   Diagnosis   • Calf cramp   • Mixed anxiety depressive disorder   • Dizziness   • Fatigue   • GERD (gastroesophageal reflux disease)   • Hematuria   • Hyperlipidemia   • Essential hypertension   • Low back pain   • Restless legs syndrome   • Essential tremor   • Vitamin D deficiency   • Balance problem   • Tension headache   • Tobacco abuse disorder   • Leg swelling   • Acute and chronic respiratory failure with hypoxia (CMS/[facility])   • Venous insufficiency of both lower extremities   • Chronic diastolic heart failure (CMS/[facility])   • Neuropathy   • Acute exacerbation  of chronic obstructive pulmonary disease (COPD) (CMS/[facility])   • Bilateral sensorineural hearing loss   • Disorder of inner ear   • Tympanosclerosis   • Transient vision disturbance   • Bilateral bronchopneumonia   • Pneumonia of both lungs due to infectious organism   • Leg wound, right   • Sepsis due to pneumonia (CMS/[facility])   • Pneumonia of both lower lobes due to infectious organism   • Chronic respiratory failure with hypoxia (CMS/[facility])   • Physical deconditioning   • Impaired mobility and ADLs   • Oral candidiasis   • Acute on chronic respiratory failure (CMS/[facility])   • COPD with acute exacerbation (CMS/[facility])   • Acute on chronic respiratory failure with hypercapnia (CMS/[facility])     Past Medical History:   Diagnosis Date   • Arthritis    • Cancer (CMS/[facility])     uterine cancer (1981)   • COPD (chronic obstructive pulmonary disease) (CMS/[facility])    • Depression    • Elevated cholesterol    • GERD (gastroesophageal reflux disease)    • History of emphysema    • History of esophageal reflux    • History of recurrent UTI (urinary tract infection)    • History of renal calculi    • History of uterine cancer    • Hyperlipidemia    • Hypertension    • Impaired functional mobility, balance, gait, and endurance    • Pneumonia 02/2019     Past Surgical History:   Procedure Laterality Date   • FOOT SURGERY     • HAND SURGERY     • HYSTERECTOMY       General Information     Row Name 03/17/21 1526          Physical Therapy Time and Intention    Document Type  therapy note (daily note)  -LR     Mode of Treatment  physical therapy;individual therapy  -LR     Row Name 03/17/21 1529          General Information    Patient Profile Reviewed  yes  -LR     Existing Precautions/Restrictions  fall;other (see comments);oxygen therapy device and L/min [employer], monitor O2 sats  -LR     Barriers to Rehab  medically complex  -LR     Row Name 03/17/21 1522          Cognition    Orientation Status (Cognition)  oriented x 4  -LR     Row Name 03/17/21 1527           Safety Issues, Functional Mobility    Safety Issues Affecting Function (Mobility)  insight into deficits/self-awareness;awareness of need for assistance;positioning of assistive device  -LR     Impairments Affecting Function (Mobility)  balance;strength;endurance/activity tolerance;pain;shortness of breath  -LR       User Key  (r) = Recorded By, (t) = Taken By, (c) = Cosigned By    Initials Name Provider Type    LR Norma Hart, PT Physical Therapist        Mobility     Row Name 03/17/21 1526          Bed Mobility    Supine-Sit [place] (Bed Mobility)  not tested  -LR     Sit-Supine [place] (Bed Mobility)  not tested  -LR     Comment (Bed Mobility)  [facility] on arrival and at end of treatment.  -LR     Row Name 03/17/21 1526          Transfers    Comment (Transfers)  Verbal cues to push up from chair to stand and to reach back for chair to lower into sitting. Performed x2. Patient stood from chair before being cued to do so and before RW in place.  -LR     Row Name 03/17/21 1526          Bed-Chair Transfer    Bed-Chair [place] (Transfers)  not tested  -LR     Row Name 03/17/21 1526          Sit-Stand Transfer    Sit-Stand [place] (Transfers)  verbal cues;standby assist  -LR     Assistive Device (Sit-Stand Transfers)  walker, front-wheeled  -LR     Row Name 03/17/21 1526          Gait/Stairs (Locomotion)    [place] Level (Gait)  verbal cues;contact guard  -LR     Assistive Device (Gait)  walker, front-wheeled  -LR     Distance in Feet (Gait)  210  -LR     Deviations/Abnormal Patterns (Gait)  bilateral deviations;alia decreased;gait speed decreased;stride length decreased  -LR     Bilateral Gait Deviations  forward flexed posture;heel strike decreased  -LR     Left Sided Gait Deviations  forward flexed posture;heel strike decreased  -LR     [place] Level (Stairs)  not tested  -LR     Comment (Gait/Stairs)  Patient ambulated with step through gait pattern at slow pace with  forward flexed posture and advancing RW too far out in front. Improved slightly with cues for correction. Required one brief standing rest break d/t fatigue and SOA.  -LR       User Key  (r) = Recorded By, (t) = Taken By, (c) = Cosigned By    Initials Name Provider Type    Norma Escobedo, YANNICK Physical Therapist        Obj/Interventions     Row Name 03/17/21 1526          Motor Skills    Therapeutic Exercise  ankle;knee;hip;other (see comments) cues for technique; reports pain and difficulty with marches on R  -LR     Row Name 03/17/21 1526          Hip (Therapeutic Exercise)    Hip (Therapeutic Exercise)  strengthening exercise  -LR     Hip Strengthening (Therapeutic Exercise)  bilateral;marching while seated;sitting;10 repetitions  -     Row Name 03/17/21 1526          Knee (Therapeutic Exercise)    Knee (Therapeutic Exercise)  strengthening exercise  -LR     Knee Strengthening (Therapeutic Exercise)  bilateral;LAQ (long arc quad);sitting;10 repetitions  -     Row Name 03/17/21 1526          Ankle (Therapeutic Exercise)    Ankle (Therapeutic Exercise)  AROM (active range of motion)  -LR     Ankle AROM (Therapeutic Exercise)  bilateral;dorsiflexion;plantarflexion;sitting;10 repetitions;5 repetitions  -LR     Row Name 03/17/21 1526          Balance    Balance Assessment  sitting static balance;sitting dynamic balance;standing static balance;standing dynamic balance  -LR     Static Sitting Balance  WFL;unsupported;sitting in chair  -LR     Dynamic Sitting Balance  WFL;unsupported;sitting in chair  -LR     Static Standing Balance  WFL;supported;standing  -LR     Dynamic Standing Balance  mild impairment;supported;standing  -LR     Comment, Balance  sat for PLB after gait  -LR       User Key  (r) = Recorded By, (t) = Taken By, (c) = Cosigned By    Initials Name Provider Type    Norma Escobedo, PT Physical Therapist        Goals/Plan     Row Name 03/17/21 1526          Bed Mobility Goal 1 (PT)     Activity/Assistive Device (Bed Mobility Goal 1, PT)  bed mobility activities, all  -LR     [place] Level/Cues Needed (Bed Mobility Goal 1, PT)  independent  -LR     Time Frame (Bed Mobility Goal 1, PT)  1 week;long term goal (LTG)  -LR     Progress/Outcomes (Bed Mobility Goal 1, PT)  goal ongoing  -LR     Row Name 03/17/21 1526          Transfer Goal 1 (PT)    Activity/Assistive Device (Transfer Goal 1, PT)  sit-to-stand/stand-to-sit;bed-to-chair/chair-to-bed;walker, rolling  -LR     [place] Level/Cues Needed (Transfer Goal 1, PT)  modified independence  -LR     Time Frame (Transfer Goal 1, PT)  1 week;long term goal (LTG)  -LR     Progress/Outcome (Transfer Goal 1, PT)  good progress toward goal;goal revised this date  -LR     Row Name 03/17/21 1526          Gait Training Goal 1 (PT)    Activity/Assistive Device (Gait Training Goal 1, PT)  gait (walking locomotion);assistive device use  -LR     Distance (Gait Training Goal 1, PT)  400 feet  -LR     Time Frame (Gait Training Goal 1, PT)  long term goal (LTG);2 weeks  -LR     Progress/Outcome (Gait Training Goal 1, PT)  continuing progress toward goal;goal ongoing  -LR     Row Name 03/17/21 1526          Stairs Goal 1 (PT)    Activity/Assistive Device (Stairs Goal 1, PT)  stairs, all skills  -LR     [place] Level/Cues Needed (Stairs Goal 1, PT)  standby assist;1 person assist  -LR     Number of Stairs (Stairs Goal 1, PT)  1  -LR     Time Frame (Stairs Goal 1, PT)  long term goal (LTG);2 weeks  -LR     Progress/Outcome (Stairs Goal 1, PT)  goal ongoing  -LR       User Key  (r) = Recorded By, (t) = Taken By, (c) = Cosigned By    Initials Name Provider Type    LR Norma Hart, PT Physical Therapist        Clinical Impression     Row Name 03/17/21 1526          Pain    Additional Documentation  Pain Scale: Numbers Pre/Post-Treatment (Group)  -LR     Row Name 03/17/21 1526          Pain Scale: Numbers Pre/Post-Treatment    Pretreatment Pain Rating   0/10 - no pain  -LR     Posttreatment Pain Rating  0/10 - no pain  -LR     Pain Intervention(s)  Ambulation/increased activity;Repositioned  -LR     Row Name 03/17/21 1526          Plan of Care Review    Plan of Care Reviewed With  patient  -LR     Progress  improving  -LR     Outcome Summary  Patient ambulated 210 feet with RW and CGA, limited by fatigue and SOA. O2 sats within 90's throughout mobility on supplemental O2. Encouraged frequent ambulation. Will continue to proress as able.  -LR     Row Name 03/17/21 1526          Therapy Assessment/Plan (PT)    Rehab Potential (PT)  good, to achieve stated therapy goals  -LR     Criteria for Skilled Interventions Met (PT)  yes;meets criteria;skilled treatment is necessary  -LR     Row Name 03/17/21 1526          Vital Signs    Pre SpO2 (%)  98  -LR     O2 Delivery Pre Treatment  supplemental O2  -LR     Intra SpO2 (%)  94  -LR     O2 Delivery Intra Treatment  supplemental O2  -LR     Post SpO2 (%)  94  -LR     O2 Delivery Post Treatment  supplemental O2  -LR     Pre Patient Position  Sitting  -LR     Intra Patient Position  Sitting  -LR     Post Patient Position  Sitting  -LR     Row Name 03/17/21 1526          Positioning and Restraints    Pre-Treatment Position  sitting in chair/recliner  -LR     Post Treatment Position  chair  -LR     In Chair  notified nsg;reclined;sitting;call light within reach;encouraged to call for assist;exit alarm on;with family/caregiver;legs elevated;waffle cushion  -LR       User Key  (r) = Recorded By, (t) = Taken By, (c) = Cosigned By    Initials Name Provider Type    LR Norma Hart, PT Physical Therapist        Outcome Measures     Row Name 03/17/21 1526          How much help from another person do you currently need...    Turning from your back to your side while in flat bed without using bedrails?  3  -LR     Moving from lying on back to sitting on the side of a flat bed without bedrails?  3  -LR     Moving to and from a  bed to a chair (including a wheelchair)?  3  -LR     Standing up from a chair using your arms (e.g., wheelchair, bedside chair)?  3  -LR     To walk in hospital room?  3  -LR     Row Name 03/17/21 1526          Functional Assessment    Outcome Measure Options  AM-PAC 6 Clicks Basic Mobility (PT)  -LR       User Key  (r) = Recorded By, (t) = Taken By, (c) = Cosigned By    Initials Name Provider Type    LR Norma Hart, PT Physical Therapist        Physical Therapy Education                 Title: PT OT SLP Therapies (Not Started)     Topic: Physical Therapy (Done)     Point: Mobility training (Done)     Learning Progress Summary           Patient Acceptance, E,D, VU,NR by LR at 3/17/2021 1526    Comment: Educated on safety with mobility, correct sit<->stand t/f technique, correct gait mechanics, LE HEP, PLB, and progression of POC.    Acceptance, E,TB, VU by  at 3/15/2021 1044    Comment: edu on PT services, POC, HEP, safe sequencing with gait, and d/c plans                   Point: Home exercise program (Done)     Learning Progress Summary           Patient Acceptance, E,D, VU,NR by LR at 3/17/2021 1526    Comment: Educated on safety with mobility, correct sit<->stand t/f technique, correct gait mechanics, LE HEP, PLB, and progression of POC.    Acceptance, E,TB, VU by  at 3/15/2021 1044    Comment: edu on PT services, POC, HEP, safe sequencing with gait, and d/c plans                   Point: Body mechanics (Done)     Learning Progress Summary           Patient Acceptance, E,D, VU,NR by LR at 3/17/2021 1526    Comment: Educated on safety with mobility, correct sit<->stand t/f technique, correct gait mechanics, LE HEP, PLB, and progression of POC.    Acceptance, E,TB, VU by  at 3/15/2021 1044    Comment: edu on PT services, POC, HEP, safe sequencing with gait, and d/c plans                   Point: Precautions (Done)     Learning Progress Summary           Patient Acceptance, E,D, VU,NR by LR at  3/17/2021 1526    Comment: Educated on safety with mobility, correct sit<->stand t/f technique, correct gait mechanics, LE HEP, PLB, and progression of POC.    Acceptance, E,TB, VU by  at 3/15/2021 1044    Comment: edu on PT services, POC, HEP, safe sequencing with gait, and d/c plans                               User Key     Initials Effective Dates Name Provider Type Discipline     06/19/15 -  Norma Hart, PT Physical Therapist PT     09/22/20 -  Rohan Mcclellan, PT Physical Therapist PT              PT Recommendation and Plan     Plan of Care Reviewed With: patient  Progress: improving  Outcome Summary: Patient ambulated 210 feet with RW and CGA, limited by fatigue and SOA. O2 sats within 90's throughout mobility on supplemental O2. Encouraged frequent ambulation. Will continue to proress as able.     Time Calculation:   PT Charges     Row Name 03/17/21 1526 03/17/21 1459          Time Calculation    Start Time  1526  -LR  --  -LR     PT Received On  03/17/21  -LR  --  -LR     PT Goal Re-Cert Due Date  03/25/21  -LR  --  -LR        Time Calculation- PT    Total Timed Code Minutes- PT  24 minute(s)  -LR  --  -LR        Timed Charges    23978 - PT Therapeutic Exercise Minutes  8  -LR  --     58801 - Gait Training Minutes   12  -LR  --  -LR     11802 - PT Therapeutic Activity Minutes  4  -LR  --       User Key  (r) = Recorded By, (t) = Taken By, (c) = Cosigned By    Initials Name Provider Type    LR Norma Hart, PT Physical Therapist        Therapy Charges for Today     Code Description Service Date Service Provider Modifiers Qty    38487019735 HC GAIT TRAINING EA 15 MIN 3/17/2021 Norma Hart, PT GP 1    56895771871 HC PT THER PROC EA 15 MIN 3/17/2021 Norma Hart, PT GP 1          PT G-Codes  Outcome Measure Options: AM-PAC 6 Clicks Daily Activity (OT)  AM-PAC 6 Clicks Score (PT): 20  AM-PAC 6 Clicks Score (OT): 20    Norma Hart  PT  3/17/2021      Electronically signed by Norma Hart, PT at 21 1608          Occupational Therapy Notes (most recent note)      Margarita Cabello, OT at 21 1409          Patient Name: Sadie Tijerina  : 1940    MRN: [id number]                              Today's Date: 3/17/2021       Admit Date: 3/14/2021    Visit Dx:     ICD-10-CM ICD-9-CM   1. COPD with acute exacerbation (CMS/[facility])  J44.1 491.21   2. Anxiety  F41.9 300.00     Patient Active Problem List   Diagnosis   • Calf cramp   • Mixed anxiety depressive disorder   • Dizziness   • Fatigue   • GERD (gastroesophageal reflux disease)   • Hematuria   • Hyperlipidemia   • Essential hypertension   • Low back pain   • Restless legs syndrome   • Essential tremor   • Vitamin D deficiency   • Balance problem   • Tension headache   • Tobacco abuse disorder   • Leg swelling   • Acute and chronic respiratory failure with hypoxia (CMS/[facility])   • Venous insufficiency of both lower extremities   • Chronic diastolic heart failure (CMS/[facility])   • Neuropathy   • Acute exacerbation of chronic obstructive pulmonary disease (COPD) (CMS/[facility])   • Bilateral sensorineural hearing loss   • Disorder of inner ear   • Tympanosclerosis   • Transient vision disturbance   • Bilateral bronchopneumonia   • Pneumonia of both lungs due to infectious organism   • Leg wound, right   • Sepsis due to pneumonia (CMS/[facility])   • Pneumonia of both lower lobes due to infectious organism   • Chronic respiratory failure with hypoxia (CMS/[facility])   • Physical deconditioning   • Impaired mobility and ADLs   • Oral candidiasis   • Acute on chronic respiratory failure (CMS/[facility])   • COPD with acute exacerbation (CMS/[facility])   • Acute on chronic respiratory failure with hypercapnia (CMS/[facility])     Past Medical History:   Diagnosis Date   • Arthritis    • Cancer (CMS/HCC)     uterine cancer ()   • COPD (chronic obstructive pulmonary disease) (CMS/[facility])    • Depression    • Elevated  cholesterol    • GERD (gastroesophageal reflux disease)    • History of emphysema    • History of esophageal reflux    • History of recurrent UTI (urinary tract infection)    • History of renal calculi    • History of uterine cancer    • Hyperlipidemia    • Hypertension    • Impaired functional mobility, balance, gait, and endurance    • Pneumonia 02/2019     Past Surgical History:   Procedure Laterality Date   • FOOT SURGERY     • HAND SURGERY     • HYSTERECTOMY       General Information     Row Name 03/17/21 1512          OT Time and Intention    Document Type  therapy note (daily note)  -DEE     Mode of Treatment  occupational therapy;individual therapy  -J     Row Name 03/17/21 1512          General Information    Patient Profile Reviewed  yes  -JNELL     Existing Precautions/Restrictions  fall;oxygen therapy device and L/min;other (see comments) [place] without hearing aids (unavailable at this time)  -J     Row Name 03/17/21 1512          Cognition    Orientation Status (Cognition)  oriented x 3  -J     Row Name 03/17/21 1512          Safety Issues, Functional Mobility    Safety Issues Affecting Function (Mobility)  insight into deficits/self-awareness;safety precaution awareness;safety precautions follow-through/compliance  -DEE     Impairments Affecting Function (Mobility)  balance;endurance/activity tolerance;shortness of breath;strength  -JNELL     Comment, Safety Issues/Impairments (Mobility)  pt alert, follows commands; reiterated ECWS strategies and safety cues for most safe mobility w/ greater ease, SOB  -JY       User Key  (r) = Recorded By, (t) = Taken By, (c) = Cosigned By    Initials Name Provider Type    Margarita Esquivel OT Occupational Therapist          Mobility/ADL's     Row Name 03/17/21 1514          Bed Mobility    Bed Mobility  supine-sit;scooting/bridging  -DEE     Scooting/Bridging [place] (Bed Mobility)  standby assist;verbal cues  -DEE     Supine-Sit [place] (Bed Mobility)  standby  assist;verbal cues  -JY     Sit-Supine [place] (Bed Mobility)  not tested  -JY     Assistive Device (Bed Mobility)  head of bed elevated;bed rails  -JY     Comment (Bed Mobility)  skilled cues for optimal hand placement for assist in uprighting trunk with decreased energy expenditure, weight shifting tech with PLB to advance to EOB, reported SOB upon sitting/completion and initiated seated rest break, stable O2  -"[employer]Y     Row Name 03/17/21 1514          Transfers    Transfers  sit-stand transfer;bed-chair transfer  -JY     Comment (Transfers)  pt impulsively stood from EOB and initiated pivot prior to readiness with gait belt, AD, req'd gross CGA for pivot and controlled descend  -JY     Bed-Chair [place] (Transfers)  contact guard;verbal cues  -JY     Assistive Device (Bed-Chair Transfers)  other (see comments) UE support, CGA pt stood and t/f'd prior to readiness with AD and gait belt  -JY     Sit-Stand [place] (Transfers)  contact guard;verbal cues  -JY     Row Name 03/17/21 1514          Sit-Stand Transfer    Assistive Device (Sit-Stand Transfers)  other (see comments) UE support, CGA pt stood and t/f'd prior to readiness with AD and gait belt  -[place]     Row Name 03/17/21 1514          Functional Mobility    Functional Mobility- Comment  defer to PT for specifics regarding fxl mobility; pt deferred mobility to bathroom for toileting and indicated fatigue, SOB with other OT activity  -JY     Row Name 03/17/21 1514          Activities of Daily Living    BADL Assessment/Intervention  upper body dressing;bathing;grooming  -[place]     Row Name 03/17/21 1514          Upper Body Dressing Assessment/Training    [place] Level (Upper Body Dressing)  doff;don;pajama/robe;contact guard assist;verbal cues  -JY     Position (Upper Body Dressing)  edge of bed sitting  -JY     Comment (Upper Body Dressing)  pt able to d/d gown with no physical A except posterior tying, yet provision of cues for line mgmt around  garments, vice versa  -JY     Row Name 03/17/21 1514          Bathing Assessment/Intervention    [place] Level (Bathing)  upper body;upper extremities;proximal lower extremities;standby assist;verbal cues  -JY     Position (Bathing)  edge of bed sitting;supported sitting  -JY     Comment (Bathing)  pt able to sponge bathe all body areas in close reach, deferred most distal aspects d/t SOB w/ forward flexion; pt uses AE at home for LBB, cued pt to demo PLB with ADLs and reviewed ECWS to reduce exertion; pt deferred applying lotion to LB d/t perceived exertion and completed UB lotion application with set up A  -JY     Row Name 03/17/21 1514          Grooming Assessment/Training    [place] Level (Grooming)  hair care, combing/brushing;set up  -JY     Position (Grooming)  edge of bed sitting  -JY       User Key  (r) = Recorded By, (t) = Taken By, (c) = Cosigned By    Initials Name Provider Type    Margarita Esquivel OT Occupational Therapist        Obj/Interventions     Row Name 03/17/21 1522          Balance    Balance Assessment  sitting static balance;sitting dynamic balance;standing static balance;standing dynamic balance  -JY     Static Sitting Balance  WFL;unsupported;sitting, edge of bed  -JY     Dynamic Sitting Balance  WFL;unsupported;sitting, edge of bed;supported;sitting in chair;other (see comments) ADLs with distal reach  -JY     Static Standing Balance  WFL;supported;standing  -JY     Dynamic Standing Balance  mild impairment;supported;standing;other (see comments) fxl transfer  -JY     Balance Interventions  sitting;standing;static;dynamic;sit to stand;supported;occupation based/functional task  -JY     Comment, Balance  no LOB noted, impulsive upon standing and t/f prior to readiness (safety concerns)  -JY       User Key  (r) = Recorded By, (t) = Taken By, (c) = Cosigned By    Initials Name Provider Type    Margarita Esquivel OT Occupational Therapist        Goals/Plan    No documentation.        Clinical Impression     Row Name 03/17/21 1523          Pain Assessment    Additional Documentation  Pain Scale: Numbers Pre/Post-Treatment (Group)  -JY     Row Name 03/17/21 1523          Pain Scale: Numbers Pre/Post-Treatment    Pretreatment Pain Rating  0/10 - no pain  -JY     Posttreatment Pain Rating  0/10 - no pain  -JY     Pre/Posttreatment Pain Comment  pt denied any pain and tolerated all OT intervention; mentioned edema at R knee when extended in bed  -JY     Pain Intervention(s)  Repositioned;Ambulation/increased activity;Rest  -JY     Row Name 03/17/21 1523          Plan of Care Review    Plan of Care Reviewed With  patient  -JY     Progress  improving  -JY     Outcome Summary  Pt reports improved perceived performance this date with reported improved breathing and less exertion with effort. Pt completed bed mobility with SBA with HOB elevated and bed rails used, cues for ECWS as pt reported SOB upon sitting, SPO2 stable of which would be consistent though session. Pt while seated at EOB completed UBD d/d gown with CGA for posterior tying, gross sponge bathing with SBA w/ exception of most distal feet lower LEs d/t exertion, SOB; pt uses LE AE at home. Pt able to apply lotion to UEs after set up, dep for LB application and g/h with SUA. Reiterated the ECWS, PLB applicable to ADLs. PT impulsively stood and initiated SPT to recliner prior to readiness with AD or gait belt, requiring CGA. Educated on safety awareness. Pt and family to have mtg tomorrow to discuss further d/c plans.  -JY     Row Name 03/17/21 1523          Vital Signs    Pre Systolic BP Rehab  135  -JY     Pre Treatment Diastolic BP  80  -JY     Pretreatment Heart Rate (beats/min)  76  -JY     Posttreatment Heart Rate (beats/min)  77  -JY     Pre SpO2 (%)  95  -JY     O2 Delivery Pre Treatment  supplemental O2  -JY     Intra SpO2 (%)  94  -JY     O2 Delivery Intra Treatment  supplemental O2  -JY     Post SpO2 (%)  94  -JY     O2 Delivery  Post Treatment  supplemental O2  -JY     Pre Patient Position  Supine  -JY     Intra Patient Position  Standing  -JY     Post Patient Position  Sitting  -JY     Row Name 03/17/21 1523          Positioning and Restraints    Pre-Treatment Position  in bed  -JY     Post Treatment Position  chair  -JY     In Chair  notified nsg;reclined;call light within reach;encouraged to call for assist;exit alarm on;legs elevated  -JY       User Key  (r) = Recorded By, (t) = Taken By, (c) = Cosigned By    Initials Name Provider Type    Margarita Esquivel OT Occupational Therapist        Outcome Measures     Row Name 03/17/21 1530          How much help from another is currently needed...    Putting on and taking off regular lower body clothing?  3  -JY     Bathing (including washing, rinsing, and drying)  3  -JY     Toileting (which includes using toilet bed pan or urinal)  3  -JY     Putting on and taking off regular upper body clothing  3  -JY     Taking care of personal grooming (such as brushing teeth)  4  -JY     Eating meals  4  -JY     AM-PAC 6 Clicks Score (OT)  20  -JY     Row Name 03/17/21 1530          Functional Assessment    Outcome Measure Options  AM-PAC 6 Clicks Daily Activity (OT)  -JY       User Key  (r) = Recorded By, (t) = Taken By, (c) = Cosigned By    Initials Name Provider Type    Margarita Esquivel OT Occupational Therapist        Occupational Therapy Education                 Title: PT OT SLP Therapies (Not Started)     Topic: Occupational Therapy (In Progress)     Point: ADL training (In Progress)     Description:   Instruct learner(s) on proper safety adaptation and remediation techniques during self care or transfers.   Instruct in proper use of assistive devices.              Learning Progress Summary           Patient Acceptance, E,D, NR by DEE at 3/17/2021 1409    Acceptance, E, VU by MT at 3/15/2021 1420    Comment: Role of OT, benefits of activity, adaptive breathing                   Point: Home  exercise program (Not Started)     Description:   Instruct learner(s) on appropriate technique for monitoring, assisting and/or progressing therapeutic exercises/activities.              Learner Progress:  Not documented in this visit.          Point: Precautions (In Progress)     Description:   Instruct learner(s) on prescribed precautions during self-care and functional transfers.              Learning Progress Summary           Patient Acceptance, E,D, NR by DEE at 3/17/2021 1409                   Point: Body mechanics (In Progress)     Description:   Instruct learner(s) on proper positioning and spine alignment during self-care, functional mobility activities and/or exercises.              Learning Progress Summary           Patient Acceptance, E,D, NR by DEE at 3/17/2021 1409                               User Key     Initials Effective Dates Name Provider Type Discipline    AC 06/23/15 -  Radha Bo, OT Occupational Therapist OT    DEE 01/29/20 -  Margarita Cabello OT Occupational Therapist OT              OT Recommendation and Plan     Plan of Care Review  Plan of Care Reviewed With: patient  Progress: improving  Outcome Summary: Pt reports improved perceived performance this date with reported improved breathing and less exertion with effort. Pt completed bed mobility with SBA with HOB elevated and bed rails used, cues for ECWS as pt reported SOB upon sitting, SPO2 stable of which would be consistent though session. Pt while seated at EOB completed UBD d/d gown with CGA for posterior tying, gross sponge bathing with SBA w/ exception of most distal feet lower LEs d/t exertion, SOB; pt uses LE AE at home. Pt able to apply lotion to UEs after set up, dep for LB application and g/h with SUA. Reiterated the ECWS, PLB applicable to ADLs. PT impulsively stood and initiated SPT to recliner prior to readiness with AD or gait belt, requiring CGA. Educated on safety awareness. Pt and family to have mtg tomorrow to  discuss further d/c plans.     Time Calculation:   Time Calculation- OT     Row Name 03/17/21 1531             Time Calculation- OT    OT Start Time  1409  -JY      OT Received On  03/17/21  -JY      OT Goal Re-Cert Due Date  03/25/21  -JY         Timed Charges    23447 - OT Therapeutic Activity Minutes  15  -JY      95826 - OT Self Care/Mgmt Minutes  15  -JY        User Key  (r) = Recorded By, (t) = Taken By, (c) = Cosigned By    Initials Name Provider Type    Margarita Esquivel OT Occupational Therapist        Therapy Charges for Today     Code Description Service Date Service Provider Modifiers Qty    12671411088 HC OT THERAPEUTIC ACT EA 15 MIN 3/17/2021 Margarita Cabello OT GO 1    80174738368 HC OT SELF CARE/MGMT/TRAIN EA 15 MIN 3/17/2021 Margarita Cabello OT GO 1               Margarita Cabello OT  3/17/2021    Electronically signed by Margarita Cabello OT at 03/17/21 1532

## 2021-03-18 NOTE — PROGRESS NOTES
NN rounded on patient at BS.  Patient resting on 2L NC, O2 sat 97%.  Home baseline is 2L during the day and bipap +3L HS.  Patient is scheduled to have a family meeting today with Hospice.  No new questions or concerns voiced at this time.  NN continues to follow.          Per current [employer] Standards, please consider: No ICS in place, Outpatient PFT, Pulmonary rehab as appropriate, Palliative Care consult

## 2021-03-18 NOTE — PROGRESS NOTES
Continued Stay Note  [facility]     Patient Name: Sadie Tijerina  MRN: [id number]  Today's Date: 3/18/2021    Admit Date: 3/14/2021    Discharge Plan     Row Name 03/18/21 1631       Plan    Plan  [facility]    Plan Comments  Visit made to pt, pt appeared to be sleeping, pt's daughter Neris present. Neris stated pt is now agreeable to go to [facility] upon hospital discharge. Pt will be able to go on outings outside of the facility with Neris. Reviewed hospice services at the facility as Neris stated is familiar with hospice. Informed Neris that [facility] will be following pt, hospice can be implemented when pt arrives at the facility. Hospice referral sent to [facility], was informed that [facility] will need to make the referral when the pt arrives to the facility, informed Neris of this process. Discussed discharge plans with Dr. Jacobs, informed Dr. Jacobs that [facility] does not do admissions over the weekend, plan is for pt to discharge on Monday. Will continue to follow. Please call [phone] if can be of further assistance.    Row Name 03/18/21 1539       Plan    Plan     Patient/Family in Agreement with Plan     Plan Comments     Final Discharge Disposition Code         Discharge Codes    No documentation.       Expected Discharge Date and Time     Expected Discharge Date Expected Discharge Time    Mar 22, 2021             Nicole Adkins RN

## 2021-03-19 NOTE — PLAN OF CARE
Goal Outcome Evaluation:  Plan of Care Reviewed With: patient  Progress: no change  Outcome Summary: Pt is more alert and wants to go to [facility] on Monday and work with therapy with hopes to return home but unsure if pt will be able to. Boost plus ordered to supplement  pt. Pt doesn't like the food. Pt reports not sleeping well. Pt's dyspnea controlled for the most part. having sporadic momemts with air hunger but inhaler effective.Palliative Team meeting 1300: Seth Baltazar DO, Keena BELTRAN, Afia Gaston RN, CHPN, Luci Jean Baptiste LCSW, Afia Gaston RN, CHPN, Luma Haynes RN CHPN

## 2021-03-19 NOTE — PLAN OF CARE
Problem: Adult Inpatient Plan of Care  Goal: Plan of Care Review  Recent Flowsheet Documentation  Taken 3/19/2021 1449 by Froy Mcneal OT  Progress: no change  Plan of Care Reviewed With: patient  Outcome Summary: Pt agreeable to therapy w/ Max encouragement and extra time throughout. Pt CGA for supine-sit, Sybil for sit-supine, SBA for STS and CGA for functional mobiltiy in hallway w/ RW, Sybil for LB dressing. Pt tolerated 6 minute self-care ADL completion sitting EOB and dynamic reaching activitie in supported standing, O2 sats remained > 88% on 2.5L NC and cues for PLB intermittently. Will cont IPOT per POC. Rec d/c to SNF.

## 2021-03-19 NOTE — PROGRESS NOTES
"                  Clinical Nutrition     Reason for Visit:   LOS    Patient Name: Sadie Tijerina  YOB: 1940  MRN: [id number]  Date of Encounter: 03/19/21 13:13 EDT  Admission date: 3/14/2021      Nutrition Assessment   Assessment     Admission diagnosis  Dyspnea    Additional diagnosis/conditions/procedures this admission  Acute/chronic hypoxic/hypercapniec respiratory failure  Acute exacerbation of COPD  Anxiety    Additional PMH/procedures:  HLP  HTN  GERD  Uterine cancer  Pneumonia  COPD  Arthritis  COVID19 (12/2020)    Foot surgery  Hand surgery      Reported/Observed/Food/Nutrition Related History:      Patient reports decreased appetite. Does not care for the food. She is getting tired of the options she has been selecting. Extensive preferences reviewed with patient. She is tolerating the current textures well and does not desire soft textures. She drinks SB/garrett Ensure at home and would like to receive those here. Willing to try Magic cup supplement as well. Provided menu and discussed preferences with patient.      Patient likes: tuna salad, chicken salad, cheerios, frosted flakes, SB/garrett ensure, ice cream, toast, coffee  Patient dislikes: pasta, chicken noodle soup, lasagna, baked fish, carrots, green beans, eggs, tea    Anthropometrics     Height: 157.5 cm (62\")  Last filed wt: Weight: 67.3 kg (148 lb 6.4 oz) (03/14/21 1801)  Weight Method: Bed scale    BMI: BMI (Calculated): 27.1  Overweight: 25.0-29.9kg/m2     Ideal Body Weight (IBW) (kg): 50.43  Admission wt: 140 lb  Method obtained: weight per charting 3/114, no method listed    Patient reports her UBW is ~125 lbs    Labs reviewed   Noted: hyperkalemia    Results from last 7 days   Lab Units 03/19/21  1122 03/18/21  0900 03/16/21  0523 03/15/21  0545 03/14/21  1439   GLUCOSE mg/dL 130* 115* 148* 135* 161*   BUN mg/dL 30* 25* 22 18 18   CREATININE mg/dL 0.83 0.76 0.93 0.90 1.05*   SODIUM mmol/L 139 138 139 140 140   CHLORIDE mmol/L " 99 103 101 99 93*   POTASSIUM mmol/L 5.3* 5.3* 5.0 4.2 4.9   MAGNESIUM mg/dL  --   --  2.9* 1.4*  --    ALT (SGPT) U/L  --   --   --   --  16     Results from last 7 days   Lab Units 03/14/21  1439   ALBUMIN g/dL 3.70           Lab Results   Lab Value Date/Time    HGBA1C 4.90 03/15/2021 0545    HGBA1C 5.6 01/27/2018 0541    HGBA1C 5.1 05/04/2016 0000       Medications reviewed   Pertinent: brovana, VIT D3, colace, IB abx, norco, risaquad, solumedrol, protonix, inderal      Intake/Output 24 hrs (7:00AM - 6:59 AM)     Intake & Output (last day)       03/18 0701 - 03/19 0700 03/19 0701 - 03/20 0700    P.O. 800 240    IV Piggyback 100     Total Intake(mL/kg) 900 (13.4) 240 (3.6)    Urine (mL/kg/hr) 1550 (1) 100 (0.2)    Stool  0    Total Output 1550 100    Net -650 +140          Urine Unmeasured Occurrence  1 x    Stool Unmeasured Occurrence  1 x          Current Nutrition Prescription     PO: Diet Regular; Thin; Cardiac  Intake: 46% x 6 meals    Nutrition Diagnosis     3/19  Problem Inadequate oral intake   Etiology Decreased appetite/limited preferences   Signs/Symptoms PO Intake (46% x 6 meals)       Nutrition Intervention   1.  Follow treatment progress, Care plan reviewed  2.  Advise alternate selection, Interview for preferences   3. Supplements ordered - Chocolate Ensure x2/day, Orange Magic Cup x2/day  4. Menu provided  5. Preferences communicated in message to catering staff    Goal:   General: Nutrition support treatment  PO: Establish PO, Increase intake      Monitoring/Evaluation:   Per protocol, PO intake, Supplement intake, Pertinent labs, Symptoms      Norma López RDN, LD  Time Spent: 30 minutes

## 2021-03-19 NOTE — PLAN OF CARE
Goal Outcome Evaluation:  Plan of Care Reviewed With: patient  Progress: no change  Outcome Summary: Patient declines OOB activities but agreeable to bed level TE. Requires max encouragement to participate in TE. Patient education for importance and rationale for participating in PT.Cont IP POC.

## 2021-03-19 NOTE — PROGRESS NOTES
[facility] Medicine Services  PROGRESS NOTE    Patient Name: Sadie Tijerina  : 1940  MRN: [id number]    Date of Admission: 3/14/2021  Primary Care Physician: Kristian Wolff MD    Subjective   Subjective     CC:  F/u SOA    HPI:  Patient states that she is better now but last night she was smothering. She would like her pro air bedside.     ROS:  Gen- No fevers, chills  CV- No chest pain, palpitations  Resp- No cough,+ dyspnea  GI- No N/V/D, abd pain        Objective   Objective     Vital Signs:   Temp:  [95.7 °F (35.4 °C)-98 °F (36.7 °C)] 97.6 °F (36.4 °C)  Heart Rate:  [] 70  Resp:  [18] 18  BP: (112-156)/(63-81) 150/73        Physical Exam:  Constitutional: No acute distress, awake, alert, elderly female sitting up in bed   HENT: NCAT, mucous membranes moist  Respiratory: expiratory wheezing bilaterlly , respiratory effort more  labored on 2 L   Cardiovascular: RRR, no murmurs, rubs, or gallops  Gastrointestinal: Positive bowel sounds, soft, nontender, nondistended  Musculoskeletal: trace  bilateral ankle edema  Psychiatric: Appropriate affect, cooperative  Neurologic: Oriented x 3,  speech clear  Skin: No rashes      Results Reviewed:  Results from last 7 days   Lab Units 21  0757 21  0900 21  0523 21  1439   WBC 10*3/mm3 12.79* 13.13* 13.25* 13.64*   HEMOGLOBIN g/dL 10.7* 9.6* 9.5* 10.9*   HEMATOCRIT % 37.3 32.5* 32.1* 37.3   PLATELETS 10*3/mm3 233 209 179 203   PROCALCITONIN ng/mL  --   --   --  0.07     Results from last 7 days   Lab Units 21  1122 21  0900 21  0523 21  1439   SODIUM mmol/L 139 138 139 140   POTASSIUM mmol/L 5.3* 5.3* 5.0 4.9   CHLORIDE mmol/L 99 103 101 93*   CO2 mmol/L 32.0* 32.0* 35.0* 41.0*   BUN mg/dL 30* 25* 22 18   CREATININE mg/dL 0.83 0.76 0.93 1.05*   GLUCOSE mg/dL 130* 115* 148* 161*   CALCIUM mg/dL 8.4* 8.7 8.2* 9.7   ALT (SGPT) U/L  --   --   --  16   AST (SGOT) U/L  --   --   --  26      TROPONIN T ng/mL  --   --   --  <0.010   PROBNP pg/mL  --   --   --  275.2     Estimated Creatinine Clearance: 47 mL/min (by C-G formula based on SCr of 0.83 mg/dL).    Microbiology Results Abnormal     Procedure Component Value - Date/Time    Blood Culture - Blood, Arm, Right [[id number]] Collected: 03/14/21 1540    Lab Status: Preliminary result Specimen: Blood from Arm, Right Updated: 03/18/21 1645     Blood Culture No growth at 4 days    Blood Culture - Blood, Hand, Left [[id number]] Collected: 03/14/21 1545    Lab Status: Preliminary result Specimen: Blood from Hand, Left Updated: 03/18/21 1645     Blood Culture No growth at 4 days    COVID PRE-OP / PRE-PROCEDURE SCREENING ORDER (NO ISOLATION) - Swab, Nasopharynx [[id number]]  (Normal) Collected: 03/14/21 1443    Lab Status: Final result Specimen: Swab from Nasopharynx Updated: 03/14/21 1521    Narrative:      The following orders were created for panel order COVID PRE-OP / PRE-PROCEDURE SCREENING ORDER (NO ISOLATION) - Swab, Nasopharynx.  Procedure                               Abnormality         Status                     ---------                               -----------         ------                     COVID-19 and FLU A/B PCR...[[id number]]  Normal              Final result                 Please view results for these tests on the individual orders.    COVID-19 and FLU A/B PCR - Swab, Nasopharynx [[id number]]  (Normal) Collected: 03/14/21 1443    Lab Status: Final result Specimen: Swab from Nasopharynx Updated: 03/14/21 1521     COVID19 Not Detected     Influenza A PCR Not Detected     Influenza B PCR Not Detected    Narrative:      Fact sheet for providers: https://www.fda.gov/media/002684/download    Fact sheet for patients: https://www.fda.gov/media/332943/download    Test performed by PCR.          Imaging Results (Last 24 Hours)     ** No results found for the last 24 hours. **          Results for orders placed in visit on 04/01/20    Adult  Transthoracic Echo Complete W/ Cont if Necessary Per Protocol    Interpretation Summary  · Calculated right ventricular systolic pressure from tricuspid regurgitation is 40 mmHg.  · Mild tricuspid valve regurgitation is present.  · Estimated EF = 66%.  · Left ventricular systolic function is normal.      I have reviewed the medications:  Scheduled Meds:Albuterol Sulfate NEB Orderable, 2.5 mg, Nebulization, 4x Daily - RT  albuterol sulfate HFA, 2 puff, Inhalation, 4x Daily  amLODIPine, 5 mg, Oral, Daily  arformoterol, 15 mcg, Nebulization, BID - RT  aspirin, 81 mg, Oral, Daily  benzocaine, , Mouth/Throat, TID  budesonide, 0.5 mg, Nebulization, BID - RT  cetirizine, 10 mg, Oral, Daily  cholecalciferol, 1,000 Units, Oral, Daily  clonazePAM, 0.25 mg, Oral, Nightly  docusate sodium, 100 mg, Oral, BID  doxycycline, 100 mg, Intravenous, Q12H  fluticasone, 2 spray, Each Nare, Daily  guaiFENesin, 1,200 mg, Oral, Daily  heparin (porcine), 5,000 Units, Subcutaneous, Q12H  HYDROcodone-acetaminophen, 0.5 tablet, Oral, Q6H  ipratropium, 2 spray, Each Nare, 4x Daily  lactobacillus acidophilus, 1 capsule, Oral, BID  methylPREDNISolone sodium succinate, 40 mg, Intravenous, Q12H  palliative care oral rinse, , Mouth/Throat, 4x Daily  pantoprazole, 40 mg, Oral, Daily  pharmacy consult - MTM, , Does not apply, Daily  pravastatin, 20 mg, Oral, Nightly  propranolol, 60 mg, Oral, BID  sertraline, 50 mg, Oral, Daily  sodium chloride, 10 mL, Intravenous, Q12H  sodium chloride, 10 mL, Intravenous, Q12H  triamcinolone, , Topical, Q12H      Continuous Infusions:   PRN Meds:.•  acetaminophen  •  benzonatate  •  bisacodyl  •  bisacodyl  •  clonazePAM  •  ibuprofen  •  ipratropium-albuterol  •  labetalol  •  magnesium sulfate **OR** magnesium sulfate **OR** magnesium sulfate  •  melatonin  •  mineral oil-hydrophilic petrolatum  •  ondansetron  •  sodium chloride  •  sodium chloride  •  sodium chloride    Assessment/Plan   Assessment & Plan          Active Hospital Problems    Diagnosis  POA   • **Acute and chronic respiratory failure with hypoxia (CMS/HCC) [J96.21]  Yes   • COPD with acute exacerbation (CMS/HCC) [J44.1]  Yes   • Acute on chronic respiratory failure with hypercapnia (CMS/HCC) [J96.22]  Yes   • Physical deconditioning [R53.81]  Yes   • Chronic diastolic heart failure (CMS/HCC) [I50.32]  Yes   • Mixed anxiety depressive disorder [F41.8]  Yes   • Essential hypertension [I10]  Yes   • GERD (gastroesophageal reflux disease) [K21.9]  Yes   • Hyperlipidemia [E78.5]  Yes      Resolved Hospital Problems   No resolved problems to display.        Brief Hospital Course to date:  Sadie Tijerina is a 80 y.o. female with PMH of HTN, HLP, arthritis, remote uterine cancer, COPD on 2L NC and BiPAP, frequent pneumonia that presents after being home one day from rehab with SOA, wheezing and cough.    Acute on Chronic Respiratory Failure  COPD  Recent COVID Pneumonia  -CTA shows no PE, bronchial wall thickening, chronic lung changes  -home o2 2LNC  -Solumedrol bid  - doxy and rocephin   -changed neb albuterol to inhaler per patient request  -Pulm, saw, appreciate their input   Chronic Diastolic Heart Failure  -EF 66%, tricuspid regurgitation   HTN  Anxiety/Depression  HLD  GERD  GOC/Family Update  Patient is to go to [place] on Monday and hospice to follow   DVT Prophylaxis:  ELIECER      Disposition: I expect the patient to be discharged TBD    CODE STATUS:   Code Status and Medical Interventions:   Ordered at: 21 1217     Limited Support to NOT Include:    Intubation     Level Of Support Discussed With:    Patient     Code Status:    CPR     Medical Interventions (Level of Support Prior to Arrest):    Messi Jacobs,   21                [facility] Medicine Services  PROGRESS NOTE    Patient Name: Sadie Tijerina  : 1940  MRN: [id number]    Date of Admission: 3/14/2021  Primary Care  "Physician: Kristian Wolff MD    Subjective   Subjective     CC:  F/u SOA    HPI:  Patient states that she feels short of breath. She is coughing more today. She requests pro air inhaler because the neb treatment \"does not work as well\".     ROS:  Gen- No fevers, chills  CV- No chest pain, palpitations  Resp- No cough,+ dyspnea  GI- No N/V/D, abd pain        Objective   Objective     Vital Signs:   Temp:  [95.7 °F (35.4 °C)-98 °F (36.7 °C)] 97.6 °F (36.4 °C)  Heart Rate:  [] 70  Resp:  [18] 18  BP: (112-156)/(63-81) 150/73        Physical Exam:  Constitutional: No acute distress, awake, alert, elderly female sitting up in bed   HENT: NCAT, mucous membranes moist  Respiratory: expiratory wheezing bilaterlly , respiratory effort non labored on 2 L   Cardiovascular: RRR, no murmurs, rubs, or gallops  Gastrointestinal: Positive bowel sounds, soft, nontender, nondistended  Musculoskeletal: trace  bilateral ankle edema  Psychiatric: Appropriate affect, cooperative  Neurologic: Oriented x 3,  speech clear  Skin: No rashes      Results Reviewed:  Results from last 7 days   Lab Units 03/19/21  0757 03/18/21  0900 03/16/21  0523 03/14/21  1439   WBC 10*3/mm3 12.79* 13.13* 13.25* 13.64*   HEMOGLOBIN g/dL 10.7* 9.6* 9.5* 10.9*   HEMATOCRIT % 37.3 32.5* 32.1* 37.3   PLATELETS 10*3/mm3 233 209 179 203   PROCALCITONIN ng/mL  --   --   --  0.07     Results from last 7 days   Lab Units 03/19/21  1122 03/18/21  0900 03/16/21  0523 03/14/21  1439   SODIUM mmol/L 139 138 139 140   POTASSIUM mmol/L 5.3* 5.3* 5.0 4.9   CHLORIDE mmol/L 99 103 101 93*   CO2 mmol/L 32.0* 32.0* 35.0* 41.0*   BUN mg/dL 30* 25* 22 18   CREATININE mg/dL 0.83 0.76 0.93 1.05*   GLUCOSE mg/dL 130* 115* 148* 161*   CALCIUM mg/dL 8.4* 8.7 8.2* 9.7   ALT (SGPT) U/L  --   --   --  16   AST (SGOT) U/L  --   --   --  26   TROPONIN T ng/mL  --   --   --  <0.010   PROBNP pg/mL  --   --   --  275.2     Estimated Creatinine Clearance: 47 mL/min (by C-G formula based on " SCr of 0.83 mg/dL).    Microbiology Results Abnormal     Procedure Component Value - Date/Time    Blood Culture - Blood, Arm, Right [[id number]] Collected: 03/14/21 1540    Lab Status: Preliminary result Specimen: Blood from Arm, Right Updated: 03/18/21 1645     Blood Culture No growth at 4 days    Blood Culture - Blood, Hand, Left [[id number]] Collected: 03/14/21 1545    Lab Status: Preliminary result Specimen: Blood from Hand, Left Updated: 03/18/21 1645     Blood Culture No growth at 4 days    COVID PRE-OP / PRE-PROCEDURE SCREENING ORDER (NO ISOLATION) - Swab, Nasopharynx [[id number]]  (Normal) Collected: 03/14/21 1443    Lab Status: Final result Specimen: Swab from Nasopharynx Updated: 03/14/21 1521    Narrative:      The following orders were created for panel order COVID PRE-OP / PRE-PROCEDURE SCREENING ORDER (NO ISOLATION) - Swab, Nasopharynx.  Procedure                               Abnormality         Status                     ---------                               -----------         ------                     COVID-19 and FLU A/B PCR...[[id number]]  Normal              Final result                 Please view results for these tests on the individual orders.    COVID-19 and FLU A/B PCR - Swab, Nasopharynx [[id number]]  (Normal) Collected: 03/14/21 1443    Lab Status: Final result Specimen: Swab from Nasopharynx Updated: 03/14/21 1521     COVID19 Not Detected     Influenza A PCR Not Detected     Influenza B PCR Not Detected    Narrative:      Fact sheet for providers: https://www.fda.gov/media/334860/download    Fact sheet for patients: https://www.fda.gov/media/296899/download    Test performed by PCR.          Imaging Results (Last 24 Hours)     ** No results found for the last 24 hours. **          Results for orders placed in visit on 04/01/20    Adult Transthoracic Echo Complete W/ Cont if Necessary Per Protocol    Interpretation Summary  · Calculated right ventricular systolic pressure from tricuspid  regurgitation is 40 mmHg.  · Mild tricuspid valve regurgitation is present.  · Estimated EF = 66%.  · Left ventricular systolic function is normal.      I have reviewed the medications:  Scheduled Meds:Albuterol Sulfate NEB Orderable, 2.5 mg, Nebulization, 4x Daily - RT  albuterol sulfate HFA, 2 puff, Inhalation, 4x Daily  amLODIPine, 5 mg, Oral, Daily  arformoterol, 15 mcg, Nebulization, BID - RT  aspirin, 81 mg, Oral, Daily  benzocaine, , Mouth/Throat, TID  budesonide, 0.5 mg, Nebulization, BID - RT  cetirizine, 10 mg, Oral, Daily  cholecalciferol, 1,000 Units, Oral, Daily  clonazePAM, 0.25 mg, Oral, Nightly  docusate sodium, 100 mg, Oral, BID  doxycycline, 100 mg, Intravenous, Q12H  fluticasone, 2 spray, Each Nare, Daily  guaiFENesin, 1,200 mg, Oral, Daily  heparin (porcine), 5,000 Units, Subcutaneous, Q12H  HYDROcodone-acetaminophen, 0.5 tablet, Oral, Q6H  ipratropium, 2 spray, Each Nare, 4x Daily  lactobacillus acidophilus, 1 capsule, Oral, BID  methylPREDNISolone sodium succinate, 40 mg, Intravenous, Q12H  palliative care oral rinse, , Mouth/Throat, 4x Daily  pantoprazole, 40 mg, Oral, Daily  pharmacy consult - MTM, , Does not apply, Daily  pravastatin, 20 mg, Oral, Nightly  propranolol, 60 mg, Oral, BID  sertraline, 50 mg, Oral, Daily  sodium chloride, 10 mL, Intravenous, Q12H  sodium chloride, 10 mL, Intravenous, Q12H  triamcinolone, , Topical, Q12H      Continuous Infusions:   PRN Meds:.•  acetaminophen  •  benzonatate  •  bisacodyl  •  bisacodyl  •  clonazePAM  •  ibuprofen  •  ipratropium-albuterol  •  labetalol  •  magnesium sulfate **OR** magnesium sulfate **OR** magnesium sulfate  •  melatonin  •  mineral oil-hydrophilic petrolatum  •  ondansetron  •  sodium chloride  •  sodium chloride  •  sodium chloride    Assessment/Plan   Assessment & Plan     Active Hospital Problems    Diagnosis  POA   • **Acute and chronic respiratory failure with hypoxia (CMS/HCC) [J96.21]  Yes   • COPD with acute exacerbation  (CMS/[facility]) [J44.1]  Yes   • Acute on chronic respiratory failure with hypercapnia (CMS/[facility]) [J96.22]  Yes   • Physical deconditioning [R53.81]  Yes   • Chronic diastolic heart failure (CMS/[facility]) [I50.32]  Yes   • Mixed anxiety depressive disorder [F41.8]  Yes   • Essential hypertension [I10]  Yes   • GERD (gastroesophageal reflux disease) [K21.9]  Yes   • Hyperlipidemia [E78.5]  Yes      Resolved Hospital Problems   No resolved problems to display.        Brief Hospital Course to date:  Sadie Tijerina is a 80 y.o. female with PMH of HTN, HLP, arthritis, remote uterine cancer, COPD on 2L NC and BiPAP, frequent pneumonia that presents after being home one day from rehab with SOA, wheezing and cough.    Acute on Chronic Respiratory Failure  COPD  Recent COVID Pneumonia  -CTA shows no PE, bronchial wall thickening, chronic lung changes  -home o2 2LNC  -Solumedrol bid  - doxy and rocephin   -changed neb albuterol to inhaler per patient request  -pulm following   -brovana/pulmicort   Chronic Diastolic Heart Failure  -EF 66%, tricuspid regurgitation    HTN  Anxiety/Depression  HLD  GERD  GOC/Family Update  Patient deciding on home with hospice, versus rehab (if she has days left) or an alternative. Meeting today with hospice,  and family   DVT Prophylaxis:  ELIECER      Disposition: I expect the patient to be discharged TBD    CODE STATUS:   Code Status and Medical Interventions:   Ordered at: 03/16/21 1217     Limited Support to NOT Include:    Intubation     Level Of Support Discussed With:    Patient     Code Status:    CPR     Medical Interventions (Level of Support Prior to Arrest):    Messi Jacobs,   03/19/21

## 2021-03-19 NOTE — PROGRESS NOTES
Continued Stay Note  [facility]     Patient Name: Sadie Tijerina  MRN: [id number]  Today's Date: 3/19/2021    Admit Date: 3/14/2021    Discharge Plan     Row Name 03/19/21 1448       Plan    Plan  [facility]    Plan Comments  Visit made to pt, pt sitting up in bed, alert. Pt stated is aware of the plan to go to [facility] on Monday. [facility] to follow pt at the facility. Please call [phone] if can be of further assistance.        Discharge Codes    No documentation.       Expected Discharge Date and Time     Expected Discharge Date Expected Discharge Time    Mar 22, 2021             Nicole Adkins RN

## 2021-03-19 NOTE — THERAPY TREATMENT NOTE
Patient Name: Sadie Tijerina  : 1940    MRN: [id number]                              Today's Date: 3/19/2021       Admit Date: 3/14/2021    Visit Dx:     ICD-10-CM ICD-9-CM   1. COPD with acute exacerbation (CMS/[facility])  J44.1 491.21   2. Anxiety  F41.9 300.00     Patient Active Problem List   Diagnosis   • Calf cramp   • Mixed anxiety depressive disorder   • Dizziness   • Fatigue   • GERD (gastroesophageal reflux disease)   • Hematuria   • Hyperlipidemia   • Essential hypertension   • Low back pain   • Restless legs syndrome   • Essential tremor   • Vitamin D deficiency   • Balance problem   • Tension headache   • Tobacco abuse disorder   • Leg swelling   • Acute and chronic respiratory failure with hypoxia (CMS/[facility])   • Venous insufficiency of both lower extremities   • Chronic diastolic heart failure (CMS/[facility])   • Neuropathy   • Acute exacerbation of chronic obstructive pulmonary disease (COPD) (CMS/[facility])   • Bilateral sensorineural hearing loss   • Disorder of inner ear   • Tympanosclerosis   • Transient vision disturbance   • Bilateral bronchopneumonia   • Pneumonia of both lungs due to infectious organism   • Leg wound, right   • Sepsis due to pneumonia (CMS/[facility])   • Pneumonia of both lower lobes due to infectious organism   • Chronic respiratory failure with hypoxia (CMS/[facility])   • Physical deconditioning   • Impaired mobility and ADLs   • Oral candidiasis   • Acute on chronic respiratory failure (CMS/[facility])   • COPD with acute exacerbation (CMS/[facility])   • Acute on chronic respiratory failure with hypercapnia (CMS/[facility])     Past Medical History:   Diagnosis Date   • Arthritis    • Cancer (CMS/[facility])     uterine cancer ()   • COPD (chronic obstructive pulmonary disease) (CMS/[facility])    • Depression    • Elevated cholesterol    • GERD (gastroesophageal reflux disease)    • History of emphysema    • History of esophageal reflux    • History of recurrent UTI (urinary tract infection)    • History of renal calculi     • History of uterine cancer    • Hyperlipidemia    • Hypertension    • Impaired functional mobility, balance, gait, and endurance    • Pneumonia 02/2019     Past Surgical History:   Procedure Laterality Date   • FOOT SURGERY     • HAND SURGERY     • HYSTERECTOMY       General Information     Row Name 03/19/21 1532          Physical Therapy Time and Intention    Document Type  therapy note (daily note)  -     Mode of Treatment  physical therapy  -     Row Name 03/19/21 1532          General Information    Patient Profile Reviewed  yes  -     Existing Precautions/Restrictions  fall;other (see comments);oxygen therapy device and L/min  -     Row Name 03/19/21 1532          Cognition    Orientation Status (Cognition)  oriented x 4  -     Row Name 03/19/21 1532          Safety Issues, Functional Mobility    Impairments Affecting Function (Mobility)  balance;strength;endurance/activity tolerance;pain;shortness of breath  -       User Key  (r) = Recorded By, (t) = Taken By, (c) = Cosigned By    Initials Name Provider Type    Isatu Avelar PT Physical Therapist        Mobility     Row Name 03/19/21 1533          Bed Mobility    Comment (Bed Mobility)  Patient declines OOB activities  -     Row Name 03/19/21 1533          Transfers    Comment (Transfers)  declines OOB activities  -     Row Name 03/19/21 1533          Gait/Stairs (Locomotion)    Comment (Gait/Stairs)  declines OOB activities  -       User Key  (r) = Recorded By, (t) = Taken By, (c) = Cosigned By    Initials Name Provider Type    Isatu Avelar PT Physical Therapist        Obj/Interventions     Row Name 03/19/21 1534          Hip (Therapeutic Exercise)    Hip (Therapeutic Exercise)  AROM (active range of motion);isometric exercises  -     Hip AROM (Therapeutic Exercise)  external rotation;internal rotation;bilateral;flexion;extension;aBduction;aDduction;10 repetitions  -     Hip Isometrics (Therapeutic Exercise)  gluteal  sets;bilateral;10 repetitions  -LO     Row Name 03/19/21 1534          Knee (Therapeutic Exercise)    Knee (Therapeutic Exercise)  AROM (active range of motion);strengthening exercise;isometric exercises  -     Knee AROM (Therapeutic Exercise)  bilateral;flexion;10 repetitions  -LO     Knee Isometrics (Therapeutic Exercise)  quad sets;10 repetitions  -LO     Row Name 03/19/21 1534          Ankle (Therapeutic Exercise)    Ankle AROM (Therapeutic Exercise)  bilateral;dorsiflexion;plantarflexion;10 repetitions  -LO       User Key  (r) = Recorded By, (t) = Taken By, (c) = Cosigned By    Initials Name Provider Type    LO Isatu Villagomez, PT Physical Therapist        Goals/Plan    No documentation.       Clinical Impression     Row Name 03/19/21 1536          Pain Scale: Numbers Pre/Post-Treatment    Pretreatment Pain Rating  0/10 - no pain  -LO     Posttreatment Pain Rating  0/10 - no pain  -LO     [facility] Name 03/19/21 1536          Plan of Care Review    Plan of Care Reviewed With  patient  -     Progress  no change  -     Outcome Summary  Patient declines OOB activities but agreeable to bed level TE. Requires max encouragement to participate in TE. Patient education for importance and rationale for participating in PT.Cont IP POC.  -     Row Name 03/19/21 1536          Therapy Assessment/Plan (PT)    Rehab Potential (PT)  good, to achieve stated therapy goals  -     Criteria for Skilled Interventions Met (PT)  yes;meets criteria;skilled treatment is necessary  -     Row Name 03/19/21 1536          Vital Signs    O2 Delivery Pre Treatment  nasal cannula  -LO     O2 Delivery Intra Treatment  nasal cannula  -LO     O2 Delivery Post Treatment  nasal cannula  -LO     Pre Patient Position  Supine  -LO     Intra Patient Position  Supine  -LO     Post Patient Position  Supine  -LO     Row Name 03/19/21 1536          Positioning and Restraints    Pre-Treatment Position  in bed  -LO     Post Treatment Position  bed  -LO      In Bed  supine;with family/caregiver;exit alarm on;encouraged to call for assist;call light within reach  -LO       User Key  (r) = Recorded By, (t) = Taken By, (c) = Cosigned By    Initials Name Provider Type    Isatu Avelar, YANNICK Physical Therapist        Outcome Measures     Row Name 03/19/21 1538          How much help from another person do you currently need...    Turning from your back to your side while in flat bed without using bedrails?  3  -LO     Moving from lying on back to sitting on the side of a flat bed without bedrails?  3  -LO     Moving to and from a bed to a chair (including a wheelchair)?  3  -LO     Standing up from a chair using your arms (e.g., wheelchair, bedside chair)?  3  -LO     Climbing 3-5 steps with a railing?  3  -LO     To walk in hospital room?  3  -LO     AM-PAC 6 Clicks Score (PT)  18  -LO       User Key  (r) = Recorded By, (t) = Taken By, (c) = Cosigned By    Initials Name Provider Type    Isatu Avelar, YANNICK Physical Therapist        Physical Therapy Education                 Title: PT OT SLP Therapies (Done)     Topic: Physical Therapy (Done)     Point: Mobility training (Done)     Learning Progress Summary           Patient Acceptance, E,TB, VU by EO at 3/18/2021 1232    Acceptance, E,D, VU,NR by LR at 3/17/2021 1526    Comment: Educated on safety with mobility, correct sit<->stand t/f technique, correct gait mechanics, LE HEP, PLB, and progression of POC.    Acceptance, E,TB, VU by  at 3/15/2021 1044    Comment: edu on PT services, POC, HEP, safe sequencing with gait, and d/c plans                   Point: Home exercise program (Done)     Learning Progress Summary           Patient Acceptance, E, VU,NR by LO at 3/19/2021 1514    Comment: Patient education regarding HEP    Acceptance, E,TB, VU by EO at 3/18/2021 1232    Acceptance, E,D, VU,NR by LR at 3/17/2021 1526    Comment: Educated on safety with mobility, correct sit<->stand t/f technique, correct gait mechanics,  LE HEP, PLB, and progression of POC.    Acceptance, E,TB, VU by  at 3/15/2021 1044    Comment: edu on PT services, POC, HEP, safe sequencing with gait, and d/c plans   Family Acceptance, E, VU,NR by  at 3/19/2021 1514    Comment: Patient education regarding HEP                   Point: Body mechanics (Done)     Learning Progress Summary           Patient Acceptance, E,TB, VU by EO at 3/18/2021 1232    Acceptance, E,D, VU,NR by  at 3/17/2021 1526    Comment: Educated on safety with mobility, correct sit<->stand t/f technique, correct gait mechanics, LE HEP, PLB, and progression of POC.    Acceptance, E,TB, VU by  at 3/15/2021 1044    Comment: edu on PT services, POC, HEP, safe sequencing with gait, and d/c plans                   Point: Precautions (Done)     Learning Progress Summary           Patient Acceptance, E,TB, VU by EO at 3/18/2021 1232    Acceptance, E,D, VU,NR by  at 3/17/2021 1526    Comment: Educated on safety with mobility, correct sit<->stand t/f technique, correct gait mechanics, LE HEP, PLB, and progression of POC.    Acceptance, E,TB, VU by  at 3/15/2021 1044    Comment: edu on PT services, POC, HEP, safe sequencing with gait, and d/c plans                               User Key     Initials Effective Dates Name Provider Type Discipline     06/19/15 -  Norma Hart, PT Physical Therapist PT    EO 10/30/17 -  Tere Cabrera, RN Registered Nurse Nurse     03/11/20 -  Isatu Villagomez, PT Physical Therapist PT     09/22/20 -  Rohan Mcclellan, PT Physical Therapist PT              PT Recommendation and Plan     Plan of Care Reviewed With: patient  Progress: no change  Outcome Summary: Patient declines OOB activities but agreeable to bed level TE. Requires max encouragement to participate in TE. Patient education for importance and rationale for participating in PT.Cont IP POC.     Time Calculation:   PT Charges     Row Name 03/19/21 1514             Time Calculation    Start Time   1514  -LO      PT Received On  03/19/21  -      PT Goal Re-Cert Due Date  03/25/21  -         Timed Charges    40026 - PT Therapeutic Exercise Minutes  14  -LO        User Key  (r) = Recorded By, (t) = Taken By, (c) = Cosigned By    Initials Name Provider Type    Isatu Avelar, PT Physical Therapist        Therapy Charges for Today     Code Description Service Date Service Provider Modifiers Qty    72301157090 HC PT THER PROC EA 15 MIN 3/19/2021 Isatu Villagomez, PT GP 1          PT G-Codes  Outcome Measure Options: AM-PAC 6 Clicks Daily Activity (OT)  AM-PAC 6 Clicks Score (PT): 18  AM-PAC 6 Clicks Score (OT): 20    Isatu Villagomez, YANNICK  3/19/2021

## 2021-03-19 NOTE — PROGRESS NOTES
Continued Stay Note  [facility]     Patient Name: Sadie Tijerina  MRN: [id number]  Today's Date: 3/19/2021    Admit Date: 3/14/2021    Discharge Plan     Row Name 03/19/21 1048       Plan    Plan  [facility]    Patient/Family in Agreement with Plan  yes    Plan Comments  Transportation has been arranged to transport Mrs. Tijerina to [facility] on Monday at 1300. [facility] will transport Mrs. Tijerina there via stretcher. THey will pick her up at the bedside. Mrs. Tijerina will be transferring into a longterm care medicaid pending bed.    Final Discharge Disposition Code  04 - intermediate care facility        Discharge Codes    No documentation.       Expected Discharge Date and Time     Expected Discharge Date Expected Discharge Time    Mar 22, 2021             Michael Roberson RN

## 2021-03-19 NOTE — PROGRESS NOTES
NN rounded on patient at BS.  Patient resting on bipap at time of visit.  Patient is expected to be discharged on Monday to long  term care.  No new questions or concerns voiced at this time.  NN continues to follow.      Per current [employer] Standards, please consider: No ICS in place, Outpatient PFT, Pulmonary rehab as appropriate, Palliative Care consult

## 2021-03-19 NOTE — THERAPY TREATMENT NOTE
Patient Name: Sadie Tijerina  : 1940    MRN: [id number]                              Today's Date: 3/19/2021       Admit Date: 3/14/2021    Visit Dx:     ICD-10-CM ICD-9-CM   1. COPD with acute exacerbation (CMS/[facility])  J44.1 491.21   2. Anxiety  F41.9 300.00     Patient Active Problem List   Diagnosis   • Calf cramp   • Mixed anxiety depressive disorder   • Dizziness   • Fatigue   • GERD (gastroesophageal reflux disease)   • Hematuria   • Hyperlipidemia   • Essential hypertension   • Low back pain   • Restless legs syndrome   • Essential tremor   • Vitamin D deficiency   • Balance problem   • Tension headache   • Tobacco abuse disorder   • Leg swelling   • Acute and chronic respiratory failure with hypoxia (CMS/[facility])   • Venous insufficiency of both lower extremities   • Chronic diastolic heart failure (CMS/[facility])   • Neuropathy   • Acute exacerbation of chronic obstructive pulmonary disease (COPD) (CMS/[facility])   • Bilateral sensorineural hearing loss   • Disorder of inner ear   • Tympanosclerosis   • Transient vision disturbance   • Bilateral bronchopneumonia   • Pneumonia of both lungs due to infectious organism   • Leg wound, right   • Sepsis due to pneumonia (CMS/[facility])   • Pneumonia of both lower lobes due to infectious organism   • Chronic respiratory failure with hypoxia (CMS/[facility])   • Physical deconditioning   • Impaired mobility and ADLs   • Oral candidiasis   • Acute on chronic respiratory failure (CMS/[facility])   • COPD with acute exacerbation (CMS/[facility])   • Acute on chronic respiratory failure with hypercapnia (CMS/[facility])     Past Medical History:   Diagnosis Date   • Arthritis    • Cancer (CMS/[facility])     uterine cancer ()   • COPD (chronic obstructive pulmonary disease) (CMS/[facility])    • Depression    • Elevated cholesterol    • GERD (gastroesophageal reflux disease)    • History of emphysema    • History of esophageal reflux    • History of recurrent UTI (urinary tract infection)    • History of renal calculi     • History of uterine cancer    • Hyperlipidemia    • Hypertension    • Impaired functional mobility, balance, gait, and endurance    • Pneumonia 02/2019     Past Surgical History:   Procedure Laterality Date   • FOOT SURGERY     • HAND SURGERY     • HYSTERECTOMY       General Information     Row Name 03/19/21 1442          OT Time and Intention    Document Type  therapy note (daily note)  -CS     Mode of Treatment  occupational therapy  -     Row Name 03/19/21 1442          General Information    Patient Profile Reviewed  yes  -CS     Existing Precautions/Restrictions  fall;other (see comments);oxygen therapy device and L/min [place], Monitor O2 sats  -     Barriers to Rehab  medically complex  -CS     Row Name 03/19/21 1442          Cognition    Orientation Status (Cognition)  oriented x 4  -CS     Row Name 03/19/21 1442          Safety Issues, Functional Mobility    Impairments Affecting Function (Mobility)  balance;strength;endurance/activity tolerance;pain;shortness of breath  -       User Key  (r) = Recorded By, (t) = Taken By, (c) = Cosigned By    Initials Name Provider Type    CS Froy Mcneal OT Occupational Therapist          Mobility/ADL's     Row Name 03/19/21 1444          Bed Mobility    Bed Mobility  supine-sit;sit-supine;rolling right  -CS     Rolling Right [place] (Bed Mobility)  contact guard  -CS     Supine-Sit [place] (Bed Mobility)  contact guard  -CS     Sit-Supine [place] (Bed Mobility)  minimum assist (75% patient effort)  -CS     Assistive Device (Bed Mobility)  head of bed elevated;bed rails  -     Comment (Bed Mobility)  Cues for sequencing and bed rail use, Sybil for BLE during sit-supine  -CS     Row Name 03/19/21 1444          Transfers    Sit-Stand [place] (Transfers)  standby assist  -     Row Name 03/19/21 1444          Sit-Stand Transfer    Assistive Device (Sit-Stand Transfers)  walker, front-wheeled  -     Row Name 03/19/21 1444          Functional  Mobility    Functional Mobility- Comment  CGA at  for functional distances in room and hallway, one LOB during sidesteps to HOB upon return to room  -     Row Name 03/19/21 1444          Activities of Daily Living    BADL Assessment/Intervention  lower body dressing;grooming  -     Row Name 03/19/21 1444          Lower Body Dressing Assessment/Training    [place] Level (Lower Body Dressing)  don;socks;minimum assist (75% patient effort)  -CS     Position (Lower Body Dressing)  unsupported sitting  -CS     Comment (Lower Body Dressing)  assist for dynamic sitting balance and task completion  -     Row Name 03/19/21 1444          Grooming Assessment/Training    [place] Level (Grooming)  hair care, combing/brushing;wash face, hands;set up  -CS     Position (Grooming)  unsupported sitting;edge of bed sitting  -CS       User Key  (r) = Recorded By, (t) = Taken By, (c) = Cosigned By    Initials Name Provider Type    Froy Lind OT Occupational Therapist        Obj/Interventions     [facility] Name 03/19/21 1448          Balance    Balance Assessment  sitting static balance;sitting dynamic balance;standing static balance;standing dynamic balance  -CS     Static Sitting Balance  WFL;sitting, edge of bed  -CS     Dynamic Sitting Balance  WFL;other (see comments);sitting, edge of bed CGA during LB dressing completion  -CS     Static Standing Balance  WFL;supported;standing  -CS     Dynamic Standing Balance  mild impairment;supported;standing  -CS     Comment, Balance  Pt w/ 1 LOB during dynamic reaching activities w/ moderate challenge  -       User Key  (r) = Recorded By, (t) = Taken By, (c) = Cosigned By    Initials Name Provider Type    Froy Lind OT Occupational Therapist        Goals/Plan    No documentation.       Clinical Impression     [facility] Name 03/19/21 1445          Pain Assessment    Additional Documentation  Pain Scale: FACES Pre/Post-Treatment (Group)  -[facility] Name 03/19/21 1441           Pain Scale: FACES Pre/Post-Treatment    Pain: FACES Scale, Pretreatment  2-->hurts little bit  -CS     Posttreatment Pain Rating  4-->hurts little more  -CS     Pain Location - Orientation  generalized  -CS     Pre/Posttreatment Pain Comment  extensive bruising, nursing aware and managing  -CS     Row Name 03/19/21 1449          Plan of Care Review    Plan of Care Reviewed With  patient  -CS     Progress  no change  -CS     Outcome Summary  Pt agreeable to therapy w/ Max encouragement and extra time throughout. Pt CGA for supine-sit, Sybil for sit-supine, SBA for STS and CGA for functional mobiltiy in hallway w/ RW, Sybil for LB dressing. Pt tolerated 6 minute self-care ADL completion sitting EOB and dynamic reaching activitie in supported standing, O2 sats remained > 88% on 2.5L NC and cues for PLB intermittently. Will cont IPOT per POC. Rec d/c to SNF.  -CS     Row Name 03/19/21 1449          Therapy Plan Review/Discharge Plan (OT)    Anticipated Discharge Disposition (OT)  skilled nursing facility  -     Row Name 03/19/21 1449          Vital Signs    Pre Systolic BP Rehab  -- RN cleared for tx, VSS on 3L NC  -CS     O2 Delivery Pre Treatment  nasal cannula  -CS     O2 Delivery Intra Treatment  nasal cannula  -CS     O2 Delivery Post Treatment  nasal cannula  -CS     Pre Patient Position  Supine  -CS     Intra Patient Position  Standing  -CS     Post Patient Position  Supine  -CS     Row Name 03/19/21 1441          Positioning and Restraints    Pre-Treatment Position  in bed  -CS     Post Treatment Position  bed  -CS     In Bed  notified nsg;fowlers;encouraged to call for assist;exit alarm on;with family/caregiver  -CS       User Key  (r) = Recorded By, (t) = Taken By, (c) = Cosigned By    Initials Name Provider Type    CS Froy Mcneal, OT Occupational Therapist        Outcome Measures     Row Name 03/19/21 7805          How much help from another is currently needed...    Putting on and taking off  regular lower body clothing?  3  -CS     Bathing (including washing, rinsing, and drying)  3  -CS     Row Name 03/19/21 1454          Functional Assessment    Outcome Measure Options  AM-PAC 6 Clicks Daily Activity (OT)  -CS       User Key  (r) = Recorded By, (t) = Taken By, (c) = Cosigned By    Initials Name Provider Type    CS Froy Mcneal OT Occupational Therapist        Occupational Therapy Education                 Title: PT OT SLP Therapies (Done)     Topic: Occupational Therapy (Done)     Point: ADL training (Done)     Description:   Instruct learner(s) on proper safety adaptation and remediation techniques during self care or transfers.   Instruct in proper use of assistive devices.              Learning Progress Summary           Patient Acceptance, E, VU,NR by CS at 3/19/2021 1455    Acceptance, E,TB, VU by EO at 3/18/2021 1232    Acceptance, E,D, NR by JNELL at 3/17/2021 1409    Acceptance, E, VU by AC at 3/15/2021 1420    Comment: Role of OT, benefits of activity, adaptive breathing                   Point: Home exercise program (Done)     Description:   Instruct learner(s) on appropriate technique for monitoring, assisting and/or progressing therapeutic exercises/activities.              Learning Progress Summary           Patient Acceptance, E,TB, VU by EO at 3/18/2021 1232                   Point: Precautions (Done)     Description:   Instruct learner(s) on prescribed precautions during self-care and functional transfers.              Learning Progress Summary           Patient Acceptance, E, VU,NR by CS at 3/19/2021 1455    Acceptance, E,TB, VU by EO at 3/18/2021 1232    Acceptance, E,D, NR by JNELL at 3/17/2021 1409                   Point: Body mechanics (Done)     Description:   Instruct learner(s) on proper positioning and spine alignment during self-care, functional mobility activities and/or exercises.              Learning Progress Summary           Patient Acceptance, E, VU,NR by CS at  3/19/2021 1455    Acceptance, E,TB, VU by EO at 3/18/2021 1232    Acceptance, E,D, NR by JY at 3/17/2021 1409                               User Key     Initials Effective Dates Name Provider Type Discipline    AC 06/23/15 -  Radha Bo, OT Occupational Therapist OT    EO 10/30/17 -  Tere Cabrera RN Registered Nurse Nurse    JY 01/29/20 -  Margarita Cabello OT Occupational Therapist OT    CS 10/21/20 -  Froy Mcneal, OT Occupational Therapist OT              OT Recommendation and Plan     Plan of Care Review  Plan of Care Reviewed With: patient  Progress: no change  Outcome Summary: Pt agreeable to therapy w/ Max encouragement and extra time throughout. Pt CGA for supine-sit, Sybil for sit-supine, SBA for STS and CGA for functional mobiltiy in hallway w/ RW, Sybil for LB dressing. Pt tolerated 6 minute self-care ADL completion sitting EOB and dynamic reaching activitie in supported standing, O2 sats remained > 88% on 2.5L NC and cues for PLB intermittently. Will cont IPOT per POC. Rec d/c to SNF.     Time Calculation:   Time Calculation- OT     Row Name 03/19/21 1455             Time Calculation- OT    OT Start Time  1328  -CS      OT Received On  03/19/21  -CS      OT Goal Re-Cert Due Date  03/25/21  -         Timed Charges    32679 - OT Therapeutic Activity Minutes  20  -CS      57835 - OT Self Care/Mgmt Minutes  8  -CS        User Key  (r) = Recorded By, (t) = Taken By, (c) = Cosigned By    Initials Name Provider Type    CS Froy Mcneal OT Occupational Therapist        Therapy Charges for Today     Code Description Service Date Service Provider Modifiers Qty    48683508979 HC OT THERAPEUTIC ACT EA 15 MIN 3/19/2021 Froy Mcneal OT GO 1    78173749494 HC OT SELF CARE/MGMT/TRAIN EA 15 MIN 3/19/2021 Froy Mcneal OT GO 1               Froy Mcneal OT  3/19/2021

## 2021-03-20 NOTE — PROGRESS NOTES
"    [facility] Medicine Services  PROGRESS NOTE    Patient Name: Sadie Tijerina  : 1940  MRN: [id number]    Date of Admission: 3/14/2021  Primary Care Physician: Kristian Wolff MD    Subjective   Subjective     CC:  F/u SOA    HPI:  Patient states that she is tired. She asked that her 'hormones\" be restarted. She also says she can only wear her bipap for short time at night but will wear it some during the day.      ROS:  Gen- No fevers, chills  CV- No chest pain, palpitations  Resp- No cough,+ dyspnea  GI- No N/V/D, abd pain        Objective   Objective     Vital Signs:   Temp:  [96.2 °F (35.7 °C)-97.8 °F (36.6 °C)] 96.2 °F (35.7 °C)  Heart Rate:  [63-81] 73  Resp:  [18-20] 18  BP: (119-155)/(61-73) 119/68        Physical Exam:  Constitutional: No acute distress, awake, alert, elderly female resting in bed   HENT: NCAT, mucous membranes moist  Respiratory: expiratory wheezing bilaterlly , respiratory effort more  labored on 2 L   Cardiovascular: RRR, no murmurs, rubs, or gallops  Gastrointestinal: Positive bowel sounds, soft, nontender, nondistended  Musculoskeletal: trace  bilateral ankle edema  Psychiatric: Appropriate affect, cooperative  Neurologic: Oriented x 3,  speech clear  Skin: No rashes      Results Reviewed:  Results from last 7 days   Lab Units 21  0757 21  0921  0523 21  1439   WBC 10*3/mm3 12.79* 13.13* 13.25* 13.64*   HEMOGLOBIN g/dL 10.7* 9.6* 9.5* 10.9*   HEMATOCRIT % 37.3 32.5* 32.1* 37.3   PLATELETS 10*3/mm3 233 209 179 203   PROCALCITONIN ng/mL  --   --   --  0.07     Results from last 7 days   Lab Units 21  1122 21  0900 21  0523 21  1439   SODIUM mmol/L 139 138 139 140   POTASSIUM mmol/L 5.3* 5.3* 5.0 4.9   CHLORIDE mmol/L 99 103 101 93*   CO2 mmol/L 32.0* 32.0* 35.0* 41.0*   BUN mg/dL 30* 25* 22 18   CREATININE mg/dL 0.83 0.76 0.93 1.05*   GLUCOSE mg/dL 130* 115* 148* 161*   CALCIUM mg/dL 8.4* 8.7 8.2* 9.7  "   ALT (SGPT) U/L  --   --   --  16   AST (SGOT) U/L  --   --   --  26   TROPONIN T ng/mL  --   --   --  <0.010   PROBNP pg/mL  --   --   --  275.2     Estimated Creatinine Clearance: 47 mL/min (by C-G formula based on SCr of 0.83 mg/dL).    Microbiology Results Abnormal     Procedure Component Value - Date/Time    Blood Culture - Blood, Hand, Left [[id number]] Collected: 03/14/21 1545    Lab Status: Final result Specimen: Blood from Hand, Left Updated: 03/19/21 1645     Blood Culture No growth at 5 days    Blood Culture - Blood, Arm, Right [[id number]] Collected: 03/14/21 1540    Lab Status: Final result Specimen: Blood from Arm, Right Updated: 03/19/21 1645     Blood Culture No growth at 5 days    COVID PRE-OP / PRE-PROCEDURE SCREENING ORDER (NO ISOLATION) - Swab, Nasopharynx [[id number]]  (Normal) Collected: 03/14/21 1443    Lab Status: Final result Specimen: Swab from Nasopharynx Updated: 03/14/21 1521    Narrative:      The following orders were created for panel order COVID PRE-OP / PRE-PROCEDURE SCREENING ORDER (NO ISOLATION) - Swab, Nasopharynx.  Procedure                               Abnormality         Status                     ---------                               -----------         ------                     COVID-19 and FLU A/B PCR...[[id number]]  Normal              Final result                 Please view results for these tests on the individual orders.    COVID-19 and FLU A/B PCR - Swab, Nasopharynx [[id number]]  (Normal) Collected: 03/14/21 1443    Lab Status: Final result Specimen: Swab from Nasopharynx Updated: 03/14/21 1521     COVID19 Not Detected     Influenza A PCR Not Detected     Influenza B PCR Not Detected    Narrative:      Fact sheet for providers: https://www.fda.gov/media/281443/download    Fact sheet for patients: https://www.fda.gov/media/466575/download    Test performed by PCR.          Imaging Results (Last 24 Hours)     ** No results found for the last 24 hours. **           Results for orders placed in visit on 04/01/20    Adult Transthoracic Echo Complete W/ Cont if Necessary Per Protocol    Interpretation Summary  · Calculated right ventricular systolic pressure from tricuspid regurgitation is 40 mmHg.  · Mild tricuspid valve regurgitation is present.  · Estimated EF = 66%.  · Left ventricular systolic function is normal.      I have reviewed the medications:  Scheduled Meds:Albuterol Sulfate NEB Orderable, 2.5 mg, Nebulization, 4x Daily - RT  amLODIPine, 5 mg, Oral, Daily  arformoterol, 15 mcg, Nebulization, BID - RT  aspirin, 81 mg, Oral, Daily  benzocaine, , Mouth/Throat, TID  budesonide, 0.5 mg, Nebulization, BID - RT  cetirizine, 10 mg, Oral, Daily  cholecalciferol, 1,000 Units, Oral, Daily  clonazePAM, 0.25 mg, Oral, Nightly  docusate sodium, 100 mg, Oral, BID  fluticasone, 2 spray, Each Nare, Daily  guaiFENesin, 1,200 mg, Oral, Daily  heparin (porcine), 5,000 Units, Subcutaneous, Q12H  HYDROcodone-acetaminophen, 0.5 tablet, Oral, Q6H  ipratropium, 2 spray, Each Nare, 4x Daily  lactobacillus acidophilus, 1 capsule, Oral, BID  methylPREDNISolone sodium succinate, 40 mg, Intravenous, Q12H  palliative care oral rinse, , Mouth/Throat, 4x Daily  pantoprazole, 40 mg, Oral, Daily  pharmacy consult - MTM, , Does not apply, Daily  pravastatin, 20 mg, Oral, Nightly  propranolol, 60 mg, Oral, BID  sertraline, 50 mg, Oral, Daily  sodium chloride, 10 mL, Intravenous, Q12H  triamcinolone, , Topical, Q12H      Continuous Infusions:   PRN Meds:.•  acetaminophen  •  benzonatate  •  bisacodyl  •  bisacodyl  •  clonazePAM  •  ibuprofen  •  ipratropium-albuterol  •  labetalol  •  magnesium sulfate **OR** magnesium sulfate **OR** magnesium sulfate  •  melatonin  •  mineral oil-hydrophilic petrolatum  •  ondansetron  •  sodium chloride    Assessment/Plan   Assessment & Plan     Active Hospital Problems    Diagnosis  POA   • **Acute and chronic respiratory failure with hypoxia (CMS/HCC)  [J96.21]  Yes   • COPD with acute exacerbation (CMS/HCC) [J44.1]  Yes   • Acute on chronic respiratory failure with hypercapnia (CMS/HCC) [J96.22]  Yes   • Physical deconditioning [R53.81]  Yes   • Chronic diastolic heart failure (CMS/HCC) [I50.32]  Yes   • Mixed anxiety depressive disorder [F41.8]  Yes   • Essential hypertension [I10]  Yes   • GERD (gastroesophageal reflux disease) [K21.9]  Yes   • Hyperlipidemia [E78.5]  Yes      Resolved Hospital Problems   No resolved problems to display.        Brief Hospital Course to date:  Sadie Tijerina is a 80 y.o. female with PMH of HTN, HLP, arthritis, remote uterine cancer, COPD on 2L NC and BiPAP, frequent pneumonia that presents after being home one day from rehab with SOA, wheezing and cough.    Acute on Chronic Respiratory Failure  COPD  Recent COVID Pneumonia  -CTA shows no PE, bronchial wall thickening, chronic lung changes  -home o2 2LNC  -Solumedrol bid  - doxy and rocephin   -changed neb albuterol to inhaler per patient request  -Pulm, saw, appreciate their input   Chronic Diastolic Heart Failure  -EF 66%, tricuspid regurgitation   HTN  Anxiety/Depression  HLD  GERD  GOC/Family Update  Patient is to go to [place] on Monday and hospice to follow   DVT Prophylaxis:  ELIECER      Disposition: I expect the patient to be discharged TBD    CODE STATUS:   Code Status and Medical Interventions:   Ordered at: 21 1217     Limited Support to NOT Include:    Intubation     Level Of Support Discussed With:    Patient     Code Status:    CPR     Medical Interventions (Level of Support Prior to Arrest):    Messi Jacobs DO  21                [facility] Medicine Services  PROGRESS NOTE    Patient Name: Sadie Tijerina  : 1940  MRN: [id number]    Date of Admission: 3/14/2021  Primary Care Physician: Kristian Wolff MD    Subjective   Subjective     CC:  F/u SOA    HPI:  Patient states that she feels short  "of breath. She is coughing more today. She requests pro air inhaler because the neb treatment \"does not work as well\".     ROS:  Gen- No fevers, chills  CV- No chest pain, palpitations  Resp- No cough,+ dyspnea  GI- No N/V/D, abd pain        Objective   Objective     Vital Signs:   Temp:  [96.2 °F (35.7 °C)-97.8 °F (36.6 °C)] 96.2 °F (35.7 °C)  Heart Rate:  [63-81] 73  Resp:  [18-20] 18  BP: (119-155)/(61-73) 119/68        Physical Exam:  Constitutional: No acute distress, awake, alert, elderly female sitting up in bed   HENT: NCAT, mucous membranes moist  Respiratory: expiratory wheezing bilaterlly , respiratory effort non labored on 2 L   Cardiovascular: RRR, no murmurs, rubs, or gallops  Gastrointestinal: Positive bowel sounds, soft, nontender, nondistended  Musculoskeletal: trace  bilateral ankle edema  Psychiatric: Appropriate affect, cooperative  Neurologic: Oriented x 3,  speech clear  Skin: No rashes      Results Reviewed:  Results from last 7 days   Lab Units 03/19/21  0757 03/18/21  0900 03/16/21  0523 03/14/21  1439   WBC 10*3/mm3 12.79* 13.13* 13.25* 13.64*   HEMOGLOBIN g/dL 10.7* 9.6* 9.5* 10.9*   HEMATOCRIT % 37.3 32.5* 32.1* 37.3   PLATELETS 10*3/mm3 233 209 179 203   PROCALCITONIN ng/mL  --   --   --  0.07     Results from last 7 days   Lab Units 03/19/21  1122 03/18/21  0900 03/16/21  0523 03/14/21  1439   SODIUM mmol/L 139 138 139 140   POTASSIUM mmol/L 5.3* 5.3* 5.0 4.9   CHLORIDE mmol/L 99 103 101 93*   CO2 mmol/L 32.0* 32.0* 35.0* 41.0*   BUN mg/dL 30* 25* 22 18   CREATININE mg/dL 0.83 0.76 0.93 1.05*   GLUCOSE mg/dL 130* 115* 148* 161*   CALCIUM mg/dL 8.4* 8.7 8.2* 9.7   ALT (SGPT) U/L  --   --   --  16   AST (SGOT) U/L  --   --   --  26   TROPONIN T ng/mL  --   --   --  <0.010   PROBNP pg/mL  --   --   --  275.2     Estimated Creatinine Clearance: 47 mL/min (by C-G formula based on SCr of 0.83 mg/dL).    Microbiology Results Abnormal     Procedure Component Value - Date/Time    Blood Culture - " Blood, Hand, Left [[id number]] Collected: 03/14/21 1545    Lab Status: Final result Specimen: Blood from Hand, Left Updated: 03/19/21 1645     Blood Culture No growth at 5 days    Blood Culture - Blood, Arm, Right [[id number]] Collected: 03/14/21 1540    Lab Status: Final result Specimen: Blood from Arm, Right Updated: 03/19/21 1645     Blood Culture No growth at 5 days    COVID PRE-OP / PRE-PROCEDURE SCREENING ORDER (NO ISOLATION) - Swab, Nasopharynx [[id number]]  (Normal) Collected: 03/14/21 1443    Lab Status: Final result Specimen: Swab from Nasopharynx Updated: 03/14/21 1521    Narrative:      The following orders were created for panel order COVID PRE-OP / PRE-PROCEDURE SCREENING ORDER (NO ISOLATION) - Swab, Nasopharynx.  Procedure                               Abnormality         Status                     ---------                               -----------         ------                     COVID-19 and FLU A/B PCR...[[id number]]  Normal              Final result                 Please view results for these tests on the individual orders.    COVID-19 and FLU A/B PCR - Swab, Nasopharynx [[id number]]  (Normal) Collected: 03/14/21 1443    Lab Status: Final result Specimen: Swab from Nasopharynx Updated: 03/14/21 1521     COVID19 Not Detected     Influenza A PCR Not Detected     Influenza B PCR Not Detected    Narrative:      Fact sheet for providers: https://www.fda.gov/media/681063/download    Fact sheet for patients: https://www.fda.gov/media/830251/download    Test performed by PCR.          Imaging Results (Last 24 Hours)     ** No results found for the last 24 hours. **          Results for orders placed in visit on 04/01/20    Adult Transthoracic Echo Complete W/ Cont if Necessary Per Protocol    Interpretation Summary  · Calculated right ventricular systolic pressure from tricuspid regurgitation is 40 mmHg.  · Mild tricuspid valve regurgitation is present.  · Estimated EF = 66%.  · Left ventricular systolic  function is normal.      I have reviewed the medications:  Scheduled Meds:Albuterol Sulfate NEB Orderable, 2.5 mg, Nebulization, 4x Daily - RT  amLODIPine, 5 mg, Oral, Daily  arformoterol, 15 mcg, Nebulization, BID - RT  aspirin, 81 mg, Oral, Daily  benzocaine, , Mouth/Throat, TID  budesonide, 0.5 mg, Nebulization, BID - RT  cetirizine, 10 mg, Oral, Daily  cholecalciferol, 1,000 Units, Oral, Daily  clonazePAM, 0.25 mg, Oral, Nightly  docusate sodium, 100 mg, Oral, BID  fluticasone, 2 spray, Each Nare, Daily  guaiFENesin, 1,200 mg, Oral, Daily  heparin (porcine), 5,000 Units, Subcutaneous, Q12H  HYDROcodone-acetaminophen, 0.5 tablet, Oral, Q6H  ipratropium, 2 spray, Each Nare, 4x Daily  lactobacillus acidophilus, 1 capsule, Oral, BID  methylPREDNISolone sodium succinate, 40 mg, Intravenous, Q12H  palliative care oral rinse, , Mouth/Throat, 4x Daily  pantoprazole, 40 mg, Oral, Daily  pharmacy consult - MTM, , Does not apply, Daily  pravastatin, 20 mg, Oral, Nightly  propranolol, 60 mg, Oral, BID  sertraline, 50 mg, Oral, Daily  sodium chloride, 10 mL, Intravenous, Q12H  triamcinolone, , Topical, Q12H      Continuous Infusions:   PRN Meds:.•  acetaminophen  •  benzonatate  •  bisacodyl  •  bisacodyl  •  clonazePAM  •  ibuprofen  •  ipratropium-albuterol  •  labetalol  •  magnesium sulfate **OR** magnesium sulfate **OR** magnesium sulfate  •  melatonin  •  mineral oil-hydrophilic petrolatum  •  ondansetron  •  sodium chloride    Assessment/Plan   Assessment & Plan     Active Hospital Problems    Diagnosis  POA   • **Acute and chronic respiratory failure with hypoxia (CMS/HCC) [J96.21]  Yes   • COPD with acute exacerbation (CMS/HCC) [J44.1]  Yes   • Acute on chronic respiratory failure with hypercapnia (CMS/HCC) [J96.22]  Yes   • Physical deconditioning [R53.81]  Yes   • Chronic diastolic heart failure (CMS/HCC) [I50.32]  Yes   • Mixed anxiety depressive disorder [F41.8]  Yes   • Essential hypertension [I10]  Yes   •  GERD (gastroesophageal reflux disease) [K21.9]  Yes   • Hyperlipidemia [E78.5]  Yes      Resolved Hospital Problems   No resolved problems to display.        Brief Hospital Course to date:  Sadie Tijerina is a 80 y.o. female with PMH of HTN, HLP, arthritis, remote uterine cancer, COPD on 2L NC and BiPAP, frequent pneumonia that presents after being home one day from rehab with SOA, wheezing and cough.    Acute on Chronic Respiratory Failure  COPD  Recent COVID Pneumonia  -CTA shows no PE, bronchial wall thickening, chronic lung changes  -home o2 2LNC  -Solumedrol bid  - doxy and rocephin   -changed neb albuterol to inhaler per patient request  -pulm following   -brovana/pulmicort   Chronic Diastolic Heart Failure  -EF 66%, tricuspid regurgitation    HTN  Anxiety/Depression  HLD  GERD  GOC/Family Update  Patient deciding on home with hospice, versus rehab (if she has days left) or an alternative. Meeting today with hospice,  and family   DVT Prophylaxis:  ELIECER      Disposition: I expect the patient to be discharged TBD    CODE STATUS:   Code Status and Medical Interventions:   Ordered at: 03/16/21 1217     Limited Support to NOT Include:    Intubation     Level Of Support Discussed With:    Patient     Code Status:    CPR     Medical Interventions (Level of Support Prior to Arrest):    Limited       Sharon Jacobs,   03/20/21

## 2021-03-20 NOTE — PLAN OF CARE
Goal Outcome Evaluation:     Outcome Summary: Pt A/OX4. VSS. Pt on 3LNC, pt wore Bipap for only a few hours this shift. SR on the monitor. Pt complaining of rash/dryness on her chest, PRN cream medication applied for comfort. No new concerns/ complaints voiced at this time.

## 2021-03-21 NOTE — PROGRESS NOTES
[facility] Medicine Services  PROGRESS NOTE    Patient Name: Sadie Tijerina  : 1940  MRN: [id number]    Date of Admission: 3/14/2021  Primary Care Physician: Kritsian Wolff MD    Subjective   Subjective     CC:  F/u SOA    HPI:  Patient reports not sleeping well last night and is tired. Breathing is better/ as good as its been for a while  Does not like inhaled steroid- causing mouth discomfort and wished to discontinue. Reports tried several previously and discontinues everytime due to mouth discomfort, even with oral hygiene after    ROS:  Gen- No fevers, chills  CV- No chest pain, palpitations  Resp- No cough,dyspnea improved  GI- No N/V/D, abd pain        Objective   Objective     Vital Signs:   Temp:  [96.3 °F (35.7 °C)-98.2 °F (36.8 °C)] 96.8 °F (36 °C)  Heart Rate:  [68-77] 73  Resp:  [15-18] 16  BP: (108-180)/(50-77) 159/60        Physical Exam:  Constitutional: No acute distress, awake, alert, elderly female resting in bed   HENT: NCAT, mucous membranes moist  Respiratory: expiratory wheezing bilaterlly , respiratory effort more  labored on 2 L   Cardiovascular: RRR, no murmurs, rubs, or gallops  Gastrointestinal: Positive bowel sounds, soft, nontender, nondistended  Musculoskeletal: trace  bilateral ankle edema  Psychiatric: Appropriate affect, cooperative  Neurologic: Oriented x 3,  speech clear  Skin: No rashes      Results Reviewed:  Results from last 7 days   Lab Units 21  0757 21  0921  0521  1439   WBC 10*3/mm3 12.79* 13.13* 13.25* 13.64*   HEMOGLOBIN g/dL 10.7* 9.6* 9.5* 10.9*   HEMATOCRIT % 37.3 32.5* 32.1* 37.3   PLATELETS 10*3/mm3 233 209 179 203   PROCALCITONIN ng/mL  --   --   --  0.07     Results from last 7 days   Lab Units 21  1122 21  0900 21  0523 21  1439   SODIUM mmol/L 139 138 139 140   POTASSIUM mmol/L 5.3* 5.3* 5.0 4.9   CHLORIDE mmol/L 99 103 101 93*   CO2 mmol/L 32.0* 32.0* 35.0* 41.0*   BUN  mg/dL 30* 25* 22 18   CREATININE mg/dL 0.83 0.76 0.93 1.05*   GLUCOSE mg/dL 130* 115* 148* 161*   CALCIUM mg/dL 8.4* 8.7 8.2* 9.7   ALT (SGPT) U/L  --   --   --  16   AST (SGOT) U/L  --   --   --  26   TROPONIN T ng/mL  --   --   --  <0.010   PROBNP pg/mL  --   --   --  275.2     Estimated Creatinine Clearance: 47 mL/min (by C-G formula based on SCr of 0.83 mg/dL).    Microbiology Results Abnormal     Procedure Component Value - Date/Time    Blood Culture - Blood, Hand, Left [[id number]] Collected: 03/14/21 1545    Lab Status: Final result Specimen: Blood from Hand, Left Updated: 03/19/21 1645     Blood Culture No growth at 5 days    Blood Culture - Blood, Arm, Right [[id number]] Collected: 03/14/21 1540    Lab Status: Final result Specimen: Blood from Arm, Right Updated: 03/19/21 1645     Blood Culture No growth at 5 days    COVID PRE-OP / PRE-PROCEDURE SCREENING ORDER (NO ISOLATION) - Swab, Nasopharynx [[id number]]  (Normal) Collected: 03/14/21 1443    Lab Status: Final result Specimen: Swab from Nasopharynx Updated: 03/14/21 1521    Narrative:      The following orders were created for panel order COVID PRE-OP / PRE-PROCEDURE SCREENING ORDER (NO ISOLATION) - Swab, Nasopharynx.  Procedure                               Abnormality         Status                     ---------                               -----------         ------                     COVID-19 and FLU A/B PCR...[[id number]]  Normal              Final result                 Please view results for these tests on the individual orders.    COVID-19 and FLU A/B PCR - Swab, Nasopharynx [[id number]]  (Normal) Collected: 03/14/21 1443    Lab Status: Final result Specimen: Swab from Nasopharynx Updated: 03/14/21 1521     COVID19 Not Detected     Influenza A PCR Not Detected     Influenza B PCR Not Detected    Narrative:      Fact sheet for providers: https://www.fda.gov/media/205766/download    Fact sheet for patients:  https://www.fda.gov/media/570455/download    Test performed by PCR.          Imaging Results (Last 24 Hours)     ** No results found for the last 24 hours. **          Results for orders placed in visit on 04/01/20    Adult Transthoracic Echo Complete W/ Cont if Necessary Per Protocol    Interpretation Summary  · Calculated right ventricular systolic pressure from tricuspid regurgitation is 40 mmHg.  · Mild tricuspid valve regurgitation is present.  · Estimated EF = 66%.  · Left ventricular systolic function is normal.      I have reviewed the medications:  Scheduled Meds:Albuterol Sulfate NEB Orderable, 2.5 mg, Nebulization, 4x Daily - RT  amLODIPine, 5 mg, Oral, Daily  arformoterol, 15 mcg, Nebulization, BID - RT  aspirin, 81 mg, Oral, Daily  benzocaine, , Mouth/Throat, TID  cetirizine, 10 mg, Oral, Daily  cholecalciferol, 1,000 Units, Oral, Daily  clonazePAM, 0.25 mg, Oral, Nightly  docusate sodium, 100 mg, Oral, BID  estrogens (conjugated), 0.625 mg, Oral, Daily  fluticasone, 2 spray, Each Nare, Daily  guaiFENesin, 1,200 mg, Oral, Daily  heparin (porcine), 5,000 Units, Subcutaneous, Q12H  HYDROcodone-acetaminophen, 0.5 tablet, Oral, Q6H  ipratropium, 2 spray, Each Nare, 4x Daily  lactobacillus acidophilus, 1 capsule, Oral, BID  nystatin, 5 mL, Swish & Spit, 4x Daily  palliative care oral rinse, , Mouth/Throat, 4x Daily  pantoprazole, 40 mg, Oral, Daily  pharmacy consult - MTM, , Does not apply, Daily  pravastatin, 20 mg, Oral, Nightly  predniSONE, 20 mg, Oral, BID With Meals  propranolol, 60 mg, Oral, BID  sertraline, 50 mg, Oral, Daily  sodium chloride, 10 mL, Intravenous, Q12H  temazepam, 7.5 mg, Oral, Nightly  triamcinolone, , Topical, Q12H      Continuous Infusions:   PRN Meds:.•  acetaminophen  •  benzonatate  •  bisacodyl  •  bisacodyl  •  clonazePAM  •  ibuprofen  •  ipratropium-albuterol  •  labetalol  •  magnesium sulfate **OR** magnesium sulfate **OR** magnesium sulfate  •  melatonin  •  mineral  oil-hydrophilic petrolatum  •  ondansetron  •  sodium chloride    Assessment/Plan   Assessment & Plan     Active Hospital Problems    Diagnosis  POA   • **Acute and chronic respiratory failure with hypoxia (CMS/HCC) [J96.21]  Yes   • COPD with acute exacerbation (CMS/HCC) [J44.1]  Yes   • Acute on chronic respiratory failure with hypercapnia (CMS/HCC) [J96.22]  Yes   • Physical deconditioning [R53.81]  Yes   • Chronic diastolic heart failure (CMS/HCC) [I50.32]  Yes   • Mixed anxiety depressive disorder [F41.8]  Yes   • Essential hypertension [I10]  Yes   • GERD (gastroesophageal reflux disease) [K21.9]  Yes   • Hyperlipidemia [E78.5]  Yes      Resolved Hospital Problems   No resolved problems to display.        Brief Hospital Course to date:  Sadie Tijerina is a 80 y.o. female with PMH of HTN, HLP, arthritis, remote uterine cancer, COPD on 2L NC and BiPAP, frequent pneumonia that presents after being home one day from rehab with SOA, wheezing and cough.    This patient's problems and plans were partially entered by my partner and updated as appropriate by me 03/21/21.      Acute on Chronic Respiratory Failure  COPD  Recent COVID Pneumonia  -CTA shows no PE, bronchial wall thickening, chronic lung changes  -home o2 2LNC  -Solumedrol bid transition to bid prednisone today. Will need long taper and likely life long oral steroids  - doxy and rocephin completed  -changed neb albuterol to inhaler per patient request  -Pulm, saw, appreciate their input - rec long steroid taper and inhaled corticosteroids. Patient requested these be discontinued. Discussed ramifications on breathing and patient is ok with this for time being. Will dc pulmicort today. Add nystatin s&s.  Chronic Diastolic Heart Failure  -EF 66%, tricuspid regurgitation   HTN  Anxiety/Depression  HLD  GERD  GOC/Family Update  Patient is to go to [place] on Monday and hospice to follow   Add low dose restoril for sleep this evening    DVT Prophylaxis:   ELIECER      Disposition: I expect the patient to be discharged tomorrow to [facility]- covid ordered    CODE STATUS:   Code Status and Medical Interventions:   Ordered at: 03/16/21 1217     Limited Support to NOT Include:    Intubation     Level Of Support Discussed With:    Patient     Code Status:    CPR     Medical Interventions (Level of Support Prior to Arrest):    Limited       Lorri Swartz, APRN  03/21/21

## 2021-03-21 NOTE — PLAN OF CARE
Goal Outcome Evaluation:        Outcome Summary: Pt A/Ox4. VSS. Pt on 2LNC/ BIPAP at HS. SR on the monitor. Pt complaining of a headache at the beginning of shift, PRN ibuprofen administered. No other concerns at this time.    0647 placed on RA to record o2 saturation. @0658 o2 reapplied, pt's o2 was 935, however, the patient was requesting the o2 because she developed a pounding headache. Pt also given Ibuprofen to help with headache.

## 2021-03-22 PROBLEM — J96.21 ACUTE AND CHRONIC RESPIRATORY FAILURE WITH HYPOXIA (HCC): Status: RESOLVED | Noted: 2018-01-23 | Resolved: 2021-01-01

## 2021-03-22 PROBLEM — J96.22 ACUTE ON CHRONIC RESPIRATORY FAILURE WITH HYPERCAPNIA (HCC): Status: RESOLVED | Noted: 2021-01-01 | Resolved: 2021-01-01

## 2021-03-22 NOTE — PROGRESS NOTES
NN spoke with patient at .  Patient alert and able to answer questions appropriately.  O2 sat 96%$ on 2L NC, home baseline is 2L with bipap +3L HS.  She is expected to DC to [facility] today for LTC.  Deep breathing exercises encouraged.  COPD action plan reviewed.  No new questions or concerns voice at this time.  NN continues to follow.            Per current GOLD Standards, please consider: No ICS in place, Outpatient PFT, Pulmonary rehab as appropriate, Palliative Care consult

## 2021-03-22 NOTE — DISCHARGE SUMMARY
[facility]  DISCHARGE SUMMARY    Patient Name: Sadie Tijerina  : 1940  MRN: [id number]    Date of Admission: 3/14/2021  2:04 PM  Date of Discharge:  3/22/2021  Primary Care Physician: Kristian Wolff MD    Consults     Date and Time Order Name Status Description    3/17/2021 12:33 AM Inpatient Pulmonology Consult Completed     3/14/2021  6:10 PM Inpatient Palliative Care MD Consult Completed           Hospital Course     Presenting Problem:   COPD with acute exacerbation (CMS/HCC) [J44.1]    Active Hospital Problems    Diagnosis  POA   • COPD with acute exacerbation (CMS/HCC) [J44.1]  Yes   • Physical deconditioning [R53.81]  Yes   • Chronic diastolic heart failure (CMS/HCC) [I50.32]  Yes   • Mixed anxiety depressive disorder [F41.8]  Yes   • Essential hypertension [I10]  Yes   • GERD (gastroesophageal reflux disease) [K21.9]  Yes   • Hyperlipidemia [E78.5]  Yes      Resolved Hospital Problems    Diagnosis Date Resolved POA   • **Acute and chronic respiratory failure with hypoxia (CMS/HCC) [J96.21] 2021 Yes   • Acute on chronic respiratory failure with hypercapnia (CMS/HCC) [J96.22] 2021 Yes          Hospital Course:  Sadie Tijerina is a 80 y.o. female  with PMH of HTN, HLP, arthritis, remote uterine cancer, COPD on 2L NC and BiPAP, frequent pneumonia that presents after being home one day from rehab with SOA, wheezing and cough.        Acute on Chronic Respiratory Failure  COPD  Recent COVID Pneumonia  -CTA shows no PE, bronchial wall thickening, chronic lung changes  -home o2 2LNC to continue   -Solumedrol bid transition to bid prednisone.. Will need long taper and likely life long oral steroids> 20mg bid x 7 days, 20mg daily x 7days, 10mg daily x 7 days, then resume home 5mg daily dosing on   - doxy and rocephin completed  -changed neb albuterol to inhaler per patient request  -Pulm, saw, appreciate their input - rec long steroid taper  and inhaled corticosteroids. Patient requested these be discontinued. Discussed ramifications on breathing and patient is ok restarting steroid inhaler with mouthwash and nystatin for the time being    Chronic Diastolic Heart Failure  -EF 66%, tricuspid regurgitation   - resume home lasix    HTN  Anxiety/Depression  HLD  GERD  GOC/Family Update  Patient is to go to [place] and hospice to follow   Add restoril for sleep        Discharge Follow Up Recommendations for outpatient labs/diagnostics:   PCP follow up as desires  Hospice to follow at SNF for further care/ management      Day of Discharge     HPI:   Feeling ok today. Breathing is better. Not sleeping well still, but otherwise no complaints.    Review of Systems  Gen- No fevers, chills  CV- No chest pain, palpitations  Resp- No cough, dyspnea  GI- No N/V/D, abd pain        Vital Signs:   Temp:  [96.9 °F (36.1 °C)-97.8 °F (36.6 °C)] 97.6 °F (36.4 °C)  Heart Rate:  [71-78] 74  Resp:  [16-20] 20  BP: (138-166)/(49-81) 159/74     Physical Exam:  Constitutional: No acute distress, awake, alert, sitting up in bed eating breakfast  HENT: NCAT, mucous membranes moist  Respiratory: Coarse bilaterally, respiratory effort normal on NC  Cardiovascular: RRR, no murmurs, rubs, or gallops  Gastrointestinal: Positive bowel sounds, soft, nontender, nondistended  Musculoskeletal: No bilateral ankle edema  Psychiatric: Appropriate affect, cooperative  Neurologic: Oriented x 3, strength symmetric in all extremities, Cranial Nerves grossly intact to confrontation, speech clear  Skin: No rashes      Pertinent  and/or Most Recent Results     LAB RESULTS:      Lab 03/19/21  0757 03/18/21  0900 03/16/21  0523   WBC 12.79* 13.13* 13.25*   HEMOGLOBIN 10.7* 9.6* 9.5*   HEMATOCRIT 37.3 32.5* 32.1*   PLATELETS 233 209 179   NEUTROS ABS  --  11.47* 12.20*   IMMATURE GRANS (ABS)  --  0.09* 0.09*   LYMPHS ABS  --  0.90 0.49*   MONOS ABS  --  0.66 0.45   EOS ABS  --  0.00 0.00   .5*  98.2* 98.2*         Lab 03/19/21  1122 03/18/21  0900 03/16/21  0523   SODIUM 139 138 139   POTASSIUM 5.3* 5.3* 5.0   CHLORIDE 99 103 101   CO2 32.0* 32.0* 35.0*   ANION GAP 8.0 3.0* 3.0*   BUN 30* 25* 22   CREATININE 0.83 0.76 0.93   GLUCOSE 130* 115* 148*   CALCIUM 8.4* 8.7 8.2*   MAGNESIUM  --   --  2.9*                         Brief Urine Lab Results  (Last result in the past 365 days)      Color   Clarity   Blood   Leuk Est   Nitrite   Protein   CREAT   Urine HCG        03/15/21 0444 Yellow Clear Negative Negative Negative Negative             Microbiology Results (last 10 days)     Procedure Component Value - Date/Time    COVID PRE-OP / PRE-PROCEDURE SCREENING ORDER (NO ISOLATION) - Swab, Nasopharynx [[id number]]  (Normal) Collected: 03/21/21 1545    Lab Status: Final result Specimen: Swab from Nasopharynx Updated: 03/21/21 1644    Narrative:      The following orders were created for panel order COVID PRE-OP / PRE-PROCEDURE SCREENING ORDER (NO ISOLATION) - Swab, Nasopharynx.  Procedure                               Abnormality         Status                     ---------                               -----------         ------                     COVID-19,CEPHEID,KIERA IN-...[[id number]]  Normal              Final result                 Please view results for these tests on the individual orders.    COVID-19,CEPHEID,KIERA IN-HOUSE(OR EMERGENT/ADD-ON),NP SWAB IN TRANSPORT MEDIA 3-4 HR TAT - Swab, Nasopharynx [[id number]]  (Normal) Collected: 03/21/21 1545    Lab Status: Final result Specimen: Swab from Nasopharynx Updated: 03/21/21 1644     COVID19 Not Detected    Narrative:      Fact sheet for providers: https://www.fda.gov/media/634391/download     Fact sheet for patients: https://www.fda.gov/media/768387/download    Blood Culture - Blood, Hand, Left [[id number]] Collected: 03/14/21 1545    Lab Status: Final result Specimen: Blood from Hand, Left Updated: 03/19/21 1645     Blood Culture No growth at 5 days    Blood  Culture - Blood, Arm, Right [[id number]] Collected: 03/14/21 1540    Lab Status: Final result Specimen: Blood from Arm, Right Updated: 03/19/21 1645     Blood Culture No growth at 5 days    COVID PRE-OP / PRE-PROCEDURE SCREENING ORDER (NO ISOLATION) - Swab, Nasopharynx [[id number]]  (Normal) Collected: 03/14/21 1443    Lab Status: Final result Specimen: Swab from Nasopharynx Updated: 03/14/21 1521    Narrative:      The following orders were created for panel order COVID PRE-OP / PRE-PROCEDURE SCREENING ORDER (NO ISOLATION) - Swab, Nasopharynx.  Procedure                               Abnormality         Status                     ---------                               -----------         ------                     COVID-19 and FLU A/B PCR...[[id number]]  Normal              Final result                 Please view results for these tests on the individual orders.    COVID-19 and FLU A/B PCR - Swab, Nasopharynx [[id number]]  (Normal) Collected: 03/14/21 1443    Lab Status: Final result Specimen: Swab from Nasopharynx Updated: 03/14/21 1521     COVID19 Not Detected     Influenza A PCR Not Detected     Influenza B PCR Not Detected    Narrative:      Fact sheet for providers: https://www.fda.gov/media/513492/download    Fact sheet for patients: https://www.fda.gov/media/295680/download    Test performed by PCR.          CT Angiogram Chest With & Without Contrast    Result Date: 3/14/2021  EXAMINATION: CT ANGIOGRAM CHEST W WO CONTRAST-  INDICATION: Respiratory failure  TECHNIQUE: Multiplanar CT imaging of the chest was performed with and without administration of intravenous contrast. Maximum intensity projection 3-D images were reconstructed to facilitate diagnostic accuracy  The radiation dose reduction device was turned on for each scan per the ALARA (As Low as Reasonably Achievable) protocol.  COMPARISON: CT chest without contrast performed 3/12/2019  FINDINGS: Thyroid is homogenous in attenuation. Likely mild degree  of fatty deposition in the liver.  Only cystectomy. Pancreas spleen and adrenal glands are normal. Exophytic left renal cyst. Stomach is normal. Aortic atherosclerosis. Coronary artery calcifications. Heart is normal in size. No pericardial effusion. Main pulmonary artery is normal in caliber. No large main stem or lobar pulmonary embolism. No suspicious mediastinal lymph nodes. Emphysema with multiple areas of bronchiectasis predominantly in the lung apices with adjacent ill-defined patchy consolidation. Scattered peribronchial nodularity for example in the posterior aspect of the left upper lobe and ill-defined groundglass opacities in the right lower lobe. No pleural effusion. No pneumothorax.        1. No acute intrathoracic findings. No pulmonary embolism.  2. Scattered areas of bronchiectasis with bronchial wall thickening and patchy areas of consolidation. This appears progressed compared to prior exam and is consistent with chronic long-standing changes. More focal nodularity in the left upper lobe may suggest acute infection or inflammation. Groundglass opacities in the right lower lobe appear similar to prior. Opacities in the right middle lobe appear mildly progressed.   This report was finalized on 3/14/2021 4:36 PM by Tony Duran.      XR Chest 1 View    Result Date: 3/14/2021  EXAMINATION: XR CHEST 1 VW-  INDICATION: SOA triage protocol  COMPARISON: NONE  FINDINGS: Degenerative osseous changes. Soft tissue anchors in the left humeral head. Normal cardiomediastinal silhouette. Aortic atherosclerosis. No pleural effusion or pneumothorax. Prominent interstitial markings with basilar opacities.      Prominent interstitial markings with basilar opacities.  This report was finalized on 3/14/2021 3:00 PM by Tony Duran.        Results for orders placed in visit on 01/07/20    Duplex Carotid Ultrasound CAR    Interpretation Summary  · Right internal carotid artery stenosis of 0-49%.  · Left internal carotid  artery stenosis of 0-49%.      Results for orders placed in visit on 01/07/20    Duplex Carotid Ultrasound CAR    Interpretation Summary  · Right internal carotid artery stenosis of 0-49%.  · Left internal carotid artery stenosis of 0-49%.      Results for orders placed in visit on 04/01/20    Adult Transthoracic Echo Complete W/ Cont if Necessary Per Protocol    Interpretation Summary  · Calculated right ventricular systolic pressure from tricuspid regurgitation is 40 mmHg.  · Mild tricuspid valve regurgitation is present.  · Estimated EF = 66%.  · Left ventricular systolic function is normal.      Plan for Follow-up of Pending Labs/Results:     Discharge Details        Discharge Medications      New Medications      Instructions Start Date   benzocaine 10 % mucosal gel  Commonly known as: ORAJEL   Mouth/Throat, 3 Times Daily      bisacodyl 5 MG EC tablet  Commonly known as: DULCOLAX   5 mg, Oral, Daily PRN      bisacodyl 10 MG suppository  Commonly known as: DULCOLAX   10 mg, Rectal, Daily PRN      docusate sodium 100 MG capsule   100 mg, Oral, 2 Times Daily      fluticasone-salmeterol 500-50 MCG/DOSE DISKUS  Commonly known as: Advair Diskus   1 puff, Inhalation, 2 Times Daily - RT      HYDROcodone-acetaminophen 5-325 MG per tablet  Commonly known as: NORCO   0.5 tablets, Oral, Every 6 Hours      ipratropium-albuterol 0.5-2.5 mg/3 ml nebulizer  Commonly known as: DUO-NEB   3 mL, Nebulization, Every 4 Hours PRN      mineral oil-hydrophilic petrolatum ointment   Topical, 2 Times Daily PRN      nystatin 086871 UNIT/ML suspension  Commonly known as: MYCOSTATIN   500,000 Units, Swish & Spit, 4 Times Daily      temazepam 15 MG capsule  Commonly known as: RESTORIL   15 mg, Oral, Nightly      triamcinolone 0.1 % cream  Commonly known as: KENALOG  Replaces: betamethasone dipropionate 0.05 % cream   Topical, Every 12 Hours Scheduled         Changes to Medications      Instructions Start Date   acetaminophen 325 MG  tablet  Commonly known as: TYLENOL  What changed:   · medication strength  · how much to take  · when to take this   650 mg, Oral, Every 6 Hours PRN      clonazePAM 0.5 MG tablet  Commonly known as: KlonoPIN  What changed: how much to take   0.25 mg, Oral, 3 Times Daily PRN      clonazePAM 0.5 MG tablet  Commonly known as: KlonoPIN  What changed: You were already taking a medication with the same name, and this prescription was added. Make sure you understand how and when to take each.   0.25 mg, Oral, Nightly      melatonin 5 MG tablet tablet  What changed:   · medication strength  · how much to take   5 mg, Oral, Nightly      predniSONE 20 MG tablet  Commonly known as: DELTASONE  What changed:   · medication strength  · how much to take  · how to take this  · when to take this  · additional instructions   20mg bid x 7 days, 20mg daily x 7 days, 10mg daily x 7 days, then resume home dose 5mg daily         Continue These Medications      Instructions Start Date   albuterol 1.25 MG/3ML nebulizer solution  Commonly known as: ACCUNEB   1 ampule, Nebulization, Every 6 Hours PRN      amLODIPine 5 MG tablet  Commonly known as: NORVASC   5 mg, Oral, Daily      arformoterol 15 MCG/2ML nebulizer solution  Commonly known as: BROVANA   15 mcg, Nebulization, 2 Times Daily - RT      aspirin 81 MG EC tablet   81 mg, Oral, Daily      benzonatate 100 MG capsule  Commonly known as: TESSALON   100 mg, Oral, 3 Times Daily PRN      Bevespi Aerosphere 9-4.8 MCG/ACT aerosol  Generic drug: Glycopyrrolate-Formoterol   INHALE 2 PUFFS BY MOUTH TWICE DAILY      Biotene dry mouth liquid liquid   10 mL, Oral, 2 times daily, Every morning and at bedtime       cetirizine 10 MG tablet  Commonly known as: zyrTEC   10 mg, Oral, Daily      cholecalciferol 25 MCG (1000 UT) tablet  Commonly known as: VITAMIN D3   1,000 Units, Oral, Daily      estrogens (conjugated) 0.625 MG tablet  Commonly known as: PREMARIN   0.625 mg, Oral, Daily      fluticasone 50  MCG/ACT nasal spray  Commonly known as: FLONASE   2 sprays, Each Nare, Daily      furosemide 20 MG tablet  Commonly known as: LASIX   20 mg, Oral, Daily      guaiFENesin 600 MG 12 hr tablet  Commonly known as: MUCINEX   1,200 mg, Oral, Daily      ipratropium 0.06 % nasal spray  Commonly known as: ATROVENT   2 sprays, Nasal, 4 Times Daily      lactobacillus acidophilus capsule capsule   1 capsule, Oral, 2 Times Daily      meclizine 25 MG tablet  Commonly known as: ANTIVERT   12.5 mg, Oral, 3 Times Daily PRN      omeprazole 40 MG capsule  Commonly known as: priLOSEC   40 mg, Oral, Daily      potassium chloride 10 MEQ CR tablet  Commonly known as: K-DUR,KLOR-CON   10 mEq, Oral, Daily      pravastatin 20 MG tablet  Commonly known as: PRAVACHOL   20 mg, Oral, Every Evening      propranolol 60 MG tablet  Commonly known as: INDERAL   TAKE 1 TABLET BY MOUTH TWICE DAILY      sertraline 50 MG tablet  Commonly known as: ZOLOFT   50 mg, Oral, Daily         Stop These Medications    betamethasone dipropionate 0.05 % cream  Replaced by: triamcinolone 0.1 % cream     mirtazapine 7.5 MG half tablet  Commonly known as: REMERON     mupirocin 2 % ointment  Commonly known as: BACTROBAN            Allergies   Allergen Reactions   • Morphine And Related Irritability         Discharge Disposition:  Skilled Nursing Facility (DC - External)    Diet:  Hospital:  Diet Order   Procedures   • Diet Regular; Thin; Cardiac       Activity:  Activity Instructions     Activity as Tolerated            Restrictions or Other Recommendations:         CODE STATUS:    Code Status and Medical Interventions:   Ordered at: 03/16/21 1212     Limited Support to NOT Include:    Intubation     Level Of Support Discussed With:    Patient     Code Status:    CPR     Medical Interventions (Level of Support Prior to Arrest):    Limited       No future appointments.    Additional Instructions for the Follow-ups that You Need to Schedule     Discharge Follow-up with  PCP   As directed       Currently Documented PCP:    Kristian Wolff MD    PCP Phone Number:    [phone]     Follow Up Details: prn                     RUBY Bardales  03/22/21      Time Spent on Discharge:  I spent  45 minutes on this discharge activity which included: face-to-face encounter with the patient, reviewing the data in the system, coordination of the care with the nursing staff as well as consultants, documentation, and entering orders.        Electronically signed by RUBY Bardales, 03/22/21, 9:42 AM EDT.

## 2021-03-22 NOTE — PROGRESS NOTES
Case Management Discharge Note    Per Isatu in admissions, they can accept Mrs. Tijerina into a longterm care medicaid pending bed today. [facility] is scheduled to transport her there today at 13:00. They will pick her up at the bedside. Nurse to call report to [phone] ext [phone].    Selected Continued Care - Admitted Since 3/14/2021     Destination Coordination complete    Service Provider Selected Services Address Phone Fax Patient Preferred    [facility]  [facility] [address] [facility] [address] [phone] --          Durable Medical Equipment    No services have been selected for the patient.              Dialysis/Infusion    No services have been selected for the patient.              Home Medical Care    No services have been selected for the patient.              Therapy    No services have been selected for the patient.              Community Resources    No services have been selected for the patient.                  Transportation Services  Ambulance: [facility]  W/C Van: Other    Final Discharge Disposition Code: 04 - intermediate care facility

## 2021-03-22 NOTE — NURSING NOTE
Prior to central line removal, order for the removal of catheter was verified, patient was assessed, necessary materials were gathered and patient was educated regarding procedure .    Patient was positioned supine and flat to ensure that the insertion site was at or below the level of the heart.    Hands were washed, clean gloves were applied and central line dressing was gently removed. Catheter exit site was not cultured.     A new pair of clean gloves were then applied. Insertion site was cleansed with 2% Chlorhexidine swab using a circular motion beginning at the insertion site and moving outward for 30 seconds and allowed to dry.     Clamp on line was not present.     Patient was instructed to perform Valsalva maneuver as catheter was withdrawn.     The central line was grasped at the insertion site and slowly pulled outward parallel to the skin. Resistance was not met.    After central line was completely removed, a sterile 4x4 gauze pad was used to apply light pressure until bleeding stopped. At that time, petroleum-based gauze and a sterile occlusive dressing was applied to exit site.     Patient was instructed to keep dressing in place for at least 24 hours and to remain in a flat or reclining position for 30 minutes post-removal.     Catheter was inspected after removal and intact. Tip of central line was not sent for culture. Patient tolerated procedure.

## 2021-03-22 NOTE — PLAN OF CARE
Patient resting comfortably vss patient to be discharged to rehab to day with hospice to follow will continue to monitor.

## 2021-03-22 NOTE — PROGRESS NOTES
Continued Stay Note  [facility]     Patient Name: Sadie Tijerina  MRN: [id number]  Today's Date: 3/22/2021    Admit Date: 3/14/2021    Discharge Plan     Row Name 03/22/21 1013       Plan    Plan  [facility]    Final Discharge Disposition Code     Final Note  Pt discharging to [facility], plan is for [facility] to be involved with the pt at the facility. [facility] had informed this writer that a new hospice referral will need to be made once pt is admitted to the facility. Telephone call to pt's daughter Neris, informed that a new referral for hospice will need to be made when pt is admitted to the facility, Neris verbalized understanding and requested this writer call the facility regarding the referral. Telephone call to [facility]  to ext [phone], informed of need for the hospice referral to [facility]. Please call [phone] if can be of further assistance.     Row Name 03/22/21 0932       Plan    Plan     Patient/Family in Agreement with Plan     Final Discharge Disposition Code     Final Note         Discharge Codes    No documentation.       Expected Discharge Date and Time     Expected Discharge Date Expected Discharge Time    Mar 22, 2021             Nicole Adkins RN

## 2021-03-24 NOTE — TELEPHONE ENCOUNTER
[facility] MEDICATION REQUEST FOR HYDROCODONE 5/325 MG, CLONAZEPAM 0.5 MG, TEMAZEPAM 15 MG, AND CLONAZEPAM 0.5 MG.    NORCO DIRECTIONS: TAKE 1/2 TAB PO Q 6 HRS PRN.    CLONAZEPAM DIRECTIONS: TAKE 1/2 TAB PO QHS SCHEDULED AND TAKE 1/2 PO Q 8 HRS PRN.    TEMAZEPAM DIRECTIONS: TAKE 1 TAB PO QHS SCHEDULED.

## 2021-03-25 NOTE — PROGRESS NOTES
Nursing Home History and Physical       Luis Eduardo DO [place] []  RUBY Tolentino []  [address] [place] [address]  Phone: [phone]  Fax: [phone] Kip Cortez MD []  Fred Cueva DO [x]   [address] [place] [address]  Phone: [phone]  Fax: [phone]     PATIENT NAME: Sadie Tijerina                                                                          YOB: 1940           DATE OF SERVICE: 03/25/2021  FACILITY:  [x]  [place]   [] [place]    [] [facility]   [] [facility]   []  [facility]   []  Other ______________________________________________________________________    CHIEF COMPLAINT:  Hypoxic respiratory failure      HISTORY OF PRESENT ILLNESS:   Patient is an 80-year-old white female with a history of hypertension, hyperlipidemia, arthritis, uterine cancer, and COPD with chronic hypoxia who was transferred from [facility] following hospitalization for pneumonia.  Patient was treated with Solu-Medrol and prednisone taper along with doxycycline and Rocephin which were completed upon arrival at this facility.  On exam, patient was resting comfortably and had minor complaints including irritation from nasal sprays and that she was not receiving her breathing treatment frequent enough.    PAST MEDICAL & SURGICAL HISTORY:   Past Medical History:   Diagnosis Date   • Arthritis    • Cancer (CMS/[facility])     uterine cancer (1981)   • COPD (chronic obstructive pulmonary disease) (CMS/[facility])    • Depression    • Elevated cholesterol    • GERD (gastroesophageal reflux disease)    • History of emphysema    • History of esophageal reflux    • History of recurrent UTI (urinary tract infection)    • History of renal calculi    • History of uterine cancer    • Hyperlipidemia    • Hypertension    • Impaired functional mobility, balance, gait, and endurance    • Pneumonia 02/2019      Past Surgical History:   Procedure Laterality Date   •  FOOT SURGERY     • HAND SURGERY     • HYSTERECTOMY           MEDICATIONS:  I have reviewed and reconciled the patients medication list in the patients chart at the [facility] nursing [facility] on 2021.      ALLERGIES:  Allergies   Allergen Reactions   • Morphine And Related Irritability         SOCIAL HISTORY:  Social History     Socioeconomic History   • Marital status:      Spouse name: Not on file   • Number of children: Not on file   • Years of education: Not on file   • Highest education level: Not on file   Tobacco Use   • Smoking status: Current Every Day Smoker     Packs/day: 0.25     Types: Cigarettes     Last attempt to quit: 2019     Years since quittin.3   • Smokeless tobacco: Never Used   • Tobacco comment: HALF OF A CIGARETTE   Vaping Use   • Vaping Use: Never assessed   Substance and Sexual Activity   • Alcohol use: No   • Drug use: No   • Sexual activity: Defer     Comment:        FAMILY HISTORY:  Family History   Problem Relation Age of Onset   • Cancer Mother    • Heart attack Father    • Arthritis Father    • Heart disease Father    • Diabetes Daughter    • Hyperlipidemia Daughter    • Migraines Daughter    • Heart disease Sister    • Diabetes Son    • Glaucoma Son         REVIEW OF SYSTEMS:  Review of Systems   Constitutional: Negative for chills, fatigue and fever.   HENT: Negative for congestion, ear pain, rhinorrhea, sinus pressure and sore throat.    Eyes: Negative for visual disturbance.   Respiratory: Positive for cough. Negative for chest tightness, shortness of breath and wheezing.    Cardiovascular: Negative for chest pain, palpitations and leg swelling.   Gastrointestinal: Negative for abdominal pain, blood in stool, constipation, diarrhea, nausea and vomiting.   Endocrine: Negative for polydipsia and polyuria.   Genitourinary: Negative for dysuria and hematuria.   Musculoskeletal: Negative for arthralgias and back pain.   Skin: Negative for rash.    Neurological: Negative for dizziness, light-headedness, numbness and headaches.   Psychiatric/Behavioral: Negative for dysphoric mood and sleep disturbance. The patient is not nervous/anxious.          PHYSICAL EXAMINATION:   VITAL SIGNS: /63   Pulse 77   Temp 97.9 °F (36.6 °C)   Resp 18   Wt 67.6 kg (149 lb)   SpO2 98%   BMI 27.25 kg/m²     Physical Exam  Vitals and nursing note reviewed.   Constitutional:       Appearance: Normal appearance. She is well-developed. She is ill-appearing.      Comments: Frail elderly female   HENT:      Head: Normocephalic and atraumatic.      Nose: Nose normal.      Mouth/Throat:      Mouth: Mucous membranes are moist.      Pharynx: No oropharyngeal exudate.   Eyes:      General: No scleral icterus.     Conjunctiva/sclera: Conjunctivae normal.      Pupils: Pupils are equal, round, and reactive to light.   Neck:      Thyroid: No thyromegaly.   Cardiovascular:      Rate and Rhythm: Normal rate and regular rhythm.      Heart sounds: Normal heart sounds. No murmur heard.   No friction rub. No gallop.    Pulmonary:      Effort: Pulmonary effort is normal. No respiratory distress.      Breath sounds: Wheezing (mild ) present.   Abdominal:      General: Bowel sounds are normal. There is no distension.      Palpations: Abdomen is soft.      Tenderness: There is no abdominal tenderness.   Musculoskeletal:         General: No deformity or signs of injury.      Cervical back: Normal range of motion and neck supple.   Lymphadenopathy:      Cervical: No cervical adenopathy.   Skin:     General: Skin is warm and dry.      Findings: No rash.   Neurological:      Mental Status: She is alert and oriented to person, place, and time.   Psychiatric:         Mood and Affect: Mood normal.         Behavior: Behavior normal.         RECORDS REVIEW:   Discharge Summary from [facility] 3/22/21    ASSESSMENT   Diagnoses and all orders for this visit:    1. Chronic respiratory failure with hypoxia  (CMS/[facility]) (Primary)    2. Mixed anxiety depressive disorder    3. Essential hypertension    4. Chronic diastolic heart failure (CMS/[facility])    5. Gastroesophageal reflux disease without esophagitis    6. Palliative care patient        PLAN    Chronic Respiratory Failure due to COPD  - cont supplemental O2  - attempt PT and strengthening measures, however if she does have further decline, hospice care is likely appropriate to consider.   - cont nursing facility care for ADLs  - Continue Advair and DuoNeb.   -Continue prednisone taper recommended from hospitalization.    Chronic Diastolic Heart Failure  - cont current medications, bulimic at this time.    Essential hypertension  -Continue Norvasc.    Anxiety  -Controlled on Zoloft.    Allergic rhinitis  -Okay to discontinue fluticasone and ipratropium nasal spray due to nasal radiation.  Continue humidified oxygen.    GERD  -Continue Prilosec.      [x]  Discussed Patient in detail with nursing/staff, addressed all needs today.     [x]  Plan of Care Reviewed   [x]  PT/OT Reviewed   [x]  Order Changes  []  Discharge Plans Reviewed  [x]  Advance Directive on file with Nursing Home.   [x]  POA on file with Nursing Home.    [x]  Code Status listed and reviewed.       Fred Cueva DO.  3/31/2021      **Part of this note may be an electronic transcription/translation of spoken language to printed text using the Dragon Dictation System.**

## 2021-04-01 NOTE — PROGRESS NOTES
Nursing Home Progress Note        Luis Eduardo Perry DO []  RUBY Tolentino []  [address] [place] [address]  Phone: [phone]  Fax: [phone] Kip Cortez MD []  Fred Cueva DO []  Norma Barber PA-C [x]   [address] [place] [address]  Phone: [phone]  Fax: [phone]     PATIENT NAME: Sadie Tijerina                                                                          YOB: 1940           DATE OF SERVICE: 04/01/2021  FACILITY:  [x] [place]   [] [place]   [] [facility]   [] [facility]   [] [place]    CHIEF COMPLAINT:  Follow up, COPD      HISTORY OF PRESENT ILLNESS:   Ms. Tijerina is an 81 y/o female who presents today for follow up on COPD exacerbation.  She is followed by hospice services.  Recently admitted to this facility for long term care.  Patient reports that she has had some allergy symptoms, sneezing.  Respiratory symptoms are at baseline, no increased O2 demands.  In addition she has some left foot pain and discoloration.  Denies any acute trauma.  No recent falls.    PAST MEDICAL & SURGICAL HISTORY:   Past Medical History:   Diagnosis Date   • Arthritis    • Cancer (CMS/HCC)     uterine cancer (1981)   • COPD (chronic obstructive pulmonary disease) (CMS/[facility])    • Depression    • Elevated cholesterol    • GERD (gastroesophageal reflux disease)    • History of emphysema    • History of esophageal reflux    • History of recurrent UTI (urinary tract infection)    • History of renal calculi    • History of uterine cancer    • Hyperlipidemia    • Hypertension    • Impaired functional mobility, balance, gait, and endurance    • Pneumonia 02/2019      Past Surgical History:   Procedure Laterality Date   • FOOT SURGERY     • HAND SURGERY     • HYSTERECTOMY           MEDICATIONS:  I have reviewed and reconciled the patients medication list in the patients chart at the skilled nursing facility today.      ALLERGIES:  Allergies   Allergen  Reactions   • Morphine And Related Irritability         SOCIAL HISTORY:  Social History     Socioeconomic History   • Marital status:      Spouse name: Not on file   • Number of children: Not on file   • Years of education: Not on file   • Highest education level: Not on file   Tobacco Use   • Smoking status: Current Every Day Smoker     Packs/day: 0.25     Types: Cigarettes     Last attempt to quit: 2019     Years since quittin.3   • Smokeless tobacco: Never Used   • Tobacco comment: HALF OF A CIGARETTE   Vaping Use   • Vaping Use: Never assessed   Substance and Sexual Activity   • Alcohol use: No   • Drug use: No   • Sexual activity: Defer     Comment:        FAMILY HISTORY:  Family History   Problem Relation Age of Onset   • Cancer Mother    • Heart attack Father    • Arthritis Father    • Heart disease Father    • Diabetes Daughter    • Hyperlipidemia Daughter    • Migraines Daughter    • Heart disease Sister    • Diabetes Son    • Glaucoma Son        REVIEW OF SYSTEMS:    Constitutional: Negative for fever, chills, diaphoresis.  HENT:  Negative for congestion or problems swallowing.  Respiratory: Negative for cough, or wheezing.   Cardiovascular: Negative for chest pain, palpitations, leg swelling.   Gastrointestinal: Negative for diarrhea, nausea, vomiting, abdominal pain.   Genitourinary: Negative for dysuria hematuria, and frequency.   Musculoskeletal: Negative for joint swelling  Integumentary: Negative for rash or wound.  Neurological: Negative for syncope and seizures.   Psychological: Agitation, anxiety, or sleep disturbance  Positive: Shortness of breath (chronic), left foot pain, allergy symptoms    PHYSICAL EXAMINATION:     VITAL SIGNS:  /76   Pulse 87   Temp 97.3 °F (36.3 °C)   Resp 20   SpO2 94%     Constitutional: Alert and talkative, sitting upright in her wheelchair.   HENT:   Head: Normocephalic and atraumatic.   Eyes: Conjunctivae and EOM are normal. Pupils are  equal, round, and reactive to light.   Neck: Normal range of motion. Neck supple. No JVD present.   Cardiovascular: Normal rate, regular rhythm and normal heart sounds.   Pulmonary/Chest: Effort normal and breath sounds normal. No respiratory distress.   Abdominal: Soft. Bowel sounds are normal. No distention or tenderness.    Musculoskeletal: Normal range of motion.   Neurological: Alert and oriented at baseline.  Skin: Skin is warm and dry.   Psychiatric: Normal mood and affect.  Normal behavior.   Left foot has pedal edema with diffuse discoloration.  Tenderness with palpation.  Neurovascularly BLE intact.    Nursing note and vitals reviewed.    RECORDS REVIEW:   3/30:  BUN 31, Cr. 1.0, WBC 22.0, Hgb 11.1, Hct 35.3    ASSESSMENT     Diagnoses and all orders for this visit:    1. COPD with acute exacerbation (CMS/HCC) (Primary)    2. Chronic respiratory failure with hypoxia (CMS/[facility])    3. Essential hypertension    4. Hospice care patient    5. Left foot pain    6. Seasonal allergic rhinitis, unspecified trigger        PLAN  COPD exacerbation/chronic respiratory failure: She continues on steroid taper.  She does have elevated white count, suspect due to steroid use.  She is remained afebrile, denies any malaise or body aches.  We will repeat CBC and BMP in 1 week.  Respiratory symptoms are at her baseline.  Discussed in detail with patient about her current nebulizer/inhalers.  She feels that duo nebs are giving her a headache.  She has requested use of a ProAir inhaler every 2 hours as needed.  She utilize this medication at home and feels confident with using an inhaler.  I did discuss in detail with her difficulty of coordinating inhalers and discus medications.  Due to her overall poor health feel it best if patient resumes use of Pulmicort nebulizer twice daily to replace her Advair inhaler.  We will also continue Bevespi but add spacer to aid in delivery of medication.  Utilize albuterol nebulizers if needed  as needed, as patient is at high risk for developing respiratory distress.  Hypertension: Controlled.  Continue current medications.  Hospice care: Continue to follow along with hospice services.  They visited patient yesterday.  Discussed patient medication changes with hospice by phone.   Left foot pain: We will obtain x-ray.  Utilize as needed pain medication.  Encouraged patient to elevate extremity.  Seasonal allergies: Start Zyrtec daily for 30 days.    [x]  Discussed Patient in detail with nursing/staff, addressed all needs today.     [x]  Plan of Care Reviewed   []  PT/OT Reviewed   []  Order Changes  []  Discharge Plans Reviewed  [x]  Advance Directive on file with Nursing Home.   [x]  POA on file with Nursing Home.    [x]  Code Status listed:  []  Full Code   [x]  DNR    I spent 40 minutes in direct patient care including face to face and floor time.          Norma Barber PA-C.  4/7/2021

## 2021-04-08 NOTE — PROGRESS NOTES
Nursing Home Advanced Care Planning Note      Luis Eduardo Perry DO  []  RUBY Tolentino  []  [address] [place] [address]  Phone: [phone]  Fax: [phone] Kip Cortez MD  []  Fred Cueva DO  []  Norma Barber PA-C  [x]  [address] [place] [address]  Phone: [phone]  Fax: [phone]     PATIENT NAME: Sadie Tijerina                                                                          YOB: 1940     Age:  80 y.o.  Sex:  female  DATE OF SERVICE: 04/08/2021  Primary Care Physician:  Fred Cueva DO  FACILITY:   [x] [place]    [] [place]    [] [facility]     [] [facility]    [] [place]    Brief Summary:    Ms. Tijerina is an 81 y/o female with history of end stage COPD,  chronic respiratory failure, hyperlipidemia, diastolic congestive heart failure, hypertension, debility.  She is currently under the care of hospice.  She had recent hospitalization due to her chronic respiratory disease, where it was felt continued care at SNF appropriate due to patient's recurrent and often severe symptoms.  During her hospitalization, palliative services consulted where ultimately family and patient elected to focus on symptom management and defer any aggressive life prolonging interventions.         Advance Care Planning   ACP discussion was held with the patient during this visit. Patient has an advance directive in EMR which is still valid.     1.  Determination of decisional capacity:     Patient displays decisional capacity in regards to her chronic medical conditions.     2.  Advanced Directives:    Living Will  EMS DNR  POA (Daughters x 2)    3.  Patient & Family Meeting    Meeting attendees: Myself, Hospice nurse, Ms. Tijerina's 2 daughters, and patient  Meeting location: [facility]     Summary of the discussion:    Ms. Tijerina is a very frail elderly female with severe end-stage COPD.  She has had recurrent hospitalizations over  the past year.  She previously was living independently, where family reports she was known to have recurrent falls and poor medication compliance.  Although patient maintains that she would like to transition back to independent living, family feel it is unsafe and she will not be able to.  Hospice continues to follow along with patient at SNF.  Family expressed that patient has had somewhat of an improvement to her symptoms while she is tapering off of prednisone.  They fear that patient will attempt to discharge from facility, believing she can care for herself at home.  I did discuss this with patient, and although she expresses desire to care for herself independently at home, she also realizes that right now she is unable to.  She expresses interest in participating in therapy, if she has the energy.  She becomes short of breath with minimal exertion.  I encouraged patient to keep an open mind about long-term care, as her respiratory health will most likely decline.  CODE STATUS reviewed, DO NOT RESUSCITATE.  I discussed with family current plan and medications in detail.  They expressed satisfaction with plan, wish to continue with a conservative approach and limit transfer to hospital if possible.    Conclusions:    Code status: DO NOT RESUSCITATE  Medical interventions: Limited  Advanced directives: Living Will, EMS DNR, POA - 2 daughters.                     Total time spent in counseling and advanced care planning discussion per above documentation 65 min.     Norma Barber PA-C

## 2021-04-14 NOTE — TELEPHONE ENCOUNTER
REQUESTED REFILL BY [facility]    TEMAZEPAM 15 MG ONE TAB PO EVERY NIGHT  SCHEDULED        **NOTE:  WHEN PLACING ORDER IT STATED THIS IS NOT ON FORMULARY OTHER ALTERNATIVES ARE AVAILABLE.

## 2021-04-15 NOTE — PROGRESS NOTES
Nursing Home Progress Note        Luis Eduardo Perry DO []  RUBY Tolentino []  [address] [place] [address]  Phone: [phone]  Fax: [phone] Kip Cortez MD []  Fred Cueva DO [x]   [address] [place] [address]  Phone: [phone]  Fax: [phone]     PATIENT NAME: Sadie Tijerina                                                                          YOB: 1940           DATE OF SERVICE: 04/08/2021  FACILITY:  [x] [place]   [] [place]   [] [facility]   [] [facility]  []  [facility]  [] Other ______________________________________________________________________     CHIEF COMPLAINT:  Chronic Medical Management      HISTORY OF PRESENT ILLNESS:   Patient was sitting up comfortably in her chair with her lunch on exam today.  He shared concerns about her bilateral lower extremity edema.  Patient was concerned about her medication regimen for anxiety.  She stated that she was taking daytime doses as well as nighttime doses of clonazepam at home.  She is requesting daytime doses as she has only receiving nighttime dosing of Klonopin.  She also expressed concerns about her nebulizer and inhaler orders.  She had a list of all of her medications and requested that budesonide nebulizer be discontinued.    An advanced care planning meeting is scheduled for the patient and family later today.    PAST MEDICAL & SURGICAL HISTORY:   Past Medical History:   Diagnosis Date   • Arthritis    • Cancer (CMS/[facility])     uterine cancer (1981)   • COPD (chronic obstructive pulmonary disease) (CMS/[facility])    • Depression    • Elevated cholesterol    • GERD (gastroesophageal reflux disease)    • History of emphysema    • History of esophageal reflux    • History of recurrent UTI (urinary tract infection)    • History of renal calculi    • History of uterine cancer    • Hyperlipidemia    • Hypertension    • Impaired functional mobility, balance, gait, and endurance    •  Pneumonia 2019      Past Surgical History:   Procedure Laterality Date   • FOOT SURGERY     • HAND SURGERY     • HYSTERECTOMY           MEDICATIONS:  I have reviewed and reconciled the patients medication list in the patients chart at the skilled nursing facility today, 2021.      ALLERGIES:  Allergies   Allergen Reactions   • Morphine And Related Irritability         SOCIAL HISTORY:  Social History     Socioeconomic History   • Marital status:      Spouse name: Not on file   • Number of children: Not on file   • Years of education: Not on file   • Highest education level: Not on file   Tobacco Use   • Smoking status: Current Every Day Smoker     Packs/day: 0.25     Types: Cigarettes     Last attempt to quit: 2019     Years since quittin.3   • Smokeless tobacco: Never Used   • Tobacco comment: HALF OF A CIGARETTE   Vaping Use   • Vaping Use: Never assessed   Substance and Sexual Activity   • Alcohol use: No   • Drug use: No   • Sexual activity: Defer     Comment:        FAMILY HISTORY:  Family History   Problem Relation Age of Onset   • Cancer Mother    • Heart attack Father    • Arthritis Father    • Heart disease Father    • Diabetes Daughter    • Hyperlipidemia Daughter    • Migraines Daughter    • Heart disease Sister    • Diabetes Son    • Glaucoma Son        REVIEW OF SYSTEMS:  Review of Systems   Constitutional: Positive for fatigue. Negative for chills and fever.   HENT: Negative for congestion, ear pain, rhinorrhea, sinus pressure and sore throat.    Eyes: Negative for visual disturbance.   Respiratory: Positive for shortness of breath. Negative for cough, chest tightness and wheezing.    Cardiovascular: Negative for chest pain, palpitations and leg swelling.   Gastrointestinal: Negative for abdominal pain, blood in stool, constipation, diarrhea, nausea and vomiting.   Endocrine: Negative for polydipsia and polyuria.   Genitourinary: Negative for dysuria and hematuria.    Musculoskeletal: Negative for arthralgias and back pain.   Skin: Negative for rash.   Neurological: Negative for dizziness, light-headedness, numbness and headaches.   Psychiatric/Behavioral: Negative for dysphoric mood and sleep disturbance. The patient is not nervous/anxious.           PHYSICAL EXAMINATION:     VITAL SIGNS:  /60   Temp 97.4 °F (36.3 °C)   Resp 20   Wt 68.2 kg (150 lb 4 oz)   SpO2 98%   BMI 27.48 kg/m²     Physical Exam  Vitals and nursing note reviewed.   Constitutional:       Appearance: Normal appearance. She is well-developed. She is ill-appearing (chronically).      Comments: Frail elderly female   HENT:      Head: Normocephalic and atraumatic.      Nose: Nose normal.      Mouth/Throat:      Mouth: Mucous membranes are moist.      Pharynx: No oropharyngeal exudate.   Eyes:      General: No scleral icterus.     Conjunctiva/sclera: Conjunctivae normal.      Pupils: Pupils are equal, round, and reactive to light.   Neck:      Thyroid: No thyromegaly.   Cardiovascular:      Rate and Rhythm: Normal rate and regular rhythm.      Heart sounds: Normal heart sounds. No murmur heard.   No friction rub. No gallop.    Pulmonary:      Effort: Pulmonary effort is normal. No respiratory distress.      Breath sounds: Wheezing (mild ) present.   Abdominal:      General: Bowel sounds are normal. There is no distension.      Palpations: Abdomen is soft.      Tenderness: There is no abdominal tenderness.   Musculoskeletal:         General: No deformity or signs of injury.      Cervical back: Normal range of motion and neck supple.      Right lower leg: Edema present.      Left lower leg: Edema present.   Lymphadenopathy:      Cervical: No cervical adenopathy.   Skin:     General: Skin is warm and dry.      Findings: No rash.   Neurological:      Mental Status: She is alert and oriented to person, place, and time.   Psychiatric:         Mood and Affect: Mood normal.         Behavior: Behavior normal.          RECORDS REVIEW:   Labs: 4/6/2021 CBC and CMP in normal range except for potassium 5.4, creatinine 2.4, uric acid 9.4.      ASSESSMENT     Diagnoses and all orders for this visit:    1. Chronic respiratory failure with hypoxia (CMS/HCC) (Primary)    2. Mixed simple and mucopurulent chronic bronchitis (CMS/[facility])    3. Essential hypertension    4. Hospice care patient    5. Seasonal allergic rhinitis, unspecified trigger    6. Mixed anxiety depressive disorder    7. Chronic diastolic heart failure (CMS/[facility])    8. Gastroesophageal reflux disease without esophagitis        PLAN    Chronic Respiratory Failure due to COPD  - cont supplemental O2  -Continue physical therapy for now.  Hospice care may need to be considered in the near future.  This will be further discussed at her upcoming meeting for advanced care planning.  - cont nursing facility care for ADLs  - Continue Advair and DuoNeb.   -Discontinue budesonide nebulizers per patient preference.     Chronic Diastolic Heart Failure  - cont current medications, bulimic at this time.    Lower extremity edema  -Patient advised to start using compression stockings during the daytime when her legs are hanging down from her chair     Essential hypertension  -Continue Norvasc.     Anxiety  -Continue nighttime doses of 1 mg clonazepam start clonazepam 0.25 mg p.o. twice daily as needed for anxiety during daytime.     Allergic rhinitis  -Okay to discontinue fluticasone and ipratropium nasal spray due to nasal radiation.  Continue humidified oxygen.     GERD  -Continue Prilosec.          [x]  Discussed Patient in detail with nursing/staff, addressed all needs today.     [x]  Plan of Care Reviewed   []  PT/OT Reviewed   []  Order Changes  []  Discharge Plans Reviewed  [x]  Advance Directive on file with Nursing Home.   [x]  POA on file with Nursing Home.    [x]  Code Status listed and reviewed.     I confirm accuracy of unchanged data/findings including physical exam and  plan which have been carried forward from previous visit, as well as I have updated appropriately those that have changed        Fred Cueva DO.  4/15/2021

## 2021-04-17 NOTE — ED PROVIDER NOTES
Subjective   80-year-old chronically ill female presents to the ED from the nursing home for chief complaint of shortness of breath.  Nursing home indicates that the patient has had increased work of breathing this morning.  They state that her pulse ox was 91% on her normal 4 L nasal cannula.  He sent her to the ED for evaluation.  The patient has had multiple breathing treatments this morning.  On arrival to the ED her pulse ox is 94 to 96% on 4 L nasal cannula which is her home O2 requirements.  She is also supposed to wear CPAP at night but apparently did not wear it last night.  Patient admits to a cough and wheeze.  Cough is productive of small amount of sputum.  She denies fever chills.  No nausea vomiting diarrhea or abdominal pain.  No dysuria or hematuria.  No chest pain.  No prior treatments or limiting factors.  No other complaints at this time.          Review of Systems   Respiratory: Positive for cough, shortness of breath and wheezing.    All other systems reviewed and are negative.      Past Medical History:   Diagnosis Date   • Arthritis    • Cancer (CMS/[facility])     uterine cancer (1981)   • COPD (chronic obstructive pulmonary disease) (CMS/[facility])    • Depression    • Elevated cholesterol    • GERD (gastroesophageal reflux disease)    • History of emphysema    • History of esophageal reflux    • History of recurrent UTI (urinary tract infection)    • History of renal calculi    • History of uterine cancer    • Hyperlipidemia    • Hypertension    • Impaired functional mobility, balance, gait, and endurance    • Pneumonia 02/2019       Allergies   Allergen Reactions   • Morphine And Related Irritability       Past Surgical History:   Procedure Laterality Date   • FOOT SURGERY     • HAND SURGERY     • HYSTERECTOMY         Family History   Problem Relation Age of Onset   • Cancer Mother    • Heart attack Father    • Arthritis Father    • Heart disease Father    • Diabetes Daughter    • Hyperlipidemia  Daughter    • Migraines Daughter    • Heart disease Sister    • Diabetes Son    • Glaucoma Son        Social History     Socioeconomic History   • Marital status:      Spouse name: Not on file   • Number of children: Not on file   • Years of education: Not on file   • Highest education level: Not on file   Tobacco Use   • Smoking status: Current Every Day Smoker     Packs/day: 0.25     Types: Cigarettes     Last attempt to quit: 2019     Years since quittin.3   • Smokeless tobacco: Never Used   • Tobacco comment: HALF OF A CIGARETTE   Vaping Use   • Vaping Use: Never assessed   Substance and Sexual Activity   • Alcohol use: No   • Drug use: No   • Sexual activity: Defer     Comment:            Objective   Physical Exam  Vitals and nursing note reviewed.   Constitutional:       General: She is not in acute distress.     Appearance: She is not diaphoretic.      Comments: Elderly-appearing   HENT:      Head: Normocephalic and atraumatic.      Nose: Nose normal.   Eyes:      Conjunctiva/sclera: Conjunctivae normal.   Cardiovascular:      Rate and Rhythm: Normal rate and regular rhythm.   Pulmonary:      Effort: No respiratory distress.      Breath sounds: Wheezing present.      Comments: Coarse breath sounds bilaterally.  Mild tachypnea.  No accessory muscle use or conversational dyspnea.  Abdominal:      General: There is no distension.      Palpations: Abdomen is soft.      Tenderness: There is no abdominal tenderness. There is no guarding.   Musculoskeletal:         General: No deformity.   Neurological:      Mental Status: She is alert and oriented to person, place, and time.      Cranial Nerves: No cranial nerve deficit.         Procedures           ED Course  ED Course as of  1403   Sat 2021   0800 EKG interpreted by me.  Sinus rhythm.  Rate of 80.  Shortened VT interval.  Nonspecific Q wave.  No obvious ST segment or T wave abnormalities.  Abnormal EKG    [CG]      ED Course  User Index  [CG] Juice Dawn, DO                                           MDM  80-year-old chronically ill female just recently discharged from the hospital secondary to acute on chronic respiratory failure with hospice consult was presents to the ED after being at the nursing home for 2 days.  Apparently the patient has not been wearing her CPAP at night and has been taking it off and is also not been wearing it during her nap time.  She presents to the ED with some faint wheezing.  Initial ABG shows hypercapnia consistent with her chronic respiratory failure slightly acute decompensation.  She was placed on BiPAP tolerated BiPAP well with significant improvement in her ABG.  Chest x-ray was negative for acute process.  Laboratories also reassuring.  Patient was given duo nebs and steroids.  Her oxygen was appropriate on her home O2 requirements.  Given the negative work-up and improvement in her symptoms of feel that she is appropriate for discharge back to her nursing home as long she wears her CPAP as instructed.  Daughter is agreeable to this plan.  They have a meeting with hospice later today.      Final diagnoses:   Chronic respiratory failure with hypoxia and hypercapnia (CMS/[facility])       ED Disposition  ED Disposition     ED Disposition Condition Comment    Discharge Stable           Fred Cueva DO  [address] [address] [phone]               Medication List      Changed    * predniSONE 20 MG tablet  Commonly known as: DELTASONE  20mg bid x 7 days, 20mg daily x 7 days, 10mg daily x 7 days, then resume home dose 5mg daily  What changed: Another medication with the same name was added. Make sure you understand how and when to take each.     * predniSONE 20 MG tablet  Commonly known as: DELTASONE  Take 1 tablet by mouth 2 (Two) Times a Day.  What changed: You were already taking a medication with the same name, and this prescription was added. Make sure you understand  how and when to take each.         * This list has 2 medication(s) that are the same as other medications prescribed for you. Read the directions carefully, and ask your doctor or other care provider to review them with you.               Where to Get Your Medications      These medications were sent to [place], [place] - [address] [facility] - [phone]  - [phone] [facility] [facility] [address]    Phone: [phone]   · predniSONE 20 MG tablet          Juice Dawn, DO  04/17/21 2253

## 2021-04-21 NOTE — PROGRESS NOTES
Nursing Home Progress Note        Luis Eduardo Perry DO []  RUBY Tolentino []  [address] [place] [address]  Phone: [phone]  Fax: [phone] Kip Cortez MD []  Fred Cueva DO []  Norma Barber PA-C [x]   [address] [place] [address]  Phone: [phone]  Fax: [phone]     PATIENT NAME: Sadie Tijerina                                                                          YOB: 1940           DATE OF SERVICE: 04/05/2021  FACILITY:  [x] [place]   [] [place]   [] [facility]   [] [facility]   [] [place]    CHIEF COMPLAINT:  Follow up, BLE edema.       HISTORY OF PRESENT ILLNESS:   Ms. Tijerina is an 81 y/o female who presents today for follow up on lower extremity edema.  Initially left food had lower extremity edema and discoloration.  XR notable for old avulsion fracture.  Per nursing, patient has developed BLE edema.  She has also not been able to wear her BiPAP due to not having any filters.  Upon assessment, patient reports that her respiratory symptoms are at baseline.  Staff deny any increased O2 demands. She does describe some discomfort to her BLE.  Previously tolerated compression stockings at home.  She continues to be followed by hospice services.     PAST MEDICAL & SURGICAL HISTORY:   Past Medical History:   Diagnosis Date   • Arthritis    • Cancer (CMS/HCC)     uterine cancer (1981)   • COPD (chronic obstructive pulmonary disease) (CMS/[facility])    • Depression    • Elevated cholesterol    • GERD (gastroesophageal reflux disease)    • History of emphysema    • History of esophageal reflux    • History of recurrent UTI (urinary tract infection)    • History of renal calculi    • History of uterine cancer    • Hyperlipidemia    • Hypertension    • Impaired functional mobility, balance, gait, and endurance    • Pneumonia 02/2019      Past Surgical History:   Procedure Laterality Date   • FOOT SURGERY     • HAND SURGERY     • HYSTERECTOMY            MEDICATIONS:  I have reviewed and reconciled the patients medication list in the patients chart at the skilled nursing facility today.      ALLERGIES:  Allergies   Allergen Reactions   • Morphine And Related Irritability         SOCIAL HISTORY:  Social History     Socioeconomic History   • Marital status:      Spouse name: Not on file   • Number of children: Not on file   • Years of education: Not on file   • Highest education level: Not on file   Tobacco Use   • Smoking status: Current Every Day Smoker     Packs/day: 0.25     Types: Cigarettes     Last attempt to quit: 2019     Years since quittin.3   • Smokeless tobacco: Never Used   • Tobacco comment: HALF OF A CIGARETTE   Vaping Use   • Vaping Use: Never assessed   Substance and Sexual Activity   • Alcohol use: No   • Drug use: No   • Sexual activity: Defer     Comment:        FAMILY HISTORY:  Family History   Problem Relation Age of Onset   • Cancer Mother    • Heart attack Father    • Arthritis Father    • Heart disease Father    • Diabetes Daughter    • Hyperlipidemia Daughter    • Migraines Daughter    • Heart disease Sister    • Diabetes Son    • Glaucoma Son        REVIEW OF SYSTEMS:    Constitutional: Negative for fever, chills, diaphoresis.  HENT:  Negative for congestion or problems swallowing.  Respiratory: Negative for cough, or wheezing.   Cardiovascular: Negative for chest pain, palpitations..   Gastrointestinal: Negative for diarrhea, nausea, vomiting, abdominal pain.   Genitourinary: Negative for dysuria hematuria, and frequency.   Musculoskeletal: Negative for joint swelling  Integumentary: Negative for rash or wound.  Neurological: Negative for syncope and seizures.   Psychological: Agitation, anxiety, or sleep disturbance  Positive: Shortness of breath (chronic), BLE edema.     PHYSICAL EXAMINATION:     VITAL SIGNS:  /53   Pulse 84   Temp 97.6 °F (36.4 °C)   Resp 18   SpO2 98% Comment: 2l    Constitutional:  Alert and talkative, lying in bed.  NAD   HENT:   Head: Normocephalic and atraumatic.   Eyes: Conjunctivae and EOM are normal. Pupils are equal, round, and reactive to light.   Neck: Normal range of motion. Neck supple. No JVD present.   Cardiovascular: Normal rate, regular rhythm and normal heart sounds.   Pulmonary/Chest: Effort normal and breath sounds normal. No respiratory distress.   Abdominal: Soft. Bowel sounds are normal. No distention or tenderness.    Musculoskeletal: Normal range of motion.   Neurological: Alert and oriented at baseline.  Skin: Skin is warm and dry.   Psychiatric: Normal mood and affect.  Normal behavior.   BLE edema, neurovascularly intact BLE.  Left food has previously noted discoloration.     Nursing note and vitals reviewed.    RECORDS REVIEW:   N/A.     ASSESSMENT     Diagnoses and all orders for this visit:    1. Chronic respiratory failure with hypoxia (CMS/HCC) (Primary)    2. COPD with acute exacerbation (CMS/HCC)    3. Left foot pain    4. Leg swelling    5. Essential hypertension    6. Hospice care patient        PLAN  She continues on steroid taper, reporting that respiratory symptoms are at her baseline.  She has not been able to wear BiPAP due to no filters available.  Nursing and family are working to obtain additional filters.  Will provide left boot for previous avulsion fracture, for comfort.  She denies any trauma.  Follow up with ortho as needed, if symptoms are not improving.  Will utilize compression stockings as tolerated for BLE edema.  Recommend elevating extremities.  Continue to follow along with hospice care.        [x]  Discussed Patient in detail with nursing/staff, addressed all needs today.     [x]  Plan of Care Reviewed   []  PT/OT Reviewed   []  Order Changes  []  Discharge Plans Reviewed  [x]  Advance Directive on file with Nursing Home.   [x]  POA on file with Nursing Home.    [x]  Code Status listed:  []  Full Code   [x]  DNR         Norma Barber  JANE.  4/21/2021

## 2021-04-22 NOTE — PROGRESS NOTES
"  Nursing Home Progress Note        Luis Eduardo Perry DO []  RUBY Tolentino []  [address] [place] [address]  Phone: [phone]  Fax: [phone] Kip Cortez MD []  Fred Cueva DO []  Norma Barber PA-C [x]   [address] [place] [address]  Phone: [phone]  Fax: [phone]     PATIENT NAME: Sadie Tijerina                                                                          YOB: 1940           DATE OF SERVICE: 04/22/2021  FACILITY:  [x] [place]   [] [place]   [] [facility]   [] [facility]   [] [place]    CHIEF COMPLAINT:  Shortness of breath      HISTORY OF PRESENT ILLNESS:   Ms. Tijerina is an 79 y/o female who presents today for follow up on shortness of breath.  She required ER evaluation due to her shortness of breath, where she was started on Prednisone 20mg BID.  She was undergoing prednisone taper, current dose prior to ER eval. 5 mg daily.  She is followed by Hospice for her chronic respiratory failure.  Per nursing, patient continues to have episodes of anxiety, currently taking Klonopin 0.5 mg three times a day, again at bedtime.  She also has Restoril to be given in the evening time.  Nursing describe tremor, which has developed over the past few days.  It is interfering with her ability to feed herself and preform other ADLs.  Upon assessment today, she is sleeping but awakens easily to verbal stimuli.  She states that she has not been feeling like herself lately.  She reports that she is sleeping a lot throughout the day.  Continues to have episodic symptoms of anxiety, during which she feels she \"can't get her breath\".  No reports of hypoxia.  Has broken sleep at night, awakening often.  Tremor does not occur at rest.      PAST MEDICAL & SURGICAL HISTORY:   Past Medical History:   Diagnosis Date   • Arthritis    • Cancer (CMS/HCC)     uterine cancer (1981)   • COPD (chronic obstructive pulmonary disease) (CMS/HCC)    • Depression    • " Elevated cholesterol    • GERD (gastroesophageal reflux disease)    • History of emphysema    • History of esophageal reflux    • History of recurrent UTI (urinary tract infection)    • History of renal calculi    • History of uterine cancer    • Hyperlipidemia    • Hypertension    • Impaired functional mobility, balance, gait, and endurance    • Pneumonia 2019      Past Surgical History:   Procedure Laterality Date   • FOOT SURGERY     • HAND SURGERY     • HYSTERECTOMY           MEDICATIONS:  I have reviewed and reconciled the patients medication list in the patients chart at the [facility] nursing facility today.      ALLERGIES:  Allergies   Allergen Reactions   • Morphine And Related Irritability         SOCIAL HISTORY:  Social History     Socioeconomic History   • Marital status:      Spouse name: Not on file   • Number of children: Not on file   • Years of education: Not on file   • Highest education level: Not on file   Tobacco Use   • Smoking status: Current Every Day Smoker     Packs/day: 0.25     Types: Cigarettes     Last attempt to quit: 2019     Years since quittin.4   • Smokeless tobacco: Never Used   • Tobacco comment: HALF OF A CIGARETTE   Vaping Use   • Vaping Use: Never assessed   Substance and Sexual Activity   • Alcohol use: No   • Drug use: No   • Sexual activity: Defer     Comment:        FAMILY HISTORY:  Family History   Problem Relation Age of Onset   • Cancer Mother    • Heart attack Father    • Arthritis Father    • Heart disease Father    • Diabetes Daughter    • Hyperlipidemia Daughter    • Migraines Daughter    • Heart disease Sister    • Diabetes Son    • Glaucoma Son        REVIEW OF SYSTEMS:    Constitutional: Negative for fever, chills, diaphoresis.   HENT:  Negative for congestion or problems swallowing.  Respiratory: Negative for cough, or wheezing.   Cardiovascular: Negative for chest pain, palpitations, leg swelling.   Gastrointestinal: Negative for  diarrhea, nausea, vomiting, abdominal pain.   Genitourinary: Negative for dysuria hematuria, and frequency.   Musculoskeletal: Negative for joint swelling  Integumentary: Negative for rash or wound.  Neurological: Negative for syncope and seizures.   Psychological: Agitation.  Positive:  Fatigue, shortness of breath, anxiety, sleep disturbance.     PHYSICAL EXAMINATION:     VITAL SIGNS:  /66   Pulse 93   Temp 97.3 °F (36.3 °C)   Resp 22   SpO2 97%     Constitutional: Chronically ill appearing, frail elderly female.  NAD.   HENT:   Head: Normocephalic and atraumatic.   Eyes: Conjunctivae and EOM are normal. Pupils are equal, round, and reactive to light.   Neck: Normal range of motion. Neck supple. No JVD present.   Cardiovascular: Normal rate, regular rhythm.   Pulmonary/Chest: Effort normal.  Breath sounds diminished.  No respiratory distress.   Abdominal: Soft. Bowel sounds are normal. No distention or tenderness.    Musculoskeletal: Normal range of motion. No edema.  Neurological: Alert and oriented at baseline.  Skin: Skin is warm and dry.   Psychiatric: Normal mood and affect.  Normal behavior.   Nursing note and vitals reviewed.    RECORDS REVIEW:   N/A.     ASSESSMENT     Diagnoses and all orders for this visit:    1. Chronic respiratory failure with hypoxia (CMS/HCC) (Primary)    2. Mixed anxiety depressive disorder    3. Chronic diastolic heart failure (CMS/HCC)    4. Hospice care patient        PLAN  Patient is receiving several benzodiazepines and opiates.  Will simply regimen with continuing klonopin TID with PRN ativan, Hydrocodone 5/325 BID and PRN roxinol.  Will also taper down prednisone with 20 mg daily for 5 days then prednisone 10 mg daily.  Will reassess him dosing at a later time when respiratory status stabilizes.  She was previously on prednisone 5 mg daily when she had ER visit.  I do believe some of tremor is due to steroid use.  Continue to work alongside hospice services.   Monitor patient closely and encourage PAP machine use.  Contact myself or MD with any acute changes in condition.     I had lengthy discussion with family after patient assessment today.  They are in agreement with plan.  They do express concerns about patient ultimately requiring ER evaluation for her acute shortness of breath, as this is a recurrent problem.   I educated them on the options available, as well as Hospice services focusing on symptom management.  I reassured family, reminding them that if patient's goals of care are to focus on her symptoms and not pursuing aggressive intervention or treatment, ER evaluation is not the only option.  They expressed understanding.  Will follow up with Hospice nurse personally, so that family and patient can have continued counseling.      [x]  Discussed Patient in detail with nursing/staff, addressed all needs today.     [x]  Plan of Care Reviewed   []  PT/OT Reviewed   []  Order Changes  []  Discharge Plans Reviewed  [x]  Advance Directive on file with Nursing Home.   [x]  POA on file with Nursing Home.    [x]  Code Status listed:  []  Full Code   [x]  DNR    I spent 40 minutes in direct patient care including face to face and floor time.          Norma Barber PA-C.  4/29/2021

## 2021-04-26 NOTE — PROGRESS NOTES
Nursing Home Progress Note        Luis Eduardo Perry DO []  RUBY Tolentino []  [address] [place] [address]  Phone: [phone]  Fax: [phone] Kip Cortez MD []  Fred Cueva DO []  Norma Barber PA-C [x]   [address] [place] [address]  Phone: [phone]  Fax: [phone]     PATIENT NAME: Sadie Tijerina                                                                          YOB: 1940           DATE OF SERVICE: 04/15/2021  FACILITY:  [x] [place]   [] [place]   [] [facility]   [] [facility]   [] [place]    CHIEF COMPLAINT:  Increased anxiety.        HISTORY OF PRESENT ILLNESS:   Ms. Tijerina is an 79 y/o female who presents today for follow up on anxiety.  Patient continues to follow along with hospice care.  They made previous recommendations to adjust dosing of Klonopin.  Per nursing, she has had better symptom control, less agitation noted.  She continues to have episodes of shortness of breath improved with inhaler use.  Appetite is stable.  She continues with her baseline fatigue.  Upon assessment she is pleasant denying any acute distress.     PAST MEDICAL & SURGICAL HISTORY:   Past Medical History:   Diagnosis Date   • Arthritis    • Cancer (CMS/HCC)     uterine cancer (1981)   • COPD (chronic obstructive pulmonary disease) (CMS/[facility])    • Depression    • Elevated cholesterol    • GERD (gastroesophageal reflux disease)    • History of emphysema    • History of esophageal reflux    • History of recurrent UTI (urinary tract infection)    • History of renal calculi    • History of uterine cancer    • Hyperlipidemia    • Hypertension    • Impaired functional mobility, balance, gait, and endurance    • Pneumonia 02/2019      Past Surgical History:   Procedure Laterality Date   • FOOT SURGERY     • HAND SURGERY     • HYSTERECTOMY           MEDICATIONS:  I have reviewed and reconciled the patients medication list in the patients chart at the [facility]  nursing facility today.      ALLERGIES:  Allergies   Allergen Reactions   • Morphine And Related Irritability         SOCIAL HISTORY:  Social History     Socioeconomic History   • Marital status:      Spouse name: Not on file   • Number of children: Not on file   • Years of education: Not on file   • Highest education level: Not on file   Tobacco Use   • Smoking status: Current Every Day Smoker     Packs/day: 0.25     Types: Cigarettes     Last attempt to quit: 2019     Years since quittin.3   • Smokeless tobacco: Never Used   • Tobacco comment: HALF OF A CIGARETTE   Vaping Use   • Vaping Use: Never assessed   Substance and Sexual Activity   • Alcohol use: No   • Drug use: No   • Sexual activity: Defer     Comment:        FAMILY HISTORY:  Family History   Problem Relation Age of Onset   • Cancer Mother    • Heart attack Father    • Arthritis Father    • Heart disease Father    • Diabetes Daughter    • Hyperlipidemia Daughter    • Migraines Daughter    • Heart disease Sister    • Diabetes Son    • Glaucoma Son        REVIEW OF SYSTEMS:    Constitutional: Negative for fever, chills, diaphoresis.  HENT:  Negative for congestion or problems swallowing.  Respiratory: Negative for cough, or wheezing.   Cardiovascular: Negative for chest pain, palpitations..   Gastrointestinal: Negative for diarrhea, nausea, vomiting, abdominal pain.   Genitourinary: Negative for dysuria hematuria, and frequency.   Musculoskeletal: Negative for joint swelling  Integumentary: Negative for rash or wound.  Neurological: Negative for syncope and seizures.   Psychological: Agitation, anxiety, or sleep disturbance  Positive: Shortness of breath (chronic)    PHYSICAL EXAMINATION:     VITAL SIGNS:  /72   Pulse 75   Temp 98.3 °F (36.8 °C)   Resp 18   SpO2 98% Comment: 2l    Constitutional: Alert and talkative, lying in bed.  NAD   HENT:   Head: Normocephalic and atraumatic.   Eyes: Conjunctivae and EOM are normal.  Pupils are equal, round, and reactive to light.   Neck: Normal range of motion. Neck supple. No JVD present.   Cardiovascular: Normal rate, regular rhythm and normal heart sounds.   Pulmonary/Chest: Effort normal and breath sounds normal. No respiratory distress.   Abdominal: Soft. Bowel sounds are normal. No distention or tenderness.    Musculoskeletal: Normal range of motion.   Neurological: Alert and oriented at baseline.  Skin: Skin is warm and dry.   Psychiatric: Normal mood and affect.  Normal behavior.   Nursing note and vitals reviewed.    RECORDS REVIEW:   N/A.     ASSESSMENT     Diagnoses and all orders for this visit:    1. Chronic respiratory failure with hypoxia (CMS/HCC) (Primary)    2. Mixed anxiety depressive disorder    3. Chronic diastolic heart failure (CMS/HCC)    4. Hospice care patient        PLAN  Chronic respiratory failure: Baseline O2 requirements noted.  Denies any increased shortness of breath, continues with her chronic symptoms.  Dosing adjustments have improved symptoms.  Continue with supportive care at SNF.  Anxiety: Improved with 3 times daily dosing of Klonopin.  Continue to monitor.  Diastolic heart failure: Lower extremity edema improved.  Continue to monitor.  Hospice care patient: Continue to follow along with hospice services.  Continue to monitor for any acute changes in condition.        [x]  Discussed Patient in detail with nursing/staff, addressed all needs today.     [x]  Plan of Care Reviewed   []  PT/OT Reviewed   []  Order Changes  []  Discharge Plans Reviewed  [x]  Advance Directive on file with Nursing Home.   [x]  POA on file with Nursing Home.    [x]  Code Status listed:  []  Full Code   [x]  DNR         Norma Barber PA-C.  4/26/2021

## 2021-04-26 NOTE — PROGRESS NOTES
Nursing Home Progress Note        Luis Eduardo Perry DO []  RUBY Tolentino []  [address] [place] [address]  Phone: [phone]  Fax: [phone] Kip Cortez MD []  Fred Cueva DO []  Norma Barber PA-C [x]   [address] [place] [address]  Phone: [phone]  Fax: [phone]     PATIENT NAME: Sadie Tijerina                                                                          YOB: 1940           DATE OF SERVICE: 04/26/2021  FACILITY:  [x] [place]   [] [place]   [] [facility]   [] [facility]   [] [place]    CHIEF COMPLAINT:  shortness of breath, hallucinations.       HISTORY OF PRESENT ILLNESS:   Ms. Tijerina is an 79 y/o female with end stage COPD, under hospice care, who presents today with shortness of breath.  Previously medications adjusted, as patient was somnolent on and off throughout the day, on several different benzodiazepines.  Per family, patient had a bad weekend, where she was unable to tolerate her PAP machine. No reports of hypoxia.  She has also had some mild hallucinations of bugs crawling across the walls.  Upon assessment today she was completing a nebulizer treatment.  She reports that her breathing is stable at the moment, denying any increased shortness of breath.  Has had some mild improvement to her tremors with decrease in steroids.  Appetite and energy is poor.      PAST MEDICAL & SURGICAL HISTORY:   Past Medical History:   Diagnosis Date   • Arthritis    • Cancer (CMS/[facility])     uterine cancer (1981)   • COPD (chronic obstructive pulmonary disease) (CMS/[facility])    • Depression    • Elevated cholesterol    • GERD (gastroesophageal reflux disease)    • History of emphysema    • History of esophageal reflux    • History of recurrent UTI (urinary tract infection)    • History of renal calculi    • History of uterine cancer    • Hyperlipidemia    • Hypertension    • Impaired functional mobility, balance, gait, and endurance    •  Pneumonia 2019      Past Surgical History:   Procedure Laterality Date   • FOOT SURGERY     • HAND SURGERY     • HYSTERECTOMY           MEDICATIONS:  I have reviewed and reconciled the patients medication list in the patients chart at the skilled nursing facility today.      ALLERGIES:  Allergies   Allergen Reactions   • Morphine And Related Irritability         SOCIAL HISTORY:  Social History     Socioeconomic History   • Marital status:      Spouse name: Not on file   • Number of children: Not on file   • Years of education: Not on file   • Highest education level: Not on file   Tobacco Use   • Smoking status: Current Every Day Smoker     Packs/day: 0.25     Types: Cigarettes     Last attempt to quit: 2019     Years since quittin.3   • Smokeless tobacco: Never Used   • Tobacco comment: HALF OF A CIGARETTE   Vaping Use   • Vaping Use: Never assessed   Substance and Sexual Activity   • Alcohol use: No   • Drug use: No   • Sexual activity: Defer     Comment:        FAMILY HISTORY:  Family History   Problem Relation Age of Onset   • Cancer Mother    • Heart attack Father    • Arthritis Father    • Heart disease Father    • Diabetes Daughter    • Hyperlipidemia Daughter    • Migraines Daughter    • Heart disease Sister    • Diabetes Son    • Glaucoma Son        REVIEW OF SYSTEMS:    Constitutional: Negative for fever, chills, diaphoresis.  HENT:  Negative for congestion or problems swallowing.  Respiratory: Negative for cough, or wheezing.   Cardiovascular: Negative for chest pain, palpitations, leg swelling.   Gastrointestinal: Negative for diarrhea, nausea, vomiting, abdominal pain.   Genitourinary: Negative for dysuria hematuria, and frequency.   Musculoskeletal: Negative for joint swelling  Integumentary: Negative for rash or wound.  Neurological: Negative for syncope and seizures.   Psychological: Agitation  Positive:  Shortness of breath, hallucinations, anxiety, fatigue, generalized  weakness, poor appetite.     PHYSICAL EXAMINATION:     VITAL SIGNS:  /80   Pulse 76   Temp 98 °F (36.7 °C)   Resp 20   SpO2 98%     Constitutional: Frail elderly female, appears chronically ill.    HENT:   Head: Normocephalic and atraumatic.   Eyes: Conjunctivae and EOM are normal. Pupils are equal, round, and reactive to light.   Neck: Normal range of motion. Neck supple. No JVD present.   Cardiovascular: Normal rate, regular rhythm and normal heart sounds.   Pulmonary/Chest: Effort normal and breath sounds normal. No respiratory distress.   Abdominal: Soft. Bowel sounds are normal. No distention or tenderness.    Musculoskeletal: Normal range of motion. No edema.  Neurological: Alert and oriented at baseline.  Skin: Skin is warm and dry.   Psychiatric: Normal mood and affect.  Normal behavior.   Lung sounds are diminished throughout.  Deconditioning noted.  Denies visual hallucinations during assessment.   Nursing note and vitals reviewed.    RECORDS REVIEW:   N/A    ASSESSMENT     Diagnoses and all orders for this visit:    1. Chronic respiratory failure with hypoxia (CMS/HCC) (Primary)    2. Chronic diastolic heart failure (CMS/HCC)    3. Mixed anxiety depressive disorder    4. Hospice care patient    5. Visual hallucinations        PLAN  Continue with hydrocodone and klonopin scheduled. PRN Intensol and Roxanol.  Continue to hold medication for lethargy.  She is more alert upon assessment today.  Will discontinue Pulmicort nebulizer, as she is on prednisone orally.  Suspect that steroid use contributing to tremors.  Schedule albuterol nebulizer TID, as she is unable to coordinate inhaler use.  She has refused to use DuoNeb.  Differential for hallucinations include hypercapnia, medications, UTI.  Will obtain basic labs; BMP, CBC, and UA.  Initially patient requested use of her home BiPAP machine.  Will go ahead and obtain BiPAP from Hospice.  Continue to monitor patient condition closely, notify myself  for MD with any acute changes in condition.  Discussed plan in depth with Neris, patient's POA.      [x]  Discussed Patient in detail with nursing/staff, addressed all needs today.     [x]  Plan of Care Reviewed   []  PT/OT Reviewed   []  Order Changes  []  Discharge Plans Reviewed  [x]  Advance Directive on file with Nursing Home.   [x]  POA on file with Nursing Home.    [x]  Code Status listed:  []  Full Code   [x]  DNR         Norma Barber PA-C.  4/26/2021

## 2021-05-03 NOTE — TELEPHONE ENCOUNTER
[facility] MEDICATION REQUEST FOR MORPHINE SULFATE 20 MG/ML, LORAZEPAM INTENSOL 2 MG/ML, AND CLONAZEPAM 0.5 MG    MORPHINE DIRECTIONS: GIVE 0.25 ML PO Q 3 HRS PRN.    LORAZEPAM DIRECTIONS: GIVE 0.25 ML Q 3 HRS PRN.    CLONAZEPAM DIRECTIONS: TAKE 1/2 TAB (0.25 MG) BY MOUTH TID.

## 2021-05-25 NOTE — PROGRESS NOTES
Nursing Home Progress Note        Luis Eduardo Perry DO []  RUBY Tolentino []  [address] [place] [address]  Phone: [phone]  Fax: [phone] Kip Cortez MD []  Fred Cueva DO []  Norma Barber PA-C [x]   [address] [place] [address]  Phone: [phone]  Fax: [phone]     PATIENT NAME: Sadie Tijerina                                                                          YOB: 1940           DATE OF SERVICE: 05/10/2021  FACILITY:  [x] [place]   [] [place]   [] [facility]   [] [facility]   [] [place]    CHIEF COMPLAINT:  Unresponsive.       HISTORY OF PRESENT ILLNESS:   Ms. Tijerina is an 79 y/o female with end stage COPD, under hospice care.  Per nursing, patient has had continued gradual decline.  She is not taking anything by mouth, essentially unresponsive.  They have noted respirations to be shallow with.'s of occasional apnea.  A Carpenter catheter was placed for comfort.  Family have remained at bedside.  They have noted some increased secretions.  She has remained comfortable with use of morphine and Ativan, no distress reported.  Hospice is following closely.      PAST MEDICAL & SURGICAL HISTORY:   Past Medical History:   Diagnosis Date   • Arthritis    • Cancer (CMS/HCC)     uterine cancer (1981)   • COPD (chronic obstructive pulmonary disease) (CMS/[facility])    • Depression    • Elevated cholesterol    • GERD (gastroesophageal reflux disease)    • History of emphysema    • History of esophageal reflux    • History of recurrent UTI (urinary tract infection)    • History of renal calculi    • History of uterine cancer    • Hyperlipidemia    • Hypertension    • Impaired functional mobility, balance, gait, and endurance    • Pneumonia 02/2019      Past Surgical History:   Procedure Laterality Date   • FOOT SURGERY     • HAND SURGERY     • HYSTERECTOMY           MEDICATIONS:  I have reviewed and reconciled the patients medication list in the patients  chart at the skilled nursing facility today.      ALLERGIES:  Allergies   Allergen Reactions   • Morphine And Related Irritability         SOCIAL HISTORY:  Social History     Socioeconomic History   • Marital status:      Spouse name: Not on file   • Number of children: Not on file   • Years of education: Not on file   • Highest education level: Not on file   Tobacco Use   • Smoking status: Current Every Day Smoker     Packs/day: 0.25     Types: Cigarettes     Last attempt to quit: 2019     Years since quittin.4   • Smokeless tobacco: Never Used   • Tobacco comment: HALF OF A CIGARETTE   Vaping Use   • Vaping Use: Never assessed   Substance and Sexual Activity   • Alcohol use: No   • Drug use: No   • Sexual activity: Defer     Comment:        FAMILY HISTORY:  Family History   Problem Relation Age of Onset   • Cancer Mother    • Heart attack Father    • Arthritis Father    • Heart disease Father    • Diabetes Daughter    • Hyperlipidemia Daughter    • Migraines Daughter    • Heart disease Sister    • Diabetes Son    • Glaucoma Son        REVIEW OF SYSTEMS:    Unable to perform ROS due to AMS.    PHYSICAL EXAMINATION:     VITAL SIGNS:  /60   Pulse (!) 124   Resp 22   SpO2 94%     Constitutional: Frail elderly female, appears chronically ill.  She is lying in bed with family at bedside.    HENT:   Head: Normocephalic and atraumatic.   Neck:  Neck supple. No JVD present.   Cardiovascular:  Tachycardic, regular.  Pulses present bilaterally.  Pulmonary/Chest: Effort normal and breath sounds normal. No respiratory distress.   Abdominal: Soft.   Bowel sounds decreased.  No distention or tenderness.  Musculoskeletal:  No edema.  Neurological: Sleeping, awakens only briefly.  Skin: Skin is warm and dry.   Psychiatric: Normal behavior.   No agitation or restlessness.  Nursing note and vitals reviewed.    RECORDS REVIEW:   N/A    ASSESSMENT     Diagnoses and all orders for this visit:    1.  Chronic respiratory failure with hypoxia (CMS/HCC) (Primary)    2. Chronic diastolic heart failure (CMS/HCC)    3. Hospice care patient        PLAN  Patient appears to be nearing end-of-life, she is no longer tolerating any p.o. intake.  Medications reviewed and discontinued p.o. medications other than comfort orders.  This includes lorazepam, morphine, and Levsin.  Utilize albuterol every 6 hours as needed.  Changed Tylenol to rectal as needed.  Had discussion with family at bedside who expressed no acute concerns.  Staff have had to suction patient, will discontinue this and utilize Levsin for any secretions.  Hospice to continue following.  I have scheduled the lorazepam and morphine every 3 hours, patient previously tolerating as as needed.  Monitor for any breakthrough pain or restlessness.  Discussed with nursing staff who expressed no acute concerns.  Continue to monitor closely.    [x]  Discussed Patient in detail with nursing/staff, addressed all needs today.     [x]  Plan of Care Reviewed   []  PT/OT Reviewed   []  Order Changes  []  Discharge Plans Reviewed  [x]  Advance Directive on file with Nursing Home.   [x]  POA on file with Nursing Home.    [x]  Code Status listed:  []  Full Code   [x]  DNR         Norma Barber PA-C.  5/25/2021

## 2021-07-04 NOTE — PROGRESS NOTES
You have chosen to receive care through a telehealth visit.  Do you consent to use a video/audio connection for your medical care today? Yes    On this video will be Sadie Tijerina, her daughter Ava, and Kristian Wolff MD     ambulatory

## 2022-01-04 NOTE — PLAN OF CARE
Goal Outcome Evaluation:  Plan of Care Reviewed With: patient  Progress: improving.       Screening mammogram order placed.

## 2022-02-15 NOTE — TELEPHONE ENCOUNTER
Dr. Cueva, Sadie's daughter called and left a message requesting that you change the Propanolol back to the 60 mg TID, she states her mother's tremors have become worse since lowering the dosage. Sadie's daughter states her mother is unable to feed herself now due to tremors.   34

## 2022-08-15 NOTE — PROGRESS NOTES
"Chief Complaint   Patient presents with   • Follow-up   • Breathing Problem         Subjective   Sadie Tijerina is a 80 y.o. female.     History of Present Illness   Patient comes today for follow up of shortness of breath and COPD.     She has had 2 exacerbations and one ER visit in March since her last appointment.  When she was seen at the emergency room she was given antibiotics and steroids.    She is using Bevespi twice a day and Atrovent twice a day.  She takes prednisone 5 mg daily and has been doing so since her last visit.    She uses BiPAP at a pressure of 10/4 with oxygen bled into the machine.  She uses oxygen nearly continuous.    Exercise tolerance has also remained stable.     Quit smoking in February 2020.     The following portions of the patient's history were reviewed and updated as appropriate: allergies, current medications, past family history, past medical history, past social history and past surgical history.    Review of Systems   HENT: Positive for rhinorrhea, sneezing and voice change. Negative for sinus pressure.    Respiratory: Positive for cough, shortness of breath and wheezing. Negative for chest tightness.        Objective   Visit Vitals  /70   Pulse 57   Resp 18   Ht 157.5 cm (62.01\")   Wt 59.4 kg (131 lb)   SpO2 94%   BMI 23.95 kg/m²     Physical Exam   Constitutional: She is oriented to person, place, and time.   HENT:   Head: Normocephalic and atraumatic.   Crowded oropharynx. Denture present.   Eyes: EOM are normal.   Neck: Neck supple.   Cardiovascular: Normal rate and regular rhythm.   Pulmonary/Chest: Effort normal. No respiratory distress.   Somewhat hyperresonant to percussion  Somewhat decreased A/E with minimal expiratory wheezing noted.   Musculoskeletal: She exhibits no edema.   In wheelchair.   Neurological: She is alert and oriented to person, place, and time.   Psychiatric: She has a normal mood and affect.   Vitals reviewed.          Assessment/Plan "   Sadie was seen today for follow-up and breathing problem.    Diagnoses and all orders for this visit:    Chronic obstructive pulmonary disease, unspecified COPD type (CMS/HCC)  -     Converted Six Minute Walk    Chronic respiratory failure with hypoxia and hypercapnia (CMS/HCC)    Hypoxia    SOB (shortness of breath)           Return in about 6 months (around 12/9/2020) for Recheck, For Dr. Mojica.    DISCUSSION (if any):  No change to the current medications has been made.  She should continue using Bevespi, Atrovent, and prednisone as directed.    Compliance with medications stressed.     Side effects of prescribed medications discussed with the patient.    On 6-minute walk her oxygen saturation decreased to 87% on room air and 2 L of oxygen were added she increased to 92% on 2 L of oxygen.    She will need to continue using her BiPAP machine at the current pressure of 10/4 with oxygen bled into the machine on a nightly basis.    I have told her that she may sit with her oxygen off while she is at rest only but once she gets up and is moving around she needs to be using the oxygen.  She verbalizes understanding.    Her breathing has likely improved some due to the prednisone that she is currently taking.     Dictated utilizing Dragon dictation.    This document was electronically signed by RUBY Torres June 9, 2020  14:05    no

## 2022-09-16 NOTE — TELEPHONE ENCOUNTER
Patients daughter called back again to discuss a medicine that was prescribed, she said this isn't working for her tremors.   [phone]--Ava---ok to ARMIDA   friend/significant other

## 2023-08-07 NOTE — ED PROVIDER NOTES
Subjective   HPI Comments: 77-year-old female advised to come to the ER by her foot doctor today, the patient has an infected right foot wound, her foot doctor has advised admission for IV antibiotics.  She was seen in our ER on December 24 after cutting her foot on the edge of a storm door.  She was sutured and sent home on antibiotics.  The patient and her daughter both report foot became red within one day and has been red and swollen with increased pain ever since.  She was seen by her family Dr. Wolff, last week  he referred her onto a podiatrist.  He gave her a prescription of Levaquin, she has taken 11 days of this.  The patient denies any fever but states she is in so much pain she has decreased sleep.  He was seen by Dr. MEEK today in [place].  She states her only past medical history is COPD for which she uses nebulizer and inhaler only.      History provided by:  Patient and relative  History limited by: no limits.   used: No        Review of Systems   Constitutional: Negative for activity change, appetite change, fatigue and fever.   HENT: Negative for congestion, ear pain, rhinorrhea, sneezing and sore throat.    Eyes: Negative for pain, discharge and redness.   Respiratory: Negative for cough, shortness of breath and wheezing.    Cardiovascular: Negative.    Gastrointestinal: Negative for abdominal pain, constipation, diarrhea, nausea and vomiting.   Endocrine: Negative.    Genitourinary: Negative for difficulty urinating, dysuria and frequency.   Musculoskeletal: Positive for joint swelling. Negative for back pain and neck pain.   Skin: Positive for wound. Negative for color change, pallor and rash.   Allergic/Immunologic: Negative.    Neurological: Negative.    Hematological: Negative.    Psychiatric/Behavioral: Negative.    All other systems reviewed and are negative.      Past Medical History:   Diagnosis Date   • Arthritis    • Depression    • History of emphysema    •  History of esophageal reflux    • History of recurrent UTI (urinary tract infection)    • History of renal calculi    • History of uterine cancer    • Hyperlipidemia        Allergies   Allergen Reactions   • Influenza Vaccines    • Morphine And Related        Past Surgical History:   Procedure Laterality Date   • FOOT SURGERY     • HAND SURGERY     • HYSTERECTOMY         Family History   Problem Relation Age of Onset   • Cancer Mother    • Heart attack Father    • Arthritis Father    • Diabetes Daughter    • Hyperlipidemia Daughter    • Migraines Daughter        Social History     Social History   • Marital status:      Spouse name: N/A   • Number of children: N/A   • Years of education: N/A     Social History Main Topics   • Smoking status: Current Every Day Smoker     Packs/day: 0.25     Types: Cigarettes   • Smokeless tobacco: Never Used   • Alcohol use No   • Drug use: No   • Sexual activity: Not Asked      Comment:      Other Topics Concern   • None     Social History Narrative           Objective   Physical Exam   Constitutional: She is oriented to person, place, and time. She appears well-developed and well-nourished. No distress.   HENT:   Head: Normocephalic and atraumatic.   Right Ear: External ear normal.   Left Ear: External ear normal.   Nose: Nose normal.   Mouth/Throat: Oropharynx is clear and moist. No oropharyngeal exudate.   Eyes: Conjunctivae and EOM are normal. Pupils are equal, round, and reactive to light. Right eye exhibits no discharge. Left eye exhibits no discharge. No scleral icterus.   Neck: Normal range of motion. Neck supple. No JVD present. No tracheal deviation present.   Cardiovascular: Normal rate, regular rhythm and normal heart sounds.    Pulmonary/Chest: Effort normal and breath sounds normal. No stridor. No respiratory distress. She has no wheezes. She has no rales.   Abdominal: Soft. Bowel sounds are normal. She exhibits no distension and no mass. There is no  tenderness. There is no rebound and no guarding. No hernia.   Musculoskeletal: Normal range of motion. She exhibits edema and tenderness. She exhibits no deformity.   Right foot with laceration across lateral/ dorsal surface.  There is wound dehiscence to the proximal aspect.  The entire foot is erythematous, warm, swollen.  Capillary refill is less than 3 seconds.  There are palpable pedal pulses.  Sensation is intact.   Neurological: She is alert and oriented to person, place, and time. No cranial nerve deficit. Coordination normal.   Skin: Skin is warm and dry. No rash noted. She is not diaphoretic. No erythema.   Psychiatric: She has a normal mood and affect. Her behavior is normal. Judgment and thought content normal.   Nursing note and vitals reviewed.      Procedures         ED Course  ED Course   Comment By Time   Discussed with hospitalist who accepted pt as obs/admit. Mayda Holcomb PA-C 01/10 1948   CBC, white count 13.8, hemoglobin 12, hematocrit 38.5, pulse 207.  Basic metabolic panel glucose 95, BUN 25, creatinine 1.2, sodium 138, potassium 3.9, chloride 97, CO2 39.  Two-view x-ray of the right foot as interpreted by me, no fracture, no dislocation, no evidence of ostemylitis Mayda Holcomb PA-C 01/10 1952                  [facility]    Final diagnoses:   Cellulitis of foot            Mayda Holcomb PA-C  01/11/18 0124     [place] Counseling: I discussed with the patient the risks of ixekizumab including but not limited to immunosuppression, serious infections, worsening of inflammatory bowel disease and drug reactions.  The patient understands that monitoring is required including a PPD at baseline and must alert us or the primary physician if symptoms of infection or other concerning signs are noted.

## 2024-08-26 NOTE — PROGRESS NOTES
"Adult Nutrition  Assessment/PES    Patient Name:  Sadie Tijerina  YOB: 1940  MRN: [id number]  Admit Date:  12/2/2020    Assessment Date:  12/7/2020    Comments:    Recommend:  1. Consider adding cardiac modifications to current diet order as medically appropriate and tolerated.  2. Encourage PO intake. Average intake ~72% x 9 meals.   3. RD ordered Boost Plus daily.  4. Consider a multivitamin with minerals daily.     RD to follow pt and available PRN.      Reason for Assessment     Row Name 12/07/20 1356          Reason for Assessment    Reason For Assessment  per organizational policy;identified at risk by screening criteria     Diagnosis  cardiac disease;pulmonary disease COPD, acute resp failure, CHF, HTN. HLD     Identified At Risk by Screening Criteria  other (see comments);MST SCORE 2+ LACE           Anthropometrics     Row Name 12/07/20 1405          Anthropometrics    Height  157.5 cm (62\")        Ideal Body Weight (IBW)    Ideal Body Weight (IBW) (kg)  50.43         Labs/Tests/Procedures/Meds     Row Name 12/07/20 1403          Labs/Procedures/Meds    Lab Results Reviewed  reviewed, pertinent     Lab Results Comments  Low: Alb; High: Procal, ALT, CRP, Phos, Gluc        Medications    Pertinent Medications Reviewed  reviewed, pertinent     Pertinent Medications Comments  Protonix, Prednisone,           Estimated/Assessed Needs     Row Name 12/07/20 1405          Calculation Measurements    Weight Used For Calculations  66.2 kg (145 lb 15.1 oz) actual BW     Height  157.5 cm (62\")        Estimated/Assessed Needs    Additional Documentation  Fluid Requirements (Group);Robeson-St. Jeor Equation (Group);Protein Requirements (Group);Calorie Requirements (Group)        Calorie Requirements    Estimated Calorie Need Method  Robeson-St Jeor     Estimated Calorie Requirement Comment  [id number]        Robeson-St. Jeor Equation    RMR (Robeson-St. Jeor Equation)  1075.25     Robeson-St. Jeor " Refill request via fax      Activity Factors  -- 1.3 AF        Protein Requirements    Weight Used For Protein Calculations  66.2 kg (145 lb 15.1 oz) actual BW     Est Protein Requirement Amount (gms/kg)  1.2 gm protein 66-79 grams     Estimated Protein Requirements (gms/day)  79.44        Fluid Requirements    Estimated Fluid Requirement Method  [place]-Segar Formula     [place]-Segar Method (over 20 kg)  2824         Nutrition Prescription Ordered     Row Name 12/07/20 1406          Nutrition Prescription PO    Current PO Diet  Regular         Evaluation of Received Nutrient/Fluid Intake     Row Name 12/07/20 1405          PO Evaluation    Number of Days PO Intake Evaluated  3 days     Number of Meals  9     % PO Intake  72               Problem/Interventions:  Problem 1     Row Name 12/07/20 1400          Nutrition Diagnoses Problem 1    Problem 1  Increased Nutrient Needs     Macronutrient  Kcal;Protein;Fluid     Etiology (related to)  Medical Diagnosis     Pulmonary/Critical Care  Acute respiratory failure;COPD     Signs/Symptoms (evidenced by)  Report/Observation     Reported/Observed By  MD               Intervention Goal     Row Name 12/07/20 1407          Intervention Goal    General  Improved nutrition related lab(s);Meet nutritional needs for age/condition     PO  Meet estimated needs;Increase intake;PO intake (%)     PO Intake %  75 %     Weight  Maintain weight         Nutrition Intervention     Row Name 12/07/20 1402          Nutrition Intervention    RD/Tech Action  Follow Tx progress;Encourage intake;Recommend/ordered     Recommended/Ordered  Diet;Supplement         Nutrition Prescription     Row Name 12/07/20 1408          Nutrition Prescription PO    PO Prescription  Begin/change diet;Begin/change supplement     Begin/Change Diet to  Regular     Supplement  Boost Plus     Supplement Frequency  Daily     Common Modifiers  Cardiac     New PO Prescription Ordered?  No, recommended        Other Orders    Obtain Weight  Daily      Obtain Weight Ordered?  No, recommended     Supplement  Vitamin mineral supplement     Supplement Ordered?  No, recommended         Education/Evaluation     Row Name 12/07/20 1408          Education    Education  Will Instruct as appropriate        Monitor/Evaluation    Monitor  Per protocol;I&O;PO intake;Supplement intake;Pertinent labs;Weight;Skin status           Electronically signed by:  Ester Thompson RD  12/07/20 14:08 EST